# Patient Record
Sex: FEMALE | Race: WHITE | Employment: OTHER | URBAN - METROPOLITAN AREA
[De-identification: names, ages, dates, MRNs, and addresses within clinical notes are randomized per-mention and may not be internally consistent; named-entity substitution may affect disease eponyms.]

---

## 2017-01-13 PROBLEM — J44.9 CHRONIC OBSTRUCTIVE PULMONARY DISEASE (HCC): Status: ACTIVE | Noted: 2017-01-13

## 2017-01-13 PROBLEM — E11.8 CONTROLLED TYPE 2 DIABETES MELLITUS WITH COMPLICATION, WITHOUT LONG-TERM CURRENT USE OF INSULIN (HCC): Status: ACTIVE | Noted: 2017-01-13

## 2017-02-08 PROBLEM — J44.1 COPD WITH EXACERBATION (HCC): Status: ACTIVE | Noted: 2017-01-13

## 2017-02-08 PROBLEM — I20.0 UNSTABLE ANGINA (HCC): Status: ACTIVE | Noted: 2017-02-08

## 2017-02-08 PROBLEM — R55 SYNCOPE AND COLLAPSE: Status: ACTIVE | Noted: 2017-02-08

## 2017-02-09 PROBLEM — R07.9 CHEST PAIN: Status: ACTIVE | Noted: 2017-02-09

## 2017-02-16 ENCOUNTER — OFFICE VISIT (OUTPATIENT)
Dept: CARDIOLOGY CLINIC | Age: 72
End: 2017-02-16

## 2017-02-16 VITALS
HEIGHT: 66 IN | DIASTOLIC BLOOD PRESSURE: 80 MMHG | SYSTOLIC BLOOD PRESSURE: 118 MMHG | BODY MASS INDEX: 41.95 KG/M2 | WEIGHT: 261 LBS | HEART RATE: 78 BPM

## 2017-02-16 DIAGNOSIS — I25.10 CAD IN NATIVE ARTERY: Primary | ICD-10-CM

## 2017-02-16 DIAGNOSIS — R42 DIZZINESS: ICD-10-CM

## 2017-02-16 DIAGNOSIS — I10 ESSENTIAL HYPERTENSION: ICD-10-CM

## 2017-02-16 PROCEDURE — G8417 CALC BMI ABV UP PARAM F/U: HCPCS | Performed by: INTERNAL MEDICINE

## 2017-02-16 PROCEDURE — G8427 DOCREV CUR MEDS BY ELIG CLIN: HCPCS | Performed by: INTERNAL MEDICINE

## 2017-02-16 PROCEDURE — 99214 OFFICE O/P EST MOD 30 MIN: CPT | Performed by: INTERNAL MEDICINE

## 2017-02-16 PROCEDURE — 4040F PNEUMOC VAC/ADMIN/RCVD: CPT | Performed by: INTERNAL MEDICINE

## 2017-02-16 PROCEDURE — 4004F PT TOBACCO SCREEN RCVD TLK: CPT | Performed by: INTERNAL MEDICINE

## 2017-02-16 PROCEDURE — G8484 FLU IMMUNIZE NO ADMIN: HCPCS | Performed by: INTERNAL MEDICINE

## 2017-02-16 PROCEDURE — G8399 PT W/DXA RESULTS DOCUMENT: HCPCS | Performed by: INTERNAL MEDICINE

## 2017-02-16 PROCEDURE — G8598 ASA/ANTIPLAT THER USED: HCPCS | Performed by: INTERNAL MEDICINE

## 2017-02-16 PROCEDURE — 1111F DSCHRG MED/CURRENT MED MERGE: CPT | Performed by: INTERNAL MEDICINE

## 2017-02-16 PROCEDURE — 3017F COLORECTAL CA SCREEN DOC REV: CPT | Performed by: INTERNAL MEDICINE

## 2017-02-16 PROCEDURE — 1123F ACP DISCUSS/DSCN MKR DOCD: CPT | Performed by: INTERNAL MEDICINE

## 2017-02-16 PROCEDURE — 1090F PRES/ABSN URINE INCON ASSESS: CPT | Performed by: INTERNAL MEDICINE

## 2017-02-16 PROCEDURE — 3014F SCREEN MAMMO DOC REV: CPT | Performed by: INTERNAL MEDICINE

## 2017-06-01 RX ORDER — TRIAMCINOLONE ACETONIDE 1 MG/G
CREAM TOPICAL
Qty: 30 G | Refills: 1 | Status: SHIPPED | OUTPATIENT
Start: 2017-06-01 | End: 2020-12-11 | Stop reason: SDUPTHER

## 2017-08-16 ENCOUNTER — TELEPHONE (OUTPATIENT)
Dept: SLEEP MEDICINE | Age: 72
End: 2017-08-16

## 2017-08-16 ENCOUNTER — OFFICE VISIT (OUTPATIENT)
Dept: CARDIOLOGY CLINIC | Age: 72
End: 2017-08-16

## 2017-08-16 VITALS
SYSTOLIC BLOOD PRESSURE: 124 MMHG | HEART RATE: 50 BPM | BODY MASS INDEX: 41.78 KG/M2 | DIASTOLIC BLOOD PRESSURE: 64 MMHG | WEIGHT: 260 LBS | HEIGHT: 66 IN

## 2017-08-16 DIAGNOSIS — I25.10 ASHD (ARTERIOSCLEROTIC HEART DISEASE): Primary | ICD-10-CM

## 2017-08-16 DIAGNOSIS — I10 HTN (HYPERTENSION), BENIGN: ICD-10-CM

## 2017-08-16 DIAGNOSIS — I25.10 CORONARY ARTERY DISEASE INVOLVING NATIVE CORONARY ARTERY OF NATIVE HEART WITHOUT ANGINA PECTORIS: ICD-10-CM

## 2017-08-16 DIAGNOSIS — R06.83 SNORING: ICD-10-CM

## 2017-08-16 DIAGNOSIS — E66.01 MORBID OBESITY DUE TO EXCESS CALORIES (HCC): ICD-10-CM

## 2017-08-16 DIAGNOSIS — R60.0 BILATERAL EDEMA OF LOWER EXTREMITY: ICD-10-CM

## 2017-08-16 PROCEDURE — 93000 ELECTROCARDIOGRAM COMPLETE: CPT | Performed by: INTERNAL MEDICINE

## 2017-08-16 PROCEDURE — 99214 OFFICE O/P EST MOD 30 MIN: CPT | Performed by: INTERNAL MEDICINE

## 2017-08-16 PROCEDURE — G8598 ASA/ANTIPLAT THER USED: HCPCS | Performed by: INTERNAL MEDICINE

## 2017-08-16 PROCEDURE — G8399 PT W/DXA RESULTS DOCUMENT: HCPCS | Performed by: INTERNAL MEDICINE

## 2017-08-16 PROCEDURE — 1036F TOBACCO NON-USER: CPT | Performed by: INTERNAL MEDICINE

## 2017-08-16 PROCEDURE — G8427 DOCREV CUR MEDS BY ELIG CLIN: HCPCS | Performed by: INTERNAL MEDICINE

## 2017-08-16 PROCEDURE — 3014F SCREEN MAMMO DOC REV: CPT | Performed by: INTERNAL MEDICINE

## 2017-08-16 PROCEDURE — 3017F COLORECTAL CA SCREEN DOC REV: CPT | Performed by: INTERNAL MEDICINE

## 2017-08-16 PROCEDURE — G8417 CALC BMI ABV UP PARAM F/U: HCPCS | Performed by: INTERNAL MEDICINE

## 2017-08-16 PROCEDURE — 1123F ACP DISCUSS/DSCN MKR DOCD: CPT | Performed by: INTERNAL MEDICINE

## 2017-08-16 PROCEDURE — 1090F PRES/ABSN URINE INCON ASSESS: CPT | Performed by: INTERNAL MEDICINE

## 2017-08-16 PROCEDURE — 4040F PNEUMOC VAC/ADMIN/RCVD: CPT | Performed by: INTERNAL MEDICINE

## 2017-08-22 ENCOUNTER — HOSPITAL ENCOUNTER (OUTPATIENT)
Dept: OTHER | Age: 72
Discharge: HOME | End: 2017-08-22
Attending: INTERNAL MEDICINE | Admitting: INTERNAL MEDICINE

## 2017-08-22 DIAGNOSIS — I10 HTN (HYPERTENSION), BENIGN: ICD-10-CM

## 2017-08-22 DIAGNOSIS — R60.0 BILATERAL EDEMA OF LOWER EXTREMITY: ICD-10-CM

## 2017-08-22 LAB
ANION GAP SERPL CALCULATED.3IONS-SCNC: 17 MMOL/L (ref 3–16)
BUN BLDV-MCNC: 12 MG/DL (ref 7–20)
CALCIUM SERPL-MCNC: 9.6 MG/DL (ref 8.3–10.6)
CHLORIDE BLD-SCNC: 99 MMOL/L (ref 99–110)
CO2: 23 MMOL/L (ref 21–32)
CREAT SERPL-MCNC: 0.7 MG/DL (ref 0.6–1.2)
GFR AFRICAN AMERICAN: >60
GFR NON-AFRICAN AMERICAN: >60
GLUCOSE BLD-MCNC: 165 MG/DL (ref 70–99)
POTASSIUM SERPL-SCNC: 3.9 MMOL/L (ref 3.5–5.1)
SODIUM BLD-SCNC: 139 MMOL/L (ref 136–145)

## 2017-08-30 ENCOUNTER — TELEPHONE (OUTPATIENT)
Dept: CARDIOLOGY CLINIC | Age: 72
End: 2017-08-30

## 2017-09-27 PROBLEM — I20.0 UNSTABLE ANGINA (HCC): Status: ACTIVE | Noted: 2017-09-27

## 2017-09-27 PROBLEM — I20.0 UNSTABLE ANGINA (HCC): Status: RESOLVED | Noted: 2017-02-08 | Resolved: 2017-09-27

## 2017-09-27 PROBLEM — E87.70 FLUID OVERLOAD: Status: ACTIVE | Noted: 2017-09-27

## 2017-10-05 ENCOUNTER — HOSPITAL ENCOUNTER (OUTPATIENT)
Dept: OTHER | Age: 72
Discharge: HOME | End: 2017-10-05
Attending: NURSE PRACTITIONER | Admitting: NURSE PRACTITIONER

## 2017-10-05 ENCOUNTER — OFFICE VISIT (OUTPATIENT)
Dept: CARDIOLOGY CLINIC | Age: 72
End: 2017-10-05

## 2017-10-05 VITALS
WEIGHT: 264 LBS | HEART RATE: 101 BPM | DIASTOLIC BLOOD PRESSURE: 76 MMHG | HEIGHT: 66 IN | SYSTOLIC BLOOD PRESSURE: 138 MMHG | OXYGEN SATURATION: 94 % | BODY MASS INDEX: 42.43 KG/M2

## 2017-10-05 DIAGNOSIS — I10 HTN (HYPERTENSION), BENIGN: ICD-10-CM

## 2017-10-05 DIAGNOSIS — R06.02 SOB (SHORTNESS OF BREATH): ICD-10-CM

## 2017-10-05 DIAGNOSIS — I25.10 CORONARY ARTERY DISEASE INVOLVING NATIVE CORONARY ARTERY OF NATIVE HEART WITHOUT ANGINA PECTORIS: Primary | ICD-10-CM

## 2017-10-05 LAB
ANION GAP SERPL CALCULATED.3IONS-SCNC: 11 MMOL/L (ref 3–16)
BUN BLDV-MCNC: 17 MG/DL (ref 7–20)
CALCIUM SERPL-MCNC: 10.3 MG/DL (ref 8.3–10.6)
CHLORIDE BLD-SCNC: 102 MMOL/L (ref 99–110)
CO2: 28 MMOL/L (ref 21–32)
CREAT SERPL-MCNC: 0.8 MG/DL (ref 0.6–1.2)
GFR AFRICAN AMERICAN: >60
GFR NON-AFRICAN AMERICAN: >60
GLUCOSE BLD-MCNC: 97 MG/DL (ref 70–99)
POTASSIUM SERPL-SCNC: 4.1 MMOL/L (ref 3.5–5.1)
PRO-BNP: 148 PG/ML (ref 0–124)
SODIUM BLD-SCNC: 141 MMOL/L (ref 136–145)

## 2017-10-05 PROCEDURE — 99214 OFFICE O/P EST MOD 30 MIN: CPT | Performed by: NURSE PRACTITIONER

## 2017-10-05 NOTE — PROGRESS NOTES
[address]     Outpatient Follow Up Note    CHIEF COMPLAINT / HPI: Hospital Follow Up secondary to chest pain     Hospital record has been reviewed  Hospital Course progressed as follows( no discharge summary available at time of OV today)    recurrent chest discomfort, weight gain and lower extremity edema. Has been in the ER twice for recurrent CP and SOB. Describes increased weight from 258 to 265 lbs in 1 week with progressive lower extremity edema. This was associated withsharp L parasternal CP when getting up with sensation of spinning. Discomfort was sharp and into the L arm. Denies reproducible exertional chest discomfort, palpitations or syncope. ECG and enzymes are negative--Cardiac cath 2016 showed non-obstructive CAD. ~Ice to sore chest wall, support hose, Lexiscan Myoview/echo, sleep study. ~Fluid overload (9/27/2017)  Edema resolved--weight 271  Reduce lasix at D/C. Will try Demadex 20mg/day at D/C     Javier Mosquera is 71 y.o. female who presents today for a routine follow up after a recent hospitalization related to the above mentioned issues. She recalls waking with CP. She feel trying to get out of the chair. She went to the ER and released. She continued to feel poorly. She got a sharp pain in her Lt chest with arm radiation. Subjective:   Since the time of discharge, the patient admits their symptoms have improved. She's felt very tired / no energy. She's been feeling better each day but still has some days better than others. She denies significant chest pain. She had one sharp twinge in her chest with a little radiation to her Lt arm the morning after discharge. She took a NTG and xanax: it lasted about 3 minutes. She hasn't had any since. There is SOB/MUNROE: no better no worse. She has 2 pillow orthopnea, denies PND. Sometimes her legs swell. She will start being weighed in the near future. The patient is not experiencing palpitations.  She has some light headedness making quick position changes. These symptoms are improving over the last few days. Her BP today was 140/90 before taking her medications. With regard to medication therapy the patient has been compliant with prescribed regimen. They have tolerated therapy to date.      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    Chronic obstructive pulmonary disease ([facility] [facility] 1/13/2017    Claustrophobia     COPD (chronic obstructive pulmonary disease) ([facility]     Gastroesophageal reflux disease without esophagitis 3/24/2016    Hiatal hernia     Hypercholesteremia     Hypertension     Movement disorder arthritis    Pneumonia     Type II or unspecified type diabetes mellitus without mention of complication, not stated as uncontrolled     Unspecified cerebral artery occlusion with cerebral infarction      Social History:    History   Smoking Status    Former Smoker    Packs/day: 0.25    Years: 49.00    Types: Cigarettes    Quit date: 6/16/2017   Smokeless Tobacco    Never Used     Comment: only smoke when real stressed  Nicotine patch     Current Medications:  Current Outpatient Prescriptions   Medication Sig Dispense Refill    torsemide (DEMADEX) 20 MG tablet Take 1 tablet by mouth daily 30 tablet 3    pravastatin (PRAVACHOL) 80 MG tablet Take 80 mg by mouth daily      gabapentin (NEURONTIN) 100 MG capsule Take 1 capsule by mouth 3 times daily 90 capsule 0    ipratropium-albuterol (DUONEB) 0.5-2.5 (3) MG/3ML SOLN nebulizer solution Inhale 3 mLs into the lungs every 4 hours (while awake) 120 vial 0    ALPRAZolam (XANAX) 1 MG tablet Take 1 tablet by mouth 3 times daily as needed for Anxiety 90 tablet 0    isosorbide mononitrate (IMDUR) 60 MG extended release tablet Take 1 tablet by mouth daily 30 tablet 2    omeprazole (PRILOSEC) 40 MG delayed release capsule Take 1 capsule by mouth daily 30 capsule 3    cilostazol (PLETAL) 100 MG tablet Take 1 tablet by mouth 2 times daily 60 tablet 3    butalbital-acetaminophen-caffeine (FIORICET, ESGIC) -40 MG per tablet Take 1 tablet by mouth every 6 hours as needed for Headaches 90 tablet 1    metoprolol tartrate (LOPRESSOR) 25 MG tablet Take 0.5 tablets by mouth 2 times daily 60 tablet 3    lisinopril (PRINIVIL;ZESTRIL) 2.5 MG tablet Take 1 tablet by mouth daily 30 tablet 2    glipiZIDE (GLUCOTROL) 5 MG tablet Take 1 tablet by mouth 2 times daily (before meals) 60 tablet 2    triamcinolone (KENALOG) 0.1 % cream Apply topically 2 times daily. 30 g 1    albuterol sulfate HFA (VENTOLIN HFA) 108 (90 BASE) MCG/ACT inhaler Inhale 2 puffs into the lungs every 6 hours as needed for Wheezing 1 Inhaler 3    nystatin (MYCOSTATIN) 499139 UNIT/GM powder Affected area 1 Bottle 5    aspirin 81 MG tablet Take 81 mg by mouth daily      nitroGLYCERIN (NITROSTAT) 0.4 MG SL tablet Place 1 tablet under the tongue every 5 minutes as needed for Chest pain. 30 tablet 0    Respiratory Therapy Supplies (NEBULIZER) by Does not apply route. PRN BREATHING TREATMENTS, UNSURE OF MED       glucose blood VI test strips (ASCENSIA AUTODISC VI;ONE TOUCH ULTRA TEST VI) strip 1 each by In Vitro route daily As needed. 100 each 3     No current facility-administered medications for this visit. REVIEW OF SYSTEMS:   CONSTITUTIONAL: No major weight gain or loss, fatigue, weakness, night sweats or fever. There's been no change in energy level, sleep pattern, or activity level. HEENT: No new vision difficulties or ringing in the ears. RESPIRATORY: No new SOB, PND, orthopnea or cough.    CARDIOVASCULAR: See HPI  GI: No nausea, vomiting, diarrhea, constipation, abdominal pain or changes in bowel habits. : No urinary frequency, urgency, incontinence hematuria or dysuria. SKIN: No cyanosis or skin lesions. MUSCULOSKELETAL: No new muscle or joint pain. NEUROLOGICAL: No syncope or TIA-like symptoms. PSYCHIATRIC: No anxiety, pain, insomnia or depression    Objective:   PHYSICAL EXAM:      Vitals:    10/05/17 1440 10/05/17 1444   BP: 130/86 138/76   Site: Right Arm Right Arm   Position: Sitting    Cuff Size: Large Adult Large Adult   Pulse: 98 101   SpO2: 95% 94%   Weight: 264 lb (119.7 kg)    Height: 5' 6\" (1.676 m)        VITALS:  /86 (Site: Right Arm, Position: Sitting, Cuff Size: Large Adult)  Pulse 101  Ht 5' 6\" (1.676 m)  Wt 264 lb (119.7 kg)  SpO2 94%  BMI 42.61 kg/m2    CONSTITUTIONAL: Cooperative, no apparent distress, and appears well nourished / obese  NEUROLOGIC:  Awake and orientated to person, place and time. PSYCH: Calm affect. SKIN: Warm and dry. HEENT: Sclera non-icteric, normocephalic, neck supple, no elevation of JVP, normal carotid pulses with no bruits and thyroid normal size. LUNGS:  No increased work of breathing and clear to auscultation, no crackles or wheezing. CARDIOVASCULAR:  Regular rate and rhythm with no murmurs, gallops, rubs, or abnormal heart sounds, normal PMI. The apical impulses not displaced. Heart tones are crisp and normal                                                                                            Cervical veins are not engorged                 JVP less than 8 cm H2O                                                                              The carotid upstroke is normal in amplitude and contour without delay or bruit    ABDOMEN:  Normal bowel sounds, non-distended and non-tender to palpation   EXT: bart LE edema Rt > Lt, no calf tenderness. Pulses are present bilaterally.     DATA:    Lab Results   Component Value Date    ALT 12 09/26/2017    AST 19 09/26/2017    ALKPHOS 79 09/26/2017    BILITOT <0.2 09/26/2017     Lab Results   Component Value Date    CREATININE 0.8 09/29/2017    BUN 20 09/29/2017     09/29/2017    K 4.0 09/29/2017    CL 98 (L) 09/29/2017    CO2 28 09/29/2017     Lab Results   Component Value Date    TSH 2.34 06/06/2011     Lab Results   Component Value Date    WBC 13.5 (H) 09/29/2017    HGB 14.1 09/29/2017    HCT 42.3 09/29/2017    MCV 89.3 09/29/2017     09/29/2017     No components found for: CHLPL  Lab Results   Component Value Date    TRIG 272 (H) 03/05/2015    TRIG 181 (H) 05/02/2013    TRIG 203 (H) 04/19/2012     Lab Results   Component Value Date    HDL 23 (L) 03/05/2015    HDL 35 (L) 05/02/2013    HDL 32 (L) 04/19/2012     Lab Results   Component Value Date    LDLCALC 96 03/05/2015    LDLCALC 83 05/02/2013    LDLCALC 82 04/19/2012     Lab Results   Component Value Date    LABVLDL 54 03/05/2015    LABVLDL 36 05/02/2013    LABVLDL 41 04/19/2012     Radiology Review:  Pertinent images / reports were reviewed as a part of this visit and reveals the following:    Last Echo: Feb '17:  Summary  Technically difficult study. Normal left ventricle size, wall thickness and systolic function with an EF 55%  Mild mitral regurgitation is present.   Mild tricuspid regurgitation with RVSP estimated at 36 mmHg.     Last Stress Test: 9/27/17:  Summary    There is normal isotope uptake at stress and rest. There is no evidence of    myocardial ischemia or scar.    Normal LV function.    Overall findings represent a low risk scan. Assessment:     1. Coronary artery disease involving native coronary artery of native heart without angina pectoris   ~atypical chest discomfort  ~non-obst disease by cath; nuclear stress neg for ischemia  ~has used NTG SL along with xanax. Thinks panic attacks cause CP  ~statin / ASA / BB    2.  SOB (shortness of breath)   ~stable: weight down 7# over the past week of discharge  ~tolerating torsemide   ~proBNP 342 on adm  ~home SaPO2 92-97% on RA; sleeps using O2 NC   ~expecting to have sleep study Basic Metabolic Panel    Brain Natriuretic Peptide   3. HTN (hypertension), benign   ~reasonable         Patient  is stable since hospital discharge. Plan:  BNP/BMP today as planned   F/U as scheduled 11/2    I have addressed the patient's cardiac risk factors and adjusted pharmacologic treatment as needed. In addition, I have reinforced the need for patient directed risk factor modification. Further evaluation will be based upon the patient's clinical course and testing results. All questions and concerns were addressed to the patient. Alternatives to  treatment were discussed. The patient  currently  is not smoking: quit a few months ago. The risks related to smoking were reviewed with the patient. Recommend maintaining a smoke-free lifestyle. Antiplatelet therapy has been recommended / prescribed for this patient. Education conducted on adverse reactions including bleeding was discussed. The patient verbalizes understanding. Pt is on a BB  Pt is on an ace-i  Pt is on a statin      Saturated fat diet discussed  Exercise program discussed    Thank you for allowing to us to participate in the care of Triamarilis Mcmullen.       Svitlana  Documentation of today's visit sent to PCP

## 2017-10-05 NOTE — MR AVS SNAPSHOT
your BMI, the greater your risk of heart disease, high blood pressure, type 2 diabetes, stroke, gallstones, arthritis, sleep apnea, and certain cancers. BMI is not perfect. It may overestimate body fat in athletes and people who are more muscular. Even a small weight loss (between 5 and 10 percent of your current weight) by decreasing your calorie intake and becoming more physically active will help lower your risk of developing or worsening diseases associated with obesity. Learn more at: [employer]          Instructions    Labs today     Sleep apnea            Medications and Orders      Your Current Medications Are              torsemide (DEMADEX) 20 MG tablet Take 1 tablet by mouth daily    pravastatin (PRAVACHOL) 80 MG tablet Take 80 mg by mouth daily    gabapentin (NEURONTIN) 100 MG capsule Take 1 capsule by mouth 3 times daily    ipratropium-albuterol (DUONEB) 0.5-2.5 (3) MG/3ML SOLN nebulizer solution Inhale 3 mLs into the lungs every 4 hours (while awake)    ALPRAZolam (XANAX) 1 MG tablet Take 1 tablet by mouth 3 times daily as needed for Anxiety    isosorbide mononitrate (IMDUR) 60 MG extended release tablet Take 1 tablet by mouth daily    omeprazole (PRILOSEC) 40 MG delayed release capsule Take 1 capsule by mouth daily    cilostazol (PLETAL) 100 MG tablet Take 1 tablet by mouth 2 times daily    butalbital-acetaminophen-caffeine (FIORICET, ESGIC) -40 MG per tablet Take 1 tablet by mouth every 6 hours as needed for Headaches    metoprolol tartrate (LOPRESSOR) 25 MG tablet Take 0.5 tablets by mouth 2 times daily    lisinopril (PRINIVIL;ZESTRIL) 2.5 MG tablet Take 1 tablet by mouth daily    glipiZIDE (GLUCOTROL) 5 MG tablet Take 1 tablet by mouth 2 times daily (before meals)    triamcinolone (KENALOG) 0.1 % cream Apply topically 2 times daily.     albuterol sulfate HFA (VENTOLIN HFA) 108 (90 BASE) MCG/ACT inhaler Inhale 2 puffs into the lungs every 6 hours as needed for Wheezing    nystatin (MYCOSTATIN) 760397 UNIT/GM powder Affected area    aspirin 81 MG tablet Take 81 mg by mouth daily    nitroGLYCERIN (NITROSTAT) 0.4 MG SL tablet Place 1 tablet under the tongue every 5 minutes as needed for Chest pain. Respiratory Therapy Supplies (NEBULIZER) by Does not apply route. PRN BREATHING TREATMENTS, UNSURE OF MED     glucose blood VI test strips (ASCENSIA AUTODISC VI;ONE TOUCH ULTRA TEST VI) strip 1 each by In Vitro route daily As needed.       Allergies              Morphine Shortness Of Breath    Codeine     Chest pain    Hydromorphone Other (See Comments)    hallucinations  Hallucinations    But will take if needed in an emergency    Vicodin [Hydrocodone-acetaminophen] Hives      We Ordered/Performed the following           Basic Metabolic Panel     Brain Natriuretic Peptide     Comments:    Run as proBNP please         Additional Information        Basic Information     Date Of Birth Sex Race Ethnicity Preferred Language    1945 Female White Non-/Non  English      Problem List as of 10/5/2017  Date Reviewed: 10/5/2017                Hypoxia    CAD (coronary artery disease)    Hyperlipemia    Unstable angina (HCC)    Fluid overload    Bilateral edema of lower extremity    Chest pain    Syncope    Controlled type 2 diabetes mellitus with complication, without long-term current use of insulin (HCC)    COPD with exacerbation ([facility])    PVC (premature ventricular contraction)    Dizziness    Light headedness    Primary osteoarthritis involving multiple joints    Gastroesophageal reflux disease without esophagitis    Hemispheric carotid artery syndrome    DM2 (diabetes mellitus, type 2) ([facility])    Malignant hypertension    Body mass index 40.0-44.9, adult ([address]    Morbid obesity ([facility]    Essential hypertension    Type II or unspecified type diabetes mellitus without mention of complication, uncontrolled COPD (chronic obstructive pulmonary disease) ([facility]      Immunizations as of 10/5/2017     Name Date    Influenza Virus Vaccine 9/5/2017, 9/10/2015, 8/11/2014, 10/11/2013, 10/18/2010    Influenza, MDCK Ambrocioena Liguori, with Preservative 9/8/2017    Influenza, Triv, 3 years and older, IM 10/20/2016    Pneumococcal 13-valent Conjugate ([id number]) 2/8/2017    Pneumococcal Polysaccharide ([id number]) 7/23/2015, 12/24/2010      Preventive Care        Date Due    Hepatitis C screening is recommended for all adults regardless of risk factors born between [facility] at least once (lifetime) who have never been tested. 1945    Urine Check For Kidney Problems 4/8/1964    Tetanus Combination Vaccine (1 - Tdap) 4/8/1965    Cholesterol Screening 3/5/2016    Eye Exam By An Eye Doctor 7/23/2016    Diabetic Foot Exam 8/18/2017    Mammograms are recommended every 2 years for low/average risk patients aged 48 - 69, and every year for high risk patients per updated national guidelines. However these guidelines can be individualized by your provider. 10/29/2017    Hemoglobin A1C (Test For Long-Term Glucose Control) 9/27/2018    Colonoscopy 5/12/2020            MyChart Signup           Our records indicate that you have declined MyChart signup.

## 2017-10-06 ENCOUNTER — TELEPHONE (OUTPATIENT)
Dept: CARDIOLOGY CLINIC | Age: 72
End: 2017-10-06

## 2017-10-09 ENCOUNTER — TELEPHONE (OUTPATIENT)
Dept: CARDIOLOGY CLINIC | Age: 72
End: 2017-10-09

## 2017-11-02 ENCOUNTER — OFFICE VISIT (OUTPATIENT)
Dept: CARDIOLOGY CLINIC | Age: 72
End: 2017-11-02

## 2017-11-02 VITALS
HEART RATE: 76 BPM | WEIGHT: 269 LBS | DIASTOLIC BLOOD PRESSURE: 70 MMHG | HEIGHT: 66 IN | BODY MASS INDEX: 43.23 KG/M2 | SYSTOLIC BLOOD PRESSURE: 126 MMHG

## 2017-11-02 DIAGNOSIS — M79.605 PAIN IN BOTH LOWER EXTREMITIES: Primary | ICD-10-CM

## 2017-11-02 DIAGNOSIS — M79.89 LEG SWELLING: ICD-10-CM

## 2017-11-02 DIAGNOSIS — M79.604 PAIN IN BOTH LOWER EXTREMITIES: Primary | ICD-10-CM

## 2017-11-02 DIAGNOSIS — R00.1 BRADYCARDIA: ICD-10-CM

## 2017-11-02 DIAGNOSIS — I73.9 CLAUDICATION (HCC): ICD-10-CM

## 2017-11-02 PROCEDURE — 3017F COLORECTAL CA SCREEN DOC REV: CPT | Performed by: INTERNAL MEDICINE

## 2017-11-02 PROCEDURE — 1090F PRES/ABSN URINE INCON ASSESS: CPT | Performed by: INTERNAL MEDICINE

## 2017-11-02 PROCEDURE — G8482 FLU IMMUNIZE ORDER/ADMIN: HCPCS | Performed by: INTERNAL MEDICINE

## 2017-11-02 PROCEDURE — 99214 OFFICE O/P EST MOD 30 MIN: CPT | Performed by: INTERNAL MEDICINE

## 2017-11-02 PROCEDURE — G8427 DOCREV CUR MEDS BY ELIG CLIN: HCPCS | Performed by: INTERNAL MEDICINE

## 2017-11-02 PROCEDURE — 1123F ACP DISCUSS/DSCN MKR DOCD: CPT | Performed by: INTERNAL MEDICINE

## 2017-11-02 PROCEDURE — 1036F TOBACCO NON-USER: CPT | Performed by: INTERNAL MEDICINE

## 2017-11-02 PROCEDURE — G8598 ASA/ANTIPLAT THER USED: HCPCS | Performed by: INTERNAL MEDICINE

## 2017-11-02 PROCEDURE — G8399 PT W/DXA RESULTS DOCUMENT: HCPCS | Performed by: INTERNAL MEDICINE

## 2017-11-02 PROCEDURE — 93000 ELECTROCARDIOGRAM COMPLETE: CPT | Performed by: INTERNAL MEDICINE

## 2017-11-02 PROCEDURE — 3014F SCREEN MAMMO DOC REV: CPT | Performed by: INTERNAL MEDICINE

## 2017-11-02 PROCEDURE — G8417 CALC BMI ABV UP PARAM F/U: HCPCS | Performed by: INTERNAL MEDICINE

## 2017-11-02 PROCEDURE — 4040F PNEUMOC VAC/ADMIN/RCVD: CPT | Performed by: INTERNAL MEDICINE

## 2017-11-02 NOTE — PATIENT INSTRUCTIONS
Arterial us for claudication. ECG today. Sleep study to evaluate sleep apnea. Coq10 400 mg daily OTC at least 2 months trial  Fluid restriction no mor 64 oz day. Support hose knee high. Follow up in 3-4 months.

## 2017-11-02 NOTE — PROGRESS NOTES
[address]   Cardiac Consultation    Referring Provider:  Terrence Villalobos MD     Chief Complaint   Patient presents with    3 Month Follow-Up    Hypertension    Coronary Artery Disease    Numbness     in feet along with some pain     . Symptomatic bradycardia--B blocker stopped    History of Present Illness:   72 yo with several week hx of  Lower extremity edema, MUNROE, and bilateral lower extremity discomfort with activity. Occ chest discomfort unchanged. Legs and feet concern her the most and the slow HR. Patient has been advised on the importance of regular exercise of at least 20-30 minutes daily alternating with aerobic and isometric activities. Currently works with PT that comes to the house. Past Medical History:   has a past medical history of Acute MI ([facility] Acute pyelonephritis; Anxiety and depression; Arthritis; CAD (coronary artery disease); Cancer ([facility] Cancer ([facility] Cancer of skin of leg; Chronic obstructive pulmonary disease ([facility] Claustrophobia; COPD (chronic obstructive pulmonary disease) ([facility] Gastroesophageal reflux disease without esophagitis; Hiatal hernia; Hypercholesteremia; Hypertension; Movement disorder; Pneumonia; Type II or unspecified type diabetes mellitus without mention of complication, not stated as uncontrolled; and Unspecified cerebral artery occlusion with cerebral infarction. Surgical History:   has a past surgical history that includes Cardiac surgery; Gallbladder surgery; Hysterectomy; Appendectomy; Cholecystectomy; Colonoscopy; Upper gastrointestinal endoscopy (4/20/12); Endoscopy, colon, diagnostic; and Tear duct surgery. Social History:   reports that she quit smoking about 4 months ago. Her smoking use included Cigarettes. She has a 12.25 pack-year smoking history. She has never used smokeless tobacco. She reports that she drinks about 0.6 oz of alcohol per week . She reports that she does not use drugs.      Family History:  family history includes Cancer in her father, mother, and sister; Heart Disease in her brother, mother, and sister. Home Medications:  Outpatient Encounter Prescriptions as of 11/2/2017   Medication Sig Dispense Refill    lisinopril (PRINIVIL;ZESTRIL) 2.5 MG tablet Take 1 tablet by mouth daily 30 tablet 2    ALPRAZolam (XANAX) 1 MG tablet Take 1 tablet by mouth 3 times daily as needed for Anxiety 90 tablet 0    omeprazole (PRILOSEC) 40 MG delayed release capsule Take 1 capsule by mouth daily 30 capsule 3    cilostazol (PLETAL) 100 MG tablet Take 1 tablet by mouth 2 times daily 60 tablet 3    glipiZIDE (GLUCOTROL) 5 MG tablet Take 1 tablet by mouth 2 times daily (before meals) 60 tablet 2    butalbital-acetaminophen-caffeine (FIORICET, ESGIC) -40 MG per tablet Take 1 tablet by mouth every 6 hours as needed for Headaches 90 tablet 1    gabapentin (NEURONTIN) 100 MG capsule Take 1 capsule by mouth 3 times daily 90 capsule 0    torsemide (DEMADEX) 20 MG tablet Take 1 tablet by mouth daily 30 tablet 3    pravastatin (PRAVACHOL) 80 MG tablet Take 1 tablet by mouth daily 30 tablet 2    ipratropium-albuterol (DUONEB) 0.5-2.5 (3) MG/3ML SOLN nebulizer solution Inhale 3 mLs into the lungs every 4 hours (while awake) 120 vial 0    isosorbide mononitrate (IMDUR) 60 MG extended release tablet Take 1 tablet by mouth daily 30 tablet 2    triamcinolone (KENALOG) 0.1 % cream Apply topically 2 times daily. 30 g 1    albuterol sulfate HFA (VENTOLIN HFA) 108 (90 BASE) MCG/ACT inhaler Inhale 2 puffs into the lungs every 6 hours as needed for Wheezing 1 Inhaler 3    nystatin (MYCOSTATIN) 469900 UNIT/GM powder Affected area 1 Bottle 5    aspirin 81 MG tablet Take 81 mg by mouth daily      nitroGLYCERIN (NITROSTAT) 0.4 MG SL tablet Place 1 tablet under the tongue every 5 minutes as needed for Chest pain.  30 tablet 0    glucose blood VI test strips (ASCENSIA AUTODISC VI;ONE TOUCH ULTRA TEST VI) strip 1 each by In Vitro Auscultation: Normal breath sounds without dullness  Cardiovascular:  · The apical impulses not displaced  · Heart tones are crisp and normal  · Cervical veins are not engorged  · Normal S1S2, No S3, No Murmur  · Peripheral pulses are symmetrical and full  Extremities:  · There is no clubbing, cyanosis of the extremities. · No edema  · Femoral Arteries: 2+ and equal  · Pedal Pulses: 2+ and   · No masses or tenderness  · Liver/Spleen: No Abnormalities Noted  Neurological/Psychiatric:  · Alert and oriented in all spheres  · Moves all extremities well  · Exhibits normal gait balance and coordination  · No abnormalities of mood, affect, memory, mentation, or behavior are noted      Assessment:   Edema-Bilateral leg pain with activity--Arterial Doplers, fluid restrict, support Hose-20-30mmHG, Trial of CoQ10 200-400mg/day.   COPD  Obesity  Prob AKIL--Reschedule sleep study  Chronic CP--Stable  Bradycardia -Symptomatic--Holter and ECG to evaluate    Plan:  Sleep Study   Arterial Doppler legs  CoQ10 200-400mg/day  Fluid restrict 1200cc  Support Hose  ECG--NSR with PVCs  Holter  OV X 3-4months    Thank you for allowing me to participate in the care of this individual.      Gerald Kramer M.D., [facility]

## 2017-11-08 PROCEDURE — 93224 XTRNL ECG REC UP TO 48 HRS: CPT | Performed by: INTERNAL MEDICINE

## 2017-11-09 ENCOUNTER — TELEPHONE (OUTPATIENT)
Dept: CARDIOLOGY CLINIC | Age: 72
End: 2017-11-09

## 2017-11-15 ENCOUNTER — HOSPITAL ENCOUNTER (OUTPATIENT)
Dept: VASCULAR LAB | Age: 72
Discharge: HOME | End: 2017-11-15
Attending: INTERNAL MEDICINE | Admitting: INTERNAL MEDICINE

## 2017-11-15 DIAGNOSIS — M79.604 PAIN OF RIGHT LEG: ICD-10-CM

## 2017-11-15 DIAGNOSIS — M79.89 LEG SWELLING: ICD-10-CM

## 2017-11-15 DIAGNOSIS — M79.604 PAIN IN BOTH LOWER EXTREMITIES: ICD-10-CM

## 2017-11-15 DIAGNOSIS — M79.605 PAIN IN BOTH LOWER EXTREMITIES: ICD-10-CM

## 2017-11-15 DIAGNOSIS — I73.9 CLAUDICATION (HCC): ICD-10-CM

## 2017-12-28 PROBLEM — R07.9 CHEST PAIN: Status: ACTIVE | Noted: 2017-12-28

## 2018-01-19 PROBLEM — F41.9 ANXIETY: Status: ACTIVE | Noted: 2018-01-19

## 2018-02-21 ENCOUNTER — TELEPHONE (OUTPATIENT)
Dept: OTHER | Facility: CLINIC | Age: 73
End: 2018-02-21

## 2018-04-03 ENCOUNTER — OFFICE VISIT (OUTPATIENT)
Dept: CARDIOLOGY CLINIC | Age: 73
End: 2018-04-03

## 2018-04-03 VITALS
DIASTOLIC BLOOD PRESSURE: 86 MMHG | BODY MASS INDEX: 43.39 KG/M2 | SYSTOLIC BLOOD PRESSURE: 136 MMHG | HEART RATE: 76 BPM | HEIGHT: 66 IN | WEIGHT: 270 LBS

## 2018-04-03 DIAGNOSIS — I10 ESSENTIAL HYPERTENSION: ICD-10-CM

## 2018-04-03 DIAGNOSIS — I25.10 CORONARY ARTERY DISEASE INVOLVING NATIVE CORONARY ARTERY OF NATIVE HEART WITHOUT ANGINA PECTORIS: ICD-10-CM

## 2018-04-03 DIAGNOSIS — E78.2 MIXED HYPERLIPIDEMIA: ICD-10-CM

## 2018-04-03 DIAGNOSIS — I25.10 CAD IN NATIVE ARTERY: Primary | ICD-10-CM

## 2018-04-03 DIAGNOSIS — R60.0 BILATERAL EDEMA OF LOWER EXTREMITY: ICD-10-CM

## 2018-04-03 DIAGNOSIS — I73.9 CLAUDICATION (HCC): ICD-10-CM

## 2018-04-03 DIAGNOSIS — R06.02 SOB (SHORTNESS OF BREATH): ICD-10-CM

## 2018-04-03 PROBLEM — J44.1 COPD WITH EXACERBATION (HCC): Chronic | Status: ACTIVE | Noted: 2017-01-13

## 2018-04-03 PROCEDURE — G8598 ASA/ANTIPLAT THER USED: HCPCS | Performed by: INTERNAL MEDICINE

## 2018-04-03 PROCEDURE — 3017F COLORECTAL CA SCREEN DOC REV: CPT | Performed by: INTERNAL MEDICINE

## 2018-04-03 PROCEDURE — 1036F TOBACCO NON-USER: CPT | Performed by: INTERNAL MEDICINE

## 2018-04-03 PROCEDURE — 1123F ACP DISCUSS/DSCN MKR DOCD: CPT | Performed by: INTERNAL MEDICINE

## 2018-04-03 PROCEDURE — 1090F PRES/ABSN URINE INCON ASSESS: CPT | Performed by: INTERNAL MEDICINE

## 2018-04-03 PROCEDURE — G8427 DOCREV CUR MEDS BY ELIG CLIN: HCPCS | Performed by: INTERNAL MEDICINE

## 2018-04-03 PROCEDURE — 3014F SCREEN MAMMO DOC REV: CPT | Performed by: INTERNAL MEDICINE

## 2018-04-03 PROCEDURE — 99214 OFFICE O/P EST MOD 30 MIN: CPT | Performed by: INTERNAL MEDICINE

## 2018-04-03 PROCEDURE — 4040F PNEUMOC VAC/ADMIN/RCVD: CPT | Performed by: INTERNAL MEDICINE

## 2018-04-03 PROCEDURE — G8399 PT W/DXA RESULTS DOCUMENT: HCPCS | Performed by: INTERNAL MEDICINE

## 2018-04-03 PROCEDURE — G8417 CALC BMI ABV UP PARAM F/U: HCPCS | Performed by: INTERNAL MEDICINE

## 2018-05-25 ENCOUNTER — TELEPHONE (OUTPATIENT)
Dept: CARDIOLOGY CLINIC | Age: 73
End: 2018-05-25

## 2018-05-25 ENCOUNTER — HOSPITAL ENCOUNTER (OUTPATIENT)
Dept: MAMMOGRAPHY | Age: 73
Discharge: HOME | End: 2018-05-25
Attending: INTERNAL MEDICINE | Admitting: INTERNAL MEDICINE

## 2018-05-25 DIAGNOSIS — E11.8 CONTROLLED TYPE 2 DIABETES MELLITUS WITH COMPLICATION, WITHOUT LONG-TERM CURRENT USE OF INSULIN (HCC): ICD-10-CM

## 2018-05-25 DIAGNOSIS — Z12.39 BREAST CANCER SCREENING: ICD-10-CM

## 2018-05-25 LAB
ANION GAP SERPL CALCULATED.3IONS-SCNC: 17 MMOL/L (ref 3–16)
BUN BLDV-MCNC: 17 MG/DL (ref 7–20)
CALCIUM SERPL-MCNC: 9.6 MG/DL (ref 8.3–10.6)
CHLORIDE BLD-SCNC: 98 MMOL/L (ref 99–110)
CO2: 25 MMOL/L (ref 21–32)
CREAT SERPL-MCNC: 0.7 MG/DL (ref 0.6–1.2)
GFR AFRICAN AMERICAN: >60
GFR NON-AFRICAN AMERICAN: >60
GLUCOSE BLD-MCNC: 153 MG/DL (ref 70–99)
POTASSIUM SERPL-SCNC: 4.3 MMOL/L (ref 3.5–5.1)
SODIUM BLD-SCNC: 140 MMOL/L (ref 136–145)

## 2018-06-07 PROBLEM — E87.6 HYPOKALEMIA: Status: RESOLVED | Noted: 2018-06-07 | Resolved: 2018-06-07

## 2018-06-07 PROBLEM — E83.51 HYPOCALCEMIA: Status: ACTIVE | Noted: 2018-06-07

## 2018-06-07 PROBLEM — E87.6 HYPOKALEMIA: Status: ACTIVE | Noted: 2018-06-07

## 2018-06-07 PROBLEM — R47.1 DYSARTHRIA: Status: ACTIVE | Noted: 2018-06-07

## 2018-06-07 PROBLEM — E83.42 HYPOMAGNESEMIA: Status: ACTIVE | Noted: 2018-06-07

## 2018-06-08 PROBLEM — I20.0 UNSTABLE ANGINA (HCC): Status: RESOLVED | Noted: 2017-09-27 | Resolved: 2018-06-08

## 2018-06-08 PROBLEM — R55 SYNCOPE: Status: RESOLVED | Noted: 2017-02-08 | Resolved: 2018-06-08

## 2018-06-08 PROBLEM — K59.1 FUNCTIONAL DIARRHEA: Status: ACTIVE | Noted: 2018-06-08

## 2018-06-18 PROBLEM — E66.01 MORBID OBESITY (HCC): Status: ACTIVE | Noted: 2018-06-18

## 2018-06-18 PROBLEM — G93.41 METABOLIC ENCEPHALOPATHY: Status: ACTIVE | Noted: 2018-06-18

## 2018-07-23 ENCOUNTER — OFFICE VISIT (OUTPATIENT)
Dept: SURGERY | Age: 73
End: 2018-07-23

## 2018-07-23 VITALS
WEIGHT: 278 LBS | DIASTOLIC BLOOD PRESSURE: 82 MMHG | HEIGHT: 66 IN | SYSTOLIC BLOOD PRESSURE: 122 MMHG | BODY MASS INDEX: 44.68 KG/M2

## 2018-07-23 DIAGNOSIS — C44.41 BASAL CELL CARCINOMA OF SCALP: Primary | ICD-10-CM

## 2018-07-23 DIAGNOSIS — D04.4 SQUAMOUS CELL CARCINOMA IN SITU OF SCALP: Primary | ICD-10-CM

## 2018-07-23 PROCEDURE — 1090F PRES/ABSN URINE INCON ASSESS: CPT | Performed by: SURGERY

## 2018-07-23 PROCEDURE — 3017F COLORECTAL CA SCREEN DOC REV: CPT | Performed by: SURGERY

## 2018-07-23 PROCEDURE — G8417 CALC BMI ABV UP PARAM F/U: HCPCS | Performed by: SURGERY

## 2018-07-23 PROCEDURE — 1101F PT FALLS ASSESS-DOCD LE1/YR: CPT | Performed by: SURGERY

## 2018-07-23 PROCEDURE — 99202 OFFICE O/P NEW SF 15 MIN: CPT | Performed by: SURGERY

## 2018-07-23 PROCEDURE — G8427 DOCREV CUR MEDS BY ELIG CLIN: HCPCS | Performed by: SURGERY

## 2018-07-23 ASSESSMENT — ENCOUNTER SYMPTOMS
CHEST TIGHTNESS: 1
SHORTNESS OF BREATH: 1
TROUBLE SWALLOWING: 1
NAUSEA: 1
BACK PAIN: 1
ALLERGIC/IMMUNOLOGIC NEGATIVE: 1
DIARRHEA: 1
EYES NEGATIVE: 1
SINUS PRESSURE: 1

## 2018-07-23 NOTE — PROGRESS NOTES
Subjective:      Aurora Wolf is a 68 y.o. female     CC: Skin lesion of the scalp    HPI: 71-year-old female who presents for evaluation of a skin lesion of the scalp which has been present for about 25-30 years. Biopsy of the lesion in  showed basal cell carcinoma. It has slowly increased in size. She was scheduled to have it removed about 3 years ago but canceled after her  . Recently, the lesion has been causing her some pain and has been bleeding.         Family History   Problem Relation Age of Onset    Heart Disease Mother     Cancer Mother    Jewell Elbert Cancer Father     Cancer Sister     Heart Disease Sister     Heart Disease Brother     High Blood Pressure Neg Hx     High Cholesterol Neg Hx        Past Medical History:   Diagnosis Date    Acute MI     Acute pyelonephritis 2015    Anxiety and depression     Arthritis     CAD (coronary artery disease)     Cancer ([address]     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    Cancer [facility])     \"skin on top of my head\"    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    Chronic obstructive pulmonary disease ([address] 2017    Claustrophobia     COPD (chronic obstructive pulmonary disease) ([address]     Gastroesophageal reflux disease without esophagitis 3/24/2016    Hiatal hernia     Hypercholesteremia     Hypertension     Movement disorder arthritis    Pneumonia     Type II or unspecified type diabetes mellitus without mention of complication, not stated as uncontrolled     Unspecified cerebral artery occlusion with cerebral infarction        Past Surgical History:   Procedure Laterality Date    APPENDECTOMY      CARDIAC SURGERY      1 stent  and 1 stent     CHOLECYSTECTOMY      COLONOSCOPY      COLONOSCOPY  2018    ENDOSCOPY, COLON, DIAGNOSTIC      GALLBLADDER SURGERY      HYSTERECTOMY      TEAR DUCT SURGERY      UPPER GASTROINTESTINAL ENDOSCOPY  12    with biopsy of stomach,gastritis    UPPER GASTROINTESTINAL ENDOSCOPY  06/11/2018           Prior to Visit Medications    Medication Sig Taking? Authorizing Provider   ALPRAZolucy Riddle) 1 MG tablet Take 1 tablet by mouth 3 times daily as needed for Anxiety for up to 30 days. Sybil Roldan MD   blood glucose test strips (ASCENSIA AUTODISC VI;ONE TOUCH ULTRA TEST VI) strip 1 each by In Vitro route 3 times daily As needed. Yes Teja Sharpe MD   nystatin (MYCOSTATIN) 605774 UNIT/GM powder Apply topically 4 times daily. Yes Teja Sharpe MD   diclofenac sodium 1 % GEL Apply 2 g topically 2 times daily Yes Teja Sharpe MD   traMADol (ULTRAM) 50 MG tablet Take 1 tablet by mouth every 4 hours as needed for Pain for up to 7 days. Intended supply: 7 days. Take lowest dose possible to manage pain.  Yes Teja Sharpe MD   Denture Care Products (EFFERDENT DENTURE CLEANSER) TBEF 1 tablet by Does not apply route as needed (denture cleaning) Yes Teja Sharpe MD   omeprazole (PRILOSEC) 40 MG delayed release capsule Take 1 capsule by mouth daily Yes Teja Sharpe MD   glipiZIDE (GLUCOTROL) 5 MG tablet Take 1 tablet by mouth 2 times daily (before meals) Yes Teja Sharpe MD   lisinopril (PRINIVIL;ZESTRIL) 2.5 MG tablet Take 1 tablet by mouth daily Yes Teja Shrape MD   butalbital-acetaminophen-caffeine (FIORICET, ESGIC) -40 MG per tablet Take 1 tablet by mouth every 6 hours as needed for Headaches Yes Teja Sharpe MD   Diapers & Supplies (HUGGIES PULL-UPS 4T-5T) MISC 8 each by Does not apply route daily Yes Teja Sharpe MD   acetaminophen (TYLENOL) 325 MG tablet Take 2 tablets by mouth every 4 hours as needed for Pain or Fever Yes Teja Sharpe MD   docusate (COLACE, DULCOLAX) 100 MG CAPS Take 100 mg by mouth 2 times daily Yes Teja Sharpe MD   magnesium hydroxide (MILK OF MAGNESIA) 400 MG/5ML suspension Take 30 mLs by mouth daily as needed for Constipation Yes Teja Sharpe MD   diclofenac sodium 1 % GEL Apply

## 2018-07-23 NOTE — LETTER
[address]  Phone: [phone]  Fax: [phone]    Christina Winkler MD        July 23, 2018     Raul Valles MD  [address]    Patient: Jessica Giraldo  MR Number: [id number]  YOB: 1945  Date of Visit: 7/23/2018    Dear Dr. Raul Valles:    Thank you for the request for consultation for [facility]. Below are the relevant portions of my assessment and plan of care. Assessment:     59-year-old female with a long-standing history of a biopsy-proven basal cell carcinoma of the scalp. Physical examination, the patient has a 4 x 5 cm crusting skin lesion. There is no peripheral palpable adenopathy. Plan:     Because of the large size of the lesion, it will require excision with reconstruction. The patient was referred to Dr. Mikel Santana, plastic surgery, at [facility]    If you have questions, please do not hesitate to call me.     Sincerely,        Christina Winkler MD

## 2018-07-26 ENCOUNTER — TELEPHONE (OUTPATIENT)
Dept: SURGERY | Age: 73
End: 2018-07-26

## 2018-08-03 ENCOUNTER — TELEPHONE (OUTPATIENT)
Dept: SURGERY | Age: 73
End: 2018-08-03

## 2018-08-03 ENCOUNTER — OFFICE VISIT (OUTPATIENT)
Dept: SURGERY | Age: 73
End: 2018-08-03

## 2018-08-03 VITALS
RESPIRATION RATE: 15 BRPM | HEART RATE: 79 BPM | DIASTOLIC BLOOD PRESSURE: 76 MMHG | BODY MASS INDEX: 44.2 KG/M2 | OXYGEN SATURATION: 91 % | TEMPERATURE: 98 F | HEIGHT: 66 IN | SYSTOLIC BLOOD PRESSURE: 141 MMHG | WEIGHT: 275 LBS

## 2018-08-03 DIAGNOSIS — C44.42 SCC (SQUAMOUS CELL CARCINOMA), SCALP/NECK: Primary | ICD-10-CM

## 2018-08-03 PROCEDURE — 3017F COLORECTAL CA SCREEN DOC REV: CPT | Performed by: SURGERY

## 2018-08-03 PROCEDURE — G8427 DOCREV CUR MEDS BY ELIG CLIN: HCPCS | Performed by: SURGERY

## 2018-08-03 PROCEDURE — G8399 PT W/DXA RESULTS DOCUMENT: HCPCS | Performed by: SURGERY

## 2018-08-03 PROCEDURE — G8417 CALC BMI ABV UP PARAM F/U: HCPCS | Performed by: SURGERY

## 2018-08-03 PROCEDURE — 1090F PRES/ABSN URINE INCON ASSESS: CPT | Performed by: SURGERY

## 2018-08-03 PROCEDURE — 99204 OFFICE O/P NEW MOD 45 MIN: CPT | Performed by: SURGERY

## 2018-08-03 PROCEDURE — 1123F ACP DISCUSS/DSCN MKR DOCD: CPT | Performed by: SURGERY

## 2018-08-03 PROCEDURE — 1101F PT FALLS ASSESS-DOCD LE1/YR: CPT | Performed by: SURGERY

## 2018-08-03 PROCEDURE — 4040F PNEUMOC VAC/ADMIN/RCVD: CPT | Performed by: SURGERY

## 2018-08-03 PROCEDURE — 1036F TOBACCO NON-USER: CPT | Performed by: SURGERY

## 2018-08-03 PROCEDURE — G8598 ASA/ANTIPLAT THER USED: HCPCS | Performed by: SURGERY

## 2018-08-03 ASSESSMENT — ENCOUNTER SYMPTOMS
NAUSEA: 1
WHEEZING: 0
HEARTBURN: 0
BACK PAIN: 1
SHORTNESS OF BREATH: 1
BLURRED VISION: 0
DOUBLE VISION: 1
VOMITING: 0
COUGH: 0

## 2018-08-03 NOTE — PROGRESS NOTES
[facility]    CC: Skin lesions    Referring Physician: Almas French MD    HPI: This is a 68 y. o.female with a PMHx as delineated below who presents to clinic in consultation for scalp SCC. She was found to have SCC that has been biopsy confirmed several years ago, however did not want to pursue excision as her  . The lesion progressed in size and has caused her pain as well as bleeding, thus she went to surgery for evaluation. Given the size, plastic surgery was consulted for evaluation and management.      PMHx:   Past Medical History:   Diagnosis Date    Acute MI     Acute pyelonephritis 2015    Anxiety and depression     Arthritis     CAD (coronary artery disease)     Cancer [facility]     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    Cancer [facility])     \"skin on top of my head\"    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    Chronic obstructive pulmonary disease ([address] 2017    Claustrophobia     COPD (chronic obstructive pulmonary disease) ([facility]     Gastroesophageal reflux disease without esophagitis 3/24/2016    Hiatal hernia     Hypercholesteremia     Hypertension     Movement disorder arthritis    Pneumonia     Type II or unspecified type diabetes mellitus without mention of complication, not stated as uncontrolled     Unspecified cerebral artery occlusion with cerebral infarction    HgbA1c - 7.5 ()    PSHx:   Past Surgical History:   Procedure Laterality Date    APPENDECTOMY      CARDIAC SURGERY      1 stent  and 1 stent     CHOLECYSTECTOMY      COLONOSCOPY      COLONOSCOPY  2018    ENDOSCOPY, COLON, DIAGNOSTIC      GALLBLADDER SURGERY      HYSTERECTOMY      TEAR DUCT SURGERY      UPPER GASTROINTESTINAL ENDOSCOPY  12    with biopsy of stomach,gastritis    UPPER GASTROINTESTINAL ENDOSCOPY  2018     Allergy:   Allergies   Allergen Reactions    Morphine Shortness Of Breath    Codeine Other margins (deep) returns as positive, then will return to OR for excision of graft as well as margins. If periosteum removed, discussed options including free flap. However, the patient does not want to proceed with \"that extensive of a surgery\". Would instead apply Integra followed by additional STSG. Will schedule. In the interim, she was advised to work to improve her glycemic control to improve her surgical outcome. A discussion regarding surgical options including: excision with graft / Ludivina Halsted / free flap was performed with the patient. The pathophysiology of SCC was also elucidated specifying need for resection, observation, & margins. Additionally, discussion regarding the risks including, but not limited to: bleeding (potentially requiring transfusion or reoperation), infection, seroma, reoperation, poor cosmetic outcome, scarring, recurrence, revisional surgery,diminished sensation, VTE (DVT/PE), and death was performed. \"A significant amount of time was also allocated to nicotine's effect on wound healing and the patient understands that a sub-optimal wound healing and cosmetic result may occur with continued utilization. All questions were answered in a satisfactory manner. This consultation lasted 45 minutes with > 50% of the time spent face to face in counseling and coordination of care.     Kervin Navas MD  [address] [place] [address] P [facility] 399 Reconstructive Surgery  8/3/2018

## 2018-08-03 NOTE — TELEPHONE ENCOUNTER
Patient is requesting a return date in regards to date of surgery. Please advise, Thank you!     Mike Lucia [phone] (home) [phone] (work)

## 2018-08-09 NOTE — TELEPHONE ENCOUNTER
Patient called to confirm surgery date and I read information from chart.  Requests return call to discuss further at [phone]

## 2018-08-09 NOTE — TELEPHONE ENCOUNTER
LM for patient have 9/6/18 arrival time of 1:00 pm start time of 3:00 pm awaiting patient to return call to confirm. Awaiting surgery order.

## 2018-08-14 ENCOUNTER — TELEPHONE (OUTPATIENT)
Dept: SURGERY | Age: 73
End: 2018-08-14

## 2018-08-14 NOTE — TELEPHONE ENCOUNTER
Mailed patient surgery instructions inpatient/outpatient, pre operative instructions (to be completed 30 days prior to surgery) to the patient and their PCP. STP and verified this information as well as the surgery date, time of arrival and actual procedure start time.      Surgery date: 9/6/18    Arrival time:1:00 pm    Procedure start time: 3:00 pm @ [facility]

## 2018-08-20 ENCOUNTER — HOSPITAL ENCOUNTER (OUTPATIENT)
Dept: OTHER | Age: 73
Discharge: HOME | End: 2018-08-20
Attending: SURGERY | Admitting: SURGERY

## 2018-08-20 LAB
A/G RATIO: 1.2 (ref 1.1–2.2)
ALBUMIN SERPL-MCNC: 3.7 G/DL (ref 3.4–5)
ALP BLD-CCNC: 80 U/L (ref 40–129)
ALT SERPL-CCNC: 15 U/L (ref 10–40)
ANION GAP SERPL CALCULATED.3IONS-SCNC: 13 MMOL/L (ref 3–16)
APTT: 29.9 SEC (ref 26–36)
AST SERPL-CCNC: 21 U/L (ref 15–37)
BILIRUB SERPL-MCNC: <0.2 MG/DL (ref 0–1)
BUN BLDV-MCNC: 9 MG/DL (ref 7–20)
CALCIUM SERPL-MCNC: 9.8 MG/DL (ref 8.3–10.6)
CHLORIDE BLD-SCNC: 101 MMOL/L (ref 99–110)
CO2: 24 MMOL/L (ref 21–32)
CREAT SERPL-MCNC: 0.7 MG/DL (ref 0.6–1.2)
GFR AFRICAN AMERICAN: >60
GFR NON-AFRICAN AMERICAN: >60
GLOBULIN: 3.1 G/DL
GLUCOSE BLD-MCNC: 173 MG/DL (ref 70–99)
HCT VFR BLD CALC: 46.8 % (ref 36–48)
HEMOGLOBIN: 15.6 G/DL (ref 12–16)
INR BLD: 1.01 (ref 0.86–1.14)
MCH RBC QN AUTO: 30 PG (ref 26–34)
MCHC RBC AUTO-ENTMCNC: 33.4 G/DL (ref 31–36)
MCV RBC AUTO: 89.9 FL (ref 80–100)
PDW BLD-RTO: 15.2 % (ref 12.4–15.4)
PLATELET # BLD: 233 K/UL (ref 135–450)
PMV BLD AUTO: 8.6 FL (ref 5–10.5)
POTASSIUM SERPL-SCNC: 4.1 MMOL/L (ref 3.5–5.1)
PROTHROMBIN TIME: 11.5 SEC (ref 9.8–13)
RBC # BLD: 5.21 M/UL (ref 4–5.2)
SODIUM BLD-SCNC: 138 MMOL/L (ref 136–145)
TOTAL PROTEIN: 6.8 G/DL (ref 6.4–8.2)
WBC # BLD: 10.5 K/UL (ref 4–11)

## 2018-08-20 PROCEDURE — 93010 ELECTROCARDIOGRAM REPORT: CPT | Performed by: INTERNAL MEDICINE

## 2018-08-21 LAB
EKG ATRIAL RATE: 80 BPM
EKG DIAGNOSIS: NORMAL
EKG P AXIS: 4 DEGREES
EKG P-R INTERVAL: 160 MS
EKG Q-T INTERVAL: 372 MS
EKG QRS DURATION: 110 MS
EKG QTC CALCULATION (BAZETT): 429 MS
EKG R AXIS: -56 DEGREES
EKG T AXIS: 37 DEGREES
EKG VENTRICULAR RATE: 80 BPM

## 2018-09-04 NOTE — PROGRESS NOTES
patterns. Nausea, headache, muscle aches, or sore throat may also occur after anesthesia. Your nurse will help you manage these potential side effects. 2. For comfort and safety, arrange to have someone at home with you for the first 24 hours after discharge. 3. You and your family will be given written instructions about your diet, activity, dressing care, medications, and return visits. 4. Once at home, should issues with nausea, pain, or bleeding occur, or should you notice any signs of infection, you should call your surgeon. 5. Always clean your hands before and after caring for your wound. Do not let your family touch your surgery site without cleaning their hands. 6. Narcotic pain medications can cause significant constipation. You may want to add a stool softener to your postoperative medication schedule or speak to your surgeon on how best to manage this SIDE EFFECT. SPECIAL INSTRUCTIONS     Thank you for allowing us to care for you. We strive to exceed your expectations in the delivery of care and service provided to you and your family. If you need to contact us for any reason, please call us at [phone]    Instructions reviewed with patient during preadmission testing phone interview. Jordan Gutierrez. 9/4/2018 .3:18 PM      ADDITIONAL EDUCATIONAL INFORMATION REVIEWED PER PHONE WITH YOU AND/OR YOUR FAMILY:  No Bring a urine sample on day of surgery  No Pain Goal-Taking Control of Your Pain  No FAQs about Surgical Site Infections  Yes Hibiclens® Bathing Instructions   No Antibacterial Soap  No Natan® Wipes Bathing Instructions (Obtained from: [email or web address]/. pdf )  No Incentive Spirometer Education  No Other

## 2018-09-05 ENCOUNTER — ANESTHESIA EVENT (OUTPATIENT)
Dept: OPERATING ROOM | Age: 73
DRG: 577 | End: 2018-09-05
Payer: COMMERCIAL

## 2018-09-06 ENCOUNTER — ANESTHESIA (OUTPATIENT)
Dept: OPERATING ROOM | Age: 73
DRG: 577 | End: 2018-09-06
Payer: COMMERCIAL

## 2018-09-06 ENCOUNTER — HOSPITAL ENCOUNTER (INPATIENT)
Age: 73
LOS: 3 days | Discharge: HOME HEALTH | DRG: 577 | End: 2018-09-09
Attending: SURGERY | Admitting: SURGERY
Payer: COMMERCIAL

## 2018-09-06 VITALS — OXYGEN SATURATION: 97 % | SYSTOLIC BLOOD PRESSURE: 189 MMHG | DIASTOLIC BLOOD PRESSURE: 90 MMHG | TEMPERATURE: 97.2 F

## 2018-09-06 DIAGNOSIS — C76.0 SQUAMOUS CELL CARCINOMA OF HEAD AND NECK (HCC): Primary | ICD-10-CM

## 2018-09-06 PROBLEM — C44.42 SQUAMOUS CELL CARCINOMA OF HEAD AND NECK: Status: ACTIVE | Noted: 2018-09-06

## 2018-09-06 LAB
GLUCOSE BLD-MCNC: 143 MG/DL (ref 70–99)
GLUCOSE BLD-MCNC: 170 MG/DL (ref 70–99)
GLUCOSE BLD-MCNC: 173 MG/DL (ref 70–99)
GLUCOSE BLD-MCNC: 175 MG/DL (ref 70–99)
GLUCOSE BLD-MCNC: 188 MG/DL (ref 70–99)
PERFORMED ON: ABNORMAL

## 2018-09-06 PROCEDURE — 2500000003 HC RX 250 WO HCPCS: Performed by: NURSE ANESTHETIST, CERTIFIED REGISTERED

## 2018-09-06 PROCEDURE — 6360000002 HC RX W HCPCS: Performed by: STUDENT IN AN ORGANIZED HEALTH CARE EDUCATION/TRAINING PROGRAM

## 2018-09-06 PROCEDURE — 2580000003 HC RX 258: Performed by: SURGERY

## 2018-09-06 PROCEDURE — G0378 HOSPITAL OBSERVATION PER HR: HCPCS

## 2018-09-06 PROCEDURE — 94760 N-INVAS EAR/PLS OXIMETRY 1: CPT

## 2018-09-06 PROCEDURE — 6360000002 HC RX W HCPCS: Performed by: SURGERY

## 2018-09-06 PROCEDURE — 88305 TISSUE EXAM BY PATHOLOGIST: CPT

## 2018-09-06 PROCEDURE — 3700000001 HC ADD 15 MINUTES (ANESTHESIA): Performed by: SURGERY

## 2018-09-06 PROCEDURE — 2580000003 HC RX 258: Performed by: ANESTHESIOLOGY

## 2018-09-06 PROCEDURE — 6360000002 HC RX W HCPCS: Performed by: ANESTHESIOLOGY

## 2018-09-06 PROCEDURE — 0HR0X74 REPLACEMENT OF SCALP SKIN WITH AUTOLOGOUS TISSUE SUBSTITUTE, PARTIAL THICKNESS, EXTERNAL APPROACH: ICD-10-PCS | Performed by: SURGERY

## 2018-09-06 PROCEDURE — 94640 AIRWAY INHALATION TREATMENT: CPT

## 2018-09-06 PROCEDURE — 3600000014 HC SURGERY LEVEL 4 ADDTL 15MIN: Performed by: SURGERY

## 2018-09-06 PROCEDURE — 7100000001 HC PACU RECOVERY - ADDTL 15 MIN: Performed by: SURGERY

## 2018-09-06 PROCEDURE — S0028 INJECTION, FAMOTIDINE, 20 MG: HCPCS | Performed by: NURSE ANESTHETIST, CERTIFIED REGISTERED

## 2018-09-06 PROCEDURE — 94664 DEMO&/EVAL PT USE INHALER: CPT

## 2018-09-06 PROCEDURE — 2709999900 HC NON-CHARGEABLE SUPPLY: Performed by: SURGERY

## 2018-09-06 PROCEDURE — 11626 EXC S/N/H/F/G MAL+MRG >4 CM: CPT | Performed by: SURGERY

## 2018-09-06 PROCEDURE — 6360000002 HC RX W HCPCS: Performed by: NURSE ANESTHETIST, CERTIFIED REGISTERED

## 2018-09-06 PROCEDURE — 96372 THER/PROPH/DIAG INJ SC/IM: CPT

## 2018-09-06 PROCEDURE — 3700000000 HC ANESTHESIA ATTENDED CARE: Performed by: SURGERY

## 2018-09-06 PROCEDURE — 1200000000 HC SEMI PRIVATE

## 2018-09-06 PROCEDURE — 2500000003 HC RX 250 WO HCPCS: Performed by: SURGERY

## 2018-09-06 PROCEDURE — 3600000004 HC SURGERY LEVEL 4 BASE: Performed by: SURGERY

## 2018-09-06 PROCEDURE — 2580000003 HC RX 258: Performed by: STUDENT IN AN ORGANIZED HEALTH CARE EDUCATION/TRAINING PROGRAM

## 2018-09-06 PROCEDURE — 6370000000 HC RX 637 (ALT 250 FOR IP): Performed by: SURGERY

## 2018-09-06 PROCEDURE — 6370000000 HC RX 637 (ALT 250 FOR IP): Performed by: STUDENT IN AN ORGANIZED HEALTH CARE EDUCATION/TRAINING PROGRAM

## 2018-09-06 PROCEDURE — 2700000000 HC OXYGEN THERAPY PER DAY

## 2018-09-06 PROCEDURE — C9290 INJ, BUPIVACAINE LIPOSOME: HCPCS | Performed by: SURGERY

## 2018-09-06 PROCEDURE — 15120 SPLT AGRFT F/S/N/H/F/G/M 1ST: CPT | Performed by: SURGERY

## 2018-09-06 PROCEDURE — 7100000000 HC PACU RECOVERY - FIRST 15 MIN: Performed by: SURGERY

## 2018-09-06 RX ORDER — ALBUTEROL SULFATE 2.5 MG/3ML
2.5 SOLUTION RESPIRATORY (INHALATION) EVERY 6 HOURS PRN
Status: DISCONTINUED | OUTPATIENT
Start: 2018-09-06 | End: 2018-09-06

## 2018-09-06 RX ORDER — SODIUM CHLORIDE 0.9 % (FLUSH) 0.9 %
10 SYRINGE (ML) INJECTION EVERY 12 HOURS SCHEDULED
Status: DISCONTINUED | OUTPATIENT
Start: 2018-09-06 | End: 2018-09-09 | Stop reason: HOSPADM

## 2018-09-06 RX ORDER — ACETAMINOPHEN 325 MG/1
650 TABLET ORAL EVERY 4 HOURS PRN
Status: DISCONTINUED | OUTPATIENT
Start: 2018-09-06 | End: 2018-09-09 | Stop reason: HOSPADM

## 2018-09-06 RX ORDER — SUCCINYLCHOLINE CHLORIDE 20 MG/ML
INJECTION INTRAMUSCULAR; INTRAVENOUS PRN
Status: DISCONTINUED | OUTPATIENT
Start: 2018-09-06 | End: 2018-09-06 | Stop reason: SDUPTHER

## 2018-09-06 RX ORDER — GABAPENTIN 100 MG/1
100 CAPSULE ORAL 3 TIMES DAILY
Status: DISCONTINUED | OUTPATIENT
Start: 2018-09-06 | End: 2018-09-09 | Stop reason: HOSPADM

## 2018-09-06 RX ORDER — FENTANYL CITRATE 50 UG/ML
25 INJECTION, SOLUTION INTRAMUSCULAR; INTRAVENOUS EVERY 5 MIN PRN
Status: DISCONTINUED | OUTPATIENT
Start: 2018-09-06 | End: 2018-09-06 | Stop reason: HOSPADM

## 2018-09-06 RX ORDER — ALBUTEROL SULFATE 2.5 MG/3ML
2.5 SOLUTION RESPIRATORY (INHALATION) ONCE
Status: COMPLETED | OUTPATIENT
Start: 2018-09-06 | End: 2018-09-06

## 2018-09-06 RX ORDER — ONDANSETRON 2 MG/ML
INJECTION INTRAMUSCULAR; INTRAVENOUS PRN
Status: DISCONTINUED | OUTPATIENT
Start: 2018-09-06 | End: 2018-09-06 | Stop reason: SDUPTHER

## 2018-09-06 RX ORDER — PSEUDOEPHEDRINE HCL 30 MG
100 TABLET ORAL 2 TIMES DAILY
Status: DISCONTINUED | OUTPATIENT
Start: 2018-09-06 | End: 2018-09-06

## 2018-09-06 RX ORDER — LIDOCAINE HYDROCHLORIDE 20 MG/ML
INJECTION, SOLUTION INFILTRATION; PERINEURAL PRN
Status: DISCONTINUED | OUTPATIENT
Start: 2018-09-06 | End: 2018-09-06 | Stop reason: SDUPTHER

## 2018-09-06 RX ORDER — ONDANSETRON 2 MG/ML
4 INJECTION INTRAMUSCULAR; INTRAVENOUS
Status: COMPLETED | OUTPATIENT
Start: 2018-09-06 | End: 2018-09-06

## 2018-09-06 RX ORDER — EPINEPHRINE NASAL SOLUTION 1 MG/ML
SOLUTION NASAL PRN
Status: DISCONTINUED | OUTPATIENT
Start: 2018-09-06 | End: 2018-09-06 | Stop reason: HOSPADM

## 2018-09-06 RX ORDER — ROCURONIUM BROMIDE 10 MG/ML
INJECTION, SOLUTION INTRAVENOUS PRN
Status: DISCONTINUED | OUTPATIENT
Start: 2018-09-06 | End: 2018-09-06 | Stop reason: SDUPTHER

## 2018-09-06 RX ORDER — SODIUM CHLORIDE, SODIUM LACTATE, POTASSIUM CHLORIDE, CALCIUM CHLORIDE 600; 310; 30; 20 MG/100ML; MG/100ML; MG/100ML; MG/100ML
INJECTION, SOLUTION INTRAVENOUS CONTINUOUS
Status: DISCONTINUED | OUTPATIENT
Start: 2018-09-06 | End: 2018-09-06

## 2018-09-06 RX ORDER — DEXTROSE MONOHYDRATE 25 G/50ML
12.5 INJECTION, SOLUTION INTRAVENOUS PRN
Status: DISCONTINUED | OUTPATIENT
Start: 2018-09-06 | End: 2018-09-09 | Stop reason: HOSPADM

## 2018-09-06 RX ORDER — SODIUM CHLORIDE 0.9 % (FLUSH) 0.9 %
10 SYRINGE (ML) INJECTION PRN
Status: DISCONTINUED | OUTPATIENT
Start: 2018-09-06 | End: 2018-09-09 | Stop reason: HOSPADM

## 2018-09-06 RX ORDER — ONDANSETRON 2 MG/ML
4 INJECTION INTRAMUSCULAR; INTRAVENOUS EVERY 6 HOURS PRN
Status: DISCONTINUED | OUTPATIENT
Start: 2018-09-06 | End: 2018-09-09 | Stop reason: HOSPADM

## 2018-09-06 RX ORDER — MINERAL OIL
OIL (ML) MISCELLANEOUS PRN
Status: DISCONTINUED | OUTPATIENT
Start: 2018-09-06 | End: 2018-09-06 | Stop reason: HOSPADM

## 2018-09-06 RX ORDER — ALPRAZOLAM 0.5 MG/1
1 TABLET ORAL 3 TIMES DAILY PRN
Status: DISCONTINUED | OUTPATIENT
Start: 2018-09-06 | End: 2018-09-09 | Stop reason: HOSPADM

## 2018-09-06 RX ORDER — MORPHINE SULFATE 2 MG/ML
2 INJECTION, SOLUTION INTRAMUSCULAR; INTRAVENOUS EVERY 5 MIN PRN
Status: DISCONTINUED | OUTPATIENT
Start: 2018-09-06 | End: 2018-09-06 | Stop reason: HOSPADM

## 2018-09-06 RX ORDER — IPRATROPIUM BROMIDE AND ALBUTEROL SULFATE 2.5; .5 MG/3ML; MG/3ML
3 SOLUTION RESPIRATORY (INHALATION)
Status: DISCONTINUED | OUTPATIENT
Start: 2018-09-06 | End: 2018-09-09 | Stop reason: HOSPADM

## 2018-09-06 RX ORDER — FENTANYL CITRATE 50 UG/ML
50 INJECTION, SOLUTION INTRAMUSCULAR; INTRAVENOUS EVERY 5 MIN PRN
Status: DISCONTINUED | OUTPATIENT
Start: 2018-09-06 | End: 2018-09-06 | Stop reason: HOSPADM

## 2018-09-06 RX ORDER — HYDRALAZINE HYDROCHLORIDE 20 MG/ML
INJECTION INTRAMUSCULAR; INTRAVENOUS PRN
Status: DISCONTINUED | OUTPATIENT
Start: 2018-09-06 | End: 2018-09-06 | Stop reason: SDUPTHER

## 2018-09-06 RX ORDER — ISOSORBIDE DINITRATE 30 MG/1
30 TABLET ORAL
COMMUNITY
End: 2018-09-21 | Stop reason: DRUGHIGH

## 2018-09-06 RX ORDER — NICOTINE POLACRILEX 4 MG
15 LOZENGE BUCCAL PRN
Status: DISCONTINUED | OUTPATIENT
Start: 2018-09-06 | End: 2018-09-09 | Stop reason: HOSPADM

## 2018-09-06 RX ORDER — MIDAZOLAM HYDROCHLORIDE 1 MG/ML
INJECTION INTRAMUSCULAR; INTRAVENOUS PRN
Status: DISCONTINUED | OUTPATIENT
Start: 2018-09-06 | End: 2018-09-06 | Stop reason: SDUPTHER

## 2018-09-06 RX ORDER — IPRATROPIUM BROMIDE AND ALBUTEROL SULFATE 2.5; .5 MG/3ML; MG/3ML
1 SOLUTION RESPIRATORY (INHALATION)
Status: DISCONTINUED | OUTPATIENT
Start: 2018-09-06 | End: 2018-09-06

## 2018-09-06 RX ORDER — PROMETHAZINE HYDROCHLORIDE 25 MG/ML
6.25 INJECTION, SOLUTION INTRAMUSCULAR; INTRAVENOUS
Status: DISCONTINUED | OUTPATIENT
Start: 2018-09-06 | End: 2018-09-06 | Stop reason: HOSPADM

## 2018-09-06 RX ORDER — BUTALBITAL, ACETAMINOPHEN AND CAFFEINE 50; 325; 40 MG/1; MG/1; MG/1
1 TABLET ORAL EVERY 6 HOURS PRN
Status: DISCONTINUED | OUTPATIENT
Start: 2018-09-06 | End: 2018-09-09 | Stop reason: HOSPADM

## 2018-09-06 RX ORDER — ALBUTEROL SULFATE 2.5 MG/3ML
2.5 SOLUTION RESPIRATORY (INHALATION) EVERY 6 HOURS PRN
Status: DISCONTINUED | OUTPATIENT
Start: 2018-09-06 | End: 2018-09-09 | Stop reason: HOSPADM

## 2018-09-06 RX ORDER — OXYCODONE HYDROCHLORIDE AND ACETAMINOPHEN 5; 325 MG/1; MG/1
2 TABLET ORAL EVERY 4 HOURS PRN
Status: DISCONTINUED | OUTPATIENT
Start: 2018-09-06 | End: 2018-09-06

## 2018-09-06 RX ORDER — OXYCODONE HYDROCHLORIDE AND ACETAMINOPHEN 5; 325 MG/1; MG/1
1 TABLET ORAL EVERY 4 HOURS PRN
Status: DISCONTINUED | OUTPATIENT
Start: 2018-09-06 | End: 2018-09-06

## 2018-09-06 RX ORDER — PROPOFOL 10 MG/ML
INJECTION, EMULSION INTRAVENOUS PRN
Status: DISCONTINUED | OUTPATIENT
Start: 2018-09-06 | End: 2018-09-06 | Stop reason: SDUPTHER

## 2018-09-06 RX ORDER — DEXTROSE MONOHYDRATE 50 MG/ML
100 INJECTION, SOLUTION INTRAVENOUS PRN
Status: DISCONTINUED | OUTPATIENT
Start: 2018-09-06 | End: 2018-09-09 | Stop reason: HOSPADM

## 2018-09-06 RX ORDER — CILOSTAZOL 50 MG/1
100 TABLET ORAL 2 TIMES DAILY
Status: DISCONTINUED | OUTPATIENT
Start: 2018-09-06 | End: 2018-09-06

## 2018-09-06 RX ORDER — MORPHINE SULFATE 2 MG/ML
1 INJECTION, SOLUTION INTRAMUSCULAR; INTRAVENOUS EVERY 5 MIN PRN
Status: COMPLETED | OUTPATIENT
Start: 2018-09-06 | End: 2018-09-06

## 2018-09-06 RX ORDER — SODIUM CHLORIDE, SODIUM LACTATE, POTASSIUM CHLORIDE, CALCIUM CHLORIDE 600; 310; 30; 20 MG/100ML; MG/100ML; MG/100ML; MG/100ML
INJECTION, SOLUTION INTRAVENOUS CONTINUOUS
Status: DISCONTINUED | OUTPATIENT
Start: 2018-09-06 | End: 2018-09-08

## 2018-09-06 RX ORDER — PANTOPRAZOLE SODIUM 40 MG/1
40 TABLET, DELAYED RELEASE ORAL
Status: DISCONTINUED | OUTPATIENT
Start: 2018-09-07 | End: 2018-09-09 | Stop reason: HOSPADM

## 2018-09-06 RX ORDER — FENTANYL CITRATE 50 UG/ML
INJECTION, SOLUTION INTRAMUSCULAR; INTRAVENOUS PRN
Status: DISCONTINUED | OUTPATIENT
Start: 2018-09-06 | End: 2018-09-06 | Stop reason: SDUPTHER

## 2018-09-06 RX ORDER — MORPHINE SULFATE 4 MG/ML
INJECTION, SOLUTION INTRAMUSCULAR; INTRAVENOUS
Status: DISPENSED
Start: 2018-09-06 | End: 2018-09-07

## 2018-09-06 RX ORDER — LIDOCAINE HYDROCHLORIDE AND EPINEPHRINE 10; 10 MG/ML; UG/ML
INJECTION, SOLUTION INFILTRATION; PERINEURAL PRN
Status: DISCONTINUED | OUTPATIENT
Start: 2018-09-06 | End: 2018-09-06 | Stop reason: HOSPADM

## 2018-09-06 RX ADMIN — ROCURONIUM BROMIDE 20 MG: 10 INJECTION, SOLUTION INTRAVENOUS at 08:15

## 2018-09-06 RX ADMIN — MORPHINE SULFATE 2 MG: 2 INJECTION, SOLUTION INTRAMUSCULAR; INTRAVENOUS at 16:18

## 2018-09-06 RX ADMIN — PHENYLEPHRINE HYDROCHLORIDE 50 MCG: 10 INJECTION, SOLUTION INTRAMUSCULAR; INTRAVENOUS; SUBCUTANEOUS at 08:12

## 2018-09-06 RX ADMIN — ALPRAZOLAM 1 MG: 0.5 TABLET ORAL at 10:37

## 2018-09-06 RX ADMIN — CEFAZOLIN SODIUM 2 G: 1 POWDER, FOR SOLUTION INTRAMUSCULAR; INTRAVENOUS at 07:51

## 2018-09-06 RX ADMIN — BUTALBITAL, ACETAMINOPHEN, AND CAFFEINE 1 TABLET: 50; 325; 40 TABLET ORAL at 20:48

## 2018-09-06 RX ADMIN — ALBUTEROL SULFATE 2.5 MG: 2.5 SOLUTION RESPIRATORY (INHALATION) at 07:02

## 2018-09-06 RX ADMIN — LIDOCAINE HYDROCHLORIDE 100 MG: 20 INJECTION, SOLUTION INFILTRATION; PERINEURAL at 07:55

## 2018-09-06 RX ADMIN — HYDRALAZINE HYDROCHLORIDE 5 MG: 20 INJECTION, SOLUTION INTRAMUSCULAR; INTRAVENOUS at 09:00

## 2018-09-06 RX ADMIN — ALPRAZOLAM 1 MG: 0.5 TABLET ORAL at 20:49

## 2018-09-06 RX ADMIN — MORPHINE SULFATE 1 MG: 2 INJECTION, SOLUTION INTRAMUSCULAR; INTRAVENOUS at 14:35

## 2018-09-06 RX ADMIN — ONDANSETRON 4 MG: 2 INJECTION INTRAMUSCULAR; INTRAVENOUS at 07:55

## 2018-09-06 RX ADMIN — INSULIN LISPRO 1 UNITS: 100 INJECTION, SOLUTION INTRAVENOUS; SUBCUTANEOUS at 23:28

## 2018-09-06 RX ADMIN — GABAPENTIN 100 MG: 100 CAPSULE ORAL at 20:49

## 2018-09-06 RX ADMIN — MORPHINE SULFATE 1 MG: 2 INJECTION, SOLUTION INTRAMUSCULAR; INTRAVENOUS at 10:45

## 2018-09-06 RX ADMIN — FAMOTIDINE 20 MG: 10 INJECTION, SOLUTION INTRAVENOUS at 07:55

## 2018-09-06 RX ADMIN — SUCCINYLCHOLINE CHLORIDE 140 MG: 20 INJECTION, SOLUTION INTRAMUSCULAR; INTRAVENOUS; PARENTERAL at 07:55

## 2018-09-06 RX ADMIN — ENOXAPARIN SODIUM 40 MG: 40 INJECTION SUBCUTANEOUS at 20:51

## 2018-09-06 RX ADMIN — ONDANSETRON 4 MG: 2 INJECTION INTRAMUSCULAR; INTRAVENOUS at 09:45

## 2018-09-06 RX ADMIN — PROPOFOL 200 MG: 10 INJECTION, EMULSION INTRAVENOUS at 07:55

## 2018-09-06 RX ADMIN — MIDAZOLAM HYDROCHLORIDE 2 MG: 1 INJECTION INTRAMUSCULAR; INTRAVENOUS at 07:51

## 2018-09-06 RX ADMIN — BUTALBITAL, ACETAMINOPHEN, AND CAFFEINE 1 TABLET: 50; 325; 40 TABLET ORAL at 12:59

## 2018-09-06 RX ADMIN — ALBUTEROL SULFATE 2.5 MG: 2.5 SOLUTION RESPIRATORY (INHALATION) at 09:29

## 2018-09-06 RX ADMIN — PHENYLEPHRINE HYDROCHLORIDE 50 MCG: 10 INJECTION, SOLUTION INTRAMUSCULAR; INTRAVENOUS; SUBCUTANEOUS at 08:24

## 2018-09-06 RX ADMIN — MORPHINE SULFATE 1 MG: 2 INJECTION, SOLUTION INTRAMUSCULAR; INTRAVENOUS at 11:45

## 2018-09-06 RX ADMIN — SODIUM CHLORIDE, SODIUM LACTATE, POTASSIUM CHLORIDE, AND CALCIUM CHLORIDE: 600; 310; 30; 20 INJECTION, SOLUTION INTRAVENOUS at 08:40

## 2018-09-06 RX ADMIN — FENTANYL CITRATE 100 MCG: 50 INJECTION INTRAMUSCULAR; INTRAVENOUS at 07:51

## 2018-09-06 RX ADMIN — DEXTROSE MONOHYDRATE 1 G: 5 INJECTION INTRAVENOUS at 20:50

## 2018-09-06 RX ADMIN — GABAPENTIN 100 MG: 100 CAPSULE ORAL at 12:58

## 2018-09-06 RX ADMIN — FENTANYL CITRATE 50 MCG: 50 INJECTION INTRAMUSCULAR; INTRAVENOUS at 09:57

## 2018-09-06 RX ADMIN — FENTANYL CITRATE 50 MCG: 50 INJECTION INTRAMUSCULAR; INTRAVENOUS at 10:39

## 2018-09-06 RX ADMIN — MORPHINE SULFATE 1 MG: 2 INJECTION, SOLUTION INTRAMUSCULAR; INTRAVENOUS at 13:55

## 2018-09-06 RX ADMIN — MIDAZOLAM HYDROCHLORIDE 0.25 MG: 1 INJECTION INTRAMUSCULAR; INTRAVENOUS at 09:12

## 2018-09-06 RX ADMIN — SODIUM CHLORIDE, SODIUM LACTATE, POTASSIUM CHLORIDE, AND CALCIUM CHLORIDE: 600; 310; 30; 20 INJECTION, SOLUTION INTRAVENOUS at 06:41

## 2018-09-06 ASSESSMENT — PAIN SCALES - GENERAL
PAINLEVEL_OUTOF10: 6
PAINLEVEL_OUTOF10: 8
PAINLEVEL_OUTOF10: 6
PAINLEVEL_OUTOF10: 5
PAINLEVEL_OUTOF10: 7
PAINLEVEL_OUTOF10: 6

## 2018-09-06 ASSESSMENT — PAIN DESCRIPTION - ONSET: ONSET: ON-GOING

## 2018-09-06 ASSESSMENT — PAIN DESCRIPTION - DESCRIPTORS
DESCRIPTORS: ACHING
DESCRIPTORS: ITCHING;BURNING

## 2018-09-06 ASSESSMENT — PAIN DESCRIPTION - LOCATION: LOCATION: HEAD

## 2018-09-06 ASSESSMENT — PAIN DESCRIPTION - FREQUENCY: FREQUENCY: CONTINUOUS

## 2018-09-06 ASSESSMENT — COPD QUESTIONNAIRES: CAT_SEVERITY: MODERATE

## 2018-09-06 ASSESSMENT — PAIN DESCRIPTION - PAIN TYPE: TYPE: SURGICAL PAIN

## 2018-09-06 ASSESSMENT — PAIN DESCRIPTION - PROGRESSION: CLINICAL_PROGRESSION: NOT CHANGED

## 2018-09-06 ASSESSMENT — LIFESTYLE VARIABLES: SMOKING_STATUS: 0

## 2018-09-06 ASSESSMENT — PAIN - FUNCTIONAL ASSESSMENT: PAIN_FUNCTIONAL_ASSESSMENT: 0-10

## 2018-09-06 NOTE — ANESTHESIA POSTPROCEDURE EVALUATION
Department of Anesthesiology  Postprocedure Note    Patient: Radha Coats  MRN: [id number]  YOB: 1945  Date of evaluation: 9/6/2018  Time:  10:38 AM     Procedure Summary     Date:  09/06/18 Room / Location:  [facility] OR 06 / [facility]    Anesthesia Start:  0750 Anesthesia Stop:      Procedures:       EXCISION OF SCALP SQUAMOUS CELL CARCINOMA (N/A )      SPLIT THICKNESS SKIN GRAFT FOR COVERAGE SCALP, APPLICATION OF WOUND VAC DEVICE (N/A ) Diagnosis:  (SQUAMOUS CELL CARCINOMA SCALP/NECK)    Surgeon:  Yani Maloney MD Responsible Provider:  Alfa Chung MD    Anesthesia Type:  general ASA Status:  4          Anesthesia Type: general    Carlos Phase I: Carlos Score: 8    Carlos Phase II:      Last vitals: Reviewed and per EMR flowsheets.        Anesthesia Post Evaluation    Patient location during evaluation: PACU  Patient participation: complete - patient participated  Level of consciousness: awake and alert  Airway patency: patent  Nausea & Vomiting: no nausea and no vomiting  Complications: no  Cardiovascular status: blood pressure returned to baseline  Respiratory status: acceptable  Hydration status: stable

## 2018-09-06 NOTE — PROGRESS NOTES
RESPIRATORY THERAPY ASSESSMENT    Name:  Kody [facility] Record Number:  [id number]  Age: 68 y.o. Gender: female  : 1945  Today's Date:  2018  Room:  53245324-01    Assessment     Is the patient being admitted for a COPD or Asthma exacerbation? No   (If yes the patient will be seen every 4 hours for the first 24 hours and then reassessed)    Patient Admission Diagnosis Squamous cell CA      Allergies  Allergies   Allergen Reactions    Morphine Shortness Of Breath    Codeine Other (See Comments)     Chest pain    Hydromorphone Other (See Comments)     hallucinations  Hallucinations    But will take if needed in an emergency    Vicodin [Hydrocodone-Acetaminophen] Hives       Minimum Predicted Vital Capacity:     884          Actual Vital Capacity:      500          Pulmonary History:COPD  Home Oxygen Therapy:  room air  Home Respiratory Therapy:None   Current Respiratory Therapy:  Duoneb HHN Q4WA, Albuterol PRN  Treatment Type: HHN  Medications: Albuterol    Respiratory Severity Index(RSI)   Patients with orders for inhalation medications, oxygen, or any therapeutic treatment modality will be placed on Respiratory Protocol. They will be assessed with the first treatment and at least every 72 hours thereafter. The following severity scale will be used to determine frequency of treatment intervention.     Smoking History: Pulmonary Disease or Smoking History, Greater than 15 pack year = 2    Social History  Social History   Substance Use Topics    Smoking status: Former Smoker     Packs/day: 0.25     Years: 49.00     Types: Cigarettes     Quit date: 2017    Smokeless tobacco: Never Used      Comment: only smoke when real stressed  Nicotine patch    Alcohol use 0.6 oz/week     1 Glasses of wine per week      Comment: occ. every 3-4 mo       Recent Surgical History: None = 0  Past Surgical History  Past Surgical History:   Procedure Laterality Date    APPENDECTOMY      CARDIAC SURGERY

## 2018-09-06 NOTE — ANESTHESIA PRE PROCEDURE
Department of Anesthesiology  Preprocedure Note       Name:  Tamy Contreras   Age:  68 y.o.  :  1945                                          MRN:  [id number]         Date:  2018      Surgeon: Cyndy Allen):  Salome Omalley MD    Procedure: Procedure(s):  EXCISION OF SCALP SQUAMOUS CELL CARCINOMA  SPLIT THICKNESS SKIN GRAFT FOR COVERAGE SCALP, APPLICATION OF WOUND VAC DEVICE    Medications prior to admission:   Prior to Admission medications    Medication Sig Start Date End Date Taking? Authorizing Provider   OXYGEN Inhale 2 L/min into the lungs as needed Uses when O2 Sat is below 90   Yes Historical Provider, MD   isosorbide dinitrate (ISORDIL) 30 MG tablet Take 30 mg by mouth   Yes Historical Provider, MD   ALPRAZolam (XANAX) 1 MG tablet Take 1 tablet by mouth 3 times daily as needed for Anxiety for up to 30 days. . 8/16/18 9/15/18 Yes Rosa Puckett MD   gabapentin (NEURONTIN) 100 MG capsule Take 1 capsule by mouth 3 times daily for 30 days. . 8/16/18 9/15/18 Yes oRsa Puckett MD   blood glucose test strips (ASCENSIA AUTODISC VI;ONE TOUCH ULTRA TEST VI) strip 1 each by In Vitro route 3 times daily As needed.  18  Yes Rosa Puckett MD   omeprazole (PRILOSEC) 40 MG delayed release capsule Take 1 capsule by mouth daily 18  Yes Rosa Puckett MD   glipiZIDE (GLUCOTROL) 5 MG tablet Take 1 tablet by mouth 2 times daily (before meals) 5/15/18  Yes Rosa Puckett MD   isosorbide mononitrate (IMDUR) 60 MG extended release tablet Take 1 tablet by mouth daily 5/15/18 9/4/18 Yes Rosa Puckett MD   lisinopril (PRINIVIL;ZESTRIL) 2.5 MG tablet Take 1 tablet by mouth daily 5/15/18  Yes Rosa Puckett MD   butalbital-acetaminophen-caffeine (FIORICET, ESGIC) -75 MG per tablet Take 1 tablet by mouth every 6 hours as needed for Headaches 5/15/18  Yes Rosa Puckett MD   diclofenac sodium 1 % GEL Apply 2 g topically 2 times daily 17  Yes Rosa Puckett MD   albuterol sulfate HFA (VENTOLIN HFA) 108 (90 BASE) MCG/ACT inhaler Inhale 2 puffs into the lungs every 6 hours as needed for Wheezing 4/4/17  Yes Nancy Quevedo MD   nystatin (MYCOSTATIN) 458721 UNIT/GM powder Affected area 5/6/16  Yes Nancy Quevedo MD   aspirin 81 MG tablet Take 81 mg by mouth daily   Yes Kandis Seo MD   nitroGLYCERIN (NITROSTAT) 0.4 MG SL tablet Place 1 tablet under the tongue every 5 minutes as needed for Chest pain. 4/22/12  Yes Nancy Quevedo MD   nystatin (MYCOSTATIN) 604597 UNIT/GM powder Apply topically 4 times daily. 7/19/18   Nancy Quevedo MD   diclofenac sodium 1 % GEL Apply 2 g topically 2 times daily 7/19/18   Nancy Quevedo MD   Denture Care Products (EFFERDENT DENTURE CLEANSER) TBEF 1 tablet by Does not apply route as needed (denture cleaning) 6/13/18   Nancy Quevedo MD   Incontinence Supply Disposable (PREVAIL WET WIPES) MISC 8 each by Does not apply route daily 3/14/18 6/12/18  Nancy Quevedo MD   Diapers & Supplies (HUGGIES PULL-UPS 4T-5T) MISC 8 each by Does not apply route daily 3/14/18   Nancy Quevedo MD   docusate (COLACE, DULCOLAX) 100 MG CAPS Take 100 mg by mouth 2 times daily 12/28/17   Nancy Quevedo MD   magnesium hydroxide (MILK OF MAGNESIA) 400 MG/5ML suspension Take 30 mLs by mouth daily as needed for Constipation 12/28/17   Nancy Quevedo MD   Elastic Bandages & Supports (JOBST FOR MEN 30-40MMHG LG) MISC 20-30 mm by Does not apply route daily Please measure for knee hi hose 11/2/17   Sobeida Hernandez MD   cilostazol (PLETAL) 100 MG tablet Take 1 tablet by mouth 2 times daily 10/20/17   Nancy Quevedo MD   ipratropium-albuterol (DUONEB) 0.5-2.5 (3) MG/3ML SOLN nebulizer solution Inhale 3 mLs into the lungs every 4 hours (while awake) 9/25/17   Johan Anderson MD   triamcinolone (KENALOG) 0.1 % cream Apply topically 2 times daily. 6/1/17   Nancy Quevedo MD   Respiratory Therapy Supplies (NEBULIZER) by Does not apply route.  PRN BREATHING TREATMENTS, UNSURE OF MED     Historical Provider, MD       Current medications:    Current Facility-Administered Medications   Medication Dose Route Frequency Provider Last Rate Last Dose    ceFAZolin (ANCEF) 3 g in dextrose 5 % 100 mL IVPB  3 g Intravenous Once Jevon Obrien MD        lactated ringers infusion   Intravenous Continuous Dmitri Dumas MD           Allergies:     Allergies   Allergen Reactions    Morphine Shortness Of Breath    Codeine Other (See Comments)     Chest pain    Hydromorphone Other (See Comments)     hallucinations  Hallucinations    But will take if needed in an emergency    Vicodin [Hydrocodone-Acetaminophen] Hives       Problem List:    Patient Active Problem List   Diagnosis Code    CAD (coronary artery disease) I25.10    Hyperlipemia E78.5    COPD (chronic obstructive pulmonary disease) ([facility] J44.9    Essential hypertension I10    Type II or unspecified type diabetes mellitus without mention of complication, uncontrolled E11.65    Morbid obesity ([facility] E66.01    Body mass index 40.0-44.9, adult ([facility] Z68.41    Malignant hypertension I10    ASHD (arteriosclerotic heart disease) I25.10    Obesity E66.9    DM2 (diabetes mellitus, type 2) ([facility] E11.9    TIA (transient ischemic attack) G45.9    Type 2 diabetes mellitus with hyperglycemia (HCC) E11.65    HTN (hypertension), benign I10    CAD in native artery I25.10    Hemispheric carotid artery syndrome G45.1    Primary osteoarthritis involving multiple joints M15.0    Gastroesophageal reflux disease without esophagitis K21.9    PVC (premature ventricular contraction) I49.3    Dizziness R42    Light headedness R42    Controlled type 2 diabetes mellitus with complication, without long-term current use of insulin (HCC) E11.8    COPD with exacerbation (HCC) J44.1    Chest pain R07.9    Bilateral edema of lower extremity R60.0    Hypoxia R09.02    Fluid overload E87.70    Chest pain R07.9    Anxiety F41.9    Hypokalemia E87.6    Dysarthria R47.1    Hypomagnesemia E83.42    Hypocalcemia E83.51    Functional diarrhea B18.9    Metabolic encephalopathy C39.83    Morbid obesity (HCC) E66.01       Past Medical History:        Diagnosis Date    Acute MI ([facility] [facility]     Acute pyelonephritis 9/8/2015    Anxiety and depression     Arthritis     CAD (coronary artery disease)     Cancer ([facility]     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    Cancer [facility])     \"skin on top of my head\"    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    Chronic obstructive pulmonary disease ([facility] 1/13/2017    Claustrophobia     COPD (ch[facility] obstructive [facility]) [facility]     Esophageal stricture     Gastroesophageal reflux disease without esophagitis 3/24/2016    Hiatal hernia     Hiatal hernia     Hypercholesteremia     Hypertension     Migraines     Movement disorder arthritis    Pneumonia     PONV (postoperative nausea and vomiting)     Type II or unspecified type diabetes mellitus without mention of complication, not stated as uncontrolled     Unspecified cerebral artery occlusion with cerebral infarction        Past Surgical History:        Procedure Laterality Date    APPENDECTOMY      CARDIAC SURGERY      1 stent 2000 and 1 stent 2001    CHOLECYSTECTOMY      COLONOSCOPY      COLONOSCOPY  06/11/2018    ENDOSCOPY, COLON, DIAGNOSTIC      GALLBLADDER SURGERY      HYSTERECTOMY      TEAR DUCT SURGERY      UPPER GASTROINTESTINAL ENDOSCOPY  4/20/12    with biopsy of stomach,gastritis    UPPER GASTROINTESTINAL ENDOSCOPY  06/11/2018    w/esophagael dilation       Social History:    Social History   Substance Use Topics    Smoking status: Former Smoker     Packs/day: 0.25     Years: 49.00     Types: Cigarettes     Quit date: 6/16/2017    Smokeless tobacco: Never Used      Comment: only smoke when real stressed  Nicotine patch    Alcohol use 0.6 oz/week     1 Glasses of wine per week      Comment: occ.

## 2018-09-06 NOTE — PROGRESS NOTES
Admission Progress Note  9/6/2018     Data:  Patient received to unit from PACU to 5324 family at bed side. See DocFlowsheets for assessments. Action:  Patient oriented to room and call light, instructed on diet and activity. Reviewed the patient education folder with the patient / family. Patient instructed to call nurse with any needs or concerns. Response:  Patient / family verbalized understanding of instructions. No other needs verbalized, call light in reach. Bed locked and in lowest position. Will continue to monitor patient per plan of care.     Roldan Zimmerman RN

## 2018-09-07 LAB
GLUCOSE BLD-MCNC: 145 MG/DL (ref 70–99)
GLUCOSE BLD-MCNC: 179 MG/DL (ref 70–99)
GLUCOSE BLD-MCNC: 191 MG/DL (ref 70–99)
GLUCOSE BLD-MCNC: 201 MG/DL (ref 70–99)
PERFORMED ON: ABNORMAL

## 2018-09-07 PROCEDURE — 2580000003 HC RX 258: Performed by: STUDENT IN AN ORGANIZED HEALTH CARE EDUCATION/TRAINING PROGRAM

## 2018-09-07 PROCEDURE — 94640 AIRWAY INHALATION TREATMENT: CPT

## 2018-09-07 PROCEDURE — 96365 THER/PROPH/DIAG IV INF INIT: CPT

## 2018-09-07 PROCEDURE — 6370000000 HC RX 637 (ALT 250 FOR IP): Performed by: STUDENT IN AN ORGANIZED HEALTH CARE EDUCATION/TRAINING PROGRAM

## 2018-09-07 PROCEDURE — 96376 TX/PRO/DX INJ SAME DRUG ADON: CPT

## 2018-09-07 PROCEDURE — 94150 VITAL CAPACITY TEST: CPT

## 2018-09-07 PROCEDURE — G8979 MOBILITY GOAL STATUS: HCPCS

## 2018-09-07 PROCEDURE — 96375 TX/PRO/DX INJ NEW DRUG ADDON: CPT

## 2018-09-07 PROCEDURE — 96372 THER/PROPH/DIAG INJ SC/IM: CPT

## 2018-09-07 PROCEDURE — G0378 HOSPITAL OBSERVATION PER HR: HCPCS

## 2018-09-07 PROCEDURE — G8978 MOBILITY CURRENT STATUS: HCPCS

## 2018-09-07 PROCEDURE — 6360000002 HC RX W HCPCS: Performed by: STUDENT IN AN ORGANIZED HEALTH CARE EDUCATION/TRAINING PROGRAM

## 2018-09-07 PROCEDURE — 99024 POSTOP FOLLOW-UP VISIT: CPT | Performed by: SURGERY

## 2018-09-07 PROCEDURE — 97116 GAIT TRAINING THERAPY: CPT

## 2018-09-07 PROCEDURE — 6360000002 HC RX W HCPCS: Performed by: NURSE PRACTITIONER

## 2018-09-07 PROCEDURE — 6370000000 HC RX 637 (ALT 250 FOR IP): Performed by: NURSE PRACTITIONER

## 2018-09-07 PROCEDURE — 94761 N-INVAS EAR/PLS OXIMETRY MLT: CPT

## 2018-09-07 PROCEDURE — 96366 THER/PROPH/DIAG IV INF ADDON: CPT

## 2018-09-07 PROCEDURE — 1200000000 HC SEMI PRIVATE

## 2018-09-07 PROCEDURE — 97161 PT EVAL LOW COMPLEX 20 MIN: CPT

## 2018-09-07 RX ORDER — NITROGLYCERIN 0.4 MG/1
0.4 TABLET SUBLINGUAL EVERY 5 MIN PRN
Status: DISCONTINUED | OUTPATIENT
Start: 2018-09-07 | End: 2018-09-09 | Stop reason: HOSPADM

## 2018-09-07 RX ORDER — TRAMADOL HYDROCHLORIDE 50 MG/1
100 TABLET ORAL EVERY 6 HOURS PRN
Status: DISCONTINUED | OUTPATIENT
Start: 2018-09-07 | End: 2018-09-07

## 2018-09-07 RX ORDER — TRAMADOL HYDROCHLORIDE 50 MG/1
50 TABLET ORAL EVERY 6 HOURS PRN
Status: DISCONTINUED | OUTPATIENT
Start: 2018-09-07 | End: 2018-09-07

## 2018-09-07 RX ORDER — LISINOPRIL 2.5 MG/1
2.5 TABLET ORAL DAILY
Status: DISCONTINUED | OUTPATIENT
Start: 2018-09-07 | End: 2018-09-09 | Stop reason: HOSPADM

## 2018-09-07 RX ORDER — ISOSORBIDE MONONITRATE 60 MG/1
60 TABLET, EXTENDED RELEASE ORAL DAILY
Status: DISCONTINUED | OUTPATIENT
Start: 2018-09-07 | End: 2018-09-09 | Stop reason: HOSPADM

## 2018-09-07 RX ORDER — OXYCODONE HYDROCHLORIDE AND ACETAMINOPHEN 5; 325 MG/1; MG/1
1 TABLET ORAL EVERY 4 HOURS PRN
Status: DISCONTINUED | OUTPATIENT
Start: 2018-09-07 | End: 2018-09-09 | Stop reason: HOSPADM

## 2018-09-07 RX ORDER — HYDRALAZINE HYDROCHLORIDE 20 MG/ML
5 INJECTION INTRAMUSCULAR; INTRAVENOUS EVERY 6 HOURS PRN
Status: DISCONTINUED | OUTPATIENT
Start: 2018-09-07 | End: 2018-09-09 | Stop reason: HOSPADM

## 2018-09-07 RX ADMIN — IPRATROPIUM BROMIDE AND ALBUTEROL SULFATE 3 ML: .5; 3 SOLUTION RESPIRATORY (INHALATION) at 16:47

## 2018-09-07 RX ADMIN — ENOXAPARIN SODIUM 40 MG: 40 INJECTION SUBCUTANEOUS at 20:32

## 2018-09-07 RX ADMIN — BUTALBITAL, ACETAMINOPHEN, AND CAFFEINE 1 TABLET: 50; 325; 40 TABLET ORAL at 02:52

## 2018-09-07 RX ADMIN — OXYCODONE HYDROCHLORIDE AND ACETAMINOPHEN 1 TABLET: 5; 325 TABLET ORAL at 09:44

## 2018-09-07 RX ADMIN — INSULIN LISPRO 2 UNITS: 100 INJECTION, SOLUTION INTRAVENOUS; SUBCUTANEOUS at 12:31

## 2018-09-07 RX ADMIN — PANTOPRAZOLE SODIUM 40 MG: 40 TABLET, DELAYED RELEASE ORAL at 06:15

## 2018-09-07 RX ADMIN — LISINOPRIL 2.5 MG: 2.5 TABLET ORAL at 09:44

## 2018-09-07 RX ADMIN — PROCHLORPERAZINE EDISYLATE 10 MG: 5 INJECTION INTRAMUSCULAR; INTRAVENOUS at 20:31

## 2018-09-07 RX ADMIN — INSULIN LISPRO 1 UNITS: 100 INJECTION, SOLUTION INTRAVENOUS; SUBCUTANEOUS at 17:51

## 2018-09-07 RX ADMIN — PROCHLORPERAZINE EDISYLATE 10 MG: 5 INJECTION INTRAMUSCULAR; INTRAVENOUS at 07:49

## 2018-09-07 RX ADMIN — TRAMADOL HYDROCHLORIDE 100 MG: 50 TABLET, FILM COATED ORAL at 05:42

## 2018-09-07 RX ADMIN — DEXTROSE MONOHYDRATE 1 G: 5 INJECTION INTRAVENOUS at 03:03

## 2018-09-07 RX ADMIN — DEXTROSE MONOHYDRATE 1 G: 5 INJECTION INTRAVENOUS at 09:45

## 2018-09-07 RX ADMIN — INSULIN LISPRO 1 UNITS: 100 INJECTION, SOLUTION INTRAVENOUS; SUBCUTANEOUS at 20:32

## 2018-09-07 RX ADMIN — INSULIN LISPRO 1 UNITS: 100 INJECTION, SOLUTION INTRAVENOUS; SUBCUTANEOUS at 09:49

## 2018-09-07 RX ADMIN — GABAPENTIN 100 MG: 100 CAPSULE ORAL at 09:44

## 2018-09-07 RX ADMIN — ISOSORBIDE MONONITRATE 60 MG: 60 TABLET, EXTENDED RELEASE ORAL at 09:44

## 2018-09-07 RX ADMIN — IPRATROPIUM BROMIDE AND ALBUTEROL SULFATE 3 ML: .5; 3 SOLUTION RESPIRATORY (INHALATION) at 22:16

## 2018-09-07 RX ADMIN — GABAPENTIN 100 MG: 100 CAPSULE ORAL at 20:31

## 2018-09-07 RX ADMIN — DEXTROSE MONOHYDRATE 1 G: 5 INJECTION INTRAVENOUS at 17:50

## 2018-09-07 RX ADMIN — ALPRAZOLAM 1 MG: 0.5 TABLET ORAL at 22:59

## 2018-09-07 RX ADMIN — BUTALBITAL, ACETAMINOPHEN, AND CAFFEINE 1 TABLET: 50; 325; 40 TABLET ORAL at 20:31

## 2018-09-07 RX ADMIN — DEXTROSE MONOHYDRATE 1 G: 5 INJECTION INTRAVENOUS at 23:45

## 2018-09-07 RX ADMIN — OXYCODONE HYDROCHLORIDE AND ACETAMINOPHEN 1 TABLET: 5; 325 TABLET ORAL at 22:59

## 2018-09-07 RX ADMIN — ALPRAZOLAM 1 MG: 0.5 TABLET ORAL at 09:45

## 2018-09-07 RX ADMIN — IPRATROPIUM BROMIDE AND ALBUTEROL SULFATE 3 ML: .5; 3 SOLUTION RESPIRATORY (INHALATION) at 10:59

## 2018-09-07 RX ADMIN — GABAPENTIN 100 MG: 100 CAPSULE ORAL at 15:27

## 2018-09-07 ASSESSMENT — PAIN SCALES - GENERAL
PAINLEVEL_OUTOF10: 9
PAINLEVEL_OUTOF10: 9
PAINLEVEL_OUTOF10: 8
PAINLEVEL_OUTOF10: 8
PAINLEVEL_OUTOF10: 3
PAINLEVEL_OUTOF10: 8
PAINLEVEL_OUTOF10: 6

## 2018-09-07 ASSESSMENT — PAIN DESCRIPTION - LOCATION
LOCATION: HEAD

## 2018-09-07 ASSESSMENT — PAIN DESCRIPTION - ONSET
ONSET: ON-GOING

## 2018-09-07 ASSESSMENT — PAIN DESCRIPTION - PROGRESSION
CLINICAL_PROGRESSION: NOT CHANGED
CLINICAL_PROGRESSION: NOT CHANGED
CLINICAL_PROGRESSION: GRADUALLY IMPROVING
CLINICAL_PROGRESSION: NOT CHANGED
CLINICAL_PROGRESSION: NOT CHANGED

## 2018-09-07 ASSESSMENT — PAIN DESCRIPTION - PAIN TYPE
TYPE: SURGICAL PAIN

## 2018-09-07 ASSESSMENT — PAIN DESCRIPTION - DESCRIPTORS
DESCRIPTORS: ACHING

## 2018-09-07 ASSESSMENT — PAIN DESCRIPTION - FREQUENCY
FREQUENCY: CONTINUOUS

## 2018-09-07 NOTE — PROGRESS NOTES
shower, patient needs assistance getting socks on)  Homemaking Assistance: Needs assistance (aide 5x/week, assists with cleaning and meal prep)  Ambulation Assistance: Independent (with cane, uses 4WW if needed)  Transfer Assistance: Independent  Active : Yes (not often)  Additional Comments: patient gets meals on wheels, grand-daughter assists as needed (stops by daily when working), grand-daughter reports multiple falls at home  Objective          AROM RLE (degrees)  RLE AROM: WFL  AROM LLE (degrees)  LLE AROM : WFL  Strength RLE  Strength RLE: WFL  Strength LLE  Strength LLE: WFL        Bed mobility  Supine to Sit: Minimal assistance (assist with bringing trunk to upright sitting position, head of bed elevated)  Scooting: Supervision (to scoot toward edge of bed)  Comment: patient sitting up in bedside chair at end of session  Transfers  Sit to Stand: Contact guard assistance  Stand to sit: Contact guard assistance  Bed to Chair: Contact guard assistance  Ambulation  Ambulation?: Yes  Ambulation 1  Surface: level tile  Device: Rolling Walker  Assistance: Contact guard assistance  Quality of Gait: decreased step length/height bilaterally with shuffling steps, gait fairly steady with FWW  Distance: 4-5 steps from edge of bed to bedside chair  Comments: patient fatigued with mobility  Stairs/Curb  Stairs?: No     Balance  Sitting - Static: Good  Standing - Static: Fair (CGA with UE support)  Standing - Dynamic: Fair (CGA to ambulate with FWW)        Assessment   Body structures, Functions, Activity limitations: Decreased functional mobility ; Decreased safe awareness;Decreased balance;Decreased endurance  Assessment: Patient tolerated session well, transferring and ambulating over to bedside chair with FWW and CGA. Patient declined further mobility at this time due to fatigue and lunch arriving. She demonstrates steady gait with FWW but is drowsy/lethargic throughout session needing increased verbal cues. go home       Therapy Time   Individual Concurrent Group Co-treatment   Time In 1213         Time Out 1238         Minutes 25         Timed Code Treatment Minutes: 10 Minutes    Timed Code Treatment Minutes:  10 Minutes    Total Treatment Minutes:    10 minutes treatment + 15 minutes evaluation = 25 total treatment minutes  If patient is discharged from the hospital prior to next treatment session, this note will serve as the discharge summary.      Farhe[address] CO[address]

## 2018-09-07 NOTE — PROGRESS NOTES
Surgery Daily Progress Note  Patient: Marino Saravia    CC: Squamous Cell Carcinoma of the Scalp     Subjective :  Patient underwent elective excision of SCC of the scalp with STSG and application of wound vac yesterday. Pt continues to complains of pain in her head, radiating to her neck. Pt states ultram doesn't help. Patient is tolerating PO intake with no nausea and no vomiting. She continues to void freely and experiencing urinary incontinence, as she does at baseline. ROS: A 14 point review of systems was conducted, significant findings as noted above. All other systems negative. Objective : Infusions:   lactated ringers      dextrose        I/O:I/O last 3 completed shifts: In: 2460 [P.O.:360; I.V.:2100]  Out: 710 [Urine:700; Blood:10]           Wt Readings from Last 1 Encounters:   09/06/18 277 lb (125.6 kg)     Exam:BP (!) 162/79   Pulse 80   Temp 98.4 °F (36.9 °C) (Oral)   Resp 18   Ht 5' 6\" (1.676 m)   Wt 277 lb (125.6 kg)   SpO2 92%   BMI 44.71 kg/m²     General appearance: alert, in no apparent distress   HEENT: Wound vac on vertex of head with good seal; no surrounding erythema  Lungs: Normal effort; no adventitious breath sounds  Heart: regular rate and rhythm; S1, S2 normal; no murmurs, no rubs, no gallops  Abdomen: soft, non-tender, non-distended; no guarding, no rigidity, no rebound;            LABS: No results for input(s): WBC, HGB, HCT, MCV, PLT in the last 72 hours. No results for input(s): NA, K, CL, CO2, PHOS, BUN, CREATININE in the last 72 hours. Invalid input(s): CA   No results for input(s): AST, ALT, ALB, BILIDIR, BILITOT, ALKPHOS in the last 72 hours. No results for input(s): LIPASE, AMYLASE in the last 72 hours. No results for input(s): PROT, INR, APTT in the last 72 hours. No results for input(s): CKTOTAL, CKMB, CKMBINDEX, TROPONINI in the last 72 hours.     ASSESSMENT/PLAN: Pt. is a 68 y.o. female s/p excision of SCC of the scalp with STSG and application of wound vac POD1. - Dc ultram, start percocet with compazine   - Continue ancef  - FEN: IVF: LR at 125, Diet: Carb control  - VTE PPx: Lovenox and SCDs  - Pulmonary toilet: IS bedside, encourage frequent use  - Activity: Encourage frequent ambulation and out of bed to chair  - Dispo: Anticipating discharge home today with prevena in place for 7 days    Will discuss with staff    Anthony Villalba MD  PGY-1 General Surgery  09/07/18 7:29 AM  [phone]    I saw and independently examined the patient today. I agree with the history of present illness, past medical/surgical histories, family history, social history, medication list and allergies as listed. The review of systems is as noted above. My physical exam confirms the findings listed above. Review of labs, pathology reports, radiology reports and medical records confirm the findings noted above. I edited the note where appropriate in italics, strikethrough font, or underline. Patient with significant pain from both scalp and donor site. AF/VSS  Vac intact with good seal.    Awaiting pathology. Plan to keep for additional day for pain control (patient with multiple pain medication allergies) and then plan for DC in AM once appropriate regimen determined.     Darline Hernandez MD  [address] [place] [address] [address] Reconstructive Surgery  [phone]  09/07/18

## 2018-09-07 NOTE — OP NOTE
usual sterile  manner. A time-out was performed confirming the patient and the procedure  to be performed. The operation commenced by infiltrating a 50:50 mixture  of 1% lidocaine with epinephrine and 0.5% Marcaine plain into the periphery  around the wound. 2-cm margins were utilized around the area of squamous  cell carcinoma. After satisfactory amount of time for epinephrine effect,  the incision was taken down to the level of the periosteum. The tissue was  dissected sharply with scalpel with hemostasis using pinpoint cautery. This lesion was then taken off the scalp and sent to Pathology as specimen. Attention was then directed toward the right thigh. The split-thickness  skin graft was elevated using a SOL ELIXIRS dermatome. The graft was meshed in  1.5:1 manner and sutured in place to the scalp wound using 4-0 chromic  sutures. VAC dressing was then applied with excellent seal.  Attention was  directed towards the lateral thigh and hemostasis was obtained with  epinephrine-soaked Telfa. Xeroform dressing was then applied and sutured  in place. The patient was then awakened, taken to PACU in satisfactory  condition. There were no immediate complications. The patient tolerated  the procedure well. At the end of the case, all counts were correct.         Renetta Allen MD    D: 09/06/2018 17:48:47       T: 09/06/2018 21:40:54     [email or web address]  Job#: [id number]     Doc#: [id number]    CC:

## 2018-09-07 NOTE — PROGRESS NOTES
Occupational Therapy  OT order received, chart reviewed, attempted OT, but pt sleeping soundly. Will follow up 9/7 as schedule allows.   Adelso Tubbs, OTR/L [id number]

## 2018-09-07 NOTE — PLAN OF CARE
Problem: Musculor/Skeletal Functional Status  Intervention: PT Evaluation/treatment  Increase independence with mobility.

## 2018-09-08 LAB
GLUCOSE BLD-MCNC: 172 MG/DL (ref 70–99)
GLUCOSE BLD-MCNC: 202 MG/DL (ref 70–99)
GLUCOSE BLD-MCNC: 240 MG/DL (ref 70–99)
GLUCOSE BLD-MCNC: 249 MG/DL (ref 70–99)
PERFORMED ON: ABNORMAL

## 2018-09-08 PROCEDURE — G0378 HOSPITAL OBSERVATION PER HR: HCPCS

## 2018-09-08 PROCEDURE — 2700000000 HC OXYGEN THERAPY PER DAY

## 2018-09-08 PROCEDURE — 94664 DEMO&/EVAL PT USE INHALER: CPT

## 2018-09-08 PROCEDURE — 6370000000 HC RX 637 (ALT 250 FOR IP): Performed by: STUDENT IN AN ORGANIZED HEALTH CARE EDUCATION/TRAINING PROGRAM

## 2018-09-08 PROCEDURE — 94761 N-INVAS EAR/PLS OXIMETRY MLT: CPT

## 2018-09-08 PROCEDURE — 6360000002 HC RX W HCPCS: Performed by: STUDENT IN AN ORGANIZED HEALTH CARE EDUCATION/TRAINING PROGRAM

## 2018-09-08 PROCEDURE — 96366 THER/PROPH/DIAG IV INF ADDON: CPT

## 2018-09-08 PROCEDURE — 96372 THER/PROPH/DIAG INJ SC/IM: CPT

## 2018-09-08 PROCEDURE — 1200000000 HC SEMI PRIVATE

## 2018-09-08 PROCEDURE — 2580000003 HC RX 258: Performed by: STUDENT IN AN ORGANIZED HEALTH CARE EDUCATION/TRAINING PROGRAM

## 2018-09-08 PROCEDURE — 6370000000 HC RX 637 (ALT 250 FOR IP): Performed by: NURSE PRACTITIONER

## 2018-09-08 PROCEDURE — 94640 AIRWAY INHALATION TREATMENT: CPT

## 2018-09-08 RX ADMIN — LISINOPRIL 2.5 MG: 2.5 TABLET ORAL at 08:34

## 2018-09-08 RX ADMIN — IPRATROPIUM BROMIDE AND ALBUTEROL SULFATE 3 ML: .5; 3 SOLUTION RESPIRATORY (INHALATION) at 15:03

## 2018-09-08 RX ADMIN — INSULIN LISPRO 4 UNITS: 100 INJECTION, SOLUTION INTRAVENOUS; SUBCUTANEOUS at 19:18

## 2018-09-08 RX ADMIN — GABAPENTIN 100 MG: 100 CAPSULE ORAL at 08:34

## 2018-09-08 RX ADMIN — OXYCODONE HYDROCHLORIDE AND ACETAMINOPHEN 1 TABLET: 5; 325 TABLET ORAL at 14:32

## 2018-09-08 RX ADMIN — PANTOPRAZOLE SODIUM 40 MG: 40 TABLET, DELAYED RELEASE ORAL at 06:14

## 2018-09-08 RX ADMIN — GABAPENTIN 100 MG: 100 CAPSULE ORAL at 20:01

## 2018-09-08 RX ADMIN — OXYCODONE HYDROCHLORIDE AND ACETAMINOPHEN 1 TABLET: 5; 325 TABLET ORAL at 23:59

## 2018-09-08 RX ADMIN — ALBUTEROL SULFATE 2.5 MG: 2.5 SOLUTION RESPIRATORY (INHALATION) at 04:14

## 2018-09-08 RX ADMIN — ALPRAZOLAM 1 MG: 0.5 TABLET ORAL at 21:11

## 2018-09-08 RX ADMIN — ISOSORBIDE MONONITRATE 60 MG: 60 TABLET, EXTENDED RELEASE ORAL at 08:34

## 2018-09-08 RX ADMIN — GABAPENTIN 100 MG: 100 CAPSULE ORAL at 14:32

## 2018-09-08 RX ADMIN — OXYCODONE HYDROCHLORIDE AND ACETAMINOPHEN 1 TABLET: 5; 325 TABLET ORAL at 20:01

## 2018-09-08 RX ADMIN — DEXTROSE MONOHYDRATE 1 G: 5 INJECTION INTRAVENOUS at 10:34

## 2018-09-08 RX ADMIN — OXYCODONE HYDROCHLORIDE AND ACETAMINOPHEN 1 TABLET: 5; 325 TABLET ORAL at 06:16

## 2018-09-08 RX ADMIN — ENOXAPARIN SODIUM 40 MG: 40 INJECTION SUBCUTANEOUS at 20:01

## 2018-09-08 RX ADMIN — INSULIN LISPRO 1 UNITS: 100 INJECTION, SOLUTION INTRAVENOUS; SUBCUTANEOUS at 08:35

## 2018-09-08 RX ADMIN — Medication 10 ML: at 20:44

## 2018-09-08 RX ADMIN — INSULIN LISPRO 2 UNITS: 100 INJECTION, SOLUTION INTRAVENOUS; SUBCUTANEOUS at 13:40

## 2018-09-08 RX ADMIN — INSULIN LISPRO 2 UNITS: 100 INJECTION, SOLUTION INTRAVENOUS; SUBCUTANEOUS at 21:14

## 2018-09-08 RX ADMIN — DEXTROSE MONOHYDRATE 1 G: 5 INJECTION INTRAVENOUS at 17:28

## 2018-09-08 ASSESSMENT — PAIN DESCRIPTION - ORIENTATION
ORIENTATION: RIGHT
ORIENTATION: UPPER

## 2018-09-08 ASSESSMENT — PAIN DESCRIPTION - PROGRESSION: CLINICAL_PROGRESSION: NOT CHANGED

## 2018-09-08 ASSESSMENT — PAIN SCALES - GENERAL
PAINLEVEL_OUTOF10: 10
PAINLEVEL_OUTOF10: 9

## 2018-09-08 ASSESSMENT — PAIN DESCRIPTION - LOCATION
LOCATION: LEG;HEAD
LOCATION: HEAD

## 2018-09-08 ASSESSMENT — PAIN DESCRIPTION - DESCRIPTORS: DESCRIPTORS: ACHING

## 2018-09-08 ASSESSMENT — PAIN DESCRIPTION - FREQUENCY: FREQUENCY: CONTINUOUS

## 2018-09-08 ASSESSMENT — PAIN DESCRIPTION - PAIN TYPE
TYPE: ACUTE PAIN;SURGICAL PAIN
TYPE: SURGICAL PAIN

## 2018-09-08 ASSESSMENT — PAIN DESCRIPTION - ONSET: ONSET: ON-GOING

## 2018-09-08 NOTE — PLAN OF CARE
Problem: Falls - Risk of:  Goal: Will remain free from falls  Will remain free from falls   Outcome: Ongoing  Patient in bed with non-skid socks on, calls out appropriately. Call light within reach, bed in lowest position and wheels locked. Will continue to monitor.

## 2018-09-08 NOTE — PROGRESS NOTES
Patient A&O x4, VSS, complains of pain mostly at the skin graft site, pain managed with medication as ordered, up to bathroom x1 assist with front wheel walker, voiding well, wound vac has had no output on this shift with dressing CDI, dressing on upper rt thigh reinforce, with curlix and ace wrap. Will continue to monitor.

## 2018-09-09 VITALS
TEMPERATURE: 97.6 F | DIASTOLIC BLOOD PRESSURE: 66 MMHG | BODY MASS INDEX: 44.52 KG/M2 | WEIGHT: 277 LBS | HEIGHT: 66 IN | HEART RATE: 75 BPM | OXYGEN SATURATION: 91 % | RESPIRATION RATE: 18 BRPM | SYSTOLIC BLOOD PRESSURE: 115 MMHG

## 2018-09-09 LAB
GLUCOSE BLD-MCNC: 172 MG/DL (ref 70–99)
GLUCOSE BLD-MCNC: 256 MG/DL (ref 70–99)
PERFORMED ON: ABNORMAL
PERFORMED ON: ABNORMAL

## 2018-09-09 PROCEDURE — 2700000000 HC OXYGEN THERAPY PER DAY

## 2018-09-09 PROCEDURE — 2580000003 HC RX 258: Performed by: STUDENT IN AN ORGANIZED HEALTH CARE EDUCATION/TRAINING PROGRAM

## 2018-09-09 PROCEDURE — 97535 SELF CARE MNGMENT TRAINING: CPT

## 2018-09-09 PROCEDURE — G8988 SELF CARE GOAL STATUS: HCPCS

## 2018-09-09 PROCEDURE — 6360000002 HC RX W HCPCS: Performed by: STUDENT IN AN ORGANIZED HEALTH CARE EDUCATION/TRAINING PROGRAM

## 2018-09-09 PROCEDURE — 96372 THER/PROPH/DIAG INJ SC/IM: CPT

## 2018-09-09 PROCEDURE — G0378 HOSPITAL OBSERVATION PER HR: HCPCS

## 2018-09-09 PROCEDURE — 96375 TX/PRO/DX INJ NEW DRUG ADDON: CPT

## 2018-09-09 PROCEDURE — 96366 THER/PROPH/DIAG IV INF ADDON: CPT

## 2018-09-09 PROCEDURE — 97166 OT EVAL MOD COMPLEX 45 MIN: CPT

## 2018-09-09 PROCEDURE — 6370000000 HC RX 637 (ALT 250 FOR IP): Performed by: SURGERY

## 2018-09-09 PROCEDURE — G8987 SELF CARE CURRENT STATUS: HCPCS

## 2018-09-09 PROCEDURE — 97530 THERAPEUTIC ACTIVITIES: CPT

## 2018-09-09 PROCEDURE — 94761 N-INVAS EAR/PLS OXIMETRY MLT: CPT

## 2018-09-09 PROCEDURE — 6370000000 HC RX 637 (ALT 250 FOR IP): Performed by: STUDENT IN AN ORGANIZED HEALTH CARE EDUCATION/TRAINING PROGRAM

## 2018-09-09 PROCEDURE — 6370000000 HC RX 637 (ALT 250 FOR IP): Performed by: NURSE PRACTITIONER

## 2018-09-09 RX ORDER — TRAMADOL HYDROCHLORIDE 50 MG/1
50 TABLET ORAL EVERY 6 HOURS PRN
Qty: 12 TABLET | Refills: 0 | Status: SHIPPED | OUTPATIENT
Start: 2018-09-09 | End: 2018-09-13

## 2018-09-09 RX ORDER — POLYETHYLENE GLYCOL 3350 17 G/17G
17 POWDER, FOR SOLUTION ORAL DAILY PRN
Status: DISCONTINUED | OUTPATIENT
Start: 2018-09-09 | End: 2018-09-09 | Stop reason: HOSPADM

## 2018-09-09 RX ORDER — DOCUSATE SODIUM 100 MG/1
100 CAPSULE, LIQUID FILLED ORAL 2 TIMES DAILY
Status: DISCONTINUED | OUTPATIENT
Start: 2018-09-09 | End: 2018-09-09 | Stop reason: HOSPADM

## 2018-09-09 RX ADMIN — OXYCODONE HYDROCHLORIDE AND ACETAMINOPHEN 1 TABLET: 5; 325 TABLET ORAL at 03:54

## 2018-09-09 RX ADMIN — INSULIN LISPRO 9 UNITS: 100 INJECTION, SOLUTION INTRAVENOUS; SUBCUTANEOUS at 12:44

## 2018-09-09 RX ADMIN — LISINOPRIL 2.5 MG: 2.5 TABLET ORAL at 08:44

## 2018-09-09 RX ADMIN — OXYCODONE HYDROCHLORIDE AND ACETAMINOPHEN 1 TABLET: 5; 325 TABLET ORAL at 08:43

## 2018-09-09 RX ADMIN — PANTOPRAZOLE SODIUM 40 MG: 40 TABLET, DELAYED RELEASE ORAL at 05:04

## 2018-09-09 RX ADMIN — DOCUSATE SODIUM 100 MG: 100 CAPSULE, LIQUID FILLED ORAL at 11:06

## 2018-09-09 RX ADMIN — OXYCODONE HYDROCHLORIDE AND ACETAMINOPHEN 1 TABLET: 5; 325 TABLET ORAL at 12:48

## 2018-09-09 RX ADMIN — DEXTROSE MONOHYDRATE 1 G: 5 INJECTION INTRAVENOUS at 08:44

## 2018-09-09 RX ADMIN — ENOXAPARIN SODIUM 30 MG: 30 INJECTION SUBCUTANEOUS at 08:52

## 2018-09-09 RX ADMIN — GABAPENTIN 100 MG: 100 CAPSULE ORAL at 14:46

## 2018-09-09 RX ADMIN — ONDANSETRON 4 MG: 2 INJECTION INTRAMUSCULAR; INTRAVENOUS at 03:35

## 2018-09-09 RX ADMIN — GABAPENTIN 100 MG: 100 CAPSULE ORAL at 08:44

## 2018-09-09 RX ADMIN — DEXTROSE MONOHYDRATE 1 G: 5 INJECTION INTRAVENOUS at 00:00

## 2018-09-09 RX ADMIN — ISOSORBIDE MONONITRATE 60 MG: 60 TABLET, EXTENDED RELEASE ORAL at 08:44

## 2018-09-09 RX ADMIN — INSULIN LISPRO 2 UNITS: 100 INJECTION, SOLUTION INTRAVENOUS; SUBCUTANEOUS at 08:46

## 2018-09-09 ASSESSMENT — PAIN DESCRIPTION - PAIN TYPE
TYPE: ACUTE PAIN;SURGICAL PAIN
TYPE: SURGICAL PAIN

## 2018-09-09 ASSESSMENT — PAIN SCALES - GENERAL
PAINLEVEL_OUTOF10: 10
PAINLEVEL_OUTOF10: 9
PAINLEVEL_OUTOF10: 8

## 2018-09-09 ASSESSMENT — PAIN DESCRIPTION - DESCRIPTORS: DESCRIPTORS: ACHING

## 2018-09-09 ASSESSMENT — PAIN DESCRIPTION - PROGRESSION: CLINICAL_PROGRESSION: NOT CHANGED

## 2018-09-09 ASSESSMENT — PAIN DESCRIPTION - ONSET: ONSET: ON-GOING

## 2018-09-09 ASSESSMENT — PAIN DESCRIPTION - LOCATION
LOCATION: HEAD
LOCATION: HEAD

## 2018-09-09 ASSESSMENT — PAIN DESCRIPTION - ORIENTATION: ORIENTATION: UPPER

## 2018-09-09 ASSESSMENT — PAIN DESCRIPTION - FREQUENCY: FREQUENCY: CONTINUOUS

## 2018-09-09 NOTE — CARE COORDINATION
2018  [facility])  Clinical Case Management Department    Patient: Ling Lowery  MRN: [id number] / : 1945  ACCT: J [de-identified]     Emergency Contacts  Contact 1: Name: Beverly Joy 1: Number: [phone]  Contact 1: Relationship: daughter  Contact 2: Name: Dulce Maria Hamm 2: Number: [phone]  Contact 2: Relationship: grandson    Admission Documentation  Attending Provider: Cris Mann MD  Admit date/time: 2018  5:56 AM  Status: Inpatient  Diagnosis: Squamous cell carcinoma of head and neck ([facility] [address]     Readmission within last 30 days:  no     Living Situation  Discharge Planning  Living Arrangements: Alone  Support Systems: Family Members  Potential Assistance Needed: N/A  Type of Home Care Services: None  Patient expects to be discharged to[de-identified] home  Expected Discharge Date: 18    Service Assessment:       Values / Beliefs  Do you have any ethnic, cultural, sacramental, or spiritual Adventism needs you would like us to be aware of while you are in the hospital?: No    Advance Directives (For Healthcare)  Pre-existing DNR Comfort Care/DNR Arrest/DNI Order: No  Healthcare Directive: No, patient does not have an advance directive for healthcare treatment  Information on Healthcare Directives Requested: No  Patient Requests Assistance: No  Advance Directives: Pt. not interested at this time      Pharmacy:    Potential Assistance Purchasing Medications:  No  Does patient want to participate in local refill/meds to beds program?: No    Notification completed in [facility]/PAS? No     Discharge disposition:       CM spoke with intake and are aware of her d/c today , they will get an aid as soon as possible  , CM left  for daughter per pt request to update of d/c plan  , patient states she will have a ride between 5 pm and 6 pm , orders faxed to [facility] . CM discussed d/c plan with RN Adamaris Allen. [address] ?[phone]   ? Phone:

## 2018-09-09 NOTE — PLAN OF CARE
Problem: Falls - Risk of:  Goal: Will remain free from falls  Will remain free from falls   Outcome: Ongoing  Patient in bed with nonskid socks on. Ambulates x1 assist with walker. Bed in lowest position and locked. Call light and belongings within reach. Calls out appropriately for assistance. Will monitor.

## 2018-09-10 ENCOUNTER — OFFICE VISIT (OUTPATIENT)
Dept: SURGERY | Age: 73
End: 2018-09-10

## 2018-09-10 VITALS
HEART RATE: 105 BPM | TEMPERATURE: 97.9 F | DIASTOLIC BLOOD PRESSURE: 58 MMHG | RESPIRATION RATE: 17 BRPM | SYSTOLIC BLOOD PRESSURE: 84 MMHG | OXYGEN SATURATION: 92 %

## 2018-09-10 DIAGNOSIS — Z09 POSTOP CHECK: Primary | ICD-10-CM

## 2018-09-10 PROCEDURE — 99024 POSTOP FOLLOW-UP VISIT: CPT | Performed by: SURGERY

## 2018-09-10 NOTE — Clinical Note
Rock Davis is doing well from her scalp SCC/BCC excision with skin graft reconstruction. She is healing well with excellent take of her skin graft thus far. Her pathology was clear from a margin perspective. I will see her again in a few weeks to ensure continued wound healing success. If you have any questions or concerns, please don't hesitate to contact me anytime. Thanks!  Cat Grace

## 2018-09-13 ENCOUNTER — TELEPHONE (OUTPATIENT)
Dept: SURGERY | Age: 73
End: 2018-09-13

## 2018-09-17 NOTE — TELEPHONE ENCOUNTER
[place] [facility] called to let us know that [facility] [address] [facility]. They would like us to place the order mentioned below with Christiano N Norma [facility]. They stated that the patient needs SN and rehab therapy. She is homebound. Please call with questions.

## 2018-09-18 NOTE — TELEPHONE ENCOUNTER
Annie guajardo/ [place] [facility],  is requesting a return call in regards to skilled nursing [phone].

## 2018-09-19 ENCOUNTER — TELEPHONE (OUTPATIENT)
Dept: SURGERY | Age: 73
End: 2018-09-19

## 2018-09-20 NOTE — TELEPHONE ENCOUNTER
Late entry. Early Dr [address] 15 to set up skilled nursing care for patient. They are not taking [employer] at this time.  Tried to contact Ruth Pierce at [address] no answer LVM

## 2018-09-20 NOTE — TELEPHONE ENCOUNTER
Forward pictures to Dr. Coleman Nuñez. Per Coleman Nuñez no reason to be concerned everything looks good.  Continue wound care as ordered will see in office soon for post op check

## 2018-09-21 ENCOUNTER — HOSPITAL ENCOUNTER (INPATIENT)
Dept: INPATIENT UNIT | Age: 73
LOS: 1 days | Discharge: HOME HEALTH | DRG: 313 | End: 2018-09-22
Attending: EMERGENCY MEDICINE | Admitting: INTERNAL MEDICINE
Payer: COMMERCIAL

## 2018-09-21 DIAGNOSIS — I49.8 BIGEMINY: ICD-10-CM

## 2018-09-21 DIAGNOSIS — R07.9 CHEST PAIN, UNSPECIFIED TYPE: Primary | ICD-10-CM

## 2018-09-21 LAB
ANION GAP SERPL CALCULATED.3IONS-SCNC: 14 MMOL/L (ref 3–16)
BASOPHILS ABSOLUTE: 0.1 K/UL (ref 0–0.2)
BASOPHILS RELATIVE PERCENT: 1.3 %
BUN BLDV-MCNC: 12 MG/DL (ref 7–20)
CALCIUM SERPL-MCNC: 9.8 MG/DL (ref 8.3–10.6)
CHLORIDE BLD-SCNC: 98 MMOL/L (ref 99–110)
CO2: 24 MMOL/L (ref 21–32)
CREAT SERPL-MCNC: 0.9 MG/DL (ref 0.6–1.2)
D DIMER: 474 NG/ML DDU (ref 0–229)
EKG ATRIAL RATE: 70 BPM
EKG DIAGNOSIS: NORMAL
EKG P AXIS: 43 DEGREES
EKG P-R INTERVAL: 170 MS
EKG Q-T INTERVAL: 406 MS
EKG QRS DURATION: 108 MS
EKG QTC CALCULATION (BAZETT): 438 MS
EKG R AXIS: -48 DEGREES
EKG T AXIS: 15 DEGREES
EKG VENTRICULAR RATE: 70 BPM
EOSINOPHILS ABSOLUTE: 0.1 K/UL (ref 0–0.6)
EOSINOPHILS RELATIVE PERCENT: 1.4 %
GFR AFRICAN AMERICAN: >60
GFR NON-AFRICAN AMERICAN: >60
GLUCOSE BLD-MCNC: 174 MG/DL (ref 70–99)
HCT VFR BLD CALC: 50.2 % (ref 36–48)
HEMOGLOBIN: 16.5 G/DL (ref 12–16)
INR BLD: 1.07 (ref 0.86–1.14)
LYMPHOCYTES ABSOLUTE: 2.1 K/UL (ref 1–5.1)
LYMPHOCYTES RELATIVE PERCENT: 24.2 %
MCH RBC QN AUTO: 29.5 PG (ref 26–34)
MCHC RBC AUTO-ENTMCNC: 33 G/DL (ref 31–36)
MCV RBC AUTO: 89.4 FL (ref 80–100)
MONOCYTES ABSOLUTE: 0.5 K/UL (ref 0–1.3)
MONOCYTES RELATIVE PERCENT: 5.9 %
NEUTROPHILS ABSOLUTE: 5.8 K/UL (ref 1.7–7.7)
NEUTROPHILS RELATIVE PERCENT: 67.2 %
PDW BLD-RTO: 14.6 % (ref 12.4–15.4)
PLATELET # BLD: 252 K/UL (ref 135–450)
PMV BLD AUTO: 7.6 FL (ref 5–10.5)
POTASSIUM SERPL-SCNC: 4.2 MMOL/L (ref 3.5–5.1)
PRO-BNP: 114 PG/ML (ref 0–124)
PROTHROMBIN TIME: 12.2 SEC (ref 9.8–13)
RBC # BLD: 5.61 M/UL (ref 4–5.2)
SODIUM BLD-SCNC: 136 MMOL/L (ref 136–145)
TROPONIN: <0.01 NG/ML
TROPONIN: <0.01 NG/ML
WBC # BLD: 8.7 K/UL (ref 4–11)

## 2018-09-21 PROCEDURE — 71046 X-RAY EXAM CHEST 2 VIEWS: CPT

## 2018-09-21 PROCEDURE — 71260 CT THORAX DX C+: CPT

## 2018-09-21 PROCEDURE — 96372 THER/PROPH/DIAG INJ SC/IM: CPT | Performed by: INTERNAL MEDICINE

## 2018-09-21 PROCEDURE — 99285 EMERGENCY DEPT VISIT HI MDM: CPT

## 2018-09-21 PROCEDURE — 85610 PROTHROMBIN TIME: CPT

## 2018-09-21 PROCEDURE — 93005 ELECTROCARDIOGRAM TRACING: CPT

## 2018-09-21 PROCEDURE — 83880 ASSAY OF NATRIURETIC PEPTIDE: CPT

## 2018-09-21 PROCEDURE — 36415 COLL VENOUS BLD VENIPUNCTURE: CPT

## 2018-09-21 PROCEDURE — 80048 BASIC METABOLIC PNL TOTAL CA: CPT

## 2018-09-21 PROCEDURE — 96361 HYDRATE IV INFUSION ADD-ON: CPT | Performed by: INTERNAL MEDICINE

## 2018-09-21 PROCEDURE — 9990 CHARGE CONVERSION

## 2018-09-21 PROCEDURE — 96360 HYDRATION IV INFUSION INIT: CPT

## 2018-09-21 PROCEDURE — 85025 COMPLETE CBC W/AUTO DIFF WBC: CPT

## 2018-09-21 PROCEDURE — 84484 ASSAY OF TROPONIN QUANT: CPT

## 2018-09-21 PROCEDURE — 85379 FIBRIN DEGRADATION QUANT: CPT

## 2018-09-21 RX ORDER — NITROGLYCERIN 0.4 MG/1
0.4 TABLET SUBLINGUAL EVERY 5 MIN PRN
Status: DISCONTINUED | OUTPATIENT
Start: 2018-09-21 | End: 2018-09-23 | Stop reason: HOSPADM

## 2018-09-21 RX ORDER — ISOSORBIDE MONONITRATE 60 MG/1
60 TABLET, EXTENDED RELEASE ORAL DAILY
Status: DISCONTINUED | OUTPATIENT
Start: 2018-09-21 | End: 2018-09-23 | Stop reason: HOSPADM

## 2018-09-21 RX ORDER — TRAMADOL HYDROCHLORIDE 50 MG/1
50 TABLET ORAL ONCE
Status: COMPLETED | OUTPATIENT
Start: 2018-09-21 | End: 2018-09-21

## 2018-09-21 RX ORDER — SODIUM CHLORIDE 0.9 % (FLUSH) 0.9 %
10 SYRINGE (ML) INJECTION EVERY 12 HOURS SCHEDULED
Status: DISCONTINUED | OUTPATIENT
Start: 2018-09-21 | End: 2018-09-23 | Stop reason: HOSPADM

## 2018-09-21 RX ORDER — IPRATROPIUM BROMIDE AND ALBUTEROL SULFATE 2.5; .5 MG/3ML; MG/3ML
3 SOLUTION RESPIRATORY (INHALATION)
Status: DISCONTINUED | OUTPATIENT
Start: 2018-09-22 | End: 2018-09-21

## 2018-09-21 RX ORDER — LISINOPRIL 5 MG/1
2.5 TABLET ORAL DAILY
Status: DISCONTINUED | OUTPATIENT
Start: 2018-09-21 | End: 2018-09-23

## 2018-09-21 RX ORDER — ACETAMINOPHEN 325 MG/1
650 TABLET ORAL EVERY 4 HOURS PRN
Status: DISCONTINUED | OUTPATIENT
Start: 2018-09-21 | End: 2018-09-23 | Stop reason: HOSPADM

## 2018-09-21 RX ORDER — GABAPENTIN 100 MG/1
100 CAPSULE ORAL 3 TIMES DAILY
COMMUNITY
End: 2018-10-30 | Stop reason: SDUPTHER

## 2018-09-21 RX ORDER — CILOSTAZOL 100 MG/1
100 TABLET ORAL 2 TIMES DAILY
Status: DISCONTINUED | OUTPATIENT
Start: 2018-09-21 | End: 2018-09-23 | Stop reason: HOSPADM

## 2018-09-21 RX ORDER — FACIAL-BODY WIPES
8 EACH TOPICAL DAILY
Status: DISCONTINUED | OUTPATIENT
Start: 2018-09-21 | End: 2018-09-21 | Stop reason: RX

## 2018-09-21 RX ORDER — ONDANSETRON 2 MG/ML
4 INJECTION INTRAMUSCULAR; INTRAVENOUS EVERY 6 HOURS PRN
Status: DISCONTINUED | OUTPATIENT
Start: 2018-09-21 | End: 2018-09-23 | Stop reason: HOSPADM

## 2018-09-21 RX ORDER — POTASSIUM CHLORIDE 20 MEQ/1
40 TABLET, EXTENDED RELEASE ORAL PRN
Status: DISCONTINUED | OUTPATIENT
Start: 2018-09-21 | End: 2018-09-23 | Stop reason: HOSPADM

## 2018-09-21 RX ORDER — NITROGLYCERIN 0.4 MG/1
0.4 TABLET SUBLINGUAL EVERY 5 MIN PRN
Status: COMPLETED | OUTPATIENT
Start: 2018-09-21 | End: 2018-09-22

## 2018-09-21 RX ORDER — PANTOPRAZOLE SODIUM 40 MG/1
40 TABLET, DELAYED RELEASE ORAL
Status: DISCONTINUED | OUTPATIENT
Start: 2018-09-22 | End: 2018-09-23 | Stop reason: HOSPADM

## 2018-09-21 RX ORDER — GABAPENTIN 100 MG/1
100 CAPSULE ORAL 3 TIMES DAILY
Status: DISCONTINUED | OUTPATIENT
Start: 2018-09-21 | End: 2018-09-23 | Stop reason: HOSPADM

## 2018-09-21 RX ORDER — POTASSIUM CHLORIDE 7.45 MG/ML
10 INJECTION INTRAVENOUS PRN
Status: DISCONTINUED | OUTPATIENT
Start: 2018-09-21 | End: 2018-09-21 | Stop reason: SDUPTHER

## 2018-09-21 RX ORDER — ALBUTEROL SULFATE 90 UG/1
2 AEROSOL, METERED RESPIRATORY (INHALATION) EVERY 6 HOURS PRN
Status: DISCONTINUED | OUTPATIENT
Start: 2018-09-21 | End: 2018-09-21

## 2018-09-21 RX ORDER — BUTALBITAL, ACETAMINOPHEN AND CAFFEINE 50; 325; 40 MG/1; MG/1; MG/1
1 TABLET ORAL EVERY 6 HOURS PRN
Status: DISCONTINUED | OUTPATIENT
Start: 2018-09-21 | End: 2018-09-23 | Stop reason: HOSPADM

## 2018-09-21 RX ORDER — OMEPRAZOLE 20 MG/1
40 CAPSULE, DELAYED RELEASE ORAL DAILY
Status: DISCONTINUED | OUTPATIENT
Start: 2018-09-21 | End: 2018-09-21 | Stop reason: CLARIF

## 2018-09-21 RX ORDER — ALBUTEROL SULFATE 2.5 MG/3ML
2.5 SOLUTION RESPIRATORY (INHALATION) EVERY 4 HOURS PRN
Status: DISCONTINUED | OUTPATIENT
Start: 2018-09-21 | End: 2018-09-21

## 2018-09-21 RX ORDER — GLIPIZIDE 5 MG/1
5 TABLET ORAL
Status: DISCONTINUED | OUTPATIENT
Start: 2018-09-22 | End: 2018-09-23 | Stop reason: HOSPADM

## 2018-09-21 RX ORDER — SODIUM CHLORIDE 0.9 % (FLUSH) 0.9 %
10 SYRINGE (ML) INJECTION PRN
Status: DISCONTINUED | OUTPATIENT
Start: 2018-09-21 | End: 2018-09-23 | Stop reason: HOSPADM

## 2018-09-21 RX ORDER — POTASSIUM CHLORIDE 20 MEQ/1
40 TABLET, EXTENDED RELEASE ORAL PRN
Status: DISCONTINUED | OUTPATIENT
Start: 2018-09-21 | End: 2018-09-21 | Stop reason: SDUPTHER

## 2018-09-21 RX ORDER — 0.9 % SODIUM CHLORIDE 0.9 %
1000 INTRAVENOUS SOLUTION INTRAVENOUS ONCE
Status: COMPLETED | OUTPATIENT
Start: 2018-09-21 | End: 2018-09-21

## 2018-09-21 RX ORDER — POTASSIUM CHLORIDE 20MEQ/15ML
40 LIQUID (ML) ORAL PRN
Status: DISCONTINUED | OUTPATIENT
Start: 2018-09-21 | End: 2018-09-21 | Stop reason: SDUPTHER

## 2018-09-21 RX ORDER — POTASSIUM CHLORIDE 7.45 MG/ML
10 INJECTION INTRAVENOUS PRN
Status: DISCONTINUED | OUTPATIENT
Start: 2018-09-21 | End: 2018-09-23 | Stop reason: HOSPADM

## 2018-09-21 RX ORDER — ISOSORBIDE MONONITRATE 60 MG/1
60 TABLET, EXTENDED RELEASE ORAL DAILY
COMMUNITY
End: 2018-10-05 | Stop reason: SDUPTHER

## 2018-09-21 RX ORDER — ALBUTEROL SULFATE 2.5 MG/3ML
2.5 SOLUTION RESPIRATORY (INHALATION) EVERY 4 HOURS PRN
Status: DISCONTINUED | OUTPATIENT
Start: 2018-09-21 | End: 2018-09-23 | Stop reason: HOSPADM

## 2018-09-21 RX ORDER — ALPRAZOLAM 0.5 MG/1
1 TABLET ORAL 3 TIMES DAILY PRN
Status: DISCONTINUED | OUTPATIENT
Start: 2018-09-21 | End: 2018-09-23 | Stop reason: HOSPADM

## 2018-09-21 RX ADMIN — ENOXAPARIN SODIUM 40 MG: 40 INJECTION SUBCUTANEOUS at 21:54

## 2018-09-21 RX ADMIN — LISINOPRIL 2.5 MG: 5 TABLET ORAL at 21:58

## 2018-09-21 RX ADMIN — ALPRAZOLAM 1 MG: 0.5 TABLET ORAL at 19:10

## 2018-09-21 RX ADMIN — ISOSORBIDE MONONITRATE 60 MG: 60 TABLET, EXTENDED RELEASE ORAL at 21:54

## 2018-09-21 RX ADMIN — CILOSTAZOL 100 MG: 100 TABLET ORAL at 21:54

## 2018-09-21 RX ADMIN — TRAMADOL HYDROCHLORIDE 50 MG: 50 TABLET ORAL at 14:56

## 2018-09-21 RX ADMIN — GABAPENTIN 100 MG: 100 CAPSULE ORAL at 21:54

## 2018-09-21 RX ADMIN — Medication 1000 ML: at 13:39

## 2018-09-21 RX ADMIN — NITROGLYCERIN 0.4 MG: 0.4 TABLET SUBLINGUAL at 13:39

## 2018-09-21 RX ADMIN — Medication 10 ML: at 21:55

## 2018-09-21 ASSESSMENT — PAIN SCALES - GENERAL
PAINLEVEL_OUTOF10: 8
PAINLEVEL_OUTOF10: 6
PAINLEVEL_OUTOF10: 10

## 2018-09-21 ASSESSMENT — PAIN DESCRIPTION - PROGRESSION
CLINICAL_PROGRESSION: NOT CHANGED
CLINICAL_PROGRESSION: NOT CHANGED

## 2018-09-21 ASSESSMENT — PAIN DESCRIPTION - PAIN TYPE: TYPE: ACUTE PAIN

## 2018-09-21 ASSESSMENT — PAIN DESCRIPTION - LOCATION: LOCATION: CHEST

## 2018-09-21 NOTE — ED NOTES
Pt's family came out to desk and states that her BP has shot up and pt is now confused. Upon arrival to room, pt's BP is around where it has been most of the time. Pt had been sleeping and woke up and was confused about where she was, why she was here and was afraid that she was being admitted to a nursing home. Explained to pt that she was brought into the hospital by the squad because she was having chest pain. That she is being admitted to the hospital because she came in with chest pain.       Enrique Villegas RN  09/21/18 [id number]

## 2018-09-21 NOTE — ED PROVIDER NOTES
Triage Chief Complaint:   Chest Pain (pt brought in by FF squad from home for c/o chest pain. pt reports that it feels like her previous heart attacks. Pt had tumor removed from head 2 weeks ago with skin graft to right thigh. )    BERTIN Campbell is a 68 y.o. female that presents with CP and SOB. Patient reports that she noticed some substernal CP yesterday that was relieved with 1 SL nitro. Started having the pain again, took another nitro, and had some relief of the pain. Described as an intense heaviness and pressure across her chest. Radiates through to her back. Has some mild SOB associated with this. Symptoms are worse with exertion. Has h/o MI, HTN, HLD, COPD, DM. Recently has basal cell carcinoma removed from her scalp and had skin graft to right lateral thigh about two weeks ago. Rates pain as 10/10. States it feels similar to previous MIs. Denies fever, chills, cough, abdominal pain, nausea, vomiting, joint pain, numbness, tingling, focal weakness, or other associated signs or symptoms. ROS:  At least 10 systems reviewed and otherwise negative except as in the [address]     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    Cancer [facility])     \"skin on top of my head\"    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    Chronic obstructive pulmonary disease ([address] 1/13/2017    Claustrophobia     COPD (chronic obstructive pulmonary disease) (HCC)     Esophageal stricture     Gastroesophageal reflux disease without esophagitis 3/24/2016    Hiatal hernia     Hiatal hernia     Hypercholesteremia     Hypertension     Migraines     Movement disorder arthritis    Pneumonia     PONV (postoperative nausea and vomiting)     Type II or unspecified type diabetes mellitus without mention of complication, not stated as uncontrolled     Unspecified cerebral artery occlusion with cerebral infarction      Past Surgical History:   Procedure Laterality Date    APPENDECTOMY      CARDIAC SURGERY      1 stent 2000 and 1 stent 2001    CHOLECYSTECTOMY      COLONOSCOPY      COLONOSCOPY  06/11/2018    ENDOSCOPY, COLON, DIAGNOSTIC      GALLBLADDER SURGERY      HYSTERECTOMY      TN EXC SKIN MALIG <5MM REMAINDR BODY N/A 9/6/2018    EXCISION OF SCALP SQUAMOUS CELL CARCINOMA performed by Jodi Kelly MD at [facility] <100SQCM N/A 9/6/2018    SPLIT THICKNESS SKIN GRAFT FOR COVERAGE SCALP, APPLICATION OF WOUND VAC DEVICE performed by Jodi Kelly MD at [address] [place] [address] ENDOSCOPY  4/20/12    with biopsy of stomach,gastritis    UPPER GASTROINTESTINAL ENDOSCOPY  06/11/2018    w/esophagael dilation       CURRENT MEDICATIONS    Current Outpatient Rx   Medication Sig Dispense Refill    gabapentin (NEURONTIN) 100 MG capsule Take 100 mg by mouth 3 times daily. Albertus Reel isosorbide mononitrate (IMDUR) 60 MG extended release tablet Take 60 mg by mouth daily      ALPRAZolam (XANAX) 1 MG tablet Take 1 tablet by mouth 3 times daily as needed for Anxiety for up to 30 days. . 90 tablet 0    nystatin (MYCOSTATIN) 167418 UNIT/GM powder Apply topically 4 times daily.  1 Bottle 1    omeprazole (PRILOSEC) 40 MG delayed release capsule Take 1 capsule by mouth daily 30 capsule 3    glipiZIDE (GLUCOTROL) 5 MG tablet Take 1 tablet by mouth 2 times daily (before meals) 60 tablet 2    lisinopril (PRINIVIL;ZESTRIL) 2.5 MG tablet Take 1 tablet by mouth daily 30 tablet 2    butalbital-acetaminophen-caffeine (FIORICET, ESGIC) -40 MG per tablet Take 1 tablet by mouth every 6 hours as needed for Headaches 90 tablet 1    diclofenac sodium 1 % GEL Apply 2 g topically 2 times daily 1 Tube 3    ipratropium-albuterol (DUONEB) 0.5-2.5 Sister     Heart Disease Sister     Heart Disease Brother     High Blood Pressure Neg Hx     High Cholesterol Neg Hx        Social History     Social History    Marital status:      Spouse name: Naomi Melendrez Number of children: 3    Years of education: 15     Social History Main Topics    Smoking status: Former Smoker     Packs/day: 0.25     Years: 49.00     Types: Cigarettes     Quit date: 6/16/2017    Smokeless tobacco: Never Used      Comment: only smoke when real stressed  Nicotine patch    Alcohol use 0.6 oz/week     1 Glasses of wine per week      Comment: occ. every 3-4 mo    Drug use: No    Sexual activity: Not Asked     Other Topics Concern    None     Social History Narrative    None       Nursing Notes Reviewed    Physical Exam:  ED Triage Vitals [09/21/18 1226]   Enc Vitals Group      BP (!) 164/84      Pulse 68      Resp 24      Temp       Temp src       SpO2 96 %      Weight 270 lb (122.5 kg)      Height 5' 6\" (1.676 m)      Head Circumference       Peak Flow       Pain Score       Pain Loc       Pain Edu? Excl. in [address]? GENERAL APPEARANCE: Awake and alert. Cooperative. No acute distress. HEAD: Normocephalic. Atraumatic. EYES: EOM's grossly intact. Sclera anicteric. PERRLA  ENT: Mucous membranes are moist. Tolerates saliva. No trismus. Normal nose, patent airway, nonerythematous TMs and EACs  NECK: Supple. No meningismus. Trachea midline. HEART: Regularly irregular, Patient in bigeminy. Radial pulses 2+. LUNGS: Respirations unlabored. CTAB  ABDOMEN: Soft. Non-tender. No guarding or rebound. NBS  EXTREMITIES: No acute deformities. Full range of motion of upper and lower extremities  SKIN: Warm and dry. NEUROLOGICAL: No gross facial drooping. Moves all 4 extremities spontaneously.     I have reviewed and interpreted all of the currently available lab results from this visit (if applicable):  Results for orders placed or performed during the hospital encounter of 09/21/18 CBC auto differential   Result Value Ref Range    WBC 8.7 4.0 - 11.0 K/uL    RBC 5.61 (H) 4.00 - 5.20 M/uL    Hemoglobin 16.5 (H) 12.0 - 16.0 g/dL    Hematocrit 50.2 (H) 36.0 - 48.0 %    MCV 89.4 80.0 - 100.0 fL    MCH 29.5 26.0 - 34.0 pg    MCHC 33.0 31.0 - 36.0 g/dL    RDW 14.6 12.4 - 15.4 %    Platelets 135 304 - 027 K/uL    MPV 7.6 5.0 - 10.5 fL    Neutrophils % 67.2 %    Lymphocytes % 24.2 %    Monocytes % 5.9 %    Eosinophils % 1.4 %    Basophils % 1.3 %    Neutrophils # 5.8 1.7 - 7.7 K/uL    Lymphocytes # 2.1 1.0 - 5.1 K/uL    Monocytes # 0.5 0.0 - 1.3 K/uL    Eosinophils # 0.1 0.0 - 0.6 K/uL    Basophils # 0.1 0.0 - 0.2 K/uL   Basic metabolic panel   Result Value Ref Range    Sodium 136 136 - 145 mmol/L    Potassium 4.2 3.5 - 5.1 mmol/L    Chloride 98 (L) 99 - 110 mmol/L    CO2 24 21 - 32 mmol/L    Anion Gap 14 3 - 16    Glucose 174 (H) 70 - 99 mg/dL    BUN 12 7 - 20 mg/dL    CREATININE 0.9 0.6 - 1.2 mg/dL    GFR Non-African American >60 >60    GFR African American >60 >60    Calcium 9.8 8.3 - 10.6 mg/dL   Troponin   Result Value Ref Range    Troponin <0.01 <0.01 ng/mL   Protime-INR   Result Value Ref Range    Protime 12.2 9.8 - 13.0 sec    INR 1.07 0.86 - 1.14   Brain Natriuretic Peptide   Result Value Ref Range    Pro- 0 - 124 pg/mL   EKG 12 Lead   Result Value Ref Range    Ventricular Rate 70 BPM    Atrial Rate 70 BPM    P-R Interval 170 ms    QRS Duration 108 ms    Q-T Interval 406 ms    QTc Calculation (Bazett) 438 ms    P Axis 43 degrees    R Axis -48 degrees    T Axis 15 degrees    Diagnosis       Sinus rhythm with frequent Premature ventricular complexes in a pattern of bigeminyLeft axis deviationSeptal infarct , age undeterminedAbnormal ECG        Radiographs (if obtained):  CT Chest Pulmonary Embolism W Contrast   Preliminary Result   1. No evidence of acute cardiopulmonary disease. 2. No radiographic findings to suggest presence of pulmonary thromboembolism.    3. Calcific atherosclerotic coronary artery disease as described above. XR CHEST STANDARD (2 VW)   Final Result   No active cardiopulmonary disease             Chart review shows recent radiographs:  Xr Chest Standard (2 Vw)    Result Date: 9/21/2018  EXAMINATION: TWO VIEWS OF THE CHEST 9/21/2018 12:36 pm COMPARISON: 12/27/2017 HISTORY: ORDERING PHYSICIAN PROVIDED HISTORY: chest pain TECHNOLOGIST PROVIDED HISTORY: Technologist Provided Reason for Exam: Chest Pain (pt brought in by FF squad from home for c/o chest pain. pt reports that it feels like her previous heart attacks. Pt had tumor removed from head 2 weeks ago with skin graft to right thigh. ) Acuity: Acute Type of Encounter: Initial FINDINGS: Lordotic positioning. Heart size borderline. Pulmonary vessels within normal limits. Haziness of the lungs is due to overlying soft tissues. Lungs are grossly clear. Costophrenic angles are sharp     No active cardiopulmonary disease       EKG (if obtained): See Dr. Truong Tan note for EKG interpretation. MDM/ED COURSE:    Supervising physician: Dr. Lyn Shelton    CBC and BMP are unremarkable. Troponin negative. PT/INR is 12.2 and 1.07 respectively, BNP is 114. Chest x-ray shows no acute cardio pulmonary modalities. CT chest shows no signs of PE. EKG shows bigeminy, which is new for patient. Patient received IV fluids and SL nitro in ED. Chest pressure has improved some but complains of HA from the nitro. Patient then given Ultram. Due to patient's history, symptoms and her new bigeminy, patient should be admitted for further evaluation and management. Discussed results and plan for admission with patient. Consulted PCP. Patient's history, symptoms, ED course were discussed in detail. They agree to accept the patient for admission. Clinical Impression:  1. Chest pain, unspecified type    2.  Bigeminy        DISCHARGE MEDICATIONS:  New Prescriptions    No medications on file       (Please note that portions of this note may have been completed with a voice recognition program. Efforts were made to edit the dictations but occasionally words are mis-transcribed.)         Shannon Hylton PA-C  09/21/18 7162

## 2018-09-21 NOTE — ED PROVIDER NOTES
Sensation grossly intact to light touch. Full hand grasp B/L.  No pronator drift  PSYCH: Normal mood and affect    Assessment:  41-year-old female presented complaining of chest pain and mild shortness of breath      Plan:     Labs  Aspirin  Nitroglycerin  EKG  Cardiac monitor  CTA chest      Results for orders placed or performed during the hospital encounter of 09/21/18   CBC auto differential   Result Value Ref Range    WBC 8.7 4.0 - 11.0 K/uL    RBC 5.61 (H) 4.00 - 5.20 M/uL    Hemoglobin 16.5 (H) 12.0 - 16.0 g/dL    Hematocrit 50.2 (H) 36.0 - 48.0 %    MCV 89.4 80.0 - 100.0 fL    MCH 29.5 26.0 - 34.0 pg    MCHC 33.0 31.0 - 36.0 g/dL    RDW 14.6 12.4 - 15.4 %    Platelets 566 373 - 708 K/uL    MPV 7.6 5.0 - 10.5 fL    Neutrophils % 67.2 %    Lymphocytes % 24.2 %    Monocytes % 5.9 %    Eosinophils % 1.4 %    Basophils % 1.3 %    Neutrophils # 5.8 1.7 - 7.7 K/uL    Lymphocytes # 2.1 1.0 - 5.1 K/uL    Monocytes # 0.5 0.0 - 1.3 K/uL    Eosinophils # 0.1 0.0 - 0.6 K/uL    Basophils # 0.1 0.0 - 0.2 K/uL   Basic metabolic panel   Result Value Ref Range    Sodium 136 136 - 145 mmol/L    Potassium 4.2 3.5 - 5.1 mmol/L    Chloride 98 (L) 99 - 110 mmol/L    CO2 24 21 - 32 mmol/L    Anion Gap 14 3 - 16    Glucose 174 (H) 70 - 99 mg/dL    BUN 12 7 - 20 mg/dL    CREATININE 0.9 0.6 - 1.2 mg/dL    GFR Non-African American >60 >60    GFR African American >60 >60    Calcium 9.8 8.3 - 10.6 mg/dL   Troponin   Result Value Ref Range    Troponin <0.01 <0.01 ng/mL   Protime-INR   Result Value Ref Range    Protime 12.2 9.8 - 13.0 sec    INR 1.07 0.86 - 1.14   Brain Natriuretic Peptide   Result Value Ref Range    Pro- 0 - 124 pg/mL   EKG 12 Lead   Result Value Ref Range    Ventricular Rate 70 BPM    Atrial Rate 70 BPM    P-R Interval 170 ms    QRS Duration 108 ms    Q-T Interval 406 ms    QTc Calculation (Bazett) 438 ms    P Axis 43 degrees    R Axis -48 degrees    T Axis 15 degrees    Diagnosis       Sinus rhythm with frequent Premature ventricular complexes in a pattern of bigeminyLeft axis deviationSeptal infarct , age undeterminedAbnormal ECG       Xr Chest Standard (2 Vw)    Result Date: 9/21/2018  EXAMINATION: TWO VIEWS OF THE CHEST 9/21/2018 12:36 pm COMPARISON: 12/27/2017 HISTORY: ORDERING PHYSICIAN PROVIDED HISTORY: chest pain TECHNOLOGIST PROVIDED HISTORY: Technologist Provided Reason for Exam: Chest Pain (pt brought in by FF squad from home for c/o chest pain. pt reports that it feels like her previous heart attacks. Pt had tumor removed from head 2 weeks ago with skin graft to right thigh. ) Acuity: Acute Type of Encounter: Initial FINDINGS: Lordotic positioning. Heart size borderline. Pulmonary vessels within normal limits. Haziness of the lungs is due to overlying soft tissues. Lungs are grossly clear. Costophrenic angles are sharp     No active cardiopulmonary disease     Ct Chest Pulmonary Embolism W Contrast    Result Date: 9/21/2018  EXAMINATION: CTA OF THE CHEST, 9/21/2018 1:24 pm TECHNIQUE: CTA of the chest was performed after the administration of intravenous contrast.  Multiplanar reformatted images are provided for review. MIP images are provided for review. Dose modulation, iterative reconstruction, and/or weight based adjustment of the mA/kV was utilized to reduce the radiation dose to as low as reasonably achievable. COMPARISON: None HISTORY: ORDERING PHYSICIAN PROVIDED HISTORY: CP radiating to back, SOB TECHNOLOGIST PROVIDED HISTORY: Technologist Provided Reason for Exam: Chest pain Acuity: Unknown Type of Encounter: Unknown Relevant Medical/Surgical History: History of heart attack, recent surgery FINDINGS: Pulmonary Arteries: The central, main, and interlobar pulmonary arteries are unremarkable in appearance. No intraluminal filling defects are identified within the peripheral pulmonary arterial tree. No radiographic findings to suggest presence of pulmonary thromboembolism.  Mediastinum: There are mild her coronary arteries on CT of the chest.  She will require further in-hospital evaluation about her for rule out of ACS. Case is discussed with Dr. Tianna Hastings for Dr. Jim Chang who accepted the patient in stable condition. All diagnostic, treatment, and disposition decisions were made by myself in conjunction with the [facility]/Resident. For all further details of the patient's emergency department visit, please see their documentation. (Please note that portions of this note may have been completed with a voice recognition program. Efforts were made to edit the dictations but occasionally words are mis-transcribed. )    Cathleen Lyn, DO   [employer]       Cathleen Lyn DO  09/21/18 2326

## 2018-09-21 NOTE — H&P
History and Physical  Dr. Kristine Obregon  9/21/2018    PCP: Jatin Khan MD    Cc:   Chief Complaint   Patient presents with    Chest Pain     pt brought in by [facility] from home for c/o chest pain. pt reports that it feels like her previous heart attacks. Pt had tumor removed from head 2 weeks ago with skin graft to right thigh. HPI:  Perla De La Paz is a 68 y.o. female who  has a past medical history of Acute MI ([address] Acute pyelonephritis; Anxiety and depression; Arthritis; CAD (coronary artery disease); Cancer ([address] Cancer ([address] Cancer of skin of leg; Chronic obstructive pulmonary disease ([facility] Claustrophobia; COPD (chronic obstructive pulmonary disease) ([facility] Esophageal stricture; Gastroesophageal reflux disease without esophagitis; Hiatal hernia; Hiatal hernia; Hypercholesteremia; Hypertension; Migraines; Movement disorder; Pneumonia; PONV (postoperative nausea and vomiting); Type II or unspecified type diabetes mellitus without mention of complication, not stated as uncontrolled; and Unspecified cerebral artery occlusion with cerebral infarction. Patient presents with Chest pain. HPI of presenting complaint in block format: (1-3 for expanded problem focused, ?4 for detailed/comprehensive)   Location: chest pain  Duration: for 1 day  Timing:    Context: 68 y.o. female that presents with CP and SOB. Patient reports that she noticed some substernal CP yesterday that was relieved with 1 SL nitro. Started having the pain again, took another nitro, and had some relief of the pain. Described as an intense heaviness and pressure across her chest. Radiates through to her back. Has some mild SOB associated with this. Symptoms are worse with exertion. Recently has basal cell carcinoma removed from her scalp and had skin graft to right lateral thigh about two weeks ago. Severity: pain rated 10/10  Quality: sharp in nature  Modifying Factors: worse with exertion.  No change with eating  Associated Signs or Symptoms: Denies fever, chills, cough, abdominal pain, nausea, vomiting, joint pain, numbness, tingling, focal weakness, or other associated signs or symptoms. Problem list of hospitalization thus far: Active Hospital Problems    Diagnosis    CAD (coronary artery disease) [I25.10]     Priority: High    Chest pain [R07.9]    Bigeminy [I49.9]    DM2 (diabetes mellitus, type 2) (HCC) [E11.9]    Essential hypertension [I10]    COPD (chronic obstructive pulmonary disease) ([facility] [J44.9]         Review of Systems: (1 system for EPF, 2-9 for detailed, 10+ for comprehensive)  Review of Systems   Constitutional: Negative for chills and fever. HENT: Negative for hearing loss and tinnitus. Eyes: Negative for pain and discharge. Respiratory: Positive for shortness of breath. Negative for cough. Cardiovascular: Positive for chest pain. Negative for claudication and leg swelling. Gastrointestinal: Negative for nausea and vomiting. Genitourinary: Negative for dysuria and frequency. Musculoskeletal: Positive for back pain. Negative for myalgias. Skin: Negative for itching and rash. Neurological: Negative for dizziness and tremors. Endo/Heme/Allergies: Negative for environmental allergies. Psychiatric/Behavioral: Positive for depression. The patient is nervous/anxious.             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)     \"skin on top of my head\"    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    Chronic obstructive pulmonary disease ([facility] 1/13/2017    Claustrophobia     COPD (chronic obstructive pulmonary disease) ([facility]     Esophageal stricture     Gastroesophageal reflux disease without esophagitis 3/24/2016    Hiatal hernia     Hiatal Cancer Father     Cancer Sister     Heart Disease Sister     Heart Disease Brother     High Blood Pressure Neg Hx     High Cholesterol Neg Hx        PFSH: The above PMHx, PSHx, SocHx, FamHx has been reviewed by myself. (1 area for detailed, 2-3 for comprehensive)      Code Status: Prior    Meds  following list of home medications from electronic chart has been reviewed by myself  No current facility-administered medications on file prior to encounter. Current Outpatient Prescriptions on File Prior to Encounter   Medication Sig Dispense Refill    ALPRAZolam (XANAX) 1 MG tablet Take 1 tablet by mouth 3 times daily as needed for Anxiety for up to 30 days. . 90 tablet 0    nystatin (MYCOSTATIN) 079950 UNIT/GM powder Apply topically 4 times daily. 1 Bottle 1    omeprazole (PRILOSEC) 40 MG delayed release capsule Take 1 capsule by mouth daily 30 capsule 3    glipiZIDE (GLUCOTROL) 5 MG tablet Take 1 tablet by mouth 2 times daily (before meals) 60 tablet 2    lisinopril (PRINIVIL;ZESTRIL) 2.5 MG tablet Take 1 tablet by mouth daily 30 tablet 2    butalbital-acetaminophen-caffeine (FIORICET, ESGIC) -40 MG per tablet Take 1 tablet by mouth every 6 hours as needed for Headaches 90 tablet 1    diclofenac sodium 1 % GEL Apply 2 g topically 2 times daily 1 Tube 3    ipratropium-albuterol (DUONEB) 0.5-2.5 (3) MG/3ML SOLN nebulizer solution Inhale 3 mLs into the lungs every 4 hours (while awake) 120 vial 0    triamcinolone (KENALOG) 0.1 % cream Apply topically 2 times daily. 30 g 1    albuterol sulfate HFA (VENTOLIN HFA) 108 (90 BASE) MCG/ACT inhaler Inhale 2 puffs into the lungs every 6 hours as needed for Wheezing 1 Inhaler 3    nystatin (MYCOSTATIN) 485538 UNIT/GM powder Affected area 1 Bottle 5    aspirin 81 MG tablet Take 81 mg by mouth daily      nitroGLYCERIN (NITROSTAT) 0.4 MG SL tablet Place 1 tablet under the tongue every 5 minutes as needed for Chest pain.  30 tablet 0    OXYGEN Inhale 2 L/min rales  Cardiovascular: normal rate, regular rhythm, normal S1 and S2 and no murmurs  Gastrointestinal: soft, non-tender, non-distended, normal bowel sounds, no masses or organomegaly  Lymphatic:   Extremities: no edema, no clubbing  Skin: No rashes or nodules noted. Neurologic:    LABS:  Labs Reviewed   CBC WITH AUTO DIFFERENTIAL - Abnormal; Notable for the following:        Result Value    RBC 5.61 (*)     Hemoglobin 16.5 (*)     Hematocrit 50.2 (*)     All other components within normal limits    Narrative:     Performed at:  [facility] [address] [place]   Phone [phone]   BASIC METABOLIC PANEL - Abnormal; Notable for the following:     Chloride 98 (*)     Glucose 174 (*)     All other components within normal limits    Narrative:     Performed at:  [facility] [address], "[employer]   Phone [phone]   TROPONIN    Narrative:     Performed at:  [facility] [address], "[employer]   Phone [phone]   PROTIME-INR    Narrative:     Performed at:  [facility] [address], "[employer]   Phone [phone]    Narrative:     Performed at:  [facility] [email or web address]   Phone [phone]         IMAGING:  Imaging results from the ER have been reviewed in the computerized chart. Xr Chest Standard (2 Vw)    Result Date: 9/21/2018  EXAMINATION: TWO VIEWS OF THE CHEST 9/21/2018 12:36 pm COMPARISON: 12/27/2017 HISTORY: ORDERING PHYSICIAN PROVIDED HISTORY: chest pain TECHNOLOGIST PROVIDED HISTORY: Technologist Provided Reason for Exam: Chest Pain (pt brought in by FF squad from home for c/o chest pain. pt reports that it feels like her previous heart attacks. Pt had tumor removed from head 2 weeks ago with skin graft to right thigh. ) Acuity: Acute Type of Encounter: Initial FINDINGS: Lordotic positioning. Heart size borderline. Pulmonary vessels within normal limits. Haziness of the lungs is due to overlying soft tissues. Lungs are grossly clear. Costophrenic angles are sharp     No active cardiopulmonary disease     Ct Chest Pulmonary Embolism W Contrast    Result Date: 9/21/2018  EXAMINATION: CTA OF THE CHEST, 9/21/2018 1:24 pm TECHNIQUE: CTA of the chest was performed after the administration of intravenous contrast.  Multiplanar reformatted images are provided for review. MIP images are provided for review. Dose modulation, iterative reconstruction, and/or weight based adjustment of the mA/kV was utilized to reduce the radiation dose to as low as reasonably achievable. COMPARISON: None HISTORY: ORDERING PHYSICIAN PROVIDED HISTORY: CP radiating to back, SOB TECHNOLOGIST PROVIDED HISTORY: Technologist Provided Reason for Exam: Chest pain Acuity: Unknown Type of Encounter: Unknown Relevant Medical/Surgical History: History of heart attack, recent surgery FINDINGS: Pulmonary Arteries: The central, main, and interlobar pulmonary arteries are unremarkable in appearance. No intraluminal filling defects are identified within the peripheral pulmonary arterial tree. No radiographic findings to suggest presence of pulmonary thromboembolism. Mediastinum: There are mild atherosclerotic changes of the thoracic aorta with no evidence of aneurysm or dissection. No evidence of pericardial effusion. Calcific atherosclerotic coronary artery disease is identified. No significant mediastinal, hilar, or axillary lymphadenopathy is identified. Lungs/pleura: Linear/band-like densities at the left lung base may represent minimal atelectasis/scar. Subtle band-like density in the medial right middle lobe may represent minimal atelectasis/scar as well. No focal confluent pulmonary infiltrate is identified. No evidence of pleural effusion or pneumothorax. carbohydrate diet. Fingerstick sugars to be checked to monitor for both hypoglycemia as well as hyperglycemia. Sliding scale insulin ordered. Glucagon and dextrose ordered for hypoglycemia. Patient will be continued on home medications. Hemoglobin a1c to be ordered to assess efficacy of therapy. Mark (9/21/2018)    Assessment: New Problem to me. New finding    Plan: consult cardiology to nina.          Diagnoses as listed above, designated as new or established and plan outlined for each. Data Reviewed:   (1) Lab tests were reviewed or ordered. (1) Radiology tests were reviewed or ordered. (1) Medical test (Echo, EKG, PFT/jose juan) were not ordered. (1) History was obtained from someone other than patient  (1) Old records  were reviewed - see HPI/MDM for pertinent details if review done. (2) Case was discussed with another health care provider: Dr Stephanie Bustos  (2) Imaging was viewed by myself. cta chest  (2) EKG  was viewed by myself. The patient is being placed in inpatient status with the expectation of requiring a hospital stay spanning at least two midnights for care and treatment of the problems noted in the problem list.  This determination is also based on the patients comorbidities and past medical history, the severity and timing of the signs and symptoms upon presentation.         Electronically signed by: Perez Baird 9/21/2018

## 2018-09-21 NOTE — ED NOTES
Pt assisted off the bedside commode, pt assisted with cleaning herself, new brief placed on pt. Pt back in bed, VSS. Pt remains an ER hold. No s/s of distress noted.        Lin Anderson RN  09/21/18 7617

## 2018-09-21 NOTE — ED NOTES
Bed: 06-01  Expected date:   Expected time:   Means of arrival:   Comments:  Sick pt in [facility], RN  09/21/18 [id number]

## 2018-09-22 LAB
BASOPHILS ABSOLUTE: 0.1 K/UL (ref 0–0.2)
BASOPHILS RELATIVE PERCENT: 0.9 %
CHOLESTEROL, TOTAL: 172 MG/DL (ref 0–199)
EKG ATRIAL RATE: 73 BPM
EKG ATRIAL RATE: 78 BPM
EKG DIAGNOSIS: NORMAL
EKG DIAGNOSIS: NORMAL
EKG P AXIS: -1 DEGREES
EKG P AXIS: 0 DEGREES
EKG P-R INTERVAL: 152 MS
EKG P-R INTERVAL: 162 MS
EKG Q-T INTERVAL: 406 MS
EKG Q-T INTERVAL: 414 MS
EKG QRS DURATION: 106 MS
EKG QRS DURATION: 108 MS
EKG QTC CALCULATION (BAZETT): 447 MS
EKG QTC CALCULATION (BAZETT): 471 MS
EKG R AXIS: -86 DEGREES
EKG R AXIS: 261 DEGREES
EKG T AXIS: 55 DEGREES
EKG T AXIS: 75 DEGREES
EKG VENTRICULAR RATE: 73 BPM
EKG VENTRICULAR RATE: 78 BPM
EOSINOPHILS ABSOLUTE: 0.1 K/UL (ref 0–0.6)
EOSINOPHILS RELATIVE PERCENT: 2 %
GLUCOSE BLD-MCNC: 164 MG/DL (ref 70–99)
GLUCOSE BLD-MCNC: 241 MG/DL (ref 70–99)
HCT VFR BLD CALC: 47 % (ref 36–48)
HDLC SERPL-MCNC: 26 MG/DL (ref 40–60)
HEMOGLOBIN: 15.2 G/DL (ref 12–16)
LDL CHOLESTEROL CALCULATED: 89 MG/DL
LV EF: 59 %
LVEF MODALITY: NORMAL
LYMPHOCYTES ABSOLUTE: 2 K/UL (ref 1–5.1)
LYMPHOCYTES RELATIVE PERCENT: 27.9 %
MCH RBC QN AUTO: 29.6 PG (ref 26–34)
MCHC RBC AUTO-ENTMCNC: 32.4 G/DL (ref 31–36)
MCV RBC AUTO: 91.4 FL (ref 80–100)
MONOCYTES ABSOLUTE: 0.6 K/UL (ref 0–1.3)
MONOCYTES RELATIVE PERCENT: 8.6 %
NEUTROPHILS ABSOLUTE: 4.4 K/UL (ref 1.7–7.7)
NEUTROPHILS RELATIVE PERCENT: 60.6 %
PDW BLD-RTO: 14.5 % (ref 12.4–15.4)
PERFORMED ON: ABNORMAL
PERFORMED ON: ABNORMAL
PLATELET # BLD: 227 K/UL (ref 135–450)
PMV BLD AUTO: 7.7 FL (ref 5–10.5)
RBC # BLD: 5.14 M/UL (ref 4–5.2)
TRIGL SERPL-MCNC: 284 MG/DL (ref 0–150)
TROPONIN: <0.01 NG/ML
VLDLC SERPL CALC-MCNC: 57 MG/DL
WBC # BLD: 7.3 K/UL (ref 4–11)

## 2018-09-22 PROCEDURE — 9990 CHARGE CONVERSION

## 2018-09-22 PROCEDURE — 85025 COMPLETE CBC W/AUTO DIFF WBC: CPT

## 2018-09-22 PROCEDURE — G0378 HOSPITAL OBSERVATION PER HR: HCPCS | Performed by: INTERNAL MEDICINE

## 2018-09-22 PROCEDURE — 78452 HT MUSCLE IMAGE SPECT MULT: CPT

## 2018-09-22 PROCEDURE — 99223 1ST HOSP IP/OBS HIGH 75: CPT | Performed by: INTERNAL MEDICINE

## 2018-09-22 PROCEDURE — 6360000002 HC RX W HCPCS: Performed by: INTERNAL MEDICINE

## 2018-09-22 PROCEDURE — 2580000003 HC RX 258: Performed by: INTERNAL MEDICINE

## 2018-09-22 PROCEDURE — 6370000000 HC RX 637 (ALT 250 FOR IP): Performed by: INTERNAL MEDICINE

## 2018-09-22 PROCEDURE — 93010 ELECTROCARDIOGRAM REPORT: CPT | Performed by: INTERNAL MEDICINE

## 2018-09-22 PROCEDURE — 93017 CV STRESS TEST TRACING ONLY: CPT | Performed by: INTERNAL MEDICINE

## 2018-09-22 PROCEDURE — 84484 ASSAY OF TROPONIN QUANT: CPT

## 2018-09-22 PROCEDURE — 96372 THER/PROPH/DIAG INJ SC/IM: CPT | Performed by: INTERNAL MEDICINE

## 2018-09-22 PROCEDURE — A9502 TC99M TETROFOSMIN: HCPCS | Performed by: INTERNAL MEDICINE

## 2018-09-22 PROCEDURE — 1200000000 HC SEMI PRIVATE

## 2018-09-22 PROCEDURE — 36415 COLL VENOUS BLD VENIPUNCTURE: CPT

## 2018-09-22 PROCEDURE — 80061 LIPID PANEL: CPT

## 2018-09-22 PROCEDURE — 3430000000 HC RX DIAGNOSTIC RADIOPHARMACEUTICAL: Performed by: INTERNAL MEDICINE

## 2018-09-22 PROCEDURE — 96374 THER/PROPH/DIAG INJ IV PUSH: CPT | Performed by: INTERNAL MEDICINE

## 2018-09-22 PROCEDURE — 93005 ELECTROCARDIOGRAM TRACING: CPT | Performed by: INTERNAL MEDICINE

## 2018-09-22 RX ORDER — AMINOPHYLLINE DIHYDRATE 25 MG/ML
100 INJECTION, SOLUTION INTRAVENOUS PRN
Status: DISCONTINUED | OUTPATIENT
Start: 2018-09-22 | End: 2018-09-23 | Stop reason: HOSPADM

## 2018-09-22 RX ADMIN — Medication 10 ML: at 12:03

## 2018-09-22 RX ADMIN — GABAPENTIN 100 MG: 100 CAPSULE ORAL at 20:04

## 2018-09-22 RX ADMIN — AMINOPHYLLINE 100 MG: 25 INJECTION, SOLUTION INTRAVENOUS at 10:01

## 2018-09-22 RX ADMIN — GABAPENTIN 100 MG: 100 CAPSULE ORAL at 14:15

## 2018-09-22 RX ADMIN — NITROGLYCERIN 0.4 MG: 0.4 TABLET SUBLINGUAL at 04:20

## 2018-09-22 RX ADMIN — Medication 10 ML: at 20:05

## 2018-09-22 RX ADMIN — NITROGLYCERIN 0.4 MG: 0.4 TABLET SUBLINGUAL at 00:10

## 2018-09-22 RX ADMIN — NITROGLYCERIN 0.4 MG: 0.4 TABLET SUBLINGUAL at 04:03

## 2018-09-22 RX ADMIN — ISOSORBIDE MONONITRATE 60 MG: 60 TABLET, EXTENDED RELEASE ORAL at 12:01

## 2018-09-22 RX ADMIN — REGADENOSON 0.4 MG: 0.08 INJECTION, SOLUTION INTRAVENOUS at 09:47

## 2018-09-22 RX ADMIN — BUTALBITAL, ACETAMINOPHEN AND CAFFEINE 1 TABLET: 50; 325; 40 TABLET ORAL at 23:30

## 2018-09-22 RX ADMIN — ENOXAPARIN SODIUM 40 MG: 40 INJECTION SUBCUTANEOUS at 20:04

## 2018-09-22 RX ADMIN — ASPIRIN 325 MG: 325 TABLET, DELAYED RELEASE ORAL at 12:01

## 2018-09-22 RX ADMIN — LISINOPRIL 2.5 MG: 5 TABLET ORAL at 12:01

## 2018-09-22 RX ADMIN — ALPRAZOLAM 1 MG: 0.5 TABLET ORAL at 20:05

## 2018-09-22 RX ADMIN — TETROFOSMIN 10 MILLICURIE: 0.23 INJECTION, POWDER, LYOPHILIZED, FOR SOLUTION INTRAVENOUS at 09:36

## 2018-09-22 RX ADMIN — PANTOPRAZOLE SODIUM 40 MG: 40 TABLET, DELAYED RELEASE ORAL at 06:00

## 2018-09-22 RX ADMIN — CILOSTAZOL 100 MG: 100 TABLET ORAL at 12:01

## 2018-09-22 RX ADMIN — GABAPENTIN 100 MG: 100 CAPSULE ORAL at 12:02

## 2018-09-22 RX ADMIN — ALPRAZOLAM 1 MG: 0.5 TABLET ORAL at 12:02

## 2018-09-22 RX ADMIN — CILOSTAZOL 100 MG: 100 TABLET ORAL at 20:04

## 2018-09-22 RX ADMIN — GLIPIZIDE 5 MG: 5 TABLET ORAL at 17:37

## 2018-09-22 RX ADMIN — TETROFOSMIN 30 MILLICURIE: 0.23 INJECTION, POWDER, LYOPHILIZED, FOR SOLUTION INTRAVENOUS at 11:59

## 2018-09-22 RX ADMIN — ALPRAZOLAM 1 MG: 0.5 TABLET ORAL at 00:20

## 2018-09-22 RX ADMIN — NITROGLYCERIN 0.4 MG: 0.4 TABLET SUBLINGUAL at 00:20

## 2018-09-22 RX ADMIN — NITROGLYCERIN 0.4 MG: 0.4 TABLET SUBLINGUAL at 00:15

## 2018-09-22 ASSESSMENT — PAIN DESCRIPTION - PAIN TYPE: TYPE: ACUTE PAIN

## 2018-09-22 ASSESSMENT — PAIN SCALES - GENERAL
PAINLEVEL_OUTOF10: 8
PAINLEVEL_OUTOF10: 0
PAINLEVEL_OUTOF10: 10
PAINLEVEL_OUTOF10: 0

## 2018-09-22 ASSESSMENT — PAIN DESCRIPTION - ORIENTATION: ORIENTATION: LEFT

## 2018-09-22 ASSESSMENT — PAIN DESCRIPTION - ONSET: ONSET: AWAKENED FROM SLEEP

## 2018-09-22 ASSESSMENT — PAIN DESCRIPTION - LOCATION: LOCATION: CHEST

## 2018-09-22 ASSESSMENT — PAIN DESCRIPTION - DESCRIPTORS: DESCRIPTORS: SHARP

## 2018-09-22 ASSESSMENT — PAIN DESCRIPTION - PROGRESSION
CLINICAL_PROGRESSION: NOT CHANGED
CLINICAL_PROGRESSION: NOT CHANGED

## 2018-09-22 ASSESSMENT — ENCOUNTER SYMPTOMS
VOMITING: 0
COUGH: 0
SHORTNESS OF BREATH: 1
EYE PAIN: 0
BACK PAIN: 1
EYE DISCHARGE: 0
NAUSEA: 0

## 2018-09-22 ASSESSMENT — PAIN DESCRIPTION - FREQUENCY: FREQUENCY: INTERMITTENT

## 2018-09-22 NOTE — PROGRESS NOTES
Instructed on Lexiscan Stress Test Procedure including possible side effects/ adverse reactions. Patient verbalizes  understanding and denies having any questions . See [address] [place] [address] Rd Cardiology.   Axel Leblanc RN

## 2018-09-22 NOTE — PROGRESS NOTES
Dr. Nury Donnelly called for stress test results. Test results unable to tranfer to system. Dr. Nury Donnelly states that pt. Stress test is normal. Will follow through with discharge and follow up as needed.  Naomi Brown 5:13 PM

## 2018-09-22 NOTE — PROGRESS NOTES
Aminophylline given per lexiscan protocol in stress lab for c/o persistant nausea and stomach cramping.   Srinivas Renee RN

## 2018-09-22 NOTE — PAYOR INFORMATION
Patient Malachi Whiting:  [de-identified]  Primary AUTH/CERT:    [address] Name:   [facility]  Primary Insurance Plan Name:  Davi Quintero [id number]  Primary Insurance Group Number:  [id number]  Primary Insurance Plan Type: N  Primary Insurance Policy Number:  [id number]

## 2018-09-22 NOTE — PAYOR INFORMATION
NEELAM REBOLLEDO SUBMITTED VIA RICHTER ONLINE. PENDING REF [id number].   UR TO FAX CLINICALS TO Antonia Aranda [phone]

## 2018-09-22 NOTE — PROGRESS NOTES
. Educated pt. On elevated BS and medication. Pt. States she takes glipizide and states she will take it once she is able to eat. Pt. States understanding for need for medication. Pt. States she has issues with hypoglycemia vs. Hyperglycemia. Will continue to monitor.  aNomi Brown 4:43 PM

## 2018-09-22 NOTE — PROGRESS NOTES
<5MM REMAINDR BODY N/A 9/6/2018    EXCISION OF SCALP SQUAMOUS CELL CARCINOMA performed by Tammy Cisneros MD at [facility] <100SQCM N/A 9/6/2018    SPLIT THICKNESS SKIN GRAFT FOR COVERAGE SCALP, APPLICATION OF WOUND VAC DEVICE performed by Tammy Cisneros MD at [address] [facility] ENDOSCOPY  4/20/12    with biopsy of stomach,gastritis    UPPER GASTROINTESTINAL ENDOSCOPY  06/11/2018    w/esophagael dilation       Level of Consciousness: Alert, Oriented, and Cooperative = 0    Level of Activity: Walking unassisted = 0    Respiratory Pattern: Regular Pattern; RR 8-20 = 0    Breath Sounds: Clear = 0    Sputum   ,  ,    Cough: Strong, spontaneous, non-productive = 0    Vital Signs   BP (!) 159/56   Pulse 60   Temp 98.1 °F (36.7 °C) (Oral)   Resp 18   Ht 5' 6\" (1.676 m)   Wt 275 lb 11.2 oz (125.1 kg)   SpO2 94%   BMI 44.50 kg/m²   SPO2 (COPD values may differ): Greater than or equal to 92% on room air = 0    Peak Flow (asthma only): not applicable = 0    RSI: 5-6 = Q4hr PRN (every four hours as needed) for dyspnea        Plan       Goals: medication delivery  Plan of Care: Q4hr PRN (every four hours as needed) for dyspnea    Patient/caregiver was educated on the proper method of use of Respiratory Therapy device:  No: none        Level of understanding, none given was able to:(check appropriate box(es))   [] Verbalize understanding   [] Demonstrate understanding       [] Teach back        [] Needs reinforcement       []  No available caregiver               []  Other:     Response to education:  none given     Teaching Time:  0  minutes      Is patient being placed on Home Treatment Regimen? Yes     Electronically signed by Matilde Huff RCP on 9/21/2018 at 10:19 PM    Respiratory Protocol Guidelines     1.  Assessment and treatment by Respiratory Therapy will be initiated for medication and therapeutic interventions upon initiation of aerosolized a bronchodilator. 2. Discontinue if patient experiences worsened bronchospasm, or secretions have lessened to the point that the patient is able to clear them with a cough. Anti-inflammatory and Combination Medications:    1. If the patient lacks prior history of lung disease, is not using inhaled anti-inflammatory medication at home, and lacks wheezing by examination or by history for at least 24 hours, contact physician for possible discontinuation.

## 2018-09-22 NOTE — FLOWSHEET NOTE
Shift assessment complete. VSS. Pt. Is alert and oriented resting in bed. Pt. Complaining of pain at graft site but pt. Able to manage pain with non-pharm measures and pt. Satisfied. Pt. States that she received a \"GI drink\" last night and that it \"improved my pain a lot and made my belly feel better\". Pt. Requesting to have \"drink\" again. POC discussed with pt. And pt. States understanding and is in agreement with care. Call light and bedside table within reach. 09/22/18 1115   Vital Signs   Temp 97.2 °F (36.2 °C)   Temp Source Temporal   Pulse 69   Heart Rate Source Brachial   Resp 16   /63   BP Location Right upper arm   MAP (mmHg) 80   Patient Position Semi fowlers   Level of Consciousness 0   MEWS Score 1   Patient Currently in Pain Denies   Pain Assessment   Pain Assessment 0-10   Pain Level 0   Clinical Progression Not changed   Pain Intervention(s) Declines   Response to Pain Intervention Patient Satisfied   Oxygen Therapy   SpO2 94 %   Pulse Oximeter Device Mode Intermittent   Pulse Oximeter Device Location Finger   O2 Device None (Room air)   Will continue to monitor.  Naomi Brown

## 2018-09-22 NOTE — CONSULTS
[address]   Cardiac Consultation    Referring Provider:  Heraclio Dunbar MD     Chief Complaint   Patient presents with    Chest Pain     pt brought in by [facility] squad from home for c/o chest pain. pt reports that it feels like her previous heart attacks. Pt had tumor removed from head 2 weeks ago with skin graft to right thigh. History of Present Illness:  69 yo well known to me with hx of CAD and remote MI, COPD, Obesity, Diabetes, HTN, Anxiety/stress presents with [employer] SSCP with radiation to the back. Sx remind her of prior MI. Much stress related to loss of her  and daughter in the past. Denies reproducibly exertional chest discomfort, SOB, change in exercise tolerance, palpitations or syncope. Cath for CP in 2016 demonstrated jailed Diagonal branch with patent LAD stent and mild non-obstructive disease elsewhere. She did take NTG for sx with no clinical response. Noted HR in 30-40s ? Bigeminy. Past Medical History:   has a past medical history of Acute MI ([address] Acute pyelonephritis; Anxiety and depression; Arthritis; CAD (coronary artery disease); Cancer ([address] Cancer ([address] Cancer of skin of leg; Chronic obstructive pulmonary disease ([address] Claustrophobia; COPD (chronic obstructive pulmonary disease) ([address] Esophageal stricture; Gastroesophageal reflux disease without esophagitis; Hiatal hernia; Hiatal hernia; Hypercholesteremia; Hypertension; Migraines; Movement disorder; Pneumonia; PONV (postoperative nausea and vomiting); Type II or unspecified type diabetes mellitus without mention of complication, not stated as uncontrolled; and Unspecified cerebral artery occlusion with cerebral infarction. Surgical History:   has a past surgical history that includes Cardiac surgery; Gallbladder surgery; Hysterectomy; Appendectomy; Cholecystectomy; Colonoscopy; Upper gastrointestinal endoscopy (4/20/12); Endoscopy, colon, diagnostic; Tear duct surgery;  Upper gastrointestinal endoscopy (06/11/2018); Colonoscopy (06/11/2018); pr exc skin malig <5mm remaindr body (N/A, 9/6/2018); and pr split grft trunk,arm,leg <100sqcm (N/A, 9/6/2018). Social History:   reports that she quit smoking about 15 months ago. Her smoking use included Cigarettes. She has a 12.25 pack-year smoking history. She has never used smokeless tobacco. She reports that she drinks about 0.6 oz of alcohol per week . She reports that she does not use drugs. Family History:  family history includes Cancer in her father, mother, and sister; Heart Disease in her brother, mother, and sister. Home Medications:  See List    Allergies:  Morphine; Codeine; Hydromorphone; and Vicodin [hydrocodone-acetaminophen]     Review of Systems:   · Constitutional: there has been no unanticipated weight loss. There's been no change in energy level, sleep pattern, or activity level. · Eyes: No visual changes or diplopia. No scleral icterus. · ENT: No Headaches, hearing loss or vertigo. No mouth sores or sore throat. · Cardiovascular: Reviewed in HPI  · Respiratory: No cough or wheezing, no sputum production. No hematemesis. · Gastrointestinal: No abdominal pain, appetite loss, blood in stools. No change in bowel or bladder habits. · Genitourinary: No dysuria, trouble voiding, or hematuria. · Musculoskeletal:  No gait disturbance, weakness or joint complaints. · Integumentary: No rash or pruritis. · Neurological: No headache, diplopia, change in muscle strength, numbness or tingling. No change in gait, balance, coordination, mood, affect, memory, mentation, behavior. · Psychiatric: No anxiety, no depression. · Endocrine: No malaise, fatigue or temperature intolerance. No excessive thirst, fluid intake, or urination. No tremor. · Hematologic/Lymphatic: No abnormal bruising or bleeding, blood clots or swollen lymph nodes. · Allergic/Immunologic: No nasal congestion or hives.     Physical Examination:    Vitals: 09/22/18 0400   BP: 113/73   Pulse: 78   Resp: 18   Temp: 97 °F (36.1 °C)   SpO2:           Constitutional and General Appearance:   . NAD   SKIN:  .     Warm and dry  EYES:    .     EOMI  Neck:   . Normal carotid contour  Respiratory:  · Normal excursion and expansion without use of accessory muscles  · Resp Auscultation: Normal breath sounds without dullness  Cardiovascular:  · The apical impulses not displaced  +CWT  · Heart tones are crisp and normal  · Cervical veins are not engorged  · Normal S1S2, No S3, No Murmur  · Peripheral pulses are symmetrical and full  Extremities:  · There is no clubbing, cyanosis of the extremities. · No edema  · Femoral Arteries: 2+ and equal  · Pedal Pulses: 2+ and equal   Abdomen:  · No masses or tenderness  · Liver/Spleen: No Abnormalities Noted  Neurological/Psychiatric:  · Alert and oriented in all spheres  · Moves all extremities well  · Exhibits normal gait balance and coordination  · No abnormalities of mood, affect, memory, mentation, or behavior are noted      Assessment:     1. Chest pain, unspecified type -Atypical features with no ischemia/injury--Musculoskeletal component. 2. Bigeminy    3. Obesity  4. Anxiety/Stress    Plan:  Ruperto Gaucher    Thank you for allowing me to participate in the care of this individual.      Mary Jo Casanova M.D., [facility] [place].

## 2018-09-23 VITALS
OXYGEN SATURATION: 96 % | TEMPERATURE: 97.2 F | HEART RATE: 70 BPM | BODY MASS INDEX: 44.2 KG/M2 | SYSTOLIC BLOOD PRESSURE: 149 MMHG | RESPIRATION RATE: 16 BRPM | HEIGHT: 66 IN | WEIGHT: 275 LBS | DIASTOLIC BLOOD PRESSURE: 84 MMHG

## 2018-09-23 PROCEDURE — 99233 SBSQ HOSP IP/OBS HIGH 50: CPT | Performed by: INTERNAL MEDICINE

## 2018-09-23 PROCEDURE — 6370000000 HC RX 637 (ALT 250 FOR IP): Performed by: INTERNAL MEDICINE

## 2018-09-23 PROCEDURE — 2580000003 HC RX 258: Performed by: INTERNAL MEDICINE

## 2018-09-23 RX ORDER — LISINOPRIL 5 MG/1
5 TABLET ORAL DAILY
Status: DISCONTINUED | OUTPATIENT
Start: 2018-09-23 | End: 2018-09-23 | Stop reason: HOSPADM

## 2018-09-23 RX ADMIN — ASPIRIN 325 MG: 325 TABLET, DELAYED RELEASE ORAL at 09:51

## 2018-09-23 RX ADMIN — GLIPIZIDE 5 MG: 5 TABLET ORAL at 06:05

## 2018-09-23 RX ADMIN — PANTOPRAZOLE SODIUM 40 MG: 40 TABLET, DELAYED RELEASE ORAL at 06:05

## 2018-09-23 RX ADMIN — GABAPENTIN 100 MG: 100 CAPSULE ORAL at 09:51

## 2018-09-23 RX ADMIN — ALPRAZOLAM 1 MG: 0.5 TABLET ORAL at 09:51

## 2018-09-23 RX ADMIN — ALPRAZOLAM 1 MG: 0.5 TABLET ORAL at 02:19

## 2018-09-23 RX ADMIN — CILOSTAZOL 100 MG: 100 TABLET ORAL at 09:51

## 2018-09-23 RX ADMIN — LISINOPRIL 5 MG: 5 TABLET ORAL at 09:51

## 2018-09-23 RX ADMIN — Medication 10 ML: at 09:52

## 2018-09-23 RX ADMIN — ISOSORBIDE MONONITRATE 60 MG: 60 TABLET, EXTENDED RELEASE ORAL at 09:51

## 2018-09-23 ASSESSMENT — PAIN SCALES - GENERAL
PAINLEVEL_OUTOF10: 0
PAINLEVEL_OUTOF10: 0

## 2018-09-23 NOTE — FLOWSHEET NOTE
Shift assessment complete. VSS. Pt. Has no complaints at this time. Pt. Given education about GERD and precautions with eating and pt.states understanding. POC discussed with pt. And pt. States understanding and is in agreement with care. Call light and bedside table within reach. 09/23/18 0815   Vital Signs   Temp 97.2 °F (36.2 °C)   Temp Source Temporal   Pulse 75   Heart Rate Source Brachial   Resp 16   BP (!) 149/84   BP Location Right Arm   MAP (mmHg) 105   Level of Consciousness 0   MEWS Score 1   Patient Currently in Pain Denies   Pain Assessment   Pain Assessment 0-10   Pain Level 0   Pain Intervention(s) Declines   Response to Pain Intervention Patient Satisfied   Oxygen Therapy   SpO2 96 %   Pulse Oximeter Device Mode Intermittent   Pulse Oximeter Device Location Finger   O2 Device None (Room air)   Will continue to monitor.  Naomi Brown

## 2018-09-25 ENCOUNTER — TELEPHONE (OUTPATIENT)
Dept: SURGERY | Age: 73
End: 2018-09-25

## 2018-09-25 NOTE — TELEPHONE ENCOUNTER
Patient called because she needs head bandages called into [facility] [address]

## 2018-10-08 NOTE — DISCHARGE SUMMARY
Physician Discharge Summary       Bianca Subramanian  1945  MRN: [id number]    Admit Date: 9/21/2018  Discharge Date: 9/22/2018  1:45 PM    Discharge Unit: [address] [address]  Dept: [phone]  Loc: [phone]    Attending MD: Amilcar Silver MD  Discharging MD: Karthik Sheriff MD  PCP: Amilcar Silver MD [facility] [address] [phone]      Admission Diagnosis: Becka Nails [[id number]]     Discharge Diagnosis: Chest pain, non cardiac in origin (unable to determine cause)    Full Hospital Problem List:  Active Hospital Problems    Diagnosis Date Noted    CAD (coronary artery disease) [I25.10] 05/23/2011     Priority: High    Chest pain [R07.9] 09/21/2018    Bigeminy [I49.9] 09/21/2018    DM2 (diabetes mellitus, type 2) ([facility] [E11.9] 12/03/2015    Essential hypertension [I10] 04/18/2012    COPD (chronic obstructive pulmonary disease) ([facility] [J44.9] 08/09/2011           Hospital Course:    Pt was admitted for chest pain. Placed on telemetry and r/o MI pathway. Serial cardiac enzymes were negative. Pt underwent stress test, results of which are negative. Patient is chest pain free at time of discharge  At this time, etiology of the chest pain is unknown    No changes are made to patients medications. Pt is to follow up with PMD in 1-2 weeks time. Consults made during Hospitalization:  IP CONSULT TO PRIMARY CARE PROVIDER  IP CONSULT TO CARDIOLOGY  IP CONSULT TO SOCIAL WORK    Treatment team at time of Discharge: Treatment Team: Attending Provider: Amilcar Silver MD; Consulting Physician: Amilcar Silver MD; Consulting Physician: Liu Schroeder MD; Respiratory Therapist (Day): Ryan Pruitt RCP;  Registered Nurse: Beryl Soulier, RN    Condition at discharge: Stable    Imaging Results:  Xr Chest Standard (2 Vw)    Result Date: 9/21/2018  EXAMINATION: TWO VIEWS OF THE CHEST 9/21/2018 12:36 pm COMPARISON: 12/27/2017 HISTORY: ORDERING PHYSICIAN PROVIDED HISTORY: chest pain TECHNOLOGIST PROVIDED HISTORY: Technologist Provided Reason for Exam: Chest Pain (pt brought in by [facility] squad from home for c/o chest pain. pt reports that it feels like her previous heart attacks. Pt had tumor removed from head 2 weeks ago with skin graft to right thigh. ) Acuity: Acute Type of Encounter: Initial FINDINGS: Lordotic positioning. Heart size borderline. Pulmonary vessels within normal limits. Haziness of the lungs is due to overlying soft tissues. Lungs are grossly clear. Costophrenic angles are sharp     No active cardiopulmonary disease     Ct Chest Pulmonary Embolism W Contrast    Result Date: 9/23/2018  EXAMINATION: CTA OF THE CHEST, 9/21/2018 1:24 pm TECHNIQUE: CTA of the chest was performed after the administration of intravenous contrast.  Multiplanar reformatted images are provided for review. MIP images are provided for review. Dose modulation, iterative reconstruction, and/or weight based adjustment of the mA/kV was utilized to reduce the radiation dose to as low as reasonably achievable. COMPARISON: None HISTORY: ORDERING PHYSICIAN PROVIDED HISTORY: CP radiating to back, SOB TECHNOLOGIST PROVIDED HISTORY: Technologist Provided Reason for Exam: Chest pain Acuity: Unknown Type of Encounter: Unknown Relevant Medical/Surgical History: History of heart attack, recent surgery FINDINGS: Pulmonary Arteries: The central, main, and interlobar pulmonary arteries are unremarkable in appearance. No intraluminal filling defects are identified within the peripheral pulmonary arterial tree. No radiographic findings to suggest presence of pulmonary thromboembolism. Mediastinum: There are mild atherosclerotic changes of the thoracic aorta with no evidence of aneurysm or dissection. No evidence of pericardial effusion. Calcific atherosclerotic coronary artery disease is identified.  No significant mediastinal, hilar, or axillary lymphadenopathy is identified. Lungs/pleura: Linear/band-like densities at the left lung base may represent minimal atelectasis/scar. Subtle band-like density in the medial right middle lobe may represent minimal atelectasis/scar as well. No focal confluent pulmonary infiltrate is identified. No evidence of pleural effusion or pneumothorax. No focal abnormality of the visualized portion of the tracheobronchial tree is identified. Upper Abdomen: Limited imaging of the upper abdomen demonstrates reflux of small amount of contrast material into the IVC. Soft Tissues/Bones: There are degenerative changes of the spine with no evidence of acute osseous abnormality. 1. No evidence of acute cardiopulmonary disease. 2. No radiographic findings to suggest presence of pulmonary thromboembolism. 3. Calcific atherosclerotic coronary artery disease as described above. Stress/rest Nm Myocardial Spect Rx    Result Date: 9/22/2018  Cardiac Perfusion Imaging  Demographics   Patient Name      Marguerite Hickey   Date of Study     09/22/2018          Gender              Female   Patient Number    [id number]          Date of Birth       1945   Visit Number      [id number]         Age                 68 year(s)   Accession Number  [id number]           Room Number         1922   Corporate ID      [id number]            NM Technician       FRANKLIN Varner   Nurse             Deep Linda RN Interpreting        Aiden Celis,                                        Physician           MD, Abigail Carrington,  Physician         MD   The procedure was explained in detail to the patient. Risks,  complications and alternative treatments were reviewed. Written consent  was obtained. Procedure Procedure Type:   Nuclear Stress Test:Pharmacological, NM MYOCARDIAL SPECT REST EXERCISE OR  RX   Study location: [facility] - Nuclear Medicine   Indications: Chest pain.               Hospital Status: Inpatient. Risk Factors   The patient risk factors include:prior PCI on 01/01/2010;obesity,  cerebrovascular disease, physical activity, Current/Recent(w/in 1 year)  tobacco use, treated and controlled hypertension, family history of  premature CAD appeared at age 62, orally-treated diabetes mellitus, prior  heart failure and prior MI . Conclusions   Summary  There is normal isotope uptake at stress and rest. There is no evidence of  myocardial ischemia or scar. Normal LV function. Overall findings represent a low risk scan. Stress Protocols   Resting ECG  Normal sinus rhythm. Resting HR:71 bpm          Resting BP:102/73 mmHg   Pre-stress physical exam: Resting pulse ox 95% room air. Chest pain at level 0/10. Stress Protocol:Pharmacologic - Lexiscan's  Peak HR:86 bpm                   HR/BP product:9718  Peak BP:113/76 mmHg  Predicted HR: 148 bpm  % of predicted HR: 58  Test duration: 4 min  Reason for termination:Completed   ECG Findings  Normal sinus rhythm. Normal response to lexiscan . Arrhythmias  Occasional PVC's. Symptoms  Developed SOB and GI upset likely related to lexiscan. Symptoms resolved with aminophylline. Complications  Procedure complication was none. Procedure Medications   - Lexiscan I.V. 0.4 mg.10 sec   - Aminophylline I.V. 100 mg. Imaging Protocols   - One Day   Rest                          Stress   Isotope:Myoview/Tetrofosmin   Isotope: Myoview/Tetrofosmin  Isotope dose:9.6 mCi          Isotope dose:31.5 mCi  Administration Route:I.V.      Administration Route:I.V.  Date:09/22/2018 08:00         Date:09/22/2018 09:50                                 Technique:      Gated  Imaging Results    Stress ejection    Ejection fraction:59 %    EDV :81 ml    ESV :33 ml    Stroke volume :48 ml    LV mass :122 gr  Medical History  Signatures   ------------------------------------------------------------------  Electronically signed by Aiden Celis MD, [facility] (Interpreting  physician) on 09/22/2018 at 14:33  ------------------------------------------------------------------            Discharge Exam:  BP (!) 149/84   Pulse 70   Temp 97.2 °F (36.2 °C) (Temporal)   Resp 16   Ht 5' 6\" (1.676 m)   Wt 275 lb (124.7 kg)   SpO2 96%   BMI 44.39 kg/m²   General appearance: alert, appears stated age and cooperative  Head: Normocephalic, without obvious abnormality, atraumatic  Lungs: clear to auscultation bilaterally  Heart: regular rate and rhythm, S1, S2 normal, no murmur, click, rub or gallop  Abdomen: soft, non-tender; bowel sounds normal; no masses,  no organomegaly  Extremities: extremities normal, atraumatic, no cyanosis or edema    Disposition: home    Discharge Medications:     Medication List      CONTINUE taking these medications    albuterol sulfate  (90 Base) MCG/ACT inhaler  Commonly known as:  VENTOLIN HFA  Inhale 2 puffs into the lungs every 6 hours as needed for Wheezing  Notes to patient:  Use:bronchodilator, to open airways, rescue inhaler  Side effects: nervousness, jitteriness, shakiness, headache, fast heartbeat     aspirin 81 MG tablet  Notes to patient:  Use: prevents heart attacks and strokes. Side effects: bleeding or bruising more easily, stomach upset. blood glucose test strips strip  Commonly known as:  ASCENSIA AUTODISC VI;ONE TOUCH ULTRA TEST VI  1 each by In Vitro route 3 times daily As needed.      butalbital-acetaminophen-caffeine -40 MG per tablet  Commonly known as:  FIORICET, ESGIC  Take 1 tablet by mouth every 6 hours as needed for Headaches  Notes to patient:  Use:  Lessens pain  Side effects: Drowsiness, lightheadedness or fainting, constipation     cilostazol 100 MG tablet  Commonly known as:  PLETAL  Take 1 tablet by mouth 2 times daily  Notes to patient:  Use: management of peripheral vascular disease, primarily intermittent claudication  Side effects: dizziness, headache, diarrhea, upset stomach     diclofenac sodium 1 % Gel  Apply 2 g topically 2 times daily     EFFERDENT DENTURE CLEANSER Tbef  1 tablet by Does not apply route as needed (denture cleaning)     gabapentin 100 MG capsule  Commonly known as:  NEURONTIN  Notes to patient:  Use: Treat neuropathic pain  Side effects: Dizziness, fatigue, nausea, diarrhea      glipiZIDE 5 MG tablet  Commonly known as:  GLUCOTROL  Take 1 tablet by mouth 2 times daily (before meals)  Notes to patient:  Glipizide (Glucotrol)  Use: treat diabetes or high blood sugar  Side effects: low blood sugar, shakiness, dizziness, headache     HUGGIES PULL-UPS 4T-5T Misc  8 each by Does not apply route daily     ipratropium-albuterol 0.5-2.5 (3) MG/3ML Soln nebulizer solution  Commonly known as:  DUONEB  Inhale 3 mLs into the lungs every 4 hours (while awake)  Notes to patient:  Use:bronchodilator, to open airways, rescue inhaler  Side effects: nervousness, jitteriness, shakiness, headache, fast heartbeat     JOBST FOR MEN 30-40MMHG LG Misc  20-30 mm by Does not apply route daily Please measure for knee hi hose     lisinopril 2.5 MG tablet  Commonly known as:  PRINIVIL;ZESTRIL  Take 1 tablet by mouth daily  Notes to patient:  Use:  Lowers blood pressure and takes workload off heart  Side Effects: Dry cough, dizziness, drowsiness, sensitivity to the sun     NEBULIZER     nitroGLYCERIN 0.4 MG SL tablet  Commonly known as:  NITROSTAT  Place 1 tablet under the tongue every 5 minutes as needed for Chest pain. Notes to patient:  Use: Treat chest pain  Side effects: headache, flushing, dizziness    ** Take 1 tablet every 5 minutes for chest pain up to 3 times. Call doctor right away. * nystatin 018544 UNIT/GM powder  Commonly known as:  MYCOSTATIN  Affected area  Notes to patient:  Use: Treatment of fungal infection  Side effects: Stomach upset, naussea, diarrhea     * nystatin 056024 UNIT/GM powder  Commonly known as:  MYCOSTATIN  Apply topically 4 times daily.      omeprazole 40 MG delayed release capsule  Commonly known as: PRILOSEC  Take 1 capsule by mouth daily  Notes to patient:  Use: prevention and treatment of gastric ulcers and/or heartburn  Side effects: headache, fatigue, constipation, dry mouth      OXYGEN     PREVAIL WET WIPES Misc  8 each by Does not apply route daily     triamcinolone 0.1 % cream  Commonly known as:  KENALOG  Apply topically 2 times daily. * This list has 2 medication(s) that are the same as other medications prescribed for you. Read the directions carefully, and ask your doctor or other care provider to review them with you.             STOP taking these medications    docusate 100 MG Caps  Commonly known as:  COLACE, DULCOLAX     magnesium hydroxide 400 MG/5ML suspension  Commonly known as:  MILK OF MAGNESIA            Allergies   Allergen Reactions    Morphine Shortness Of Breath    Codeine Other (See Comments)     Chest pain    Hydromorphone Other (See Comments)     hallucinations  Hallucinations    But will take if needed in an emergency    Vicodin [Hydrocodone-Acetaminophen] Hives       Follow-up with PCP in 1-2 weeks time  Pt is asked to call PMD or return to ER if chest pain recurs    Total time spent on day of discharge including face-to-face visit, examination, documentation, counseling, preparation of discharge plans and followup, and discharge medicine reconciliation and presciptions is 34 minutes    Signed:  Megan Kiran MD  10/7/2018

## 2018-10-10 ENCOUNTER — TELEPHONE (OUTPATIENT)
Dept: CARDIOLOGY CLINIC | Age: 73
End: 2018-10-10

## 2018-10-10 ENCOUNTER — OFFICE VISIT (OUTPATIENT)
Dept: SURGERY | Age: 73
End: 2018-10-10

## 2018-10-10 VITALS
HEART RATE: 71 BPM | BODY MASS INDEX: 44.06 KG/M2 | DIASTOLIC BLOOD PRESSURE: 80 MMHG | TEMPERATURE: 98 F | WEIGHT: 273 LBS | OXYGEN SATURATION: 98 % | SYSTOLIC BLOOD PRESSURE: 129 MMHG

## 2018-10-10 DIAGNOSIS — Z09 POSTOP CHECK: Primary | ICD-10-CM

## 2018-10-10 PROCEDURE — 99024 POSTOP FOLLOW-UP VISIT: CPT | Performed by: SURGERY

## 2018-11-02 ENCOUNTER — APPOINTMENT (OUTPATIENT)
Dept: GENERAL RADIOLOGY | Age: 73
End: 2018-11-02
Payer: COMMERCIAL

## 2018-11-02 ENCOUNTER — HOSPITAL ENCOUNTER (OUTPATIENT)
Age: 73
Setting detail: OBSERVATION
Discharge: HOME | End: 2018-11-03
Attending: EMERGENCY MEDICINE | Admitting: INTERNAL MEDICINE
Payer: COMMERCIAL

## 2018-11-02 DIAGNOSIS — R55 NEAR SYNCOPE: ICD-10-CM

## 2018-11-02 DIAGNOSIS — R07.9 CHEST PAIN, UNSPECIFIED TYPE: Primary | ICD-10-CM

## 2018-11-02 DIAGNOSIS — F43.0 STRESS REACTION: ICD-10-CM

## 2018-11-02 LAB
A/G RATIO: 1 (ref 1.1–2.2)
ALBUMIN SERPL-MCNC: 3.9 G/DL (ref 3.4–5)
ALP BLD-CCNC: 97 U/L (ref 40–129)
ALT SERPL-CCNC: 26 U/L (ref 10–40)
ANION GAP SERPL CALCULATED.3IONS-SCNC: 14 MMOL/L (ref 3–16)
AST SERPL-CCNC: 40 U/L (ref 15–37)
BASOPHILS ABSOLUTE: 0.1 K/UL (ref 0–0.2)
BASOPHILS RELATIVE PERCENT: 0.9 %
BILIRUB SERPL-MCNC: 0.3 MG/DL (ref 0–1)
BUN BLDV-MCNC: 11 MG/DL (ref 7–20)
CALCIUM SERPL-MCNC: 10.1 MG/DL (ref 8.3–10.6)
CHLORIDE BLD-SCNC: 96 MMOL/L (ref 99–110)
CO2: 23 MMOL/L (ref 21–32)
CREAT SERPL-MCNC: 0.8 MG/DL (ref 0.6–1.2)
EOSINOPHILS ABSOLUTE: 0.1 K/UL (ref 0–0.6)
EOSINOPHILS RELATIVE PERCENT: 0.4 %
GFR AFRICAN AMERICAN: >60
GFR NON-AFRICAN AMERICAN: >60
GLOBULIN: 3.8 G/DL
GLUCOSE BLD-MCNC: 111 MG/DL (ref 70–99)
GLUCOSE BLD-MCNC: 145 MG/DL (ref 70–99)
GLUCOSE BLD-MCNC: 205 MG/DL (ref 70–99)
HCT VFR BLD CALC: 48.9 % (ref 36–48)
HEMOGLOBIN: 16.4 G/DL (ref 12–16)
LYMPHOCYTES ABSOLUTE: 2.2 K/UL (ref 1–5.1)
LYMPHOCYTES RELATIVE PERCENT: 17.5 %
MCH RBC QN AUTO: 29.8 PG (ref 26–34)
MCHC RBC AUTO-ENTMCNC: 33.5 G/DL (ref 31–36)
MCV RBC AUTO: 88.7 FL (ref 80–100)
MONOCYTES ABSOLUTE: 0.7 K/UL (ref 0–1.3)
MONOCYTES RELATIVE PERCENT: 5.8 %
NEUTROPHILS ABSOLUTE: 9.5 K/UL (ref 1.7–7.7)
NEUTROPHILS RELATIVE PERCENT: 75.4 %
PDW BLD-RTO: 14.5 % (ref 12.4–15.4)
PERFORMED ON: ABNORMAL
PERFORMED ON: ABNORMAL
PLATELET # BLD: 232 K/UL (ref 135–450)
PMV BLD AUTO: 8.4 FL (ref 5–10.5)
POTASSIUM SERPL-SCNC: 4.2 MMOL/L (ref 3.5–5.1)
RBC # BLD: 5.51 M/UL (ref 4–5.2)
SODIUM BLD-SCNC: 133 MMOL/L (ref 136–145)
TOTAL PROTEIN: 7.7 G/DL (ref 6.4–8.2)
TROPONIN: <0.01 NG/ML
TROPONIN: <0.01 NG/ML
WBC # BLD: 12.6 K/UL (ref 4–11)

## 2018-11-02 PROCEDURE — 96374 THER/PROPH/DIAG INJ IV PUSH: CPT

## 2018-11-02 PROCEDURE — 6370000000 HC RX 637 (ALT 250 FOR IP): Performed by: INTERNAL MEDICINE

## 2018-11-02 PROCEDURE — 93010 ELECTROCARDIOGRAM REPORT: CPT | Performed by: INTERNAL MEDICINE

## 2018-11-02 PROCEDURE — G0378 HOSPITAL OBSERVATION PER HR: HCPCS

## 2018-11-02 PROCEDURE — 85025 COMPLETE CBC W/AUTO DIFF WBC: CPT

## 2018-11-02 PROCEDURE — 93005 ELECTROCARDIOGRAM TRACING: CPT | Performed by: EMERGENCY MEDICINE

## 2018-11-02 PROCEDURE — 71045 X-RAY EXAM CHEST 1 VIEW: CPT

## 2018-11-02 PROCEDURE — 96372 THER/PROPH/DIAG INJ SC/IM: CPT

## 2018-11-02 PROCEDURE — 2500000003 HC RX 250 WO HCPCS: Performed by: INTERNAL MEDICINE

## 2018-11-02 PROCEDURE — 99285 EMERGENCY DEPT VISIT HI MDM: CPT

## 2018-11-02 PROCEDURE — 6370000000 HC RX 637 (ALT 250 FOR IP): Performed by: EMERGENCY MEDICINE

## 2018-11-02 PROCEDURE — 6360000002 HC RX W HCPCS: Performed by: INTERNAL MEDICINE

## 2018-11-02 PROCEDURE — 6360000002 HC RX W HCPCS: Performed by: EMERGENCY MEDICINE

## 2018-11-02 PROCEDURE — 36415 COLL VENOUS BLD VENIPUNCTURE: CPT

## 2018-11-02 PROCEDURE — 80053 COMPREHEN METABOLIC PANEL: CPT

## 2018-11-02 PROCEDURE — 84484 ASSAY OF TROPONIN QUANT: CPT

## 2018-11-02 RX ORDER — TRAMADOL HYDROCHLORIDE 50 MG/1
50 TABLET ORAL EVERY 4 HOURS PRN
Status: DISCONTINUED | OUTPATIENT
Start: 2018-11-02 | End: 2018-11-03 | Stop reason: HOSPADM

## 2018-11-02 RX ORDER — ALBUTEROL SULFATE 90 UG/1
2 AEROSOL, METERED RESPIRATORY (INHALATION) EVERY 6 HOURS PRN
Status: DISCONTINUED | OUTPATIENT
Start: 2018-11-02 | End: 2018-11-03 | Stop reason: HOSPADM

## 2018-11-02 RX ORDER — BUTALBITAL, ACETAMINOPHEN AND CAFFEINE 50; 325; 40 MG/1; MG/1; MG/1
1 TABLET ORAL EVERY 6 HOURS PRN
Status: DISCONTINUED | OUTPATIENT
Start: 2018-11-02 | End: 2018-11-03 | Stop reason: HOSPADM

## 2018-11-02 RX ORDER — ONDANSETRON 2 MG/ML
4 INJECTION INTRAMUSCULAR; INTRAVENOUS EVERY 6 HOURS PRN
Status: DISCONTINUED | OUTPATIENT
Start: 2018-11-02 | End: 2018-11-03 | Stop reason: HOSPADM

## 2018-11-02 RX ORDER — ASPIRIN 325 MG
325 TABLET ORAL ONCE
Status: COMPLETED | OUTPATIENT
Start: 2018-11-02 | End: 2018-11-02

## 2018-11-02 RX ORDER — LISINOPRIL 5 MG/1
2.5 TABLET ORAL DAILY
Status: DISCONTINUED | OUTPATIENT
Start: 2018-11-02 | End: 2018-11-03 | Stop reason: HOSPADM

## 2018-11-02 RX ORDER — IPRATROPIUM BROMIDE AND ALBUTEROL SULFATE 2.5; .5 MG/3ML; MG/3ML
3 SOLUTION RESPIRATORY (INHALATION) EVERY 4 HOURS PRN
Status: DISCONTINUED | OUTPATIENT
Start: 2018-11-02 | End: 2018-11-03 | Stop reason: HOSPADM

## 2018-11-02 RX ORDER — ASPIRIN 81 MG/1
81 TABLET, CHEWABLE ORAL DAILY
Status: DISCONTINUED | OUTPATIENT
Start: 2018-11-02 | End: 2018-11-03 | Stop reason: HOSPADM

## 2018-11-02 RX ORDER — GABAPENTIN 100 MG/1
100 CAPSULE ORAL 3 TIMES DAILY
Status: DISCONTINUED | OUTPATIENT
Start: 2018-11-02 | End: 2018-11-03 | Stop reason: HOSPADM

## 2018-11-02 RX ORDER — ALPRAZOLAM 0.5 MG/1
1 TABLET ORAL 3 TIMES DAILY PRN
Status: DISCONTINUED | OUTPATIENT
Start: 2018-11-02 | End: 2018-11-03 | Stop reason: HOSPADM

## 2018-11-02 RX ORDER — CILOSTAZOL 100 MG/1
100 TABLET ORAL 2 TIMES DAILY
Status: DISCONTINUED | OUTPATIENT
Start: 2018-11-02 | End: 2018-11-03 | Stop reason: HOSPADM

## 2018-11-02 RX ORDER — PANTOPRAZOLE SODIUM 40 MG/1
40 TABLET, DELAYED RELEASE ORAL
Status: DISCONTINUED | OUTPATIENT
Start: 2018-11-03 | End: 2018-11-03 | Stop reason: HOSPADM

## 2018-11-02 RX ORDER — LORAZEPAM 2 MG/ML
1 INJECTION INTRAMUSCULAR EVERY 30 MIN PRN
Status: DISCONTINUED | OUTPATIENT
Start: 2018-11-02 | End: 2018-11-03 | Stop reason: HOSPADM

## 2018-11-02 RX ORDER — NITROGLYCERIN 0.4 MG/1
0.4 TABLET SUBLINGUAL EVERY 5 MIN PRN
Status: DISCONTINUED | OUTPATIENT
Start: 2018-11-02 | End: 2018-11-03 | Stop reason: HOSPADM

## 2018-11-02 RX ORDER — GLIPIZIDE 5 MG/1
5 TABLET ORAL
Status: DISCONTINUED | OUTPATIENT
Start: 2018-11-02 | End: 2018-11-03 | Stop reason: HOSPADM

## 2018-11-02 RX ORDER — ISOSORBIDE MONONITRATE 60 MG/1
60 TABLET, EXTENDED RELEASE ORAL DAILY
Status: DISCONTINUED | OUTPATIENT
Start: 2018-11-02 | End: 2018-11-03 | Stop reason: HOSPADM

## 2018-11-02 RX ADMIN — ISOSORBIDE MONONITRATE 60 MG: 60 TABLET, EXTENDED RELEASE ORAL at 19:28

## 2018-11-02 RX ADMIN — CILOSTAZOL 100 MG: 100 TABLET ORAL at 20:49

## 2018-11-02 RX ADMIN — GABAPENTIN 100 MG: 100 CAPSULE ORAL at 20:49

## 2018-11-02 RX ADMIN — DICLOFENAC 2 G: 10 GEL TOPICAL at 20:57

## 2018-11-02 RX ADMIN — LISINOPRIL 2.5 MG: 5 TABLET ORAL at 19:29

## 2018-11-02 RX ADMIN — LORAZEPAM 1 MG: 2 INJECTION INTRAMUSCULAR; INTRAVENOUS at 15:19

## 2018-11-02 RX ADMIN — BUTALBITAL, ACETAMINOPHEN AND CAFFEINE 1 TABLET: 50; 325; 40 TABLET ORAL at 19:29

## 2018-11-02 RX ADMIN — GLIPIZIDE 5 MG: 5 TABLET ORAL at 19:29

## 2018-11-02 RX ADMIN — TRAMADOL HYDROCHLORIDE 50 MG: 50 TABLET, FILM COATED ORAL at 19:29

## 2018-11-02 RX ADMIN — ENOXAPARIN SODIUM 40 MG: 40 INJECTION SUBCUTANEOUS at 20:49

## 2018-11-02 RX ADMIN — ALPRAZOLAM 1 MG: 0.5 TABLET ORAL at 20:57

## 2018-11-02 RX ADMIN — MICONAZOLE NITRATE: 2 POWDER TOPICAL at 20:57

## 2018-11-02 RX ADMIN — ASPIRIN 325 MG: 325 TABLET, COATED ORAL at 15:19

## 2018-11-02 ASSESSMENT — PAIN SCALES - GENERAL
PAINLEVEL_OUTOF10: 10
PAINLEVEL_OUTOF10: 9

## 2018-11-02 ASSESSMENT — PAIN DESCRIPTION - PAIN TYPE: TYPE: ACUTE PAIN

## 2018-11-02 ASSESSMENT — PAIN DESCRIPTION - LOCATION: LOCATION: HEAD

## 2018-11-02 ASSESSMENT — PAIN DESCRIPTION - DESCRIPTORS: DESCRIPTORS: HEADACHE

## 2018-11-02 NOTE — ED PROVIDER NOTES
CHIEF COMPLAINT  Chest Pain (MOTHER OF BED 3 ( pt). AT BEDSIDE PT STARTED EXPERIENCING CHEST PAIN AND HAD A POSSIBLE SYNCOPAL EPISODE.)      HISTORY OF PRESENT ILLNESS  Silvia Hernandez is a 68 y.o. female presents to the ED with presents emergency Department chief complaint of chest pain. The patient was at the bed of her  son when she began to have shortness of breath and chest pressure. She had a syncopal episode but was lowered to the ground by family members that she could feel it coming on. She continues to have some chest pressure. She rates as a 9 out of 10. Midsternal non-radiating. She is very tearful and emotionally labile  She is denying any recent medication changes. She's had no fevers no chills. .  No other complaints, modifyingfactors or associated symptoms. I havereviewed the following from the nursing documentation.     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    Chronic obstructive pulmonary disease ([facility] 2017    Claustrophobia     COPD (chronic obstructive pulmonary disease) (HCC)     Esophageal stricture     Gastroesophageal reflux disease without esophagitis 3/24/2016    Hiatal hernia     Hiatal hernia     Hypercholesteremia     Hypertension     Migraines     Movement disorder arthritis    Pneumonia     PONV (postoperative nausea and vomiting)     Type II or unspecified type diabetes mellitus without mention of complication, not stated as uncontrolled     Unspecified cerebral artery occlusion with cerebral infarction      Past Surgical History:   Procedure Laterality Date    APPENDECTOMY      CARDIAC SURGERY      1 stent  and 1 stent     7 days. Intended supply: 7 days. Take lowest dose possible to manage pain. 42 tablet 0    ALPRAZolam (XANAX) 1 MG tablet Take 1 tablet by mouth 3 times daily as needed for Anxiety for up to 30 days. . 90 tablet 0    glipiZIDE (GLUCOTROL) 5 MG tablet Take 1 tablet by mouth 2 times daily (before meals) 60 tablet 2    lisinopril (PRINIVIL;ZESTRIL) 2.5 MG tablet Take 1 tablet by mouth daily 30 tablet 2    diclofenac sodium 1 % GEL Apply 2 g topically 2 times daily 1 Tube 3    gabapentin (NEURONTIN) 100 MG capsule Take 1 capsule by mouth 3 times daily for 30 days. . 90 capsule 0    isosorbide mononitrate (IMDUR) 60 MG extended release tablet Take 1 tablet by mouth daily 90 tablet 3    butalbital-acetaminophen-caffeine (FIORICET, ESGIC) -40 MG per tablet Take 1 tablet by mouth every 6 hours as needed for Headaches 90 tablet 1    cilostazol (PLETAL) 100 MG tablet Take 1 tablet by mouth 2 times daily 60 tablet 3    ipratropium-albuterol (DUONEB) 0.5-2.5 (3) MG/3ML SOLN nebulizer solution Inhale 3 mLs into the lungs every 4 hours (while awake) 120 vial 0    triamcinolone (KENALOG) 0.1 % cream Apply topically 2 times daily. 30 g 1    albuterol sulfate HFA (VENTOLIN HFA) 108 (90 BASE) MCG/ACT inhaler Inhale 2 puffs into the lungs every 6 hours as needed for Wheezing 1 Inhaler 3    nystatin (MYCOSTATIN) 729182 UNIT/GM powder Affected area 1 Bottle 5    aspirin 81 MG tablet Take 81 mg by mouth daily      omeprazole (PRILOSEC) 40 MG delayed release capsule Take 1 capsule by mouth daily 30 capsule 3    OXYGEN Inhale 2 L/min into the lungs as needed Uses when O2 Sat is below 90      blood glucose test strips (ASCENSIA AUTODISC VI;ONE TOUCH ULTRA TEST VI) strip 1 each by In Vitro route 3 times daily As needed.  100 each 3    Denture Care Products (EFFERDENT DENTURE CLEANSER) TBEF 1 tablet by Does not apply route as needed (denture cleaning)  0    Incontinence Supply Disposable (PREVAIL WET WIPES) MISC 8 each by to take these medications. New Prescriptions    No medications on file       CLINICALIMPRESSION  1. Chest pain, unspecified type    2. Near syncope    3. Stress reaction        Blood pressure 124/83, pulse 82, temperature 98.2 °F (36.8 °C), temperature source Oral, resp. rate 14, height 5' 6\" (1.676 m), weight 275 lb (124.7 kg), SpO2 94 %, not currently breastfeeding. [address] was admitted in stable condition.                  Shanelle Rutherford DO  11/02/18 [id number]

## 2018-11-03 VITALS
RESPIRATION RATE: 18 BRPM | TEMPERATURE: 97.4 F | OXYGEN SATURATION: 90 % | DIASTOLIC BLOOD PRESSURE: 76 MMHG | WEIGHT: 279.7 LBS | HEART RATE: 77 BPM | HEIGHT: 66 IN | BODY MASS INDEX: 44.95 KG/M2 | SYSTOLIC BLOOD PRESSURE: 113 MMHG

## 2018-11-03 PROBLEM — R07.2 PRECORDIAL PAIN: Status: ACTIVE | Noted: 2018-09-21

## 2018-11-03 PROBLEM — R60.0 BILATERAL EDEMA OF LOWER EXTREMITY: Status: RESOLVED | Noted: 2017-08-16 | Resolved: 2018-11-03

## 2018-11-03 PROBLEM — R07.9 CHEST PAIN: Status: RESOLVED | Noted: 2017-12-28 | Resolved: 2018-11-03

## 2018-11-03 PROBLEM — E83.51 HYPOCALCEMIA: Status: RESOLVED | Noted: 2018-06-07 | Resolved: 2018-11-03

## 2018-11-03 PROBLEM — E87.70 FLUID OVERLOAD: Status: RESOLVED | Noted: 2017-09-27 | Resolved: 2018-11-03

## 2018-11-03 PROBLEM — R47.1 DYSARTHRIA: Status: RESOLVED | Noted: 2018-06-07 | Resolved: 2018-11-03

## 2018-11-03 PROBLEM — R07.9 CHEST PAIN: Status: RESOLVED | Noted: 2017-02-09 | Resolved: 2018-11-03

## 2018-11-03 PROBLEM — I49.8 BIGEMINY: Status: RESOLVED | Noted: 2018-09-21 | Resolved: 2018-11-03

## 2018-11-03 PROBLEM — E83.42 HYPOMAGNESEMIA: Status: RESOLVED | Noted: 2018-06-07 | Resolved: 2018-11-03

## 2018-11-03 PROBLEM — J44.1 COPD WITH EXACERBATION (HCC): Chronic | Status: RESOLVED | Noted: 2017-01-13 | Resolved: 2018-11-03

## 2018-11-03 LAB
TROPONIN: <0.01 NG/ML
TROPONIN: <0.01 NG/ML

## 2018-11-03 PROCEDURE — G0378 HOSPITAL OBSERVATION PER HR: HCPCS

## 2018-11-03 PROCEDURE — 36415 COLL VENOUS BLD VENIPUNCTURE: CPT

## 2018-11-03 PROCEDURE — 6360000002 HC RX W HCPCS: Performed by: INTERNAL MEDICINE

## 2018-11-03 PROCEDURE — 96375 TX/PRO/DX INJ NEW DRUG ADDON: CPT

## 2018-11-03 PROCEDURE — 84484 ASSAY OF TROPONIN QUANT: CPT

## 2018-11-03 PROCEDURE — 6370000000 HC RX 637 (ALT 250 FOR IP): Performed by: INTERNAL MEDICINE

## 2018-11-03 RX ORDER — DEXTROSE MONOHYDRATE 50 MG/ML
100 INJECTION, SOLUTION INTRAVENOUS PRN
Status: DISCONTINUED | OUTPATIENT
Start: 2018-11-03 | End: 2018-11-03 | Stop reason: HOSPADM

## 2018-11-03 RX ORDER — NICOTINE POLACRILEX 4 MG
15 LOZENGE BUCCAL PRN
Status: DISCONTINUED | OUTPATIENT
Start: 2018-11-03 | End: 2018-11-03 | Stop reason: HOSPADM

## 2018-11-03 RX ORDER — DEXTROSE MONOHYDRATE 25 G/50ML
12.5 INJECTION, SOLUTION INTRAVENOUS PRN
Status: DISCONTINUED | OUTPATIENT
Start: 2018-11-03 | End: 2018-11-03 | Stop reason: HOSPADM

## 2018-11-03 RX ADMIN — ALPRAZOLAM 1 MG: 0.5 TABLET ORAL at 10:15

## 2018-11-03 RX ADMIN — DICLOFENAC 2 G: 10 GEL TOPICAL at 09:58

## 2018-11-03 RX ADMIN — MICONAZOLE NITRATE: 2 POWDER TOPICAL at 09:57

## 2018-11-03 RX ADMIN — PANTOPRAZOLE SODIUM 40 MG: 40 TABLET, DELAYED RELEASE ORAL at 09:55

## 2018-11-03 RX ADMIN — LISINOPRIL 2.5 MG: 5 TABLET ORAL at 09:55

## 2018-11-03 RX ADMIN — ALPRAZOLAM 1 MG: 0.5 TABLET ORAL at 04:43

## 2018-11-03 RX ADMIN — GLIPIZIDE 5 MG: 5 TABLET ORAL at 09:55

## 2018-11-03 RX ADMIN — ISOSORBIDE MONONITRATE 60 MG: 60 TABLET, EXTENDED RELEASE ORAL at 09:54

## 2018-11-03 RX ADMIN — ONDANSETRON HYDROCHLORIDE 4 MG: 2 INJECTION, SOLUTION INTRAMUSCULAR; INTRAVENOUS at 14:04

## 2018-11-03 RX ADMIN — GABAPENTIN 100 MG: 100 CAPSULE ORAL at 09:54

## 2018-11-03 RX ADMIN — CILOSTAZOL 100 MG: 100 TABLET ORAL at 09:55

## 2018-11-03 RX ADMIN — ASPIRIN 81 MG CHEWABLE TABLET 81 MG: 81 TABLET CHEWABLE at 09:55

## 2018-11-03 RX ADMIN — GABAPENTIN 100 MG: 100 CAPSULE ORAL at 14:02

## 2018-11-03 RX ADMIN — GLIPIZIDE 5 MG: 5 TABLET ORAL at 17:09

## 2018-11-03 NOTE — PROGRESS NOTES
Family at bedside. Patient less tearful.
Patient very tearful and having a difficult time processing the loss of her son yesterday. Offered spiritual support consult, patient has her  coming to see her today. 1mg Xanax given. Will continue to monitor.
1. Assessment and treatment by Respiratory Therapy will be initiated for medication and therapeutic interventions upon initiation of aerosolized medication. 2. Physician will be contacted for respiratory rate (RR) greater than 35 breaths per minute. Therapy will be held for heart rate (HR) greater than 140 beats per minute, pending direction from physician. 3. Bronchodilators will be administered via Metered Dose Inhaler (MDI) with spacer when the following criteria are met:  a. Alert and cooperative     b. HR < 140 bpm  c. RR < 30 bpm                d. Can demonstrate a 2-3 second inspiratory hold  4. Bronchodilators will be administered via Hand Held Nebulizer [facility]) to patients when ANY of the following criteria are met  a. Incognizant or uncooperative          b. Patients treated with HHN at Home        c. Unable to demonstrate proper MDI or HFA technique     d. RR > 30 bpm   5. Bronchodilators will be delivered via Metered Dose Inhaler (MDI) or Hydrofluoroalkane (HFA) with spacer to intubated patients on mechanical ventilation. 6. Inhalation medication orders will be delivered and/or substituted as outlined below. Aerosolized Medications Ordering and Administration Guidelines:    1. All Medications will be ordered by a physician, and their frequency and/or modality will be adjusted as defined by the patients Respiratory Severity Index (RSI) score. 2. If the patient does not have documented COPD, consider discontinuing anticholinergics when RSI is less than 9.  3. If the bronchospasm worsens (increased RSI), then the bronchodilator frequency can be increased to a maximum of every 4 hours. If greater than every 4 hours is required, the physician will be contacted. 4. If the bronchospasm improves, the frequency of the bronchodilator can be decreased, based on the patient's RSI, but not less than home treatment regimen frequency.   5. Bronchodilator(s) will be discontinued if patient has a RSI less than

## 2018-11-15 LAB
EKG ATRIAL RATE: 87 BPM
EKG DIAGNOSIS: NORMAL
EKG P AXIS: 25 DEGREES
EKG P-R INTERVAL: 164 MS
EKG Q-T INTERVAL: 354 MS
EKG QRS DURATION: 102 MS
EKG QTC CALCULATION (BAZETT): 425 MS
EKG R AXIS: -62 DEGREES
EKG T AXIS: 49 DEGREES
EKG VENTRICULAR RATE: 87 BPM

## 2018-11-27 ENCOUNTER — TELEPHONE (OUTPATIENT)
Dept: INPATIENT UNIT | Age: 73
End: 2018-11-27

## 2018-12-08 NOTE — DISCHARGE SUMMARY
[facility]                     [facility], [address]                               DISCHARGE SUMMARY    PATIENT NAME: Rachel Lobo                      :        1945  MED REC NO:   [id number]                          ROOM:       4131  ACCOUNT NO:   [de-identified]                           ADMIT DATE: 2018  PROVIDER:     Gordy Baumgarten, MD                  DISCHARGE DATE:  2018    FINAL DIAGNOSES:  1. The patient was on observational status for chest pain. 2.  Atherosclerotic heart disease. 3.  Hypertension. 4.  Acute grief reaction. 5.  Morbid obesity. 6.  COPD. DISCHARGE MEDICATIONS:  Resuming preadmission home meds without any  change with just reassurance. HOSPITAL COURSE:  This elderly woman came to the emergency room, has a  known cardiac disease, but has had recently all kinds of invasive and  noninvasive evaluation done. She was having severe chest pain and acute  grief reaction because her son just had  of heroin overdose. The  patient because of her cardiac history was brought in for observation. She underwent cardiac enzyme ruling out. Serial cardiac enzyme  monitoring. Vital signs were stable. All the labs were within normal  limit. EKG changes were not significant. Sodium was 133, potassium  4.2, chloride 96, CO2 23, BUN 11, creatinine 0.8. Three sets of  troponin were negative. Cholesterol was 172, HDL was 26. After 23  hours of observation, the patient was discharged in stable condition. The patient did not need anymore aggressive imaging studies. The  patient was discharged in stable condition and she was now coming to the  terms about her son's demise who had been long battling drug abuse  addiction. The patient was discharged in stable condition, family was  comforted.         Reji Jackson MD    D: 2018 16:37:10       T: 2018 10:37:03     [id number]  Job#: [id number]     Doc#:

## 2019-01-07 PROBLEM — E11.9 CONTROLLED TYPE 2 DIABETES MELLITUS WITHOUT COMPLICATION, WITHOUT LONG-TERM CURRENT USE OF INSULIN (HCC): Status: ACTIVE | Noted: 2017-01-13

## 2019-04-15 ENCOUNTER — OFFICE VISIT (OUTPATIENT)
Dept: SURGERY | Age: 74
End: 2019-04-15

## 2019-04-15 VITALS — HEIGHT: 66 IN | WEIGHT: 279 LBS | BODY MASS INDEX: 44.84 KG/M2

## 2019-04-15 DIAGNOSIS — Z09 POSTOP CHECK: Primary | ICD-10-CM

## 2019-04-15 PROCEDURE — 99999 PR OFFICE/OUTPT VISIT,PROCEDURE ONLY: CPT | Performed by: SURGERY

## 2019-04-22 ENCOUNTER — OFFICE VISIT (OUTPATIENT)
Dept: SURGERY | Age: 74
End: 2019-04-22
Payer: COMMERCIAL

## 2019-04-22 VITALS
HEART RATE: 88 BPM | DIASTOLIC BLOOD PRESSURE: 84 MMHG | BODY MASS INDEX: 44.84 KG/M2 | RESPIRATION RATE: 15 BRPM | SYSTOLIC BLOOD PRESSURE: 136 MMHG | HEIGHT: 66 IN | OXYGEN SATURATION: 93 % | WEIGHT: 279 LBS | TEMPERATURE: 98 F

## 2019-04-22 DIAGNOSIS — Z09 POSTOP CHECK: Primary | ICD-10-CM

## 2019-04-22 PROCEDURE — G8598 ASA/ANTIPLAT THER USED: HCPCS | Performed by: SURGERY

## 2019-04-22 PROCEDURE — 1123F ACP DISCUSS/DSCN MKR DOCD: CPT | Performed by: SURGERY

## 2019-04-22 PROCEDURE — 1036F TOBACCO NON-USER: CPT | Performed by: SURGERY

## 2019-04-22 PROCEDURE — 99212 OFFICE O/P EST SF 10 MIN: CPT | Performed by: SURGERY

## 2019-04-22 PROCEDURE — 1090F PRES/ABSN URINE INCON ASSESS: CPT | Performed by: SURGERY

## 2019-04-22 PROCEDURE — G8428 CUR MEDS NOT DOCUMENT: HCPCS | Performed by: SURGERY

## 2019-04-22 PROCEDURE — 4040F PNEUMOC VAC/ADMIN/RCVD: CPT | Performed by: SURGERY

## 2019-04-22 PROCEDURE — 3017F COLORECTAL CA SCREEN DOC REV: CPT | Performed by: SURGERY

## 2019-04-22 PROCEDURE — G8399 PT W/DXA RESULTS DOCUMENT: HCPCS | Performed by: SURGERY

## 2019-04-22 PROCEDURE — G8417 CALC BMI ABV UP PARAM F/U: HCPCS | Performed by: SURGERY

## 2019-04-22 NOTE — Clinical Note
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 Her graft has healed very well and she has no evidence of recurrence. She can follow-up with me PRN or anytime with any issues. Please don't hesitate to contact me anytime if you have any questions. Thanks! bAby Ely

## 2019-04-22 NOTE — PROGRESS NOTES
SCALP, APPLICATION OF WOUND VAC DEVICE performed by Jayne Llamas MD at [address]        UPPER GASTROINTESTINAL ENDOSCOPY   12     with biopsy of stomach,gastritis    UPPER GASTROINTESTINAL ENDOSCOPY   2018     w/esophagael dilation         Allergy:         Allergies   Allergen Reactions    Morphine Shortness Of Breath    Codeine Other (See Comments)       Chest pain    Hydromorphone Other (See Comments)       hallucinations  Hallucinations    But will take if needed in an emergency    Vicodin [Hydrocodone-Acetaminophen] Hives         SHx:   Social History               Socioeconomic History    Marital status:        Spouse name: Salty Evans Number of children: 3    Years of education: 15    Highest education level: Not on file   Occupational History    Not on file   Social Needs    Financial resource strain: Not on file    Food insecurity:       Worry: Not on file       Inability: Not on file    Transportation needs:       Medical: Not on file       Non-medical: Not on file   Tobacco Use    Smoking status: Former Smoker       Packs/day: 0.25       Years: 49.00       Pack years: 12.25       Types: Cigarettes       Last attempt to quit: 2017       Years since quittin.8    Smokeless tobacco: Never Used    Tobacco comment: only smoke when real stressed  Nicotine patch   Substance and Sexual Activity    Alcohol use:  Yes       Alcohol/week: 0.6 oz       Types: 1 Glasses of wine per week       Comment: occ. every 3-4 mo    Drug use: No    Sexual activity: Not Currently       Partners: Male   Lifestyle    Physical activity:       Days per week: Not on file       Minutes per session: Not on file    Stress: Not on file   Relationships    Social connections:       Talks on phone: Not on file       Gets together: Not on file       Attends Catholic service: Not on file       Active member of club or organization: Not on file       Attends meetings of clubs or organizations: Not on file       Relationship status: Not on file    Intimate partner violence:       Fear of current or ex partner: Not on file       Emotionally abused: Not on file       Physically abused: Not on file       Forced sexual activity: Not on file   Other Topics Concern    Not on file   Social History Narrative    Not on file         FHx: Family history of CA: Yes  Meds:   Current Facility-Administered Medications          Current Outpatient Medications   Medication Sig Dispense Refill    traMADol (ULTRAM) 50 MG tablet Take 1 tablet by mouth every 4 hours as needed for Pain for up to 7 days. Intended supply: 7 days. Take lowest dose possible to manage pain 42 tablet 0    ALPRAZolam (XANAX) 1 MG tablet Take 1 tablet by mouth 3 times daily as needed for Anxiety for up to 30 days. 90 tablet 0    gabapentin (NEURONTIN) 100 MG capsule Take 1 capsule by mouth 3 times daily for 30 days. 90 capsule 1    lisinopril (PRINIVIL;ZESTRIL) 2.5 MG tablet Take 1 tablet by mouth daily 30 tablet 2    diclofenac sodium 1 % GEL Apply 2 g topically 2 times daily 1 Tube 3    glipiZIDE (GLUCOTROL) 5 MG tablet Take 1 tablet by mouth 2 times daily (before meals) 60 tablet 2    omeprazole (PRILOSEC) 40 MG delayed release capsule Take 1 capsule by mouth daily 30 capsule 3    albuterol sulfate HFA (VENTOLIN HFA) 108 (90 Base) MCG/ACT inhaler Inhale 2 puffs into the lungs every 6 hours as needed for Wheezing 1 Inhaler 3    blood glucose test strips (ASCENSIA AUTODISC VI;ONE TOUCH ULTRA TEST VI) strip 1 each by In Vitro route 3 times daily As needed.  100 each 3    isosorbide mononitrate (IMDUR) 60 MG extended release tablet Take 1 tablet by mouth daily 90 tablet 3    OXYGEN Inhale 2 L/min into the lungs as needed Uses when O2 Sat is below 90        Denture Care Products (EFFERDENT DENTURE CLEANSER) TBEF 1 tablet by Does not apply route as needed (denture cleaning)   0   

## 2019-04-24 ENCOUNTER — APPOINTMENT (OUTPATIENT)
Dept: CT IMAGING | Age: 74
DRG: 191 | End: 2019-04-24
Payer: COMMERCIAL

## 2019-04-24 ENCOUNTER — HOSPITAL ENCOUNTER (INPATIENT)
Age: 74
LOS: 5 days | Discharge: HOME | DRG: 191 | End: 2019-04-29
Attending: EMERGENCY MEDICINE | Admitting: INTERNAL MEDICINE
Payer: COMMERCIAL

## 2019-04-24 ENCOUNTER — APPOINTMENT (OUTPATIENT)
Dept: GENERAL RADIOLOGY | Age: 74
DRG: 191 | End: 2019-04-24
Payer: COMMERCIAL

## 2019-04-24 DIAGNOSIS — J44.1 COPD EXACERBATION (HCC): Primary | ICD-10-CM

## 2019-04-24 DIAGNOSIS — R05.8 PRODUCTIVE COUGH: ICD-10-CM

## 2019-04-24 DIAGNOSIS — R07.89 CHEST DISCOMFORT: ICD-10-CM

## 2019-04-24 LAB
A/G RATIO: 1 (ref 1.1–2.2)
ALBUMIN SERPL-MCNC: 3.3 G/DL (ref 3.4–5)
ALP BLD-CCNC: 84 U/L (ref 40–129)
ALT SERPL-CCNC: 12 U/L (ref 10–40)
ANION GAP SERPL CALCULATED.3IONS-SCNC: 10 MMOL/L (ref 3–16)
AST SERPL-CCNC: 15 U/L (ref 15–37)
BASOPHILS ABSOLUTE: 0.1 K/UL (ref 0–0.2)
BASOPHILS RELATIVE PERCENT: 0.7 %
BILIRUB SERPL-MCNC: <0.2 MG/DL (ref 0–1)
BUN BLDV-MCNC: 11 MG/DL (ref 7–20)
CALCIUM SERPL-MCNC: 9.5 MG/DL (ref 8.3–10.6)
CHLORIDE BLD-SCNC: 105 MMOL/L (ref 99–110)
CO2: 25 MMOL/L (ref 21–32)
CREAT SERPL-MCNC: 0.7 MG/DL (ref 0.6–1.2)
EOSINOPHILS ABSOLUTE: 0.1 K/UL (ref 0–0.6)
EOSINOPHILS RELATIVE PERCENT: 1 %
GFR AFRICAN AMERICAN: >60
GFR NON-AFRICAN AMERICAN: >60
GLOBULIN: 3.4 G/DL
GLUCOSE BLD-MCNC: 136 MG/DL (ref 70–99)
HCT VFR BLD CALC: 49.1 % (ref 36–48)
HEMOGLOBIN: 16 G/DL (ref 12–16)
INR BLD: 1.04 (ref 0.86–1.14)
LYMPHOCYTES ABSOLUTE: 3.5 K/UL (ref 1–5.1)
LYMPHOCYTES RELATIVE PERCENT: 33.5 %
MCH RBC QN AUTO: 29.1 PG (ref 26–34)
MCHC RBC AUTO-ENTMCNC: 32.5 G/DL (ref 31–36)
MCV RBC AUTO: 89.6 FL (ref 80–100)
MONOCYTES ABSOLUTE: 0.7 K/UL (ref 0–1.3)
MONOCYTES RELATIVE PERCENT: 6.9 %
NEUTROPHILS ABSOLUTE: 6.1 K/UL (ref 1.7–7.7)
NEUTROPHILS RELATIVE PERCENT: 57.9 %
PDW BLD-RTO: 14.3 % (ref 12.4–15.4)
PLATELET # BLD: 192 K/UL (ref 135–450)
PMV BLD AUTO: 8 FL (ref 5–10.5)
POTASSIUM SERPL-SCNC: 3.9 MMOL/L (ref 3.5–5.1)
PRO-BNP: 196 PG/ML (ref 0–124)
PROTHROMBIN TIME: 11.9 SEC (ref 9.8–13)
RBC # BLD: 5.49 M/UL (ref 4–5.2)
SODIUM BLD-SCNC: 140 MMOL/L (ref 136–145)
TOTAL PROTEIN: 6.7 G/DL (ref 6.4–8.2)
TROPONIN: <0.01 NG/ML
WBC # BLD: 10.5 K/UL (ref 4–11)

## 2019-04-24 PROCEDURE — 96365 THER/PROPH/DIAG IV INF INIT: CPT

## 2019-04-24 PROCEDURE — 80053 COMPREHEN METABOLIC PANEL: CPT

## 2019-04-24 PROCEDURE — 71046 X-RAY EXAM CHEST 2 VIEWS: CPT

## 2019-04-24 PROCEDURE — 6360000002 HC RX W HCPCS: Performed by: EMERGENCY MEDICINE

## 2019-04-24 PROCEDURE — 99285 EMERGENCY DEPT VISIT HI MDM: CPT

## 2019-04-24 PROCEDURE — 96375 TX/PRO/DX INJ NEW DRUG ADDON: CPT

## 2019-04-24 PROCEDURE — 84484 ASSAY OF TROPONIN QUANT: CPT

## 2019-04-24 PROCEDURE — 85610 PROTHROMBIN TIME: CPT

## 2019-04-24 PROCEDURE — 6360000002 HC RX W HCPCS: Performed by: NURSE PRACTITIONER

## 2019-04-24 PROCEDURE — 87040 BLOOD CULTURE FOR BACTERIA: CPT

## 2019-04-24 PROCEDURE — 83880 ASSAY OF NATRIURETIC PEPTIDE: CPT

## 2019-04-24 PROCEDURE — 1200000000 HC SEMI PRIVATE

## 2019-04-24 PROCEDURE — 6370000000 HC RX 637 (ALT 250 FOR IP): Performed by: NURSE PRACTITIONER

## 2019-04-24 PROCEDURE — 93005 ELECTROCARDIOGRAM TRACING: CPT | Performed by: EMERGENCY MEDICINE

## 2019-04-24 PROCEDURE — 71250 CT THORAX DX C-: CPT

## 2019-04-24 PROCEDURE — 94640 AIRWAY INHALATION TREATMENT: CPT

## 2019-04-24 PROCEDURE — 85025 COMPLETE CBC W/AUTO DIFF WBC: CPT

## 2019-04-24 RX ORDER — ALBUTEROL SULFATE 2.5 MG/3ML
5 SOLUTION RESPIRATORY (INHALATION) ONCE
Status: COMPLETED | OUTPATIENT
Start: 2019-04-24 | End: 2019-04-24

## 2019-04-24 RX ORDER — METHYLPREDNISOLONE SODIUM SUCCINATE 125 MG/2ML
125 INJECTION, POWDER, LYOPHILIZED, FOR SOLUTION INTRAMUSCULAR; INTRAVENOUS ONCE
Status: COMPLETED | OUTPATIENT
Start: 2019-04-24 | End: 2019-04-24

## 2019-04-24 RX ORDER — IPRATROPIUM BROMIDE AND ALBUTEROL SULFATE 2.5; .5 MG/3ML; MG/3ML
1 SOLUTION RESPIRATORY (INHALATION) ONCE
Status: COMPLETED | OUTPATIENT
Start: 2019-04-24 | End: 2019-04-24

## 2019-04-24 RX ORDER — LEVOFLOXACIN 5 MG/ML
500 INJECTION, SOLUTION INTRAVENOUS ONCE
Status: COMPLETED | OUTPATIENT
Start: 2019-04-24 | End: 2019-04-25

## 2019-04-24 RX ADMIN — METHYLPREDNISOLONE SODIUM SUCCINATE 125 MG: 125 INJECTION, POWDER, FOR SOLUTION INTRAMUSCULAR; INTRAVENOUS at 22:57

## 2019-04-24 RX ADMIN — LEVOFLOXACIN 500 MG: 5 INJECTION, SOLUTION INTRAVENOUS at 22:57

## 2019-04-24 RX ADMIN — ALBUTEROL SULFATE 5 MG: 2.5 SOLUTION RESPIRATORY (INHALATION) at 19:42

## 2019-04-24 RX ADMIN — IPRATROPIUM BROMIDE AND ALBUTEROL SULFATE 1 AMPULE: .5; 3 SOLUTION RESPIRATORY (INHALATION) at 19:42

## 2019-04-24 ASSESSMENT — ENCOUNTER SYMPTOMS
VOMITING: 0
NAUSEA: 0
DIARRHEA: 0
COUGH: 1
CHEST TIGHTNESS: 0
WHEEZING: 1
SHORTNESS OF BREATH: 1
ABDOMINAL PAIN: 0

## 2019-04-24 ASSESSMENT — PAIN SCALES - GENERAL: PAINLEVEL_OUTOF10: 9

## 2019-04-24 NOTE — ED PROVIDER NOTES
[address] [facility]  eMERGENCY dEPARTMENT eNCOUnter        Pt Name: Nuha Whiting  MRN: [id number]  Kevgfclaudia 1945  Date of evaluation: 4/24/2019  Provider: Martha Hernandez, KAE - CEASAR  PCP: Omer Lizarraga MD    This patient was seen and evaluated by the attending physician Jacklyn Loco       Chief Complaint   Patient presents with    Chest Pain     Patient in with complaints of cp for a day. States feels heavy and lots of pressure. Took nitro. no aspirin. Also having emesis. sob noted upon triage. HISTORY OF PRESENT ILLNESS   (Location/Symptom, Timing/Onset, Context/Setting, Quality, Duration, Modifying Factors, Severity)  Note limiting factors. Nuha Whiting is a 69 y.o. female presents to the ER with a complaint of sharp/stabbing chest pain that goes from the left chest into the back with cough. States that she has had a productive cough x several days and fever. Concerned that she may have pneumonia, reports that she has a moist cough with green sputum. No mitigating or exacerbating factors. States that she only has chest pain with cough. She did take nitroglycerin today with previous heart attack, no relief from nitro. Denies any lightheadedness, dizziness, visual disturbances. No neck pain. No abdominal pain, nausea, vomiting, diarrhea, constipation, or dysuria. No rash. Nursing Notes were all reviewed and agreed with or any disagreements were addressed  in the HPI. REVIEW OF SYSTEMS    (2-9 systems for level 4, 10 or more for level 5)     Review of Systems   Constitutional: Positive for chills, fatigue and fever. Negative for activity change. HENT: Positive for congestion. Respiratory: Positive for cough, shortness of breath and wheezing. Negative for chest tightness. Cardiovascular: Positive for chest pain. Gastrointestinal: Negative for abdominal pain, diarrhea, nausea and vomiting.    Genitourinary: Negative for dysuria. All other systems reviewed and are negative. Positives and Pertinent negatives as per HPI. Except as noted abovein the ROS, all other systems were reviewed and negative.        PAST MEDICAL HISTORY     Past Medical History:   Diagnosis Date    Acute MI 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    Chronic obstructive pulmonary disease ([facility] [address] [facility] 1/13/2017    Claustrophobia     COPD (chronic obstructive pulmonary disease) ([facility] [address] [facility]     Esophageal stricture     Gastroesophageal reflux disease without esophagitis 3/24/2016    Hiatal hernia     Hiatal hernia     Hypercholesteremia     Hypertension     Migraines     Movement disorder arthritis    Pneumonia     PONV (postoperative nausea and vomiting)     Type II or unspecified type diabetes mellitus without mention of complication, not stated as uncontrolled     Unspecified cerebral artery occlusion with cerebral infarction          SURGICAL HISTORY     Past Surgical History:   Procedure Laterality Date    APPENDECTOMY      CARDIAC SURGERY      1 stent 2000 and 1 stent 2001    CHOLECYSTECTOMY      COLONOSCOPY      COLONOSCOPY  06/11/2018    ENDOSCOPY, COLON, DIAGNOSTIC      EYE SURGERY      bilateral cataracts    GALLBLADDER SURGERY      HYSTERECTOMY      CT EXC SKIN MALIG <5MM REMAINDR BODY N/A 9/6/2018    EXCISION OF SCALP SQUAMOUS CELL CARCINOMA performed by Maco Leonard MD at [address] [facility] <100SQCM N/A 9/6/2018    SPLIT THICKNESS SKIN GRAFT FOR COVERAGE SCALP, APPLICATION OF WOUND VAC DEVICE performed by Maco Leonard MD at [address] ENDOSCOPY  4/20/12    with biopsy of stomach,gastritis    (MYCOSTATIN) [id number] UNIT/GM POWDER    Affected area    OMEPRAZOLE (PRILOSEC) 40 MG DELAYED RELEASE CAPSULE    Take 1 capsule by mouth daily    OXYGEN    Inhale 2 L/min into the lungs as needed Uses when O2 Sat is below 90    RESPIRATORY THERAPY SUPPLIES (NEBULIZER)    by Does not apply route. PRN BREATHING TREATMENTS, UNSURE OF MED     TRIAMCINOLONE (KENALOG) 0.1 % CREAM    Apply topically 2 times daily. ALLERGIES     Morphine; Codeine; Hydromorphone; and Vicodin [hydrocodone-acetaminophen]    FAMILYHISTORY       Family History   Problem Relation Age of Onset    Heart Disease Mother     Cancer Mother     Cancer Father     Cancer Sister     Heart Disease Sister     Heart Disease Brother     High Blood Pressure Neg Hx     High Cholesterol Neg Hx           SOCIAL HISTORY       Social History     Socioeconomic History    Marital status:      Spouse name: Thien Morales Number of children: 3    Years of education: 15    Highest education level: None   Occupational History    None   Social Needs    Financial resource strain: None    Food insecurity:     Worry: None     Inability: None    Transportation needs:     Medical: None     Non-medical: None   Tobacco Use    Smoking status: Former Smoker     Packs/day: 0.25     Years: 49.00     Pack years: 12.25     Types: Cigarettes     Last attempt to quit: 2017     Years since quittin.8    Smokeless tobacco: Never Used    Tobacco comment: only smoke when real stressed  Nicotine patch   Substance and Sexual Activity    Alcohol use:  Yes     Alcohol/week: 0.6 oz     Types: 1 Glasses of wine per week     Comment: occ. every 3-4 mo    Drug use: No    Sexual activity: Not Currently     Partners: Male   Lifestyle    Physical activity:     Days per week: None     Minutes per session: None    Stress: None   Relationships    Social connections:     Talks on phone: None     Gets together: None     Attends Anabaptist service: None     Active member of club or organization: None     Attends meetings of clubs or organizations: None     Relationship status: None    Intimate partner violence:     Fear of current or ex partner: None     Emotionally abused: None     Physically abused: None     Forced sexual activity: None   Other Topics Concern    None   Social History Narrative    None       SCREENINGS             PHYSICAL EXAM    (up to 7 for level 4, 8 or more for level 5)     ED Triage Vitals [04/24/19 1901]   BP Temp Temp Source Pulse Resp SpO2 Height Weight   (!) 146/89 98.9 °F (37.2 °C) Oral 77 18 95 % -- --       Physical Exam   Constitutional: She is oriented to person, place, and time. She appears well-developed and well-nourished. No distress. HENT:   Head: Normocephalic and atraumatic. Right Ear: External ear normal.   Left Ear: External ear normal.   Eyes: Right eye exhibits no discharge. Left eye exhibits no discharge. Neck: Normal range of motion. Neck supple. No JVD present. Cardiovascular: Normal rate and regular rhythm. Exam reveals no friction rub. No murmur heard. Pulmonary/Chest: Effort normal. No respiratory distress. She has wheezes. She has rales. Abdominal: Soft. She exhibits no mass. There is no tenderness. Musculoskeletal: Normal range of motion. Neurological: She is alert and oriented to person, place, and time. Skin: Skin is warm and dry. She is not diaphoretic. No pallor. Psychiatric: She has a normal mood and affect. Her behavior is normal.   Nursing note and vitals reviewed.       DIAGNOSTIC RESULTS   LABS:    Labs Reviewed   CBC WITH AUTO DIFFERENTIAL - Abnormal; Notable for the following components:       Result Value    RBC 5.49 (*)     Hematocrit 49.1 (*)     All other components within normal limits    Narrative:     Performed at:  [facility] [address], [place], [facility] [address]   Phone [phone]   COMPREHENSIVE METABOLIC PANEL - Abnormal; Notable for the following components:    Glucose 136 (*)     Alb 3.3 (*)     Albumin/Globulin Ratio 1.0 (*)     All other components within normal limits    Narrative:     Performed at:  [facility] [address] [facility],  [place], [address]   Phone [phone]   BRAIN NATRIURETIC PEPTIDE - Abnormal; Notable for the following components:    Pro- (*)     All other components within normal limits    Narrative:     Performed at:  [facility] [address]  [place], [address]   Phone [phone]   CULTURE BLOOD #2   CULTURE BLOOD #1   TROPONIN    Narrative:     Performed at:  [facility] [address],  [place], [address]   Phone [phone]   PROTIME-INR    Narrative:     Performed at:  [facility] [address],  [place], [address]   Phone [phone]       All other labs were within normal range or not returned as of this dictation. EKG: All EKG's are interpreted by the Emergency Department Physician who either signs orCo-signs this chart in the absence of a cardiologist.  Please see their note for interpretation of EKG. RADIOLOGY:   Non-plain film images such as CT, Ultrasound and MRI are read by the radiologist. Plain radiographic images are visualized andpreliminarily interpreted by the  ED Provider with the below findings:        Interpretation [facility] Radiologist below, if available at the time of this note:    CT CHEST WO CONTRAST   Final Result   1. No focal infiltrate to suggest pneumonia. 2. Coronary artery disease. XR CHEST STANDARD (2 VW)   Final Result   No acute process. No results found.       PROCEDURES   Unless otherwise noted below, none     Procedures    CRITICAL CARE TIME   N/A    CONSULTS:  IP CONSULT TO INTERNAL MEDICINE      EMERGENCY DEPARTMENT COURSE and DIFFERENTIALDIAGNOSIS/MDM:   Vitals:    Vitals:    04/24/19 2245 04/24/19 2315 04/25/19 0015 04/25/19 0045   BP: (!) 153/79  (!) 168/70 (!) 148/69   Pulse: 68  71 70   Resp: 16 23 14 19   Temp:       TempSrc:       SpO2: 94% 92% 92% 91%       Patient was given thefollowing medications:  Medications   albuterol (PROVENTIL) nebulizer solution 5 mg (5 mg Nebulization Given 4/24/19 1942)   ipratropium-albuterol (DUONEB) nebulizer solution 1 ampule (1 ampule Inhalation Given 4/24/19 1942)   methylPREDNISolone sodium (SOLU-MEDROL) injection 125 mg (125 mg Intravenous Given 4/24/19 2257)   levofloxacin (LEVAQUIN) 500 MG/100ML infusion 500 mg (0 mg Intravenous Stopped 4/25/19 0001)       Briefly, this is a 69year old female that  presents to the ER with a complaint of sharp/stabbing chest pain that goes from the left chest into the back with cough. States that she has had a productive cough x several days and fever. Concerned that she may have pneumonia, reports that she has a moist cough with green sputum. No mitigating or exacerbating factors. States that she only has chest pain with cough. She did take nitroglycerin today with previous heart attack, no relief from nitro. She is given steroids and albuterol, and DuoNeb here in the ER. CBC is unremarkable. CMP unremarkable. . Troponin negative. INR 1.04. Impression:    1. No focal infiltrate to suggest pneumonia. 2. Coronary artery disease. Chest xray:  Impression:    No acute process. patient will be admitted for COPD exacerbation, ordered levaquin in the ER. Patient did have a reaction of itching. Medication was discontinued by ER nurse. Admit to dr. Iraj Henley. FINAL IMPRESSION      1. COPD exacerbation ([address]    2. Productive cough    3.  Chest discomfort          DISPOSITION/PLAN   DISPOSITION Admitted 04/24/2019 11:14:31 PM      PATIENT REFERREDTO:  Domenic Sommers MD  [address]  [phone]            DISCHARGE MEDICATIONS:  New Prescriptions    No medications on file       DISCONTINUED MEDICATIONS:  Discontinued Medications    No medications on file              (Please note that portions ofthis note were completed with a voice recognition program.  Efforts were made to edit the dictations but occasionally words are mis-transcribed.)    KAE Foreman CNP (electronically signed)           KAE Foreman CNP  04/25/19 0104

## 2019-04-24 NOTE — ED NOTES
Bed: 30  Expected date:   Expected time:   Means of arrival:   Comments:  FF 31-CP     Lianna Chapman, RN  04/24/19 5284

## 2019-04-25 PROBLEM — E87.6 HYPOKALEMIA: Status: RESOLVED | Noted: 2018-06-07 | Resolved: 2019-04-25

## 2019-04-25 PROBLEM — J20.9 ACUTE BRONCHITIS: Status: ACTIVE | Noted: 2019-04-25

## 2019-04-25 PROBLEM — G93.41 METABOLIC ENCEPHALOPATHY: Status: RESOLVED | Noted: 2018-06-18 | Resolved: 2019-04-25

## 2019-04-25 LAB
A/G RATIO: 1 (ref 1.1–2.2)
ALBUMIN SERPL-MCNC: 3.4 G/DL (ref 3.4–5)
ALP BLD-CCNC: 89 U/L (ref 40–129)
ALT SERPL-CCNC: 15 U/L (ref 10–40)
ANION GAP SERPL CALCULATED.3IONS-SCNC: 13 MMOL/L (ref 3–16)
AST SERPL-CCNC: 18 U/L (ref 15–37)
BILIRUB SERPL-MCNC: <0.2 MG/DL (ref 0–1)
BUN BLDV-MCNC: 13 MG/DL (ref 7–20)
C DIFFICILE TOXIN, EIA: NORMAL
CALCIUM SERPL-MCNC: 9.4 MG/DL (ref 8.3–10.6)
CHLORIDE BLD-SCNC: 100 MMOL/L (ref 99–110)
CO2: 21 MMOL/L (ref 21–32)
CREAT SERPL-MCNC: 0.8 MG/DL (ref 0.6–1.2)
EKG ATRIAL RATE: 74 BPM
EKG DIAGNOSIS: NORMAL
EKG P AXIS: 4 DEGREES
EKG P-R INTERVAL: 184 MS
EKG Q-T INTERVAL: 386 MS
EKG QRS DURATION: 106 MS
EKG QTC CALCULATION (BAZETT): 428 MS
EKG R AXIS: -64 DEGREES
EKG T AXIS: 30 DEGREES
EKG VENTRICULAR RATE: 74 BPM
ESTIMATED AVERAGE GLUCOSE: 180 MG/DL
GFR AFRICAN AMERICAN: >60
GFR NON-AFRICAN AMERICAN: >60
GLOBULIN: 3.5 G/DL
GLUCOSE BLD-MCNC: 273 MG/DL (ref 70–99)
GLUCOSE BLD-MCNC: 286 MG/DL (ref 70–99)
GLUCOSE BLD-MCNC: 321 MG/DL (ref 70–99)
GLUCOSE BLD-MCNC: 336 MG/DL (ref 70–99)
GLUCOSE BLD-MCNC: 413 MG/DL (ref 70–99)
HBA1C MFR BLD: 7.9 %
HCT VFR BLD CALC: 47.6 % (ref 36–48)
HEMOGLOBIN: 15.7 G/DL (ref 12–16)
MCH RBC QN AUTO: 30 PG (ref 26–34)
MCHC RBC AUTO-ENTMCNC: 33.1 G/DL (ref 31–36)
MCV RBC AUTO: 90.8 FL (ref 80–100)
PDW BLD-RTO: 14.6 % (ref 12.4–15.4)
PERFORMED ON: ABNORMAL
PLATELET # BLD: 188 K/UL (ref 135–450)
PMV BLD AUTO: 8.3 FL (ref 5–10.5)
POTASSIUM SERPL-SCNC: 4.5 MMOL/L (ref 3.5–5.1)
RBC # BLD: 5.24 M/UL (ref 4–5.2)
SODIUM BLD-SCNC: 134 MMOL/L (ref 136–145)
TOTAL PROTEIN: 6.9 G/DL (ref 6.4–8.2)
TROPONIN: <0.01 NG/ML
WBC # BLD: 10.5 K/UL (ref 4–11)

## 2019-04-25 PROCEDURE — 83036 HEMOGLOBIN GLYCOSYLATED A1C: CPT

## 2019-04-25 PROCEDURE — 94760 N-INVAS EAR/PLS OXIMETRY 1: CPT

## 2019-04-25 PROCEDURE — 93010 ELECTROCARDIOGRAM REPORT: CPT | Performed by: INTERNAL MEDICINE

## 2019-04-25 PROCEDURE — 6370000000 HC RX 637 (ALT 250 FOR IP): Performed by: INTERNAL MEDICINE

## 2019-04-25 PROCEDURE — 2500000003 HC RX 250 WO HCPCS: Performed by: INTERNAL MEDICINE

## 2019-04-25 PROCEDURE — 84484 ASSAY OF TROPONIN QUANT: CPT

## 2019-04-25 PROCEDURE — 87324 CLOSTRIDIUM AG IA: CPT

## 2019-04-25 PROCEDURE — 85027 COMPLETE CBC AUTOMATED: CPT

## 2019-04-25 PROCEDURE — 6360000002 HC RX W HCPCS: Performed by: INTERNAL MEDICINE

## 2019-04-25 PROCEDURE — 94640 AIRWAY INHALATION TREATMENT: CPT

## 2019-04-25 PROCEDURE — 80053 COMPREHEN METABOLIC PANEL: CPT

## 2019-04-25 PROCEDURE — 1200000000 HC SEMI PRIVATE

## 2019-04-25 PROCEDURE — 87449 NOS EACH ORGANISM AG IA: CPT

## 2019-04-25 PROCEDURE — 36415 COLL VENOUS BLD VENIPUNCTURE: CPT

## 2019-04-25 RX ORDER — GABAPENTIN 100 MG/1
100 CAPSULE ORAL 3 TIMES DAILY
Status: DISCONTINUED | OUTPATIENT
Start: 2019-04-25 | End: 2019-04-29 | Stop reason: HOSPADM

## 2019-04-25 RX ORDER — DEXTROSE MONOHYDRATE 50 MG/ML
100 INJECTION, SOLUTION INTRAVENOUS PRN
Status: DISCONTINUED | OUTPATIENT
Start: 2019-04-25 | End: 2019-04-29 | Stop reason: HOSPADM

## 2019-04-25 RX ORDER — METHYLPREDNISOLONE SODIUM SUCCINATE 40 MG/ML
40 INJECTION, POWDER, LYOPHILIZED, FOR SOLUTION INTRAMUSCULAR; INTRAVENOUS EVERY 8 HOURS
Status: DISCONTINUED | OUTPATIENT
Start: 2019-04-25 | End: 2019-04-26

## 2019-04-25 RX ORDER — LISINOPRIL 5 MG/1
2.5 TABLET ORAL DAILY
Status: DISCONTINUED | OUTPATIENT
Start: 2019-04-25 | End: 2019-04-29 | Stop reason: HOSPADM

## 2019-04-25 RX ORDER — ALBUTEROL SULFATE 90 UG/1
2 AEROSOL, METERED RESPIRATORY (INHALATION) EVERY 6 HOURS PRN
Status: DISCONTINUED | OUTPATIENT
Start: 2019-04-25 | End: 2019-04-29 | Stop reason: HOSPADM

## 2019-04-25 RX ORDER — TRIAMCINOLONE ACETONIDE 1 MG/G
CREAM TOPICAL 2 TIMES DAILY
Status: DISCONTINUED | OUTPATIENT
Start: 2019-04-25 | End: 2019-04-29 | Stop reason: HOSPADM

## 2019-04-25 RX ORDER — CEFUROXIME AXETIL 250 MG/1
250 TABLET ORAL EVERY 12 HOURS SCHEDULED
Status: DISCONTINUED | OUTPATIENT
Start: 2019-04-25 | End: 2019-04-29 | Stop reason: HOSPADM

## 2019-04-25 RX ORDER — NITROGLYCERIN 0.4 MG/1
0.4 TABLET SUBLINGUAL EVERY 5 MIN PRN
Status: DISCONTINUED | OUTPATIENT
Start: 2019-04-25 | End: 2019-04-29 | Stop reason: HOSPADM

## 2019-04-25 RX ORDER — CILOSTAZOL 100 MG/1
100 TABLET ORAL 2 TIMES DAILY
Status: DISCONTINUED | OUTPATIENT
Start: 2019-04-25 | End: 2019-04-29 | Stop reason: HOSPADM

## 2019-04-25 RX ORDER — GLIPIZIDE 5 MG/1
5 TABLET ORAL
Status: DISCONTINUED | OUTPATIENT
Start: 2019-04-25 | End: 2019-04-29 | Stop reason: HOSPADM

## 2019-04-25 RX ORDER — DEXTROSE MONOHYDRATE 25 G/50ML
12.5 INJECTION, SOLUTION INTRAVENOUS PRN
Status: DISCONTINUED | OUTPATIENT
Start: 2019-04-25 | End: 2019-04-29 | Stop reason: HOSPADM

## 2019-04-25 RX ORDER — LEVOFLOXACIN 5 MG/ML
500 INJECTION, SOLUTION INTRAVENOUS EVERY 24 HOURS
Status: DISCONTINUED | OUTPATIENT
Start: 2019-04-25 | End: 2019-04-25

## 2019-04-25 RX ORDER — BUTALBITAL, ACETAMINOPHEN AND CAFFEINE 50; 325; 40 MG/1; MG/1; MG/1
1 TABLET ORAL EVERY 6 HOURS PRN
Status: DISCONTINUED | OUTPATIENT
Start: 2019-04-25 | End: 2019-04-29 | Stop reason: HOSPADM

## 2019-04-25 RX ORDER — PANTOPRAZOLE SODIUM 40 MG/1
40 TABLET, DELAYED RELEASE ORAL
Status: DISCONTINUED | OUTPATIENT
Start: 2019-04-25 | End: 2019-04-26 | Stop reason: CLARIF

## 2019-04-25 RX ORDER — DEXTROSE MONOHYDRATE 25 G/50ML
12.5 INJECTION, SOLUTION INTRAVENOUS PRN
Status: DISCONTINUED | OUTPATIENT
Start: 2019-04-25 | End: 2019-04-25 | Stop reason: SDUPTHER

## 2019-04-25 RX ORDER — IPRATROPIUM BROMIDE AND ALBUTEROL SULFATE 2.5; .5 MG/3ML; MG/3ML
3 SOLUTION RESPIRATORY (INHALATION) EVERY 4 HOURS PRN
Status: DISCONTINUED | OUTPATIENT
Start: 2019-04-25 | End: 2019-04-29 | Stop reason: HOSPADM

## 2019-04-25 RX ORDER — ALPRAZOLAM 0.5 MG/1
1 TABLET ORAL 3 TIMES DAILY PRN
Status: DISCONTINUED | OUTPATIENT
Start: 2019-04-25 | End: 2019-04-29 | Stop reason: HOSPADM

## 2019-04-25 RX ORDER — NICOTINE POLACRILEX 4 MG
15 LOZENGE BUCCAL PRN
Status: DISCONTINUED | OUTPATIENT
Start: 2019-04-25 | End: 2019-04-29 | Stop reason: HOSPADM

## 2019-04-25 RX ORDER — NICOTINE POLACRILEX 4 MG
15 LOZENGE BUCCAL PRN
Status: DISCONTINUED | OUTPATIENT
Start: 2019-04-25 | End: 2019-04-25 | Stop reason: SDUPTHER

## 2019-04-25 RX ORDER — ISOSORBIDE MONONITRATE 60 MG/1
60 TABLET, EXTENDED RELEASE ORAL DAILY
Status: DISCONTINUED | OUTPATIENT
Start: 2019-04-25 | End: 2019-04-29 | Stop reason: HOSPADM

## 2019-04-25 RX ORDER — ASPIRIN 81 MG/1
81 TABLET, CHEWABLE ORAL DAILY
Status: DISCONTINUED | OUTPATIENT
Start: 2019-04-25 | End: 2019-04-29 | Stop reason: HOSPADM

## 2019-04-25 RX ORDER — FACIAL-BODY WIPES
8 EACH TOPICAL DAILY
Status: DISCONTINUED | OUTPATIENT
Start: 2019-04-25 | End: 2019-04-25 | Stop reason: RX

## 2019-04-25 RX ADMIN — LISINOPRIL 2.5 MG: 5 TABLET ORAL at 09:02

## 2019-04-25 RX ADMIN — METHYLPREDNISOLONE SODIUM SUCCINATE 40 MG: 40 INJECTION, POWDER, FOR SOLUTION INTRAMUSCULAR; INTRAVENOUS at 23:00

## 2019-04-25 RX ADMIN — INSULIN LISPRO 5 UNITS: 100 INJECTION, SOLUTION INTRAVENOUS; SUBCUTANEOUS at 20:59

## 2019-04-25 RX ADMIN — PANTOPRAZOLE SODIUM 40 MG: 40 TABLET, DELAYED RELEASE ORAL at 05:58

## 2019-04-25 RX ADMIN — ALPRAZOLAM 1 MG: 0.5 TABLET ORAL at 20:57

## 2019-04-25 RX ADMIN — DICLOFENAC 2 G: 10 GEL TOPICAL at 09:03

## 2019-04-25 RX ADMIN — DICLOFENAC 2 G: 10 GEL TOPICAL at 21:07

## 2019-04-25 RX ADMIN — INSULIN LISPRO 18 UNITS: 100 INJECTION, SOLUTION INTRAVENOUS; SUBCUTANEOUS at 11:47

## 2019-04-25 RX ADMIN — MICONAZOLE NITRATE: 2 POWDER TOPICAL at 09:03

## 2019-04-25 RX ADMIN — TRIAMCINOLONE ACETONIDE: 1 CREAM TOPICAL at 09:03

## 2019-04-25 RX ADMIN — ISOSORBIDE MONONITRATE 60 MG: 60 TABLET, EXTENDED RELEASE ORAL at 09:02

## 2019-04-25 RX ADMIN — METHYLPREDNISOLONE SODIUM SUCCINATE 40 MG: 40 INJECTION, POWDER, FOR SOLUTION INTRAMUSCULAR; INTRAVENOUS at 15:13

## 2019-04-25 RX ADMIN — ENOXAPARIN SODIUM 40 MG: 40 INJECTION SUBCUTANEOUS at 09:02

## 2019-04-25 RX ADMIN — Medication 2 PUFF: at 07:50

## 2019-04-25 RX ADMIN — ASPIRIN 81 MG 81 MG: 81 TABLET ORAL at 09:02

## 2019-04-25 RX ADMIN — TRIAMCINOLONE ACETONIDE: 1 CREAM TOPICAL at 21:10

## 2019-04-25 RX ADMIN — ALPRAZOLAM 1 MG: 0.5 TABLET ORAL at 02:21

## 2019-04-25 RX ADMIN — CEFUROXIME AXETIL 250 MG: 250 TABLET ORAL at 20:03

## 2019-04-25 RX ADMIN — INSULIN LISPRO 12 UNITS: 100 INJECTION, SOLUTION INTRAVENOUS; SUBCUTANEOUS at 17:32

## 2019-04-25 RX ADMIN — GABAPENTIN 100 MG: 100 CAPSULE ORAL at 15:14

## 2019-04-25 RX ADMIN — GLIPIZIDE 5 MG: 5 TABLET ORAL at 15:14

## 2019-04-25 RX ADMIN — CILOSTAZOL 100 MG: 100 TABLET ORAL at 09:02

## 2019-04-25 RX ADMIN — METHYLPREDNISOLONE SODIUM SUCCINATE 40 MG: 40 INJECTION, POWDER, FOR SOLUTION INTRAMUSCULAR; INTRAVENOUS at 05:55

## 2019-04-25 RX ADMIN — MICONAZOLE NITRATE: 2 POWDER TOPICAL at 21:10

## 2019-04-25 RX ADMIN — ALPRAZOLAM 1 MG: 0.5 TABLET ORAL at 11:37

## 2019-04-25 RX ADMIN — GABAPENTIN 100 MG: 100 CAPSULE ORAL at 09:02

## 2019-04-25 RX ADMIN — CEFUROXIME AXETIL 250 MG: 250 TABLET ORAL at 11:47

## 2019-04-25 RX ADMIN — GLIPIZIDE 5 MG: 5 TABLET ORAL at 05:54

## 2019-04-25 RX ADMIN — CILOSTAZOL 100 MG: 100 TABLET ORAL at 20:03

## 2019-04-25 RX ADMIN — GABAPENTIN 100 MG: 100 CAPSULE ORAL at 20:03

## 2019-04-25 ASSESSMENT — PAIN SCALES - GENERAL: PAINLEVEL_OUTOF10: 0

## 2019-04-25 NOTE — PROGRESS NOTES
Patient admitted to room 5569 From Ed via stretcher. A/O x 4, Oriented to room and call light, call light in reach. Bed in low locked position, Up to bathroom with walker. Steady. Declines bed alarm at this time. Patient has a skin graft to top of head. Site WNL, skin graft donor site to Rt thigh, healed. Site WNL.

## 2019-04-25 NOTE — CARE COORDINATION
Discharge Planning Assessment  RN/SW discharge planner met with patient/(and family member) to discuss reason for admission, current living situation, and potential needs at the time of discharge    Demographics/Insurance verified Yes    Current type of dwelling: ground level apt    Living arrangements: home alone    Level of function/Support: needs assist w/bathing-has aides to assist w/this    PCP: Philip Pineda    Last Visit to PCP:seeing on admission-sees him 1x/month    DME: uses a walker and cane    Active with any community resources/agencies/skilled home care: Pt stated she has [employer] for MOW, aides (13hrs/week-see pt 5days/week), life alert and medical transporation. Stated no skilled services. Medication compliance issues: stated compliant w/all meds    Financial issues that could impact healthcare:    Transportation at the time of discharge: stated she would have a ride home    Tentative discharge plan: home w/COA services resumed.     Electronically signed by PIOTR Yeh on 4/25/2019 at 1:27 PM

## 2019-04-25 NOTE — ED NOTES
Pt concerned that BS was low due to not eating, RN let pt know that BS was 136 and ok right now. RN let pt know once she was able to eat RN would provide something.       Jazmin Alicia RN  04/24/19 3654

## 2019-04-25 NOTE — ED NOTES
Pt in bed, respirations easy, even, and unlabored. Pt is alert and orientated. No s/s of distress.       Dwayne Villanueva RN  04/24/19 9169

## 2019-04-25 NOTE — ED NOTES
Pt is in bed, respirations easy, even, and unlabored. No s/s of distress. Alert and orientated. Pt states that she has been coughing and it hurts in her chest area when she coughs.       Mily Villa RN  04/24/19 5225

## 2019-04-25 NOTE — ED PROVIDER NOTES
I independently performed a history and physical on [address]. All diagnostic, treatment, and disposition decisions were made by myself in conjunction with the advanced practice provider. I have participated in the medical decision making and directed the treatment plan and disposition of the patient. For further details of Rolande Harada emergency department encounter, please see the advanced practice provider's documentation. CHIEF COMPLAINT  Chief Complaint   Patient presents with    Chest Pain     Patient in with complaints of cp for a day. States feels heavy and lots of pressure. Took nitro. no aspirin. Also having emesis. sob noted upon triage. Briefly, Apolinar Tejeda is a 69 y.o. female  who presents to the ED complaining of complaints of chest pain with coughing, shortness of breath and wheezing and coughing up thick yellow sputum. She has a history of COPD but wears oxygen mostly just at night. She had an episode of vomiting. FOCUSED PHYSICAL EXAMINATION  BP (!) 153/79   Pulse 68   Temp 98.9 °F (37.2 °C) (Oral)   Resp 16   SpO2 94%    Focused physical examination notable for no acute distress, well-appearing, well-nourished, normal speech and mentation without obvious facial droop, no obvious rash. No obvious cranial nerve deficits on my initial exam.  Reproducible anterior chest wall pain. Regular rate and rhythm. Wheezing and rhonchi throughout, wet sounding cough.     The 12 lead EKG was interpreted by me as follows:  Rate: normal with a rate of 74  Rhythm: sinus  Axis: left deviation  Intervals: normal RI, narrow QRS, normal QTc  ST segments: no ST elevations or depressions  T waves: no abnormal inversions  Non-specific T wave changes: present  Prior EKG comparison: EKG dated 11/2/18 is not significantly different    MDM:  Diagnostic considerations included acute coronary syndrome, pulmonary embolism, COPD/asthma, pneumonia, sepsis, pericardial tamponade, pneumothorax, CHF,

## 2019-04-25 NOTE — ED NOTES
Pt transported via bed, on tele with IV saline locked. Pt pants and shoes with pt. Emperatriz Helton ed tech transported.       Debbie Madrid RN  04/25/19 [id number]

## 2019-04-25 NOTE — ED NOTES
RN entered the room to answer call light, pt stating that her left arm was itching and felt like burning. Levaquin was infusing though that arm, RN stopped infusion and notified NP. Pt left arm slightly red, but no hives or swelling noted.       Edgardo Barbour RN  04/25/19 0526

## 2019-04-26 LAB
ANION GAP SERPL CALCULATED.3IONS-SCNC: 10 MMOL/L (ref 3–16)
BUN BLDV-MCNC: 22 MG/DL (ref 7–20)
CALCIUM SERPL-MCNC: 10.5 MG/DL (ref 8.3–10.6)
CHLORIDE BLD-SCNC: 96 MMOL/L (ref 99–110)
CO2: 24 MMOL/L (ref 21–32)
CREAT SERPL-MCNC: 0.8 MG/DL (ref 0.6–1.2)
EKG ATRIAL RATE: 80 BPM
EKG DIAGNOSIS: NORMAL
EKG P AXIS: -5 DEGREES
EKG P-R INTERVAL: 156 MS
EKG Q-T INTERVAL: 372 MS
EKG QRS DURATION: 112 MS
EKG QTC CALCULATION (BAZETT): 429 MS
EKG R AXIS: -58 DEGREES
EKG T AXIS: 64 DEGREES
EKG VENTRICULAR RATE: 80 BPM
GFR AFRICAN AMERICAN: >60
GFR NON-AFRICAN AMERICAN: >60
GLUCOSE BLD-MCNC: 362 MG/DL (ref 70–99)
GLUCOSE BLD-MCNC: 380 MG/DL (ref 70–99)
GLUCOSE BLD-MCNC: 412 MG/DL (ref 70–99)
GLUCOSE BLD-MCNC: 440 MG/DL (ref 70–99)
GLUCOSE BLD-MCNC: 445 MG/DL (ref 70–99)
MAGNESIUM: 2 MG/DL (ref 1.8–2.4)
PERFORMED ON: ABNORMAL
POTASSIUM SERPL-SCNC: 4.9 MMOL/L (ref 3.5–5.1)
SODIUM BLD-SCNC: 130 MMOL/L (ref 136–145)

## 2019-04-26 PROCEDURE — 6360000002 HC RX W HCPCS: Performed by: INTERNAL MEDICINE

## 2019-04-26 PROCEDURE — 80048 BASIC METABOLIC PNL TOTAL CA: CPT

## 2019-04-26 PROCEDURE — 83735 ASSAY OF MAGNESIUM: CPT

## 2019-04-26 PROCEDURE — 93005 ELECTROCARDIOGRAM TRACING: CPT | Performed by: INTERNAL MEDICINE

## 2019-04-26 PROCEDURE — 94760 N-INVAS EAR/PLS OXIMETRY 1: CPT

## 2019-04-26 PROCEDURE — 99223 1ST HOSP IP/OBS HIGH 75: CPT | Performed by: INTERNAL MEDICINE

## 2019-04-26 PROCEDURE — 94640 AIRWAY INHALATION TREATMENT: CPT

## 2019-04-26 PROCEDURE — 36415 COLL VENOUS BLD VENIPUNCTURE: CPT

## 2019-04-26 PROCEDURE — 93010 ELECTROCARDIOGRAM REPORT: CPT | Performed by: INTERNAL MEDICINE

## 2019-04-26 PROCEDURE — 6370000000 HC RX 637 (ALT 250 FOR IP): Performed by: INTERNAL MEDICINE

## 2019-04-26 PROCEDURE — 1200000000 HC SEMI PRIVATE

## 2019-04-26 RX ORDER — CALCIUM CARBONATE 200(500)MG
1000 TABLET,CHEWABLE ORAL EVERY 4 HOURS PRN
Status: DISCONTINUED | OUTPATIENT
Start: 2019-04-26 | End: 2019-04-29 | Stop reason: HOSPADM

## 2019-04-26 RX ORDER — METHYLPREDNISOLONE SODIUM SUCCINATE 125 MG/2ML
60 INJECTION, POWDER, LYOPHILIZED, FOR SOLUTION INTRAMUSCULAR; INTRAVENOUS EVERY 6 HOURS
Status: DISCONTINUED | OUTPATIENT
Start: 2019-04-26 | End: 2019-04-29 | Stop reason: HOSPADM

## 2019-04-26 RX ORDER — ACETAMINOPHEN 80 MG
TABLET,CHEWABLE ORAL
Status: DISPENSED
Start: 2019-04-26 | End: 2019-04-26

## 2019-04-26 RX ORDER — CALCIUM CARBONATE 200(500)MG
500 TABLET,CHEWABLE ORAL EVERY 4 HOURS PRN
Status: DISCONTINUED | OUTPATIENT
Start: 2019-04-26 | End: 2019-04-26

## 2019-04-26 RX ORDER — OMEPRAZOLE 40 MG/1
40 CAPSULE, DELAYED RELEASE ORAL DAILY
Status: DISCONTINUED | OUTPATIENT
Start: 2019-04-27 | End: 2019-04-29 | Stop reason: HOSPADM

## 2019-04-26 RX ORDER — GUAIFENESIN/DEXTROMETHORPHAN 100-10MG/5
10 SYRUP ORAL EVERY 4 HOURS PRN
Status: DISCONTINUED | OUTPATIENT
Start: 2019-04-26 | End: 2019-04-29 | Stop reason: HOSPADM

## 2019-04-26 RX ORDER — BENZONATATE 100 MG/1
200 CAPSULE ORAL 3 TIMES DAILY PRN
Status: DISCONTINUED | OUTPATIENT
Start: 2019-04-26 | End: 2019-04-29 | Stop reason: HOSPADM

## 2019-04-26 RX ORDER — OMEPRAZOLE 20 MG/1
40 CAPSULE, DELAYED RELEASE ORAL DAILY
Status: DISCONTINUED | OUTPATIENT
Start: 2019-04-27 | End: 2019-04-26 | Stop reason: ALTCHOICE

## 2019-04-26 RX ORDER — TRAMADOL HYDROCHLORIDE 50 MG/1
50 TABLET ORAL EVERY 6 HOURS PRN
Status: DISCONTINUED | OUTPATIENT
Start: 2019-04-26 | End: 2019-04-29 | Stop reason: HOSPADM

## 2019-04-26 RX ORDER — MAGNESIUM SULFATE 1 G/100ML
1 INJECTION INTRAVENOUS ONCE
Status: COMPLETED | OUTPATIENT
Start: 2019-04-26 | End: 2019-04-26

## 2019-04-26 RX ADMIN — MICONAZOLE NITRATE: 2 POWDER TOPICAL at 08:33

## 2019-04-26 RX ADMIN — LISINOPRIL 2.5 MG: 5 TABLET ORAL at 08:27

## 2019-04-26 RX ADMIN — PANTOPRAZOLE SODIUM 40 MG: 40 TABLET, DELAYED RELEASE ORAL at 05:50

## 2019-04-26 RX ADMIN — INSULIN GLARGINE 25 UNITS: 100 INJECTION, SOLUTION SUBCUTANEOUS at 20:22

## 2019-04-26 RX ADMIN — CEFUROXIME AXETIL 250 MG: 250 TABLET ORAL at 20:16

## 2019-04-26 RX ADMIN — CEFUROXIME AXETIL 250 MG: 250 TABLET ORAL at 08:27

## 2019-04-26 RX ADMIN — INSULIN LISPRO 15 UNITS: 100 INJECTION, SOLUTION INTRAVENOUS; SUBCUTANEOUS at 18:09

## 2019-04-26 RX ADMIN — METHYLPREDNISOLONE SODIUM SUCCINATE 60 MG: 125 INJECTION, POWDER, FOR SOLUTION INTRAMUSCULAR; INTRAVENOUS at 15:46

## 2019-04-26 RX ADMIN — INSULIN LISPRO 9 UNITS: 100 INJECTION, SOLUTION INTRAVENOUS; SUBCUTANEOUS at 20:22

## 2019-04-26 RX ADMIN — GUAIFENESIN AND DEXTROMETHORPHAN 10 ML: 100; 10 SYRUP ORAL at 22:53

## 2019-04-26 RX ADMIN — ENOXAPARIN SODIUM 40 MG: 40 INJECTION SUBCUTANEOUS at 08:44

## 2019-04-26 RX ADMIN — ANTACID TABLETS 1000 MG: 500 TABLET, CHEWABLE ORAL at 01:00

## 2019-04-26 RX ADMIN — METHYLPREDNISOLONE SODIUM SUCCINATE 40 MG: 40 INJECTION, POWDER, FOR SOLUTION INTRAMUSCULAR; INTRAVENOUS at 05:50

## 2019-04-26 RX ADMIN — GLIPIZIDE 5 MG: 5 TABLET ORAL at 05:50

## 2019-04-26 RX ADMIN — ISOSORBIDE MONONITRATE 60 MG: 60 TABLET, EXTENDED RELEASE ORAL at 08:27

## 2019-04-26 RX ADMIN — BENZONATATE 200 MG: 100 CAPSULE ORAL at 22:53

## 2019-04-26 RX ADMIN — DICLOFENAC 2 G: 10 GEL TOPICAL at 08:33

## 2019-04-26 RX ADMIN — ASPIRIN 81 MG 81 MG: 81 TABLET ORAL at 08:26

## 2019-04-26 RX ADMIN — Medication 2 PUFF: at 09:07

## 2019-04-26 RX ADMIN — CILOSTAZOL 100 MG: 100 TABLET ORAL at 08:26

## 2019-04-26 RX ADMIN — TRIAMCINOLONE ACETONIDE: 1 CREAM TOPICAL at 08:33

## 2019-04-26 RX ADMIN — ALPRAZOLAM 1 MG: 0.5 TABLET ORAL at 04:00

## 2019-04-26 RX ADMIN — GABAPENTIN 100 MG: 100 CAPSULE ORAL at 20:16

## 2019-04-26 RX ADMIN — DICLOFENAC 2 G: 10 GEL TOPICAL at 20:30

## 2019-04-26 RX ADMIN — GABAPENTIN 100 MG: 100 CAPSULE ORAL at 13:39

## 2019-04-26 RX ADMIN — Medication 2 PUFF: at 19:55

## 2019-04-26 RX ADMIN — METHYLPREDNISOLONE SODIUM SUCCINATE 60 MG: 125 INJECTION, POWDER, FOR SOLUTION INTRAMUSCULAR; INTRAVENOUS at 22:53

## 2019-04-26 RX ADMIN — GABAPENTIN 100 MG: 100 CAPSULE ORAL at 08:26

## 2019-04-26 RX ADMIN — TRIAMCINOLONE ACETONIDE: 1 CREAM TOPICAL at 20:16

## 2019-04-26 RX ADMIN — INSULIN LISPRO 18 UNITS: 100 INJECTION, SOLUTION INTRAVENOUS; SUBCUTANEOUS at 12:08

## 2019-04-26 RX ADMIN — MICONAZOLE NITRATE: 2 POWDER TOPICAL at 20:16

## 2019-04-26 RX ADMIN — INSULIN LISPRO 15 UNITS: 100 INJECTION, SOLUTION INTRAVENOUS; SUBCUTANEOUS at 08:44

## 2019-04-26 RX ADMIN — MAGNESIUM SULFATE HEPTAHYDRATE 1 G: 1 INJECTION, SOLUTION INTRAVENOUS at 15:50

## 2019-04-26 RX ADMIN — INSULIN LISPRO 10 UNITS: 100 INJECTION, SOLUTION INTRAVENOUS; SUBCUTANEOUS at 18:07

## 2019-04-26 RX ADMIN — CILOSTAZOL 100 MG: 100 TABLET ORAL at 20:16

## 2019-04-26 RX ADMIN — TRAMADOL HYDROCHLORIDE 50 MG: 50 TABLET, FILM COATED ORAL at 01:00

## 2019-04-26 RX ADMIN — ALPRAZOLAM 1 MG: 0.5 TABLET ORAL at 20:16

## 2019-04-26 RX ADMIN — GLIPIZIDE 5 MG: 5 TABLET ORAL at 15:49

## 2019-04-26 ASSESSMENT — PAIN SCALES - GENERAL
PAINLEVEL_OUTOF10: 10
PAINLEVEL_OUTOF10: 0

## 2019-04-26 NOTE — PROGRESS NOTES
Pt blood sugars continue to stay elevated, messaged Dr. Manish Morataya for further interventions. Waiting return call/ new orders.

## 2019-04-26 NOTE — H&P
[facility] [address]                     [address] [facility], [address]                              HISTORY AND PHYSICAL    PATIENT NAME: Mary Jane Mohan                      :        1945  MED REC NO:   [id number]                          ROOM:       0196  ACCOUNT NO:   [de-identified]                           ADMIT DATE: 2019  PROVIDER:     Van Henderson MD    HISTORY OF PRESENT ILLNESS:  The patient is a 80-year-old white lady  known patient of mine came to the emergency room with increasing  shortness of breath, cough, wheezing, mucoid sputum production,  occasionally yellowish for the last three days. No hemoptysis, no  weight loss. The patient is morbidly obese. The patient is a known  case of COPD and tobacco abuse. PAST MEDICAL HISTORY:  Pertinent for COPD, hypertension, atherosclerotic  heart disease, history of CVA, history of movement disorder,  osteoarthritis, pyelonephritis, anxiety neurosis, history of basal cell  carcinoma, claustrophobia, hiatal hernia, migraine, hyperlipidemia, type  2 diabetes mellitus. PAST SURGICAL HISTORY:  Pertinent for upper GI endoscopy, colonoscopy,  skin tumor excision from the skull, hysterectomy, tear duct surgery,  EGD, cataract removal bilateral.    MEDICATIONS:  Ventolin, Xanax, aspirin, Fioricet, Pletal, diclofenac  gel, gabapentin, glipizide, DuoNeb, isosorbide mononitrate, lisinopril,  Nitrostat, Mycostatin powder, Prilosec, triamcinolone cream.    ALLERGIES:  The patient is allergic to MORPHINE, CODEINE, HYDROMORPHONE,  LEVAQUIN, VICODIN. SOCIAL HISTORY:  She is a . She also recently lost her son to  heroin overdose and she has been very depressed since then. She also  has couple of grown children from previous marriage. 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 No history of substance abuse.     FAMILY HISTORY:  Both her parents are  echocardiogram was in 02/2017,  according to that her LVEF was 55% with mild mitral regurgitation and  mild tricuspid regurgitation. ASSESSMENT:  COPD exacerbation, acute bronchitis, atherosclerotic heart  disease, pleuritic chest discomfort. PLAN:  Get her admitted. Treat her with nebulized aerosol, supplemental  oxygen, IV steroids, empiric antibiotic treatment, cefuroxime, Mexitil. Also add Dulera to inhale beta 2 agonist and inhale corticosteroid.         Jose Antonio Maldonado MD    D: 04/25/2019 17:59:08       T: 04/26/2019 0:01:32     [email or web address]  Job#: [id number]     Doc#: [id number]    CC:

## 2019-04-26 NOTE — CONSULTS
[facility]   Electrophysiology Consultation   Date: 4/26/2019  Reason for Consultation: premature ventricular contractions   Consult Requesting Physician: Domenic Sommers MD     Chief Complaint   Patient presents with    Chest Pain     Patient in with complaints of cp for a day. States feels heavy and lots of pressure. Took nitro. no aspirin. Also having emesis. sob noted upon triage. HPI: Aey Neri is a 69 y.o. female admitted with shortness of breath, productive sputum  And cough and wheezing.   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    Cancer [facility])     \"skin on top of my head\"    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    Chronic obstructive pulmonary disease ([facility] 1/13/2017    Claustrophobia     COPD (chronic obstructive pulmonary disease) (HCC)     Esophageal stricture     Gastroesophageal reflux disease without esophagitis 3/24/2016    Hiatal hernia     Hiatal hernia     Hypercholesteremia     Hypertension     Migraines     Movement disorder arthritis    Pneumonia     PONV (postoperative nausea and vomiting)     Type II or unspecified type diabetes mellitus without mention of complication, not stated as uncontrolled     Unspecified cerebral artery occlusion with cerebral infarction         Past Surgical History:   Procedure Laterality Date    APPENDECTOMY      CARDIAC SURGERY      1 stent 2000 and 1 stent 2001    CHOLECYSTECTOMY      COLONOSCOPY      COLONOSCOPY  06/11/2018    ENDOSCOPY, COLON, DIAGNOSTIC      EYE SURGERY      bilateral cataracts    GALLBLADDER SURGERY      HYSTERECTOMY      MS EXC SKIN MALIG <5MM REMAINDR BODY N/A 9/6/2018    EXCISION OF SCALP SQUAMOUS CELL CARCINOMA performed by Devyn Pierce Martines MD at [facility] <100SQCM N/A 9/6/2018    SPLIT THICKNESS SKIN GRAFT FOR COVERAGE SCALP, APPLICATION OF WOUND VAC DEVICE performed by Juan R Stuart MD at [facility] GASTROINTESTINAL ENDOSCOPY  4/20/12    with biopsy of stomach,gastritis    UPPER GASTROINTESTINAL ENDOSCOPY  06/11/2018    w/esophagael dilation       Allergies   Allergen Reactions    Morphine Shortness Of Breath    Codeine Other (See Comments)     Chest pain    Hydromorphone Other (See Comments)     hallucinations  Hallucinations    But will take if needed in an emergency    Levaquin [Levofloxacin In D5w] Itching    Vicodin [Hydrocodone-Acetaminophen] Hives       Social History:  Reviewed. reports that she quit smoking about 22 months ago. Her smoking use included cigarettes. She has a 12.25 pack-year smoking history. She has never used smokeless tobacco. She reports that she drinks about 0.6 oz of alcohol per week. She reports that she does not use drugs. Family History:  Reviewed. family history includes Cancer in her father, mother, and sister; Heart Disease in her brother, mother, and sister. Review of System:  All other systems reviewed and are negative except for that noted above. Pertinent negatives are:     · General: negative for fever, chills   · Ophthalmic ROS: negative for - eye pain or loss of vision  · ENT ROS: negative for - headaches, sore throat   · Respiratory: negative for - cough, sputum  · Cardiovascular: Reviewed in HPI  · Gastrointestinal: negative for - abdominal pain, diarrhea, N/V  · Hematology: negative for - bleeding, blood clots, bruising or jaundice  · Genito-Urinary:  negative for - Dysuria or incontinence  · Musculoskeletal: negative for - Joint swelling, muscle pain  · Neurological: negative for - confusion, dizziness, headaches   · Psychiatric: No anxiety, no depression.   · Dermatological: negative for - 04/24/2019    INR 1.07 09/21/2018    INR 1.01 08/20/2018     Lab Results   Component Value Date    CHOL 172 09/22/2018    HDL 26 09/22/2018    HDL 32 04/19/2012    TRIG 284 09/22/2018       ECG: tiara strange    Holter 2017  PVC 4.6%    Echo: 2017   Conclusions      Summary     Technically difficult study.     Normal left ventricle size, wall thickness and systolic function with an EF   of 55%.     Mild mitral regurgitation is present.     Mild tricuspid regurgitation with RVSP estimated at 36 mmHg.   Cath:   Stress  2018  Conclusions        Summary    There is normal isotope uptake at stress and rest. There is no evidence of    myocardial ischemia or scar.    Normal LV function.    Overall findings represent a low risk scan.           Scheduled Meds:   pill splitter        methylPREDNISolone  60 mg Intravenous Q6H    magnesium sulfate  1 g Intravenous Once    insulin glargine  25 Units Subcutaneous Nightly    insulin lispro  10 Units Subcutaneous TID WC    aspirin  81 mg Oral Daily    cilostazol  100 mg Oral BID    diclofenac sodium  2 g Topical BID    gabapentin  100 mg Oral TID    glipiZIDE  5 mg Oral BID AC    isosorbide mononitrate  60 mg Oral Daily    lisinopril  2.5 mg Oral Daily    miconazole   Topical BID    pantoprazole  40 mg Oral QAM AC    triamcinolone   Topical BID    mometasone-formoterol  2 puff Inhalation BID    enoxaparin  40 mg Subcutaneous Daily    cefUROXime  250 mg Oral 2 times per day    insulin lispro  0-18 Units Subcutaneous TID WC    insulin lispro  0-9 Units Subcutaneous Nightly     Continuous Infusions:   dextrose      dextrose       PRN Meds:.traMADol, calcium carbonate, benzonatate, albuterol sulfate HFA, ALPRAZolam, butalbital-acetaminophen-caffeine, EFFERDENT DENTURE CLEANSER, ipratropium-albuterol, nitroGLYCERIN, dextrose, glucose, dextrose, glucagon (rDNA), dextrose     Patient Active Problem List    Diagnosis Date Noted    HTN (hypertension), benign Priority: High    CAD (coronary artery disease) 05/23/2011     Priority: High    Hyperlipemia 05/23/2011     Priority: High    Acute bronchitis 04/25/2019    COPD exacerbation (HCC) 04/24/2019    Precordial pain 09/21/2018    Squamous cell carcinoma of head and neck ([facility] 09/06/2018    Mor[facility] 06/18/2018    Functional diarrhea 06/08/2018    Anxiety 01/19/2018    Controlled type 2 diabetes mellitus without complication, without long-term current use of insulin ([facility] 01/13/2017    Dizziness 06/08/2016    Light headedness 06/08/2016    Primary osteoarthritis involving multiple joints 03/24/2016    Gastroesophageal reflux disease without esophagitis 03/24/2016    DM2 (diabetes mellitus, type 2) ([facility] 12/03/2015    ASHD (arteriosclerotic heart disease)     Malignant hypertension 11/07/2014    Essential hypertension 04/18/2012    Type II or unspecified type diabetes mellitus without mention of complication, uncontrolled 04/18/2012      Active Hospital Problems    Diagnosis Date Noted    HTN (hypertension), benign [I10]      Priority: High    CAD (coronary artery disease) [I25.10] 05/23/2011     Priority: High    Acute bronchitis [J20.9] 04/25/2019    COPD exacerbation (HCC) [J44.1] 04/24/2019    Precordial pain [R07.2] 09/21/2018    Morbid obesity ([facility] [E66.01] 06/18/2018    Functional diarrhea [K59.1] 06/08/2018    Controlled type 2 diabetes mellitus without complication, without long-term current use of insulin ([facility] [E11.9] 01/13/2017    Gastroesophageal reflux disease without esophagitis [K21.9] 03/24/2016    DM2 (diabetes mellitus, type 2) ([facility] [E11.9] 12/03/2015    ASHD (arteriosclerotic heart disease) [I25.10]     Malignant hypertension [I10] 11/07/2014    Essential hypertension [I10] 04/18/2012       Assessment:       Plan:    - frequent PVC   Holter in the past showed 4.6% burden.   Stress test was normal last year  I would repeat the Holter as outpatient once current issue is resolved  Unless symptomatic or associated with LV dysfunction, no further work up is needed. - CAD   Hx of remote MI   Normal stress test    -COPD exacerbation   On inhaler, steroid and antibiotics    - HTN   BP is well controlled. Continue current meds. - Hyponatremia   Adequate intake    - Obesity   Diet and exercise    I independently reviewed * holter    Thank you for allowing me to participate in the care of [address]: This report was transcribed using voice recognition software. Every effort was made to ensure accuracy, however, inadvertent computerized transcription errors may be present.

## 2019-04-27 ENCOUNTER — APPOINTMENT (OUTPATIENT)
Dept: GENERAL RADIOLOGY | Age: 74
DRG: 191 | End: 2019-04-27
Payer: COMMERCIAL

## 2019-04-27 LAB
ANION GAP SERPL CALCULATED.3IONS-SCNC: 12 MMOL/L (ref 3–16)
BUN BLDV-MCNC: 24 MG/DL (ref 7–20)
CALCIUM SERPL-MCNC: 10.1 MG/DL (ref 8.3–10.6)
CHLORIDE BLD-SCNC: 94 MMOL/L (ref 99–110)
CO2: 23 MMOL/L (ref 21–32)
CREAT SERPL-MCNC: 0.9 MG/DL (ref 0.6–1.2)
GFR AFRICAN AMERICAN: >60
GFR NON-AFRICAN AMERICAN: >60
GLUCOSE BLD-MCNC: 316 MG/DL (ref 70–99)
GLUCOSE BLD-MCNC: 323 MG/DL (ref 70–99)
GLUCOSE BLD-MCNC: 326 MG/DL (ref 70–99)
GLUCOSE BLD-MCNC: 372 MG/DL (ref 70–99)
GLUCOSE BLD-MCNC: 384 MG/DL (ref 70–99)
MAGNESIUM: 2.1 MG/DL (ref 1.8–2.4)
PERFORMED ON: ABNORMAL
POTASSIUM SERPL-SCNC: 4.8 MMOL/L (ref 3.5–5.1)
SODIUM BLD-SCNC: 129 MMOL/L (ref 136–145)
TROPONIN: <0.01 NG/ML

## 2019-04-27 PROCEDURE — 93005 ELECTROCARDIOGRAM TRACING: CPT | Performed by: INTERNAL MEDICINE

## 2019-04-27 PROCEDURE — 99231 SBSQ HOSP IP/OBS SF/LOW 25: CPT | Performed by: NURSE PRACTITIONER

## 2019-04-27 PROCEDURE — 6360000002 HC RX W HCPCS: Performed by: INTERNAL MEDICINE

## 2019-04-27 PROCEDURE — 6370000000 HC RX 637 (ALT 250 FOR IP): Performed by: INTERNAL MEDICINE

## 2019-04-27 PROCEDURE — 36415 COLL VENOUS BLD VENIPUNCTURE: CPT

## 2019-04-27 PROCEDURE — 94760 N-INVAS EAR/PLS OXIMETRY 1: CPT

## 2019-04-27 PROCEDURE — 83735 ASSAY OF MAGNESIUM: CPT

## 2019-04-27 PROCEDURE — 1200000000 HC SEMI PRIVATE

## 2019-04-27 PROCEDURE — 84484 ASSAY OF TROPONIN QUANT: CPT

## 2019-04-27 PROCEDURE — 80048 BASIC METABOLIC PNL TOTAL CA: CPT

## 2019-04-27 PROCEDURE — 71046 X-RAY EXAM CHEST 2 VIEWS: CPT

## 2019-04-27 PROCEDURE — 94640 AIRWAY INHALATION TREATMENT: CPT

## 2019-04-27 RX ORDER — POTASSIUM CHLORIDE 7.45 MG/ML
10 INJECTION INTRAVENOUS PRN
Status: DISCONTINUED | OUTPATIENT
Start: 2019-04-27 | End: 2019-04-29 | Stop reason: HOSPADM

## 2019-04-27 RX ORDER — MORPHINE SULFATE 2 MG/ML
INJECTION, SOLUTION INTRAMUSCULAR; INTRAVENOUS
Status: DISPENSED
Start: 2019-04-27 | End: 2019-04-28

## 2019-04-27 RX ORDER — IPRATROPIUM BROMIDE AND ALBUTEROL SULFATE 2.5; .5 MG/3ML; MG/3ML
1 SOLUTION RESPIRATORY (INHALATION)
Status: DISCONTINUED | OUTPATIENT
Start: 2019-04-27 | End: 2019-04-27

## 2019-04-27 RX ORDER — IPRATROPIUM BROMIDE AND ALBUTEROL SULFATE 2.5; .5 MG/3ML; MG/3ML
1 SOLUTION RESPIRATORY (INHALATION) 4 TIMES DAILY
Status: DISCONTINUED | OUTPATIENT
Start: 2019-04-27 | End: 2019-04-29 | Stop reason: HOSPADM

## 2019-04-27 RX ORDER — POTASSIUM CHLORIDE 20 MEQ/1
40 TABLET, EXTENDED RELEASE ORAL PRN
Status: DISCONTINUED | OUTPATIENT
Start: 2019-04-27 | End: 2019-04-29 | Stop reason: HOSPADM

## 2019-04-27 RX ORDER — MORPHINE SULFATE 2 MG/ML
2 INJECTION, SOLUTION INTRAMUSCULAR; INTRAVENOUS ONCE
Status: COMPLETED | OUTPATIENT
Start: 2019-04-27 | End: 2019-04-27

## 2019-04-27 RX ADMIN — GLIPIZIDE 5 MG: 5 TABLET ORAL at 17:55

## 2019-04-27 RX ADMIN — OMEPRAZOLE 40 MG: 40 CAPSULE, DELAYED RELEASE ORAL at 06:24

## 2019-04-27 RX ADMIN — INSULIN GLARGINE 25 UNITS: 100 INJECTION, SOLUTION SUBCUTANEOUS at 21:13

## 2019-04-27 RX ADMIN — ANTACID TABLETS 1000 MG: 500 TABLET, CHEWABLE ORAL at 18:08

## 2019-04-27 RX ADMIN — ALPRAZOLAM 1 MG: 0.5 TABLET ORAL at 23:10

## 2019-04-27 RX ADMIN — LISINOPRIL 2.5 MG: 5 TABLET ORAL at 09:06

## 2019-04-27 RX ADMIN — INSULIN LISPRO 10 UNITS: 100 INJECTION, SOLUTION INTRAVENOUS; SUBCUTANEOUS at 18:06

## 2019-04-27 RX ADMIN — IPRATROPIUM BROMIDE AND ALBUTEROL SULFATE 1 AMPULE: .5; 3 SOLUTION RESPIRATORY (INHALATION) at 15:36

## 2019-04-27 RX ADMIN — METHYLPREDNISOLONE SODIUM SUCCINATE 60 MG: 125 INJECTION, POWDER, FOR SOLUTION INTRAMUSCULAR; INTRAVENOUS at 21:09

## 2019-04-27 RX ADMIN — INSULIN LISPRO 10 UNITS: 100 INJECTION, SOLUTION INTRAVENOUS; SUBCUTANEOUS at 09:04

## 2019-04-27 RX ADMIN — NITROGLYCERIN 0.4 MG: 0.4 TABLET, ORALLY DISINTEGRATING SUBLINGUAL at 13:22

## 2019-04-27 RX ADMIN — ALPRAZOLAM 1 MG: 0.5 TABLET ORAL at 13:13

## 2019-04-27 RX ADMIN — MICONAZOLE NITRATE: 2 POWDER TOPICAL at 21:08

## 2019-04-27 RX ADMIN — GABAPENTIN 100 MG: 100 CAPSULE ORAL at 09:06

## 2019-04-27 RX ADMIN — NITROGLYCERIN 0.4 MG: 0.4 TABLET, ORALLY DISINTEGRATING SUBLINGUAL at 17:42

## 2019-04-27 RX ADMIN — ISOSORBIDE MONONITRATE 60 MG: 60 TABLET, EXTENDED RELEASE ORAL at 09:07

## 2019-04-27 RX ADMIN — Medication 2 PUFF: at 09:54

## 2019-04-27 RX ADMIN — GUAIFENESIN AND DEXTROMETHORPHAN 10 ML: 100; 10 SYRUP ORAL at 06:26

## 2019-04-27 RX ADMIN — DICLOFENAC 2 G: 10 GEL TOPICAL at 13:23

## 2019-04-27 RX ADMIN — DICLOFENAC 2 G: 10 GEL TOPICAL at 21:08

## 2019-04-27 RX ADMIN — GABAPENTIN 100 MG: 100 CAPSULE ORAL at 13:13

## 2019-04-27 RX ADMIN — IPRATROPIUM BROMIDE AND ALBUTEROL SULFATE 1 AMPULE: .5; 3 SOLUTION RESPIRATORY (INHALATION) at 19:47

## 2019-04-27 RX ADMIN — MICONAZOLE NITRATE: 2 POWDER TOPICAL at 13:23

## 2019-04-27 RX ADMIN — METHYLPREDNISOLONE SODIUM SUCCINATE 60 MG: 125 INJECTION, POWDER, FOR SOLUTION INTRAMUSCULAR; INTRAVENOUS at 17:54

## 2019-04-27 RX ADMIN — INSULIN LISPRO 6 UNITS: 100 INJECTION, SOLUTION INTRAVENOUS; SUBCUTANEOUS at 21:13

## 2019-04-27 RX ADMIN — MORPHINE SULFATE 2 MG: 2 INJECTION, SOLUTION INTRAMUSCULAR; INTRAVENOUS at 18:25

## 2019-04-27 RX ADMIN — CEFUROXIME AXETIL 250 MG: 250 TABLET ORAL at 21:09

## 2019-04-27 RX ADMIN — CEFUROXIME AXETIL 250 MG: 250 TABLET ORAL at 09:06

## 2019-04-27 RX ADMIN — TRAMADOL HYDROCHLORIDE 50 MG: 50 TABLET, FILM COATED ORAL at 13:30

## 2019-04-27 RX ADMIN — METHYLPREDNISOLONE SODIUM SUCCINATE 60 MG: 125 INJECTION, POWDER, FOR SOLUTION INTRAMUSCULAR; INTRAVENOUS at 09:06

## 2019-04-27 RX ADMIN — TRIAMCINOLONE ACETONIDE: 1 CREAM TOPICAL at 21:08

## 2019-04-27 RX ADMIN — CILOSTAZOL 100 MG: 100 TABLET ORAL at 21:09

## 2019-04-27 RX ADMIN — TRIAMCINOLONE ACETONIDE: 1 CREAM TOPICAL at 13:23

## 2019-04-27 RX ADMIN — INSULIN LISPRO 15 UNITS: 100 INJECTION, SOLUTION INTRAVENOUS; SUBCUTANEOUS at 17:55

## 2019-04-27 RX ADMIN — NITROGLYCERIN 0.4 MG: 0.4 TABLET, ORALLY DISINTEGRATING SUBLINGUAL at 17:54

## 2019-04-27 RX ADMIN — NITROGLYCERIN 0.4 MG: 0.4 TABLET, ORALLY DISINTEGRATING SUBLINGUAL at 18:07

## 2019-04-27 RX ADMIN — CILOSTAZOL 100 MG: 100 TABLET ORAL at 09:06

## 2019-04-27 RX ADMIN — GABAPENTIN 100 MG: 100 CAPSULE ORAL at 21:09

## 2019-04-27 RX ADMIN — GLIPIZIDE 5 MG: 5 TABLET ORAL at 06:24

## 2019-04-27 RX ADMIN — Medication 2 PUFF: at 19:47

## 2019-04-27 RX ADMIN — INSULIN LISPRO 12 UNITS: 100 INJECTION, SOLUTION INTRAVENOUS; SUBCUTANEOUS at 09:03

## 2019-04-27 RX ADMIN — INSULIN LISPRO 10 UNITS: 100 INJECTION, SOLUTION INTRAVENOUS; SUBCUTANEOUS at 13:15

## 2019-04-27 RX ADMIN — ENOXAPARIN SODIUM 40 MG: 40 INJECTION SUBCUTANEOUS at 09:06

## 2019-04-27 RX ADMIN — INSULIN LISPRO 12 UNITS: 100 INJECTION, SOLUTION INTRAVENOUS; SUBCUTANEOUS at 13:14

## 2019-04-27 RX ADMIN — ASPIRIN 81 MG 81 MG: 81 TABLET ORAL at 09:07

## 2019-04-27 RX ADMIN — METHYLPREDNISOLONE SODIUM SUCCINATE 60 MG: 125 INJECTION, POWDER, FOR SOLUTION INTRAMUSCULAR; INTRAVENOUS at 04:05

## 2019-04-27 ASSESSMENT — PAIN SCALES - GENERAL
PAINLEVEL_OUTOF10: 0
PAINLEVEL_OUTOF10: 10
PAINLEVEL_OUTOF10: 9
PAINLEVEL_OUTOF10: 0
PAINLEVEL_OUTOF10: 0
PAINLEVEL_OUTOF10: 9
PAINLEVEL_OUTOF10: 0

## 2019-04-27 NOTE — PROGRESS NOTES
Pt complain of chest pain 10/10. RR called and Primary MD notified. VSS. 3 doses SL nitro given prior to RR. 2mg morphine given durring RR. Pt no rates pain 7/10 in her back states chest pain is now better.

## 2019-04-27 NOTE — PLAN OF CARE
RR called for chest pain- in the center, moderate to severe with some SOB. Nitro times 3 given with out relief. Vitals - no change except minimal desaturation. Chest clear to auscultation. ekg- PVC;s that's known of. Finally pain resolved with iv morphine. Plan:   Check trops times 2. Check bmp and mg.   - if trop high or chest pain recurs, will let cards know.    - cont tele

## 2019-04-27 NOTE — PROGRESS NOTES
Morning meds administered. Pt states Robitussin helped her sleep. Starting to cough again, another dose of Robitussin given.

## 2019-04-27 NOTE — PROGRESS NOTES
Pt complain of chest pain. PRN nitro given x 1 dose, pt states its starting to feel better and does not want second dose. Will continue to monitor. Pt complain of headache after nitro PRN ultram given see MAR.

## 2019-04-27 NOTE — PROGRESS NOTES
Progress Note - Dr. Jinny Pulido - Internal Medicine  PCP: Sabino Malhotra MD [address] [facility] / [facility] [phone]    Hospital Day: 3  Code Status: Full Code  Current Diet: DIET CARB CONTROL;        CC: follow up on medical issues    Subjective:   Steve Rubio is a 69 y.o. female. Complaining of chest pain, still dyspneic  Cards has been paged by RN    She complains of chest pain, complains of shortness of breath, denies nausea,  denies emesis. 10 system Review of Systems is reviewed with patient, and pertinent positives are listed here: None . Otherwise, Review of systems is negative. I have reviewed the patient's medical and social history in detail and updated the computerized patient record. To recap: She  has a past medical history of Acute MI ([address] Acute pyelonephritis, Anxiety and depression, Arthritis, CAD (coronary artery disease), Cancer ([address] Cancer ([address] Cancer of skin of leg, Chronic obstructive pulmonary disease ([address] Claustrophobia, COPD (chronic obstructive pulmonary disease) ([address] Esophageal stricture, Gastroesophageal reflux disease without esophagitis, Hiatal hernia, Hiatal hernia, Hypercholesteremia, Hypertension, Migraines, Movement disorder, Pneumonia, PONV (postoperative nausea and vomiting), Type II or unspecified type diabetes mellitus without mention of complication, not stated as uncontrolled, and Unspecified cerebral artery occlusion with cerebral infarction. . She  has a past surgical history that includes Cardiac surgery; Gallbladder surgery; Hysterectomy; Appendectomy; Cholecystectomy; Colonoscopy; Upper gastrointestinal endoscopy (4/20/12); Endoscopy, colon, diagnostic; Tear duct surgery; Upper gastrointestinal endoscopy (06/11/2018); Colonoscopy (06/11/2018); pr exc skin malig <5mm remaindr body (N/A, 9/6/2018); pr split grft trunk,arm,leg <100sqcm (N/A, 9/6/2018); skin biopsy; and eye surgery. . She  reports that she quit smoking about 22 months ago.  Her smoking use included cigarettes. She has a 12.25 pack-year smoking history. She has never used smokeless tobacco. She reports that she drinks about 0.6 oz of alcohol per week. She reports that she does not use drugs. .        Active Hospital Problems    Diagnosis Date Noted    CAD (coronary artery disease) [I25.10] 05/23/2011     Priority: High    Hyponatremia [E87.1]     Acute bronchitis [J20.9] 04/25/2019    COPD exacerbation ([facility] [J44.1] 04/24/2019    Morbid obesity (HCC) [E66.01] 06/18/2018    Functional diarrhea [K59.1] 06/08/2018    PVC (premature ventricular contraction) [I49.3] 06/08/2016    Gastroesophageal reflux disease without esophagitis [K21.9] 03/24/2016    DM2 (diabetes mellitus, type 2) ([facility] [E11.9] 12/03/2015    ASHD (arteriosclerotic heart disease) [I25.10]     Essential hypertension [I10] 04/18/2012       Current Facility-Administered Medications: potassium chloride (KLOR-CON M) extended release tablet 40 mEq, 40 mEq, Oral, PRN **OR** potassium bicarb-citric acid (EFFER-K) effervescent tablet 40 mEq, 40 mEq, Oral, PRN **OR** potassium chloride 10 mEq/100 mL IVPB (Peripheral Line), 10 mEq, Intravenous, PRN  ipratropium-albuterol (DUONEB) nebulizer solution 1 ampule, 1 ampule, Inhalation, Q4H While awake  traMADol (ULTRAM) tablet 50 mg, 50 mg, Oral, Q6H PRN  calcium carbonate (TUMS) chewable tablet 1,000 mg, 1,000 mg, Oral, Q4H PRN  methylPREDNISolone sodium (SOLU-MEDROL) injection 60 mg, 60 mg, Intravenous, Q6H  benzonatate (TESSALON) capsule 200 mg, 200 mg, Oral, TID PRN  insulin glargine (LANTUS) injection pen 25 Units, 25 Units, Subcutaneous, Nightly  insulin lispro (HUMALOG) injection pen 10 Units, 10 Units, Subcutaneous, TID WC  omeprazole (PRILOSEC) delayed release capsule 40 mg-Patient Supplied Medication, 40 mg, Oral, Daily  guaiFENesin-dextromethorphan (ROBITUSSIN DM) 100-10 MG/5ML syrup 10 mL, 10 mL, Oral, Q4H PRN  albuterol sulfate  (90 Base) MCG/ACT inhaler 2 puff, 2 -- -- -- 20 94 % --   04/27/19 0901 (!) 163/73 97.7 °F (36.5 °C) Oral 57 18 -- --   04/27/19 0715 -- -- -- -- -- -- 280 lb (127 kg)   04/27/19 0600 -- -- -- 86 -- -- --   04/27/19 0400 (!) 142/72 98 °F (36.7 °C) Axillary 76 18 94 % --   04/26/19 2300 110/61 97.8 °F (36.6 °C) Oral 75 18 94 % --   04/26/19 2011 135/75 -- -- -- -- -- --   04/26/19 2000 (!) 178/73 97.4 °F (36.3 °C) Oral 70 18 91 % --   04/26/19 1956 -- -- -- -- 18 92 % --   04/26/19 1853 -- -- -- 83 -- -- --   04/26/19 1715 (!) 146/93 97.7 °F (36.5 °C) Oral 76 20 92 % --   04/26/19 1533 -- -- -- -- -- 91 % --   04/26/19 1500 (!) 118/56 97.8 °F (36.6 °C) Oral 69 18 -- --     Patient Vitals for the past 96 hrs (Last 3 readings):   Weight   04/27/19 0715 280 lb (127 kg)   04/25/19 0616 271 lb 3.2 oz (123 kg)   04/25/19 0224 270 lb 14.4 oz (122.9 kg)         No intake or output data in the 24 hours ending 04/27/19 1322      Physical Exam:   S1, S2 normal, no murmur, rub or gallop, regular rate and rhythm  Wheezes bilat  abdomen is soft without significant tenderness, masses, organomegaly or guarding  extremities normal, atraumatic, no cyanosis or edema    Labs:  Lab Results   Component Value Date    WBC 10.5 04/25/2019    HGB 15.7 04/25/2019    HCT 47.6 04/25/2019     04/25/2019    CHOL 172 09/22/2018    TRIG 284 (H) 09/22/2018    HDL 26 (L) 09/22/2018    ALT 15 04/25/2019    AST 18 04/25/2019     (L) 04/26/2019    K 4.9 04/26/2019    CL 96 (L) 04/26/2019    CREATININE 0.8 04/26/2019    BUN 22 (H) 04/26/2019    CO2 24 04/26/2019    TSH 2.34 06/06/2011    INR 1.04 04/24/2019    LABA1C 7.9 04/25/2019    LABMICR Not Indicated 06/07/2018     Lab Results   Component Value Date    CKTOTAL 24 (L) 06/07/2018    CKMB 0.29 08/09/2011    TROPONINI <0.01 04/25/2019       Recent Imaging Results are Reviewed:  Xr Chest Standard (2 Vw)    Result Date: 4/24/2019  EXAMINATION: TWO VIEWS OF THE CHEST 4/24/2019 7:41 pm COMPARISON: 11/02/2018 HISTORY: ORDERING SYSTEM PROVIDED HISTORY: Chest Discomfort TECHNOLOGIST PROVIDED HISTORY: Reason for exam:->Chest Discomfort Ordering Physician Provided Reason for Exam: Chest Discomfort Acuity: Acute Type of Exam: Initial FINDINGS: Normal cardiomediastinal silhouette. No focal consolidation. No pleural effusion or pneumothorax. Bones grossly intact. No acute process. Ct Chest Wo Contrast    Result Date: 4/24/2019  EXAMINATION: CT OF THE CHEST WITHOUT CONTRAST 4/24/2019 9:02 pm TECHNIQUE: CT of the chest was performed without the administration of intravenous contrast. Multiplanar reformatted images are provided for review. Dose modulation, iterative reconstruction, and/or weight based adjustment of the mA/kV was utilized to reduce the radiation dose to as low as reasonably achievable. COMPARISON: 09/21/2018 HISTORY: ORDERING SYSTEM PROVIDED HISTORY: pneumonia TECHNOLOGIST PROVIDED HISTORY: Ordering Physician Provided Reason for Exam: pneumonia Acuity: Acute Type of Exam: Initial Relevant Medical/Surgical History: Chest Pain (Patient in with complaints of cp for a day. States feels heavy and lots of pressure. Took nitro. no aspirin. Also having emesis. sob noted upon triage.) FINDINGS: Mediastinum: The heart size is normal.  There is no pericardial effusion or mediastinal hematoma. Coronary artery calcifications are again seen. Aortic caliber is normal.  There is no adenopathy. Lungs/pleura: The central airways are patent. There is no pneumothorax or pleural effusion. There is progressive lingular atelectasis with anterior left lower lobe scarring. No focal infiltrate is seen to suggest pneumonia. Upper Abdomen: Limited imaging of the upper abdomen discloses no significant abnormality. Soft Tissues/Bones: No acute findings. 1. No focal infiltrate to suggest pneumonia. 2. Coronary artery disease.        Assessment and Plan:  Patient Active Hospital Problem List:   PVC (premature ventricular contraction) (6/8/2016)    Assessment: Established problem. Stable. EP eval reviewed    Plan: at this time, no further workup   CAD (coronary artery disease) (5/23/2011)    Assessment: Established problem. Stable. Plan: Continue present orders/plan. Essential hypertension (4/18/2012)    Assessment: Established problem. Stable. 119/53    Plan: Continue present orders/plan. ASHD (arteriosclerotic heart disease) ()    Assessment: Established problem. Uncontrolled. Some CP today    Plan: cards has been called    DM2 (diabetes mellitus, type 2) ([facility] (12/3/2015)    Assessment: Established problem. Stable. Glu 323    Plan: elevated due to steroids. Cont sliding scale   Gastroesophageal reflux disease without esophagitis (3/24/2016)    Assessment: Established problem. Stable. Plan: Continue present orders/plan. Morbid obesity ([facility] (6/18/2018)   COPD exacerbation ([facility] (4/24/2019)    Assessment: Established problem. Stable. On steroids, hhn    Plan: continue tx. cxr ordered   Acute bronchitis (4/25/2019)    Assessment: Established problem. Stable. On abx    Plan: cxr to be checked today   Hyponatremia ()    Assessment: Established problem. Stable. Plan: Continue present orders/plan.                Smooth Clifton  4/27/2019

## 2019-04-27 NOTE — PROGRESS NOTES
HYSTERECTOMY      NM EXC SKIN MALIG <5MM REMAINDR BODY N/A 9/6/2018    EXCISION OF SCALP SQUAMOUS CELL CARCINOMA performed by Jeana Crawley MD at [address] [facility] [place] <100SQCM N/A 9/6/2018    SPLIT THICKNESS SKIN GRAFT FOR COVERAGE SCALP, APPLICATION OF WOUND VAC DEVICE performed by Jeana Crawley MD at [address] [facility] GASTROINTESTINAL ENDOSCOPY  4/20/12    with biopsy of stomach,gastritis    UPPER GASTROINTESTINAL ENDOSCOPY  06/11/2018    w/esophagael dilation       Allergies   Allergen Reactions    Morphine Shortness Of Breath    Codeine Other (See Comments)     Chest pain    Hydromorphone Other (See Comments)     hallucinations  Hallucinations    But will take if needed in an emergency    Levaquin [Levofloxacin In D5w] Itching    Vicodin [Hydrocodone-Acetaminophen] Hives       Social History:  Reviewed. reports that she quit smoking about 22 months ago. Her smoking use included cigarettes. She has a 12.25 pack-year smoking history. She has never used smokeless tobacco. She reports that she drinks about 0.6 oz of alcohol per week. She reports that she does not use drugs. Family History:  Reviewed. family history includes Cancer in her father, mother, and sister; Heart Disease in her brother, mother, and sister. Review of System:  All other systems reviewed and are negative except for that noted above.  Pertinent negatives are:     · General: negative for fever, chills   · Ophthalmic ROS: negative for - eye pain or loss of vision  · ENT ROS: negative for - headaches, sore throat   · Respiratory: negative for - cough, sputum  · Cardiovascular: Reviewed in HPI  · Gastrointestinal: negative for - abdominal pain, diarrhea, N/V  · Hematology: negative for - bleeding, blood clots, bruising or jaundice  · Genito-Urinary:  negative for - Dysuria or incontinence  · Musculoskeletal: negative for - Joint swelling, muscle pain  · Neurological: negative for - confusion, dizziness, headaches   · Psychiatric: No anxiety, no depression. · Dermatological: negative for - rash    Physical Examination:  Vitals:    19 1541   BP:    Pulse:    Resp: 20   Temp:    SpO2: 91%      No intake/output data recorded. Wt Readings from Last 3 Encounters:   19 280 lb (127 kg)   19 279 lb (126.6 kg)   04/15/19 279 lb (126.6 kg)     Temp  Av.7 °F (36.5 °C)  Min: 97.4 °F (36.3 °C)  Max: 98 °F (36.7 °C)  Pulse  Av.4  Min: 57  Max: 86  BP  Min: 101/48  Max: 178/73  SpO2  Av.3 %  Min: 90 %  Max: 94 %  No intake or output data in the 24 hours ending 19 1556    · Telemetry: Sinus rhythm PVC  · Constitutional: Oriented. No distress. · Head: Normocephalic and atraumatic. · Mouth/Throat: Oropharynx is clear and moist.   · Eyes: Conjunctivae normal. EOM are normal.   · Neck: Neck supple. No rigidity. No JVD present. · Cardiovascular: Normal rate, regular rhythm, S1&S2. · Pulmonary/Chest: Bilateral respiratory sounds. No wheezes, No rhonchi. · Abdominal: Soft. Bowel sounds present. No distension, No tenderness. · Musculoskeletal: No tenderness. No edema    · Lymphadenopathy: Has no cervical adenopathy. · Neurological: Alert and oriented. Cranial nerve appears intact, No Gross deficit   · Skin: Skin is warm and dry. No rash noted. · Psychiatric: Has a normal behavior     Labs, diagnostic and imaging results reviewed. Reviewed.    Recent Labs     19  0616 19  1402    134* 130*   K 3.9 4.5 4.9    100 96*   CO2 25 21 24   BUN 11 13 22*   CREATININE 0.7 0.8 0.8     Recent Labs     19  0616   WBC 10.5 10.5   HGB 16.0 15.7   HCT 49.1* 47.6   MCV 89.6 90.8    188     Lab Results   Component Value Date    CKTOTAL 24 2018    CKMB 0.29 2011    TROPONINI <0.01 2019     Lab Results   Component Value Date    BNP <15 2012    BNP <15 2010     Lab Results   Component Value Date    PROTIME 11.9 04/24/2019    PROTIME 12.2 09/21/2018    PROTIME 11.5 08/20/2018    INR 1.04 04/24/2019    INR 1.07 09/21/2018    INR 1.01 08/20/2018     Lab Results   Component Value Date    CHOL 172 09/22/2018    HDL 26 09/22/2018    HDL 32 04/19/2012    TRIG 284 09/22/2018       ECG: tiara strange    Holter 2017  PVC 4.6%    Echo: 2017   Conclusions      Summary     Technically difficult study.     Normal left ventricle size, wall thickness and systolic function with an EF   of 55%.     Mild mitral regurgitation is present.     Mild tricuspid regurgitation with RVSP estimated at 36 mmHg.   Cath:   Stress  2018  Conclusions        Summary    There is normal isotope uptake at stress and rest. There is no evidence of    myocardial ischemia or scar.    Normal LV function.    Overall findings represent a low risk scan.           Scheduled Meds:   ipratropium-albuterol  1 ampule Inhalation 4x daily    methylPREDNISolone  60 mg Intravenous Q6H    insulin glargine  25 Units Subcutaneous Nightly    insulin lispro  10 Units Subcutaneous TID WC    omeprazole  40 mg Oral Daily    aspirin  81 mg Oral Daily    cilostazol  100 mg Oral BID    diclofenac sodium  2 g Topical BID    gabapentin  100 mg Oral TID    glipiZIDE  5 mg Oral BID AC    isosorbide mononitrate  60 mg Oral Daily    lisinopril  2.5 mg Oral Daily    miconazole   Topical BID    triamcinolone   Topical BID    mometasone-formoterol  2 puff Inhalation BID    enoxaparin  40 mg Subcutaneous Daily    cefUROXime  250 mg Oral 2 times per day    insulin lispro  0-18 Units Subcutaneous TID WC    insulin lispro  0-9 Units Subcutaneous Nightly     Continuous Infusions:   dextrose      dextrose       PRN Meds:.potassium chloride **OR** potassium alternative oral replacement **OR** potassium chloride, traMADol, calcium carbonate, benzonatate, guaiFENesin-dextromethorphan, albuterol sulfate HFA, ALPRAZolam, History/Assessment/Plan 4/27/19  -No acute events overnight  -Fewer PVC's  -No changes today from EP perspective, will follow    Assessment/Plan:  - frequent PVC   Holter in the past showed 4.6% burden. Stress test was normal last year  I would repeat the Holter as outpatient once current issue is resolved  Unless symptomatic or associated with LV dysfunction, no further work up is needed. - CAD   Hx of remote MI   Normal stress test    -COPD exacerbation   On inhaler, steroid and antibiotics    - HTN   BP is well controlled. Continue current meds. - Hyponatremia   Adequate intake    - Obesity   Diet and exercise    I independently reviewed * holter    Thank you for allowing me to participate in the care of [address]: This report was transcribed using voice recognition software. Every effort was made to ensure accuracy, however, inadvertent computerized transcription errors may be present.

## 2019-04-27 NOTE — PROGRESS NOTES
Department of Internal Medicine  General Internal Medicine   Progress Note      SUBJECTIVE  Cough and  SOB with persistent wheezing       History obtained from chart review and the patient  General ROS: positive for  - fatigue and malaise  negative for - chills, fever or night sweats  Psychological ROS: negative  Respiratory ROS: positive for - shortness of breath and wheezing  negative for - cough, hemoptysis, sputum changes or stridor  Cardiovascular ROS: positive for - chest pain, dyspnea on exertion and edema  negative for - loss of consciousness, orthopnea or palpitations  Gastrointestinal ROS: no abdominal pain, change in bowel habits, or black or bloody stools    OBJECTIVE      Medications      Current Facility-Administered Medications: traMADol (ULTRAM) tablet 50 mg, 50 mg, Oral, Q6H PRN  calcium carbonate (TUMS) chewable tablet 1,000 mg, 1,000 mg, Oral, Q4H PRN  methylPREDNISolone sodium (SOLU-MEDROL) injection 60 mg, 60 mg, Intravenous, Q6H  benzonatate (TESSALON) capsule 200 mg, 200 mg, Oral, TID PRN  insulin glargine (LANTUS) injection pen 25 Units, 25 Units, Subcutaneous, Nightly  insulin lispro (HUMALOG) injection pen 10 Units, 10 Units, Subcutaneous, TID WC  [START ON 4/27/2019] omeprazole (PRILOSEC) delayed release capsule 40 mg-Patient Supplied Medication, 40 mg, Oral, Daily  guaiFENesin-dextromethorphan (ROBITUSSIN DM) 100-10 MG/5ML syrup 10 mL, 10 mL, Oral, Q4H PRN  albuterol sulfate  (90 Base) MCG/ACT inhaler 2 puff, 2 puff, Inhalation, Q6H PRN  ALPRAZolam (XANAX) tablet 1 mg, 1 mg, Oral, TID PRN  aspirin chewable tablet 81 mg, 81 mg, Oral, Daily  butalbital-acetaminophen-caffeine (FIORICET, ESGIC) per tablet 1 tablet, 1 tablet, Oral, Q6H PRN  cilostazol (PLETAL) tablet 100 mg, 100 mg, Oral, BID  EFFERDENT DENTURE CLEANSER TBEF 1 each, 1 tablet, Does not apply, PRN  diclofenac sodium 1 % gel 2 g, 2 g, Topical, BID  gabapentin (NEURONTIN) capsule 100 mg, 100 mg, Oral, TID  glipiZIDE trachea midline and skin normal  LUNGS:  No increased work of breathing, good air exchange, clear to auscultation bilaterally, no crackles or wheezing  CARDIOVASCULAR:  Normal apical impulse, regular rate and rhythm, normal S1 and S2, no S3 or S4, and no murmur noted  ABDOMEN:  No scars, normal bowel sounds, soft, non-distended, non-tender, no masses palpated, no hepatosplenomegally  MUSCULOSKELETAL:  Trace edema  NEUROLOGIC:  No acute focal change    Data      Lab Results   Component Value Date    PHART 7.446 02/07/2017       Lab Results   Component Value Date     04/26/2019    K 4.9 04/26/2019    K 4.7 06/07/2018    CL 96 04/26/2019    CO2 24 04/26/2019    BUN 22 04/26/2019    CREATININE 0.8 04/26/2019    GLUCOSE 445 04/26/2019    CALCIUM 10.5 04/26/2019     Lab Results   Component Value Date    WBC 10.5 04/25/2019    HGB 15.7 04/25/2019    HCT 47.6 04/25/2019    MCV 90.8 04/25/2019     04/25/2019         Lab Results   Component Value Date    INR 1.04 04/24/2019    PROTIME 11.9 04/24/2019       ASSESSMENT AND PLAN      Patient Active Problem List   Diagnosis    CAD (coronary artery disease)    Hyperlipemia    Essential hypertension    Type II or unspecified type diabetes mellitus without mention of complication, uncontrolled    Malignant hypertension    ASHD (arteriosclerotic heart disease)    [facility]) [facility]    HTN (hypertension), benign    Primary osteoarthritis involving multiple joints    Gastroesophageal reflux disease without esophagitis    PVC (premature ventricular contraction)    Dizziness    Light headedness    Controlled type 2 diabetes mellitus without complication, without long-term current use of insulin (HCC)    Anxiety    Functional diarrhea    Morbid obesity (HCC)    Squamous cell carcinoma of head and neck (HCC)    Precordial pain    COPD exacerbation (HCC)    Acute bronchitis    Hyponatremia     Increase IV steroids    added coverage for elevated  BS   ICS  And LABA   Empiric antibiotics therapy

## 2019-04-27 NOTE — PROGRESS NOTES
Pt complaining of coughing. States that cough is knocking the wind out of her. Notified DrCelio 10ml Robitussin was ordered 4x a day in addition to tessalon maribel to help with cough suppression.

## 2019-04-28 LAB
ANION GAP SERPL CALCULATED.3IONS-SCNC: 9 MMOL/L (ref 3–16)
BASOPHILS ABSOLUTE: 0 K/UL (ref 0–0.2)
BASOPHILS RELATIVE PERCENT: 0.3 %
BUN BLDV-MCNC: 24 MG/DL (ref 7–20)
CALCIUM SERPL-MCNC: 9.8 MG/DL (ref 8.3–10.6)
CHLORIDE BLD-SCNC: 96 MMOL/L (ref 99–110)
CO2: 26 MMOL/L (ref 21–32)
CREAT SERPL-MCNC: 0.8 MG/DL (ref 0.6–1.2)
EKG ATRIAL RATE: 92 BPM
EKG DIAGNOSIS: NORMAL
EKG P AXIS: 90 DEGREES
EKG P-R INTERVAL: 148 MS
EKG Q-T INTERVAL: 364 MS
EKG QRS DURATION: 116 MS
EKG QTC CALCULATION (BAZETT): 450 MS
EKG R AXIS: -50 DEGREES
EKG T AXIS: 61 DEGREES
EKG VENTRICULAR RATE: 92 BPM
EOSINOPHILS ABSOLUTE: 0 K/UL (ref 0–0.6)
EOSINOPHILS RELATIVE PERCENT: 0 %
GFR AFRICAN AMERICAN: >60
GFR NON-AFRICAN AMERICAN: >60
GLUCOSE BLD-MCNC: 286 MG/DL (ref 70–99)
GLUCOSE BLD-MCNC: 300 MG/DL (ref 70–99)
GLUCOSE BLD-MCNC: 330 MG/DL (ref 70–99)
GLUCOSE BLD-MCNC: 341 MG/DL (ref 70–99)
GLUCOSE BLD-MCNC: 357 MG/DL (ref 70–99)
HCT VFR BLD CALC: 44 % (ref 36–48)
HEMOGLOBIN: 14.4 G/DL (ref 12–16)
LYMPHOCYTES ABSOLUTE: 1.1 K/UL (ref 1–5.1)
LYMPHOCYTES RELATIVE PERCENT: 7.9 %
MCH RBC QN AUTO: 29.1 PG (ref 26–34)
MCHC RBC AUTO-ENTMCNC: 32.8 G/DL (ref 31–36)
MCV RBC AUTO: 88.8 FL (ref 80–100)
MONOCYTES ABSOLUTE: 0.6 K/UL (ref 0–1.3)
MONOCYTES RELATIVE PERCENT: 3.9 %
NEUTROPHILS ABSOLUTE: 12.7 K/UL (ref 1.7–7.7)
NEUTROPHILS RELATIVE PERCENT: 87.9 %
PDW BLD-RTO: 14.2 % (ref 12.4–15.4)
PERFORMED ON: ABNORMAL
PLATELET # BLD: 249 K/UL (ref 135–450)
PMV BLD AUTO: 8.7 FL (ref 5–10.5)
POTASSIUM SERPL-SCNC: 4.9 MMOL/L (ref 3.5–5.1)
RBC # BLD: 4.95 M/UL (ref 4–5.2)
SODIUM BLD-SCNC: 131 MMOL/L (ref 136–145)
WBC # BLD: 14.5 K/UL (ref 4–11)

## 2019-04-28 PROCEDURE — 2700000000 HC OXYGEN THERAPY PER DAY

## 2019-04-28 PROCEDURE — 80048 BASIC METABOLIC PNL TOTAL CA: CPT

## 2019-04-28 PROCEDURE — 1200000000 HC SEMI PRIVATE

## 2019-04-28 PROCEDURE — 6360000002 HC RX W HCPCS: Performed by: INTERNAL MEDICINE

## 2019-04-28 PROCEDURE — 94640 AIRWAY INHALATION TREATMENT: CPT

## 2019-04-28 PROCEDURE — 94760 N-INVAS EAR/PLS OXIMETRY 1: CPT

## 2019-04-28 PROCEDURE — 93010 ELECTROCARDIOGRAM REPORT: CPT | Performed by: INTERNAL MEDICINE

## 2019-04-28 PROCEDURE — 36415 COLL VENOUS BLD VENIPUNCTURE: CPT

## 2019-04-28 PROCEDURE — 85025 COMPLETE CBC W/AUTO DIFF WBC: CPT

## 2019-04-28 PROCEDURE — 6370000000 HC RX 637 (ALT 250 FOR IP): Performed by: INTERNAL MEDICINE

## 2019-04-28 RX ADMIN — GABAPENTIN 100 MG: 100 CAPSULE ORAL at 12:51

## 2019-04-28 RX ADMIN — CEFUROXIME AXETIL 250 MG: 250 TABLET ORAL at 09:35

## 2019-04-28 RX ADMIN — IPRATROPIUM BROMIDE AND ALBUTEROL SULFATE 1 AMPULE: .5; 3 SOLUTION RESPIRATORY (INHALATION) at 21:31

## 2019-04-28 RX ADMIN — ASPIRIN 81 MG 81 MG: 81 TABLET ORAL at 09:35

## 2019-04-28 RX ADMIN — INSULIN LISPRO 12 UNITS: 100 INJECTION, SOLUTION INTRAVENOUS; SUBCUTANEOUS at 17:00

## 2019-04-28 RX ADMIN — IPRATROPIUM BROMIDE AND ALBUTEROL SULFATE 1 AMPULE: .5; 3 SOLUTION RESPIRATORY (INHALATION) at 15:58

## 2019-04-28 RX ADMIN — GUAIFENESIN AND DEXTROMETHORPHAN 10 ML: 100; 10 SYRUP ORAL at 00:36

## 2019-04-28 RX ADMIN — METHYLPREDNISOLONE SODIUM SUCCINATE 60 MG: 125 INJECTION, POWDER, FOR SOLUTION INTRAMUSCULAR; INTRAVENOUS at 04:33

## 2019-04-28 RX ADMIN — DICLOFENAC 2 G: 10 GEL TOPICAL at 09:36

## 2019-04-28 RX ADMIN — Medication 2 PUFF: at 21:31

## 2019-04-28 RX ADMIN — METHYLPREDNISOLONE SODIUM SUCCINATE 60 MG: 125 INJECTION, POWDER, FOR SOLUTION INTRAMUSCULAR; INTRAVENOUS at 21:09

## 2019-04-28 RX ADMIN — TRIAMCINOLONE ACETONIDE: 1 CREAM TOPICAL at 09:36

## 2019-04-28 RX ADMIN — MICONAZOLE NITRATE: 2 POWDER TOPICAL at 09:36

## 2019-04-28 RX ADMIN — ISOSORBIDE MONONITRATE 60 MG: 60 TABLET, EXTENDED RELEASE ORAL at 09:35

## 2019-04-28 RX ADMIN — INSULIN LISPRO 12 UNITS: 100 INJECTION, SOLUTION INTRAVENOUS; SUBCUTANEOUS at 12:51

## 2019-04-28 RX ADMIN — LISINOPRIL 2.5 MG: 5 TABLET ORAL at 09:35

## 2019-04-28 RX ADMIN — GUAIFENESIN AND DEXTROMETHORPHAN 10 ML: 100; 10 SYRUP ORAL at 17:01

## 2019-04-28 RX ADMIN — ALPRAZOLAM 1 MG: 0.5 TABLET ORAL at 17:11

## 2019-04-28 RX ADMIN — METHYLPREDNISOLONE SODIUM SUCCINATE 60 MG: 125 INJECTION, POWDER, FOR SOLUTION INTRAMUSCULAR; INTRAVENOUS at 17:01

## 2019-04-28 RX ADMIN — CILOSTAZOL 100 MG: 100 TABLET ORAL at 09:35

## 2019-04-28 RX ADMIN — OMEPRAZOLE 40 MG: 40 CAPSULE, DELAYED RELEASE ORAL at 06:32

## 2019-04-28 RX ADMIN — DICLOFENAC 2 G: 10 GEL TOPICAL at 21:09

## 2019-04-28 RX ADMIN — MICONAZOLE NITRATE: 2 POWDER TOPICAL at 21:09

## 2019-04-28 RX ADMIN — INSULIN LISPRO 10 UNITS: 100 INJECTION, SOLUTION INTRAVENOUS; SUBCUTANEOUS at 10:39

## 2019-04-28 RX ADMIN — GLIPIZIDE 5 MG: 5 TABLET ORAL at 06:29

## 2019-04-28 RX ADMIN — TRIAMCINOLONE ACETONIDE: 1 CREAM TOPICAL at 21:10

## 2019-04-28 RX ADMIN — INSULIN LISPRO 10 UNITS: 100 INJECTION, SOLUTION INTRAVENOUS; SUBCUTANEOUS at 17:02

## 2019-04-28 RX ADMIN — CEFUROXIME AXETIL 250 MG: 250 TABLET ORAL at 21:09

## 2019-04-28 RX ADMIN — GABAPENTIN 100 MG: 100 CAPSULE ORAL at 21:09

## 2019-04-28 RX ADMIN — GLIPIZIDE 5 MG: 5 TABLET ORAL at 17:01

## 2019-04-28 RX ADMIN — INSULIN LISPRO 9 UNITS: 100 INJECTION, SOLUTION INTRAVENOUS; SUBCUTANEOUS at 09:36

## 2019-04-28 RX ADMIN — INSULIN LISPRO 7 UNITS: 100 INJECTION, SOLUTION INTRAVENOUS; SUBCUTANEOUS at 21:10

## 2019-04-28 RX ADMIN — BENZONATATE 200 MG: 100 CAPSULE ORAL at 00:36

## 2019-04-28 RX ADMIN — IPRATROPIUM BROMIDE AND ALBUTEROL SULFATE 1 AMPULE: .5; 3 SOLUTION RESPIRATORY (INHALATION) at 09:24

## 2019-04-28 RX ADMIN — CILOSTAZOL 100 MG: 100 TABLET ORAL at 21:09

## 2019-04-28 RX ADMIN — ALPRAZOLAM 1 MG: 0.5 TABLET ORAL at 22:31

## 2019-04-28 RX ADMIN — Medication 2 PUFF: at 09:24

## 2019-04-28 RX ADMIN — INSULIN GLARGINE 25 UNITS: 100 INJECTION, SOLUTION SUBCUTANEOUS at 21:10

## 2019-04-28 RX ADMIN — GUAIFENESIN AND DEXTROMETHORPHAN 10 ML: 100; 10 SYRUP ORAL at 21:09

## 2019-04-28 RX ADMIN — INSULIN LISPRO 10 UNITS: 100 INJECTION, SOLUTION INTRAVENOUS; SUBCUTANEOUS at 12:55

## 2019-04-28 RX ADMIN — GABAPENTIN 100 MG: 100 CAPSULE ORAL at 09:35

## 2019-04-28 RX ADMIN — ENOXAPARIN SODIUM 40 MG: 40 INJECTION SUBCUTANEOUS at 09:34

## 2019-04-28 RX ADMIN — METHYLPREDNISOLONE SODIUM SUCCINATE 60 MG: 125 INJECTION, POWDER, FOR SOLUTION INTRAMUSCULAR; INTRAVENOUS at 09:35

## 2019-04-28 ASSESSMENT — PAIN SCALES - GENERAL: PAINLEVEL_OUTOF10: 0

## 2019-04-28 NOTE — PROGRESS NOTES
Pt called out stating she was coughing. Pt given Tessalon Meka and Robitussin. Will continue to monitor.

## 2019-04-28 NOTE — PROGRESS NOTES
Assessment complete. VSS. Patient resting in bed. Fall precautions in place. No needs expressed at this time. Will continue to monitor.

## 2019-04-28 NOTE — PROGRESS NOTES
Shift assessment complete. VSS, scheduled meds given, call light within reach. No further needs expressed at this time.

## 2019-04-28 NOTE — PROGRESS NOTES
Pt called out saying she was nauseated. Pt requested some saltine crackers and milk. Will continue to monitor.

## 2019-04-28 NOTE — PROGRESS NOTES
Progress Note - Dr. Johnny Olivares - Internal Medicine  PCP: Hunter Bolanos MD [address] [facility] [phone]    Hospital Day: 4  Code Status: Full Code  Current Diet: DIET CARB CONTROL;        CC: follow up on medical issues    Subjective:   Manisha Alvarez is a 69 y.o. female. She denies problems    Doing ok  Pt with some chest pain yesterday - has since resolved. Trop negative    She complains of chest pain, denies shortness of breath, denies nausea,  denies emesis. 10 system Review of Systems is reviewed with patient, and pertinent positives are listed here: None . Otherwise, Review of systems is negative. I have reviewed the patient's medical and social history in detail and updated the computerized patient record. To recap: She  has a past medical history of Acute MI ([address] Acute pyelonephritis, Anxiety and depression, Arthritis, CAD (coronary artery disease), Cancer ([address] Cancer ([address] Cancer of skin of leg, Chronic obstructive pulmonary disease ([address] Claustrophobia, COPD (chronic obstructive pulmonary disease) ([address] Esophageal stricture, Gastroesophageal reflux disease without esophagitis, Hiatal hernia, Hiatal hernia, Hypercholesteremia, Hypertension, Migraines, Movement disorder, Pneumonia, PONV (postoperative nausea and vomiting), Type II or unspecified type diabetes mellitus without mention of complication, not stated as uncontrolled, and Unspecified cerebral artery occlusion with cerebral infarction. . She  has a past surgical history that includes Cardiac surgery; Gallbladder surgery; Hysterectomy; Appendectomy; Cholecystectomy; Colonoscopy; Upper gastrointestinal endoscopy (4/20/12); Endoscopy, colon, diagnostic; Tear duct surgery; Upper gastrointestinal endoscopy (06/11/2018); Colonoscopy (06/11/2018); pr exc skin malig <5mm remaindr body (N/A, 9/6/2018); pr split grft trunk,arm,leg <100sqcm (N/A, 9/6/2018); skin biopsy; and eye surgery. . She  reports that she quit smoking inhaler 2 puff, 2 puff, Inhalation, Q6H PRN  ALPRAZolam (XANAX) tablet 1 mg, 1 mg, Oral, TID PRN  aspirin chewable tablet 81 mg, 81 mg, Oral, Daily  butalbital-acetaminophen-caffeine (FIORICET, ESGIC) per tablet 1 tablet, 1 tablet, Oral, Q6H PRN  cilostazol (PLETAL) tablet 100 mg, 100 mg, Oral, BID  EFFERDENT DENTURE CLEANSER TBEF 1 each, 1 tablet, Does not apply, PRN  diclofenac sodium 1 % gel 2 g, 2 g, Topical, BID  gabapentin (NEURONTIN) capsule 100 mg, 100 mg, Oral, TID  glipiZIDE (GLUCOTROL) tablet 5 mg, 5 mg, Oral, BID AC  ipratropium-albuterol (DUONEB) nebulizer solution 3 mL, 3 mL, Inhalation, Q4H PRN  isosorbide mononitrate (IMDUR) extended release tablet 60 mg, 60 mg, Oral, Daily  lisinopril (PRINIVIL;ZESTRIL) tablet 2.5 mg, 2.5 mg, Oral, Daily  nitroGLYCERIN (NITROSTAT) SL tablet 0.4 mg, 0.4 mg, Sublingual, Q5 Min PRN  miconazole (MICOTIN) 2 % powder, , Topical, BID  triamcinolone (KENALOG) 0.1 % cream, , Topical, BID  mometasone-formoterol (DULERA) 200-5 MCG/ACT inhaler 2 puff, 2 puff, Inhalation, BID **AND** MDI Treatment, , , BID  enoxaparin (LOVENOX) injection 40 mg, 40 mg, Subcutaneous, Daily  dextrose 5 % solution, 100 mL/hr, Intravenous, PRN  cefUROXime (CEFTIN) tablet 250 mg, 250 mg, Oral, 2 times per day  glucose (GLUTOSE) 40 % oral gel 15 g, 15 g, Oral, PRN  dextrose 50 % solution 12.5 g, 12.5 g, Intravenous, PRN  glucagon (rDNA) injection 1 mg, 1 mg, Intramuscular, PRN  dextrose 5 % solution, 100 mL/hr, Intravenous, PRN  insulin lispro (HUMALOG) injection pen 0-18 Units, 0-18 Units, Subcutaneous, TID WC  insulin lispro (HUMALOG) injection pen 0-9 Units, 0-9 Units, Subcutaneous, Nightly         Objective:  /64   Pulse 75   Temp 97.9 °F (36.6 °C) (Oral)   Resp 18   Ht 5' 6\" (1.676 m)   Wt 278 lb 14.4 oz (126.5 kg)   SpO2 91%   BMI 45.02 kg/m²      Patient Vitals for the past 24 hrs:   BP Temp Temp src Pulse Resp SpO2 Weight   04/28/19 1256 129/64 97.9 °F (36.6 °C) Oral 75 18 91 % -- 08/09/2011    TROPONINI <0.01 04/27/2019       Recent Imaging Results are Reviewed:  Xr Chest Standard (2 Vw)    Result Date: 4/27/2019  EXAMINATION: TWO VIEWS OF THE CHEST 4/27/2019 2:26 pm COMPARISON: 04/24/2019 HISTORY: ORDERING SYSTEM PROVIDED HISTORY: cough TECHNOLOGIST PROVIDED HISTORY: Reason for exam:->cough Ordering Physician Provided Reason for Exam: Cough Acuity: Unknown Type of Exam: Unknown FINDINGS: Cardiomediastinal silhouette is stable. No pulmonary vascular congestion or edema. No focal pulmonary consolidation. Stable chest demonstrating no acute cardiopulmonary abnormality     Xr Chest Standard (2 Vw)    Result Date: 4/24/2019  EXAMINATION: TWO VIEWS OF THE CHEST 4/24/2019 7:41 pm COMPARISON: 11/02/2018 HISTORY: ORDERING SYSTEM PROVIDED HISTORY: Chest Discomfort TECHNOLOGIST PROVIDED HISTORY: Reason for exam:->Chest Discomfort Ordering Physician Provided Reason for Exam: Chest Discomfort Acuity: Acute Type of Exam: Initial FINDINGS: Normal cardiomediastinal silhouette. No focal consolidation. No pleural effusion or pneumothorax. Bones grossly intact. No acute process. Ct Chest Wo Contrast    Result Date: 4/24/2019  EXAMINATION: CT OF THE CHEST WITHOUT CONTRAST 4/24/2019 9:02 pm TECHNIQUE: CT of the chest was performed without the administration of intravenous contrast. Multiplanar reformatted images are provided for review. Dose modulation, iterative reconstruction, and/or weight based adjustment of the mA/kV was utilized to reduce the radiation dose to as low as reasonably achievable. COMPARISON: 09/21/2018 HISTORY: ORDERING SYSTEM PROVIDED HISTORY: pneumonia TECHNOLOGIST PROVIDED HISTORY: Ordering Physician Provided Reason for Exam: pneumonia Acuity: Acute Type of Exam: Initial Relevant Medical/Surgical History: Chest Pain (Patient in with complaints of cp for a day. States feels heavy and lots of pressure. Took nitro. no aspirin. Also having emesis.  sob noted upon triage.) FINDINGS: Mediastinum: The heart size is normal.  There is no pericardial effusion or mediastinal hematoma. Coronary artery calcifications are again seen. Aortic caliber is normal.  There is no adenopathy. Lungs/pleura: The central airways are patent. There is no pneumothorax or pleural effusion. There is progressive lingular atelectasis with anterior left lower lobe scarring. No focal infiltrate is seen to suggest pneumonia. Upper Abdomen: Limited imaging of the upper abdomen discloses no significant abnormality. Soft Tissues/Bones: No acute findings. 1. No focal infiltrate to suggest pneumonia. 2. Coronary artery disease. Assessment and Plan:  Patient Active Hospital Problem List:   PVC (premature ventricular contraction) (6/8/2016)    Assessment: Established problem. Stable. EP eval reviewed    Plan: at this time, no further workup   CAD (coronary artery disease) (5/23/2011)    Assessment: Established problem. Some CP yesterday. Workup negative then. No CP now    Plan: Continue present orders/plan. Essential hypertension (4/18/2012)    Assessment: Established problem. Stable. 129/64    Plan: Continue present orders/plan. ASHD (arteriosclerotic heart disease) ()    Assessment: Established problem. Uncontrolled. Some CP yest    Plan: cards has been called    DM2 (diabetes mellitus, type 2) ([facility] (12/3/2015)    Assessment: Established problem. Stable. Glu 300    Plan: elevated due to steroids. Cont sliding scale   Gastroesophageal reflux disease without esophagitis (3/24/2016)    Assessment: Established problem. Stable. Plan: Continue present orders/plan. Morbid obesity ([address] (6/18/2018)   COPD exacerbation ([address] (4/24/2019)    Assessment: Established problem. Stable. On steroids, hhn    Plan: continue tx. cxr reviewed   Acute bronchitis (4/25/2019)    Assessment: Established problem. Stable.   On abx    Plan: cxr to be checked today   Hyponatremia ()    Assessment: Established

## 2019-04-29 ENCOUNTER — TELEPHONE (OUTPATIENT)
Dept: CARDIOLOGY CLINIC | Age: 74
End: 2019-04-29

## 2019-04-29 VITALS
OXYGEN SATURATION: 93 % | HEART RATE: 78 BPM | DIASTOLIC BLOOD PRESSURE: 67 MMHG | BODY MASS INDEX: 44.82 KG/M2 | RESPIRATION RATE: 18 BRPM | TEMPERATURE: 97.9 F | WEIGHT: 278.9 LBS | HEIGHT: 66 IN | SYSTOLIC BLOOD PRESSURE: 134 MMHG

## 2019-04-29 LAB
ANION GAP SERPL CALCULATED.3IONS-SCNC: 9 MMOL/L (ref 3–16)
BANDED NEUTROPHILS RELATIVE PERCENT: 3 % (ref 0–7)
BASOPHILS ABSOLUTE: 0 K/UL (ref 0–0.2)
BASOPHILS RELATIVE PERCENT: 0 %
BLOOD CULTURE, ROUTINE: NORMAL
BUN BLDV-MCNC: 24 MG/DL (ref 7–20)
CALCIUM SERPL-MCNC: 9.3 MG/DL (ref 8.3–10.6)
CHLORIDE BLD-SCNC: 98 MMOL/L (ref 99–110)
CO2: 26 MMOL/L (ref 21–32)
CREAT SERPL-MCNC: 0.8 MG/DL (ref 0.6–1.2)
CULTURE, BLOOD 2: NORMAL
EOSINOPHILS ABSOLUTE: 0 K/UL (ref 0–0.6)
EOSINOPHILS RELATIVE PERCENT: 0 %
GFR AFRICAN AMERICAN: >60
GFR NON-AFRICAN AMERICAN: >60
GLUCOSE BLD-MCNC: 255 MG/DL (ref 70–99)
GLUCOSE BLD-MCNC: 321 MG/DL (ref 70–99)
GLUCOSE BLD-MCNC: 429 MG/DL (ref 70–99)
HCT VFR BLD CALC: 43.7 % (ref 36–48)
HEMOGLOBIN: 14.5 G/DL (ref 12–16)
LYMPHOCYTES ABSOLUTE: 0.8 K/UL (ref 1–5.1)
LYMPHOCYTES RELATIVE PERCENT: 6 %
MCH RBC QN AUTO: 29.7 PG (ref 26–34)
MCHC RBC AUTO-ENTMCNC: 33.2 G/DL (ref 31–36)
MCV RBC AUTO: 89.4 FL (ref 80–100)
MONOCYTES ABSOLUTE: 0.5 K/UL (ref 0–1.3)
MONOCYTES RELATIVE PERCENT: 4 %
NEUTROPHILS ABSOLUTE: 12.1 K/UL (ref 1.7–7.7)
NEUTROPHILS RELATIVE PERCENT: 87 %
PDW BLD-RTO: 14.3 % (ref 12.4–15.4)
PERFORMED ON: ABNORMAL
PERFORMED ON: ABNORMAL
PLATELET # BLD: ABNORMAL K/UL (ref 135–450)
PLATELET SLIDE REVIEW: ABNORMAL
PMV BLD AUTO: ABNORMAL FL (ref 5–10.5)
POTASSIUM SERPL-SCNC: 4.8 MMOL/L (ref 3.5–5.1)
RBC # BLD: 4.89 M/UL (ref 4–5.2)
RBC # BLD: NORMAL 10*6/UL
SLIDE REVIEW: ABNORMAL
SODIUM BLD-SCNC: 133 MMOL/L (ref 136–145)
WBC # BLD: 13.4 K/UL (ref 4–11)

## 2019-04-29 PROCEDURE — 6360000002 HC RX W HCPCS: Performed by: INTERNAL MEDICINE

## 2019-04-29 PROCEDURE — 94760 N-INVAS EAR/PLS OXIMETRY 1: CPT

## 2019-04-29 PROCEDURE — 36415 COLL VENOUS BLD VENIPUNCTURE: CPT

## 2019-04-29 PROCEDURE — 99233 SBSQ HOSP IP/OBS HIGH 50: CPT | Performed by: INTERNAL MEDICINE

## 2019-04-29 PROCEDURE — 6370000000 HC RX 637 (ALT 250 FOR IP): Performed by: INTERNAL MEDICINE

## 2019-04-29 PROCEDURE — 85025 COMPLETE CBC W/AUTO DIFF WBC: CPT

## 2019-04-29 PROCEDURE — 80048 BASIC METABOLIC PNL TOTAL CA: CPT

## 2019-04-29 PROCEDURE — 94640 AIRWAY INHALATION TREATMENT: CPT

## 2019-04-29 PROCEDURE — 2700000000 HC OXYGEN THERAPY PER DAY

## 2019-04-29 RX ORDER — BENZONATATE 200 MG/1
200 CAPSULE ORAL 3 TIMES DAILY PRN
Qty: 21 CAPSULE | Refills: 0 | Status: SHIPPED | OUTPATIENT
Start: 2019-04-29 | End: 2019-05-06

## 2019-04-29 RX ORDER — PREDNISONE 10 MG/1
TABLET ORAL
Qty: 40 TABLET | Refills: 0 | Status: SHIPPED | OUTPATIENT
Start: 2019-04-29 | End: 2019-04-29 | Stop reason: SDUPTHER

## 2019-04-29 RX ORDER — CEFUROXIME AXETIL 250 MG/1
250 TABLET ORAL EVERY 12 HOURS SCHEDULED
Qty: 20 TABLET | Refills: 0 | Status: SHIPPED | OUTPATIENT
Start: 2019-04-29 | End: 2019-05-09

## 2019-04-29 RX ORDER — PREDNISONE 10 MG/1
TABLET ORAL
Qty: 40 TABLET | Refills: 0 | Status: SHIPPED | OUTPATIENT
Start: 2019-04-29 | End: 2019-05-09

## 2019-04-29 RX ADMIN — ENOXAPARIN SODIUM 40 MG: 40 INJECTION SUBCUTANEOUS at 08:32

## 2019-04-29 RX ADMIN — Medication 2 PUFF: at 08:27

## 2019-04-29 RX ADMIN — METHYLPREDNISOLONE SODIUM SUCCINATE 60 MG: 125 INJECTION, POWDER, FOR SOLUTION INTRAMUSCULAR; INTRAVENOUS at 12:34

## 2019-04-29 RX ADMIN — ASPIRIN 81 MG 81 MG: 81 TABLET ORAL at 08:32

## 2019-04-29 RX ADMIN — INSULIN LISPRO 9 UNITS: 100 INJECTION, SOLUTION INTRAVENOUS; SUBCUTANEOUS at 16:46

## 2019-04-29 RX ADMIN — GUAIFENESIN AND DEXTROMETHORPHAN 10 ML: 100; 10 SYRUP ORAL at 05:03

## 2019-04-29 RX ADMIN — GLIPIZIDE 5 MG: 5 TABLET ORAL at 04:57

## 2019-04-29 RX ADMIN — NITROGLYCERIN 0.4 MG: 0.4 TABLET, ORALLY DISINTEGRATING SUBLINGUAL at 08:08

## 2019-04-29 RX ADMIN — MICONAZOLE NITRATE: 2 POWDER TOPICAL at 06:00

## 2019-04-29 RX ADMIN — TRAMADOL HYDROCHLORIDE 50 MG: 50 TABLET, FILM COATED ORAL at 03:06

## 2019-04-29 RX ADMIN — LISINOPRIL 2.5 MG: 5 TABLET ORAL at 08:33

## 2019-04-29 RX ADMIN — IPRATROPIUM BROMIDE AND ALBUTEROL SULFATE 1 AMPULE: .5; 3 SOLUTION RESPIRATORY (INHALATION) at 08:26

## 2019-04-29 RX ADMIN — METHYLPREDNISOLONE SODIUM SUCCINATE 60 MG: 125 INJECTION, POWDER, FOR SOLUTION INTRAMUSCULAR; INTRAVENOUS at 04:57

## 2019-04-29 RX ADMIN — GABAPENTIN 100 MG: 100 CAPSULE ORAL at 08:32

## 2019-04-29 RX ADMIN — CEFUROXIME AXETIL 250 MG: 250 TABLET ORAL at 08:33

## 2019-04-29 RX ADMIN — DICLOFENAC 2 G: 10 GEL TOPICAL at 08:34

## 2019-04-29 RX ADMIN — IPRATROPIUM BROMIDE AND ALBUTEROL SULFATE 1 AMPULE: .5; 3 SOLUTION RESPIRATORY (INHALATION) at 11:59

## 2019-04-29 RX ADMIN — IPRATROPIUM BROMIDE AND ALBUTEROL SULFATE 1 AMPULE: .5; 3 SOLUTION RESPIRATORY (INHALATION) at 16:06

## 2019-04-29 RX ADMIN — CILOSTAZOL 100 MG: 100 TABLET ORAL at 08:32

## 2019-04-29 RX ADMIN — INSULIN LISPRO 12 UNITS: 100 INJECTION, SOLUTION INTRAVENOUS; SUBCUTANEOUS at 08:09

## 2019-04-29 RX ADMIN — METHYLPREDNISOLONE SODIUM SUCCINATE 60 MG: 125 INJECTION, POWDER, FOR SOLUTION INTRAMUSCULAR; INTRAVENOUS at 16:19

## 2019-04-29 RX ADMIN — INSULIN LISPRO 10 UNITS: 100 INJECTION, SOLUTION INTRAVENOUS; SUBCUTANEOUS at 16:47

## 2019-04-29 RX ADMIN — ISOSORBIDE MONONITRATE 60 MG: 60 TABLET, EXTENDED RELEASE ORAL at 08:32

## 2019-04-29 RX ADMIN — ALPRAZOLAM 1 MG: 0.5 TABLET ORAL at 08:06

## 2019-04-29 RX ADMIN — ALPRAZOLAM 1 MG: 0.5 TABLET ORAL at 16:19

## 2019-04-29 RX ADMIN — INSULIN LISPRO 10 UNITS: 100 INJECTION, SOLUTION INTRAVENOUS; SUBCUTANEOUS at 08:12

## 2019-04-29 RX ADMIN — INSULIN LISPRO 10 UNITS: 100 INJECTION, SOLUTION INTRAVENOUS; SUBCUTANEOUS at 12:37

## 2019-04-29 RX ADMIN — OMEPRAZOLE 40 MG: 40 CAPSULE, DELAYED RELEASE ORAL at 04:58

## 2019-04-29 RX ADMIN — INSULIN LISPRO 12 UNITS: 100 INJECTION, SOLUTION INTRAVENOUS; SUBCUTANEOUS at 12:35

## 2019-04-29 RX ADMIN — GLIPIZIDE 5 MG: 5 TABLET ORAL at 16:19

## 2019-04-29 RX ADMIN — GABAPENTIN 100 MG: 100 CAPSULE ORAL at 16:19

## 2019-04-29 RX ADMIN — TRIAMCINOLONE ACETONIDE: 1 CREAM TOPICAL at 08:35

## 2019-04-29 ASSESSMENT — PAIN DESCRIPTION - DESCRIPTORS: DESCRIPTORS: POUNDING

## 2019-04-29 ASSESSMENT — PAIN DESCRIPTION - ORIENTATION
ORIENTATION: ANTERIOR;POSTERIOR
ORIENTATION: LEFT

## 2019-04-29 ASSESSMENT — PAIN DESCRIPTION - LOCATION
LOCATION: CHEST
LOCATION: KNEE

## 2019-04-29 ASSESSMENT — PAIN SCALES - GENERAL
PAINLEVEL_OUTOF10: 9
PAINLEVEL_OUTOF10: 10

## 2019-04-29 ASSESSMENT — PAIN DESCRIPTION - PAIN TYPE
TYPE: CHRONIC PAIN
TYPE: ACUTE PAIN

## 2019-04-29 NOTE — PROGRESS NOTES
Pt c/o chest pain 9/10, pounding in front and back. VSS. Oxygen replaced. Xanax and nitro given. Will monitor.

## 2019-04-29 NOTE — PLAN OF CARE
Problem: Falls - Risk of:  Goal: Will remain free from falls  Description  Will remain free from falls  Outcome: Ongoing  Patient will remain free of falls during this shift. Bed will remain in lowest, locked position. Patient wearing non-skid footwear. Patient encouraged to call nurse when needing to ambulate. Problem: Serum Glucose Level - Abnormal:  Goal: Ability to maintain appropriate glucose levels will improve  Description  Ability to maintain appropriate glucose levels will improve  Outcome: Ongoing  RN will monitor blood glucose levels during this shift and assess for signs and symptoms of hyper/hypoglycemia. RN will manage insulin administration this shift as needed.

## 2019-04-29 NOTE — DISCHARGE SUMMARY
SYSTEM PROVIDED HISTORY: pneumonia TECHNOLOGIST PROVIDED HISTORY: Ordering Physician Provided Reason for Exam: pneumonia Acuity: Acute Type of Exam: Initial Relevant Medical/Surgical History: Chest Pain (Patient in with complaints of cp for a day. States feels heavy and lots of pressure. Took nitro. no aspirin. Also having emesis. sob noted upon triage.) FINDINGS: Mediastinum: The heart size is normal.  There is no pericardial effusion or mediastinal hematoma. Coronary artery calcifications are again seen. Aortic caliber is normal.  There is no adenopathy. Lungs/pleura: The central airways are patent. There is no pneumothorax or pleural effusion. There is progressive lingular atelectasis with anterior left lower lobe scarring. No focal infiltrate is seen to suggest pneumonia. Upper Abdomen: Limited imaging of the upper abdomen discloses no significant abnormality. Soft Tissues/Bones: No acute findings. 1. No focal infiltrate to suggest pneumonia. 2. Coronary artery disease.          Discharge Exam:  BP (!) 141/72   Pulse 75   Temp 97.8 °F (36.6 °C) (Oral)   Resp 18   Ht 5' 6\" (1.676 m)   Wt 278 lb 14.4 oz (126.5 kg)   SpO2 92%   BMI 45.02 kg/m²   General appearance: alert, appears stated age and cooperative  Head: Normocephalic, without obvious abnormality, atraumatic  Lungs: clear to auscultation bilaterally  Heart: regular rate and rhythm, S1, S2 normal, no murmur, click, rub or gallop  Abdomen: soft, non-tender; bowel sounds normal; no masses,  no organomegaly  Extremities: extremities normal, atraumatic, no cyanosis or edema  Pulses: 2+ and symmetric    Disposition: home    Condition: stable    Discharge Medications:   Rita Lyman   Home Medication Instructions R:[id number]    Printed on:04/29/19 0856   Medication Information                      albuterol sulfate HFA (VENTOLIN HFA) 108 (90 Base) MCG/ACT inhaler  Inhale 2 puffs into the lungs every 6 hours as needed for Wheezing ALPRAZolam (XANAX) 1 MG tablet  Take 1 tablet by mouth 3 times daily as needed for Anxiety for up to 30 days. aspirin 81 MG tablet  Take 81 mg by mouth daily             benzonatate (TESSALON) 200 MG capsule  Take 1 capsule by mouth 3 times daily as needed for Cough             blood glucose test strips (ASCENSIA AUTODISC VI;ONE TOUCH ULTRA TEST VI) strip  1 each by In Vitro route 3 times daily As needed. butalbital-acetaminophen-caffeine (FIORICET, ESGIC) -40 MG per tablet  Take 1 tablet by mouth every 6 hours as needed for Headaches             cefUROXime (CEFTIN) 250 MG tablet  Take 1 tablet by mouth every 12 hours for 10 days             cilostazol (PLETAL) 100 MG tablet  Take 1 tablet by mouth 2 times daily             Denture Care Products (EFFERDENT DENTURE CLEANSER) TBEF  1 tablet by Does not apply route as needed (denture cleaning)             Diapers & Supplies (HUGGIES PULL-UPS 4T-5T) MISC  8 each by Does not apply route daily             diclofenac sodium 1 % GEL  Apply 2 g topically 2 times daily             Elastic Bandages & Supports (JOBST FOR MEN 30-40MMHG LG) MISC  20-30 mm by Does not apply route daily Please measure for knee hi hose             gabapentin (NEURONTIN) 100 MG capsule  Take 1 capsule by mouth 3 times daily for 30 days.              glipiZIDE (GLUCOTROL) 5 MG tablet  Take 1 tablet by mouth 2 times daily (before meals)             Incontinence Supply Disposable (PREVAIL WET WIPES) MISC  8 each by Does not apply route daily             ipratropium-albuterol (DUONEB) 0.5-2.5 (3) MG/3ML SOLN nebulizer solution  Inhale 3 mLs into the lungs every 4 hours (while awake)             isosorbide mononitrate (IMDUR) 60 MG extended release tablet  Take 1 tablet by mouth daily             lisinopril (PRINIVIL;ZESTRIL) 2.5 MG tablet  Take 1 tablet by mouth daily             nitroGLYCERIN (NITROSTAT) 0.4 MG SL tablet  Place 1 tablet under the tongue every 5 minutes as needed for Chest pain. nystatin (MYCOSTATIN) [id number] UNIT/GM powder  Affected area             omeprazole (PRILOSEC) 40 MG delayed release capsule  Take 1 capsule by mouth daily             OXYGEN  Inhale 2 L/min into the lungs as needed Uses when O2 Sat is below 90             predniSONE (DELTASONE) 10 MG tablet  4 tab daily x 4d, then 3 tab daily x 4d, then 2 tab daily x 4d, then 1 tab daily x 4d             Respiratory Therapy Supplies (NEBULIZER)  by Does not apply route. PRN BREATHING TREATMENTS, UNSURE OF MED              triamcinolone (KENALOG) 0.1 % cream  Apply topically 2 times daily. Allergies: Allergies   Allergen Reactions    Morphine Shortness Of Breath    Codeine Other (See Comments)     Chest pain    Hydromorphone Other (See Comments)     hallucinations  Hallucinations    But will take if needed in an emergency    Levaquin [Levofloxacin In D5w] Itching    Vicodin [Hydrocodone-Acetaminophen] Hives       Follow up Instructions: Follow-up with PCP: Amilcar Silver MD in 2 wk .       Total time spent on day of discharge including face-to-face visit, examination, documentation, counseling, preparation of discharge plans and followup, and discharge medicine reconciliation and presciptions is 34 minutes    Signed:  Karthik Sheriff MD  4/29/2019

## 2019-04-29 NOTE — PROGRESS NOTES
VSS; respirations even, easy, and slightly labored. Wheezes and rhonchi noted in all lung fields, see flowsheets. Scheduled medications administered. IV flushed. Friend at bedside. Pt repositions self. Shift assessment complete, pt A&OX4. No further needs expressed. Call light within reach. Will continue to monitor.

## 2019-04-29 NOTE — TELEPHONE ENCOUNTER
Getting out of the hospital today and wants to know if she really needs to keep the appt she has for Thursday with DGB ?  She just saw DGB while she was in the hospital

## 2019-04-29 NOTE — PROGRESS NOTES
Prn Tramadol administered for c/o pain in left knee. Warm blanket wrapped around patient's knee to help with pain. Pt repositioned self in bed. No further needs expressed. Call light within reach. Will continue to monitor.

## 2019-04-29 NOTE — PROGRESS NOTES
Routine VSS. Patient in bed. No further needs expressed. Call light within reach. Will continue to monitor.

## 2019-04-29 NOTE — PROGRESS NOTES
Patient incontinent of stool in bed by accident. Complete linen change provided. Prn xanax administered for c/o anxiety. Pt reports BM. All belongings within reach. No further needs expressed. Call light within reach. Will continue to monitor.

## 2019-04-29 NOTE — PROGRESS NOTES
[facility] [address]   Electrophysiology Progress Note     Admit Date: 2019     Reason for follow up: pvc    HPI and Interval History:   Patient seen and examined. Clinical notes reviewed. Telemetry reviewed. No new complaint today. No major events overnight. Denies having chest pain, shortness of breath, dyspnea on exertion, Orthopnea, PND at the time of this visit. Feeling better  Fewer PVC  Review of System:  All other systems reviewed and are negative except for that noted above. Pertinent negatives are:     · General: negative for fever, chills   · Ophthalmic ROS: negative for - eye pain or loss of vision  · ENT ROS: negative for - headaches, sore throat   · Respiratory: negative for - cough, sputum  · Cardiovascular: Reviewed in HPI  · Gastrointestinal: negative for - abdominal pain, diarrhea, N/V  · Hematology: negative for - bleeding, blood clots, bruising or jaundice  · Genito-Urinary:  negative for - Dysuria or incontinence  · Musculoskeletal: negative for - Joint swelling, muscle pain  · Neurological: negative for - confusion, dizziness, headaches   · Psychiatric: No anxiety, no depression. · Dermatological: negative for - rash      Physical Examination:  Vitals:    19 0800   BP: (!) 141/72   Pulse: 75   Resp: 20   Temp: 97.8 °F (36.6 °C)   SpO2: 93%      No intake/output data recorded. Wt Readings from Last 3 Encounters:   19 278 lb 14.4 oz (126.5 kg)   19 279 lb (126.6 kg)   04/15/19 279 lb (126.6 kg)     Temp  Av.9 °F (36.6 °C)  Min: 97.5 °F (36.4 °C)  Max: 98.5 °F (36.9 °C)  Pulse  Av.8  Min: 64  Max: 97  BP  Min: 129/64  Max: 154/61  SpO2  Av.2 %  Min: 90 %  Max: 96 %  No intake or output data in the 24 hours ending 19 0807    · Telemetry: Sinus rhythm   · Constitutional: Oriented. No distress. obese  · Head: Normocephalic and atraumatic.    · Mouth/Throat: Oropharynx is clear and moist.   · Eyes: Conjunctivae normal. EOM are normal.   · Neck: Neck supple. No rigidity. No JVD present. · Cardiovascular: Normal rate, regular rhythm, S1&S2. · Pulmonary/Chest: Bilateral respiratory sounds. No wheezes, No rhonchi. · Abdominal: Soft. Bowel sounds present. No distension, No tenderness. · Musculoskeletal: No tenderness. No edema    · Lymphadenopathy: Has no cervical adenopathy. · Neurological: Alert and oriented. Cranial nerve appears intact, No Gross deficit   · Skin: Skin is warm and dry. No rash noted. · Psychiatric: Has a normal behavior     Labs, diagnostic and imaging results reviewed. Reviewed. Recent Labs     04/27/19  1837 04/28/19  0640 04/29/19  0558   * 131* 133*   K 4.8 4.9 4.8   CL 94* 96* 98*   CO2 23 26 26   BUN 24* 24* 24*   CREATININE 0.9 0.8 0.8     Recent Labs     04/28/19  0640 04/29/19  0558   WBC 14.5* 13.4*   HGB 14.4 14.5   HCT 44.0 43.7   MCV 88.8 89.4    see below     Lab Results   Component Value Date    CKTOTAL 24 06/07/2018    CKMB 0.29 08/09/2011    TROPONINI <0.01 04/27/2019     Estimated Creatinine Clearance: 85 mL/min (based on SCr of 0.8 mg/dL).    Lab Results   Component Value Date    BNP <15 04/18/2012    BNP <15 12/22/2010     Lab Results   Component Value Date    PROTIME 11.9 04/24/2019    PROTIME 12.2 09/21/2018    PROTIME 11.5 08/20/2018    INR 1.04 04/24/2019    INR 1.07 09/21/2018    INR 1.01 08/20/2018     Lab Results   Component Value Date    CHOL 172 09/22/2018    HDL 26 09/22/2018    HDL 32 04/19/2012    TRIG 284 09/22/2018       Scheduled Meds:   ipratropium-albuterol  1 ampule Inhalation 4x daily    methylPREDNISolone  60 mg Intravenous Q6H    insulin glargine  25 Units Subcutaneous Nightly    insulin lispro  10 Units Subcutaneous TID WC    omeprazole  40 mg Oral Daily    aspirin  81 mg Oral Daily    cilostazol  100 mg Oral BID    diclofenac sodium  2 g Topical BID    gabapentin  100 mg Oral TID    glipiZIDE  5 mg Oral BID AC    isosorbide mononitrate  60 mg Oral Daily    lisinopril  2.5 mg Oral Daily    miconazole   Topical BID    triamcinolone   Topical BID    mometasone-formoterol  2 puff Inhalation BID    enoxaparin  40 mg Subcutaneous Daily    cefUROXime  250 mg Oral 2 times per day    insulin lispro  0-18 Units Subcutaneous TID     insulin lispro  0-9 Units Subcutaneous Nightly     Continuous Infusions:   dextrose      dextrose       PRN Meds:potassium chloride **OR** potassium alternative oral replacement **OR** potassium chloride, traMADol, calcium carbonate, benzonatate, guaiFENesin-dextromethorphan, albuterol sulfate HFA, ALPRAZolam, butalbital-acetaminophen-caffeine, EFFERDENT DENTURE CLEANSER, ipratropium-albuterol, nitroGLYCERIN, dextrose, glucose, dextrose, glucagon (rDNA), dextrose     Patient Active Problem List    Diagnosis Date Noted    HTN (hypertension), benign      Priority: High    CAD (coronary artery disease) 05/23/2011     Priority: High    Hyperlipemia 05/23/2011     Priority: High    Hyponatremia     Acute bronchitis 04/25/2019    COPD exacerbation (HCC) 04/24/2019    Precordial pain 09/21/2018    Squamous cell carcinoma of head and neck ([facility] 09/06/2018    Morbid obesity ([facility] 06/18/2018    Functional diarrhea 06/08/2018    Anxiety 01/19/2018    Controlled type 2 diabetes mellitus without complication, without long-term current use of insulin ([facility] 01/13/2017    PVC (premature ventricular contraction) 06/08/2016    Dizziness 06/08/2016    Light headedness 06/08/2016    Primary osteoarthritis involving multiple joints 03/24/2016    Gastroesophageal reflux disease without esophagitis 03/24/2016    DM2 (diabetes mellitus, type 2) ([facility] 12/03/2015    ASHD (arteriosclerotic heart disease)     Malignant hypertension 11/07/2014    Essential hypertension 04/18/2012    Type II or unspecified type diabetes mellitus without mention of complication, uncontrolled 04/18/2012      Active Hospital Problems    Diagnosis Date Noted    CAD (coronary artery disease) [I25.10] 05/23/2011     Priority: High    Hyponatremia [E87.1]     Acute bronchitis [J20.9] 04/25/2019    COPD exacerbation ([facility] [J44.1] 04/24/2019    Morbid obesity ([facility] [E66.01] 06/18/2018    Functional diarrhea [K59.1] 06/08/2018    PVC (premature ventricular contraction) [I49.3] 06/08/2016    Gastroesophageal reflux disease without esophagitis [K21.9] 03/24/2016    DM2 (diabetes mellitus, type 2) ([facility] [E11.9] 12/03/2015    ASHD (arteriosclerotic heart disease) [I25.10]     Essential hypertension [I10] 04/18/2012       Assessment:       Plan:     -  PVC              Holter in the past showed 4.6% burden. Stress test was normal last year  I would repeat the Holter as outpatient once current issue is resolved  Unless symptomatic or associated with LV dysfunction, no further work up is needed. Frequency is lower as her overall condition has improved     - CAD              Hx of remote MI              Normal stress test     -COPD exacerbation              On inhaler, steroid and antibiotics    improved  - HTN              BP is acceptablycontrolled. Continue current meds.      - Hyponatremia              Adequate intake     - Obesity              Diet and exercise        NOTE: This report was transcribed using voice recognition software. Every effort was made to ensure accuracy, however, inadvertent computerized transcription errors may be present.

## 2019-04-30 NOTE — CARE COORDINATION
Received a call stating pt needed home care for RN to assist w/new insulin. Per discussion w/charge Rn regarding insulin and home care order, AVS does not indicate that pt would d/c w/new insulin-was provided insulin d/t sugars increasing d/t steroids pt was on. No home care orders put in at this time.   Electronically signed by PIOTR Chacon on 4/30/2019 at 3:09 PM

## 2019-05-02 LAB
GLUCOSE BLD-MCNC: 344 MG/DL (ref 70–99)
PERFORMED ON: ABNORMAL

## 2019-07-24 ENCOUNTER — APPOINTMENT (OUTPATIENT)
Dept: CT IMAGING | Age: 74
End: 2019-07-24
Payer: COMMERCIAL

## 2019-07-24 ENCOUNTER — APPOINTMENT (OUTPATIENT)
Dept: GENERAL RADIOLOGY | Age: 74
End: 2019-07-24
Payer: COMMERCIAL

## 2019-07-24 ENCOUNTER — HOSPITAL ENCOUNTER (OUTPATIENT)
Age: 74
Setting detail: OBSERVATION
Discharge: HOME | End: 2019-07-26
Attending: EMERGENCY MEDICINE | Admitting: INTERNAL MEDICINE
Payer: COMMERCIAL

## 2019-07-24 DIAGNOSIS — R07.9 CHEST PAIN, UNSPECIFIED TYPE: ICD-10-CM

## 2019-07-24 DIAGNOSIS — R42 DIZZINESS: Primary | ICD-10-CM

## 2019-07-24 LAB
A/G RATIO: 0.9 (ref 1.1–2.2)
ALBUMIN SERPL-MCNC: 3.3 G/DL (ref 3.4–5)
ALP BLD-CCNC: 75 U/L (ref 40–129)
ALT SERPL-CCNC: 19 U/L (ref 10–40)
ANION GAP SERPL CALCULATED.3IONS-SCNC: 13 MMOL/L (ref 3–16)
AST SERPL-CCNC: 25 U/L (ref 15–37)
BASOPHILS ABSOLUTE: 0.2 K/UL (ref 0–0.2)
BASOPHILS RELATIVE PERCENT: 1.5 %
BILIRUB SERPL-MCNC: <0.2 MG/DL (ref 0–1)
BUN BLDV-MCNC: 12 MG/DL (ref 7–20)
CALCIUM SERPL-MCNC: 9.5 MG/DL (ref 8.3–10.6)
CHLORIDE BLD-SCNC: 100 MMOL/L (ref 99–110)
CO2: 21 MMOL/L (ref 21–32)
CREAT SERPL-MCNC: 0.8 MG/DL (ref 0.6–1.2)
EOSINOPHILS ABSOLUTE: 0.1 K/UL (ref 0–0.6)
EOSINOPHILS RELATIVE PERCENT: 0.9 %
GFR AFRICAN AMERICAN: >60
GFR NON-AFRICAN AMERICAN: >60
GLOBULIN: 3.6 G/DL
GLUCOSE BLD-MCNC: 171 MG/DL (ref 70–99)
HCT VFR BLD CALC: 48.3 % (ref 36–48)
HEMOGLOBIN: 15.9 G/DL (ref 12–16)
LYMPHOCYTES ABSOLUTE: 3 K/UL (ref 1–5.1)
LYMPHOCYTES RELATIVE PERCENT: 27.9 %
MCH RBC QN AUTO: 30 PG (ref 26–34)
MCHC RBC AUTO-ENTMCNC: 33 G/DL (ref 31–36)
MCV RBC AUTO: 90.9 FL (ref 80–100)
MONOCYTES ABSOLUTE: 0.6 K/UL (ref 0–1.3)
MONOCYTES RELATIVE PERCENT: 5.4 %
NEUTROPHILS ABSOLUTE: 7 K/UL (ref 1.7–7.7)
NEUTROPHILS RELATIVE PERCENT: 64.3 %
PDW BLD-RTO: 15 % (ref 12.4–15.4)
PLATELET # BLD: 205 K/UL (ref 135–450)
PMV BLD AUTO: 8.5 FL (ref 5–10.5)
POTASSIUM SERPL-SCNC: 4.6 MMOL/L (ref 3.5–5.1)
RBC # BLD: 5.31 M/UL (ref 4–5.2)
SODIUM BLD-SCNC: 134 MMOL/L (ref 136–145)
TOTAL PROTEIN: 6.9 G/DL (ref 6.4–8.2)
TROPONIN: <0.01 NG/ML
WBC # BLD: 10.9 K/UL (ref 4–11)

## 2019-07-24 PROCEDURE — 70450 CT HEAD/BRAIN W/O DYE: CPT

## 2019-07-24 PROCEDURE — 80053 COMPREHEN METABOLIC PANEL: CPT

## 2019-07-24 PROCEDURE — 93005 ELECTROCARDIOGRAM TRACING: CPT | Performed by: EMERGENCY MEDICINE

## 2019-07-24 PROCEDURE — 6370000000 HC RX 637 (ALT 250 FOR IP): Performed by: EMERGENCY MEDICINE

## 2019-07-24 PROCEDURE — 85025 COMPLETE CBC W/AUTO DIFF WBC: CPT

## 2019-07-24 PROCEDURE — 99285 EMERGENCY DEPT VISIT HI MDM: CPT

## 2019-07-24 PROCEDURE — 70160 X-RAY EXAM OF NASAL BONES: CPT

## 2019-07-24 PROCEDURE — 84484 ASSAY OF TROPONIN QUANT: CPT

## 2019-07-24 RX ORDER — ALPRAZOLAM 0.5 MG/1
1 TABLET ORAL ONCE
Status: COMPLETED | OUTPATIENT
Start: 2019-07-24 | End: 2019-07-24

## 2019-07-24 RX ORDER — ASPIRIN 325 MG
325 TABLET ORAL ONCE
Status: COMPLETED | OUTPATIENT
Start: 2019-07-24 | End: 2019-07-24

## 2019-07-24 RX ORDER — LORAZEPAM 2 MG/ML
1 INJECTION INTRAMUSCULAR ONCE
Status: DISCONTINUED | OUTPATIENT
Start: 2019-07-24 | End: 2019-07-24

## 2019-07-24 RX ORDER — MECLIZINE HCL 12.5 MG/1
25 TABLET ORAL ONCE
Status: COMPLETED | OUTPATIENT
Start: 2019-07-24 | End: 2019-07-24

## 2019-07-24 RX ADMIN — ALPRAZOLAM 1 MG: 0.5 TABLET ORAL at 21:17

## 2019-07-24 RX ADMIN — MECLIZINE 25 MG: 12.5 TABLET ORAL at 21:11

## 2019-07-24 RX ADMIN — ASPIRIN 325 MG ORAL TABLET 325 MG: 325 PILL ORAL at 23:30

## 2019-07-24 ASSESSMENT — HEART SCORE: ECG: 0

## 2019-07-24 ASSESSMENT — PAIN SCALES - GENERAL: PAINLEVEL_OUTOF10: 9

## 2019-07-25 ENCOUNTER — APPOINTMENT (OUTPATIENT)
Dept: MRI IMAGING | Age: 74
End: 2019-07-25
Payer: COMMERCIAL

## 2019-07-25 LAB
EKG ATRIAL RATE: 73 BPM
EKG DIAGNOSIS: NORMAL
EKG P AXIS: -26 DEGREES
EKG P-R INTERVAL: 174 MS
EKG Q-T INTERVAL: 398 MS
EKG QRS DURATION: 106 MS
EKG QTC CALCULATION (BAZETT): 438 MS
EKG R AXIS: -66 DEGREES
EKG T AXIS: 23 DEGREES
EKG VENTRICULAR RATE: 73 BPM
GLUCOSE BLD-MCNC: 139 MG/DL (ref 70–99)
GLUCOSE BLD-MCNC: 162 MG/DL (ref 70–99)
GLUCOSE BLD-MCNC: 178 MG/DL (ref 70–99)
GLUCOSE BLD-MCNC: 183 MG/DL (ref 70–99)
LEFT VENTRICULAR EJECTION FRACTION HIGH VALUE: 65 %
LEFT VENTRICULAR EJECTION FRACTION MODE: NORMAL
LV EF: 65 %
LVEF MODALITY: NORMAL
PERFORMED ON: ABNORMAL
TROPONIN: <0.01 NG/ML

## 2019-07-25 PROCEDURE — 97166 OT EVAL MOD COMPLEX 45 MIN: CPT

## 2019-07-25 PROCEDURE — G0378 HOSPITAL OBSERVATION PER HR: HCPCS

## 2019-07-25 PROCEDURE — 84484 ASSAY OF TROPONIN QUANT: CPT

## 2019-07-25 PROCEDURE — 6370000000 HC RX 637 (ALT 250 FOR IP): Performed by: NURSE PRACTITIONER

## 2019-07-25 PROCEDURE — 97162 PT EVAL MOD COMPLEX 30 MIN: CPT

## 2019-07-25 PROCEDURE — 99223 1ST HOSP IP/OBS HIGH 75: CPT | Performed by: INTERNAL MEDICINE

## 2019-07-25 PROCEDURE — 1200000000 HC SEMI PRIVATE

## 2019-07-25 PROCEDURE — 96372 THER/PROPH/DIAG INJ SC/IM: CPT

## 2019-07-25 PROCEDURE — 93010 ELECTROCARDIOGRAM REPORT: CPT | Performed by: INTERNAL MEDICINE

## 2019-07-25 PROCEDURE — 97530 THERAPEUTIC ACTIVITIES: CPT

## 2019-07-25 PROCEDURE — 6360000002 HC RX W HCPCS: Performed by: NURSE PRACTITIONER

## 2019-07-25 PROCEDURE — 94760 N-INVAS EAR/PLS OXIMETRY 1: CPT

## 2019-07-25 PROCEDURE — 93306 TTE W/DOPPLER COMPLETE: CPT

## 2019-07-25 PROCEDURE — 6370000000 HC RX 637 (ALT 250 FOR IP): Performed by: INTERNAL MEDICINE

## 2019-07-25 PROCEDURE — 70551 MRI BRAIN STEM W/O DYE: CPT

## 2019-07-25 PROCEDURE — 36415 COLL VENOUS BLD VENIPUNCTURE: CPT

## 2019-07-25 PROCEDURE — 97535 SELF CARE MNGMENT TRAINING: CPT

## 2019-07-25 PROCEDURE — 2580000003 HC RX 258: Performed by: NURSE PRACTITIONER

## 2019-07-25 RX ORDER — TRIAMCINOLONE ACETONIDE 1 MG/G
CREAM TOPICAL 2 TIMES DAILY
Status: DISCONTINUED | OUTPATIENT
Start: 2019-07-25 | End: 2019-07-26 | Stop reason: HOSPADM

## 2019-07-25 RX ORDER — IPRATROPIUM BROMIDE AND ALBUTEROL SULFATE 2.5; .5 MG/3ML; MG/3ML
1 SOLUTION RESPIRATORY (INHALATION) EVERY 4 HOURS PRN
Status: DISCONTINUED | OUTPATIENT
Start: 2019-07-25 | End: 2019-07-26 | Stop reason: HOSPADM

## 2019-07-25 RX ORDER — POTASSIUM CHLORIDE 7.45 MG/ML
10 INJECTION INTRAVENOUS PRN
Status: DISCONTINUED | OUTPATIENT
Start: 2019-07-25 | End: 2019-07-26 | Stop reason: HOSPADM

## 2019-07-25 RX ORDER — ALPRAZOLAM 0.5 MG/1
1 TABLET ORAL 3 TIMES DAILY PRN
Status: DISCONTINUED | OUTPATIENT
Start: 2019-07-25 | End: 2019-07-26 | Stop reason: HOSPADM

## 2019-07-25 RX ORDER — DEXTROSE MONOHYDRATE 50 MG/ML
100 INJECTION, SOLUTION INTRAVENOUS PRN
Status: DISCONTINUED | OUTPATIENT
Start: 2019-07-25 | End: 2019-07-26 | Stop reason: HOSPADM

## 2019-07-25 RX ORDER — NICOTINE POLACRILEX 4 MG
15 LOZENGE BUCCAL PRN
Status: DISCONTINUED | OUTPATIENT
Start: 2019-07-25 | End: 2019-07-26 | Stop reason: HOSPADM

## 2019-07-25 RX ORDER — OMEPRAZOLE 20 MG/1
40 CAPSULE, DELAYED RELEASE ORAL DAILY
Status: DISCONTINUED | OUTPATIENT
Start: 2019-07-25 | End: 2019-07-25 | Stop reason: CLARIF

## 2019-07-25 RX ORDER — NITROGLYCERIN 0.4 MG/1
0.4 TABLET SUBLINGUAL EVERY 5 MIN PRN
Status: DISCONTINUED | OUTPATIENT
Start: 2019-07-25 | End: 2019-07-26 | Stop reason: HOSPADM

## 2019-07-25 RX ORDER — ASPIRIN 81 MG/1
81 TABLET ORAL DAILY
Status: DISCONTINUED | OUTPATIENT
Start: 2019-07-25 | End: 2019-07-26 | Stop reason: HOSPADM

## 2019-07-25 RX ORDER — GABAPENTIN 300 MG/1
300 CAPSULE ORAL 2 TIMES DAILY
Status: DISCONTINUED | OUTPATIENT
Start: 2019-07-25 | End: 2019-07-26 | Stop reason: HOSPADM

## 2019-07-25 RX ORDER — LISINOPRIL 5 MG/1
2.5 TABLET ORAL DAILY
Status: DISCONTINUED | OUTPATIENT
Start: 2019-07-25 | End: 2019-07-26 | Stop reason: HOSPADM

## 2019-07-25 RX ORDER — POTASSIUM CHLORIDE 20 MEQ/1
40 TABLET, EXTENDED RELEASE ORAL PRN
Status: DISCONTINUED | OUTPATIENT
Start: 2019-07-25 | End: 2019-07-26 | Stop reason: HOSPADM

## 2019-07-25 RX ORDER — SODIUM CHLORIDE 0.9 % (FLUSH) 0.9 %
10 SYRINGE (ML) INJECTION PRN
Status: DISCONTINUED | OUTPATIENT
Start: 2019-07-25 | End: 2019-07-26 | Stop reason: HOSPADM

## 2019-07-25 RX ORDER — PANTOPRAZOLE SODIUM 40 MG/1
40 TABLET, DELAYED RELEASE ORAL
Status: DISCONTINUED | OUTPATIENT
Start: 2019-07-25 | End: 2019-07-26 | Stop reason: HOSPADM

## 2019-07-25 RX ORDER — SODIUM CHLORIDE 0.9 % (FLUSH) 0.9 %
10 SYRINGE (ML) INJECTION EVERY 12 HOURS SCHEDULED
Status: DISCONTINUED | OUTPATIENT
Start: 2019-07-25 | End: 2019-07-26 | Stop reason: HOSPADM

## 2019-07-25 RX ORDER — ISOSORBIDE MONONITRATE 60 MG/1
60 TABLET, EXTENDED RELEASE ORAL DAILY
Status: DISCONTINUED | OUTPATIENT
Start: 2019-07-25 | End: 2019-07-25

## 2019-07-25 RX ORDER — DEXTROSE MONOHYDRATE 25 G/50ML
12.5 INJECTION, SOLUTION INTRAVENOUS PRN
Status: DISCONTINUED | OUTPATIENT
Start: 2019-07-25 | End: 2019-07-26 | Stop reason: HOSPADM

## 2019-07-25 RX ORDER — ONDANSETRON 2 MG/ML
4 INJECTION INTRAMUSCULAR; INTRAVENOUS EVERY 6 HOURS PRN
Status: DISCONTINUED | OUTPATIENT
Start: 2019-07-25 | End: 2019-07-26 | Stop reason: HOSPADM

## 2019-07-25 RX ORDER — ALBUTEROL SULFATE 90 UG/1
2 AEROSOL, METERED RESPIRATORY (INHALATION) EVERY 6 HOURS PRN
Status: DISCONTINUED | OUTPATIENT
Start: 2019-07-25 | End: 2019-07-26 | Stop reason: HOSPADM

## 2019-07-25 RX ADMIN — LISINOPRIL 2.5 MG: 5 TABLET ORAL at 08:25

## 2019-07-25 RX ADMIN — INSULIN LISPRO 1 UNITS: 100 INJECTION, SOLUTION INTRAVENOUS; SUBCUTANEOUS at 16:48

## 2019-07-25 RX ADMIN — Medication 10 ML: at 08:26

## 2019-07-25 RX ADMIN — INSULIN LISPRO 1 UNITS: 100 INJECTION, SOLUTION INTRAVENOUS; SUBCUTANEOUS at 21:17

## 2019-07-25 RX ADMIN — ASPIRIN 81 MG: 81 TABLET ORAL at 08:25

## 2019-07-25 RX ADMIN — Medication 10 ML: at 21:16

## 2019-07-25 RX ADMIN — ENOXAPARIN SODIUM 40 MG: 40 INJECTION SUBCUTANEOUS at 21:24

## 2019-07-25 RX ADMIN — TRIAMCINOLONE ACETONIDE: 1 CREAM TOPICAL at 10:14

## 2019-07-25 RX ADMIN — TRIAMCINOLONE ACETONIDE: 1 CREAM TOPICAL at 21:16

## 2019-07-25 RX ADMIN — INSULIN LISPRO 1 UNITS: 100 INJECTION, SOLUTION INTRAVENOUS; SUBCUTANEOUS at 08:27

## 2019-07-25 RX ADMIN — GABAPENTIN 300 MG: 300 CAPSULE ORAL at 21:24

## 2019-07-25 RX ADMIN — ALPRAZOLAM 1 MG: 0.5 TABLET ORAL at 21:24

## 2019-07-25 RX ADMIN — GABAPENTIN 300 MG: 300 CAPSULE ORAL at 08:24

## 2019-07-25 ASSESSMENT — PAIN SCALES - GENERAL
PAINLEVEL_OUTOF10: 6
PAINLEVEL_OUTOF10: 7
PAINLEVEL_OUTOF10: 7
PAINLEVEL_OUTOF10: 0

## 2019-07-25 ASSESSMENT — PAIN DESCRIPTION - PAIN TYPE
TYPE: ACUTE PAIN

## 2019-07-25 ASSESSMENT — PAIN DESCRIPTION - ORIENTATION: ORIENTATION: MID

## 2019-07-25 ASSESSMENT — PAIN DESCRIPTION - LOCATION: LOCATION: NOSE

## 2019-07-25 NOTE — CONSULTS
[address]   Cardiac Evaluation      Patient: Steff Duron  YOB: 1945         Chief Complaint   Patient presents with    Dizziness     in via squad, was walking from bathroom to bedroom and felt dizzy with chest pain and then went down. denies LOC. c/o back pain, bilateral knee and leg pain. fell onto nose. unsure if on blood thinner. Referring provider: Briseyda Mann MD    History of Present Illness:   70 yo WF who is morbidly obese was admitted from the ER with dizziness that was described as the room spinning but she also complained of chest pain. Today is complaining of knee and nose pain from her fall. She is extremely sedentary at home and only gets up to the bathroom and to eat. She sees DR Jsoe Ruggiero and has had a stress test in the past year which was neg. She has untreated sleep apnea. Past Medical History:   has a past medical history of Acute MI ([address] Acute pyelonephritis, Anxiety and depression, Arthritis, CAD (coronary artery disease), Cancer ([address] Cancer ([address] Cancer of skin of leg, Chronic obstructive pulmonary disease ([address] Claustrophobia, COPD (chronic obstructive pulmonary disease) ([address] Esophageal stricture, Gastroesophageal reflux disease without esophagitis, Hiatal hernia, Hiatal hernia, Hypercholesteremia, Hypertension, Migraines, Movement disorder, Pneumonia, PONV (postoperative nausea and vomiting), Type II or unspecified type diabetes mellitus without mention of complication, not stated as uncontrolled, and Unspecified cerebral artery occlusion with cerebral infarction. Surgical History:   has a past surgical history that includes Cardiac surgery; Gallbladder surgery; Hysterectomy; Appendectomy; Cholecystectomy; Colonoscopy; Upper gastrointestinal endoscopy (4/20/12); Endoscopy, colon, diagnostic; Tear duct surgery; Upper gastrointestinal endoscopy (06/11/2018);  Colonoscopy (06/11/2018); pr exc skin malig <5mm remaindr body (N/A, 9/6/2018); pr split grft trunk,arm,leg <100sqcm (N/A, 9/6/2018); skin biopsy; and eye surgery.      Current Facility-Administered Medications   Medication Dose Route Frequency Provider Last Rate Last Dose    sodium chloride flush 0.9 % injection 10 mL  10 mL Intravenous 2 times per day KAE Jeffrey - CNP   10 mL at 07/25/19 0826    sodium chloride flush 0.9 % injection 10 mL  10 mL Intravenous PRN KAE Jeffrey CNP        magnesium hydroxide (MILK OF MAGNESIA) 400 MG/5ML suspension 30 mL  30 mL Oral Daily PRN KAE Jeffrey - CEASAR        ondansetron (ZOFRAN) injection 4 mg  4 mg Intravenous Q6H PRN KAE Jeffrey CNP        enoxaparin (LOVENOX) injection 40 mg  40 mg Subcutaneous Nightly KAE Jeffrey CNP        potassium chloride (KLOR-CON M) extended release tablet 40 mEq  40 mEq Oral PRN KAE Jeffrey - CNP        Or    potassium bicarb-citric acid (EFFER-K) effervescent tablet 40 mEq  40 mEq Oral PRN Feliicano Up, KAE - CNP        Or    potassium chloride 10 mEq/100 mL IVPB (Peripheral Line)  10 mEq Intravenous PRN KAE Jeffrey - CEASAR        perflutren lipid microspheres (DEFINITY) injection 1.65 mg  1.5 mL Intravenous ONCE PRN KAE Jeffrey - CNP        albuterol sulfate  (90 Base) MCG/ACT inhaler 2 puff  2 puff Inhalation Q6H PRN KAE Jeffrey CNP        aspirin EC tablet 81 mg  81 mg Oral Daily KAE Jeffrey - CNP   81 mg at 07/25/19 0825    gabapentin (NEURONTIN) capsule 300 mg  300 mg Oral BID Feliciano Up APRN - CNP   300 mg at 07/25/19 0824    lisinopril (PRINIVIL;ZESTRIL) tablet 2.5 mg  2.5 mg Oral Daily KAE Jeffrey - CNP   2.5 mg at 07/25/19 0825    nitroGLYCERIN (NITROSTAT) SL tablet 0.4 mg  0.4 mg Sublingual Q5 Min PRN KAE Jeffrey CNP        triamcinolone (KENALOG) 0.1 % cream   Topical BID KAE Jeffrey CNP        glucose (GLUTOSE) 40 % oral gel 15 g

## 2019-07-25 NOTE — ED PROVIDER NOTES
[facility]      Pt Name: Davy Mejia  MRN: [id number]  [place] 1945  Date ofevaluation: 7/24/2019  Provider: Bar Chase MD    CHIEF COMPLAINT       Chief Complaint   Patient presents with    Dizziness     in via squad, was walking from bathroom to bedroom and felt dizzy with chest pain and then went down. denies LOC. c/o back pain, bilateral knee and leg pain. fell onto nose. unsure if on blood thinner. HISTORY OF PRESENT ILLNESS   (Location/Symptom, Timing/Onset,Context/Setting, Quality, Duration, Modifying Factors, Severity)  Note limiting factors. Davy Mejia is a 69 y.o. female who presents to the emergency department with dizziness. This is a 78-year-old female who presents with dizziness. She describes as room spinning dizziness. The patient states it started around 2 hours prior to arrival.  The patient states after the dizziness she developed some substernal chest pain. Patient has a history of known cardiac disease. She denies any pain at this time. HPI    NursingNotes were reviewed. REVIEW OF SYSTEMS    (2-9 systems for level 4, 10 or more for level 5)     Review of Systems    General Appearance:  Alert, cooperative, no distress, appears stated age. Head:  Normocephalic, without obvious abnormality, atraumatic. Eyes:  conjunctiva/corneas clear, EOM's intact. Sclera anicteric. ENT: Mucous membranes moist.   Neck: Supple, symmetrical, trachea midline, no adenopathy. No jugular venous distention. Lungs:   No Respiratory Distress. Chest Wall:     Heart:   Regular rate rhythm with no murmurs rubs or gallops   Abdomen:    Overweight. Soft and benign. No pulsatile masses. Extremities:  Full range of motion. Pulses:  Good pulses throughout. Skin:  No rashes or lesions to exposed skin. Neurologic: Alert and oriented X 3. Motor grossly normal.  Speech clear.         Except as noted above the remainder of the 3:23 PM      CONTINUE these medications which have NOT CHANGED    Details   ALPRAZolam (XANAX) 1 MG tablet Take 1 tablet by mouth 3 times daily as needed for Anxiety for up to 30 days. , Disp-90 tablet, R-0Normal      gabapentin (NEURONTIN) 300 MG capsule Take 1 capsule by mouth 2 times daily for 30 days. Intended supply: 30 days, Disp-60 capsule, R-1Normal      lisinopril (PRINIVIL;ZESTRIL) 2.5 MG tablet Take 1 tablet by mouth daily, Disp-30 tablet, R-2Normal      glipiZIDE (GLUCOTROL) 5 MG tablet Take 1 tablet by mouth 2 times daily (before meals), Disp-60 tablet, R-2Normal      omeprazole (PRILOSEC) 40 MG delayed release capsule Take 1 capsule by mouth daily, Disp-30 capsule, R-3Normal      albuterol sulfate HFA (VENTOLIN HFA) 108 (90 Base) MCG/ACT inhaler Inhale 2 puffs into the lungs every 6 hours as needed for Wheezing, Disp-1 Inhaler, R-3Normal      diclofenac sodium 1 % GEL Apply 2 g topically 2 times daily, Topical, 2 TIMES DAILY Starting Fri 3/8/2019, Disp-1 Tube, R-3, Normal      blood glucose test strips (ASCENSIA AUTODISC VI;ONE TOUCH ULTRA TEST VI) strip 3 TIMES DAILY Starting Fri 2/8/2019, Disp-100 each, R-3, NormalAs needed. OXYGEN Inhale 2 L/min into the lungs as needed Uses when O2 Sat is below 90Historical Med      Denture Care Products (EFFERDENT DENTURE CLEANSER) TBEF PRN Starting Wed 6/13/2018, R-0, OTC      Diapers & Supplies (HUGGIES PULL-UPS 4T-5T) MISC 8 each by Does not apply route daily, Disp-720 each, R-3Normal      triamcinolone (KENALOG) 0.1 % cream Apply topically 2 times daily. , Disp-30 g, R-1, Normal      nystatin (MYCOSTATIN) 659749 UNIT/GM powder Affected area, Disp-1 Bottle, R-5, Normal      aspirin 81 MG tablet Take 81 mg by mouth daily      nitroGLYCERIN (NITROSTAT) 0.4 MG SL tablet Place 1 tablet under the tongue every 5 minutes as needed for Chest pain., Disp-30 tablet, R-0      Respiratory Therapy Supplies (NEBULIZER) by Does not apply route.  PRN BREATHING TREATMENTS, when real stressed  Nicotine patch   Substance and Sexual Activity    Alcohol use: Yes     Alcohol/week: 1.0 standard drinks     Types: 1 Glasses of wine per week     Comment: occ. every 3-4 mo    Drug use: No    Sexual activity: Not Currently     Partners: Male   Lifestyle    Physical activity:     Days per week: None     Minutes per session: None    Stress: None   Relationships    Social connections:     Talks on phone: None     Gets together: None     Attends Evangelical service: None     Active member of club or organization: None     Attends meetings of clubs or organizations: None     Relationship status: None    Intimate partner violence:     Fear of current or ex partner: None     Emotionally abused: None     Physically abused: None     Forced sexual activity: None   Other Topics Concern    None   Social History Narrative    None       SCREENINGS      Heart Score for chest pain patients  History: Moderately Suspicious  ECG: Normal  Patient Age: > 65 years  *Risk factors for Atherosclerotic disease: Coronary Artery Disease, Hypertension, Obesity  Risk Factors: > 3 Risk factors or history of atherosclerotic disease*  Troponin: < 1X normal limit  Heart Score Total: 5      PHYSICAL EXAM    (up to 7 for level 4, 8 or more for level 5)     ED Triage Vitals [07/24/19 2030]   BP Temp Temp Source Pulse Resp SpO2 Height Weight   126/66 97.3 °F (36.3 °C) Oral 73 17 94 % 5' 6\" (1.676 m) 270 lb (122.5 kg)       Physical Exam    DIAGNOSTIC RESULTS     EKG: All EKG's are interpreted by the Emergency Department Physician who either signs or Co-signsthis chart in the absence of a cardiologist.    Sinus rhythm at a rate of 73 beats a minute with no acute ST elevation or depressions or pathologic Q waves.     RADIOLOGY:   Non-plain filmimages such as CT, Ultrasound and MRI are read by the radiologist. Plain radiographic images are visualized and preliminarily interpreted by the emergency physician with the below

## 2019-07-25 NOTE — DISCHARGE INSTR - COC
w/esophagael dilation       Immunization History:   Immunization History   Administered Date(s) Administered    Influenza 10/11/2013    Influenza Virus Vaccine 10/18/2010, 08/11/2014, 09/10/2015, 09/05/2017    Influenza, High Dose (Fluzone 65 yrs and older) 10/02/2018    Influenza, MDCK [place], with preserv IM (Flucelvax 4 yrs and older) 09/08/2017, 09/11/2018    Influenza, Triv, 3 Years and older, IM (Afluria (5 yrs and older) 10/20/2016    Pneumococcal Conjugate 13-valent (Tgetnod18) 02/08/2017    Pneumococcal Polysaccharide (Lksqvbote32) 12/24/2010, 07/23/2015       Active Problems:  Patient Active Problem List   Diagnosis Code    CAD (coronary artery disease) I25.10    Hyperlipemia E78.5    Essential hypertension I10    Type II or unspecified type diabetes mellitus without mention of complication, uncontrolled E11.65    Malignant hypertension I10    ASHD (arteriosclerotic heart disease) I25.10    DM2 (diabetes mellitus, type 2) ([facility]) E11.9    HTN (hypertension), benign I10    Primary osteoarthritis involving multiple joints M15.0    Gastroesophageal reflux disease without esophagitis K21.9    PVC (premature ventricular contraction) I49.3    Dizziness R42    Light headedness R42    Controlled type 2 diabetes mellitus without complication, without long-term current use of insulin ([facility]) E11.9    Chest pain R07.9    Anxiety F41.9    Functional diarrhea K59.1    Morbid obesity ([facility]) E66.01    Squamous cell carcinoma of head and neck ([facility]) C76.0    Precordial pain R07.2    COPD exacerbation ([facility]) J44.1    Acute bronchitis J20.9    Hyponatremia E87.1       Isolation/Infection:   Isolation          No Isolation            Nurse Assessment:  Last Vital Signs: /71   Pulse 66   Temp 97.8 °F (36.6 °C) (Temporal)   Resp 18   Ht 5' 6\" (1.676 m)   Wt 270 lb (122.5 kg)   SpO2 92%   BMI 43.58 kg/m²     Last documented pain score (0-10 scale): Pain Level: 7  Last Weight:   Wt Readings

## 2019-07-25 NOTE — H&P
 CHOLECYSTECTOMY      COLONOSCOPY      COLONOSCOPY  06/11/2018    ENDOSCOPY, COLON, DIAGNOSTIC      EYE SURGERY      bilateral cataracts    GALLBLADDER SURGERY      HYSTERECTOMY      ID EXC SKIN MALIG <5MM REMAINDR BODY N/A 9/6/2018    EXCISION OF SCALP SQUAMOUS CELL CARCINOMA performed by Landon Higginbotham MD at [facility] <100SQCM N/A 9/6/2018    SPLIT THICKNESS SKIN GRAFT FOR COVERAGE SCALP, APPLICATION OF WOUND VAC DEVICE performed by Landon Higginbotham MD at [facility] GASTROINTESTINAL ENDOSCOPY  4/20/12    with biopsy of stomach,gastritis    UPPER GASTROINTESTINAL ENDOSCOPY  06/11/2018    w/esophagael dilation       Medications Prior to Admission:      Prior to Admission medications    Medication Sig Start Date End Date Taking? Authorizing Provider   ALPRAZolam Li Cartwright) 1 MG tablet Take 1 tablet by mouth 3 times daily as needed for Anxiety for up to 30 days. 7/9/19 8/8/19 Yes Sacha Yañez MD   gabapentin (NEURONTIN) 300 MG capsule Take 1 capsule by mouth 2 times daily for 30 days. Intended supply: 30 days 7/9/19 8/8/19 Yes Sacha Yañez MD   lisinopril (PRINIVIL;ZESTRIL) 2.5 MG tablet Take 1 tablet by mouth daily 6/10/19  Yes Sacha Yañez MD   glipiZIDE (GLUCOTROL) 5 MG tablet Take 1 tablet by mouth 2 times daily (before meals) 6/10/19  Yes Sacha Yañez MD   omeprazole (PRILOSEC) 40 MG delayed release capsule Take 1 capsule by mouth daily 6/10/19  Yes Sacha Yañez MD   albuterol sulfate HFA (VENTOLIN HFA) 108 (90 Base) MCG/ACT inhaler Inhale 2 puffs into the lungs every 6 hours as needed for Wheezing 6/10/19  Yes Sacha Yañez MD   diclofenac sodium 1 % GEL Apply 2 g topically 2 times daily 3/8/19  Yes Sacha Yañez MD   blood glucose test strips (ASCENSIA AUTODISC VI;ONE TOUCH ULTRA TEST VI) strip 1 each by In Vitro route 3 times daily As needed.  2/8/19  Yes Sacha Yañez MD   OXYGEN Inhale 2 L/min into the lungs as needed Uses when O2 Sat is below 90   Yes Historical Provider, MD   Denture Care Products (EFFERDENT DENTURE CLEANSER) TBEF 1 tablet by Does not apply route as needed (denture cleaning) 6/13/18  Yes Analia Hillman MD   Diapers & Supplies (HUGGIES PULL-UPS 4T-5T) MISC 8 each by Does not apply route daily 3/14/18  Yes Analia Hillman MD   triamcinolone (KENALOG) 0.1 % cream Apply topically 2 times daily. 6/1/17  Yes Analia Hillman MD   nystatin (MYCOSTATIN) 812599 UNIT/GM powder Affected area 5/6/16  Yes Analia Hillman MD   aspirin 81 MG tablet Take 81 mg by mouth daily   Yes Historical Provider, MD   nitroGLYCERIN (NITROSTAT) 0.4 MG SL tablet Place 1 tablet under the tongue every 5 minutes as needed for Chest pain. 4/22/12  Yes Analia Hillman MD   Respiratory Therapy Supplies (NEBULIZER) by Does not apply route. PRN BREATHING TREATMENTS, UNSURE OF MED    Yes Historical Provider, MD   isosorbide mononitrate (IMDUR) 60 MG extended release tablet Take 1 tablet by mouth daily 10/5/18 4/25/19  Analia Hillman MD   butalbital-acetaminophen-caffeine (FIORICET, ESGIC) -56 MG per tablet Take 1 tablet by mouth every 6 hours as needed for Headaches 5/15/18   Analia Hillman MD   Incontinence Supply Disposable (PREVAIL WET WIPES) MISC 8 each by Does not apply route daily 3/14/18 6/12/18  Analia Hillman MD   Elastic Bandages & Supports (JOBST FOR MEN 30-40MMHG LG) MISC 20-30 mm by Does not apply route daily Please measure for knee hi hose 11/2/17   Henna Garg MD   cilostazol (PLETAL) 100 MG tablet Take 1 tablet by mouth 2 times daily 10/20/17   Analia Hillman MD   ipratropium-albuterol (DUONEB) 0.5-2.5 (3) MG/3ML SOLN nebulizer solution Inhale 3 mLs into the lungs every 4 hours (while awake) 9/25/17   Sweetie Slade MD       Allergies:  Morphine; Codeine;  Hydromorphone; Levaquin [levofloxacin in d5w]; and Vicodin [hydrocodone-acetaminophen]    Social History:      The patient

## 2019-07-25 NOTE — ED NOTES
Dr. KOHLER [facility], [facility] at bedside.       Amaris Pizarro RN  07/24/19 0532

## 2019-07-25 NOTE — ED PROVIDER NOTES
TECHNIQUE: CT of the head was performed without the administration of intravenous contrast. Dose modulation, iterative reconstruction, and/or weight based adjustment of the mA/kV was utilized to reduce the radiation dose to as low as reasonably achievable. COMPARISON: None. HISTORY: ORDERING SYSTEM PROVIDED HISTORY: dizzy TECHNOLOGIST PROVIDED HISTORY: Has a \"code stroke\" or \"stroke alert\" been called? ->No Reason for Exam: dizzy Acuity: Acute Type of Exam: Initial Relevant Medical/Surgical History: Dizziness (in via squad, was walking from bathroom to bedroom and felt dizzy with chest pain and then went down. denies LOC. c/o back pain, bilateral knee and leg pain. fell onto nose. unsure if on blood thinner. ) FINDINGS: BRAIN/VENTRICLES: There is no acute intracranial hemorrhage, mass effect or midline shift. No abnormal extra-axial fluid collection. The gray-white differentiation is maintained without evidence of an acute infarct. There is prominence of the ventricles and sulci due to global parenchymal volume loss. There are nonspecific areas of hypoattenuation within the periventricular and subcortical white matter, which likely represent chronic microvascular ischemic change. ORBITS: The visualized portion of the orbits demonstrate no acute abnormality. SINUSES: The visualized paranasal sinuses and mastoid air cells demonstrate no acute abnormality. SOFT TISSUES/SKULL: No acute abnormality of the visualized skull or soft tissues. No acute intracranial abnormality. Final ED Course and MDM:  Patient with decreased dizziness after medication. Notes decreasing chest pain as well. Patient with a heart score of 5. Concerned the symptoms are related to ACS. Patient has been admitted for further evaluation and treatment.     ED Medication Orders (From admission, onward)    Start Ordered     Status Ordering Provider    07/24/19 224 07/24/19 2235  aspirin tablet 325 mg  ONCE      Last MAR action:  Given - by

## 2019-07-25 NOTE — ED NOTES
Pt refusing ativan stating she has never taken it before. Explained that Dr. Daisy Roberts wants to give it because of the vertigo symptoms. Pt still refusing and wants to take xanax instead. Dr. Daisy Roberts aware and changing orders.       Madhuri Chavez RN  07/24/19 9408

## 2019-07-25 NOTE — PROGRESS NOTES
diagnosis was Dizziness. A diagnosis of Chest pain, unspecified type was also pertinent to this visit. has a past medical history of Acute MI ([facility] [address] Acute pyelonephritis, Anxiety and depression, Arthritis, CAD (coronary artery disease), Cancer ([address] Cancer ([facility] [address] Cancer of skin of leg, Chronic obstructive pulmonary disease ([facility] [address] Claustrophobia, COPD (chronic obstructive pulmonary disease) ([facility] [address] Esophageal stricture, Gastroesophageal reflux disease without esophagitis, Hiatal hernia, Hiatal hernia, Hypercholesteremia, Hypertension, Migraines, Movement disorder, Pneumonia, PONV (postoperative nausea and vomiting), Type II or unspecified type diabetes mellitus without mention of complication, not stated as uncontrolled, and Unspecified cerebral artery occlusion with cerebral infarction. has a past surgical history that includes Cardiac surgery; Gallbladder surgery; Hysterectomy; Appendectomy; Cholecystectomy; Colonoscopy; Upper gastrointestinal endoscopy (4/20/12); Endoscopy, colon, diagnostic; Tear duct surgery; Upper gastrointestinal endoscopy (06/11/2018); Colonoscopy (06/11/2018); pr exc skin malig <5mm remaindr body (N/A, 9/6/2018); pr split grft trunk,arm,leg <100sqcm (N/A, 9/6/2018); skin biopsy; and eye surgery. Restrictions  Restrictions/Precautions  Restrictions/Precautions: Fall Risk(High fall risk)  Position Activity Restriction  Other position/activity restrictions: In brief, Ana Bah is a 69 y.o. female that presented to the emergency department with onset of chest pain and vertigo at 8 PM tonight. Patient fell forward and landed on her nose. She noted that the chest pain began before the fall. Pt is s/p meclizine and xanax and signed out with CT head with contrast pending. Given chest pain with 2 prior heart attacks, concern patient will require admission.   At time of my eval, patient did not have any vertigo while turning her head, which she notes is an improvement than prior Decreased awareness of deficits  Initiation: Requires cues for some  Sequencing: Requires cues for some    Objective          PROM RLE (degrees)  RLE PROM: WFL  AROM RLE (degrees)  RLE AROM: Exceptions  RLE General AROM: pt with minimal ROM of ankle against gravity, 50% active hip flexion, knee ROM not assessed due to pain   PROM LLE (degrees)  LLE PROM: WFL  AROM LLE (degrees)  LLE AROM : Exceptions  LLE General AROM: pt with minimal ROM of ankle against gravity, 50% active hip flexion, knee ROM not assessed due to pain   Strength RLE  Comment: hip flexion 3/5, ankle PF/DF <3/5, knee strength not assessed due to pain  Strength LLE  Comment: hip flexion 3/5, ankle PF/DF <3/5, knee strength not assessed due to pain  Tone RLE  RLE Tone: Normotonic  Tone LLE  LLE Tone: Normotonic  Sensation  Overall Sensation Status: Impaired(pt reports occassional numbness/tingling in B LEs from hip to feet)  Bed mobility  Supine to Sit: Stand by assistance  Sit to Supine: Stand by assistance  Scooting: Stand by assistance(scooting up in bed)  Comment: HOB elevated, bedrail use   Transfers  Sit to Stand: Contact guard assistance(from bed and toilet to RW, pt with BUEs on RW with increased weight through UEs to stand and significantly forward flexed trunk despite therapist cues)  Stand to sit: Contact guard assistance  Ambulation  Ambulation?: Yes  Ambulation 1  Surface: level tile  Device: Rolling Walker  Assistance: Contact guard assistance  Quality of Gait: significantly forward flexed posture, decreased step length, decreased cory, increased CAMMIE  Distance: 15'  Comments: Pt with decreased safety awareness when ambulating with RW. Pt did not stay in RW despite therapist cues, especially during turns where patient picked entire RW up to turn.      Balance  Sitting - Static: Good(able to sit EOB with SBA x10 min. )  Sitting - Dynamic: Fair;+  Standing - Static: Fair(with RW)  Standing - Dynamic: Fair(with RW)      Performed the and directed the above evaluation.  Thanks, Sam Cheng PT, DPT [id number]    Thanks, Seamus Garcia, DPT [id number]

## 2019-07-25 NOTE — PROGRESS NOTES
ADL SBA  Short term goal 2: Fxl transfers for ADL supervision  Short term goal 3: UB/LB dressing SBA  Short term goal 4: UB/LB bathing SBA  Short term goal 5:  Toileting supervision  Long term goals  Time Frame for Long term goals : LTG=STG       Therapy Time   Individual Concurrent Group Co-treatment   Time In 1436         Time Out 1521         Minutes 45            Timed Code Treatment Minutes:  30 minutes    Total Treatment Minutes:  45 minutes    Lyn Ruvalcaba, [address] [facility] OTR/L [id number]    Lyn Ruvalcaba, OT

## 2019-07-26 VITALS
TEMPERATURE: 98.6 F | WEIGHT: 269.3 LBS | SYSTOLIC BLOOD PRESSURE: 156 MMHG | DIASTOLIC BLOOD PRESSURE: 70 MMHG | HEART RATE: 60 BPM | RESPIRATION RATE: 16 BRPM | HEIGHT: 66 IN | BODY MASS INDEX: 43.28 KG/M2 | OXYGEN SATURATION: 93 %

## 2019-07-26 LAB
ANION GAP SERPL CALCULATED.3IONS-SCNC: 10 MMOL/L (ref 3–16)
BUN BLDV-MCNC: 11 MG/DL (ref 7–20)
CALCIUM SERPL-MCNC: 9.2 MG/DL (ref 8.3–10.6)
CHLORIDE BLD-SCNC: 102 MMOL/L (ref 99–110)
CO2: 27 MMOL/L (ref 21–32)
CREAT SERPL-MCNC: 0.7 MG/DL (ref 0.6–1.2)
GFR AFRICAN AMERICAN: >60
GFR NON-AFRICAN AMERICAN: >60
GLUCOSE BLD-MCNC: 109 MG/DL (ref 70–99)
GLUCOSE BLD-MCNC: 143 MG/DL (ref 70–99)
GLUCOSE BLD-MCNC: 164 MG/DL (ref 70–99)
GLUCOSE BLD-MCNC: 166 MG/DL (ref 70–99)
HCT VFR BLD CALC: 47 % (ref 36–48)
HEMOGLOBIN: 15.7 G/DL (ref 12–16)
MCH RBC QN AUTO: 29.8 PG (ref 26–34)
MCHC RBC AUTO-ENTMCNC: 33.3 G/DL (ref 31–36)
MCV RBC AUTO: 89.4 FL (ref 80–100)
PDW BLD-RTO: 14.8 % (ref 12.4–15.4)
PERFORMED ON: ABNORMAL
PLATELET # BLD: 173 K/UL (ref 135–450)
PMV BLD AUTO: 8.1 FL (ref 5–10.5)
POTASSIUM REFLEX MAGNESIUM: 4.3 MMOL/L (ref 3.5–5.1)
RBC # BLD: 5.26 M/UL (ref 4–5.2)
SODIUM BLD-SCNC: 139 MMOL/L (ref 136–145)
TROPONIN: <0.01 NG/ML
WBC # BLD: 6.9 K/UL (ref 4–11)

## 2019-07-26 PROCEDURE — 84484 ASSAY OF TROPONIN QUANT: CPT

## 2019-07-26 PROCEDURE — 36415 COLL VENOUS BLD VENIPUNCTURE: CPT

## 2019-07-26 PROCEDURE — 6370000000 HC RX 637 (ALT 250 FOR IP): Performed by: NURSE PRACTITIONER

## 2019-07-26 PROCEDURE — 85027 COMPLETE CBC AUTOMATED: CPT

## 2019-07-26 PROCEDURE — G0378 HOSPITAL OBSERVATION PER HR: HCPCS

## 2019-07-26 PROCEDURE — 80048 BASIC METABOLIC PNL TOTAL CA: CPT

## 2019-07-26 PROCEDURE — 2580000003 HC RX 258: Performed by: NURSE PRACTITIONER

## 2019-07-26 PROCEDURE — 6370000000 HC RX 637 (ALT 250 FOR IP): Performed by: INTERNAL MEDICINE

## 2019-07-26 RX ADMIN — LISINOPRIL 2.5 MG: 5 TABLET ORAL at 07:50

## 2019-07-26 RX ADMIN — GABAPENTIN 300 MG: 300 CAPSULE ORAL at 07:50

## 2019-07-26 RX ADMIN — Medication 10 ML: at 08:06

## 2019-07-26 RX ADMIN — PANTOPRAZOLE SODIUM 40 MG: 40 TABLET, DELAYED RELEASE ORAL at 06:05

## 2019-07-26 RX ADMIN — ASPIRIN 81 MG: 81 TABLET ORAL at 07:50

## 2019-07-26 RX ADMIN — INSULIN LISPRO 1 UNITS: 100 INJECTION, SOLUTION INTRAVENOUS; SUBCUTANEOUS at 07:51

## 2019-07-26 RX ADMIN — TRIAMCINOLONE ACETONIDE: 1 CREAM TOPICAL at 07:51

## 2019-07-26 NOTE — CARE COORDINATION
Patient discharged 7/26/19 to home with Name: [address]  Phone: [phone]  Fax: [phone]  All discharge needs met per case managementCM faxed H&P, AVS, face sheet and home care orders to [facility] [employer] at [phone].     DARRYL eLrmaN, CCM, RN  [facility]  [phone]

## 2019-07-26 NOTE — PLAN OF CARE
Problem: Falls - Risk of:  Goal: Will remain free from falls  Description  Will remain free from falls  7/26/2019 0809 by Javi Chambers RN  Outcome: Ongoing  Note:   Fall precautions in place, bed alarm on, nonskid foot wear applied, bed in lowest position, and call light within reach. Will continue to monitor.

## 2019-07-29 NOTE — PROGRESS NOTES
Occupational Therapy Discharge Summary    Name: Kristi Sandoval  : 1945    The pt was evaluated by OT on 19 and seen for initial evaluation only, prior to DC home on 19, per MD order. The pt's acute therapy goals were:    Short term goals  Time Frame for Short term goals: discharge  Short term goal 1: Fxl mob for ADL SBA  Short term goal 2: Fxl transfers for ADL supervision  Short term goal 3: UB/LB dressing SBA  Short term goal 4: UB/LB bathing SBA  Short term goal 5: Toileting supervision  Long term goals  Time Frame for Long term goals : LTG=STG     Patient met 0 goals during stay. Number of Refusals:0  Number of Holds: 1    During this hospitalization, the patient was educated on:  Patient Education: eval, discharge    DC pt from OT caseload at this time. Thank you!     Elvis White, OTR/L, [id number]

## 2019-08-09 PROBLEM — J20.9 ACUTE BRONCHITIS: Status: RESOLVED | Noted: 2019-04-25 | Resolved: 2019-08-09

## 2019-08-09 PROBLEM — K58.0 IRRITABLE BOWEL SYNDROME WITH DIARRHEA: Status: ACTIVE | Noted: 2019-08-09

## 2019-09-18 ENCOUNTER — HOSPITAL ENCOUNTER (INPATIENT)
Age: 74
LOS: 2 days | Discharge: HOME | DRG: 287 | End: 2019-09-20
Attending: EMERGENCY MEDICINE | Admitting: INTERNAL MEDICINE
Payer: COMMERCIAL

## 2019-09-18 ENCOUNTER — APPOINTMENT (OUTPATIENT)
Dept: GENERAL RADIOLOGY | Age: 74
DRG: 287 | End: 2019-09-18
Payer: COMMERCIAL

## 2019-09-18 DIAGNOSIS — R07.9 CHEST PAIN, UNSPECIFIED TYPE: Primary | ICD-10-CM

## 2019-09-18 PROBLEM — I20.0 UNSTABLE ANGINA (HCC): Status: ACTIVE | Noted: 2019-09-18

## 2019-09-18 LAB
A/G RATIO: 1.1 (ref 1.1–2.2)
ALBUMIN SERPL-MCNC: 3.7 G/DL (ref 3.4–5)
ALP BLD-CCNC: 87 U/L (ref 40–129)
ALT SERPL-CCNC: 20 U/L (ref 10–40)
ANION GAP SERPL CALCULATED.3IONS-SCNC: 10 MMOL/L (ref 3–16)
APTT: 29.1 SEC (ref 26–36)
AST SERPL-CCNC: 25 U/L (ref 15–37)
BASOPHILS ABSOLUTE: 0 K/UL (ref 0–0.2)
BASOPHILS RELATIVE PERCENT: 0.2 %
BILIRUB SERPL-MCNC: <0.2 MG/DL (ref 0–1)
BUN BLDV-MCNC: 14 MG/DL (ref 7–20)
CALCIUM SERPL-MCNC: 10 MG/DL (ref 8.3–10.6)
CHLORIDE BLD-SCNC: 102 MMOL/L (ref 99–110)
CO2: 25 MMOL/L (ref 21–32)
CREAT SERPL-MCNC: 0.8 MG/DL (ref 0.6–1.2)
EOSINOPHILS ABSOLUTE: 0.1 K/UL (ref 0–0.6)
EOSINOPHILS RELATIVE PERCENT: 0.9 %
GFR AFRICAN AMERICAN: >60
GFR NON-AFRICAN AMERICAN: >60
GLOBULIN: 3.3 G/DL
GLUCOSE BLD-MCNC: 145 MG/DL (ref 70–99)
GLUCOSE BLD-MCNC: 158 MG/DL (ref 70–99)
HCT VFR BLD CALC: 49 % (ref 36–48)
HEMOGLOBIN: 16.1 G/DL (ref 12–16)
INR BLD: 1.03 (ref 0.86–1.14)
LYMPHOCYTES ABSOLUTE: 2.6 K/UL (ref 1–5.1)
LYMPHOCYTES RELATIVE PERCENT: 23.2 %
MCH RBC QN AUTO: 29.1 PG (ref 26–34)
MCHC RBC AUTO-ENTMCNC: 32.9 G/DL (ref 31–36)
MCV RBC AUTO: 88.4 FL (ref 80–100)
MONOCYTES ABSOLUTE: 0.6 K/UL (ref 0–1.3)
MONOCYTES RELATIVE PERCENT: 5.7 %
NEUTROPHILS ABSOLUTE: 7.8 K/UL (ref 1.7–7.7)
NEUTROPHILS RELATIVE PERCENT: 70 %
PDW BLD-RTO: 14.2 % (ref 12.4–15.4)
PERFORMED ON: ABNORMAL
PLATELET # BLD: 211 K/UL (ref 135–450)
PMV BLD AUTO: 8.3 FL (ref 5–10.5)
POTASSIUM SERPL-SCNC: 4.2 MMOL/L (ref 3.5–5.1)
PRO-BNP: 119 PG/ML (ref 0–124)
PROTHROMBIN TIME: 11.7 SEC (ref 9.8–13)
RBC # BLD: 5.54 M/UL (ref 4–5.2)
SODIUM BLD-SCNC: 137 MMOL/L (ref 136–145)
TOTAL PROTEIN: 7 G/DL (ref 6.4–8.2)
TROPONIN: <0.01 NG/ML
TROPONIN: <0.01 NG/ML
WBC # BLD: 11.2 K/UL (ref 4–11)

## 2019-09-18 PROCEDURE — 80053 COMPREHEN METABOLIC PANEL: CPT

## 2019-09-18 PROCEDURE — 93005 ELECTROCARDIOGRAM TRACING: CPT | Performed by: EMERGENCY MEDICINE

## 2019-09-18 PROCEDURE — 85610 PROTHROMBIN TIME: CPT

## 2019-09-18 PROCEDURE — 96374 THER/PROPH/DIAG INJ IV PUSH: CPT

## 2019-09-18 PROCEDURE — 85730 THROMBOPLASTIN TIME PARTIAL: CPT

## 2019-09-18 PROCEDURE — 1200000000 HC SEMI PRIVATE

## 2019-09-18 PROCEDURE — 83880 ASSAY OF NATRIURETIC PEPTIDE: CPT

## 2019-09-18 PROCEDURE — 84484 ASSAY OF TROPONIN QUANT: CPT

## 2019-09-18 PROCEDURE — 99285 EMERGENCY DEPT VISIT HI MDM: CPT

## 2019-09-18 PROCEDURE — 2500000003 HC RX 250 WO HCPCS: Performed by: INTERNAL MEDICINE

## 2019-09-18 PROCEDURE — 6370000000 HC RX 637 (ALT 250 FOR IP): Performed by: PHYSICIAN ASSISTANT

## 2019-09-18 PROCEDURE — 6370000000 HC RX 637 (ALT 250 FOR IP): Performed by: INTERNAL MEDICINE

## 2019-09-18 PROCEDURE — 6360000002 HC RX W HCPCS: Performed by: PHYSICIAN ASSISTANT

## 2019-09-18 PROCEDURE — 71046 X-RAY EXAM CHEST 2 VIEWS: CPT

## 2019-09-18 PROCEDURE — 96375 TX/PRO/DX INJ NEW DRUG ADDON: CPT

## 2019-09-18 PROCEDURE — 85025 COMPLETE CBC W/AUTO DIFF WBC: CPT

## 2019-09-18 PROCEDURE — 36415 COLL VENOUS BLD VENIPUNCTURE: CPT

## 2019-09-18 RX ORDER — IPRATROPIUM BROMIDE AND ALBUTEROL SULFATE 2.5; .5 MG/3ML; MG/3ML
3 SOLUTION RESPIRATORY (INHALATION) EVERY 4 HOURS PRN
Status: DISCONTINUED | OUTPATIENT
Start: 2019-09-18 | End: 2019-09-20 | Stop reason: HOSPADM

## 2019-09-18 RX ORDER — GABAPENTIN 300 MG/1
300 CAPSULE ORAL 2 TIMES DAILY
Status: DISCONTINUED | OUTPATIENT
Start: 2019-09-18 | End: 2019-09-20 | Stop reason: HOSPADM

## 2019-09-18 RX ORDER — NICOTINE POLACRILEX 4 MG
15 LOZENGE BUCCAL PRN
Status: DISCONTINUED | OUTPATIENT
Start: 2019-09-18 | End: 2019-09-20 | Stop reason: HOSPADM

## 2019-09-18 RX ORDER — LISINOPRIL 5 MG/1
2.5 TABLET ORAL DAILY
Status: DISCONTINUED | OUTPATIENT
Start: 2019-09-19 | End: 2019-09-20 | Stop reason: HOSPADM

## 2019-09-18 RX ORDER — ONDANSETRON 2 MG/ML
4 INJECTION INTRAMUSCULAR; INTRAVENOUS ONCE
Status: COMPLETED | OUTPATIENT
Start: 2019-09-18 | End: 2019-09-18

## 2019-09-18 RX ORDER — PANTOPRAZOLE SODIUM 40 MG/1
40 TABLET, DELAYED RELEASE ORAL
Status: DISCONTINUED | OUTPATIENT
Start: 2019-09-19 | End: 2019-09-20 | Stop reason: HOSPADM

## 2019-09-18 RX ORDER — ALBUTEROL SULFATE 90 UG/1
2 AEROSOL, METERED RESPIRATORY (INHALATION) EVERY 6 HOURS PRN
Status: DISCONTINUED | OUTPATIENT
Start: 2019-09-18 | End: 2019-09-20 | Stop reason: HOSPADM

## 2019-09-18 RX ORDER — ISOSORBIDE MONONITRATE 60 MG/1
60 TABLET, EXTENDED RELEASE ORAL DAILY
Status: DISCONTINUED | OUTPATIENT
Start: 2019-09-19 | End: 2019-09-20 | Stop reason: HOSPADM

## 2019-09-18 RX ORDER — TRIAMCINOLONE ACETONIDE 1 MG/G
CREAM TOPICAL 2 TIMES DAILY
Status: DISCONTINUED | OUTPATIENT
Start: 2019-09-18 | End: 2019-09-18 | Stop reason: ALTCHOICE

## 2019-09-18 RX ORDER — DEXTROSE MONOHYDRATE 50 MG/ML
100 INJECTION, SOLUTION INTRAVENOUS PRN
Status: DISCONTINUED | OUTPATIENT
Start: 2019-09-18 | End: 2019-09-20 | Stop reason: HOSPADM

## 2019-09-18 RX ORDER — NITROGLYCERIN 0.4 MG/1
0.4 TABLET SUBLINGUAL EVERY 5 MIN PRN
Status: DISCONTINUED | OUTPATIENT
Start: 2019-09-18 | End: 2019-09-20 | Stop reason: HOSPADM

## 2019-09-18 RX ORDER — GLIPIZIDE 5 MG/1
5 TABLET ORAL
Status: DISCONTINUED | OUTPATIENT
Start: 2019-09-19 | End: 2019-09-20 | Stop reason: HOSPADM

## 2019-09-18 RX ORDER — ONDANSETRON 2 MG/ML
INJECTION INTRAMUSCULAR; INTRAVENOUS
Status: DISPENSED
Start: 2019-09-18 | End: 2019-09-19

## 2019-09-18 RX ORDER — KETOROLAC TROMETHAMINE 15 MG/ML
15 INJECTION, SOLUTION INTRAMUSCULAR; INTRAVENOUS EVERY 6 HOURS PRN
Status: DISCONTINUED | OUTPATIENT
Start: 2019-09-18 | End: 2019-09-20 | Stop reason: HOSPADM

## 2019-09-18 RX ORDER — ASPIRIN 81 MG/1
81 TABLET ORAL DAILY
Status: DISCONTINUED | OUTPATIENT
Start: 2019-09-19 | End: 2019-09-20 | Stop reason: HOSPADM

## 2019-09-18 RX ORDER — ALPRAZOLAM 0.5 MG/1
1 TABLET ORAL 3 TIMES DAILY PRN
Status: DISCONTINUED | OUTPATIENT
Start: 2019-09-18 | End: 2019-09-20 | Stop reason: HOSPADM

## 2019-09-18 RX ORDER — DEXTROSE MONOHYDRATE 25 G/50ML
12.5 INJECTION, SOLUTION INTRAVENOUS PRN
Status: DISCONTINUED | OUTPATIENT
Start: 2019-09-18 | End: 2019-09-20 | Stop reason: HOSPADM

## 2019-09-18 RX ORDER — MORPHINE SULFATE 2 MG/ML
2 INJECTION, SOLUTION INTRAMUSCULAR; INTRAVENOUS ONCE
Status: COMPLETED | OUTPATIENT
Start: 2019-09-18 | End: 2019-09-18

## 2019-09-18 RX ADMIN — MICONAZOLE NITRATE: 20 POWDER TOPICAL at 21:52

## 2019-09-18 RX ADMIN — DICLOFENAC 2 G: 10 GEL TOPICAL at 21:57

## 2019-09-18 RX ADMIN — GABAPENTIN 300 MG: 300 CAPSULE ORAL at 21:51

## 2019-09-18 RX ADMIN — ONDANSETRON 4 MG: 2 INJECTION INTRAMUSCULAR; INTRAVENOUS at 18:10

## 2019-09-18 RX ADMIN — NITROGLYCERIN 0.5 INCH: 20 OINTMENT TOPICAL at 18:09

## 2019-09-18 RX ADMIN — ALPRAZOLAM 1 MG: 0.5 TABLET ORAL at 21:52

## 2019-09-18 RX ADMIN — MORPHINE SULFATE 2 MG: 2 INJECTION, SOLUTION INTRAMUSCULAR; INTRAVENOUS at 18:09

## 2019-09-18 ASSESSMENT — PAIN SCALES - GENERAL
PAINLEVEL_OUTOF10: 2
PAINLEVEL_OUTOF10: 8
PAINLEVEL_OUTOF10: 8

## 2019-09-18 ASSESSMENT — PAIN DESCRIPTION - ORIENTATION
ORIENTATION: LEFT

## 2019-09-18 ASSESSMENT — PAIN DESCRIPTION - FREQUENCY
FREQUENCY: INTERMITTENT

## 2019-09-18 ASSESSMENT — PAIN DESCRIPTION - PAIN TYPE
TYPE: ACUTE PAIN

## 2019-09-18 ASSESSMENT — PAIN DESCRIPTION - LOCATION
LOCATION: CHEST

## 2019-09-18 ASSESSMENT — PAIN DESCRIPTION - DESCRIPTORS
DESCRIPTORS: DISCOMFORT

## 2019-09-18 ASSESSMENT — ENCOUNTER SYMPTOMS
DIARRHEA: 0
VOMITING: 0
SHORTNESS OF BREATH: 1
NAUSEA: 0
ABDOMINAL PAIN: 0
COUGH: 0
RHINORRHEA: 0

## 2019-09-18 NOTE — ED PROVIDER NOTES
[facility]        Pt Name: Davy Mejia  MRN: [id number]  [place] 1945  Date of evaluation: 9/18/2019  Provider: Edwina Robin PA-C  PCP: Thierry Negrete MD    This patient was seen and evaluated by the attending physician Ankur Huizar, [address]       Chief Complaint   Patient presents with    Chest Pain     pt with chest pain x3 days. pt states pain is worse today. also having sob. pt with hx of MI.        HISTORY OF PRESENT ILLNESS   (Location/Symptom, Timing/Onset, Context/Setting, Quality, Duration, Modifying Factors, Severity)  Note limiting factors. Davy Mejia is a 69 y.o. female who presents to the emergency department today for evaluation for chest pain. The patient has a past medical history significant for MI, coronary artery disease, type 2 diabetes, hypertension, hyperlipidemia. The patient has a positive family history of cardiac events. The patient states that since last night she has been experiencing intermittent left-sided chest pain and pressure with radiation up into her jaw and through to her back. Patient denies any known aggravating factors however she tells me that she took a nitro last night and this did seem to improve her pain. The patient states that she did not take any nitro today because it gave her headache. Patient states that she did receive a full-strength aspirin in route to the ED. Patient is currently rating her pain as an 8/10, she states that it does feel similar to previous heart attacks. She denies any recent illnesses including fever, cough. She denies any vomiting or diarrhea although she does have associated shortness of breath, and nausea with her pain. She denies any diaphoresis. She denies any lower leg pain or swelling. No history of DVT or PE. She has no other complaints.     Nursing Notes were all reviewed and agreed with or any disagreements were RELEASE TABLET    Take 1 tablet by mouth daily    LISINOPRIL (PRINIVIL;ZESTRIL) 2.5 MG TABLET    Take 1 tablet by mouth daily    NITROGLYCERIN (NITROSTAT) 0.4 MG SL TABLET    Place 1 tablet under the tongue every 5 minutes as needed for Chest pain. NYSTATIN (MYCOSTATIN) 017747 UNIT/GM POWDER    Affected area    NYSTATIN (MYCOSTATIN) 435109 UNIT/GM POWDER    Apply topically 4 times daily. OMEPRAZOLE (PRILOSEC) 40 MG DELAYED RELEASE CAPSULE    Take 1 capsule by mouth daily    OXYGEN    Inhale 2 L/min into the lungs as needed Uses when O2 Sat is below 90    RESPIRATORY THERAPY SUPPLIES (NEBULIZER)    by Does not apply route. PRN BREATHING TREATMENTS, UNSURE OF MED     TRIAMCINOLONE (KENALOG) 0.1 % CREAM    Apply topically 2 times daily. ALLERGIES     Morphine; Codeine; Hydromorphone; Levaquin [levofloxacin in d5w]; and Vicodin [hydrocodone-acetaminophen]    FAMILYHISTORY       Family History   Problem Relation Age of Onset    Heart Disease Mother     Cancer Mother     Cancer Father     Cancer Sister     Heart Disease Sister     Heart Disease Brother     High Blood Pressure Neg Hx     High Cholesterol Neg Hx           SOCIAL HISTORY       Social History     Socioeconomic History    Marital status:      Spouse name: Georgina Chisholm Number of children: 3    Years of education: 15    Highest education level: None   Occupational History    None   Social Needs    Financial resource strain: None    Food insecurity:     Worry: None     Inability: None    Transportation needs:     Medical: None     Non-medical: None   Tobacco Use    Smoking status: Former Smoker     Packs/day: 0.25     Years: 49.00     Pack years: 12.25     Types: Cigarettes     Last attempt to quit: 2017     Years since quittin.2    Smokeless tobacco: Never Used    Tobacco comment: only smoke when real stressed  Nicotine patch   Substance and Sexual Activity    Alcohol use:  Yes     Alcohol/week: 1.0 standard drinks complaints. Physical exam is relatively unremarkable    CBC shows a nonspecific leukocytosis of 11.2, no evidence of anemia. CMP is unremarkable. Troponin is negative. X-ray shows no acute process. Patient was given Nitropaste in the ED, she tells me that she can tolerate small doses of morphine, therefore this was given. Last stress test that I can see was in September 2018, due to her risk factors she will need to be admitted for further care and evaluation. Dr. Layton Mcardle has agreed for admission. Patient is stable for admission    FINAL IMPRESSION      1. Chest pain, unspecified type          DISPOSITION/PLAN   DISPOSITION Decision To Admit 09/18/2019 05:41:56 PM      PATIENT REFERREDTO:  No follow-up provider specified.     DISCHARGE MEDICATIONS:  New Prescriptions    No medications on file       DISCONTINUED MEDICATIONS:  Discontinued Medications    No medications on file              (Please note that portions ofthis note were completed with a voice recognition program.  Efforts were made to edit the dictations but occasionally words are mis-transcribed.)    Jose G Ruelas PA-C (electronically signed)            Jose G Ruelas PA-C  09/18/19 5923

## 2019-09-18 NOTE — CARE COORDINATION
Discharge Planning Assessment  SW discharge planner met with patient to discuss reason for admission, current living situation, and potential needs at the time of discharge. Pt in ED d/t chest pain    Demographics/Insurance verified:  Yes    Current type of dwelling:  Apartment - ground level    Living arrangements:  Alone    Level of function/Support:  Pt reports she uses a walker at all times, has arthritis in her legs and feet. Stated she has supportive neighbors and a supportive grandtr. PCP:  Layton Mcardle    Last Visit to PCP:  2 weeks ago    DME:  Sofia Harper lift chair. No DME needs reported. Active with any community resources/agencies/skilled home care:   Yes, active with [facility] for RN only. Also active with [facility] for home delivered meals, lifeline, medical transportation and aide that comes 6 days a week for 3 - 4 hours at a time. Medication compliance issues:  No    Financial issues that could impact healthcare:  No    Transportation at the time of discharge:  Prem Nearing can assist or possibly a neighbor. Tentative discharge plan:  Home most likely and continue with [facility] and [facility].  No other needs identified.     Electronically signed by PIOTR Garcia, [place] on 9/18/2019 at 7:48 PM

## 2019-09-19 PROBLEM — K59.1 FUNCTIONAL DIARRHEA: Status: RESOLVED | Noted: 2018-06-08 | Resolved: 2019-09-19

## 2019-09-19 PROBLEM — C44.42 SQUAMOUS CELL CARCINOMA OF HEAD AND NECK: Status: RESOLVED | Noted: 2018-09-06 | Resolved: 2019-09-19

## 2019-09-19 PROBLEM — J44.1 COPD EXACERBATION (HCC): Status: RESOLVED | Noted: 2019-04-24 | Resolved: 2019-09-19

## 2019-09-19 PROBLEM — C76.0 SQUAMOUS CELL CARCINOMA OF HEAD AND NECK (HCC): Status: RESOLVED | Noted: 2018-09-06 | Resolved: 2019-09-19

## 2019-09-19 LAB
BACTERIA: ABNORMAL /HPF
BILIRUBIN URINE: NEGATIVE
BLOOD, URINE: ABNORMAL
CLARITY: ABNORMAL
COLOR: YELLOW
EKG ATRIAL RATE: 77 BPM
EKG DIAGNOSIS: NORMAL
EKG P AXIS: 36 DEGREES
EKG P-R INTERVAL: 176 MS
EKG Q-T INTERVAL: 376 MS
EKG QRS DURATION: 112 MS
EKG QTC CALCULATION (BAZETT): 425 MS
EKG R AXIS: -67 DEGREES
EKG T AXIS: 46 DEGREES
EKG VENTRICULAR RATE: 77 BPM
EPITHELIAL CELLS, UA: 1 /HPF (ref 0–5)
GLUCOSE BLD-MCNC: 154 MG/DL (ref 70–99)
GLUCOSE BLD-MCNC: 160 MG/DL (ref 70–99)
GLUCOSE BLD-MCNC: 175 MG/DL (ref 70–99)
GLUCOSE BLD-MCNC: 95 MG/DL (ref 70–99)
GLUCOSE URINE: NEGATIVE MG/DL
HYALINE CASTS: 2 /LPF (ref 0–8)
KETONES, URINE: NEGATIVE MG/DL
LEUKOCYTE ESTERASE, URINE: ABNORMAL
MICROSCOPIC EXAMINATION: YES
NITRITE, URINE: NEGATIVE
PERFORMED ON: ABNORMAL
PERFORMED ON: NORMAL
PH UA: 6 (ref 5–8)
POC ACT LR: 237 SEC
PROTEIN UA: NEGATIVE MG/DL
RBC UA: 2 /HPF (ref 0–4)
SPECIFIC GRAVITY UA: 1.01 (ref 1–1.03)
TROPONIN: <0.01 NG/ML
TROPONIN: <0.01 NG/ML
URINE TYPE: ABNORMAL
UROBILINOGEN, URINE: 0.2 E.U./DL
WBC UA: 166 /HPF (ref 0–5)

## 2019-09-19 PROCEDURE — 87186 SC STD MICRODIL/AGAR DIL: CPT

## 2019-09-19 PROCEDURE — 81001 URINALYSIS AUTO W/SCOPE: CPT

## 2019-09-19 PROCEDURE — 2500000003 HC RX 250 WO HCPCS

## 2019-09-19 PROCEDURE — B2111ZZ FLUOROSCOPY OF MULTIPLE CORONARY ARTERIES USING LOW OSMOLAR CONTRAST: ICD-10-PCS | Performed by: INTERNAL MEDICINE

## 2019-09-19 PROCEDURE — 6370000000 HC RX 637 (ALT 250 FOR IP): Performed by: INTERNAL MEDICINE

## 2019-09-19 PROCEDURE — 2580000003 HC RX 258: Performed by: INTERNAL MEDICINE

## 2019-09-19 PROCEDURE — 99153 MOD SED SAME PHYS/QHP EA: CPT

## 2019-09-19 PROCEDURE — 6360000002 HC RX W HCPCS

## 2019-09-19 PROCEDURE — 36415 COLL VENOUS BLD VENIPUNCTURE: CPT

## 2019-09-19 PROCEDURE — C1887 CATHETER, GUIDING: HCPCS

## 2019-09-19 PROCEDURE — 6360000004 HC RX CONTRAST MEDICATION: Performed by: INTERNAL MEDICINE

## 2019-09-19 PROCEDURE — 2709999900 HC NON-CHARGEABLE SUPPLY

## 2019-09-19 PROCEDURE — 93010 ELECTROCARDIOGRAM REPORT: CPT | Performed by: INTERNAL MEDICINE

## 2019-09-19 PROCEDURE — 93571 IV DOP VEL&/PRESS C FLO 1ST: CPT

## 2019-09-19 PROCEDURE — 87086 URINE CULTURE/COLONY COUNT: CPT

## 2019-09-19 PROCEDURE — 2500000003 HC RX 250 WO HCPCS: Performed by: INTERNAL MEDICINE

## 2019-09-19 PROCEDURE — 1200000000 HC SEMI PRIVATE

## 2019-09-19 PROCEDURE — C1894 INTRO/SHEATH, NON-LASER: HCPCS

## 2019-09-19 PROCEDURE — 4A023N7 MEASUREMENT OF CARDIAC SAMPLING AND PRESSURE, LEFT HEART, PERCUTANEOUS APPROACH: ICD-10-PCS | Performed by: INTERNAL MEDICINE

## 2019-09-19 PROCEDURE — 99222 1ST HOSP IP/OBS MODERATE 55: CPT | Performed by: INTERNAL MEDICINE

## 2019-09-19 PROCEDURE — 87077 CULTURE AEROBIC IDENTIFY: CPT

## 2019-09-19 PROCEDURE — 84484 ASSAY OF TROPONIN QUANT: CPT

## 2019-09-19 PROCEDURE — 99152 MOD SED SAME PHYS/QHP 5/>YRS: CPT

## 2019-09-19 PROCEDURE — 4A033BC MEASUREMENT OF ARTERIAL PRESSURE, CORONARY, PERCUTANEOUS APPROACH: ICD-10-PCS | Performed by: INTERNAL MEDICINE

## 2019-09-19 PROCEDURE — 93458 L HRT ARTERY/VENTRICLE ANGIO: CPT

## 2019-09-19 PROCEDURE — 6360000002 HC RX W HCPCS: Performed by: INTERNAL MEDICINE

## 2019-09-19 PROCEDURE — 2720000010 HC SURG SUPPLY STERILE

## 2019-09-19 PROCEDURE — 85347 COAGULATION TIME ACTIVATED: CPT

## 2019-09-19 PROCEDURE — C1769 GUIDE WIRE: HCPCS

## 2019-09-19 RX ORDER — SODIUM CHLORIDE 9 MG/ML
INJECTION, SOLUTION INTRAVENOUS CONTINUOUS
Status: DISCONTINUED | OUTPATIENT
Start: 2019-09-19 | End: 2019-09-20 | Stop reason: HOSPADM

## 2019-09-19 RX ORDER — ACETAMINOPHEN 325 MG/1
650 TABLET ORAL EVERY 4 HOURS PRN
Status: DISCONTINUED | OUTPATIENT
Start: 2019-09-19 | End: 2019-09-20 | Stop reason: HOSPADM

## 2019-09-19 RX ORDER — POTASSIUM CHLORIDE 7.45 MG/ML
10 INJECTION INTRAVENOUS PRN
Status: DISCONTINUED | OUTPATIENT
Start: 2019-09-19 | End: 2019-09-20 | Stop reason: HOSPADM

## 2019-09-19 RX ORDER — SODIUM CHLORIDE 0.9 % (FLUSH) 0.9 %
10 SYRINGE (ML) INJECTION PRN
Status: DISCONTINUED | OUTPATIENT
Start: 2019-09-19 | End: 2019-09-20 | Stop reason: HOSPADM

## 2019-09-19 RX ORDER — SULFAMETHOXAZOLE AND TRIMETHOPRIM 800; 160 MG/1; MG/1
1 TABLET ORAL EVERY 12 HOURS SCHEDULED
Status: DISCONTINUED | OUTPATIENT
Start: 2019-09-19 | End: 2019-09-20 | Stop reason: HOSPADM

## 2019-09-19 RX ORDER — POTASSIUM CHLORIDE 20 MEQ/1
40 TABLET, EXTENDED RELEASE ORAL PRN
Status: DISCONTINUED | OUTPATIENT
Start: 2019-09-19 | End: 2019-09-20 | Stop reason: HOSPADM

## 2019-09-19 RX ORDER — SODIUM CHLORIDE 0.9 % (FLUSH) 0.9 %
10 SYRINGE (ML) INJECTION PRN
Status: DISCONTINUED | OUTPATIENT
Start: 2019-09-19 | End: 2019-09-19 | Stop reason: SDUPTHER

## 2019-09-19 RX ORDER — ONDANSETRON 2 MG/ML
4 INJECTION INTRAMUSCULAR; INTRAVENOUS EVERY 6 HOURS PRN
Status: DISCONTINUED | OUTPATIENT
Start: 2019-09-19 | End: 2019-09-20 | Stop reason: HOSPADM

## 2019-09-19 RX ORDER — 0.9 % SODIUM CHLORIDE 0.9 %
500 INTRAVENOUS SOLUTION INTRAVENOUS PRN
Status: DISCONTINUED | OUTPATIENT
Start: 2019-09-19 | End: 2019-09-20 | Stop reason: HOSPADM

## 2019-09-19 RX ORDER — SODIUM CHLORIDE 0.9 % (FLUSH) 0.9 %
10 SYRINGE (ML) INJECTION EVERY 12 HOURS SCHEDULED
Status: DISCONTINUED | OUTPATIENT
Start: 2019-09-19 | End: 2019-09-20 | Stop reason: HOSPADM

## 2019-09-19 RX ORDER — ASPIRIN 325 MG
325 TABLET ORAL ONCE
Status: DISCONTINUED | OUTPATIENT
Start: 2019-09-19 | End: 2019-09-20 | Stop reason: HOSPADM

## 2019-09-19 RX ORDER — SODIUM CHLORIDE 0.9 % (FLUSH) 0.9 %
10 SYRINGE (ML) INJECTION EVERY 12 HOURS SCHEDULED
Status: DISCONTINUED | OUTPATIENT
Start: 2019-09-19 | End: 2019-09-19 | Stop reason: SDUPTHER

## 2019-09-19 RX ORDER — HYDRALAZINE HYDROCHLORIDE 20 MG/ML
10 INJECTION INTRAMUSCULAR; INTRAVENOUS EVERY 6 HOURS PRN
Status: DISCONTINUED | OUTPATIENT
Start: 2019-09-19 | End: 2019-09-20 | Stop reason: HOSPADM

## 2019-09-19 RX ADMIN — ALPRAZOLAM 1 MG: 0.5 TABLET ORAL at 08:31

## 2019-09-19 RX ADMIN — SULFAMETHOXAZOLE AND TRIMETHOPRIM 1 TABLET: 800; 160 TABLET ORAL at 08:32

## 2019-09-19 RX ADMIN — KETOROLAC TROMETHAMINE 15 MG: 15 INJECTION, SOLUTION INTRAMUSCULAR; INTRAVENOUS at 04:57

## 2019-09-19 RX ADMIN — KETOROLAC TROMETHAMINE 15 MG: 15 INJECTION, SOLUTION INTRAMUSCULAR; INTRAVENOUS at 16:28

## 2019-09-19 RX ADMIN — IOPAMIDOL 50 ML: 755 INJECTION, SOLUTION INTRAVENOUS at 15:52

## 2019-09-19 RX ADMIN — MICONAZOLE NITRATE: 20 POWDER TOPICAL at 20:50

## 2019-09-19 RX ADMIN — ALPRAZOLAM 1 MG: 0.5 TABLET ORAL at 18:14

## 2019-09-19 RX ADMIN — DICLOFENAC 2 G: 10 GEL TOPICAL at 08:28

## 2019-09-19 RX ADMIN — SODIUM CHLORIDE: 9 INJECTION, SOLUTION INTRAVENOUS at 16:58

## 2019-09-19 RX ADMIN — DICLOFENAC 2 G: 10 GEL TOPICAL at 20:50

## 2019-09-19 RX ADMIN — SODIUM CHLORIDE: 9 INJECTION, SOLUTION INTRAVENOUS at 11:30

## 2019-09-19 RX ADMIN — ASPIRIN 81 MG: 81 TABLET, COATED ORAL at 08:26

## 2019-09-19 RX ADMIN — MICONAZOLE NITRATE: 20 POWDER TOPICAL at 08:28

## 2019-09-19 RX ADMIN — Medication 10 ML: at 20:45

## 2019-09-19 RX ADMIN — GLIPIZIDE 5 MG: 5 TABLET ORAL at 16:28

## 2019-09-19 RX ADMIN — GLIPIZIDE 5 MG: 5 TABLET ORAL at 06:23

## 2019-09-19 RX ADMIN — SULFAMETHOXAZOLE AND TRIMETHOPRIM 1 TABLET: 800; 160 TABLET ORAL at 20:45

## 2019-09-19 RX ADMIN — ISOSORBIDE MONONITRATE 60 MG: 60 TABLET, EXTENDED RELEASE ORAL at 08:26

## 2019-09-19 RX ADMIN — GABAPENTIN 300 MG: 300 CAPSULE ORAL at 08:25

## 2019-09-19 RX ADMIN — PANTOPRAZOLE SODIUM 40 MG: 40 TABLET, DELAYED RELEASE ORAL at 06:23

## 2019-09-19 RX ADMIN — LISINOPRIL 2.5 MG: 5 TABLET ORAL at 08:26

## 2019-09-19 RX ADMIN — GABAPENTIN 300 MG: 300 CAPSULE ORAL at 20:44

## 2019-09-19 ASSESSMENT — PAIN SCALES - GENERAL
PAINLEVEL_OUTOF10: 8
PAINLEVEL_OUTOF10: 8
PAINLEVEL_OUTOF10: 4
PAINLEVEL_OUTOF10: 0
PAINLEVEL_OUTOF10: 7
PAINLEVEL_OUTOF10: 0
PAINLEVEL_OUTOF10: 8
PAINLEVEL_OUTOF10: 0
PAINLEVEL_OUTOF10: 0
PAINLEVEL_OUTOF10: 6

## 2019-09-19 ASSESSMENT — PAIN DESCRIPTION - PAIN TYPE
TYPE: ACUTE PAIN

## 2019-09-19 ASSESSMENT — PAIN DESCRIPTION - LOCATION
LOCATION: CHEST
LOCATION: HEAD;LEG
LOCATION: JAW
LOCATION: JAW

## 2019-09-19 ASSESSMENT — PAIN DESCRIPTION - ORIENTATION: ORIENTATION: LEFT

## 2019-09-19 NOTE — CONSULTS
[facility]  [phone]        Reason for Consultation/Chief Complaint: \"I have been having chest pain . \"    History of Present Illness:  Woo Chavez is a 69 y.o. patient who presented to the hospital with complaints of intermittent L sided chest pain. The patient's symptom onset is acute for a time period of 1 day(s). The severity is described as moderate to severe. The course of her symptom over time is intermittent. The symptoms are described as improved with nitro and worsened with exertion. The patient's symptom is associated with radiation to jaw and back. The hospital course has been complicated by chest pain. I have been asked to provide consultation regarding further management and testing. The patient has a past medical history significant for MI, coronary artery disease, type 2 diabetes, hypertension, hyperlipidemia. Past Medical History:   has a past medical history of Acute MI ([address] Acute pyelonephritis, Anxiety and depression, Arthritis, CAD (coronary artery disease), Cancer ([address] Cancer ([address] Cancer of skin of leg, Chronic obstructive pulmonary disease ([address] Claustrophobia, COPD (chronic obstructive pulmonary disease) ([address] Esophageal stricture, Gastroesophageal reflux disease without esophagitis, Hiatal hernia, Hiatal hernia, Hypercholesteremia, Hypertension, Migraines, Movement disorder, Pneumonia, PONV (postoperative nausea and vomiting), Type II or unspecified type diabetes mellitus without mention of complication, not stated as uncontrolled, and Unspecified cerebral artery occlusion with cerebral infarction. Surgical History:   has a past surgical history that includes Cardiac surgery; Gallbladder surgery; Hysterectomy; Appendectomy; Cholecystectomy; Colonoscopy; Upper gastrointestinal endoscopy (4/20/12); Endoscopy, colon, diagnostic; Tear duct surgery; Upper gastrointestinal endoscopy (06/11/2018);  Colonoscopy (06/11/2018); pr exc skin malig <5mm

## 2019-09-20 VITALS
BODY MASS INDEX: 44.29 KG/M2 | OXYGEN SATURATION: 92 % | WEIGHT: 275.6 LBS | SYSTOLIC BLOOD PRESSURE: 160 MMHG | DIASTOLIC BLOOD PRESSURE: 87 MMHG | HEIGHT: 66 IN | HEART RATE: 79 BPM | TEMPERATURE: 97.6 F | RESPIRATION RATE: 16 BRPM

## 2019-09-20 LAB
GLUCOSE BLD-MCNC: 180 MG/DL (ref 70–99)
GLUCOSE BLD-MCNC: 234 MG/DL (ref 70–99)
PERFORMED ON: ABNORMAL
PERFORMED ON: ABNORMAL

## 2019-09-20 PROCEDURE — 6370000000 HC RX 637 (ALT 250 FOR IP): Performed by: INTERNAL MEDICINE

## 2019-09-20 PROCEDURE — 2500000003 HC RX 250 WO HCPCS: Performed by: INTERNAL MEDICINE

## 2019-09-20 PROCEDURE — 94680 O2 UPTK RST&XERS DIR SIMPLE: CPT

## 2019-09-20 PROCEDURE — 94760 N-INVAS EAR/PLS OXIMETRY 1: CPT

## 2019-09-20 PROCEDURE — 2700000000 HC OXYGEN THERAPY PER DAY

## 2019-09-20 PROCEDURE — 99232 SBSQ HOSP IP/OBS MODERATE 35: CPT | Performed by: INTERNAL MEDICINE

## 2019-09-20 PROCEDURE — 6360000002 HC RX W HCPCS: Performed by: INTERNAL MEDICINE

## 2019-09-20 PROCEDURE — 94640 AIRWAY INHALATION TREATMENT: CPT

## 2019-09-20 RX ORDER — METOPROLOL SUCCINATE 25 MG/1
25 TABLET, EXTENDED RELEASE ORAL DAILY
Status: DISCONTINUED | OUTPATIENT
Start: 2019-09-20 | End: 2019-09-20 | Stop reason: HOSPADM

## 2019-09-20 RX ORDER — TRAMADOL HYDROCHLORIDE 50 MG/1
50 TABLET ORAL ONCE
Status: COMPLETED | OUTPATIENT
Start: 2019-09-20 | End: 2019-09-20

## 2019-09-20 RX ADMIN — ISOSORBIDE MONONITRATE 60 MG: 60 TABLET, EXTENDED RELEASE ORAL at 09:53

## 2019-09-20 RX ADMIN — PANTOPRAZOLE SODIUM 40 MG: 40 TABLET, DELAYED RELEASE ORAL at 06:21

## 2019-09-20 RX ADMIN — MICONAZOLE NITRATE: 20 POWDER TOPICAL at 09:55

## 2019-09-20 RX ADMIN — GABAPENTIN 300 MG: 300 CAPSULE ORAL at 09:57

## 2019-09-20 RX ADMIN — IPRATROPIUM BROMIDE AND ALBUTEROL SULFATE 3 ML: .5; 3 SOLUTION RESPIRATORY (INHALATION) at 09:41

## 2019-09-20 RX ADMIN — METOPROLOL SUCCINATE 25 MG: 25 TABLET, EXTENDED RELEASE ORAL at 10:07

## 2019-09-20 RX ADMIN — DICLOFENAC 2 G: 10 GEL TOPICAL at 09:55

## 2019-09-20 RX ADMIN — LISINOPRIL 2.5 MG: 5 TABLET ORAL at 09:54

## 2019-09-20 RX ADMIN — GLIPIZIDE 5 MG: 5 TABLET ORAL at 06:21

## 2019-09-20 RX ADMIN — ALPRAZOLAM 1 MG: 0.5 TABLET ORAL at 09:58

## 2019-09-20 RX ADMIN — TRAMADOL HYDROCHLORIDE 50 MG: 50 TABLET, FILM COATED ORAL at 10:07

## 2019-09-20 RX ADMIN — ENOXAPARIN SODIUM 40 MG: 40 INJECTION SUBCUTANEOUS at 09:52

## 2019-09-20 RX ADMIN — ASPIRIN 81 MG: 81 TABLET, COATED ORAL at 09:53

## 2019-09-20 RX ADMIN — SULFAMETHOXAZOLE AND TRIMETHOPRIM 1 TABLET: 800; 160 TABLET ORAL at 09:53

## 2019-09-20 RX ADMIN — ALPRAZOLAM 1 MG: 0.5 TABLET ORAL at 01:56

## 2019-09-20 ASSESSMENT — PAIN SCALES - GENERAL
PAINLEVEL_OUTOF10: 0
PAINLEVEL_OUTOF10: 0
PAINLEVEL_OUTOF10: 9

## 2019-09-20 ASSESSMENT — PAIN DESCRIPTION - DESCRIPTORS: DESCRIPTORS: HEADACHE

## 2019-09-20 ASSESSMENT — PAIN DESCRIPTION - PAIN TYPE: TYPE: ACUTE PAIN

## 2019-09-20 NOTE — CARE COORDINATION
Patient confirmed she uses [facility] for home O2. She has a portable tank, home tank and oxygenator. Charline Edwards w/ [employer] updated.     Marvel Borrego RN, BSN  [phone]

## 2019-09-20 NOTE — PROGRESS NOTES
Home Oxygen Evaluation    Patients room air resting oxygen saturation SpO2 86%.   Patient's on oxygen at rest SpO2 % at   1 lpm = SpO2  92%    2 lpm = SpO2  94%

## 2019-09-20 NOTE — DISCHARGE SUMMARY
[facility] -- Physician Discharge Summary     Kristi Sandoval  1945  MRN: [id number]    Admit Date: 9/18/2019  Discharge Date: No discharge date for patient encounter. Attending MD: Kvng Carreon MD  Discharging MD: Antonio Naidu MD  PCP: Rhett Crouch [address] [place] [address] [address] [phone]    Admission Diagnosis: Unstable angina ([facility] [I20.0]  Unstable angina ([address] [I20.0]  DISCHARGE DIAGNOSIS: same    Full Hospital Problem List:  Active Hospital Problems    Diagnosis Date Noted    Hyperlipemia [E78.5] 05/23/2011     Priority: High    CAD (coronary artery disease) [I25.10] 05/23/2011     Priority: High    Unstable angina ([address] [I20.0] 09/18/2019    Irritable bowel syndrome with diarrhea [K58.0] 08/09/2019    Morbid obesity ([facility] [E66.01] 06/18/2018    Anxiety [F41.9] 01/19/2018    Chest pain [R07.9] 12/28/2017    Controlled type 2 diabetes mellitus without complication, without long-term current use of insulin ([facility] [E11.9] 01/13/2017    Primary osteoarthritis involving multiple joints [M15.0] 03/24/2016    Gastroesophageal reflux disease without esophagitis [K21.9] 03/24/2016    DM2 (diabetes mellitus, type 2) ([facility] [E11.9] 12/03/2015    ASHD (arteriosclerotic heart disease) [I25.10]     Essential hypertension [I10] 04/18/2012    COPD (chronic obstructive pulmonary disease) ([facility] [J44.9] 08/09/2011           Hospital Course: The patient is a 61-year-old white lady who  came to the emergency room with history of chest pain for last three  days. Pain has no relation to exertion, happens at rest, partially  relieved with nitroglycerin. There is some associated shortness of  breath, no diaphoresis. No loss of consciousness. No nausea or  Vomiting. She is admitted for unstable angina. Cardiology is consulted. Based on her presentation, cardiac cath is recommended.  Per op note per Dr Christie Griffith with FFR:  Anatomy:   LM-Normal No disease LAD-proximal stent 10% ISR, mid stent patent. Distal LAD 20% irregular stenosis. D2 70% ostial stent [facility]  Cx-Patent, mild luminal irregularities  OM1- ostial discrete 60% stenosis  RCA-Dominant, large sized vessel. Proximal 70% heavily calcified stenosis. RPDA- Patent, mild luminal irregularities     FFR of prox RCA 0.84     Impression  ~Angiography w/ single vessel moderate to severe disease. Patent LAD stents  ~LVEDP 20  ~Non ischemic FFR of RCA     Recommendation  ~Aggressive medical treatment and risk factor modification  1. Medications reviewed, no changes at this time. 2. Post cath IVF. Bedrest.  3. No indication for beta blocker, statin or anti platelet therapy  4. Patient has been advised on the importance of regular exercise of at least 20-30 minutes daily alternating with aerobic and isometric activities. 5. Patient counseled about and offered assistance for smoking cessation   6. No indication for cardiac rehab  4.  Follow up in 2 months with cardiology     Pt is chest pain free at time of discharge  No changes to her meds  Cleared by cardiology for discharge    Consults made during Hospitalization:  [address] [place] [address] TO CARDIOLOGY    Treatment team at time of Discharge: Treatment Team: Attending Provider: Steve Lopes MD; Registered Nurse: Valencia Lilly RN; Consulting Physician: Sharon Anne MD; Utilization Reviewer: Lopez Souza RN; Respiratory Therapist (Night): Tigre Orr RCP; Consulting Physician: River Ledezma MD; Registered Nurse: Kalin Avery RN    Imaging Results:  Xr Chest Standard (2 Vw)    Result Date: 9/18/2019  EXAMINATION: TWO XRAY VIEWS OF THE CHEST 9/18/2019 2:31 pm COMPARISON: 04/27/2019 HISTORY: ORDERING SYSTEM PROVIDED HISTORY: Chest Discomfort TECHNOLOGIST PROVIDED HISTORY: Reason for exam:->Chest Discomfort Reason for Exam: Chest pain Acuity: Unknown Type of Exam: Unknown FINDINGS: Evaluation is limited by the patient's body habitus. No definite focal infiltrate is identified. No obvious pneumothorax. No free air. No acute bony abnormality. Limited negative chest radiograph. Discharge Exam:  /87   Pulse 64   Temp 97.2 °F (36.2 °C) (Temporal)   Resp 16   Ht 5' 6\" (1.676 m)   Wt 275 lb 9.6 oz (125 kg)   SpO2 98%   BMI 44.48 kg/m²   General appearance: alert, appears stated age and cooperative  Head: Normocephalic, without obvious abnormality, atraumatic  Lungs: clear to auscultation bilaterally  Heart: regular rate and rhythm, S1, S2 normal, no murmur, click, rub or gallop  Abdomen: soft, non-tender; bowel sounds normal; no masses,  no organomegaly  Extremities: extremities normal, atraumatic, no cyanosis or edema  Pulses: 2+ and symmetric    Disposition: home    Condition: stable    Discharge Medications:   Nila Kings   Home Medication Instructions [id number]    Printed on:09/19/19 2012   Medication Information                      albuterol sulfate HFA (VENTOLIN HFA) 108 (90 Base) MCG/ACT inhaler  Inhale 2 puffs into the lungs every 6 hours as needed for Wheezing             ALPRAZolam (XANAX) 1 MG tablet  Take 1 tablet by mouth 3 times daily as needed for Anxiety for up to 30 days. aspirin 81 MG tablet  Take 81 mg by mouth daily             blood glucose test strips (ASCENSIA AUTODISC VI;ONE TOUCH ULTRA TEST VI) strip  1 each by In Vitro route 3 times daily As needed.              Denture Care Products (EFFERDENT DENTURE CLEANSER) TBEF  1 tablet by Does not apply route as needed (denture cleaning)             Diapers & Supplies (HUGGIES PULL-UPS 4T-5T) MISC  8 each by Does not apply route daily             diclofenac sodium 1 % GEL  Apply 2 g topically 2 times daily             Elastic Bandages & Supports (JOBST FOR MEN 30-40MMHG LG) MISC  20-30 mm by Does not apply route daily Please measure for knee hi hose             gabapentin (NEURONTIN) 300 MG capsule  Take 1

## 2019-09-20 NOTE — DISCHARGE INSTR - COC
Continuity of Care Form    Patient Name: Birgit Storm   :    MRN:  [id number]    Admit date:  2019  Discharge date:  2019    Code Status Order: Full Code   Advance Directives:   [facility] Documentation     Date/Time Healthcare Directive Type of Healthcare Directive Copy in [facility] [address] Agent's Name Healthcare Agent's Phone Number    [phone]  No, patient does not have an advance directive for healthcare treatment -- -- -- -- --          Admitting Physician:  Harrison Lim MD  PCP: Harrison Lim MD    Discharging Nurse: [place] [address] [facility] Final Unit/Room#: 4OZ-3967/5367-35  Discharging Unit Phone Number: [phone]    Emergency Contact:   Extended Emergency Contact Information  Primary Emergency Contact: [address] [place] [address] Phone: [phone]  Relation: Child  Secondary Emergency Contact: Rhea Bedolla  [place] Phone: [phone]  Relation: Grandchild    Past Surgical History:  Past Surgical History:   Procedure Laterality Date    APPENDECTOMY      CARDIAC SURGERY      1 stent  and 1 stent     CHOLECYSTECTOMY      COLONOSCOPY      COLONOSCOPY  2018    ENDOSCOPY, COLON, DIAGNOSTIC      EYE SURGERY      bilateral cataracts    GALLBLADDER SURGERY      HYSTERECTOMY      DC EXC SKIN MALIG <5MM REMAINDR BODY N/A 2018    EXCISION OF SCALP SQUAMOUS CELL CARCINOMA performed by Larry Merlos MD at [facility] <100SQCM N/A 2018    SPLIT THICKNESS SKIN GRAFT FOR COVERAGE SCALP, APPLICATION OF WOUND [place] performed by Larry Merlos MD at [address] ENDOSCOPY  12    with biopsy of stomach,gastritis    UPPER GASTROINTESTINAL ENDOSCOPY  2018    w/esophagael dilation       Immunization History:   Immunization History   Administered Date(s) Administered    Influenza 10/11/2013    Influenza Virus Vaccine 10/18/2010, 08/11/2014, 09/10/2015, 09/05/2017    Influenza, High Dose (Fluzone 65 yrs and older) 10/02/2018    Influenza, MDCK Jarrell Reyna, with preserv IM (Flucelvax 4 yrs and older) 09/08/2017, 09/11/2018    Influenza, Jarrell Pinks, IM, (6 mo and older Fluzone, Flulaval, Fluarix and 3 yrs and older Afluria) 09/09/2019    Influenza, Triv, 3 Years and older, IM (Afluria (5 yrs and older) 10/20/2016    Pneumococcal Conjugate 13-valent ([id number]) 02/08/2017    Pneumococcal Polysaccharide ([id number]) 12/24/2010, 07/23/2015       Active Problems:  Patient Active Problem List   Diagnosis Code    CAD (coronary artery disease) I25.10    Hyperlipemia E78.5    COPD (chronic obstructive pulmonary disease) ([facility] J44.9    Essential hypertension I10    ASHD (arteriosclerotic heart disease) I25.10    DM2 (diabetes mellitus, type 2) ([facility]) E11.9    Primary osteoarthritis involving multiple joints M15.0    Gastroesophageal reflux disease without esophagitis K21.9    PVC (premature ventricular contraction) I49.3    Controlled type 2 diabetes mellitus without complication, without long-term current use of insulin ([facility]) E11.9    Chest pain R07.9    Anxiety F41.9    Morbid obesity ([facility]) E66.01    Precordial pain R07.2    Irritable bowel syndrome with diarrhea K58.0    Unstable angina ([facility]) I20.0       Isolation/Infection:   Isolation          No Isolation            Nurse Assessment:  Last Vital Signs: /87   Pulse 64   Temp 97.2 °F (36.2 °C) (Temporal)   Resp 16   Ht 5' 6\" (1.676 m)   Wt 275 lb 9.6 oz (125 kg)   SpO2 98%   BMI 44.48 kg/m²     Last documented pain score (0-10 scale): Pain Level: 0  Last Weight:   Wt Readings from Last 1 Encounters:   09/18/19 275 lb 9.6 oz (125 kg)     Mental Status:  oriented and alert    IV Access:  - None    Nursing Mobility/ADLs:  Walking   Independent  Transfer  Independent  Bathing  Independent  Dressing  Independent  Toileting Independent  Feeding  Independent  Med Admin  Independent  Med Delivery   whole    Wound Care Documentation and Therapy:  Puncture 09/19/19 Wrist Right;Dorsal (Active)   Number of days: 0        Elimination:  Continence:   · Bowel: No  · Bladder: No  Urinary Catheter: None   Colostomy/Ileostomy/Ileal Conduit: No       Date of Last BM: 09/19/19    Intake/Output Summary (Last 24 hours) at 9/19/2019 2053  Last data filed at 9/19/2019 1902  Gross per 24 hour   Intake --   Output 700 ml   Net -700 ml     No intake/output data recorded. Safety Concerns:     None    Impairments/Disabilities:      None    Nutrition Therapy:  Current Nutrition Therapy:   - Oral Diet:  Cardiac    Routes of Feeding: Oral  Liquids: No Restrictions  Daily Fluid Restriction: no  Last Modified Barium Swallow with Video (Video Swallowing Test): not done    Treatments at the Time of Hospital Discharge:   Respiratory Treatments: PRN  Oxygen Therapy:  is on oxygen at 2 L/min per nasal cannula.   Ventilator:    - No ventilator support    Rehab Therapies: N/A  Weight Bearing Status/Restrictions: No weight bearing restirctions  Other Medical Equipment (for information only, NOT a DME order):  N/A  Other Treatments: N/A    Patient's personal belongings (please select all that are sent with patient):  None    RN SIGNATURE:  Electronically signed by Thony Benjamin RN on 9/20/19 at 10:20 AM    CASE MANAGEMENT/SOCIAL WORK SECTION    Inpatient Status Date: ***    Readmission Risk Assessment Score:  Readmission Risk              Risk of Unplanned Readmission:        14           Discharging to Facility/ Agency   · Name:   · Address:  · Phone:  · Fax:    Dialysis Facility (if applicable)   · Name:  · Address:  · Dialysis Schedule:  · Phone:  · Fax:    / signature: {Esignature:[id number]}    PHYSICIAN SECTION    Prognosis: Fair    Condition at Discharge: Stable    Rehab Potential (if transferring to Rehab): Good    Recommended Labs or

## 2019-09-21 LAB
ORGANISM: ABNORMAL
URINE CULTURE, ROUTINE: ABNORMAL

## 2019-09-22 ENCOUNTER — HOSPITAL ENCOUNTER (INPATIENT)
Age: 74
LOS: 3 days | Discharge: NURSING, REHAB OR LONG-TERM CARE | DRG: 191 | End: 2019-09-25
Attending: EMERGENCY MEDICINE | Admitting: INTERNAL MEDICINE
Payer: COMMERCIAL

## 2019-09-22 ENCOUNTER — APPOINTMENT (OUTPATIENT)
Dept: GENERAL RADIOLOGY | Age: 74
DRG: 191 | End: 2019-09-22
Payer: COMMERCIAL

## 2019-09-22 DIAGNOSIS — I20.0 UNSTABLE ANGINA PECTORIS (HCC): Primary | ICD-10-CM

## 2019-09-22 DIAGNOSIS — F41.9 ANXIETY: ICD-10-CM

## 2019-09-22 PROBLEM — J44.1 COPD EXACERBATION (HCC): Status: ACTIVE | Noted: 2019-09-22

## 2019-09-22 LAB
ANION GAP SERPL CALCULATED.3IONS-SCNC: 12 MMOL/L (ref 3–16)
BASOPHILS ABSOLUTE: 0.1 K/UL (ref 0–0.2)
BASOPHILS RELATIVE PERCENT: 1.1 %
BUN BLDV-MCNC: 15 MG/DL (ref 7–20)
CALCIUM SERPL-MCNC: 9.9 MG/DL (ref 8.3–10.6)
CHLORIDE BLD-SCNC: 97 MMOL/L (ref 99–110)
CO2: 25 MMOL/L (ref 21–32)
CREAT SERPL-MCNC: 0.8 MG/DL (ref 0.6–1.2)
EKG ATRIAL RATE: 85 BPM
EKG DIAGNOSIS: NORMAL
EKG P-R INTERVAL: 164 MS
EKG Q-T INTERVAL: 366 MS
EKG QRS DURATION: 110 MS
EKG QTC CALCULATION (BAZETT): 435 MS
EKG R AXIS: -59 DEGREES
EKG T AXIS: 43 DEGREES
EKG VENTRICULAR RATE: 85 BPM
EOSINOPHILS ABSOLUTE: 0.1 K/UL (ref 0–0.6)
EOSINOPHILS RELATIVE PERCENT: 1.1 %
GFR AFRICAN AMERICAN: >60
GFR NON-AFRICAN AMERICAN: >60
GLUCOSE BLD-MCNC: 150 MG/DL (ref 70–99)
GLUCOSE BLD-MCNC: 183 MG/DL (ref 70–99)
GLUCOSE BLD-MCNC: 395 MG/DL (ref 70–99)
HCT VFR BLD CALC: 50.9 % (ref 36–48)
HEMOGLOBIN: 16.8 G/DL (ref 12–16)
LYMPHOCYTES ABSOLUTE: 2.5 K/UL (ref 1–5.1)
LYMPHOCYTES RELATIVE PERCENT: 22.2 %
MCH RBC QN AUTO: 29 PG (ref 26–34)
MCHC RBC AUTO-ENTMCNC: 33 G/DL (ref 31–36)
MCV RBC AUTO: 88 FL (ref 80–100)
MONOCYTES ABSOLUTE: 0.7 K/UL (ref 0–1.3)
MONOCYTES RELATIVE PERCENT: 6.2 %
NEUTROPHILS ABSOLUTE: 7.9 K/UL (ref 1.7–7.7)
NEUTROPHILS RELATIVE PERCENT: 69.4 %
PDW BLD-RTO: 14.7 % (ref 12.4–15.4)
PERFORMED ON: ABNORMAL
PERFORMED ON: ABNORMAL
PLATELET # BLD: 234 K/UL (ref 135–450)
PMV BLD AUTO: 7.9 FL (ref 5–10.5)
POTASSIUM SERPL-SCNC: 4.7 MMOL/L (ref 3.5–5.1)
PRO-BNP: 88 PG/ML (ref 0–124)
RBC # BLD: 5.78 M/UL (ref 4–5.2)
SODIUM BLD-SCNC: 134 MMOL/L (ref 136–145)
TROPONIN: <0.01 NG/ML
WBC # BLD: 11.4 K/UL (ref 4–11)

## 2019-09-22 PROCEDURE — 1200000000 HC SEMI PRIVATE

## 2019-09-22 PROCEDURE — 84484 ASSAY OF TROPONIN QUANT: CPT

## 2019-09-22 PROCEDURE — 6360000002 HC RX W HCPCS: Performed by: INTERNAL MEDICINE

## 2019-09-22 PROCEDURE — 6370000000 HC RX 637 (ALT 250 FOR IP): Performed by: INTERNAL MEDICINE

## 2019-09-22 PROCEDURE — 80048 BASIC METABOLIC PNL TOTAL CA: CPT

## 2019-09-22 PROCEDURE — 6360000002 HC RX W HCPCS: Performed by: PHYSICIAN ASSISTANT

## 2019-09-22 PROCEDURE — 99285 EMERGENCY DEPT VISIT HI MDM: CPT

## 2019-09-22 PROCEDURE — 87205 SMEAR GRAM STAIN: CPT

## 2019-09-22 PROCEDURE — 94640 AIRWAY INHALATION TREATMENT: CPT

## 2019-09-22 PROCEDURE — 93010 ELECTROCARDIOGRAM REPORT: CPT | Performed by: INTERNAL MEDICINE

## 2019-09-22 PROCEDURE — 93005 ELECTROCARDIOGRAM TRACING: CPT | Performed by: EMERGENCY MEDICINE

## 2019-09-22 PROCEDURE — 6360000002 HC RX W HCPCS: Performed by: EMERGENCY MEDICINE

## 2019-09-22 PROCEDURE — 2580000003 HC RX 258: Performed by: INTERNAL MEDICINE

## 2019-09-22 PROCEDURE — 6370000000 HC RX 637 (ALT 250 FOR IP): Performed by: EMERGENCY MEDICINE

## 2019-09-22 PROCEDURE — 96374 THER/PROPH/DIAG INJ IV PUSH: CPT

## 2019-09-22 PROCEDURE — 87070 CULTURE OTHR SPECIMN AEROBIC: CPT

## 2019-09-22 PROCEDURE — 2700000000 HC OXYGEN THERAPY PER DAY

## 2019-09-22 PROCEDURE — 85025 COMPLETE CBC W/AUTO DIFF WBC: CPT

## 2019-09-22 PROCEDURE — 94761 N-INVAS EAR/PLS OXIMETRY MLT: CPT

## 2019-09-22 PROCEDURE — 71046 X-RAY EXAM CHEST 2 VIEWS: CPT

## 2019-09-22 PROCEDURE — 2500000003 HC RX 250 WO HCPCS: Performed by: INTERNAL MEDICINE

## 2019-09-22 PROCEDURE — 6370000000 HC RX 637 (ALT 250 FOR IP): Performed by: PHYSICIAN ASSISTANT

## 2019-09-22 PROCEDURE — 83880 ASSAY OF NATRIURETIC PEPTIDE: CPT

## 2019-09-22 RX ORDER — PREDNISONE 20 MG/1
40 TABLET ORAL DAILY
Status: DISCONTINUED | OUTPATIENT
Start: 2019-09-24 | End: 2019-09-25 | Stop reason: HOSPADM

## 2019-09-22 RX ORDER — SODIUM CHLORIDE 0.9 % (FLUSH) 0.9 %
10 SYRINGE (ML) INJECTION PRN
Status: DISCONTINUED | OUTPATIENT
Start: 2019-09-22 | End: 2019-09-25 | Stop reason: HOSPADM

## 2019-09-22 RX ORDER — OMEPRAZOLE 20 MG/1
40 CAPSULE, DELAYED RELEASE ORAL DAILY
Status: DISCONTINUED | OUTPATIENT
Start: 2019-09-22 | End: 2019-09-22

## 2019-09-22 RX ORDER — ALBUTEROL SULFATE 90 UG/1
2 AEROSOL, METERED RESPIRATORY (INHALATION) EVERY 6 HOURS PRN
Status: DISCONTINUED | OUTPATIENT
Start: 2019-09-22 | End: 2019-09-25 | Stop reason: HOSPADM

## 2019-09-22 RX ORDER — GLIPIZIDE 5 MG/1
5 TABLET ORAL
Status: DISCONTINUED | OUTPATIENT
Start: 2019-09-22 | End: 2019-09-23

## 2019-09-22 RX ORDER — MAGNESIUM SULFATE IN WATER 40 MG/ML
2 INJECTION, SOLUTION INTRAVENOUS ONCE
Status: COMPLETED | OUTPATIENT
Start: 2019-09-22 | End: 2019-09-22

## 2019-09-22 RX ORDER — IPRATROPIUM BROMIDE AND ALBUTEROL SULFATE 2.5; .5 MG/3ML; MG/3ML
1 SOLUTION RESPIRATORY (INHALATION) ONCE
Status: COMPLETED | OUTPATIENT
Start: 2019-09-22 | End: 2019-09-22

## 2019-09-22 RX ORDER — NITROGLYCERIN 0.4 MG/1
0.4 TABLET SUBLINGUAL EVERY 5 MIN PRN
Status: DISCONTINUED | OUTPATIENT
Start: 2019-09-22 | End: 2019-09-25 | Stop reason: HOSPADM

## 2019-09-22 RX ORDER — PANTOPRAZOLE SODIUM 40 MG/1
40 TABLET, DELAYED RELEASE ORAL
Status: DISCONTINUED | OUTPATIENT
Start: 2019-09-22 | End: 2019-09-25 | Stop reason: HOSPADM

## 2019-09-22 RX ORDER — METHYLPREDNISOLONE SODIUM SUCCINATE 125 MG/2ML
125 INJECTION, POWDER, LYOPHILIZED, FOR SOLUTION INTRAMUSCULAR; INTRAVENOUS ONCE
Status: COMPLETED | OUTPATIENT
Start: 2019-09-22 | End: 2019-09-22

## 2019-09-22 RX ORDER — SODIUM CHLORIDE 0.9 % (FLUSH) 0.9 %
10 SYRINGE (ML) INJECTION EVERY 12 HOURS SCHEDULED
Status: DISCONTINUED | OUTPATIENT
Start: 2019-09-22 | End: 2019-09-25 | Stop reason: HOSPADM

## 2019-09-22 RX ORDER — TRIAMCINOLONE ACETONIDE 1 MG/G
CREAM TOPICAL 2 TIMES DAILY
Status: DISCONTINUED | OUTPATIENT
Start: 2019-09-22 | End: 2019-09-25 | Stop reason: HOSPADM

## 2019-09-22 RX ORDER — LISINOPRIL 5 MG/1
2.5 TABLET ORAL DAILY
Status: DISCONTINUED | OUTPATIENT
Start: 2019-09-22 | End: 2019-09-25 | Stop reason: HOSPADM

## 2019-09-22 RX ORDER — DEXTROSE MONOHYDRATE 25 G/50ML
12.5 INJECTION, SOLUTION INTRAVENOUS PRN
Status: DISCONTINUED | OUTPATIENT
Start: 2019-09-22 | End: 2019-09-23

## 2019-09-22 RX ORDER — IPRATROPIUM BROMIDE AND ALBUTEROL SULFATE 2.5; .5 MG/3ML; MG/3ML
3 SOLUTION RESPIRATORY (INHALATION)
Status: DISCONTINUED | OUTPATIENT
Start: 2019-09-22 | End: 2019-09-25 | Stop reason: HOSPADM

## 2019-09-22 RX ORDER — ISOSORBIDE MONONITRATE 60 MG/1
60 TABLET, EXTENDED RELEASE ORAL DAILY
Status: DISCONTINUED | OUTPATIENT
Start: 2019-09-22 | End: 2019-09-25 | Stop reason: HOSPADM

## 2019-09-22 RX ORDER — GABAPENTIN 300 MG/1
300 CAPSULE ORAL 2 TIMES DAILY
Status: DISCONTINUED | OUTPATIENT
Start: 2019-09-22 | End: 2019-09-25 | Stop reason: HOSPADM

## 2019-09-22 RX ORDER — METHYLPREDNISOLONE SODIUM SUCCINATE 40 MG/ML
40 INJECTION, POWDER, LYOPHILIZED, FOR SOLUTION INTRAMUSCULAR; INTRAVENOUS EVERY 6 HOURS
Status: DISPENSED | OUTPATIENT
Start: 2019-09-22 | End: 2019-09-24

## 2019-09-22 RX ORDER — NICOTINE POLACRILEX 4 MG
15 LOZENGE BUCCAL PRN
Status: DISCONTINUED | OUTPATIENT
Start: 2019-09-22 | End: 2019-09-23

## 2019-09-22 RX ORDER — ALPRAZOLAM 0.5 MG/1
1 TABLET ORAL 3 TIMES DAILY PRN
Status: DISCONTINUED | OUTPATIENT
Start: 2019-09-22 | End: 2019-09-25 | Stop reason: HOSPADM

## 2019-09-22 RX ORDER — DEXTROSE MONOHYDRATE 50 MG/ML
100 INJECTION, SOLUTION INTRAVENOUS PRN
Status: DISCONTINUED | OUTPATIENT
Start: 2019-09-22 | End: 2019-09-23

## 2019-09-22 RX ORDER — ONDANSETRON 2 MG/ML
4 INJECTION INTRAMUSCULAR; INTRAVENOUS EVERY 6 HOURS PRN
Status: DISCONTINUED | OUTPATIENT
Start: 2019-09-22 | End: 2019-09-25 | Stop reason: HOSPADM

## 2019-09-22 RX ORDER — ASPIRIN 81 MG/1
81 TABLET, CHEWABLE ORAL DAILY
Status: DISCONTINUED | OUTPATIENT
Start: 2019-09-22 | End: 2019-09-25 | Stop reason: HOSPADM

## 2019-09-22 RX ADMIN — METHYLPREDNISOLONE SODIUM SUCCINATE 40 MG: 40 INJECTION, POWDER, FOR SOLUTION INTRAMUSCULAR; INTRAVENOUS at 14:08

## 2019-09-22 RX ADMIN — IPRATROPIUM BROMIDE AND ALBUTEROL SULFATE 1 AMPULE: .5; 3 SOLUTION RESPIRATORY (INHALATION) at 02:50

## 2019-09-22 RX ADMIN — LISINOPRIL 2.5 MG: 5 TABLET ORAL at 09:42

## 2019-09-22 RX ADMIN — MAGNESIUM SULFATE HEPTAHYDRATE 2 G: 40 INJECTION, SOLUTION INTRAVENOUS at 02:39

## 2019-09-22 RX ADMIN — GLIPIZIDE 5 MG: 5 TABLET ORAL at 09:42

## 2019-09-22 RX ADMIN — IPRATROPIUM BROMIDE AND ALBUTEROL SULFATE 3 ML: .5; 3 SOLUTION RESPIRATORY (INHALATION) at 07:40

## 2019-09-22 RX ADMIN — ASPIRIN 81 MG 81 MG: 81 TABLET ORAL at 09:42

## 2019-09-22 RX ADMIN — Medication 10 ML: at 09:48

## 2019-09-22 RX ADMIN — ONDANSETRON 4 MG: 2 INJECTION INTRAMUSCULAR; INTRAVENOUS at 22:26

## 2019-09-22 RX ADMIN — IPRATROPIUM BROMIDE AND ALBUTEROL SULFATE 3 ML: .5; 3 SOLUTION RESPIRATORY (INHALATION) at 12:59

## 2019-09-22 RX ADMIN — DICLOFENAC 2 G: 10 GEL TOPICAL at 22:27

## 2019-09-22 RX ADMIN — TRIAMCINOLONE ACETONIDE: 1 CREAM TOPICAL at 22:27

## 2019-09-22 RX ADMIN — MICONAZOLE NITRATE: 20 POWDER TOPICAL at 22:26

## 2019-09-22 RX ADMIN — IPRATROPIUM BROMIDE AND ALBUTEROL SULFATE 3 ML: .5; 3 SOLUTION RESPIRATORY (INHALATION) at 17:07

## 2019-09-22 RX ADMIN — ENOXAPARIN SODIUM 40 MG: 40 INJECTION SUBCUTANEOUS at 09:47

## 2019-09-22 RX ADMIN — ALPRAZOLAM 1 MG: 0.5 TABLET ORAL at 06:11

## 2019-09-22 RX ADMIN — GLIPIZIDE 5 MG: 5 TABLET ORAL at 15:41

## 2019-09-22 RX ADMIN — METHYLPREDNISOLONE SODIUM SUCCINATE 40 MG: 40 INJECTION, POWDER, FOR SOLUTION INTRAMUSCULAR; INTRAVENOUS at 09:42

## 2019-09-22 RX ADMIN — ALBUTEROL SULFATE 5 MG: 2.5 SOLUTION RESPIRATORY (INHALATION) at 01:45

## 2019-09-22 RX ADMIN — GABAPENTIN 300 MG: 300 CAPSULE ORAL at 09:43

## 2019-09-22 RX ADMIN — ALPRAZOLAM 1 MG: 0.5 TABLET ORAL at 18:14

## 2019-09-22 RX ADMIN — IPRATROPIUM BROMIDE AND ALBUTEROL SULFATE 1 AMPULE: .5; 3 SOLUTION RESPIRATORY (INHALATION) at 01:45

## 2019-09-22 RX ADMIN — Medication 10 ML: at 23:00

## 2019-09-22 RX ADMIN — METHYLPREDNISOLONE SODIUM SUCCINATE 125 MG: 125 INJECTION, POWDER, FOR SOLUTION INTRAMUSCULAR; INTRAVENOUS at 02:39

## 2019-09-22 RX ADMIN — MICONAZOLE NITRATE: 20 POWDER TOPICAL at 09:47

## 2019-09-22 RX ADMIN — IPRATROPIUM BROMIDE AND ALBUTEROL SULFATE 3 ML: .5; 3 SOLUTION RESPIRATORY (INHALATION) at 20:16

## 2019-09-22 RX ADMIN — ISOSORBIDE MONONITRATE 60 MG: 60 TABLET, EXTENDED RELEASE ORAL at 09:43

## 2019-09-22 ASSESSMENT — PAIN SCALES - GENERAL
PAINLEVEL_OUTOF10: 7
PAINLEVEL_OUTOF10: 9
PAINLEVEL_OUTOF10: 8
PAINLEVEL_OUTOF10: 7
PAINLEVEL_OUTOF10: 5

## 2019-09-22 ASSESSMENT — ENCOUNTER SYMPTOMS
EYE ITCHING: 0
BACK PAIN: 0
CONSTIPATION: 0
FACIAL SWELLING: 0
NAUSEA: 0
CHEST TIGHTNESS: 1
EYE PAIN: 0
DIARRHEA: 0
SHORTNESS OF BREATH: 1
ABDOMINAL PAIN: 0
VOMITING: 0

## 2019-09-22 ASSESSMENT — PAIN DESCRIPTION - PAIN TYPE
TYPE_2: ACUTE PAIN
TYPE: CHRONIC PAIN

## 2019-09-22 ASSESSMENT — PAIN DESCRIPTION - DESCRIPTORS
DESCRIPTORS: HEADACHE
DESCRIPTORS: ACHING
DESCRIPTORS: HEADACHE
DESCRIPTORS: STABBING
DESCRIPTORS_2: HEADACHE

## 2019-09-22 ASSESSMENT — PAIN DESCRIPTION - LOCATION
LOCATION: HEAD
LOCATION_2: HEAD
LOCATION: CHEST
LOCATION: HEAD
LOCATION: BACK;LEG
LOCATION: CHEST

## 2019-09-22 ASSESSMENT — PAIN DESCRIPTION - ORIENTATION
ORIENTATION: MID
ORIENTATION: MID
ORIENTATION: RIGHT;LEFT
ORIENTATION: LEFT

## 2019-09-22 ASSESSMENT — PAIN DESCRIPTION - DIRECTION: RADIATING_TOWARDS: SHOULDER

## 2019-09-22 ASSESSMENT — PAIN DESCRIPTION - INTENSITY: RATING_2: 10

## 2019-09-22 ASSESSMENT — PAIN DESCRIPTION - PROGRESSION: CLINICAL_PROGRESSION: GRADUALLY IMPROVING

## 2019-09-22 NOTE — H&P
nausea and vomiting)     Type II or unspecified type diabetes mellitus without mention of complication, not stated as uncontrolled     Unspecified cerebral artery occlusion with cerebral infarction        Past Surgical History:   Past Surgical History:   Procedure Laterality Date    APPENDECTOMY      CARDIAC SURGERY      1 stent 2000 and 1 stent 2001    CHOLECYSTECTOMY      COLONOSCOPY      COLONOSCOPY  06/11/2018    ENDOSCOPY, COLON, DIAGNOSTIC      EYE SURGERY      bilateral cataracts    GALLBLADDER SURGERY      HYSTERECTOMY      AZ EXC SKIN MALIG <5MM REMAINDR BODY N/A 9/6/2018    EXCISION OF SCALP SQUAMOUS CELL CARCINOMA performed by Dang Wilde MD at [facility] <100SQCM N/A 9/6/2018    SPLIT THICKNESS SKIN GRAFT FOR COVERAGE SCALP, APPLICATION OF WOUND VAC DEVICE performed by Dang Wilde MD at [address] [facility] ENDOSCOPY  4/20/12    with biopsy of stomach,gastritis    UPPER GASTROINTESTINAL ENDOSCOPY  06/11/2018    w/esophagael dilation       Social History:   Social History     Tobacco History     Smoking Status  Former Smoker Quit date  6/16/2017 Smoking Frequency  0.25 packs/day for 49 years (12.25 pk yrs) Smoking Tobacco Type  Cigarettes    Smokeless Tobacco Use  Never Used    Tobacco Comment  only smoke when real stressed  Nicotine patch          Alcohol History     Alcohol Use Status  Yes Drinks/Week  1 Glasses of wine per week Amount  1.0 standard drinks of alcohol/wk Comment  occ. every 3-4 mo          Drug Use     Drug Use Status  No          Sexual Activity     Sexually Active  Not Currently Partners  Male                Fam History:   Family History   Problem Relation Age of Onset    Heart Disease Mother     Cancer Mother     Cancer Father     Cancer Sister     Heart Disease Sister     Heart Disease Brother     High Blood Pressure Neg Hx     High Cholesterol Neg Hx        PFSH: The MISC 8 each by Does not apply route daily 3/14/18  Yes Mukund Lai MD   Elastic Bandages & Supports (JOBST FOR MEN 30-40MMHG LG) MISC 20-30 mm by Does not apply route daily Please measure for knee hi hose 11/2/17  Yes Pio Sheets MD   ipratropium-albuterol (DUONEB) 0.5-2.5 (3) MG/3ML SOLN nebulizer solution Inhale 3 mLs into the lungs every 4 hours (while awake) 9/25/17  Yes Sven Henry MD   triamcinolone (KENALOG) 0.1 % cream Apply topically 2 times daily. 6/1/17  Yes Mukund Lai MD   nystatin (MYCOSTATIN) 062650 UNIT/GM powder Affected area 5/6/16  Yes Mukund Lai MD   aspirin 81 MG tablet Take 81 mg by mouth daily   Yes Historical Provider, MD   nitroGLYCERIN (NITROSTAT) 0.4 MG SL tablet Place 1 tablet under the tongue every 5 minutes as needed for Chest pain. 4/22/12  Yes Mukund Lai MD   Respiratory Therapy Supplies (NEBULIZER) by Does not apply route.  PRN BREATHING TREATMENTS, UNSURE OF MED    Yes Historical Provider, MD   Incontinence Supply Disposable (PREVAIL WET WIPES) MISC 8 each by Does not apply route daily 3/14/18 6/12/18  Mukund Lai MD         Allergies   Allergen Reactions    Morphine Shortness Of Breath    Codeine Other (See Comments)     Chest pain    Hydromorphone Other (See Comments)     hallucinations  Hallucinations    But will take if needed in an emergency    Levaquin [Levofloxacin In D5w] Itching    Vicodin [Hydrocodone-Acetaminophen] Hives             EXAM: (2-7 system for EPF/Detailed, ?8 for Comprehensive)  BP (!) 108/53   Pulse 77   Temp 97.8 °F (36.6 °C) (Temporal)   Resp 18   Ht 5' 6\" (1.676 m)   Wt 269 lb 12.8 oz (122.4 kg)   SpO2 92%   BMI 43.55 kg/m²   Constitutional: vitals as above: alert, appears stated age and cooperative  Psychiatric: normal insight and judgment, oriented to person, place, time, and general circumstances  Head: Normocephalic, without obvious abnormality, atraumatic  Eyes:lids and lashes normal, conjunctivae and sclerae normal and pupils equal, round, reactive to light and accomodation  EMNT: external ears normal, lips normal  Neck: no adenopathy, no carotid bruit, no JVD, supple, symmetrical, trachea midline and thyroid not enlarged, symmetric, no tenderness/mass/nodules   Respiratory: end exp wheezes  Cardiovascular: normal rate, regular rhythm, normal S1 and S2 and no gallops  Gastrointestinal: soft, non-tender, non-distended, normal bowel sounds, no masses or organomegaly  Lymphatic:   Extremities: no edema  Skin:No rashes or nodules noted.   Neurologic:    LABS:  Labs Reviewed   BASIC METABOLIC PANEL - Abnormal; Notable for the following components:       Result Value    Sodium 134 (*)     Chloride 97 (*)     Glucose 150 (*)     All other components within normal limits    Narrative:     Performed at:  [facility] [address], [place], [facility][note 3] [email or web address]   Phone [phone]   CBC WITH AUTO DIFFERENTIAL - Abnormal; Notable for the following components:    WBC 11.4 (*)     RBC 5.78 (*)     Hemoglobin 16.8 (*)     Hematocrit 50.9 (*)     Neutrophils Absolute 7.9 (*)     All other components within normal limits    Narrative:     Performed at:  [facility] [address], [place], [facility][note 3] [email or web address]   Phone [phone]   POCT GLUCOSE - Abnormal; Notable for the following components:    POC Glucose 183 (*)     All other components within normal limits    Narrative:     Performed at:  [facility] [address], [place], [facility][note 3] [email or web address]   Phone [phone]   RESPIRATORY CULTURE   C DIFF TOXIN/ANTIGEN   TROPONIN    Narrative:     Performed at:  [facility] [address], [place], [employer]   Phone [phone] PEPTIDE    Narrative:     Performed at:  [facility] [address], [place], [facility][note 3] [email or web address]   Phone [phone]   POCT GLUCOSE POCT GLUCOSE         IMAGING:  Imaging results from the ER have been reviewed in the computerized chart. Xr Chest Standard (2 Vw)    Result Date: 9/22/2019  EXAMINATION: TWO XRAY VIEWS OF THE CHEST 9/22/2019 1:14 am COMPARISON: 09/18/2019 HISTORY: ORDERING SYSTEM PROVIDED HISTORY: toshia retanain TECHNOLOGIST PROVIDED HISTORY: Reason for exam:->ches tpain FINDINGS: Evaluation is limited by the patient's body habitus. Pulmonary edema is identified. The heart size is at the upper limits of normal.  There is no large pleural effusion or evidence to suggest pneumothorax. Pulmonary edema. Xr Chest Standard (2 Vw)    Result Date: 9/18/2019  EXAMINATION: TWO XRAY VIEWS OF THE CHEST 9/18/2019 2:31 pm COMPARISON: 04/27/2019 HISTORY: ORDERING SYSTEM PROVIDED HISTORY: Chest Discomfort TECHNOLOGIST PROVIDED HISTORY: Reason for exam:->Chest Discomfort Reason for Exam: Chest pain Acuity: Unknown Type of Exam: Unknown FINDINGS: Evaluation is limited by the patient's body habitus. No definite focal infiltrate is identified. No obvious pneumothorax. No free air. No acute bony abnormality. Limited negative chest radiograph. EKG: from ER, interpreted by self. Sinus rhythm at 85. No acute st elevation. No TWI. Comparison made to old ekg in chart from 9/18/19, appears similar          MEDICAL DECISION MAKING:    Principal Problem:    COPD exacerbation ([address] - New Problem to me. Plan: will give iv steroids. Cont o2. Appears BIG PROBLEM IS NON-COMPLIANCE AT HOME. PT REFUSING TO WEAR HOME O2. IT IS NO WONDER THAT SHE IS SHORT OF BREATH  Active Problems:    Hyperlipemia - Established problem. Stable. Plan: Continue present orders/plan. Essential hypertension - Established problem. Stable. 108/53  Plan: Continue present orders/plan. DM2 (diabetes mellitus, type 2) ([address] -Established problem. Stable. Plan: Patient placed on controlled carbohydrate diet.  Fingerstick sugars to be checked to monitor

## 2019-09-22 NOTE — DISCHARGE INSTR - COC
Independent  Toileting  Assisted  Feeding  Independent  Med Admin  Assisted  Med Delivery   whole    Wound Care Documentation and Therapy:  Puncture 09/19/19 Wrist Right;Dorsal (Active)   Number of days: 2        Elimination:  Continence:   · Bowel: Yes  · Bladder: Mostly continent, has stress incontinence  Urinary Catheter: None   Colostomy/Ileostomy/Ileal Conduit: No       Date of Last BM: 9/24/19    Intake/Output Summary (Last 24 hours) at 9/22/2019 1610  Last data filed at 9/22/2019 1545  Gross per 24 hour   Intake 1090 ml   Output 550 ml   Net 540 ml     I/O last 3 completed shifts: In: 36 [P.O.:720; I.V.:10]  Out: 550 [Urine:550]    Safety Concerns: At Risk for Falls    Impairments/Disabilities:      None    Nutrition Therapy:  Current Nutrition Therapy:   - Oral Diet:  Carb Control 4 carbs/meal (1800kcals/day)    Routes of Feeding: Oral  Liquids: Thin Liquids  Daily Fluid Restriction: no  Last Modified Barium Swallow with Video (Video Swallowing Test): not done    Treatments at the Time of Hospital Discharge:   Respiratory Treatments: duoneb, albuteral, dulera  Oxygen Therapy:  is on oxygen at 3 L/min per nasal cannula.   Ventilator:    - No ventilator support    Rehab Therapies: Physical Therapy and Occupational Therapy  Weight Bearing Status/Restrictions: No weight bearing restirctions  Other Medical Equipment (for information only, NOT a DME order):  walker  Other Treatments: n/a    Patient's personal belongings (please select all that are sent with patient):  None    RN SIGNATURE:  Electronically signed by Meek Miguel RN on 9/25/19 at 1:42 PM    CASE MANAGEMENT/SOCIAL WORK SECTION    Inpatient Status Date:  9/22/19    Readmission Risk Assessment Score:  Readmission Risk              Risk of Unplanned Readmission:        20           Discharging to Facility/ [address]: [facility]  Phone: [phone]  Fax: [phone]      / KARINE Fierro,

## 2019-09-22 NOTE — ED NOTES
Pt placed on 2L of 02. Pt states she is to wear 02 when her sats run below 90%. Daughter called to update, per request of patient. Daughter states that pt is not good about wearing 02 at home. Pt also very depressed this week, Anniversary of her daughters death.      Jeffery Sotelo RN  09/22/19 2705

## 2019-09-22 NOTE — PROGRESS NOTES
Received report from ER, admitted to 3a, admission paperwork complete. Oriented to room, bed, and call light. Assisted to bathroom and back to bed. Placed on contact plus for c-diff rule out. Ice pack for headache. Got crackers and ginger ale per request. Denies other needs, call light in reach, bed alarm engaged.

## 2019-09-22 NOTE — ED PROVIDER NOTES
[facility]        Pt Name: Lacey Steinberg  MRN: [id number]  [place] 1945  Date of evaluation: 9/22/2019  Provider: Lorena Shields PA-C  PCP: Lizabeth Tenorio MD    This patient was seen and evaluated by the attending physician Shira Frey MD.      [address] [place] [address]       Chief Complaint   Patient presents with    Chest Pain     Pt in by squad for chest pain. Recent admission for same. Angiogram done yesterday through right arm. Pt now with pain to chest into jaw and back. Squad gave 324mg of aspirin and 1 nitro       HISTORY OF PRESENT ILLNESS   (Location/Symptom, Timing/Onset, Context/Setting, Quality, Duration, Modifying Factors, Severity)  Note limiting factors. Lacey Steinberg is a 69 y.o. female patient presents the emergency department for evaluation of chest pain. Patient had a recent admission for similar chest pain. Angiogram was done on 18 September through the right radial artery. Patient had no stents placed at that time. Patient is now complaining of left-sided chest pain that radiates up into her jaw and into her back. Patient gave her aspirin and nitroglycerin. Patient states she did not get any relief from these medications. Patient had not tried to take any medications at home. Patient takes all of her prescribed medications daily. Patient states that her chest pain is largely a tightness sensation. She states she has an associated shortness of breath. Patient states she has oxygen at home that she is supposed to wear for oxygen saturations to get below 90 however she frequently does not wear it. Nursing Notes were all reviewed and agreed with or any disagreements were addressed  in the HPI. REVIEW OF SYSTEMS    (2-9 systems for level 4, 10 or more for level 5)     Review of Systems   Constitutional: Negative for fatigue and fever. HENT: Negative. Eyes: Negative for visual disturbance.

## 2019-09-23 ENCOUNTER — APPOINTMENT (OUTPATIENT)
Dept: CT IMAGING | Age: 74
DRG: 191 | End: 2019-09-23
Payer: COMMERCIAL

## 2019-09-23 PROBLEM — R07.9 CHEST PAIN: Status: RESOLVED | Noted: 2017-12-28 | Resolved: 2019-09-23

## 2019-09-23 PROBLEM — I20.0 UNSTABLE ANGINA (HCC): Status: RESOLVED | Noted: 2019-09-18 | Resolved: 2019-09-23

## 2019-09-23 LAB
A/G RATIO: 1 (ref 1.1–2.2)
ALBUMIN SERPL-MCNC: 3.5 G/DL (ref 3.4–5)
ALP BLD-CCNC: 83 U/L (ref 40–129)
ALT SERPL-CCNC: 15 U/L (ref 10–40)
ANION GAP SERPL CALCULATED.3IONS-SCNC: 9 MMOL/L (ref 3–16)
AST SERPL-CCNC: 12 U/L (ref 15–37)
BASOPHILS ABSOLUTE: 0.1 K/UL (ref 0–0.2)
BASOPHILS RELATIVE PERCENT: 0.3 %
BILIRUB SERPL-MCNC: <0.2 MG/DL (ref 0–1)
BUN BLDV-MCNC: 17 MG/DL (ref 7–20)
CALCIUM SERPL-MCNC: 10.1 MG/DL (ref 8.3–10.6)
CHLORIDE BLD-SCNC: 99 MMOL/L (ref 99–110)
CO2: 24 MMOL/L (ref 21–32)
CREAT SERPL-MCNC: 0.8 MG/DL (ref 0.6–1.2)
EOSINOPHILS ABSOLUTE: 0 K/UL (ref 0–0.6)
EOSINOPHILS RELATIVE PERCENT: 0 %
GFR AFRICAN AMERICAN: >60
GFR NON-AFRICAN AMERICAN: >60
GLOBULIN: 3.4 G/DL
GLUCOSE BLD-MCNC: 276 MG/DL (ref 70–99)
GLUCOSE BLD-MCNC: 282 MG/DL (ref 70–99)
GLUCOSE BLD-MCNC: 296 MG/DL (ref 70–99)
GLUCOSE BLD-MCNC: 310 MG/DL (ref 70–99)
GLUCOSE BLD-MCNC: 317 MG/DL (ref 70–99)
GLUCOSE BLD-MCNC: 412 MG/DL (ref 70–99)
GLUCOSE BLD-MCNC: 415 MG/DL (ref 70–99)
GLUCOSE BLD-MCNC: 441 MG/DL (ref 70–99)
HCT VFR BLD CALC: 45.8 % (ref 36–48)
HEMOGLOBIN: 14.9 G/DL (ref 12–16)
LYMPHOCYTES ABSOLUTE: 1 K/UL (ref 1–5.1)
LYMPHOCYTES RELATIVE PERCENT: 6.1 %
MCH RBC QN AUTO: 29.1 PG (ref 26–34)
MCHC RBC AUTO-ENTMCNC: 32.5 G/DL (ref 31–36)
MCV RBC AUTO: 89.6 FL (ref 80–100)
MONOCYTES ABSOLUTE: 0.8 K/UL (ref 0–1.3)
MONOCYTES RELATIVE PERCENT: 4.5 %
NEUTROPHILS ABSOLUTE: 15.3 K/UL (ref 1.7–7.7)
NEUTROPHILS RELATIVE PERCENT: 89.1 %
PDW BLD-RTO: 14.3 % (ref 12.4–15.4)
PERFORMED ON: ABNORMAL
PLATELET # BLD: 210 K/UL (ref 135–450)
PMV BLD AUTO: 8.5 FL (ref 5–10.5)
POTASSIUM REFLEX MAGNESIUM: 5.3 MMOL/L (ref 3.5–5.1)
PROCALCITONIN: 0.04 NG/ML (ref 0–0.15)
RBC # BLD: 5.11 M/UL (ref 4–5.2)
SODIUM BLD-SCNC: 132 MMOL/L (ref 136–145)
TOTAL PROTEIN: 6.9 G/DL (ref 6.4–8.2)
WBC # BLD: 17.2 K/UL (ref 4–11)

## 2019-09-23 PROCEDURE — 6360000002 HC RX W HCPCS: Performed by: INTERNAL MEDICINE

## 2019-09-23 PROCEDURE — 6370000000 HC RX 637 (ALT 250 FOR IP): Performed by: INTERNAL MEDICINE

## 2019-09-23 PROCEDURE — 84145 PROCALCITONIN (PCT): CPT

## 2019-09-23 PROCEDURE — 97162 PT EVAL MOD COMPLEX 30 MIN: CPT

## 2019-09-23 PROCEDURE — 2580000003 HC RX 258: Performed by: INTERNAL MEDICINE

## 2019-09-23 PROCEDURE — 94761 N-INVAS EAR/PLS OXIMETRY MLT: CPT

## 2019-09-23 PROCEDURE — 85025 COMPLETE CBC W/AUTO DIFF WBC: CPT

## 2019-09-23 PROCEDURE — 6360000004 HC RX CONTRAST MEDICATION: Performed by: INTERNAL MEDICINE

## 2019-09-23 PROCEDURE — 80053 COMPREHEN METABOLIC PANEL: CPT

## 2019-09-23 PROCEDURE — 97530 THERAPEUTIC ACTIVITIES: CPT

## 2019-09-23 PROCEDURE — 36415 COLL VENOUS BLD VENIPUNCTURE: CPT

## 2019-09-23 PROCEDURE — 2700000000 HC OXYGEN THERAPY PER DAY

## 2019-09-23 PROCEDURE — 71260 CT THORAX DX C+: CPT

## 2019-09-23 PROCEDURE — 83036 HEMOGLOBIN GLYCOSYLATED A1C: CPT

## 2019-09-23 PROCEDURE — 94640 AIRWAY INHALATION TREATMENT: CPT

## 2019-09-23 PROCEDURE — 97166 OT EVAL MOD COMPLEX 45 MIN: CPT

## 2019-09-23 PROCEDURE — 2500000003 HC RX 250 WO HCPCS: Performed by: INTERNAL MEDICINE

## 2019-09-23 PROCEDURE — 1200000000 HC SEMI PRIVATE

## 2019-09-23 RX ORDER — DEXTROSE MONOHYDRATE 25 G/50ML
12.5 INJECTION, SOLUTION INTRAVENOUS PRN
Status: DISCONTINUED | OUTPATIENT
Start: 2019-09-23 | End: 2019-09-25 | Stop reason: HOSPADM

## 2019-09-23 RX ORDER — CEFUROXIME AXETIL 250 MG/1
500 TABLET ORAL EVERY 12 HOURS SCHEDULED
Status: DISCONTINUED | OUTPATIENT
Start: 2019-09-23 | End: 2019-09-25

## 2019-09-23 RX ORDER — DEXTROMETHORPHAN HYDROBROMIDE AND PROMETHAZINE HYDROCHLORIDE 15; 6.25 MG/5ML; MG/5ML
5 SYRUP ORAL EVERY 4 HOURS PRN
Status: DISCONTINUED | OUTPATIENT
Start: 2019-09-23 | End: 2019-09-25 | Stop reason: HOSPADM

## 2019-09-23 RX ORDER — METOPROLOL SUCCINATE 25 MG/1
25 TABLET, EXTENDED RELEASE ORAL DAILY
Status: DISCONTINUED | OUTPATIENT
Start: 2019-09-23 | End: 2019-09-23

## 2019-09-23 RX ORDER — GLIPIZIDE 5 MG/1
10 TABLET ORAL
Status: DISCONTINUED | OUTPATIENT
Start: 2019-09-23 | End: 2019-09-23 | Stop reason: ALTCHOICE

## 2019-09-23 RX ORDER — NICOTINE POLACRILEX 4 MG
15 LOZENGE BUCCAL PRN
Status: DISCONTINUED | OUTPATIENT
Start: 2019-09-23 | End: 2019-09-25 | Stop reason: HOSPADM

## 2019-09-23 RX ORDER — DEXTROSE MONOHYDRATE 50 MG/ML
100 INJECTION, SOLUTION INTRAVENOUS PRN
Status: DISCONTINUED | OUTPATIENT
Start: 2019-09-23 | End: 2019-09-25 | Stop reason: HOSPADM

## 2019-09-23 RX ADMIN — METHYLPREDNISOLONE SODIUM SUCCINATE 40 MG: 40 INJECTION, POWDER, FOR SOLUTION INTRAMUSCULAR; INTRAVENOUS at 22:09

## 2019-09-23 RX ADMIN — ASPIRIN 81 MG 81 MG: 81 TABLET ORAL at 08:47

## 2019-09-23 RX ADMIN — Medication 10 ML: at 22:15

## 2019-09-23 RX ADMIN — MICONAZOLE NITRATE: 20 POWDER TOPICAL at 22:15

## 2019-09-23 RX ADMIN — ALPRAZOLAM 1 MG: 0.5 TABLET ORAL at 22:08

## 2019-09-23 RX ADMIN — Medication 10 ML: at 09:42

## 2019-09-23 RX ADMIN — GLIPIZIDE 5 MG: 5 TABLET ORAL at 06:40

## 2019-09-23 RX ADMIN — IPRATROPIUM BROMIDE AND ALBUTEROL SULFATE 3 ML: .5; 3 SOLUTION RESPIRATORY (INHALATION) at 19:35

## 2019-09-23 RX ADMIN — METHYLPREDNISOLONE SODIUM SUCCINATE 40 MG: 40 INJECTION, POWDER, FOR SOLUTION INTRAMUSCULAR; INTRAVENOUS at 01:24

## 2019-09-23 RX ADMIN — Medication 10 ML: at 12:53

## 2019-09-23 RX ADMIN — DEXTROMETHORPHAN HYDROBROMIDE AND PROMETHAZINE HYDROCHLORIDE 5 ML: 15; 6.25 SOLUTION ORAL at 23:49

## 2019-09-23 RX ADMIN — IPRATROPIUM BROMIDE AND ALBUTEROL SULFATE 3 ML: .5; 3 SOLUTION RESPIRATORY (INHALATION) at 12:35

## 2019-09-23 RX ADMIN — IPRATROPIUM BROMIDE AND ALBUTEROL SULFATE 3 ML: .5; 3 SOLUTION RESPIRATORY (INHALATION) at 15:41

## 2019-09-23 RX ADMIN — INSULIN GLARGINE 13 UNITS: 100 INJECTION, SOLUTION SUBCUTANEOUS at 13:21

## 2019-09-23 RX ADMIN — METHYLPREDNISOLONE SODIUM SUCCINATE 40 MG: 40 INJECTION, POWDER, FOR SOLUTION INTRAMUSCULAR; INTRAVENOUS at 06:43

## 2019-09-23 RX ADMIN — INSULIN LISPRO 6 UNITS: 100 INJECTION, SOLUTION INTRAVENOUS; SUBCUTANEOUS at 22:10

## 2019-09-23 RX ADMIN — DEXTROMETHORPHAN HYDROBROMIDE AND PROMETHAZINE HYDROCHLORIDE 5 ML: 15; 6.25 SOLUTION ORAL at 17:51

## 2019-09-23 RX ADMIN — INSULIN LISPRO 9 UNITS: 100 INJECTION, SOLUTION INTRAVENOUS; SUBCUTANEOUS at 08:46

## 2019-09-23 RX ADMIN — INSULIN LISPRO 18 UNITS: 100 INJECTION, SOLUTION INTRAVENOUS; SUBCUTANEOUS at 16:34

## 2019-09-23 RX ADMIN — GABAPENTIN 300 MG: 300 CAPSULE ORAL at 01:23

## 2019-09-23 RX ADMIN — CEFUROXIME AXETIL 500 MG: 250 TABLET ORAL at 22:08

## 2019-09-23 RX ADMIN — ENOXAPARIN SODIUM 40 MG: 40 INJECTION SUBCUTANEOUS at 08:47

## 2019-09-23 RX ADMIN — LISINOPRIL 2.5 MG: 5 TABLET ORAL at 08:47

## 2019-09-23 RX ADMIN — DICLOFENAC 2 G: 10 GEL TOPICAL at 09:42

## 2019-09-23 RX ADMIN — Medication 2 PUFF: at 19:35

## 2019-09-23 RX ADMIN — MICONAZOLE NITRATE: 20 POWDER TOPICAL at 09:43

## 2019-09-23 RX ADMIN — METHYLPREDNISOLONE SODIUM SUCCINATE 40 MG: 40 INJECTION, POWDER, FOR SOLUTION INTRAMUSCULAR; INTRAVENOUS at 12:52

## 2019-09-23 RX ADMIN — INSULIN LISPRO 18 UNITS: 100 INJECTION, SOLUTION INTRAVENOUS; SUBCUTANEOUS at 11:51

## 2019-09-23 RX ADMIN — DEXTROMETHORPHAN HYDROBROMIDE AND PROMETHAZINE HYDROCHLORIDE 5 ML: 15; 6.25 SOLUTION ORAL at 10:52

## 2019-09-23 RX ADMIN — ALPRAZOLAM 1 MG: 0.5 TABLET ORAL at 02:49

## 2019-09-23 RX ADMIN — GABAPENTIN 300 MG: 300 CAPSULE ORAL at 08:47

## 2019-09-23 RX ADMIN — ISOSORBIDE MONONITRATE 60 MG: 60 TABLET, EXTENDED RELEASE ORAL at 08:47

## 2019-09-23 RX ADMIN — Medication 2 PUFF: at 05:05

## 2019-09-23 RX ADMIN — ALPRAZOLAM 1 MG: 0.5 TABLET ORAL at 11:52

## 2019-09-23 RX ADMIN — CEFUROXIME AXETIL 500 MG: 250 TABLET ORAL at 10:56

## 2019-09-23 RX ADMIN — INSULIN LISPRO 10 UNITS: 100 INJECTION, SOLUTION INTRAVENOUS; SUBCUTANEOUS at 12:52

## 2019-09-23 RX ADMIN — INSULIN LISPRO 9 UNITS: 100 INJECTION, SOLUTION INTRAVENOUS; SUBCUTANEOUS at 01:20

## 2019-09-23 RX ADMIN — ONDANSETRON 4 MG: 2 INJECTION INTRAMUSCULAR; INTRAVENOUS at 04:50

## 2019-09-23 RX ADMIN — GABAPENTIN 300 MG: 300 CAPSULE ORAL at 22:14

## 2019-09-23 RX ADMIN — IPRATROPIUM BROMIDE AND ALBUTEROL SULFATE 3 ML: .5; 3 SOLUTION RESPIRATORY (INHALATION) at 07:38

## 2019-09-23 RX ADMIN — IOPAMIDOL 75 ML: 755 INJECTION, SOLUTION INTRAVENOUS at 11:24

## 2019-09-23 RX ADMIN — Medication 2 PUFF: at 12:35

## 2019-09-23 RX ADMIN — TRIAMCINOLONE ACETONIDE: 1 CREAM TOPICAL at 11:57

## 2019-09-23 RX ADMIN — TRIAMCINOLONE ACETONIDE: 1 CREAM TOPICAL at 22:09

## 2019-09-23 RX ADMIN — DICLOFENAC 2 G: 10 GEL TOPICAL at 22:15

## 2019-09-23 RX ADMIN — BENZOCAINE, MENTHOL 1 LOZENGE: 15; 3.6 LOZENGE ORAL at 08:47

## 2019-09-23 RX ADMIN — INSULIN LISPRO 10 UNITS: 100 INJECTION, SOLUTION INTRAVENOUS; SUBCUTANEOUS at 16:34

## 2019-09-23 ASSESSMENT — PAIN SCALES - GENERAL
PAINLEVEL_OUTOF10: 2
PAINLEVEL_OUTOF10: 8
PAINLEVEL_OUTOF10: 2
PAINLEVEL_OUTOF10: 8
PAINLEVEL_OUTOF10: 4
PAINLEVEL_OUTOF10: 0
PAINLEVEL_OUTOF10: 0
PAINLEVEL_OUTOF10: 8
PAINLEVEL_OUTOF10: 0
PAINLEVEL_OUTOF10: 7
PAINLEVEL_OUTOF10: 0
PAINLEVEL_OUTOF10: 7
PAINLEVEL_OUTOF10: 2

## 2019-09-23 ASSESSMENT — PAIN DESCRIPTION - PAIN TYPE
TYPE: CHRONIC PAIN
TYPE: CHRONIC PAIN
TYPE_2: ACUTE PAIN
TYPE: ACUTE PAIN
TYPE: ACUTE PAIN

## 2019-09-23 ASSESSMENT — PAIN DESCRIPTION - ORIENTATION
ORIENTATION: MID
ORIENTATION: MID

## 2019-09-23 ASSESSMENT — PAIN DESCRIPTION - LOCATION
LOCATION: CHEST
LOCATION: BACK
LOCATION_2: HEAD
LOCATION: HEAD

## 2019-09-23 ASSESSMENT — PAIN DESCRIPTION - DESCRIPTORS: DESCRIPTORS: HEADACHE

## 2019-09-23 ASSESSMENT — PAIN DESCRIPTION - INTENSITY: RATING_2: 6

## 2019-09-23 ASSESSMENT — PAIN DESCRIPTION - PROGRESSION: CLINICAL_PROGRESSION: GRADUALLY IMPROVING

## 2019-09-23 ASSESSMENT — PAIN DESCRIPTION - FREQUENCY: FREQUENCY: INTERMITTENT

## 2019-09-23 NOTE — PROGRESS NOTES
Physical Therapy    Facility/Department: [facility] 3A NURSING  Initial Assessment    NAME: Geovany Sloan  : 1945  MRN: [id number]    Date of Service: 2019    Discharge Recommendations:Milana Ashford scored a 13/24 on the AM-PAC short mobility form. Current research shows that an AM-PAC score of 17 or less is typically not associated with a discharge to the patient's home setting. Based on the patients AM-PAC score and their current functional mobility deficits, it is recommended that the patient have 3-5 sessions per week of Physical Therapy at d/c to increase the patients independence. PT Equipment Recommendations  Equipment Needed: No    Assessment   Body structures, Functions, Activity limitations: Decreased functional mobility ; Decreased ADL status; Decreased strength;Decreased balance;Decreased endurance; Increased Pain  Assessment: Pt is limited by the above deficits and would benefit from skilled PT services to maximize pt functional mobility and safety prior to discharge. Pt is a very HIGH fall risk and unsafe to discharge home at this time. Prognosis: Fair;Good  Decision Making: Medium Complexity  PT Education: Goals;PT Role;Plan of Care;Transfer Training;Functional Mobility Training  Barriers to Learning: hearing  REQUIRES PT FOLLOW UP: Yes  Activity Tolerance  Activity Tolerance: Patient limited by fatigue;Patient limited by endurance(weakness)  Activity Tolerance: SpO2=94% at rest; >90% with transfer to bed on 3L       Patient Diagnosis(es): The encounter diagnosis was Unstable angina pectoris ([address]      has a past medical history of Acute MI ([address] Acute pyelonephritis, Anxiety and depression, Arthritis, CAD (coronary artery disease), Cancer ([address] Cancer ([address] Cancer of skin of leg, Chronic obstructive pulmonary disease ([address] Claustrophobia, COPD (chronic obstructive pulmonary disease) ([address] Esophageal stricture, Gastroesophageal reflux disease without esophagitis, Hiatal hernia, Hiatal hernia, Hypercholesteremia, Hypertension, Migraines, Movement disorder, Pneumonia, PONV (postoperative nausea and vomiting), Type II or unspecified type diabetes mellitus without mention of complication, not stated as uncontrolled, and Unspecified cerebral artery occlusion with cerebral infarction. has a past surgical history that includes Cardiac surgery; Gallbladder surgery; Hysterectomy; Appendectomy; Cholecystectomy; Colonoscopy; Upper gastrointestinal endoscopy (4/20/12); Endoscopy, colon, diagnostic; Tear duct surgery; Upper gastrointestinal endoscopy (06/11/2018); Colonoscopy (06/11/2018); pr exc skin malig <5mm remaindr body (N/A, 9/6/2018); pr split grft trunk,arm,leg <100sqcm (N/A, 9/6/2018); skin biopsy; and eye surgery. Restrictions  Restrictions/Precautions  Restrictions/Precautions: Fall Risk(high fall risk; carb control diet; up with assist; on 3L of O2)  Required Braces or Orthoses?: No  Position Activity Restriction  Other position/activity restrictions: Admitted with COPD exacerbation;  Chest CT neg 9/23 for PE or other abnormalities     Vision/Hearing  Vision: Impaired  Vision Exceptions: Wears glasses for reading(cataracts removed)  Hearing: Exceptions to [facility]  Hearing Exceptions: Hard of hearing/hearing concerns       Subjective  General  Chart Reviewed: Yes  Family / Caregiver Present: No  Diagnosis: COPD Exacerbation  Follows Commands: Within Functional Limits  General Comment  Comments: Pt seated in chair upon arrival; agreeable to PT  Subjective  Subjective: Pain back, LEs, head, chest from coughing; 8/10; LEs very weak and needs TKA; pt very tired; nursing aware of pain, pt states \"there is nothing I can take for the pain\"   Pain Screening  Patient Currently in Pain: Yes  Pain Assessment  Pain Assessment: 0-10  Pain Level: 8  Pain Type: Acute pain  Vital Signs  Patient Currently in Pain: Yes       Orientation  Orientation  Overall Orientation Status: Within Functional Limits Strengthening, ROM, Balance Training, Transfer Training, Functional Mobility Training, Patient/Caregiver Education & Training, Gait Training  Safety Devices  Type of devices:  All fall risk precautions in place, Bed alarm in place, Call light within reach, Gait belt, Patient at risk for falls, Left in bed, Nurse notified(MERCEDES Crump)      AM-PAC Score  AM-PAC Inpatient Mobility Raw Score : 13 (09/23/19 1352)  AM-PAC Inpatient T-Scale Score : 36.74 (09/23/19 1352)  Mobility Inpatient CMS 0-100% Score: 64.91 (09/23/19 1352)  Mobility Inpatient CMS G-Code Modifier : CL (09/23/19 1352)          Goals  Short term goals  Time Frame for Short term goals: discharge  Short term goal 1: bed mobility with supervision  Short term goal 2: sit to/from stand with SBA  Short term goal 3: amb 20 ft with RW with CGA  Patient Goals   Patient goals : return to apt       Therapy Time   Individual Concurrent Group Co-treatment   Time In 1246         Time Out 1316         Minutes 30         Timed Code Treatment Minutes: [address], [address]

## 2019-09-23 NOTE — PROGRESS NOTES
Pt. leaning over side of bed vomiting. PRN zofran given by Nicolasa Duke RN. Pt. Found not to be wearing O2. Says \"I can't throw-up and wear my Oxygen. \"

## 2019-09-23 NOTE — PROGRESS NOTES
Physical Therapy/Occupational Therapy  Praveen Peters     PT/OT attempted, pt refusing evaluation at this time. Pt stating that she \"had a bad night\", and would like therapy to try later. Will follow up later this date as schedule allows. 2nd attempt: Pt is off the floor to CT.      Thanks, Jos Guidry, PT [place], OTR/L, [id number]

## 2019-09-23 NOTE — PROGRESS NOTES
Provider notified about BS of 395. New orders obtained for high dose sliding scale insulin. See new orders.

## 2019-09-23 NOTE — PROGRESS NOTES
Occupational Therapy   Occupational Therapy Initial Assessment  Date: 2019   Patient Name: Ana Cristina Kunz  MRN: [id number]     : 1945    Date of Service: 2019    Discharge Recommendations:  Ana Cristina Kunz scored a  on the AM-PAC ADL Inpatient form. Current research shows that an AM-PAC score of 17 or less is typically not associated with a discharge to the patient's home setting. Based on the patients AM-PAC score and their current ADL deficits, it is recommended that the patient have 3-5 sessions per week of Occupational Therapy at d/c to increase the patients independence. OT Equipment Recommendations  Equipment Needed: No  Other: TBD next level of care. Assessment   Performance deficits / Impairments: Decreased functional mobility ; Decreased ADL status; Decreased endurance;Decreased fine motor control;Decreased balance  Assessment: Pt is not at her baseline level of occupational function, based on the above deficits associated with COPD. Pt would benefit from continued skilled acute OT services to address these deficits. Treatment Diagnosis: Decreased ADL status, endurance, FM control, functional mobility and balance associated with COPD  Prognosis: Fair  Decision Making: Medium Complexity  History: Pt is 70 yo, lives alone, needs assist w/ADLs, IADLs, limited assist from [address], ambulates w/4WW, rarely goes out, recent fall. PMH: acute MI, COPD, DM anxiety & depression, acute MI, Acute pyelonephritis, skin Ca  Exam: ROM, MMT, 6 clicks, 5 peformance deficits/impairments  Assistance / Modification: Min/CGA for transfer, very limited functional mobility, decreased hand functional severely limited ADLs  Patient Education: OT eval, POC, d/c recommendation, safe functional transfer  Barriers to Learning: None  REQUIRES OT FOLLOW UP: Yes  Activity Tolerance  Activity Tolerance: Patient limited by fatigue  Safety Devices  Safety Devices in place: Yes  Type of devices:  All fall risk planning  Diagnosis: COPD  Subjective  Subjective: Pt seated in chair on arrival. Pt reported getting \"no sleep last night\" and reported pain \"all over\", 8/10 in her chest and head from coughting and low back & LEs. Pt denied pain medication. Patient Currently in Pain: Yes  Pain Assessment  Pain Assessment: 0-10  Pain Level: 8(12:47 pm)  Patient's Stated Pain Goal: 2  Pre Treatment Pain Screening  Intervention List: Patient able to continue with treatment;Nurse/Physician notified  Vital Signs  Patient Currently in Pain: Yes  Social/Functional History  Social/Functional History  Lives With: Alone  Type of Home: Apartment  Home Layout: One level  Home Access: Level entry  Bathroom Shower/Tub: Tub/Shower unit(Pt takes tub baths)  Bathroom Toilet: Standard  Bathroom Equipment: Grab bars in shower, Grab bars around toilet, Hand-held shower  Home Equipment: Reacher, 4 wheeled walker, Grab bars, Cane, Oxygen, Lift chair(alarm cord in bathroom; home O2, 2L, high-low table)  Receives Help From: Home health  ADL Assistance: Needs assistance(see OT notes for details)  Homemaking Assistance: Needs assistance  Homemaking Responsibilities: Yes(Very limited; only puts items in microwave; aides are supposed to help; don't do much)  Ambulation Assistance: Independent(uses 4WW in house and community; rarely goes out, walks short distances)  Additional Comments: Pt fell 2 months ago, bathing without aide present, became dizzy getting out of the tub. Pt has tried several [employer], but aides aren't assisting her.        Objective   Vision: Impaired  Vision Exceptions: Wears glasses for reading(cataracts removed)  Hearing: Exceptions to [facility]  Hearing Exceptions: Hard of hearing/hearing concerns    Orientation  Overall Orientation Status: Within Functional Limits     Balance  Sitting Balance: Stand by assistance  Standing Balance: Minimal assistance(Min A on 1st sit to stand; CGA 2nd standing)  Standing Balance  Time: ~20 sec X

## 2019-09-24 ENCOUNTER — APPOINTMENT (OUTPATIENT)
Dept: CT IMAGING | Age: 74
DRG: 191 | End: 2019-09-24
Payer: COMMERCIAL

## 2019-09-24 LAB
CULTURE, RESPIRATORY: NORMAL
EKG ATRIAL RATE: 85 BPM
EKG DIAGNOSIS: NORMAL
EKG P AXIS: 23 DEGREES
EKG P-R INTERVAL: 154 MS
EKG Q-T INTERVAL: 344 MS
EKG QRS DURATION: 116 MS
EKG QTC CALCULATION (BAZETT): 409 MS
EKG R AXIS: -56 DEGREES
EKG T AXIS: 53 DEGREES
EKG VENTRICULAR RATE: 85 BPM
ESTIMATED AVERAGE GLUCOSE: 171.4 MG/DL
GLUCOSE BLD-MCNC: 185 MG/DL (ref 70–99)
GLUCOSE BLD-MCNC: 275 MG/DL (ref 70–99)
GLUCOSE BLD-MCNC: 311 MG/DL (ref 70–99)
GLUCOSE BLD-MCNC: 327 MG/DL (ref 70–99)
GRAM STAIN RESULT: NORMAL
HBA1C MFR BLD: 7.6 %
LEFT VENTRICULAR EJECTION FRACTION HIGH VALUE: 60 %
LEFT VENTRICULAR EJECTION FRACTION MODE: NORMAL
LV EF: 55 %
PERFORMED ON: ABNORMAL
TROPONIN: <0.01 NG/ML

## 2019-09-24 PROCEDURE — 99222 1ST HOSP IP/OBS MODERATE 55: CPT | Performed by: INTERNAL MEDICINE

## 2019-09-24 PROCEDURE — 94640 AIRWAY INHALATION TREATMENT: CPT

## 2019-09-24 PROCEDURE — 6370000000 HC RX 637 (ALT 250 FOR IP): Performed by: INTERNAL MEDICINE

## 2019-09-24 PROCEDURE — 2580000003 HC RX 258: Performed by: INTERNAL MEDICINE

## 2019-09-24 PROCEDURE — 2700000000 HC OXYGEN THERAPY PER DAY

## 2019-09-24 PROCEDURE — 6360000004 HC RX CONTRAST MEDICATION: Performed by: INTERNAL MEDICINE

## 2019-09-24 PROCEDURE — 6360000002 HC RX W HCPCS: Performed by: INTERNAL MEDICINE

## 2019-09-24 PROCEDURE — 84484 ASSAY OF TROPONIN QUANT: CPT

## 2019-09-24 PROCEDURE — 93010 ELECTROCARDIOGRAM REPORT: CPT | Performed by: INTERNAL MEDICINE

## 2019-09-24 PROCEDURE — 71275 CT ANGIOGRAPHY CHEST: CPT

## 2019-09-24 PROCEDURE — 1200000000 HC SEMI PRIVATE

## 2019-09-24 PROCEDURE — 93005 ELECTROCARDIOGRAM TRACING: CPT | Performed by: INTERNAL MEDICINE

## 2019-09-24 PROCEDURE — 36415 COLL VENOUS BLD VENIPUNCTURE: CPT

## 2019-09-24 PROCEDURE — 94761 N-INVAS EAR/PLS OXIMETRY MLT: CPT

## 2019-09-24 RX ORDER — IBUPROFEN 400 MG/1
400 TABLET ORAL EVERY 6 HOURS PRN
Status: DISCONTINUED | OUTPATIENT
Start: 2019-09-24 | End: 2019-09-25 | Stop reason: HOSPADM

## 2019-09-24 RX ORDER — KETOROLAC TROMETHAMINE 15 MG/ML
15 INJECTION, SOLUTION INTRAMUSCULAR; INTRAVENOUS EVERY 6 HOURS PRN
Status: DISCONTINUED | OUTPATIENT
Start: 2019-09-24 | End: 2019-09-24

## 2019-09-24 RX ORDER — COLCHICINE 0.6 MG/1
0.6 TABLET ORAL DAILY
Status: DISCONTINUED | OUTPATIENT
Start: 2019-09-24 | End: 2019-09-25 | Stop reason: HOSPADM

## 2019-09-24 RX ORDER — KETOROLAC TROMETHAMINE 30 MG/ML
30 INJECTION, SOLUTION INTRAMUSCULAR; INTRAVENOUS EVERY 6 HOURS PRN
Status: DISCONTINUED | OUTPATIENT
Start: 2019-09-24 | End: 2019-09-25 | Stop reason: HOSPADM

## 2019-09-24 RX ADMIN — ALPRAZOLAM 1 MG: 0.5 TABLET ORAL at 09:25

## 2019-09-24 RX ADMIN — INSULIN LISPRO 12 UNITS: 100 INJECTION, SOLUTION INTRAVENOUS; SUBCUTANEOUS at 08:33

## 2019-09-24 RX ADMIN — DICLOFENAC 2 G: 10 GEL TOPICAL at 08:25

## 2019-09-24 RX ADMIN — GABAPENTIN 300 MG: 300 CAPSULE ORAL at 21:17

## 2019-09-24 RX ADMIN — ONDANSETRON 4 MG: 2 INJECTION INTRAMUSCULAR; INTRAVENOUS at 12:42

## 2019-09-24 RX ADMIN — INSULIN LISPRO 3 UNITS: 100 INJECTION, SOLUTION INTRAVENOUS; SUBCUTANEOUS at 17:36

## 2019-09-24 RX ADMIN — PREDNISONE 40 MG: 20 TABLET ORAL at 12:43

## 2019-09-24 RX ADMIN — MICONAZOLE NITRATE: 20 POWDER TOPICAL at 21:47

## 2019-09-24 RX ADMIN — INSULIN LISPRO 10 UNITS: 100 INJECTION, SOLUTION INTRAVENOUS; SUBCUTANEOUS at 17:36

## 2019-09-24 RX ADMIN — INSULIN LISPRO 10 UNITS: 100 INJECTION, SOLUTION INTRAVENOUS; SUBCUTANEOUS at 12:52

## 2019-09-24 RX ADMIN — ENOXAPARIN SODIUM 40 MG: 40 INJECTION SUBCUTANEOUS at 08:24

## 2019-09-24 RX ADMIN — Medication 2 PUFF: at 08:15

## 2019-09-24 RX ADMIN — IPRATROPIUM BROMIDE AND ALBUTEROL SULFATE 3 ML: .5; 3 SOLUTION RESPIRATORY (INHALATION) at 08:15

## 2019-09-24 RX ADMIN — ALPRAZOLAM 1 MG: 0.5 TABLET ORAL at 21:20

## 2019-09-24 RX ADMIN — GABAPENTIN 300 MG: 300 CAPSULE ORAL at 08:25

## 2019-09-24 RX ADMIN — INSULIN LISPRO 12 UNITS: 100 INJECTION, SOLUTION INTRAVENOUS; SUBCUTANEOUS at 12:52

## 2019-09-24 RX ADMIN — INSULIN GLARGINE 13 UNITS: 100 INJECTION, SOLUTION SUBCUTANEOUS at 21:26

## 2019-09-24 RX ADMIN — ASPIRIN 81 MG 81 MG: 81 TABLET ORAL at 08:25

## 2019-09-24 RX ADMIN — INSULIN LISPRO 10 UNITS: 100 INJECTION, SOLUTION INTRAVENOUS; SUBCUTANEOUS at 08:33

## 2019-09-24 RX ADMIN — DICLOFENAC 2 G: 10 GEL TOPICAL at 21:47

## 2019-09-24 RX ADMIN — TRIAMCINOLONE ACETONIDE: 1 CREAM TOPICAL at 21:47

## 2019-09-24 RX ADMIN — IPRATROPIUM BROMIDE AND ALBUTEROL SULFATE 3 ML: .5; 3 SOLUTION RESPIRATORY (INHALATION) at 16:38

## 2019-09-24 RX ADMIN — METHYLPREDNISOLONE SODIUM SUCCINATE 40 MG: 40 INJECTION, POWDER, FOR SOLUTION INTRAMUSCULAR; INTRAVENOUS at 04:08

## 2019-09-24 RX ADMIN — IOPAMIDOL 75 ML: 755 INJECTION, SOLUTION INTRAVENOUS at 14:29

## 2019-09-24 RX ADMIN — Medication 10 ML: at 21:47

## 2019-09-24 RX ADMIN — Medication 10 ML: at 09:25

## 2019-09-24 RX ADMIN — IPRATROPIUM BROMIDE AND ALBUTEROL SULFATE 3 ML: .5; 3 SOLUTION RESPIRATORY (INHALATION) at 12:01

## 2019-09-24 RX ADMIN — NITROGLYCERIN 0.4 MG: 0.4 TABLET, ORALLY DISINTEGRATING SUBLINGUAL at 10:18

## 2019-09-24 RX ADMIN — COLCHICINE 0.6 MG: 0.6 TABLET, FILM COATED ORAL at 17:36

## 2019-09-24 RX ADMIN — CEFUROXIME AXETIL 500 MG: 250 TABLET ORAL at 08:25

## 2019-09-24 RX ADMIN — INSULIN LISPRO 5 UNITS: 100 INJECTION, SOLUTION INTRAVENOUS; SUBCUTANEOUS at 21:26

## 2019-09-24 RX ADMIN — KETOROLAC TROMETHAMINE 15 MG: 15 INJECTION, SOLUTION INTRAMUSCULAR; INTRAVENOUS at 12:42

## 2019-09-24 RX ADMIN — MICONAZOLE NITRATE: 20 POWDER TOPICAL at 08:25

## 2019-09-24 RX ADMIN — NITROGLYCERIN 0.4 MG: 0.4 TABLET, ORALLY DISINTEGRATING SUBLINGUAL at 12:41

## 2019-09-24 RX ADMIN — ISOSORBIDE MONONITRATE 60 MG: 60 TABLET, EXTENDED RELEASE ORAL at 08:25

## 2019-09-24 RX ADMIN — PANTOPRAZOLE SODIUM 40 MG: 40 TABLET, DELAYED RELEASE ORAL at 06:36

## 2019-09-24 RX ADMIN — Medication 10 ML: at 12:42

## 2019-09-24 RX ADMIN — Medication 2 PUFF: at 20:05

## 2019-09-24 RX ADMIN — NITROGLYCERIN 0.4 MG: 0.4 TABLET, ORALLY DISINTEGRATING SUBLINGUAL at 10:28

## 2019-09-24 RX ADMIN — CEFUROXIME AXETIL 500 MG: 250 TABLET ORAL at 21:17

## 2019-09-24 RX ADMIN — TRIAMCINOLONE ACETONIDE: 1 CREAM TOPICAL at 08:25

## 2019-09-24 RX ADMIN — LISINOPRIL 2.5 MG: 5 TABLET ORAL at 08:25

## 2019-09-24 RX ADMIN — NITROGLYCERIN 0.4 MG: 0.4 TABLET, ORALLY DISINTEGRATING SUBLINGUAL at 10:13

## 2019-09-24 RX ADMIN — IPRATROPIUM BROMIDE AND ALBUTEROL SULFATE 3 ML: .5; 3 SOLUTION RESPIRATORY (INHALATION) at 20:05

## 2019-09-24 ASSESSMENT — PAIN DESCRIPTION - PAIN TYPE
TYPE: ACUTE PAIN
TYPE: ACUTE PAIN

## 2019-09-24 ASSESSMENT — PAIN DESCRIPTION - DESCRIPTORS
DESCRIPTORS: ACHING;CRUSHING
DESCRIPTORS: ACHING;CRUSHING
DESCRIPTORS: PRESSURE;CRUSHING

## 2019-09-24 ASSESSMENT — PAIN DESCRIPTION - DIRECTION
RADIATING_TOWARDS: BACK
RADIATING_TOWARDS: BACK

## 2019-09-24 ASSESSMENT — PAIN SCALES - GENERAL
PAINLEVEL_OUTOF10: 0
PAINLEVEL_OUTOF10: 5
PAINLEVEL_OUTOF10: 0
PAINLEVEL_OUTOF10: 5
PAINLEVEL_OUTOF10: 10
PAINLEVEL_OUTOF10: 9
PAINLEVEL_OUTOF10: 8
PAINLEVEL_OUTOF10: 10

## 2019-09-24 ASSESSMENT — PAIN DESCRIPTION - ORIENTATION
ORIENTATION: MID
ORIENTATION: MID

## 2019-09-24 ASSESSMENT — PAIN DESCRIPTION - LOCATION
LOCATION: CHEST
LOCATION: CHEST

## 2019-09-24 NOTE — PROGRESS NOTES
(IMDUR) extended release tablet 60 mg, 60 mg, Oral, Daily  lisinopril (PRINIVIL;ZESTRIL) tablet 2.5 mg, 2.5 mg, Oral, Daily  nitroGLYCERIN (NITROSTAT) SL tablet 0.4 mg, 0.4 mg, Sublingual, Q5 Min PRN  miconazole (MICOTIN) 2 % powder, , Topical, BID  triamcinolone (KENALOG) 0.1 % cream, , Topical, BID  sodium chloride flush 0.9 % injection 10 mL, 10 mL, Intravenous, 2 times per day  sodium chloride flush 0.9 % injection 10 mL, 10 mL, Intravenous, PRN  magnesium hydroxide (MILK OF MAGNESIA) 400 MG/5ML suspension 30 mL, 30 mL, Oral, Daily PRN  ondansetron (ZOFRAN) injection 4 mg, 4 mg, Intravenous, Q6H PRN  enoxaparin (LOVENOX) injection 40 mg, 40 mg, Subcutaneous, Daily  methylPREDNISolone sodium (SOLU-MEDROL) injection 40 mg, 40 mg, Intravenous, Q6H **FOLLOWED BY** [START ON 2019] predniSONE (DELTASONE) tablet 40 mg, 40 mg, Oral, Daily  pantoprazole (PROTONIX) tablet 40 mg, 40 mg, Oral, QAM AC  insulin lispro (HUMALOG) injection pen 0-18 Units, 0-18 Units, Subcutaneous, TID WC  insulin lispro (HUMALOG) injection pen 0-9 Units, 0-9 Units, Subcutaneous, Nightly    Physical      VITALS:    BP (!) 150/79   Pulse 83   Temp 97.2 °F (36.2 °C) (Oral)   Resp 20   Ht 5' 6\" (1.676 m)   Wt 277 lb (125.6 kg)   SpO2 98%   BMI 44.71 kg/m²   TEMPERATURE:  Current - Temp: 97.2 °F (36.2 °C);  Max - Temp  Av.5 °F (36.4 °C)  Min: 97 °F (36.1 °C)  Max: 98.2 °F (36.8 °C)  RESPIRATIONS RANGE: Resp  Av.8  Min: 18  Max: 22  PULSE RANGE: Pulse  Av.3  Min: 64  Max: 87  BLOOD PRESSURE RANGE:  Systolic (20MIR), ZUS:836 , Min:130 , SUC:682   ; Diastolic (24QJG), LZK:55, Min:74, Max:84    PULSE OXIMETRY RANGE: SpO2  Av %  Min: 92 %  Max: 98 %  24HR INTAKE/OUTPUT:      Intake/Output Summary (Last 24 hours) at 2019 5366  Last data filed at 2019 1854  Gross per 24 hour   Intake 1020 ml   Output --   Net 1020 ml     CONSTITUTIONAL:  awake, alert, cooperative, no apparent distress, and appears stated age  NECK:

## 2019-09-24 NOTE — PROGRESS NOTES
Patient with active 10/10 chest pain. BP elevated, 160/106. Nitro SL given x2. Stat EKG and trop ordered. Dr. Deejay Silver aware, cardiology called and consulted.

## 2019-09-24 NOTE — PROGRESS NOTES
Physical Therapy      Hold/refusal for PT treatment today. Pt currently in lots of pain. Will follow-up another day as schedule permits.    Thanks, Lauren Davis, DPT [phone]

## 2019-09-24 NOTE — CARE COORDINATION
CM placed call to [facility] admissions at [facility] to verify status of precert request. CM will follow to facilitate DC planning needs.     KARINE Turner, CCM, RN  [facility]  [phone]

## 2019-09-25 VITALS
SYSTOLIC BLOOD PRESSURE: 128 MMHG | TEMPERATURE: 97.8 F | DIASTOLIC BLOOD PRESSURE: 85 MMHG | HEART RATE: 78 BPM | HEIGHT: 66 IN | WEIGHT: 275.5 LBS | OXYGEN SATURATION: 91 % | BODY MASS INDEX: 44.27 KG/M2 | RESPIRATION RATE: 20 BRPM

## 2019-09-25 LAB
GLUCOSE BLD-MCNC: 117 MG/DL (ref 70–99)
GLUCOSE BLD-MCNC: 172 MG/DL (ref 70–99)
GLUCOSE BLD-MCNC: 196 MG/DL (ref 70–99)
PERFORMED ON: ABNORMAL

## 2019-09-25 PROCEDURE — 93308 TTE F-UP OR LMTD: CPT

## 2019-09-25 PROCEDURE — 94761 N-INVAS EAR/PLS OXIMETRY MLT: CPT

## 2019-09-25 PROCEDURE — 97530 THERAPEUTIC ACTIVITIES: CPT

## 2019-09-25 PROCEDURE — 94640 AIRWAY INHALATION TREATMENT: CPT

## 2019-09-25 PROCEDURE — 6370000000 HC RX 637 (ALT 250 FOR IP): Performed by: INTERNAL MEDICINE

## 2019-09-25 PROCEDURE — 2700000000 HC OXYGEN THERAPY PER DAY

## 2019-09-25 PROCEDURE — 99232 SBSQ HOSP IP/OBS MODERATE 35: CPT | Performed by: INTERNAL MEDICINE

## 2019-09-25 PROCEDURE — 6360000002 HC RX W HCPCS: Performed by: INTERNAL MEDICINE

## 2019-09-25 PROCEDURE — 2580000003 HC RX 258: Performed by: INTERNAL MEDICINE

## 2019-09-25 RX ORDER — DEXTROMETHORPHAN HYDROBROMIDE AND PROMETHAZINE HYDROCHLORIDE 15; 6.25 MG/5ML; MG/5ML
5 SYRUP ORAL EVERY 4 HOURS PRN
Qty: 200 ML | Refills: 0 | Status: SHIPPED | OUTPATIENT
Start: 2019-09-25 | End: 2019-12-17 | Stop reason: SDUPTHER

## 2019-09-25 RX ORDER — PREDNISONE 20 MG/1
40 TABLET ORAL DAILY
Qty: 20 TABLET | Refills: 0 | Status: SHIPPED | OUTPATIENT
Start: 2019-09-26 | End: 2019-10-06

## 2019-09-25 RX ORDER — COLCHICINE 0.6 MG/1
0.6 TABLET ORAL DAILY
Qty: 30 TABLET | Refills: 3 | Status: SHIPPED | OUTPATIENT
Start: 2019-09-26 | End: 2020-09-01

## 2019-09-25 RX ORDER — NICOTINE POLACRILEX 4 MG
15 LOZENGE BUCCAL PRN
Qty: 45 G | Refills: 1 | Status: SHIPPED | OUTPATIENT
Start: 2019-09-25 | End: 2020-04-03 | Stop reason: SDUPTHER

## 2019-09-25 RX ORDER — ALPRAZOLAM 1 MG/1
1 TABLET ORAL 3 TIMES DAILY PRN
Qty: 20 TABLET | Refills: 0 | Status: SHIPPED | OUTPATIENT
Start: 2019-09-25 | End: 2019-10-08 | Stop reason: SDUPTHER

## 2019-09-25 RX ORDER — IBUPROFEN 400 MG/1
400 TABLET ORAL EVERY 6 HOURS PRN
Qty: 120 TABLET | Refills: 3 | Status: SHIPPED | OUTPATIENT
Start: 2019-09-25 | End: 2019-10-29 | Stop reason: SDUPTHER

## 2019-09-25 RX ADMIN — DEXTROMETHORPHAN HYDROBROMIDE AND PROMETHAZINE HYDROCHLORIDE 5 ML: 15; 6.25 SOLUTION ORAL at 00:05

## 2019-09-25 RX ADMIN — ISOSORBIDE MONONITRATE 60 MG: 60 TABLET, EXTENDED RELEASE ORAL at 08:59

## 2019-09-25 RX ADMIN — LISINOPRIL 2.5 MG: 5 TABLET ORAL at 08:59

## 2019-09-25 RX ADMIN — GABAPENTIN 300 MG: 300 CAPSULE ORAL at 08:59

## 2019-09-25 RX ADMIN — DICLOFENAC 2 G: 10 GEL TOPICAL at 09:11

## 2019-09-25 RX ADMIN — COLCHICINE 0.6 MG: 0.6 TABLET, FILM COATED ORAL at 08:59

## 2019-09-25 RX ADMIN — CEFUROXIME AXETIL 500 MG: 250 TABLET ORAL at 08:59

## 2019-09-25 RX ADMIN — ASPIRIN 81 MG 81 MG: 81 TABLET ORAL at 08:59

## 2019-09-25 RX ADMIN — ALPRAZOLAM 1 MG: 0.5 TABLET ORAL at 12:50

## 2019-09-25 RX ADMIN — INSULIN LISPRO 10 UNITS: 100 INJECTION, SOLUTION INTRAVENOUS; SUBCUTANEOUS at 09:11

## 2019-09-25 RX ADMIN — INSULIN LISPRO 10 UNITS: 100 INJECTION, SOLUTION INTRAVENOUS; SUBCUTANEOUS at 12:51

## 2019-09-25 RX ADMIN — INSULIN LISPRO 10 UNITS: 100 INJECTION, SOLUTION INTRAVENOUS; SUBCUTANEOUS at 16:47

## 2019-09-25 RX ADMIN — INSULIN LISPRO 3 UNITS: 100 INJECTION, SOLUTION INTRAVENOUS; SUBCUTANEOUS at 16:47

## 2019-09-25 RX ADMIN — Medication 10 ML: at 09:13

## 2019-09-25 RX ADMIN — PANTOPRAZOLE SODIUM 40 MG: 40 TABLET, DELAYED RELEASE ORAL at 06:21

## 2019-09-25 RX ADMIN — MICONAZOLE NITRATE: 20 POWDER TOPICAL at 09:11

## 2019-09-25 RX ADMIN — TRIAMCINOLONE ACETONIDE: 1 CREAM TOPICAL at 09:11

## 2019-09-25 RX ADMIN — IPRATROPIUM BROMIDE AND ALBUTEROL SULFATE 3 ML: .5; 3 SOLUTION RESPIRATORY (INHALATION) at 12:40

## 2019-09-25 RX ADMIN — INSULIN LISPRO 3 UNITS: 100 INJECTION, SOLUTION INTRAVENOUS; SUBCUTANEOUS at 12:51

## 2019-09-25 RX ADMIN — ENOXAPARIN SODIUM 40 MG: 40 INJECTION SUBCUTANEOUS at 08:59

## 2019-09-25 RX ADMIN — PREDNISONE 40 MG: 20 TABLET ORAL at 08:58

## 2019-09-25 ASSESSMENT — PAIN DESCRIPTION - LOCATION: LOCATION: BACK;KNEE;HEAD

## 2019-09-25 ASSESSMENT — PAIN SCALES - GENERAL
PAINLEVEL_OUTOF10: 8
PAINLEVEL_OUTOF10: 2
PAINLEVEL_OUTOF10: 5

## 2019-09-25 ASSESSMENT — PAIN DESCRIPTION - ORIENTATION: ORIENTATION: LEFT;RIGHT

## 2019-09-25 ASSESSMENT — PAIN DESCRIPTION - PAIN TYPE: TYPE: ACUTE PAIN;CHRONIC PAIN

## 2019-09-25 NOTE — PLAN OF CARE
Problem: Falls - Risk of:  Goal: Will remain free from falls  Description  Will remain free from falls  Outcome: Ongoing  Note:   Bed locked and in lowest position. Non slip socks on when ambulating. Bed alarm engaged. Pt encouraged to call prior to getting up from bed. Problem: Respiratory  Goal: O2 Sat > 90%  Outcome: Ongoing  Note:   Pt requires O2 for comfort currently on 4 L/min- trying to wean down.  Pt easily winded with ambulation but quickly recovers with rest.

## 2019-10-23 ENCOUNTER — TELEPHONE (OUTPATIENT)
Dept: INTERNAL MEDICINE CLINIC | Age: 74
End: 2019-10-23

## 2019-10-23 DIAGNOSIS — J44.1 COPD EXACERBATION (HCC): Primary | ICD-10-CM

## 2019-10-24 NOTE — PROGRESS NOTES
Patient seen , discharge dictated scripts given , arrangements made , JOSE G completed .  Discussed with nursing staff  And   If applicable ,  Discussed with  Patient's family , all questions answered and concerns addressed  When applicable

## 2019-10-25 NOTE — DISCHARGE SUMMARY
stable. White blood cell count was 17.2, hemoglobin and  hematocrit 14.9 and 45.8, platelet count 058. Nuclear medicine,  myocardial contractility. Stress testing, noninvasive. Pharmacologic  stress test was also done, which was a normal isotope uptake at stress  and rest without any evidence of myocardial ischemia or scar. The  patient was given all the supportive care, home healthcare needs etc.   The patient has significant difficulty in getting around with  significant component of exertional dyspnea. We made necessary  arrangement for patient to go to a skilled nursing facility to [address] [place] [address] for a few days. The patient was discharged in stable  condition on 09/25.         Jacey Wheeler MD    D: 10/24/2019 7:35:25       T: 10/24/2019 23:17:18     [id number]  Job#: [id number]     Doc#: [id number]    CC:

## 2019-11-04 ENCOUNTER — OFFICE VISIT (OUTPATIENT)
Dept: CARDIOLOGY CLINIC | Age: 74
End: 2019-11-04
Payer: COMMERCIAL

## 2019-11-04 VITALS
HEIGHT: 65 IN | WEIGHT: 268 LBS | DIASTOLIC BLOOD PRESSURE: 80 MMHG | SYSTOLIC BLOOD PRESSURE: 144 MMHG | BODY MASS INDEX: 44.65 KG/M2 | HEART RATE: 76 BPM

## 2019-11-04 DIAGNOSIS — I25.10 CORONARY ARTERY DISEASE INVOLVING NATIVE CORONARY ARTERY OF NATIVE HEART WITHOUT ANGINA PECTORIS: Primary | ICD-10-CM

## 2019-11-04 DIAGNOSIS — E78.5 HYPERLIPIDEMIA, UNSPECIFIED HYPERLIPIDEMIA TYPE: ICD-10-CM

## 2019-11-04 DIAGNOSIS — I10 HYPERTENSION, UNSPECIFIED TYPE: ICD-10-CM

## 2019-11-04 PROCEDURE — G8482 FLU IMMUNIZE ORDER/ADMIN: HCPCS | Performed by: INTERNAL MEDICINE

## 2019-11-04 PROCEDURE — G8417 CALC BMI ABV UP PARAM F/U: HCPCS | Performed by: INTERNAL MEDICINE

## 2019-11-04 PROCEDURE — 3017F COLORECTAL CA SCREEN DOC REV: CPT | Performed by: INTERNAL MEDICINE

## 2019-11-04 PROCEDURE — 4040F PNEUMOC VAC/ADMIN/RCVD: CPT | Performed by: INTERNAL MEDICINE

## 2019-11-04 PROCEDURE — 99213 OFFICE O/P EST LOW 20 MIN: CPT | Performed by: INTERNAL MEDICINE

## 2019-11-04 PROCEDURE — G8399 PT W/DXA RESULTS DOCUMENT: HCPCS | Performed by: INTERNAL MEDICINE

## 2019-11-04 PROCEDURE — G8427 DOCREV CUR MEDS BY ELIG CLIN: HCPCS | Performed by: INTERNAL MEDICINE

## 2019-11-04 PROCEDURE — 1090F PRES/ABSN URINE INCON ASSESS: CPT | Performed by: INTERNAL MEDICINE

## 2019-11-04 PROCEDURE — 1036F TOBACCO NON-USER: CPT | Performed by: INTERNAL MEDICINE

## 2019-11-04 PROCEDURE — 1123F ACP DISCUSS/DSCN MKR DOCD: CPT | Performed by: INTERNAL MEDICINE

## 2019-11-04 PROCEDURE — G8598 ASA/ANTIPLAT THER USED: HCPCS | Performed by: INTERNAL MEDICINE

## 2019-11-07 ENCOUNTER — HOSPITAL ENCOUNTER (OUTPATIENT)
Age: 74
Discharge: HOME | End: 2019-11-07
Payer: COMMERCIAL

## 2019-11-07 LAB
CHOLESTEROL, TOTAL: 226 MG/DL (ref 0–199)
HDLC SERPL-MCNC: 34 MG/DL (ref 40–60)
LDL CHOLESTEROL CALCULATED: 132 MG/DL
TRIGL SERPL-MCNC: 300 MG/DL (ref 0–150)
VLDLC SERPL CALC-MCNC: 60 MG/DL

## 2019-11-07 PROCEDURE — 80061 LIPID PANEL: CPT

## 2019-11-07 PROCEDURE — 36415 COLL VENOUS BLD VENIPUNCTURE: CPT

## 2019-11-13 ENCOUNTER — TELEPHONE (OUTPATIENT)
Dept: CARDIOLOGY CLINIC | Age: 74
End: 2019-11-13

## 2019-11-14 RX ORDER — ROSUVASTATIN CALCIUM 10 MG/1
10 TABLET, COATED ORAL DAILY
Qty: 30 TABLET | Refills: 5 | Status: SHIPPED | OUTPATIENT
Start: 2019-11-14 | End: 2020-07-02 | Stop reason: SDUPTHER

## 2019-11-21 ENCOUNTER — TELEPHONE (OUTPATIENT)
Dept: CARDIOLOGY CLINIC | Age: 74
End: 2019-11-21

## 2019-12-09 RX ORDER — BUDESONIDE AND FORMOTEROL FUMARATE DIHYDRATE 160; 4.5 UG/1; UG/1
2 AEROSOL RESPIRATORY (INHALATION) 2 TIMES DAILY
Qty: 3 INHALER | Refills: 1 | Status: SHIPPED | OUTPATIENT
Start: 2019-12-09 | End: 2021-06-30 | Stop reason: SDUPTHER

## 2020-01-19 ENCOUNTER — HOSPITAL ENCOUNTER (INPATIENT)
Age: 75
LOS: 12 days | Discharge: HOME HEALTH | DRG: 190 | End: 2020-01-31
Attending: EMERGENCY MEDICINE | Admitting: INTERNAL MEDICINE
Payer: COMMERCIAL

## 2020-01-19 ENCOUNTER — APPOINTMENT (OUTPATIENT)
Dept: GENERAL RADIOLOGY | Age: 75
DRG: 190 | End: 2020-01-19
Payer: COMMERCIAL

## 2020-01-19 PROBLEM — J44.1 COPD EXACERBATION (HCC): Status: ACTIVE | Noted: 2020-01-19

## 2020-01-19 LAB
A/G RATIO: 0.9 (ref 1.1–2.2)
ALBUMIN SERPL-MCNC: 3.4 G/DL (ref 3.4–5)
ALP BLD-CCNC: 91 U/L (ref 40–129)
ALT SERPL-CCNC: 15 U/L (ref 10–40)
ANION GAP SERPL CALCULATED.3IONS-SCNC: 13 MMOL/L (ref 3–16)
AST SERPL-CCNC: 18 U/L (ref 15–37)
BACTERIA: ABNORMAL /HPF
BILIRUB SERPL-MCNC: 0.5 MG/DL (ref 0–1)
BILIRUBIN URINE: NEGATIVE
BLOOD, URINE: ABNORMAL
BUN BLDV-MCNC: 8 MG/DL (ref 7–20)
CALCIUM SERPL-MCNC: 9.8 MG/DL (ref 8.3–10.6)
CHLORIDE BLD-SCNC: 98 MMOL/L (ref 99–110)
CLARITY: ABNORMAL
CO2: 26 MMOL/L (ref 21–32)
COLOR: YELLOW
CREAT SERPL-MCNC: 0.7 MG/DL (ref 0.6–1.2)
EPITHELIAL CELLS, UA: 2 /HPF (ref 0–5)
GFR AFRICAN AMERICAN: >60
GFR NON-AFRICAN AMERICAN: >60
GLOBULIN: 4 G/DL
GLUCOSE BLD-MCNC: 144 MG/DL (ref 70–99)
GLUCOSE URINE: NEGATIVE MG/DL
HCT VFR BLD CALC: 49.8 % (ref 36–48)
HEMOGLOBIN: 16.6 G/DL (ref 12–16)
HYALINE CASTS: 6 /LPF (ref 0–8)
KETONES, URINE: NEGATIVE MG/DL
LEUKOCYTE ESTERASE, URINE: ABNORMAL
MCH RBC QN AUTO: 29.5 PG (ref 26–34)
MCHC RBC AUTO-ENTMCNC: 33.4 G/DL (ref 31–36)
MCV RBC AUTO: 88.2 FL (ref 80–100)
MICROSCOPIC EXAMINATION: YES
NITRITE, URINE: POSITIVE
PDW BLD-RTO: 14.4 % (ref 12.4–15.4)
PH UA: 6.5 (ref 5–8)
PLATELET # BLD: 198 K/UL (ref 135–450)
PMV BLD AUTO: 8.3 FL (ref 5–10.5)
POTASSIUM REFLEX MAGNESIUM: 4.1 MMOL/L (ref 3.5–5.1)
PRO-BNP: 139 PG/ML (ref 0–124)
PROTEIN UA: NEGATIVE MG/DL
RBC # BLD: 5.64 M/UL (ref 4–5.2)
RBC UA: ABNORMAL /HPF (ref 0–2)
SODIUM BLD-SCNC: 137 MMOL/L (ref 136–145)
SPECIFIC GRAVITY UA: 1.01 (ref 1–1.03)
TOTAL PROTEIN: 7.4 G/DL (ref 6.4–8.2)
TROPONIN: <0.01 NG/ML
URINE TYPE: ABNORMAL
UROBILINOGEN, URINE: 1 E.U./DL
WBC # BLD: 9.7 K/UL (ref 4–11)
WBC UA: 72 /HPF (ref 0–5)

## 2020-01-19 PROCEDURE — 96372 THER/PROPH/DIAG INJ SC/IM: CPT

## 2020-01-19 PROCEDURE — 6370000000 HC RX 637 (ALT 250 FOR IP): Performed by: EMERGENCY MEDICINE

## 2020-01-19 PROCEDURE — 1220000000 HC SEMI PRIVATE OB R&B

## 2020-01-19 PROCEDURE — 99285 EMERGENCY DEPT VISIT HI MDM: CPT

## 2020-01-19 PROCEDURE — 83880 ASSAY OF NATRIURETIC PEPTIDE: CPT

## 2020-01-19 PROCEDURE — 71045 X-RAY EXAM CHEST 1 VIEW: CPT

## 2020-01-19 PROCEDURE — 85027 COMPLETE CBC AUTOMATED: CPT

## 2020-01-19 PROCEDURE — 84484 ASSAY OF TROPONIN QUANT: CPT

## 2020-01-19 PROCEDURE — 81001 URINALYSIS AUTO W/SCOPE: CPT

## 2020-01-19 PROCEDURE — 93005 ELECTROCARDIOGRAM TRACING: CPT | Performed by: EMERGENCY MEDICINE

## 2020-01-19 PROCEDURE — 80053 COMPREHEN METABOLIC PANEL: CPT

## 2020-01-19 PROCEDURE — 6360000002 HC RX W HCPCS: Performed by: EMERGENCY MEDICINE

## 2020-01-19 PROCEDURE — 83036 HEMOGLOBIN GLYCOSYLATED A1C: CPT

## 2020-01-19 PROCEDURE — 94640 AIRWAY INHALATION TREATMENT: CPT

## 2020-01-19 RX ORDER — NITROGLYCERIN 0.4 MG/1
0.4 TABLET SUBLINGUAL ONCE
Status: COMPLETED | OUTPATIENT
Start: 2020-01-19 | End: 2020-01-19

## 2020-01-19 RX ORDER — IPRATROPIUM BROMIDE AND ALBUTEROL SULFATE 2.5; .5 MG/3ML; MG/3ML
1 SOLUTION RESPIRATORY (INHALATION) ONCE
Status: COMPLETED | OUTPATIENT
Start: 2020-01-19 | End: 2020-01-19

## 2020-01-19 RX ORDER — METHYLPREDNISOLONE SODIUM SUCCINATE 125 MG/2ML
125 INJECTION, POWDER, LYOPHILIZED, FOR SOLUTION INTRAMUSCULAR; INTRAVENOUS DAILY
Status: DISCONTINUED | OUTPATIENT
Start: 2020-01-19 | End: 2020-01-20 | Stop reason: HOSPADM

## 2020-01-19 RX ORDER — METHYLPREDNISOLONE SODIUM SUCCINATE 125 MG/2ML
125 INJECTION, POWDER, LYOPHILIZED, FOR SOLUTION INTRAMUSCULAR; INTRAVENOUS DAILY
Status: DISCONTINUED | OUTPATIENT
Start: 2020-01-20 | End: 2020-01-19

## 2020-01-19 RX ORDER — FUROSEMIDE 10 MG/ML
40 INJECTION INTRAMUSCULAR; INTRAVENOUS ONCE
Status: COMPLETED | OUTPATIENT
Start: 2020-01-19 | End: 2020-01-19

## 2020-01-19 RX ADMIN — Medication 1 G: at 22:06

## 2020-01-19 RX ADMIN — FUROSEMIDE 40 MG: 10 INJECTION, SOLUTION INTRAMUSCULAR; INTRAVENOUS at 22:06

## 2020-01-19 RX ADMIN — IPRATROPIUM BROMIDE AND ALBUTEROL SULFATE 1 AMPULE: .5; 3 SOLUTION RESPIRATORY (INHALATION) at 21:32

## 2020-01-19 RX ADMIN — NITROGLYCERIN 0.4 MG: 0.4 TABLET, ORALLY DISINTEGRATING SUBLINGUAL at 22:05

## 2020-01-19 RX ADMIN — METHYLPREDNISOLONE SODIUM SUCCINATE 125 MG: 125 INJECTION, POWDER, FOR SOLUTION INTRAMUSCULAR; INTRAVENOUS at 22:58

## 2020-01-20 LAB
ESTIMATED AVERAGE GLUCOSE: 174.3 MG/DL
GLUCOSE BLD-MCNC: 185 MG/DL (ref 70–99)
GLUCOSE BLD-MCNC: 208 MG/DL (ref 70–99)
GLUCOSE BLD-MCNC: 242 MG/DL (ref 70–99)
GLUCOSE BLD-MCNC: 246 MG/DL (ref 70–99)
GLUCOSE BLD-MCNC: 284 MG/DL (ref 70–99)
GLUCOSE BLD-MCNC: 302 MG/DL (ref 70–99)
HBA1C MFR BLD: 7.7 %
PERFORMED ON: ABNORMAL

## 2020-01-20 PROCEDURE — 6360000002 HC RX W HCPCS: Performed by: INTERNAL MEDICINE

## 2020-01-20 PROCEDURE — 94640 AIRWAY INHALATION TREATMENT: CPT

## 2020-01-20 PROCEDURE — 6370000000 HC RX 637 (ALT 250 FOR IP): Performed by: INTERNAL MEDICINE

## 2020-01-20 PROCEDURE — 94761 N-INVAS EAR/PLS OXIMETRY MLT: CPT

## 2020-01-20 PROCEDURE — 2700000000 HC OXYGEN THERAPY PER DAY

## 2020-01-20 PROCEDURE — 1220000000 HC SEMI PRIVATE OB R&B

## 2020-01-20 PROCEDURE — 93010 ELECTROCARDIOGRAM REPORT: CPT | Performed by: INTERNAL MEDICINE

## 2020-01-20 RX ORDER — NITROGLYCERIN 0.4 MG/1
0.4 TABLET SUBLINGUAL EVERY 5 MIN PRN
Status: DISCONTINUED | OUTPATIENT
Start: 2020-01-20 | End: 2020-01-31 | Stop reason: HOSPADM

## 2020-01-20 RX ORDER — ALBUTEROL SULFATE 90 UG/1
2 AEROSOL, METERED RESPIRATORY (INHALATION) EVERY 6 HOURS PRN
Status: DISCONTINUED | OUTPATIENT
Start: 2020-01-20 | End: 2020-01-31 | Stop reason: HOSPADM

## 2020-01-20 RX ORDER — ASPIRIN 81 MG/1
81 TABLET, CHEWABLE ORAL DAILY
Status: DISCONTINUED | OUTPATIENT
Start: 2020-01-20 | End: 2020-01-31 | Stop reason: HOSPADM

## 2020-01-20 RX ORDER — INSULIN LISPRO 100 [IU]/ML
0-12 INJECTION, SOLUTION INTRAVENOUS; SUBCUTANEOUS
Status: DISCONTINUED | OUTPATIENT
Start: 2020-01-20 | End: 2020-01-22

## 2020-01-20 RX ORDER — COLCHICINE 0.6 MG/1
0.6 TABLET ORAL DAILY
Status: DISCONTINUED | OUTPATIENT
Start: 2020-01-20 | End: 2020-01-20 | Stop reason: ALTCHOICE

## 2020-01-20 RX ORDER — GABAPENTIN 300 MG/1
300 CAPSULE ORAL 2 TIMES DAILY
Status: DISCONTINUED | OUTPATIENT
Start: 2020-01-20 | End: 2020-01-31 | Stop reason: HOSPADM

## 2020-01-20 RX ORDER — DIPHENOXYLATE HYDROCHLORIDE AND ATROPINE SULFATE 2.5; .025 MG/1; MG/1
1 TABLET ORAL 2 TIMES DAILY
Status: DISCONTINUED | OUTPATIENT
Start: 2020-01-20 | End: 2020-01-31 | Stop reason: HOSPADM

## 2020-01-20 RX ORDER — ALPRAZOLAM 0.5 MG/1
1 TABLET ORAL 3 TIMES DAILY PRN
Status: DISCONTINUED | OUTPATIENT
Start: 2020-01-20 | End: 2020-01-31 | Stop reason: HOSPADM

## 2020-01-20 RX ORDER — PANTOPRAZOLE SODIUM 40 MG/1
40 TABLET, DELAYED RELEASE ORAL
Status: DISCONTINUED | OUTPATIENT
Start: 2020-01-20 | End: 2020-01-31 | Stop reason: HOSPADM

## 2020-01-20 RX ORDER — DEXTROSE MONOHYDRATE 25 G/50ML
12.5 INJECTION, SOLUTION INTRAVENOUS PRN
Status: DISCONTINUED | OUTPATIENT
Start: 2020-01-20 | End: 2020-01-31 | Stop reason: HOSPADM

## 2020-01-20 RX ORDER — METHYLPREDNISOLONE SODIUM SUCCINATE 40 MG/ML
40 INJECTION, POWDER, LYOPHILIZED, FOR SOLUTION INTRAMUSCULAR; INTRAVENOUS EVERY 8 HOURS
Status: DISCONTINUED | OUTPATIENT
Start: 2020-01-20 | End: 2020-01-23

## 2020-01-20 RX ORDER — INSULIN LISPRO 100 [IU]/ML
0-6 INJECTION, SOLUTION INTRAVENOUS; SUBCUTANEOUS NIGHTLY
Status: DISCONTINUED | OUTPATIENT
Start: 2020-01-20 | End: 2020-01-22

## 2020-01-20 RX ORDER — CEFUROXIME AXETIL 250 MG/1
500 TABLET ORAL EVERY 12 HOURS SCHEDULED
Status: DISCONTINUED | OUTPATIENT
Start: 2020-01-20 | End: 2020-01-25 | Stop reason: ALTCHOICE

## 2020-01-20 RX ORDER — IBUPROFEN 800 MG/1
800 TABLET ORAL
Status: DISCONTINUED | OUTPATIENT
Start: 2020-01-20 | End: 2020-01-20 | Stop reason: ALTCHOICE

## 2020-01-20 RX ORDER — DEXTROSE MONOHYDRATE 50 MG/ML
100 INJECTION, SOLUTION INTRAVENOUS PRN
Status: DISCONTINUED | OUTPATIENT
Start: 2020-01-20 | End: 2020-01-31 | Stop reason: HOSPADM

## 2020-01-20 RX ORDER — NICOTINE POLACRILEX 4 MG
15 LOZENGE BUCCAL PRN
Status: DISCONTINUED | OUTPATIENT
Start: 2020-01-20 | End: 2020-01-31 | Stop reason: HOSPADM

## 2020-01-20 RX ORDER — IPRATROPIUM BROMIDE AND ALBUTEROL SULFATE 2.5; .5 MG/3ML; MG/3ML
1 SOLUTION RESPIRATORY (INHALATION) EVERY 4 HOURS PRN
Status: DISCONTINUED | OUTPATIENT
Start: 2020-01-20 | End: 2020-01-31 | Stop reason: HOSPADM

## 2020-01-20 RX ORDER — IBUPROFEN 200 MG
400 TABLET ORAL EVERY 6 HOURS PRN
Status: DISCONTINUED | OUTPATIENT
Start: 2020-01-20 | End: 2020-01-31 | Stop reason: HOSPADM

## 2020-01-20 RX ORDER — IPRATROPIUM BROMIDE AND ALBUTEROL SULFATE 2.5; .5 MG/3ML; MG/3ML
3 SOLUTION RESPIRATORY (INHALATION)
Status: DISCONTINUED | OUTPATIENT
Start: 2020-01-20 | End: 2020-01-31 | Stop reason: HOSPADM

## 2020-01-20 RX ORDER — BUTALBITAL, ACETAMINOPHEN AND CAFFEINE 50; 325; 40 MG/1; MG/1; MG/1
1 TABLET ORAL EVERY 4 HOURS PRN
Status: DISCONTINUED | OUTPATIENT
Start: 2020-01-20 | End: 2020-01-31 | Stop reason: HOSPADM

## 2020-01-20 RX ORDER — ROSUVASTATIN CALCIUM 10 MG/1
10 TABLET, COATED ORAL DAILY
Status: DISCONTINUED | OUTPATIENT
Start: 2020-01-20 | End: 2020-01-31 | Stop reason: HOSPADM

## 2020-01-20 RX ORDER — LISINOPRIL 5 MG/1
2.5 TABLET ORAL DAILY
Status: DISCONTINUED | OUTPATIENT
Start: 2020-01-20 | End: 2020-01-31 | Stop reason: HOSPADM

## 2020-01-20 RX ORDER — ISOSORBIDE MONONITRATE 30 MG/1
60 TABLET, EXTENDED RELEASE ORAL DAILY
Status: DISCONTINUED | OUTPATIENT
Start: 2020-01-20 | End: 2020-01-31 | Stop reason: HOSPADM

## 2020-01-20 RX ORDER — TRIAMCINOLONE ACETONIDE 1 MG/G
CREAM TOPICAL 2 TIMES DAILY
Status: DISCONTINUED | OUTPATIENT
Start: 2020-01-20 | End: 2020-01-20 | Stop reason: ALTCHOICE

## 2020-01-20 RX ORDER — NICOTINE POLACRILEX 4 MG
15 LOZENGE BUCCAL PRN
Status: DISCONTINUED | OUTPATIENT
Start: 2020-01-20 | End: 2020-01-20 | Stop reason: SDUPTHER

## 2020-01-20 RX ADMIN — ROSUVASTATIN CALCIUM 10 MG: 10 TABLET, FILM COATED ORAL at 09:55

## 2020-01-20 RX ADMIN — IPRATROPIUM BROMIDE AND ALBUTEROL SULFATE 3 ML: .5; 3 SOLUTION RESPIRATORY (INHALATION) at 09:14

## 2020-01-20 RX ADMIN — METHYLPREDNISOLONE SODIUM SUCCINATE 40 MG: 40 INJECTION, POWDER, FOR SOLUTION INTRAMUSCULAR; INTRAVENOUS at 23:20

## 2020-01-20 RX ADMIN — DICLOFENAC 2 G: 10 GEL TOPICAL at 09:07

## 2020-01-20 RX ADMIN — ALPRAZOLAM 1 MG: 0.5 TABLET ORAL at 21:23

## 2020-01-20 RX ADMIN — ALPRAZOLAM 1 MG: 0.5 TABLET ORAL at 02:17

## 2020-01-20 RX ADMIN — METHYLPREDNISOLONE SODIUM SUCCINATE 40 MG: 40 INJECTION, POWDER, FOR SOLUTION INTRAMUSCULAR; INTRAVENOUS at 09:10

## 2020-01-20 RX ADMIN — GABAPENTIN 300 MG: 300 CAPSULE ORAL at 09:07

## 2020-01-20 RX ADMIN — GABAPENTIN 300 MG: 300 CAPSULE ORAL at 21:03

## 2020-01-20 RX ADMIN — INSULIN LISPRO 4 UNITS: 100 INJECTION, SOLUTION INTRAVENOUS; SUBCUTANEOUS at 21:24

## 2020-01-20 RX ADMIN — ENOXAPARIN SODIUM 40 MG: 40 INJECTION SUBCUTANEOUS at 09:09

## 2020-01-20 RX ADMIN — IPRATROPIUM BROMIDE AND ALBUTEROL SULFATE 3 ML: .5; 3 SOLUTION RESPIRATORY (INHALATION) at 19:35

## 2020-01-20 RX ADMIN — INSULIN LISPRO 4 UNITS: 100 INJECTION, SOLUTION INTRAVENOUS; SUBCUTANEOUS at 11:42

## 2020-01-20 RX ADMIN — CEFUROXIME AXETIL 500 MG: 250 TABLET ORAL at 21:26

## 2020-01-20 RX ADMIN — ASPIRIN 81 MG 81 MG: 81 TABLET ORAL at 09:10

## 2020-01-20 RX ADMIN — ISOSORBIDE MONONITRATE 60 MG: 30 TABLET, EXTENDED RELEASE ORAL at 09:55

## 2020-01-20 RX ADMIN — IPRATROPIUM BROMIDE AND ALBUTEROL SULFATE 3 ML: .5; 3 SOLUTION RESPIRATORY (INHALATION) at 17:20

## 2020-01-20 RX ADMIN — DIPHENOXYLATE HYDROCHLORIDE AND ATROPINE SULFATE 1 TABLET: 2.5; .025 TABLET ORAL at 21:03

## 2020-01-20 RX ADMIN — INSULIN GLARGINE 30 UNITS: 100 INJECTION, SOLUTION SUBCUTANEOUS at 21:25

## 2020-01-20 RX ADMIN — INSULIN LISPRO 4 UNITS: 100 INJECTION, SOLUTION INTRAVENOUS; SUBCUTANEOUS at 09:55

## 2020-01-20 RX ADMIN — INSULIN LISPRO 4 UNITS: 100 INJECTION, SOLUTION INTRAVENOUS; SUBCUTANEOUS at 17:09

## 2020-01-20 RX ADMIN — Medication 2 PUFF: at 09:14

## 2020-01-20 RX ADMIN — INSULIN GLARGINE 30 UNITS: 100 INJECTION, SOLUTION SUBCUTANEOUS at 02:22

## 2020-01-20 RX ADMIN — CEFUROXIME AXETIL 500 MG: 250 TABLET ORAL at 09:10

## 2020-01-20 RX ADMIN — DICLOFENAC 2 G: 10 GEL TOPICAL at 21:24

## 2020-01-20 RX ADMIN — METHYLPREDNISOLONE SODIUM SUCCINATE 40 MG: 40 INJECTION, POWDER, FOR SOLUTION INTRAMUSCULAR; INTRAVENOUS at 14:56

## 2020-01-20 RX ADMIN — Medication 2 PUFF: at 19:35

## 2020-01-20 RX ADMIN — DICLOFENAC 2 G: 10 GEL TOPICAL at 02:22

## 2020-01-20 RX ADMIN — LISINOPRIL 2.5 MG: 5 TABLET ORAL at 09:10

## 2020-01-20 ASSESSMENT — PAIN SCALES - GENERAL
PAINLEVEL_OUTOF10: 0
PAINLEVEL_OUTOF10: 0

## 2020-01-20 NOTE — ED PROVIDER NOTES
Procedure Laterality Date    APPENDECTOMY      CARDIAC SURGERY      1 stent 2000 and 1 stent 2001    CHOLECYSTECTOMY      COLONOSCOPY      COLONOSCOPY  06/11/2018    ENDOSCOPY, COLON, DIAGNOSTIC      EYE SURGERY      bilateral cataracts    GALLBLADDER SURGERY      HYSTERECTOMY      IL EXC SKIN MALIG <5MM REMAINDR BODY N/A 9/6/2018    EXCISION OF SCALP SQUAMOUS CELL CARCINOMA performed by Luciana Cho MD at [facility] <100SQCM N/A 9/6/2018    SPLIT THICKNESS SKIN GRAFT FOR COVERAGE SCALP, APPLICATION OF WOUND VAC DEVICE performed by Luciana Cho MD at [facility] GASTROINTESTINAL ENDOSCOPY  4/20/12    with biopsy of stomach,gastritis    UPPER GASTROINTESTINAL ENDOSCOPY  06/11/2018    w/esophagael dilation       CURRENT MEDICATIONS    Current Outpatient Rx   Medication Sig Dispense Refill    ALPRAZolam (XANAX) 1 MG tablet Take 1 tablet by mouth 3 times daily as needed for Anxiety for up to 30 days. 90 tablet 0    ibuprofen (ADVIL;MOTRIN) 800 MG tablet Take 1 tablet by mouth 3 times daily (with meals) 90 tablet 5    ipratropium-albuterol (DUONEB) 0.5-2.5 (3) MG/3ML SOLN nebulizer solution Inhale 3 mLs into the lungs every 4 hours as needed for Shortness of Breath 360 mL 1    isosorbide mononitrate (IMDUR) 60 MG extended release tablet Take 1 tablet by mouth daily 90 tablet 3    budesonide-formoterol (SYMBICORT) 160-4.5 MCG/ACT AERO Inhale 2 puffs into the lungs 2 times daily 3 Inhaler 1    gabapentin (NEURONTIN) 300 MG capsule Take 1 capsule by mouth 2 times daily for 30 days. Intended supply: 30 days 60 capsule 1    albuterol sulfate  (90 Base) MCG/ACT inhaler Inhale 2 puffs into the lungs every 6 hours as needed for Wheezing 1 Inhaler 2    triamcinolone (KENALOG) 0.1 % cream Apply topically 2 times daily.  45 g 1    rosuvastatin (CRESTOR) 10 MG tablet Take 1 tablet by mouth daily 30 tablet 5    omeprazole (PRILOSEC) 40 MG delayed release capsule Take 1 capsule by mouth daily 30 capsule 3    ibuprofen (ADVIL;MOTRIN) 400 MG tablet Take 1 tablet by mouth every 6 hours as needed for Pain or Fever 120 tablet 3    lisinopril (PRINIVIL;ZESTRIL) 2.5 MG tablet Take 1 tablet by mouth daily 30 tablet 2    glucose (GLUTOSE) 40 % GEL Take 37.5 mLs by mouth as needed (low BS) 45 g 1    insulin glargine (LANTUS) 100 UNIT/ML injection pen Inject 13 Units into the skin nightly 5 pen 3    insulin lispro (HUMALOG) 100 UNIT/ML pen Inject 0-18 Units into the skin 3 times daily (with meals) 5 pen 3    insulin lispro (HUMALOG) 100 UNIT/ML pen Inject 0-9 Units into the skin nightly 5 pen 3    insulin lispro (HUMALOG) 100 UNIT/ML pen Inject 10 Units into the skin 3 times daily (with meals) 5 pen 3    glucagon, rDNA, 1 MG injection Inject 1 mg into the muscle as needed for Low blood sugar (Blood glucose less than 70 mg/dL and patient NOT ALERT or NPO and does not have IV access. ) 1 each 0    colchicine (COLCRYS) 0.6 MG tablet Take 1 tablet by mouth daily 30 tablet 3    benzocaine-menthol (CEPACOL SORE THROAT) 15-3.6 MG lozenge Take 1 lozenge by mouth every 2 hours as needed for Sore Throat 168 lozenge     albuterol sulfate HFA (VENTOLIN HFA) 108 (90 Base) MCG/ACT inhaler Inhale 2 puffs into the lungs every 6 hours as needed for Wheezing 1 Inhaler 3    diclofenac sodium 1 % GEL Apply 2 g topically 2 times daily 1 Tube 3    blood glucose test strips (ASCENSIA AUTODISC VI;ONE TOUCH ULTRA TEST VI) strip 1 each by In Vitro route 3 times daily As needed.  100 each 3    OXYGEN Inhale 2 L/min into the lungs as needed Uses when O2 Sat is below 90      Denture Care Products (EFFERDENT DENTURE CLEANSER) TBEF 1 tablet by Does not apply route as needed (denture cleaning)  0    Incontinence Supply Disposable (PREVAIL WET WIPES) MISC 8 each by Does not apply route daily 720 each 3    Diapers & Supplies (HUGGIES PULL-UPS 4T-5T) MISC 8 each by normal, Oropharynx moist, No oral exudates, Nose normal. Neck- Normal range of motion, No tenderness, Supple, No stridor. Eyes:  PERRL, EOMI, Conjunctiva normal, No discharge. Respiratory: Coarse breath sounds, No respiratory distress, No wheezing, No chest tenderness. Cardiovascular:  Normal heart rate, Normal rhythm, No murmurs, No rubs, No gallops. GI:  Bowel sounds normal, Soft, No tenderness, No masses, No pulsatile masses. Musculoskeletal:  Intact distal pulses, No edema, No tenderness, No cyanosis, No clubbing. Good range of motion in all major joints. No tenderness to palpation or major deformities noted. Back- No tenderness. Integument:  Warm, Dry, No erythema, No rash. Lymphatic:  No lymphadenopathy noted. Neurologic:  Alert & oriented x 3, Normal motor function, Normal sensory function, No focal deficits noted. Psychiatric:  Affect normal, Judgment normal, Mood normal.     EKG    Normal sinus rhythm with no ST elevation or depression. Normal QRS    RADIOLOGY    XR CHEST PORTABLE   Final Result   Pulmonary edema.            Labs Reviewed   CBC - Abnormal; Notable for the following components:       Result Value    RBC 5.64 (*)     Hemoglobin 16.6 (*)     Hematocrit 49.8 (*)     All other components within normal limits    Narrative:     Performed at:  [facility] [address], [place], [facility]   Phone [phone]   COMPREHENSIVE METABOLIC PANEL W/ REFLEX TO MG FOR LOW K - Abnormal; Notable for the following components:    Chloride 98 (*)     Glucose 144 (*)     Albumin/Globulin Ratio 0.9 (*)     All other components within normal limits    Narrative:     Performed at:  [facility] [address], [place], [facility]   Phone [phone]   BRAIN NATRIURETIC PEPTIDE - Abnormal; Notable for the following components:    Pro- (*)     All other components within normal limits    Narrative:     Performed

## 2020-01-20 NOTE — PLAN OF CARE
Problem: Falls - Risk of:  Goal: Will remain free from falls  Description  Will remain free from falls  Outcome: Ongoing  Goal: Absence of physical injury  Description  Absence of physical injury  Outcome: Ongoing     Problem: Serum Glucose Level - Abnormal:  Goal: Ability to maintain appropriate glucose levels will improve  Description  Ability to maintain appropriate glucose levels will improve  Outcome: Ongoing     Problem: Pain:  Goal: Pain level will decrease  Description  Pain level will decrease  Outcome: Ongoing     Problem: Breathing Pattern - Ineffective:  Goal: Ability to achieve and maintain a regular respiratory rate will improve  Description  Ability to achieve and maintain a regular respiratory rate will improve  Outcome: Ongoing

## 2020-01-20 NOTE — PROGRESS NOTES
Accepted pt. To room. Introduced self and POC. Oriented to Room and call light system. All questions answered by pt. Who is A&Ox4. On 4L of O2. Verified Allergies and Home meds w/ patient.

## 2020-01-20 NOTE — ED NOTES
Bed: 14  Expected date:   Expected time:   Means of arrival:   Comments:  MERCEDES Gillis  01/19/20 2029

## 2020-01-20 NOTE — ED NOTES
Report given to RN, RN verbalized understanding and denied any need for further information, patient to be transported to room at this time      Latosha Cotto RN  01/20/20 0087

## 2020-01-21 PROBLEM — I20.0 UNSTABLE ANGINA (HCC): Status: RESOLVED | Noted: 2019-09-18 | Resolved: 2020-01-21

## 2020-01-21 PROBLEM — J44.1 COPD EXACERBATION (HCC): Status: RESOLVED | Noted: 2020-01-19 | Resolved: 2020-01-21

## 2020-01-21 PROBLEM — E11.9 CONTROLLED TYPE 2 DIABETES MELLITUS WITHOUT COMPLICATION, WITHOUT LONG-TERM CURRENT USE OF INSULIN (HCC): Status: RESOLVED | Noted: 2017-01-13 | Resolved: 2020-01-21

## 2020-01-21 LAB
GLUCOSE BLD-MCNC: 254 MG/DL (ref 70–99)
GLUCOSE BLD-MCNC: 257 MG/DL (ref 70–99)
GLUCOSE BLD-MCNC: 273 MG/DL (ref 70–99)
GLUCOSE BLD-MCNC: 286 MG/DL (ref 70–99)
GLUCOSE BLD-MCNC: 387 MG/DL (ref 70–99)
GLUCOSE BLD-MCNC: 474 MG/DL (ref 70–99)
PERFORMED ON: ABNORMAL

## 2020-01-21 PROCEDURE — 94640 AIRWAY INHALATION TREATMENT: CPT

## 2020-01-21 PROCEDURE — 6370000000 HC RX 637 (ALT 250 FOR IP): Performed by: INTERNAL MEDICINE

## 2020-01-21 PROCEDURE — 2700000000 HC OXYGEN THERAPY PER DAY

## 2020-01-21 PROCEDURE — 6360000002 HC RX W HCPCS: Performed by: INTERNAL MEDICINE

## 2020-01-21 PROCEDURE — 2500000003 HC RX 250 WO HCPCS: Performed by: INTERNAL MEDICINE

## 2020-01-21 PROCEDURE — 1220000000 HC SEMI PRIVATE OB R&B

## 2020-01-21 PROCEDURE — 94761 N-INVAS EAR/PLS OXIMETRY MLT: CPT

## 2020-01-21 RX ORDER — BENZONATATE 100 MG/1
100 CAPSULE ORAL 3 TIMES DAILY PRN
Status: DISCONTINUED | OUTPATIENT
Start: 2020-01-21 | End: 2020-01-31 | Stop reason: HOSPADM

## 2020-01-21 RX ORDER — INSULIN LISPRO 100 [IU]/ML
5 INJECTION, SOLUTION INTRAVENOUS; SUBCUTANEOUS
Status: DISCONTINUED | OUTPATIENT
Start: 2020-01-21 | End: 2020-01-24

## 2020-01-21 RX ORDER — DEXTROMETHORPHAN HYDROBROMIDE AND PROMETHAZINE HYDROCHLORIDE 15; 6.25 MG/5ML; MG/5ML
10 SYRUP ORAL EVERY 6 HOURS PRN
Status: DISCONTINUED | OUTPATIENT
Start: 2020-01-21 | End: 2020-01-31 | Stop reason: HOSPADM

## 2020-01-21 RX ORDER — INSULIN LISPRO 100 [IU]/ML
10 INJECTION, SOLUTION INTRAVENOUS; SUBCUTANEOUS ONCE
Status: COMPLETED | OUTPATIENT
Start: 2020-01-21 | End: 2020-01-21

## 2020-01-21 RX ADMIN — ROSUVASTATIN CALCIUM 10 MG: 10 TABLET, FILM COATED ORAL at 09:10

## 2020-01-21 RX ADMIN — ENOXAPARIN SODIUM 40 MG: 40 INJECTION SUBCUTANEOUS at 09:49

## 2020-01-21 RX ADMIN — GABAPENTIN 300 MG: 300 CAPSULE ORAL at 09:10

## 2020-01-21 RX ADMIN — MICONAZOLE NITRATE: 20 POWDER TOPICAL at 20:51

## 2020-01-21 RX ADMIN — INSULIN LISPRO 10 UNITS: 100 INJECTION, SOLUTION INTRAVENOUS; SUBCUTANEOUS at 22:45

## 2020-01-21 RX ADMIN — METHYLPREDNISOLONE SODIUM SUCCINATE 40 MG: 40 INJECTION, POWDER, FOR SOLUTION INTRAMUSCULAR; INTRAVENOUS at 22:46

## 2020-01-21 RX ADMIN — ALPRAZOLAM 1 MG: 0.5 TABLET ORAL at 20:52

## 2020-01-21 RX ADMIN — ISOSORBIDE MONONITRATE 60 MG: 30 TABLET, EXTENDED RELEASE ORAL at 09:10

## 2020-01-21 RX ADMIN — CEFUROXIME AXETIL 500 MG: 250 TABLET ORAL at 09:10

## 2020-01-21 RX ADMIN — ASPIRIN 81 MG 81 MG: 81 TABLET ORAL at 09:10

## 2020-01-21 RX ADMIN — IBUPROFEN 400 MG: 200 TABLET, FILM COATED ORAL at 09:09

## 2020-01-21 RX ADMIN — INSULIN LISPRO 6 UNITS: 100 INJECTION, SOLUTION INTRAVENOUS; SUBCUTANEOUS at 16:52

## 2020-01-21 RX ADMIN — INSULIN LISPRO 6 UNITS: 100 INJECTION, SOLUTION INTRAVENOUS; SUBCUTANEOUS at 09:11

## 2020-01-21 RX ADMIN — INSULIN LISPRO 5 UNITS: 100 INJECTION, SOLUTION INTRAVENOUS; SUBCUTANEOUS at 16:53

## 2020-01-21 RX ADMIN — INSULIN LISPRO 6 UNITS: 100 INJECTION, SOLUTION INTRAVENOUS; SUBCUTANEOUS at 12:00

## 2020-01-21 RX ADMIN — PROMETHAZINE HYDROCHLORIDE AND DEXTROMETHORPHAN HYDROBROMIDE 10 ML: 15; 6.25 SYRUP ORAL at 23:13

## 2020-01-21 RX ADMIN — MICONAZOLE NITRATE: 20 POWDER TOPICAL at 10:58

## 2020-01-21 RX ADMIN — INSULIN LISPRO 6 UNITS: 100 INJECTION, SOLUTION INTRAVENOUS; SUBCUTANEOUS at 21:26

## 2020-01-21 RX ADMIN — BENZONATATE 100 MG: 100 CAPSULE ORAL at 21:08

## 2020-01-21 RX ADMIN — METHYLPREDNISOLONE SODIUM SUCCINATE 40 MG: 40 INJECTION, POWDER, FOR SOLUTION INTRAMUSCULAR; INTRAVENOUS at 14:13

## 2020-01-21 RX ADMIN — DIPHENOXYLATE HYDROCHLORIDE AND ATROPINE SULFATE 1 TABLET: 2.5; .025 TABLET ORAL at 20:52

## 2020-01-21 RX ADMIN — LISINOPRIL 2.5 MG: 5 TABLET ORAL at 09:09

## 2020-01-21 RX ADMIN — IPRATROPIUM BROMIDE AND ALBUTEROL SULFATE 3 ML: .5; 3 SOLUTION RESPIRATORY (INHALATION) at 15:48

## 2020-01-21 RX ADMIN — PROMETHAZINE HYDROCHLORIDE AND DEXTROMETHORPHAN HYDROBROMIDE 10 ML: 15; 6.25 SYRUP ORAL at 14:06

## 2020-01-21 RX ADMIN — IPRATROPIUM BROMIDE AND ALBUTEROL SULFATE 3 ML: .5; 3 SOLUTION RESPIRATORY (INHALATION) at 19:45

## 2020-01-21 RX ADMIN — Medication 2 PUFF: at 19:45

## 2020-01-21 RX ADMIN — BENZONATATE 100 MG: 100 CAPSULE ORAL at 14:06

## 2020-01-21 RX ADMIN — DICLOFENAC 2 G: 10 GEL TOPICAL at 09:11

## 2020-01-21 RX ADMIN — INSULIN GLARGINE 40 UNITS: 100 INJECTION, SOLUTION SUBCUTANEOUS at 21:26

## 2020-01-21 RX ADMIN — IPRATROPIUM BROMIDE AND ALBUTEROL SULFATE 3 ML: .5; 3 SOLUTION RESPIRATORY (INHALATION) at 11:59

## 2020-01-21 RX ADMIN — ALPRAZOLAM 1 MG: 0.5 TABLET ORAL at 09:10

## 2020-01-21 RX ADMIN — METHYLPREDNISOLONE SODIUM SUCCINATE 40 MG: 40 INJECTION, POWDER, FOR SOLUTION INTRAMUSCULAR; INTRAVENOUS at 05:38

## 2020-01-21 RX ADMIN — DICLOFENAC 2 G: 10 GEL TOPICAL at 20:51

## 2020-01-21 RX ADMIN — CEFUROXIME AXETIL 500 MG: 250 TABLET ORAL at 22:45

## 2020-01-21 RX ADMIN — GABAPENTIN 300 MG: 300 CAPSULE ORAL at 20:52

## 2020-01-21 RX ADMIN — DIPHENOXYLATE HYDROCHLORIDE AND ATROPINE SULFATE 1 TABLET: 2.5; .025 TABLET ORAL at 09:11

## 2020-01-21 ASSESSMENT — PAIN SCALES - GENERAL: PAINLEVEL_OUTOF10: 6

## 2020-01-21 NOTE — PROGRESS NOTES
Patient Active Problem List   Diagnosis    CAD (coronary artery disease)    Hyperlipemia    Essential hypertension    UTI (urinary tract infection)    DM2 [facility] 2) [facility]    Primary osteoarthritis involving multiple joints    Gastroesophageal reflux disease without esophagitis    PVC (premature ventricular contraction)    Anxiety    Morbid obesity ([facility])    Precordial pain    Acute bronchitis    Irritable bowel syndrome with diarrhea    COPD with acute exacerbation ([facility]   H&P dictated

## 2020-01-21 NOTE — PLAN OF CARE
Problem: Falls - Risk of:  Goal: Will remain free from falls  Description  Will remain free from falls  Outcome: Ongoing  Goal: Absence of physical injury  Description  Absence of physical injury  Outcome: Ongoing   Safety precautions in place. Problem: Serum Glucose Level - Abnormal:  Goal: Ability to maintain appropriate glucose levels will improve  Description  Ability to maintain appropriate glucose levels will improve  Outcome: Ongoing   Insulin given per order. Problem: Pain:  Description  Pain management should include both nonpharmacologic and pharmacologic interventions. Goal: Pain level will decrease  Description  Pain level will decrease  Outcome: Ongoing   No pain. Problem: Breathing Pattern - Ineffective:  Goal: Ability to achieve and maintain a regular respiratory rate will improve  Description  Ability to achieve and maintain a regular respiratory rate will improve  Outcome: Ongoing   Patient remains on 2L of oxygen. The care plan and education has been reviewed and mutually agreed upon with the patient.

## 2020-01-21 NOTE — H&P
[place] [address]                     [facility], [address]                              HISTORY AND PHYSICAL    PATIENT NAME: Tanya Cobian                      :        1945  MED REC NO:   [id number]                          ROOM:         ACCOUNT NO:   [de-identified]                           ADMIT DATE: 2020  PROVIDER:     Karina Ng MD    HISTORY OF PRESENT ILLNESS:  The patient is a 76-year-old white woman,  chronically ill with multiple admissions in the past, came to the  emergency room with severe respiratory distress, huffing and puffing  cough production, mucoid sputum production. There was no fever, no  chills, no hemoptysis. PAST MEDICAL HISTORY:  Pertinent for COPD, severe tobacco abuse, history  of CVA, history of morbid obesity, gastroesophageal reflux disease,  esophageal stricture, basal cell carcinoma of the scalp, atherosclerotic  heart disease, anxiety neurosis, recurrent UTI, pyelonephritis,  claustrophobia, movement disorder, osteoarthritis. PAST SURGICAL HISTORY:  Pertinent for upper GI endoscopy, colonoscopy,  skin grafting, cardiac surgery with one stent, cholecystectomy, EGD and  colonoscopy on more than one occasion, gallbladder surgery,  hysterectomy, tear duct surgery. MEDICATIONS:  The patient is on albuterol sulfate, Xanax, aspirin,  Cepacol lozenges, Symbicort, colchicine, gabapentin, ibuprofen, Advil,  Humalog, Lantus, DuoNeb, isosorbide mononitrate, Prinivil, Nitrostat  sublingual, Mycostatin powder, Prilosec, oxygen, rosuvastatin,  triamcinolone cream.    ALLERGIES:  The patient is allergic to MORPHINE and CODEINE,  HYDROMORPHONE, LEVAQUIN, and HYDROCODONE.    SOCIAL HISTORY:  She is a . Her   couple of years ago. She recently lost her son to heroin overdose. She still has one child  and two grandchildren left. She was  twice.   She has been a  heavy smoker all her life until recently when she claimed she may have  stopped until very recently she was doing so called vaping or vaporized  nicotine. There is no history of alcohol abuse. 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 No history of substance  abuse. REVIEW OF SYSTEMS:  Pertinent for moderate anxiety. Severe shortness of  breath, obvious wheezing. Obese habitus. No dysphagia. Difficulty in  ambulation. No angina pectoris. Significant resting and exertional  shortness of breath with orthopnea. No paroxysmal nocturnal dyspnea. No abdominal pain. No hematemesis or melena. No genitourinary  complaint. Does have chronic musculoskeletal pain. Does have  occasional depression and anxiety neurosis. No TIA or seizure activity. PHYSICAL EXAMINATION:  GENERAL:  She is alert, awake, oriented x3. A moderately distressed  56-year-old white woman looking consistent with her stated age. VITAL SIGNS:  Her temperature is 97.5, blood pressure 146/67,  respirations 20, heart rate 78. HEENT:  Oral mucosa dry. SKIN:  Warm and dry. NECK:  Supple. Mild jugular venous distention. No accessory muscles  use. LUNGS:  Diminished breath sounds. Bronchovesicular breathing pattern  with few coarse crackles. Bilateral profound expiratory wheezes. HEART:  Regular rate and rhythm. S1 and S2.  1/6 systolic ejection  murmur. No gallop rhythm. ABDOMEN:  Soft, nontender. Bowel sounds present. EXTREMITIES:  Show 1 to 2+ pitting edema. NEUROLOGICAL:  No acute focal deficit. Appears to be moving all the  four extremities against gravity. LABORATORY EVALUATION:  Shows sodium of 137, potassium 4.1, chloride 98,  CO2 26, BUN 8, creatinine 0.7, blood sugar 144, ProBNP level 139, total  cholesterol 226, HDL 34, , triglycerides 300, troponin less than  0.01, albumin 3.4, globulin 4.0, alkaline phosphatase 91, AST and ALT 18  and 15, hemoglobin A1c is 7.7.   White blood cell count is 9.7,  hemoglobin/hematocrit 16.6 and 49.8, platelet count is 054. Urinalysis  4+ bacteria, 72 wbcs, large amount of leukocyte esterase, evidence of  urinary tract infection is undeniable. Chest x-ray, pulmonary edema. The patient had a transthoracic  echocardiogram done just in 09/2019. At that time LVEF was 60% with  some diastolic dysfunction, moderate pulmonary hypertension, _____ size  was normal.    ASSESSMENT:  COPD exacerbation, acute bronchitis, urinary tract  infection, morbid obesity,Acute on chronic hypoxemic respiratory failure   atherosclerotic heart disease, hypertension,  chronic diastolic heart failure, mild pulmonary edema. PLAN:  Get her admitted. Treat her with nebulized aerosol, supplemental  oxygen, IV Solu-Medrol, empiric antibiotic treatment to cover both  bronchitis and urinary tract infection. The patient is a full code.         Kevon Romero MD    D: 01/21/2020 7:50:28       T: 01/21/2020 10:20:05     [id number]  Job#: [id number]     Doc#: [id number]    CC:

## 2020-01-21 NOTE — PROGRESS NOTES
Shift assessment completed, see flowsheets. Medications administered, see MAR. Plan of care discussed with patient. Call light and bedside table within reach. Pt denies further needs at this time. Will continue to monitor.   Chaparro Baker RN

## 2020-01-22 LAB
GLUCOSE BLD-MCNC: 283 MG/DL (ref 70–99)
GLUCOSE BLD-MCNC: 306 MG/DL (ref 70–99)
GLUCOSE BLD-MCNC: 311 MG/DL (ref 70–99)
GLUCOSE BLD-MCNC: 337 MG/DL (ref 70–99)
GLUCOSE BLD-MCNC: 356 MG/DL (ref 70–99)
PERFORMED ON: ABNORMAL

## 2020-01-22 PROCEDURE — 1220000000 HC SEMI PRIVATE OB R&B

## 2020-01-22 PROCEDURE — 6360000002 HC RX W HCPCS: Performed by: INTERNAL MEDICINE

## 2020-01-22 PROCEDURE — 94761 N-INVAS EAR/PLS OXIMETRY MLT: CPT

## 2020-01-22 PROCEDURE — 6370000000 HC RX 637 (ALT 250 FOR IP): Performed by: INTERNAL MEDICINE

## 2020-01-22 PROCEDURE — 94640 AIRWAY INHALATION TREATMENT: CPT

## 2020-01-22 PROCEDURE — 2700000000 HC OXYGEN THERAPY PER DAY

## 2020-01-22 RX ORDER — INSULIN LISPRO 100 [IU]/ML
0-9 INJECTION, SOLUTION INTRAVENOUS; SUBCUTANEOUS NIGHTLY
Status: DISCONTINUED | OUTPATIENT
Start: 2020-01-22 | End: 2020-01-31 | Stop reason: HOSPADM

## 2020-01-22 RX ORDER — GUAIFENESIN 600 MG/1
600 TABLET, EXTENDED RELEASE ORAL 2 TIMES DAILY
Status: DISCONTINUED | OUTPATIENT
Start: 2020-01-22 | End: 2020-01-31 | Stop reason: HOSPADM

## 2020-01-22 RX ORDER — INSULIN LISPRO 100 [IU]/ML
0-18 INJECTION, SOLUTION INTRAVENOUS; SUBCUTANEOUS
Status: DISCONTINUED | OUTPATIENT
Start: 2020-01-22 | End: 2020-01-31 | Stop reason: HOSPADM

## 2020-01-22 RX ADMIN — INSULIN LISPRO 5 UNITS: 100 INJECTION, SOLUTION INTRAVENOUS; SUBCUTANEOUS at 11:39

## 2020-01-22 RX ADMIN — INSULIN LISPRO 8 UNITS: 100 INJECTION, SOLUTION INTRAVENOUS; SUBCUTANEOUS at 08:28

## 2020-01-22 RX ADMIN — ASPIRIN 81 MG 81 MG: 81 TABLET ORAL at 08:30

## 2020-01-22 RX ADMIN — INSULIN GLARGINE 40 UNITS: 100 INJECTION, SOLUTION SUBCUTANEOUS at 21:38

## 2020-01-22 RX ADMIN — ALPRAZOLAM 1 MG: 0.5 TABLET ORAL at 05:54

## 2020-01-22 RX ADMIN — ENOXAPARIN SODIUM 40 MG: 40 INJECTION SUBCUTANEOUS at 08:31

## 2020-01-22 RX ADMIN — GUAIFENESIN 600 MG: 600 TABLET, EXTENDED RELEASE ORAL at 21:31

## 2020-01-22 RX ADMIN — LISINOPRIL 2.5 MG: 5 TABLET ORAL at 08:30

## 2020-01-22 RX ADMIN — BENZONATATE 100 MG: 100 CAPSULE ORAL at 11:05

## 2020-01-22 RX ADMIN — PANTOPRAZOLE SODIUM 40 MG: 40 TABLET, DELAYED RELEASE ORAL at 05:54

## 2020-01-22 RX ADMIN — Medication 2 PUFF: at 08:37

## 2020-01-22 RX ADMIN — INSULIN LISPRO 15 UNITS: 100 INJECTION, SOLUTION INTRAVENOUS; SUBCUTANEOUS at 11:38

## 2020-01-22 RX ADMIN — ISOSORBIDE MONONITRATE 60 MG: 30 TABLET, EXTENDED RELEASE ORAL at 08:30

## 2020-01-22 RX ADMIN — DICLOFENAC 2 G: 10 GEL TOPICAL at 21:32

## 2020-01-22 RX ADMIN — IPRATROPIUM BROMIDE AND ALBUTEROL SULFATE 3 ML: .5; 3 SOLUTION RESPIRATORY (INHALATION) at 13:13

## 2020-01-22 RX ADMIN — GABAPENTIN 300 MG: 300 CAPSULE ORAL at 08:30

## 2020-01-22 RX ADMIN — IPRATROPIUM BROMIDE AND ALBUTEROL SULFATE 3 ML: .5; 3 SOLUTION RESPIRATORY (INHALATION) at 19:08

## 2020-01-22 RX ADMIN — IBUPROFEN 400 MG: 200 TABLET, FILM COATED ORAL at 05:54

## 2020-01-22 RX ADMIN — INSULIN LISPRO 5 UNITS: 100 INJECTION, SOLUTION INTRAVENOUS; SUBCUTANEOUS at 08:28

## 2020-01-22 RX ADMIN — MICONAZOLE NITRATE: 20 POWDER TOPICAL at 08:31

## 2020-01-22 RX ADMIN — GABAPENTIN 300 MG: 300 CAPSULE ORAL at 21:31

## 2020-01-22 RX ADMIN — ROSUVASTATIN CALCIUM 10 MG: 10 TABLET, FILM COATED ORAL at 08:30

## 2020-01-22 RX ADMIN — CEFUROXIME AXETIL 500 MG: 250 TABLET ORAL at 21:38

## 2020-01-22 RX ADMIN — Medication 2 PUFF: at 19:16

## 2020-01-22 RX ADMIN — IPRATROPIUM BROMIDE AND ALBUTEROL SULFATE 3 ML: .5; 3 SOLUTION RESPIRATORY (INHALATION) at 08:37

## 2020-01-22 RX ADMIN — INSULIN LISPRO 12 UNITS: 100 INJECTION, SOLUTION INTRAVENOUS; SUBCUTANEOUS at 17:17

## 2020-01-22 RX ADMIN — DIPHENOXYLATE HYDROCHLORIDE AND ATROPINE SULFATE 1 TABLET: 2.5; .025 TABLET ORAL at 21:37

## 2020-01-22 RX ADMIN — DIPHENOXYLATE HYDROCHLORIDE AND ATROPINE SULFATE 1 TABLET: 2.5; .025 TABLET ORAL at 11:05

## 2020-01-22 RX ADMIN — METHYLPREDNISOLONE SODIUM SUCCINATE 40 MG: 40 INJECTION, POWDER, FOR SOLUTION INTRAMUSCULAR; INTRAVENOUS at 06:00

## 2020-01-22 RX ADMIN — BENZONATATE 100 MG: 100 CAPSULE ORAL at 21:33

## 2020-01-22 RX ADMIN — METHYLPREDNISOLONE SODIUM SUCCINATE 40 MG: 40 INJECTION, POWDER, FOR SOLUTION INTRAMUSCULAR; INTRAVENOUS at 23:52

## 2020-01-22 RX ADMIN — INSULIN LISPRO 5 UNITS: 100 INJECTION, SOLUTION INTRAVENOUS; SUBCUTANEOUS at 17:17

## 2020-01-22 RX ADMIN — PROMETHAZINE HYDROCHLORIDE AND DEXTROMETHORPHAN HYDROBROMIDE 10 ML: 15; 6.25 SYRUP ORAL at 05:55

## 2020-01-22 RX ADMIN — INSULIN LISPRO 5 UNITS: 100 INJECTION, SOLUTION INTRAVENOUS; SUBCUTANEOUS at 21:38

## 2020-01-22 RX ADMIN — ALPRAZOLAM 1 MG: 0.5 TABLET ORAL at 21:31

## 2020-01-22 RX ADMIN — MICONAZOLE NITRATE: 20 POWDER TOPICAL at 21:33

## 2020-01-22 RX ADMIN — METHYLPREDNISOLONE SODIUM SUCCINATE 40 MG: 40 INJECTION, POWDER, FOR SOLUTION INTRAMUSCULAR; INTRAVENOUS at 17:14

## 2020-01-22 RX ADMIN — DICLOFENAC 2 G: 10 GEL TOPICAL at 08:31

## 2020-01-22 RX ADMIN — CEFUROXIME AXETIL 500 MG: 250 TABLET ORAL at 08:30

## 2020-01-22 ASSESSMENT — PAIN DESCRIPTION - LOCATION
LOCATION: CHEST
LOCATION: BACK;CHEST
LOCATION: CHEST

## 2020-01-22 ASSESSMENT — PAIN SCALES - GENERAL
PAINLEVEL_OUTOF10: 9
PAINLEVEL_OUTOF10: 8
PAINLEVEL_OUTOF10: 6
PAINLEVEL_OUTOF10: 5
PAINLEVEL_OUTOF10: 0

## 2020-01-22 ASSESSMENT — PAIN DESCRIPTION - PAIN TYPE
TYPE: ACUTE PAIN

## 2020-01-22 NOTE — PLAN OF CARE
Problem: Falls - Risk of:  Goal: Will remain free from falls  Description  Will remain free from falls  1/22/2020 0108 by Tasha King RN  Outcome: Ongoing     Problem: Falls - Risk of:  Goal: Absence of physical injury  Description  Absence of physical injury  1/22/2020 0108 by Tasha King RN  Outcome: Ongoing   Safety precautions in place. Problem: Serum Glucose Level - Abnormal:  Goal: Ability to maintain appropriate glucose levels will improve  Description  Ability to maintain appropriate glucose levels will improve  1/22/2020 0108 by Tasha King RN  Outcome: Ongoing   Blood glucose levels elevated, insulin given per order. Problem: Pain:  Description  Pain management should include both nonpharmacologic and pharmacologic interventions. Goal: Pain level will decrease  Description  Pain level will decrease  1/22/2020 0108 by Tasha King RN  Outcome: Ongoing   Pain medication given per order. Problem: Breathing Pattern - Ineffective:  Goal: Ability to achieve and maintain a regular respiratory rate will improve  Description  Ability to achieve and maintain a regular respiratory rate will improve  1/22/2020 0108 by Tasha King RN  Outcome: Ongoing   SOB with exertion. The care plan and education has been reviewed and mutually agreed upon with the patient.

## 2020-01-22 NOTE — PROGRESS NOTES
Department of Internal Medicine  General Internal Medicine   Progress Note     SUBJECTIVE   Denies chest pain , wheezing some what improved but still has quite a bit of exertional dyspnea       History obtained from chart review and the patient  General ROS: positive for  - fatigue and malaise  negative for - chills, fever or night sweats  Psychological ROS: negative  Respiratory ROS: positive for - shortness of breath and wheezing  negative for - cough, hemoptysis, sputum changes or stridor  Cardiovascular ROS: positive for - chest pain, dyspnea on exertion and edema  negative for - loss of consciousness, orthopnea or palpitations  Gastrointestinal ROS: no abdominal pain, change in bowel habits, or black or bloody stools    OBJECTIVE      Medications      Current Facility-Administered Medications: insulin lispro (1 Unit Dial) 0-18 Units, 0-18 Units, Subcutaneous, TID WC  insulin lispro (1 Unit Dial) 0-9 Units, 0-9 Units, Subcutaneous, Nightly  miconazole (MICOTIN) 2 % powder, , Topical, BID  benzonatate (TESSALON) capsule 100 mg, 100 mg, Oral, TID PRN  promethazine-dextromethorphan (PROMETHAZINE-DM) 6.25-15 MG/5ML syrup 10 mL, 10 mL, Oral, Q6H PRN  insulin glargine (LANTUS) injection pen 40 Units, 40 Units, Subcutaneous, Nightly  insulin lispro (1 Unit Dial) 5 Units, 5 Units, Subcutaneous, TID WC  albuterol sulfate  (90 Base) MCG/ACT inhaler 2 puff, 2 puff, Inhalation, Q6H PRN  ALPRAZolam (XANAX) tablet 1 mg, 1 mg, Oral, TID PRN  aspirin chewable tablet 81 mg, 81 mg, Oral, Daily  mometasone-formoterol (DULERA) 200-5 MCG/ACT inhaler 2 puff, 2 puff, Inhalation, BID  diclofenac sodium 1 % gel 2 g, 2 g, Topical, BID  gabapentin (NEURONTIN) capsule 300 mg, 300 mg, Oral, BID  ibuprofen (ADVIL;MOTRIN) tablet 400 mg, 400 mg, Oral, Q6H PRN  ipratropium-albuterol (DUONEB) nebulizer solution 3 mL, 3 mL, Inhalation, Q4H WA  ipratropium-albuterol (DUONEB) nebulizer solution 3 mL, 1 vial, Inhalation, Q4H PRN  isosorbide auscultation bilaterally, no crackles or wheezing  CARDIOVASCULAR:  Normal apical impulse, regular rate and rhythm, normal S1 and S2, no S3 or S4, and no murmur noted  ABDOMEN:  No scars, normal bowel sounds, soft, non-distended, non-tender, no masses palpated, no hepatosplenomegally  MUSCULOSKELETAL:  Trace edema  NEUROLOGIC:  No acute focal change    Data      Lab Results   Component Value Date    PHART 7.446 02/07/2017       Lab Results   Component Value Date     01/19/2020    K 4.1 01/19/2020    CL 98 01/19/2020    CO2 26 01/19/2020    BUN 8 01/19/2020    CREATININE 0.7 01/19/2020    GLUCOSE 144 01/19/2020    CALCIUM 9.8 01/19/2020     Lab Results   Component Value Date    WBC 9.7 01/19/2020    HGB 16.6 01/19/2020    HCT 49.8 01/19/2020    MCV 88.2 01/19/2020     01/19/2020         Lab Results   Component Value Date    INR 1.03 09/18/2019    PROTIME 11.7 09/18/2019       ASSESSMENT AND PLAN      Patient Active Problem List   Diagnosis    CAD (coronary artery disease)    Hyperlipemia    Essential hypertension    UTI (urinary tract infection)    DM2 [facility]diabetes mellitus, type 2) ([facility])    Primary osteoarthritis involving multiple joints    Gastroesophageal reflux disease without esophagitis    PVC (premature ventricular contraction)    Anxiety    Morbid obesity ([facility])    Precordial pain    Acute bronchitis    Irritable bowel syndrome with diarrhea    COPD with acute exacerbation ([facility])     Ct IV solumedrol    nebulizer treatments  Optimize control of diabetes    may need placement

## 2020-01-23 PROBLEM — R06.02 SOB (SHORTNESS OF BREATH): Status: ACTIVE | Noted: 2020-01-23

## 2020-01-23 PROBLEM — R09.89 CHEST CONGESTION: Status: ACTIVE | Noted: 2020-01-23

## 2020-01-23 PROBLEM — R82.81 PYURIA: Status: ACTIVE | Noted: 2020-01-23

## 2020-01-23 PROBLEM — G47.33 OSA (OBSTRUCTIVE SLEEP APNEA): Status: ACTIVE | Noted: 2020-01-23

## 2020-01-23 PROBLEM — R06.89 INEFFECTIVE AIRWAY CLEARANCE: Status: ACTIVE | Noted: 2020-01-23

## 2020-01-23 LAB
GLUCOSE BLD-MCNC: 219 MG/DL (ref 70–99)
GLUCOSE BLD-MCNC: 233 MG/DL (ref 70–99)
GLUCOSE BLD-MCNC: 262 MG/DL (ref 70–99)
GLUCOSE BLD-MCNC: 312 MG/DL (ref 70–99)
PERFORMED ON: ABNORMAL

## 2020-01-23 PROCEDURE — 94669 MECHANICAL CHEST WALL OSCILL: CPT

## 2020-01-23 PROCEDURE — 94640 AIRWAY INHALATION TREATMENT: CPT

## 2020-01-23 PROCEDURE — 6370000000 HC RX 637 (ALT 250 FOR IP): Performed by: INTERNAL MEDICINE

## 2020-01-23 PROCEDURE — 6360000002 HC RX W HCPCS: Performed by: INTERNAL MEDICINE

## 2020-01-23 PROCEDURE — 2700000000 HC OXYGEN THERAPY PER DAY

## 2020-01-23 PROCEDURE — 94761 N-INVAS EAR/PLS OXIMETRY MLT: CPT

## 2020-01-23 PROCEDURE — 94150 VITAL CAPACITY TEST: CPT

## 2020-01-23 PROCEDURE — 1220000000 HC SEMI PRIVATE OB R&B

## 2020-01-23 PROCEDURE — 99223 1ST HOSP IP/OBS HIGH 75: CPT | Performed by: INTERNAL MEDICINE

## 2020-01-23 PROCEDURE — 6360000002 HC RX W HCPCS

## 2020-01-23 RX ORDER — POTASSIUM CHLORIDE 7.45 MG/ML
10 INJECTION INTRAVENOUS PRN
Status: DISCONTINUED | OUTPATIENT
Start: 2020-01-23 | End: 2020-01-23 | Stop reason: SDUPTHER

## 2020-01-23 RX ORDER — HYDRALAZINE HYDROCHLORIDE 20 MG/ML
10 INJECTION INTRAMUSCULAR; INTRAVENOUS EVERY 6 HOURS PRN
Status: DISCONTINUED | OUTPATIENT
Start: 2020-01-23 | End: 2020-01-23 | Stop reason: SDUPTHER

## 2020-01-23 RX ORDER — ACETYLCYSTEINE 200 MG/ML
SOLUTION ORAL; RESPIRATORY (INHALATION)
Status: COMPLETED
Start: 2020-01-23 | End: 2020-01-23

## 2020-01-23 RX ORDER — METHYLPREDNISOLONE SODIUM SUCCINATE 40 MG/ML
40 INJECTION, POWDER, LYOPHILIZED, FOR SOLUTION INTRAMUSCULAR; INTRAVENOUS EVERY 12 HOURS
Status: DISCONTINUED | OUTPATIENT
Start: 2020-01-23 | End: 2020-01-27

## 2020-01-23 RX ORDER — 0.9 % SODIUM CHLORIDE 0.9 %
500 INTRAVENOUS SOLUTION INTRAVENOUS PRN
Status: DISCONTINUED | OUTPATIENT
Start: 2020-01-23 | End: 2020-01-23 | Stop reason: SDUPTHER

## 2020-01-23 RX ORDER — POTASSIUM CHLORIDE 7.45 MG/ML
10 INJECTION INTRAVENOUS PRN
Status: DISCONTINUED | OUTPATIENT
Start: 2020-01-23 | End: 2020-01-31 | Stop reason: HOSPADM

## 2020-01-23 RX ORDER — HYDRALAZINE HYDROCHLORIDE 20 MG/ML
10 INJECTION INTRAMUSCULAR; INTRAVENOUS EVERY 6 HOURS PRN
Status: DISCONTINUED | OUTPATIENT
Start: 2020-01-23 | End: 2020-01-31 | Stop reason: HOSPADM

## 2020-01-23 RX ORDER — POTASSIUM CHLORIDE 20 MEQ/1
40 TABLET, EXTENDED RELEASE ORAL PRN
Status: DISCONTINUED | OUTPATIENT
Start: 2020-01-23 | End: 2020-01-23 | Stop reason: SDUPTHER

## 2020-01-23 RX ORDER — SODIUM CHLORIDE 0.9 % (FLUSH) 0.9 %
10 SYRINGE (ML) INJECTION 2 TIMES DAILY
Status: DISCONTINUED | OUTPATIENT
Start: 2020-01-23 | End: 2020-01-31 | Stop reason: HOSPADM

## 2020-01-23 RX ORDER — ACETYLCYSTEINE 200 MG/ML
600 SOLUTION ORAL; RESPIRATORY (INHALATION) 2 TIMES DAILY
Status: DISCONTINUED | OUTPATIENT
Start: 2020-01-23 | End: 2020-01-27

## 2020-01-23 RX ORDER — SODIUM CHLORIDE 0.9 % (FLUSH) 0.9 %
10 SYRINGE (ML) INJECTION PRN
Status: DISCONTINUED | OUTPATIENT
Start: 2020-01-23 | End: 2020-01-31 | Stop reason: HOSPADM

## 2020-01-23 RX ORDER — POTASSIUM CHLORIDE 20 MEQ/1
40 TABLET, EXTENDED RELEASE ORAL PRN
Status: DISCONTINUED | OUTPATIENT
Start: 2020-01-23 | End: 2020-01-31 | Stop reason: HOSPADM

## 2020-01-23 RX ORDER — 0.9 % SODIUM CHLORIDE 0.9 %
500 INTRAVENOUS SOLUTION INTRAVENOUS PRN
Status: DISCONTINUED | OUTPATIENT
Start: 2020-01-23 | End: 2020-01-31 | Stop reason: HOSPADM

## 2020-01-23 RX ADMIN — DICLOFENAC 2 G: 10 GEL TOPICAL at 21:30

## 2020-01-23 RX ADMIN — ROSUVASTATIN CALCIUM 10 MG: 10 TABLET, FILM COATED ORAL at 10:57

## 2020-01-23 RX ADMIN — MICONAZOLE NITRATE: 20 POWDER TOPICAL at 09:24

## 2020-01-23 RX ADMIN — LISINOPRIL 2.5 MG: 5 TABLET ORAL at 09:15

## 2020-01-23 RX ADMIN — SALINE NASAL SPRAY 1 SPRAY: 1.5 SOLUTION NASAL at 21:38

## 2020-01-23 RX ADMIN — INSULIN GLARGINE 40 UNITS: 100 INJECTION, SOLUTION SUBCUTANEOUS at 21:25

## 2020-01-23 RX ADMIN — IPRATROPIUM BROMIDE AND ALBUTEROL SULFATE 3 ML: .5; 3 SOLUTION RESPIRATORY (INHALATION) at 16:03

## 2020-01-23 RX ADMIN — ALPRAZOLAM 1 MG: 0.5 TABLET ORAL at 12:05

## 2020-01-23 RX ADMIN — ENOXAPARIN SODIUM 40 MG: 40 INJECTION SUBCUTANEOUS at 09:14

## 2020-01-23 RX ADMIN — ASPIRIN 81 MG 81 MG: 81 TABLET ORAL at 09:15

## 2020-01-23 RX ADMIN — INSULIN LISPRO 5 UNITS: 100 INJECTION, SOLUTION INTRAVENOUS; SUBCUTANEOUS at 09:21

## 2020-01-23 RX ADMIN — DIPHENOXYLATE HYDROCHLORIDE AND ATROPINE SULFATE 1 TABLET: 2.5; .025 TABLET ORAL at 10:59

## 2020-01-23 RX ADMIN — BENZONATATE 100 MG: 100 CAPSULE ORAL at 06:12

## 2020-01-23 RX ADMIN — ACETYLCYSTEINE 600 MG: 200 SOLUTION ORAL; RESPIRATORY (INHALATION) at 19:28

## 2020-01-23 RX ADMIN — ALPRAZOLAM 1 MG: 0.5 TABLET ORAL at 23:49

## 2020-01-23 RX ADMIN — SALINE NASAL SPRAY 1 SPRAY: 1.5 SOLUTION NASAL at 17:17

## 2020-01-23 RX ADMIN — PANTOPRAZOLE SODIUM 40 MG: 40 TABLET, DELAYED RELEASE ORAL at 06:12

## 2020-01-23 RX ADMIN — IPRATROPIUM BROMIDE AND ALBUTEROL SULFATE 3 ML: .5; 3 SOLUTION RESPIRATORY (INHALATION) at 07:57

## 2020-01-23 RX ADMIN — CEFUROXIME AXETIL 500 MG: 250 TABLET ORAL at 21:30

## 2020-01-23 RX ADMIN — DICLOFENAC 2 G: 10 GEL TOPICAL at 09:23

## 2020-01-23 RX ADMIN — INSULIN LISPRO 5 UNITS: 100 INJECTION, SOLUTION INTRAVENOUS; SUBCUTANEOUS at 11:53

## 2020-01-23 RX ADMIN — GABAPENTIN 300 MG: 300 CAPSULE ORAL at 21:30

## 2020-01-23 RX ADMIN — IPRATROPIUM BROMIDE AND ALBUTEROL SULFATE 3 ML: .5; 3 SOLUTION RESPIRATORY (INHALATION) at 19:20

## 2020-01-23 RX ADMIN — IPRATROPIUM BROMIDE AND ALBUTEROL SULFATE 3 ML: .5; 3 SOLUTION RESPIRATORY (INHALATION) at 11:36

## 2020-01-23 RX ADMIN — INSULIN LISPRO 6 UNITS: 100 INJECTION, SOLUTION INTRAVENOUS; SUBCUTANEOUS at 06:00

## 2020-01-23 RX ADMIN — GUAIFENESIN 600 MG: 600 TABLET, EXTENDED RELEASE ORAL at 09:14

## 2020-01-23 RX ADMIN — GUAIFENESIN 600 MG: 600 TABLET, EXTENDED RELEASE ORAL at 21:30

## 2020-01-23 RX ADMIN — DIPHENOXYLATE HYDROCHLORIDE AND ATROPINE SULFATE 1 TABLET: 2.5; .025 TABLET ORAL at 21:30

## 2020-01-23 RX ADMIN — INSULIN LISPRO 9 UNITS: 100 INJECTION, SOLUTION INTRAVENOUS; SUBCUTANEOUS at 17:13

## 2020-01-23 RX ADMIN — INSULIN LISPRO 5 UNITS: 100 INJECTION, SOLUTION INTRAVENOUS; SUBCUTANEOUS at 17:14

## 2020-01-23 RX ADMIN — INSULIN LISPRO 3 UNITS: 100 INJECTION, SOLUTION INTRAVENOUS; SUBCUTANEOUS at 21:26

## 2020-01-23 RX ADMIN — GABAPENTIN 300 MG: 300 CAPSULE ORAL at 09:14

## 2020-01-23 RX ADMIN — Medication 2 PUFF: at 07:55

## 2020-01-23 RX ADMIN — CEFUROXIME AXETIL 500 MG: 250 TABLET ORAL at 10:58

## 2020-01-23 RX ADMIN — METHYLPREDNISOLONE SODIUM SUCCINATE 40 MG: 40 INJECTION, POWDER, FOR SOLUTION INTRAMUSCULAR; INTRAVENOUS at 06:12

## 2020-01-23 RX ADMIN — ISOSORBIDE MONONITRATE 60 MG: 30 TABLET, EXTENDED RELEASE ORAL at 10:59

## 2020-01-23 RX ADMIN — MICONAZOLE NITRATE: 20 POWDER TOPICAL at 21:30

## 2020-01-23 RX ADMIN — INSULIN LISPRO 12 UNITS: 100 INJECTION, SOLUTION INTRAVENOUS; SUBCUTANEOUS at 11:52

## 2020-01-23 ASSESSMENT — PAIN SCALES - GENERAL: PAINLEVEL_OUTOF10: 7

## 2020-01-23 ASSESSMENT — PAIN DESCRIPTION - PAIN TYPE: TYPE: ACUTE PAIN

## 2020-01-23 ASSESSMENT — PAIN DESCRIPTION - LOCATION: LOCATION: CHEST

## 2020-01-23 NOTE — PLAN OF CARE
Problem: Falls - Risk of:  Goal: Will remain free from falls  Description  Will remain free from falls  Outcome: Ongoing     Problem: Serum Glucose Level - Abnormal:  Goal: Ability to maintain appropriate glucose levels will improve  Description  Ability to maintain appropriate glucose levels will improve  1/23/2020 1111 by Mary Bah RN  Outcome: Ongoing     Problem: Pain:  Goal: Pain level will decrease  Description  Pain level will decrease  1/23/2020 1111 by Mary Bah RN  Outcome: Ongoing     Problem: Breathing Pattern - Ineffective:  Goal: Ability to achieve and maintain a regular respiratory rate will improve  Description  Ability to achieve and maintain a regular respiratory rate will improve  1/23/2020 1111 by Mary Bah RN  Outcome: Ongoing

## 2020-01-23 NOTE — PLAN OF CARE
Problem: Serum Glucose Level - Abnormal:  Goal: Ability to maintain appropriate glucose levels will improve  Description  Ability to maintain appropriate glucose levels will improve  1/22/2020 2229 by Italo Encarnacion RN  Outcome: Ongoing     Problem: Pain:  Goal: Pain level will decrease  Description  Pain level will decrease  1/22/2020 2229 by Italo Encarnacion RN  Outcome: Ongoing     Problem: Breathing Pattern - Ineffective:  Goal: Ability to achieve and maintain a regular respiratory rate will improve  Description  Ability to achieve and maintain a regular respiratory rate will improve  1/22/2020 2229 by Italo Encarnacion RN  Outcome: Ongoing

## 2020-01-23 NOTE — CONSULTS
INPATIENT PULMONARY CRITICAL CARE CONSULT NOTE      Chief Complaint/Referring Provider:  Patient is being seen at the request of Dr. Nafisa Gardiner  for a consultation for refractory COPD      Presenting HPI: Patient came to the hospital with increasing shortness of breath and coughing    As per ER provider-Milana L Catia Plummer is a 69 y.o. female who presents with cough and shortness of breath. Patient was seen by her primary care physician around [place] put on antibiotics states that she felt better until yesterday when she became increasingly short of breath. She has pain with coughing. No lightheadedness or dizziness. She states that she feels as though she has had some chills but did not take her temperature at home. She is afebrile here today. She has a history of COPD and CHF and is oxygen dependent at night only. Coughing of course makes her feel worse and there are no other associated signs or symptoms.     Patient on evaluation states that she has been having cough and cold for last 3 weeks or more and patient states that she was treating her cold on her own but her symptoms were not improving, patient states along with a cough she has increased chest congestion, patient is not able to bring up the secretions anything significant, patient states that the secretions appear to come to her throat but is not able to expectorate, patient has some nasal congestion, patient does not have any tinnitus, patient denies any difficulty in swallowing, no coughing or choking while eating, patient does not have any significant odynophagia or dysphagia, patient does have increased shortness of breath and wheezing, patient has chest tightness, patient has some reflux symptoms, patient does not have any significant epistaxis or hemoptysis, patient does not have any hematochezia, patient does have some leg edema but not significant, patient denies any significant change in the ambient environment or any sick contacts, patient is a (chronic obstructive pulmonary disease) (HCC)     Esophageal stricture     Gastroesophageal reflux disease without esophagitis 3/24/2016    Hiatal hernia     Hiatal hernia     Hypercholesteremia     Hypertension     Migraines     Movement disorder arthritis    Pneumonia     PONV (postoperative nausea and vomiting)     Type II or unspecified type diabetes mellitus without mention of complication, not stated as uncontrolled     Unspecified cerebral artery occlusion with cerebral infarction         Past Surgical History:   Procedure Laterality Date    APPENDECTOMY      CARDIAC SURGERY      1 stent  and 1 stent     CHOLECYSTECTOMY      COLONOSCOPY      COLONOSCOPY  2018    ENDOSCOPY, COLON, DIAGNOSTIC      EYE SURGERY      bilateral cataracts    GALLBLADDER SURGERY      HYSTERECTOMY      WA EXC SKIN MALIG <5MM REMAINDR BODY N/A 2018    EXCISION OF SCALP SQUAMOUS CELL CARCINOMA performed by Gem Pritchard MD at [address] [facility] <100SQCM N/A 2018    SPLIT THICKNESS SKIN GRAFT FOR COVERAGE SCALP, APPLICATION OF WOUND VAC DEVICE performed by Gem Pritchard MD at [address] [facility] SURGERY      UPPER GASTROINTESTINAL ENDOSCOPY  12    with biopsy of stomach,gastritis    UPPER GASTROINTESTINAL ENDOSCOPY  2018    w/esophagael dilation        Family History   Problem Relation Age of Onset    Heart Disease Mother     Cancer Mother     Cancer Father     Cancer Sister     Heart Disease Sister     Heart Disease Brother     High Blood Pressure Neg Hx     High Cholesterol Neg Hx         Social History     Tobacco Use    Smoking status: Former Smoker     Packs/day: 0.25     Years: 49.00     Pack years: 12.25     Types: Cigarettes     Last attempt to quit: 2017     Years since quittin.6    Smokeless tobacco: Never Used    Tobacco comment: only smoke when real stressed  Nicotine patch   Substance Use Topics    (H) 337 (H) 311 (H) 356 (H) 306 (H) 283 (H) 219 (H)     Results for Chasity Boss (MRN [id number]) as of 1/23/2020 11:59   Ref. Range 1/19/2020 21:46   Pro-BNP Latest Ref Range: 0 - 124 pg/mL 139 (H)   Troponin Latest Ref Range: <0.01 ng/mL <0.01     Imaging:  I have reviewed radiology images personally. XR CHEST PORTABLE   Final Result   Pulmonary edema. XR CHEST PORTABLE    (Results Pending)     Xr Chest Portable    Result Date: 1/19/2020  EXAMINATION: ONE XRAY VIEW OF THE CHEST 1/19/2020 9:21 pm COMPARISON: 09/22/2019 HISTORY: ORDERING SYSTEM PROVIDED HISTORY: SOB TECHNOLOGIST PROVIDED HISTORY: Reason for exam:->SOB Reason for Exam: increased SOB and chest pain, coughing Acuity: Acute Type of Exam: Initial FINDINGS: Evaluation is limited by the patient's body habitus and the portable technique. Pulmonary edema is again noted. The heart size is unchanged. There is no discernible pneumothorax. Pulmonary edema. Klebsiella pneumoniae    Urine Culture, Routine 09/19/2019  2:10 PM [facility]   >100,000 CFU/ml    Testing Performed By     Lab - [facility]. Name Director Address Valid Date Range   19-[facility] - Rip Danial Bose M.D., Ph.D. [address] [place] [id number] 08/30/17 0817-Present   Narrative   Performed by: [facility]   ORDER#: [id number]                          ORDERED BY: Trini Marin  SOURCE: Urine Clean Catch                  COLLECTED:  09/19/19 14:10  ANTIBIOTICS AT [facility]. :                      RECEIVED :  09/20/19 07:00  Performed at:  [address] [address]   Phone [phone]   Susceptibility     Klebsiella pneumoniae (1)     Antibiotic Interpretation VIVI Status    amoxicillin-clavulanate Sensitive <=8/4 mcg/mL     ampicillin Resistant >16 mcg/mL     ceFAZolin Sensitive <=2 mcg/mL      NOTE: Cefazolin should only be used for reformatted images are provided for   review.  MIP images are provided for review. Dose modulation, iterative   reconstruction, and/or weight based adjustment of the mA/kV was utilized to   reduce the radiation dose to as low as reasonably achievable.       COMPARISON:   CT chest 09/23/2019       HISTORY:   ORDERING SYSTEM PROVIDED HISTORY: CHEST PAIN, NORMAL EKG   TECHNOLOGIST PROVIDED HISTORY:   Reason for Exam: chest pain , back pain   Acuity: Unknown   Type of Exam: Unknown       FINDINGS:   Aorta: Calcified and noncalcified atherosclerotic plaque of the thoracic   aorta.  Thoracic aorta is normal in caliber.  No acute aortic abnormality. No dissection or aneurysm.       Mediastinum: Stable cardiomegaly.  No pericardial effusion.  No mediastinal   hematoma.  No mediastinal or hilar adenopathy.  Small hiatal hernia.       Lungs/Pleura: Central airways are clear.  Mild lower lobe and lingular   atelectasis.  No focal consolidation.  No pulmonary mass or suspicious,   noncalcified pulmonary nodule.       Upper Abdomen: Limited images of the upper abdomen demonstrate post   cholecystectomy changes.       Soft Tissues/Bones: No acute bone or soft tissue abnormality.           Impression   No acute thoracic aortic abnormality.  No thoracic aortic aneurysm or   dissection.       No acute pulmonary process. Echocardiogram:2019- Conclusions      Summary   -Thickened pericardium without pericardial effusion   -Normal left ventricle size, wall thickness, and systolic function with an   estimated ejection fraction of 55-60%. No regional wall motion abnormalities   are seen. PFT: In 2014-  Office spirometry shows FEV-1 66% of predicted. FVC 65% of predicted.           Assessment:  Active Problems:    CAD (coronary artery disease)    Hyperlipemia    Essential hypertension    UTI (urinary tract infection)    DM2 (diabetes mellitus, type 2) (HCC)    Primary osteoarthritis involving multiple joints    Anxiety

## 2020-01-23 NOTE — PROGRESS NOTES
Incentive Spirometry education and demonstration completed by Respiratory Therapy Yes      Response to education: Very Good     Teaching Time: 5 minutes    Minimum Predicted Vital Capacity - 593 mL. Patient's Actual Vital Capacity - 400 mL.  Turning over to Nursing for routine follow-up No.    Comments: pt unable to meet goal     Electronically signed by Power Tovar RCP on 1/23/2020 at 4:28 PM

## 2020-01-24 ENCOUNTER — APPOINTMENT (OUTPATIENT)
Dept: GENERAL RADIOLOGY | Age: 75
DRG: 190 | End: 2020-01-24
Payer: COMMERCIAL

## 2020-01-24 ENCOUNTER — ANESTHESIA (OUTPATIENT)
Dept: ENDOSCOPY | Age: 75
DRG: 190 | End: 2020-01-24
Payer: COMMERCIAL

## 2020-01-24 ENCOUNTER — ANESTHESIA EVENT (OUTPATIENT)
Dept: ENDOSCOPY | Age: 75
DRG: 190 | End: 2020-01-24
Payer: COMMERCIAL

## 2020-01-24 VITALS
OXYGEN SATURATION: 94 % | SYSTOLIC BLOOD PRESSURE: 87 MMHG | RESPIRATION RATE: 12 BRPM | DIASTOLIC BLOOD PRESSURE: 50 MMHG

## 2020-01-24 PROBLEM — R91.8 PULMONARY INFILTRATE: Status: ACTIVE | Noted: 2020-01-24

## 2020-01-24 LAB
ANION GAP SERPL CALCULATED.3IONS-SCNC: 9 MMOL/L (ref 3–16)
ATYPICAL LYMPHOCYTE RELATIVE PERCENT: 3 % (ref 0–6)
BASOPHILS ABSOLUTE: 0 K/UL (ref 0–0.2)
BASOPHILS RELATIVE PERCENT: 0 %
BUN BLDV-MCNC: 23 MG/DL (ref 7–20)
CALCIUM SERPL-MCNC: 9.4 MG/DL (ref 8.3–10.6)
CHLORIDE BLD-SCNC: 97 MMOL/L (ref 99–110)
CO2: 28 MMOL/L (ref 21–32)
CREAT SERPL-MCNC: 0.9 MG/DL (ref 0.6–1.2)
EOSINOPHILS ABSOLUTE: 0 K/UL (ref 0–0.6)
EOSINOPHILS RELATIVE PERCENT: 0 %
GFR AFRICAN AMERICAN: >60
GFR NON-AFRICAN AMERICAN: >60
GLUCOSE BLD-MCNC: 149 MG/DL (ref 70–99)
GLUCOSE BLD-MCNC: 158 MG/DL (ref 70–99)
GLUCOSE BLD-MCNC: 166 MG/DL (ref 70–99)
GLUCOSE BLD-MCNC: 225 MG/DL (ref 70–99)
GLUCOSE BLD-MCNC: 236 MG/DL (ref 70–99)
GLUCOSE BLD-MCNC: 321 MG/DL (ref 70–99)
HCT VFR BLD CALC: 46.2 % (ref 36–48)
HEMOGLOBIN: 15.5 G/DL (ref 12–16)
LYMPHOCYTES ABSOLUTE: 0.9 K/UL (ref 1–5.1)
LYMPHOCYTES RELATIVE PERCENT: 6 %
MCH RBC QN AUTO: 29.3 PG (ref 26–34)
MCHC RBC AUTO-ENTMCNC: 33.5 G/DL (ref 31–36)
MCV RBC AUTO: 87.5 FL (ref 80–100)
MONOCYTES ABSOLUTE: 0.2 K/UL (ref 0–1.3)
MONOCYTES RELATIVE PERCENT: 2 %
NEUTROPHILS ABSOLUTE: 8.5 K/UL (ref 1.7–7.7)
NEUTROPHILS RELATIVE PERCENT: 89 %
PDW BLD-RTO: 14.5 % (ref 12.4–15.4)
PERFORMED ON: ABNORMAL
PLATELET # BLD: 196 K/UL (ref 135–450)
PLATELET SLIDE REVIEW: ADEQUATE
PMV BLD AUTO: 8.6 FL (ref 5–10.5)
POTASSIUM SERPL-SCNC: 4.9 MMOL/L (ref 3.5–5.1)
RBC # BLD: 5.28 M/UL (ref 4–5.2)
RBC # BLD: NORMAL 10*6/UL
SLIDE REVIEW: ABNORMAL
SODIUM BLD-SCNC: 134 MMOL/L (ref 136–145)
WBC # BLD: 9.6 K/UL (ref 4–11)

## 2020-01-24 PROCEDURE — 6360000002 HC RX W HCPCS: Performed by: INTERNAL MEDICINE

## 2020-01-24 PROCEDURE — 80048 BASIC METABOLIC PNL TOTAL CA: CPT

## 2020-01-24 PROCEDURE — 0BC18ZZ EXTIRPATION OF MATTER FROM TRACHEA, VIA NATURAL OR ARTIFICIAL OPENING ENDOSCOPIC: ICD-10-PCS | Performed by: INTERNAL MEDICINE

## 2020-01-24 PROCEDURE — 7100000000 HC PACU RECOVERY - FIRST 15 MIN: Performed by: INTERNAL MEDICINE

## 2020-01-24 PROCEDURE — 6370000000 HC RX 637 (ALT 250 FOR IP): Performed by: INTERNAL MEDICINE

## 2020-01-24 PROCEDURE — 87102 FUNGUS ISOLATION CULTURE: CPT

## 2020-01-24 PROCEDURE — 0BC78ZZ EXTIRPATION OF MATTER FROM LEFT MAIN BRONCHUS, VIA NATURAL OR ARTIFICIAL OPENING ENDOSCOPIC: ICD-10-PCS | Performed by: INTERNAL MEDICINE

## 2020-01-24 PROCEDURE — 88112 CYTOPATH CELL ENHANCE TECH: CPT

## 2020-01-24 PROCEDURE — 2700000000 HC OXYGEN THERAPY PER DAY

## 2020-01-24 PROCEDURE — 2580000003 HC RX 258: Performed by: NURSE ANESTHETIST, CERTIFIED REGISTERED

## 2020-01-24 PROCEDURE — 31645 BRNCHSC W/THER ASPIR 1ST: CPT | Performed by: INTERNAL MEDICINE

## 2020-01-24 PROCEDURE — 94669 MECHANICAL CHEST WALL OSCILL: CPT

## 2020-01-24 PROCEDURE — 1220000000 HC SEMI PRIVATE OB R&B

## 2020-01-24 PROCEDURE — 2709999900 HC NON-CHARGEABLE SUPPLY: Performed by: INTERNAL MEDICINE

## 2020-01-24 PROCEDURE — 2580000003 HC RX 258: Performed by: INTERNAL MEDICINE

## 2020-01-24 PROCEDURE — 87015 SPECIMEN INFECT AGNT CONCNTJ: CPT

## 2020-01-24 PROCEDURE — 3609010800 HC BRONCHOSCOPY ALVEOLAR LAVAGE: Performed by: INTERNAL MEDICINE

## 2020-01-24 PROCEDURE — 88305 TISSUE EXAM BY PATHOLOGIST: CPT

## 2020-01-24 PROCEDURE — 99233 SBSQ HOSP IP/OBS HIGH 50: CPT | Performed by: INTERNAL MEDICINE

## 2020-01-24 PROCEDURE — 0BC38ZZ EXTIRPATION OF MATTER FROM RIGHT MAIN BRONCHUS, VIA NATURAL OR ARTIFICIAL OPENING ENDOSCOPIC: ICD-10-PCS | Performed by: INTERNAL MEDICINE

## 2020-01-24 PROCEDURE — 85025 COMPLETE CBC W/AUTO DIFF WBC: CPT

## 2020-01-24 PROCEDURE — 0B9F7ZX DRAINAGE OF RIGHT LOWER LUNG LOBE, VIA NATURAL OR ARTIFICIAL OPENING, DIAGNOSTIC: ICD-10-PCS | Performed by: INTERNAL MEDICINE

## 2020-01-24 PROCEDURE — 31624 DX BRONCHOSCOPE/LAVAGE: CPT | Performed by: INTERNAL MEDICINE

## 2020-01-24 PROCEDURE — 3700000001 HC ADD 15 MINUTES (ANESTHESIA): Performed by: INTERNAL MEDICINE

## 2020-01-24 PROCEDURE — 88312 SPECIAL STAINS GROUP 1: CPT

## 2020-01-24 PROCEDURE — 87206 SMEAR FLUORESCENT/ACID STAI: CPT

## 2020-01-24 PROCEDURE — 87070 CULTURE OTHR SPECIMN AEROBIC: CPT

## 2020-01-24 PROCEDURE — 6360000002 HC RX W HCPCS: Performed by: NURSE ANESTHETIST, CERTIFIED REGISTERED

## 2020-01-24 PROCEDURE — 7100000001 HC PACU RECOVERY - ADDTL 15 MIN: Performed by: INTERNAL MEDICINE

## 2020-01-24 PROCEDURE — 3700000000 HC ANESTHESIA ATTENDED CARE: Performed by: INTERNAL MEDICINE

## 2020-01-24 PROCEDURE — 87116 MYCOBACTERIA CULTURE: CPT

## 2020-01-24 PROCEDURE — 94640 AIRWAY INHALATION TREATMENT: CPT

## 2020-01-24 PROCEDURE — 2500000003 HC RX 250 WO HCPCS: Performed by: NURSE ANESTHETIST, CERTIFIED REGISTERED

## 2020-01-24 PROCEDURE — 71045 X-RAY EXAM CHEST 1 VIEW: CPT

## 2020-01-24 PROCEDURE — 87631 RESP VIRUS 3-5 TARGETS: CPT

## 2020-01-24 PROCEDURE — 94761 N-INVAS EAR/PLS OXIMETRY MLT: CPT

## 2020-01-24 PROCEDURE — 87205 SMEAR GRAM STAIN: CPT

## 2020-01-24 RX ORDER — LABETALOL HYDROCHLORIDE 5 MG/ML
5 INJECTION, SOLUTION INTRAVENOUS EVERY 10 MIN PRN
Status: DISCONTINUED | OUTPATIENT
Start: 2020-01-24 | End: 2020-01-24 | Stop reason: HOSPADM

## 2020-01-24 RX ORDER — SODIUM CHLORIDE 0.9 % (FLUSH) 0.9 %
10 SYRINGE (ML) INJECTION EVERY 12 HOURS SCHEDULED
Status: CANCELLED | OUTPATIENT
Start: 2020-01-24

## 2020-01-24 RX ORDER — SODIUM CHLORIDE 9 MG/ML
INJECTION, SOLUTION INTRAVENOUS CONTINUOUS PRN
Status: DISCONTINUED | OUTPATIENT
Start: 2020-01-24 | End: 2020-01-24 | Stop reason: SDUPTHER

## 2020-01-24 RX ORDER — IPRATROPIUM BROMIDE AND ALBUTEROL SULFATE 2.5; .5 MG/3ML; MG/3ML
1 SOLUTION RESPIRATORY (INHALATION)
Status: DISCONTINUED | OUTPATIENT
Start: 2020-01-24 | End: 2020-01-24

## 2020-01-24 RX ORDER — ACETYLCYSTEINE 200 MG/ML
SOLUTION ORAL; RESPIRATORY (INHALATION) PRN
Status: DISCONTINUED | OUTPATIENT
Start: 2020-01-24 | End: 2020-01-24 | Stop reason: ALTCHOICE

## 2020-01-24 RX ORDER — LIDOCAINE HYDROCHLORIDE 40 MG/ML
SOLUTION TOPICAL PRN
Status: DISCONTINUED | OUTPATIENT
Start: 2020-01-24 | End: 2020-01-24 | Stop reason: ALTCHOICE

## 2020-01-24 RX ORDER — LIDOCAINE HYDROCHLORIDE 20 MG/ML
INJECTION, SOLUTION INFILTRATION; PERINEURAL PRN
Status: DISCONTINUED | OUTPATIENT
Start: 2020-01-24 | End: 2020-01-24 | Stop reason: SDUPTHER

## 2020-01-24 RX ORDER — SODIUM CHLORIDE 0.9 % (FLUSH) 0.9 %
10 SYRINGE (ML) INJECTION PRN
Status: CANCELLED | OUTPATIENT
Start: 2020-01-24

## 2020-01-24 RX ORDER — PROMETHAZINE HYDROCHLORIDE 25 MG/ML
6.25 INJECTION, SOLUTION INTRAMUSCULAR; INTRAVENOUS
Status: DISCONTINUED | OUTPATIENT
Start: 2020-01-24 | End: 2020-01-24 | Stop reason: HOSPADM

## 2020-01-24 RX ORDER — ONDANSETRON 2 MG/ML
4 INJECTION INTRAMUSCULAR; INTRAVENOUS
Status: DISCONTINUED | OUTPATIENT
Start: 2020-01-24 | End: 2020-01-24 | Stop reason: HOSPADM

## 2020-01-24 RX ORDER — SODIUM CHLORIDE 9 MG/ML
INJECTION, SOLUTION INTRAVENOUS CONTINUOUS
Status: CANCELLED | OUTPATIENT
Start: 2020-01-24

## 2020-01-24 RX ORDER — PROPOFOL 10 MG/ML
INJECTION, EMULSION INTRAVENOUS PRN
Status: DISCONTINUED | OUTPATIENT
Start: 2020-01-24 | End: 2020-01-24 | Stop reason: SDUPTHER

## 2020-01-24 RX ORDER — INSULIN LISPRO 100 [IU]/ML
10 INJECTION, SOLUTION INTRAVENOUS; SUBCUTANEOUS
Status: DISCONTINUED | OUTPATIENT
Start: 2020-01-24 | End: 2020-01-31 | Stop reason: HOSPADM

## 2020-01-24 RX ADMIN — ASPIRIN 81 MG 81 MG: 81 TABLET ORAL at 15:24

## 2020-01-24 RX ADMIN — GABAPENTIN 300 MG: 300 CAPSULE ORAL at 22:08

## 2020-01-24 RX ADMIN — LIDOCAINE HYDROCHLORIDE 100 MG: 20 INJECTION, SOLUTION INFILTRATION; PERINEURAL at 12:52

## 2020-01-24 RX ADMIN — ALPRAZOLAM 1 MG: 0.5 TABLET ORAL at 14:15

## 2020-01-24 RX ADMIN — DICLOFENAC 2 G: 10 GEL TOPICAL at 22:08

## 2020-01-24 RX ADMIN — INSULIN LISPRO 6 UNITS: 100 INJECTION, SOLUTION INTRAVENOUS; SUBCUTANEOUS at 22:09

## 2020-01-24 RX ADMIN — MICONAZOLE NITRATE: 20 POWDER TOPICAL at 15:27

## 2020-01-24 RX ADMIN — GUAIFENESIN 600 MG: 600 TABLET, EXTENDED RELEASE ORAL at 22:08

## 2020-01-24 RX ADMIN — INSULIN LISPRO 3 UNITS: 100 INJECTION, SOLUTION INTRAVENOUS; SUBCUTANEOUS at 17:29

## 2020-01-24 RX ADMIN — MICONAZOLE NITRATE: 20 POWDER TOPICAL at 22:08

## 2020-01-24 RX ADMIN — CEFUROXIME AXETIL 500 MG: 250 TABLET ORAL at 22:08

## 2020-01-24 RX ADMIN — Medication 10 ML: at 22:20

## 2020-01-24 RX ADMIN — ISOSORBIDE MONONITRATE 60 MG: 30 TABLET, EXTENDED RELEASE ORAL at 17:33

## 2020-01-24 RX ADMIN — INSULIN GLARGINE 40 UNITS: 100 INJECTION, SOLUTION SUBCUTANEOUS at 22:08

## 2020-01-24 RX ADMIN — ROSUVASTATIN CALCIUM 10 MG: 10 TABLET, FILM COATED ORAL at 15:25

## 2020-01-24 RX ADMIN — Medication 10 ML: at 09:36

## 2020-01-24 RX ADMIN — Medication 10 ML: at 15:40

## 2020-01-24 RX ADMIN — IPRATROPIUM BROMIDE AND ALBUTEROL SULFATE 3 ML: .5; 3 SOLUTION RESPIRATORY (INHALATION) at 15:44

## 2020-01-24 RX ADMIN — LISINOPRIL 2.5 MG: 5 TABLET ORAL at 15:24

## 2020-01-24 RX ADMIN — IPRATROPIUM BROMIDE AND ALBUTEROL SULFATE 3 ML: .5; 3 SOLUTION RESPIRATORY (INHALATION) at 12:21

## 2020-01-24 RX ADMIN — IPRATROPIUM BROMIDE AND ALBUTEROL SULFATE 3 ML: .5; 3 SOLUTION RESPIRATORY (INHALATION) at 07:44

## 2020-01-24 RX ADMIN — IPRATROPIUM BROMIDE AND ALBUTEROL SULFATE 3 ML: .5; 3 SOLUTION RESPIRATORY (INHALATION) at 19:30

## 2020-01-24 RX ADMIN — IPRATROPIUM BROMIDE AND ALBUTEROL SULFATE 3 ML: .5; 3 SOLUTION RESPIRATORY (INHALATION) at 04:02

## 2020-01-24 RX ADMIN — INSULIN LISPRO 10 UNITS: 100 INJECTION, SOLUTION INTRAVENOUS; SUBCUTANEOUS at 17:30

## 2020-01-24 RX ADMIN — SALINE NASAL SPRAY 1 SPRAY: 1.5 SOLUTION NASAL at 09:34

## 2020-01-24 RX ADMIN — ACETYLCYSTEINE 600 MG: 200 SOLUTION ORAL; RESPIRATORY (INHALATION) at 07:37

## 2020-01-24 RX ADMIN — PROPOFOL 100 MG: 10 INJECTION, EMULSION INTRAVENOUS at 12:51

## 2020-01-24 RX ADMIN — METHYLPREDNISOLONE SODIUM SUCCINATE 40 MG: 40 INJECTION, POWDER, FOR SOLUTION INTRAMUSCULAR; INTRAVENOUS at 01:30

## 2020-01-24 RX ADMIN — METHYLPREDNISOLONE SODIUM SUCCINATE 40 MG: 40 INJECTION, POWDER, FOR SOLUTION INTRAMUSCULAR; INTRAVENOUS at 15:33

## 2020-01-24 RX ADMIN — SALINE NASAL SPRAY 1 SPRAY: 1.5 SOLUTION NASAL at 15:40

## 2020-01-24 RX ADMIN — DIPHENOXYLATE HYDROCHLORIDE AND ATROPINE SULFATE 1 TABLET: 2.5; .025 TABLET ORAL at 22:08

## 2020-01-24 RX ADMIN — ENOXAPARIN SODIUM 40 MG: 40 INJECTION SUBCUTANEOUS at 14:15

## 2020-01-24 RX ADMIN — SALINE NASAL SPRAY 1 SPRAY: 1.5 SOLUTION NASAL at 22:07

## 2020-01-24 RX ADMIN — DICLOFENAC 2 G: 10 GEL TOPICAL at 15:28

## 2020-01-24 RX ADMIN — SODIUM CHLORIDE: 9 INJECTION, SOLUTION INTRAVENOUS at 13:05

## 2020-01-24 RX ADMIN — IBUPROFEN 400 MG: 200 TABLET, FILM COATED ORAL at 15:24

## 2020-01-24 RX ADMIN — SODIUM CHLORIDE: 9 INJECTION, SOLUTION INTRAVENOUS at 12:39

## 2020-01-24 RX ADMIN — ACETYLCYSTEINE 600 MG: 200 SOLUTION ORAL; RESPIRATORY (INHALATION) at 19:30

## 2020-01-24 RX ADMIN — ALPRAZOLAM 1 MG: 0.5 TABLET ORAL at 22:20

## 2020-01-24 RX ADMIN — INSULIN LISPRO 6 UNITS: 100 INJECTION, SOLUTION INTRAVENOUS; SUBCUTANEOUS at 09:34

## 2020-01-24 ASSESSMENT — PULMONARY FUNCTION TESTS
PIF_VALUE: 9
PIF_VALUE: 5
PIF_VALUE: 14
PIF_VALUE: 20
PIF_VALUE: 20
PIF_VALUE: 0
PIF_VALUE: 17
PIF_VALUE: 2
PIF_VALUE: 18
PIF_VALUE: 1
PIF_VALUE: 14
PIF_VALUE: 0
PIF_VALUE: 18
PIF_VALUE: 21
PIF_VALUE: 9
PIF_VALUE: 5
PIF_VALUE: 18
PIF_VALUE: 18
PIF_VALUE: 14
PIF_VALUE: 1
PIF_VALUE: 1
PIF_VALUE: 2
PIF_VALUE: 2
PIF_VALUE: 1
PIF_VALUE: 14
PIF_VALUE: 23
PIF_VALUE: 2
PIF_VALUE: 1
PIF_VALUE: 17
PIF_VALUE: 11
PIF_VALUE: 9
PIF_VALUE: 19
PIF_VALUE: 1
PIF_VALUE: 1
PIF_VALUE: 16
PIF_VALUE: 14
PIF_VALUE: 1
PIF_VALUE: 2
PIF_VALUE: 6

## 2020-01-24 ASSESSMENT — ENCOUNTER SYMPTOMS: SHORTNESS OF BREATH: 1

## 2020-01-24 ASSESSMENT — PAIN SCALES - GENERAL
PAINLEVEL_OUTOF10: 1
PAINLEVEL_OUTOF10: 0

## 2020-01-24 NOTE — PROGRESS NOTES
01/23/20 1941   Patient Observation   Observations pt.  refused to perform IS at this time-pt states she did it on her own an hour ago

## 2020-01-24 NOTE — PLAN OF CARE
Problem: Falls - Risk of:  Goal: Will remain free from falls  Description  Will remain free from falls  1/24/2020 1149 by MERCEDES Haney  Outcome: Ongoing     Problem: Pain:  Goal: Pain level will decrease  Description  Pain level will decrease  1/24/2020 1149 by MERCEDES Haney  Outcome: Ongoing     Problem: Breathing Pattern - Ineffective:  Goal: Ability to achieve and maintain a regular respiratory rate will improve  Description  Ability to achieve and maintain a regular respiratory rate will improve  1/24/2020 1149 by MERCEDES Haney  Outcome: Ongoing

## 2020-01-24 NOTE — CARE COORDINATION
CM following for discharge needs. Chart reviewed for discharge planning. Barrier(s) to discharge-respiratory status, possible bronchoscopy today  Tentative discharge plan- home with home health, patient has oxygen and COA services  Tentative discharge date-when medically stable    *Case management will continue to follow progress and update discharge plan as needed.     Negrita Gregg, BSN, CCM, RN  [facility]  [phone]

## 2020-01-24 NOTE — PROGRESS NOTES
Pt woke up SOB sts unable to catch her breath but sh needed to go to the BR ans that made her more SOB back to bed o2 in place tried to calm pt.  She just keeps moaning and sts I can't breath call place to [place] [facility] at [phone] she will be up to give pt a treatment

## 2020-01-24 NOTE — PROGRESS NOTES
Department of Internal Medicine  General Internal Medicine   Progress Note     SUBJECTIVE   Moderate dyspnea  And wheezing  No fever or hemoptysis       History obtained from chart review and the patient  General ROS: positive for  - fatigue and malaise  negative for - chills, fever or night sweats  Psychological ROS: negative  Respiratory ROS: positive for - shortness of breath and wheezing  negative for - cough, hemoptysis, sputum changes or stridor  Cardiovascular ROS: positive for - chest pain, dyspnea on exertion and edema  negative for - loss of consciousness, orthopnea or palpitations  Gastrointestinal ROS: no abdominal pain, change in bowel habits, or black or bloody stools    OBJECTIVE      Medications      Current Facility-Administered Medications: methylPREDNISolone sodium (SOLU-MEDROL) injection 40 mg, 40 mg, Intravenous, Q12H  acetylcysteine (MUCOMYST) 20 % solution 600 mg, 600 mg, Inhalation, BID  sodium chloride (OCEAN, BABY AYR) 0.65 % nasal spray 1 spray, 1 spray, Each Nostril, TID  sodium chloride flush 0.9 % injection 10 mL, 10 mL, Intravenous, BID  sodium chloride flush 0.9 % injection 10 mL, 10 mL, Intravenous, PRN  potassium chloride (KLOR-CON M) extended release tablet 40 mEq, 40 mEq, Oral, PRN **OR** potassium bicarb-citric acid (EFFER-K) effervescent tablet 40 mEq, 40 mEq, Oral, PRN **OR** potassium chloride 10 mEq/100 mL IVPB (Peripheral Line), 10 mEq, Intravenous, PRN  0.9 % sodium chloride bolus, 500 mL, Intravenous, PRN  hydrALAZINE (APRESOLINE) injection 10 mg, 10 mg, Intravenous, Q6H PRN  insulin lispro (1 Unit Dial) 0-18 Units, 0-18 Units, Subcutaneous, TID WC  insulin lispro (1 Unit Dial) 0-9 Units, 0-9 Units, Subcutaneous, Nightly  guaiFENesin (MUCINEX) extended release tablet 600 mg, 600 mg, Oral, BID  miconazole (MICOTIN) 2 % powder, , Topical, BID  benzonatate (TESSALON) capsule 100 mg, 100 mg, Oral, TID PRN  promethazine-dextromethorphan (PROMETHAZINE-DM) 6.25-15 MG/5ML syrup 10 22  PULSE RANGE: Pulse  Av.7  Min: 62  Max: 74  BLOOD PRESSURE RANGE:  Systolic (09PGS), JENNIFER:999 , Min:119 , VVO:958   ; Diastolic (56GTL), UCS:57, Min:56, Max:62    PULSE OXIMETRY RANGE: SpO2  Av.4 %  Min: 91 %  Max: 94 %  24HR INTAKE/OUTPUT:    No intake or output data in the 24 hours ending 20 0303  CONSTITUTIONAL:  awake, alert, cooperative, no apparent distress, and appears stated age  NECK:  supple, symmetrical, trachea midline and skin normal  LUNGS:  No increased work of breathing, good air exchange, clear to auscultation bilaterally, no crackles or wheezing  CARDIOVASCULAR:  Normal apical impulse, regular rate and rhythm, normal S1 and S2, no S3 or S4, and no murmur noted  ABDOMEN:  No scars, normal bowel sounds, soft, non-distended, non-tender, no masses palpated, no hepatosplenomegally  MUSCULOSKELETAL:  Trace edema  NEUROLOGIC:  No acute focal change    Data      Lab Results   Component Value Date    PHART 7.446 2017       Lab Results   Component Value Date     2020    K 4.1 2020    CL 98 2020    CO2 26 2020    BUN 8 2020    CREATININE 0.7 2020    GLUCOSE 144 2020    CALCIUM 9.8 2020     Lab Results   Component Value Date    WBC 9.7 2020    HGB 16.6 2020    HCT 49.8 2020    MCV 88.2 2020     2020         Lab Results   Component Value Date    INR 1.03 2019    PROTIME 11.7 2019       ASSESSMENT AND PLAN      Patient Active Problem List   Diagnosis    CAD (coronary artery disease)    Hyperlipemia    Essential hypertension    UTI (urinary tract infection)    DM2 (diabetes mellitus, type 2) (HCC)    Primary osteoarthritis involving multiple joints    Gastroesophageal reflux disease without esophagitis    PVC (premature ventricular contraction)    Anxiety    Morbid obesity (HCC)    Precordial pain    Acute bronchitis    Irritable bowel syndrome with diarrhea    COPD with

## 2020-01-24 NOTE — PROCEDURES
Bronchoscopy note    Patient with acute respiratory failure with hypoxemia, COPD exacerbation, chest congestion, mucous plugging, atelectasis, pulmonary infiltrates, ineffective airway clearance along with symptomatic dyspnea which was not improving with conservative management and for that reason patient was offered bronchoscopy for diagnostic and therapeutic purposes and patient was told about the procedure along with the pros and cons and after informed consent patient was taken to the endoscopy suite, patient was given IV sedation by the anesthesia team, please refer to anesthesia sheet for ASA Mallampati scores, the bronchoscope was introduced through the LMA which was introduced with the anesthesia team and patient was found to have normal vocal cords, patient had extensive tracheobronchomalacia, patient had complete blockage of the right lower lobe, right middle lobe as well as left lower lobe bronchi with thick tenacious mucous plugs which were quite viscous, the bronchoscope was wedged into the right lower lobe bronchus and BAL was done from the area which was sent for various cultures and cytology, the rest of the tracheobronchial tree was therapeutically aspirated using Mucomyst and saline, patient tolerated the procedure well and did not have any apparent complications  Estimated blood loss was 0    Further management depending on patient's clinical status and the bronchoscopy results    Tc rm MD

## 2020-01-24 NOTE — ANESTHESIA PRE PROCEDURE
[facility] Department of Anesthesiology  Pre-Anesthesia Evaluation/Consultation       Name:  Jojo Velasquez  : 1945  Age:  69 y.o.                                            MRN:  [id number]  Date: 2020           Surgeon: Surgeon(s):  Daphne Fields MD    Procedure: Procedure(s):  BRONCHOSCOPY DIAGNOSTIC OR CELL [address] ONLY     Allergies   Allergen Reactions    Morphine Shortness Of Breath    Codeine Other (See Comments)     Chest pain    Hydromorphone Other (See Comments)     hallucinations  Hallucinations    But will take if needed in an emergency    Levaquin [Levofloxacin In D5w] Itching    Vicodin [Hydrocodone-Acetaminophen] Hives     Patient Active Problem List   Diagnosis    CAD (coronary artery disease)    Hyperlipemia    Essential hypertension    UTI (urinary tract infection)    DM2 (diabetes mellitus, type 2) (HCC)    Primary osteoarthritis involving multiple joints    Gastroesophageal reflux disease without esophagitis    PVC (premature ventricular contraction)    Anxiety    Morbid obesity (HCC)    Precordial pain    Acute bronchitis    Irritable bowel syndrome with diarrhea    COPD with acute exacerbation (HCC)    AKIL (obstructive sleep apnea)    SOB (shortness of breath)    Chest congestion    DM (diabetes mellitus), secondary uncontrolled (HCC)    Pyuria    Ineffective airway clearance    Pulmonary infiltrate     Past Medical History:   Diagnosis Date    Acute MI ([address]     Acute pyelonephritis 2015    Anxiety and depression     Arthritis     CAD (coronary artery disease)     Cancer ([address]     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    Cancer [facility])     \"skin on top of my head\"    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    Chronic obstructive pulmonary disease ([address] 2017    Claustrophobia     COPD (chronic obstructive pulmonary disease) ([address]     Esophageal stricture     Gastroesophageal reflux disease without esophagitis 3/24/2016    Hiatal hernia     Hiatal hernia     Hypercholesteremia     Hypertension     Migraines     Movement disorder arthritis    Pneumonia     PONV (postoperative nausea and vomiting)     Type II or unspecified type diabetes mellitus without mention of complication, not stated as uncontrolled     Unspecified cerebral artery occlusion with cerebral infarction      Past Surgical History:   Procedure Laterality Date    APPENDECTOMY      CARDIAC SURGERY      1 stent  and 1 stent     CHOLECYSTECTOMY      COLONOSCOPY      COLONOSCOPY  2018    ENDOSCOPY, COLON, DIAGNOSTIC      EYE SURGERY      bilateral cataracts    GALLBLADDER SURGERY      HYSTERECTOMY      NV EXC SKIN MALIG <5MM REMAINDR BODY N/A 2018    EXCISION OF SCALP SQUAMOUS CELL CARCINOMA performed by Leoncio Perez MD at [facility] <100SQCM N/A 2018    SPLIT THICKNESS SKIN GRAFT FOR COVERAGE SCALP, APPLICATION OF WOUND VAC DEVICE performed by Leoncio Perez MD at [address] [facility] ENDOSCOPY  12    with biopsy of stomach,gastritis    UPPER GASTROINTESTINAL ENDOSCOPY  2018    w/esophagael dilation     Social History     Tobacco Use    Smoking status: Former Smoker     Packs/day: 0.25     Years: 49.00     Pack years: 12.25     Types: Cigarettes     Last attempt to quit: 2017     Years since quittin.6    Smokeless tobacco: Never Used    Tobacco comment: only smoke when real stressed  Nicotine patch   Substance Use Topics    Alcohol use: Yes     Alcohol/week: 1.0 standard drinks     Types: 1 Glasses of wine per week     Comment: occ. every 3-4 mo    Drug use: No     Medications  No current facility-administered medications on file prior to encounter.       Current Outpatient Medications on File Prior to Encounter   Medication Sig Dispense Refill    ALPRAZolam (XANAX) 1 MG tablet Take 1 tablet by mouth 3 solution Inhale 3 mLs into the lungs every 4 hours (while awake) 120 vial 0    aspirin 81 MG tablet Take 81 mg by mouth daily      nitroGLYCERIN (NITROSTAT) 0.4 MG SL tablet Place 1 tablet under the tongue every 5 minutes as needed for Chest pain. 30 tablet 0    Respiratory Therapy Supplies (NEBULIZER) by Does not apply route. PRN BREATHING TREATMENTS, UNSURE OF MED       ibuprofen (ADVIL;MOTRIN) 800 MG tablet Take 1 tablet by mouth 3 times daily (with meals) 90 tablet 5    triamcinolone (KENALOG) 0.1 % cream Apply topically 2 times daily. 45 g 1    insulin glargine (LANTUS) 100 UNIT/ML injection pen Inject 13 Units into the skin nightly 5 pen 3    insulin lispro (HUMALOG) 100 UNIT/ML pen Inject 0-18 Units into the skin 3 times daily (with meals) 5 pen 3    insulin lispro (HUMALOG) 100 UNIT/ML pen Inject 0-9 Units into the skin nightly 5 pen 3    insulin lispro (HUMALOG) 100 UNIT/ML pen Inject 10 Units into the skin 3 times daily (with meals) 5 pen 3    glucagon, rDNA, 1 MG injection Inject 1 mg into the muscle as needed for Low blood sugar (Blood glucose less than 70 mg/dL and patient NOT ALERT or NPO and does not have IV access. ) 1 each 0    colchicine (COLCRYS) 0.6 MG tablet Take 1 tablet by mouth daily 30 tablet 3    benzocaine-menthol (CEPACOL SORE THROAT) 15-3.6 MG lozenge Take 1 lozenge by mouth every 2 hours as needed for Sore Throat 168 lozenge     Denture Care Products (EFFERDENT DENTURE CLEANSER) TBEF 1 tablet by Does not apply route as needed (denture cleaning)  0    Incontinence Supply Disposable (PREVAIL WET WIPES) MISC 8 each by Does not apply route daily 720 each 3    triamcinolone (KENALOG) 0.1 % cream Apply topically 2 times daily.  30 g 1    nystatin (MYCOSTATIN) 035350 UNIT/GM powder Affected area 1 Bottle 5     Current Facility-Administered Medications   Medication Dose Route Frequency Provider Last Rate Last Dose    insulin lispro (1 Unit Dial) 10 Units  10 Units Subcutaneous Topical BID Martin Perez MD        benzonatate (TESSALON) capsule 100 mg  100 mg Oral TID PRN Martin Perez MD   100 mg at 01/23/20 0612    promethazine-dextromethorphan (PROMETHAZINE-DM) 6.25-15 MG/5ML syrup 10 mL  10 mL Oral Q6H PRN Martin Perez MD   10 mL at 01/22/20 0555    insulin glargine (LANTUS) injection pen 40 Units  40 Units Subcutaneous Nightly Martin Perez MD   40 Units at 01/23/20 2125    albuterol sulfate  (90 Base) MCG/ACT inhaler 2 puff  2 puff Inhalation Q6H PRN Martin Perez MD        ALPRAZolam John Fernando) tablet 1 mg  1 mg Oral TID PRN Martin Perez MD   1 mg at 01/23/20 2349    aspirin chewable tablet 81 mg  81 mg Oral Daily Martin Perez MD   81 mg at 01/23/20 0915    diclofenac sodium 1 % gel 2 g  2 g Topical BID Martin Perez MD   2 g at 01/23/20 2130    gabapentin (NEURONTIN) capsule 300 mg  300 mg Oral BID Martin Perez MD   300 mg at 01/23/20 2130    ibuprofen (ADVIL;MOTRIN) tablet 400 mg  400 mg Oral Q6H PRN Martin Perez MD   400 mg at 01/22/20 0554    ipratropium-albuterol (DUONEB) nebulizer solution 3 mL  3 mL Inhalation Q4H WA Martin Perez MD   3 mL at 01/24/20 0744    ipratropium-albuterol (DUONEB) nebulizer solution 3 mL  1 vial Inhalation Q4H PRN Martin Perez MD   3 mL at 01/24/20 0402    isosorbide mononitrate (IMDUR) extended release tablet 60 mg  60 mg Oral Daily Martin Perez MD   60 mg at 01/23/20 1059    lisinopril (PRINIVIL;ZESTRIL) tablet 2.5 mg  2.5 mg Oral Daily Martin Perez MD   2.5 mg at 01/23/20 0915    nitroGLYCERIN (NITROSTAT) SL tablet 0.4 mg  0.4 mg Sublingual Q5 Min PRN Martin Perez MD        pantoprazole (PROTONIX) tablet 40 mg  40 mg Oral QAM AC Martin Perez MD   40 mg at 01/23/20 7173    rosuvastatin (CRESTOR) tablet 10 mg  10 mg Oral Daily Martin Perez MD   10 mg at 01/23/20 1057    cefUROXime (CEFTIN) tablet 500 mg  500 mg Oral 2 times per day Martin Perez MD   500 mg at 20    enoxaparin (LOVENOX) injection 40 mg  40 mg Subcutaneous Daily Niya Gan MD   40 mg at 20 0914    glucose (GLUTOSE) 40 % oral gel 15 g  15 g Oral PRN Niya Gan MD        dextrose 50 % IV solution  12.5 g Intravenous PRN Niya Gan MD        glucagon (rDNA) injection 1 mg  1 mg Intramuscular PRN Niya Gan MD        dextrose 5 % solution  100 mL/hr Intravenous PRN Niya Gan MD        diphenoxylate-atropine (LOMOTIL) 2.5-0.025 MG per tablet 1 tablet  1 tablet Oral BID Niya Gan MD   1 tablet at 200    butalbital-acetaminophen-caffeine (FIORICET, ESGIC) per tablet 1 tablet  1 tablet Oral Q4H PRN Niya Gan MD         Vital Signs (Current)   Vitals:    20 1207   BP: (!) 161/88   Pulse: 61   Resp: 19   Temp: 97.7 °F (36.5 °C)   SpO2: 91%     Vital Signs Statistics (for past 48 hrs)     Temp  Av.3 °F (36.3 °C)  Min: 95.8 °F (35.4 °C)   Min taken time: 20 0005  Max: 98.4 °F (36.9 °C)   Max taken time: 20 0115  Pulse  Av.2  Min: 62   Min taken time: 20 0500  Max: 74   Max taken time: 20 2020  Resp  Av.9  Min: 25   Min taken time: 20 1141  Max: 24   Max taken time: 20 0451  BP  Min: 108/59   Min taken time: 20 1141  Max: 161/88   Max taken time: 20 1207  SpO2  Av.1 %  Min: 90 %   Min taken time: 20 1713  Max: 96 %   Max taken time: 20 0802    BP Readings from Last 3 Encounters:   20 (!) 161/88   20 130/80   19 118/62     BMI  Body mass index is 41.19 kg/m². Estimated body mass index is 41.19 kg/m² as calculated from the following:    Height as of this encounter: 5' 6\" (1.676 m). Weight as of this encounter: 255 lb 3 oz (115.8 kg).     CBC   Lab Results   Component Value Date    WBC 9.6 2020    RBC 5.28 2020    HGB 15.5 2020    HCT 46.2 2020    MCV 87.5 2020    RDW 14.5 2020     2020 CMP    Lab Results   Component Value Date     01/24/2020    K 4.9 01/24/2020    K 4.1 01/19/2020    CL 97 01/24/2020    CO2 28 01/24/2020    BUN 23 01/24/2020    CREATININE 0.9 01/24/2020    GFRAA >60 01/24/2020    GFRAA >60 05/02/2013    AGRATIO 0.9 01/19/2020    LABGLOM >60 01/24/2020    GLUCOSE 236 01/24/2020    PROT 7.4 01/19/2020    PROT 7.6 12/02/2011    CALCIUM 9.4 01/24/2020    BILITOT 0.5 01/19/2020    ALKPHOS 91 01/19/2020    AST 18 01/19/2020    ALT 15 01/19/2020     BMP    Lab Results   Component Value Date     01/24/2020    K 4.9 01/24/2020    K 4.1 01/19/2020    CL 97 01/24/2020    CO2 28 01/24/2020    BUN 23 01/24/2020    CREATININE 0.9 01/24/2020    CALCIUM 9.4 01/24/2020    GFRAA >60 01/24/2020    GFRAA >60 05/02/2013    LABGLOM >60 01/24/2020    GLUCOSE 236 01/24/2020     POCGlucose  Recent Labs     01/24/20  0531   GLUCOSE 236*      Coags    Lab Results   Component Value Date    PROTIME 11.7 09/18/2019    INR 1.03 09/18/2019    APTT 29.1 53/85/0594     HCG (If Applicable) No results found for: PREGTESTUR, PREGSERUM, HCG, HCGQUANT   ABGs   Lab Results   Component Value Date    PHART 7.446 02/07/2017    PO2ART 77.3 02/07/2017    VLO3IRW 33.6 02/07/2017    FYC8FSV 22.6 02/07/2017    BEART -0.6 02/07/2017    M7IOMBKI 96.6 02/07/2017      Type & Screen (If Applicable)  No results found for: LABABO, LABRH                         BMI: Wt Readings from Last 3 Encounters:       NPO Status: 8 hours                          Anesthesia Evaluation  Patient summary reviewed   history of anesthetic complications: PONV.   Airway: Mallampati: IV  TM distance: >3 FB   Neck ROM: full   Dental:    (+) edentulous      Pulmonary:   (+) pneumonia:  COPD (2L O2 NC qhs):  shortness of breath:  sleep apnea: on CPAP,  decreased breath sounds,  wheezes,                             Cardiovascular:  Exercise tolerance: poor (<4 METS),   (+) hypertension (EF 60):, past MI:, CAD (Stents x 2 2001):, dysrhythmias (pacS):,       ECG reviewed  Rhythm: regular  Rate: normal  Echocardiogram reviewed         Beta Blocker:  Not on Beta Blocker         Neuro/Psych:   (+) CVA (no residual effects per pt):, headaches:, psychiatric history:depression/anxiety             GI/Hepatic/Renal:   (+) hiatal hernia, GERD:, morbid obesity          Endo/Other:    (+) DiabetesType II DM, using insulin, . Abdominal:   (+) obese,         Vascular: negative vascular ROS. Anesthesia Plan      general     ASA 4     (Explained to patient risk of post operative intubation and ventilator need due to current respiratory status. Patient understands and would like to p)  Induction: intravenous. MIPS: Prophylactic antiemetics administered. Anesthetic plan and risks discussed with patient. Plan discussed with CRNA. This pre-anesthesia assessment may be used as a history and physical.    DOS STAFF ADDENDUM:    Pt seen and examined, chart reviewed (including anesthesia, drug and allergy history). No interval changes to history and physical examination. Anesthetic plan, risks, benefits, alternatives, and personnel involved discussed with patient. Questions and concerns addressed. Patient(family) verbalized an understanding and agrees to proceed.       Lavern Kirby MD  January 24, 2020  12:11 PM

## 2020-01-24 NOTE — CARE COORDINATION
CM received message from [facility]  Jay Best). CM returned call advising the current discharge plan is to return home with prior management as patient refuses to go to skilled nursing facility. CM advised there is currently no tentative discharge date.   KARINE Ohara, CCM, RN  [facility]  [phone]

## 2020-01-24 NOTE — PROGRESS NOTES
0430  Pt is a littler calmer since breathing TX she is laying down and a warm blanket given.  Pt sts the discomfort she is having is from breathing so hard and coughing it goes from the front of he chest straight thru to her back and increase in intensity with breathing    0500  Resting eyes closed resp easy and even    0600 cont to rest resp easy and even

## 2020-01-24 NOTE — PROGRESS NOTES
Pt unable to do acapella and IS at this time, Pt complaining of chest pain, will attempt at a later time

## 2020-01-24 NOTE — PROGRESS NOTES
INPATIENT PULMONARY CRITICAL CARE PROGRESS NOTE      Reason for visit    refractory COPD    SUBJECTIVE: Patient when seen this morning continues to be symptomatic, patient has profound chest congestion and is not able to expectorate, patient continues to have increased shortness of breath and wheezing, patient has increased chest tightness, patient states that she is symptomatic because she is not able to expectorate anything significant, patient was having increased anxiety because of that, patient was afebrile, patient's systolic blood pressure is on the higher side, patient's blood sugars are better than yesterday, patient was on 2 L of nasal cannula oxygen with saturation 91% when seen, patient urine output has not been documented in the epic for review, patient was alert and communicative, no other pertinent review of system of concern          Physical Exam:  Blood pressure (!) 161/88, pulse 61, temperature 97.7 °F (36.5 °C), temperature source Temporal, resp. rate 19, height 5' 6\" (1.676 m), weight 255 lb 3 oz (115.8 kg), SpO2 91 %, not currently breastfeeding.'     Constitutional: Mild respiratory  distress. HENT:  Oropharynx is clear and moist. No thyromegaly. Increased nasal congestion  Eyes:  Conjunctivae are normal. Pupils equal, round, and reactive to light. No scleral icterus. Neck: . No tracheal deviation present. No obvious thyroid mass. Short enlarged neck  Cardiovascular: sinus bradycardia, normal heart sounds. No right ventricular heave. some  lower extremity edema. Pulmonary/Chest: B/L  wheezes. Scattered  rales. Increased chest congestion Chest wall is not dull to percussion. No accessory muscle usage or stridor. Abdominal: Soft. Bowel sounds present. No distension or hernia. No tenderness. obese    Musculoskeletal: No cyanosis. No clubbing. No obvious joint deformity. Lymphadenopathy: No cervical or supraclavicular adenopathy. Skin: Skin is warm and dry.  No rash or nodules on the exposed extremities. Psychiatric: Normal mood and affect. Behavior is normal.  slight  anxiety. Neurologic: Alert, awake and oriented. PERRL. Speech fluent        Results:  CBC:   Recent Labs     01/24/20  0531   WBC 9.6   HGB 15.5   HCT 46.2   MCV 87.5        BMP:   Recent Labs     01/24/20  0531   *   K 4.9   CL 97*   CO2 28   BUN 23*   CREATININE 0.9       Imaging:  I have reviewed radiology images personally. XR CHEST PORTABLE   Final Result   Increased pleural-parenchymal disease on the left         XR CHEST PORTABLE   Final Result   Pulmonary edema. Xr Chest Portable    Result Date: 1/24/2020  EXAMINATION: ONE XRAY VIEW OF THE CHEST 1/24/2020 5:52 am COMPARISON: 01/19/2020 HISTORY: ORDERING SYSTEM PROVIDED HISTORY: sob TECHNOLOGIST PROVIDED HISTORY: Reason for exam:->sob Reason for Exam: sob Acuity: Unknown Type of Exam: Unknown FINDINGS: Lung volumes are low accentuating heart size and bronchovascular markings at the lung bases. Patient body habitus limits evaluation of fine pulmonary parenchymal changes. There is persistent left-sided pleural effusion left lung consolidation, slightly increased Right basilar opacity is unchanged Atherosclerotic changes seen in the aorta. Increased pleural-parenchymal disease on the left     Xr Chest Portable    Result Date: 1/19/2020  EXAMINATION: ONE XRAY VIEW OF THE CHEST 1/19/2020 9:21 pm COMPARISON: 09/22/2019 HISTORY: ORDERING SYSTEM PROVIDED HISTORY: SOB TECHNOLOGIST PROVIDED HISTORY: Reason for exam:->SOB Reason for Exam: increased SOB and chest pain, coughing Acuity: Acute Type of Exam: Initial FINDINGS: Evaluation is limited by the patient's body habitus and the portable technique. Pulmonary edema is again noted. The heart size is unchanged. There is no discernible pneumothorax. Pulmonary edema. Results for Deana Santos (MRN [id number]) as of 1/24/2020 12:08   Ref.  Range 1/23/2020 21:10 1/24/2020 05:31 1/24/2020 08:00 1/24/2020 11:40   Sodium Latest Ref Range: 136 - 145 mmol/L  134 (L)     Potassium Latest Ref Range: 3.5 - 5.1 mmol/L  4.9     Chloride Latest Ref Range: 99 - 110 mmol/L  97 (L)     CO2 Latest Ref Range: 21 - 32 mmol/L  28     BUN Latest Ref Range: 7 - 20 mg/dL  23 (H)     Creatinine Latest Ref Range: 0.6 - 1.2 mg/dL  0.9     Anion Gap Latest Ref Range: 3 - 16   9     GFR Non- Latest Ref Range: >60   >60     GFR  Latest Ref Range: >60   >60     Glucose Latest Ref Range: 70 - 99 mg/dL  236 (H)     POC Glucose Latest Ref Range: 70 - 99 mg/dl 233 (H)  225 (H) 149 (H)   Calcium Latest Ref Range: 8.3 - 10.6 mg/dL  9.4       Results for Kervin Kaufman (MRN [id number]) as of 1/24/2020 12:08   Ref.  Range 9/22/2019 01:17 9/23/2019 05:15 1/19/2020 21:46 1/24/2020 05:31   WBC Latest Ref Range: 4.0 - 11.0 K/uL 11.4 (H) 17.2 (H) 9.7 9.6   RBC Latest Ref Range: 4.00 - 5.20 M/uL 5.78 (H) 5.11 5.64 (H) 5.28 (H)   Hemoglobin Quant Latest Ref Range: 12.0 - 16.0 g/dL 16.8 (H) 14.9 16.6 (H) 15.5   Hematocrit Latest Ref Range: 36.0 - 48.0 % 50.9 (H) 45.8 49.8 (H) 46.2   MCV Latest Ref Range: 80.0 - 100.0 fL 88.0 89.6 88.2 87.5   MCH Latest Ref Range: 26.0 - 34.0 pg 29.0 29.1 29.5 29.3   MCHC Latest Ref Range: 31.0 - 36.0 g/dL 33.0 32.5 33.4 33.5   MPV Latest Ref Range: 5.0 - 10.5 fL 7.9 8.5 8.3 8.6   RDW Latest Ref Range: 12.4 - 15.4 % 14.7 14.3 14.4 14.5   Platelet Count Latest Ref Range: 135 - 450 K/uL 234 210 198 196   Neutrophils % Latest Units: % 69.4 89.1  89.0     ONE XRAY VIEW OF THE CHEST       1/24/2020 5:52 am       COMPARISON:   01/19/2020       HISTORY:   ORDERING SYSTEM PROVIDED HISTORY: sob   TECHNOLOGIST PROVIDED HISTORY:   Reason for exam:->sob   Reason for Exam: sob   Acuity: Unknown   Type of Exam: Unknown       FINDINGS:   Lung volumes are low accentuating heart size and bronchovascular markings at   the lung bases.  Patient body habitus limits evaluation of fine pulmonary parenchymal changes.       There is persistent left-sided pleural effusion left lung consolidation,   slightly increased       Right basilar opacity is unchanged       Atherosclerotic changes seen in the aorta.           Impression   Increased pleural-parenchymal disease on the left         Assessment:  Principal Problem:    COPD with acute exacerbation ([facility])  Active Problems:    CAD (coronary artery disease)    Hyperlipemia    Essential hypertension    UTI (urinary tract infection)    DM2 (diabetes mellitus, type 2) ([facility])    Anxiety    Morbid obesity ([facility])    Acute bronchitis    Pulmonary infiltrate  Resolved Problems:    COPD exacerbation ([facility])          Plan:   · Oxygen supplementation, if required, to keep saturation between 90 to 94% only  · Pulmonary toilet  · Bronchodilators  · IV Solu-Medrol to continue for now  · Mucolytic started including Mucomyst nebulization  · Patient's blood sugars are on the higher side which may be partly because of steroids and titration of insulins as per internal medicine team  · Lantus insulin to be titrated as per blood glucose monitoring by IM  · BGM with SSI  · Patient is getting p.o.  Ceftin for pyuria with history of Klebsiella UTI in the past  · Patient has increased chest congestion along with mucus plugging and ineffective airway clearance and patient was told about the options including conservative management versus aggressive care with bronchoscopy-patient continued to be symptomatic and patient was told about the options of bronchoscopy with intrinsic pros and cons of the procedure and patient wants to get it done we will arrange for that today  · Incentive spirometry and Acapella ordered  · Internal medicine to decide about continuation of lomotil  · Patient to be provisionally kept NPO after midnight  · Monitor for any hypoglycemia  · Avoid sedatives and narcotics  · Antihypertensives as per IM  · Monitor for any hypoventilation and hypercarbia   · Saline nasal spray started  · PUD and DVT prophylaxis as per IM     Case discussed with patient and nursing     Further management depending on patient's clinical status, follow-up on above recommendations and bronchoscopy findings         Electronically signed by:  Brab Aragon MD    1/24/2020    12:11 PM.

## 2020-01-24 NOTE — PROGRESS NOTES
Department of Internal Medicine  General Internal Medicine   Progress Note     SUBJECTIVE   Does have some chronic chest pain , still significant dyspnea       History obtained from chart review and the patient  General ROS: positive for  - fatigue and malaise  negative for - chills, fever or night sweats  Psychological ROS: negative  Respiratory ROS: positive for - shortness of breath and wheezing  negative for - cough, hemoptysis, sputum changes or stridor  Cardiovascular ROS: positive for - chest pain, dyspnea on exertion and edema  negative for - loss of consciousness, orthopnea or palpitations  Gastrointestinal ROS: no abdominal pain, change in bowel habits, or black or bloody stools    OBJECTIVE      Medications      Current Facility-Administered Medications: methylPREDNISolone sodium (SOLU-MEDROL) injection 40 mg, 40 mg, Intravenous, Q12H  acetylcysteine (MUCOMYST) 20 % solution 600 mg, 600 mg, Inhalation, BID  sodium chloride (OCEAN, BABY AYR) 0.65 % nasal spray 1 spray, 1 spray, Each Nostril, TID  sodium chloride flush 0.9 % injection 10 mL, 10 mL, Intravenous, BID  sodium chloride flush 0.9 % injection 10 mL, 10 mL, Intravenous, PRN  potassium chloride (KLOR-CON M) extended release tablet 40 mEq, 40 mEq, Oral, PRN **OR** potassium bicarb-citric acid (EFFER-K) effervescent tablet 40 mEq, 40 mEq, Oral, PRN **OR** potassium chloride 10 mEq/100 mL IVPB (Peripheral Line), 10 mEq, Intravenous, PRN  0.9 % sodium chloride bolus, 500 mL, Intravenous, PRN  hydrALAZINE (APRESOLINE) injection 10 mg, 10 mg, Intravenous, Q6H PRN  insulin lispro (1 Unit Dial) 0-18 Units, 0-18 Units, Subcutaneous, TID WC  insulin lispro (1 Unit Dial) 0-9 Units, 0-9 Units, Subcutaneous, Nightly  guaiFENesin (MUCINEX) extended release tablet 600 mg, 600 mg, Oral, BID  miconazole (MICOTIN) 2 % powder, , Topical, BID  benzonatate (TESSALON) capsule 100 mg, 100 mg, Oral, TID PRN  promethazine-dextromethorphan (PROMETHAZINE-DM) 6.25-15 MG/5ML syrup 10 mL, 10 mL, Oral, Q6H PRN  insulin glargine (LANTUS) injection pen 40 Units, 40 Units, Subcutaneous, Nightly  insulin lispro (1 Unit Dial) 5 Units, 5 Units, Subcutaneous, TID WC  albuterol sulfate  (90 Base) MCG/ACT inhaler 2 puff, 2 puff, Inhalation, Q6H PRN  ALPRAZolam (XANAX) tablet 1 mg, 1 mg, Oral, TID PRN  aspirin chewable tablet 81 mg, 81 mg, Oral, Daily  diclofenac sodium 1 % gel 2 g, 2 g, Topical, BID  gabapentin (NEURONTIN) capsule 300 mg, 300 mg, Oral, BID  ibuprofen (ADVIL;MOTRIN) tablet 400 mg, 400 mg, Oral, Q6H PRN  ipratropium-albuterol (DUONEB) nebulizer solution 3 mL, 3 mL, Inhalation, Q4H WA  ipratropium-albuterol (DUONEB) nebulizer solution 3 mL, 1 vial, Inhalation, Q4H PRN  isosorbide mononitrate (IMDUR) extended release tablet 60 mg, 60 mg, Oral, Daily  lisinopril (PRINIVIL;ZESTRIL) tablet 2.5 mg, 2.5 mg, Oral, Daily  nitroGLYCERIN (NITROSTAT) SL tablet 0.4 mg, 0.4 mg, Sublingual, Q5 Min PRN  pantoprazole (PROTONIX) tablet 40 mg, 40 mg, Oral, QAM AC  rosuvastatin (CRESTOR) tablet 10 mg, 10 mg, Oral, Daily  cefUROXime (CEFTIN) tablet 500 mg, 500 mg, Oral, 2 times per day  enoxaparin (LOVENOX) injection 40 mg, 40 mg, Subcutaneous, Daily  glucose (GLUTOSE) 40 % oral gel 15 g, 15 g, Oral, PRN  dextrose 50 % IV solution, 12.5 g, Intravenous, PRN  glucagon (rDNA) injection 1 mg, 1 mg, Intramuscular, PRN  dextrose 5 % solution, 100 mL/hr, Intravenous, PRN  diphenoxylate-atropine (LOMOTIL) 2.5-0.025 MG per tablet 1 tablet, 1 tablet, Oral, BID  butalbital-acetaminophen-caffeine (FIORICET, ESGIC) per tablet 1 tablet, 1 tablet, Oral, Q4H PRN    Physical      VITALS:    BP (!) 128/56   Pulse 68   Temp 98.4 °F (36.9 °C) (Oral)   Resp 18   Ht 5' 6\" (1.676 m)   Wt 255 lb 3 oz (115.8 kg)   SpO2 94%   BMI 41.19 kg/m²   TEMPERATURE:  Current - Temp: 98.4 °F (36.9 °C);  Max - Temp  Av.2 °F (36.2 °C)  Min: 95.8 °F (35.4 °C)  Max: 98.4 °F (36.9 °C)  RESPIRATIONS RANGE: Resp  Av.7  Min:

## 2020-01-24 NOTE — PROGRESS NOTES
Pt lost IV access on day shift about 1830, did not receive the solumedrol. This RN stuck pt 2 x's without success. An ICU nurse came and looked did not stick. An OB RN looked a stuck x 1 unsuccessful.  This RN relooked  And suck and did get the IV in. Pt giana well

## 2020-01-24 NOTE — PROGRESS NOTES
Incentive Spirometry education and demonstration completed by Respiratory Therapy Yes      Response to education: Good     Teaching Time: 5 minutes    Minimum Predicted Vital Capacity - 593 mL. Patient's Actual Vital Capacity - 400 mL.  Turning over to Nursing for routine follow-up No.    Comments: pt. Requires more instruction on IS    Electronically signed by Christina Rg RCP on 1/23/2020 at 10:04 PM

## 2020-01-24 NOTE — PROGRESS NOTES
Progress Note - Dr. Sadiq Light - Internal Medicine  PCP: Neda Thompson MD [address] [facility] / [facility] [phone]    Hospital Day: 5  Code Status: Full Code  Current Diet: Diet NPO, After Midnight        CC: follow up on medical issues    Subjective:   Ellie Wesley is a 69 y.o. female. She denies problems    Doing ok  Remains on o2  Pt has not made up mind on bronchoscopy or not    She complains of chest pain, denies shortness of breath, denies nausea,  denies emesis. 10 system Review of Systems is reviewed with patient, and pertinent positives are listed here: None . Otherwise, Review of systems is negative. I have reviewed the patient's medical and social history in detail and updated the computerized patient record. To recap: She  has a past medical history of Acute MI ([address] Acute pyelonephritis, Anxiety and depression, Arthritis, CAD (coronary artery disease), Cancer ([address] Cancer ([address] Cancer of skin of leg, Chronic obstructive pulmonary disease ([address] Claustrophobia, COPD (chronic obstructive pulmonary disease) ([address] Esophageal stricture, Gastroesophageal reflux disease without esophagitis, Hiatal hernia, Hiatal hernia, Hypercholesteremia, Hypertension, Migraines, Movement disorder, Pneumonia, PONV (postoperative nausea and vomiting), Type II or unspecified type diabetes mellitus without mention of complication, not stated as uncontrolled, and Unspecified cerebral artery occlusion with cerebral infarction. . She  has a past surgical history that includes Cardiac surgery; Gallbladder surgery; Hysterectomy; Appendectomy; Cholecystectomy; Colonoscopy; Upper gastrointestinal endoscopy (4/20/12); Endoscopy, colon, diagnostic; Tear duct surgery; Upper gastrointestinal endoscopy (06/11/2018); Colonoscopy (06/11/2018); pr exc skin malig <5mm remaindr body (N/A, 9/6/2018); pr split grft trunk,arm,leg <100sqcm (N/A, 9/6/2018); skin biopsy; and eye surgery. . She  reports that she quit smoking about 2 years ago. Her smoking use included cigarettes. She has a 12.25 pack-year smoking history. She has never used smokeless tobacco. She reports current alcohol use of about 1.0 standard drinks of alcohol per week. She reports that she does not use drugs. .        Active Hospital Problems    Diagnosis Date Noted    Hyperlipemia [E78.5] 05/23/2011     Priority: High    CAD (coronary artery disease) [I25.10] 05/23/2011     Priority: High    COPD with acute exacerbation (HCC) [J44.1] 09/22/2019    Acute bronchitis [J20.9] 04/25/2019    Morbid obesity ([facility] [E66.01] 06/18/2018    Anxiety [F41.9] 01/19/2018    UTI (urinary tract infection) [N39.0] 12/03/2015    DM2 (diabetes mellitus, type 2) ([facility] [E11.9] 12/03/2015    Essential hypertension [I10] 04/18/2012       Current Facility-Administered Medications: methylPREDNISolone sodium (SOLU-MEDROL) injection 40 mg, 40 mg, Intravenous, Q12H  acetylcysteine (MUCOMYST) 20 % solution 600 mg, 600 mg, Inhalation, BID  sodium chloride (OCEAN, BABY AYR) 0.65 % nasal spray 1 spray, 1 spray, Each Nostril, TID  sodium chloride flush 0.9 % injection 10 mL, 10 mL, Intravenous, BID  sodium chloride flush 0.9 % injection 10 mL, 10 mL, Intravenous, PRN  potassium chloride (KLOR-CON M) extended release tablet 40 mEq, 40 mEq, Oral, PRN **OR** potassium bicarb-citric acid (EFFER-K) effervescent tablet 40 mEq, 40 mEq, Oral, PRN **OR** potassium chloride 10 mEq/100 mL IVPB (Peripheral Line), 10 mEq, Intravenous, PRN  0.9 % sodium chloride bolus, 500 mL, Intravenous, PRN  hydrALAZINE (APRESOLINE) injection 10 mg, 10 mg, Intravenous, Q6H PRN  insulin lispro (1 Unit Dial) 0-18 Units, 0-18 Units, Subcutaneous, TID WC  insulin lispro (1 Unit Dial) 0-9 Units, 0-9 Units, Subcutaneous, Nightly  guaiFENesin (MUCINEX) extended release tablet 600 mg, 600 mg, Oral, BID  miconazole (MICOTIN) 2 % powder, , Topical, BID  benzonatate (TESSALON) capsule 100 mg, 100 mg, Oral, TID PRN  promethazine-dextromethorphan (PROMETHAZINE-DM) 6.25-15 MG/5ML syrup 10 mL, 10 mL, Oral, Q6H PRN  insulin glargine (LANTUS) injection pen 40 Units, 40 Units, Subcutaneous, Nightly  insulin lispro (1 Unit Dial) 5 Units, 5 Units, Subcutaneous, TID WC  albuterol sulfate  (90 Base) MCG/ACT inhaler 2 puff, 2 puff, Inhalation, Q6H PRN  ALPRAZolam (XANAX) tablet 1 mg, 1 mg, Oral, TID PRN  aspirin chewable tablet 81 mg, 81 mg, Oral, Daily  diclofenac sodium 1 % gel 2 g, 2 g, Topical, BID  gabapentin (NEURONTIN) capsule 300 mg, 300 mg, Oral, BID  ibuprofen (ADVIL;MOTRIN) tablet 400 mg, 400 mg, Oral, Q6H PRN  ipratropium-albuterol (DUONEB) nebulizer solution 3 mL, 3 mL, Inhalation, Q4H WA  ipratropium-albuterol (DUONEB) nebulizer solution 3 mL, 1 vial, Inhalation, Q4H PRN  isosorbide mononitrate (IMDUR) extended release tablet 60 mg, 60 mg, Oral, Daily  lisinopril (PRINIVIL;ZESTRIL) tablet 2.5 mg, 2.5 mg, Oral, Daily  nitroGLYCERIN (NITROSTAT) SL tablet 0.4 mg, 0.4 mg, Sublingual, Q5 Min PRN  pantoprazole (PROTONIX) tablet 40 mg, 40 mg, Oral, QAM AC  rosuvastatin (CRESTOR) tablet 10 mg, 10 mg, Oral, Daily  cefUROXime (CEFTIN) tablet 500 mg, 500 mg, Oral, 2 times per day  enoxaparin (LOVENOX) injection 40 mg, 40 mg, Subcutaneous, Daily  glucose (GLUTOSE) 40 % oral gel 15 g, 15 g, Oral, PRN  dextrose 50 % IV solution, 12.5 g, Intravenous, PRN  glucagon (rDNA) injection 1 mg, 1 mg, Intramuscular, PRN  dextrose 5 % solution, 100 mL/hr, Intravenous, PRN  diphenoxylate-atropine (LOMOTIL) 2.5-0.025 MG per tablet 1 tablet, 1 tablet, Oral, BID  butalbital-acetaminophen-caffeine (FIORICET, ESGIC) per tablet 1 tablet, 1 tablet, Oral, Q4H PRN         Objective:  /69   Pulse 59   Temp 97.4 °F (36.3 °C) (Oral)   Resp 18   Ht 5' 6\" (1.676 m)   Wt 255 lb 3 oz (115.8 kg)   SpO2 96%   BMI 41.19 kg/m²      Patient Vitals for the past 24 hrs:   BP Temp Temp src Pulse Resp SpO2   01/24/20 0802 128/69 97.4 °F (36.3 °C) Oral 59 18 96 %   01/24/20 0744 -- -- -- -- 18 91 %   01/24/20 0451 (!) 154/85 97.8 °F (36.6 °C) Oral 68 24 95 %   01/24/20 0413 -- -- -- -- 20 --   01/24/20 0410 -- -- -- -- 20 --   01/24/20 0402 -- -- -- -- 20 94 %   01/24/20 0115 -- 98.4 °F (36.9 °C) Oral -- -- --   01/24/20 0005 (!) 128/56 95.8 °F (35.4 °C) Oral 68 18 94 %   01/23/20 2320 -- -- -- -- 18 --   01/23/20 2020 (!) 119/57 98.1 °F (36.7 °C) Oral 74 22 92 %   01/23/20 1938 -- -- -- -- 20 --   01/23/20 1928 -- -- -- -- 18 --   01/23/20 1920 -- -- -- -- 18 92 %   01/23/20 1603 -- -- -- -- 18 92 %   01/23/20 1136 -- -- -- -- 18 91 %   01/23/20 0845 121/61 96.6 °F (35.9 °C) Oral -- 20 --     No data found.       No intake or output data in the 24 hours ending 01/24/20 0805      Physical Exam:   S1, S2 normal, no murmur, rub or gallop, regular rate and rhythm  clear to auscultation bilaterally  abdomen is soft without significant tenderness, masses, organomegaly or guarding  extremities normal, atraumatic, no cyanosis or edema    Labs:  Lab Results   Component Value Date    WBC 9.6 01/24/2020    HGB 15.5 01/24/2020    HCT 46.2 01/24/2020     01/24/2020    CHOL 226 (H) 11/07/2019    TRIG 300 (H) 11/07/2019    HDL 34 (L) 11/07/2019    ALT 15 01/19/2020    AST 18 01/19/2020     (L) 01/24/2020    K 4.9 01/24/2020    CL 97 (L) 01/24/2020    CREATININE 0.9 01/24/2020    BUN 23 (H) 01/24/2020    CO2 28 01/24/2020    TSH 2.34 06/06/2011    INR 1.03 09/18/2019    LABA1C 7.7 01/19/2020    LABMICR YES 01/19/2020     Lab Results   Component Value Date    CKTOTAL 24 (L) 06/07/2018    CKMB 0.29 08/09/2011    TROPONINI <0.01 01/19/2020       Recent Imaging Results are Reviewed:  Xr Chest Portable    Result Date: 1/24/2020  EXAMINATION: ONE XRAY VIEW OF THE CHEST 1/24/2020 5:52 am COMPARISON: 01/19/2020 HISTORY: ORDERING SYSTEM PROVIDED HISTORY: sob TECHNOLOGIST PROVIDED HISTORY: Reason for exam:->sob Reason for Exam: sob Acuity: Unknown Type of Exam: Unknown FINDINGS: Lung volumes are low accentuating heart size and bronchovascular markings at the lung bases. Patient body habitus limits evaluation of fine pulmonary parenchymal changes. There is persistent left-sided pleural effusion left lung consolidation, slightly increased Right basilar opacity is unchanged Atherosclerotic changes seen in the aorta. Increased pleural-parenchymal disease on the left     Xr Chest Portable    Result Date: 1/19/2020  EXAMINATION: ONE XRAY VIEW OF THE CHEST 1/19/2020 9:21 pm COMPARISON: 09/22/2019 HISTORY: ORDERING SYSTEM PROVIDED HISTORY: SOB TECHNOLOGIST PROVIDED HISTORY: Reason for exam:->SOB Reason for Exam: increased SOB and chest pain, coughing Acuity: Acute Type of Exam: Initial FINDINGS: Evaluation is limited by the patient's body habitus and the portable technique. Pulmonary edema is again noted. The heart size is unchanged. There is no discernible pneumothorax. Pulmonary edema. Assessment and Plan:  Principal Problem:    COPD with acute exacerbation ([address] -Established problem. Stable. Remains on o2. Plan: appreciate pulm eval. Cont steroids. Consideration of bronchoscopy; will await pulm re-eval  Active Problems:    CAD (coronary artery disease)-Established problem. Stable. Plan: Continue present orders/plan. Hyperlipemia -Established problem. Stable. Plan: cont statin    Essential hypertension -Established problem. Stable. 128/69  Plan: Continue present orders/plan. UTI (urinary tract infection) -Established problem. Stable. Plan: cont abx    DM2 (diabetes mellitus, type 2) ([address] -Established problem. Stable. FSBS 225  Plan: stay on ccc diet, sliding scale, home meds    Anxiety -Established problem. Stable. Plan: Continue present orders/plan. Morbid obesity ([address] -Established problem. Stable.   bmi 41  Plan: advised to lose weight    Acute bronchitis            Racheal Bartholomew  1/24/2020

## 2020-01-25 ENCOUNTER — APPOINTMENT (OUTPATIENT)
Dept: GENERAL RADIOLOGY | Age: 75
DRG: 190 | End: 2020-01-25
Payer: COMMERCIAL

## 2020-01-25 LAB
ANION GAP SERPL CALCULATED.3IONS-SCNC: 9 MMOL/L (ref 3–16)
BASOPHILS ABSOLUTE: 0 K/UL (ref 0–0.2)
BASOPHILS RELATIVE PERCENT: 0 %
BUN BLDV-MCNC: 25 MG/DL (ref 7–20)
CALCIUM SERPL-MCNC: 8.7 MG/DL (ref 8.3–10.6)
CHLORIDE BLD-SCNC: 98 MMOL/L (ref 99–110)
CO2: 27 MMOL/L (ref 21–32)
CREAT SERPL-MCNC: 0.7 MG/DL (ref 0.6–1.2)
EOSINOPHILS ABSOLUTE: 0 K/UL (ref 0–0.6)
EOSINOPHILS RELATIVE PERCENT: 0 %
GFR AFRICAN AMERICAN: >60
GFR NON-AFRICAN AMERICAN: >60
GLUCOSE BLD-MCNC: 197 MG/DL (ref 70–99)
GLUCOSE BLD-MCNC: 213 MG/DL (ref 70–99)
GLUCOSE BLD-MCNC: 218 MG/DL (ref 70–99)
GLUCOSE BLD-MCNC: 271 MG/DL (ref 70–99)
HCT VFR BLD CALC: 44 % (ref 36–48)
HEMOGLOBIN: 14.3 G/DL (ref 12–16)
LYMPHOCYTES ABSOLUTE: 1.6 K/UL (ref 1–5.1)
LYMPHOCYTES RELATIVE PERCENT: 14 %
MCH RBC QN AUTO: 29 PG (ref 26–34)
MCHC RBC AUTO-ENTMCNC: 32.5 G/DL (ref 31–36)
MCV RBC AUTO: 89.1 FL (ref 80–100)
METAMYELOCYTES RELATIVE PERCENT: 2 %
MONOCYTES ABSOLUTE: 0.6 K/UL (ref 0–1.3)
MONOCYTES RELATIVE PERCENT: 5 %
NEUTROPHILS ABSOLUTE: 9.3 K/UL (ref 1.7–7.7)
NEUTROPHILS RELATIVE PERCENT: 79 %
PDW BLD-RTO: 14.5 % (ref 12.4–15.4)
PERFORMED ON: ABNORMAL
PLATELET # BLD: 218 K/UL (ref 135–450)
PLATELET SLIDE REVIEW: ADEQUATE
PMV BLD AUTO: 8.7 FL (ref 5–10.5)
POTASSIUM SERPL-SCNC: 4.6 MMOL/L (ref 3.5–5.1)
RBC # BLD: 4.93 M/UL (ref 4–5.2)
SLIDE REVIEW: ABNORMAL
SODIUM BLD-SCNC: 134 MMOL/L (ref 136–145)
WBC # BLD: 11.5 K/UL (ref 4–11)

## 2020-01-25 PROCEDURE — 6370000000 HC RX 637 (ALT 250 FOR IP): Performed by: INTERNAL MEDICINE

## 2020-01-25 PROCEDURE — 2700000000 HC OXYGEN THERAPY PER DAY

## 2020-01-25 PROCEDURE — 94761 N-INVAS EAR/PLS OXIMETRY MLT: CPT

## 2020-01-25 PROCEDURE — 99232 SBSQ HOSP IP/OBS MODERATE 35: CPT | Performed by: INTERNAL MEDICINE

## 2020-01-25 PROCEDURE — 6360000002 HC RX W HCPCS: Performed by: INTERNAL MEDICINE

## 2020-01-25 PROCEDURE — 85025 COMPLETE CBC W/AUTO DIFF WBC: CPT

## 2020-01-25 PROCEDURE — 71046 X-RAY EXAM CHEST 2 VIEWS: CPT

## 2020-01-25 PROCEDURE — 94669 MECHANICAL CHEST WALL OSCILL: CPT

## 2020-01-25 PROCEDURE — 2580000003 HC RX 258: Performed by: INTERNAL MEDICINE

## 2020-01-25 PROCEDURE — 94640 AIRWAY INHALATION TREATMENT: CPT

## 2020-01-25 PROCEDURE — 1220000000 HC SEMI PRIVATE OB R&B

## 2020-01-25 PROCEDURE — 80048 BASIC METABOLIC PNL TOTAL CA: CPT

## 2020-01-25 RX ORDER — PREDNISONE 10 MG/1
TABLET ORAL
Qty: 40 TABLET | Refills: 0 | Status: SHIPPED | OUTPATIENT
Start: 2020-01-25 | End: 2020-02-04

## 2020-01-25 RX ORDER — AMOXICILLIN AND CLAVULANATE POTASSIUM 875; 125 MG/1; MG/1
1 TABLET, FILM COATED ORAL EVERY 12 HOURS SCHEDULED
Status: DISCONTINUED | OUTPATIENT
Start: 2020-01-25 | End: 2020-01-31 | Stop reason: HOSPADM

## 2020-01-25 RX ORDER — CEFUROXIME AXETIL 500 MG/1
500 TABLET ORAL EVERY 12 HOURS SCHEDULED
Qty: 20 TABLET | Refills: 0 | Status: SHIPPED | OUTPATIENT
Start: 2020-01-25 | End: 2020-02-04

## 2020-01-25 RX ORDER — DEXTROMETHORPHAN HYDROBROMIDE AND PROMETHAZINE HYDROCHLORIDE 15; 6.25 MG/5ML; MG/5ML
5 SYRUP ORAL EVERY 6 HOURS PRN
Qty: 200 ML | Refills: 0 | Status: SHIPPED | OUTPATIENT
Start: 2020-01-25 | End: 2020-02-04

## 2020-01-25 RX ADMIN — ISOSORBIDE MONONITRATE 60 MG: 30 TABLET, EXTENDED RELEASE ORAL at 08:32

## 2020-01-25 RX ADMIN — GABAPENTIN 300 MG: 300 CAPSULE ORAL at 08:22

## 2020-01-25 RX ADMIN — SALINE NASAL SPRAY 1 SPRAY: 1.5 SOLUTION NASAL at 22:49

## 2020-01-25 RX ADMIN — AMOXICILLIN AND CLAVULANATE POTASSIUM 1 TABLET: 875; 125 TABLET, FILM COATED ORAL at 22:48

## 2020-01-25 RX ADMIN — ACETYLCYSTEINE 600 MG: 200 SOLUTION ORAL; RESPIRATORY (INHALATION) at 08:36

## 2020-01-25 RX ADMIN — LISINOPRIL 2.5 MG: 5 TABLET ORAL at 08:22

## 2020-01-25 RX ADMIN — ACETYLCYSTEINE 600 MG: 200 SOLUTION ORAL; RESPIRATORY (INHALATION) at 19:23

## 2020-01-25 RX ADMIN — IPRATROPIUM BROMIDE AND ALBUTEROL SULFATE 3 ML: .5; 3 SOLUTION RESPIRATORY (INHALATION) at 19:23

## 2020-01-25 RX ADMIN — ROSUVASTATIN CALCIUM 10 MG: 10 TABLET, FILM COATED ORAL at 08:31

## 2020-01-25 RX ADMIN — INSULIN LISPRO 3 UNITS: 100 INJECTION, SOLUTION INTRAVENOUS; SUBCUTANEOUS at 11:39

## 2020-01-25 RX ADMIN — GUAIFENESIN 600 MG: 600 TABLET, EXTENDED RELEASE ORAL at 08:22

## 2020-01-25 RX ADMIN — INSULIN GLARGINE 40 UNITS: 100 INJECTION, SOLUTION SUBCUTANEOUS at 22:52

## 2020-01-25 RX ADMIN — DICLOFENAC 2 G: 10 GEL TOPICAL at 08:23

## 2020-01-25 RX ADMIN — INSULIN LISPRO 5 UNITS: 100 INJECTION, SOLUTION INTRAVENOUS; SUBCUTANEOUS at 22:53

## 2020-01-25 RX ADMIN — METHYLPREDNISOLONE SODIUM SUCCINATE 40 MG: 40 INJECTION, POWDER, FOR SOLUTION INTRAMUSCULAR; INTRAVENOUS at 02:30

## 2020-01-25 RX ADMIN — ASPIRIN 81 MG 81 MG: 81 TABLET ORAL at 08:22

## 2020-01-25 RX ADMIN — GABAPENTIN 300 MG: 300 CAPSULE ORAL at 22:49

## 2020-01-25 RX ADMIN — INSULIN LISPRO 6 UNITS: 100 INJECTION, SOLUTION INTRAVENOUS; SUBCUTANEOUS at 08:33

## 2020-01-25 RX ADMIN — INSULIN LISPRO 10 UNITS: 100 INJECTION, SOLUTION INTRAVENOUS; SUBCUTANEOUS at 08:33

## 2020-01-25 RX ADMIN — Medication 10 ML: at 22:49

## 2020-01-25 RX ADMIN — INSULIN LISPRO 10 UNITS: 100 INJECTION, SOLUTION INTRAVENOUS; SUBCUTANEOUS at 11:39

## 2020-01-25 RX ADMIN — CEFUROXIME AXETIL 500 MG: 250 TABLET ORAL at 08:31

## 2020-01-25 RX ADMIN — DICLOFENAC 2 G: 10 GEL TOPICAL at 22:50

## 2020-01-25 RX ADMIN — METHYLPREDNISOLONE SODIUM SUCCINATE 40 MG: 40 INJECTION, POWDER, FOR SOLUTION INTRAMUSCULAR; INTRAVENOUS at 13:43

## 2020-01-25 RX ADMIN — PANTOPRAZOLE SODIUM 40 MG: 40 TABLET, DELAYED RELEASE ORAL at 05:45

## 2020-01-25 RX ADMIN — IPRATROPIUM BROMIDE AND ALBUTEROL SULFATE 3 ML: .5; 3 SOLUTION RESPIRATORY (INHALATION) at 12:24

## 2020-01-25 RX ADMIN — MICONAZOLE NITRATE: 20 POWDER TOPICAL at 08:23

## 2020-01-25 RX ADMIN — DIPHENOXYLATE HYDROCHLORIDE AND ATROPINE SULFATE 1 TABLET: 2.5; .025 TABLET ORAL at 08:31

## 2020-01-25 RX ADMIN — Medication 10 ML: at 08:23

## 2020-01-25 RX ADMIN — ALPRAZOLAM 1 MG: 0.5 TABLET ORAL at 22:48

## 2020-01-25 RX ADMIN — IPRATROPIUM BROMIDE AND ALBUTEROL SULFATE 3 ML: .5; 3 SOLUTION RESPIRATORY (INHALATION) at 08:35

## 2020-01-25 RX ADMIN — ENOXAPARIN SODIUM 40 MG: 40 INJECTION SUBCUTANEOUS at 08:22

## 2020-01-25 RX ADMIN — SALINE NASAL SPRAY 1 SPRAY: 1.5 SOLUTION NASAL at 08:24

## 2020-01-25 RX ADMIN — DIPHENOXYLATE HYDROCHLORIDE AND ATROPINE SULFATE 1 TABLET: 2.5; .025 TABLET ORAL at 22:46

## 2020-01-25 RX ADMIN — GUAIFENESIN 600 MG: 600 TABLET, EXTENDED RELEASE ORAL at 22:48

## 2020-01-25 ASSESSMENT — PAIN SCALES - GENERAL
PAINLEVEL_OUTOF10: 7
PAINLEVEL_OUTOF10: 0

## 2020-01-25 ASSESSMENT — PAIN DESCRIPTION - PAIN TYPE: TYPE: CHRONIC PAIN

## 2020-01-25 ASSESSMENT — PAIN DESCRIPTION - LOCATION: LOCATION: BACK

## 2020-01-25 NOTE — PROGRESS NOTES
[facility] Pulmonary/CCM Progress note      Admit Date: 1/19/2020    Chief Complaint: Shortness of breath and chest congestion    Subjective: Interval History: Patient underwent bronchoscopy yesterday, with mucous plugging/respiratory secretions noted and suctioned bilaterally. Chest x-ray shows persistent left-sided infiltrate and patient still feels chest congestion. Unable to bring up phlegm. On 2 L O2.     Scheduled Meds:   insulin lispro  10 Units Subcutaneous TID WC    methylPREDNISolone  40 mg Intravenous Q12H    acetylcysteine  600 mg Inhalation BID    sodium chloride  1 spray Each Nostril TID    sodium chloride flush  10 mL Intravenous BID    insulin lispro  0-18 Units Subcutaneous TID WC    insulin lispro  0-9 Units Subcutaneous Nightly    guaiFENesin  600 mg Oral BID    miconazole   Topical BID    insulin glargine  40 Units Subcutaneous Nightly    aspirin  81 mg Oral Daily    diclofenac sodium  2 g Topical BID    gabapentin  300 mg Oral BID    ipratropium-albuterol  3 mL Inhalation Q4H WA    isosorbide mononitrate  60 mg Oral Daily    lisinopril  2.5 mg Oral Daily    pantoprazole  40 mg Oral QAM AC    rosuvastatin  10 mg Oral Daily    cefUROXime  500 mg Oral 2 times per day    enoxaparin  40 mg Subcutaneous Daily    diphenoxylate-atropine  1 tablet Oral BID     Continuous Infusions:   dextrose       PRN Meds:sodium chloride flush, potassium chloride **OR** potassium alternative oral replacement **OR** potassium chloride, sodium chloride, hydrALAZINE, benzonatate, promethazine-dextromethorphan, albuterol sulfate HFA, ALPRAZolam, ibuprofen, ipratropium-albuterol, nitroGLYCERIN, glucose, dextrose, glucagon (rDNA), dextrose, butalbital-acetaminophen-caffeine    Review of Systems  Constitutional: negative for fatigue, fevers, malaise and weight loss  Ears, nose, mouth, throat: negative for ear drainage, epistaxis, hoarseness, nasal congestion, sore throat and voice change  Respiratory: clubbing or edema  Musculoskeletal: normal range of motion, no joint swelling, deformity or tenderness  Neurologic: alert, no focal neurologic deficits    Data Review:  CBC:   Lab Results   Component Value Date    WBC 11.5 01/25/2020    RBC 4.93 01/25/2020     BMP:   Lab Results   Component Value Date    GLUCOSE 218 01/25/2020    CO2 27 01/25/2020    BUN 25 01/25/2020    CREATININE 0.7 01/25/2020    CALCIUM 8.7 01/25/2020     ABG:   Lab Results   Component Value Date    XHS6RCM 22.6 02/07/2017    BEART -0.6 02/07/2017    [id number] 96.6 02/07/2017    PHART 7.446 02/07/2017    IYQ4GAW 33.6 02/07/2017    PO2ART 77.3 02/07/2017    [id number] 23.7 02/07/2017       Radiology: All pertinent images / reports were reviewed as a part of this visit. Narrative   EXAMINATION:   TWO XRAY VIEWS OF THE CHEST       1/25/2020 4:05 pm       COMPARISON:   01/24/2020 and earlier studies.       HISTORY:   ORDERING SYSTEM PROVIDED HISTORY: cough   TECHNOLOGIST PROVIDED HISTORY:   Reason for exam:->cough   Reason for Exam: COUGH, CONGESTION AND SHORTNESS OF BREATH. FORMER SMOKER. HISTORY OF DIABETES, HTN, COPD, SKIN CANCER, CAD AND ACUTE MI   Acuity: Unknown   Type of Exam: Subsequent/Follow-up       FINDINGS:   Heart is slightly enlarged.  The pulmonary vasculature is mildly to   moderately prominent and more prominent than on the prior study.  Small   amount of opacity present in the lingula along the left heart border.  No   sizable pleural effusion.  Mild underlying emphysematous changes suspected.           Impression   Mild-to-moderate pulmonary vascular congestion worse than yesterday.  Focal   lingular airspace disease either atelectasis or pneumonia slightly improved. No pleural effusion.  Mild emphysematous changes. Problem List:     COPD with acute exacerbation  Mucous plugging of airway    Assessment/Plan:     Patient underwent bronchoscopy yesterday with significant mucus plugging noted.   Chest x-ray still shows significant infiltrate at the left base. On 2 L O2. Still has significant chest congestion on exam, add chest vest therapy. Acapella valve. Patient is not ready for discharge yet. Might require bronchoscopy which we will evaluate for on Monday.     Matilde Moya MD Patient tolerated procedure well.

## 2020-01-25 NOTE — PROGRESS NOTES
Progress Note - Dr. Darvin Hawley - Internal Medicine  PCP: Tami Lutz MD [address] [facility] [phone]    Hospital Day: 6  Code Status: Full Code  Current Diet: DIET CARB CONTROL;        CC: follow up on medical issues    Subjective:   Mikala Sahu is a 69 y.o. female. She denies new problems    Refusing to even consider snf  She appears to be close to baseline  On o2 here, has o2 at home already    She denies chest pain, complains of shortness of breath, denies nausea,  denies emesis. 10 system Review of Systems is reviewed with patient, and pertinent positives are listed here: None . Otherwise, Review of systems is negative. I have reviewed the patient's medical and social history in detail and updated the computerized patient record. To recap: She  has a past medical history of Acute MI ([address] Acute pyelonephritis, Anxiety and depression, Arthritis, CAD (coronary artery disease), Cancer ([address] Cancer ([address] Cancer of skin of leg, Chronic obstructive pulmonary disease ([address] Claustrophobia, COPD (chronic obstructive pulmonary disease) ([address] Esophageal stricture, Gastroesophageal reflux disease without esophagitis, Hiatal hernia, Hiatal hernia, Hypercholesteremia, Hypertension, Migraines, Movement disorder, Pneumonia, PONV (postoperative nausea and vomiting), Type II or unspecified type diabetes mellitus without mention of complication, not stated as uncontrolled, and Unspecified cerebral artery occlusion with cerebral infarction. . She  has a past surgical history that includes Cardiac surgery; Gallbladder surgery; Hysterectomy; Appendectomy; Cholecystectomy; Colonoscopy; Upper gastrointestinal endoscopy (4/20/12); Endoscopy, colon, diagnostic; Tear duct surgery; Upper gastrointestinal endoscopy (06/11/2018);  Colonoscopy (06/11/2018); pr exc skin malig <5mm remaindr body (N/A, 9/6/2018); pr split grft trunk,arm,leg <100sqcm (N/A, 9/6/2018); skin biopsy; eye surgery; and bronchoscopy (N/A, 1/24/2020). . She  reports that she quit smoking about 2 years ago. Her smoking use included cigarettes. She has a 12.25 pack-year smoking history. She has never used smokeless tobacco. She reports current alcohol use of about 1.0 standard drinks of alcohol per week. She reports that she does not use drugs. .        Active Hospital Problems    Diagnosis Date Noted    Hyperlipemia [E78.5] 05/23/2011     Priority: High    CAD (coronary artery disease) [I25.10] 05/23/2011     Priority: High    Pulmonary infiltrate [R91.8] 01/24/2020    COPD with acute exacerbation (HCC) [J44.1] 09/22/2019    Acute bronchitis [J20.9] 04/25/2019    [facility] ([facility] [E66.01] 06/18/2018    Anxiety [F41.9] 01/19/2018    UTI (urinary tract infection) [N39.0] 12/03/2015    DM2 (diabetes mellitus, type 2) ([facility] [E11.9] 12/03/2015    Essential hypertension [I10] 04/18/2012       Current Facility-Administered Medications: insulin lispro (1 Unit Dial) 10 Units, 10 Units, Subcutaneous, TID WC  methylPREDNISolone sodium (SOLU-MEDROL) injection 40 mg, 40 mg, Intravenous, Q12H  acetylcysteine (MUCOMYST) 20 % solution 600 mg, 600 mg, Inhalation, BID  sodium chloride (OCEAN, BABY AYR) 0.65 % nasal spray 1 spray, 1 spray, Each Nostril, TID  sodium chloride flush 0.9 % injection 10 mL, 10 mL, Intravenous, BID  sodium chloride flush 0.9 % injection 10 mL, 10 mL, Intravenous, PRN  potassium chloride (KLOR-CON M) extended release tablet 40 mEq, 40 mEq, Oral, PRN **OR** potassium bicarb-citric acid (EFFER-K) effervescent tablet 40 mEq, 40 mEq, Oral, PRN **OR** potassium chloride 10 mEq/100 mL IVPB (Peripheral Line), 10 mEq, Intravenous, PRN  0.9 % sodium chloride bolus, 500 mL, Intravenous, PRN  hydrALAZINE (APRESOLINE) injection 10 mg, 10 mg, Intravenous, Q6H PRN  insulin lispro (1 Unit Dial) 0-18 Units, 0-18 Units, Subcutaneous, TID WC  insulin lispro (1 Unit Dial) 0-9 Units, 0-9 Units, Subcutaneous, Nightly  guaiFENesin [facility]) extended release tablet 600 mg, 600 mg, Oral, BID  miconazole (MICOTIN) 2 % powder, , Topical, BID  benzonatate (TESSALON) capsule 100 mg, 100 mg, Oral, TID PRN  promethazine-dextromethorphan (PROMETHAZINE-DM) 6.25-15 MG/5ML syrup 10 mL, 10 mL, Oral, Q6H PRN  insulin glargine (LANTUS) injection pen 40 Units, 40 Units, Subcutaneous, Nightly  albuterol sulfate  (90 Base) MCG/ACT inhaler 2 puff, 2 puff, Inhalation, Q6H PRN  ALPRAZolam (XANAX) tablet 1 mg, 1 mg, Oral, TID PRN  aspirin chewable tablet 81 mg, 81 mg, Oral, Daily  diclofenac sodium 1 % gel 2 g, 2 g, Topical, BID  gabapentin (NEURONTIN) capsule 300 mg, 300 mg, Oral, BID  ibuprofen (ADVIL;MOTRIN) tablet 400 mg, 400 mg, Oral, Q6H PRN  ipratropium-albuterol (DUONEB) nebulizer solution 3 mL, 3 mL, Inhalation, Q4H WA  ipratropium-albuterol (DUONEB) nebulizer solution 3 mL, 1 vial, Inhalation, Q4H PRN  isosorbide mononitrate (IMDUR) extended release tablet 60 mg, 60 mg, Oral, Daily  lisinopril (PRINIVIL;ZESTRIL) tablet 2.5 mg, 2.5 mg, Oral, Daily  nitroGLYCERIN (NITROSTAT) SL tablet 0.4 mg, 0.4 mg, Sublingual, Q5 Min PRN  pantoprazole (PROTONIX) tablet 40 mg, 40 mg, Oral, QAM AC  rosuvastatin (CRESTOR) tablet 10 mg, 10 mg, Oral, Daily  cefUROXime (CEFTIN) tablet 500 mg, 500 mg, Oral, 2 times per day  enoxaparin (LOVENOX) injection 40 mg, 40 mg, Subcutaneous, Daily  glucose (GLUTOSE) 40 % oral gel 15 g, 15 g, Oral, PRN  dextrose 50 % IV solution, 12.5 g, Intravenous, PRN  glucagon (rDNA) injection 1 mg, 1 mg, Intramuscular, PRN  dextrose 5 % solution, 100 mL/hr, Intravenous, PRN  diphenoxylate-atropine (LOMOTIL) 2.5-0.025 MG per tablet 1 tablet, 1 tablet, Oral, BID  butalbital-acetaminophen-caffeine (FIORICET, ESGIC) per tablet 1 tablet, 1 tablet, Oral, Q4H PRN         Objective:  BP (!) 141/72   Pulse 53   Temp 97.3 °F (36.3 °C) (Oral)   Resp 16   Ht 5' 6\" (1.676 m)   Wt 255 lb 3 oz (115.8 kg)   SpO2 95%   BMI 41.19 kg/m²      Patient Vitals for the past 24 hrs:   BP Temp Temp src Pulse Resp SpO2   01/25/20 0834 -- -- -- -- 16 95 %   01/25/20 0818 (!) 141/72 97.3 °F (36.3 °C) Oral 53 16 94 %   01/25/20 0412 (!) 142/78 97.3 °F (36.3 °C) Oral 62 16 92 %   01/25/20 0025 111/63 97.3 °F (36.3 °C) Oral 70 16 95 %   01/24/20 2200 (!) 108/55 98.7 °F (37.1 °C) Oral 81 16 91 %   01/24/20 1930 -- -- -- -- 16 93 %   01/24/20 1545 -- -- -- -- 16 96 %   01/24/20 1335 129/82 97.4 °F (36.3 °C) Temporal 66 15 94 %   01/24/20 1330 126/86 -- -- 64 15 95 %   01/24/20 1325 (!) 121/91 97.8 °F (36.6 °C) Temporal 69 15 94 %   01/24/20 1321 -- -- -- 74 14 91 %   01/24/20 1318 128/65 97.8 °F (36.6 °C) Temporal 74 14 94 %   01/24/20 1220 -- -- -- -- -- 93 %   01/24/20 1207 (!) 161/88 97.7 °F (36.5 °C) Temporal 61 19 91 %   01/24/20 1141 (!) 108/59 97.5 °F (36.4 °C) Oral 59 18 91 %     No data found.         Intake/Output Summary (Last 24 hours) at 1/25/2020 1122  Last data filed at 1/24/2020 1328  Gross per 24 hour   Intake 250 ml   Output --   Net 250 ml         Physical Exam:   S1, S2 normal, no murmur, rub or gallop, regular rate and rhythm  clear to auscultation bilaterally  abdomen is soft without significant tenderness, masses, organomegaly or guarding  extremities normal, atraumatic, no cyanosis or edema    Labs:  Lab Results   Component Value Date    WBC 11.5 (H) 01/25/2020    HGB 14.3 01/25/2020    HCT 44.0 01/25/2020     01/25/2020    CHOL 226 (H) 11/07/2019    TRIG 300 (H) 11/07/2019    HDL 34 (L) 11/07/2019    ALT 15 01/19/2020    AST 18 01/19/2020     (L) 01/25/2020    K 4.6 01/25/2020    CL 98 (L) 01/25/2020    CREATININE 0.7 01/25/2020    BUN 25 (H) 01/25/2020    CO2 27 01/25/2020    TSH 2.34 06/06/2011    INR 1.03 09/18/2019    LABA1C 7.7 01/19/2020    LABMICR YES 01/19/2020     Lab Results   Component Value Date    CKTOTAL 24 (L) 06/07/2018    CKMB 0.29 08/09/2011    TROPONINI <0.01 01/19/2020       Recent Imaging Results are Reviewed:  Xr Chest Portable    Result Date: 1/24/2020  EXAMINATION: ONE XRAY VIEW OF THE CHEST 1/24/2020 5:52 am COMPARISON: 01/19/2020 HISTORY: ORDERING SYSTEM PROVIDED HISTORY: sob TECHNOLOGIST PROVIDED HISTORY: Reason for exam:->sob Reason for Exam: sob Acuity: Unknown Type of Exam: Unknown FINDINGS: Lung volumes are low accentuating heart size and bronchovascular markings at the lung bases. Patient body habitus limits evaluation of fine pulmonary parenchymal changes. There is persistent left-sided pleural effusion left lung consolidation, slightly increased Right basilar opacity is unchanged Atherosclerotic changes seen in the aorta. Increased pleural-parenchymal disease on the left     Xr Chest Portable    Result Date: 1/19/2020  EXAMINATION: ONE XRAY VIEW OF THE CHEST 1/19/2020 9:21 pm COMPARISON: 09/22/2019 HISTORY: ORDERING SYSTEM PROVIDED HISTORY: SOB TECHNOLOGIST PROVIDED HISTORY: Reason for exam:->SOB Reason for Exam: increased SOB and chest pain, coughing Acuity: Acute Type of Exam: Initial FINDINGS: Evaluation is limited by the patient's body habitus and the portable technique. Pulmonary edema is again noted. The heart size is unchanged. There is no discernible pneumothorax. Pulmonary edema. Assessment and Plan:  Principal Problem:    COPD with acute exacerbation ([address] -Established problem. Stable. Remains on o2. Plan: appreciate pulm eval. Cont steroids. Convert to PO soon. Active Problems:    CAD (coronary artery disease)-Established problem. Stable. Plan: Continue present orders/plan. Hyperlipemia -Established problem. Stable. Plan: cont statin    Essential hypertension -Established problem. Stable. 141/72  Plan: Continue present orders/plan. UTI (urinary tract infection) -Established problem. Stable. Plan: cont abx    DM2 (diabetes mellitus, type 2) ([address] -Established problem. Stable. FSBS 197; due to steroids?   Plan: stay on ccc diet, sliding scale, home meds    Anxiety -Established problem. Stable. Plan: Continue present orders/plan. Morbid obesity ([address] -Established problem. Stable. bmi 41  Plan: advised to lose weight    Acute bronchitis    Disp - pt is chronically ill. Close to baseline. Can wear o2 atc at home.   Discharge when ok with octavio Knight Most  1/25/2020

## 2020-01-25 NOTE — DISCHARGE INSTR - COC
Continuity of Care Form    Patient Name: Brandon Allen   :    MRN:  [id number]    Admit date:  2020  Discharge date:  20    Code Status Order: Full Code   Advance Directives:   Advance Care Flowsheet Documentation     Date/Time Healthcare Directive Type of Healthcare Directive Copy in [address] Agent's Name Healthcare Agent's Phone Number    20 0250  No, patient does not have an advance directive for healthcare treatment -- -- -- -- --          Admitting Physician:  Vi Manriquez MD  PCP: Vi Manriquez MD    Discharging Nurse: [facility] Unit/Room#: [id number]/[id number]  Discharging Unit Phone Number: [phone]    Emergency Contact:   Extended Emergency Contact Information  Primary Emergency Contact: [address] [place] [address] Phone: [phone]  Relation: Child  Secondary Emergency Contact: Rhea Bedolla  [place] Phone: [phone]  Relation: Grandchild    Past Surgical History:  Past Surgical History:   Procedure Laterality Date    APPENDECTOMY      BRONCHOSCOPY N/A 2020    BRONCHOSCOPY ALVEOLAR LAVAGE performed by Susana Gunter MD at [address] [facility] [address] [facility] [address]      1 stent  and 1 stent     CHOLECYSTECTOMY      COLONOSCOPY      COLONOSCOPY  2018    ENDOSCOPY, COLON, DIAGNOSTIC      EYE SURGERY      bilateral cataracts    GALLBLADDER SURGERY      HYSTERECTOMY      HI EXC SKIN MALIG <5MM REMAINDR BODY N/A 2018    EXCISION OF SCALP SQUAMOUS CELL CARCINOMA performed by Mary Mata MD at [address] [facility] <100SQCM N/A 2018    SPLIT THICKNESS SKIN GRAFT FOR COVERAGE SCALP, APPLICATION OF WOUND [place] performed by Mary Mata MD at [address] ENDOSCOPY  12    with biopsy of stomach,gastritis    UPPER GASTROINTESTINAL ENDOSCOPY  2018    w/esophagael dilation Immunization History:   Immunization History   Administered Date(s) Administered    Influenza 10/11/2013    Influenza Virus Vaccine 10/18/2010, 08/11/2014, 09/10/2015, 09/05/2017    Influenza, High Dose (Fluzone 65 yrs and older) 10/02/2018    Influenza, MDCK Quadv, with preserv IM (Flucelvax 4 yrs and older) 09/08/2017, 09/11/2018    Influenza, Rubina Mimes, IM, (6 mo and older Fluzone, Flulaval, Fluarix and 3 yrs and older Afluria) 09/09/2019    Influenza, Triv, 3 Years and older, IM (Daryl Yuliet (5 yrs and older) 10/20/2016    Pneumococcal Conjugate 13-valent (Earlie Edu) 02/08/2017    Pneumococcal Polysaccharide ([id number]) 12/24/2010, 07/23/2015       Active Problems:  Patient Active Problem List   Diagnosis Code    CAD (coronary artery disease) I25.10    Hyperlipemia E78.5    Essential hypertension I10    UTI (urinary tract infection) N39.0    DM2 (diabetes mellitus, type 2) ([facility] E11.9    Primary osteoarthritis involving multiple joints M15.0    Gastroesophageal reflux disease without esophagitis K21.9    PVC (premature ventricular contraction) I49.3    Anxiety F41.9    Morbid obesity (HCC) E66.01    Precordial pain R07.2    Acute bronchitis J20.9    Irritable bowel syndrome with diarrhea K58.0    COPD with acute exacerbation (HCC) J44.1    AKIL (obstructive sleep apnea) G47.33    SOB (shortness of breath) R06.02    Chest congestion R09.89    DM (diabetes mellitus), secondary uncontrolled (HCC) E13.65    Pyuria R82.81    Ineffective airway clearance R06.89    Pulmonary infiltrate R91.8       Isolation/Infection:   Isolation          No Isolation        Patient Infection Status     None to display          Nurse Assessment:  Last Vital Signs: BP (!) 141/72   Pulse 53   Temp 97.3 °F (36.3 °C) (Oral)   Resp 16   Ht 5' 6\" (1.676 m)   Wt 255 lb 3 oz (115.8 kg)   SpO2 95%   BMI 41.19 kg/m²     Last documented pain score (0-10 scale): Pain Level: 0  Last Weight:   Wt Readings from Last 8:10 PM    CASE MANAGEMENT/SOCIAL WORK SECTION    Inpatient Status Date: 01/19/20    Readmission Risk Assessment Score:  Readmission Risk              Risk of Unplanned Readmission:        24         Discharging to Facility/ Agency   · Name: [facility]  · Address:  · Phone: [phone]  · Fax: [phone]    / signature:Electronically signed by PIOTR Kilgore on 1/25/2020 at 12:00 PM      PHYSICIAN SECTION    Prognosis: Poor    Condition at Discharge: Stable    Rehab Potential (if transferring to Rehab): Good    Recommended Labs or Other Treatments After Discharge: wear oxygen around the clock at 2LPM until re-eval by Dr Harshal Macedo at followup appt in 8-1UG    Physician Certification: I certify the above information and transfer of Belén Walters  is necessary for the continuing treatment of the diagnosis listed and that she requires Home Care for greater 30 days.      Update Admission H&P: No change in H&P    PHYSICIAN SIGNATURE:  Electronically signed by Bryce Dey MD on 1/25/20 at 11:27 AM

## 2020-01-26 ENCOUNTER — APPOINTMENT (OUTPATIENT)
Dept: GENERAL RADIOLOGY | Age: 75
DRG: 190 | End: 2020-01-26
Payer: COMMERCIAL

## 2020-01-26 PROBLEM — J10.1 INFLUENZA B: Status: ACTIVE | Noted: 2020-01-26

## 2020-01-26 LAB
ANION GAP SERPL CALCULATED.3IONS-SCNC: 7 MMOL/L (ref 3–16)
ATYPICAL LYMPHOCYTE RELATIVE PERCENT: 1 % (ref 0–6)
BASOPHILS ABSOLUTE: 0 K/UL (ref 0–0.2)
BASOPHILS RELATIVE PERCENT: 0 %
BUN BLDV-MCNC: 26 MG/DL (ref 7–20)
CALCIUM SERPL-MCNC: 9 MG/DL (ref 8.3–10.6)
CHLORIDE BLD-SCNC: 95 MMOL/L (ref 99–110)
CO2: 30 MMOL/L (ref 21–32)
CREAT SERPL-MCNC: 0.7 MG/DL (ref 0.6–1.2)
CULTURE, RESPIRATORY: NORMAL
EOSINOPHILS ABSOLUTE: 0 K/UL (ref 0–0.6)
EOSINOPHILS RELATIVE PERCENT: 0 %
GFR AFRICAN AMERICAN: >60
GFR NON-AFRICAN AMERICAN: >60
GLUCOSE BLD-MCNC: 159 MG/DL (ref 70–99)
GLUCOSE BLD-MCNC: 189 MG/DL (ref 70–99)
GLUCOSE BLD-MCNC: 212 MG/DL (ref 70–99)
GLUCOSE BLD-MCNC: 250 MG/DL (ref 70–99)
GLUCOSE BLD-MCNC: 280 MG/DL (ref 70–99)
GRAM STAIN RESULT: NORMAL
HCT VFR BLD CALC: 45.3 % (ref 36–48)
HEMOGLOBIN: 14.9 G/DL (ref 12–16)
INFLUENZA A BY PCR: NOT DETECTED
INFLUENZA B BY PCR: DETECTED
LYMPHOCYTES ABSOLUTE: 1.2 K/UL (ref 1–5.1)
LYMPHOCYTES RELATIVE PERCENT: 11 %
MCH RBC QN AUTO: 29.4 PG (ref 26–34)
MCHC RBC AUTO-ENTMCNC: 32.9 G/DL (ref 31–36)
MCV RBC AUTO: 89.3 FL (ref 80–100)
METAMYELOCYTES RELATIVE PERCENT: 1 %
MONOCYTES ABSOLUTE: 1 K/UL (ref 0–1.3)
MONOCYTES RELATIVE PERCENT: 10 %
NEUTROPHILS ABSOLUTE: 7.8 K/UL (ref 1.7–7.7)
NEUTROPHILS RELATIVE PERCENT: 77 %
PDW BLD-RTO: 14.7 % (ref 12.4–15.4)
PERFORMED ON: ABNORMAL
PLATELET # BLD: 204 K/UL (ref 135–450)
PLATELET SLIDE REVIEW: ADEQUATE
PMV BLD AUTO: 8.4 FL (ref 5–10.5)
POTASSIUM SERPL-SCNC: 4.9 MMOL/L (ref 3.5–5.1)
RBC # BLD: 5.07 M/UL (ref 4–5.2)
RBC # BLD: NORMAL 10*6/UL
RSV BY PCR: NOT DETECTED
RSV SOURCE: ABNORMAL
SLIDE REVIEW: ABNORMAL
SODIUM BLD-SCNC: 132 MMOL/L (ref 136–145)
WBC # BLD: 10 K/UL (ref 4–11)

## 2020-01-26 PROCEDURE — 94640 AIRWAY INHALATION TREATMENT: CPT

## 2020-01-26 PROCEDURE — 6370000000 HC RX 637 (ALT 250 FOR IP): Performed by: INTERNAL MEDICINE

## 2020-01-26 PROCEDURE — 1220000000 HC SEMI PRIVATE OB R&B

## 2020-01-26 PROCEDURE — 85025 COMPLETE CBC W/AUTO DIFF WBC: CPT

## 2020-01-26 PROCEDURE — 80048 BASIC METABOLIC PNL TOTAL CA: CPT

## 2020-01-26 PROCEDURE — 94761 N-INVAS EAR/PLS OXIMETRY MLT: CPT

## 2020-01-26 PROCEDURE — 2700000000 HC OXYGEN THERAPY PER DAY

## 2020-01-26 PROCEDURE — 94669 MECHANICAL CHEST WALL OSCILL: CPT

## 2020-01-26 PROCEDURE — 6360000002 HC RX W HCPCS: Performed by: INTERNAL MEDICINE

## 2020-01-26 PROCEDURE — 71046 X-RAY EXAM CHEST 2 VIEWS: CPT

## 2020-01-26 PROCEDURE — 2580000003 HC RX 258: Performed by: INTERNAL MEDICINE

## 2020-01-26 PROCEDURE — 99232 SBSQ HOSP IP/OBS MODERATE 35: CPT | Performed by: INTERNAL MEDICINE

## 2020-01-26 RX ORDER — OSELTAMIVIR PHOSPHATE 75 MG/1
75 CAPSULE ORAL 2 TIMES DAILY
Status: COMPLETED | OUTPATIENT
Start: 2020-01-26 | End: 2020-01-30

## 2020-01-26 RX ADMIN — OSELTAMIVIR PHOSPHATE 75 MG: 75 CAPSULE ORAL at 20:45

## 2020-01-26 RX ADMIN — SALINE NASAL SPRAY 1 SPRAY: 1.5 SOLUTION NASAL at 08:14

## 2020-01-26 RX ADMIN — DIPHENOXYLATE HYDROCHLORIDE AND ATROPINE SULFATE 1 TABLET: 2.5; .025 TABLET ORAL at 21:19

## 2020-01-26 RX ADMIN — OSELTAMIVIR PHOSPHATE 75 MG: 75 CAPSULE ORAL at 11:02

## 2020-01-26 RX ADMIN — INSULIN LISPRO 6 UNITS: 100 INJECTION, SOLUTION INTRAVENOUS; SUBCUTANEOUS at 08:13

## 2020-01-26 RX ADMIN — INSULIN LISPRO 9 UNITS: 100 INJECTION, SOLUTION INTRAVENOUS; SUBCUTANEOUS at 11:31

## 2020-01-26 RX ADMIN — DIPHENOXYLATE HYDROCHLORIDE AND ATROPINE SULFATE 1 TABLET: 2.5; .025 TABLET ORAL at 09:07

## 2020-01-26 RX ADMIN — ALPRAZOLAM 1 MG: 0.5 TABLET ORAL at 09:09

## 2020-01-26 RX ADMIN — SALINE NASAL SPRAY 1 SPRAY: 1.5 SOLUTION NASAL at 20:43

## 2020-01-26 RX ADMIN — GABAPENTIN 300 MG: 300 CAPSULE ORAL at 08:15

## 2020-01-26 RX ADMIN — AMOXICILLIN AND CLAVULANATE POTASSIUM 1 TABLET: 875; 125 TABLET, FILM COATED ORAL at 20:48

## 2020-01-26 RX ADMIN — MICONAZOLE NITRATE: 20 POWDER TOPICAL at 08:14

## 2020-01-26 RX ADMIN — INSULIN GLARGINE 40 UNITS: 100 INJECTION, SOLUTION SUBCUTANEOUS at 20:45

## 2020-01-26 RX ADMIN — INSULIN LISPRO 5 UNITS: 100 INJECTION, SOLUTION INTRAVENOUS; SUBCUTANEOUS at 20:45

## 2020-01-26 RX ADMIN — DICLOFENAC 2 G: 10 GEL TOPICAL at 08:14

## 2020-01-26 RX ADMIN — METHYLPREDNISOLONE SODIUM SUCCINATE 40 MG: 40 INJECTION, POWDER, FOR SOLUTION INTRAMUSCULAR; INTRAVENOUS at 01:30

## 2020-01-26 RX ADMIN — ISOSORBIDE MONONITRATE 60 MG: 30 TABLET, EXTENDED RELEASE ORAL at 09:06

## 2020-01-26 RX ADMIN — IPRATROPIUM BROMIDE AND ALBUTEROL SULFATE 3 ML: .5; 3 SOLUTION RESPIRATORY (INHALATION) at 19:26

## 2020-01-26 RX ADMIN — GUAIFENESIN 600 MG: 600 TABLET, EXTENDED RELEASE ORAL at 20:45

## 2020-01-26 RX ADMIN — ACETYLCYSTEINE 600 MG: 200 SOLUTION ORAL; RESPIRATORY (INHALATION) at 08:28

## 2020-01-26 RX ADMIN — GUAIFENESIN 600 MG: 600 TABLET, EXTENDED RELEASE ORAL at 08:16

## 2020-01-26 RX ADMIN — IPRATROPIUM BROMIDE AND ALBUTEROL SULFATE 3 ML: .5; 3 SOLUTION RESPIRATORY (INHALATION) at 08:28

## 2020-01-26 RX ADMIN — LISINOPRIL 2.5 MG: 5 TABLET ORAL at 08:16

## 2020-01-26 RX ADMIN — ROSUVASTATIN CALCIUM 10 MG: 10 TABLET, FILM COATED ORAL at 09:06

## 2020-01-26 RX ADMIN — METHYLPREDNISOLONE SODIUM SUCCINATE 40 MG: 40 INJECTION, POWDER, FOR SOLUTION INTRAMUSCULAR; INTRAVENOUS at 14:17

## 2020-01-26 RX ADMIN — IPRATROPIUM BROMIDE AND ALBUTEROL SULFATE 3 ML: .5; 3 SOLUTION RESPIRATORY (INHALATION) at 13:03

## 2020-01-26 RX ADMIN — ALPRAZOLAM 1 MG: 0.5 TABLET ORAL at 22:10

## 2020-01-26 RX ADMIN — GABAPENTIN 300 MG: 300 CAPSULE ORAL at 22:11

## 2020-01-26 RX ADMIN — INSULIN LISPRO 10 UNITS: 100 INJECTION, SOLUTION INTRAVENOUS; SUBCUTANEOUS at 16:48

## 2020-01-26 RX ADMIN — MICONAZOLE NITRATE: 20 POWDER TOPICAL at 20:43

## 2020-01-26 RX ADMIN — ASPIRIN 81 MG 81 MG: 81 TABLET ORAL at 08:16

## 2020-01-26 RX ADMIN — ACETYLCYSTEINE 600 MG: 200 SOLUTION ORAL; RESPIRATORY (INHALATION) at 19:24

## 2020-01-26 RX ADMIN — AMOXICILLIN AND CLAVULANATE POTASSIUM 1 TABLET: 875; 125 TABLET, FILM COATED ORAL at 08:14

## 2020-01-26 RX ADMIN — INSULIN LISPRO 10 UNITS: 100 INJECTION, SOLUTION INTRAVENOUS; SUBCUTANEOUS at 11:31

## 2020-01-26 RX ADMIN — DICLOFENAC 2 G: 10 GEL TOPICAL at 20:43

## 2020-01-26 RX ADMIN — INSULIN LISPRO 10 UNITS: 100 INJECTION, SOLUTION INTRAVENOUS; SUBCUTANEOUS at 08:13

## 2020-01-26 RX ADMIN — INSULIN LISPRO 3 UNITS: 100 INJECTION, SOLUTION INTRAVENOUS; SUBCUTANEOUS at 16:48

## 2020-01-26 RX ADMIN — ENOXAPARIN SODIUM 40 MG: 40 INJECTION SUBCUTANEOUS at 08:15

## 2020-01-26 RX ADMIN — PANTOPRAZOLE SODIUM 40 MG: 40 TABLET, DELAYED RELEASE ORAL at 05:48

## 2020-01-26 RX ADMIN — SALINE NASAL SPRAY 1 SPRAY: 1.5 SOLUTION NASAL at 14:17

## 2020-01-26 RX ADMIN — Medication 10 ML: at 08:15

## 2020-01-26 ASSESSMENT — PAIN SCALES - GENERAL
PAINLEVEL_OUTOF10: 0
PAINLEVEL_OUTOF10: 5
PAINLEVEL_OUTOF10: 0

## 2020-01-26 NOTE — PROGRESS NOTES
Shift assessment completed, see flowsheets. Medications administered, see MAR. Plan of care discussed with pt and/or family. Pt denies further needs at this time. Will continue to monitor.

## 2020-01-26 NOTE — PROGRESS NOTES
Progress Note - Dr. Oli Marcus - Internal Medicine  PCP: Linda Jaquez MD [address] [facility] [phone]    Hospital Day: 7  Code Status: Full Code  Current Diet: DIET CARB CONTROL;        CC: follow up on medical issues    Subjective:   Mando Schrader is a 69 y.o. female. She complains of problems    Pt feels worse today  Flu b positive  Short of breath    Poss bronch on Monday per pulm      She denies chest pain, complains of shortness of breath, denies nausea,  denies emesis. 10 system Review of Systems is reviewed with patient, and pertinent positives are listed here: None . Otherwise, Review of systems is negative. I have reviewed the patient's medical and social history in detail and updated the computerized patient record. To recap: She  has a past medical history of Acute MI ([address] Acute pyelonephritis, Anxiety and depression, Arthritis, CAD (coronary artery disease), Cancer ([address] Cancer ([address] Cancer of skin of leg, Chronic obstructive pulmonary disease ([address] Claustrophobia, COPD (chronic obstructive pulmonary disease) ([address] Esophageal stricture, Gastroesophageal reflux disease without esophagitis, Hiatal hernia, Hiatal hernia, Hypercholesteremia, Hypertension, Migraines, Movement disorder, Pneumonia, PONV (postoperative nausea and vomiting), Type II or unspecified type diabetes mellitus without mention of complication, not stated as uncontrolled, and Unspecified cerebral artery occlusion with cerebral infarction. . She  has a past surgical history that includes Cardiac surgery; Gallbladder surgery; Hysterectomy; Appendectomy; Cholecystectomy; Colonoscopy; Upper gastrointestinal endoscopy (4/20/12); Endoscopy, colon, diagnostic; Tear duct surgery; Upper gastrointestinal endoscopy (06/11/2018);  Colonoscopy (06/11/2018); pr exc skin malig <5mm remaindr body (N/A, 9/6/2018); pr split grft trunk,arm,leg <100sqcm (N/A, 9/6/2018); skin biopsy; eye surgery; and bronchoscopy (N/A, 1/24/2020). . She  reports that she quit smoking about 2 years ago. Her smoking use included cigarettes. She has a 12.25 pack-year smoking history. She has never used smokeless tobacco. She reports current alcohol use of about 1.0 standard drinks of alcohol per week. She reports that she does not use drugs. .        Active Hospital Problems    Diagnosis Date Noted    Hyperlipemia [E78.5] 05/23/2011     Priority: High    CAD (coronary artery disease) [I25.10] 05/23/2011     Priority: High    Influenza B [J10.1] 01/26/2020    Pulmonary infiltrate [R91.8] 01/24/2020    COPD with acute exacerbation (HCC) [J44.1] 09/22/2019    Acute bronchitis [J20.9] 04/25/2019    [facility] [E66.01] 06/18/2018    Anxiety [F41.9] 01/19/2018    UTI (urinary tract infection) [N39.0] 12/03/2015    DM2 (diabetes mellitus, type 2) ([facility] [E11.9] 12/03/2015    Essential hypertension [I10] 04/18/2012       Current Facility-Administered Medications: amoxicillin-clavulanate (AUGMENTIN) 875-125 MG per tablet 1 tablet, 1 tablet, Oral, 2 times per day  insulin lispro (1 Unit Dial) 10 Units, 10 Units, Subcutaneous, TID WC  methylPREDNISolone sodium (SOLU-MEDROL) injection 40 mg, 40 mg, Intravenous, Q12H  acetylcysteine (MUCOMYST) 20 % solution 600 mg, 600 mg, Inhalation, BID  sodium chloride (OCEAN, BABY AYR) 0.65 % nasal spray 1 spray, 1 spray, Each Nostril, TID  sodium chloride flush 0.9 % injection 10 mL, 10 mL, Intravenous, BID  sodium chloride flush 0.9 % injection 10 mL, 10 mL, Intravenous, PRN  potassium chloride (KLOR-CON M) extended release tablet 40 mEq, 40 mEq, Oral, PRN **OR** potassium bicarb-citric acid (EFFER-K) effervescent tablet 40 mEq, 40 mEq, Oral, PRN **OR** potassium chloride 10 mEq/100 mL IVPB (Peripheral Line), 10 mEq, Intravenous, PRN  0.9 % sodium chloride bolus, 500 mL, Intravenous, PRN  hydrALAZINE (APRESOLINE) injection 10 mg, 10 mg, Intravenous, Q6H PRN  insulin lispro (1 Unit Dial) 0-18 Units, 0-18 SpO2 95%   BMI 41.19 kg/m²      Patient Vitals for the past 24 hrs:   BP Temp Temp src Pulse Resp SpO2   01/26/20 0831 -- -- -- -- 18 95 %   01/26/20 0745 126/67 97.1 °F (36.2 °C) Oral 52 18 94 %   01/26/20 0530 130/69 97.1 °F (36.2 °C) Oral 54 18 94 %   01/26/20 0115 (!) 145/68 97.1 °F (36.2 °C) Oral 63 16 92 %   01/25/20 2030 133/63 97.1 °F (36.2 °C) Oral 76 16 91 %   01/25/20 1928 -- -- -- -- -- (!) 89 %   01/25/20 1224 -- -- -- -- 18 91 %     No data found. No intake or output data in the 24 hours ending 01/26/20 0943      Physical Exam:   S1, S2 normal, no murmur, rub or gallop, regular rate and rhythm  Rales bilat  abdomen is soft without significant tenderness, masses, organomegaly or guarding  extremities normal, atraumatic, no cyanosis or edema    Labs:  Lab Results   Component Value Date    WBC 10.0 01/26/2020    HGB 14.9 01/26/2020    HCT 45.3 01/26/2020     01/26/2020    CHOL 226 (H) 11/07/2019    TRIG 300 (H) 11/07/2019    HDL 34 (L) 11/07/2019    ALT 15 01/19/2020    AST 18 01/19/2020     (L) 01/26/2020    K 4.9 01/26/2020    CL 95 (L) 01/26/2020    CREATININE 0.7 01/26/2020    BUN 26 (H) 01/26/2020    CO2 30 01/26/2020    TSH 2.34 06/06/2011    INR 1.03 09/18/2019    LABA1C 7.7 01/19/2020    LABMICR YES 01/19/2020     Lab Results   Component Value Date    CKTOTAL 24 (L) 06/07/2018    CKMB 0.29 08/09/2011    TROPONINI <0.01 01/19/2020       Recent Imaging Results are Reviewed:  Xr Chest Standard (2 Vw)    Result Date: 1/25/2020  EXAMINATION: TWO XRAY VIEWS OF THE CHEST 1/25/2020 4:05 pm COMPARISON: 01/24/2020 and earlier studies. HISTORY: ORDERING SYSTEM PROVIDED HISTORY: cough TECHNOLOGIST PROVIDED HISTORY: Reason for exam:->cough Reason for Exam: COUGH, CONGESTION AND SHORTNESS OF BREATH. FORMER SMOKER. HISTORY OF DIABETES, HTN, COPD, SKIN CANCER, CAD AND ACUTE MI Acuity: Unknown Type of Exam: Subsequent/Follow-up FINDINGS: Heart is slightly enlarged.   The pulmonary vasculature is mildly to moderately prominent and more prominent than on the prior study. Small amount of opacity present in the lingula along the left heart border. No sizable pleural effusion. Mild underlying emphysematous changes suspected. Mild-to-moderate pulmonary vascular congestion worse than yesterday. Focal lingular airspace disease either atelectasis or pneumonia slightly improved. No pleural effusion. Mild emphysematous changes. Xr Chest Portable    Result Date: 1/24/2020  EXAMINATION: ONE XRAY VIEW OF THE CHEST 1/24/2020 5:52 am COMPARISON: 01/19/2020 HISTORY: ORDERING SYSTEM PROVIDED HISTORY: sob TECHNOLOGIST PROVIDED HISTORY: Reason for exam:->sob Reason for Exam: sob Acuity: Unknown Type of Exam: Unknown FINDINGS: Lung volumes are low accentuating heart size and bronchovascular markings at the lung bases. Patient body habitus limits evaluation of fine pulmonary parenchymal changes. There is persistent left-sided pleural effusion left lung consolidation, slightly increased Right basilar opacity is unchanged Atherosclerotic changes seen in the aorta. Increased pleural-parenchymal disease on the left     Xr Chest Portable    Result Date: 1/19/2020  EXAMINATION: ONE XRAY VIEW OF THE CHEST 1/19/2020 9:21 pm COMPARISON: 09/22/2019 HISTORY: ORDERING SYSTEM PROVIDED HISTORY: SOB TECHNOLOGIST PROVIDED HISTORY: Reason for exam:->SOB Reason for Exam: increased SOB and chest pain, coughing Acuity: Acute Type of Exam: Initial FINDINGS: Evaluation is limited by the patient's body habitus and the portable technique. Pulmonary edema is again noted. The heart size is unchanged. There is no discernible pneumothorax. Pulmonary edema. Assessment and Plan:  Principal Problem:    COPD with acute exacerbation ([address] -Established problem. Stable.  Remains on o2. Plan: appreciate pulm eval. Cont steroids. Convert to PO soon. Active Problems:    CAD (coronary artery disease)-Established problem.

## 2020-01-26 NOTE — PROGRESS NOTES
organomegaly  Lymph Nodes: Cervical, supraclavicular normal  Extremities: no cyanosis, clubbing or edema  Musculoskeletal: normal range of motion, no joint swelling, deformity or tenderness  Neurologic: alert, no focal neurologic deficits    Data Review:  CBC:   Lab Results   Component Value Date    WBC 10.0 01/26/2020    RBC 5.07 01/26/2020     BMP:   Lab Results   Component Value Date    GLUCOSE 189 01/26/2020    CO2 30 01/26/2020    BUN 26 01/26/2020    CREATININE 0.7 01/26/2020    CALCIUM 9.0 01/26/2020     ABG:   Lab Results   Component Value Date    MUS8PNT 22.6 02/07/2017    BEART -0.6 02/07/2017    P7QPBMMO 96.6 02/07/2017    PHART 7.446 02/07/2017    IPR0ARH 33.6 02/07/2017    PO2ART 77.3 02/07/2017    KBJ2DEB 23.7 02/07/2017       Radiology: All pertinent images / reports were reviewed as a part of this visit. Narrative   EXAMINATION:   TWO XRAY VIEWS OF THE CHEST       1/25/2020 4:05 pm       COMPARISON:   01/24/2020 and earlier studies.       HISTORY:   ORDERING SYSTEM PROVIDED HISTORY: cough   TECHNOLOGIST PROVIDED HISTORY:   Reason for exam:->cough   Reason for Exam: COUGH, CONGESTION AND SHORTNESS OF BREATH. FORMER SMOKER. HISTORY OF DIABETES, HTN, COPD, SKIN CANCER, CAD AND ACUTE MI   Acuity: Unknown   Type of Exam: Subsequent/Follow-up       FINDINGS:   Heart is slightly enlarged.  The pulmonary vasculature is mildly to   moderately prominent and more prominent than on the prior study.  Small   amount of opacity present in the lingula along the left heart border.  No   sizable pleural effusion.  Mild underlying emphysematous changes suspected.           Impression   Mild-to-moderate pulmonary vascular congestion worse than yesterday.  Focal   lingular airspace disease either atelectasis or pneumonia slightly improved. No pleural effusion.  Mild emphysematous changes.          Problem List:     COPD with acute exacerbation  Mucous plugging of airway    Assessment/Plan:     Patient underwent bronchoscopy yesterday with significant mucus plugging noted. Chest x-ray still shows significant infiltrate at the left base. On 2 L O2. Influenza B negative on respiratory viral panel, started on Tamiflu. Still has significant chest congestion on exam, add chest vest therapy. Acapella valve. No response to above therapy, will organize for bronchoscopy tomorrow a.m. for bronchial toileting. Persistent infiltrate noted at the left base.     Rhonda Mensah MD

## 2020-01-27 ENCOUNTER — ANESTHESIA (OUTPATIENT)
Dept: ENDOSCOPY | Age: 75
DRG: 190 | End: 2020-01-27
Payer: COMMERCIAL

## 2020-01-27 ENCOUNTER — ANESTHESIA EVENT (OUTPATIENT)
Dept: ENDOSCOPY | Age: 75
DRG: 190 | End: 2020-01-27
Payer: COMMERCIAL

## 2020-01-27 VITALS
RESPIRATION RATE: 18 BRPM | OXYGEN SATURATION: 97 % | DIASTOLIC BLOOD PRESSURE: 57 MMHG | SYSTOLIC BLOOD PRESSURE: 106 MMHG

## 2020-01-27 LAB
ANION GAP SERPL CALCULATED.3IONS-SCNC: 9 MMOL/L (ref 3–16)
BANDED NEUTROPHILS RELATIVE PERCENT: 2 % (ref 0–7)
BASOPHILS ABSOLUTE: 0 K/UL (ref 0–0.2)
BASOPHILS RELATIVE PERCENT: 0 %
BUN BLDV-MCNC: 28 MG/DL (ref 7–20)
CALCIUM SERPL-MCNC: 8.6 MG/DL (ref 8.3–10.6)
CHLORIDE BLD-SCNC: 97 MMOL/L (ref 99–110)
CO2: 27 MMOL/L (ref 21–32)
CREAT SERPL-MCNC: 0.7 MG/DL (ref 0.6–1.2)
EOSINOPHILS ABSOLUTE: 0 K/UL (ref 0–0.6)
EOSINOPHILS RELATIVE PERCENT: 0 %
GFR AFRICAN AMERICAN: >60
GFR NON-AFRICAN AMERICAN: >60
GLUCOSE BLD-MCNC: 129 MG/DL (ref 70–99)
GLUCOSE BLD-MCNC: 201 MG/DL (ref 70–99)
GLUCOSE BLD-MCNC: 222 MG/DL (ref 70–99)
GLUCOSE BLD-MCNC: 227 MG/DL (ref 70–99)
GLUCOSE BLD-MCNC: 245 MG/DL (ref 70–99)
HCT VFR BLD CALC: 44.8 % (ref 36–48)
HEMOGLOBIN: 14.8 G/DL (ref 12–16)
INR BLD: 0.97 (ref 0.86–1.14)
LYMPHOCYTES ABSOLUTE: 1.1 K/UL (ref 1–5.1)
LYMPHOCYTES RELATIVE PERCENT: 12 %
MCH RBC QN AUTO: 29.4 PG (ref 26–34)
MCHC RBC AUTO-ENTMCNC: 33 G/DL (ref 31–36)
MCV RBC AUTO: 89 FL (ref 80–100)
MONOCYTES ABSOLUTE: 0.6 K/UL (ref 0–1.3)
MONOCYTES RELATIVE PERCENT: 7 %
NEUTROPHILS ABSOLUTE: 7.5 K/UL (ref 1.7–7.7)
NEUTROPHILS RELATIVE PERCENT: 79 %
PDW BLD-RTO: 14.5 % (ref 12.4–15.4)
PERFORMED ON: ABNORMAL
PLATELET # BLD: 167 K/UL (ref 135–450)
PLATELET SLIDE REVIEW: ADEQUATE
PMV BLD AUTO: 8.3 FL (ref 5–10.5)
POTASSIUM SERPL-SCNC: 5.2 MMOL/L (ref 3.5–5.1)
PROTHROMBIN TIME: 11.3 SEC (ref 10–13.2)
RBC # BLD: 5.04 M/UL (ref 4–5.2)
SLIDE REVIEW: NORMAL
SODIUM BLD-SCNC: 133 MMOL/L (ref 136–145)
WBC # BLD: 9.2 K/UL (ref 4–11)

## 2020-01-27 PROCEDURE — 2580000003 HC RX 258: Performed by: INTERNAL MEDICINE

## 2020-01-27 PROCEDURE — 3700000000 HC ANESTHESIA ATTENDED CARE: Performed by: INTERNAL MEDICINE

## 2020-01-27 PROCEDURE — 6370000000 HC RX 637 (ALT 250 FOR IP): Performed by: INTERNAL MEDICINE

## 2020-01-27 PROCEDURE — 36415 COLL VENOUS BLD VENIPUNCTURE: CPT

## 2020-01-27 PROCEDURE — 6360000002 HC RX W HCPCS: Performed by: NURSE ANESTHETIST, CERTIFIED REGISTERED

## 2020-01-27 PROCEDURE — 2580000003 HC RX 258: Performed by: NURSE ANESTHETIST, CERTIFIED REGISTERED

## 2020-01-27 PROCEDURE — 80048 BASIC METABOLIC PNL TOTAL CA: CPT

## 2020-01-27 PROCEDURE — 6370000000 HC RX 637 (ALT 250 FOR IP): Performed by: ANESTHESIOLOGY

## 2020-01-27 PROCEDURE — 31622 DX BRONCHOSCOPE/WASH: CPT | Performed by: INTERNAL MEDICINE

## 2020-01-27 PROCEDURE — 85025 COMPLETE CBC W/AUTO DIFF WBC: CPT

## 2020-01-27 PROCEDURE — 2580000003 HC RX 258: Performed by: ANESTHESIOLOGY

## 2020-01-27 PROCEDURE — 94669 MECHANICAL CHEST WALL OSCILL: CPT

## 2020-01-27 PROCEDURE — 6360000002 HC RX W HCPCS: Performed by: INTERNAL MEDICINE

## 2020-01-27 PROCEDURE — 94761 N-INVAS EAR/PLS OXIMETRY MLT: CPT

## 2020-01-27 PROCEDURE — 2709999900 HC NON-CHARGEABLE SUPPLY: Performed by: INTERNAL MEDICINE

## 2020-01-27 PROCEDURE — 3700000001 HC ADD 15 MINUTES (ANESTHESIA): Performed by: INTERNAL MEDICINE

## 2020-01-27 PROCEDURE — 99232 SBSQ HOSP IP/OBS MODERATE 35: CPT | Performed by: INTERNAL MEDICINE

## 2020-01-27 PROCEDURE — 0B938ZZ DRAINAGE OF RIGHT MAIN BRONCHUS, VIA NATURAL OR ARTIFICIAL OPENING ENDOSCOPIC: ICD-10-PCS | Performed by: INTERNAL MEDICINE

## 2020-01-27 PROCEDURE — 2700000000 HC OXYGEN THERAPY PER DAY

## 2020-01-27 PROCEDURE — 7100000001 HC PACU RECOVERY - ADDTL 15 MIN: Performed by: INTERNAL MEDICINE

## 2020-01-27 PROCEDURE — 7100000000 HC PACU RECOVERY - FIRST 15 MIN: Performed by: INTERNAL MEDICINE

## 2020-01-27 PROCEDURE — 1220000000 HC SEMI PRIVATE OB R&B

## 2020-01-27 PROCEDURE — 2500000003 HC RX 250 WO HCPCS: Performed by: NURSE ANESTHETIST, CERTIFIED REGISTERED

## 2020-01-27 PROCEDURE — 85610 PROTHROMBIN TIME: CPT

## 2020-01-27 PROCEDURE — 94640 AIRWAY INHALATION TREATMENT: CPT

## 2020-01-27 PROCEDURE — 3609027000 HC BRONCHOSCOPY: Performed by: INTERNAL MEDICINE

## 2020-01-27 RX ORDER — SODIUM CHLORIDE 9 MG/ML
INJECTION, SOLUTION INTRAVENOUS CONTINUOUS
Status: CANCELLED | OUTPATIENT
Start: 2020-01-27

## 2020-01-27 RX ORDER — ONDANSETRON 2 MG/ML
4 INJECTION INTRAMUSCULAR; INTRAVENOUS
Status: DISCONTINUED | OUTPATIENT
Start: 2020-01-27 | End: 2020-01-27 | Stop reason: HOSPADM

## 2020-01-27 RX ORDER — METHYLPREDNISOLONE SODIUM SUCCINATE 40 MG/ML
40 INJECTION, POWDER, LYOPHILIZED, FOR SOLUTION INTRAMUSCULAR; INTRAVENOUS DAILY
Status: DISCONTINUED | OUTPATIENT
Start: 2020-01-28 | End: 2020-01-30

## 2020-01-27 RX ORDER — SODIUM CHLORIDE 0.9 % (FLUSH) 0.9 %
10 SYRINGE (ML) INJECTION EVERY 12 HOURS SCHEDULED
Status: CANCELLED | OUTPATIENT
Start: 2020-01-27

## 2020-01-27 RX ORDER — PROMETHAZINE HYDROCHLORIDE 25 MG/ML
6.25 INJECTION, SOLUTION INTRAMUSCULAR; INTRAVENOUS
Status: DISCONTINUED | OUTPATIENT
Start: 2020-01-27 | End: 2020-01-27 | Stop reason: HOSPADM

## 2020-01-27 RX ORDER — SODIUM CHLORIDE 9 MG/ML
INJECTION, SOLUTION INTRAVENOUS CONTINUOUS
Status: DISCONTINUED | OUTPATIENT
Start: 2020-01-27 | End: 2020-01-30

## 2020-01-27 RX ORDER — SODIUM CHLORIDE 0.9 % (FLUSH) 0.9 %
10 SYRINGE (ML) INJECTION PRN
Status: CANCELLED | OUTPATIENT
Start: 2020-01-27

## 2020-01-27 RX ORDER — IPRATROPIUM BROMIDE AND ALBUTEROL SULFATE 2.5; .5 MG/3ML; MG/3ML
1 SOLUTION RESPIRATORY (INHALATION) ONCE
Status: COMPLETED | OUTPATIENT
Start: 2020-01-27 | End: 2020-01-27

## 2020-01-27 RX ORDER — ONDANSETRON 2 MG/ML
INJECTION INTRAMUSCULAR; INTRAVENOUS PRN
Status: DISCONTINUED | OUTPATIENT
Start: 2020-01-27 | End: 2020-01-27 | Stop reason: SDUPTHER

## 2020-01-27 RX ORDER — SODIUM CHLORIDE 9 MG/ML
INJECTION, SOLUTION INTRAVENOUS CONTINUOUS PRN
Status: DISCONTINUED | OUTPATIENT
Start: 2020-01-27 | End: 2020-01-27 | Stop reason: SDUPTHER

## 2020-01-27 RX ORDER — LABETALOL HYDROCHLORIDE 5 MG/ML
5 INJECTION, SOLUTION INTRAVENOUS EVERY 10 MIN PRN
Status: DISCONTINUED | OUTPATIENT
Start: 2020-01-27 | End: 2020-01-27 | Stop reason: HOSPADM

## 2020-01-27 RX ORDER — PROPOFOL 10 MG/ML
INJECTION, EMULSION INTRAVENOUS PRN
Status: DISCONTINUED | OUTPATIENT
Start: 2020-01-27 | End: 2020-01-27 | Stop reason: SDUPTHER

## 2020-01-27 RX ORDER — LIDOCAINE HYDROCHLORIDE 20 MG/ML
INJECTION, SOLUTION INFILTRATION; PERINEURAL PRN
Status: DISCONTINUED | OUTPATIENT
Start: 2020-01-27 | End: 2020-01-27 | Stop reason: SDUPTHER

## 2020-01-27 RX ORDER — LIDOCAINE HYDROCHLORIDE 40 MG/ML
SOLUTION TOPICAL PRN
Status: DISCONTINUED | OUTPATIENT
Start: 2020-01-27 | End: 2020-01-27 | Stop reason: ALTCHOICE

## 2020-01-27 RX ADMIN — Medication 10 ML: at 12:29

## 2020-01-27 RX ADMIN — SALINE NASAL SPRAY 1 SPRAY: 1.5 SOLUTION NASAL at 08:06

## 2020-01-27 RX ADMIN — DIPHENOXYLATE HYDROCHLORIDE AND ATROPINE SULFATE 1 TABLET: 2.5; .025 TABLET ORAL at 20:59

## 2020-01-27 RX ADMIN — PROPOFOL 100 MG: 10 INJECTION, EMULSION INTRAVENOUS at 09:56

## 2020-01-27 RX ADMIN — GUAIFENESIN 600 MG: 600 TABLET, EXTENDED RELEASE ORAL at 12:28

## 2020-01-27 RX ADMIN — OSELTAMIVIR PHOSPHATE 75 MG: 75 CAPSULE ORAL at 20:25

## 2020-01-27 RX ADMIN — AMOXICILLIN AND CLAVULANATE POTASSIUM 1 TABLET: 875; 125 TABLET, FILM COATED ORAL at 20:24

## 2020-01-27 RX ADMIN — IPRATROPIUM BROMIDE AND ALBUTEROL SULFATE 3 ML: .5; 3 SOLUTION RESPIRATORY (INHALATION) at 19:13

## 2020-01-27 RX ADMIN — INSULIN LISPRO 6 UNITS: 100 INJECTION, SOLUTION INTRAVENOUS; SUBCUTANEOUS at 11:25

## 2020-01-27 RX ADMIN — INSULIN LISPRO 3 UNITS: 100 INJECTION, SOLUTION INTRAVENOUS; SUBCUTANEOUS at 20:59

## 2020-01-27 RX ADMIN — AMOXICILLIN AND CLAVULANATE POTASSIUM 1 TABLET: 875; 125 TABLET, FILM COATED ORAL at 12:19

## 2020-01-27 RX ADMIN — IPRATROPIUM BROMIDE AND ALBUTEROL SULFATE 3 ML: .5; 3 SOLUTION RESPIRATORY (INHALATION) at 13:17

## 2020-01-27 RX ADMIN — LISINOPRIL 2.5 MG: 5 TABLET ORAL at 08:05

## 2020-01-27 RX ADMIN — METHYLPREDNISOLONE SODIUM SUCCINATE 40 MG: 40 INJECTION, POWDER, FOR SOLUTION INTRAMUSCULAR; INTRAVENOUS at 01:50

## 2020-01-27 RX ADMIN — MICONAZOLE NITRATE: 20 POWDER TOPICAL at 08:07

## 2020-01-27 RX ADMIN — SODIUM CHLORIDE: 9 INJECTION, SOLUTION INTRAVENOUS at 17:13

## 2020-01-27 RX ADMIN — INSULIN GLARGINE 40 UNITS: 100 INJECTION, SOLUTION SUBCUTANEOUS at 20:59

## 2020-01-27 RX ADMIN — ISOSORBIDE MONONITRATE 60 MG: 30 TABLET, EXTENDED RELEASE ORAL at 08:09

## 2020-01-27 RX ADMIN — ENOXAPARIN SODIUM 40 MG: 40 INJECTION SUBCUTANEOUS at 12:28

## 2020-01-27 RX ADMIN — GABAPENTIN 300 MG: 300 CAPSULE ORAL at 12:28

## 2020-01-27 RX ADMIN — GABAPENTIN 300 MG: 300 CAPSULE ORAL at 21:02

## 2020-01-27 RX ADMIN — OSELTAMIVIR PHOSPHATE 75 MG: 75 CAPSULE ORAL at 12:20

## 2020-01-27 RX ADMIN — SALINE NASAL SPRAY 1 SPRAY: 1.5 SOLUTION NASAL at 12:29

## 2020-01-27 RX ADMIN — ONDANSETRON 4 MG: 2 INJECTION INTRAMUSCULAR; INTRAVENOUS at 10:07

## 2020-01-27 RX ADMIN — IPRATROPIUM BROMIDE AND ALBUTEROL SULFATE 1 AMPULE: .5; 3 SOLUTION RESPIRATORY (INHALATION) at 08:55

## 2020-01-27 RX ADMIN — IPRATROPIUM BROMIDE AND ALBUTEROL SULFATE 3 ML: .5; 3 SOLUTION RESPIRATORY (INHALATION) at 16:04

## 2020-01-27 RX ADMIN — DIPHENOXYLATE HYDROCHLORIDE AND ATROPINE SULFATE 1 TABLET: 2.5; .025 TABLET ORAL at 08:09

## 2020-01-27 RX ADMIN — DICLOFENAC 2 G: 10 GEL TOPICAL at 08:07

## 2020-01-27 RX ADMIN — INSULIN LISPRO 10 UNITS: 100 INJECTION, SOLUTION INTRAVENOUS; SUBCUTANEOUS at 16:50

## 2020-01-27 RX ADMIN — ASPIRIN 81 MG 81 MG: 81 TABLET ORAL at 12:27

## 2020-01-27 RX ADMIN — INSULIN LISPRO 10 UNITS: 100 INJECTION, SOLUTION INTRAVENOUS; SUBCUTANEOUS at 11:26

## 2020-01-27 RX ADMIN — LIDOCAINE HYDROCHLORIDE 60 MG: 20 INJECTION, SOLUTION INFILTRATION; PERINEURAL at 09:57

## 2020-01-27 RX ADMIN — MICONAZOLE NITRATE: 20 POWDER TOPICAL at 20:19

## 2020-01-27 RX ADMIN — ROSUVASTATIN CALCIUM 10 MG: 10 TABLET, FILM COATED ORAL at 12:20

## 2020-01-27 RX ADMIN — DICLOFENAC 2 G: 10 GEL TOPICAL at 20:18

## 2020-01-27 RX ADMIN — PROPOFOL 50 MG: 10 INJECTION, EMULSION INTRAVENOUS at 10:00

## 2020-01-27 RX ADMIN — SALINE NASAL SPRAY 1 SPRAY: 1.5 SOLUTION NASAL at 20:18

## 2020-01-27 RX ADMIN — SODIUM CHLORIDE: 9 INJECTION, SOLUTION INTRAVENOUS at 08:54

## 2020-01-27 RX ADMIN — ALPRAZOLAM 1 MG: 0.5 TABLET ORAL at 14:11

## 2020-01-27 RX ADMIN — ALPRAZOLAM 1 MG: 0.5 TABLET ORAL at 23:34

## 2020-01-27 RX ADMIN — SODIUM CHLORIDE: 9 INJECTION, SOLUTION INTRAVENOUS at 09:55

## 2020-01-27 RX ADMIN — SODIUM CHLORIDE: 9 INJECTION, SOLUTION INTRAVENOUS at 10:02

## 2020-01-27 RX ADMIN — GUAIFENESIN 600 MG: 600 TABLET, EXTENDED RELEASE ORAL at 20:24

## 2020-01-27 ASSESSMENT — PAIN SCALES - GENERAL
PAINLEVEL_OUTOF10: 0

## 2020-01-27 ASSESSMENT — PAIN - FUNCTIONAL ASSESSMENT: PAIN_FUNCTIONAL_ASSESSMENT: 0-10

## 2020-01-27 ASSESSMENT — ENCOUNTER SYMPTOMS: SHORTNESS OF BREATH: 1

## 2020-01-27 NOTE — PROGRESS NOTES
[facility] Pulmonary/CCM Progress note      Admit Date: 1/19/2020    Chief Complaint: Shortness of breath and chest congestion    Subjective: Interval History: Congestion of the chest continues, unable to bring up phlegm. Persistent left basilar infiltrate noted on chest x-ray. Requires 2 L O2. Noted to have influenza B.     Scheduled Meds:   oseltamivir  75 mg Oral BID    amoxicillin-clavulanate  1 tablet Oral 2 times per day    insulin lispro  10 Units Subcutaneous TID WC    methylPREDNISolone  40 mg Intravenous Q12H    acetylcysteine  600 mg Inhalation BID    sodium chloride  1 spray Each Nostril TID    sodium chloride flush  10 mL Intravenous BID    insulin lispro  0-18 Units Subcutaneous TID WC    insulin lispro  0-9 Units Subcutaneous Nightly    guaiFENesin  600 mg Oral BID    miconazole   Topical BID    insulin glargine  40 Units Subcutaneous Nightly    aspirin  81 mg Oral Daily    diclofenac sodium  2 g Topical BID    gabapentin  300 mg Oral BID    ipratropium-albuterol  3 mL Inhalation Q4H WA    isosorbide mononitrate  60 mg Oral Daily    lisinopril  2.5 mg Oral Daily    pantoprazole  40 mg Oral QAM AC    rosuvastatin  10 mg Oral Daily    enoxaparin  40 mg Subcutaneous Daily    diphenoxylate-atropine  1 tablet Oral BID     Continuous Infusions:   sodium chloride 100 mL/hr at 01/27/20 0854    dextrose       PRN Meds:ondansetron, promethazine, labetalol, sodium chloride flush, potassium chloride **OR** potassium alternative oral replacement **OR** potassium chloride, sodium chloride, hydrALAZINE, benzonatate, promethazine-dextromethorphan, albuterol sulfate HFA, ALPRAZolam, ibuprofen, ipratropium-albuterol, nitroGLYCERIN, glucose, dextrose, glucagon (rDNA), dextrose, butalbital-acetaminophen-caffeine    Review of Systems  Constitutional: negative for fatigue, fevers, malaise and weight loss  Ears, nose, mouth, throat: negative for ear drainage, epistaxis, hoarseness, nasal congestion, sore throat and voice change  Respiratory: negative except for cough and shortness of breath  Cardiovascular: negative for chest pain, chest pressure/discomfort, irregular heart beat, lower extremity edema and palpitations  Gastrointestinal: negative for abdominal pain, constipation, diarrhea, jaundice, melena, odynophagia, reflux symptoms and vomiting  Hematologic/lymphatic: negative for bleeding, easy bruising, lymphadenopathy and petechiae  Musculoskeletal:negative for arthralgias, bone pain, muscle weakness, neck pain and stiff joints  Neurological: negative for dizziness, gait problems, headaches, seizures, speech problems, tremors and weakness  Behavioral/Psych: negative for anxiety, behavior problems, depression, fatigue and sleep disturbance  Endocrine: negative for diabetic symptoms including none, neuropathy, polyphagia, polyuria, polydipsia, vomiting and diarrhea and temperature intolerance  Allergic/Immunologic: negative for anaphylaxis, angioedema, hay fever and urticaria    Objective:     Patient Vitals for the past 8 hrs:   BP Temp Temp src Pulse Resp SpO2   01/27/20 1026 107/64 -- -- 62 21 94 %   01/27/20 1021 113/62 -- -- 62 18 97 %   01/27/20 1016 108/63 97.9 °F (36.6 °C) Temporal 64 18 97 %   01/27/20 0830 (!) 125/56 98.1 °F (36.7 °C) Temporal 60 20 93 %   01/27/20 0515 (!) 114/57 98.3 °F (36.8 °C) Oral 59 18 93 %     I/O last 3 completed shifts:   In: 350 [P.O.:350]  Out: -   I/O this shift:  In: 350 [I.V.:350]  Out: -     General Appearance: alert and oriented to person, place and time, well developed and well- nourished, in no acute distress  Skin: warm and dry, no rash or erythema  Head: normocephalic and atraumatic  Eyes: pupils equal, round, and reactive to light, extraocular eye movements intact, conjunctivae normal  ENT: external ear and ear canal normal bilaterally, nose without deformity, nasal mucosa and turbinates normal  Neck: supple and non-tender without mass, no cervical yesterday.  Focal   lingular airspace disease either atelectasis or pneumonia slightly improved. No pleural effusion.  Mild emphysematous changes. Problem List:     COPD with acute exacerbation  Mucous plugging of airway    Assessment/Plan:     Repeat bronchoscopy performed today suggestive of thin bilateral secretions in both main stem bronchi, which was suctioned. No clear evidence of mucus plugging noted. Influenza B negative on respiratory viral panel, started on Tamiflu. Potential discharge tomorrow if better. Decrease Solu-Medrol to 40 mg daily. Pulmonary will follow for 1 more day.     Suzy Thacker MD

## 2020-01-27 NOTE — PROGRESS NOTES
Progress Note - Dr. Jodee Frankel - Internal Medicine  PCP: Cornelio Del Rio MD [facility] / [address] [place] [address] [place] [phone]    Hospital Day: 8  Code Status: Full Code  Current Diet: Diet NPO, After Midnight        CC: follow up on medical issues    Subjective:   Edilberto Beck is a 69 y.o. female. She denies newproblems    About the same  Still congested  For repeat bronch this am    She denies chest pain, complains of shortness of breath, denies nausea,  denies emesis. 10 system Review of Systems is reviewed with patient, and pertinent positives are listed here: None . Otherwise, Review of systems is negative. I have reviewed the patient's medical and social history in detail and updated the computerized patient record. To recap: She  has a past medical history of Acute MI ([address] Acute pyelonephritis, Anxiety and depression, Arthritis, CAD (coronary artery disease), Cancer ([address] Cancer ([address] Cancer of skin of leg, Chronic obstructive pulmonary disease ([address] Claustrophobia, COPD (chronic obstructive pulmonary disease) ([address] Esophageal stricture, Gastroesophageal reflux disease without esophagitis, Hiatal hernia, Hiatal hernia, Hypercholesteremia, Hypertension, Migraines, Movement disorder, Pneumonia, PONV (postoperative nausea and vomiting), Type II or unspecified type diabetes mellitus without mention of complication, not stated as uncontrolled, and Unspecified cerebral artery occlusion with cerebral infarction. . She  has a past surgical history that includes Cardiac surgery; Gallbladder surgery; Hysterectomy; Appendectomy; Cholecystectomy; Colonoscopy; Upper gastrointestinal endoscopy (4/20/12); Endoscopy, colon, diagnostic; Tear duct surgery; Upper gastrointestinal endoscopy (06/11/2018); Colonoscopy (06/11/2018); pr exc skin malig <5mm remaindr body (N/A, 9/6/2018); pr split grft trunk,arm,leg <100sqcm (N/A, 9/6/2018); skin biopsy; eye surgery; and bronchoscopy (N/A, 1/24/2020). . She  reports that 255 lb 3 oz (115.8 kg)   SpO2 93%   BMI 41.19 kg/m²      Patient Vitals for the past 24 hrs:   BP Temp Temp src Pulse Resp SpO2   01/27/20 0515 (!) 114/57 98.3 °F (36.8 °C) Oral 59 18 93 %   01/27/20 0045 133/72 97.8 °F (36.6 °C) Axillary 66 18 94 %   01/26/20 2030 128/73 97.6 °F (36.4 °C) Axillary 83 19 94 %   01/26/20 1925 -- -- -- -- -- 92 %   01/26/20 0831 -- -- -- -- 18 95 %     No data found. Intake/Output Summary (Last 24 hours) at 1/27/2020 6531  Last data filed at 1/26/2020 2102  Gross per 24 hour   Intake 350 ml   Output --   Net 350 ml         Physical Exam:   S1, S2 normal, no murmur, rub or gallop, regular rate and rhythm  Rales bilat  abdomen is soft without significant tenderness, masses, organomegaly or guarding  extremities normal, atraumatic, no cyanosis or edema    Labs:  Lab Results   Component Value Date    WBC 9.2 01/27/2020    HGB 14.8 01/27/2020    HCT 44.8 01/27/2020     01/27/2020    CHOL 226 (H) 11/07/2019    TRIG 300 (H) 11/07/2019    HDL 34 (L) 11/07/2019    ALT 15 01/19/2020    AST 18 01/19/2020     (L) 01/27/2020    K 5.2 (H) 01/27/2020    CL 97 (L) 01/27/2020    CREATININE 0.7 01/27/2020    BUN 28 (H) 01/27/2020    CO2 27 01/27/2020    TSH 2.34 06/06/2011    INR 0.97 01/27/2020    LABA1C 7.7 01/19/2020    LABMICR YES 01/19/2020     Lab Results   Component Value Date    CKTOTAL 24 (L) 06/07/2018    CKMB 0.29 08/09/2011    TROPONINI <0.01 01/19/2020       Recent Imaging Results are Reviewed:  Xr Chest Standard (2 Vw)    Result Date: 1/26/2020  EXAMINATION: TWO XRAY VIEWS OF THE CHEST 1/26/2020 10:23 am COMPARISON: January 25, 2020 HISTORY: ORDERING SYSTEM PROVIDED HISTORY: cough TECHNOLOGIST PROVIDED HISTORY: Reason for exam:->cough Reason for Exam: short of breath Acuity: Acute Type of Exam: Initial FINDINGS: Increased left lung density is suspected to represent overlapping soft tissue given the lack of corresponding abnormality on the lateral view. Cardiomegaly. Mild pulmonary edema. Mild pulmonary edema. Xr Chest Standard (2 Vw)    Result Date: 1/25/2020  EXAMINATION: TWO XRAY VIEWS OF THE CHEST 1/25/2020 4:05 pm COMPARISON: 01/24/2020 and earlier studies. HISTORY: ORDERING SYSTEM PROVIDED HISTORY: cough TECHNOLOGIST PROVIDED HISTORY: Reason for exam:->cough Reason for Exam: COUGH, CONGESTION AND SHORTNESS OF BREATH. FORMER SMOKER. HISTORY OF DIABETES, HTN, COPD, SKIN CANCER, CAD AND ACUTE MI Acuity: Unknown Type of Exam: Subsequent/Follow-up FINDINGS: Heart is slightly enlarged. The pulmonary vasculature is mildly to moderately prominent and more prominent than on the prior study. Small amount of opacity present in the lingula along the left heart border. No sizable pleural effusion. Mild underlying emphysematous changes suspected. Mild-to-moderate pulmonary vascular congestion worse than yesterday. Focal lingular airspace disease either atelectasis or pneumonia slightly improved. No pleural effusion. Mild emphysematous changes. Xr Chest Portable    Result Date: 1/24/2020  EXAMINATION: ONE XRAY VIEW OF THE CHEST 1/24/2020 5:52 am COMPARISON: 01/19/2020 HISTORY: ORDERING SYSTEM PROVIDED HISTORY: sob TECHNOLOGIST PROVIDED HISTORY: Reason for exam:->sob Reason for Exam: sob Acuity: Unknown Type of Exam: Unknown FINDINGS: Lung volumes are low accentuating heart size and bronchovascular markings at the lung bases. Patient body habitus limits evaluation of fine pulmonary parenchymal changes. There is persistent left-sided pleural effusion left lung consolidation, slightly increased Right basilar opacity is unchanged Atherosclerotic changes seen in the aorta.      Increased pleural-parenchymal disease on the left     Xr Chest Portable    Result Date: 1/19/2020  EXAMINATION: ONE XRAY VIEW OF THE CHEST 1/19/2020 9:21 pm COMPARISON: 09/22/2019 HISTORY: ORDERING SYSTEM PROVIDED HISTORY: SOB TECHNOLOGIST PROVIDED HISTORY: Reason for exam:->SOB Reason for Exam:

## 2020-01-27 NOTE — PLAN OF CARE
Bronchoscopy complete (2nd). Continue w/ IV fluids monitor O2 saturation. Up to br, friend in to visit, requested prn Xanax. Discussed home blood sugar control of glipizide    Problem: Falls - Risk of:  Goal: Will remain free from falls  Description  Will remain free from falls  Outcome: Ongoing  Goal: Absence of physical injury  Description  Absence of physical injury  Outcome: Ongoing     Problem: Serum Glucose Level - Abnormal:  Goal: Ability to maintain appropriate glucose levels will improve  Description  Ability to maintain appropriate glucose levels will improve  Outcome: Ongoing     Problem: Pain:  Description  Pain management should include both nonpharmacologic and pharmacologic interventions.   Goal: Pain level will decrease  Description  Pain level will decrease  Outcome: Ongoing     Problem: Breathing Pattern - Ineffective:  Goal: Ability to achieve and maintain a regular respiratory rate will improve  Description  Ability to achieve and maintain a regular respiratory rate will improve  Outcome: Ongoing

## 2020-01-27 NOTE — ANESTHESIA PRE PROCEDURE
[facility] Department of Anesthesiology  Pre-Anesthesia Evaluation/Consultation       Name:  Martha Lucero  : 1945  Age:  69 y.o.                                            MRN:  [id number]  Date: 2020           Surgeon: Surgeon(s):  Rylee Kumar MD    Procedure: Procedure(s):  BRONCHOSCOPY DIAGNOSTIC OR CELL [address] ONLY     Allergies   Allergen Reactions    Morphine Shortness Of Breath    Codeine Other (See Comments)     Chest pain    Hydromorphone Other (See Comments)     hallucinations  Hallucinations    But will take if needed in an emergency    Levaquin [Levofloxacin In D5w] Itching    Vicodin [Hydrocodone-Acetaminophen] Hives     Patient Active Problem List   Diagnosis    CAD (coronary artery disease)    Hyperlipemia    Essential hypertension    UTI (urinary tract infection)    DM2 (diabetes mellitus, type 2) ([facility])    Primary osteoarthritis involving multiple joints    Gastroesophageal reflux disease without esophagitis    PVC (premature ventricular contraction)    Anxiety    Morbid obesity ([facility])    Precordial pain    Acute bronchitis    Irritable bowel syndrome with diarrhea    COPD with acute exacerbation (HCC)    AKIL (obstructive sleep apnea)    SOB (shortness of breath)    Chest congestion    DM (diabetes mellitus), secondary uncontrolled (HCC)    Pyuria    Ineffective airway clearance    Pulmonary infiltrate    Influenza B     Past Medical History:   Diagnosis Date    Acute MI ([address]     Acute pyelonephritis 2015    Anxiety and depression     Arthritis     CAD (coronary artery disease)     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    Cancer [facility])     \"skin on top of my head\"    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    Chronic obstructive pulmonary disease ([address] 2017    Claustrophobia     COPD (chronic obstructive pulmonary disease) (HCC)     Esophageal stricture     Gastroesophageal reflux disease without esophagitis 3/24/2016    Hiatal hernia     Hiatal hernia     Hypercholesteremia     Hypertension     Migraines     Movement disorder arthritis    Pneumonia     PONV (postoperative nausea and vomiting)     Type II or unspecified type diabetes mellitus without mention of complication, not stated as uncontrolled     Unspecified cerebral artery occlusion with cerebral infarction      Past Surgical History:   Procedure Laterality Date    APPENDECTOMY      BRONCHOSCOPY N/A 2020    BRONCHOSCOPY ALVEOLAR LAVAGE performed by Myah Sutherland MD at [address] [facility] [address]      1 stent  and 1 stent     CHOLECYSTECTOMY      COLONOSCOPY      COLONOSCOPY  2018    ENDOSCOPY, COLON, DIAGNOSTIC      EYE SURGERY      bilateral cataracts    GALLBLADDER SURGERY      HYSTERECTOMY      RI EXC SKIN MALIG <5MM REMAINDR BODY N/A 2018    EXCISION OF SCALP SQUAMOUS CELL CARCINOMA performed by Kashif Rivas MD at [address] [facility] <100SQCM N/A 2018    SPLIT THICKNESS SKIN GRAFT FOR COVERAGE SCALP, APPLICATION OF WOUND VAC DEVICE performed by Kashif Rivas MD at [address] ENDOSCOPY  12    with biopsy of stomach,gastritis    UPPER GASTROINTESTINAL ENDOSCOPY  2018    w/esophagael dilation     Social History     Tobacco Use    Smoking status: Former Smoker     Packs/day: 0.25     Years: 49.00     Pack years: 12.25     Types: Cigarettes     Last attempt to quit: 2017     Years since quittin.6    Smokeless tobacco: Never Used    Tobacco comment: only smoke when real stressed  Nicotine patch   Substance Use Topics    Alcohol use:  Yes     Alcohol/week: 1.0 standard drinks     Types: 1 Glasses of wine per week     Comment: occ. every 3-4 mo    Drug use: No     Medications  Current Facility-Administered Medications on File Prior to Visit   Medication Dose Route 1 tablet Oral BID Martin Perez MD   1 tablet at 20 0809    butalbital-acetaminophen-caffeine (FIORICET, ESGIC) per tablet 1 tablet  1 tablet Oral Q4H PRN Martin Perez MD         Vital Signs (Current)   There were no vitals filed for this visit. Vital Signs Statistics (for past 48 hrs)     Temp  Av.4 °F (36.3 °C)  Min: 97.1 °F (36.2 °C)   Min taken time: 20 0745  Max: 98.3 °F (36.8 °C)   Max taken time: 20 0515  Pulse  Av.7  Min: 46   Min taken time: 20 0745  Max: 83   Max taken time: 20 2030  Resp  Av.5  Min: 12   Min taken time: 20 0115  Max: 19   Max taken time: 20 2030  BP  Min: 114/57   Min taken time: 20 0515  Max: 145/68   Max taken time: 20 0115  SpO2  Av.8 %  Min: 89 %   Min taken time: 20 1928  Max: 95 %   Max taken time: 20 0831    BP Readings from Last 3 Encounters:   20 (!) 114/57   20 (!) 87/50   20 130/80     BMI  There is no height or weight on file to calculate BMI. Estimated body mass index is 41.19 kg/m² as calculated from the following:    Height as of 20: 5' 6\" (1.676 m). Weight as of 20: 255 lb 3 oz (115.8 kg).     CBC   Lab Results   Component Value Date    WBC 9.2 2020    RBC 5.04 2020    HGB 14.8 2020    HCT 44.8 2020    MCV 89.0 2020    RDW 14.5 2020     2020     CMP    Lab Results   Component Value Date     2020    K 5.2 2020    K 4.1 2020    CL 97 2020    CO2 27 2020    BUN 28 2020    CREATININE 0.7 2020    GFRAA >60 2020    GFRAA >60 2013    AGRATIO 0.9 2020    LABGLOM >60 2020    GLUCOSE 245 2020    PROT 7.4 2020    PROT 7.6 2011    CALCIUM 8.6 2020    BILITOT 0.5 2020    ALKPHOS 91 2020    AST 18 2020    ALT 15 2020     BMP    Lab Results   Component Value Date     2020    K 5.2 01/27/2020    K 4.1 01/19/2020    CL 97 01/27/2020    CO2 27 01/27/2020    BUN 28 01/27/2020    CREATININE 0.7 01/27/2020    CALCIUM 8.6 01/27/2020    GFRAA >60 01/27/2020    GFRAA >60 05/02/2013    LABGLOM >60 01/27/2020    GLUCOSE 245 01/27/2020     POCGlucose  Recent Labs     01/25/20  0456 01/26/20  0423 01/27/20  0702   GLUCOSE 218* 189* 245*      Coags    Lab Results   Component Value Date    PROTIME 11.3 01/27/2020    INR 0.97 01/27/2020    APTT 29.1 68/67/4633     HCG (If Applicable) No results found for: PREGTESTUR, PREGSERUM, HCG, HCGQUANT   ABGs   Lab Results   Component Value Date    PHART 7.446 02/07/2017    PO2ART 77.3 02/07/2017    MHS0MDQ 33.6 02/07/2017    FGD6RZM 22.6 02/07/2017    BEART -0.6 02/07/2017    B0CTMPWB 96.6 02/07/2017      Type & Screen (If Applicable)  No results found for: LABABO, LABRH                         BMI: Wt Readings from Last 3 Encounters:       NPO Status: 8 hours                          Anesthesia Evaluation  Patient summary reviewed   history of anesthetic complications: PONV. Airway: Mallampati: IV  TM distance: >3 FB   Neck ROM: full   Dental:    (+) edentulous      Pulmonary:   (+) pneumonia:  COPD (2L O2 NC qhs):  shortness of breath:  sleep apnea: on CPAP,  decreased breath sounds,  wheezes,                             Cardiovascular:  Exercise tolerance: poor (<4 METS),   (+) hypertension (EF 60):, past MI:, CAD (Stents x 2 2001):, dysrhythmias (pacS):,       ECG reviewed  Rhythm: regular  Rate: normal  Echocardiogram reviewed         Beta Blocker:  Not on Beta Blocker         Neuro/Psych:   (+) CVA (no residual effects per pt):, headaches:, psychiatric history:depression/anxiety             GI/Hepatic/Renal:   (+) hiatal hernia, GERD:, morbid obesity          Endo/Other:    (+) DiabetesType II DM, using insulin, . Abdominal:   (+) obese,         Vascular: negative vascular ROS.                                          Anesthesia Plan      general ASA 4     (Explained to patient risk of post operative intubation and ventilator need due to current respiratory status. Patient understands and would like to p)  Induction: intravenous. MIPS: Prophylactic antiemetics administered. Anesthetic plan and risks discussed with patient. Plan discussed with CRNA. This pre-anesthesia assessment may be used as a history and physical.    DOS STAFF ADDENDUM:    Pt seen and examined, chart reviewed (including anesthesia, drug and allergy history). No interval changes to history and physical examination. Anesthetic plan, risks, benefits, alternatives, and personnel involved discussed with patient. Questions and concerns addressed. Patient(family) verbalized an understanding and agrees to proceed.       Niko Archibald MD  January 27, 2020  8:24 AM

## 2020-01-27 NOTE — PROGRESS NOTES
Reviewed problem list, assessment, H&P, and checklist preoperatively. Scope verified using 2 person system.

## 2020-01-27 NOTE — PLAN OF CARE
Problem: Falls - Risk of:  Goal: Will remain free from falls  Description  Will remain free from falls  Outcome: Met This Shift  Goal: Absence of physical injury  Description  Absence of physical injury  Outcome: Met This Shift     Problem: Serum Glucose Level - Abnormal:  Goal: Ability to maintain appropriate glucose levels will improve  Description  Ability to maintain appropriate glucose levels will improve  Outcome: Ongoing     Problem: Pain:  Goal: Pain level will decrease  Description  Pain level will decrease  Outcome: Met This Shift     Problem: Breathing Pattern - Ineffective:  Goal: Ability to achieve and maintain a regular respiratory rate will improve  Description  Ability to achieve and maintain a regular respiratory rate will improve  Outcome: Ongoing

## 2020-01-27 NOTE — PROGRESS NOTES
Pt flu positive and recovered in room 414 due to droplet isolation. S/p bronchoscopy for clean out. Pt alert and oriented. Denies pain or discomfort. VSS. Simple mask weaned to 5 lpm humidified NC at this time. Pt in droplet isolation and precautions maintained.

## 2020-01-27 NOTE — OP NOTE
Bronchoscopy Procedure Note    Date of Operation: 1/27/20  Pre-op Diagnosis: Mucus plugging of airway  Post-op Diagnosis: airway inflammation only  Surgeon: Inga Brown  Anesthesia: General LMA anesthesia  Estimate Blood Loss: Minimal   Complications: None    Indications and History:  The patient is 69 y.o. female with Influenza B pneumonia. The risks, benefits, complications, treatment options and expected outcomes were discussed with the patient. The possibilities of reaction to medication, pulmonary aspiration, perforation of a viscus, bleeding, failure to diagnose a condition and creating a complication requiring transfusion or operation were discussed with the patient who freely signed the consent. Description of Procedure: The patient was taken to endoscopy suite, identified as Claritza Dean and the procedure verified as flexible fiberoptic bronchoscopy. A time out was held and the above information confirmed. After the induction of topical nasopharyngeal anesthesia, the patient was placed in appropriate position and the bronchoscope was passed through the LMA . The vocal cords were visualized and total of 3 ml of 2% Lidocaine was topically placed onto the cords. The cords were normal. The scope was then passed into the trachea. Lidocaine 2% 3 ml was used topically on the chris. Careful inspection of the tracheal lumen was accomplished. The scope was sequentially passed into the left main and then left upper and lower bronchi and segmental bronchi. The scope was then withdrawn and advanced into the right main bronchus and then into the RUL, RML, and RLL bronchi and segmental bronchi. Endobronchial findings: Thin secretions noted which was suctioned. No obvious mucus plugging noted.   Trachea: inflammation  Chris: Normal mucosa   Right main bronchus: Normal mucosa   Right upper lobe bronchus: Normal mucosa   Right middle lobe bronchus: Normal mucosa   Right lower lobe bronchus: Normal mucosa   Left main bronchus: Normal mucosa   Left upper lobe bronchus: Normal mucosa   Left lower lobe bronchus: Normal mucosa     The patient was taken to the endoscopy recovery area in satisfactory condition. Recommendation:   1. F/U on culture results   2. F/U on cytology results     Attestation: I performed the procedure.   [id number]

## 2020-01-28 LAB
ANION GAP SERPL CALCULATED.3IONS-SCNC: 8 MMOL/L (ref 3–16)
BASOPHILS ABSOLUTE: 0 K/UL (ref 0–0.2)
BASOPHILS RELATIVE PERCENT: 0.4 %
BUN BLDV-MCNC: 29 MG/DL (ref 7–20)
CALCIUM SERPL-MCNC: 8.3 MG/DL (ref 8.3–10.6)
CHLORIDE BLD-SCNC: 101 MMOL/L (ref 99–110)
CO2: 24 MMOL/L (ref 21–32)
CREAT SERPL-MCNC: 0.7 MG/DL (ref 0.6–1.2)
EOSINOPHILS ABSOLUTE: 0 K/UL (ref 0–0.6)
EOSINOPHILS RELATIVE PERCENT: 0.2 %
GFR AFRICAN AMERICAN: >60
GFR NON-AFRICAN AMERICAN: >60
GLUCOSE BLD-MCNC: 104 MG/DL (ref 70–99)
GLUCOSE BLD-MCNC: 184 MG/DL (ref 70–99)
GLUCOSE BLD-MCNC: 211 MG/DL (ref 70–99)
GLUCOSE BLD-MCNC: 217 MG/DL (ref 70–99)
GLUCOSE BLD-MCNC: 256 MG/DL (ref 70–99)
HCT VFR BLD CALC: 42.9 % (ref 36–48)
HEMOGLOBIN: 14 G/DL (ref 12–16)
LYMPHOCYTES ABSOLUTE: 3.1 K/UL (ref 1–5.1)
LYMPHOCYTES RELATIVE PERCENT: 30.5 %
MCH RBC QN AUTO: 29.5 PG (ref 26–34)
MCHC RBC AUTO-ENTMCNC: 32.7 G/DL (ref 31–36)
MCV RBC AUTO: 90 FL (ref 80–100)
MONOCYTES ABSOLUTE: 1.2 K/UL (ref 0–1.3)
MONOCYTES RELATIVE PERCENT: 11.2 %
NEUTROPHILS ABSOLUTE: 6 K/UL (ref 1.7–7.7)
NEUTROPHILS RELATIVE PERCENT: 57.7 %
PDW BLD-RTO: 14.6 % (ref 12.4–15.4)
PERFORMED ON: ABNORMAL
PLATELET # BLD: 164 K/UL (ref 135–450)
PMV BLD AUTO: 8.5 FL (ref 5–10.5)
POTASSIUM SERPL-SCNC: 4.2 MMOL/L (ref 3.5–5.1)
RBC # BLD: 4.77 M/UL (ref 4–5.2)
SODIUM BLD-SCNC: 133 MMOL/L (ref 136–145)
WBC # BLD: 10.3 K/UL (ref 4–11)

## 2020-01-28 PROCEDURE — 6370000000 HC RX 637 (ALT 250 FOR IP): Performed by: INTERNAL MEDICINE

## 2020-01-28 PROCEDURE — 80048 BASIC METABOLIC PNL TOTAL CA: CPT

## 2020-01-28 PROCEDURE — 6360000002 HC RX W HCPCS: Performed by: INTERNAL MEDICINE

## 2020-01-28 PROCEDURE — 94640 AIRWAY INHALATION TREATMENT: CPT

## 2020-01-28 PROCEDURE — 94761 N-INVAS EAR/PLS OXIMETRY MLT: CPT

## 2020-01-28 PROCEDURE — 85025 COMPLETE CBC W/AUTO DIFF WBC: CPT

## 2020-01-28 PROCEDURE — 2700000000 HC OXYGEN THERAPY PER DAY

## 2020-01-28 PROCEDURE — 99232 SBSQ HOSP IP/OBS MODERATE 35: CPT | Performed by: INTERNAL MEDICINE

## 2020-01-28 PROCEDURE — 94669 MECHANICAL CHEST WALL OSCILL: CPT

## 2020-01-28 PROCEDURE — 1200000000 HC SEMI PRIVATE

## 2020-01-28 PROCEDURE — 2580000003 HC RX 258: Performed by: INTERNAL MEDICINE

## 2020-01-28 RX ADMIN — GABAPENTIN 300 MG: 300 CAPSULE ORAL at 08:53

## 2020-01-28 RX ADMIN — OSELTAMIVIR PHOSPHATE 75 MG: 75 CAPSULE ORAL at 08:58

## 2020-01-28 RX ADMIN — INSULIN LISPRO 5 UNITS: 100 INJECTION, SOLUTION INTRAVENOUS; SUBCUTANEOUS at 21:10

## 2020-01-28 RX ADMIN — SALINE NASAL SPRAY 1 SPRAY: 1.5 SOLUTION NASAL at 12:39

## 2020-01-28 RX ADMIN — SALINE NASAL SPRAY 1 SPRAY: 1.5 SOLUTION NASAL at 21:19

## 2020-01-28 RX ADMIN — OSELTAMIVIR PHOSPHATE 75 MG: 75 CAPSULE ORAL at 21:09

## 2020-01-28 RX ADMIN — Medication 10 ML: at 08:54

## 2020-01-28 RX ADMIN — INSULIN LISPRO 10 UNITS: 100 INJECTION, SOLUTION INTRAVENOUS; SUBCUTANEOUS at 16:55

## 2020-01-28 RX ADMIN — GUAIFENESIN 600 MG: 600 TABLET, EXTENDED RELEASE ORAL at 21:09

## 2020-01-28 RX ADMIN — AMOXICILLIN AND CLAVULANATE POTASSIUM 1 TABLET: 875; 125 TABLET, FILM COATED ORAL at 21:09

## 2020-01-28 RX ADMIN — IPRATROPIUM BROMIDE AND ALBUTEROL SULFATE 3 ML: .5; 3 SOLUTION RESPIRATORY (INHALATION) at 16:36

## 2020-01-28 RX ADMIN — INSULIN GLARGINE 40 UNITS: 100 INJECTION, SOLUTION SUBCUTANEOUS at 21:10

## 2020-01-28 RX ADMIN — LISINOPRIL 2.5 MG: 5 TABLET ORAL at 08:52

## 2020-01-28 RX ADMIN — IPRATROPIUM BROMIDE AND ALBUTEROL SULFATE 3 ML: .5; 3 SOLUTION RESPIRATORY (INHALATION) at 19:27

## 2020-01-28 RX ADMIN — SALINE NASAL SPRAY 1 SPRAY: 1.5 SOLUTION NASAL at 08:13

## 2020-01-28 RX ADMIN — INSULIN LISPRO 6 UNITS: 100 INJECTION, SOLUTION INTRAVENOUS; SUBCUTANEOUS at 12:36

## 2020-01-28 RX ADMIN — MICONAZOLE NITRATE: 20 POWDER TOPICAL at 08:13

## 2020-01-28 RX ADMIN — DIPHENOXYLATE HYDROCHLORIDE AND ATROPINE SULFATE 1 TABLET: 2.5; .025 TABLET ORAL at 21:19

## 2020-01-28 RX ADMIN — DICLOFENAC 2 G: 10 GEL TOPICAL at 08:13

## 2020-01-28 RX ADMIN — ENOXAPARIN SODIUM 40 MG: 40 INJECTION SUBCUTANEOUS at 08:52

## 2020-01-28 RX ADMIN — INSULIN LISPRO 10 UNITS: 100 INJECTION, SOLUTION INTRAVENOUS; SUBCUTANEOUS at 08:14

## 2020-01-28 RX ADMIN — IPRATROPIUM BROMIDE AND ALBUTEROL SULFATE 3 ML: .5; 3 SOLUTION RESPIRATORY (INHALATION) at 11:28

## 2020-01-28 RX ADMIN — IPRATROPIUM BROMIDE AND ALBUTEROL SULFATE 3 ML: .5; 3 SOLUTION RESPIRATORY (INHALATION) at 07:20

## 2020-01-28 RX ADMIN — ROSUVASTATIN CALCIUM 10 MG: 10 TABLET, FILM COATED ORAL at 08:53

## 2020-01-28 RX ADMIN — GABAPENTIN 300 MG: 300 CAPSULE ORAL at 21:26

## 2020-01-28 RX ADMIN — MICONAZOLE NITRATE: 20 POWDER TOPICAL at 21:19

## 2020-01-28 RX ADMIN — GUAIFENESIN 600 MG: 600 TABLET, EXTENDED RELEASE ORAL at 08:52

## 2020-01-28 RX ADMIN — DIPHENOXYLATE HYDROCHLORIDE AND ATROPINE SULFATE 1 TABLET: 2.5; .025 TABLET ORAL at 09:15

## 2020-01-28 RX ADMIN — ISOSORBIDE MONONITRATE 60 MG: 30 TABLET, EXTENDED RELEASE ORAL at 08:57

## 2020-01-28 RX ADMIN — DICLOFENAC 2 G: 10 GEL TOPICAL at 21:10

## 2020-01-28 RX ADMIN — AMOXICILLIN AND CLAVULANATE POTASSIUM 1 TABLET: 875; 125 TABLET, FILM COATED ORAL at 08:57

## 2020-01-28 RX ADMIN — ASPIRIN 81 MG 81 MG: 81 TABLET ORAL at 08:52

## 2020-01-28 RX ADMIN — PANTOPRAZOLE SODIUM 40 MG: 40 TABLET, DELAYED RELEASE ORAL at 08:52

## 2020-01-28 RX ADMIN — INSULIN LISPRO 10 UNITS: 100 INJECTION, SOLUTION INTRAVENOUS; SUBCUTANEOUS at 12:37

## 2020-01-28 RX ADMIN — Medication 10 ML: at 21:11

## 2020-01-28 RX ADMIN — INSULIN LISPRO 6 UNITS: 100 INJECTION, SOLUTION INTRAVENOUS; SUBCUTANEOUS at 16:52

## 2020-01-28 RX ADMIN — ALPRAZOLAM 1 MG: 0.5 TABLET ORAL at 22:26

## 2020-01-28 RX ADMIN — METHYLPREDNISOLONE SODIUM SUCCINATE 40 MG: 40 INJECTION, POWDER, FOR SOLUTION INTRAMUSCULAR; INTRAVENOUS at 08:53

## 2020-01-28 RX ADMIN — ALPRAZOLAM 1 MG: 0.5 TABLET ORAL at 09:15

## 2020-01-28 ASSESSMENT — PAIN SCALES - GENERAL
PAINLEVEL_OUTOF10: 0

## 2020-01-28 NOTE — PROGRESS NOTES
[facility] Pulmonary/CCM Progress note      Admit Date: 1/19/2020    Chief Complaint: Shortness of breath and chest congestion    Subjective: Interval History: Patient still continues to be very congested and rhonchorous. Bronchoscopy performed yesterday showed only thin secretions. Continues to need 2 L O2.     Scheduled Meds:   methylPREDNISolone  40 mg Intravenous Daily    oseltamivir  75 mg Oral BID    amoxicillin-clavulanate  1 tablet Oral 2 times per day    insulin lispro  10 Units Subcutaneous TID WC    sodium chloride  1 spray Each Nostril TID    sodium chloride flush  10 mL Intravenous BID    insulin lispro  0-18 Units Subcutaneous TID WC    insulin lispro  0-9 Units Subcutaneous Nightly    guaiFENesin  600 mg Oral BID    miconazole   Topical BID    insulin glargine  40 Units Subcutaneous Nightly    aspirin  81 mg Oral Daily    diclofenac sodium  2 g Topical BID    gabapentin  300 mg Oral BID    ipratropium-albuterol  3 mL Inhalation Q4H WA    isosorbide mononitrate  60 mg Oral Daily    lisinopril  2.5 mg Oral Daily    pantoprazole  40 mg Oral QAM AC    rosuvastatin  10 mg Oral Daily    enoxaparin  40 mg Subcutaneous Daily    diphenoxylate-atropine  1 tablet Oral BID     Continuous Infusions:   sodium chloride 100 mL/hr at 01/27/20 1713    dextrose       PRN Meds:sodium chloride flush, potassium chloride **OR** potassium alternative oral replacement **OR** potassium chloride, sodium chloride, hydrALAZINE, benzonatate, promethazine-dextromethorphan, albuterol sulfate HFA, ALPRAZolam, ibuprofen, ipratropium-albuterol, nitroGLYCERIN, glucose, dextrose, glucagon (rDNA), dextrose, butalbital-acetaminophen-caffeine    Review of Systems  Constitutional: negative for fatigue, fevers, malaise and weight loss  Ears, nose, mouth, throat: negative for ear drainage, epistaxis, hoarseness, nasal congestion, sore throat and voice change  Respiratory: negative except for cough and shortness of organomegaly  Lymph Nodes: Cervical, supraclavicular normal  Extremities: no cyanosis, clubbing or edema  Musculoskeletal: normal range of motion, no joint swelling, deformity or tenderness  Neurologic: alert, no focal neurologic deficits    Data Review:  CBC:   Lab Results   Component Value Date    WBC 10.3 01/28/2020    RBC 4.77 01/28/2020     BMP:   Lab Results   Component Value Date    GLUCOSE 184 01/28/2020    CO2 24 01/28/2020    BUN 29 01/28/2020    CREATININE 0.7 01/28/2020    CALCIUM 8.3 01/28/2020     ABG:   Lab Results   Component Value Date    WDW1LKX 22.6 02/07/2017    BEART -0.6 02/07/2017    Q0UFSTPH 96.6 02/07/2017    PHART 7.446 02/07/2017    QWI0AOE 33.6 02/07/2017    PO2ART 77.3 02/07/2017    JDS4LNP 23.7 02/07/2017       Radiology: All pertinent images / reports were reviewed as a part of this visit. Narrative   EXAMINATION:   TWO XRAY VIEWS OF THE CHEST       1/25/2020 4:05 pm       COMPARISON:   01/24/2020 and earlier studies.       HISTORY:   ORDERING SYSTEM PROVIDED HISTORY: cough   TECHNOLOGIST PROVIDED HISTORY:   Reason for exam:->cough   Reason for Exam: COUGH, CONGESTION AND SHORTNESS OF BREATH. FORMER SMOKER. HISTORY OF DIABETES, HTN, COPD, SKIN CANCER, CAD AND ACUTE MI   Acuity: Unknown   Type of Exam: Subsequent/Follow-up       FINDINGS:   Heart is slightly enlarged.  The pulmonary vasculature is mildly to   moderately prominent and more prominent than on the prior study.  Small   amount of opacity present in the lingula along the left heart border.  No   sizable pleural effusion.  Mild underlying emphysematous changes suspected.           Impression   Mild-to-moderate pulmonary vascular congestion worse than yesterday.  Focal   lingular airspace disease either atelectasis or pneumonia slightly improved. No pleural effusion.  Mild emphysematous changes.          Problem List:     COPD with acute exacerbation  Mucous plugging of airway  Influenza B infection  Assessment/Plan: Influenza B infection with multifocal infiltrates-status post bronchoscopy x2, mucous plugging removed. Bronchoscopy from yesterday showed thin secretions only. Influenza B negative on respiratory viral panel, started on Tamiflu. Continue with Solu-Medrol. Unfortunately patient is not ready for discharge yet.     Geoffrey Pabon MD

## 2020-01-29 LAB
GLUCOSE BLD-MCNC: 182 MG/DL (ref 70–99)
GLUCOSE BLD-MCNC: 237 MG/DL (ref 70–99)
GLUCOSE BLD-MCNC: 382 MG/DL (ref 70–99)
GLUCOSE BLD-MCNC: 89 MG/DL (ref 70–99)
PERFORMED ON: ABNORMAL
PERFORMED ON: NORMAL

## 2020-01-29 PROCEDURE — 6370000000 HC RX 637 (ALT 250 FOR IP): Performed by: INTERNAL MEDICINE

## 2020-01-29 PROCEDURE — 6360000002 HC RX W HCPCS: Performed by: INTERNAL MEDICINE

## 2020-01-29 PROCEDURE — 2580000003 HC RX 258: Performed by: ANESTHESIOLOGY

## 2020-01-29 PROCEDURE — 2500000003 HC RX 250 WO HCPCS: Performed by: INTERNAL MEDICINE

## 2020-01-29 PROCEDURE — 94761 N-INVAS EAR/PLS OXIMETRY MLT: CPT

## 2020-01-29 PROCEDURE — 1200000000 HC SEMI PRIVATE

## 2020-01-29 PROCEDURE — 2700000000 HC OXYGEN THERAPY PER DAY

## 2020-01-29 PROCEDURE — 99232 SBSQ HOSP IP/OBS MODERATE 35: CPT | Performed by: INTERNAL MEDICINE

## 2020-01-29 PROCEDURE — 2580000003 HC RX 258: Performed by: INTERNAL MEDICINE

## 2020-01-29 PROCEDURE — 94640 AIRWAY INHALATION TREATMENT: CPT

## 2020-01-29 PROCEDURE — 94669 MECHANICAL CHEST WALL OSCILL: CPT

## 2020-01-29 RX ADMIN — GUAIFENESIN 600 MG: 600 TABLET, EXTENDED RELEASE ORAL at 09:21

## 2020-01-29 RX ADMIN — DICLOFENAC 2 G: 10 GEL TOPICAL at 09:20

## 2020-01-29 RX ADMIN — MICONAZOLE NITRATE: 20 POWDER TOPICAL at 09:20

## 2020-01-29 RX ADMIN — ISOSORBIDE MONONITRATE 60 MG: 30 TABLET, EXTENDED RELEASE ORAL at 09:21

## 2020-01-29 RX ADMIN — IPRATROPIUM BROMIDE AND ALBUTEROL SULFATE 3 ML: .5; 3 SOLUTION RESPIRATORY (INHALATION) at 16:50

## 2020-01-29 RX ADMIN — INSULIN LISPRO 10 UNITS: 100 INJECTION, SOLUTION INTRAVENOUS; SUBCUTANEOUS at 09:29

## 2020-01-29 RX ADMIN — Medication 10 ML: at 20:31

## 2020-01-29 RX ADMIN — INSULIN LISPRO 10 UNITS: 100 INJECTION, SOLUTION INTRAVENOUS; SUBCUTANEOUS at 14:28

## 2020-01-29 RX ADMIN — AMOXICILLIN AND CLAVULANATE POTASSIUM 1 TABLET: 875; 125 TABLET, FILM COATED ORAL at 09:21

## 2020-01-29 RX ADMIN — GABAPENTIN 300 MG: 300 CAPSULE ORAL at 09:21

## 2020-01-29 RX ADMIN — OSELTAMIVIR PHOSPHATE 75 MG: 75 CAPSULE ORAL at 09:21

## 2020-01-29 RX ADMIN — DICLOFENAC 2 G: 10 GEL TOPICAL at 22:43

## 2020-01-29 RX ADMIN — ROSUVASTATIN CALCIUM 10 MG: 10 TABLET, FILM COATED ORAL at 09:21

## 2020-01-29 RX ADMIN — INSULIN LISPRO 10 UNITS: 100 INJECTION, SOLUTION INTRAVENOUS; SUBCUTANEOUS at 17:52

## 2020-01-29 RX ADMIN — SODIUM CHLORIDE: 9 INJECTION, SOLUTION INTRAVENOUS at 02:33

## 2020-01-29 RX ADMIN — ALPRAZOLAM 1 MG: 0.5 TABLET ORAL at 20:39

## 2020-01-29 RX ADMIN — INSULIN LISPRO 15 UNITS: 100 INJECTION, SOLUTION INTRAVENOUS; SUBCUTANEOUS at 17:52

## 2020-01-29 RX ADMIN — IPRATROPIUM BROMIDE AND ALBUTEROL SULFATE 3 ML: .5; 3 SOLUTION RESPIRATORY (INHALATION) at 11:54

## 2020-01-29 RX ADMIN — BENZONATATE 100 MG: 100 CAPSULE ORAL at 20:39

## 2020-01-29 RX ADMIN — SALINE NASAL SPRAY 1 SPRAY: 1.5 SOLUTION NASAL at 09:30

## 2020-01-29 RX ADMIN — ASPIRIN 81 MG 81 MG: 81 TABLET ORAL at 09:20

## 2020-01-29 RX ADMIN — SALINE NASAL SPRAY 1 SPRAY: 1.5 SOLUTION NASAL at 14:27

## 2020-01-29 RX ADMIN — ENOXAPARIN SODIUM 40 MG: 40 INJECTION SUBCUTANEOUS at 09:20

## 2020-01-29 RX ADMIN — METHYLPREDNISOLONE SODIUM SUCCINATE 40 MG: 40 INJECTION, POWDER, FOR SOLUTION INTRAMUSCULAR; INTRAVENOUS at 09:20

## 2020-01-29 RX ADMIN — SODIUM CHLORIDE: 9 INJECTION, SOLUTION INTRAVENOUS at 13:06

## 2020-01-29 RX ADMIN — AMOXICILLIN AND CLAVULANATE POTASSIUM 1 TABLET: 875; 125 TABLET, FILM COATED ORAL at 20:31

## 2020-01-29 RX ADMIN — GUAIFENESIN 600 MG: 600 TABLET, EXTENDED RELEASE ORAL at 20:31

## 2020-01-29 RX ADMIN — GABAPENTIN 300 MG: 300 CAPSULE ORAL at 20:31

## 2020-01-29 RX ADMIN — IPRATROPIUM BROMIDE AND ALBUTEROL SULFATE 3 ML: .5; 3 SOLUTION RESPIRATORY (INHALATION) at 20:37

## 2020-01-29 RX ADMIN — Medication 10 ML: at 09:21

## 2020-01-29 RX ADMIN — IPRATROPIUM BROMIDE AND ALBUTEROL SULFATE 3 ML: .5; 3 SOLUTION RESPIRATORY (INHALATION) at 08:01

## 2020-01-29 RX ADMIN — SALINE NASAL SPRAY 1 SPRAY: 1.5 SOLUTION NASAL at 20:33

## 2020-01-29 RX ADMIN — OSELTAMIVIR PHOSPHATE 75 MG: 75 CAPSULE ORAL at 20:31

## 2020-01-29 RX ADMIN — INSULIN LISPRO 3 UNITS: 100 INJECTION, SOLUTION INTRAVENOUS; SUBCUTANEOUS at 20:32

## 2020-01-29 RX ADMIN — DIPHENOXYLATE HYDROCHLORIDE AND ATROPINE SULFATE 1 TABLET: 2.5; .025 TABLET ORAL at 09:21

## 2020-01-29 RX ADMIN — INSULIN GLARGINE 40 UNITS: 100 INJECTION, SOLUTION SUBCUTANEOUS at 20:31

## 2020-01-29 RX ADMIN — INSULIN LISPRO 3 UNITS: 100 INJECTION, SOLUTION INTRAVENOUS; SUBCUTANEOUS at 14:27

## 2020-01-29 RX ADMIN — MICONAZOLE NITRATE: 20 POWDER TOPICAL at 20:33

## 2020-01-29 RX ADMIN — PANTOPRAZOLE SODIUM 40 MG: 40 TABLET, DELAYED RELEASE ORAL at 05:26

## 2020-01-29 RX ADMIN — DIPHENOXYLATE HYDROCHLORIDE AND ATROPINE SULFATE 1 TABLET: 2.5; .025 TABLET ORAL at 20:31

## 2020-01-29 RX ADMIN — LISINOPRIL 2.5 MG: 5 TABLET ORAL at 09:21

## 2020-01-29 ASSESSMENT — PAIN SCALES - GENERAL: PAINLEVEL_OUTOF10: 0

## 2020-01-29 NOTE — PROGRESS NOTES
Department of Internal Medicine  General Internal Medicine   Progress Note     SUBJECTIVE :  Patient has still profound dyspnea and congestion       History obtained from chart review and the patient  General ROS: positive for  - fatigue and malaise  negative for - chills, fever or night sweats  Psychological ROS: negative  Respiratory ROS: positive for - shortness of breath and wheezing  negative for - cough, hemoptysis, sputum changes or stridor  Cardiovascular ROS: positive for - chest pain, dyspnea on exertion and edema  negative for - loss of consciousness, orthopnea or palpitations  Gastrointestinal ROS: no abdominal pain, change in bowel habits, or black or bloody stools    OBJECTIVE      Medications      Current Facility-Administered Medications: 0.9 % sodium chloride infusion, , Intravenous, Continuous  methylPREDNISolone sodium (SOLU-MEDROL) injection 40 mg, 40 mg, Intravenous, Daily  oseltamivir (TAMIFLU) capsule 75 mg, 75 mg, Oral, BID  amoxicillin-clavulanate (AUGMENTIN) 875-125 MG per tablet 1 tablet, 1 tablet, Oral, 2 times per day  insulin lispro (1 Unit Dial) 10 Units, 10 Units, Subcutaneous, TID WC  sodium chloride (OCEAN, BABY AYR) 0.65 % nasal spray 1 spray, 1 spray, Each Nostril, TID  sodium chloride flush 0.9 % injection 10 mL, 10 mL, Intravenous, BID  sodium chloride flush 0.9 % injection 10 mL, 10 mL, Intravenous, PRN  potassium chloride (KLOR-CON M) extended release tablet 40 mEq, 40 mEq, Oral, PRN **OR** potassium bicarb-citric acid (EFFER-K) effervescent tablet 40 mEq, 40 mEq, Oral, PRN **OR** potassium chloride 10 mEq/100 mL IVPB (Peripheral Line), 10 mEq, Intravenous, PRN  0.9 % sodium chloride bolus, 500 mL, Intravenous, PRN  hydrALAZINE (APRESOLINE) injection 10 mg, 10 mg, Intravenous, Q6H PRN  insulin lispro (1 Unit Dial) 0-18 Units, 0-18 Units, Subcutaneous, TID WC  insulin lispro (1 Unit Dial) 0-9 Units, 0-9 Units, Subcutaneous, Nightly  guaiFENesin (MUCINEX) extended release tablet esophagitis    PVC (premature ventricular contraction)    Anxiety    Morbid obesity ([facility])    Precordial pain    Acute bronchitis    Irritable bowel syndrome with diarrhea    COPD with acute exacerbation ([facility])    AKIL (obstructive sleep apnea)    SOB (shortness of breath)    Chest congestion    DM (diabetes mellitus), secondary uncontrolled ([facility])    Pyuria    Ineffective airway clearance    Pulmonary infiltrate    Influenza B    Mucus plugging of bronchi     Ct  Present care    Tamiflu added for Influenza positive status ct PO augmentin    wean IV steroids

## 2020-01-29 NOTE — PROGRESS NOTES
Incentive Spirometry education and demonstration completed by Respiratory Therapy Yes      Response to education: Very Good     Teaching Time: 5 minutes    Minimum Predicted Vital Capacity - 593 mL. Patient's Actual Vital Capacity - 750 mL. Turning over to Nursing for routine follow-up Yes.     Comments:     Electronically signed by Ebonie Senters on 1/29/2020 at 6:18 PM

## 2020-01-29 NOTE — PROGRESS NOTES
[facility] Pulmonary/CCM Progress note      Admit Date: 1/19/2020    Chief Complaint: Shortness of breath and chest congestion    Subjective: Interval History: Still quite congested, rhonchi and wheeze. No phlegm with the cough. On 2 L O2.     Scheduled Meds:   methylPREDNISolone  40 mg Intravenous Daily    oseltamivir  75 mg Oral BID    amoxicillin-clavulanate  1 tablet Oral 2 times per day    insulin lispro  10 Units Subcutaneous TID     sodium chloride  1 spray Each Nostril TID    sodium chloride flush  10 mL Intravenous BID    insulin lispro  0-18 Units Subcutaneous TID WC    insulin lispro  0-9 Units Subcutaneous Nightly    guaiFENesin  600 mg Oral BID    miconazole   Topical BID    insulin glargine  40 Units Subcutaneous Nightly    aspirin  81 mg Oral Daily    diclofenac sodium  2 g Topical BID    gabapentin  300 mg Oral BID    ipratropium-albuterol  3 mL Inhalation Q4H WA    isosorbide mononitrate  60 mg Oral Daily    lisinopril  2.5 mg Oral Daily    pantoprazole  40 mg Oral QAM AC    rosuvastatin  10 mg Oral Daily    enoxaparin  40 mg Subcutaneous Daily    diphenoxylate-atropine  1 tablet Oral BID     Continuous Infusions:   sodium chloride 100 mL/hr at 01/29/20 0233    dextrose       PRN Meds:sodium chloride flush, potassium chloride **OR** potassium alternative oral replacement **OR** potassium chloride, sodium chloride, hydrALAZINE, benzonatate, promethazine-dextromethorphan, albuterol sulfate HFA, ALPRAZolam, ibuprofen, ipratropium-albuterol, nitroGLYCERIN, glucose, dextrose, glucagon (rDNA), dextrose, butalbital-acetaminophen-caffeine    Review of Systems  Constitutional: negative for fatigue, fevers, malaise and weight loss  Ears, nose, mouth, throat: negative for ear drainage, epistaxis, hoarseness, nasal congestion, sore throat and voice change  Respiratory: negative except for cough and shortness of breath  Cardiovascular: negative for chest pain, chest pressure/discomfort, irregular heart beat, lower extremity edema and palpitations  Gastrointestinal: negative for abdominal pain, constipation, diarrhea, jaundice, melena, odynophagia, reflux symptoms and vomiting  Hematologic/lymphatic: negative for bleeding, easy bruising, lymphadenopathy and petechiae  Musculoskeletal:negative for arthralgias, bone pain, muscle weakness, neck pain and stiff joints  Neurological: negative for dizziness, gait problems, headaches, seizures, speech problems, tremors and weakness  Behavioral/Psych: negative for anxiety, behavior problems, depression, fatigue and sleep disturbance  Endocrine: negative for diabetic symptoms including none, neuropathy, polyphagia, polyuria, polydipsia, vomiting and diarrhea and temperature intolerance  Allergic/Immunologic: negative for anaphylaxis, angioedema, hay fever and urticaria    Objective:     Patient Vitals for the past 8 hrs:   BP Temp Temp src Pulse Resp SpO2   01/29/20 1156 -- -- -- -- 20 93 %   01/29/20 1129 -- 98.2 °F (36.8 °C) Oral 69 18 94 %   01/29/20 0919 117/73 97.3 °F (36.3 °C) Axillary 58 18 95 %   01/29/20 0803 -- -- -- -- 20 95 %   01/29/20 0515 134/78 97 °F (36.1 °C) Axillary 68 18 95 %     I/O last 3 completed shifts:   In: 600 [P.O.:600]  Out: -   I/O this shift:  In: 240 [P.O.:240]  Out: -     General Appearance: alert and oriented to person, place and time, well developed and well- nourished, in no acute distress  Skin: warm and dry, no rash or erythema  Head: normocephalic and atraumatic  Eyes: pupils equal, round, and reactive to light, extraocular eye movements intact, conjunctivae normal  ENT: external ear and ear canal normal bilaterally, nose without deformity, nasal mucosa and turbinates normal  Neck: supple and non-tender without mass, no cervical lymphadenopathy  Pulmonary/Chest: wheezing present-bilateral and rales present-bilateral  Cardiovascular: normal rate, regular rhythm,  no murmurs, rubs, distal pulses intact, no carotid bruits  Abdomen: soft, non-tender, non-distended, normal bowel sounds, no masses or organomegaly  Lymph Nodes: Cervical, supraclavicular normal  Extremities: no cyanosis, clubbing or edema  Musculoskeletal: normal range of motion, no joint swelling, deformity or tenderness  Neurologic: alert, no focal neurologic deficits    Data Review:  CBC:   Lab Results   Component Value Date    WBC 10.3 01/28/2020    RBC 4.77 01/28/2020     BMP:   Lab Results   Component Value Date    GLUCOSE 184 01/28/2020    CO2 24 01/28/2020    BUN 29 01/28/2020    CREATININE 0.7 01/28/2020    CALCIUM 8.3 01/28/2020     ABG:   Lab Results   Component Value Date    NEM9MDQ 22.6 02/07/2017    BEART -0.6 02/07/2017    U6AQRHXA 96.6 02/07/2017    PHART 7.446 02/07/2017    ZXN8CDX 33.6 02/07/2017    PO2ART 77.3 02/07/2017    JMA9EKV 23.7 02/07/2017       Radiology: All pertinent images / reports were reviewed as a part of this visit. Narrative   EXAMINATION:   TWO XRAY VIEWS OF THE CHEST       1/25/2020 4:05 pm       COMPARISON:   01/24/2020 and earlier studies.       HISTORY:   ORDERING SYSTEM PROVIDED HISTORY: cough   TECHNOLOGIST PROVIDED HISTORY:   Reason for exam:->cough   Reason for Exam: COUGH, CONGESTION AND SHORTNESS OF BREATH. FORMER SMOKER. HISTORY OF DIABETES, HTN, COPD, SKIN CANCER, CAD AND ACUTE MI   Acuity: Unknown   Type of Exam: Subsequent/Follow-up       FINDINGS:   Heart is slightly enlarged.  The pulmonary vasculature is mildly to   moderately prominent and more prominent than on the prior study.  Small   amount of opacity present in the lingula along the left heart border.  No   sizable pleural effusion.  Mild underlying emphysematous changes suspected.           Impression   Mild-to-moderate pulmonary vascular congestion worse than yesterday.  Focal   lingular airspace disease either atelectasis or pneumonia slightly improved. No pleural effusion.  Mild emphysematous changes.          Problem List:     COPD with acute exacerbation  Mucous plugging of airway  Influenza B infection  Assessment/Plan:     Influenza B infection with multifocal infiltrates-status post bronchoscopy x2, mucous plugging removed. No significant secretions/plugging noted on second bronchoscopy. Respiratory cultures negative. Influenza B negative on respiratory viral panel, started on Tamiflu. Continue with Solu-Medrol. Unfortunately patient is not ready for discharge yet, since patient continues to be rhonchorous.   May need 1 or 2 more days in hospital.    Sanju Ram MD

## 2020-01-30 LAB
GLUCOSE BLD-MCNC: 101 MG/DL (ref 70–99)
GLUCOSE BLD-MCNC: 185 MG/DL (ref 70–99)
GLUCOSE BLD-MCNC: 244 MG/DL (ref 70–99)
PERFORMED ON: ABNORMAL

## 2020-01-30 PROCEDURE — 6370000000 HC RX 637 (ALT 250 FOR IP): Performed by: INTERNAL MEDICINE

## 2020-01-30 PROCEDURE — 94761 N-INVAS EAR/PLS OXIMETRY MLT: CPT

## 2020-01-30 PROCEDURE — 94669 MECHANICAL CHEST WALL OSCILL: CPT

## 2020-01-30 PROCEDURE — 2700000000 HC OXYGEN THERAPY PER DAY

## 2020-01-30 PROCEDURE — 1200000000 HC SEMI PRIVATE

## 2020-01-30 PROCEDURE — 6360000002 HC RX W HCPCS: Performed by: INTERNAL MEDICINE

## 2020-01-30 PROCEDURE — 2500000003 HC RX 250 WO HCPCS: Performed by: INTERNAL MEDICINE

## 2020-01-30 PROCEDURE — 94640 AIRWAY INHALATION TREATMENT: CPT

## 2020-01-30 PROCEDURE — 99232 SBSQ HOSP IP/OBS MODERATE 35: CPT | Performed by: INTERNAL MEDICINE

## 2020-01-30 PROCEDURE — 2580000003 HC RX 258: Performed by: ANESTHESIOLOGY

## 2020-01-30 PROCEDURE — 2580000003 HC RX 258: Performed by: INTERNAL MEDICINE

## 2020-01-30 RX ORDER — PREDNISONE 20 MG/1
40 TABLET ORAL DAILY
Status: DISCONTINUED | OUTPATIENT
Start: 2020-01-31 | End: 2020-01-31 | Stop reason: HOSPADM

## 2020-01-30 RX ADMIN — ASPIRIN 81 MG 81 MG: 81 TABLET ORAL at 08:51

## 2020-01-30 RX ADMIN — GABAPENTIN 300 MG: 300 CAPSULE ORAL at 08:51

## 2020-01-30 RX ADMIN — IPRATROPIUM BROMIDE AND ALBUTEROL SULFATE 3 ML: .5; 3 SOLUTION RESPIRATORY (INHALATION) at 16:57

## 2020-01-30 RX ADMIN — ENOXAPARIN SODIUM 40 MG: 40 INJECTION SUBCUTANEOUS at 08:51

## 2020-01-30 RX ADMIN — INSULIN GLARGINE 40 UNITS: 100 INJECTION, SOLUTION SUBCUTANEOUS at 21:43

## 2020-01-30 RX ADMIN — IPRATROPIUM BROMIDE AND ALBUTEROL SULFATE 3 ML: .5; 3 SOLUTION RESPIRATORY (INHALATION) at 07:42

## 2020-01-30 RX ADMIN — OSELTAMIVIR PHOSPHATE 75 MG: 75 CAPSULE ORAL at 08:51

## 2020-01-30 RX ADMIN — MICONAZOLE NITRATE: 20 POWDER TOPICAL at 21:40

## 2020-01-30 RX ADMIN — DICLOFENAC 2 G: 10 GEL TOPICAL at 21:39

## 2020-01-30 RX ADMIN — AMOXICILLIN AND CLAVULANATE POTASSIUM 1 TABLET: 875; 125 TABLET, FILM COATED ORAL at 08:51

## 2020-01-30 RX ADMIN — ALPRAZOLAM 1 MG: 0.5 TABLET ORAL at 21:39

## 2020-01-30 RX ADMIN — OSELTAMIVIR PHOSPHATE 75 MG: 75 CAPSULE ORAL at 21:41

## 2020-01-30 RX ADMIN — DICLOFENAC 2 G: 10 GEL TOPICAL at 08:51

## 2020-01-30 RX ADMIN — INSULIN LISPRO 3 UNITS: 100 INJECTION, SOLUTION INTRAVENOUS; SUBCUTANEOUS at 13:21

## 2020-01-30 RX ADMIN — SODIUM CHLORIDE: 9 INJECTION, SOLUTION INTRAVENOUS at 03:10

## 2020-01-30 RX ADMIN — GABAPENTIN 300 MG: 300 CAPSULE ORAL at 21:42

## 2020-01-30 RX ADMIN — Medication 10 ML: at 22:33

## 2020-01-30 RX ADMIN — INSULIN LISPRO 10 UNITS: 100 INJECTION, SOLUTION INTRAVENOUS; SUBCUTANEOUS at 08:52

## 2020-01-30 RX ADMIN — IPRATROPIUM BROMIDE AND ALBUTEROL SULFATE 3 ML: .5; 3 SOLUTION RESPIRATORY (INHALATION) at 21:16

## 2020-01-30 RX ADMIN — LISINOPRIL 2.5 MG: 5 TABLET ORAL at 08:51

## 2020-01-30 RX ADMIN — Medication 10 ML: at 08:51

## 2020-01-30 RX ADMIN — DIPHENOXYLATE HYDROCHLORIDE AND ATROPINE SULFATE 1 TABLET: 2.5; .025 TABLET ORAL at 08:51

## 2020-01-30 RX ADMIN — INSULIN LISPRO 3 UNITS: 100 INJECTION, SOLUTION INTRAVENOUS; SUBCUTANEOUS at 21:42

## 2020-01-30 RX ADMIN — GUAIFENESIN 600 MG: 600 TABLET, EXTENDED RELEASE ORAL at 21:40

## 2020-01-30 RX ADMIN — SALINE NASAL SPRAY 1 SPRAY: 1.5 SOLUTION NASAL at 21:40

## 2020-01-30 RX ADMIN — INSULIN LISPRO 10 UNITS: 100 INJECTION, SOLUTION INTRAVENOUS; SUBCUTANEOUS at 17:40

## 2020-01-30 RX ADMIN — AMOXICILLIN AND CLAVULANATE POTASSIUM 1 TABLET: 875; 125 TABLET, FILM COATED ORAL at 21:41

## 2020-01-30 RX ADMIN — SALINE NASAL SPRAY 1 SPRAY: 1.5 SOLUTION NASAL at 08:51

## 2020-01-30 RX ADMIN — ROSUVASTATIN CALCIUM 10 MG: 10 TABLET, FILM COATED ORAL at 08:51

## 2020-01-30 RX ADMIN — MICONAZOLE NITRATE: 20 POWDER TOPICAL at 08:51

## 2020-01-30 RX ADMIN — IPRATROPIUM BROMIDE AND ALBUTEROL SULFATE 3 ML: .5; 3 SOLUTION RESPIRATORY (INHALATION) at 11:21

## 2020-01-30 RX ADMIN — GUAIFENESIN 600 MG: 600 TABLET, EXTENDED RELEASE ORAL at 08:51

## 2020-01-30 RX ADMIN — ISOSORBIDE MONONITRATE 60 MG: 30 TABLET, EXTENDED RELEASE ORAL at 08:51

## 2020-01-30 RX ADMIN — PROMETHAZINE HYDROCHLORIDE AND DEXTROMETHORPHAN HYDROBROMIDE 10 ML: 15; 6.25 SYRUP ORAL at 22:32

## 2020-01-30 RX ADMIN — PANTOPRAZOLE SODIUM 40 MG: 40 TABLET, DELAYED RELEASE ORAL at 06:08

## 2020-01-30 RX ADMIN — METHYLPREDNISOLONE SODIUM SUCCINATE 40 MG: 40 INJECTION, POWDER, FOR SOLUTION INTRAMUSCULAR; INTRAVENOUS at 08:52

## 2020-01-30 RX ADMIN — DIPHENOXYLATE HYDROCHLORIDE AND ATROPINE SULFATE 1 TABLET: 2.5; .025 TABLET ORAL at 21:40

## 2020-01-30 RX ADMIN — INSULIN LISPRO 10 UNITS: 100 INJECTION, SOLUTION INTRAVENOUS; SUBCUTANEOUS at 13:22

## 2020-01-30 ASSESSMENT — PAIN DESCRIPTION - LOCATION: LOCATION: CHEST

## 2020-01-30 ASSESSMENT — PAIN DESCRIPTION - PAIN TYPE: TYPE: ACUTE PAIN

## 2020-01-30 NOTE — PROGRESS NOTES
chest pain, chest pressure/discomfort, irregular heart beat, lower extremity edema and palpitations  Gastrointestinal: negative for abdominal pain, constipation, diarrhea, jaundice, melena, odynophagia, reflux symptoms and vomiting  Hematologic/lymphatic: negative for bleeding, easy bruising, lymphadenopathy and petechiae  Musculoskeletal:negative for arthralgias, bone pain, muscle weakness, neck pain and stiff joints  Neurological: negative for dizziness, gait problems, headaches, seizures, speech problems, tremors and weakness  Behavioral/Psych: negative for anxiety, behavior problems, depression, fatigue and sleep disturbance  Endocrine: negative for diabetic symptoms including none, neuropathy, polyphagia, polyuria, polydipsia, vomiting and diarrhea and temperature intolerance  Allergic/Immunologic: negative for anaphylaxis, angioedema, hay fever and urticaria    Objective:     Patient Vitals for the past 8 hrs:   BP Temp Temp src Pulse Resp SpO2   01/30/20 0845 123/80 98.2 °F (36.8 °C) Oral 81 18 94 %   01/30/20 0744 -- -- -- -- 18 91 %   01/30/20 0610 131/66 98.5 °F (36.9 °C) Oral 60 20 95 %     I/O last 3 completed shifts: In: 1965.8 [P.O.:240; I.V.:1725.8]  Out: -   I/O this shift:   In: 780.3 [I.V.:780.3]  Out: -     General Appearance: alert and oriented to person, place and time, well developed and well- nourished, in no acute distress  Skin: warm and dry, no rash or erythema  Head: normocephalic and atraumatic  Eyes: pupils equal, round, and reactive to light, extraocular eye movements intact, conjunctivae normal  ENT: external ear and ear canal normal bilaterally, nose without deformity, nasal mucosa and turbinates normal  Neck: supple and non-tender without mass, no cervical lymphadenopathy  Pulmonary/Chest: wheezing present-bilateral and rales present-bilateral  Cardiovascular: normal rate, regular rhythm,  no murmurs, rubs, distal pulses intact, no carotid bruits  Abdomen: soft, non-tender, non-distended, normal bowel sounds, no masses or organomegaly  Lymph Nodes: Cervical, supraclavicular normal  Extremities: no cyanosis, clubbing or edema  Musculoskeletal: normal range of motion, no joint swelling, deformity or tenderness  Neurologic: alert, no focal neurologic deficits    Data Review:  CBC:   Lab Results   Component Value Date    WBC 10.3 01/28/2020    RBC 4.77 01/28/2020     BMP:   Lab Results   Component Value Date    GLUCOSE 184 01/28/2020    CO2 24 01/28/2020    BUN 29 01/28/2020    CREATININE 0.7 01/28/2020    CALCIUM 8.3 01/28/2020     ABG:   Lab Results   Component Value Date    PKF0HMZ 22.6 02/07/2017    BEART -0.6 02/07/2017    S4OPEZML 96.6 02/07/2017    PHART 7.446 02/07/2017    AHY7LAF 33.6 02/07/2017    PO2ART 77.3 02/07/2017    [id number] 23.7 02/07/2017       Radiology: All pertinent images / reports were reviewed as a part of this visit. Narrative   EXAMINATION:   TWO XRAY VIEWS OF THE CHEST       1/25/2020 4:05 pm       COMPARISON:   01/24/2020 and earlier studies.       HISTORY:   ORDERING SYSTEM PROVIDED HISTORY: cough   TECHNOLOGIST PROVIDED HISTORY:   Reason for exam:->cough   Reason for Exam: COUGH, CONGESTION AND SHORTNESS OF BREATH. FORMER SMOKER. HISTORY OF DIABETES, HTN, COPD, SKIN CANCER, CAD AND ACUTE MI   Acuity: Unknown   Type of Exam: Subsequent/Follow-up       FINDINGS:   Heart is slightly enlarged.  The pulmonary vasculature is mildly to   moderately prominent and more prominent than on the prior study.  Small   amount of opacity present in the lingula along the left heart border.  No   sizable pleural effusion.  Mild underlying emphysematous changes suspected.           Impression   Mild-to-moderate pulmonary vascular congestion worse than yesterday.  Focal   lingular airspace disease either atelectasis or pneumonia slightly improved. No pleural effusion.  Mild emphysematous changes.          Problem List:     COPD with acute exacerbation  Mucous plugging of

## 2020-01-31 VITALS
SYSTOLIC BLOOD PRESSURE: 117 MMHG | HEIGHT: 66 IN | BODY MASS INDEX: 41.01 KG/M2 | DIASTOLIC BLOOD PRESSURE: 64 MMHG | TEMPERATURE: 97.8 F | HEART RATE: 68 BPM | RESPIRATION RATE: 20 BRPM | WEIGHT: 255.19 LBS | OXYGEN SATURATION: 94 %

## 2020-01-31 LAB
GLUCOSE BLD-MCNC: 102 MG/DL (ref 70–99)
GLUCOSE BLD-MCNC: 179 MG/DL (ref 70–99)
GLUCOSE BLD-MCNC: 246 MG/DL (ref 70–99)
GLUCOSE BLD-MCNC: 93 MG/DL (ref 70–99)
PERFORMED ON: ABNORMAL
PERFORMED ON: NORMAL

## 2020-01-31 PROCEDURE — 6370000000 HC RX 637 (ALT 250 FOR IP): Performed by: INTERNAL MEDICINE

## 2020-01-31 PROCEDURE — 94640 AIRWAY INHALATION TREATMENT: CPT

## 2020-01-31 PROCEDURE — 94761 N-INVAS EAR/PLS OXIMETRY MLT: CPT

## 2020-01-31 PROCEDURE — 94760 N-INVAS EAR/PLS OXIMETRY 1: CPT

## 2020-01-31 PROCEDURE — 2580000003 HC RX 258: Performed by: INTERNAL MEDICINE

## 2020-01-31 PROCEDURE — 6360000002 HC RX W HCPCS: Performed by: INTERNAL MEDICINE

## 2020-01-31 RX ORDER — AMOXICILLIN AND CLAVULANATE POTASSIUM 875; 125 MG/1; MG/1
1 TABLET, FILM COATED ORAL EVERY 12 HOURS SCHEDULED
Qty: 10 TABLET | Refills: 0 | Status: SHIPPED | OUTPATIENT
Start: 2020-01-31 | End: 2020-02-05

## 2020-01-31 RX ADMIN — INSULIN LISPRO 3 UNITS: 100 INJECTION, SOLUTION INTRAVENOUS; SUBCUTANEOUS at 13:30

## 2020-01-31 RX ADMIN — PREDNISONE 40 MG: 20 TABLET ORAL at 08:33

## 2020-01-31 RX ADMIN — GABAPENTIN 300 MG: 300 CAPSULE ORAL at 08:32

## 2020-01-31 RX ADMIN — SALINE NASAL SPRAY 1 SPRAY: 1.5 SOLUTION NASAL at 13:31

## 2020-01-31 RX ADMIN — DIPHENOXYLATE HYDROCHLORIDE AND ATROPINE SULFATE 1 TABLET: 2.5; .025 TABLET ORAL at 08:33

## 2020-01-31 RX ADMIN — IPRATROPIUM BROMIDE AND ALBUTEROL SULFATE 3 ML: .5; 3 SOLUTION RESPIRATORY (INHALATION) at 15:56

## 2020-01-31 RX ADMIN — ASPIRIN 81 MG 81 MG: 81 TABLET ORAL at 08:32

## 2020-01-31 RX ADMIN — ENOXAPARIN SODIUM 40 MG: 40 INJECTION SUBCUTANEOUS at 08:31

## 2020-01-31 RX ADMIN — IPRATROPIUM BROMIDE AND ALBUTEROL SULFATE 3 ML: .5; 3 SOLUTION RESPIRATORY (INHALATION) at 12:19

## 2020-01-31 RX ADMIN — MICONAZOLE NITRATE: 20 POWDER TOPICAL at 08:34

## 2020-01-31 RX ADMIN — Medication 10 ML: at 08:34

## 2020-01-31 RX ADMIN — LISINOPRIL 2.5 MG: 5 TABLET ORAL at 08:32

## 2020-01-31 RX ADMIN — SALINE NASAL SPRAY 1 SPRAY: 1.5 SOLUTION NASAL at 08:35

## 2020-01-31 RX ADMIN — INSULIN LISPRO 10 UNITS: 100 INJECTION, SOLUTION INTRAVENOUS; SUBCUTANEOUS at 13:29

## 2020-01-31 RX ADMIN — PANTOPRAZOLE SODIUM 40 MG: 40 TABLET, DELAYED RELEASE ORAL at 06:57

## 2020-01-31 RX ADMIN — GUAIFENESIN 600 MG: 600 TABLET, EXTENDED RELEASE ORAL at 08:33

## 2020-01-31 RX ADMIN — DICLOFENAC 2 G: 10 GEL TOPICAL at 08:34

## 2020-01-31 RX ADMIN — INSULIN LISPRO 10 UNITS: 100 INJECTION, SOLUTION INTRAVENOUS; SUBCUTANEOUS at 16:52

## 2020-01-31 RX ADMIN — INSULIN LISPRO 10 UNITS: 100 INJECTION, SOLUTION INTRAVENOUS; SUBCUTANEOUS at 08:47

## 2020-01-31 RX ADMIN — IPRATROPIUM BROMIDE AND ALBUTEROL SULFATE 3 ML: .5; 3 SOLUTION RESPIRATORY (INHALATION) at 09:00

## 2020-01-31 RX ADMIN — ALPRAZOLAM 1 MG: 0.5 TABLET ORAL at 08:54

## 2020-01-31 RX ADMIN — AMOXICILLIN AND CLAVULANATE POTASSIUM 1 TABLET: 875; 125 TABLET, FILM COATED ORAL at 08:32

## 2020-01-31 RX ADMIN — INSULIN LISPRO 6 UNITS: 100 INJECTION, SOLUTION INTRAVENOUS; SUBCUTANEOUS at 16:48

## 2020-01-31 RX ADMIN — ISOSORBIDE MONONITRATE 60 MG: 30 TABLET, EXTENDED RELEASE ORAL at 08:32

## 2020-01-31 RX ADMIN — ROSUVASTATIN CALCIUM 10 MG: 10 TABLET, FILM COATED ORAL at 08:31

## 2020-01-31 ASSESSMENT — PAIN SCALES - GENERAL
PAINLEVEL_OUTOF10: 0

## 2020-01-31 NOTE — PLAN OF CARE
Problem: Falls - Risk of:  Goal: Will remain free from falls  Description  Will remain free from falls  1/31/2020 1230 by Manish Salazar RN  Outcome: Ongoing  1/30/2020 2339 by Franklyn Macias RN  Outcome: Ongoing  Goal: Absence of physical injury  Description  Absence of physical injury  1/31/2020 1230 by Manish Salazar RN  Outcome: Ongoing  1/30/2020 2339 by Franklyn Macias RN  Outcome: Ongoing     Problem: Serum Glucose Level - Abnormal:  Goal: Ability to maintain appropriate glucose levels will improve  Description  Ability to maintain appropriate glucose levels will improve  1/31/2020 1230 by Manish Salazar RN  Outcome: Ongoing  1/30/2020 2339 by Franklyn Macias RN  Outcome: Ongoing     Problem: Pain:  Goal: Pain level will decrease  Description  Pain level will decrease  1/31/2020 1230 by Manish Salazar RN  Outcome: Ongoing  1/30/2020 2339 by Franklyn Macias RN  Outcome: Ongoing     Problem: Breathing Pattern - Ineffective:  Goal: Ability to achieve and maintain a regular respiratory rate will improve  Description  Ability to achieve and maintain a regular respiratory rate will improve  1/31/2020 1230 by Manish Salazar RN  Outcome: Ongoing  1/30/2020 2339 by Franklyn Macias RN  Outcome: Ongoing     Problem: HEMODYNAMIC STATUS  Goal: Patient has stable vital signs and fluid balance  1/31/2020 1230 by Manish Salazar RN  Outcome: Ongoing  1/30/2020 2339 by Franklyn Macias RN  Outcome: Ongoing     Problem: ACTIVITY INTOLERANCE/IMPAIRED MOBILITY  Goal: Mobility/activity is maintained at optimum level for patient  1/30/2020 2339 by Franklyn Macias RN  Outcome: Ongoing     Problem: COMMUNICATION IMPAIRMENT  Goal: Ability to express needs and understand communication  1/31/2020 1230 by Manish Salazar RN  Outcome: Ongoing  1/30/2020 2339 by Franklyn Macias RN  Outcome: Ongoing     Problem: Discharge Planning:  Goal: Discharged to appropriate level of care  Description  Discharged to appropriate level of care  1/31/2020 1230 by Jaycee Arredondo RN  Outcome: Ongoing  1/30/2020 2339 by Barbra Garcia RN  Outcome: Ongoing     Problem: Activity Intolerance:  Goal: Ability to tolerate increased activity will improve  Description  Ability to tolerate increased activity will improve  1/30/2020 2339 by Barbra Garcia RN  Outcome: Ongoing     Problem: Airway Clearance - Ineffective:  Goal: Ability to maintain a clear airway will improve  Description  Ability to maintain a clear airway will improve  1/31/2020 1230 by Jaycee Arredondo RN  Outcome: Ongoing  1/30/2020 2339 by Barbra Garcia RN  Outcome: Ongoing     Problem: Breathing Pattern - Ineffective:  Goal: Ability to achieve and maintain a regular respiratory rate will improve  Description  Ability to achieve and maintain a regular respiratory rate will improve  1/31/2020 1230 by Jaycee Arredondo RN  Outcome: Ongoing  1/30/2020 2339 by Barbra Garcia RN  Outcome: Ongoing     Problem: Gas Exchange - Impaired:  Goal: Levels of oxygenation will improve  Description  Levels of oxygenation will improve  1/31/2020 1230 by Jaycee Arredondo RN  Outcome: Ongoing  1/30/2020 2339 by Barbra Garcia RN  Outcome: Ongoing     Problem:  Activity:  Goal: Energy level will increase  Description  Energy level will increase  1/31/2020 1230 by Jaycee Arredondo RN  Outcome: Ongoing  1/30/2020 2339 by Barbra Garcia RN  Outcome: Ongoing  Goal: Ability to return to normal activity level will improve  Description  Ability to return to normal activity level will improve  1/31/2020 1230 by Jaycee Arredondo RN  Outcome: Ongoing  1/30/2020 2339 by Barbra Garcia RN  Outcome: Ongoing     Problem: Fluid Volume:  Goal: Maintenance of adequate hydration will improve  Description  Maintenance of adequate hydration will improve  1/31/2020 1230 by Jaycee Arredondo RN  Outcome: Ongoing  1/30/2020 2339 by Campos Inman Markus Lozano RN  Outcome: Ongoing     Problem: Health Behavior:  Goal: Adoption of positive health habits will improve  Description  Adoption of positive health habits will improve  1/31/2020 1230 by Chilo Bose RN  Outcome: Ongoing  1/30/2020 2339 by John Busch RN  Outcome: Ongoing  Goal: Compliance with immunization standards will improve  Description  Compliance with immunization standards will improve  1/30/2020 2339 by John Busch RN  Outcome: Ongoing     Problem: Physical Regulation:  Goal: Complications related to the disease process, condition or treatment will be avoided or minimized  Description  Complications related to the disease process, condition or treatment will be avoided or minimized  1/31/2020 1230 by Chilo Bose RN  Outcome: Ongoing  1/30/2020 2339 by John Busch RN  Outcome: Ongoing  Goal: Ability to maintain clinical measurements within normal limits will improve  Description  Ability to maintain clinical measurements within normal limits will improve  1/31/2020 1230 by Chilo Bose RN  Outcome: Ongoing  1/30/2020 2339 by John Busch RN  Outcome: Ongoing  Goal: Signs and symptoms of infection will decrease  Description  Signs and symptoms of infection will decrease  1/31/2020 1230 by Chilo Bose RN  Outcome: Ongoing  1/30/2020 2339 by John Busch RN  Outcome: Ongoing     Problem: Respiratory:  Goal: Respiratory status will improve  Description  Respiratory status will improve  1/30/2020 2339 by John Busch RN  Outcome: Ongoing     Problem: Sensory:  Goal: General experience of comfort will improve  Description  General experience of comfort will improve  1/30/2020 2339 by John Busch RN  Outcome: Ongoing

## 2020-01-31 NOTE — PROGRESS NOTES
Oral, TID PRN  promethazine-dextromethorphan (PROMETHAZINE-DM) 6.25-15 MG/5ML syrup 10 mL, 10 mL, Oral, Q6H PRN  insulin glargine (LANTUS) injection pen 40 Units, 40 Units, Subcutaneous, Nightly  albuterol sulfate  (90 Base) MCG/ACT inhaler 2 puff, 2 puff, Inhalation, Q6H PRN  ALPRAZolam (XANAX) tablet 1 mg, 1 mg, Oral, TID PRN  aspirin chewable tablet 81 mg, 81 mg, Oral, Daily  diclofenac sodium 1 % gel 2 g, 2 g, Topical, BID  gabapentin (NEURONTIN) capsule 300 mg, 300 mg, Oral, BID  ibuprofen (ADVIL;MOTRIN) tablet 400 mg, 400 mg, Oral, Q6H PRN  ipratropium-albuterol (DUONEB) nebulizer solution 3 mL, 3 mL, Inhalation, Q4H WA  ipratropium-albuterol (DUONEB) nebulizer solution 3 mL, 1 vial, Inhalation, Q4H PRN  isosorbide mononitrate (IMDUR) extended release tablet 60 mg, 60 mg, Oral, Daily  lisinopril (PRINIVIL;ZESTRIL) tablet 2.5 mg, 2.5 mg, Oral, Daily  nitroGLYCERIN (NITROSTAT) SL tablet 0.4 mg, 0.4 mg, Sublingual, Q5 Min PRN  pantoprazole (PROTONIX) tablet 40 mg, 40 mg, Oral, QAM AC  rosuvastatin (CRESTOR) tablet 10 mg, 10 mg, Oral, Daily  enoxaparin (LOVENOX) injection 40 mg, 40 mg, Subcutaneous, Daily  glucose (GLUTOSE) 40 % oral gel 15 g, 15 g, Oral, PRN  dextrose 50 % IV solution, 12.5 g, Intravenous, PRN  glucagon (rDNA) injection 1 mg, 1 mg, Intramuscular, PRN  dextrose 5 % solution, 100 mL/hr, Intravenous, PRN  diphenoxylate-atropine (LOMOTIL) 2.5-0.025 MG per tablet 1 tablet, 1 tablet, Oral, BID  butalbital-acetaminophen-caffeine (FIORICET, ESGIC) per tablet 1 tablet, 1 tablet, Oral, Q4H PRN    Physical      VITALS:    /64   Pulse 68   Temp 97.8 °F (36.6 °C) (Axillary)   Resp 20   Ht 5' 6\" (1.676 m)   Wt 255 lb 3 oz (115.8 kg)   SpO2 94%   BMI 41.19 kg/m²   TEMPERATURE:  Current - Temp: 97.8 °F (36.6 °C);  Max - Temp  Av.9 °F (36.6 °C)  Min: 97.7 °F (36.5 °C)  Max: 98.3 °F (36.8 °C)  RESPIRATIONS RANGE: Resp  Av.9  Min: 18  Max: 20  PULSE RANGE: Pulse  Av.3  Min: 58 Acute bronchitis    Irritable bowel syndrome with diarrhea    COPD with acute exacerbation (HCC)    AKIL (obstructive sleep apnea)    SOB (shortness of breath)    Chest congestion    DM (diabetes mellitus), secondary uncontrolled (HCC)    Pyuria    Ineffective airway clearance    Pulmonary infiltrate    Influenza B    Mucus plugging of bronchi     Ct  Present care    wean steroids , she appears to be heading towards dismissal but clinical improvement is modest

## 2020-01-31 NOTE — PROGRESS NOTES
Department of Internal Medicine  General Internal Medicine   Progress Note     SUBJECTIVE :  Breathing has improved , no wheezes obviously audible       History obtained from chart review and the patient  General ROS: positive for  - fatigue and malaise  negative for - chills, fever or night sweats  Psychological ROS: negative  Respiratory ROS: positive for - shortness of breath and wheezing  negative for - cough, hemoptysis, sputum changes or stridor  Cardiovascular ROS: positive for - chest pain, dyspnea on exertion and edema  negative for - loss of consciousness, orthopnea or palpitations  Gastrointestinal ROS: no abdominal pain, change in bowel habits, or black or bloody stools    OBJECTIVE      Medications      Current Facility-Administered Medications: predniSONE (DELTASONE) tablet 40 mg, 40 mg, Oral, Daily  amoxicillin-clavulanate (AUGMENTIN) 875-125 MG per tablet 1 tablet, 1 tablet, Oral, 2 times per day  insulin lispro (1 Unit Dial) 10 Units, 10 Units, Subcutaneous, TID WC  sodium chloride (OCEAN, BABY AYR) 0.65 % nasal spray 1 spray, 1 spray, Each Nostril, TID  sodium chloride flush 0.9 % injection 10 mL, 10 mL, Intravenous, BID  sodium chloride flush 0.9 % injection 10 mL, 10 mL, Intravenous, PRN  potassium chloride (KLOR-CON M) extended release tablet 40 mEq, 40 mEq, Oral, PRN **OR** potassium bicarb-citric acid (EFFER-K) effervescent tablet 40 mEq, 40 mEq, Oral, PRN **OR** potassium chloride 10 mEq/100 mL IVPB (Peripheral Line), 10 mEq, Intravenous, PRN  0.9 % sodium chloride bolus, 500 mL, Intravenous, PRN  hydrALAZINE (APRESOLINE) injection 10 mg, 10 mg, Intravenous, Q6H PRN  insulin lispro (1 Unit Dial) 0-18 Units, 0-18 Units, Subcutaneous, TID WC  insulin lispro (1 Unit Dial) 0-9 Units, 0-9 Units, Subcutaneous, Nightly  guaiFENesin (MUCINEX) extended release tablet 600 mg, 600 mg, Oral, BID  miconazole (MICOTIN) 2 % powder, , Topical, BID  benzonatate (TESSALON) capsule 100 mg, 100 mg, Oral, TID bronchitis    Irritable bowel syndrome with diarrhea    COPD with acute exacerbation (HCC)    AKIL (obstructive sleep apnea)    SOB (shortness of breath)    Chest congestion    DM (diabetes mellitus), secondary uncontrolled (HCC)    Pyuria    Ineffective airway clearance    Pulmonary infiltrate    Influenza B    Mucus plugging of bronchi     Ct  Present care , slow wean on steroid dependence

## 2020-01-31 NOTE — ADT AUTH CERT
Raine Morillo      tolerating po    Interventions    (X) Oxygen as needed    1/31/2020 10:59 AM EST by Raine Morillo      2L    Medications    ( ) * Inhaled bronchodilator regimen established and feasible at next level of care    * Milestone   Additional Notes   01/29/2020      Pulmonology:   Interval History: Still quite congested, rhonchi and wheeze.  No phlegm with the cough.  On 2 L O2.       Problem List:       COPD with acute exacerbation   Mucous plugging of airway   Influenza B infection   Assessment/Plan:       Influenza B infection with multifocal infiltrates-status post bronchoscopy x2, mucous plugging removed.  No significant secretions/plugging noted on second bronchoscopy.  Respiratory cultures negative.       Influenza B positive on respiratory viral panel, started on Tamiflu.  Continue with Solu-Medrol.       Unfortunately patient is not ready for discharge yet, since patient continues to be rhonchorous.  May need 1 or 2 more days in hospital.      Scheduled Medications   · methylPREDNISolone 40 mg Intravenous Daily   · oseltamivir 75 mg Oral BID   · amoxicillin-clavulanate 1 tablet Oral 2 times per day   · insulin lispro 10 Units Subcutaneous TID WC   · sodium chloride 1 spray Each Nostril TID   · sodium chloride flush 10 mL Intravenous BID   · insulin lispro 0-18 Units Subcutaneous TID WC   · insulin lispro 0-9 Units Subcutaneous Nightly   · guaiFENesin 600 mg Oral BID   · miconazole Topical BID   · insulin glargine 40 Units Subcutaneous Nightly   · aspirin 81 mg Oral Daily   · diclofenac sodium 2 g Topical BID   · gabapentin 300 mg Oral BID   · ipratropium-albuterol 3 mL Inhalation Q4H WA   · isosorbide mononitrate 60 mg Oral Daily   · lisinopril 2.5 mg Oral Daily   · pantoprazole 40 mg Oral QAM AC   · rosuvastatin 10 mg Oral Daily   · enoxaparin 40 mg Subcutaneous Daily   · diphenoxylate-atropine 1 tablet Oral BID         PRN: xanax po x 1, tessalon 100 mg po x 1      Labs: [id number]     Chronic Obstructive Pulmonary Disease - Care Day 10 (1/28/2020) by Erica Antoine RN         Review Status Review Entered   Completed 1/31/2020 10:52       Criteria Review      Care Day: 10 Care Date: 1/28/2020 Level of Care:    Guideline Day 3    Clinical Status    (X) * Hemodynamic stability    1/31/2020 10:52 AM EST by Chi Morillo      VS: 97.5, 60, 22, 131/67, 97% on 2L/NC    (X) * Mental status at baseline    1/31/2020 10:52 AM EST by Tamie Morillo      A&O    ( ) * No evidence of infection, or outpatient treatment planned    (X) * Uncompensated acidosis absent    1/31/2020 10:52 AM EST by Chi Morillo      none noted    ( ) * Oxygenation at baseline or acceptable for next level of care [G]    ( ) * Airflow rates at baseline or acceptable for next level of care    ( ) * Eating and sleeping without frequent dyspnea    ( ) * Breathing comfortably at rest    ( ) * Discharge plans and education understood    Activity    ( ) * Ambulatory    Routes    (X) * Oral hydration, medications, and diet    1/31/2020 10:52 AM EST by Chi Morillo      tolerating po    Interventions    (X) Oxygen as needed    1/31/2020 10:52 AM EST by Chi Morillo      2L    Medications    ( ) * Inhaled bronchodilator regimen established and feasible at next level of care    * Milestone   Additional Notes   01/28/2020      Pulmonology:   Interval History: Patient still continues to be very congested and rhonchorous.  Bronchoscopy performed yesterday showed only thin secretions.  Continues to need 2 L O2.       Problem List:       COPD with acute exacerbation   Mucous plugging of airway   Influenza B infection   Assessment/Plan:       Influenza B infection with multifocal infiltrates-status post bronchoscopy x2, mucous plugging removed.  Bronchoscopy from yesterday showed thin secretions only.       Influenza B on respiratory viral panel, started on Tamiflu.  Continue with Solu-Medrol.       Unfortunately patient is not ready for discharge yet.          Scheduled Medications   · methylPREDNISolone 40 mg Intravenous Daily   · oseltamivir 75 mg Oral BID   · amoxicillin-clavulanate 1 tablet Oral 2 times per day   · insulin lispro 10 Units Subcutaneous TID WC   · sodium chloride 1 spray Each Nostril TID   · sodium chloride flush 10 mL Intravenous BID   · insulin lispro 0-18 Units Subcutaneous TID WC   · insulin lispro 0-9 Units Subcutaneous Nightly   · guaiFENesin 600 mg Oral BID   · miconazole Topical BID   · insulin glargine 40 Units Subcutaneous Nightly   · aspirin 81 mg Oral Daily   · diclofenac sodium 2 g Topical BID   · gabapentin 300 mg Oral BID   · ipratropium-albuterol 3 mL Inhalation Q4H WA   · isosorbide mononitrate 60 mg Oral Daily   · lisinopril 2.5 mg Oral Daily   · pantoprazole 40 mg Oral QAM AC   · rosuvastatin 10 mg Oral Daily   · enoxaparin 40 mg Subcutaneous Daily   · diphenoxylate-atropine 1 tablet Oral BID         PRN: xanax po x 2      CBC:    Lab Results   Component Value Date     WBC 10.3 01/28/2020     RBC 4.77 01/28/2020       BMP:    Lab Results   Component Value Date     GLUCOSE 184 01/28/2020     CO2 24 01/28/2020     BUN 29 01/28/2020     CREATININE 0.7 01/28/2020     CALCIUM 8.3 01/28/2020              Chronic Obstructive Pulmonary Disease - Care Day 9 (1/27/2020) by George Olmedo RN         Review Status Review Entered   Completed 1/31/2020 10:46       Criteria Review      Care Day: 9 Care Date: 1/27/2020 Level of Care:    Guideline Day 3    Clinical Status    (X) * Hemodynamic stability    1/31/2020 10:46 AM EST by Tamie Morillo      VS: 98.1, 60, 20, 125/56, 93% on 2L/NC    (X) * Mental status at baseline    1/31/2020 10:46 AM EST by Tamie Morillo      A&O    ( ) * No evidence of infection, or outpatient treatment planned    (X) * Uncompensated acidosis absent    ( ) * Oxygenation at baseline or acceptable for next level of care [G]    ( ) * Airflow rates at baseline or acceptable for next level of care    ( 189 01/26/2020     CO2 30 01/26/2020     BUN 26 01/26/2020     CREATININE 0.7 01/26/2020     CALCIUM 9.0 01/26/2020                   Chronic Obstructive Pulmonary Disease - Care Day 7 (1/25/2020) by Erica Antoine RN         Review Status Review Entered   Completed 1/31/2020 10:36       Criteria Review      Care Day: 7 Care Date: 1/25/2020 Level of Care:    Guideline Day 3    Clinical Status    (X) * Hemodynamic stability    1/31/2020 10:36 AM EST by Tamie Morillo      VS: 97.3, 62, 16, 142/78, 92% on 2L/NC---89% on r/a    (X) * Mental status at baseline    1/31/2020 10:36 AM EST by Tamie Morillo      A&O    ( ) * No evidence of infection, or outpatient treatment planned    1/31/2020 10:36 AM EST by Chi Morillo      cxr with significant infiltrate left base    (X) * Uncompensated acidosis absent    1/31/2020 10:36 AM EST by Chi Morillo      none ntoed    ( ) * Oxygenation at baseline or acceptable for next level of care [G]    ( ) * Airflow rates at baseline or acceptable for next level of care    ( ) * Eating and sleeping without frequent dyspnea    ( ) * Breathing comfortably at rest    ( ) * Discharge plans and education understood    Activity    ( ) * Ambulatory    Routes    (X) * Oral hydration, medications, and diet    1/31/2020 10:36 AM EST by Chi Morillo      tolerating po    Interventions    (X) Oxygen as needed    1/31/2020 10:36 AM EST by Chi Morillo      2 L    Medications    ( ) * Inhaled bronchodilator regimen established and feasible at next level of care    * Milestone   Additional Notes   01/25/2020      Pulmonology:   Interval History: Patient underwent bronchoscopy yesterday, with mucous plugging/respiratory secretions noted and suctioned bilaterally.  Chest x-ray shows persistent left-sided infiltrate and patient still feels chest congestion.  Unable to bring up phlegm.  On 2 L O2.       Problem List:       COPD with acute exacerbation   Mucous plugging of airway     tracheobronchial tree was therapeutically aspirated using Mucomyst and saline, patient tolerated the procedure well and did not have any apparent complications         Current meds:    Mucomyst 600 mg HHN bid, asa, ceftin 500 mg po bid, lovenox 40 mg sc daily, neurontin bid, mucinex bid, SSI, lantus 40 u sc nightly, duoneb hhn q 4, imdur po daily, IV solumedrol 40 mg q 12, crestor po daily      PRN: xanax 1 mg po x 2, ibuprofen 400 x 1, duoneb 3 ml x 1         Labs:      (L) 01/24/2020     K 4.9 01/24/2020     CL 97 (L) 01/24/2020     CREATININE 0.9 01/24/2020     BUN 23 (H) 01/24/2020             Chronic Obstructive Pulmonary Disease - Care Day 5 (1/23/2020) by Alex Villavicencio RN         Review Status Review Entered   Completed 1/23/2020 16:21       Criteria Review      Care Day: 5 Care Date: 1/23/2020 Level of Care:    Guideline Day 3    Clinical Status    (X) * Hemodynamic stability    1/23/2020 4:21 PM EST by Tamie Morillo      VS: 97.0, 58, 18, 123/62, 94% on 2L/NC    (X) * Mental status at baseline    ( ) * No evidence of infection, or outpatient treatment planned    1/23/2020 4:21 PM EST by Raine Morillo      Pulmonology concerned for mucous plugging    (X) * Uncompensated acidosis absent    ( ) * Oxygenation at baseline or acceptable for next level of care [G]    ( ) * Airflow rates at baseline or acceptable for next level of care    ( ) * Eating and sleeping without frequent dyspnea    ( ) * Breathing comfortably at rest    ( ) * Discharge plans and education understood    Activity    ( ) * Ambulatory    Routes    (X) * Oral hydration, medications, and diet    Medications    ( ) * Inhaled bronchodilator regimen established and feasible at next level of care    ( ) Oral steroids    1/23/2020 4:21 PM EST by Raine Morillo      IV solumedrol 40 mg q 12, decreased from q 8    * Milestone   Additional Notes   01/23/2020      Labs: [id number]      Pulmonology:   Patient on evaluation states that she has sleep apnea)     SOB (shortness of breath)     Chest congestion     DM (diabetes mellitus), secondary uncontrolled (HCC)     Pyuria     Ineffective airway clearance   Resolved Problems:     COPD exacerbation (HCC)                   Plan:    · Oxygen supplementation, if required, to keep saturation between 90 to 94% only   · Pulmonary toilet   · Bronchodilators   · No need for any Dulera with DuoNeb and IV Solu-Medrol   · IV Solu-Medrol to continue for now   · Mucolytic started including Mucomyst nebulization   · Patient's blood sugars are on the higher side which may be partly because of steroids and titration of insulins as per internal medicine team   · Lantus insulin to be titrated as per blood glucose monitoring by IM   · BGM with SSI   · Patient is getting p.o.  Ceftin for pyuria with history of Klebsiella UTI in the past   · Patient has increased chest congestion along with mucus plugging and ineffective airway clearance and patient was told about the options including conservative management versus aggressive care with bronchoscopy-patient wants to revisit that tomorrow morning   · Incentive spirometry and Acapella ordered   · Internal medicine to decide about continuation of lomotil   · Patient to be provisionally kept NPO after midnight   · Monitor for any hypoglycemia   · Avoid sedatives and narcotics   · Antihypertensives as per IM   · Monitor for any hypoventilation and hypercarbia    · Saline nasal spray started   · PUD and DVT prophylaxis as per IM      Scheduled Meds:   methylPREDNISolone, 40 mg, Intravenous, Q12H   acetylcysteine, 600 mg, Inhalation, BID   sodium chloride, 1 spray, Each Nostril, TID   insulin lispro, 0-18 Units, Subcutaneous, TID WC   insulin lispro, 0-9 Units, Subcutaneous, Nightly   guaiFENesin, 600 mg, Oral, BID   miconazole, , Topical, BID   insulin glargine, 40 Units, Subcutaneous, Nightly   insulin lispro, 5 Units, Subcutaneous, TID WC   aspirin, 81 mg, Oral, Daily   diclofenac sodium, 2 g, Topical, BID   gabapentin, 300 mg, Oral, BID   ipratropium-albuterol, 3 mL, Inhalation, Q4H WA   isosorbide mononitrate, 60 mg, Oral, Daily   lisinopril, 2.5 mg, Oral, Daily   pantoprazole, 40 mg, Oral, QAM AC   rosuvastatin, 10 mg, Oral, Daily   cefUROXime, 500 mg, Oral, 2 times per day   enoxaparin, 40 mg, Subcutaneous, Daily   diphenoxylate-atropine, 1 tablet, Oral, BID             Chronic Obstructive Pulmonary Disease - Care Day 4 (1/22/2020) by Brittany Rocha RN         Review Status Review Entered   Completed 1/23/2020 16:18       Criteria Review      Care Day: 4 Care Date: 1/22/2020 Level of Care:    Guideline Day 3    Clinical Status    (X) * Hemodynamic stability    1/23/2020 4:18 PM EST by Tamie Morillo      VS: 97.3, 72, 18, 154/93, 90% on 2L/NC    (X) * Mental status at baseline    1/23/2020 4:18 PM EST by Vibha Morillo      baseline    ( ) * No evidence of infection, or outpatient treatment planned    (X) * Uncompensated acidosis absent    1/23/2020 4:18 PM EST by Vibha Morillo      none noted    ( ) * Oxygenation at baseline or acceptable for next level of care [G]    ( ) * Airflow rates at baseline or acceptable for next level of care    ( ) * Eating and sleeping without frequent dyspnea    1/23/2020 4:18 PM EST by Vibha Morillo      continues with exertional dyspnea    ( ) * Breathing comfortably at rest    ( ) * Discharge plans and education understood    Activity    ( ) * Ambulatory    Routes    (X) * Oral hydration, medications, and diet    1/23/2020 4:18 PM EST by Vibha Morillo      tolerating po    Medications    ( ) * Inhaled bronchodilator regimen established and feasible at next level of care    ( ) Oral steroids    1/23/2020 4:18 PM EST by Vibha Morillo      continues on IV solumedrol as prior    * Milestone   Additional Notes   01/22/2020      No MD notes available      Per CM notes, planning for home at [place]       Chronic Obstructive Pulmonary Disease - Care Day 3 lispro (1 Unit Dial) 0-18 Units, 0-18 Units, Subcutaneous, TID WC   insulin lispro (1 Unit Dial) 0-9 Units, 0-9 Units, Subcutaneous, Nightly   miconazole (MICOTIN) 2 % powder, , Topical, BID   insulin glargine (LANTUS) injection pen 40 Units, 40 Units, Subcutaneous, Nightly   insulin lispro (1 Unit Dial) 5 Units, 5 Units, Subcutaneous, TID WC   aspirin chewable tablet 81 mg, 81 mg, Oral, Daily   mometasone-formoterol (DULERA) 200-5 MCG/ACT inhaler 2 puff, 2 puff, Inhalation, BID   diclofenac sodium 1 % gel 2 g, 2 g, Topical, BID   gabapentin (NEURONTIN) capsule 300 mg, 300 mg, Oral, BID   ipratropium-albuterol (DUONEB) nebulizer solution 3 mL, 3 mL, Inhalation, Q4H WA   isosorbide mononitrate (IMDUR) extended release tablet 60 mg, 60 mg, Oral, Daily   lisinopril (PRINIVIL;ZESTRIL) tablet 2.5 mg, 2.5 mg, Oral, Daily   pantoprazole (PROTONIX) tablet 40 mg, 40 mg, Oral, QAM AC   rosuvastatin (CRESTOR) tablet 10 mg, 10 mg, Oral, Daily   methylPREDNISolone sodium (SOLU-MEDROL) injection 40 mg, 40 mg, Intravenous, Q8H   cefUROXime (CEFTIN) tablet 500 mg, 500 mg, Oral, 2 times per day   enoxaparin (LOVENOX) injection 40 mg, 40 mg, Subcutaneous, Daily   diphenoxylate-atropine (LOMOTIL) 2.5-0.025 MG per tablet 1 tablet, 1 tablet, Oral, BID              Chronic Obstructive Pulmonary Disease - Care Day 2 (1/20/2020) by William Chandler RN         Review Status Review Entered   Completed 1/21/2020 14:20       Criteria Review      Care Day: 2 Care Date: 1/20/2020 Level of Care:    Guideline Day 2    Level Of Care    (X) Floor or intermediate care    Clinical Status    (X) * Hemodynamic stability    1/21/2020 2:20 PM EST by Shaheen Urias      146/67, 136/67, 141/55    (X) * Mechanical and noninvasive ventilation absent    ( ) * Uncompensated acidosis absent    Routes    (X) Diet as tolerated    Interventions    (X) Oxygen    1/21/2020 2:20 PM EST by Shaheen Duty      O2 2-4L    (X) Pulse oximetry    1/21/2020 2:20 PM EST by Danika Hamm 87-97% O2 2-4L    (X) Respiratory therapy    (X) DVT prophylaxis    1/21/2020 2:20 PM EST by Korene Nissen      SQ Lovenox QD    Medications    (X) Systemic corticosteroids    1/21/2020 2:20 PM EST by Korene Nissen      IV Solumedrol Q8hrs    (X) Inhaled bronchodilators    1/21/2020 2:20 PM EST by Korene Nissen      Duooneb Aero Q4hrs WA    * Milestone   Additional Notes   1/20-Care Day 2      [id number]/55-87%-97% O2 2-4L      IV Solumedrol 40mg Q8hrs   Duoneb Aero Q4hrs WA   SQ Lovenox QD      Intermed Note:   REVIEW OF SYSTEMS:  Pertinent for moderate anxiety.  Severe shortness of   breath, obvious wheezing.  Obese habitus.  No dysphagia.  Difficulty in   ambulation.  No angina pectoris.  Significant resting and exertional   shortness of breath with orthopnea.  No paroxysmal nocturnal dyspnea. No abdominal pain.  No hematemesis or melena.  No genitourinary   complaint.  Does have chronic musculoskeletal pain.  Does have   occasional depression and anxiety neurosis.  No TIA or seizure activity. PHYSICAL EXAMINATION:   GENERAL: Kenny Goel is alert, awake, oriented x3.  A moderately distressed   60-year-old white woman looking consistent with her stated age. VITAL SIGNS:  Her temperature is 97.5, blood pressure 146/67,   respirations 20, heart rate 78. HEENT:  Oral mucosa dry. SKIN:  Warm and dry. NECK:  Supple.  Mild jugular venous distention.  No accessory muscles   use. LUNGS:  Diminished breath sounds.  Bronchovesicular breathing pattern   with few coarse crackles.  Bilateral profound expiratory wheezes. HEART:  Regular rate and rhythm.  S1 and S2.  5/5 systolic ejection   murmur.  No gallop rhythm. ABDOMEN:  Soft, nontender.  Bowel sounds present. EXTREMITIES:  Show 1 to 2+ pitting edema. NEUROLOGICAL:  No acute focal deficit.  Appears to be moving all the   four extremities against gravity.    ASSESSMENT:  COPD exacerbation, acute bronchitis, UTI PLAN:Treat her with nebulized aerosol, supplemental   oxygen, IV Solu-Medrol, empiric antibiotic treatment to cover both   bronchitis and urinary tract infection.

## 2020-01-31 NOTE — ADT AUTH CERT
Utilization Reviews         Chronic Obstructive Pulmonary Disease - Care Day 12 (1/30/2020) by Petr Parker RN         Review Status Review Entered   Completed 1/31/2020 11:03       Criteria Review      Care Day: 12 Care Date: 1/30/2020 Level of Care:    Guideline Day 3    Clinical Status    (X) * Hemodynamic stability    1/31/2020 11:03 AM EST by Tamie Morillo      VS: 98.2, 81, 18, 123/80, 91% on r/a    (X) * Mental status at baseline    1/31/2020 11:03 AM EST by Tamie Morillo      A&O    ( ) * No evidence of infection, or outpatient treatment planned    (X) * Uncompensated acidosis absent    1/31/2020 11:03 AM EST by Franck Morillo      none noted    (X) * Oxygenation at baseline or acceptable for next level of care [G]    1/31/2020 11:03 AM EST by Franck Morillo      on R/A    ( ) * Airflow rates at baseline or acceptable for next level of care    ( ) * Eating and sleeping without frequent dyspnea    ( ) * Breathing comfortably at rest    ( ) * Discharge plans and education understood    Activity    ( ) * Ambulatory    Routes    (X) * Oral hydration, medications, and diet    1/31/2020 11:03 AM EST by Franck Morillo      tolerating po    Medications    ( ) * Inhaled bronchodilator regimen established and feasible at next level of care    * Milestone   Additional Notes   01/30/2020      Pulmonology:   Interval History: Congested, on room air currently.  Still has significant bilateral wheeze and shortness of breath.  Denies any chest pain. Problem List:       COPD with acute exacerbation   Mucous plugging of airway   Influenza B infection   Assessment/Plan:       Influenza B infection with multifocal infiltrates-status post bronchoscopy x2, some mucous plugging noted.  Respiratory cultures negative.       Influenza B, completed 5-day course of Tamiflu. .  Switch Solu-Medrol to prednisone.       Scheduled Medications   · methylPREDNISolone 40 mg Intravenous Daily   · oseltamivir 75 mg Oral BID   · amoxicillin-clavulanate 1 tablet Oral 2 times per day   · insulin lispro 10 Units Subcutaneous TID WC   · sodium chloride 1 spray Each Nostril TID   · sodium chloride flush 10 mL Intravenous BID   · insulin lispro 0-18 Units Subcutaneous TID WC   · insulin lispro 0-9 Units Subcutaneous Nightly   · guaiFENesin 600 mg Oral BID   · miconazole Topical BID   · insulin glargine 40 Units Subcutaneous Nightly   · aspirin 81 mg Oral Daily   · diclofenac sodium 2 g Topical BID   · gabapentin 300 mg Oral BID   · ipratropium-albuterol 3 mL Inhalation Q4H WA   · isosorbide mononitrate 60 mg Oral Daily   · lisinopril 2.5 mg Oral Daily   · pantoprazole 40 mg Oral QAM AC   · rosuvastatin 10 mg Oral Daily   · enoxaparin 40 mg Subcutaneous Daily   · diphenoxylate-atropine 1 tablet Oral BID      Prednisone 40 mg po to start on 01/31      PRN:  xanax 1 mg po x 1      Labs: [id number]          Chronic Obstructive Pulmonary Disease - Care Day 11 (1/29/2020) by Logan Shannon RN         Review Status Review Entered   Completed 1/31/2020 10:59       Criteria Review      Care Day: 11 Care Date: 1/29/2020 Level of Care:    Guideline Day 3    Clinical Status    (X) * Hemodynamic stability    1/31/2020 10:59 AM EST by Tamie Morillo      VS: 98.2, 69, 20, 117/73, 93% on 2L/NC    (X) * Mental status at baseline    1/31/2020 10:59 AM EST by Tamie Morillo      A&O    ( ) * No evidence of infection, or outpatient treatment planned    (X) * Uncompensated acidosis absent    1/31/2020 10:59 AM EST by Sonia Morillo      none noted    ( ) * Oxygenation at baseline or acceptable for next level of care [G]    ( ) * Airflow rates at baseline or acceptable for next level of care    ( ) * Eating and sleeping without frequent dyspnea    ( ) * Breathing comfortably at rest    ( ) * Discharge plans and education understood    Activity    ( ) * Ambulatory    Routes    (X) * Oral hydration, medications, and diet    1/31/2020 10:59 AM EST by Sonia Morillo      tolerating po    Interventions    (X) Oxygen as needed    1/31/2020 10:59 AM EST by Jason Morillo      2L    Medications    ( ) * Inhaled bronchodilator regimen established and feasible at next level of care    * Milestone   Additional Notes   01/29/2020      Pulmonology:   Interval History: Still quite congested, rhonchi and wheeze.  No phlegm with the cough.  On 2 L O2.       Problem List:       COPD with acute exacerbation   Mucous plugging of airway   Influenza B infection   Assessment/Plan:       Influenza B infection with multifocal infiltrates-status post bronchoscopy x2, mucous plugging removed.  No significant secretions/plugging noted on second bronchoscopy.  Respiratory cultures negative.       Influenza B positive on respiratory viral panel, started on Tamiflu.  Continue with Solu-Medrol.       Unfortunately patient is not ready for discharge yet, since patient continues to be rhonchorous.  May need 1 or 2 more days in hospital.      Scheduled Medications   · methylPREDNISolone 40 mg Intravenous Daily   · oseltamivir 75 mg Oral BID   · amoxicillin-clavulanate 1 tablet Oral 2 times per day   · insulin lispro 10 Units Subcutaneous TID WC   · sodium chloride 1 spray Each Nostril TID   · sodium chloride flush 10 mL Intravenous BID   · insulin lispro 0-18 Units Subcutaneous TID WC   · insulin lispro 0-9 Units Subcutaneous Nightly   · guaiFENesin 600 mg Oral BID   · miconazole Topical BID   · insulin glargine 40 Units Subcutaneous Nightly   · aspirin 81 mg Oral Daily   · diclofenac sodium 2 g Topical BID   · gabapentin 300 mg Oral BID   · ipratropium-albuterol 3 mL Inhalation Q4H WA   · isosorbide mononitrate 60 mg Oral Daily   · lisinopril 2.5 mg Oral Daily   · pantoprazole 40 mg Oral QAM AC   · rosuvastatin 10 mg Oral Daily   · enoxaparin 40 mg Subcutaneous Daily   · diphenoxylate-atropine 1 tablet Oral BID         PRN: xanax po x 1, tessalon 100 mg po x 1      Labs: [id number]       Chronic Obstructive Pulmonary Disease - Care Day 10 (1/28/2020) by Andrade Cleveland RN         Review Status Review Entered   Completed 1/31/2020 10:52       Criteria Review      Care Day: 10 Care Date: 1/28/2020 Level of Care:    Guideline Day 3    Clinical Status    (X) * Hemodynamic stability    1/31/2020 10:52 AM EST by Jason Morillo      VS: 97.5, 60, 22, 131/67, 97% on 2L/NC    (X) * Mental status at baseline    1/31/2020 10:52 AM EST by Tamie Morillo      A&O    ( ) * No evidence of infection, or outpatient treatment planned    (X) * Uncompensated acidosis absent    1/31/2020 10:52 AM EST by Jason Morillo      none noted    ( ) * Oxygenation at baseline or acceptable for next level of care [G]    ( ) * Airflow rates at baseline or acceptable for next level of care    ( ) * Eating and sleeping without frequent dyspnea    ( ) * Breathing comfortably at rest    ( ) * Discharge plans and education understood    Activity    ( ) * Ambulatory    Routes    (X) * Oral hydration, medications, and diet    1/31/2020 10:52 AM EST by Jason Morillo      tolerating po    Interventions    (X) Oxygen as needed    1/31/2020 10:52 AM EST by Jason Morillo      2L    Medications    ( ) * Inhaled bronchodilator regimen established and feasible at next level of care    * Milestone   Additional Notes   01/28/2020      Pulmonology:   Interval History: Patient still continues to be very congested and rhonchorous.  Bronchoscopy performed yesterday showed only thin secretions.  Continues to need 2 L O2. Problem List:       COPD with acute exacerbation   Mucous plugging of airway   Influenza B infection   Assessment/Plan:       Influenza B infection with multifocal infiltrates-status post bronchoscopy x2, mucous plugging removed.  Bronchoscopy from yesterday showed thin secretions only.       Influenza B on respiratory viral panel, started on Tamiflu.  Continue with Solu-Medrol.       Unfortunately patient is not ready for discharge yet. Scheduled Medications   · methylPREDNISolone 40 mg Intravenous Daily   · oseltamivir 75 mg Oral BID   · amoxicillin-clavulanate 1 tablet Oral 2 times per day   · insulin lispro 10 Units Subcutaneous TID WC   · sodium chloride 1 spray Each Nostril TID   · sodium chloride flush 10 mL Intravenous BID   · insulin lispro 0-18 Units Subcutaneous TID WC   · insulin lispro 0-9 Units Subcutaneous Nightly   · guaiFENesin 600 mg Oral BID   · miconazole Topical BID   · insulin glargine 40 Units Subcutaneous Nightly   · aspirin 81 mg Oral Daily   · diclofenac sodium 2 g Topical BID   · gabapentin 300 mg Oral BID   · ipratropium-albuterol 3 mL Inhalation Q4H WA   · isosorbide mononitrate 60 mg Oral Daily   · lisinopril 2.5 mg Oral Daily   · pantoprazole 40 mg Oral QAM AC   · rosuvastatin 10 mg Oral Daily   · enoxaparin 40 mg Subcutaneous Daily   · diphenoxylate-atropine 1 tablet Oral BID         PRN: xanax po x 2      CBC:    Lab Results   Component Value Date     WBC 10.3 01/28/2020     RBC 4.77 01/28/2020       BMP:    Lab Results   Component Value Date     GLUCOSE 184 01/28/2020     CO2 24 01/28/2020     BUN 29 01/28/2020     CREATININE 0.7 01/28/2020     CALCIUM 8.3 01/28/2020              Chronic Obstructive Pulmonary Disease - Care Day 9 (1/27/2020) by Alysha Bush RN         Review Status Review Entered   Completed 1/31/2020 10:46       Criteria Review      Care Day: 9 Care Date: 1/27/2020 Level of Care:    Guideline Day 3    Clinical Status    (X) * Hemodynamic stability    1/31/2020 10:46 AM EST by Tamie Morillo      VS: 98.1, 60, 20, 125/56, 93% on 2L/NC    (X) * Mental status at baseline    1/31/2020 10:46 AM EST by Tamie Morillo      A&O    ( ) * No evidence of infection, or outpatient treatment planned    (X) * Uncompensated acidosis absent    ( ) * Oxygenation at baseline or acceptable for next level of care [G]    ( ) * Airflow rates at baseline or acceptable for next level of care    ( ) * Eating and sleeping without frequent dyspnea    ( ) * Breathing comfortably at rest    ( ) * Discharge plans and education understood    Activity    ( ) * Ambulatory    Routes    ( ) * Oral hydration, medications, and diet    1/31/2020 10:46 AM EST by Vibha Morillo      NPO    Interventions    (X) Oxygen as needed    1/31/2020 10:46 AM EST by Vibha Morillo      2L    Medications    ( ) * Inhaled bronchodilator regimen established and feasible at next level of care    * Milestone   Additional Notes   01/27/2020      For repeat bronchoscopy today      IM note:   About the same   Still congested   For repeat bronch this am      Assessment and Plan:   Principal Problem:     COPD with acute exacerbation ([facility] [address] -Established problem. Stable.  Remains on o2. Plan: appreciate pulm eval. Cont steroids. Convert to PO soon. Active Problems:     CAD (coronary artery disease)-Established problem. Stable.    Plan: Continue present orders/plan.       Hyperlipemia -Established problem. Stable.     Plan: cont statin     Essential hypertension -Established problem. Stable.  114/57   Plan: Continue present orders/plan.      UTI (urinary tract infection) -Established problem. Stable.     Plan: cont abx     DM2 (diabetes mellitus, type 2) ([facility] [address] -Established problem. Stable.  FSBS 280; due to steroids? Plan: stay on ccc diet, sliding scale, home meds     Anxiety -Established problem. Stable.     Plan: Continue present orders/plan.      Morbid obesity ([facility] -Established problem. Stable.  bmi 41   Plan: advised to lose weight     Acute bronchitis     Influenza B   Plan: on tamiflu - today is day #2          Bronchoscopy:   Endobronchial findings: Thin secretions noted which was suctioned. No obvious mucus plugging noted.    Trachea: inflammation   Kell: Normal mucosa    Right main bronchus: Normal mucosa    Right upper lobe bronchus: Normal mucosa    Right middle lobe bronchus: Normal mucosa    Right lower lobe bronchus: Normal mucosa    Left main bronchus: Normal mucosa    Left upper lobe bronchus: Normal mucosa    Left lower lobe bronchus: Normal mucosa        The patient was taken to the endoscopy recovery area in satisfactory condition.        Recommendation:    1. F/U on culture results    2. F/U on cytology results       Pulmonology:   Interval History: Congestion of the chest continues, unable to bring up phlegm.  Persistent left basilar infiltrate noted on chest x-ray.  Requires 2 L O2.  Noted to have influenza B. Problem List:       COPD with acute exacerbation   Mucous plugging of airway       Assessment/Plan:       Repeat bronchoscopy performed today suggestive of thin bilateral secretions in both main stem bronchi, which was suctioned.  No clear evidence of mucus plugging noted.       Influenza B  on respiratory viral panel, started on Tamiflu.       Potential discharge tomorrow if better.  Decrease Solu-Medrol to 40 mg daily.       Pulmonary will follow for 1 more day.             Scheduled Medications   · oseltamivir 75 mg Oral BID   · amoxicillin-clavulanate 1 tablet Oral 2 times per day   · insulin lispro 10 Units Subcutaneous TID WC   · methylPREDNISolone 40 mg Intravenous Q12H   · acetylcysteine 600 mg Inhalation BID   · sodium chloride 1 spray Each Nostril TID   · sodium chloride flush 10 mL Intravenous BID   · insulin lispro 0-18 Units Subcutaneous TID WC   · insulin lispro 0-9 Units Subcutaneous Nightly   · guaiFENesin 600 mg Oral BID   · miconazole Topical BID   · insulin glargine 40 Units Subcutaneous Nightly   · aspirin 81 mg Oral Daily   · diclofenac sodium 2 g Topical BID   · gabapentin 300 mg Oral BID   · ipratropium-albuterol 3 mL Inhalation Q4H WA   · isosorbide mononitrate 60 mg Oral Daily   · lisinopril 2.5 mg Oral Daily   · pantoprazole 40 mg Oral QAM AC   · rosuvastatin 10 mg Oral Daily   · enoxaparin 40 mg Subcutaneous Daily   · diphenoxylate-atropine 1 tablet Oral BID      PRN: xanax po x 2      CBC:    Lab Results 189 01/26/2020     CO2 30 01/26/2020     BUN 26 01/26/2020     CREATININE 0.7 01/26/2020     CALCIUM 9.0 01/26/2020                   Chronic Obstructive Pulmonary Disease - Care Day 7 (1/25/2020) by Diann Friedman RN         Review Status Review Entered   Completed 1/31/2020 10:36       Criteria Review      Care Day: 7 Care Date: 1/25/2020 Level of Care:    Guideline Day 3    Clinical Status    (X) * Hemodynamic stability    1/31/2020 10:36 AM EST by Tamie Morillo      VS: 97.3, 62, 16, 142/78, 92% on 2L/NC---89% on r/a    (X) * Mental status at baseline    1/31/2020 10:36 AM EST by Tamie Morillo      A&O    ( ) * No evidence of infection, or outpatient treatment planned    1/31/2020 10:36 AM EST by Christiano Morillo      cxr with significant infiltrate left base    (X) * Uncompensated acidosis absent    1/31/2020 10:36 AM EST by Christiano Morillo      none ntoed    ( ) * Oxygenation at baseline or acceptable for next level of care [G]    ( ) * Airflow rates at baseline or acceptable for next level of care    ( ) * Eating and sleeping without frequent dyspnea    ( ) * Breathing comfortably at rest    ( ) * Discharge plans and education understood    Activity    ( ) * Ambulatory    Routes    (X) * Oral hydration, medications, and diet    1/31/2020 10:36 AM EST by Christiano Morillo      tolerating po    Interventions    (X) Oxygen as needed    1/31/2020 10:36 AM EST by Christiano Morillo      2 L    Medications    ( ) * Inhaled bronchodilator regimen established and feasible at next level of care    * Milestone   Additional Notes   01/25/2020      Pulmonology:   Interval History: Patient underwent bronchoscopy yesterday, with mucous plugging/respiratory secretions noted and suctioned bilaterally.  Chest x-ray shows persistent left-sided infiltrate and patient still feels chest congestion.  Unable to bring up phlegm.  On 2 L O2.       Problem List:       COPD with acute exacerbation   Mucous plugging of airway       VS: 98.7, 81, 16, 108/55, 91% on 2L/NC    (X) * Mental status at baseline    1/31/2020 10:23 AM EST by Tamie Morillo&SELVIN    ( ) * No evidence of infection, or outpatient treatment planned    (X) * Uncompensated acidosis absent    ( ) * Oxygenation at baseline or acceptable for next level of care [G]    ( ) * Airflow rates at baseline or acceptable for next level of care    ( ) * Eating and sleeping without frequent dyspnea    ( ) * Breathing comfortably at rest    ( ) * Discharge plans and education understood    Activity    ( ) * Ambulatory    Routes    ( ) * Oral hydration, medications, and diet    1/31/2020 10:23 AM EST by Mable Morillo Been      NPO for bronchoscopy    Medications    ( ) * Inhaled bronchodilator regimen established and feasible at next level of care    * Milestone   Additional Notes   01/24/2020      Pulmonology:   SUBJECTIVE: Patient when seen this morning continues to be symptomatic, patient has profound chest congestion and is not able to expectorate, patient continues to have increased shortness of breath and wheezing, patient has increased chest tightness, patient states that she is symptomatic because she is not able to expectorate anything significant, patient was having increased anxiety because of that, patient was afebrile, patient's systolic blood pressure is on the higher side, patient's blood sugars are better than yesterday, patient was on 2 L of nasal cannula oxygen with saturation 91% when seen, patient urine output has not been documented in the epic for review, patient was alert and communicative, no other pertinent review of system of concern      Assessment:   Principal Problem:     COPD with acute exacerbation ([address]   Active Problems:     CAD (coronary artery disease)     Hyperlipemia     Essential hypertension     UTI (urinary tract infection)     DM2 (diabetes mellitus, type 2) ([address]     Anxiety     Morbid obesity ([address]     Acute bronchitis     Pulmonary infiltrate sleep apnea)     SOB (shortness of breath)     Chest congestion     DM (diabetes mellitus), secondary uncontrolled (HCC)     Pyuria     Ineffective airway clearance   Resolved Problems:     COPD exacerbation (HCC)                   Plan:    · Oxygen supplementation, if required, to keep saturation between 90 to 94% only   · Pulmonary toilet   · Bronchodilators   · No need for any Dulera with DuoNeb and IV Solu-Medrol   · IV Solu-Medrol to continue for now   · Mucolytic started including Mucomyst nebulization   · Patient's blood sugars are on the higher side which may be partly because of steroids and titration of insulins as per internal medicine team   · Lantus insulin to be titrated as per blood glucose monitoring by IM   · BGM with SSI   · Patient is getting p.o.  Ceftin for pyuria with history of Klebsiella UTI in the past   · Patient has increased chest congestion along with mucus plugging and ineffective airway clearance and patient was told about the options including conservative management versus aggressive care with bronchoscopy-patient wants to revisit that tomorrow morning   · Incentive spirometry and Acapella ordered   · Internal medicine to decide about continuation of lomotil   · Patient to be provisionally kept NPO after midnight   · Monitor for any hypoglycemia   · Avoid sedatives and narcotics   · Antihypertensives as per IM   · Monitor for any hypoventilation and hypercarbia    · Saline nasal spray started   · PUD and DVT prophylaxis as per IM      Scheduled Meds:   methylPREDNISolone, 40 mg, Intravenous, Q12H   acetylcysteine, 600 mg, Inhalation, BID   sodium chloride, 1 spray, Each Nostril, TID   insulin lispro, 0-18 Units, Subcutaneous, TID WC   insulin lispro, 0-9 Units, Subcutaneous, Nightly   guaiFENesin, 600 mg, Oral, BID   miconazole, , Topical, BID   insulin glargine, 40 Units, Subcutaneous, Nightly   insulin lispro, 5 Units, Subcutaneous, TID WC   aspirin, 81 mg, Oral, Daily   diclofenac sodium, 2 g, Topical, BID   gabapentin, 300 mg, Oral, BID   ipratropium-albuterol, 3 mL, Inhalation, Q4H WA   isosorbide mononitrate, 60 mg, Oral, Daily   lisinopril, 2.5 mg, Oral, Daily   pantoprazole, 40 mg, Oral, QAM AC   rosuvastatin, 10 mg, Oral, Daily   cefUROXime, 500 mg, Oral, 2 times per day   enoxaparin, 40 mg, Subcutaneous, Daily   diphenoxylate-atropine, 1 tablet, Oral, BID             Chronic Obstructive Pulmonary Disease - Care Day 4 (1/22/2020) by Berlin Flowers RN         Review Status Review Entered   Completed 1/23/2020 16:18       Criteria Review      Care Day: 4 Care Date: 1/22/2020 Level of Care:    Guideline Day 3    Clinical Status    (X) * Hemodynamic stability    1/23/2020 4:18 PM EST by Tamie Morillo      VS: 97.3, 72, 18, 154/93, 90% on 2L/NC    (X) * Mental status at baseline    1/23/2020 4:18 PM EST by Pato Morillo      baseline    ( ) * No evidence of infection, or outpatient treatment planned    (X) * Uncompensated acidosis absent    1/23/2020 4:18 PM EST by Pato Morillo      none noted    ( ) * Oxygenation at baseline or acceptable for next level of care [G]    ( ) * Airflow rates at baseline or acceptable for next level of care    ( ) * Eating and sleeping without frequent dyspnea    1/23/2020 4:18 PM EST by Pato Morillo      continues with exertional dyspnea    ( ) * Breathing comfortably at rest    ( ) * Discharge plans and education understood    Activity    ( ) * Ambulatory    Routes    (X) * Oral hydration, medications, and diet    1/23/2020 4:18 PM EST by Pato Morillo      tolerating po    Medications    ( ) * Inhaled bronchodilator regimen established and feasible at next level of care    ( ) Oral steroids    1/23/2020 4:18 PM EST by Pato Morillo      continues on IV solumedrol as prior    * Milestone   Additional Notes   01/22/2020      No MD notes available      Per CM notes, planning for home at [place]       Chronic Obstructive Pulmonary Disease - Care Day 3 lispro (1 Unit Dial) 0-18 Units, 0-18 Units, Subcutaneous, TID WC   insulin lispro (1 Unit Dial) 0-9 Units, 0-9 Units, Subcutaneous, Nightly   miconazole (MICOTIN) 2 % powder, , Topical, BID   insulin glargine (LANTUS) injection pen 40 Units, 40 Units, Subcutaneous, Nightly   insulin lispro (1 Unit Dial) 5 Units, 5 Units, Subcutaneous, TID WC   aspirin chewable tablet 81 mg, 81 mg, Oral, Daily   mometasone-formoterol (DULERA) 200-5 MCG/ACT inhaler 2 puff, 2 puff, Inhalation, BID   diclofenac sodium 1 % gel 2 g, 2 g, Topical, BID   gabapentin (NEURONTIN) capsule 300 mg, 300 mg, Oral, BID   ipratropium-albuterol (DUONEB) nebulizer solution 3 mL, 3 mL, Inhalation, Q4H WA   isosorbide mononitrate (IMDUR) extended release tablet 60 mg, 60 mg, Oral, Daily   lisinopril (PRINIVIL;ZESTRIL) tablet 2.5 mg, 2.5 mg, Oral, Daily   pantoprazole (PROTONIX) tablet 40 mg, 40 mg, Oral, QAM AC   rosuvastatin (CRESTOR) tablet 10 mg, 10 mg, Oral, Daily   methylPREDNISolone sodium (SOLU-MEDROL) injection 40 mg, 40 mg, Intravenous, Q8H   cefUROXime (CEFTIN) tablet 500 mg, 500 mg, Oral, 2 times per day   enoxaparin (LOVENOX) injection 40 mg, 40 mg, Subcutaneous, Daily   diphenoxylate-atropine (LOMOTIL) 2.5-0.025 MG per tablet 1 tablet, 1 tablet, Oral, BID

## 2020-01-31 NOTE — PROGRESS NOTES
Assessment completed, see doc flowsheets. Pt is A&Ox4. Lung sounds - expiratory wheezing. VSS. Medication given per [facility]. Patient has no needs at this time. Call light within in reach, will continue to monitor.

## 2020-01-31 NOTE — PROGRESS NOTES
assessments done. All vitals within normal limits. Austin Gross denies pain. Requesting Xanax for anxiety.

## 2020-02-01 NOTE — PROGRESS NOTES
01/31/20 2015   Patient Observation   Observations pt.  refused duoneb/vpep-states she is discharged

## 2020-02-01 NOTE — PROGRESS NOTES
Pt discharged in stable condition to home with home healthcare; JOSE G completed and given to pt; pt taken to granddaughter at front of hospital in wheelchair by this RN; pt transferred safely into granddaughter's car for transport home.

## 2020-02-03 LAB
EKG ATRIAL RATE: 74 BPM
EKG DIAGNOSIS: NORMAL
EKG P AXIS: 97 DEGREES
EKG P-R INTERVAL: 178 MS
EKG Q-T INTERVAL: 378 MS
EKG QRS DURATION: 112 MS
EKG QTC CALCULATION (BAZETT): 419 MS
EKG R AXIS: -65 DEGREES
EKG T AXIS: 48 DEGREES
EKG VENTRICULAR RATE: 74 BPM

## 2020-02-05 NOTE — PROGRESS NOTES
Patient Active Problem List   Diagnosis    Acute on chronic respiratory failure with hypoxia ([facility]    CAD (coronary artery disease)    Hyperlipemia    Essential hypertension    UTI (urinary tract infection)    DM2 (diabetes mellitus, type 2) ([facility]    Primary osteoarthritis involving multiple joints    Gastroesophageal reflux disease without esophagitis    PVC (premature ventricular contraction)    Anxiety    Morbid obesity ([facility]    Precordial pain    Acute bronchitis    Irritable bowel syndrome with diarrhea    COPD with acute exacerbation ([facility] [address]    AKIL (obstructive sleep apnea)    SOB (shortness of breath)    Chest congestion    DM (diabetes mellitus), secondary uncontrolled ([facility]    Pyuria    Ineffective airway clearance    Pulmonary infiltrate    Influenza B    Mucus plugging of bronchi   problem list updated

## 2020-02-24 LAB
FUNGUS (MYCOLOGY) CULTURE: NORMAL
FUNGUS STAIN: NORMAL

## 2020-03-10 LAB
AFB CULTURE (MYCOBACTERIA): NORMAL
AFB SMEAR: NORMAL

## 2020-03-13 ENCOUNTER — HOSPITAL ENCOUNTER (INPATIENT)
Age: 75
LOS: 13 days | Discharge: HOME | DRG: 191 | End: 2020-03-27
Attending: EMERGENCY MEDICINE | Admitting: INTERNAL MEDICINE
Payer: COMMERCIAL

## 2020-03-13 ENCOUNTER — APPOINTMENT (OUTPATIENT)
Dept: GENERAL RADIOLOGY | Age: 75
DRG: 191 | End: 2020-03-13
Payer: COMMERCIAL

## 2020-03-13 LAB
ANION GAP SERPL CALCULATED.3IONS-SCNC: 11 MMOL/L (ref 3–16)
BASOPHILS ABSOLUTE: 0 K/UL (ref 0–0.2)
BASOPHILS RELATIVE PERCENT: 0.2 %
BUN BLDV-MCNC: 11 MG/DL (ref 7–20)
CALCIUM SERPL-MCNC: 9.8 MG/DL (ref 8.3–10.6)
CHLORIDE BLD-SCNC: 99 MMOL/L (ref 99–110)
CO2: 26 MMOL/L (ref 21–32)
CREAT SERPL-MCNC: 0.7 MG/DL (ref 0.6–1.2)
EOSINOPHILS ABSOLUTE: 0.1 K/UL (ref 0–0.6)
EOSINOPHILS RELATIVE PERCENT: 1 %
GFR AFRICAN AMERICAN: >60
GFR NON-AFRICAN AMERICAN: >60
GLUCOSE BLD-MCNC: 175 MG/DL (ref 70–99)
HCT VFR BLD CALC: 47.1 % (ref 36–48)
HEMOGLOBIN: 15.8 G/DL (ref 12–16)
LYMPHOCYTES ABSOLUTE: 2.6 K/UL (ref 1–5.1)
LYMPHOCYTES RELATIVE PERCENT: 28.6 %
MCH RBC QN AUTO: 29.7 PG (ref 26–34)
MCHC RBC AUTO-ENTMCNC: 33.6 G/DL (ref 31–36)
MCV RBC AUTO: 88.3 FL (ref 80–100)
MONOCYTES ABSOLUTE: 0.6 K/UL (ref 0–1.3)
MONOCYTES RELATIVE PERCENT: 6.1 %
NEUTROPHILS ABSOLUTE: 5.8 K/UL (ref 1.7–7.7)
NEUTROPHILS RELATIVE PERCENT: 64.1 %
PDW BLD-RTO: 15.9 % (ref 12.4–15.4)
PLATELET # BLD: 177 K/UL (ref 135–450)
PMV BLD AUTO: 8.2 FL (ref 5–10.5)
POTASSIUM SERPL-SCNC: 4.2 MMOL/L (ref 3.5–5.1)
RAPID INFLUENZA  B AGN: NEGATIVE
RAPID INFLUENZA A AGN: NEGATIVE
RBC # BLD: 5.33 M/UL (ref 4–5.2)
SODIUM BLD-SCNC: 136 MMOL/L (ref 136–145)
TROPONIN: <0.01 NG/ML
WBC # BLD: 9.1 K/UL (ref 4–11)

## 2020-03-13 PROCEDURE — 36415 COLL VENOUS BLD VENIPUNCTURE: CPT

## 2020-03-13 PROCEDURE — 87804 INFLUENZA ASSAY W/OPTIC: CPT

## 2020-03-13 PROCEDURE — 71045 X-RAY EXAM CHEST 1 VIEW: CPT

## 2020-03-13 PROCEDURE — 96365 THER/PROPH/DIAG IV INF INIT: CPT

## 2020-03-13 PROCEDURE — 93005 ELECTROCARDIOGRAM TRACING: CPT | Performed by: EMERGENCY MEDICINE

## 2020-03-13 PROCEDURE — 6370000000 HC RX 637 (ALT 250 FOR IP): Performed by: EMERGENCY MEDICINE

## 2020-03-13 PROCEDURE — 96375 TX/PRO/DX INJ NEW DRUG ADDON: CPT

## 2020-03-13 PROCEDURE — 96367 TX/PROPH/DG ADDL SEQ IV INF: CPT

## 2020-03-13 PROCEDURE — 6360000002 HC RX W HCPCS: Performed by: EMERGENCY MEDICINE

## 2020-03-13 PROCEDURE — 2580000003 HC RX 258: Performed by: EMERGENCY MEDICINE

## 2020-03-13 PROCEDURE — 84484 ASSAY OF TROPONIN QUANT: CPT

## 2020-03-13 PROCEDURE — 80048 BASIC METABOLIC PNL TOTAL CA: CPT

## 2020-03-13 PROCEDURE — 83036 HEMOGLOBIN GLYCOSYLATED A1C: CPT

## 2020-03-13 PROCEDURE — 85025 COMPLETE CBC W/AUTO DIFF WBC: CPT

## 2020-03-13 PROCEDURE — 99285 EMERGENCY DEPT VISIT HI MDM: CPT

## 2020-03-13 RX ORDER — ALBUTEROL SULFATE 90 UG/1
2 AEROSOL, METERED RESPIRATORY (INHALATION) ONCE
Status: DISCONTINUED | OUTPATIENT
Start: 2020-03-13 | End: 2020-03-18

## 2020-03-13 RX ORDER — ASPIRIN 81 MG/1
324 TABLET, CHEWABLE ORAL ONCE
Status: COMPLETED | OUTPATIENT
Start: 2020-03-13 | End: 2020-03-13

## 2020-03-13 RX ORDER — METHYLPREDNISOLONE SODIUM SUCCINATE 125 MG/2ML
125 INJECTION, POWDER, LYOPHILIZED, FOR SOLUTION INTRAMUSCULAR; INTRAVENOUS ONCE
Status: COMPLETED | OUTPATIENT
Start: 2020-03-13 | End: 2020-03-13

## 2020-03-13 RX ADMIN — CEFEPIME HYDROCHLORIDE 2 G: 2 INJECTION, POWDER, FOR SOLUTION INTRAVENOUS at 21:55

## 2020-03-13 RX ADMIN — METHYLPREDNISOLONE SODIUM SUCCINATE 125 MG: 125 INJECTION, POWDER, FOR SOLUTION INTRAMUSCULAR; INTRAVENOUS at 21:55

## 2020-03-13 RX ADMIN — ASPIRIN 81 MG 324 MG: 81 TABLET ORAL at 23:28

## 2020-03-13 RX ADMIN — VANCOMYCIN HYDROCHLORIDE 1750 MG: 10 INJECTION, POWDER, LYOPHILIZED, FOR SOLUTION INTRAVENOUS at 23:28

## 2020-03-14 LAB
EKG ATRIAL RATE: 75 BPM
EKG DIAGNOSIS: NORMAL
EKG P-R INTERVAL: 170 MS
EKG Q-T INTERVAL: 386 MS
EKG QRS DURATION: 108 MS
EKG QTC CALCULATION (BAZETT): 431 MS
EKG R AXIS: -65 DEGREES
EKG T AXIS: 47 DEGREES
EKG VENTRICULAR RATE: 75 BPM
ESTIMATED AVERAGE GLUCOSE: 171.4 MG/DL
GLUCOSE BLD-MCNC: 234 MG/DL (ref 70–99)
GLUCOSE BLD-MCNC: 251 MG/DL (ref 70–99)
GLUCOSE BLD-MCNC: 298 MG/DL (ref 70–99)
GLUCOSE BLD-MCNC: 345 MG/DL (ref 70–99)
HBA1C MFR BLD: 7.6 %
PERFORMED ON: ABNORMAL

## 2020-03-14 PROCEDURE — 6370000000 HC RX 637 (ALT 250 FOR IP): Performed by: INTERNAL MEDICINE

## 2020-03-14 PROCEDURE — 94761 N-INVAS EAR/PLS OXIMETRY MLT: CPT

## 2020-03-14 PROCEDURE — 94640 AIRWAY INHALATION TREATMENT: CPT

## 2020-03-14 PROCEDURE — 2500000003 HC RX 250 WO HCPCS: Performed by: INTERNAL MEDICINE

## 2020-03-14 PROCEDURE — 6360000002 HC RX W HCPCS: Performed by: INTERNAL MEDICINE

## 2020-03-14 PROCEDURE — 93010 ELECTROCARDIOGRAM REPORT: CPT | Performed by: INTERNAL MEDICINE

## 2020-03-14 PROCEDURE — 1200000000 HC SEMI PRIVATE

## 2020-03-14 RX ORDER — METHYLPREDNISOLONE SODIUM SUCCINATE 40 MG/ML
40 INJECTION, POWDER, LYOPHILIZED, FOR SOLUTION INTRAMUSCULAR; INTRAVENOUS EVERY 8 HOURS
Status: DISCONTINUED | OUTPATIENT
Start: 2020-03-14 | End: 2020-03-17

## 2020-03-14 RX ORDER — GABAPENTIN 300 MG/1
300 CAPSULE ORAL 2 TIMES DAILY
Status: DISCONTINUED | OUTPATIENT
Start: 2020-03-14 | End: 2020-03-27 | Stop reason: HOSPADM

## 2020-03-14 RX ORDER — IPRATROPIUM BROMIDE AND ALBUTEROL SULFATE 2.5; .5 MG/3ML; MG/3ML
3 SOLUTION RESPIRATORY (INHALATION)
Status: DISCONTINUED | OUTPATIENT
Start: 2020-03-14 | End: 2020-03-27 | Stop reason: HOSPADM

## 2020-03-14 RX ORDER — ALPRAZOLAM 0.5 MG/1
1 TABLET ORAL 3 TIMES DAILY PRN
Status: DISCONTINUED | OUTPATIENT
Start: 2020-03-14 | End: 2020-03-27 | Stop reason: HOSPADM

## 2020-03-14 RX ORDER — NITROGLYCERIN 0.4 MG/1
0.4 TABLET SUBLINGUAL EVERY 5 MIN PRN
Status: DISCONTINUED | OUTPATIENT
Start: 2020-03-14 | End: 2020-03-27 | Stop reason: HOSPADM

## 2020-03-14 RX ORDER — TRIAMCINOLONE ACETONIDE 1 MG/G
CREAM TOPICAL 2 TIMES DAILY
Status: DISCONTINUED | OUTPATIENT
Start: 2020-03-14 | End: 2020-03-27 | Stop reason: HOSPADM

## 2020-03-14 RX ORDER — INSULIN LISPRO 100 [IU]/ML
0-6 INJECTION, SOLUTION INTRAVENOUS; SUBCUTANEOUS NIGHTLY
Status: DISCONTINUED | OUTPATIENT
Start: 2020-03-14 | End: 2020-03-14 | Stop reason: DRUGHIGH

## 2020-03-14 RX ORDER — IBUPROFEN 400 MG/1
400 TABLET ORAL
Status: DISCONTINUED | OUTPATIENT
Start: 2020-03-14 | End: 2020-03-27 | Stop reason: HOSPADM

## 2020-03-14 RX ORDER — LISINOPRIL 5 MG/1
2.5 TABLET ORAL DAILY
Status: DISCONTINUED | OUTPATIENT
Start: 2020-03-14 | End: 2020-03-27 | Stop reason: HOSPADM

## 2020-03-14 RX ORDER — INSULIN LISPRO 100 [IU]/ML
0-18 INJECTION, SOLUTION INTRAVENOUS; SUBCUTANEOUS
Status: DISCONTINUED | OUTPATIENT
Start: 2020-03-14 | End: 2020-03-27 | Stop reason: HOSPADM

## 2020-03-14 RX ORDER — INSULIN LISPRO 100 [IU]/ML
0-9 INJECTION, SOLUTION INTRAVENOUS; SUBCUTANEOUS NIGHTLY
Status: DISCONTINUED | OUTPATIENT
Start: 2020-03-14 | End: 2020-03-27 | Stop reason: HOSPADM

## 2020-03-14 RX ORDER — COLCHICINE 0.6 MG/1
0.6 TABLET ORAL DAILY
Status: DISCONTINUED | OUTPATIENT
Start: 2020-03-14 | End: 2020-03-14 | Stop reason: ALTCHOICE

## 2020-03-14 RX ORDER — LEVOFLOXACIN 500 MG/1
500 TABLET, FILM COATED ORAL DAILY
Status: DISCONTINUED | OUTPATIENT
Start: 2020-03-14 | End: 2020-03-23

## 2020-03-14 RX ORDER — GLIPIZIDE 5 MG/1
5 TABLET ORAL
Status: DISCONTINUED | OUTPATIENT
Start: 2020-03-14 | End: 2020-03-25

## 2020-03-14 RX ORDER — BUDESONIDE AND FORMOTEROL FUMARATE DIHYDRATE 160; 4.5 UG/1; UG/1
2 AEROSOL RESPIRATORY (INHALATION) 2 TIMES DAILY
Status: DISCONTINUED | OUTPATIENT
Start: 2020-03-14 | End: 2020-03-15

## 2020-03-14 RX ORDER — ALBUTEROL SULFATE 90 UG/1
2 AEROSOL, METERED RESPIRATORY (INHALATION) EVERY 6 HOURS PRN
Status: DISCONTINUED | OUTPATIENT
Start: 2020-03-14 | End: 2020-03-27 | Stop reason: HOSPADM

## 2020-03-14 RX ORDER — ISOSORBIDE MONONITRATE 60 MG/1
60 TABLET, EXTENDED RELEASE ORAL DAILY
Status: DISCONTINUED | OUTPATIENT
Start: 2020-03-14 | End: 2020-03-27 | Stop reason: HOSPADM

## 2020-03-14 RX ORDER — ASPIRIN 81 MG/1
81 TABLET, CHEWABLE ORAL DAILY
Status: DISCONTINUED | OUTPATIENT
Start: 2020-03-14 | End: 2020-03-27 | Stop reason: HOSPADM

## 2020-03-14 RX ORDER — FACIAL-BODY WIPES
8 EACH TOPICAL DAILY
Status: DISCONTINUED | OUTPATIENT
Start: 2020-03-14 | End: 2020-03-14

## 2020-03-14 RX ORDER — PANTOPRAZOLE SODIUM 40 MG/1
40 TABLET, DELAYED RELEASE ORAL
Status: DISCONTINUED | OUTPATIENT
Start: 2020-03-14 | End: 2020-03-27 | Stop reason: HOSPADM

## 2020-03-14 RX ORDER — HYDRALAZINE HYDROCHLORIDE 25 MG/1
25 TABLET, FILM COATED ORAL 3 TIMES DAILY
Status: DISCONTINUED | OUTPATIENT
Start: 2020-03-14 | End: 2020-03-27 | Stop reason: HOSPADM

## 2020-03-14 RX ORDER — INSULIN LISPRO 100 [IU]/ML
0-12 INJECTION, SOLUTION INTRAVENOUS; SUBCUTANEOUS
Status: DISCONTINUED | OUTPATIENT
Start: 2020-03-14 | End: 2020-03-14 | Stop reason: DRUGHIGH

## 2020-03-14 RX ORDER — ROSUVASTATIN CALCIUM 10 MG/1
10 TABLET, COATED ORAL DAILY
Status: DISCONTINUED | OUTPATIENT
Start: 2020-03-14 | End: 2020-03-27 | Stop reason: HOSPADM

## 2020-03-14 RX ORDER — NICOTINE POLACRILEX 4 MG
15 LOZENGE BUCCAL PRN
Status: DISCONTINUED | OUTPATIENT
Start: 2020-03-14 | End: 2020-03-27 | Stop reason: HOSPADM

## 2020-03-14 RX ORDER — DEXTROSE MONOHYDRATE 50 MG/ML
100 INJECTION, SOLUTION INTRAVENOUS PRN
Status: DISCONTINUED | OUTPATIENT
Start: 2020-03-14 | End: 2020-03-27 | Stop reason: HOSPADM

## 2020-03-14 RX ORDER — DEXTROSE MONOHYDRATE 25 G/50ML
12.5 INJECTION, SOLUTION INTRAVENOUS PRN
Status: DISCONTINUED | OUTPATIENT
Start: 2020-03-14 | End: 2020-03-27 | Stop reason: HOSPADM

## 2020-03-14 RX ADMIN — GABAPENTIN 300 MG: 300 CAPSULE ORAL at 03:04

## 2020-03-14 RX ADMIN — INSULIN GLARGINE 30 UNITS: 100 INJECTION, SOLUTION SUBCUTANEOUS at 22:21

## 2020-03-14 RX ADMIN — IPRATROPIUM BROMIDE AND ALBUTEROL SULFATE 3 ML: .5; 3 SOLUTION RESPIRATORY (INHALATION) at 07:43

## 2020-03-14 RX ADMIN — Medication 2 PUFF: at 20:56

## 2020-03-14 RX ADMIN — HYDRALAZINE HYDROCHLORIDE 25 MG: 25 TABLET, FILM COATED ORAL at 17:18

## 2020-03-14 RX ADMIN — IBUPROFEN 400 MG: 400 TABLET ORAL at 10:58

## 2020-03-14 RX ADMIN — GLIPIZIDE 5 MG: 5 TABLET ORAL at 05:36

## 2020-03-14 RX ADMIN — DICLOFENAC 2 G: 10 GEL TOPICAL at 02:53

## 2020-03-14 RX ADMIN — ALPRAZOLAM 1 MG: 0.5 TABLET ORAL at 22:05

## 2020-03-14 RX ADMIN — INSULIN LISPRO 5 UNITS: 100 INJECTION, SOLUTION INTRAVENOUS; SUBCUTANEOUS at 22:21

## 2020-03-14 RX ADMIN — Medication 2 PUFF: at 07:46

## 2020-03-14 RX ADMIN — GABAPENTIN 300 MG: 300 CAPSULE ORAL at 22:05

## 2020-03-14 RX ADMIN — PANTOPRAZOLE SODIUM 40 MG: 40 TABLET, DELAYED RELEASE ORAL at 05:36

## 2020-03-14 RX ADMIN — MICONAZOLE NITRATE: 20 POWDER TOPICAL at 08:27

## 2020-03-14 RX ADMIN — TRIAMCINOLONE ACETONIDE: 1 CREAM TOPICAL at 08:28

## 2020-03-14 RX ADMIN — HYDRALAZINE HYDROCHLORIDE 25 MG: 25 TABLET, FILM COATED ORAL at 22:05

## 2020-03-14 RX ADMIN — IBUPROFEN 400 MG: 400 TABLET ORAL at 17:18

## 2020-03-14 RX ADMIN — IPRATROPIUM BROMIDE AND ALBUTEROL SULFATE 3 ML: .5; 3 SOLUTION RESPIRATORY (INHALATION) at 15:49

## 2020-03-14 RX ADMIN — DICLOFENAC 2 G: 10 GEL TOPICAL at 08:27

## 2020-03-14 RX ADMIN — IBUPROFEN 400 MG: 400 TABLET ORAL at 08:27

## 2020-03-14 RX ADMIN — ISOSORBIDE MONONITRATE 60 MG: 60 TABLET, EXTENDED RELEASE ORAL at 08:26

## 2020-03-14 RX ADMIN — METHYLPREDNISOLONE SODIUM SUCCINATE 40 MG: 40 INJECTION, POWDER, FOR SOLUTION INTRAMUSCULAR; INTRAVENOUS at 05:36

## 2020-03-14 RX ADMIN — IPRATROPIUM BROMIDE AND ALBUTEROL SULFATE 3 ML: .5; 3 SOLUTION RESPIRATORY (INHALATION) at 11:28

## 2020-03-14 RX ADMIN — INSULIN LISPRO 8 UNITS: 100 INJECTION, SOLUTION INTRAVENOUS; SUBCUTANEOUS at 11:52

## 2020-03-14 RX ADMIN — LISINOPRIL 2.5 MG: 5 TABLET ORAL at 08:26

## 2020-03-14 RX ADMIN — LEVOFLOXACIN 500 MG: 500 TABLET, FILM COATED ORAL at 08:26

## 2020-03-14 RX ADMIN — DICLOFENAC 2 G: 10 GEL TOPICAL at 22:05

## 2020-03-14 RX ADMIN — HYDRALAZINE HYDROCHLORIDE 25 MG: 25 TABLET, FILM COATED ORAL at 08:26

## 2020-03-14 RX ADMIN — ASPIRIN 81 MG 81 MG: 81 TABLET ORAL at 08:26

## 2020-03-14 RX ADMIN — IPRATROPIUM BROMIDE AND ALBUTEROL SULFATE 3 ML: .5; 3 SOLUTION RESPIRATORY (INHALATION) at 20:55

## 2020-03-14 RX ADMIN — ROSUVASTATIN CALCIUM 10 MG: 10 TABLET, FILM COATED ORAL at 08:26

## 2020-03-14 RX ADMIN — GABAPENTIN 300 MG: 300 CAPSULE ORAL at 08:26

## 2020-03-14 RX ADMIN — ALPRAZOLAM 1 MG: 0.5 TABLET ORAL at 10:58

## 2020-03-14 RX ADMIN — ALPRAZOLAM 1 MG: 0.5 TABLET ORAL at 03:04

## 2020-03-14 RX ADMIN — INSULIN LISPRO 6 UNITS: 100 INJECTION, SOLUTION INTRAVENOUS; SUBCUTANEOUS at 17:18

## 2020-03-14 RX ADMIN — ENOXAPARIN SODIUM 40 MG: 40 INJECTION SUBCUTANEOUS at 08:26

## 2020-03-14 RX ADMIN — METHYLPREDNISOLONE SODIUM SUCCINATE 40 MG: 40 INJECTION, POWDER, FOR SOLUTION INTRAMUSCULAR; INTRAVENOUS at 17:23

## 2020-03-14 RX ADMIN — GLIPIZIDE 5 MG: 5 TABLET ORAL at 17:17

## 2020-03-14 RX ADMIN — METHYLPREDNISOLONE SODIUM SUCCINATE 40 MG: 40 INJECTION, POWDER, FOR SOLUTION INTRAMUSCULAR; INTRAVENOUS at 22:05

## 2020-03-14 ASSESSMENT — PAIN DESCRIPTION - PAIN TYPE
TYPE: CHRONIC PAIN
TYPE: CHRONIC PAIN;ACUTE PAIN
TYPE: CHRONIC PAIN
TYPE: CHRONIC PAIN

## 2020-03-14 ASSESSMENT — PAIN DESCRIPTION - ORIENTATION: ORIENTATION: LOWER;LEFT

## 2020-03-14 ASSESSMENT — PAIN SCALES - GENERAL
PAINLEVEL_OUTOF10: 8
PAINLEVEL_OUTOF10: 7
PAINLEVEL_OUTOF10: 8
PAINLEVEL_OUTOF10: 8
PAINLEVEL_OUTOF10: 0
PAINLEVEL_OUTOF10: 7

## 2020-03-14 ASSESSMENT — PAIN DESCRIPTION - FREQUENCY
FREQUENCY: CONTINUOUS

## 2020-03-14 ASSESSMENT — PAIN DESCRIPTION - DESCRIPTORS
DESCRIPTORS: ACHING;CONSTANT
DESCRIPTORS: ACHING
DESCRIPTORS: ACHING

## 2020-03-14 ASSESSMENT — PAIN DESCRIPTION - LOCATION
LOCATION: GENERALIZED

## 2020-03-14 ASSESSMENT — PAIN DESCRIPTION - ONSET
ONSET: ON-GOING
ONSET: ON-GOING

## 2020-03-14 ASSESSMENT — PAIN DESCRIPTION - PROGRESSION: CLINICAL_PROGRESSION: NOT CHANGED

## 2020-03-14 NOTE — PLAN OF CARE
Problem: Falls - Risk of:  Goal: Will remain free from falls  Description: Will remain free from falls  Outcome: Ongoing  Goal: Absence of physical injury  Description: Absence of physical injury  Outcome: Ongoing     Problem: Pain:  Goal: Pain level will decrease  Description: Pain level will decrease  Outcome: Ongoing  Goal: Control of acute pain  Description: Control of acute pain  Outcome: Ongoing  Goal: Control of chronic pain  Description: Control of chronic pain  Outcome: Ongoing     Problem:  Bowel/Gastric:  Goal: Occurrences of diarrhea will decrease  Description: Occurrences of diarrhea will decrease  Outcome: Ongoing     Problem: Physical Regulation:  Goal: Prevent transmision of infection  Description: Prevent transmision of infection  Outcome: Ongoing

## 2020-03-14 NOTE — ED PROVIDER NOTES
eMERGENCY dEPARTMENT eNCOUnter      PtName: Alannah Kennedy  MRN: [id number]  [place] 1945  Date of evaluation: 3/13/2020  Provider: Sharath [address]       Chief Complaint   Patient presents with    Chest Pain     started yesterday and she took 1 Nitro with + effects and had no further CP until today, today she began with CP again, took Nitro without + effects so she called 911, they gave a HHN and 1x Nitro spray. pt began with cough today, states productive green. HISTORY OF PRESENT ILLNESS   (Location/Symptom, Timing/Onset,Context/Setting, Quality, Duration, Modifying Factors, Severity) Note limiting factors. HPI    Alannah Kennedy is a 69 y.o. female who presents to the emergency department with chief complaint of productive cough, chest pressure described as constant, central, 5/10 without radiation. Also coughing up green/yellow sputum. History of COPD. Normally only on oxygen at night but having to go onto it all the time now. Yesterday she had chest pain as well and improved with nitro but not today. Nursing Notes were reviewed. REVIEW OF SYSTEMS    (2+ forlevel 4; 10+ for level 5)     Review of Systems  See hpi for further details. Complete 10 point review of all systems performed and is otherwise negative unless noted above.     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    Chronic obstructive pulmonary disease ([address] 1/13/2017    Claustrophobia     COPD (chronic obstructive pulmonary disease) ([facility] [facility]     Esophageal stricture     Gastroesophageal reflux disease without esophagitis 3/24/2016    Hiatal hernia     Hiatal hernia     PEN    Inject 10 Units into the skin 3 times daily (with meals)    IPRATROPIUM-ALBUTEROL (DUONEB) 0.5-2.5 (3) MG/3ML SOLN NEBULIZER SOLUTION    Inhale 3 mLs into the lungs every 4 hours (while awake)    IPRATROPIUM-ALBUTEROL (DUONEB) 0.5-2.5 (3) MG/3ML SOLN NEBULIZER SOLUTION    Inhale 3 mLs into the lungs every 4 hours as needed for Shortness of Breath    ISOSORBIDE MONONITRATE (IMDUR) 60 MG EXTENDED RELEASE TABLET    Take 1 tablet by mouth daily    LISINOPRIL (PRINIVIL;ZESTRIL) 2.5 MG TABLET    Take 1 tablet by mouth daily    LOMITAPIDE MESYLATE PO    Take by mouth    NITROGLYCERIN (NITROSTAT) 0.4 MG SL TABLET    Place 1 tablet under the tongue every 5 minutes as needed for Chest pain. NYSTATIN (MYCOSTATIN) 732880 UNIT/GM POWDER    Affected area    OMEPRAZOLE (PRILOSEC) 40 MG DELAYED RELEASE CAPSULE    Take 1 capsule by mouth daily    OXYGEN    Inhale 2 L/min into the lungs as needed Uses when O2 Sat is below 90    PHENYLEPHRINE-BROMPHEN-DM 5-2-10 MG/5ML LIQD    Take 7.5 mLs by mouth 3 times daily as needed (cough)    RESPIRATORY THERAPY SUPPLIES (NEBULIZER)    by Does not apply route. PRN BREATHING TREATMENTS, UNSURE OF MED     ROSUVASTATIN (CRESTOR) 10 MG TABLET    Take 1 tablet by mouth daily    TRIAMCINOLONE (KENALOG) 0.1 % CREAM    Apply topically 2 times daily. TRIAMCINOLONE (KENALOG) 0.1 % CREAM    Apply topically 2 times daily. ALLERGIES     Morphine; Codeine; Hydromorphone; Levaquin [levofloxacin in d5w]; and Vicodin [hydrocodone-acetaminophen]    FAMILY HISTORY       Family History   Problem Relation Age of Onset    Heart Disease Mother     Cancer Mother     Cancer Father     Cancer Sister     Heart Disease Sister     Heart Disease Brother     High Blood Pressure Neg Hx     High Cholesterol Neg Hx           SOCIAL HISTORY       Social History     Socioeconomic History    Marital status:       Spouse name: Ruth Beck Number of children: 3    Years of education: 15    Highest education level: None   Occupational History    None   Social Needs    Financial resource strain: None    Food insecurity     Worry: None     Inability: None    Transportation needs     Medical: None     Non-medical: None   Tobacco Use    Smoking status: Former Smoker     Packs/day: 0.25     Years: 49.00     Pack years: 12.25     Types: Cigarettes     Last attempt to quit: 2017     Years since quittin.7    Smokeless tobacco: Never Used    Tobacco comment: only smoke when real stressed  Nicotine patch   Substance and Sexual Activity    Alcohol use: Yes     Alcohol/week: 1.0 standard drinks     Types: 1 Glasses of wine per week     Comment: occ. every 3-4 mo    Drug use: No    Sexual activity: Not Currently     Partners: Male   Lifestyle    Physical activity     Days per week: None     Minutes per session: None    Stress: None   Relationships    Social connections     Talks on phone: None     Gets together: None     Attends Je[employer] witness service: None     Active member of club or organization: None     Attends meetings of clubs or organizations: None     Relationship status: None    Intimate partner violence     Fear of current or ex partner: None     Emotionally abused: None     Physically abused: None     Forced sexual activity: None   Other Topics Concern    None   Social History Narrative    None       SCREENINGS           PHYSICAL EXAM    (5+ for level 4, 8+ for level 5)     ED Triage Vitals [20 2020]   BP Temp Temp Source Pulse Resp SpO2 Height Weight   (!) 152/79 98.6 °F (37 °C) Oral 78 18 91 % 5' 5\" (1.651 m) 262 lb (118.8 kg)       Physical Exam  Vitals signs and nursing note reviewed. Constitutional:       General: She is not in acute distress. Appearance: Normal appearance. She is not toxic-appearing or diaphoretic. HENT:      Head: Normocephalic and atraumatic.       Right Ear: External ear normal.      Left Ear: External ear normal.      Nose: Nose normal. Mouth/Throat:      Mouth: Mucous membranes are moist.      Pharynx: Oropharynx is clear. Eyes:      General: No scleral icterus. Right eye: No discharge. Left eye: No discharge. Extraocular Movements: Extraocular movements intact. Conjunctiva/sclera: Conjunctivae normal.      Pupils: Pupils are equal, round, and reactive to light. Neck:      Musculoskeletal: Normal range of motion and neck supple. Cardiovascular:      Rate and Rhythm: Normal rate and regular rhythm. Pulses: Normal pulses. Heart sounds: Normal heart sounds. No murmur. No friction rub. No gallop. Pulmonary:      Effort: Pulmonary effort is normal. No respiratory distress. Breath sounds: No stridor. Wheezing and rales present. No rhonchi. Comments: Wheezes and crackles bilaterally. Chest:      Chest wall: No tenderness. Abdominal:      General: Abdomen is flat. There is no distension. Tenderness: There is no abdominal tenderness. Musculoskeletal: Normal range of motion. General: No swelling, tenderness or deformity. Right lower leg: No edema. Left lower leg: No edema. Skin:     General: Skin is warm and dry. Capillary Refill: Capillary refill takes less than 2 seconds. Coloration: Skin is not jaundiced or pale. Findings: No bruising, erythema or rash. Neurological:      General: No focal deficit present. Mental Status: She is alert and oriented to person, place, and time. Cranial Nerves: No cranial nerve deficit. Sensory: No sensory deficit. Motor: No weakness. Psychiatric:         Mood and Affect: Mood normal.         Behavior: Behavior normal.         Thought Content: Thought content normal.         Judgment: Judgment normal.         DIAGNOSTIC RESULTS     EKG (Per Emergency Physician):   EKG interpretation by ED physician: L sided axis, normal sinus rhythm at a rate of 75 bpm. No ischemic ST changes.       RADIOLOGY (Per Emergency Physician): Interpretation per the Radiologist below, if available at the time of this note:  Xr Chest Portable    Result Date: 3/13/2020  EXAMINATION: ONE XRAY VIEW OF THE CHEST 3/13/2020 8:53 pm COMPARISON: Frontal and lateral views of the chest 01/26/2020, 01/25/2020. CTA thorax 09/24/2019. HISTORY: ORDERING SYSTEM PROVIDED HISTORY: CHEST PAIN TECHNOLOGIST PROVIDED HISTORY: Reason for exam:->CHEST PAIN FINDINGS: The cardiopericardial silhouette is again noted to be enlarged but stable. Atherosclerotic calcification of the thoracic aorta is again noted. The visible lungs are without pneumothorax, pleural effusion or new focal airspace opacity. Blunting of the left costophrenic angle again noted likely reflecting atelectasis rather than an infectious/inflammatory process. Prominent bilateral interstitial markings again noted, vascular congestion versus interstitial edema are to be considered. 1. The visible lungs are without pneumothorax, pleural effusion or new focal airspace opacity. 2. Blunting of the left costophrenic angle again noted likely reflecting atelectasis. 3. Prominent bilateral interstitial markings again noted, vascular congestion versus interstitial edema are to be considered.        LABS:  Labs Reviewed   BASIC METABOLIC PANEL - Abnormal; Notable for the following components:       Result Value    Glucose 175 (*)     All other components within normal limits    Narrative:     Performed at:  [facility] [address], [facility], [address]   Phone [phone]   CBC WITH AUTO DIFFERENTIAL - Abnormal; Notable for the following components:    RBC 5.33 (*)     RDW 15.9 (*)     All other components within normal limits    Narrative:     Performed at:  [facility]  [address] [facility], [address]   Phone [phone]   RAPID INFLUENZA A/B ANTIGENS    Narrative:     Performed at:  [facility]  [address], [address]   Phone [phone]   TROPONIN    Narrative:     Performed at:  [facility]  [address], [address]   Phone [phone]       All other labs were within normal range or not returned as of this dictation. EMERGENCY DEPARTMENT COURSE and DIFFERENTIAL DIAGNOSIS/MDM:   Vitals:    Vitals:    03/13/20 2100 03/13/20 2130 03/13/20 2200 03/13/20 2230   BP: (!) 140/76 (!) 153/83 (!) 160/85 139/81   Pulse: 72 70 71 71   Resp: 17 18 16 14   Temp:       TempSrc:       SpO2: 93% 95%  96%   Weight:       Height:           Medications   albuterol sulfate  (90 Base) MCG/ACT inhaler 2 puff (has no administration in time range)   vancomycin (VANCOCIN) 1,750 mg in dextrose 5 % 500 mL IVPB (has no administration in time range)   aspirin chewable tablet 324 mg (has no administration in time range)   methylPREDNISolone sodium (SOLU-MEDROL) injection 125 mg (125 mg Intravenous Given 3/13/20 2155)   cefepime (MAXIPIME) 2 g IVPB minibag (0 g Intravenous Stopped 3/13/20 2225)       MDM. Cardiac work-up initiated. Patient with prior heart history however clinically suspect the cause of her chest pain related to COPD exacerbation, breathing treatments, steroids ordered. Flu swab ordered. Antibiotics ordered given her recent hospitalization in January as well as productive cough. Aspirin ordered given chest pain. Recommend admission. CONSULTS:  None    PROCEDURES:  Unless otherwise noted below, none     Procedures    FINAL IMPRESSION      1. Acute chest pain    2. COPD exacerbation ([facility] [address]          DISPOSITION/PLAN   DISPOSITION Decision To Admit 03/13/2020 10:49:56 PM      PATIENT REFERRED TO:  No follow-up provider specified.     DISCHARGE MEDICATIONS:  New Prescriptions    No medications on file          (Please note:  Portions of this note were completed with a voice recognition program. Efforts were made to edit the dictations but occasionally words and phrases are mis-transcribed.)    Form v2016. J.5-cn    Chetna Alarcon DO (electronically signed)  Emergency Medicine Provider             Shant Castillo DO  03/13/20 4141

## 2020-03-14 NOTE — PLAN OF CARE
Problem: Falls - Risk of:  Goal: Will remain free from falls  Description: Will remain free from falls  3/14/2020 0844 by Claudia Scott RN  Outcome: Ongoing     Problem: Falls - Risk of:  Goal: Absence of physical injury  Description: Absence of physical injury  3/14/2020 0844 by Claudia Scott RN  Outcome: Ongoing     Problem: Pain:  Goal: Pain level will decrease  Description: Pain level will decrease  3/14/2020 0844 by Claudia Scott RN  Outcome: Ongoing     Problem: Pain:  Goal: Control of acute pain  Description: Control of acute pain  3/14/2020 0844 by Claudia Scott RN  Outcome: Ongoing     Problem: Pain:  Goal: Control of chronic pain  Description: Control of chronic pain  3/14/2020 0844 by Claudia Scott RN  Outcome: Ongoing

## 2020-03-14 NOTE — PROGRESS NOTES
Sitting up in bed eating breakfast and watching TV, pt is alert and oriented and rates her chronic pain 8/10. Assessment done, VSS, call light in reach and bed alarm on, will continue to monitor.

## 2020-03-15 PROBLEM — R91.8 PULMONARY INFILTRATE: Status: RESOLVED | Noted: 2020-01-24 | Resolved: 2020-03-15

## 2020-03-15 PROBLEM — R82.81 PYURIA: Status: RESOLVED | Noted: 2020-01-23 | Resolved: 2020-03-15

## 2020-03-15 PROBLEM — K58.0 IRRITABLE BOWEL SYNDROME WITH DIARRHEA: Status: RESOLVED | Noted: 2019-08-09 | Resolved: 2020-03-15

## 2020-03-15 PROBLEM — J10.1 INFLUENZA B: Status: RESOLVED | Noted: 2020-01-26 | Resolved: 2020-03-15

## 2020-03-15 PROBLEM — R06.02 SOB (SHORTNESS OF BREATH): Status: RESOLVED | Noted: 2020-01-23 | Resolved: 2020-03-15

## 2020-03-15 LAB
ANION GAP SERPL CALCULATED.3IONS-SCNC: 13 MMOL/L (ref 3–16)
BUN BLDV-MCNC: 15 MG/DL (ref 7–20)
CALCIUM SERPL-MCNC: 10.4 MG/DL (ref 8.3–10.6)
CHLORIDE BLD-SCNC: 97 MMOL/L (ref 99–110)
CO2: 25 MMOL/L (ref 21–32)
CREAT SERPL-MCNC: 0.8 MG/DL (ref 0.6–1.2)
GFR AFRICAN AMERICAN: >60
GFR NON-AFRICAN AMERICAN: >60
GLUCOSE BLD-MCNC: 246 MG/DL (ref 70–99)
GLUCOSE BLD-MCNC: 297 MG/DL (ref 70–99)
GLUCOSE BLD-MCNC: 330 MG/DL (ref 70–99)
GLUCOSE BLD-MCNC: 363 MG/DL (ref 70–99)
GLUCOSE BLD-MCNC: 377 MG/DL (ref 70–99)
HCT VFR BLD CALC: 43.2 % (ref 36–48)
HEMOGLOBIN: 14.4 G/DL (ref 12–16)
MCH RBC QN AUTO: 29.5 PG (ref 26–34)
MCHC RBC AUTO-ENTMCNC: 33.4 G/DL (ref 31–36)
MCV RBC AUTO: 88.2 FL (ref 80–100)
PDW BLD-RTO: 15.3 % (ref 12.4–15.4)
PERFORMED ON: ABNORMAL
PLATELET # BLD: 183 K/UL (ref 135–450)
PMV BLD AUTO: 8.7 FL (ref 5–10.5)
POTASSIUM SERPL-SCNC: 4.7 MMOL/L (ref 3.5–5.1)
RBC # BLD: 4.9 M/UL (ref 4–5.2)
SODIUM BLD-SCNC: 135 MMOL/L (ref 136–145)
WBC # BLD: 10.6 K/UL (ref 4–11)

## 2020-03-15 PROCEDURE — 1200000000 HC SEMI PRIVATE

## 2020-03-15 PROCEDURE — 6360000002 HC RX W HCPCS: Performed by: INTERNAL MEDICINE

## 2020-03-15 PROCEDURE — 85027 COMPLETE CBC AUTOMATED: CPT

## 2020-03-15 PROCEDURE — 80048 BASIC METABOLIC PNL TOTAL CA: CPT

## 2020-03-15 PROCEDURE — 6370000000 HC RX 637 (ALT 250 FOR IP): Performed by: INTERNAL MEDICINE

## 2020-03-15 PROCEDURE — 94761 N-INVAS EAR/PLS OXIMETRY MLT: CPT

## 2020-03-15 PROCEDURE — 94640 AIRWAY INHALATION TREATMENT: CPT

## 2020-03-15 PROCEDURE — 36415 COLL VENOUS BLD VENIPUNCTURE: CPT

## 2020-03-15 RX ORDER — DEXTROMETHORPHAN HYDROBROMIDE AND PROMETHAZINE HYDROCHLORIDE 15; 6.25 MG/5ML; MG/5ML
5 SYRUP ORAL EVERY 4 HOURS PRN
Status: DISCONTINUED | OUTPATIENT
Start: 2020-03-15 | End: 2020-03-27 | Stop reason: HOSPADM

## 2020-03-15 RX ORDER — BUDESONIDE AND FORMOTEROL FUMARATE DIHYDRATE 160; 4.5 UG/1; UG/1
2 AEROSOL RESPIRATORY (INHALATION) 2 TIMES DAILY
Status: DISCONTINUED | OUTPATIENT
Start: 2020-03-15 | End: 2020-03-23

## 2020-03-15 RX ADMIN — ASPIRIN 81 MG 81 MG: 81 TABLET ORAL at 08:13

## 2020-03-15 RX ADMIN — GABAPENTIN 300 MG: 300 CAPSULE ORAL at 08:14

## 2020-03-15 RX ADMIN — METHYLPREDNISOLONE SODIUM SUCCINATE 40 MG: 40 INJECTION, POWDER, FOR SOLUTION INTRAMUSCULAR; INTRAVENOUS at 05:40

## 2020-03-15 RX ADMIN — INSULIN LISPRO 15 UNITS: 100 INJECTION, SOLUTION INTRAVENOUS; SUBCUTANEOUS at 08:15

## 2020-03-15 RX ADMIN — PROMETHAZINE HYDROCHLORIDE AND DEXTROMETHORPHAN HYDROBROMIDE 5 ML: 15; 6.25 SYRUP ORAL at 22:51

## 2020-03-15 RX ADMIN — METHYLPREDNISOLONE SODIUM SUCCINATE 40 MG: 40 INJECTION, POWDER, FOR SOLUTION INTRAMUSCULAR; INTRAVENOUS at 14:00

## 2020-03-15 RX ADMIN — INSULIN GLARGINE 30 UNITS: 100 INJECTION, SOLUTION SUBCUTANEOUS at 22:56

## 2020-03-15 RX ADMIN — ENOXAPARIN SODIUM 40 MG: 40 INJECTION SUBCUTANEOUS at 08:14

## 2020-03-15 RX ADMIN — TRIAMCINOLONE ACETONIDE: 1 CREAM TOPICAL at 08:14

## 2020-03-15 RX ADMIN — ISOSORBIDE MONONITRATE 60 MG: 60 TABLET, EXTENDED RELEASE ORAL at 08:13

## 2020-03-15 RX ADMIN — INSULIN LISPRO 6 UNITS: 100 INJECTION, SOLUTION INTRAVENOUS; SUBCUTANEOUS at 20:50

## 2020-03-15 RX ADMIN — DICLOFENAC 2 G: 10 GEL TOPICAL at 08:15

## 2020-03-15 RX ADMIN — GLIPIZIDE 5 MG: 5 TABLET ORAL at 16:13

## 2020-03-15 RX ADMIN — INSULIN LISPRO 9 UNITS: 100 INJECTION, SOLUTION INTRAVENOUS; SUBCUTANEOUS at 12:18

## 2020-03-15 RX ADMIN — LISINOPRIL 2.5 MG: 5 TABLET ORAL at 08:14

## 2020-03-15 RX ADMIN — INSULIN LISPRO 6 UNITS: 100 INJECTION, SOLUTION INTRAVENOUS; SUBCUTANEOUS at 18:03

## 2020-03-15 RX ADMIN — PROMETHAZINE HYDROCHLORIDE AND DEXTROMETHORPHAN HYDROBROMIDE 5 ML: 15; 6.25 SYRUP ORAL at 18:03

## 2020-03-15 RX ADMIN — IPRATROPIUM BROMIDE AND ALBUTEROL SULFATE 3 ML: .5; 3 SOLUTION RESPIRATORY (INHALATION) at 13:22

## 2020-03-15 RX ADMIN — MICONAZOLE NITRATE: 20 POWDER TOPICAL at 20:45

## 2020-03-15 RX ADMIN — TRIAMCINOLONE ACETONIDE: 1 CREAM TOPICAL at 20:45

## 2020-03-15 RX ADMIN — IPRATROPIUM BROMIDE AND ALBUTEROL SULFATE 3 ML: .5; 3 SOLUTION RESPIRATORY (INHALATION) at 21:13

## 2020-03-15 RX ADMIN — DICLOFENAC 2 G: 10 GEL TOPICAL at 20:45

## 2020-03-15 RX ADMIN — HYDRALAZINE HYDROCHLORIDE 25 MG: 25 TABLET, FILM COATED ORAL at 14:00

## 2020-03-15 RX ADMIN — ROSUVASTATIN CALCIUM 10 MG: 10 TABLET, FILM COATED ORAL at 08:13

## 2020-03-15 RX ADMIN — GLIPIZIDE 5 MG: 5 TABLET ORAL at 06:32

## 2020-03-15 RX ADMIN — PANTOPRAZOLE SODIUM 40 MG: 40 TABLET, DELAYED RELEASE ORAL at 06:32

## 2020-03-15 RX ADMIN — IBUPROFEN 400 MG: 400 TABLET ORAL at 16:13

## 2020-03-15 RX ADMIN — LEVOFLOXACIN 500 MG: 500 TABLET, FILM COATED ORAL at 08:21

## 2020-03-15 RX ADMIN — HYDRALAZINE HYDROCHLORIDE 25 MG: 25 TABLET, FILM COATED ORAL at 20:45

## 2020-03-15 RX ADMIN — MICONAZOLE NITRATE: 20 POWDER TOPICAL at 08:13

## 2020-03-15 RX ADMIN — ALPRAZOLAM 1 MG: 0.5 TABLET ORAL at 22:51

## 2020-03-15 RX ADMIN — IBUPROFEN 400 MG: 400 TABLET ORAL at 08:14

## 2020-03-15 RX ADMIN — METHYLPREDNISOLONE SODIUM SUCCINATE 40 MG: 40 INJECTION, POWDER, FOR SOLUTION INTRAMUSCULAR; INTRAVENOUS at 23:01

## 2020-03-15 RX ADMIN — Medication 2 PUFF: at 21:13

## 2020-03-15 RX ADMIN — HYDRALAZINE HYDROCHLORIDE 25 MG: 25 TABLET, FILM COATED ORAL at 08:14

## 2020-03-15 RX ADMIN — IBUPROFEN 400 MG: 400 TABLET ORAL at 12:16

## 2020-03-15 RX ADMIN — ALPRAZOLAM 1 MG: 0.5 TABLET ORAL at 06:10

## 2020-03-15 RX ADMIN — Medication 2 PUFF: at 13:21

## 2020-03-15 RX ADMIN — GABAPENTIN 300 MG: 300 CAPSULE ORAL at 20:45

## 2020-03-15 ASSESSMENT — PAIN SCALES - GENERAL
PAINLEVEL_OUTOF10: 9
PAINLEVEL_OUTOF10: 9
PAINLEVEL_OUTOF10: 8
PAINLEVEL_OUTOF10: 8
PAINLEVEL_OUTOF10: 9

## 2020-03-15 ASSESSMENT — PAIN DESCRIPTION - LOCATION: LOCATION: OTHER (COMMENT)

## 2020-03-15 NOTE — PROGRESS NOTES
Patient Active Problem List   Diagnosis    Acute on chronic respiratory failure with hypoxia (HCC)    CAD (coronary artery disease)    Hyperlipemia    Essential hypertension    DM2 (diabetes mellitus, type 2) ([facility] [address]    Primary osteoarthritis involving multiple joints    Gastroesophageal reflux disease without esophagitis    Anxiety    [facility]    Precordial pain    Acute bronchitis    [facility] with [facility] exacerbation [facility]    AKIL (obstructive sleep apnea)    Chest congestion    DM (diabetes mellitus), secondary uncontrolled (HCC)    Ineffective airway clearance    Mucus plugging of bronchi   H&P dictated

## 2020-03-15 NOTE — H&P
[facility] [address]                     [facility], [address]                              HISTORY AND PHYSICAL    PATIENT NAME: Christiano Lorenz                      :        1945  MED REC NO:   [id number]                          ROOM:       5901  ACCOUNT NO:   [de-identified]                           ADMIT DATE: 2020  PROVIDER:     Demond Fernández MD    HISTORY OF PRESENT ILLNESS:  The patient is a 69-year-old white woman  came to the emergency room with history of increasing shortness of  breath, cough and wheezing. The patient has also been having chest  pain; although, her chest pain pattern is rather atypical for having  cardiac nature of the pain. The patient did have low grade fever and  noted some chills. There is cough but no hemoptysis. There is no  weight loss. In fact, she is morbidly obese with a BMI of 43.9. 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 PAST SURGICAL HISTORY:  Pertinent for appendectomy, bronchoscopy,  bronchoalveolar lavage, cardiac stenting, multiple endoscopies,  cholecystectomy, colonoscopy, EGD, eye surgery, gallbladder surgery,  hysterectomy, skin grafting, tear duct surgery, upper GI endoscopy on  more than one occasion.     MEDICATIONS:  Albuterol, Xanax, aspirin, Cepacol lozenges, Symbicort,  colchicine, diclofenac gel, gabapentin, glipizide, glucagon,  hydralazine, ibuprofen, insulin glargine, insulin lispro, DuoNeb,  isosorbide mononitrate, lisinopril, Nitrostat sublingual, nystatin  powder, omeprazole, phenylephrine, brompheniramine DM cough syrup,  rosuvastatin, triamcinolone. ALLERGIES:  She is allergic to MORPHINE, CODEINE, HYDROMORPHONE,  LEVAQUIN, and HYDROCODONE.    SOCIAL HISTORY:  She is a . She has three grown children but one  of her sons  of heroin overdose about a year ago. Her life has been  devastating since then. She also lost her  three years ago and  she has been very sad since then. She was a heavy smoker all her life  and quit smoking less than a year ago. No history of alcohol usage. 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 No history of substance abuse. FAMILY HISTORY:  Both her parents are  because of natural  causes. Mother had atherosclerotic heart disease and cancer. Father  also had atherosclerotic heart disease and cancer. Two sisters have  atherosclerotic heart disease. REVIEW OF SYSTEMS:  Negative for loss of consciousness. No seizures. No visual blurring. No dysphagia. No hematemesis. No melena. No  hemoptysis. No angina pectoris of exertion although she does have  nonspecific chest pain. No abdominal pain. No genitourinary  complaints. Does have chronic musculoskeletal pain. PHYSICAL EXAMINATION:  GENERAL:  She is alert, awake, and oriented x3, moderately distressed,  78-year-old white woman looking morbidly obese. VITAL SIGNS:  Her temperature is 96.6, blood pressure 176/96, heart rate  is 66, respirations 20. HEENT:  Oral mucosa are dry. SKIN:  Warm and dry. NECK:  Supple. Faint carotid bruits. Mild jugular venous distention. LUNGS:  Diminished breath sounds. Prolonged exhalation. Few expiratory  wheezes. Scattered crackles. HEART:  Regular rate and rhythm. S1 and S2, without any S3 or S4  gallop. ABDOMEN:  Soft, nontender. Bowel sounds are present. EXTREMITIES:  Trace edema. NEUROLOGIC:  There are no acute focal deficits. Babinski is bilaterally  absent.     LABORATORY EVALUATION:  Shows sodium 135, potassium 4.7, chloride 97,  CO2 is 25, BUN 15, creatinine 0.8, anion gap is 13, blood sugar is 377,  calcium is 10.4, white blood cell count is 10.6, hemoglobin/hematocrit  14.4 and 43.2, platelet count is 147. Rapid influenza tests A and B are  negative. Chest x-ray negative for acute intrathoracic disease. ASSESSMENT:  1. Acute exacerbation of COPD. 2.  Acute bronchitis. 3.  Atherosclerotic heart disease. 4.  Hypertension. 5.  Morbid obesity. PLAN:  Get her admitted. Treat her with IV hydration. IV Solu-Medrol. Nebulized aerosol. Supplemental oxygen. We will also give empiric  antibiotic treatment.         Maynor Aparicio MD    D: 03/15/2020 14:15:37       T: 03/15/2020 15:23:41     [email or web address]  Job#: [id number]     Doc#: [id number]    CC:

## 2020-03-15 NOTE — PROGRESS NOTES
Midnight assessment complete and documented. Assisted pt to bathroom. Tolerated well. No other needs or concerns expressed. Will continue to monitor.

## 2020-03-15 NOTE — PLAN OF CARE
Problem: Falls - Risk of:  Goal: Will remain free from falls  Description: Will remain free from falls  Outcome: Ongoing  Goal: Absence of physical injury  Description: Absence of physical injury  Outcome: Ongoing  Note: Fall precautions in place. Problem: Pain:  Goal: Pain level will decrease  Description: Pain level will decrease  Outcome: Ongoing  Goal: Control of acute pain  Description: Control of acute pain  Outcome: Ongoing  Goal: Control of chronic pain  Description: Control of chronic pain  Outcome: Ongoing     Problem:  Bowel/Gastric:  Goal: Occurrences of diarrhea will decrease  Description: Occurrences of diarrhea will decrease  Outcome: Ongoing     Problem: Physical Regulation:  Goal: Prevent transmision of infection  Description: Prevent transmision of infection  Outcome: Ongoing

## 2020-03-15 NOTE — PROGRESS NOTES
Pt asleep on stomach and when woke up, stated she couldn't breathe. Sats low 90's, BP elevated. Instructed pt to try to relax, that she was ok and to breathe slowly. Pt stated she is a mouth breather. Informed pt I could give her xanax around 0600. Will continue to monitor.

## 2020-03-15 NOTE — FLOWSHEET NOTE
[phone] Hospital or Facility: [facility] From: Daniel Loco RE: Vilma Ramirez Pt is requesting a cough suppressant. Thank you Need Callback: NO CALLBACK REQ 5C STEP DOWN ROUTINE    Order received.

## 2020-03-16 LAB
GLUCOSE BLD-MCNC: 215 MG/DL (ref 70–99)
GLUCOSE BLD-MCNC: 293 MG/DL (ref 70–99)
GLUCOSE BLD-MCNC: 313 MG/DL (ref 70–99)
GLUCOSE BLD-MCNC: 331 MG/DL (ref 70–99)
PERFORMED ON: ABNORMAL

## 2020-03-16 PROCEDURE — 6360000002 HC RX W HCPCS: Performed by: INTERNAL MEDICINE

## 2020-03-16 PROCEDURE — 94640 AIRWAY INHALATION TREATMENT: CPT

## 2020-03-16 PROCEDURE — 2700000000 HC OXYGEN THERAPY PER DAY

## 2020-03-16 PROCEDURE — 6370000000 HC RX 637 (ALT 250 FOR IP): Performed by: INTERNAL MEDICINE

## 2020-03-16 PROCEDURE — 1200000000 HC SEMI PRIVATE

## 2020-03-16 PROCEDURE — 94761 N-INVAS EAR/PLS OXIMETRY MLT: CPT

## 2020-03-16 RX ADMIN — IPRATROPIUM BROMIDE AND ALBUTEROL SULFATE 3 ML: .5; 3 SOLUTION RESPIRATORY (INHALATION) at 09:17

## 2020-03-16 RX ADMIN — MICONAZOLE NITRATE: 20 POWDER TOPICAL at 21:47

## 2020-03-16 RX ADMIN — Medication 2 PUFF: at 09:17

## 2020-03-16 RX ADMIN — LISINOPRIL 2.5 MG: 5 TABLET ORAL at 09:23

## 2020-03-16 RX ADMIN — MICONAZOLE NITRATE: 20 POWDER TOPICAL at 09:26

## 2020-03-16 RX ADMIN — IPRATROPIUM BROMIDE AND ALBUTEROL SULFATE 3 ML: .5; 3 SOLUTION RESPIRATORY (INHALATION) at 19:29

## 2020-03-16 RX ADMIN — PROMETHAZINE HYDROCHLORIDE AND DEXTROMETHORPHAN HYDROBROMIDE 5 ML: 15; 6.25 SYRUP ORAL at 21:09

## 2020-03-16 RX ADMIN — IBUPROFEN 400 MG: 400 TABLET ORAL at 13:03

## 2020-03-16 RX ADMIN — IBUPROFEN 400 MG: 400 TABLET ORAL at 09:23

## 2020-03-16 RX ADMIN — TRIAMCINOLONE ACETONIDE: 1 CREAM TOPICAL at 09:26

## 2020-03-16 RX ADMIN — DICLOFENAC 2 G: 10 GEL TOPICAL at 21:09

## 2020-03-16 RX ADMIN — PROMETHAZINE HYDROCHLORIDE AND DEXTROMETHORPHAN HYDROBROMIDE 5 ML: 15; 6.25 SYRUP ORAL at 14:49

## 2020-03-16 RX ADMIN — PANTOPRAZOLE SODIUM 40 MG: 40 TABLET, DELAYED RELEASE ORAL at 06:01

## 2020-03-16 RX ADMIN — INSULIN LISPRO 6 UNITS: 100 INJECTION, SOLUTION INTRAVENOUS; SUBCUTANEOUS at 09:34

## 2020-03-16 RX ADMIN — DICLOFENAC 2 G: 10 GEL TOPICAL at 09:26

## 2020-03-16 RX ADMIN — METHYLPREDNISOLONE SODIUM SUCCINATE 40 MG: 40 INJECTION, POWDER, FOR SOLUTION INTRAMUSCULAR; INTRAVENOUS at 13:03

## 2020-03-16 RX ADMIN — GLIPIZIDE 5 MG: 5 TABLET ORAL at 14:45

## 2020-03-16 RX ADMIN — GABAPENTIN 300 MG: 300 CAPSULE ORAL at 21:09

## 2020-03-16 RX ADMIN — INSULIN GLARGINE 30 UNITS: 100 INJECTION, SOLUTION SUBCUTANEOUS at 21:10

## 2020-03-16 RX ADMIN — ALPRAZOLAM 1 MG: 0.5 TABLET ORAL at 21:09

## 2020-03-16 RX ADMIN — GABAPENTIN 300 MG: 300 CAPSULE ORAL at 09:24

## 2020-03-16 RX ADMIN — ENOXAPARIN SODIUM 40 MG: 40 INJECTION SUBCUTANEOUS at 09:22

## 2020-03-16 RX ADMIN — INSULIN LISPRO 6 UNITS: 100 INJECTION, SOLUTION INTRAVENOUS; SUBCUTANEOUS at 21:10

## 2020-03-16 RX ADMIN — METHYLPREDNISOLONE SODIUM SUCCINATE 40 MG: 40 INJECTION, POWDER, FOR SOLUTION INTRAMUSCULAR; INTRAVENOUS at 21:09

## 2020-03-16 RX ADMIN — ROSUVASTATIN CALCIUM 10 MG: 10 TABLET, FILM COATED ORAL at 09:23

## 2020-03-16 RX ADMIN — Medication 2 PUFF: at 19:37

## 2020-03-16 RX ADMIN — HYDRALAZINE HYDROCHLORIDE 25 MG: 25 TABLET, FILM COATED ORAL at 09:30

## 2020-03-16 RX ADMIN — LEVOFLOXACIN 500 MG: 500 TABLET, FILM COATED ORAL at 09:22

## 2020-03-16 RX ADMIN — HYDRALAZINE HYDROCHLORIDE 25 MG: 25 TABLET, FILM COATED ORAL at 21:09

## 2020-03-16 RX ADMIN — IBUPROFEN 400 MG: 400 TABLET ORAL at 17:58

## 2020-03-16 RX ADMIN — HYDRALAZINE HYDROCHLORIDE 25 MG: 25 TABLET, FILM COATED ORAL at 13:03

## 2020-03-16 RX ADMIN — TRIAMCINOLONE ACETONIDE: 1 CREAM TOPICAL at 22:06

## 2020-03-16 RX ADMIN — ISOSORBIDE MONONITRATE 60 MG: 60 TABLET, EXTENDED RELEASE ORAL at 09:22

## 2020-03-16 RX ADMIN — METHYLPREDNISOLONE SODIUM SUCCINATE 40 MG: 40 INJECTION, POWDER, FOR SOLUTION INTRAMUSCULAR; INTRAVENOUS at 06:01

## 2020-03-16 RX ADMIN — GLIPIZIDE 5 MG: 5 TABLET ORAL at 06:01

## 2020-03-16 RX ADMIN — INSULIN LISPRO 12 UNITS: 100 INJECTION, SOLUTION INTRAVENOUS; SUBCUTANEOUS at 17:58

## 2020-03-16 RX ADMIN — ALPRAZOLAM 1 MG: 0.5 TABLET ORAL at 14:46

## 2020-03-16 RX ADMIN — PROMETHAZINE HYDROCHLORIDE AND DEXTROMETHORPHAN HYDROBROMIDE 5 ML: 15; 6.25 SYRUP ORAL at 02:38

## 2020-03-16 RX ADMIN — INSULIN LISPRO 9 UNITS: 100 INJECTION, SOLUTION INTRAVENOUS; SUBCUTANEOUS at 12:37

## 2020-03-16 RX ADMIN — ASPIRIN 81 MG 81 MG: 81 TABLET ORAL at 09:24

## 2020-03-16 ASSESSMENT — PAIN SCALES - GENERAL
PAINLEVEL_OUTOF10: 0
PAINLEVEL_OUTOF10: 6
PAINLEVEL_OUTOF10: 6
PAINLEVEL_OUTOF10: 0
PAINLEVEL_OUTOF10: 0
PAINLEVEL_OUTOF10: 5
PAINLEVEL_OUTOF10: 6

## 2020-03-16 ASSESSMENT — PAIN DESCRIPTION - PAIN TYPE: TYPE: CHRONIC PAIN

## 2020-03-16 NOTE — PROGRESS NOTES
Department of Internal Medicine  General Internal Medicine   Progress Note     SUBJECTIVE :  Breathing has improved , but wheezes  Still obviously audible       History obtained from chart review and the patient  General ROS: positive for  - fatigue and malaise  negative for - chills, fever or night sweats  Psychological ROS: negative  Respiratory ROS: positive for - shortness of breath and wheezing  negative for - cough, hemoptysis, sputum changes or stridor  Cardiovascular ROS: positive for - chest pain, dyspnea on exertion and edema  negative for - loss of consciousness, orthopnea or palpitations  Gastrointestinal ROS: no abdominal pain, change in bowel habits, or black or bloody stools    OBJECTIVE      Medications      Current Facility-Administered Medications: budesonide-formoterol (SYMBICORT) 160-4.5 MCG/ACT inhaler 2 puff, 2 puff, Inhalation, BID  promethazine-dextromethorphan (PROMETHAZINE-DM) 6.25-15 MG/5ML syrup 5 mL, 5 mL, Oral, Q4H PRN  albuterol sulfate  (90 Base) MCG/ACT inhaler 2 puff, 2 puff, Inhalation, Q6H PRN  ALPRAZolam (XANAX) tablet 1 mg, 1 mg, Oral, TID PRN  aspirin chewable tablet 81 mg, 81 mg, Oral, Daily  diclofenac sodium (VOLTAREN) 1 % gel 2 g, 2 g, Topical, BID  gabapentin (NEURONTIN) capsule 300 mg, 300 mg, Oral, BID  glipiZIDE (GLUCOTROL) tablet 5 mg, 5 mg, Oral, BID AC  hydrALAZINE (APRESOLINE) tablet 25 mg, 25 mg, Oral, TID  ibuprofen (ADVIL;MOTRIN) tablet 400 mg, 400 mg, Oral, TID WC  ipratropium-albuterol (DUONEB) nebulizer solution 3 mL, 3 mL, Inhalation, Q4H WA  isosorbide mononitrate (IMDUR) extended release tablet 60 mg, 60 mg, Oral, Daily  lisinopril (PRINIVIL;ZESTRIL) tablet 2.5 mg, 2.5 mg, Oral, Daily  nitroGLYCERIN (NITROSTAT) SL tablet 0.4 mg, 0.4 mg, Sublingual, Q5 Min PRN  miconazole (MICOTIN) 2 % powder, , Topical, BID  pantoprazole (PROTONIX) tablet 40 mg, 40 mg, Oral, QAM AC  rosuvastatin (CRESTOR) tablet 10 mg, 10 mg, Oral, Daily  triamcinolone (KENALOG) 0.1 % cream, , Topical, BID  methylPREDNISolone sodium (SOLU-MEDROL) injection 40 mg, 40 mg, Intravenous, Q8H  levoFLOXacin (LEVAQUIN) tablet 500 mg, 500 mg, Oral, Daily  glucose (GLUTOSE) 40 % oral gel 15 g, 15 g, Oral, PRN  dextrose 50 % IV solution, 12.5 g, Intravenous, PRN  glucagon (rDNA) injection 1 mg, 1 mg, Intramuscular, PRN  dextrose 5 % solution, 100 mL/hr, Intravenous, PRN  insulin glargine (LANTUS;BASAGLAR) injection pen 30 Units, 0.25 Units/kg, Subcutaneous, Nightly  enoxaparin (LOVENOX) injection 40 mg, 40 mg, Subcutaneous, Daily  insulin lispro (1 Unit Dial) 0-18 Units, 0-18 Units, Subcutaneous, TID WC  insulin lispro (1 Unit Dial) 0-9 Units, 0-9 Units, Subcutaneous, Nightly  albuterol sulfate  (90 Base) MCG/ACT inhaler 2 puff, 2 puff, Inhalation, Once    Physical      VITALS:    /74   Pulse 64   Temp 97.3 °F (36.3 °C) (Temporal)   Resp 18   Ht 5' 6\" (1.676 m)   Wt 267 lb 9.6 oz (121.4 kg)   SpO2 93%   Breastfeeding No   BMI 43.19 kg/m²   TEMPERATURE:  Current - Temp: 97.3 °F (36.3 °C);  Max - Temp  Av °F (36.7 °C)  Min: 97.3 °F (36.3 °C)  Max: 98.8 °F (37.1 °C)  RESPIRATIONS RANGE: Resp  Av.3  Min: 18  Max: 20  PULSE RANGE: Pulse  Av  Min: 60  Max: 87  BLOOD PRESSURE RANGE:  Systolic (59QFW), XFD:151 , Min:131 , OVR:551   ; Diastolic (69RZL), SWZ:93, Min:72, Max:86    PULSE OXIMETRY RANGE: SpO2  Av.3 %  Min: 90 %  Max: 96 %  24HR INTAKE/OUTPUT:      Intake/Output Summary (Last 24 hours) at 3/16/2020 9157  Last data filed at 3/16/2020 1246  Gross per 24 hour   Intake 600 ml   Output --   Net 600 ml     CONSTITUTIONAL:  awake, alert, cooperative, no apparent distress, and appears stated age  NECK:  supple, symmetrical, trachea midline and skin normal  LUNGS:  No increased work of breathing, good air exchange, clear to auscultation bilaterally, no crackles or wheezing  CARDIOVASCULAR:  Normal apical impulse, regular rate and rhythm, normal S1 and S2, no S3 or S4, and

## 2020-03-16 NOTE — PROGRESS NOTES
PM assessment complete and documented. Meds given per MAR. Pt states she would like her cough medicine and xanax when I give her solumedrol. Told her I would. No other needs or concerns expressed. Will continue to monitor.

## 2020-03-16 NOTE — PLAN OF CARE
Problem: Bowel/Gastric:  Goal: Occurrences of diarrhea will decrease  Description: Occurrences of diarrhea will decrease  3/15/2020 2312 by Gloria Harper RN  Outcome: Ongoing  Note: Pt states she has not had a bowel movement and is aware we need a sample.  Will continue to monitor

## 2020-03-16 NOTE — PLAN OF CARE
Problem: Falls - Risk of:  Goal: Will remain free from falls  Description: Will remain free from falls  Outcome: Ongoing  Goal: Absence of physical injury  Description: Absence of physical injury  Outcome: Ongoing     Problem: Pain:  Goal: Pain level will decrease  Description: Pain level will decrease  Outcome: Ongoing  Goal: Control of acute pain  Description: Control of acute pain  Outcome: Ongoing  Goal: Control of chronic pain  Description: Control of chronic pain  Outcome: Ongoing     Problem: Bowel/Gastric:  Goal: Occurrences of diarrhea will decrease  Description: Occurrences of diarrhea will decrease  3/16/2020 1137 by Ivanna Colon RN  Outcome: Ongoing  3/15/2020 2312 by Godwin Aquino RN  Outcome: Ongoing  Note: Pt states she has not had a bowel movement and is aware we need a sample.  Will continue to monitor     Problem: Physical Regulation:  Goal: Prevent transmision of infection  Description: Prevent transmision of infection  Outcome: Ongoing     Problem: Serum Glucose Level - Abnormal:  Goal: Ability to maintain appropriate glucose levels has stabilized  Description: Ability to maintain appropriate glucose levels has stabilized  Outcome: Ongoing

## 2020-03-16 NOTE — PROGRESS NOTES
Morning assessment completed, patient denies needs at this time, call light in reach, will continue to monitor.

## 2020-03-17 LAB
GLUCOSE BLD-MCNC: 198 MG/DL (ref 70–99)
GLUCOSE BLD-MCNC: 242 MG/DL (ref 70–99)
GLUCOSE BLD-MCNC: 269 MG/DL (ref 70–99)
GLUCOSE BLD-MCNC: 325 MG/DL (ref 70–99)
PERFORMED ON: ABNORMAL

## 2020-03-17 PROCEDURE — 1200000000 HC SEMI PRIVATE

## 2020-03-17 PROCEDURE — 94761 N-INVAS EAR/PLS OXIMETRY MLT: CPT

## 2020-03-17 PROCEDURE — 2700000000 HC OXYGEN THERAPY PER DAY

## 2020-03-17 PROCEDURE — 6370000000 HC RX 637 (ALT 250 FOR IP): Performed by: INTERNAL MEDICINE

## 2020-03-17 PROCEDURE — 6360000002 HC RX W HCPCS: Performed by: INTERNAL MEDICINE

## 2020-03-17 PROCEDURE — 94640 AIRWAY INHALATION TREATMENT: CPT

## 2020-03-17 RX ORDER — METHYLPREDNISOLONE SODIUM SUCCINATE 40 MG/ML
40 INJECTION, POWDER, LYOPHILIZED, FOR SOLUTION INTRAMUSCULAR; INTRAVENOUS EVERY 12 HOURS
Status: DISCONTINUED | OUTPATIENT
Start: 2020-03-18 | End: 2020-03-26

## 2020-03-17 RX ADMIN — TRIAMCINOLONE ACETONIDE: 1 CREAM TOPICAL at 20:26

## 2020-03-17 RX ADMIN — LISINOPRIL 2.5 MG: 5 TABLET ORAL at 08:13

## 2020-03-17 RX ADMIN — GABAPENTIN 300 MG: 300 CAPSULE ORAL at 20:24

## 2020-03-17 RX ADMIN — ISOSORBIDE MONONITRATE 60 MG: 60 TABLET, EXTENDED RELEASE ORAL at 08:12

## 2020-03-17 RX ADMIN — IBUPROFEN 400 MG: 400 TABLET ORAL at 17:22

## 2020-03-17 RX ADMIN — PROMETHAZINE HYDROCHLORIDE AND DEXTROMETHORPHAN HYDROBROMIDE 5 ML: 15; 6.25 SYRUP ORAL at 12:23

## 2020-03-17 RX ADMIN — DICLOFENAC 2 G: 10 GEL TOPICAL at 20:26

## 2020-03-17 RX ADMIN — INSULIN LISPRO 9 UNITS: 100 INJECTION, SOLUTION INTRAVENOUS; SUBCUTANEOUS at 17:23

## 2020-03-17 RX ADMIN — ENOXAPARIN SODIUM 40 MG: 40 INJECTION SUBCUTANEOUS at 08:17

## 2020-03-17 RX ADMIN — HYDRALAZINE HYDROCHLORIDE 25 MG: 25 TABLET, FILM COATED ORAL at 20:24

## 2020-03-17 RX ADMIN — PROMETHAZINE HYDROCHLORIDE AND DEXTROMETHORPHAN HYDROBROMIDE 5 ML: 15; 6.25 SYRUP ORAL at 20:24

## 2020-03-17 RX ADMIN — Medication 2 PUFF: at 21:45

## 2020-03-17 RX ADMIN — INSULIN GLARGINE 30 UNITS: 100 INJECTION, SOLUTION SUBCUTANEOUS at 20:25

## 2020-03-17 RX ADMIN — IPRATROPIUM BROMIDE AND ALBUTEROL SULFATE 3 ML: .5; 3 SOLUTION RESPIRATORY (INHALATION) at 06:17

## 2020-03-17 RX ADMIN — PANTOPRAZOLE SODIUM 40 MG: 40 TABLET, DELAYED RELEASE ORAL at 06:05

## 2020-03-17 RX ADMIN — Medication 2 PUFF: at 06:25

## 2020-03-17 RX ADMIN — HYDRALAZINE HYDROCHLORIDE 25 MG: 25 TABLET, FILM COATED ORAL at 15:55

## 2020-03-17 RX ADMIN — IPRATROPIUM BROMIDE AND ALBUTEROL SULFATE 3 ML: .5; 3 SOLUTION RESPIRATORY (INHALATION) at 12:13

## 2020-03-17 RX ADMIN — GABAPENTIN 300 MG: 300 CAPSULE ORAL at 08:18

## 2020-03-17 RX ADMIN — IBUPROFEN 400 MG: 400 TABLET ORAL at 08:13

## 2020-03-17 RX ADMIN — MICONAZOLE NITRATE: 20 POWDER TOPICAL at 08:18

## 2020-03-17 RX ADMIN — ALPRAZOLAM 1 MG: 0.5 TABLET ORAL at 09:57

## 2020-03-17 RX ADMIN — GLIPIZIDE 5 MG: 5 TABLET ORAL at 06:04

## 2020-03-17 RX ADMIN — LEVOFLOXACIN 500 MG: 500 TABLET, FILM COATED ORAL at 08:17

## 2020-03-17 RX ADMIN — ASPIRIN 81 MG 81 MG: 81 TABLET ORAL at 08:13

## 2020-03-17 RX ADMIN — HYDRALAZINE HYDROCHLORIDE 25 MG: 25 TABLET, FILM COATED ORAL at 08:19

## 2020-03-17 RX ADMIN — GLIPIZIDE 5 MG: 5 TABLET ORAL at 15:54

## 2020-03-17 RX ADMIN — METHYLPREDNISOLONE SODIUM SUCCINATE 40 MG: 40 INJECTION, POWDER, FOR SOLUTION INTRAMUSCULAR; INTRAVENOUS at 06:05

## 2020-03-17 RX ADMIN — PROMETHAZINE HYDROCHLORIDE AND DEXTROMETHORPHAN HYDROBROMIDE 5 ML: 15; 6.25 SYRUP ORAL at 06:23

## 2020-03-17 RX ADMIN — TRIAMCINOLONE ACETONIDE: 1 CREAM TOPICAL at 08:18

## 2020-03-17 RX ADMIN — INSULIN LISPRO 6 UNITS: 100 INJECTION, SOLUTION INTRAVENOUS; SUBCUTANEOUS at 20:25

## 2020-03-17 RX ADMIN — METHYLPREDNISOLONE SODIUM SUCCINATE 40 MG: 40 INJECTION, POWDER, FOR SOLUTION INTRAMUSCULAR; INTRAVENOUS at 15:55

## 2020-03-17 RX ADMIN — ROSUVASTATIN CALCIUM 10 MG: 10 TABLET, FILM COATED ORAL at 08:12

## 2020-03-17 RX ADMIN — INSULIN LISPRO 3 UNITS: 100 INJECTION, SOLUTION INTRAVENOUS; SUBCUTANEOUS at 08:58

## 2020-03-17 RX ADMIN — DICLOFENAC 2 G: 10 GEL TOPICAL at 08:18

## 2020-03-17 RX ADMIN — ALPRAZOLAM 1 MG: 0.5 TABLET ORAL at 20:24

## 2020-03-17 RX ADMIN — IPRATROPIUM BROMIDE AND ALBUTEROL SULFATE 3 ML: .5; 3 SOLUTION RESPIRATORY (INHALATION) at 21:45

## 2020-03-17 RX ADMIN — MICONAZOLE NITRATE: 20 POWDER TOPICAL at 22:17

## 2020-03-17 RX ADMIN — IBUPROFEN 400 MG: 400 TABLET ORAL at 12:22

## 2020-03-17 RX ADMIN — INSULIN LISPRO 6 UNITS: 100 INJECTION, SOLUTION INTRAVENOUS; SUBCUTANEOUS at 12:25

## 2020-03-17 ASSESSMENT — PAIN SCALES - GENERAL
PAINLEVEL_OUTOF10: 0
PAINLEVEL_OUTOF10: 0
PAINLEVEL_OUTOF10: 6
PAINLEVEL_OUTOF10: 0
PAINLEVEL_OUTOF10: 6
PAINLEVEL_OUTOF10: 0
PAINLEVEL_OUTOF10: 0
PAINLEVEL_OUTOF10: 6

## 2020-03-17 ASSESSMENT — PAIN DESCRIPTION - PAIN TYPE: TYPE: CHRONIC PAIN

## 2020-03-17 NOTE — PLAN OF CARE
Problem: Pain:  Goal: Pain level will decrease  Description: Pain level will decrease  3/16/2020 2213 by Ashley Gage RN  Outcome: Ongoing    Problem: Falls - Risk of:  Goal: Absence of physical injury  Description: Absence of physical injury  3/16/2020 2213 by Ashley Gage RN  Outcome: Ongoing

## 2020-03-17 NOTE — PROGRESS NOTES
Department of Internal Medicine  General Internal Medicine   Progress Note     SUBJECTIVE :  Breathing partially improved , wheezing still significant       History obtained from chart review and the patient  General ROS: positive for  - fatigue and malaise  negative for - chills, fever or night sweats  Psychological ROS: negative  Respiratory ROS: positive for - shortness of breath and wheezing  negative for - cough, hemoptysis, sputum changes or stridor  Cardiovascular ROS: positive for - chest pain, dyspnea on exertion and edema  negative for - loss of consciousness, orthopnea or palpitations  Gastrointestinal ROS: no abdominal pain, change in bowel habits, or black or bloody stools    OBJECTIVE      Medications      Current Facility-Administered Medications: [START ON 3/18/2020] methylPREDNISolone sodium (SOLU-MEDROL) injection 40 mg, 40 mg, Intravenous, Q12H  budesonide-formoterol (SYMBICORT) 160-4.5 MCG/ACT inhaler 2 puff, 2 puff, Inhalation, BID  promethazine-dextromethorphan (PROMETHAZINE-DM) 6.25-15 MG/5ML syrup 5 mL, 5 mL, Oral, Q4H PRN  albuterol sulfate  (90 Base) MCG/ACT inhaler 2 puff, 2 puff, Inhalation, Q6H PRN  ALPRAZolam (XANAX) tablet 1 mg, 1 mg, Oral, TID PRN  aspirin chewable tablet 81 mg, 81 mg, Oral, Daily  diclofenac sodium (VOLTAREN) 1 % gel 2 g, 2 g, Topical, BID  gabapentin (NEURONTIN) capsule 300 mg, 300 mg, Oral, BID  glipiZIDE (GLUCOTROL) tablet 5 mg, 5 mg, Oral, BID AC  hydrALAZINE (APRESOLINE) tablet 25 mg, 25 mg, Oral, TID  ibuprofen (ADVIL;MOTRIN) tablet 400 mg, 400 mg, Oral, TID WC  ipratropium-albuterol (DUONEB) nebulizer solution 3 mL, 3 mL, Inhalation, Q4H WA  isosorbide mononitrate (IMDUR) extended release tablet 60 mg, 60 mg, Oral, Daily  lisinopril (PRINIVIL;ZESTRIL) tablet 2.5 mg, 2.5 mg, Oral, Daily  nitroGLYCERIN (NITROSTAT) SL tablet 0.4 mg, 0.4 mg, Sublingual, Q5 Min PRN  miconazole (MICOTIN) 2 % powder, , Topical, BID  pantoprazole (PROTONIX) tablet 40 mg, 40 mg, Oral, QAM AC  rosuvastatin (CRESTOR) tablet 10 mg, 10 mg, Oral, Daily  triamcinolone (KENALOG) 0.1 % cream, , Topical, BID  levoFLOXacin (LEVAQUIN) tablet 500 mg, 500 mg, Oral, Daily  glucose (GLUTOSE) 40 % oral gel 15 g, 15 g, Oral, PRN  dextrose 50 % IV solution, 12.5 g, Intravenous, PRN  glucagon (rDNA) injection 1 mg, 1 mg, Intramuscular, PRN  dextrose 5 % solution, 100 mL/hr, Intravenous, PRN  insulin glargine (LANTUS;BASAGLAR) injection pen 30 Units, 0.25 Units/kg, Subcutaneous, Nightly  enoxaparin (LOVENOX) injection 40 mg, 40 mg, Subcutaneous, Daily  insulin lispro (1 Unit Dial) 0-18 Units, 0-18 Units, Subcutaneous, TID WC  insulin lispro (1 Unit Dial) 0-9 Units, 0-9 Units, Subcutaneous, Nightly  albuterol sulfate  (90 Base) MCG/ACT inhaler 2 puff, 2 puff, Inhalation, Once    Physical      VITALS:    BP (!) 141/76   Pulse 62   Temp 98.6 °F (37 °C)   Resp 20   Ht 5' 6\" (1.676 m)   Wt 267 lb 9.6 oz (121.4 kg)   SpO2 91%   Breastfeeding No   BMI 43.19 kg/m²   TEMPERATURE:  Current - Temp: 98.6 °F (37 °C);  Max - Temp  Av.8 °F (36.6 °C)  Min: 97.3 °F (36.3 °C)  Max: 98.6 °F (37 °C)  RESPIRATIONS RANGE: Resp  Av.3  Min: 18  Max: 22  PULSE RANGE: Pulse  Av.2  Min: 53  Max: 83  BLOOD PRESSURE RANGE:  Systolic (29DSW), IYN:725 , Min:110 , HKH:505   ; Diastolic (13JSL), WAY:43, Min:53, Max:96    PULSE OXIMETRY RANGE: SpO2  Av %  Min: 91 %  Max: 96 %  24HR INTAKE/OUTPUT:      Intake/Output Summary (Last 24 hours) at 3/17/2020 1802  Last data filed at 3/17/2020 1736  Gross per 24 hour   Intake 1200 ml   Output 1200 ml   Net 0 ml     CONSTITUTIONAL:  awake, alert, cooperative, no apparent distress, and appears stated age  NECK:  supple, symmetrical, trachea midline and skin normal  LUNGS:  No increased work of breathing, good air exchange, clear to auscultation bilaterally, no crackles or wheezing  CARDIOVASCULAR:  Normal apical impulse, regular rate and rhythm, normal S1 and S2, no S3

## 2020-03-18 ENCOUNTER — APPOINTMENT (OUTPATIENT)
Dept: GENERAL RADIOLOGY | Age: 75
DRG: 191 | End: 2020-03-18
Payer: COMMERCIAL

## 2020-03-18 LAB
GLUCOSE BLD-MCNC: 107 MG/DL (ref 70–99)
GLUCOSE BLD-MCNC: 117 MG/DL (ref 70–99)
GLUCOSE BLD-MCNC: 226 MG/DL (ref 70–99)
GLUCOSE BLD-MCNC: 229 MG/DL (ref 70–99)
PERFORMED ON: ABNORMAL

## 2020-03-18 PROCEDURE — 2700000000 HC OXYGEN THERAPY PER DAY

## 2020-03-18 PROCEDURE — 6360000002 HC RX W HCPCS: Performed by: INTERNAL MEDICINE

## 2020-03-18 PROCEDURE — 71046 X-RAY EXAM CHEST 2 VIEWS: CPT

## 2020-03-18 PROCEDURE — 6370000000 HC RX 637 (ALT 250 FOR IP): Performed by: INTERNAL MEDICINE

## 2020-03-18 PROCEDURE — 1200000000 HC SEMI PRIVATE

## 2020-03-18 PROCEDURE — 94761 N-INVAS EAR/PLS OXIMETRY MLT: CPT

## 2020-03-18 PROCEDURE — 94640 AIRWAY INHALATION TREATMENT: CPT

## 2020-03-18 RX ADMIN — LISINOPRIL 2.5 MG: 5 TABLET ORAL at 08:37

## 2020-03-18 RX ADMIN — ENOXAPARIN SODIUM 40 MG: 40 INJECTION SUBCUTANEOUS at 08:38

## 2020-03-18 RX ADMIN — INSULIN LISPRO 3 UNITS: 100 INJECTION, SOLUTION INTRAVENOUS; SUBCUTANEOUS at 21:20

## 2020-03-18 RX ADMIN — GABAPENTIN 300 MG: 300 CAPSULE ORAL at 08:37

## 2020-03-18 RX ADMIN — LEVOFLOXACIN 500 MG: 500 TABLET, FILM COATED ORAL at 08:38

## 2020-03-18 RX ADMIN — Medication 2 PUFF: at 09:10

## 2020-03-18 RX ADMIN — ALPRAZOLAM 1 MG: 0.5 TABLET ORAL at 21:27

## 2020-03-18 RX ADMIN — METHYLPREDNISOLONE SODIUM SUCCINATE 40 MG: 40 INJECTION, POWDER, FOR SOLUTION INTRAMUSCULAR; INTRAVENOUS at 17:19

## 2020-03-18 RX ADMIN — TRIAMCINOLONE ACETONIDE: 1 CREAM TOPICAL at 21:27

## 2020-03-18 RX ADMIN — PROMETHAZINE HYDROCHLORIDE AND DEXTROMETHORPHAN HYDROBROMIDE 5 ML: 15; 6.25 SYRUP ORAL at 06:23

## 2020-03-18 RX ADMIN — ASPIRIN 81 MG 81 MG: 81 TABLET ORAL at 08:37

## 2020-03-18 RX ADMIN — MICONAZOLE NITRATE: 20 POWDER TOPICAL at 08:40

## 2020-03-18 RX ADMIN — ISOSORBIDE MONONITRATE 60 MG: 60 TABLET, EXTENDED RELEASE ORAL at 08:37

## 2020-03-18 RX ADMIN — GABAPENTIN 300 MG: 300 CAPSULE ORAL at 21:27

## 2020-03-18 RX ADMIN — DICLOFENAC 2 G: 10 GEL TOPICAL at 08:40

## 2020-03-18 RX ADMIN — HYDRALAZINE HYDROCHLORIDE 25 MG: 25 TABLET, FILM COATED ORAL at 21:27

## 2020-03-18 RX ADMIN — DICLOFENAC 2 G: 10 GEL TOPICAL at 21:28

## 2020-03-18 RX ADMIN — IPRATROPIUM BROMIDE AND ALBUTEROL SULFATE 3 ML: .5; 3 SOLUTION RESPIRATORY (INHALATION) at 09:10

## 2020-03-18 RX ADMIN — IBUPROFEN 400 MG: 400 TABLET ORAL at 12:24

## 2020-03-18 RX ADMIN — IBUPROFEN 400 MG: 400 TABLET ORAL at 17:19

## 2020-03-18 RX ADMIN — IBUPROFEN 400 MG: 400 TABLET ORAL at 08:38

## 2020-03-18 RX ADMIN — PROMETHAZINE HYDROCHLORIDE AND DEXTROMETHORPHAN HYDROBROMIDE 5 ML: 15; 6.25 SYRUP ORAL at 22:09

## 2020-03-18 RX ADMIN — MICONAZOLE NITRATE: 20 POWDER TOPICAL at 21:27

## 2020-03-18 RX ADMIN — PANTOPRAZOLE SODIUM 40 MG: 40 TABLET, DELAYED RELEASE ORAL at 05:04

## 2020-03-18 RX ADMIN — HYDRALAZINE HYDROCHLORIDE 25 MG: 25 TABLET, FILM COATED ORAL at 08:37

## 2020-03-18 RX ADMIN — Medication 2 PUFF: at 20:56

## 2020-03-18 RX ADMIN — METHYLPREDNISOLONE SODIUM SUCCINATE 40 MG: 40 INJECTION, POWDER, FOR SOLUTION INTRAMUSCULAR; INTRAVENOUS at 05:04

## 2020-03-18 RX ADMIN — ROSUVASTATIN CALCIUM 10 MG: 10 TABLET, FILM COATED ORAL at 08:37

## 2020-03-18 RX ADMIN — GLIPIZIDE 5 MG: 5 TABLET ORAL at 17:19

## 2020-03-18 RX ADMIN — INSULIN LISPRO 6 UNITS: 100 INJECTION, SOLUTION INTRAVENOUS; SUBCUTANEOUS at 12:24

## 2020-03-18 RX ADMIN — IPRATROPIUM BROMIDE AND ALBUTEROL SULFATE 3 ML: .5; 3 SOLUTION RESPIRATORY (INHALATION) at 12:34

## 2020-03-18 RX ADMIN — ALPRAZOLAM 1 MG: 0.5 TABLET ORAL at 08:48

## 2020-03-18 RX ADMIN — HYDRALAZINE HYDROCHLORIDE 25 MG: 25 TABLET, FILM COATED ORAL at 12:24

## 2020-03-18 RX ADMIN — IPRATROPIUM BROMIDE AND ALBUTEROL SULFATE 3 ML: .5; 3 SOLUTION RESPIRATORY (INHALATION) at 20:56

## 2020-03-18 RX ADMIN — PROMETHAZINE HYDROCHLORIDE AND DEXTROMETHORPHAN HYDROBROMIDE 5 ML: 15; 6.25 SYRUP ORAL at 12:24

## 2020-03-18 RX ADMIN — GLIPIZIDE 5 MG: 5 TABLET ORAL at 05:04

## 2020-03-18 RX ADMIN — TRIAMCINOLONE ACETONIDE: 1 CREAM TOPICAL at 08:40

## 2020-03-18 RX ADMIN — INSULIN GLARGINE 30 UNITS: 100 INJECTION, SOLUTION SUBCUTANEOUS at 21:28

## 2020-03-18 RX ADMIN — IPRATROPIUM BROMIDE AND ALBUTEROL SULFATE 3 ML: .5; 3 SOLUTION RESPIRATORY (INHALATION) at 16:59

## 2020-03-18 ASSESSMENT — PAIN SCALES - GENERAL
PAINLEVEL_OUTOF10: 0
PAINLEVEL_OUTOF10: 2
PAINLEVEL_OUTOF10: 2
PAINLEVEL_OUTOF10: 0
PAINLEVEL_OUTOF10: 2

## 2020-03-18 NOTE — PROGRESS NOTES
PM assessment completed and PM medications given at this time. Expiratory wheezes still present throughout lungs. VSS, blood glucose better at 107, no insulin coverage needed for dinner. Patient denies needs at this time. Will continue to monitor.

## 2020-03-18 NOTE — PLAN OF CARE
Problem: Falls - Risk of:  Goal: Will remain free from falls  Description: Will remain free from falls  3/18/2020 1049 by Inge Alvarado RN  Outcome: Ongoing  Note: Nonskid socks on, siderails up 2/4, call light and bed table within reach. Patient is medium fall risk, refuses bed alarm. Problem: Falls - Risk of:  Goal: Absence of physical injury  Description: Absence of physical injury  3/18/2020 1049 by Inge Alvarado RN  Outcome: Ongoing  Note: Fall precautions in place, room free of clutter, patient oriented to room and call light use.  Hourly rounding completed     Problem: Serum Glucose Level - Abnormal:  Goal: Ability to maintain appropriate glucose levels has stabilized  Description: Ability to maintain appropriate glucose levels has stabilized  3/18/2020 1049 by Inge Alvarado RN  Outcome: Ongoing  Note: Blood glucose 117 this morning, order parameters not met for insulin coverage for breakfast

## 2020-03-18 NOTE — PLAN OF CARE
Problem: Falls - Risk of:  Goal: Will remain free from falls  Description: Will remain free from falls  3/17/2020 2219 by Inna Olmos RN  Outcome: Ongoing  Problem: Pain:    Goal: Pain level will decrease  Description: Pain level will decrease  3/17/2020 2219 by Inna Oloms RN  Outcome: Ongoing

## 2020-03-18 NOTE — PROGRESS NOTES
Noon assessment completed and medications given at this time. VSS, assessment unchanged. Patient denies needs at this time. Will continue to monitor.

## 2020-03-18 NOTE — PROGRESS NOTES
Morning assessment completed and morning medications given at this time. VSS. Patient states that she feels better today compared to yesterday. Patient requesting Xanax for anxiety, given at this time. Patient denies needs at this time. Will continue to monitor.

## 2020-03-19 ENCOUNTER — APPOINTMENT (OUTPATIENT)
Dept: CT IMAGING | Age: 75
DRG: 191 | End: 2020-03-19
Payer: COMMERCIAL

## 2020-03-19 LAB
ANION GAP SERPL CALCULATED.3IONS-SCNC: 9 MMOL/L (ref 3–16)
BUN BLDV-MCNC: 29 MG/DL (ref 7–20)
CALCIUM SERPL-MCNC: 9.6 MG/DL (ref 8.3–10.6)
CHLORIDE BLD-SCNC: 98 MMOL/L (ref 99–110)
CO2: 31 MMOL/L (ref 21–32)
CREAT SERPL-MCNC: 0.8 MG/DL (ref 0.6–1.2)
GFR AFRICAN AMERICAN: >60
GFR NON-AFRICAN AMERICAN: >60
GLUCOSE BLD-MCNC: 104 MG/DL (ref 70–99)
GLUCOSE BLD-MCNC: 169 MG/DL (ref 70–99)
GLUCOSE BLD-MCNC: 193 MG/DL (ref 70–99)
GLUCOSE BLD-MCNC: 222 MG/DL (ref 70–99)
GLUCOSE BLD-MCNC: 315 MG/DL (ref 70–99)
HCT VFR BLD CALC: 46.1 % (ref 36–48)
HEMOGLOBIN: 15.3 G/DL (ref 12–16)
MCH RBC QN AUTO: 29.4 PG (ref 26–34)
MCHC RBC AUTO-ENTMCNC: 33.2 G/DL (ref 31–36)
MCV RBC AUTO: 88.5 FL (ref 80–100)
PDW BLD-RTO: 15.9 % (ref 12.4–15.4)
PERFORMED ON: ABNORMAL
PLATELET # BLD: 193 K/UL (ref 135–450)
PMV BLD AUTO: 8.5 FL (ref 5–10.5)
POTASSIUM SERPL-SCNC: 4.9 MMOL/L (ref 3.5–5.1)
RBC # BLD: 5.21 M/UL (ref 4–5.2)
SODIUM BLD-SCNC: 138 MMOL/L (ref 136–145)
WBC # BLD: 10 K/UL (ref 4–11)

## 2020-03-19 PROCEDURE — 6370000000 HC RX 637 (ALT 250 FOR IP): Performed by: INTERNAL MEDICINE

## 2020-03-19 PROCEDURE — 71250 CT THORAX DX C-: CPT

## 2020-03-19 PROCEDURE — 97166 OT EVAL MOD COMPLEX 45 MIN: CPT

## 2020-03-19 PROCEDURE — 6360000002 HC RX W HCPCS: Performed by: INTERNAL MEDICINE

## 2020-03-19 PROCEDURE — 97530 THERAPEUTIC ACTIVITIES: CPT

## 2020-03-19 PROCEDURE — 94761 N-INVAS EAR/PLS OXIMETRY MLT: CPT

## 2020-03-19 PROCEDURE — 80048 BASIC METABOLIC PNL TOTAL CA: CPT

## 2020-03-19 PROCEDURE — 99223 1ST HOSP IP/OBS HIGH 75: CPT | Performed by: INTERNAL MEDICINE

## 2020-03-19 PROCEDURE — 97535 SELF CARE MNGMENT TRAINING: CPT

## 2020-03-19 PROCEDURE — 97161 PT EVAL LOW COMPLEX 20 MIN: CPT

## 2020-03-19 PROCEDURE — 85027 COMPLETE CBC AUTOMATED: CPT

## 2020-03-19 PROCEDURE — 1200000000 HC SEMI PRIVATE

## 2020-03-19 PROCEDURE — 94640 AIRWAY INHALATION TREATMENT: CPT

## 2020-03-19 PROCEDURE — 97116 GAIT TRAINING THERAPY: CPT

## 2020-03-19 RX ADMIN — INSULIN GLARGINE 30 UNITS: 100 INJECTION, SOLUTION SUBCUTANEOUS at 21:16

## 2020-03-19 RX ADMIN — PROMETHAZINE HYDROCHLORIDE AND DEXTROMETHORPHAN HYDROBROMIDE 5 ML: 15; 6.25 SYRUP ORAL at 21:15

## 2020-03-19 RX ADMIN — MICONAZOLE NITRATE: 20 POWDER TOPICAL at 21:17

## 2020-03-19 RX ADMIN — HYDRALAZINE HYDROCHLORIDE 25 MG: 25 TABLET, FILM COATED ORAL at 21:15

## 2020-03-19 RX ADMIN — TRIAMCINOLONE ACETONIDE: 1 CREAM TOPICAL at 08:52

## 2020-03-19 RX ADMIN — GLIPIZIDE 5 MG: 5 TABLET ORAL at 19:37

## 2020-03-19 RX ADMIN — INSULIN LISPRO 3 UNITS: 100 INJECTION, SOLUTION INTRAVENOUS; SUBCUTANEOUS at 19:37

## 2020-03-19 RX ADMIN — ASPIRIN 81 MG 81 MG: 81 TABLET ORAL at 07:53

## 2020-03-19 RX ADMIN — IBUPROFEN 400 MG: 400 TABLET ORAL at 19:37

## 2020-03-19 RX ADMIN — HYDRALAZINE HYDROCHLORIDE 25 MG: 25 TABLET, FILM COATED ORAL at 13:32

## 2020-03-19 RX ADMIN — LISINOPRIL 2.5 MG: 5 TABLET ORAL at 07:52

## 2020-03-19 RX ADMIN — GABAPENTIN 300 MG: 300 CAPSULE ORAL at 07:52

## 2020-03-19 RX ADMIN — METHYLPREDNISOLONE SODIUM SUCCINATE 40 MG: 40 INJECTION, POWDER, FOR SOLUTION INTRAMUSCULAR; INTRAVENOUS at 19:37

## 2020-03-19 RX ADMIN — ROSUVASTATIN CALCIUM 10 MG: 10 TABLET, FILM COATED ORAL at 07:53

## 2020-03-19 RX ADMIN — INSULIN LISPRO 3 UNITS: 100 INJECTION, SOLUTION INTRAVENOUS; SUBCUTANEOUS at 08:48

## 2020-03-19 RX ADMIN — MICONAZOLE NITRATE: 20 POWDER TOPICAL at 08:52

## 2020-03-19 RX ADMIN — IBUPROFEN 400 MG: 400 TABLET ORAL at 07:53

## 2020-03-19 RX ADMIN — DICLOFENAC 2 G: 10 GEL TOPICAL at 21:17

## 2020-03-19 RX ADMIN — GLIPIZIDE 5 MG: 5 TABLET ORAL at 06:06

## 2020-03-19 RX ADMIN — Medication 2 PUFF: at 19:41

## 2020-03-19 RX ADMIN — INSULIN LISPRO 12 UNITS: 100 INJECTION, SOLUTION INTRAVENOUS; SUBCUTANEOUS at 13:32

## 2020-03-19 RX ADMIN — ALPRAZOLAM 1 MG: 0.5 TABLET ORAL at 21:14

## 2020-03-19 RX ADMIN — TRIAMCINOLONE ACETONIDE: 1 CREAM TOPICAL at 21:17

## 2020-03-19 RX ADMIN — PROMETHAZINE HYDROCHLORIDE AND DEXTROMETHORPHAN HYDROBROMIDE 5 ML: 15; 6.25 SYRUP ORAL at 08:47

## 2020-03-19 RX ADMIN — IPRATROPIUM BROMIDE AND ALBUTEROL SULFATE 3 ML: .5; 3 SOLUTION RESPIRATORY (INHALATION) at 19:41

## 2020-03-19 RX ADMIN — Medication 2 PUFF: at 07:55

## 2020-03-19 RX ADMIN — INSULIN LISPRO 0 UNITS: 100 INJECTION, SOLUTION INTRAVENOUS; SUBCUTANEOUS at 21:16

## 2020-03-19 RX ADMIN — PROMETHAZINE HYDROCHLORIDE AND DEXTROMETHORPHAN HYDROBROMIDE 5 ML: 15; 6.25 SYRUP ORAL at 03:44

## 2020-03-19 RX ADMIN — IPRATROPIUM BROMIDE AND ALBUTEROL SULFATE 3 ML: .5; 3 SOLUTION RESPIRATORY (INHALATION) at 07:55

## 2020-03-19 RX ADMIN — IPRATROPIUM BROMIDE AND ALBUTEROL SULFATE 3 ML: .5; 3 SOLUTION RESPIRATORY (INHALATION) at 16:26

## 2020-03-19 RX ADMIN — PANTOPRAZOLE SODIUM 40 MG: 40 TABLET, DELAYED RELEASE ORAL at 06:06

## 2020-03-19 RX ADMIN — ENOXAPARIN SODIUM 40 MG: 40 INJECTION SUBCUTANEOUS at 07:54

## 2020-03-19 RX ADMIN — GABAPENTIN 300 MG: 300 CAPSULE ORAL at 21:15

## 2020-03-19 RX ADMIN — ISOSORBIDE MONONITRATE 60 MG: 60 TABLET, EXTENDED RELEASE ORAL at 07:53

## 2020-03-19 RX ADMIN — IPRATROPIUM BROMIDE AND ALBUTEROL SULFATE 3 ML: .5; 3 SOLUTION RESPIRATORY (INHALATION) at 12:06

## 2020-03-19 RX ADMIN — HYDRALAZINE HYDROCHLORIDE 25 MG: 25 TABLET, FILM COATED ORAL at 07:53

## 2020-03-19 RX ADMIN — LEVOFLOXACIN 500 MG: 500 TABLET, FILM COATED ORAL at 07:53

## 2020-03-19 RX ADMIN — DICLOFENAC 2 G: 10 GEL TOPICAL at 08:49

## 2020-03-19 RX ADMIN — IBUPROFEN 400 MG: 400 TABLET ORAL at 13:31

## 2020-03-19 RX ADMIN — ALPRAZOLAM 1 MG: 0.5 TABLET ORAL at 07:52

## 2020-03-19 RX ADMIN — METHYLPREDNISOLONE SODIUM SUCCINATE 40 MG: 40 INJECTION, POWDER, FOR SOLUTION INTRAMUSCULAR; INTRAVENOUS at 03:44

## 2020-03-19 ASSESSMENT — PAIN SCALES - GENERAL
PAINLEVEL_OUTOF10: 3
PAINLEVEL_OUTOF10: 10
PAINLEVEL_OUTOF10: 10
PAINLEVEL_OUTOF10: 8
PAINLEVEL_OUTOF10: 3
PAINLEVEL_OUTOF10: 2
PAINLEVEL_OUTOF10: 7

## 2020-03-19 ASSESSMENT — PAIN DESCRIPTION - LOCATION
LOCATION: HEAD
LOCATION: HEAD
LOCATION: HEAD;THROAT
LOCATION: BACK;LEG

## 2020-03-19 ASSESSMENT — PAIN DESCRIPTION - PAIN TYPE
TYPE: CHRONIC PAIN

## 2020-03-19 ASSESSMENT — PAIN DESCRIPTION - DESCRIPTORS
DESCRIPTORS: HEADACHE
DESCRIPTORS: HEADACHE

## 2020-03-19 ASSESSMENT — PAIN DESCRIPTION - FREQUENCY: FREQUENCY: CONTINUOUS

## 2020-03-19 ASSESSMENT — PAIN DESCRIPTION - PROGRESSION: CLINICAL_PROGRESSION: GRADUALLY IMPROVING

## 2020-03-19 NOTE — PROGRESS NOTES
Nutrition Assessment (Low Risk)    Type and Reason for Visit: Initial(RD identified per LOS)    Nutrition Recommendations:   1. Continue diet    Nutrition Assessment:  Pt is nutritionally stable at this time. Pt is consuming % at meals. No n/v at this time. Pt has no issues at this time. Pt will remain nutritionally stable as long as po is greater than 75% at meals.      Malnutrition Assessment:  · Malnutrition Status: No malnutrition    Nutrition Risk Level   Risk Level: Low    Nutrition Diagnosis:   · Problem: No nutrition diagnosis at this time    Nutrition Intervention:  Food and/or Delivery: Continue current diet  Nutrition Education/Counseling/Coordination of Care:  Continued Inpatient Monitoring      Electronically signed by Paige Henning RD, CNSC, LD on 3/19/20 at 10:25 AM EDT    Contact Number: [phone]

## 2020-03-19 NOTE — PROGRESS NOTES
Physical/Occupational Therapy  Tyler Fines  Orders received, chart reviewed. Attempted PT/OT evaluation this date. Pt supine in bed upon arrival. Pt refusing therapy at this time however agreeable to PT/OT in an hour. Will re-attempt evaluation later this date as schedule allows.    [address], [place] [address]

## 2020-03-19 NOTE — PROGRESS NOTES
Patient states she is tired and will work with therapy later, patient notified that her room will be changed due to this unit closing. Fall precautions in place, hourly rounding, call light and belongings in reach, bed in lowest position, wheels locked in place, side rails up x 2, walkways free of clutter.

## 2020-03-19 NOTE — PROGRESS NOTES
unit  Bathroom Toilet: Standard  Bathroom Equipment: Shower chair, Hand-held shower, Grab bars in shower, Grab bars around toilet  Bathroom Accessibility: Accessible  Home Equipment: (ambulates short distances)  ADL Assistance: Needs assistance  Homemaking Assistance: Needs assistance  Homemaking Responsibilities: No  Ambulation Assistance: Independent  Transfer Assistance: Independent  Active : Yes  Mode of Transportation: Car  Leisure & Hobbies: none  Additional Comments: patient has an aide 6 days a week     Cognition   Cognition  Overall Cognitive Status: WNL    Objective  AROM RLE (degrees)  RLE AROM: WFL  AROM LLE (degrees)  LLE AROM : WFL  Strength RLE  Strength RLE: WFL  Strength LLE  Strength LLE: WFL  Tone RLE  RLE Tone: Normotonic  Tone LLE  LLE Tone: Normotonic  Motor Control  Gross Motor?: WFL  Sensation  Overall Sensation Status: WFL  Bed mobility  Supine to Sit: Independent  Sit to Supine: Independent  Scooting: Independent  Transfers  Sit to Stand: Independent(x2 EOB, x3 BSC in shower)  Stand to sit: Independent  Ambulation  Ambulation?: Yes  Ambulation 1  Surface: level tile  Device: No Device  Assistance: Independent  Quality of Gait: wide Sonam and slight sway noted due to body habitus, decreased cory, forward flexed trunk  Distance: 10'+10'+3'  Comments: Pt able to negotiate obstacles without assist. No LOB. Stairs/Curb  Stairs?: No     Balance  Posture: Good  Sitting - Static: Good  Sitting - Dynamic: Good(able to maintain seated balance on BSC in shower for ADLs ~15-20 minutes total)  Standing - Static: Good  Standing - Dynamic: Good        Plan   Plan  Times per week: discharge  Safety Devices  Type of devices:  All fall risk precautions in place, Call light within reach, Gait belt, Nurse notified, Left in chair, Patient at risk for falls, Chair alarm in place  Restraints  Initially in place: No    G-Code       OutComes Score                                                  AM-PAC Score  AM-PAC Inpatient Mobility Raw Score : 24 (03/19/20 1507)  AM-PAC Inpatient T-Scale Score : 61.14 (03/19/20 1507)  Mobility Inpatient CMS 0-100% Score: 0 (03/19/20 1507)  Mobility Inpatient CMS G-Code Modifier : [address] (03/19/20 1507)          Goals  Short term goals  Time Frame for Short term goals: none, discharge from PT caseload       Therapy Time   Individual Concurrent Group Co-treatment   Time In 1420         Time Out 1500         Minutes 40              Timed Code Treatment Minutes:   25    Total Treatment Minutes:  [address], [place] [id number]

## 2020-03-19 NOTE — PROGRESS NOTES
Department of Internal Medicine  General Internal Medicine   Progress Note     SUBJECTIVE :  Wheezing persists , still feels congested       History obtained from chart review and the patient  General ROS: positive for  - fatigue and malaise  negative for - chills, fever or night sweats  Psychological ROS: negative  Respiratory ROS: positive for - shortness of breath and wheezing  negative for - cough, hemoptysis, sputum changes or stridor  Cardiovascular ROS: positive for - chest pain, dyspnea on exertion and edema  negative for - loss of consciousness, orthopnea or palpitations  Gastrointestinal ROS: no abdominal pain, change in bowel habits, or black or bloody stools    OBJECTIVE      Medications      Current Facility-Administered Medications: sodium chloride (OCEAN, BABY AYR) 0.65 % nasal spray 1 spray, 1 spray, Each Nostril, PRN  methylPREDNISolone sodium (SOLU-MEDROL) injection 40 mg, 40 mg, Intravenous, Q12H  budesonide-formoterol (SYMBICORT) 160-4.5 MCG/ACT inhaler 2 puff, 2 puff, Inhalation, BID  promethazine-dextromethorphan (PROMETHAZINE-DM) 6.25-15 MG/5ML syrup 5 mL, 5 mL, Oral, Q4H PRN  albuterol sulfate  (90 Base) MCG/ACT inhaler 2 puff, 2 puff, Inhalation, Q6H PRN  ALPRAZolam (XANAX) tablet 1 mg, 1 mg, Oral, TID PRN  aspirin chewable tablet 81 mg, 81 mg, Oral, Daily  diclofenac sodium (VOLTAREN) 1 % gel 2 g, 2 g, Topical, BID  gabapentin (NEURONTIN) capsule 300 mg, 300 mg, Oral, BID  glipiZIDE (GLUCOTROL) tablet 5 mg, 5 mg, Oral, BID AC  hydrALAZINE (APRESOLINE) tablet 25 mg, 25 mg, Oral, TID  ibuprofen (ADVIL;MOTRIN) tablet 400 mg, 400 mg, Oral, TID WC  ipratropium-albuterol (DUONEB) nebulizer solution 3 mL, 3 mL, Inhalation, Q4H WA  isosorbide mononitrate (IMDUR) extended release tablet 60 mg, 60 mg, Oral, Daily  lisinopril (PRINIVIL;ZESTRIL) tablet 2.5 mg, 2.5 mg, Oral, Daily  nitroGLYCERIN (NITROSTAT) SL tablet 0.4 mg, 0.4 mg, Sublingual, Q5 Min PRN  miconazole (MICOTIN) 2 % powder, , Topical, BID  pantoprazole (PROTONIX) tablet 40 mg, 40 mg, Oral, QAM AC  rosuvastatin (CRESTOR) tablet 10 mg, 10 mg, Oral, Daily  triamcinolone (KENALOG) 0.1 % cream, , Topical, BID  levoFLOXacin (LEVAQUIN) tablet 500 mg, 500 mg, Oral, Daily  glucose (GLUTOSE) 40 % oral gel 15 g, 15 g, Oral, PRN  dextrose 50 % IV solution, 12.5 g, Intravenous, PRN  glucagon (rDNA) injection 1 mg, 1 mg, Intramuscular, PRN  dextrose 5 % solution, 100 mL/hr, Intravenous, PRN  insulin glargine (LANTUS;BASAGLAR) injection pen 30 Units, 0.25 Units/kg, Subcutaneous, Nightly  enoxaparin (LOVENOX) injection 40 mg, 40 mg, Subcutaneous, Daily  insulin lispro (1 Unit Dial) 0-18 Units, 0-18 Units, Subcutaneous, TID WC  insulin lispro (1 Unit Dial) 0-9 Units, 0-9 Units, Subcutaneous, Nightly    Physical      VITALS:    /75   Pulse 51   Temp 96 °F (35.6 °C) (Temporal)   Resp 18   Ht 5' 6\" (1.676 m)   Wt 282 lb 14.4 oz (128.3 kg)   SpO2 92%   Breastfeeding No   BMI 45.66 kg/m²   TEMPERATURE:  Current - Temp: 96 °F (35.6 °C);  Max - Temp  Av.9 °F (36.1 °C)  Min: 96 °F (35.6 °C)  Max: 97.6 °F (36.4 °C)  RESPIRATIONS RANGE: Resp  Av.8  Min: 18  Max: 21  PULSE RANGE: Pulse  Av.8  Min: 51  Max: 84  BLOOD PRESSURE RANGE:  Systolic (48XPV), QT , Min:118 , LKY:538   ; Diastolic (20EDE), XZV:02, Min:61, Max:95    PULSE OXIMETRY RANGE: SpO2  Av.8 %  Min: 88 %  Max: 94 %  24HR INTAKE/OUTPUT:      Intake/Output Summary (Last 24 hours) at 3/19/2020 1554  Last data filed at 3/19/2020 1121  Gross per 24 hour   Intake 840 ml   Output --   Net 840 ml     CONSTITUTIONAL:  awake, alert, cooperative, no apparent distress, and appears stated age  NECK:  supple, symmetrical, trachea midline and skin normal  LUNGS:  No increased work of breathing, good air exchange, clear to auscultation bilaterally, no crackles or wheezing  CARDIOVASCULAR:  Normal apical impulse, regular rate and rhythm, normal S1 and S2, no S3 or S4, and no murmur

## 2020-03-19 NOTE — PROGRESS NOTES
Department of Internal Medicine  General Internal Medicine   Progress Note     SUBJECTIVE :  Breathing still distressed       History obtained from chart review and the patient  General ROS: positive for  - fatigue and malaise  negative for - chills, fever or night sweats  Psychological ROS: negative  Respiratory ROS: positive for - shortness of breath and wheezing  negative for - cough, hemoptysis, sputum changes or stridor  Cardiovascular ROS: positive for - chest pain, dyspnea on exertion and edema  negative for - loss of consciousness, orthopnea or palpitations  Gastrointestinal ROS: no abdominal pain, change in bowel habits, or black or bloody stools    OBJECTIVE      Medications      Current Facility-Administered Medications: sodium chloride (OCEAN, BABY AYR) 0.65 % nasal spray 1 spray, 1 spray, Each Nostril, PRN  methylPREDNISolone sodium (SOLU-MEDROL) injection 40 mg, 40 mg, Intravenous, Q12H  budesonide-formoterol (SYMBICORT) 160-4.5 MCG/ACT inhaler 2 puff, 2 puff, Inhalation, BID  promethazine-dextromethorphan (PROMETHAZINE-DM) 6.25-15 MG/5ML syrup 5 mL, 5 mL, Oral, Q4H PRN  albuterol sulfate  (90 Base) MCG/ACT inhaler 2 puff, 2 puff, Inhalation, Q6H PRN  ALPRAZolam (XANAX) tablet 1 mg, 1 mg, Oral, TID PRN  aspirin chewable tablet 81 mg, 81 mg, Oral, Daily  diclofenac sodium (VOLTAREN) 1 % gel 2 g, 2 g, Topical, BID  gabapentin (NEURONTIN) capsule 300 mg, 300 mg, Oral, BID  glipiZIDE (GLUCOTROL) tablet 5 mg, 5 mg, Oral, BID AC  hydrALAZINE (APRESOLINE) tablet 25 mg, 25 mg, Oral, TID  ibuprofen (ADVIL;MOTRIN) tablet 400 mg, 400 mg, Oral, TID WC  ipratropium-albuterol (DUONEB) nebulizer solution 3 mL, 3 mL, Inhalation, Q4H WA  isosorbide mononitrate (IMDUR) extended release tablet 60 mg, 60 mg, Oral, Daily  lisinopril (PRINIVIL;ZESTRIL) tablet 2.5 mg, 2.5 mg, Oral, Daily  nitroGLYCERIN (NITROSTAT) SL tablet 0.4 mg, 0.4 mg, Sublingual, Q5 Min PRN  miconazole (MICOTIN) 2 % powder, , Topical, BID  pantoprazole (PROTONIX) tablet 40 mg, 40 mg, Oral, QAM AC  rosuvastatin (CRESTOR) tablet 10 mg, 10 mg, Oral, Daily  triamcinolone (KENALOG) 0.1 % cream, , Topical, BID  levoFLOXacin (LEVAQUIN) tablet 500 mg, 500 mg, Oral, Daily  glucose (GLUTOSE) 40 % oral gel 15 g, 15 g, Oral, PRN  dextrose 50 % IV solution, 12.5 g, Intravenous, PRN  glucagon (rDNA) injection 1 mg, 1 mg, Intramuscular, PRN  dextrose 5 % solution, 100 mL/hr, Intravenous, PRN  insulin glargine (LANTUS;BASAGLAR) injection pen 30 Units, 0.25 Units/kg, Subcutaneous, Nightly  enoxaparin (LOVENOX) injection 40 mg, 40 mg, Subcutaneous, Daily  insulin lispro (1 Unit Dial) 0-18 Units, 0-18 Units, Subcutaneous, TID WC  insulin lispro (1 Unit Dial) 0-9 Units, 0-9 Units, Subcutaneous, Nightly    Physical      VITALS:    /75   Pulse 51   Temp 96 °F (35.6 °C) (Temporal)   Resp 18   Ht 5' 6\" (1.676 m)   Wt 282 lb 14.4 oz (128.3 kg)   SpO2 92%   Breastfeeding No   BMI 45.66 kg/m²   TEMPERATURE:  Current - Temp: 96 °F (35.6 °C);  Max - Temp  Av.9 °F (36.1 °C)  Min: 96 °F (35.6 °C)  Max: 97.6 °F (36.4 °C)  RESPIRATIONS RANGE: Resp  Av.8  Min: 18  Max: 21  PULSE RANGE: Pulse  Av.8  Min: 51  Max: 84  BLOOD PRESSURE RANGE:  Systolic (86HMX), BF , Min:118 , BENJAMIN:188   ; Diastolic (91MPK), JZL:66, Min:61, Max:95    PULSE OXIMETRY RANGE: SpO2  Av.8 %  Min: 88 %  Max: 94 %  24HR INTAKE/OUTPUT:      Intake/Output Summary (Last 24 hours) at 3/19/2020 1552  Last data filed at 3/19/2020 1121  Gross per 24 hour   Intake 840 ml   Output --   Net 840 ml     CONSTITUTIONAL:  awake, alert, cooperative, no apparent distress, and appears stated age  NECK:  supple, symmetrical, trachea midline and skin normal  LUNGS:  No increased work of breathing, good air exchange, clear to auscultation bilaterally, no crackles or wheezing  CARDIOVASCULAR:  Normal apical impulse, regular rate and rhythm, normal S1 and S2, no S3 or S4, and no murmur noted  ABDOMEN:  No scars, normal bowel sounds, soft, non-distended, non-tender, no masses palpated, no hepatosplenomegally  MUSCULOSKELETAL:  Trace edema  NEUROLOGIC:  No acute focal change    Data      Lab Results   Component Value Date    PHART 7.446 02/07/2017       Lab Results   Component Value Date     03/19/2020    K 4.9 03/19/2020    K 4.1 01/19/2020    CL 98 03/19/2020    CO2 31 03/19/2020    BUN 29 03/19/2020    CREATININE 0.8 03/19/2020    GLUCOSE 222 03/19/2020    CALCIUM 9.6 03/19/2020     Lab Results   Component Value Date    WBC 10.0 03/19/2020    HGB 15.3 03/19/2020    HCT 46.1 03/19/2020    MCV 88.5 03/19/2020     03/19/2020         Lab Results   Component Value Date    INR 0.97 01/27/2020    PROTIME 11.3 01/27/2020       ASSESSMENT AND PLAN      Patient Active Problem List   Diagnosis    Chronic respiratory failure ([facility]    CAD (coronary artery disease)    Hyperlipemia    Essential hypertension    DM2 (diabetes mellitus, type 2) ([facility]    Primary osteoarthritis involving multiple joints    Gastroesophageal reflux disease without esophagitis    Anxiety    Morbid obesity (HCC)    Precordial pain    Acute bronchitis    COPD with acute exacerbation (HCC)    AKIL (obstructive sleep apnea)    Chest congestion    DM (diabetes mellitus), secondary uncontrolled (HCC)    Ineffective airway clearance    Mucus plugging of bronchi     Ct present care , some slow cautious steroid weaning  Pulmonary consult

## 2020-03-19 NOTE — PLAN OF CARE
Problem: Falls - Risk of:  Goal: Will remain free from falls  Description: Will remain free from falls  Outcome: Ongoing  Note: Patient is a high fall risk. Fall risk interventions are in place including armband, socks, bed alarms, hourly rounding. Plan of care explained to patient, verbalized understanding. Problem: Pain:  Description: Pain management should include both nonpharmacologic and pharmacologic interventions. Goal: Pain level will decrease  Description: Pain level will decrease  Outcome: Ongoing  Note:   Patient will verbalize decrease in pain to comfortable level with the use of pharmacologic and non-pharmacologic methods. 0-10 pain rating scale reviewed with patient. Pain reductions strategies discussed. Patient verbalized understanding, demonstrates knowledge of plan of care.

## 2020-03-19 NOTE — CARE COORDINATION
Discharge Planning Assessment   discharge planner met with patient to discuss reason for admission, current living situation, and potential needs at the time of discharge    Demographics/Insurance verified Yes/No: [facility] My [address]    Current type of dwelling: Patient states that she lives in an apartment on the ground floor. Patient from ECF/ confirmed with: N/A    Living arrangements: Patient lives alone. Level of function/Support: Patient reports that she can complete most ADLs but needs assistance with IADLS. PCP: Dr. Yanet Meraz    Last Visit to PCP:March 18, 2020    DME: [employer], walker and a hospital bed    Active with any community resources/agencies/skilled home care: Patient is active with [facility]. Patient also reports that she is active with [employer] [facility] [employer] on [address]", lifeline, medical transportation and an aide. Patient reports that her aide comes 6 days a week for 3-4 hours at a time. Patient also reports that she has home O2 with [facility]. Medication compliance issues: Patient manages her own medication. Financial issues that could impact healthcare: None    Tentative discharge plan:  Patient anticipates discharging to home with [facility]. Discussed with patient and/or family that on the day of discharge home tentative time of discharge will be between 10 AM and noon. Transportation at the time of discharge: Patient's grand-daughter will provide her transportation to home.     Electronically signed by PIOTR Magana on 3/19/2020 at 1:11 PM

## 2020-03-19 NOTE — CONSULTS
lung base. Repeat imaging yesterday showed some worsening in this disease process.       Past Surgical History:        Procedure Laterality Date    APPENDECTOMY      BRONCHOSCOPY N/A 1/24/2020    BRONCHOSCOPY ALVEOLAR LAVAGE performed by Susana Gunter MD at [address] [facility] N/A 1/27/2020    BRONCHOSCOPY DIAGNOSTIC OR CELL [address] ONLY performed by Gilmer Jung MD at [address] [facility] [address]      1 stent 2000 and 1 stent 2001    CATARACT REMOVAL  2013 or 2014    bilateral cataracts removed    CHOLECYSTECTOMY      COLONOSCOPY      COLONOSCOPY  06/11/2018    ENDOSCOPY, COLON, DIAGNOSTIC      ESOPHAGEAL DILATATION      EYE SURGERY      bilateral cataracts    GALLBLADDER SURGERY      HYSTERECTOMY      [place] EXC SKIN MALIG <5MM REMAINDR BODY N/A 9/6/2018    EXCISION OF SCALP SQUAMOUS CELL CARCINOMA performed by Mary Mata MD at [address] [facility] <100SQCM N/A 9/6/2018    SPLIT THICKNESS SKIN GRAFT FOR COVERAGE SCALP, APPLICATION OF WOUND VAC DEVICE performed by Mary Mata MD at [address] ENDOSCOPY  4/20/12    with biopsy of stomach,gastritis    UPPER GASTROINTESTINAL ENDOSCOPY  06/11/2018    w/esophagael dilation     Current Medications:    Current Facility-Administered Medications: sodium chloride (OCEAN, BABY AYR) 0.65 % nasal spray 1 spray, 1 spray, Each Nostril, PRN  methylPREDNISolone sodium (SOLU-MEDROL) injection 40 mg, 40 mg, Intravenous, Q12H  budesonide-formoterol (SYMBICORT) 160-4.5 MCG/ACT inhaler 2 puff, 2 puff, Inhalation, BID  promethazine-dextromethorphan (PROMETHAZINE-DM) 6.25-15 MG/5ML syrup 5 mL, 5 mL, Oral, Q4H PRN  albuterol sulfate  (90 Base) MCG/ACT inhaler 2 puff, 2 puff, Inhalation, Q6H PRN  ALPRAZolam (XANAX) tablet 1 mg, 1 mg, Oral, TID PRN  aspirin chewable tablet 81 mg, 81 mg, Oral, Daily  diclofenac sodium (VOLTAREN) 1 % gel 2 g, 2 g, Topical, BID  gabapentin (NEURONTIN) capsule 300 mg, 300 mg, Oral, BID  glipiZIDE (GLUCOTROL) tablet 5 mg, 5 mg, Oral, BID AC  hydrALAZINE (APRESOLINE) tablet 25 mg, 25 mg, Oral, TID  ibuprofen (ADVIL;MOTRIN) tablet 400 mg, 400 mg, Oral, TID WC  ipratropium-albuterol (DUONEB) nebulizer solution 3 mL, 3 mL, Inhalation, Q4H WA  isosorbide mononitrate (IMDUR) extended release tablet 60 mg, 60 mg, Oral, Daily  lisinopril (PRINIVIL;ZESTRIL) tablet 2.5 mg, 2.5 mg, Oral, Daily  nitroGLYCERIN (NITROSTAT) SL tablet 0.4 mg, 0.4 mg, Sublingual, Q5 Min PRN  miconazole (MICOTIN) 2 % powder, , Topical, BID  pantoprazole (PROTONIX) tablet 40 mg, 40 mg, Oral, QAM AC  rosuvastatin (CRESTOR) tablet 10 mg, 10 mg, Oral, Daily  triamcinolone (KENALOG) 0.1 % cream, , Topical, BID  levoFLOXacin (LEVAQUIN) tablet 500 mg, 500 mg, Oral, Daily  glucose (GLUTOSE) 40 % oral gel 15 g, 15 g, Oral, PRN  dextrose 50 % IV solution, 12.5 g, Intravenous, PRN  glucagon (rDNA) injection 1 mg, 1 mg, Intramuscular, PRN  dextrose 5 % solution, 100 mL/hr, Intravenous, PRN  insulin glargine (LANTUS;BASAGLAR) injection pen 30 Units, 0.25 Units/kg, Subcutaneous, Nightly  enoxaparin (LOVENOX) injection 40 mg, 40 mg, Subcutaneous, Daily  insulin lispro (1 Unit Dial) 0-18 Units, 0-18 Units, Subcutaneous, TID WC  insulin lispro (1 Unit Dial) 0-9 Units, 0-9 Units, Subcutaneous, Nightly    Allergies   Allergen Reactions    Morphine Shortness Of Breath    Codeine Other (See Comments)     Chest pain    Hydromorphone Other (See Comments)     hallucinations  Hallucinations    But will take if needed in an emergency    Levaquin [Levofloxacin In D5w] Itching    Vicodin [Hydrocodone-Acetaminophen] Hives    Aspirin      Upsets hiatal hernia       Social History:    TOBACCO:   reports that she quit smoking about 2 years ago. Her smoking use included cigarettes. She has a 12.25 pack-year smoking history.  She has never used smokeless tobacco.  ETOH:   reports current base.  · She is afebrile and does not have a leukocytosis  · She has been on Levaquin since admission 3/13  · Obtain CT imaging    2. COPD exacerbation  · Still wheezing on exam  · She is on Symbicort  · Also receiving scheduled bronchodilators  · She is on Solu-Medrol 40 every 12 hours  · Increase Solu-Medrol to 40 every 6 hours    3. Chronic hypoxemic respiratory failure  · She does have a baseline oxygen requirement  · Sounds like she normally wears oxygen with sleep  · Currently requiring supplemental oxygen at 1.5 L during the day as well.

## 2020-03-19 NOTE — PLAN OF CARE
Problem: Falls - Risk of:  Goal: Will remain free from falls  Description: Will remain free from falls  3/19/2020 1024 by Nohemy Dick RN  Outcome: Ongoing   Fall precautions in place, hourly rounding, call light and belongings in reach, bed in lowest position, wheels locked in place, side rails up x 2, walkways free of clutter. Problem: Pain:  Goal: Pain level will decrease  Description: Pain level will decrease  3/19/2020 1024 by Nohemy Dick RN  Outcome: Ongoing   Patient able to report pain, 0-10 scale used, pharmacologic and non pharmacologic  Interventions in use and discussed with patient. Problem: Bowel/Gastric:  Goal: Occurrences of diarrhea will decrease  Description: Occurrences of diarrhea will decrease  Outcome: Ongoing   No diarrhea since admission, will continue to monitor.

## 2020-03-20 LAB
GLUCOSE BLD-MCNC: 118 MG/DL (ref 70–99)
GLUCOSE BLD-MCNC: 193 MG/DL (ref 70–99)
GLUCOSE BLD-MCNC: 244 MG/DL (ref 70–99)
GLUCOSE BLD-MCNC: 293 MG/DL (ref 70–99)
LV EF: 68 %
LVEF MODALITY: NORMAL
PERFORMED ON: ABNORMAL

## 2020-03-20 PROCEDURE — 2700000000 HC OXYGEN THERAPY PER DAY

## 2020-03-20 PROCEDURE — 6370000000 HC RX 637 (ALT 250 FOR IP): Performed by: INTERNAL MEDICINE

## 2020-03-20 PROCEDURE — 6360000002 HC RX W HCPCS: Performed by: INTERNAL MEDICINE

## 2020-03-20 PROCEDURE — 93306 TTE W/DOPPLER COMPLETE: CPT

## 2020-03-20 PROCEDURE — 94640 AIRWAY INHALATION TREATMENT: CPT

## 2020-03-20 PROCEDURE — 1200000000 HC SEMI PRIVATE

## 2020-03-20 RX ORDER — INSULIN LISPRO 100 [IU]/ML
5 INJECTION, SOLUTION INTRAVENOUS; SUBCUTANEOUS
Status: DISCONTINUED | OUTPATIENT
Start: 2020-03-20 | End: 2020-03-25

## 2020-03-20 RX ADMIN — ALPRAZOLAM 1 MG: 0.5 TABLET ORAL at 11:24

## 2020-03-20 RX ADMIN — INSULIN LISPRO 3 UNITS: 100 INJECTION, SOLUTION INTRAVENOUS; SUBCUTANEOUS at 21:36

## 2020-03-20 RX ADMIN — MICONAZOLE NITRATE: 20 POWDER TOPICAL at 08:47

## 2020-03-20 RX ADMIN — LISINOPRIL 2.5 MG: 5 TABLET ORAL at 08:46

## 2020-03-20 RX ADMIN — PROMETHAZINE HYDROCHLORIDE AND DEXTROMETHORPHAN HYDROBROMIDE 5 ML: 15; 6.25 SYRUP ORAL at 21:32

## 2020-03-20 RX ADMIN — DICLOFENAC 2 G: 10 GEL TOPICAL at 08:47

## 2020-03-20 RX ADMIN — PANTOPRAZOLE SODIUM 40 MG: 40 TABLET, DELAYED RELEASE ORAL at 06:39

## 2020-03-20 RX ADMIN — IBUPROFEN 400 MG: 400 TABLET ORAL at 08:47

## 2020-03-20 RX ADMIN — Medication 2 PUFF: at 20:55

## 2020-03-20 RX ADMIN — GABAPENTIN 300 MG: 300 CAPSULE ORAL at 21:31

## 2020-03-20 RX ADMIN — TRIAMCINOLONE ACETONIDE: 1 CREAM TOPICAL at 21:37

## 2020-03-20 RX ADMIN — GLIPIZIDE 5 MG: 5 TABLET ORAL at 15:18

## 2020-03-20 RX ADMIN — HYDRALAZINE HYDROCHLORIDE 25 MG: 25 TABLET, FILM COATED ORAL at 08:46

## 2020-03-20 RX ADMIN — IBUPROFEN 400 MG: 400 TABLET ORAL at 15:18

## 2020-03-20 RX ADMIN — ROSUVASTATIN CALCIUM 10 MG: 10 TABLET, FILM COATED ORAL at 08:47

## 2020-03-20 RX ADMIN — ENOXAPARIN SODIUM 40 MG: 40 INJECTION SUBCUTANEOUS at 08:47

## 2020-03-20 RX ADMIN — IPRATROPIUM BROMIDE AND ALBUTEROL SULFATE 3 ML: .5; 3 SOLUTION RESPIRATORY (INHALATION) at 11:39

## 2020-03-20 RX ADMIN — METHYLPREDNISOLONE SODIUM SUCCINATE 40 MG: 40 INJECTION, POWDER, FOR SOLUTION INTRAMUSCULAR; INTRAVENOUS at 15:18

## 2020-03-20 RX ADMIN — INSULIN LISPRO 9 UNITS: 100 INJECTION, SOLUTION INTRAVENOUS; SUBCUTANEOUS at 12:39

## 2020-03-20 RX ADMIN — Medication 2 PUFF: at 02:13

## 2020-03-20 RX ADMIN — ASPIRIN 81 MG 81 MG: 81 TABLET ORAL at 08:47

## 2020-03-20 RX ADMIN — SALINE NASAL SPRAY 1 SPRAY: 1.5 SOLUTION NASAL at 02:18

## 2020-03-20 RX ADMIN — HYDRALAZINE HYDROCHLORIDE 25 MG: 25 TABLET, FILM COATED ORAL at 21:31

## 2020-03-20 RX ADMIN — IPRATROPIUM BROMIDE AND ALBUTEROL SULFATE 3 ML: .5; 3 SOLUTION RESPIRATORY (INHALATION) at 20:55

## 2020-03-20 RX ADMIN — LEVOFLOXACIN 500 MG: 500 TABLET, FILM COATED ORAL at 08:47

## 2020-03-20 RX ADMIN — PROMETHAZINE HYDROCHLORIDE AND DEXTROMETHORPHAN HYDROBROMIDE 5 ML: 15; 6.25 SYRUP ORAL at 02:10

## 2020-03-20 RX ADMIN — ALPRAZOLAM 1 MG: 0.5 TABLET ORAL at 21:30

## 2020-03-20 RX ADMIN — MICONAZOLE NITRATE: 20 POWDER TOPICAL at 21:41

## 2020-03-20 RX ADMIN — GABAPENTIN 300 MG: 300 CAPSULE ORAL at 08:47

## 2020-03-20 RX ADMIN — TRIAMCINOLONE ACETONIDE: 1 CREAM TOPICAL at 08:47

## 2020-03-20 RX ADMIN — METHYLPREDNISOLONE SODIUM SUCCINATE 40 MG: 40 INJECTION, POWDER, FOR SOLUTION INTRAMUSCULAR; INTRAVENOUS at 03:35

## 2020-03-20 RX ADMIN — IBUPROFEN 400 MG: 400 TABLET ORAL at 12:43

## 2020-03-20 RX ADMIN — GLIPIZIDE 5 MG: 5 TABLET ORAL at 06:39

## 2020-03-20 RX ADMIN — ISOSORBIDE MONONITRATE 60 MG: 60 TABLET, EXTENDED RELEASE ORAL at 08:46

## 2020-03-20 ASSESSMENT — PAIN DESCRIPTION - LOCATION
LOCATION: GENERALIZED

## 2020-03-20 ASSESSMENT — PAIN DESCRIPTION - PAIN TYPE
TYPE: CHRONIC PAIN

## 2020-03-20 ASSESSMENT — PAIN DESCRIPTION - FREQUENCY
FREQUENCY: INTERMITTENT
FREQUENCY: INTERMITTENT

## 2020-03-20 ASSESSMENT — PAIN DESCRIPTION - DESCRIPTORS
DESCRIPTORS: HEADACHE
DESCRIPTORS: HEADACHE;ACHING

## 2020-03-20 ASSESSMENT — PAIN SCALES - GENERAL
PAINLEVEL_OUTOF10: 8
PAINLEVEL_OUTOF10: 9
PAINLEVEL_OUTOF10: 7
PAINLEVEL_OUTOF10: 7

## 2020-03-20 ASSESSMENT — PAIN DESCRIPTION - ORIENTATION
ORIENTATION: LEFT;LOWER
ORIENTATION: LEFT;LOWER

## 2020-03-20 NOTE — PROGRESS NOTES
Assessment completed, see doc flowsheets. Pt is A&Ox4. Lung sounds-expiratory wheezes, rhonchi. VSS. Medication given per [facility]. Patient has no needs at this time. Call light within in reach, will continue to monitor.

## 2020-03-20 NOTE — CONSULTS
alcohol use of about 1.0 standard drinks of alcohol per week. Patient currently lives independently  Environmental/chemical exposure: None known    Family History:       Problem Relation Age of Onset    Heart Disease Mother     Cancer Mother     Cancer Father     Cancer Sister     Heart Disease Sister     Heart Disease Brother     High Blood Pressure Neg Hx     High Cholesterol Neg Hx      REVIEW OF SYSTEMS:    CONSTITUTIONAL:  negative for fevers, chills, diaphoresis, activity change, appetite change, fatigue, night sweats and unexpected weight change. EYES:  negative for blurred vision, eye discharge, visual disturbance and icterus  HEENT:  negative for hearing loss, tinnitus, ear drainage, sinus pressure, nasal congestion, epistaxis and snoring  RESPIRATORY:  See HPI  CARDIOVASCULAR:  negative for chest pain, palpitations, exertional chest pressure/discomfort, edema, syncope  GASTROINTESTINAL:  negative for nausea, vomiting, diarrhea, constipation, blood in stool and abdominal pain  GENITOURINARY:  negative for frequency, dysuria, urinary incontinence, decreased urine volume, and hematuria  HEMATOLOGIC/LYMPHATIC:  negative for easy bruising, bleeding and lymphadenopathy  ALLERGIC/IMMUNOLOGIC:  negative for recurrent infections, angioedema, anaphylaxis and drug reactions  ENDOCRINE:  negative for weight changes and diabetic symptoms including polyuria, polydipsia and polyphagia  MUSCULOSKELETAL:  negative for  pain, joint swelling, decreased range of motion and muscle weakness  NEUROLOGICAL:  negative for headaches, slurred speech, unilateral weakness  PSYCHIATRIC/BEHAVIORAL: negative for hallucinations, behavioral problems, confusion and agitation.      Objective:   PHYSICAL EXAM:      VITALS:  /68   Pulse 57   Temp 98 °F (36.7 °C) (Oral)   Resp 20   Ht 5' 6\" (1.676 m)   Wt 282 lb 14.4 oz (128.3 kg)   SpO2 94%   Breastfeeding No   BMI 45.66 kg/m²      24HR INTAKE/OUTPUT:      Intake/Output Summary (Last 24 hours) at 3/20/2020 1006  Last data filed at 3/19/2020 1121  Gross per 24 hour   Intake 240 ml   Output --   Net 240 ml     CONSTITUTIONAL:  awake, alert, cooperative, no apparent distress, and appears stated age  NECK:  Supple, symmetrical, trachea midline, no adenopathy, thyroid symmetric, not enlarged and no tenderness, skin normal  LUNGS:  no increased work of breathing and wheezing and diminished to auscultation. No accessory muscle use  CARDIOVASCULAR: S1 and S2, no edema and no JVD  ABDOMEN:  normal bowel sounds, non-distended and no masses palpated, and no tenderness to palpation. No hepatospleenomegaly  LYMPHADENOPATHY:  no axillary or supraclavicular adenopathy. No cervical adnenopathy  PSYCHIATRIC: Oriented to person place and time. No obvious depression or anxiety. MUSCULOSKELETAL: No obvious misalignment or effusion of the joints. No clubbing, cyanosis of the digits. SKIN:  normal skin color, texture, turgor and no redness, warmth, or swelling. No palpable nodules    DATA:    Old records have been reviewed    CBC:  Recent Labs     03/19/20  0511   WBC 10.0   RBC 5.21*   HGB 15.3   HCT 46.1      MCV 88.5   MCH 29.4   MCHC 33.2   RDW 15.9*      BMP:  Recent Labs     03/19/20  0511      K 4.9   CL 98*   CO2 31   BUN 29*   CREATININE 0.8   CALCIUM 9.6   GLUCOSE 222*      ABG:  No results for input(s): PHART, OCY8XZS, PO2ART, GID4VDG, B2IZMYIZ, BEART in the last 72 hours. Lab Results   Component Value Date    BNP <15 04/18/2012     Lab Results   Component Value Date    CKTOTAL 24 (L) 06/07/2018    CKMB 0.29 08/09/2011    TROPONINI <0.01 03/13/2020       Cultures:     Abx:    Radiology Review:  All pertinent images / reports were reviewed as a part of this visit. Assessment:     1. Left lower lobe pneumonia  · I have reviewed laboratories, medical records and images for this visit  · Chest imaging reveals increasing density/effusion at the left lung base.   · She is afebrile and does not have a leukocytosis  · She has been on Levaquin since admission 3/13  · Obtain CT imaging    2. COPD exacerbation  · Still wheezing on exam  · She is on Symbicort  · Also receiving scheduled bronchodilators  · She is on Solu-Medrol 40 every 12 hours  · Increase Solu-Medrol to 40 every 6 hours    3. Chronic hypoxemic respiratory failure  · She does have a baseline oxygen requirement  · Sounds like she normally wears oxygen with sleep  · Currently requiring supplemental oxygen at 1.5 L during the day as well.

## 2020-03-21 LAB
GLUCOSE BLD-MCNC: 134 MG/DL (ref 70–99)
GLUCOSE BLD-MCNC: 209 MG/DL (ref 70–99)
GLUCOSE BLD-MCNC: 276 MG/DL (ref 70–99)
GLUCOSE BLD-MCNC: 83 MG/DL (ref 70–99)
PERFORMED ON: ABNORMAL
PERFORMED ON: NORMAL

## 2020-03-21 PROCEDURE — 2500000003 HC RX 250 WO HCPCS: Performed by: INTERNAL MEDICINE

## 2020-03-21 PROCEDURE — 6360000002 HC RX W HCPCS: Performed by: INTERNAL MEDICINE

## 2020-03-21 PROCEDURE — 94640 AIRWAY INHALATION TREATMENT: CPT

## 2020-03-21 PROCEDURE — 6370000000 HC RX 637 (ALT 250 FOR IP): Performed by: INTERNAL MEDICINE

## 2020-03-21 PROCEDURE — 2700000000 HC OXYGEN THERAPY PER DAY

## 2020-03-21 PROCEDURE — 1200000000 HC SEMI PRIVATE

## 2020-03-21 PROCEDURE — 99232 SBSQ HOSP IP/OBS MODERATE 35: CPT | Performed by: INTERNAL MEDICINE

## 2020-03-21 PROCEDURE — 94761 N-INVAS EAR/PLS OXIMETRY MLT: CPT

## 2020-03-21 RX ADMIN — GLIPIZIDE 5 MG: 5 TABLET ORAL at 16:39

## 2020-03-21 RX ADMIN — LISINOPRIL 2.5 MG: 5 TABLET ORAL at 09:33

## 2020-03-21 RX ADMIN — METHYLPREDNISOLONE SODIUM SUCCINATE 40 MG: 40 INJECTION, POWDER, FOR SOLUTION INTRAMUSCULAR; INTRAVENOUS at 04:09

## 2020-03-21 RX ADMIN — IBUPROFEN 400 MG: 400 TABLET ORAL at 16:39

## 2020-03-21 RX ADMIN — ROSUVASTATIN CALCIUM 10 MG: 10 TABLET, FILM COATED ORAL at 09:32

## 2020-03-21 RX ADMIN — INSULIN LISPRO 3 UNITS: 100 INJECTION, SOLUTION INTRAVENOUS; SUBCUTANEOUS at 22:05

## 2020-03-21 RX ADMIN — TRIAMCINOLONE ACETONIDE: 1 CREAM TOPICAL at 22:04

## 2020-03-21 RX ADMIN — METHYLPREDNISOLONE SODIUM SUCCINATE 40 MG: 40 INJECTION, POWDER, FOR SOLUTION INTRAMUSCULAR; INTRAVENOUS at 16:39

## 2020-03-21 RX ADMIN — HYDRALAZINE HYDROCHLORIDE 25 MG: 25 TABLET, FILM COATED ORAL at 22:03

## 2020-03-21 RX ADMIN — MICONAZOLE NITRATE: 20 POWDER TOPICAL at 09:34

## 2020-03-21 RX ADMIN — MICONAZOLE NITRATE: 20 POWDER TOPICAL at 22:04

## 2020-03-21 RX ADMIN — PROMETHAZINE HYDROCHLORIDE AND DEXTROMETHORPHAN HYDROBROMIDE 5 ML: 15; 6.25 SYRUP ORAL at 04:13

## 2020-03-21 RX ADMIN — INSULIN LISPRO 5 UNITS: 100 INJECTION, SOLUTION INTRAVENOUS; SUBCUTANEOUS at 09:40

## 2020-03-21 RX ADMIN — Medication 2 PUFF: at 19:31

## 2020-03-21 RX ADMIN — PANTOPRAZOLE SODIUM 40 MG: 40 TABLET, DELAYED RELEASE ORAL at 05:50

## 2020-03-21 RX ADMIN — IPRATROPIUM BROMIDE AND ALBUTEROL SULFATE 3 ML: .5; 3 SOLUTION RESPIRATORY (INHALATION) at 12:43

## 2020-03-21 RX ADMIN — DICLOFENAC 2 G: 10 GEL TOPICAL at 22:03

## 2020-03-21 RX ADMIN — INSULIN LISPRO 9 UNITS: 100 INJECTION, SOLUTION INTRAVENOUS; SUBCUTANEOUS at 13:27

## 2020-03-21 RX ADMIN — TRIAMCINOLONE ACETONIDE: 1 CREAM TOPICAL at 09:42

## 2020-03-21 RX ADMIN — IPRATROPIUM BROMIDE AND ALBUTEROL SULFATE 3 ML: .5; 3 SOLUTION RESPIRATORY (INHALATION) at 08:54

## 2020-03-21 RX ADMIN — IPRATROPIUM BROMIDE AND ALBUTEROL SULFATE 3 ML: .5; 3 SOLUTION RESPIRATORY (INHALATION) at 19:30

## 2020-03-21 RX ADMIN — ENOXAPARIN SODIUM 40 MG: 40 INJECTION SUBCUTANEOUS at 09:32

## 2020-03-21 RX ADMIN — ISOSORBIDE MONONITRATE 60 MG: 60 TABLET, EXTENDED RELEASE ORAL at 09:32

## 2020-03-21 RX ADMIN — GABAPENTIN 300 MG: 300 CAPSULE ORAL at 22:03

## 2020-03-21 RX ADMIN — IBUPROFEN 400 MG: 400 TABLET ORAL at 13:26

## 2020-03-21 RX ADMIN — IBUPROFEN 400 MG: 400 TABLET ORAL at 10:14

## 2020-03-21 RX ADMIN — HYDRALAZINE HYDROCHLORIDE 25 MG: 25 TABLET, FILM COATED ORAL at 13:26

## 2020-03-21 RX ADMIN — IPRATROPIUM BROMIDE AND ALBUTEROL SULFATE 3 ML: .5; 3 SOLUTION RESPIRATORY (INHALATION) at 16:44

## 2020-03-21 RX ADMIN — Medication 2 PUFF: at 08:54

## 2020-03-21 RX ADMIN — ALPRAZOLAM 1 MG: 0.5 TABLET ORAL at 16:39

## 2020-03-21 RX ADMIN — PROMETHAZINE HYDROCHLORIDE AND DEXTROMETHORPHAN HYDROBROMIDE 5 ML: 15; 6.25 SYRUP ORAL at 23:22

## 2020-03-21 RX ADMIN — INSULIN LISPRO 5 UNITS: 100 INJECTION, SOLUTION INTRAVENOUS; SUBCUTANEOUS at 13:28

## 2020-03-21 RX ADMIN — GLIPIZIDE 5 MG: 5 TABLET ORAL at 05:50

## 2020-03-21 RX ADMIN — ALPRAZOLAM 1 MG: 0.5 TABLET ORAL at 09:33

## 2020-03-21 RX ADMIN — GABAPENTIN 300 MG: 300 CAPSULE ORAL at 09:33

## 2020-03-21 RX ADMIN — HYDRALAZINE HYDROCHLORIDE 25 MG: 25 TABLET, FILM COATED ORAL at 09:32

## 2020-03-21 RX ADMIN — LEVOFLOXACIN 500 MG: 500 TABLET, FILM COATED ORAL at 09:32

## 2020-03-21 RX ADMIN — ASPIRIN 81 MG 81 MG: 81 TABLET ORAL at 09:32

## 2020-03-21 RX ADMIN — PROMETHAZINE HYDROCHLORIDE AND DEXTROMETHORPHAN HYDROBROMIDE 5 ML: 15; 6.25 SYRUP ORAL at 10:15

## 2020-03-21 ASSESSMENT — PAIN SCALES - GENERAL
PAINLEVEL_OUTOF10: 0
PAINLEVEL_OUTOF10: 7
PAINLEVEL_OUTOF10: 4
PAINLEVEL_OUTOF10: 6
PAINLEVEL_OUTOF10: 8
PAINLEVEL_OUTOF10: 7
PAINLEVEL_OUTOF10: 3

## 2020-03-21 ASSESSMENT — PAIN DESCRIPTION - DESCRIPTORS
DESCRIPTORS: HEADACHE
DESCRIPTORS: HEADACHE

## 2020-03-21 ASSESSMENT — PAIN DESCRIPTION - PROGRESSION: CLINICAL_PROGRESSION: GRADUALLY IMPROVING

## 2020-03-21 ASSESSMENT — PAIN DESCRIPTION - FREQUENCY
FREQUENCY: INTERMITTENT
FREQUENCY: INTERMITTENT

## 2020-03-21 ASSESSMENT — PAIN DESCRIPTION - LOCATION
LOCATION: HEAD
LOCATION: HEAD
LOCATION: SHOULDER

## 2020-03-21 ASSESSMENT — PAIN DESCRIPTION - PAIN TYPE
TYPE: ACUTE PAIN

## 2020-03-21 ASSESSMENT — PAIN DESCRIPTION - ORIENTATION
ORIENTATION: RIGHT;LEFT
ORIENTATION: RIGHT;LEFT

## 2020-03-21 ASSESSMENT — PAIN DESCRIPTION - ONSET: ONSET: ON-GOING

## 2020-03-21 NOTE — PROGRESS NOTES
Pulmonary Progress Note       ASSESSMENT:     Left Lower Lobe Pneumonia   Still has a productive cough but she is afebrile and WBC is normal  Acute Exacerbation of COPD   Still has dyspnea and wheezing   Chronic Hypoxic Respiratory Failure   She uses oxygen during sleep at home      PLAN:      Continue Levaquin for pneumonia   Continue Solumedrol for AECOPD   Continue Albuterol and Ipratropium Nebulizer         REASON FOR VISIT:  pneumonia    UPDATE:  Afebrile. PULMONARY CHIEF COMPLAINT:  shortness of breath     HISTORY:  She has severe dyspnea on exertion and intermittent dyspnea at rest.  She has cough and wheezing. REVIEW OF SYSTEMS:  ENT:  No sore throat or hoarseness. Cardiovascular:  No chest pain or palpitations. MEDICATIONS:  Scheduled Meds:   insulin glargine  20 Units Subcutaneous Nightly    insulin lispro  5 Units Subcutaneous TID WC    methylPREDNISolone  40 mg Intravenous Q12H    budesonide-formoterol  2 puff Inhalation BID    aspirin  81 mg Oral Daily    diclofenac sodium  2 g Topical BID    gabapentin  300 mg Oral BID    glipiZIDE  5 mg Oral BID AC    hydrALAZINE  25 mg Oral TID    ibuprofen  400 mg Oral TID WC    ipratropium-albuterol  3 mL Inhalation Q4H WA    isosorbide mononitrate  60 mg Oral Daily    lisinopril  2.5 mg Oral Daily    miconazole   Topical BID    pantoprazole  40 mg Oral QAM AC    rosuvastatin  10 mg Oral Daily    triamcinolone   Topical BID    levoFLOXacin  500 mg Oral Daily    enoxaparin  40 mg Subcutaneous Daily    insulin lispro  0-18 Units Subcutaneous TID WC    insulin lispro  0-9 Units Subcutaneous Nightly         PHYSICAL EXAM:   Vital Signs: /80   Pulse 57   Temp 98.2 °F (36.8 °C) (Axillary)   Resp 18   Ht 5' 6\" (1.676 m)   Wt 282 lb 14.4 oz (128.3 kg)   SpO2 93%   Breastfeeding No   BMI 45.66 kg/m²     Gen:   No distress.  Breathing comfortably at rest.  ENT:  Nasal mucosa is normal. Lips and tongue are normal.   Neck: Trachea is midline. No JVD. No thyroid enlargement. Resp:   No accessory muscle use. Bilateral wheezing. Psych:  Awake and alert. Oriented. Normal mood. LAB RESULTS:  CBC:   Recent Labs     03/19/20  0511   WBC 10.0   HGB 15.3   HCT 46.1   MCV 88.5        BMP:   Recent Labs     03/19/20  0511      K 4.9   CL 98*   CO2 31   BUN 29*   CREATININE 0.8       ABG: No results for input(s): PHART, UFZ4LFB, PO2ART in the last 72 hours. CULTURES:  Rapid Influenza Antigen 3/13/20:  Rapid Influenza A Antigen:  Negative  Rapid Influenza B Antigen:  Negative       CHEST XRAY PORTABLE:   Results for orders placed during the hospital encounter of 03/13/20   XR CHEST PORTABLE    Narrative EXAMINATION:  ONE XRAY VIEW OF THE CHEST    3/13/2020 8:53 pm    COMPARISON:  Frontal and lateral views of the chest 01/26/2020, 01/25/2020. CTA thorax  09/24/2019. HISTORY:  ORDERING SYSTEM PROVIDED HISTORY: CHEST PAIN  TECHNOLOGIST PROVIDED HISTORY:  Reason for exam:->CHEST PAIN    FINDINGS:  The cardiopericardial silhouette is again noted to be enlarged but stable. Atherosclerotic calcification of the thoracic aorta is again noted. The visible lungs are without pneumothorax, pleural effusion or new focal  airspace opacity. Blunting of the left costophrenic angle again noted likely  reflecting atelectasis rather than an infectious/inflammatory process. Prominent bilateral interstitial markings again noted, vascular congestion  versus interstitial edema are to be considered. Impression 1. The visible lungs are without pneumothorax, pleural effusion or new focal  airspace opacity. 2. Blunting of the left costophrenic angle again noted likely reflecting  atelectasis. 3. Prominent bilateral interstitial markings again noted, vascular congestion  versus interstitial edema are to be considered.          CHEST XRAY PA AND LATERAL:   Results for orders placed during the hospital encounter of 03/13/20   XR CHEST STANDARD (2 VW)    Narrative EXAMINATION:  TWO XRAY VIEWS OF THE CHEST    3/18/2020 3:20 pm    COMPARISON:  March 13, 2020    HISTORY:  ORDERING SYSTEM PROVIDED HISTORY: persistent dyspnea  TECHNOLOGIST PROVIDED HISTORY:  Reason for exam:->persistent dyspnea    FINDINGS:  Cardiac silhouette is enlarged. No pneumothorax. Interstitial prominence  and pulmonary vascular congestion centrally, progressed especially in the  bases. No acute bony abnormality. Impression Worsening edema versus infection. CHEST CT SCAN WITHOUT CONTRAST:   Results for orders placed during the hospital encounter of 03/13/20   CT Chest WO Contrast    Narrative EXAMINATION:  CT OF THE CHEST WITHOUT CONTRAST 3/19/2020 11:05 am    TECHNIQUE:  CT of the chest was performed without the administration of intravenous  contrast. Multiplanar reformatted images are provided for review. Dose  modulation, iterative reconstruction, and/or weight based adjustment of the  mA/kV was utilized to reduce the radiation dose to as low as reasonably  achievable. COMPARISON:  03/18/2020, 09/24/2019, 09/21/2018, 02/25/2014    HISTORY:  ORDERING SYSTEM PROVIDED HISTORY: dyspnea  TECHNOLOGIST PROVIDED HISTORY:  Reason for exam:->dyspnea  Reason for Exam: dyspnea  Acuity: Unknown  Type of Exam: Unknown    FINDINGS:  Mediastinum: The thyroid gland is unremarkable. Atherosclerotic plaque is  noted along the aorta and its branch vessels. The heart is mildly enlarged. Coronary artery vascular calcifications are noted. Calcified right hilar  lymph nodes are compatible with sequela of old granulomatous disease. No  mediastinal or hilar adenopathy. Lungs/pleura: There is mild pulmonary vascular congestion, most pronounced in  the lung apices. There are areas of mucus plugging noted in the lower lobes  versus aspiration. Paraseptal emphysema is noted in the lung apices, right  side greater than left.     There is asymmetric volume loss in the lingula and left lower lobe, slightly  increased in the left lower lobe since the previous exam.  Superimposed  pneumonia cannot be excluded. No new suspicious pulmonary nodule. No pleural effusion or pneumothorax. Upper Abdomen: Limited images through the upper abdomen are unremarkable. Soft Tissues/Bones: No axillary adenopathy. No appreciable soft tissue  swelling is identified. Degenerative changes are noted in the spine. There  is a chronic T11 compression fracture. No evidence of acute fracture. Impression 1. Asymmetric volume loss noted in the lingula and left lower lobe, slightly  increased in the left lower lobe since the previous exam on 09/24/2019. This  is likely related to atelectasis, however, underlying pneumonia cannot be  excluded. 2. There are areas of mucus plugging noted in the lower lobe bronchi versus  aspiration. 3. Pulmonary vascular congestion.

## 2020-03-21 NOTE — PROGRESS NOTES
(PRINIVIL;ZESTRIL) tablet 2.5 mg, 2.5 mg, Oral, Daily  nitroGLYCERIN (NITROSTAT) SL tablet 0.4 mg, 0.4 mg, Sublingual, Q5 Min PRN  miconazole (MICOTIN) 2 % powder, , Topical, BID  pantoprazole (PROTONIX) tablet 40 mg, 40 mg, Oral, QAM AC  rosuvastatin (CRESTOR) tablet 10 mg, 10 mg, Oral, Daily  triamcinolone (KENALOG) 0.1 % cream, , Topical, BID  levoFLOXacin (LEVAQUIN) tablet 500 mg, 500 mg, Oral, Daily  glucose (GLUTOSE) 40 % oral gel 15 g, 15 g, Oral, PRN  dextrose 50 % IV solution, 12.5 g, Intravenous, PRN  glucagon (rDNA) injection 1 mg, 1 mg, Intramuscular, PRN  dextrose 5 % solution, 100 mL/hr, Intravenous, PRN  enoxaparin (LOVENOX) injection 40 mg, 40 mg, Subcutaneous, Daily  insulin lispro (1 Unit Dial) 0-18 Units, 0-18 Units, Subcutaneous, TID WC  insulin lispro (1 Unit Dial) 0-9 Units, 0-9 Units, Subcutaneous, Nightly    Physical      VITALS:    /74   Pulse 66   Temp 98.3 °F (36.8 °C) (Oral)   Resp 18   Ht 5' 6\" (1.676 m)   Wt 282 lb 14.4 oz (128.3 kg)   SpO2 95%   Breastfeeding No   BMI 45.66 kg/m²   TEMPERATURE:  Current - Temp: 98.3 °F (36.8 °C);  Max - Temp  Av.9 °F (36.6 °C)  Min: 97.5 °F (36.4 °C)  Max: 98.3 °F (36.8 °C)  RESPIRATIONS RANGE: Resp  Av.3  Min: 18  Max: 19  PULSE RANGE: Pulse  Av.8  Min: 57  Max: 71  BLOOD PRESSURE RANGE:  Systolic (24VSK), YKU:781 , Min:111 , PHX:004   ; Diastolic (73INB), UVS:88, Min:56, Max:78    PULSE OXIMETRY RANGE: SpO2  Av.5 %  Min: 88 %  Max: 95 %  24HR INTAKE/OUTPUT:      Intake/Output Summary (Last 24 hours) at 3/21/2020 1401  Last data filed at 3/20/2020 1500  Gross per 24 hour   Intake 480 ml   Output --   Net 480 ml     CONSTITUTIONAL:  awake, alert, cooperative, no apparent distress, and appears stated age  NECK:  supple, symmetrical, trachea midline and skin normal  LUNGS:  No increased work of breathing, good air exchange, clear to auscultation bilaterally, no crackles or wheezing  CARDIOVASCULAR:  Normal apical impulse, regular rate and rhythm, normal S1 and S2, no S3 or S4, and no murmur noted  ABDOMEN:  No scars, normal bowel sounds, soft, non-distended, non-tender, no masses palpated, no hepatosplenomegally  MUSCULOSKELETAL:  Trace edema  NEUROLOGIC:  No acute focal change    Data      Lab Results   Component Value Date    PHART 7.446 02/07/2017       Lab Results   Component Value Date     03/19/2020    K 4.9 03/19/2020    K 4.1 01/19/2020    CL 98 03/19/2020    CO2 31 03/19/2020    BUN 29 03/19/2020    CREATININE 0.8 03/19/2020    GLUCOSE 222 03/19/2020    CALCIUM 9.6 03/19/2020     Lab Results   Component Value Date    WBC 10.0 03/19/2020    HGB 15.3 03/19/2020    HCT 46.1 03/19/2020    MCV 88.5 03/19/2020     03/19/2020         Lab Results   Component Value Date    INR 0.97 01/27/2020    PROTIME 11.3 01/27/2020       ASSESSMENT AND PLAN      Patient Active Problem List   Diagnosis    Chronic respiratory failure ([address]    CAD (coronary artery disease)    Hyperlipemia    Essential hypertension    DM2 (diabetes mellitus, type 2) ([address]    Primary osteoarthritis involving multiple joints    Gastroesophageal reflux disease without esophagitis    Anxiety    Morbid obesity ([address]    Precordial pain    Acute bronchitis    COPD with acute exacerbation (HCC)    AKIL (obstructive sleep apnea)    Chest congestion    DM (diabetes mellitus), secondary uncontrolled (HCC)    Ineffective airway clearance    Mucus plugging of bronchi     1. Asymmetric volume loss noted in the lingula and left lower lobe, slightly   increased in the left lower lobe since the previous exam on 09/24/2019.  This   is likely related to atelectasis, however, underlying pneumonia cannot be   excluded. 2. There are areas of mucus plugging noted in the lower lobe bronchi versus   aspiration.    3. Pulmonary vascular congestion         CT Chest findings noted  As above   ct Symbicort , Duoneb , incresed IV steroids   Ct Levaquin    management of diabetes   DVT prophylaxis

## 2020-03-21 NOTE — PROGRESS NOTES
Normal apical impulse, regular rate and rhythm, normal S1 and S2, no S3 or S4, and no murmur noted  ABDOMEN:  No scars, normal bowel sounds, soft, non-distended, non-tender, no masses palpated, no hepatosplenomegally  MUSCULOSKELETAL:  Trace edema  NEUROLOGIC:  No acute focal change    Data      Lab Results   Component Value Date    PHART 7.446 02/07/2017       Lab Results   Component Value Date     03/19/2020    K 4.9 03/19/2020    K 4.1 01/19/2020    CL 98 03/19/2020    CO2 31 03/19/2020    BUN 29 03/19/2020    CREATININE 0.8 03/19/2020    GLUCOSE 222 03/19/2020    CALCIUM 9.6 03/19/2020     Lab Results   Component Value Date    WBC 10.0 03/19/2020    HGB 15.3 03/19/2020    HCT 46.1 03/19/2020    MCV 88.5 03/19/2020     03/19/2020         Lab Results   Component Value Date    INR 0.97 01/27/2020    PROTIME 11.3 01/27/2020       ASSESSMENT AND PLAN      Patient Active Problem List   Diagnosis    Chronic respiratory failure ([address]    CAD (coronary artery disease)    Hyperlipemia    Essential hypertension    DM2 (diabetes mellitus, type 2) ([address]    Primary osteoarthritis involving multiple joints    Gastroesophageal reflux disease without esophagitis    Anxiety    Morbid obesity ([address]    Precordial pain    Acute bronchitis    COPD with acute exacerbation ([address]    AKIL (obstructive sleep apnea)    Chest congestion    DM (diabetes mellitus), secondary uncontrolled (HCC)    Ineffective airway clearance    Mucus plugging of bronchi     Increase steroids    chest CT    patient has done smoking cessation  But already lot of irreversible damage has occurred

## 2020-03-22 LAB
GLUCOSE BLD-MCNC: 130 MG/DL (ref 70–99)
GLUCOSE BLD-MCNC: 217 MG/DL (ref 70–99)
GLUCOSE BLD-MCNC: 255 MG/DL (ref 70–99)
GLUCOSE BLD-MCNC: 73 MG/DL (ref 70–99)
PERFORMED ON: ABNORMAL
PERFORMED ON: NORMAL

## 2020-03-22 PROCEDURE — 99232 SBSQ HOSP IP/OBS MODERATE 35: CPT | Performed by: INTERNAL MEDICINE

## 2020-03-22 PROCEDURE — 1200000000 HC SEMI PRIVATE

## 2020-03-22 PROCEDURE — 6360000002 HC RX W HCPCS: Performed by: INTERNAL MEDICINE

## 2020-03-22 PROCEDURE — 6370000000 HC RX 637 (ALT 250 FOR IP): Performed by: INTERNAL MEDICINE

## 2020-03-22 PROCEDURE — 2700000000 HC OXYGEN THERAPY PER DAY

## 2020-03-22 PROCEDURE — 94761 N-INVAS EAR/PLS OXIMETRY MLT: CPT

## 2020-03-22 PROCEDURE — 94640 AIRWAY INHALATION TREATMENT: CPT

## 2020-03-22 RX ADMIN — MICONAZOLE NITRATE: 20 POWDER TOPICAL at 21:47

## 2020-03-22 RX ADMIN — IPRATROPIUM BROMIDE AND ALBUTEROL SULFATE 3 ML: .5; 3 SOLUTION RESPIRATORY (INHALATION) at 08:29

## 2020-03-22 RX ADMIN — METHYLPREDNISOLONE SODIUM SUCCINATE 40 MG: 40 INJECTION, POWDER, FOR SOLUTION INTRAMUSCULAR; INTRAVENOUS at 17:28

## 2020-03-22 RX ADMIN — IBUPROFEN 400 MG: 400 TABLET ORAL at 13:04

## 2020-03-22 RX ADMIN — GABAPENTIN 300 MG: 300 CAPSULE ORAL at 21:47

## 2020-03-22 RX ADMIN — METHYLPREDNISOLONE SODIUM SUCCINATE 40 MG: 40 INJECTION, POWDER, FOR SOLUTION INTRAMUSCULAR; INTRAVENOUS at 04:57

## 2020-03-22 RX ADMIN — GLIPIZIDE 5 MG: 5 TABLET ORAL at 06:11

## 2020-03-22 RX ADMIN — HYDRALAZINE HYDROCHLORIDE 25 MG: 25 TABLET, FILM COATED ORAL at 21:47

## 2020-03-22 RX ADMIN — INSULIN LISPRO 9 UNITS: 100 INJECTION, SOLUTION INTRAVENOUS; SUBCUTANEOUS at 13:04

## 2020-03-22 RX ADMIN — LISINOPRIL 2.5 MG: 5 TABLET ORAL at 10:14

## 2020-03-22 RX ADMIN — DICLOFENAC 2 G: 10 GEL TOPICAL at 21:47

## 2020-03-22 RX ADMIN — HYDRALAZINE HYDROCHLORIDE 25 MG: 25 TABLET, FILM COATED ORAL at 10:13

## 2020-03-22 RX ADMIN — TRIAMCINOLONE ACETONIDE: 1 CREAM TOPICAL at 10:26

## 2020-03-22 RX ADMIN — HYDRALAZINE HYDROCHLORIDE 25 MG: 25 TABLET, FILM COATED ORAL at 13:15

## 2020-03-22 RX ADMIN — IBUPROFEN 400 MG: 400 TABLET ORAL at 10:28

## 2020-03-22 RX ADMIN — TRIAMCINOLONE ACETONIDE: 1 CREAM TOPICAL at 21:47

## 2020-03-22 RX ADMIN — LEVOFLOXACIN 500 MG: 500 TABLET, FILM COATED ORAL at 10:13

## 2020-03-22 RX ADMIN — DICLOFENAC 2 G: 10 GEL TOPICAL at 10:26

## 2020-03-22 RX ADMIN — INSULIN LISPRO 5 UNITS: 100 INJECTION, SOLUTION INTRAVENOUS; SUBCUTANEOUS at 13:05

## 2020-03-22 RX ADMIN — IPRATROPIUM BROMIDE AND ALBUTEROL SULFATE 3 ML: .5; 3 SOLUTION RESPIRATORY (INHALATION) at 20:55

## 2020-03-22 RX ADMIN — INSULIN LISPRO 5 UNITS: 100 INJECTION, SOLUTION INTRAVENOUS; SUBCUTANEOUS at 10:22

## 2020-03-22 RX ADMIN — PROMETHAZINE HYDROCHLORIDE AND DEXTROMETHORPHAN HYDROBROMIDE 5 ML: 15; 6.25 SYRUP ORAL at 13:16

## 2020-03-22 RX ADMIN — IBUPROFEN 400 MG: 400 TABLET ORAL at 17:28

## 2020-03-22 RX ADMIN — PANTOPRAZOLE SODIUM 40 MG: 40 TABLET, DELAYED RELEASE ORAL at 06:11

## 2020-03-22 RX ADMIN — Medication 2 PUFF: at 20:55

## 2020-03-22 RX ADMIN — Medication 2 PUFF: at 08:29

## 2020-03-22 RX ADMIN — ROSUVASTATIN CALCIUM 10 MG: 10 TABLET, FILM COATED ORAL at 10:13

## 2020-03-22 RX ADMIN — INSULIN LISPRO 5 UNITS: 100 INJECTION, SOLUTION INTRAVENOUS; SUBCUTANEOUS at 18:20

## 2020-03-22 RX ADMIN — GABAPENTIN 300 MG: 300 CAPSULE ORAL at 10:14

## 2020-03-22 RX ADMIN — INSULIN LISPRO 3 UNITS: 100 INJECTION, SOLUTION INTRAVENOUS; SUBCUTANEOUS at 21:47

## 2020-03-22 RX ADMIN — ASPIRIN 81 MG 81 MG: 81 TABLET ORAL at 10:13

## 2020-03-22 RX ADMIN — ALPRAZOLAM 1 MG: 0.5 TABLET ORAL at 02:02

## 2020-03-22 RX ADMIN — ISOSORBIDE MONONITRATE 60 MG: 60 TABLET, EXTENDED RELEASE ORAL at 10:14

## 2020-03-22 RX ADMIN — MICONAZOLE NITRATE: 20 POWDER TOPICAL at 10:16

## 2020-03-22 RX ADMIN — ENOXAPARIN SODIUM 40 MG: 40 INJECTION SUBCUTANEOUS at 10:14

## 2020-03-22 RX ADMIN — GLIPIZIDE 5 MG: 5 TABLET ORAL at 17:28

## 2020-03-22 RX ADMIN — ALPRAZOLAM 1 MG: 0.5 TABLET ORAL at 14:09

## 2020-03-22 ASSESSMENT — PAIN DESCRIPTION - PAIN TYPE: TYPE: ACUTE PAIN

## 2020-03-22 ASSESSMENT — PAIN SCALES - GENERAL
PAINLEVEL_OUTOF10: 8
PAINLEVEL_OUTOF10: 8
PAINLEVEL_OUTOF10: 9
PAINLEVEL_OUTOF10: 7
PAINLEVEL_OUTOF10: 9
PAINLEVEL_OUTOF10: 9

## 2020-03-22 ASSESSMENT — PAIN DESCRIPTION - PROGRESSION
CLINICAL_PROGRESSION: GRADUALLY IMPROVING

## 2020-03-22 ASSESSMENT — PAIN DESCRIPTION - LOCATION: LOCATION: OTHER (COMMENT)

## 2020-03-22 NOTE — PLAN OF CARE
Problem: Falls - Risk of:  Goal: Will remain free from falls  Description: Will remain free from falls  Outcome: Ongoing  Goal: Absence of physical injury  Description: Absence of physical injury  Outcome: Ongoing     Problem: Pain:  Goal: Pain level will decrease  Description: Pain level will decrease  Outcome: Ongoing  Goal: Control of acute pain  Description: Control of acute pain  Outcome: Ongoing  Goal: Control of chronic pain  Description: Control of chronic pain  Outcome: Ongoing     Problem:  Bowel/Gastric:  Goal: Occurrences of diarrhea will decrease  Description: Occurrences of diarrhea will decrease  Outcome: Ongoing     Problem: Physical Regulation:  Goal: Prevent transmision of infection  Description: Prevent transmision of infection  Outcome: Ongoing     Problem: Serum Glucose Level - Abnormal:  Goal: Ability to maintain appropriate glucose levels has stabilized  Description: Ability to maintain appropriate glucose levels has stabilized  Outcome: Ongoing     Problem: Musculor/Skeletal Functional Status  Goal: Highest potential functional level  Outcome: Ongoing  Goal: Absence of falls  Outcome: Ongoing

## 2020-03-22 NOTE — PROGRESS NOTES
2020    HISTORY:  ORDERING SYSTEM PROVIDED HISTORY: persistent dyspnea  TECHNOLOGIST PROVIDED HISTORY:  Reason for exam:->persistent dyspnea    FINDINGS:  Cardiac silhouette is enlarged. No pneumothorax. Interstitial prominence  and pulmonary vascular congestion centrally, progressed especially in the  bases. No acute bony abnormality. Impression Worsening edema versus infection. CHEST CT SCAN WITHOUT CONTRAST:   Results for orders placed during the hospital encounter of 03/13/20   CT Chest WO Contrast    Narrative EXAMINATION:  CT OF THE CHEST WITHOUT CONTRAST 3/19/2020 11:05 am    TECHNIQUE:  CT of the chest was performed without the administration of intravenous  contrast. Multiplanar reformatted images are provided for review. Dose  modulation, iterative reconstruction, and/or weight based adjustment of the  mA/kV was utilized to reduce the radiation dose to as low as reasonably  achievable. COMPARISON:  03/18/2020, 09/24/2019, 09/21/2018, 02/25/2014    HISTORY:  ORDERING SYSTEM PROVIDED HISTORY: dyspnea  TECHNOLOGIST PROVIDED HISTORY:  Reason for exam:->dyspnea  Reason for Exam: dyspnea  Acuity: Unknown  Type of Exam: Unknown    FINDINGS:  Mediastinum: The thyroid gland is unremarkable. Atherosclerotic plaque is  noted along the aorta and its branch vessels. The heart is mildly enlarged. Coronary artery vascular calcifications are noted. Calcified right hilar  lymph nodes are compatible with sequela of old granulomatous disease. No  mediastinal or hilar adenopathy. Lungs/pleura: There is mild pulmonary vascular congestion, most pronounced in  the lung apices. There are areas of mucus plugging noted in the lower lobes  versus aspiration. Paraseptal emphysema is noted in the lung apices, right  side greater than left.     There is asymmetric volume loss in the lingula and left lower lobe, slightly  increased in the left lower lobe since the previous exam.  Superimposed  pneumonia cannot be excluded. No new suspicious pulmonary nodule. No pleural effusion or pneumothorax. Upper Abdomen: Limited images through the upper abdomen are unremarkable. Soft Tissues/Bones: No axillary adenopathy. No appreciable soft tissue  swelling is identified. Degenerative changes are noted in the spine. There  is a chronic T11 compression fracture. No evidence of acute fracture. Impression 1. Asymmetric volume loss noted in the lingula and left lower lobe, slightly  increased in the left lower lobe since the previous exam on 09/24/2019. This  is likely related to atelectasis, however, underlying pneumonia cannot be  excluded. 2. There are areas of mucus plugging noted in the lower lobe bronchi versus  aspiration. 3. Pulmonary vascular congestion.

## 2020-03-22 NOTE — PROGRESS NOTES
Department of Internal Medicine  General Internal Medicine   Progress Note     SUBJECTIVE :  Moderate wheezing on line of improvement but it is very slow     History obtained from chart review and the patient  General ROS: positive for  - fatigue and malaise  negative for - chills, fever or night sweats  Psychological ROS: negative  Respiratory ROS: positive for - shortness of breath and wheezing  negative for - cough, hemoptysis, sputum changes or stridor  Cardiovascular ROS: positive for - chest pain, dyspnea on exertion and edema  negative for - loss of consciousness, orthopnea or palpitations  Gastrointestinal ROS: no abdominal pain, change in bowel habits, or black or bloody stools    OBJECTIVE      Medications      Current Facility-Administered Medications: insulin glargine (LANTUS;BASAGLAR) injection pen 20 Units, 20 Units, Subcutaneous, Nightly  insulin lispro (1 Unit Dial) 5 Units, 5 Units, Subcutaneous, TID WC  sodium chloride (OCEAN, BABY AYR) 0.65 % nasal spray 1 spray, 1 spray, Each Nostril, PRN  methylPREDNISolone sodium (SOLU-MEDROL) injection 40 mg, 40 mg, Intravenous, Q12H  budesonide-formoterol (SYMBICORT) 160-4.5 MCG/ACT inhaler 2 puff, 2 puff, Inhalation, BID  promethazine-dextromethorphan (PROMETHAZINE-DM) 6.25-15 MG/5ML syrup 5 mL, 5 mL, Oral, Q4H PRN  albuterol sulfate  (90 Base) MCG/ACT inhaler 2 puff, 2 puff, Inhalation, Q6H PRN  ALPRAZolam (XANAX) tablet 1 mg, 1 mg, Oral, TID PRN  aspirin chewable tablet 81 mg, 81 mg, Oral, Daily  diclofenac sodium (VOLTAREN) 1 % gel 2 g, 2 g, Topical, BID  gabapentin (NEURONTIN) capsule 300 mg, 300 mg, Oral, BID  glipiZIDE (GLUCOTROL) tablet 5 mg, 5 mg, Oral, BID AC  hydrALAZINE (APRESOLINE) tablet 25 mg, 25 mg, Oral, TID  ibuprofen (ADVIL;MOTRIN) tablet 400 mg, 400 mg, Oral, TID WC  ipratropium-albuterol (DUONEB) nebulizer solution 3 mL, 3 mL, Inhalation, Q4H WA  isosorbide mononitrate (IMDUR) extended release tablet 60 mg, 60 mg, Oral, co-management

## 2020-03-23 PROBLEM — J81.0 ACUTE PULMONARY EDEMA (HCC): Status: ACTIVE | Noted: 2020-03-23

## 2020-03-23 PROBLEM — I51.89 GRADE II DIASTOLIC DYSFUNCTION: Status: ACTIVE | Noted: 2020-03-23

## 2020-03-23 LAB
ANION GAP SERPL CALCULATED.3IONS-SCNC: 11 MMOL/L (ref 3–16)
BUN BLDV-MCNC: 29 MG/DL (ref 7–20)
CALCIUM SERPL-MCNC: 9.7 MG/DL (ref 8.3–10.6)
CHLORIDE BLD-SCNC: 96 MMOL/L (ref 99–110)
CO2: 29 MMOL/L (ref 21–32)
CREAT SERPL-MCNC: 0.8 MG/DL (ref 0.6–1.2)
GFR AFRICAN AMERICAN: >60
GFR NON-AFRICAN AMERICAN: >60
GLUCOSE BLD-MCNC: 110 MG/DL (ref 70–99)
GLUCOSE BLD-MCNC: 126 MG/DL (ref 70–99)
GLUCOSE BLD-MCNC: 171 MG/DL (ref 70–99)
GLUCOSE BLD-MCNC: 287 MG/DL (ref 70–99)
GLUCOSE BLD-MCNC: 330 MG/DL (ref 70–99)
HCT VFR BLD CALC: 50.2 % (ref 36–48)
HEMOGLOBIN: 16.2 G/DL (ref 12–16)
MCH RBC QN AUTO: 28.7 PG (ref 26–34)
MCHC RBC AUTO-ENTMCNC: 32.3 G/DL (ref 31–36)
MCV RBC AUTO: 88.7 FL (ref 80–100)
PDW BLD-RTO: 15.5 % (ref 12.4–15.4)
PERFORMED ON: ABNORMAL
PLATELET # BLD: 223 K/UL (ref 135–450)
PMV BLD AUTO: 7.9 FL (ref 5–10.5)
POTASSIUM SERPL-SCNC: 4.4 MMOL/L (ref 3.5–5.1)
RBC # BLD: 5.66 M/UL (ref 4–5.2)
SODIUM BLD-SCNC: 136 MMOL/L (ref 136–145)
WBC # BLD: 15.4 K/UL (ref 4–11)

## 2020-03-23 PROCEDURE — 1200000000 HC SEMI PRIVATE

## 2020-03-23 PROCEDURE — 99232 SBSQ HOSP IP/OBS MODERATE 35: CPT | Performed by: INTERNAL MEDICINE

## 2020-03-23 PROCEDURE — 2500000003 HC RX 250 WO HCPCS: Performed by: INTERNAL MEDICINE

## 2020-03-23 PROCEDURE — 94640 AIRWAY INHALATION TREATMENT: CPT

## 2020-03-23 PROCEDURE — 6360000002 HC RX W HCPCS: Performed by: INTERNAL MEDICINE

## 2020-03-23 PROCEDURE — 36415 COLL VENOUS BLD VENIPUNCTURE: CPT

## 2020-03-23 PROCEDURE — 6370000000 HC RX 637 (ALT 250 FOR IP): Performed by: INTERNAL MEDICINE

## 2020-03-23 PROCEDURE — 94150 VITAL CAPACITY TEST: CPT

## 2020-03-23 PROCEDURE — 85027 COMPLETE CBC AUTOMATED: CPT

## 2020-03-23 PROCEDURE — 80048 BASIC METABOLIC PNL TOTAL CA: CPT

## 2020-03-23 PROCEDURE — 2700000000 HC OXYGEN THERAPY PER DAY

## 2020-03-23 PROCEDURE — 94761 N-INVAS EAR/PLS OXIMETRY MLT: CPT

## 2020-03-23 RX ORDER — FUROSEMIDE 10 MG/ML
40 INJECTION INTRAMUSCULAR; INTRAVENOUS ONCE
Status: COMPLETED | OUTPATIENT
Start: 2020-03-23 | End: 2020-03-23

## 2020-03-23 RX ADMIN — Medication 2 PUFF: at 12:04

## 2020-03-23 RX ADMIN — PROMETHAZINE HYDROCHLORIDE AND DEXTROMETHORPHAN HYDROBROMIDE 5 ML: 15; 6.25 SYRUP ORAL at 00:20

## 2020-03-23 RX ADMIN — LEVOFLOXACIN 500 MG: 500 TABLET, FILM COATED ORAL at 08:44

## 2020-03-23 RX ADMIN — GABAPENTIN 300 MG: 300 CAPSULE ORAL at 20:34

## 2020-03-23 RX ADMIN — ISOSORBIDE MONONITRATE 60 MG: 60 TABLET, EXTENDED RELEASE ORAL at 08:44

## 2020-03-23 RX ADMIN — IPRATROPIUM BROMIDE AND ALBUTEROL SULFATE 3 ML: .5; 3 SOLUTION RESPIRATORY (INHALATION) at 19:52

## 2020-03-23 RX ADMIN — IPRATROPIUM BROMIDE AND ALBUTEROL SULFATE 3 ML: .5; 3 SOLUTION RESPIRATORY (INHALATION) at 16:14

## 2020-03-23 RX ADMIN — IBUPROFEN 400 MG: 400 TABLET ORAL at 08:44

## 2020-03-23 RX ADMIN — PANTOPRAZOLE SODIUM 40 MG: 40 TABLET, DELAYED RELEASE ORAL at 05:26

## 2020-03-23 RX ADMIN — IBUPROFEN 400 MG: 400 TABLET ORAL at 16:33

## 2020-03-23 RX ADMIN — INSULIN LISPRO 5 UNITS: 100 INJECTION, SOLUTION INTRAVENOUS; SUBCUTANEOUS at 17:59

## 2020-03-23 RX ADMIN — PROMETHAZINE HYDROCHLORIDE AND DEXTROMETHORPHAN HYDROBROMIDE 5 ML: 15; 6.25 SYRUP ORAL at 15:50

## 2020-03-23 RX ADMIN — GLIPIZIDE 5 MG: 5 TABLET ORAL at 08:50

## 2020-03-23 RX ADMIN — GABAPENTIN 300 MG: 300 CAPSULE ORAL at 08:43

## 2020-03-23 RX ADMIN — ALPRAZOLAM 1 MG: 0.5 TABLET ORAL at 01:20

## 2020-03-23 RX ADMIN — IPRATROPIUM BROMIDE AND ALBUTEROL SULFATE 3 ML: .5; 3 SOLUTION RESPIRATORY (INHALATION) at 12:04

## 2020-03-23 RX ADMIN — LISINOPRIL 2.5 MG: 5 TABLET ORAL at 08:45

## 2020-03-23 RX ADMIN — HYDRALAZINE HYDROCHLORIDE 25 MG: 25 TABLET, FILM COATED ORAL at 08:43

## 2020-03-23 RX ADMIN — HYDRALAZINE HYDROCHLORIDE 25 MG: 25 TABLET, FILM COATED ORAL at 20:34

## 2020-03-23 RX ADMIN — IBUPROFEN 400 MG: 400 TABLET ORAL at 12:05

## 2020-03-23 RX ADMIN — METHYLPREDNISOLONE SODIUM SUCCINATE 40 MG: 40 INJECTION, POWDER, FOR SOLUTION INTRAMUSCULAR; INTRAVENOUS at 05:26

## 2020-03-23 RX ADMIN — DICLOFENAC 2 G: 10 GEL TOPICAL at 20:34

## 2020-03-23 RX ADMIN — IPRATROPIUM BROMIDE AND ALBUTEROL SULFATE 3 ML: .5; 3 SOLUTION RESPIRATORY (INHALATION) at 08:27

## 2020-03-23 RX ADMIN — INSULIN LISPRO 5 UNITS: 100 INJECTION, SOLUTION INTRAVENOUS; SUBCUTANEOUS at 08:28

## 2020-03-23 RX ADMIN — INSULIN LISPRO 9 UNITS: 100 INJECTION, SOLUTION INTRAVENOUS; SUBCUTANEOUS at 12:08

## 2020-03-23 RX ADMIN — TRIAMCINOLONE ACETONIDE: 1 CREAM TOPICAL at 08:46

## 2020-03-23 RX ADMIN — TRIAMCINOLONE ACETONIDE: 1 CREAM TOPICAL at 20:34

## 2020-03-23 RX ADMIN — ASPIRIN 81 MG 81 MG: 81 TABLET ORAL at 08:44

## 2020-03-23 RX ADMIN — INSULIN LISPRO 5 UNITS: 100 INJECTION, SOLUTION INTRAVENOUS; SUBCUTANEOUS at 12:08

## 2020-03-23 RX ADMIN — FUROSEMIDE 40 MG: 10 INJECTION, SOLUTION INTRAMUSCULAR; INTRAVENOUS at 14:52

## 2020-03-23 RX ADMIN — DICLOFENAC 2 G: 10 GEL TOPICAL at 08:46

## 2020-03-23 RX ADMIN — MICONAZOLE NITRATE: 20 POWDER TOPICAL at 20:34

## 2020-03-23 RX ADMIN — ROSUVASTATIN CALCIUM 10 MG: 10 TABLET, FILM COATED ORAL at 08:43

## 2020-03-23 RX ADMIN — METHYLPREDNISOLONE SODIUM SUCCINATE 40 MG: 40 INJECTION, POWDER, FOR SOLUTION INTRAMUSCULAR; INTRAVENOUS at 14:52

## 2020-03-23 RX ADMIN — MICONAZOLE NITRATE: 20 POWDER TOPICAL at 08:46

## 2020-03-23 RX ADMIN — ALPRAZOLAM 1 MG: 0.5 TABLET ORAL at 10:57

## 2020-03-23 RX ADMIN — ENOXAPARIN SODIUM 40 MG: 40 INJECTION SUBCUTANEOUS at 08:45

## 2020-03-23 RX ADMIN — ALPRAZOLAM 1 MG: 0.5 TABLET ORAL at 21:56

## 2020-03-23 RX ADMIN — GLIPIZIDE 5 MG: 5 TABLET ORAL at 14:52

## 2020-03-23 ASSESSMENT — PAIN DESCRIPTION - LOCATION: LOCATION: GENERALIZED

## 2020-03-23 ASSESSMENT — PAIN SCALES - GENERAL
PAINLEVEL_OUTOF10: 0
PAINLEVEL_OUTOF10: 8
PAINLEVEL_OUTOF10: 4
PAINLEVEL_OUTOF10: 0
PAINLEVEL_OUTOF10: 8

## 2020-03-23 ASSESSMENT — PAIN DESCRIPTION - PAIN TYPE: TYPE: CHRONIC PAIN

## 2020-03-23 NOTE — PROGRESS NOTES
Department of Internal Medicine  General Internal Medicine   Progress Note     SUBJECTIVE :  Still has moderate respiratory distress  And wheezing     History obtained from chart review and the patient  General ROS: positive for  - fatigue and malaise  negative for - chills, fever or night sweats  Psychological ROS: negative  Respiratory ROS: positive for - shortness of breath and wheezing  negative for - cough, hemoptysis, sputum changes or stridor  Cardiovascular ROS: positive for - chest pain, dyspnea on exertion and edema  negative for - loss of consciousness, orthopnea or palpitations  Gastrointestinal ROS: no abdominal pain, change in bowel habits, or black or bloody stools    OBJECTIVE      Medications      Current Facility-Administered Medications: insulin glargine (LANTUS;BASAGLAR) injection pen 20 Units, 20 Units, Subcutaneous, Nightly  insulin lispro (1 Unit Dial) 5 Units, 5 Units, Subcutaneous, TID WC  sodium chloride (OCEAN, BABY AYR) 0.65 % nasal spray 1 spray, 1 spray, Each Nostril, PRN  methylPREDNISolone sodium (SOLU-MEDROL) injection 40 mg, 40 mg, Intravenous, Q12H  budesonide-formoterol (SYMBICORT) 160-4.5 MCG/ACT inhaler 2 puff, 2 puff, Inhalation, BID  promethazine-dextromethorphan (PROMETHAZINE-DM) 6.25-15 MG/5ML syrup 5 mL, 5 mL, Oral, Q4H PRN  albuterol sulfate  (90 Base) MCG/ACT inhaler 2 puff, 2 puff, Inhalation, Q6H PRN  ALPRAZolam (XANAX) tablet 1 mg, 1 mg, Oral, TID PRN  aspirin chewable tablet 81 mg, 81 mg, Oral, Daily  diclofenac sodium (VOLTAREN) 1 % gel 2 g, 2 g, Topical, BID  gabapentin (NEURONTIN) capsule 300 mg, 300 mg, Oral, BID  glipiZIDE (GLUCOTROL) tablet 5 mg, 5 mg, Oral, BID AC  hydrALAZINE (APRESOLINE) tablet 25 mg, 25 mg, Oral, TID  ibuprofen (ADVIL;MOTRIN) tablet 400 mg, 400 mg, Oral, TID WC  ipratropium-albuterol (DUONEB) nebulizer solution 3 mL, 3 mL, Inhalation, Q4H WA  isosorbide mononitrate (IMDUR) extended release tablet 60 mg, 60 mg, Oral, Daily  lisinopril (PRINIVIL;ZESTRIL) tablet 2.5 mg, 2.5 mg, Oral, Daily  nitroGLYCERIN (NITROSTAT) SL tablet 0.4 mg, 0.4 mg, Sublingual, Q5 Min PRN  miconazole (MICOTIN) 2 % powder, , Topical, BID  pantoprazole (PROTONIX) tablet 40 mg, 40 mg, Oral, QAM AC  rosuvastatin (CRESTOR) tablet 10 mg, 10 mg, Oral, Daily  triamcinolone (KENALOG) 0.1 % cream, , Topical, BID  levoFLOXacin (LEVAQUIN) tablet 500 mg, 500 mg, Oral, Daily  glucose (GLUTOSE) 40 % oral gel 15 g, 15 g, Oral, PRN  dextrose 50 % IV solution, 12.5 g, Intravenous, PRN  glucagon (rDNA) injection 1 mg, 1 mg, Intramuscular, PRN  dextrose 5 % solution, 100 mL/hr, Intravenous, PRN  enoxaparin (LOVENOX) injection 40 mg, 40 mg, Subcutaneous, Daily  insulin lispro (1 Unit Dial) 0-18 Units, 0-18 Units, Subcutaneous, TID WC  insulin lispro (1 Unit Dial) 0-9 Units, 0-9 Units, Subcutaneous, Nightly    Physical      VITALS:    BP (!) 113/57   Pulse 64   Temp 97.9 °F (36.6 °C) (Oral)   Resp 18   Ht 5' 6\" (1.676 m)   Wt 282 lb 14.4 oz (128.3 kg)   SpO2 92%   Breastfeeding No   BMI 45.66 kg/m²   TEMPERATURE:  Current - Temp: 97.9 °F (36.6 °C);  Max - Temp  Av.9 °F (36.6 °C)  Min: 97.5 °F (36.4 °C)  Max: 98.7 °F (37.1 °C)  RESPIRATIONS RANGE: Resp  Av  Min: 18  Max: 22  PULSE RANGE: Pulse  Av.5  Min: 60  Max: 77  BLOOD PRESSURE RANGE:  Systolic (43RGC), UKV:121 , Min:113 , RXE:743   ; Diastolic (45AQX), RAY:03, Min:54, Max:72    PULSE OXIMETRY RANGE: SpO2  Av.4 %  Min: 92 %  Max: 96 %  24HR INTAKE/OUTPUT:      Intake/Output Summary (Last 24 hours) at 3/23/2020 1334  Last data filed at 3/23/2020 1210  Gross per 24 hour   Intake 240 ml   Output --   Net 240 ml     CONSTITUTIONAL:  awake, alert, cooperative, no apparent distress, and appears stated age  NECK:  supple, symmetrical, trachea midline and skin normal  LUNGS:  No increased work of breathing, good air exchange, clear to auscultation bilaterally, no crackles or wheezing  CARDIOVASCULAR:  Normal apical impulse, regular rate and rhythm, normal S1 and S2, no S3 or S4, and no murmur noted  ABDOMEN:  No scars, normal bowel sounds, soft, non-distended, non-tender, no masses palpated, no hepatosplenomegally  MUSCULOSKELETAL:  Trace edema  NEUROLOGIC:  No acute focal change    Data      Lab Results   Component Value Date    PHART 7.446 02/07/2017       Lab Results   Component Value Date     03/23/2020    K 4.4 03/23/2020    K 4.1 01/19/2020    CL 96 03/23/2020    CO2 29 03/23/2020    BUN 29 03/23/2020    CREATININE 0.8 03/23/2020    GLUCOSE 171 03/23/2020    CALCIUM 9.7 03/23/2020     Lab Results   Component Value Date    WBC 15.4 03/23/2020    HGB 16.2 03/23/2020    HCT 50.2 03/23/2020    MCV 88.7 03/23/2020     03/23/2020         Lab Results   Component Value Date    INR 0.97 01/27/2020    PROTIME 11.3 01/27/2020       ASSESSMENT AND PLAN      Patient Active Problem List   Diagnosis    Chronic respiratory failure (HCC)    COPD exacerbation ([address]    CAD (coronary artery disease)    Hyperlipemia    Essential hypertension    DM2 (diabetes mellitus, type 2) ([address]    Primary osteoarthritis involving multiple joints    Gastroesophageal reflux disease without esophagitis    Anxiety    Morbid obesity ([address]    Precordial pain    Acute bronchitis    COPD with acute exacerbation ([address]    AKIL (obstructive sleep apnea)    Chest congestion    DM (diabetes mellitus), secondary uncontrolled (HCC)    Ineffective airway clearance    Mucus plugging of bronchi    Pneumonia of left lower lobe due to infectious organism ([address]     1. Asymmetric volume loss noted in the lingula and left lower lobe, slightly   increased in the left lower lobe since the previous exam on 09/24/2019.  This   is likely related to atelectasis, however, underlying pneumonia cannot be   excluded. 2. There are areas of mucus plugging noted in the lower lobe bronchi versus   aspiration.    3. Pulmonary vascular congestion         CT present care  With Levaquin and steroids    lot of mucous plugs is still a challenge

## 2020-03-23 NOTE — ADT AUTH CERT
Chronic Obstructive Pulmonary Disease - Care Day 10 (3/23/2020) by Tomasa Arteaga, RN         Review Status Review Entered   Completed 3/23/2020 12:49       Criteria Review      Care Day: 10 Care Date: 3/23/2020 Level of Care: Inpatient Floor    Guideline Day 3    Clinical Status    (X) * Hemodynamic stability    3/23/2020 12:49 PM EDT by Adrian Cisneros      VS: 97.7, 60, 22, 134/72, 95% on 3L/NC    (X) * Mental status at baseline    3/23/2020 12:49 PM EDT by Arabella Morillo      A&O    ( ) * No evidence of infection, or outpatient treatment planned    3/23/2020 12:49 PM EDT by Arabella Morillo      continues with pneumonia and COPD    (X) * Uncompensated acidosis absent    3/23/2020 12:49 PM EDT by Arabella Morillo      none noted    ( ) * Oxygenation at baseline or acceptable for next level of care [G]    (X) * Airflow rates at baseline or acceptable for next level of care    3/23/2020 12:49 PM EDT by Arabella Morillo      no rates    (X) * Eating and sleeping without frequent dyspnea    3/23/2020 12:49 PM EDT by Arabella Morillo      noted to have intermittent dyspnea at rest    (X) * Breathing comfortably at rest    3/23/2020 12:49 PM EDT by Arabella Morillo      noted to have intermittent dyspnea at rest    ( ) * Discharge plans and education understood    Activity    (X) * Ambulatory    3/23/2020 12:49 PM EDT by Arabella Morillo      up in room    Routes    (X) * Oral hydration, medications, and diet    3/23/2020 12:49 PM EDT by Arabella Morillo      tolerating po    Interventions    (X) Pulse oximetry    3/23/2020 12:49 PM EDT by Arabella Morillo      95%    (X) Oxygen as needed    3/23/2020 12:49 PM EDT by Arabella Morillo      3L/NC    Medications    ( ) * Inhaled bronchodilator regimen established and feasible at next level of care    ( ) Oral steroids    3/23/2020 12:49 PM EDT by Arabella Morillo      remains on IV solumedrol 40 mg q 12    (X) Possible oral antibiotics    3/23/2020 12:49 PM EDT by Adrian Cisneros      at 1.5 L during the day as well.

## 2020-03-23 NOTE — PROGRESS NOTES
Pulmonary Progress Note      REASON FOR VISIT:  pneumonia    UPDATE:    Patient when seen this morning states that she is better than before, patient still has some cough with chest congestion but it is less as per the patient, patient was having less shortness of breath and less wheezing, patient was not having any fever or chills, patient was afebrile and hemodynamically maintained, patient was on 3 L of nasal cannula oxygen with saturation of 92%, patient's blood sugars are not controlled optimally, patient's urine output has not been recorded in the epic for review, patient was alert and communicative, no other pertinent review of system of concern      MEDICATIONS:  Scheduled Meds:   insulin glargine  20 Units Subcutaneous Nightly    insulin lispro  5 Units Subcutaneous TID WC    methylPREDNISolone  40 mg Intravenous Q12H    budesonide-formoterol  2 puff Inhalation BID    aspirin  81 mg Oral Daily    diclofenac sodium  2 g Topical BID    gabapentin  300 mg Oral BID    glipiZIDE  5 mg Oral BID AC    hydrALAZINE  25 mg Oral TID    ibuprofen  400 mg Oral TID WC    ipratropium-albuterol  3 mL Inhalation Q4H WA    isosorbide mononitrate  60 mg Oral Daily    lisinopril  2.5 mg Oral Daily    miconazole   Topical BID    pantoprazole  40 mg Oral QAM AC    rosuvastatin  10 mg Oral Daily    triamcinolone   Topical BID    levoFLOXacin  500 mg Oral Daily    enoxaparin  40 mg Subcutaneous Daily    insulin lispro  0-18 Units Subcutaneous TID WC    insulin lispro  0-9 Units Subcutaneous Nightly         PHYSICAL EXAM:   Vital Signs: BP (!) 113/57   Pulse 64   Temp 97.9 °F (36.6 °C) (Oral)   Resp 18   Ht 5' 6\" (1.676 m)   Wt 282 lb 14.4 oz (128.3 kg)   SpO2 92%   Breastfeeding No   BMI 45.66 kg/m²       CONSTITUTIONAL:  awake, alert, cooperative, no apparent distress, and appears stated age  NECK:  Supple, symmetrical, trachea midline, no adenopathy, thyroid symmetric,  no tenderness, skin normal neck diameter is increased  LUNGS:   Bilateral chest congestion with wheezing with decreased breath sound intensity when seen  CARDIOVASCULAR: S1 and S2,  no JVD; pedal edema present  ABDOMEN:  normal bowel sounds, non-distended and no masses palpated, and no tenderness to palpation. No hepatospleenomegaly, obese  LYMPHADENOPATHY:  no axillary or supraclavicular adenopathy. No cervical adnenopathy  PSYCHIATRIC: Oriented to person place and time. No obvious depression or anxiety. MUSCULOSKELETAL: No obvious misalignment or effusion of the joints. No clubbing, cyanosis of the digits. SKIN:  normal skin color, texture, turgor and no redness, warmth, or swelling. No palpable nodules    LAB RESULTS:  CBC:   Recent Labs     03/23/20  0546   WBC 15.4*   HGB 16.2*   HCT 50.2*   MCV 88.7        BMP:   Recent Labs     03/23/20  0546      K 4.4   CL 96*   CO2 29   BUN 29*   CREATININE 0.8         CULTURES:  Rapid Influenza Antigen 3/13/20:  Rapid Influenza A Antigen:  Negative  Rapid Influenza B Antigen:  Negative     Results for [employer] (MRN [id number]) as of 3/23/2020 14:06   Ref.  Range 3/19/2020 05:11 3/19/2020 07:49 3/19/2020 11:42 3/19/2020 16:58 3/19/2020 20:29 3/20/2020 08:01 3/20/2020 12:24 3/20/2020 16:59 3/20/2020 20:35 3/21/2020 08:20 3/21/2020 11:23 3/21/2020 17:05 3/21/2020 20:27 3/22/2020 07:59 3/22/2020 11:23 3/22/2020 17:14 3/22/2020 21:46 3/23/2020 05:46   Sodium Latest Ref Range: 136 - 145 mmol/L 138                 136   Potassium Latest Ref Range: 3.5 - 5.1 mmol/L 4.9                 4.4   Chloride Latest Ref Range: 99 - 110 mmol/L 98 (L)                 96 (L)   CO2 Latest Ref Range: 21 - 32 mmol/L 31                 29   BUN Latest Ref Range: 7 - 20 mg/dL 29 (H)                 29 (H)   Creatinine Latest Ref Range: 0.6 - 1.2 mg/dL 0.8                 0.8   Anion Gap Latest Ref Range: 3 - 16  9                 11   GFR Non- Latest Ref Range: >60  >60 >60   GFR  Latest Ref Range: >60  >60                 >60   Glucose Latest Ref Range: 70 - 99 mg/dL 222 (H)                 171 (H)   POC Glucose Latest Ref Range: 70 - 99 mg/dl  169 (H) 315 (H) 193 (H) 104 (H) 193 (H) 293 (H) 118 (H) 244 (H) 134 (H) 276 (H) 83 209 (H) 130 (H) 255 (H) 73 217 (H)    Calcium Latest Ref Range: 8.3 - 10.6 mg/dL 9.6                 9.7      Results for [employer] (MRN [id number]) as of 3/23/2020 14:06   Ref. Range 1/28/2020 04:50 3/13/2020 20:38 3/15/2020 05:50 3/19/2020 05:11 3/23/2020 05:46   WBC Latest Ref Range: 4.0 - 11.0 K/uL 10.3 9.1 10.6 10.0 15.4 (H)   RBC Latest Ref Range: 4.00 - 5.20 M/uL 4.77 5.33 (H) 4.90 5.21 (H) 5.66 (H)   Hemoglobin Quant Latest Ref Range: 12.0 - 16.0 g/dL 14.0 15.8 14.4 15.3 16.2 (H)   Hematocrit Latest Ref Range: 36.0 - 48.0 % 42.9 47.1 43.2 46.1 50.2 (H)   MCV Latest Ref Range: 80.0 - 100.0 fL 90.0 88.3 88.2 88.5 88.7   MCH Latest Ref Range: 26.0 - 34.0 pg 29.5 29.7 29.5 29.4 28.7   MCHC Latest Ref Range: 31.0 - 36.0 g/dL 32.7 33.6 33.4 33.2 32.3   MPV Latest Ref Range: 5.0 - 10.5 fL 8.5 8.2 8.7 8.5 7.9   RDW Latest Ref Range: 12.4 - 15.4 % 14.6 15.9 (H) 15.3 15.9 (H) 15.5 (H)   Platelet Count Latest Ref Range: 135 - 450 K/uL 164 177 183 193 223   Neutrophils % Latest Units: % 57.7 64.1      Lymphocyte % Latest Units: % 30.5 28.6        Results for [employer] (MRN [id number]) as of 3/23/2020 14:06   Ref. Range 1/24/2020 13:00 3/13/2020 22:00   CULTURE, FUNGUS Unknown Rpt    CULTURE, RESPIRATORY Unknown Rpt    AFB Culture (Mycobacteria) Unknown No growth after 6 weeks of incubation. Fungus (Mycology) Culture Unknown No growth after 4 weeks of incubation. Gram Stain Result Unknown 4+ WBC's (Polymor. ..     CULTURE, RESPIRATORY Unknown >100,000 CFU/mL Normal respiratory sandi    CULTURE WITH SMEAR, ACID FAST BACILLIUS Unknown Rpt    AFB Smear Unknown No AFB observed by Fluorescent stain    Fungus Stain Unknown No Fungal elements seen    Rapid Influenza A Ag Latest Ref Range: Negative   Negative   Rapid Influenza B Ag Latest Ref Range: Negative   Negative   RAPID INFLUENZA A/B ANTIGENS Unknown  Rpt   Rsv Source Unknown BAL    Influenza A by PCR Unknown Not Detected    Influenza B by PCR Unknown Detected (A)    RSV by PCR Unknown Not Detected        TWO XRAY VIEWS OF THE CHEST       3/18/2020 3:20 pm       COMPARISON:   March 13, 2020       HISTORY:   ORDERING SYSTEM PROVIDED HISTORY: persistent dyspnea   TECHNOLOGIST PROVIDED HISTORY:   Reason for exam:->persistent dyspnea       FINDINGS:   Cardiac silhouette is enlarged.  No pneumothorax.  Interstitial prominence   and pulmonary vascular congestion centrally, progressed especially in the   bases.  No acute bony abnormality.           Impression   Worsening edema versus infection.           Echo-Summary   Normal left ventricular cavity size and systolic function with an ejection   fraction estimated at 65-70%. Mild septal hypertrophy. No regional wall motion abnormalities noted. Diastolic filling parameters suggest grade II diastolic dysfunction. Mild mitral regurgitation. Mild aortic stenosis with a peak velocity of 2.26 m/s and a mean pressure   gradient of 11 mmHg. There is mild aortic insufficiency. There is mild to moderate right ventricular hypertrophy. The right ventricle   is normal in size and function. Unable to estimate pulmonary artery pressure secondary to incomplete TR jet   envelope.     ASSESSMENT:     Assessment:  Principal Problem:    COPD with acute exacerbation     DM2 (diabetes mellitus, type 2) with hyperglycemia    Morbid obesity (HCC)  AKIL  Acute pulmonary edema  Diastolic II dysfunction     Pulmonary infiltrate              Plan:   · Oxygen supplementation, if required, to keep saturation between 90 to 94% only  · Pulmonary toilet  · Bronchodilators-no need for symbicort with duoneb and systemic steroids   · IV Solu-Medrol to continue for

## 2020-03-24 ENCOUNTER — APPOINTMENT (OUTPATIENT)
Dept: GENERAL RADIOLOGY | Age: 75
DRG: 191 | End: 2020-03-24
Payer: COMMERCIAL

## 2020-03-24 LAB
ANION GAP SERPL CALCULATED.3IONS-SCNC: 12 MMOL/L (ref 3–16)
BASOPHILS ABSOLUTE: 0.1 K/UL (ref 0–0.2)
BASOPHILS RELATIVE PERCENT: 0.3 %
BUN BLDV-MCNC: 27 MG/DL (ref 7–20)
CALCIUM SERPL-MCNC: 10 MG/DL (ref 8.3–10.6)
CHLORIDE BLD-SCNC: 93 MMOL/L (ref 99–110)
CO2: 28 MMOL/L (ref 21–32)
CREAT SERPL-MCNC: 0.8 MG/DL (ref 0.6–1.2)
EOSINOPHILS ABSOLUTE: 0 K/UL (ref 0–0.6)
EOSINOPHILS RELATIVE PERCENT: 0 %
GFR AFRICAN AMERICAN: >60
GFR NON-AFRICAN AMERICAN: >60
GLUCOSE BLD-MCNC: 110 MG/DL (ref 70–99)
GLUCOSE BLD-MCNC: 176 MG/DL (ref 70–99)
GLUCOSE BLD-MCNC: 178 MG/DL (ref 70–99)
GLUCOSE BLD-MCNC: 210 MG/DL (ref 70–99)
GLUCOSE BLD-MCNC: 224 MG/DL (ref 70–99)
HCT VFR BLD CALC: 51.5 % (ref 36–48)
HEMOGLOBIN: 17 G/DL (ref 12–16)
LYMPHOCYTES ABSOLUTE: 1.5 K/UL (ref 1–5.1)
LYMPHOCYTES RELATIVE PERCENT: 8.8 %
MCH RBC QN AUTO: 29 PG (ref 26–34)
MCHC RBC AUTO-ENTMCNC: 33 G/DL (ref 31–36)
MCV RBC AUTO: 87.9 FL (ref 80–100)
MONOCYTES ABSOLUTE: 0.9 K/UL (ref 0–1.3)
MONOCYTES RELATIVE PERCENT: 5.1 %
NEUTROPHILS ABSOLUTE: 14.4 K/UL (ref 1.7–7.7)
NEUTROPHILS RELATIVE PERCENT: 85.8 %
PDW BLD-RTO: 16.1 % (ref 12.4–15.4)
PERFORMED ON: ABNORMAL
PLATELET # BLD: 219 K/UL (ref 135–450)
PMV BLD AUTO: 8.2 FL (ref 5–10.5)
POTASSIUM SERPL-SCNC: 5 MMOL/L (ref 3.5–5.1)
RBC # BLD: 5.86 M/UL (ref 4–5.2)
SODIUM BLD-SCNC: 133 MMOL/L (ref 136–145)
WBC # BLD: 16.8 K/UL (ref 4–11)

## 2020-03-24 PROCEDURE — 6370000000 HC RX 637 (ALT 250 FOR IP): Performed by: INTERNAL MEDICINE

## 2020-03-24 PROCEDURE — 36415 COLL VENOUS BLD VENIPUNCTURE: CPT

## 2020-03-24 PROCEDURE — 6360000002 HC RX W HCPCS: Performed by: INTERNAL MEDICINE

## 2020-03-24 PROCEDURE — 2500000003 HC RX 250 WO HCPCS: Performed by: INTERNAL MEDICINE

## 2020-03-24 PROCEDURE — 80048 BASIC METABOLIC PNL TOTAL CA: CPT

## 2020-03-24 PROCEDURE — 1200000000 HC SEMI PRIVATE

## 2020-03-24 PROCEDURE — 94640 AIRWAY INHALATION TREATMENT: CPT

## 2020-03-24 PROCEDURE — 71045 X-RAY EXAM CHEST 1 VIEW: CPT

## 2020-03-24 PROCEDURE — 99232 SBSQ HOSP IP/OBS MODERATE 35: CPT | Performed by: INTERNAL MEDICINE

## 2020-03-24 PROCEDURE — 85025 COMPLETE CBC W/AUTO DIFF WBC: CPT

## 2020-03-24 PROCEDURE — 94761 N-INVAS EAR/PLS OXIMETRY MLT: CPT

## 2020-03-24 RX ORDER — DOXYCYCLINE HYCLATE 100 MG
100 TABLET ORAL EVERY 12 HOURS SCHEDULED
Status: DISCONTINUED | OUTPATIENT
Start: 2020-03-24 | End: 2020-03-27 | Stop reason: HOSPADM

## 2020-03-24 RX ADMIN — IBUPROFEN 400 MG: 400 TABLET ORAL at 08:06

## 2020-03-24 RX ADMIN — ROSUVASTATIN CALCIUM 10 MG: 10 TABLET, FILM COATED ORAL at 08:07

## 2020-03-24 RX ADMIN — DICLOFENAC 2 G: 10 GEL TOPICAL at 21:28

## 2020-03-24 RX ADMIN — LISINOPRIL 2.5 MG: 5 TABLET ORAL at 08:06

## 2020-03-24 RX ADMIN — ALPRAZOLAM 1 MG: 0.5 TABLET ORAL at 08:07

## 2020-03-24 RX ADMIN — HYDRALAZINE HYDROCHLORIDE 25 MG: 25 TABLET, FILM COATED ORAL at 08:06

## 2020-03-24 RX ADMIN — METHYLPREDNISOLONE SODIUM SUCCINATE 40 MG: 40 INJECTION, POWDER, FOR SOLUTION INTRAMUSCULAR; INTRAVENOUS at 17:07

## 2020-03-24 RX ADMIN — ISOSORBIDE MONONITRATE 60 MG: 60 TABLET, EXTENDED RELEASE ORAL at 08:06

## 2020-03-24 RX ADMIN — INSULIN LISPRO 5 UNITS: 100 INJECTION, SOLUTION INTRAVENOUS; SUBCUTANEOUS at 09:00

## 2020-03-24 RX ADMIN — DICLOFENAC 2 G: 10 GEL TOPICAL at 08:07

## 2020-03-24 RX ADMIN — TRIAMCINOLONE ACETONIDE: 1 CREAM TOPICAL at 08:08

## 2020-03-24 RX ADMIN — TRIAMCINOLONE ACETONIDE: 1 CREAM TOPICAL at 21:29

## 2020-03-24 RX ADMIN — IBUPROFEN 400 MG: 400 TABLET ORAL at 14:50

## 2020-03-24 RX ADMIN — IPRATROPIUM BROMIDE AND ALBUTEROL SULFATE 3 ML: .5; 3 SOLUTION RESPIRATORY (INHALATION) at 15:15

## 2020-03-24 RX ADMIN — GLIPIZIDE 5 MG: 5 TABLET ORAL at 08:06

## 2020-03-24 RX ADMIN — METHYLPREDNISOLONE SODIUM SUCCINATE 40 MG: 40 INJECTION, POWDER, FOR SOLUTION INTRAMUSCULAR; INTRAVENOUS at 04:37

## 2020-03-24 RX ADMIN — ASPIRIN 81 MG 81 MG: 81 TABLET ORAL at 08:06

## 2020-03-24 RX ADMIN — PROMETHAZINE HYDROCHLORIDE AND DEXTROMETHORPHAN HYDROBROMIDE 5 ML: 15; 6.25 SYRUP ORAL at 15:08

## 2020-03-24 RX ADMIN — INSULIN LISPRO 6 UNITS: 100 INJECTION, SOLUTION INTRAVENOUS; SUBCUTANEOUS at 14:50

## 2020-03-24 RX ADMIN — PANTOPRAZOLE SODIUM 40 MG: 40 TABLET, DELAYED RELEASE ORAL at 06:20

## 2020-03-24 RX ADMIN — DOXYCYCLINE HYCLATE 100 MG: 100 TABLET, COATED ORAL at 21:28

## 2020-03-24 RX ADMIN — INSULIN LISPRO 3 UNITS: 100 INJECTION, SOLUTION INTRAVENOUS; SUBCUTANEOUS at 09:00

## 2020-03-24 RX ADMIN — MICONAZOLE NITRATE: 20 POWDER TOPICAL at 21:29

## 2020-03-24 RX ADMIN — PROMETHAZINE HYDROCHLORIDE AND DEXTROMETHORPHAN HYDROBROMIDE 5 ML: 15; 6.25 SYRUP ORAL at 02:26

## 2020-03-24 RX ADMIN — HYDRALAZINE HYDROCHLORIDE 25 MG: 25 TABLET, FILM COATED ORAL at 21:28

## 2020-03-24 RX ADMIN — GABAPENTIN 300 MG: 300 CAPSULE ORAL at 08:06

## 2020-03-24 RX ADMIN — MICONAZOLE NITRATE: 20 POWDER TOPICAL at 08:08

## 2020-03-24 RX ADMIN — IPRATROPIUM BROMIDE AND ALBUTEROL SULFATE 3 ML: .5; 3 SOLUTION RESPIRATORY (INHALATION) at 11:43

## 2020-03-24 RX ADMIN — GABAPENTIN 300 MG: 300 CAPSULE ORAL at 21:28

## 2020-03-24 RX ADMIN — ALPRAZOLAM 1 MG: 0.5 TABLET ORAL at 21:28

## 2020-03-24 RX ADMIN — INSULIN LISPRO 3 UNITS: 100 INJECTION, SOLUTION INTRAVENOUS; SUBCUTANEOUS at 21:34

## 2020-03-24 RX ADMIN — HYDRALAZINE HYDROCHLORIDE 25 MG: 25 TABLET, FILM COATED ORAL at 14:50

## 2020-03-24 RX ADMIN — IBUPROFEN 400 MG: 400 TABLET ORAL at 17:06

## 2020-03-24 RX ADMIN — PROMETHAZINE HYDROCHLORIDE AND DEXTROMETHORPHAN HYDROBROMIDE 5 ML: 15; 6.25 SYRUP ORAL at 23:47

## 2020-03-24 RX ADMIN — IPRATROPIUM BROMIDE AND ALBUTEROL SULFATE 3 ML: .5; 3 SOLUTION RESPIRATORY (INHALATION) at 08:13

## 2020-03-24 RX ADMIN — ENOXAPARIN SODIUM 40 MG: 40 INJECTION SUBCUTANEOUS at 08:08

## 2020-03-24 RX ADMIN — GLIPIZIDE 5 MG: 5 TABLET ORAL at 17:06

## 2020-03-24 RX ADMIN — INSULIN LISPRO 5 UNITS: 100 INJECTION, SOLUTION INTRAVENOUS; SUBCUTANEOUS at 17:41

## 2020-03-24 RX ADMIN — DOXYCYCLINE HYCLATE 100 MG: 100 TABLET, COATED ORAL at 10:10

## 2020-03-24 RX ADMIN — INSULIN LISPRO 5 UNITS: 100 INJECTION, SOLUTION INTRAVENOUS; SUBCUTANEOUS at 14:51

## 2020-03-24 ASSESSMENT — PAIN SCALES - GENERAL
PAINLEVEL_OUTOF10: 3

## 2020-03-24 NOTE — PROGRESS NOTES
Assessment completed, see flowsheet for details. Respiratory: End expiratory wheezing auscultated throughout all lobes. O2 stats dropped to 88% on RA, now on PRN oxygen. GI: Bowel sounds are present in all quadrants. No N/V reported. : Within defined limits. Neuro: Pt A/O x4     Peripheral vascular: Pulses are palpable in all extremities. Skin: Skin tx applied to pt. No evidence of new skin breakdown assessed during shift. Pain: Pt reported a 0 on a 0-10 scale. /62   Pulse 89   Temp 98.1 °F (36.7 °C) (Oral)   Resp 18   Ht 5' 6\" (1.676 m)   Wt 282 lb 14.4 oz (128.3 kg)   SpO2 90%   Breastfeeding No   BMI 45.66 kg/m²      Call light within reach. Will continue to monitor.

## 2020-03-24 NOTE — PROGRESS NOTES
Sublingual, Q5 Min PRN  miconazole (MICOTIN) 2 % powder, , Topical, BID  pantoprazole (PROTONIX) tablet 40 mg, 40 mg, Oral, QAM AC  rosuvastatin (CRESTOR) tablet 10 mg, 10 mg, Oral, Daily  triamcinolone (KENALOG) 0.1 % cream, , Topical, BID  glucose (GLUTOSE) 40 % oral gel 15 g, 15 g, Oral, PRN  dextrose 50 % IV solution, 12.5 g, Intravenous, PRN  glucagon (rDNA) injection 1 mg, 1 mg, Intramuscular, PRN  dextrose 5 % solution, 100 mL/hr, Intravenous, PRN  enoxaparin (LOVENOX) injection 40 mg, 40 mg, Subcutaneous, Daily  insulin lispro (1 Unit Dial) 0-18 Units, 0-18 Units, Subcutaneous, TID WC  insulin lispro (1 Unit Dial) 0-9 Units, 0-9 Units, Subcutaneous, Nightly    Physical      VITALS:    /74   Pulse 75   Temp 98.3 °F (36.8 °C) (Axillary)   Resp 18   Ht 5' 6\" (1.676 m)   Wt 282 lb 14.4 oz (128.3 kg)   SpO2 93%   Breastfeeding No   BMI 45.66 kg/m²   TEMPERATURE:  Current - Temp: 98.3 °F (36.8 °C);  Max - Temp  Av °F (36.7 °C)  Min: 97.7 °F (36.5 °C)  Max: 98.3 °F (36.8 °C)  RESPIRATIONS RANGE: Resp  Av.9  Min: 18  Max: 22  PULSE RANGE: Pulse  Av  Min: 60  Max: 89  BLOOD PRESSURE RANGE:  Systolic (47OOT), DZE:158 , Min:113 , GTK:394   ; Diastolic (02GOK), JACQUIE:09, Min:57, Max:74    PULSE OXIMETRY RANGE: SpO2  Av.6 %  Min: 90 %  Max: 95 %  24HR INTAKE/OUTPUT:      Intake/Output Summary (Last 24 hours) at 3/24/2020 0001  Last data filed at 3/23/2020 1638  Gross per 24 hour   Intake 960 ml   Output 700 ml   Net 260 ml     CONSTITUTIONAL:  awake, alert, cooperative, no apparent distress, and appears stated age  NECK:  supple, symmetrical, trachea midline and skin normal  LUNGS:  No increased work of breathing, good air exchange, clear to auscultation bilaterally, no crackles or wheezing  CARDIOVASCULAR:  Normal apical impulse, regular rate and rhythm, normal S1 and S2, no S3 or S4, and no murmur noted  ABDOMEN:  No scars, normal bowel sounds, soft, non-distended, non-tender, no masses palpated, no hepatosplenomegally  MUSCULOSKELETAL:  Trace edema  NEUROLOGIC:  No acute focal change    Data      Lab Results   Component Value Date    PHART 7.446 02/07/2017       Lab Results   Component Value Date     03/23/2020    K 4.4 03/23/2020    K 4.1 01/19/2020    CL 96 03/23/2020    CO2 29 03/23/2020    BUN 29 03/23/2020    CREATININE 0.8 03/23/2020    GLUCOSE 171 03/23/2020    CALCIUM 9.7 03/23/2020     Lab Results   Component Value Date    WBC 15.4 03/23/2020    HGB 16.2 03/23/2020    HCT 50.2 03/23/2020    MCV 88.7 03/23/2020     03/23/2020         Lab Results   Component Value Date    INR 0.97 01/27/2020    PROTIME 11.3 01/27/2020       ASSESSMENT AND PLAN      Patient Active Problem List   Diagnosis    Chronic respiratory failure (HCC)    [facility] exacerbation [facility]    CAD (coronary artery disease)    Hyperlipemia    Essential hypertension    DM2 (diabetes mellitus, type 2) ([address]    Primary osteoarthritis involving multiple joints    Gastroesophageal reflux disease without esophagitis    Anxiety    Morbid obesity (HCC)    Precordial pain    Acute bronchitis    COPD with acute exacerbation ([address]    AKIL (obstructive sleep apnea)    Chest congestion    DM (diabetes mellitus), secondary uncontrolled (HCC)    Ineffective airway clearance    Mucus plugging of bronchi    Pneumonia of left lower lobe due to infectious organism ([address]    [facility] ([facility]    Grade II diastolic dysfunction     1. Asymmetric volume loss noted in the lingula and left lower lobe, slightly   increased in the left lower lobe since the previous exam on 09/24/2019.  This   is likely related to atelectasis, however, underlying pneumonia cannot be   excluded. 2. There are areas of mucus plugging noted in the lower lobe bronchi versus   aspiration.    3. Pulmonary vascular congestion         CT present care  , off antibiotics    wean IV steroids  Still needs inpatient care   Repeat CXR on Tuesday AM

## 2020-03-24 NOTE — PROGRESS NOTES
Incentive Spirometry education and demonstration completed by Respiratory Therapy Yes      Response to education: Very Good     Teaching Time: 5 minutes    Minimum Predicted Vital Capacity - 593 mL. Patient's Actual Vital Capacity - 750 mL. Turning over to Nursing for routine follow-up Yes.     Comments:     Electronically signed by Elvie Mae RCP on 3/24/2020 at 8:26 AM

## 2020-03-24 NOTE — PROGRESS NOTES
Pulmonary Progress Note      REASON FOR VISIT:  pneumonia    UPDATE:    Patient when seen this morning states that she is not feeling well, patient had a rough night, patient states that her IV had stopped working and they had to put a new IV in the middle of the night along with that patient states that she has been having persistent chest congestion which is not significant improved as compared to yesterday, patient also has been having some increased wheezing, patient does not have any chest pain, patient does not have any fever or chills, patient when seen was not hypoxemic and was on room air oxygen with saturation of 92%, patient was having increased blood sugars which are better as compared to yesterday but still suboptimal, patient had good urine output with Lasix, patient's cumulative fluid balance is +380 mL, patient was alert and oriented no other pertinent review of system of concern    MEDICATIONS:  Scheduled Meds:   doxycycline hyclate  100 mg Oral 2 times per day    insulin glargine  20 Units Subcutaneous Nightly    insulin lispro  5 Units Subcutaneous TID WC    methylPREDNISolone  40 mg Intravenous Q12H    aspirin  81 mg Oral Daily    diclofenac sodium  2 g Topical BID    gabapentin  300 mg Oral BID    glipiZIDE  5 mg Oral BID AC    hydrALAZINE  25 mg Oral TID    ibuprofen  400 mg Oral TID WC    ipratropium-albuterol  3 mL Inhalation Q4H WA    isosorbide mononitrate  60 mg Oral Daily    lisinopril  2.5 mg Oral Daily    miconazole   Topical BID    pantoprazole  40 mg Oral QAM AC    rosuvastatin  10 mg Oral Daily    triamcinolone   Topical BID    enoxaparin  40 mg Subcutaneous Daily    insulin lispro  0-18 Units Subcutaneous TID WC    insulin lispro  0-9 Units Subcutaneous Nightly         PHYSICAL EXAM:   Vital Signs: /68   Pulse 62   Temp 98.3 °F (36.8 °C) (Oral)   Resp 16   Ht 5' 6\" (1.676 m)   Wt 282 lb 14.4 oz (128.3 kg)   SpO2 92%   Breastfeeding No   BMI 45.66 Latest Ref Range: 3 - 16  11      12   GFR Non- Latest Ref Range: >60  >60      >60   GFR  Latest Ref Range: >60  >60      >60   Glucose Latest Ref Range: 70 - 99 mg/dL 171 (H)      176 (H)   POC Glucose Latest Ref Range: 70 - 99 mg/dl  126 (H) 287 (H) 110 (H) 330 (H) 178 (H)    Calcium Latest Ref Range: 8.3 - 10.6 mg/dL 9.7      10.0     Results for Hilaria Hinton (MRN [id number]) as of 3/24/2020 10:34   Ref.  Range 3/13/2020 20:38 3/15/2020 05:50 3/19/2020 05:11 3/23/2020 05:46 3/24/2020 08:22   WBC Latest Ref Range: 4.0 - 11.0 K/uL 9.1 10.6 10.0 15.4 (H) 16.8 (H)   RBC Latest Ref Range: 4.00 - 5.20 M/uL 5.33 (H) 4.90 5.21 (H) 5.66 (H) 5.86 (H)   Hemoglobin Quant Latest Ref Range: 12.0 - 16.0 g/dL 15.8 14.4 15.3 16.2 (H) 17.0 (H)   Hematocrit Latest Ref Range: 36.0 - 48.0 % 47.1 43.2 46.1 50.2 (H) 51.5 (H)   MCV Latest Ref Range: 80.0 - 100.0 fL 88.3 88.2 88.5 88.7 87.9   MCH Latest Ref Range: 26.0 - 34.0 pg 29.7 29.5 29.4 28.7 29.0   MCHC Latest Ref Range: 31.0 - 36.0 g/dL 33.6 33.4 33.2 32.3 33.0   MPV Latest Ref Range: 5.0 - 10.5 fL 8.2 8.7 8.5 7.9 8.2   RDW Latest Ref Range: 12.4 - 15.4 % 15.9 (H) 15.3 15.9 (H) 15.5 (H) 16.1 (H)   Platelet Count Latest Ref Range: 135 - 450 K/uL 177 183 193 223 219   Neutrophils % Latest Units: % 64.1    85.8   Lymphocyte % Latest Units: % 28.6    8.8   Monocytes % Latest Units: % 6.1    5.1     ONE XRAY VIEW OF THE CHEST       3/24/2020 8:47 am       COMPARISON:   Chest radiograph 03/18/2020.  CT chest 03/19/2020.       HISTORY:   ORDERING SYSTEM PROVIDED HISTORY: SOB   TECHNOLOGIST PROVIDED HISTORY:   Reason for exam:->SOB   Reason for Exam: SOB   Acuity: Unknown   Type of Exam: Subsequent/Follow-up       FINDINGS:   Single view provided.  Moderate rotation.  Stable mediastinal and cardiac   silhouettes accentuated by the rotation.  There is stable left lung base   consolidation.  No diffuse pulmonary opacities.  No significant effusion or pneumothorax.           Impression   Stable left lung base consolidation. Echo-Summary   Normal left ventricular cavity size and systolic function with an ejection   fraction estimated at 65-70%. Mild septal hypertrophy. No regional wall motion abnormalities noted. Diastolic filling parameters suggest grade II diastolic dysfunction. Mild mitral regurgitation. Mild aortic stenosis with a peak velocity of 2.26 m/s and a mean pressure   gradient of 11 mmHg. There is mild aortic insufficiency. There is mild to moderate right ventricular hypertrophy. The right ventricle   is normal in size and function. Unable to estimate pulmonary artery pressure secondary to incomplete TR jet   envelope.     ASSESSMENT:     Assessment:  Principal Problem:    COPD with acute exacerbation     DM2 (diabetes mellitus, type 2) with hyperglycemia    Morbid obesity (HCC)  AKIL  Acute pulmonary edema  Diastolic II dysfunction     Pulmonary infiltrate              Plan:   · Oxygen supplementation, if required, to keep saturation between 90 to 94% only-patient   · Pulmonary toilet  · Bronchodilator  · IV Solu-Medrol to continue for now-patient continues to be wheezing -will not transition to oral prednisone   · Patient's blood sugars are on the higher side which may be partly because of steroids and titration of insulins as per internal medicine team  · Basaglar  insulin to be titrated as per blood glucose monitoring by IM  · BGM with SSI  · Patient's clinical status and radiology reviewed   · Patient's WBC count is higher and has increased chest congestion   · Will start doxy and reassess   · Will give one dose of lasix  · Strict I/O and BMP  · Correct electrolytes on PRN basis   · Incentive spirometry ordered  · Internal medicine to decide about continuation of glipizide  · Monitor for any hypoglycemia  · Avoid sedatives and narcotics  · Antihypertensives as per IM  · Monitor for any hypoventilation and hypercarbia   · PUD and DVT prophylaxis as per IM    Tc mr MD

## 2020-03-25 LAB
GLUCOSE BLD-MCNC: 165 MG/DL (ref 70–99)
GLUCOSE BLD-MCNC: 250 MG/DL (ref 70–99)
GLUCOSE BLD-MCNC: 264 MG/DL (ref 70–99)
GLUCOSE BLD-MCNC: 70 MG/DL (ref 70–99)
PERFORMED ON: ABNORMAL
PERFORMED ON: NORMAL

## 2020-03-25 PROCEDURE — 1200000000 HC SEMI PRIVATE

## 2020-03-25 PROCEDURE — 6360000002 HC RX W HCPCS: Performed by: INTERNAL MEDICINE

## 2020-03-25 PROCEDURE — 94760 N-INVAS EAR/PLS OXIMETRY 1: CPT

## 2020-03-25 PROCEDURE — 99232 SBSQ HOSP IP/OBS MODERATE 35: CPT | Performed by: INTERNAL MEDICINE

## 2020-03-25 PROCEDURE — 2700000000 HC OXYGEN THERAPY PER DAY

## 2020-03-25 PROCEDURE — 94640 AIRWAY INHALATION TREATMENT: CPT

## 2020-03-25 PROCEDURE — 6370000000 HC RX 637 (ALT 250 FOR IP): Performed by: INTERNAL MEDICINE

## 2020-03-25 PROCEDURE — 2500000003 HC RX 250 WO HCPCS: Performed by: INTERNAL MEDICINE

## 2020-03-25 RX ORDER — METFORMIN HYDROCHLORIDE 500 MG/1
1000 TABLET, EXTENDED RELEASE ORAL 2 TIMES DAILY WITH MEALS
Status: DISCONTINUED | OUTPATIENT
Start: 2020-03-25 | End: 2020-03-26

## 2020-03-25 RX ORDER — INSULIN LISPRO 100 [IU]/ML
10 INJECTION, SOLUTION INTRAVENOUS; SUBCUTANEOUS
Status: DISCONTINUED | OUTPATIENT
Start: 2020-03-25 | End: 2020-03-27 | Stop reason: HOSPADM

## 2020-03-25 RX ADMIN — ASPIRIN 81 MG 81 MG: 81 TABLET ORAL at 08:52

## 2020-03-25 RX ADMIN — INSULIN LISPRO 3 UNITS: 100 INJECTION, SOLUTION INTRAVENOUS; SUBCUTANEOUS at 08:54

## 2020-03-25 RX ADMIN — TRIAMCINOLONE ACETONIDE: 1 CREAM TOPICAL at 21:01

## 2020-03-25 RX ADMIN — INSULIN LISPRO 10 UNITS: 100 INJECTION, SOLUTION INTRAVENOUS; SUBCUTANEOUS at 13:02

## 2020-03-25 RX ADMIN — ALPRAZOLAM 1 MG: 0.5 TABLET ORAL at 22:19

## 2020-03-25 RX ADMIN — IPRATROPIUM BROMIDE AND ALBUTEROL SULFATE 3 ML: .5; 3 SOLUTION RESPIRATORY (INHALATION) at 11:58

## 2020-03-25 RX ADMIN — IBUPROFEN 400 MG: 400 TABLET ORAL at 13:02

## 2020-03-25 RX ADMIN — GABAPENTIN 300 MG: 300 CAPSULE ORAL at 20:58

## 2020-03-25 RX ADMIN — HYDRALAZINE HYDROCHLORIDE 25 MG: 25 TABLET, FILM COATED ORAL at 13:01

## 2020-03-25 RX ADMIN — METHYLPREDNISOLONE SODIUM SUCCINATE 40 MG: 40 INJECTION, POWDER, FOR SOLUTION INTRAMUSCULAR; INTRAVENOUS at 04:04

## 2020-03-25 RX ADMIN — ISOSORBIDE MONONITRATE 60 MG: 60 TABLET, EXTENDED RELEASE ORAL at 08:52

## 2020-03-25 RX ADMIN — HYDRALAZINE HYDROCHLORIDE 25 MG: 25 TABLET, FILM COATED ORAL at 20:58

## 2020-03-25 RX ADMIN — INSULIN GLARGINE 25 UNITS: 100 INJECTION, SOLUTION SUBCUTANEOUS at 21:46

## 2020-03-25 RX ADMIN — IPRATROPIUM BROMIDE AND ALBUTEROL SULFATE 3 ML: .5; 3 SOLUTION RESPIRATORY (INHALATION) at 20:30

## 2020-03-25 RX ADMIN — ALPRAZOLAM 1 MG: 0.5 TABLET ORAL at 14:30

## 2020-03-25 RX ADMIN — GLIPIZIDE 5 MG: 5 TABLET ORAL at 06:32

## 2020-03-25 RX ADMIN — LISINOPRIL 2.5 MG: 5 TABLET ORAL at 08:52

## 2020-03-25 RX ADMIN — IBUPROFEN 400 MG: 400 TABLET ORAL at 18:11

## 2020-03-25 RX ADMIN — MICONAZOLE NITRATE: 20 POWDER TOPICAL at 09:09

## 2020-03-25 RX ADMIN — MICONAZOLE NITRATE: 20 POWDER TOPICAL at 21:13

## 2020-03-25 RX ADMIN — DICLOFENAC 2 G: 10 GEL TOPICAL at 08:51

## 2020-03-25 RX ADMIN — TRIAMCINOLONE ACETONIDE: 1 CREAM TOPICAL at 09:09

## 2020-03-25 RX ADMIN — ROSUVASTATIN CALCIUM 10 MG: 10 TABLET, FILM COATED ORAL at 08:52

## 2020-03-25 RX ADMIN — INSULIN LISPRO 9 UNITS: 100 INJECTION, SOLUTION INTRAVENOUS; SUBCUTANEOUS at 13:02

## 2020-03-25 RX ADMIN — DICLOFENAC 2 G: 10 GEL TOPICAL at 21:01

## 2020-03-25 RX ADMIN — DOXYCYCLINE HYCLATE 100 MG: 100 TABLET, COATED ORAL at 08:51

## 2020-03-25 RX ADMIN — GABAPENTIN 300 MG: 300 CAPSULE ORAL at 08:51

## 2020-03-25 RX ADMIN — GLIPIZIDE 5 MG: 5 TABLET ORAL at 16:20

## 2020-03-25 RX ADMIN — INSULIN LISPRO 5 UNITS: 100 INJECTION, SOLUTION INTRAVENOUS; SUBCUTANEOUS at 08:55

## 2020-03-25 RX ADMIN — IBUPROFEN 400 MG: 400 TABLET ORAL at 08:52

## 2020-03-25 RX ADMIN — HYDRALAZINE HYDROCHLORIDE 25 MG: 25 TABLET, FILM COATED ORAL at 08:52

## 2020-03-25 RX ADMIN — ALPRAZOLAM 1 MG: 0.5 TABLET ORAL at 06:28

## 2020-03-25 RX ADMIN — METHYLPREDNISOLONE SODIUM SUCCINATE 40 MG: 40 INJECTION, POWDER, FOR SOLUTION INTRAMUSCULAR; INTRAVENOUS at 16:20

## 2020-03-25 RX ADMIN — IPRATROPIUM BROMIDE AND ALBUTEROL SULFATE 3 ML: .5; 3 SOLUTION RESPIRATORY (INHALATION) at 15:46

## 2020-03-25 RX ADMIN — IPRATROPIUM BROMIDE AND ALBUTEROL SULFATE 3 ML: .5; 3 SOLUTION RESPIRATORY (INHALATION) at 08:03

## 2020-03-25 RX ADMIN — ENOXAPARIN SODIUM 40 MG: 40 INJECTION SUBCUTANEOUS at 08:52

## 2020-03-25 RX ADMIN — PANTOPRAZOLE SODIUM 40 MG: 40 TABLET, DELAYED RELEASE ORAL at 06:32

## 2020-03-25 RX ADMIN — DOXYCYCLINE HYCLATE 100 MG: 100 TABLET, COATED ORAL at 20:58

## 2020-03-25 RX ADMIN — INSULIN LISPRO 5 UNITS: 100 INJECTION, SOLUTION INTRAVENOUS; SUBCUTANEOUS at 21:45

## 2020-03-25 ASSESSMENT — PAIN SCALES - GENERAL
PAINLEVEL_OUTOF10: 0
PAINLEVEL_OUTOF10: 2
PAINLEVEL_OUTOF10: 0
PAINLEVEL_OUTOF10: 0

## 2020-03-25 NOTE — PROGRESS NOTES
symmetric,  no tenderness, skin normal neck diameter is increased  LUNGS:   Bilateral minimal chest congestion ; patient continues to have wheezing with decreased breath sound intensity when seen which has not changed as compared to yesterday  CARDIOVASCULAR: S1 and S2,  no JVD; pedal edema present  ABDOMEN:  normal bowel sounds, non-distended and no masses palpated, and no tenderness to palpation. No hepatospleenomegaly, obese  LYMPHADENOPATHY:  no axillary or supraclavicular adenopathy. No cervical adnenopathy  PSYCHIATRIC: Oriented to person place and time. No obvious depression or anxiety. MUSCULOSKELETAL: No obvious misalignment or effusion of the joints. No clubbing, cyanosis of the digits. SKIN:  normal skin color, texture, turgor and no redness, warmth, or swelling. No palpable nodules    LAB RESULTS:  CBC:   Recent Labs     03/23/20  0546 03/24/20  0822   WBC 15.4* 16.8*   HGB 16.2* 17.0*   HCT 50.2* 51.5*   MCV 88.7 87.9    219     BMP:   Recent Labs     03/23/20  0546 03/24/20  0822    133*   K 4.4 5.0   CL 96* 93*   CO2 29 28   BUN 29* 27*   CREATININE 0.8 0.8         CULTURES:  Rapid Influenza Antigen 3/13/20:  Rapid Influenza A Antigen:  Negative  Rapid Influenza B Antigen:  Negative           Echo-Summary   Normal left ventricular cavity size and systolic function with an ejection   fraction estimated at 65-70%. Mild septal hypertrophy. No regional wall motion abnormalities noted. Diastolic filling parameters suggest grade II diastolic dysfunction. Mild mitral regurgitation. Mild aortic stenosis with a peak velocity of 2.26 m/s and a mean pressure   gradient of 11 mmHg. There is mild aortic insufficiency. There is mild to moderate right ventricular hypertrophy. The right ventricle   is normal in size and function. Unable to estimate pulmonary artery pressure secondary to incomplete TR jet   envelope.     ASSESSMENT:     Assessment:  Principal Problem:    COPD with acute exacerbation     DM2 (diabetes mellitus, type 2) with hyperglycemia    Morbid obesity (HCC)  AKIL  Acute pulmonary edema  Diastolic II dysfunction     Pulmonary infiltrate              Plan:   · Oxygen supplementation, if required, to keep saturation between 90 to 94% only-patient   · Pulmonary toilet  · Bronchodilator  · IV Solu-Medrol to continue for now-patient continues to be wheezing -will not transition to oral prednisone today may consider tomorrow morning  · Patient's blood sugars are on the higher side which may be partly because of steroids and titration of insulins as per internal medicine team  · Basaglar  insulin to be titrated as per blood glucose monitoring by IM-patient's blood sugars are not controlled  · BGM with SSI  · Patient's clinical status and radiology reviewed   · Patient's WBC count is higher and has increased chest congestion   · Continue p.o. doxycycline  · Strict I/O and BMP  · Correct electrolytes on PRN basis   · Incentive spirometry ordered  · Internal medicine to decide about continuation of glipizide  · Monitor for any hypoglycemia  · Avoid sedatives and narcotics  · Antihypertensives as per IM  · Monitor for any hypoventilation and hypercarbia   · PUD and DVT prophylaxis as per IM    Tc mr MD

## 2020-03-26 LAB
BACTERIA: ABNORMAL /HPF
BILIRUBIN URINE: NEGATIVE
BLOOD, URINE: NEGATIVE
CLARITY: ABNORMAL
COLOR: YELLOW
EPITHELIAL CELLS, UA: 1 /HPF (ref 0–5)
GLUCOSE BLD-MCNC: 175 MG/DL (ref 70–99)
GLUCOSE BLD-MCNC: 175 MG/DL (ref 70–99)
GLUCOSE BLD-MCNC: 201 MG/DL (ref 70–99)
GLUCOSE BLD-MCNC: 210 MG/DL (ref 70–99)
GLUCOSE BLD-MCNC: 301 MG/DL (ref 70–99)
GLUCOSE URINE: 500 MG/DL
HYALINE CASTS: 2 /LPF (ref 0–8)
KETONES, URINE: NEGATIVE MG/DL
LEUKOCYTE ESTERASE, URINE: ABNORMAL
MICROSCOPIC EXAMINATION: YES
NITRITE, URINE: NEGATIVE
PERFORMED ON: ABNORMAL
PH UA: 5.5 (ref 5–8)
PROTEIN UA: NEGATIVE MG/DL
RBC UA: 2 /HPF (ref 0–4)
SPECIFIC GRAVITY UA: 1.03 (ref 1–1.03)
URINE REFLEX TO CULTURE: YES
URINE TYPE: ABNORMAL
UROBILINOGEN, URINE: 1 E.U./DL
WBC UA: 209 /HPF (ref 0–5)

## 2020-03-26 PROCEDURE — 81001 URINALYSIS AUTO W/SCOPE: CPT

## 2020-03-26 PROCEDURE — 2700000000 HC OXYGEN THERAPY PER DAY

## 2020-03-26 PROCEDURE — 6360000002 HC RX W HCPCS: Performed by: INTERNAL MEDICINE

## 2020-03-26 PROCEDURE — 87086 URINE CULTURE/COLONY COUNT: CPT

## 2020-03-26 PROCEDURE — 87077 CULTURE AEROBIC IDENTIFY: CPT

## 2020-03-26 PROCEDURE — 6370000000 HC RX 637 (ALT 250 FOR IP): Performed by: INTERNAL MEDICINE

## 2020-03-26 PROCEDURE — 94761 N-INVAS EAR/PLS OXIMETRY MLT: CPT

## 2020-03-26 PROCEDURE — 1200000000 HC SEMI PRIVATE

## 2020-03-26 PROCEDURE — 99232 SBSQ HOSP IP/OBS MODERATE 35: CPT | Performed by: INTERNAL MEDICINE

## 2020-03-26 PROCEDURE — 94640 AIRWAY INHALATION TREATMENT: CPT

## 2020-03-26 PROCEDURE — 87186 SC STD MICRODIL/AGAR DIL: CPT

## 2020-03-26 RX ORDER — PREDNISONE 20 MG/1
40 TABLET ORAL DAILY
Status: DISCONTINUED | OUTPATIENT
Start: 2020-03-26 | End: 2020-03-27 | Stop reason: HOSPADM

## 2020-03-26 RX ADMIN — IBUPROFEN 400 MG: 400 TABLET ORAL at 09:12

## 2020-03-26 RX ADMIN — INSULIN LISPRO 3 UNITS: 100 INJECTION, SOLUTION INTRAVENOUS; SUBCUTANEOUS at 23:26

## 2020-03-26 RX ADMIN — INSULIN LISPRO 12 UNITS: 100 INJECTION, SOLUTION INTRAVENOUS; SUBCUTANEOUS at 12:33

## 2020-03-26 RX ADMIN — ASPIRIN 81 MG 81 MG: 81 TABLET ORAL at 09:11

## 2020-03-26 RX ADMIN — ALPRAZOLAM 1 MG: 0.5 TABLET ORAL at 06:40

## 2020-03-26 RX ADMIN — DOXYCYCLINE HYCLATE 100 MG: 100 TABLET, COATED ORAL at 21:22

## 2020-03-26 RX ADMIN — LINAGLIPTIN 5 MG: 5 TABLET, FILM COATED ORAL at 09:11

## 2020-03-26 RX ADMIN — PROMETHAZINE HYDROCHLORIDE AND DEXTROMETHORPHAN HYDROBROMIDE 5 ML: 15; 6.25 SYRUP ORAL at 23:59

## 2020-03-26 RX ADMIN — HYDRALAZINE HYDROCHLORIDE 25 MG: 25 TABLET, FILM COATED ORAL at 21:22

## 2020-03-26 RX ADMIN — Medication 1 G: at 18:16

## 2020-03-26 RX ADMIN — DICLOFENAC 2 G: 10 GEL TOPICAL at 21:24

## 2020-03-26 RX ADMIN — INSULIN LISPRO 10 UNITS: 100 INJECTION, SOLUTION INTRAVENOUS; SUBCUTANEOUS at 12:36

## 2020-03-26 RX ADMIN — MICONAZOLE NITRATE: 20 POWDER TOPICAL at 21:32

## 2020-03-26 RX ADMIN — INSULIN LISPRO 10 UNITS: 100 INJECTION, SOLUTION INTRAVENOUS; SUBCUTANEOUS at 18:06

## 2020-03-26 RX ADMIN — IPRATROPIUM BROMIDE AND ALBUTEROL SULFATE 3 ML: .5; 3 SOLUTION RESPIRATORY (INHALATION) at 15:42

## 2020-03-26 RX ADMIN — PROMETHAZINE HYDROCHLORIDE AND DEXTROMETHORPHAN HYDROBROMIDE 5 ML: 15; 6.25 SYRUP ORAL at 00:13

## 2020-03-26 RX ADMIN — INSULIN LISPRO 10 UNITS: 100 INJECTION, SOLUTION INTRAVENOUS; SUBCUTANEOUS at 09:14

## 2020-03-26 RX ADMIN — METHYLPREDNISOLONE SODIUM SUCCINATE 40 MG: 40 INJECTION, POWDER, FOR SOLUTION INTRAMUSCULAR; INTRAVENOUS at 04:06

## 2020-03-26 RX ADMIN — HYDRALAZINE HYDROCHLORIDE 25 MG: 25 TABLET, FILM COATED ORAL at 15:18

## 2020-03-26 RX ADMIN — ISOSORBIDE MONONITRATE 60 MG: 60 TABLET, EXTENDED RELEASE ORAL at 09:12

## 2020-03-26 RX ADMIN — DOXYCYCLINE HYCLATE 100 MG: 100 TABLET, COATED ORAL at 09:11

## 2020-03-26 RX ADMIN — IBUPROFEN 400 MG: 400 TABLET ORAL at 12:33

## 2020-03-26 RX ADMIN — INSULIN LISPRO 3 UNITS: 100 INJECTION, SOLUTION INTRAVENOUS; SUBCUTANEOUS at 18:05

## 2020-03-26 RX ADMIN — IPRATROPIUM BROMIDE AND ALBUTEROL SULFATE 3 ML: .5; 3 SOLUTION RESPIRATORY (INHALATION) at 08:54

## 2020-03-26 RX ADMIN — GABAPENTIN 300 MG: 300 CAPSULE ORAL at 21:22

## 2020-03-26 RX ADMIN — PANTOPRAZOLE SODIUM 40 MG: 40 TABLET, DELAYED RELEASE ORAL at 06:40

## 2020-03-26 RX ADMIN — INSULIN GLARGINE 25 UNITS: 100 INJECTION, SOLUTION SUBCUTANEOUS at 23:26

## 2020-03-26 RX ADMIN — PREDNISONE 40 MG: 20 TABLET ORAL at 12:33

## 2020-03-26 RX ADMIN — PROMETHAZINE HYDROCHLORIDE AND DEXTROMETHORPHAN HYDROBROMIDE 5 ML: 15; 6.25 SYRUP ORAL at 11:06

## 2020-03-26 RX ADMIN — GABAPENTIN 300 MG: 300 CAPSULE ORAL at 09:11

## 2020-03-26 RX ADMIN — IPRATROPIUM BROMIDE AND ALBUTEROL SULFATE 3 ML: .5; 3 SOLUTION RESPIRATORY (INHALATION) at 19:28

## 2020-03-26 RX ADMIN — HYDRALAZINE HYDROCHLORIDE 25 MG: 25 TABLET, FILM COATED ORAL at 09:12

## 2020-03-26 RX ADMIN — TRIAMCINOLONE ACETONIDE: 1 CREAM TOPICAL at 21:39

## 2020-03-26 RX ADMIN — ALPRAZOLAM 1 MG: 0.5 TABLET ORAL at 15:19

## 2020-03-26 RX ADMIN — LISINOPRIL 2.5 MG: 5 TABLET ORAL at 09:11

## 2020-03-26 RX ADMIN — ENOXAPARIN SODIUM 40 MG: 40 INJECTION SUBCUTANEOUS at 09:12

## 2020-03-26 RX ADMIN — INSULIN LISPRO 3 UNITS: 100 INJECTION, SOLUTION INTRAVENOUS; SUBCUTANEOUS at 09:13

## 2020-03-26 RX ADMIN — ALPRAZOLAM 1 MG: 0.5 TABLET ORAL at 23:21

## 2020-03-26 RX ADMIN — ROSUVASTATIN CALCIUM 10 MG: 10 TABLET, FILM COATED ORAL at 09:11

## 2020-03-26 ASSESSMENT — PAIN DESCRIPTION - FREQUENCY
FREQUENCY: INTERMITTENT
FREQUENCY: CONTINUOUS

## 2020-03-26 ASSESSMENT — PAIN SCALES - GENERAL
PAINLEVEL_OUTOF10: 7
PAINLEVEL_OUTOF10: 8
PAINLEVEL_OUTOF10: 9
PAINLEVEL_OUTOF10: 8
PAINLEVEL_OUTOF10: 9
PAINLEVEL_OUTOF10: 8

## 2020-03-26 ASSESSMENT — PAIN DESCRIPTION - ONSET
ONSET: ON-GOING
ONSET: ON-GOING

## 2020-03-26 ASSESSMENT — PAIN DESCRIPTION - PAIN TYPE
TYPE: ACUTE PAIN;CHRONIC PAIN
TYPE: ACUTE PAIN;CHRONIC PAIN

## 2020-03-26 ASSESSMENT — PAIN DESCRIPTION - LOCATION
LOCATION: BACK;SHOULDER
LOCATION: BACK;SHOULDER
LOCATION: GENERALIZED

## 2020-03-26 ASSESSMENT — PAIN DESCRIPTION - PROGRESSION
CLINICAL_PROGRESSION: GRADUALLY IMPROVING
CLINICAL_PROGRESSION: GRADUALLY IMPROVING

## 2020-03-26 NOTE — PROGRESS NOTES
VSS, assessment completed, and meds given. Pt appears calm with no signs of distress. Breathing is regular with dyspnea noted with exertion. Pt uses 3L at baseline while sleeping. Call light is within reach. No further needs at this time.

## 2020-03-26 NOTE — PROGRESS NOTES
Bedside report completed with [facility] RN. Pt denies further needs. Call light within reach.

## 2020-03-26 NOTE — PROGRESS NOTES
nebulizer solution 3 mL, 3 mL, Inhalation, Q4H WA  isosorbide mononitrate (IMDUR) extended release tablet 60 mg, 60 mg, Oral, Daily  lisinopril (PRINIVIL;ZESTRIL) tablet 2.5 mg, 2.5 mg, Oral, Daily  nitroGLYCERIN (NITROSTAT) SL tablet 0.4 mg, 0.4 mg, Sublingual, Q5 Min PRN  miconazole (MICOTIN) 2 % powder, , Topical, BID  pantoprazole (PROTONIX) tablet 40 mg, 40 mg, Oral, QAM AC  rosuvastatin (CRESTOR) tablet 10 mg, 10 mg, Oral, Daily  triamcinolone (KENALOG) 0.1 % cream, , Topical, BID  glucose (GLUTOSE) 40 % oral gel 15 g, 15 g, Oral, PRN  dextrose 50 % IV solution, 12.5 g, Intravenous, PRN  glucagon (rDNA) injection 1 mg, 1 mg, Intramuscular, PRN  dextrose 5 % solution, 100 mL/hr, Intravenous, PRN  enoxaparin (LOVENOX) injection 40 mg, 40 mg, Subcutaneous, Daily  insulin lispro (1 Unit Dial) 0-18 Units, 0-18 Units, Subcutaneous, TID WC  insulin lispro (1 Unit Dial) 0-9 Units, 0-9 Units, Subcutaneous, Nightly    Physical      VITALS:    /71   Pulse 65   Temp 97.6 °F (36.4 °C) (Oral)   Resp 24   Ht 5' 6\" (1.676 m)   Wt 282 lb 14.4 oz (128.3 kg)   SpO2 96%   Breastfeeding No   BMI 45.66 kg/m²   TEMPERATURE:  Current - Temp: 97.6 °F (36.4 °C);  Max - Temp  Av.8 °F (36.6 °C)  Min: 97.3 °F (36.3 °C)  Max: 98.4 °F (36.9 °C)  RESPIRATIONS RANGE: Resp  Av.8  Min: 18  Max: 24  PULSE RANGE: Pulse  Av.7  Min: 65  Max: 87  BLOOD PRESSURE RANGE:  Systolic (91HLM), AC , Min:128 , OHA:685   ; Diastolic (88BUR), POA:55, Min:68, Max:94    PULSE OXIMETRY RANGE: SpO2  Av.7 %  Min: 87 %  Max: 97 %  24HR INTAKE/OUTPUT:      Intake/Output Summary (Last 24 hours) at 3/26/2020 1702  Last data filed at 3/26/2020 0900  Gross per 24 hour   Intake 480 ml   Output --   Net 480 ml     CONSTITUTIONAL:  awake, alert, cooperative, no apparent distress, and appears stated age  NECK:  supple, symmetrical, trachea midline and skin normal  LUNGS:  No increased work of breathing, good air exchange, clear to excluded. 2. There are areas of mucus plugging noted in the lower lobe bronchi versus   aspiration.    3. Pulmonary vascular congestion       Ct  Present breathing treatment    on PO prednisone now    pulmonary co-management

## 2020-03-26 NOTE — PROGRESS NOTES
Influenza A by PCR Unknown  Not Detected    Influenza B by PCR Unknown  Detected (A)    RSV by PCR Unknown  Not Detected        Echo-Summary   Normal left ventricular cavity size and systolic function with an ejection   fraction estimated at 65-70%. Mild septal hypertrophy. No regional wall motion abnormalities noted. Diastolic filling parameters suggest grade II diastolic dysfunction. Mild mitral regurgitation. Mild aortic stenosis with a peak velocity of 2.26 m/s and a mean pressure   gradient of 11 mmHg. There is mild aortic insufficiency. There is mild to moderate right ventricular hypertrophy. The right ventricle   is normal in size and function. Unable to estimate pulmonary artery pressure secondary to incomplete TR jet   envelope. ASSESSMENT:     Assessment:  Principal Problem:    COPD with acute exacerbation     DM2 (diabetes mellitus, type 2) with hyperglycemia    Morbid obesity (HCC)  AKIL  Acute pulmonary edema  Diastolic II dysfunction     Pulmonary infiltrate              Plan:   · Oxygen supplementation, if required, to keep saturation between 90 to 94% only-  · Pulmonary toilet  · Bronchodilator  · IV Solu-Medrol  Being changed to PO prednisone   · Patient's blood sugars are on the higher side which may be partly because of steroids and titration of insulins as per internal medicine team  · Basaglar  insulin to be titrated as per blood glucose monitoring by IM-patient's blood sugars are still not controlled  · BGM with SSI  · Will get CBC and BMP in AM   ·  p.o. doxycycline  · Strict I/O and BMP  · Correct electrolytes on PRN basis   · Incentive spirometry ordered  · Monitor for any hypoglycemia  · Avoid sedatives and narcotics  · Antihypertensives as per IM  · Monitor for any hypoventilation and hypercarbia   · PUD and DVT prophylaxis as per IM    Case d/w patient and nursing    ? Discharge planning     Tc rm MD

## 2020-03-26 NOTE — PROGRESS NOTES
Pt's non slip socks changed due to being soiled, and bed alarm active. Pt had been independently mobile, but is now at least standby assist. Pt asked to use the call light before attempting to get out of bed, Pt verbalizes understanding. Pt is oriented and able to use call light appropriately.

## 2020-03-27 VITALS
SYSTOLIC BLOOD PRESSURE: 138 MMHG | BODY MASS INDEX: 45.46 KG/M2 | WEIGHT: 282.9 LBS | HEART RATE: 81 BPM | HEIGHT: 66 IN | OXYGEN SATURATION: 95 % | DIASTOLIC BLOOD PRESSURE: 72 MMHG | TEMPERATURE: 97.7 F | RESPIRATION RATE: 18 BRPM

## 2020-03-27 LAB
ANION GAP SERPL CALCULATED.3IONS-SCNC: 9 MMOL/L (ref 3–16)
BUN BLDV-MCNC: 28 MG/DL (ref 7–20)
CALCIUM SERPL-MCNC: 9.5 MG/DL (ref 8.3–10.6)
CHLORIDE BLD-SCNC: 98 MMOL/L (ref 99–110)
CO2: 27 MMOL/L (ref 21–32)
CREAT SERPL-MCNC: 0.7 MG/DL (ref 0.6–1.2)
GFR AFRICAN AMERICAN: >60
GFR NON-AFRICAN AMERICAN: >60
GLUCOSE BLD-MCNC: 106 MG/DL (ref 70–99)
GLUCOSE BLD-MCNC: 222 MG/DL (ref 70–99)
GLUCOSE BLD-MCNC: 91 MG/DL (ref 70–99)
HCT VFR BLD CALC: 45.8 % (ref 36–48)
HEMOGLOBIN: 15.2 G/DL (ref 12–16)
MCH RBC QN AUTO: 29.1 PG (ref 26–34)
MCHC RBC AUTO-ENTMCNC: 33.1 G/DL (ref 31–36)
MCV RBC AUTO: 87.8 FL (ref 80–100)
PDW BLD-RTO: 16 % (ref 12.4–15.4)
PERFORMED ON: ABNORMAL
PERFORMED ON: NORMAL
PLATELET # BLD: 181 K/UL (ref 135–450)
PMV BLD AUTO: 8.1 FL (ref 5–10.5)
POTASSIUM SERPL-SCNC: 4.5 MMOL/L (ref 3.5–5.1)
RBC # BLD: 5.22 M/UL (ref 4–5.2)
SODIUM BLD-SCNC: 134 MMOL/L (ref 136–145)
WBC # BLD: 15.6 K/UL (ref 4–11)

## 2020-03-27 PROCEDURE — 6360000002 HC RX W HCPCS: Performed by: INTERNAL MEDICINE

## 2020-03-27 PROCEDURE — 36415 COLL VENOUS BLD VENIPUNCTURE: CPT

## 2020-03-27 PROCEDURE — 80048 BASIC METABOLIC PNL TOTAL CA: CPT

## 2020-03-27 PROCEDURE — 6370000000 HC RX 637 (ALT 250 FOR IP): Performed by: INTERNAL MEDICINE

## 2020-03-27 PROCEDURE — 99232 SBSQ HOSP IP/OBS MODERATE 35: CPT | Performed by: INTERNAL MEDICINE

## 2020-03-27 PROCEDURE — 94640 AIRWAY INHALATION TREATMENT: CPT

## 2020-03-27 PROCEDURE — 85027 COMPLETE CBC AUTOMATED: CPT

## 2020-03-27 PROCEDURE — 2700000000 HC OXYGEN THERAPY PER DAY

## 2020-03-27 RX ORDER — CEFUROXIME AXETIL 250 MG/1
250 TABLET ORAL 2 TIMES DAILY
Qty: 14 TABLET | Refills: 0 | Status: SHIPPED | OUTPATIENT
Start: 2020-03-27 | End: 2020-04-03

## 2020-03-27 RX ORDER — DOXYCYCLINE HYCLATE 100 MG
100 TABLET ORAL EVERY 12 HOURS SCHEDULED
Qty: 20 TABLET | Refills: 0 | Status: SHIPPED | OUTPATIENT
Start: 2020-03-27 | End: 2020-04-06

## 2020-03-27 RX ORDER — PREDNISONE 20 MG/1
40 TABLET ORAL DAILY
Qty: 20 TABLET | Refills: 0 | Status: SHIPPED | OUTPATIENT
Start: 2020-03-28 | End: 2020-04-07

## 2020-03-27 RX ORDER — ALPRAZOLAM 1 MG/1
1 TABLET ORAL 3 TIMES DAILY PRN
Qty: 90 TABLET | Refills: 0 | Status: SHIPPED | OUTPATIENT
Start: 2020-03-27 | End: 2020-05-04 | Stop reason: SDUPTHER

## 2020-03-27 RX ORDER — DEXTROMETHORPHAN HYDROBROMIDE AND PROMETHAZINE HYDROCHLORIDE 15; 6.25 MG/5ML; MG/5ML
5 SYRUP ORAL EVERY 4 HOURS PRN
Qty: 200 ML | Refills: 0 | Status: SHIPPED | OUTPATIENT
Start: 2020-03-27 | End: 2020-03-30

## 2020-03-27 RX ADMIN — IPRATROPIUM BROMIDE AND ALBUTEROL SULFATE 3 ML: .5; 3 SOLUTION RESPIRATORY (INHALATION) at 08:05

## 2020-03-27 RX ADMIN — HYDRALAZINE HYDROCHLORIDE 25 MG: 25 TABLET, FILM COATED ORAL at 09:09

## 2020-03-27 RX ADMIN — ENOXAPARIN SODIUM 40 MG: 40 INJECTION SUBCUTANEOUS at 09:09

## 2020-03-27 RX ADMIN — TRIAMCINOLONE ACETONIDE: 1 CREAM TOPICAL at 09:08

## 2020-03-27 RX ADMIN — INSULIN LISPRO 10 UNITS: 100 INJECTION, SOLUTION INTRAVENOUS; SUBCUTANEOUS at 12:54

## 2020-03-27 RX ADMIN — LINAGLIPTIN 5 MG: 5 TABLET, FILM COATED ORAL at 09:10

## 2020-03-27 RX ADMIN — ASPIRIN 81 MG 81 MG: 81 TABLET ORAL at 09:10

## 2020-03-27 RX ADMIN — ROSUVASTATIN CALCIUM 10 MG: 10 TABLET, FILM COATED ORAL at 09:10

## 2020-03-27 RX ADMIN — IPRATROPIUM BROMIDE AND ALBUTEROL SULFATE 3 ML: .5; 3 SOLUTION RESPIRATORY (INHALATION) at 12:12

## 2020-03-27 RX ADMIN — PREDNISONE 40 MG: 20 TABLET ORAL at 09:10

## 2020-03-27 RX ADMIN — DOXYCYCLINE HYCLATE 100 MG: 100 TABLET, COATED ORAL at 09:10

## 2020-03-27 RX ADMIN — LISINOPRIL 2.5 MG: 5 TABLET ORAL at 09:09

## 2020-03-27 RX ADMIN — DICLOFENAC 2 G: 10 GEL TOPICAL at 09:08

## 2020-03-27 RX ADMIN — ISOSORBIDE MONONITRATE 60 MG: 60 TABLET, EXTENDED RELEASE ORAL at 09:10

## 2020-03-27 RX ADMIN — MICONAZOLE NITRATE: 20 POWDER TOPICAL at 09:09

## 2020-03-27 RX ADMIN — INSULIN LISPRO 6 UNITS: 100 INJECTION, SOLUTION INTRAVENOUS; SUBCUTANEOUS at 12:54

## 2020-03-27 RX ADMIN — PANTOPRAZOLE SODIUM 40 MG: 40 TABLET, DELAYED RELEASE ORAL at 06:31

## 2020-03-27 RX ADMIN — INSULIN LISPRO 10 UNITS: 100 INJECTION, SOLUTION INTRAVENOUS; SUBCUTANEOUS at 09:12

## 2020-03-27 RX ADMIN — ALPRAZOLAM 1 MG: 0.5 TABLET ORAL at 06:35

## 2020-03-27 RX ADMIN — IBUPROFEN 400 MG: 400 TABLET ORAL at 09:10

## 2020-03-27 RX ADMIN — GABAPENTIN 300 MG: 300 CAPSULE ORAL at 09:10

## 2020-03-27 ASSESSMENT — PAIN SCALES - GENERAL
PAINLEVEL_OUTOF10: 0
PAINLEVEL_OUTOF10: 2

## 2020-03-27 ASSESSMENT — PAIN DESCRIPTION - PROGRESSION
CLINICAL_PROGRESSION: GRADUALLY IMPROVING

## 2020-03-27 NOTE — DISCHARGE INSTR - COC
Continuity of Care Form    Patient Name: Adi Rosenthal   :  [id number]  MRN:  [id number]    Admit date:  3/13/2020  Discharge date:  2020    Code Status Order: Full Code   Advance Directives:   Advance Care Flowsheet Documentation     Date/Time Healthcare Directive Type of Healthcare Directive Copy in [address] Agent's Name Healthcare Agent's Phone Number    [phone]  Yes, patient has an advance directive for healthcare treatment nephew has  Health care treatment directive  No, copy requested from family  [facility] power of   Hussein Fay nephew  patient doesn't know number          Admitting Physician:  Corwin Hooper MD  PCP: Corwin Hooper MD    Discharging Nurse: [facility] Unit/Room#: 0BN-7686/6838-21  Discharging Unit Phone Number: [phone]    Emergency Contact:   Extended Emergency Contact Information  Primary Emergency Contact: [address] [place] [address] Phone: [phone]  Relation: Child  Secondary Emergency Contact: Rhea Bedolla  [place] Phone: [phone]  Relation: Grandchild    Past Surgical History:  Past Surgical History:   Procedure Laterality Date    APPENDECTOMY      BRONCHOSCOPY N/A 2020    BRONCHOSCOPY ALVEOLAR LAVAGE performed by Joseph Boucher MD at [address] [place] [address] 2020    BRONCHOSCOPY DIAGNOSTIC OR CELL [address] performed by Justin Valente MD at [facility]      1 stent  and 1 stent     CATARACT REMOVAL   or     bilateral cataracts removed    CHOLECYSTECTOMY      COLONOSCOPY      COLONOSCOPY  2018    ENDOSCOPY, COLON, DIAGNOSTIC      ESOPHAGEAL DILATATION      EYE SURGERY      bilateral cataracts    GALLBLADDER SURGERY      HYSTERECTOMY      UT EXC SKIN MALIG <5MM REMAINDR BODY N/A 2018    EXCISION OF SCALP SQUAMOUS CELL CARCINOMA performed by Jalen Palomino MD at [address] [place] [address] I51.9       Isolation/Infection:   Isolation          No Isolation        Patient Infection Status     Infection Onset Added Last Indicated Last Indicated By Review Planned Expiration Resolved Resolved By    None active    Resolved    C-diff Rule Out 20 Clostridium difficile toxin/antigen (Ordered)   20 Alan Judge RN    INFLUENZA  20 Shantell Tejada RN   20           Nurse Assessment:  Last Vital Signs: /72   Pulse 81   Temp 97.7 °F (36.5 °C) (Oral)   Resp 18   Ht 5' 6\" (1.676 m)   Wt 282 lb 14.4 oz (128.3 kg)   SpO2 95%   Breastfeeding No   BMI 45.66 kg/m²     Last documented pain score (0-10 scale): Pain Level: 2  Last Weight:   Wt Readings from Last 1 Encounters:   20 282 lb 14.4 oz (128.3 kg)     Mental Status:  oriented and alert    IV Access:  - None    Nursing Mobility/ADLs:  Walking   Independent  Transfer  Independent  Bathing  Assisted  Dressing  Independent  Toileting  Independent  Feeding  Independent  Med Admin  Independent  Med Delivery   whole    Wound Care Documentation and Therapy:        Elimination:  Continence:   · Bowel: yes    · Bladder: No  Urinary Catheter: None   Colostomy/Ileostomy/Ileal Conduit: No       Date of Last BM:     Intake/Output Summary (Last 24 hours) at 3/27/2020 1124  Last data filed at 3/27/2020 0907  Gross per 24 hour   Intake 480 ml   Output 600 ml   Net -120 ml     I/O last 3 completed shifts: In: 480 [P.O.:480]  Out: 600 [Urine:600]    Safety Concerns:     History of Falls (last 30 days)    Impairments/Disabilities:      None    Nutrition Therapy:  Current Nutrition Therapy:   - Oral Diet:  Carb Control 3 carbs/meal (1500kcals/day)    Routes of Feeding: Oral  Liquids:  Thin Liquids  Daily Fluid Restriction: no  Last Modified Barium Swallow with Video (Video Swallowing Test): not done    Treatments at the Time of Hospital Discharge:   Respiratory Treatments: Duoneb 3ml Q 4hr while

## 2020-03-27 NOTE — CARE COORDINATION
Discharge Planning:    Pt discharging home today with [facility]. Order/ AVS faxed and agency notifed. No other SW needs at this time.   Kathleen Yost, MSW, LSW  [phone]

## 2020-03-27 NOTE — PROGRESS NOTES
Pulmonary Progress Note      REASON FOR VISIT:  pneumonia    UPDATE:    Patient when seen this morning states he was feeling somewhat better but states that she has yeast infection now, patient still has some cough with yellowish secretions but much less, patient shortness of breath and wheezing are better than yesterday, patient had a comfortable night, patient when seen was on 3 L of nasal cannula oxygen with saturation of 95% , patient continues to have better glycemic control, patient had no documented urine output , patient's cumulative fluid balance is +1405 mL, patient was alert and oriented no other pertinent review of system of concern    MEDICATIONS:  Scheduled Meds:   predniSONE  40 mg Oral Daily    cefTRIAXone (ROCEPHIN) IV  1 g Intravenous Q24H    insulin lispro  10 Units Subcutaneous TID WC    linagliptin  5 mg Oral Daily    insulin glargine  25 Units Subcutaneous Nightly    doxycycline hyclate  100 mg Oral 2 times per day    aspirin  81 mg Oral Daily    diclofenac sodium  2 g Topical BID    gabapentin  300 mg Oral BID    hydrALAZINE  25 mg Oral TID    ibuprofen  400 mg Oral TID WC    ipratropium-albuterol  3 mL Inhalation Q4H WA    isosorbide mononitrate  60 mg Oral Daily    lisinopril  2.5 mg Oral Daily    miconazole   Topical BID    pantoprazole  40 mg Oral QAM AC    rosuvastatin  10 mg Oral Daily    triamcinolone   Topical BID    enoxaparin  40 mg Subcutaneous Daily    insulin lispro  0-18 Units Subcutaneous TID WC    insulin lispro  0-9 Units Subcutaneous Nightly         PHYSICAL EXAM:   Vital Signs: /72   Pulse 81   Temp 97.7 °F (36.5 °C) (Oral)   Resp 18   Ht 5' 6\" (1.676 m)   Wt 282 lb 14.4 oz (128.3 kg)   SpO2 95%   Breastfeeding No   BMI 45.66 kg/m²       CONSTITUTIONAL:  awake, alert, cooperative, no apparent distress, and appears stated age, mild congestion  NECK:  Supple, symmetrical, trachea midline, no adenopathy, thyroid symmetric,  no tenderness, skin normal neck diameter is increased  LUNGS: no chest congestion ; minimal wheezing with decreased breath sound intensity when seen which has not changed as compared to yesterday  CARDIOVASCULAR: S1 and S2,  no JVD; pedal edema present  ABDOMEN:  normal bowel sounds, non-distended and no masses palpated, and no tenderness to palpation. No hepatospleenomegaly, obese  LYMPHADENOPATHY:  no axillary or supraclavicular adenopathy. No cervical adnenopathy  PSYCHIATRIC: Oriented to person place and time. No obvious depression or anxiety. MUSCULOSKELETAL: No obvious misalignment or effusion of the joints. No clubbing, cyanosis of the digits. SKIN:  normal skin color, texture, turgor and no redness, warmth, or swelling. No palpable nodules    LAB RESULTS:  CBC:   Recent Labs     03/27/20  0550   WBC 15.6*   HGB 15.2   HCT 45.8   MCV 87.8        BMP:   Recent Labs     03/27/20  0550   *   K 4.5   CL 98*   CO2 27   BUN 28*   CREATININE 0.7         CULTURES:  Rapid Influenza Antigen 3/13/20:  Rapid Influenza A Antigen:  Negative  Rapid Influenza B Antigen:  Negative       Results for Rubén Umanzor (MRN [id number]) as of 3/26/2020 11:21   Ref. Range 9/22/2019 22:50 1/24/2020 13:00 3/13/2020 22:00   CULTURE WITH SMEAR, ACID FAST BACILLIUS Unknown  Rpt    CULTURE, FUNGUS Unknown  Rpt    CULTURE, RESPIRATORY Unknown Rpt Rpt    AFB Culture (Mycobacteria) Unknown  No growth after 6 weeks of incubation. Fungus (Mycology) Culture Unknown  No growth after 4 weeks of incubation. Gram Stain Result Unknown 2+ WBC's (Polymor. .. 4+ WBC's (Polymor. ..     CULTURE, RESPIRATORY Unknown Normal respiratory sandi >100,000 CFU/mL Normal respiratory sandi    AFB Smear Unknown  No AFB observed by Fluorescent stain    Fungus Stain Unknown  No Fungal elements seen    Rapid Influenza A Ag Latest Ref Range: Negative    Negative   Rapid Influenza B Ag Latest Ref Range: Negative    Negative   RAPID INFLUENZA A/B ANTIGENS Unknown   Rpt Rsv Source Unknown  BAL    Influenza A by PCR Unknown  Not Detected    Influenza B by PCR Unknown  Detected (A)    RSV by PCR Unknown  Not Detected        Echo-Summary   Normal left ventricular cavity size and systolic function with an ejection   fraction estimated at 65-70%. Mild septal hypertrophy. No regional wall motion abnormalities noted. Diastolic filling parameters suggest grade II diastolic dysfunction. Mild mitral regurgitation. Mild aortic stenosis with a peak velocity of 2.26 m/s and a mean pressure   gradient of 11 mmHg. There is mild aortic insufficiency. There is mild to moderate right ventricular hypertrophy. The right ventricle   is normal in size and function. Unable to estimate pulmonary artery pressure secondary to incomplete TR jet   envelope. Results for uW Rodriguez (MRN [id number]) as of 3/27/2020 11:19   Ref.  Range 3/24/2020 08:22 3/24/2020 12:12 3/24/2020 16:59 3/24/2020 21:32 3/25/2020 08:16 3/25/2020 12:01 3/25/2020 17:21 3/25/2020 20:55 3/26/2020 02:16 3/26/2020 08:27 3/26/2020 11:08 3/26/2020 18:02 3/26/2020 22:23 3/27/2020 05:50   Sodium Latest Ref Range: 136 - 145 mmol/L 133 (L)             134 (L)   Potassium Latest Ref Range: 3.5 - 5.1 mmol/L 5.0             4.5   Chloride Latest Ref Range: 99 - 110 mmol/L 93 (L)             98 (L)   CO2 Latest Ref Range: 21 - 32 mmol/L 28             27   BUN Latest Ref Range: 7 - 20 mg/dL 27 (H)             28 (H)   Creatinine Latest Ref Range: 0.6 - 1.2 mg/dL 0.8             0.7   Anion Gap Latest Ref Range: 3 - 16  12             9   GFR Non- Latest Ref Range: >60  >60             >60   GFR  Latest Ref Range: >60  >60             >60   Glucose Latest Ref Range: 70 - 99 mg/dL 176 (H)             106 (H)   POC Glucose Latest Ref Range: 70 - 99 mg/dl  224 (H) 110 (H) 210 (H) 165 (H) 250 (H) 70 264 (H) 210 (H) 175 (H) 301 (H) 175 (H) 201 (H)    Calcium Latest Ref Range: 8.3 - 10.6 mg/dL 10.0 9.5       Results for Darryl Lopes (MRN [id number]) as of 3/27/2020 11:19   Ref. Range 3/15/2020 05:50 3/19/2020 05:11 3/23/2020 05:46 3/24/2020 08:22 3/27/2020 05:50   WBC Latest Ref Range: 4.0 - 11.0 K/uL 10.6 10.0 15.4 (H) 16.8 (H) 15.6 (H)   RBC Latest Ref Range: 4.00 - 5.20 M/uL 4.90 5.21 (H) 5.66 (H) 5.86 (H) 5.22 (H)   Hemoglobin Quant Latest Ref Range: 12.0 - 16.0 g/dL 14.4 15.3 16.2 (H) 17.0 (H) 15.2   Hematocrit Latest Ref Range: 36.0 - 48.0 % 43.2 46.1 50.2 (H) 51.5 (H) 45.8   MCV Latest Ref Range: 80.0 - 100.0 fL 88.2 88.5 88.7 87.9 87.8   MCH Latest Ref Range: 26.0 - 34.0 pg 29.5 29.4 28.7 29.0 29.1   MCHC Latest Ref Range: 31.0 - 36.0 g/dL 33.4 33.2 32.3 33.0 33.1   MPV Latest Ref Range: 5.0 - 10.5 fL 8.7 8.5 7.9 8.2 8.1   RDW Latest Ref Range: 12.4 - 15.4 % 15.3 15.9 (H) 15.5 (H) 16.1 (H) 16.0 (H)   Platelet Count Latest Ref Range: 135 - 450 K/uL 183 193 223 219 181   Neutrophils % Latest Units: %    85.8        ASSESSMENT:     Assessment:  Principal Problem:    COPD with acute exacerbation     DM2 (diabetes mellitus, type 2) with hyperglycemia    Morbid obesity (HCC)  AKIL  Acute pulmonary edema  Diastolic II dysfunction     Pulmonary infiltrate              Plan:   · Oxygen supplementation to keep saturation between 90 to 94% only-  · Pulmonary toilet  · Bronchodilator  · PO prednisone in tapering doses   · Insulins as per IM   · BGM with SSI  · Will get CBC and BMP in AM   · p.o. doxycycline  · Strict I/O and BMP  · Correct electrolytes on PRN basis   · Incentive spirometry ordered  · Monitor for any hypoglycemia  · Avoid sedatives and narcotics  · Antihypertensives as per IM  · Monitor for any hypoventilation and hypercarbia   · PUD and DVT prophylaxis as per IM    Case d/w patient and nursing    ? Discharge planning        Patient can follow-up with [facility] pulmonology in 4 to 6 weeks    Tc rm MD

## 2020-03-28 ENCOUNTER — CARE COORDINATION (OUTPATIENT)
Dept: CASE MANAGEMENT | Age: 75
End: 2020-03-28

## 2020-03-28 NOTE — CARE COORDINATION
COVID-19 Screening Initial Follow-up Note    Patient contacted regarding COVID-19  risk. CTN contacted patient for post discharge assessment. CTN verified name and  with patient, Introduced self, explained CTN role, and reason for call due to risk factors for infection and/or exposure to COVID-19. Symptoms reviewed with patient who verbalized the following symptoms:   Fever no    Fatigue no   Pain or aching joints no  Cough no  Shortness of breath no    Confusion or unusual change in mental status no    Chills or shaking no    Sweating no    Fast heart rate no    Fast breathing no    Dizziness/lightheadedness no    Less urine output no    Cold, clammy, and pale skin no  Low body temperature no        Patient has following risk factors: COPD, bronchitis. CTN reviewed discharge instructions, new medications, medical action plan and red flags such as increased shortness of breath, increasing fever and signs of decompensation with patient who verbalized understanding. Discussed exposure protocols and quarantine with CDC Guidelines What to do if you are sick with coronavirus disease 2019 Patient who was given an opportunity for questions and concerns. The patient agrees to contact the [facility] exposure line, local health department and PCP office for questions related to their healthcare. CTN provided contact information for future reference. Pt states HC nurse is on her way out. Will be getting new meds today.  Agreed to more CTC f/u calls    Plan for follow-up call in 14 days based on severity of symptoms and risk factors

## 2020-03-29 LAB
ORGANISM: ABNORMAL
URINE CULTURE, ROUTINE: ABNORMAL

## 2020-04-10 ENCOUNTER — CARE COORDINATION (OUTPATIENT)
Dept: CASE MANAGEMENT | Age: 75
End: 2020-04-10

## 2020-05-01 RX ORDER — COMPRESSION  PANTYHOSE, SMALL
2 EACH MISCELLANEOUS ONCE
Qty: 2 EACH | Refills: 0 | Status: SHIPPED | OUTPATIENT
Start: 2020-05-01 | End: 2020-11-16 | Stop reason: ALTCHOICE

## 2020-05-08 NOTE — DISCHARGE SUMMARY
[facility] [address]                     [address] [facility], [address] [facility]                               DISCHARGE SUMMARY    PATIENT NAME: Kervin Kaufman                      :        1945  MED REC NO:   [id number]                          ROOM:       8954  ACCOUNT NO:   [de-identified]                           ADMIT DATE: 2020  PROVIDER:     Ron Bingham MD                  DISCHARGE DATE:  2020    FINAL DIAGNOSES:  1. Acute exacerbation of COPD. 2.  Acute bronchitis. 3.  Atherosclerotic heart disease. 4.  Hypertension. 5.  Morbid obesity. DISCHARGE MEDICATIONS:  1. Prednisone 20 mg twice a day for 10 days. 2.  Ocean Nasal saline spray as directed. 3.  Doxycycline Vibra-Tabs 100 mg p.o. b.i.d.  4.  Cefuroxime 250 mg p.o. b.i.d. for 7 days. 5.  Xanax 1 mg p.o. t.i.d. for 30 days. 6.  Tradjenta 5 mg once a day. 7.  Promethazine and dextromethorphan 5 mL every four hours as needed. 8.  Lomitapide mesylate taken by mouth daily. 9.  Omeprazole 40 mg once a day. 10.  Hydralazine 25 mg p.o. t.i.d.  11.  Lisinopril 2.5 mg daily. 12.  Phenylephrine, brompheniramine and DM 7.5 mL p.o. t.i.d.  13.  Isosorbide mononitrate one tablet daily. 14.  Budesonide/formoterol two puffs twice a day 4.5/160. 15.  Albuterol two puffs every four hours p.r.n. 16.  Crestor 10 mg once a day. 17.  Cepacol lozenges as directed. 18.  Diclofenac gel 2 gm p.o. b.i.d. 19.  Kenalog cream 0.1% b.i.d. 20.  Mycostatin powder in affected area to be applied twice a day. 21. Aspirin 81 mg once a day. 22.  Nitrostat sublingual p.r.n.  23.  Triamcinolone cream 0.1% b.i.d.  24.  Insulin glargine 13 units nightly. 25.  Humalog 0 to 18 units high dose sliding scale. 26.  Colcrys one tablet daily. 27.  Efferdent one tablet daily as needed.     HOSPITAL COURSE:  This 29-year-old white woman with advanced COPD,  morbid obesity, came to the emergency room with COPD exacerbation

## 2020-07-02 ENCOUNTER — VIRTUAL VISIT (OUTPATIENT)
Dept: PULMONOLOGY | Age: 75
End: 2020-07-02
Payer: COMMERCIAL

## 2020-07-02 PROCEDURE — G8427 DOCREV CUR MEDS BY ELIG CLIN: HCPCS | Performed by: INTERNAL MEDICINE

## 2020-07-02 PROCEDURE — G8399 PT W/DXA RESULTS DOCUMENT: HCPCS | Performed by: INTERNAL MEDICINE

## 2020-07-02 PROCEDURE — 4040F PNEUMOC VAC/ADMIN/RCVD: CPT | Performed by: INTERNAL MEDICINE

## 2020-07-02 PROCEDURE — 1090F PRES/ABSN URINE INCON ASSESS: CPT | Performed by: INTERNAL MEDICINE

## 2020-07-02 PROCEDURE — 1123F ACP DISCUSS/DSCN MKR DOCD: CPT | Performed by: INTERNAL MEDICINE

## 2020-07-02 PROCEDURE — 99213 OFFICE O/P EST LOW 20 MIN: CPT | Performed by: INTERNAL MEDICINE

## 2020-07-02 PROCEDURE — 3017F COLORECTAL CA SCREEN DOC REV: CPT | Performed by: INTERNAL MEDICINE

## 2020-07-02 ASSESSMENT — ENCOUNTER SYMPTOMS
DIARRHEA: 0
CHEST TIGHTNESS: 0
SINUS PRESSURE: 0
ABDOMINAL DISTENTION: 0
CHOKING: 0
STRIDOR: 0
SORE THROAT: 0
ANAL BLEEDING: 0
SHORTNESS OF BREATH: 1
VOICE CHANGE: 0
ABDOMINAL PAIN: 0
WHEEZING: 0
CONSTIPATION: 0
RHINORRHEA: 0
COUGH: 1
BLOOD IN STOOL: 0
APNEA: 0

## 2020-07-02 NOTE — PROGRESS NOTES
[facility]     Pulmonology Video Visit    Pursuant to the emergency declaration under the [address] [facility] waiver authority and the [employer] and [employer] Supplemental Appropriations Act this Video Visit was insisted, with patient's consent, to reduce the patient's risk of exposure to COVID-19 and provide continuity of care for an established patient. The patient was at home, while the provider was at the clinic. Services were provided through a synchronous discussion through a Video Visit to substitute for in-person clinic visit, and coded as such. YOB: 1945     Date of Service:  7/2/2020     Chief Complaint   Patient presents with    Follow-Up from [address] [place] [address] Visit pt seen in the hospital for pneumonia          HPI prior hospitalization with influenza B pneumonia/mucous plugging in January 2020-required bronchoscopy x2. Dyspnea on exertion, cough with mucoid phlegm. Symptoms related to COPD are at baseline. States that she uses 2 L oxygen, only with sleep. Overall patient has been doing quite well.     Allergies   Allergen Reactions    Morphine Shortness Of Breath    Codeine Other (See Comments)     Chest pain    Hydromorphone Other (See Comments)     hallucinations  Hallucinations    But will take if needed in an emergency    Levaquin [Levofloxacin In D5w] Itching    Vicodin [Hydrocodone-Acetaminophen] Hives    Aspirin      Upsets hiatal hernia     No outpatient medications have been marked as taking for the 7/2/20 encounter (Virtual Visit) with Roberto Sanches MD.       Immunization History   Administered Date(s) Administered    Influenza 10/11/2013    Influenza Virus Vaccine 10/18/2010, 08/11/2014, 09/10/2015, 09/05/2017    Influenza, High Dose (Fluzone 65 yrs and older) 10/02/2018    Influenza, MDCK Quadv, with preserv IM (Flucelvax 4 yrs and older) 09/08/2017, 09/11/2018    Influenza, [place], IM, (6 mo and older Fluzone, Flulaval, Fluarix and 3 yrs and older Afluria) 09/09/2019    Influenza, Triv, 3 Years and older, IM (Arvind Mandeep (5 yrs and older) 10/20/2016    Pneumococcal Conjugate 13-valent (Rollene Ort) 02/08/2017    Pneumococcal Polysaccharide ([id number]) 12/24/2010, 07/23/2015       Past Medical History:   Diagnosis Date    Acute MI ([facility] [address]     Acute pyelonephritis 9/8/2015    Anxiety and depression     Arthritis     CAD (coronary artery disease)     two heart stent one in 2000 and 2nd one 6 months later on 2000, had MI in 2000    Cancer [facility])     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    [facility])     \"skin on top of my head\"    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    Chronic obstructive pulmonary disease ([facility] [address] 1/13/2017    Claustrophobia     COPD (chronic obstructive pulmonary disease) (HCC)     Esophageal stricture     Gastroesophageal reflux disease without esophagitis 3/24/2016    Hiatal hernia     Hiatal hernia     Hypercholesteremia     Hypertension     IBS (irritable bowel syndrome)     Migraines     Movement disorder arthritis    Pneumonia     PONV (postoperative nausea and vomiting)     Type II or unspecified type diabetes mellitus without mention of complication, not stated as uncontrolled     type ll does not take insulin at home    Unspecified cerebral artery occlusion with cerebral infarction     many TIA's     Past Surgical History:   Procedure Laterality Date    APPENDECTOMY      BRONCHOSCOPY N/A 1/24/2020    BRONCHOSCOPY ALVEOLAR LAVAGE performed by Anushka Bowser MD at [address]  N/A 1/27/2020    BRONCHOSCOPY DIAGNOSTIC OR CELL [address] ONLY performed by Yuridia Sandhu MD at [address]      1 stent 2000 and 1 stent 2001    CATARACT REMOVAL  2013 or 2014    bilateral cataracts removed    CHOLECYSTECTOMY      COLONOSCOPY      COLONOSCOPY  06/11/2018    ENDOSCOPY, COLON, DIAGNOSTIC      ESOPHAGEAL DILATATION      EYE SURGERY      bilateral cataracts    GALLBLADDER SURGERY      HYSTERECTOMY      HI EXC SKIN MALIG <5MM REMAINDR BODY N/A 9/6/2018    EXCISION OF SCALP SQUAMOUS CELL CARCINOMA performed by Coirnna Jacob MD at [address] [facility] <100SQCM N/A 9/6/2018    SPLIT THICKNESS SKIN GRAFT FOR COVERAGE SCALP, APPLICATION OF WOUND VAC DEVICE performed by Corinna Jacob MD at [address] [facility] DUCT SURGERY      UPPER GASTROINTESTINAL ENDOSCOPY  4/20/12    with biopsy of stomach,gastritis    UPPER GASTROINTESTINAL ENDOSCOPY  06/11/2018    w/esophagael dilation     Family History   Problem Relation Age of Onset    Heart Disease Mother     Cancer Mother     Cancer Father     Cancer Sister     Heart Disease Sister     Heart Disease Brother     High Blood Pressure Neg Hx     High Cholesterol Neg Hx        Review of Systems:  Review of Systems   Constitutional: Positive for fatigue. Negative for activity change, appetite change and fever. HENT: Negative for congestion, ear discharge, ear pain, postnasal drip, rhinorrhea, sinus pressure, sneezing, sore throat, tinnitus and voice change. Respiratory: Positive for cough and shortness of breath. Negative for apnea, choking, chest tightness, wheezing and stridor. Cardiovascular: Negative for chest pain, palpitations and leg swelling. Gastrointestinal: Negative for abdominal distention, abdominal pain, anal bleeding, blood in stool, constipation and diarrhea. Skin: Negative for pallor and rash. Allergic/Immunologic: Negative for environmental allergies. Neurological: Negative for dizziness, tremors, seizures, syncope, speech difficulty, weakness, light-headedness, numbness and headaches. Hematological: Negative for adenopathy. Does not bruise/bleed easily. Psychiatric/Behavioral: Negative for sleep disturbance.        There were no vitals filed for this visit. Patient-Reported Vitals 7/2/2020   Patient-Reported Weight 255lb   Patient-Reported Height 5'-6\"   Patient-Reported Systolic 470   Patient-Reported Diastolic 79   Patient-Reported Pulse 78   Patient-Reported Peak Flow 97      There is no height or weight on file to calculate BMI. Wt Readings from Last 3 Encounters:   07/02/20 255 lb (115.7 kg)   06/02/20 282 lb (127.9 kg)   05/04/20 282 lb (127.9 kg)     BP Readings from Last 3 Encounters:   07/02/20 128/74   06/02/20 120/78   05/04/20 138/88         Physical Exam    Due to the current efforts to prevent transmission of COVID-19 and also the need to preserve PPE for other caregivers, a face-to-face encounter with the patient was not performed. That being said, all relevant records and diagnostic tests were reviewed, including laboratory results and imaging. Please reference any relevant documentation elsewhere. Care will be coordinated with the primary service. Health Maintenance   Topic Date Due    Diabetic microalbuminuria test  04/08/1964    DTaP/Tdap/Td vaccine (1 - Tdap) 04/08/1965    Shingles Vaccine (1 of 2) 04/08/1996    Breast cancer screen  05/25/2020    Flu vaccine (1) 09/01/2020    Diabetic foot exam  10/08/2020    Diabetic retinal exam  10/08/2020    Annual Wellness Visit (AWV)  10/08/2020    Lipid screen  11/07/2020    A1C test (Diabetic or Prediabetic)  03/13/2021    Potassium monitoring  03/27/2021    Creatinine monitoring  03/27/2021    Colon cancer screen colonoscopy  06/11/2028    DEXA (modify frequency per FRAX score)  Completed    Pneumococcal 65+ years Vaccine  Completed    Hepatitis A vaccine  Aged Out    Hib vaccine  Aged Out    Meningococcal (ACWY) vaccine  Aged Out    Hepatitis C screen  Discontinued          Assessment/Plan:    Patient probably has COPD based on smoking history-50+ years. Never quantified with PFT-patient is reluctant to have PFT study done.     Prior CT imaging from March 2020 reviewed-left upper lobe atelectasis. Prior history of pneumonia related to influenza B, bronchoscopy x2 in January 2020. Continue with Symbicort 160 and albuterol. Patient has been using Symbicort only as needed and I have advised her to use it at 2 puffs twice daily. Follow-up in 4 months.

## 2020-07-27 ENCOUNTER — TELEPHONE (OUTPATIENT)
Dept: CARDIOLOGY CLINIC | Age: 75
End: 2020-07-27

## 2020-07-27 NOTE — TELEPHONE ENCOUNTER
Pt asking if she can have a VV on 7/31/20 due to not able to breath in the heat and humidity.   Please call to advise

## 2020-07-30 NOTE — PROGRESS NOTES
2020    TELEHEALTH EVALUATION -- Audio/Visual (During [facility] public health emergency)    Chief Complaint   Patient presents with    Coronary Artery Disease     No complaints     Hypertension         HPI:    Martha Singh (:  1945) has requested an audio/video evaluation for the following concern(s):  She has a history of CAD, htn,hchol  In 2019 she was hospitalized with pericarditis and was treated with colchicine. She is no longer on this    Today she states she is doing well. She has no chest pain, change in exertional sob palpitations or dizziness. Walks with a walker, has a hospital bed and lift chair at home. Monitors weight, vitals, and pulse ox daily    Patient is compliant  with medication. Stopped  crestor due to leg cramps. Review of Systems   Respiratory: Positive for shortness of breath. Musculoskeletal: Positive for myalgias. Prior to Visit Medications    Medication Sig Taking? Authorizing Provider   ciprofloxacin (CIPRO) 500 MG tablet Take 1 tablet by mouth 2 times daily for 5 days Yes Bridgett Cabrera MD   phenazopyridine (PYRIDIUM) 200 MG tablet Take 1 tablet by mouth 3 times daily as needed for Pain Yes Bridgett Cabrera MD   ALPRAZolam Tita Yanely) 1 MG tablet Take 1 tablet by mouth 3 times daily as needed for Anxiety for up to 30 days.  Yes Bridgett Cabrera MD   omeprazole (PRILOSEC) 40 MG delayed release capsule Take 1 capsule by mouth daily Yes Bridgett Cabrera MD   diclofenac sodium (VOLTAREN) 1 % GEL Apply 4 g topically 4 times daily Yes Bridgett Cabrera MD   rosuvastatin (CRESTOR) 10 MG tablet Take 1 tablet by mouth daily Yes Bridgett Cabrera MD   furosemide (LASIX) 40 MG tablet Take 1 tablet by mouth daily Yes Bridgett Cabrera MD   linagliptin (TRADJENTA) 5 MG tablet Take 1 tablet by mouth daily Yes Bridgett Cabrera MD   hydrALAZINE (APRESOLINE) 25 MG tablet Take 1 tablet by mouth 3 times daily Yes MD Kevin Espinosa MISC by Does not apply route If she needs any paper work for this  She needs to be evaluated at [facility] for that Yes Giana Simpson MD   gabapentin (NEURONTIN) 300 MG capsule Take 1 capsule by mouth 2 times daily for 30 days.  Intended supply: 30 days Yes Giana Simpson MD   glipiZIDE (GLUCOTROL) 5 MG tablet Take 1 tablet by mouth 2 times daily (before meals) Yes Giana Simpson MD   lisinopril (PRINIVIL;ZESTRIL) 10 MG tablet Take 1 tablet by mouth daily Yes Giana Simpson MD   Elastic Bandages & Supports ([address]) [address] [facility] 2 each by Does not apply route once for 1 dose Yes Giana Simpson MD   albuterol sulfate HFA (VENTOLIN HFA) 108 (90 Base) MCG/ACT inhaler Inhale 2 puffs into the lungs every 6 hours as needed for Wheezing Yes Giana Simpson MD   blood glucose test strips (TRUE METRIX BLOOD GLUCOSE TEST) strip USE THREE TIMES A DAY AS NEEDED Yes Giana Simpson MD   brompheniramine-pseudoephedrine-DM 2-30-10 MG/5ML syrup Take 5 mLs by mouth 4 times daily as needed for Congestion or Cough Yes Giana Simpson MD   sodium chloride (OCEAN, BABY AYR) 0.65 % nasal spray 1 spray by Nasal route as needed for Congestion Yes Giana Simpson MD   LOMITAPIDE MESYLATE PO Take by mouth Yes Kandis Seo MD   Phenylephrine-Bromphen-DM 5-2-10 MG/5ML LIQD Take 7.5 mLs by mouth 3 times daily as needed (cough) Yes Giana Simpson MD   ipratropium-albuterol (DUONEB) 0.5-2.5 (3) MG/3ML SOLN nebulizer solution Inhale 3 mLs into the lungs every 4 hours as needed for Shortness of Breath Yes Giana Simpson MD   isosorbide mononitrate (IMDUR) 60 MG extended release tablet Take 1 tablet by mouth daily Yes Giana Simpson MD   budesonide-formoterol (SYMBICORT) 160-4.5 MCG/ACT AERO Inhale 2 puffs into the lungs 2 times daily Yes Giana Simpson MD   albuterol sulfate  (90 Base) MCG/ACT inhaler Inhale 2 puffs into the lungs every 6 hours as needed for Wheezing Yes Giana Simpson MD   ibuprofen (ADVIL;MOTRIN) 400 MG tablet Take 1 tablet by mouth every 6 hours as needed for Pain or Fever Yes Tom Kowalski MD   glucagon, rDNA, 1 MG injection Inject 1 mg into the muscle as needed for Low blood sugar (Blood glucose less than 70 mg/dL and patient NOT ALERT or NPO and does not have IV access.) Yes Tom Kowalski MD   benzocaine-menthol (CEPACOL SORE THROAT) 15-3.6 MG lozenge Take 1 lozenge by mouth every 2 hours as needed for Sore Throat Yes Tom Kowalski MD   Incontinence Supply Disposable (PREVAIL WET WIPES) MISC 8 each by Does not apply route daily Yes Tom Kowalski MD   Diapers & Supplies (HUGGIES PULL-UPS 4T-5T) MISC 8 each by Does not apply route daily Yes Tom Kowalski MD   Elastic Bandages & Supports (JOBST FOR MEN 30-40MMHG LG) MISC 20-30 mm by Does not apply route daily Please measure for knee hi hose Yes Jaron Nath MD   ipratropium-albuterol (DUONEB) 0.5-2.5 (3) MG/3ML SOLN nebulizer solution Inhale 3 mLs into the lungs every 4 hours (while awake) Yes Ying Higginbotham MD   triamcinolone (KENALOG) 0.1 % cream Apply topically 2 times daily. Yes Tom Kowalski MD   nystatin (MYCOSTATIN) 966911 UNIT/GM powder Affected area Yes Tom Kowalski MD   aspirin 81 MG tablet Take 81 mg by mouth daily Yes Historical Provider, MD   nitroGLYCERIN (NITROSTAT) 0.4 MG SL tablet Place 1 tablet under the tongue every 5 minutes as needed for Chest pain. Yes Tom Kowalski MD   Respiratory Therapy Supplies (NEBULIZER) by Does not apply route.  PRN BREATHING TREATMENTS, UNSURE OF MED  Yes Historical Provider, MD   budesonide-formoterol (SYMBICORT) 160-4.5 MCG/ACT AERO Inhale 2 puffs into the lungs 2 times daily  Patient not taking: Reported on 7/31/2020  Tom Kowalski MD   glucose (GLUTOSE) 40 % GEL Take 37.5 mLs by mouth as needed (low BS)  Patient not taking: Reported on 7/31/2020  Tom Kowalski MD   insulin glargine (LANTUS) 100 UNIT/ML injection pen Inject 13 Units into the skin nightly  Patient not taking: Reported on 7/31/2020  Vernetta Heimlich, MD   insulin lispro (HUMALOG) 100 UNIT/ML pen Inject 0-18 Units into the skin 3 times daily (with meals)  Patient not taking: Reported on 7/31/2020  Vernetta Heimlich, MD   insulin lispro (HUMALOG) 100 UNIT/ML pen Inject 0-9 Units into the skin nightly  Patient not taking: Reported on 7/31/2020  Vernetta Heimlich, MD   insulin lispro (HUMALOG) 100 UNIT/ML pen Inject 10 Units into the skin 3 times daily (with meals)  Vernetta Heimlich, MD   colchicine (COLCRYS) 0.6 MG tablet Take 1 tablet by mouth daily  Patient not taking: Reported on 7/31/2020  Vernetta Heimlich, MD   OXYGEN Inhale 2 L/min into the lungs as needed Uses when O2 Sat is below 90  Historical Provider, MD [address] (EFFERDENT DENTURE CLEANSER) TBEF 1 tablet by Does not apply route as needed (denture cleaning)  Patient not taking: Reported on 7/31/2020  Vernetta Heimlich, MD       Social History     Tobacco Use    Smoking status: Former Smoker     Packs/day: 0.25     Years: 49.00     Pack years: 12.25     Types: Cigarettes     Last attempt to quit: 6/16/2017     Years since quitting: 3.1    Smokeless tobacco: Never Used    Tobacco comment: only smoke when real stressed  Nicotine patch   Substance Use Topics    Alcohol use:  Yes     Alcohol/week: 1.0 standard drinks     Types: 1 Glasses of wine per week     Comment: occ. every  6 mo    Drug use: No        Physical Exam not performed    ASSESSMENT/PLAN:  Assessment:   CAD  9/18/19  LAD prox prev stent 10% Instent restenosis  Mid LAD widely patent in prev stent, mid 30%  diag 2 ostial 70% jailed by previous stent  cx widely patent  RCA-prox 70%  Nonobstructive by FFR  Tolerates asa without excess bleeding or bruising       Pericarditis  2019 treated with colchicine   Resolved, no chest pain  CTA aorta--no dissection  9/25/19  Echo--no effusion, thickened pericardium  3/13/2020 echo  EF 65-70  Mild septal hypertrophy mild MR mild AS mild AI  Mild to mod RVH       htn  There were no vitals taken for this visit. 124/66 today at home 265 pounds, pulse ox 96  Heart rate 88  Tolerates lisinopril     hchol  9/22/18 ldl 89  9/23 ast 12  11/7/2019  Tc 226  hdl 34 ldl 132 tri 300  Stopped crestor due to muscle aches/leg cramps  Agrees to start crestor (1/2 dose) and coenzyme q 10 400 mg daily     Leg cramps  11/2016  No significant disease  Unable to afford coenzyme q 10  3/27 2020 k 4.5  Bun 28 creat 0.7      Edema  Support hose  Fluid restriction  Weighs daily    Carotid disease  2016    16-49% bilateral    Plan  Agrees to start crestor (1/2 dose) 5 mg and coenzyme q 10 400 mg daily  Recommend walking daily  Follow up in 6 months with Dr Zoe Daniels is a 77 y.o. female being evaluated by a Virtual Visit (video visit) encounter to address concerns as mentioned above. A caregiver was present when appropriate. Due to this being a TeleHealth encounter (During [place] [address] public [facility] emergency), evaluation of the following organ systems was limited: Vitals/Constitutional/EENT/Resp/CV/GI//MS/Neuro/Skin/Heme-Lymph-Imm. Pursuant to the emergency declaration under the [address] [place] [address], [address] [place] [address] authority and the [employer] Act, this Virtual Visit was conducted with patient's (and/or legal guardian's) consent, to reduce the patient's risk of exposure to COVID-19 and provide necessary medical care. The patient (and/or legal guardian) has also been advised to contact this office for worsening conditions or problems, and seek emergency medical treatment and/or call 911 if deemed necessary. Patient identification was verified at the start of the visit: Yes    Total time spent on this encounter: Not billed by time    Services were provided through a video synchronous discussion virtually to substitute for in-person clinic visit.  Patient and provider were located at their individual homes. Scribe Attestation:  Bernice Garner, am scribing for and in the presence of Sky Albrecht MD.   Taj Haskins 07/31/20 8:29 AM   Provider Kingston Lundborg is working as a scribe for and in the presence of rian Albrecht MD). Working as a scribe, Miya Bethea may have prepopulated components of this note with my historical  intellectual property under my direct supervision. Any additions to this intellectual property were performed in my presence and at my direction. Furthermore, the content and accuracy of this note have been reviewed by rian Albrecht MD). Sky Albrecht MD  An electronic signature was used to authenticate this note.

## 2020-07-31 ENCOUNTER — VIRTUAL VISIT (OUTPATIENT)
Dept: CARDIOLOGY CLINIC | Age: 75
End: 2020-07-31
Payer: COMMERCIAL

## 2020-07-31 PROCEDURE — 99442 PR PHYS/QHP TELEPHONE EVALUATION 11-20 MIN: CPT | Performed by: INTERNAL MEDICINE

## 2020-07-31 RX ORDER — ROSUVASTATIN CALCIUM 10 MG/1
5 TABLET, COATED ORAL DAILY
Qty: 90 TABLET | Refills: 3 | Status: SHIPPED | OUTPATIENT
Start: 2020-07-31

## 2020-07-31 ASSESSMENT — ENCOUNTER SYMPTOMS: SHORTNESS OF BREATH: 1

## 2020-07-31 NOTE — PATIENT INSTRUCTIONS
Agrees to start crestor (1/2 dose) 5 mg   Take coenzyme q 10 400 mg daily to decrease fatigue and aches that could be caused by your statin.  This can be found at any local pharmacy, no prescription needed    Recommend walking daily  Follow up in 6 months with Dr Alisson Lopez    Call [facility] [phone] with any questions

## 2020-10-02 RX ORDER — SULFAMETHOXAZOLE AND TRIMETHOPRIM 800; 160 MG/1; MG/1
1 TABLET ORAL 2 TIMES DAILY
Qty: 14 TABLET | Refills: 0 | Status: SHIPPED | OUTPATIENT
Start: 2020-10-02 | End: 2021-05-10 | Stop reason: SDUPTHER

## 2020-11-03 ENCOUNTER — VIRTUAL VISIT (OUTPATIENT)
Dept: PULMONOLOGY | Age: 75
End: 2020-11-03
Payer: COMMERCIAL

## 2020-11-03 PROCEDURE — 99213 OFFICE O/P EST LOW 20 MIN: CPT | Performed by: INTERNAL MEDICINE

## 2020-11-03 ASSESSMENT — ENCOUNTER SYMPTOMS
STRIDOR: 0
CONSTIPATION: 0
ABDOMINAL PAIN: 0
APNEA: 0
VOICE CHANGE: 0
SHORTNESS OF BREATH: 1
RHINORRHEA: 0
WHEEZING: 0
ANAL BLEEDING: 0
SINUS PRESSURE: 0
DIARRHEA: 0
BLOOD IN STOOL: 0
BACK PAIN: 0
SORE THROAT: 0
COUGH: 1
ABDOMINAL DISTENTION: 0
CHOKING: 0
CHEST TIGHTNESS: 0

## 2020-11-03 NOTE — PROGRESS NOTES
Jillian Castro     Pulmonology Video Visit    Pursuant to the emergency declaration under the [address] [facility] [address] waiver authority and the [employer] Supplemental Appropriations Act this Video Visit was insisted, with patient's consent, to reduce the patient's risk of exposure to COVID-19 and provide continuity of care for an established patient. The patient was at home, while the provider was at the clinic. Services were provided through a synchronous discussion through a Video Visit to substitute for in-person clinic visit, and coded as such. YOB: 1945     Date of Service:  11/3/2020     Chief Complaint   Patient presents with    Shortness of Breath     4 month PCP put her on Zpak 10-30-20 for possible sinus infection         HPI doxy visit. Patient continues to have some dyspnea with exertion. Has had a cough with mucoid phlegm for the last 3 to 4 days, prescribed Z-Marin by PCP. Denies any wheezing. States that she has been running low-grade fever. No chest pain.     Allergies   Allergen Reactions    Morphine Shortness Of Breath    Codeine Other (See Comments)     Chest pain    Hydromorphone Other (See Comments)     hallucinations  Hallucinations    But will take if needed in an emergency    Levaquin [Levofloxacin In D5w] Itching    Vicodin [Hydrocodone-Acetaminophen] Hives    Aspirin      Upsets hiatal hernia     No outpatient medications have been marked as taking for the 11/3/20 encounter (Virtual Visit) with Steffen Guerra MD.       Immunization History   Administered Date(s) Administered    Influenza 10/11/2013    Influenza Virus Vaccine 10/18/2010, 08/11/2014, 09/10/2015, 09/05/2017    Influenza, High Dose (Fluzone 65 yrs and older) 10/02/2018    Influenza, MDCK Quadv, with preserv IM (Flucelvax 4 yrs and older) 09/08/2017, 09/11/2018, 09/01/2020    Influenza, Quadv, IM, (6 mo and older Fluzone, Flulaval, Fluarix and 3 yrs and older Afluria) 09/09/2019    Influenza, Triv, 3 Years and older, IM ([place] Baars (5 yrs and older) 10/20/2016    Pneumococcal Conjugate 13-valent (Ga Bill) 02/08/2017    Pneumococcal Polysaccharide ([id number]) 12/24/2010, 07/23/2015       Past Medical History:   Diagnosis Date    Acute MI ([facility]     Acute pyelonephritis 9/8/2015    Anxiety and depression     Arthritis     CAD (coronary artery disease)     two heart stent one in 2000 and 2nd one 6 months later on 2000, had MI in 2000    Cancer [facility])     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    Cancer [facility])     \"skin on top of my head\"    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    Chronic obstructive pulmonary disease ([facility] 1/13/2017    Claustrophobia     COPD (chronic obstructive pulmonary disease) (HCC)     Esophageal stricture     Gastroesophageal reflux disease without esophagitis 3/24/2016    Hiatal hernia     Hiatal hernia     Hypercholesteremia     Hypertension     IBS (irritable bowel syndrome)     Migraines     Movement disorder arthritis    Pneumonia     PONV (postoperative nausea and vomiting)     Type II or unspecified type diabetes mellitus without mention of complication, not stated as uncontrolled     type ll does not take insulin at home    Unspecified cerebral artery occlusion with cerebral infarction     many TIA's     Past Surgical History:   Procedure Laterality Date    APPENDECTOMY      BRONCHOSCOPY N/A 1/24/2020    BRONCHOSCOPY ALVEOLAR LAVAGE performed by Naomi Casarez MD at [address] N/A 1/27/2020    BRONCHOSCOPY DIAGNOSTIC OR CELL [address] ONLY performed by Carlito Francois MD at [address] [place] [address] St      1 stent 2000 and 1 stent 2001    CATARACT REMOVAL  2013 or 2014    bilateral cataracts removed    CHOLECYSTECTOMY      COLONOSCOPY      COLONOSCOPY  06/11/2018    ENDOSCOPY, COLON, DIAGNOSTIC      ESOPHAGEAL DILATATION      EYE SURGERY      bilateral cataracts    GALLBLADDER SURGERY      HYSTERECTOMY      IA EXC SKIN MALIG <5MM REMAINDR BODY N/A 9/6/2018    EXCISION OF SCALP SQUAMOUS CELL CARCINOMA performed by Jennifer Johnson MD at [address] [facility] <100SQCM N/A 9/6/2018    SPLIT THICKNESS SKIN GRAFT FOR COVERAGE SCALP, APPLICATION OF WOUND VAC DEVICE performed by Jennifer Johnson MD at [address] [facility] DUCT SURGERY      UPPER GASTROINTESTINAL ENDOSCOPY  4/20/12    with biopsy of stomach,gastritis    UPPER GASTROINTESTINAL ENDOSCOPY  06/11/2018    w/esophagael dilation     Family History   Problem Relation Age of Onset    Heart Disease Mother     Cancer Mother     Cancer Father     Cancer Sister     Heart Disease Sister     Heart Disease Brother     High Blood Pressure Neg Hx     High Cholesterol Neg Hx        Review of Systems:  Review of Systems   Constitutional: Negative for activity change, appetite change, fatigue and fever. HENT: Positive for congestion and postnasal drip. Negative for ear discharge, ear pain, rhinorrhea, sinus pressure, sneezing, sore throat, tinnitus and voice change. Respiratory: Positive for cough and shortness of breath. Negative for apnea, choking, chest tightness, wheezing and stridor. Cardiovascular: Negative for chest pain, palpitations and leg swelling. Gastrointestinal: Negative for abdominal distention, abdominal pain, anal bleeding, blood in stool, constipation and diarrhea. Musculoskeletal: Negative for arthralgias, back pain and gait problem. Skin: Negative for pallor and rash. Allergic/Immunologic: Negative for environmental allergies. Neurological: Negative for dizziness, tremors, seizures, syncope, speech difficulty, weakness, light-headedness, numbness and headaches. Hematological: Negative for adenopathy. Does not bruise/bleed easily. Psychiatric/Behavioral: Negative for sleep disturbance. There were no vitals filed for this visit. Patient-Reported Vitals 7/31/2020   Patient-Reported Weight 165.0lb   Patient-Reported Height 5 6   Patient-Reported Systolic 955   Patient-Reported Diastolic 66   Patient-Reported Pulse 88   Patient-Reported SpO2 96   Patient-Reported Peak Flow -      There is no height or weight on file to calculate BMI. Wt Readings from Last 3 Encounters:   10/30/20 258 lb (117 kg)   09/29/20 253 lb (114.8 kg)   09/01/20 265 lb (120.2 kg)     BP Readings from Last 3 Encounters:   11/02/20 120/80   10/30/20 120/80   09/29/20 120/78     Physical examination:    Due to the current efforts to prevent transmission of COVID-19 and also the need to preserve PPE for other caregivers, a face-to-face encounter with the patient was not performed. That being said, all relevant records and diagnostic tests were reviewed, including laboratory results and imaging. Please reference any relevant documentation elsewhere. Care will be coordinated with the primary service. Health Maintenance   Topic Date Due    Diabetic microalbuminuria test  04/08/1964    DTaP/Tdap/Td vaccine (1 - Tdap) 04/08/1965    Shingles Vaccine (1 of 2) 04/08/1996    Breast cancer screen  05/25/2020    Diabetic foot exam  10/08/2020    Diabetic retinal exam  10/08/2020    Lipid screen  11/07/2020    A1C test (Diabetic or Prediabetic)  03/13/2021    Potassium monitoring  03/27/2021    Creatinine monitoring  03/27/2021    Annual Wellness Visit (AWV)  11/03/2021    Colon cancer screen colonoscopy  06/11/2028    DEXA (modify frequency per FRAX score)  Completed    Flu vaccine  Completed    Pneumococcal 65+ years Vaccine  Completed    Hepatitis A vaccine  Aged Out    Hib vaccine  Aged Out    Meningococcal (ACWY) vaccine  Aged Out    Hepatitis C screen  Discontinued          Assessment/Plan:    COPD, of unclear severity. Patient is reluctant to have PFT study done due to the COVID-19 pandemic.   Prefers

## 2020-11-15 ENCOUNTER — HOSPITAL ENCOUNTER (INPATIENT)
Age: 75
LOS: 8 days | Discharge: HOME HEALTH | DRG: 191 | End: 2020-11-24
Attending: EMERGENCY MEDICINE | Admitting: INTERNAL MEDICINE
Payer: COMMERCIAL

## 2020-11-15 ENCOUNTER — APPOINTMENT (OUTPATIENT)
Dept: GENERAL RADIOLOGY | Age: 75
DRG: 191 | End: 2020-11-15
Payer: COMMERCIAL

## 2020-11-15 LAB
A/G RATIO: 1.3 (ref 1.1–2.2)
ALBUMIN SERPL-MCNC: 2.8 G/DL (ref 3.4–5)
ALP BLD-CCNC: 66 U/L (ref 40–129)
ALT SERPL-CCNC: 9 U/L (ref 10–40)
ANION GAP SERPL CALCULATED.3IONS-SCNC: 8 MMOL/L (ref 3–16)
AST SERPL-CCNC: 11 U/L (ref 15–37)
BASOPHILS ABSOLUTE: 0.1 K/UL (ref 0–0.2)
BASOPHILS RELATIVE PERCENT: 0.8 %
BILIRUB SERPL-MCNC: <0.2 MG/DL (ref 0–1)
BUN BLDV-MCNC: 17 MG/DL (ref 7–20)
CALCIUM SERPL-MCNC: 7.5 MG/DL (ref 8.3–10.6)
CHLORIDE BLD-SCNC: 110 MMOL/L (ref 99–110)
CO2: 22 MMOL/L (ref 21–32)
CREAT SERPL-MCNC: 0.7 MG/DL (ref 0.6–1.2)
EOSINOPHILS ABSOLUTE: 0.1 K/UL (ref 0–0.6)
EOSINOPHILS RELATIVE PERCENT: 1.1 %
GFR AFRICAN AMERICAN: >60
GFR NON-AFRICAN AMERICAN: >60
GLOBULIN: 2.2 G/DL
GLUCOSE BLD-MCNC: 155 MG/DL (ref 70–99)
HCT VFR BLD CALC: 39.4 % (ref 36–48)
HEMOGLOBIN: 12.8 G/DL (ref 12–16)
LYMPHOCYTES ABSOLUTE: 2.1 K/UL (ref 1–5.1)
LYMPHOCYTES RELATIVE PERCENT: 23.5 %
MAGNESIUM: 1.6 MG/DL (ref 1.8–2.4)
MCH RBC QN AUTO: 28.8 PG (ref 26–34)
MCHC RBC AUTO-ENTMCNC: 32.6 G/DL (ref 31–36)
MCV RBC AUTO: 88.3 FL (ref 80–100)
MONOCYTES ABSOLUTE: 0.6 K/UL (ref 0–1.3)
MONOCYTES RELATIVE PERCENT: 6.9 %
NEUTROPHILS ABSOLUTE: 6.1 K/UL (ref 1.7–7.7)
NEUTROPHILS RELATIVE PERCENT: 67.7 %
PDW BLD-RTO: 15.9 % (ref 12.4–15.4)
PLATELET # BLD: 195 K/UL (ref 135–450)
PMV BLD AUTO: 8.1 FL (ref 5–10.5)
POTASSIUM REFLEX MAGNESIUM: 3.3 MMOL/L (ref 3.5–5.1)
PRO-BNP: 80 PG/ML (ref 0–449)
RBC # BLD: 4.46 M/UL (ref 4–5.2)
SODIUM BLD-SCNC: 140 MMOL/L (ref 136–145)
TOTAL PROTEIN: 5 G/DL (ref 6.4–8.2)
TROPONIN: <0.01 NG/ML
WBC # BLD: 9.1 K/UL (ref 4–11)

## 2020-11-15 PROCEDURE — 94640 AIRWAY INHALATION TREATMENT: CPT

## 2020-11-15 PROCEDURE — 99284 EMERGENCY DEPT VISIT MOD MDM: CPT

## 2020-11-15 PROCEDURE — 83880 ASSAY OF NATRIURETIC PEPTIDE: CPT

## 2020-11-15 PROCEDURE — 2700000000 HC OXYGEN THERAPY PER DAY

## 2020-11-15 PROCEDURE — 94761 N-INVAS EAR/PLS OXIMETRY MLT: CPT

## 2020-11-15 PROCEDURE — 80053 COMPREHEN METABOLIC PANEL: CPT

## 2020-11-15 PROCEDURE — 85025 COMPLETE CBC W/AUTO DIFF WBC: CPT

## 2020-11-15 PROCEDURE — 83735 ASSAY OF MAGNESIUM: CPT

## 2020-11-15 PROCEDURE — 71045 X-RAY EXAM CHEST 1 VIEW: CPT

## 2020-11-15 PROCEDURE — 84484 ASSAY OF TROPONIN QUANT: CPT

## 2020-11-15 PROCEDURE — 93005 ELECTROCARDIOGRAM TRACING: CPT | Performed by: EMERGENCY MEDICINE

## 2020-11-15 PROCEDURE — 6370000000 HC RX 637 (ALT 250 FOR IP): Performed by: EMERGENCY MEDICINE

## 2020-11-15 RX ORDER — FUROSEMIDE 10 MG/ML
40 INJECTION INTRAMUSCULAR; INTRAVENOUS ONCE
Status: DISCONTINUED | OUTPATIENT
Start: 2020-11-15 | End: 2020-11-16 | Stop reason: HOSPADM

## 2020-11-15 RX ORDER — IPRATROPIUM BROMIDE AND ALBUTEROL SULFATE 2.5; .5 MG/3ML; MG/3ML
1 SOLUTION RESPIRATORY (INHALATION) ONCE
Status: COMPLETED | OUTPATIENT
Start: 2020-11-15 | End: 2020-11-15

## 2020-11-15 RX ORDER — METHYLPREDNISOLONE SODIUM SUCCINATE 125 MG/2ML
125 INJECTION, POWDER, LYOPHILIZED, FOR SOLUTION INTRAMUSCULAR; INTRAVENOUS DAILY
Status: DISCONTINUED | OUTPATIENT
Start: 2020-11-16 | End: 2020-11-16

## 2020-11-15 RX ADMIN — IPRATROPIUM BROMIDE AND ALBUTEROL SULFATE 1 AMPULE: .5; 3 SOLUTION RESPIRATORY (INHALATION) at 22:12

## 2020-11-15 ASSESSMENT — PAIN SCALES - GENERAL: PAINLEVEL_OUTOF10: 10

## 2020-11-16 LAB
EKG ATRIAL RATE: 84 BPM
EKG DIAGNOSIS: NORMAL
EKG P AXIS: 27 DEGREES
EKG P-R INTERVAL: 160 MS
EKG Q-T INTERVAL: 368 MS
EKG QRS DURATION: 108 MS
EKG QTC CALCULATION (BAZETT): 434 MS
EKG R AXIS: -60 DEGREES
EKG T AXIS: 71 DEGREES
EKG VENTRICULAR RATE: 84 BPM
GLUCOSE BLD-MCNC: 211 MG/DL (ref 70–99)
GLUCOSE BLD-MCNC: 248 MG/DL (ref 70–99)
PERFORMED ON: ABNORMAL
PERFORMED ON: ABNORMAL
TROPONIN: <0.01 NG/ML

## 2020-11-16 PROCEDURE — 1200000000 HC SEMI PRIVATE

## 2020-11-16 PROCEDURE — 99222 1ST HOSP IP/OBS MODERATE 55: CPT | Performed by: INTERNAL MEDICINE

## 2020-11-16 PROCEDURE — 94640 AIRWAY INHALATION TREATMENT: CPT

## 2020-11-16 PROCEDURE — 6360000002 HC RX W HCPCS: Performed by: EMERGENCY MEDICINE

## 2020-11-16 PROCEDURE — 2700000000 HC OXYGEN THERAPY PER DAY

## 2020-11-16 PROCEDURE — 6370000000 HC RX 637 (ALT 250 FOR IP): Performed by: INTERNAL MEDICINE

## 2020-11-16 PROCEDURE — 93010 ELECTROCARDIOGRAM REPORT: CPT | Performed by: INTERNAL MEDICINE

## 2020-11-16 PROCEDURE — 36415 COLL VENOUS BLD VENIPUNCTURE: CPT

## 2020-11-16 PROCEDURE — 94761 N-INVAS EAR/PLS OXIMETRY MLT: CPT

## 2020-11-16 PROCEDURE — 6370000000 HC RX 637 (ALT 250 FOR IP): Performed by: EMERGENCY MEDICINE

## 2020-11-16 PROCEDURE — 6360000002 HC RX W HCPCS: Performed by: INTERNAL MEDICINE

## 2020-11-16 PROCEDURE — 84484 ASSAY OF TROPONIN QUANT: CPT

## 2020-11-16 RX ORDER — LISINOPRIL 10 MG/1
10 TABLET ORAL DAILY
Status: DISCONTINUED | OUTPATIENT
Start: 2020-11-16 | End: 2020-11-24 | Stop reason: HOSPADM

## 2020-11-16 RX ORDER — GLIPIZIDE 5 MG/1
5 TABLET ORAL
Status: DISCONTINUED | OUTPATIENT
Start: 2020-11-16 | End: 2020-11-24 | Stop reason: HOSPADM

## 2020-11-16 RX ORDER — HYDRALAZINE HYDROCHLORIDE 25 MG/1
25 TABLET, FILM COATED ORAL 3 TIMES DAILY
Status: DISCONTINUED | OUTPATIENT
Start: 2020-11-16 | End: 2020-11-24 | Stop reason: HOSPADM

## 2020-11-16 RX ORDER — ALOGLIPTIN 25 MG/1
25 TABLET, FILM COATED ORAL DAILY
Status: DISCONTINUED | OUTPATIENT
Start: 2020-11-16 | End: 2020-11-24 | Stop reason: HOSPADM

## 2020-11-16 RX ORDER — IBUPROFEN 400 MG/1
400 TABLET ORAL EVERY 6 HOURS PRN
Status: DISCONTINUED | OUTPATIENT
Start: 2020-11-16 | End: 2020-11-24 | Stop reason: HOSPADM

## 2020-11-16 RX ORDER — ROSUVASTATIN CALCIUM 10 MG/1
5 TABLET, COATED ORAL NIGHTLY
Status: DISCONTINUED | OUTPATIENT
Start: 2020-11-16 | End: 2020-11-24 | Stop reason: HOSPADM

## 2020-11-16 RX ORDER — NICOTINE POLACRILEX 4 MG
15 LOZENGE BUCCAL PRN
Status: DISCONTINUED | OUTPATIENT
Start: 2020-11-16 | End: 2020-11-24 | Stop reason: HOSPADM

## 2020-11-16 RX ORDER — NYSTATIN 100000 [USP'U]/G
POWDER TOPICAL 2 TIMES DAILY
Status: DISCONTINUED | OUTPATIENT
Start: 2020-11-16 | End: 2020-11-24 | Stop reason: HOSPADM

## 2020-11-16 RX ORDER — POTASSIUM CHLORIDE 750 MG/1
20 TABLET, FILM COATED, EXTENDED RELEASE ORAL 2 TIMES DAILY WITH MEALS
Status: DISCONTINUED | OUTPATIENT
Start: 2020-11-16 | End: 2020-11-18

## 2020-11-16 RX ORDER — POTASSIUM CHLORIDE 7.45 MG/ML
10 INJECTION INTRAVENOUS PRN
Status: DISCONTINUED | OUTPATIENT
Start: 2020-11-16 | End: 2020-11-19 | Stop reason: SDUPTHER

## 2020-11-16 RX ORDER — BUDESONIDE AND FORMOTEROL FUMARATE DIHYDRATE 160; 4.5 UG/1; UG/1
2 AEROSOL RESPIRATORY (INHALATION) 2 TIMES DAILY
Status: DISCONTINUED | OUTPATIENT
Start: 2020-11-16 | End: 2020-11-19

## 2020-11-16 RX ORDER — NICOTINE POLACRILEX 4 MG
15 LOZENGE BUCCAL PRN
Status: DISCONTINUED | OUTPATIENT
Start: 2020-11-16 | End: 2020-11-16 | Stop reason: SDUPTHER

## 2020-11-16 RX ORDER — GUAIFENESIN/DEXTROMETHORPHAN 100-10MG/5
5 SYRUP ORAL 3 TIMES DAILY PRN
Status: DISCONTINUED | OUTPATIENT
Start: 2020-11-16 | End: 2020-11-17 | Stop reason: ALTCHOICE

## 2020-11-16 RX ORDER — POTASSIUM CHLORIDE 20 MEQ/1
40 TABLET, EXTENDED RELEASE ORAL PRN
Status: DISCONTINUED | OUTPATIENT
Start: 2020-11-16 | End: 2020-11-19 | Stop reason: SDUPTHER

## 2020-11-16 RX ORDER — TRIAMCINOLONE ACETONIDE 1 MG/G
CREAM TOPICAL 2 TIMES DAILY
Status: DISCONTINUED | OUTPATIENT
Start: 2020-11-16 | End: 2020-11-16

## 2020-11-16 RX ORDER — FUROSEMIDE 10 MG/ML
20 INJECTION INTRAMUSCULAR; INTRAVENOUS DAILY
Status: DISCONTINUED | OUTPATIENT
Start: 2020-11-16 | End: 2020-11-24 | Stop reason: HOSPADM

## 2020-11-16 RX ORDER — GABAPENTIN 300 MG/1
300 CAPSULE ORAL 2 TIMES DAILY
Status: DISCONTINUED | OUTPATIENT
Start: 2020-11-16 | End: 2020-11-16

## 2020-11-16 RX ORDER — ALOGLIPTIN 6.25 MG/1
6.25 TABLET, FILM COATED ORAL DAILY
Status: DISCONTINUED | OUTPATIENT
Start: 2020-11-16 | End: 2020-11-16 | Stop reason: DRUGHIGH

## 2020-11-16 RX ORDER — PANTOPRAZOLE SODIUM 40 MG/1
40 TABLET, DELAYED RELEASE ORAL
Status: DISCONTINUED | OUTPATIENT
Start: 2020-11-16 | End: 2020-11-24 | Stop reason: HOSPADM

## 2020-11-16 RX ORDER — ISOSORBIDE MONONITRATE 30 MG/1
60 TABLET, EXTENDED RELEASE ORAL DAILY
Status: DISCONTINUED | OUTPATIENT
Start: 2020-11-16 | End: 2020-11-24 | Stop reason: HOSPADM

## 2020-11-16 RX ORDER — NITROGLYCERIN 0.4 MG/1
0.4 TABLET SUBLINGUAL EVERY 5 MIN PRN
Status: DISCONTINUED | OUTPATIENT
Start: 2020-11-16 | End: 2020-11-16 | Stop reason: SDUPTHER

## 2020-11-16 RX ORDER — DEXTROSE MONOHYDRATE 25 G/50ML
12.5 INJECTION, SOLUTION INTRAVENOUS PRN
Status: DISCONTINUED | OUTPATIENT
Start: 2020-11-16 | End: 2020-11-24 | Stop reason: HOSPADM

## 2020-11-16 RX ORDER — NITROGLYCERIN 0.4 MG/1
0.4 TABLET SUBLINGUAL EVERY 5 MIN PRN
Status: DISCONTINUED | OUTPATIENT
Start: 2020-11-16 | End: 2020-11-24 | Stop reason: HOSPADM

## 2020-11-16 RX ORDER — IPRATROPIUM BROMIDE AND ALBUTEROL SULFATE 2.5; .5 MG/3ML; MG/3ML
1 SOLUTION RESPIRATORY (INHALATION)
Status: DISCONTINUED | OUTPATIENT
Start: 2020-11-16 | End: 2020-11-24 | Stop reason: HOSPADM

## 2020-11-16 RX ORDER — METHYLPREDNISOLONE SODIUM SUCCINATE 125 MG/2ML
125 INJECTION, POWDER, LYOPHILIZED, FOR SOLUTION INTRAMUSCULAR; INTRAVENOUS ONCE
Status: COMPLETED | OUTPATIENT
Start: 2020-11-16 | End: 2020-11-16

## 2020-11-16 RX ORDER — METHYLPREDNISOLONE SODIUM SUCCINATE 40 MG/ML
40 INJECTION, POWDER, LYOPHILIZED, FOR SOLUTION INTRAMUSCULAR; INTRAVENOUS EVERY 8 HOURS
Status: DISCONTINUED | OUTPATIENT
Start: 2020-11-16 | End: 2020-11-18

## 2020-11-16 RX ORDER — ALPRAZOLAM 0.5 MG/1
1 TABLET ORAL 3 TIMES DAILY PRN
Status: DISCONTINUED | OUTPATIENT
Start: 2020-11-16 | End: 2020-11-24 | Stop reason: HOSPADM

## 2020-11-16 RX ORDER — DEXTROSE MONOHYDRATE 50 MG/ML
100 INJECTION, SOLUTION INTRAVENOUS PRN
Status: DISCONTINUED | OUTPATIENT
Start: 2020-11-16 | End: 2020-11-24 | Stop reason: HOSPADM

## 2020-11-16 RX ORDER — BROMPHENIRAMINE MALEATE, PSEUDOEPHEDRINE HYDROCHLORIDE, AND DEXTROMETHORPHAN HYDROBROMIDE 2; 30; 10 MG/5ML; MG/5ML; MG/5ML
5 SYRUP ORAL 4 TIMES DAILY PRN
Status: DISCONTINUED | OUTPATIENT
Start: 2020-11-16 | End: 2020-11-16 | Stop reason: ALTCHOICE

## 2020-11-16 RX ORDER — ASPIRIN 81 MG/1
81 TABLET, CHEWABLE ORAL DAILY
Status: DISCONTINUED | OUTPATIENT
Start: 2020-11-16 | End: 2020-11-24 | Stop reason: HOSPADM

## 2020-11-16 RX ORDER — MAGNESIUM SULFATE 1 G/100ML
1 INJECTION INTRAVENOUS PRN
Status: DISCONTINUED | OUTPATIENT
Start: 2020-11-16 | End: 2020-11-24 | Stop reason: HOSPADM

## 2020-11-16 RX ADMIN — FUROSEMIDE 40 MG: 10 INJECTION, SOLUTION INTRAMUSCULAR; INTRAVENOUS at 00:56

## 2020-11-16 RX ADMIN — ISOSORBIDE MONONITRATE 60 MG: 30 TABLET, EXTENDED RELEASE ORAL at 10:07

## 2020-11-16 RX ADMIN — HYDRALAZINE HYDROCHLORIDE 25 MG: 25 TABLET, FILM COATED ORAL at 20:14

## 2020-11-16 RX ADMIN — ASPIRIN 81 MG 81 MG: 81 TABLET ORAL at 10:07

## 2020-11-16 RX ADMIN — PANTOPRAZOLE SODIUM 40 MG: 40 TABLET, DELAYED RELEASE ORAL at 05:06

## 2020-11-16 RX ADMIN — ALPRAZOLAM 1 MG: 0.5 TABLET ORAL at 21:50

## 2020-11-16 RX ADMIN — Medication 2 PUFF: at 10:34

## 2020-11-16 RX ADMIN — GLIPIZIDE 5 MG: 5 TABLET ORAL at 11:05

## 2020-11-16 RX ADMIN — ALOGLIPTIN 25 MG: 25 TABLET, FILM COATED ORAL at 11:05

## 2020-11-16 RX ADMIN — METHYLPREDNISOLONE SODIUM SUCCINATE 40 MG: 40 INJECTION, POWDER, FOR SOLUTION INTRAMUSCULAR; INTRAVENOUS at 10:08

## 2020-11-16 RX ADMIN — IBUPROFEN 400 MG: 400 TABLET, FILM COATED ORAL at 05:06

## 2020-11-16 RX ADMIN — DICLOFENAC 4 G: 10 GEL TOPICAL at 10:23

## 2020-11-16 RX ADMIN — HYDRALAZINE HYDROCHLORIDE 25 MG: 25 TABLET, FILM COATED ORAL at 16:48

## 2020-11-16 RX ADMIN — ALPRAZOLAM 1 MG: 0.5 TABLET ORAL at 13:15

## 2020-11-16 RX ADMIN — ALPRAZOLAM 1 MG: 0.5 TABLET ORAL at 05:06

## 2020-11-16 RX ADMIN — METHYLPREDNISOLONE SODIUM SUCCINATE 125 MG: 125 INJECTION, POWDER, FOR SOLUTION INTRAMUSCULAR; INTRAVENOUS at 04:58

## 2020-11-16 RX ADMIN — POTASSIUM CHLORIDE 20 MEQ: 750 TABLET, FILM COATED, EXTENDED RELEASE ORAL at 18:48

## 2020-11-16 RX ADMIN — IPRATROPIUM BROMIDE AND ALBUTEROL SULFATE 1 AMPULE: .5; 3 SOLUTION RESPIRATORY (INHALATION) at 10:11

## 2020-11-16 RX ADMIN — ROSUVASTATIN CALCIUM 5 MG: 10 TABLET, FILM COATED ORAL at 20:14

## 2020-11-16 RX ADMIN — DICLOFENAC 4 G: 10 GEL TOPICAL at 21:50

## 2020-11-16 RX ADMIN — NYSTATIN: 100000 POWDER TOPICAL at 21:50

## 2020-11-16 RX ADMIN — METHYLPREDNISOLONE SODIUM SUCCINATE 40 MG: 40 INJECTION, POWDER, FOR SOLUTION INTRAMUSCULAR; INTRAVENOUS at 18:48

## 2020-11-16 RX ADMIN — FUROSEMIDE 20 MG: 10 INJECTION, SOLUTION INTRAMUSCULAR; INTRAVENOUS at 10:08

## 2020-11-16 RX ADMIN — MAGNESIUM GLUCONATE 500 MG ORAL TABLET 400 MG: 500 TABLET ORAL at 18:48

## 2020-11-16 RX ADMIN — NYSTATIN: 100000 POWDER TOPICAL at 13:15

## 2020-11-16 RX ADMIN — HYDRALAZINE HYDROCHLORIDE 25 MG: 25 TABLET, FILM COATED ORAL at 10:07

## 2020-11-16 RX ADMIN — ROSUVASTATIN CALCIUM 5 MG: 10 TABLET, FILM COATED ORAL at 04:58

## 2020-11-16 RX ADMIN — LISINOPRIL 10 MG: 10 TABLET ORAL at 10:07

## 2020-11-16 ASSESSMENT — PAIN SCALES - GENERAL
PAINLEVEL_OUTOF10: 5
PAINLEVEL_OUTOF10: 0
PAINLEVEL_OUTOF10: 0
PAINLEVEL_OUTOF10: 5
PAINLEVEL_OUTOF10: 5
PAINLEVEL_OUTOF10: 0
PAINLEVEL_OUTOF10: 10
PAINLEVEL_OUTOF10: 7
PAINLEVEL_OUTOF10: 0

## 2020-11-16 ASSESSMENT — PAIN DESCRIPTION - PAIN TYPE
TYPE: CHRONIC PAIN

## 2020-11-16 NOTE — PROGRESS NOTES
Patient assisted to bedside commode with good urine output. Patient stated severe chest pain that is worse with activity, but patient refused bedpan. Pain relieved with rest and no movement. Cough with deep breathing, which exacerbates chest pain. Will continue to monitor. Patient has chronic pain at all times throughout her body. Will review medications available.

## 2020-11-16 NOTE — ED NOTES
Pharmacy Medication History Note      List of current medications patient is taking is complete. Source of information: Dennise Monet Byairam[address] made to medication list:  Medications flagged for removal (include reason, ex. noncompliance):  none    Medications removed (include reason, ex. therapy complete or physician discontinued):  Duplicates and old medical supplies    Medications added/doses adjusted:  none    Other notes (ex. Recent course of antibiotics, Coumadin dosing):  Denies use of other OTC or herbal medications. Last dose times updated. Essie Curiel, PharmD  PGY1 Pharmacy Resident  [id number]    Home Medicine Reconciliation:    No current facility-administered medications on file prior to encounter. Current Outpatient Medications on File Prior to Encounter   Medication Sig Dispense Refill    omeprazole (PRILOSEC) 40 MG delayed release capsule Take 1 capsule by mouth daily 30 capsule 3    lisinopril (PRINIVIL;ZESTRIL) 10 MG tablet Take 1 tablet by mouth daily 90 tablet 1    ALPRAZolam (XANAX) 1 MG tablet Take 1 tablet by mouth 3 times daily as needed for Anxiety for up to 30 days. 90 tablet 0    linagliptin (TRADJENTA) 5 MG tablet Take 1 tablet by mouth daily 90 tablet 1    gabapentin (NEURONTIN) 300 MG capsule Take 1 capsule by mouth 2 times daily for 30 days.  Intended supply: 30 days 60 capsule 1    hydrALAZINE (APRESOLINE) 25 MG tablet Take 1 tablet by mouth 3 times daily 90 tablet 3    glipiZIDE (GLUCOTROL) 5 MG tablet Take 1 tablet by mouth 2 times daily (before meals) 60 tablet 2    isosorbide mononitrate (IMDUR) 60 MG extended release tablet Take 1 tablet by mouth daily 90 tablet 3    diclofenac sodium (VOLTAREN) 1 % GEL APPLY TWO GRAMS TOPICALLY TWICE DAILY 1 Tube 2    rosuvastatin (CRESTOR) 10 MG tablet Take 0.5 tablets by mouth daily 90 tablet 3    furosemide (LASIX) 40 MG tablet Take 1 tablet by mouth daily 30 tablet 1    sodium chloride (OCEAN, BABY AYR) 0.65 % nasal spray 1 spray by Nasal route as needed for Congestion  0    ipratropium-albuterol (DUONEB) 0.5-2.5 (3) MG/3ML SOLN nebulizer solution Inhale 3 mLs into the lungs every 4 hours as needed for Shortness of Breath 360 mL 1    budesonide-formoterol (SYMBICORT) 160-4.5 MCG/ACT AERO Inhale 2 puffs into the lungs 2 times daily 3 Inhaler 1    albuterol sulfate  (90 Base) MCG/ACT inhaler Inhale 2 puffs into the lungs every 6 hours as needed for Wheezing 1 Inhaler 2    ibuprofen (ADVIL;MOTRIN) 400 MG tablet Take 1 tablet by mouth every 6 hours as needed for Pain or Fever 120 tablet 3    triamcinolone (KENALOG) 0.1 % cream Apply topically 2 times daily. 30 g 1    aspirin 81 MG tablet Take 81 mg by mouth daily      [DISCONTINUED] diclofenac sodium (VOLTAREN) 1 % GEL Apply 4 g topically 4 times daily 2 Tube 3    [DISCONTINUED] nitroGLYCERIN (NITROSTAT) 0.4 MG SL tablet Place 1 tablet under the tongue every 5 minutes as needed for Chest pain up to max of 3 total doses.  If no relief after 1 dose, call 911. 25 tablet 3    [DISCONTINUED] Scooter MISC by Does not apply route If she needs any paper work for this  She needs to be evaluated at [facility] for that 1 each 0    [DISCONTINUED] Elastic Bandages & Supports ([address]) [address] [facility] [address] [facility] [address] 2 each by Does not apply route once for 1 dose 2 each 0    [DISCONTINUED] blood glucose test strips (TRUE METRIX BLOOD GLUCOSE TEST) strip USE THREE TIMES A DAY AS NEEDED 100 strip 5    [DISCONTINUED] glucose (GLUTOSE) 40 % GEL Take 37.5 mLs by mouth as needed (low BS) (Patient not taking: Reported on 7/31/2020) 45 g 1    [DISCONTINUED] brompheniramine-pseudoephedrine-DM 2-30-10 MG/5ML syrup Take 5 mLs by mouth 4 times daily as needed for Congestion or Cough 200 mL 1    [DISCONTINUED] Phenylephrine-Bromphen-DM 5-2-10 MG/5ML LIQD Take 7.5 mLs by mouth 3 times daily as needed (cough) 240 mL 1    [DISCONTINUED] OXYGEN Inhale 2 L/min into the lungs as needed Uses when O2 Sat is below 90      [DISCONTINUED] Denture Care Products (EFFERDENT DENTURE CLEANSER) TBEF 1 tablet by Does not apply route as needed (denture cleaning) (Patient not taking: Reported on 7/31/2020)  0    [DISCONTINUED] Incontinence Supply Disposable (PREVAIL WET WIPES) MISC 8 each by Does not apply route daily 720 each 3    [DISCONTINUED] Diapers & Supplies (HUGGIES PULL-UPS 4T-5T) MISC 8 each by Does not apply route daily 720 each 3    [DISCONTINUED] Elastic Bandages & Supports (JOBST FOR MEN 30-40MMHG LG) MISC 20-30 mm by Does not apply route daily Please measure for knee hi hose 1 each 3    nystatin (MYCOSTATIN) 216851 UNIT/GM powder Affected area 1 Bottle 5    nitroGLYCERIN (NITROSTAT) 0.4 MG SL tablet Place 1 tablet under the tongue every 5 minutes as needed for Chest pain. 30 tablet 0    [DISCONTINUED] Respiratory Therapy Supplies (NEBULIZER) by Does not apply route.  PRN BREATHING TREATMENTS, UNSURE OF MED

## 2020-11-16 NOTE — ED NOTES
Pt updated on POC. Pt positioned for comfort. Call light in reach. Bed locked and in low position. Pt denies any needs or concerns at this time. [place] provided.      Mark Atkins RN  11/16/20 [id number]

## 2020-11-16 NOTE — CONSULTS
[facility]  [phone]      Chief Complaint   Patient presents with    Chest Pain     in via squad, c/o chest pain and sob that radiates into back. took nitro with no relief at home. History of Present Illness:  Roman Dick is a 77 y.o. patient who presented to the hospital with complaints of CP and SOB. She had sudden onset of substernal CP, sharp/pressure radiating to the back. No relief with nitro. Troponin and BNP were normal. H/o pericarditis and CAD. Admitted for COPD exacerbation. I have been asked to provide consultation regarding further management and testing. Past Medical History:   has a past medical history of Acute MI ([facility] [address] Acute pyelonephritis, Anxiety and depression, Arthritis, CAD (coronary artery disease), Cancer ([address] Cancer ([address] Cancer of skin of leg, Chronic obstructive pulmonary disease ([address] Claustrophobia, COPD (chronic obstructive pulmonary disease) ([address] Esophageal stricture, Gastroesophageal reflux disease without esophagitis, Hiatal hernia, Hiatal hernia, Hypercholesteremia, Hypertension, IBS (irritable bowel syndrome), Migraines, Movement disorder, Pneumonia, PONV (postoperative nausea and vomiting), Type II or unspecified type diabetes mellitus without mention of complication, not stated as uncontrolled, and Unspecified cerebral artery occlusion with cerebral infarction. Surgical History:   has a past surgical history that includes Cardiac surgery; Gallbladder surgery; Hysterectomy; Appendectomy; Cholecystectomy; Colonoscopy; Upper gastrointestinal endoscopy (4/20/12); Endoscopy, colon, diagnostic; Tear duct surgery; Upper gastrointestinal endoscopy (06/11/2018); Colonoscopy (06/11/2018); pr exc skin malig <5mm remaindr body (N/A, 9/6/2018); pr split grft trunk,arm,leg <100sqcm (N/A, 9/6/2018); skin biopsy; eye surgery; bronchoscopy (N/A, 1/24/2020); bronchoscopy (N/A, 1/27/2020);  Cataract removal (2013 or 2014); and Esophagus dilation. Social History:   reports that she quit smoking about 3 years ago. Her smoking use included cigarettes. She has a 12.25 pack-year smoking history. She has never used smokeless tobacco. She reports current alcohol use of about 1.0 standard drinks of alcohol per week. She reports that she does not use drugs. Family History:  family history includes Cancer in her father, mother, and sister; Heart Disease in her brother, mother, and sister. Home Medications:  Were reviewed and are listed in nursing record. and/or listed below  Prior to Admission medications    Medication Sig Start Date End Date Taking? Authorizing Provider   omeprazole (PRILOSEC) 40 MG delayed release capsule Take 1 capsule by mouth daily 10/30/20   Donna Daly MD   lisinopril (PRINIVIL;ZESTRIL) 10 MG tablet Take 1 tablet by mouth daily 10/30/20 1/28/21  Donna Daly MD   ALPRAZolam Rimma Cassis) 1 MG tablet Take 1 tablet by mouth 3 times daily as needed for Anxiety for up to 30 days. 10/30/20 11/29/20  Donna Daly MD   diclofenac sodium (VOLTAREN) 1 % GEL Apply 4 g topically 4 times daily 10/30/20   Donna Daly MD   linagliptin (TRADJENTA) 5 MG tablet Take 1 tablet by mouth daily 10/30/20 1/28/21  Donna Daly MD   gabapentin (NEURONTIN) 300 MG capsule Take 1 capsule by mouth 2 times daily for 30 days. Intended supply: 30 days 9/29/20 10/29/20  Donna Daly MD   hydrALAZINE (APRESOLINE) 25 MG tablet Take 1 tablet by mouth 3 times daily 9/29/20   Donna Daly MD   glipiZIDE (GLUCOTROL) 5 MG tablet Take 1 tablet by mouth 2 times daily (before meals) 9/29/20   Donna Daly MD   isosorbide mononitrate (IMDUR) 60 MG extended release tablet Take 1 tablet by mouth daily 9/22/20 12/21/20  Donna Daly MD   nitroGLYCERIN (NITROSTAT) 0.4 MG SL tablet Place 1 tablet under the tongue every 5 minutes as needed for Chest pain up to max of 3 total doses.  If no relief after 1 dose, call 911. 9/22/20   Arian MENA Jay Sandoval MD   diclofenac sodium (VOLTAREN) 1 % GEL APPLY TWO GRAMS TOPICALLY TWICE DAILY 9/11/20   Roshan Farrell MD   rosuvastatin (CRESTOR) 10 MG tablet Take 0.5 tablets by mouth daily 7/31/20   Naomi Montes MD   furosemide (LASIX) 40 MG tablet Take 1 tablet by mouth daily 6/2/20 7/31/20  Roshan Farrell MD   [place] MISC by Does not apply route If she needs any paper work for this  She needs to be evaluated at [facility] for that 6/2/20   Roshan Farrell MD   Elastic Bandages & Supports (FUTURO FIRM COMPRESSION HOSE) [address] 2 each by Does not apply route once for 1 dose 5/1/20 7/31/20  Roshan Farrell MD   blood glucose test strips (TRUE METRIX BLOOD GLUCOSE TEST) strip USE THREE TIMES A DAY AS NEEDED 4/3/20   Roshan Farrell MD   glucose (GLUTOSE) 40 % GEL Take 37.5 mLs by mouth as needed (low BS)  Patient not taking: Reported on 7/31/2020 4/3/20   Roshan Farrell MD   brompheniramine-pseudoephedrine-DM 2-30-10 MG/5ML syrup Take 5 mLs by mouth 4 times daily as needed for Congestion or Cough 3/30/20   Roshan Farrell MD   sodium chloride (OCEAN, BABY AYR) 0.65 % nasal spray 1 spray by Nasal route as needed for Congestion 3/27/20   Roshan Farrell MD   Phenylephrine-Bromphen-DM 5-2-10 MG/5ML LIQD Take 7.5 mLs by mouth 3 times daily as needed (cough) 2/4/20   Roshan Farrell MD   ipratropium-albuterol (DUONEB) 0.5-2.5 (3) MG/3ML SOLN nebulizer solution Inhale 3 mLs into the lungs every 4 hours as needed for Shortness of Breath 12/17/19   Roshan Farrell MD   budesonide-formoterol (SYMBICORT) 160-4.5 MCG/ACT AERO Inhale 2 puffs into the lungs 2 times daily 12/9/19   Roshan Farrell MD   albuterol sulfate  (90 Base) MCG/ACT inhaler Inhale 2 puffs into the lungs every 6 hours as needed for Wheezing 12/5/19   Roshan Farrell MD   ibuprofen (ADVIL;MOTRIN) 400 MG tablet Take 1 tablet by mouth every 6 hours as needed for Pain or Fever 10/29/19   Roshan Farrell MD   OXYGEN Inhale 2 L/min into the lungs as needed Uses when O2 Sat is below 90    Historical Provider, MD [place] 2 (EFFERDENT DENTURE CLEANSER) TBEF 1 tablet by Does not apply route as needed (denture cleaning)  Patient not taking: Reported on 7/31/2020 6/13/18   Zaheer Luna MD   Incontinence Supply Disposable (PREVAIL WET WIPES) MISC 8 each by Does not apply route daily 3/14/18 7/31/20  Zaheer Luna MD   Diapers & Supplies (HUGGIES PULL-UPS 4T-5T) MISC 8 each by Does not apply route daily 3/14/18   Zaheer Lnua MD   Elastic Bandages & Supports (JOBST FOR MEN 30-40MMHG LG) MISC 20-30 mm by Does not apply route daily Please measure for knee hi hose 11/2/17   Lacretia Ring, MD   triamcinolone (KENALOG) 0.1 % cream Apply topically 2 times daily. 6/1/17   Zaheer Luna MD   nystatin (MYCOSTATIN) 325144 UNIT/GM powder Affected area 5/6/16   Zaheer Luna MD   aspirin 81 MG tablet Take 81 mg by mouth daily    Historical Provider, MD   nitroGLYCERIN (NITROSTAT) 0.4 MG SL tablet Place 1 tablet under the tongue every 5 minutes as needed for Chest pain. 4/22/12   Zaheer Luna MD   Respiratory Therapy Supplies (NEBULIZER) by Does not apply route.  PRN BREATHING TREATMENTS, UNSURE OF MED     Historical Provider, MD        Current Medications:  Current Facility-Administered Medications   Medication Dose Route Frequency Provider Last Rate Last Dose    ipratropium-albuterol (DUONEB) nebulizer solution 1 ampule  1 ampule Inhalation Q4H WA Zaheer Luna MD   1 ampule at 11/16/20 1011    ALPRAZolam [place]) tablet 1 mg  1 mg Oral TID PRN Zaheer Luna MD   1 mg at 11/16/20 1315    aspirin chewable tablet 81 mg  81 mg Oral Daily Zaheer Luna MD   81 mg at 11/16/20 1007    budesonide-formoterol (SYMBICORT) 160-4.5 MCG/ACT inhaler 2 puff  2 puff Inhalation BID Zaheer Luna MD   2 puff at 11/16/20 1034    diclofenac sodium (VOLTAREN) 1 % gel 4 g  4 g Topical BID Zaheer Luna MD   4 g at 11/16/20 1025    glipiZIDE (GLUCOTROL) tablet 5 mg  5 mg Oral BID AC Jolie Meredith MD   5 mg at 11/16/20 1105    hydrALAZINE (APRESOLINE) tablet 25 mg  25 mg Oral TID Jolie Meredith MD   25 mg at 11/16/20 1007    ibuprofen (ADVIL;MOTRIN) tablet 400 mg  400 mg Oral Q6H PRN Jolie Meredith MD   400 mg at 11/16/20 0506    isosorbide mononitrate (IMDUR) extended release tablet 60 mg  60 mg Oral Daily Jolie Meredith MD   60 mg at 11/16/20 1007    lisinopril (PRINIVIL;ZESTRIL) tablet 10 mg  10 mg Oral Daily Jolie Meredith MD   10 mg at 11/16/20 1007    nitroGLYCERIN (NITROSTAT) SL tablet 0.4 mg  0.4 mg Sublingual Q5 Min PRN Jolie Meredith MD        pantoprazole (PROTONIX) tablet 40 mg  40 mg Oral QAM AC Jolie Meredith MD   40 mg at 11/16/20 0506    guaiFENesin-dextromethorphan (ROBITUSSIN DM) 100-10 MG/5ML syrup 5 mL  5 mL Oral TID PRN Jolie Meredith MD        rosuvastatin (CRESTOR) tablet 5 mg  5 mg Oral Nightly Jolie Meredith MD   5 mg at 11/16/20 0458    sodium chloride (OCEAN, BABY AYR) 0.65 % nasal spray 1 spray  1 spray Nasal PRN Jolie Meredith MD        methylPREDNISolone sodium (SOLU-MEDROL) injection 40 mg  40 mg Intravenous Q8H Jolie Meredith MD   40 mg at 11/16/20 1008    furosemide (LASIX) injection 20 mg  20 mg Intravenous Daily Jolie Meredith MD   20 mg at 11/16/20 1008    alogliptin (NESINA) tablet 25 mg  25 mg Oral Daily Teresita Harrell MD   25 mg at 11/16/20 1105    glucose (GLUTOSE) 40 % oral gel 15 g  15 g Oral PRN Maureen Dennis MD        dextrose 50 % IV solution  12.5 g Intravenous PRN Maureen Dennis MD        glucagon (rDNA) injection 1 mg  1 mg Intramuscular PRN Maureen Dennis MD        dextrose 5 % solution  100 mL/hr Intravenous PRN Maureen Dennis MD        nystatin (MYCOSTATIN) powder   Topical BID Jolie Meredith MD         Current Outpatient Medications   Medication Sig Dispense Refill    omeprazole (PRILOSEC) 40 MG delayed release capsule Take 1 capsule by mouth daily 30 capsule 3    lisinopril (PRINIVIL;ZESTRIL) 10 MG tablet Take 1 tablet by mouth daily 90 tablet 1    ALPRAZolam (XANAX) 1 MG tablet Take 1 tablet by mouth 3 times daily as needed for Anxiety for up to 30 days. 90 tablet 0    diclofenac sodium (VOLTAREN) 1 % GEL Apply 4 g topically 4 times daily 2 Tube 3    linagliptin (TRADJENTA) 5 MG tablet Take 1 tablet by mouth daily 90 tablet 1    gabapentin (NEURONTIN) 300 MG capsule Take 1 capsule by mouth 2 times daily for 30 days. Intended supply: 30 days 60 capsule 1    hydrALAZINE (APRESOLINE) 25 MG tablet Take 1 tablet by mouth 3 times daily 90 tablet 3    glipiZIDE (GLUCOTROL) 5 MG tablet Take 1 tablet by mouth 2 times daily (before meals) 60 tablet 2    isosorbide mononitrate (IMDUR) 60 MG extended release tablet Take 1 tablet by mouth daily 90 tablet 3    nitroGLYCERIN (NITROSTAT) 0.4 MG SL tablet Place 1 tablet under the tongue every 5 minutes as needed for Chest pain up to max of 3 total doses.  If no relief after 1 dose, call 911. 25 tablet 3    diclofenac sodium (VOLTAREN) 1 % GEL APPLY TWO GRAMS TOPICALLY TWICE DAILY 1 Tube 2    rosuvastatin (CRESTOR) 10 MG tablet Take 0.5 tablets by mouth daily 90 tablet 3    furosemide (LASIX) 40 MG tablet Take 1 tablet by mouth daily 30 tablet 1    [place] MISC by Does not apply route If she needs any paper work for this  She needs to be [facility] for that 1 each 0    Elastic Bandages & Supports (FUTURO FIRM COMPRESSION HOSE) MISC 2 each by Does not apply route once for 1 dose 2 each 0    blood glucose test strips (TRUE METRIX BLOOD GLUCOSE TEST) strip USE THREE TIMES A DAY AS NEEDED 100 strip 5    glucose (GLUTOSE) 40 % GEL Take 37.5 mLs by mouth as needed (low BS) (Patient not taking: Reported on 7/31/2020) 45 g 1    brompheniramine-pseudoephedrine-DM 2-30-10 MG/5ML syrup Take 5 mLs by mouth 4 times daily as needed for Congestion or Cough 200 mL 1    sodium chloride (OCEAN, BABY AYR) 0.65 % nasal spray 1 spray by Nasal route as needed for Congestion  0    Phenylephrine-Bromphen-DM 5-2-10 MG/5ML LIQD Take 7.5 mLs by mouth 3 times daily as needed (cough) 240 mL 1    ipratropium-albuterol (DUONEB) 0.5-2.5 (3) MG/3ML SOLN nebulizer solution Inhale 3 mLs into the lungs every 4 hours as needed for Shortness of Breath 360 mL 1    budesonide-formoterol (SYMBICORT) 160-4.5 MCG/ACT AERO Inhale 2 puffs into the lungs 2 times daily 3 Inhaler 1    albuterol sulfate  (90 Base) MCG/ACT inhaler Inhale 2 puffs into the lungs every 6 hours as needed for Wheezing 1 Inhaler 2    ibuprofen (ADVIL;MOTRIN) 400 MG tablet Take 1 tablet by mouth every 6 hours as needed for Pain or Fever 120 tablet 3    OXYGEN Inhale 2 L/min into the lungs as needed Uses when O2 Sat is below 90      Denture Care Products (EFFERDENT DENTURE CLEANSER) TBEF 1 tablet by Does not apply route as needed (denture cleaning) (Patient not taking: Reported on 7/31/2020)  0    Incontinence Supply Disposable (PREVAIL WET WIPES) MISC 8 each by Does not apply route daily 720 each 3    Diapers & Supplies (HUGGIES PULL-UPS 4T-5T) MISC 8 each by Does not apply route daily 720 each 3    Elastic Bandages & Supports (JOBST FOR MEN 30-40MMHG LG) MISC 20-30 mm by Does not apply route daily Please measure for knee hi hose 1 each 3    triamcinolone (KENALOG) 0.1 % cream Apply topically 2 times daily. 30 g 1    nystatin (MYCOSTATIN) 226561 UNIT/GM powder Affected area 1 Bottle 5    aspirin 81 MG tablet Take 81 mg by mouth daily      nitroGLYCERIN (NITROSTAT) 0.4 MG SL tablet Place 1 tablet under the tongue every 5 minutes as needed for Chest pain. 30 tablet 0    Respiratory Therapy Supplies (NEBULIZER) by Does not apply route. PRN BREATHING TREATMENTS, UNSURE OF MED           Allergies:  Morphine; Codeine; Hydromorphone; Levaquin [levofloxacin in d5w];  Vicodin [hydrocodone-acetaminophen]; and Aspirin     Review of Systems: · Constitutional: there has been no unanticipated weight loss. There's been no change in energy level, sleep pattern, or activity level. · Eyes: No visual changes or diplopia. No scleral icterus. · ENT: No Headaches, hearing loss or vertigo. No mouth sores or sore throat. · Cardiovascular: Reviewed in HPI  · Respiratory: No cough or wheezing, no sputum production. No hematemesis. · Gastrointestinal: No abdominal pain, appetite loss, blood in stools. No change in bowel or bladder habits. · Genitourinary: No dysuria, trouble voiding, or hematuria. · Musculoskeletal:  No gait disturbance, weakness or joint complaints. · Integumentary: No rash or pruritis. · Neurological: No headache, diplopia, change in muscle strength, numbness or tingling. No change in gait, balance, coordination, mood, affect, memory, mentation, behavior. · Psychiatric: No anxiety, no depression. · Endocrine: No malaise, fatigue or temperature intolerance. No excessive thirst, fluid intake, or urination. No tremor. · Hematologic/Lymphatic: No abnormal bruising or bleeding, blood clots or swollen lymph nodes. · Allergic/Immunologic: No nasal congestion or hives.   ·     Physical Examination:    Vitals:    11/16/20 1248   BP:    Pulse:    Resp:    Temp:    SpO2: 93%    Weight: 260 lb (117.9 kg)         General Appearance:  Alert, cooperative, no distress, appears stated age   Head:  Normocephalic, without obvious abnormality, atraumatic   Eyes:  PERRL, conjunctiva/corneas clear       Nose: Nares normal, no drainage or sinus tenderness   Throat: Lips, mucosa, and tongue normal   Neck: Supple, symmetrical, trachea midline, no adenopathy, thyroid: not enlarged, symmetric, no tenderness/mass/nodules, no carotid bruit or JVD       Lungs:   Wheeze to auscultation bilaterally, respirations unlabored   Chest Wall:  No tenderness or deformity   Heart:  Regular rate and rhythm, S1, S2 normal, no murmur, rub or gallop   Abdomen:   Soft, non-tender, bowel sounds active all four quadrants,  no masses, no organomegaly           Extremities: Extremities normal, atraumatic, no cyanosis or edema   Pulses: 2+ and symmetric   Skin: Skin color, texture, turgor normal, no rashes or lesions   Pysch: Normal mood and affect   Neurologic: Normal gross motor and sensory exam.         Labs  CBC:   Lab Results   Component Value Date    WBC 9.1 11/15/2020    RBC 4.46 11/15/2020    HGB 12.8 11/15/2020    HCT 39.4 11/15/2020    MCV 88.3 11/15/2020    RDW 15.9 11/15/2020     11/15/2020     CMP:    Lab Results   Component Value Date     11/15/2020    K 3.3 11/15/2020     11/15/2020    CO2 22 11/15/2020    BUN 17 11/15/2020    CREATININE 0.7 11/15/2020    GFRAA >60 11/15/2020    GFRAA >60 05/02/2013    AGRATIO 1.3 11/15/2020    LABGLOM >60 11/15/2020    GLUCOSE 155 11/15/2020    PROT 5.0 11/15/2020    PROT 7.6 12/02/2011    CALCIUM 7.5 11/15/2020    BILITOT <0.2 11/15/2020    ALKPHOS 66 11/15/2020    AST 11 11/15/2020    ALT 9 11/15/2020     PT/INR:  No results found for: PTINR  Lab Results   Component Value Date    CKTOTAL 24 (L) 06/07/2018    CKMB 0.29 08/09/2011    TROPONINI <0.01 11/16/2020       EKG:  I have reviewed EKG with the following interpretation:  Impression:  Sinus rhythm with Fusion complexesLeft anterior fascicular blockPoor R wave progressionAbnormal ECGNo significant change was found from previous     Pt has CAD with following history:  CABG: (date/details),   Valve replacement (date/details)   GXT/Myoview/Lexiscan: (date)  (results)   Stress/echo: (date) (result)   CATH/PCI:  (date)- (results)  - (# and type of stents) -   Cardiac Cath with FFR:  Anatomy:   LM-Normal No disease   LAD-proximal stent 10% ISR, mid stent patent. Distal LAD 20% irregular stenosis. D2 70% ostial stent snf  Cx-Patent, mild luminal irregularities  OM1- ostial discrete 60% stenosis  RCA-Dominant, large sized vessel.  Proximal 70% heavily calcified stenosis.   RPDA- Patent, mild luminal irregularities     FFR of prox RCA 0.84     Impression  ~Angiography w/ single vessel moderate to severe disease. Patent LAD stents  ~LVEDP 20  ~Non ischemic FFR of RCA    9/18/19  LAD prox prev stent 10% Instent restenosis  Mid LAD widely patent in prev stent, mid 30%  diag 2 ostial 70% jailed by previous stent  cx widely patent  RCA-prox 70%  Nonobstructive by FFR  ECHO: (date) results EF    %. ANNA (date) (results)  EP procedures/studies/ablation:  (specific data). Device information:  Event monitor: (date/results)    All testing and labs listed below were personally reviewed. Assessment  Patient Active Problem List   Diagnosis    Chronic respiratory failure ([address]    COPD exacerbation ([address]    CAD (coronary artery disease)    Hyperlipemia    Essential hypertension    DM2 (diabetes mellitus, type 2) ([address]    Primary osteoarthritis involving multiple joints    Gastroesophageal reflux disease without esophagitis    Anxiety    Morbid obesity ([address]    Precordial pain    Acute bronchitis    COPD with acute exacerbation ([address]    AKIL (obstructive sleep apnea)    Chest congestion    DM (diabetes mellitus), secondary uncontrolled (HCC)    Ineffective airway clearance    Mucus plugging of bronchi    Pneumonia of left lower lobe due to infectious organism    Acute pulmonary edema (HCC)    Grade II diastolic dysfunction         Plan:    I had the opportunity to review the clinical symptoms and presentation of Maribell Zaragoza. Assessment/Plan:  Active Problems:    COPD with acute exacerbation (HCC)  Plan: per primary team.      Chest Pain: Atypical CP. Nonobstructive CAD 1 year ago by cath. Recommend nuclear stress test to assess for ischemia. Cont aspirin, statin. I will address the patient's cardiac risk factors and adjusted pharmacologic treatment as needed. In addition, I have reinforced the need for patient directed risk factor modification.     Tobacco use was discussed with the patient and educated on the negative effects. I have asked the patient to not utilize these agents. Thank you for allowing to us to participate in the curtis or Daniel Macias. Further evaluation will be based upon the patient's clinical course and testing results. All questions and concerns were addressed to the patient/family. Alternatives to my treatment were discussed. The note was completed using EMR. Every effort was made to ensure accuracy; however, inadvertent computerized transcription errors may be present.     Wilberto Pierce MD 11/16/2020 1:53 PM

## 2020-11-16 NOTE — ED NOTES
Pt c/o feeling lightheaded in waiting room. Pt taken to triage and vitals taken. See flowsheet. Pt taken to 31 to be seen by provider.       Isabel Menezes RN  11/15/20 [id number]

## 2020-11-16 NOTE — ED PROVIDER NOTES
BRONCHOSCOPY ALVEOLAR LAVAGE performed by Harini St MD at [address] [facility] N/A 1/27/2020    BRONCHOSCOPY DIAGNOSTIC OR CELL [address] ONLY performed by Jackelyn Gregg MD at [address] [facility] [address]      1 stent 2000 and 1 stent 2001    CATARACT REMOVAL  2013 or 2014    bilateral cataracts removed    CHOLECYSTECTOMY      COLONOSCOPY      COLONOSCOPY  06/11/2018    ENDOSCOPY, COLON, DIAGNOSTIC      ESOPHAGEAL DILATATION      EYE SURGERY      bilateral cataracts    GALLBLADDER SURGERY      HYSTERECTOMY      RI EXC SKIN MALIG <5MM REMAINDR BODY N/A 9/6/2018    EXCISION OF SCALP SQUAMOUS CELL CARCINOMA performed by Ad Dewey MD at [address] [facility] <100SQCM N/A 9/6/2018    SPLIT THICKNESS SKIN GRAFT FOR COVERAGE SCALP, APPLICATION OF WOUND VAC DEVICE performed by Ad Dewey MD at [address] ENDOSCOPY  4/20/12    with biopsy of stomach,gastritis    UPPER GASTROINTESTINAL ENDOSCOPY  06/11/2018    w/esophagael dilation       CURRENT MEDICATIONS    Current Outpatient Rx   Medication Sig Dispense Refill    omeprazole (PRILOSEC) 40 MG delayed release capsule Take 1 capsule by mouth daily 30 capsule 3    lisinopril (PRINIVIL;ZESTRIL) 10 MG tablet Take 1 tablet by mouth daily 90 tablet 1    ALPRAZolam (XANAX) 1 MG tablet Take 1 tablet by mouth 3 times daily as needed for Anxiety for up to 30 days. 90 tablet 0    diclofenac sodium (VOLTAREN) 1 % GEL Apply 4 g topically 4 times daily 2 Tube 3    linagliptin (TRADJENTA) 5 MG tablet Take 1 tablet by mouth daily 90 tablet 1    gabapentin (NEURONTIN) 300 MG capsule Take 1 capsule by mouth 2 times daily for 30 days.  Intended supply: 30 days 60 capsule 1    hydrALAZINE (APRESOLINE) 25 MG tablet Take 1 tablet by mouth 3 times daily 90 tablet 3    glipiZIDE (GLUCOTROL) 5 MG tablet Take 1 tablet by mouth 2 times daily (before meals) 60 tablet 2    isosorbide mononitrate (IMDUR) 60 MG extended release tablet Take 1 tablet by mouth daily 90 tablet 3    nitroGLYCERIN (NITROSTAT) 0.4 MG SL tablet Place 1 tablet under the tongue every 5 minutes as needed for Chest pain up to max of 3 total doses.  If no relief after 1 dose, call 911. 25 tablet 3    diclofenac sodium (VOLTAREN) 1 % GEL APPLY TWO GRAMS TOPICALLY TWICE DAILY 1 Tube 2    rosuvastatin (CRESTOR) 10 MG tablet Take 0.5 tablets by mouth daily 90 tablet 3    furosemide (LASIX) 40 MG tablet Take 1 tablet by mouth daily 30 tablet 1    Scooter MISC by Does not apply route If she needs any paper work for this  She needs to be [facility] at [facility] for that 1 each 0    Elastic Bandages & Supports (FUTURO FIRM COMPRESSION HOSE) MISC 2 each by Does not apply route once for 1 dose 2 each 0    blood glucose test strips (TRUE METRIX BLOOD GLUCOSE TEST) strip USE THREE TIMES A DAY AS NEEDED 100 strip 5    glucose (GLUTOSE) 40 % GEL Take 37.5 mLs by mouth as needed (low BS) (Patient not taking: Reported on 7/31/2020) 45 g 1    brompheniramine-pseudoephedrine-DM 2-30-10 MG/5ML syrup Take 5 mLs by mouth 4 times daily as needed for Congestion or Cough 200 mL 1    sodium chloride (OCEAN, BABY AYR) 0.65 % nasal spray 1 spray by Nasal route as needed for Congestion  0    Phenylephrine-Bromphen-DM 5-2-10 MG/5ML LIQD Take 7.5 mLs by mouth 3 times daily as needed (cough) 240 mL 1    ipratropium-albuterol (DUONEB) 0.5-2.5 (3) MG/3ML SOLN nebulizer solution Inhale 3 mLs into the lungs every 4 hours as needed for Shortness of Breath 360 mL 1    budesonide-formoterol (SYMBICORT) 160-4.5 MCG/ACT AERO Inhale 2 puffs into the lungs 2 times daily 3 Inhaler 1    albuterol sulfate  (90 Base) MCG/ACT inhaler Inhale 2 puffs into the lungs every 6 hours as needed for Wheezing 1 Inhaler 2    ibuprofen (ADVIL;MOTRIN) 400 MG tablet Take 1 tablet by mouth every 6 hours as needed for Pain or Fever 120 tablet 3    OXYGEN Inhale 2 L/min into the lungs as needed Uses when O2 Sat is below 90      Denture Care Products (EFFERDENT DENTURE CLEANSER) TBEF 1 tablet by Does not apply route as needed (denture cleaning) (Patient not taking: Reported on 7/31/2020)  0    Incontinence Supply Disposable (PREVAIL WET WIPES) MISC 8 each by Does not apply route daily 720 each 3    Diapers & Supplies (HUGGIES PULL-UPS 4T-5T) MISC 8 each by Does not apply route daily 720 each 3    Elastic Bandages & Supports (JOBST FOR MEN 30-40MMHG LG) MISC 20-30 mm by Does not apply route daily Please measure for knee hi hose 1 each 3    triamcinolone (KENALOG) 0.1 % cream Apply topically 2 times daily. 30 g 1    nystatin (MYCOSTATIN) 010215 UNIT/GM powder Affected area 1 Bottle 5    aspirin 81 MG tablet Take 81 mg by mouth daily      nitroGLYCERIN (NITROSTAT) 0.4 MG SL tablet Place 1 tablet under the tongue every 5 minutes as needed for Chest pain. 30 tablet 0    Respiratory Therapy Supplies (NEBULIZER) by Does not apply route. PRN BREATHING TREATMENTS, UNSURE OF MED          ALLERGIES    Allergies   Allergen Reactions    Morphine Shortness Of Breath    Codeine Other (See Comments)     Chest pain    Hydromorphone Other (See Comments)     hallucinations  Hallucinations    But will take if needed in an emergency    Levaquin [Levofloxacin In D5w] Itching    Vicodin [Hydrocodone-Acetaminophen] Hives    Aspirin      Upsets hiatal hernia       FAMILY HISTORY    Family History   Problem Relation Age of Onset    Heart Disease Mother     Cancer Mother     Cancer Father     Cancer Sister     Heart Disease Sister     Heart Disease Brother     High Blood Pressure Neg Hx     High Cholesterol Neg Hx        SOCIAL HISTORY    Social History     Socioeconomic History    Marital status:       Spouse name: Amara Crystal Number of children: 3    Years of education: 15    Highest education level: None   Occupational History    None   Social Needs    Financial resource strain: None    Food insecurity     Worry: None     Inability: None    Transportation needs     Medical: None     Non-medical: None   Tobacco Use    Smoking status: Former Smoker     Packs/day: 0.25     Years: 49.00     Pack years: 12.25     Types: Cigarettes     Last attempt to quit: 6/16/2017     Years since quitting: 3.4    Smokeless tobacco: Never Used    Tobacco comment: only smoke when real stressed  Nicotine patch   Substance and Sexual Activity    Alcohol use: Yes     Alcohol/week: 1.0 standard drinks     Types: 1 Glasses of wine per week     Comment: occ. every  6 mo    Drug use: No    Sexual activity: Not Currently     Partners: Male   Lifestyle    Physical activity     Days per week: None     Minutes per session: None    Stress: None   Relationships    Social connections     Talks on phone: None     Gets together: None     Attends Anabaptism service: None     Active member of club or organization: None     Attends meetings of clubs or organizations: None     Relationship status: None    Intimate partner violence     Fear of current or ex partner: None     Emotionally abused: None     Physically abused: None     Forced sexual activity: None   Other Topics Concern    None   Social History Narrative    None       REVIEW OF SYSTEMS    Constitutional:  Denies fever, chills, weight loss or weakness   Eyes:  Denies photophobia or discharge   HENT:  Denies sore throat or ear pain   Respiratory:  shortness of breath   Cardiovascular: Pain in the chest  GI:  Denies abdominal pain, nausea, vomiting, or diarrhea   Musculoskeletal:  Denies back pain   Skin:  Denies rash   Neurologic:  Denies headache, focal weakness or sensory changes   Endocrine:  Denies polyuria or polydypsia   Lymphatic:  Denies swollen glands   Psychiatric:  Denies depression, suicidal ideation or homicidal ideation   All systems negative except as marked.      PHYSICAL EXAM    VITAL SIGNS: BP 111/71   Pulse 86   Temp 97.4 °F (36.3 °C) (Oral)   Resp 18   Ht 5' 6\" (1.676 m)   Wt 260 lb (117.9 kg)   SpO2 96%   BMI 41.97 kg/m²    Constitutional:  Well developed, Well nourished, No acute distress, Non-toxic appearance. HENT:  Normocephalic, Atraumatic, Bilateral external ears normal, Oropharynx moist, No oral exudates, Nose normal. Neck- Normal range of motion, No tenderness, Supple, No stridor. Eyes:  PERRL, EOMI, Conjunctiva normal, No discharge. Respiratory: Coarse breath sounds with wheeze  Cardiovascular:  Normal heart rate, Normal rhythm, No murmurs, No rubs, No gallops. GI:  Bowel sounds normal, Soft, No tenderness, No masses, No pulsatile masses. Musculoskeletal:  Intact distal pulses, No edema, No tenderness, No cyanosis, No clubbing. Good range of motion in all major joints. No tenderness to palpation or major deformities noted. Back- No tenderness. Integument:  Warm, Dry, No erythema, No rash. Lymphatic:  No lymphadenopathy noted. Neurologic:  Alert & oriented x 3, Normal motor function, Normal sensory function, No focal deficits noted. Psychiatric:  Affect normal, Judgment normal, Mood normal.     EKG    Left anterior fascicular block. Otherwise normal sinus rhythm with no ST elevation or depression    RADIOLOGY    XR CHEST PORTABLE   Final Result   Mild pulmonary vascular congestion.            Labs Reviewed   COMPREHENSIVE METABOLIC PANEL W/ REFLEX TO MG FOR LOW K - Abnormal; Notable for the following components:       Result Value    Potassium reflex Magnesium 3.3 (*)     Glucose 155 (*)     Calcium 7.5 (*)     Total Protein 5.0 (*)     Alb 2.8 (*)     ALT 9 (*)     AST 11 (*)     All other components within normal limits    Narrative:     Performed at:  [facility], [facility] [address]   Phone [phone]   CBC WITH AUTO DIFFERENTIAL - Abnormal; Notable for the following components:    RDW 15.9 (*)     All other components within normal limits    Narrative:     Performed at:  [facility]  [address],  [place], [address]   Phone [phone]   MAGNESIUM - Abnormal; Notable for the following components:    Magnesium 1.60 (*)     All other components within normal limits    Narrative:     Performed at:  [facility]  [address],  [place], [address]   Phone [phone]   TROPONIN    Narrative:     Performed at:  [facility]  [address],  [place], [address]   Phone [phone]   BRAIN NATRIURETIC PEPTIDE    Narrative:     Performed at:  [facility]  [address],  [place], [address]   Phone [phone]         PROCEDURES        ED [facility] [employer] 630 studies reviewed. (See chart for details)  This patient has shortness of breath and known coronary disease. She has multiple medical problems including COPD. I gave her breathing treatment here in the department along with some Solu-Medrol. I think is based on history, this patient needs to be admitted. Laboratories are unremarkable. I spoke to Dr. Noe Higgins for admission. Patient is receiving Lasix here in the department and also got a breathing treatment. She is hemodynamically stable at the time of admission. There was significant chance for life-threatening deterioration in this patient condition if not from a urgent intervention. Total critical care time is 30 minutes  FINAL IMPRESSION    1.  Acute chest pain             David Jenkins MD  11/15/20 9044       David Jenkins MD  11/15/20 0804

## 2020-11-17 PROBLEM — J20.9 ACUTE BRONCHITIS: Status: RESOLVED | Noted: 2019-04-25 | Resolved: 2020-11-17

## 2020-11-17 PROBLEM — R06.89 INEFFECTIVE AIRWAY CLEARANCE: Status: RESOLVED | Noted: 2020-01-23 | Resolved: 2020-11-17

## 2020-11-17 PROBLEM — J81.0 ACUTE PULMONARY EDEMA (HCC): Status: RESOLVED | Noted: 2020-03-23 | Resolved: 2020-11-17

## 2020-11-17 LAB
ANION GAP SERPL CALCULATED.3IONS-SCNC: 12 MMOL/L (ref 3–16)
BUN BLDV-MCNC: 19 MG/DL (ref 7–20)
CALCIUM SERPL-MCNC: 10.6 MG/DL (ref 8.3–10.6)
CHLORIDE BLD-SCNC: 97 MMOL/L (ref 99–110)
CO2: 26 MMOL/L (ref 21–32)
CREAT SERPL-MCNC: 1 MG/DL (ref 0.6–1.2)
GFR AFRICAN AMERICAN: >60
GFR NON-AFRICAN AMERICAN: 54
GLUCOSE BLD-MCNC: 188 MG/DL (ref 70–99)
GLUCOSE BLD-MCNC: 203 MG/DL (ref 70–99)
GLUCOSE BLD-MCNC: 236 MG/DL (ref 70–99)
GLUCOSE BLD-MCNC: 281 MG/DL (ref 70–99)
GLUCOSE BLD-MCNC: 296 MG/DL (ref 70–99)
LV EF: 76 %
LVEF MODALITY: NORMAL
PERFORMED ON: ABNORMAL
POTASSIUM SERPL-SCNC: 4.7 MMOL/L (ref 3.5–5.1)
SODIUM BLD-SCNC: 135 MMOL/L (ref 136–145)

## 2020-11-17 PROCEDURE — 6360000002 HC RX W HCPCS: Performed by: INTERNAL MEDICINE

## 2020-11-17 PROCEDURE — 36415 COLL VENOUS BLD VENIPUNCTURE: CPT

## 2020-11-17 PROCEDURE — 93017 CV STRESS TEST TRACING ONLY: CPT | Performed by: INTERNAL MEDICINE

## 2020-11-17 PROCEDURE — 99233 SBSQ HOSP IP/OBS HIGH 50: CPT | Performed by: INTERNAL MEDICINE

## 2020-11-17 PROCEDURE — A9502 TC99M TETROFOSMIN: HCPCS | Performed by: INTERNAL MEDICINE

## 2020-11-17 PROCEDURE — 83036 HEMOGLOBIN GLYCOSYLATED A1C: CPT

## 2020-11-17 PROCEDURE — 94640 AIRWAY INHALATION TREATMENT: CPT

## 2020-11-17 PROCEDURE — 78452 HT MUSCLE IMAGE SPECT MULT: CPT

## 2020-11-17 PROCEDURE — 6370000000 HC RX 637 (ALT 250 FOR IP): Performed by: INTERNAL MEDICINE

## 2020-11-17 PROCEDURE — 80048 BASIC METABOLIC PNL TOTAL CA: CPT

## 2020-11-17 PROCEDURE — 2700000000 HC OXYGEN THERAPY PER DAY

## 2020-11-17 PROCEDURE — 1200000000 HC SEMI PRIVATE

## 2020-11-17 PROCEDURE — 3430000000 HC RX DIAGNOSTIC RADIOPHARMACEUTICAL: Performed by: INTERNAL MEDICINE

## 2020-11-17 PROCEDURE — 94761 N-INVAS EAR/PLS OXIMETRY MLT: CPT

## 2020-11-17 PROCEDURE — 6370000000 HC RX 637 (ALT 250 FOR IP): Performed by: EMERGENCY MEDICINE

## 2020-11-17 RX ORDER — GUAIFENESIN 100 MG/5ML
200 SOLUTION ORAL EVERY 4 HOURS PRN
Status: DISCONTINUED | OUTPATIENT
Start: 2020-11-17 | End: 2020-11-24 | Stop reason: HOSPADM

## 2020-11-17 RX ORDER — GABAPENTIN 300 MG/1
300 CAPSULE ORAL 2 TIMES DAILY
Status: DISCONTINUED | OUTPATIENT
Start: 2020-11-17 | End: 2020-11-24 | Stop reason: HOSPADM

## 2020-11-17 RX ORDER — NICOTINE POLACRILEX 4 MG
15 LOZENGE BUCCAL PRN
Status: DISCONTINUED | OUTPATIENT
Start: 2020-11-17 | End: 2020-11-24 | Stop reason: HOSPADM

## 2020-11-17 RX ORDER — DEXTROMETHORPHAN HYDROBROMIDE AND PROMETHAZINE HYDROCHLORIDE 15; 6.25 MG/5ML; MG/5ML
5 SYRUP ORAL EVERY 4 HOURS PRN
Status: DISCONTINUED | OUTPATIENT
Start: 2020-11-17 | End: 2020-11-24 | Stop reason: HOSPADM

## 2020-11-17 RX ORDER — DEXTROSE MONOHYDRATE 25 G/50ML
12.5 INJECTION, SOLUTION INTRAVENOUS PRN
Status: DISCONTINUED | OUTPATIENT
Start: 2020-11-17 | End: 2020-11-18 | Stop reason: SDUPTHER

## 2020-11-17 RX ORDER — INSULIN LISPRO 100 [IU]/ML
0-18 INJECTION, SOLUTION INTRAVENOUS; SUBCUTANEOUS
Status: DISCONTINUED | OUTPATIENT
Start: 2020-11-17 | End: 2020-11-24 | Stop reason: HOSPADM

## 2020-11-17 RX ORDER — AMINOPHYLLINE DIHYDRATE 25 MG/ML
100 INJECTION, SOLUTION INTRAVENOUS ONCE
Status: COMPLETED | OUTPATIENT
Start: 2020-11-17 | End: 2020-11-17

## 2020-11-17 RX ORDER — DEXTROSE MONOHYDRATE 50 MG/ML
100 INJECTION, SOLUTION INTRAVENOUS PRN
Status: DISCONTINUED | OUTPATIENT
Start: 2020-11-17 | End: 2020-11-18 | Stop reason: SDUPTHER

## 2020-11-17 RX ORDER — INSULIN LISPRO 100 [IU]/ML
0-9 INJECTION, SOLUTION INTRAVENOUS; SUBCUTANEOUS NIGHTLY
Status: DISCONTINUED | OUTPATIENT
Start: 2020-11-17 | End: 2020-11-24 | Stop reason: HOSPADM

## 2020-11-17 RX ADMIN — INSULIN LISPRO 9 UNITS: 100 INJECTION, SOLUTION INTRAVENOUS; SUBCUTANEOUS at 18:03

## 2020-11-17 RX ADMIN — AMINOPHYLLINE 100 MG: 25 INJECTION, SOLUTION INTRAVENOUS at 08:50

## 2020-11-17 RX ADMIN — ALPRAZOLAM 1 MG: 0.5 TABLET ORAL at 11:47

## 2020-11-17 RX ADMIN — DICLOFENAC 4 G: 10 GEL TOPICAL at 10:31

## 2020-11-17 RX ADMIN — PANTOPRAZOLE SODIUM 40 MG: 40 TABLET, DELAYED RELEASE ORAL at 05:36

## 2020-11-17 RX ADMIN — ISOSORBIDE MONONITRATE 60 MG: 30 TABLET, EXTENDED RELEASE ORAL at 10:30

## 2020-11-17 RX ADMIN — TETROFOSMIN 30 MILLICURIE: 1.38 INJECTION, POWDER, LYOPHILIZED, FOR SOLUTION INTRAVENOUS at 08:49

## 2020-11-17 RX ADMIN — GLIPIZIDE 5 MG: 5 TABLET ORAL at 16:21

## 2020-11-17 RX ADMIN — Medication 2 PUFF: at 19:08

## 2020-11-17 RX ADMIN — GUAIFENESIN AND DEXTROMETHORPHAN 5 ML: 100; 10 SYRUP ORAL at 14:50

## 2020-11-17 RX ADMIN — LISINOPRIL 10 MG: 10 TABLET ORAL at 10:30

## 2020-11-17 RX ADMIN — FUROSEMIDE 20 MG: 10 INJECTION, SOLUTION INTRAMUSCULAR; INTRAVENOUS at 13:10

## 2020-11-17 RX ADMIN — POTASSIUM CHLORIDE 20 MEQ: 750 TABLET, FILM COATED, EXTENDED RELEASE ORAL at 10:30

## 2020-11-17 RX ADMIN — METHYLPREDNISOLONE SODIUM SUCCINATE 40 MG: 40 INJECTION, POWDER, FOR SOLUTION INTRAMUSCULAR; INTRAVENOUS at 10:31

## 2020-11-17 RX ADMIN — NYSTATIN: 100000 POWDER TOPICAL at 10:32

## 2020-11-17 RX ADMIN — GABAPENTIN 300 MG: 300 CAPSULE ORAL at 20:20

## 2020-11-17 RX ADMIN — INSULIN GLARGINE 29 UNITS: 100 INJECTION, SOLUTION SUBCUTANEOUS at 20:26

## 2020-11-17 RX ADMIN — REGADENOSON 0.4 MG: 0.08 INJECTION, SOLUTION INTRAVENOUS at 08:46

## 2020-11-17 RX ADMIN — ALPRAZOLAM 1 MG: 0.5 TABLET ORAL at 22:34

## 2020-11-17 RX ADMIN — GLIPIZIDE 5 MG: 5 TABLET ORAL at 05:37

## 2020-11-17 RX ADMIN — IPRATROPIUM BROMIDE AND ALBUTEROL SULFATE 1 AMPULE: .5; 3 SOLUTION RESPIRATORY (INHALATION) at 18:23

## 2020-11-17 RX ADMIN — DICLOFENAC 4 G: 10 GEL TOPICAL at 20:22

## 2020-11-17 RX ADMIN — HYDRALAZINE HYDROCHLORIDE 25 MG: 25 TABLET, FILM COATED ORAL at 10:31

## 2020-11-17 RX ADMIN — NYSTATIN: 100000 POWDER TOPICAL at 20:22

## 2020-11-17 RX ADMIN — MAGNESIUM GLUCONATE 500 MG ORAL TABLET 400 MG: 500 TABLET ORAL at 10:30

## 2020-11-17 RX ADMIN — METHYLPREDNISOLONE SODIUM SUCCINATE 40 MG: 40 INJECTION, POWDER, FOR SOLUTION INTRAMUSCULAR; INTRAVENOUS at 01:01

## 2020-11-17 RX ADMIN — HYDRALAZINE HYDROCHLORIDE 25 MG: 25 TABLET, FILM COATED ORAL at 13:09

## 2020-11-17 RX ADMIN — INSULIN LISPRO 5 UNITS: 100 INJECTION, SOLUTION INTRAVENOUS; SUBCUTANEOUS at 20:25

## 2020-11-17 RX ADMIN — POTASSIUM CHLORIDE 20 MEQ: 750 TABLET, FILM COATED, EXTENDED RELEASE ORAL at 16:21

## 2020-11-17 RX ADMIN — GUAIFENESIN 200 MG: 100 SOLUTION ORAL at 23:54

## 2020-11-17 RX ADMIN — TETROFOSMIN 10 MILLICURIE: 1.38 INJECTION, POWDER, LYOPHILIZED, FOR SOLUTION INTRAVENOUS at 07:50

## 2020-11-17 RX ADMIN — GUAIFENESIN AND DEXTROMETHORPHAN 5 ML: 100; 10 SYRUP ORAL at 01:44

## 2020-11-17 RX ADMIN — ASPIRIN 81 MG 81 MG: 81 TABLET ORAL at 10:30

## 2020-11-17 RX ADMIN — ALOGLIPTIN 25 MG: 25 TABLET, FILM COATED ORAL at 10:30

## 2020-11-17 RX ADMIN — METHYLPREDNISOLONE SODIUM SUCCINATE 40 MG: 40 INJECTION, POWDER, FOR SOLUTION INTRAMUSCULAR; INTRAVENOUS at 16:21

## 2020-11-17 RX ADMIN — HYDRALAZINE HYDROCHLORIDE 25 MG: 25 TABLET, FILM COATED ORAL at 20:20

## 2020-11-17 RX ADMIN — ROSUVASTATIN CALCIUM 5 MG: 10 TABLET, FILM COATED ORAL at 20:21

## 2020-11-17 ASSESSMENT — PAIN SCALES - GENERAL
PAINLEVEL_OUTOF10: 0
PAINLEVEL_OUTOF10: 6
PAINLEVEL_OUTOF10: 0
PAINLEVEL_OUTOF10: 0
PAINLEVEL_OUTOF10: 5
PAINLEVEL_OUTOF10: 0

## 2020-11-17 ASSESSMENT — PAIN DESCRIPTION - PAIN TYPE: TYPE: CHRONIC PAIN

## 2020-11-17 ASSESSMENT — PAIN DESCRIPTION - ORIENTATION: ORIENTATION: UPPER;MID

## 2020-11-17 ASSESSMENT — PAIN DESCRIPTION - LOCATION: LOCATION: CHEST

## 2020-11-17 NOTE — PROGRESS NOTES
Pt up to room and settled into bed. VSS and food provided. Denies any needs. Call light in reach and bed alarm engaged.

## 2020-11-17 NOTE — PROGRESS NOTES
[address] Daily Progress Note      Admit Date:  11/15/2020    Chief Complaint   Patient presents with    Chest Pain     in via squad, c/o chest pain and sob that radiates into back. took nitro with no relief at home.          Subjective:  Ms. Laraine Prader of dyspnea at rest and cough    Objective:   BP (!) 149/92   Pulse 76   Temp 97.9 °F (36.6 °C) (Oral)   Resp 16   Ht 5' 6\" (1.676 m)   Wt 260 lb (117.9 kg)   SpO2 96%   BMI 41.97 kg/m²       Intake/Output Summary (Last 24 hours) at 11/17/2020 1211  Last data filed at 11/16/2020 1700  Gross per 24 hour   Intake 720 ml   Output 550 ml   Net 170 ml       TELEMETRY: Sinus     Physical Exam:  General:  Awake, alert, oriented x 3, NAD  Skin:  Warm and dry  Neck:  JVD flat  Chest:  wheezes  Cardiovascular:  RRR S1S2, no S3, no mrmr  Abdomen:  Soft, ND, NT, No HSM  Extremities:  No edema    Medications:    ipratropium-albuterol  1 ampule Inhalation Q4H WA    aspirin  81 mg Oral Daily    budesonide-formoterol  2 puff Inhalation BID    diclofenac sodium  4 g Topical BID    glipiZIDE  5 mg Oral BID AC    hydrALAZINE  25 mg Oral TID    isosorbide mononitrate  60 mg Oral Daily    lisinopril  10 mg Oral Daily    pantoprazole  40 mg Oral QAM AC    rosuvastatin  5 mg Oral Nightly    methylPREDNISolone  40 mg Intravenous Q8H    furosemide  20 mg Intravenous Daily    alogliptin  25 mg Oral Daily    nystatin   Topical BID    potassium chloride  20 mEq Oral BID WC    magnesium oxide  400 mg Oral Daily      dextrose       ALPRAZolam, ibuprofen, nitroGLYCERIN, guaiFENesin-dextromethorphan, sodium chloride, glucose, dextrose, glucagon (rDNA), dextrose, potassium chloride **OR** potassium alternative oral replacement **OR** potassium chloride, magnesium sulfate    Lab Data:  CBC:   Recent Labs     11/15/20  2202   WBC 9.1   HGB 12.8   HCT 39.4   MCV 88.3        BMP:   Recent Labs     11/15/20  2202 11/17/20  0611    135*   K 3.3* 4.7  97*   CO2 22 26   BUN 17 19   CREATININE 0.7 1.0     LIVER PROFILE:   Recent Labs     11/15/20  2202   AST 11*   ALT 9*   BILITOT <0.2   ALKPHOS 66     PT/INR: No results for input(s): PROTIME, INR in the last 72 hours. APTT: No results for input(s): APTT in the last 72 hours. BNP:  No results for input(s): BNP in the last 72 hours. IMAGING:    SPECT images demonstrate homogeneous tracer distribution throughout the     myocardium.     There is normal isotope uptake at stress and rest. There is no evidence of     myocardial ischemia or scar.     Normal LV size and systolic function.     Left ventricular ejection fraction of 76 %. Assessment/Plan:  Active Problems:    CAD (coronary artery disease)  Plan: Cont aspirin, statin. Stable. Unstable angina (HCC)  Plan: atypical chest pain. Normal troponin and bnp. Normal nuclear stress test today. No cardiac cause for chest pain    COPD with acute exacerbation (HCC)  Plan: cont steroids, nebulizers per primary team.         Will sign off. Call with questions.         Shun Murray MD 11/17/2020 12:11 PM

## 2020-11-17 NOTE — PROGRESS NOTES
Pt back to room 3313 from stress lab. VSS. NPO status reinforced until stress results come back, v/u. Will monitor.

## 2020-11-17 NOTE — PLAN OF CARE
Problem: Falls - Risk of:  Goal: Will remain free from falls  Description: Will remain free from falls  11/17/2020 1652 by Rudi Pickens RN  Outcome: Ongoing  Note: Pt is wearing the fall bracelet, S.A.F.E. sign is posted outside door, and yellow blanket is on the bed. Pt informed of fall risks, verbalizes understanding, and agrees to ask for help to ambulate. Will monitor. Problem: OXYGENATION/RESPIRATORY FUNCTION  Goal: Patient will maintain patent airway  Outcome: Ongoing  Note: Pt able to maintain sats >90% on RA. Pt wears 2L O2 at night. Pt receiving IV Lasix and steroids. Pt with rhonchi/expiratory wheezing. Stress test WNL. Strict I/Os and daily weights being done. Will monitor.

## 2020-11-17 NOTE — H&P
[facility]                     [facility], [address]                              HISTORY AND PHYSICAL    PATIENT NAME: Iona Matthews                      :        1945  MED REC NO:   [id number]                          ROOM:       6517  ACCOUNT NO:   [de-identified]                           ADMIT DATE: 11/15/2020  PROVIDER:     Roshan Farrell MD    HISTORY OF PRESENT ILLNESS:  The patient is a 27-year-old chronically  ill white woman who is morbidly obese, came to the emergency room with  history of increasing shortness of breath and chest pain, most of these  symptoms are chronic and she has been worked up enough in the past.  Has  had frequent COPD exacerbation. This time also she is having cough with  some mucoid sputum production. No hemoptysis. No fever, no chills. The patient felt heavy chest pressure. She has been worked up many  times before in the past for this. PAST MEDICAL HISTORY:  Pertinent for atherosclerotic heart disease,  hypertension, COPD, type 2 diabetes mellitus, history of stroke,  pneumonia, migraine, hyperlipidemia, anxiety neurosis, hiatal hernia,  COPD, esophageal stricture, claustrophobia, skin cancer of the leg,  myocardial infarction, depression. PAST SURGICAL HISTORY:  Pertinent for cardiac stent, upper GI endoscopy,  scalp lesion excision, bronchoscopy on multiple occasions, appendectomy,  cataract removal, cholecystectomy, EGD multiple times, gallbladder  removal, hysterectomy, and tear duct surgery. FAMILY HISTORY:  Both the parents are . One brother had  atherosclerotic heart disease. Father had cancer. Mother also  of  cancer and atherosclerotic heart disease. SOCIAL HISTORY:  She is a . She has three children, one of her  sons  of heroin overdose couple years ago and she has been in  turmoil since then. She has few grandchildren.   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 Heavy tobacco abuser all her life and quit smoking some  6-7 months ago. No history of alcohol usage or drug abuse. MEDICATIONS:  Lisinopril, Xanax, isosorbide, Lasix, alogliptin,  Symbicort, Voltaren gel, hydralazine, DuoNeb, Mycostatin powder,  rosuvastatin, potassium chloride extended-release, pantoprazole,  nystatin powder. ALLERGIES:  Allergic to CODEINE, HYDROMORPHONE, LEVAQUIN, VICODIN,  ACETAMINOPHEN and ASPIRIN. REVIEW OF SYSTEMS:  Negative for loss of consciousness. No visual  blurring. No convulsions. No speech disturbance. No dysphagia. The  patient is morbidly obese. Usually has never had any shortage of  appetite. Her body mass index is 42. Significant resting and  exertional shortness of breath with some wheezing. No orthopnea. No  paroxysmal nocturnal dyspnea. No exertional angina. Does have some  angina at rest.  No abdominal pain. No hematemesis. No melena. Does  have lower extremity edema. No intermittent claudication. No  genitourinary complaint except bladder overactivity. PHYSICAL EXAMINATION:  GENERAL:  Alert, awake, oriented x1 43-year-old white lady, appears to  be in moderate distress. VITAL SIGNS:  Temperature 97.8, blood pressure 127/72, respirations 18,  heart rate 84, O2 sat is 92% on 2 liters. HEENT:  Oral mucosa dry. SKIN:  Warm and dry. NECK:  Neck is supple. Mild jugular venous distention. Faint carotid  bruit. No lymphadenopathy. No thyromegaly. LUNGS:  Vesicular breath sounds. Prolonged exhalation. Bilateral  expiratory wheezes. Scattered crackles. HEART:  Regular rate and rhythm. S1, S2. No tachycardia. ABDOMEN:  Soft, morbidly obese. No organomegaly. EXTREMITIES:  Show bilateral 1-2+ pitting edema. NEUROLOGIC:  Grossly intact without any acute focal deficit. Babinski  is bilaterally absent.     LABORATORY EVALUATION:  Shows sodium 135, potassium 4.7, chloride 97,  CO2 26, BUN 19, creatinine 1.0. Troponin less than 0.01. Potassium  initially was 3.3, but now it has improved. White blood cell count 9.1,  hemoglobin/hematocrit 12.8 and 39.4, platelet count is 456. DIAGNOSTIC DATA:  EKG shows no acute ischemic changes. Chest x-ray,  mild pulmonary vascular congestion, no acute focal consolidating  pneumonia. Last time the patient had a transthoracic echocardiogram was  in March, at that time the patient did have normal LV size, systolic  function, EF of 65-70% with septal hypertrophy, diastolic parameters  suggesting grade 2 diastolic dysfunction, they were unable to estimate  the pulmonary artery pressure. ASSESSMENT:  Unstable angina, chronic diastolic heart failure, COPD  exacerbation, chronic respiratory failure, hypertension, anxiety  neurosis. PLAN:  Get her admitted. Treat her with IV Solu-Medrol, empiric  antibiotic treatment, nebulized aerosol, supplemental oxygen. Cardiology consultation. Pharmacologic stress test may be in order. Last time she had any such study was back in 09/2018.         Kathy Aguilar MD    D: 11/17/2020 9:08:30       T: 11/17/2020 9:13:40     [email or web address]  Job#: [id number]     Doc#: [id number]    CC:

## 2020-11-17 NOTE — PLAN OF CARE
Problem: Falls - Risk of:  Goal: Will remain free from falls  Description: Will remain free from falls  Outcome: Ongoing   Pt A&O, fall precaution in place. Pt will remains free from falls during this stay. Call light within reach. Will continue to monitor. Problem: Pain:  Goal: Pain level will decrease  Description: Pain level will decrease  Outcome: Ongoing  Denies pain. Will monitor.  Scheduled for stress in am.

## 2020-11-17 NOTE — PROGRESS NOTES
Patient Active Problem List   Diagnosis    Chronic respiratory failure ([facility]    CAD (coronary artery disease)    Hyperlipemia    Essential hypertension    DM2 [facility]diabetes mellitus, type 2) [facility]    Primary osteoarthritis involving multiple joints    Gastroesophageal reflux disease without esophagitis    Anxiety    Morbid obesity ([facility]    Precordial pain    Unstable angina ([facility]    COPD with acute exacerbation ([facility]    AKIL (obstructive sleep apnea)    Chest congestion    DM (diabetes mellitus), secondary uncontrolled ([facility]    Grade II diastolic dysfunction   H&P dictated

## 2020-11-17 NOTE — CARE COORDINATION
Discharge Planning Assessment     discharge planner met with patient to discuss reason for admission, current living situation, and potential needs at the time of discharge    Demographics/Insurance verified Yes/yes    Current type of dwelling: Senior apartment \"cottage\"     Patient from ECF/ confirmed with: n/a    Living arrangements: lives alone- walk in entrance with no stairs    Level of function/Support: some assistance from [facility] and family as needed    PCP: Dr. Jered Lewis     Last Visit to PCP: 11-2-20    DME: Hospital bed, walker, cane, O2    Active with any community resources/agencies/skilled home care: active with [facility] for [address]-  to [facility] program   Patient is active with [facility] for skilled nurse and [facility] for [facility]. Patient also reports that she is active with [employer] for [employer] on [employer]", lifeline, medical transportation and an aide. Patient reports that her aide comes 6 days a week for 3-4 hours at a time. Patient also reports that she has home O2 with [facility].       Medication compliance issues: no    Financial issues that could impact healthcare: no      Tentative discharge plan: [facility] [address] [facility], states she will refuse SNF if recommended   Discussed and provided facilities of choice if transition to a skilled nursing facility is required at the time of discharge      Discussed with patient and/or family that on the day of discharge home tentative time of discharge will be between 10 AM and noon.     Transportation at the time of discharge: yes    Yaya Lujan MSW, [address]

## 2020-11-17 NOTE — PROGRESS NOTES
Dr. Vivian Gutierrez paged for blood sugar >200. Pt only receiving PO antidiabetics and no sliding scale. Awaiting new orders. Will monitor.

## 2020-11-17 NOTE — PROGRESS NOTES
Instructed on Lexiscan Stress Test Procedure including possible side effects/ adverse reactions. Patient verbalizes  understanding and denies having any questions. See [address] [place] [address] Rd Cardiology.   Rmoana Hart RN

## 2020-11-18 ENCOUNTER — APPOINTMENT (OUTPATIENT)
Dept: GENERAL RADIOLOGY | Age: 75
DRG: 191 | End: 2020-11-18
Payer: COMMERCIAL

## 2020-11-18 LAB
ANION GAP SERPL CALCULATED.3IONS-SCNC: 12 MMOL/L (ref 3–16)
BASOPHILS ABSOLUTE: 0 K/UL (ref 0–0.2)
BASOPHILS RELATIVE PERCENT: 0.3 %
BUN BLDV-MCNC: 26 MG/DL (ref 7–20)
CALCIUM SERPL-MCNC: 10 MG/DL (ref 8.3–10.6)
CHLORIDE BLD-SCNC: 96 MMOL/L (ref 99–110)
CO2: 23 MMOL/L (ref 21–32)
CREAT SERPL-MCNC: 1 MG/DL (ref 0.6–1.2)
EOSINOPHILS ABSOLUTE: 0 K/UL (ref 0–0.6)
EOSINOPHILS RELATIVE PERCENT: 0 %
ESTIMATED AVERAGE GLUCOSE: 157.1 MG/DL
GFR AFRICAN AMERICAN: >60
GFR NON-AFRICAN AMERICAN: 54
GLUCOSE BLD-MCNC: 216 MG/DL (ref 70–99)
GLUCOSE BLD-MCNC: 269 MG/DL (ref 70–99)
GLUCOSE BLD-MCNC: 280 MG/DL (ref 70–99)
GLUCOSE BLD-MCNC: 308 MG/DL (ref 70–99)
GLUCOSE BLD-MCNC: 328 MG/DL (ref 70–99)
HBA1C MFR BLD: 7.1 %
HCT VFR BLD CALC: 46 % (ref 36–48)
HEMOGLOBIN: 14.8 G/DL (ref 12–16)
LYMPHOCYTES ABSOLUTE: 1.2 K/UL (ref 1–5.1)
LYMPHOCYTES RELATIVE PERCENT: 9.5 %
MCH RBC QN AUTO: 28.4 PG (ref 26–34)
MCHC RBC AUTO-ENTMCNC: 32.3 G/DL (ref 31–36)
MCV RBC AUTO: 88.2 FL (ref 80–100)
MONOCYTES ABSOLUTE: 1 K/UL (ref 0–1.3)
MONOCYTES RELATIVE PERCENT: 7.6 %
NEUTROPHILS ABSOLUTE: 10.7 K/UL (ref 1.7–7.7)
NEUTROPHILS RELATIVE PERCENT: 82.6 %
PDW BLD-RTO: 15.4 % (ref 12.4–15.4)
PERFORMED ON: ABNORMAL
PLATELET # BLD: 202 K/UL (ref 135–450)
PMV BLD AUTO: 8.4 FL (ref 5–10.5)
POTASSIUM SERPL-SCNC: 4.9 MMOL/L (ref 3.5–5.1)
RBC # BLD: 5.21 M/UL (ref 4–5.2)
SODIUM BLD-SCNC: 131 MMOL/L (ref 136–145)
WBC # BLD: 13 K/UL (ref 4–11)

## 2020-11-18 PROCEDURE — 80048 BASIC METABOLIC PNL TOTAL CA: CPT

## 2020-11-18 PROCEDURE — 6370000000 HC RX 637 (ALT 250 FOR IP): Performed by: INTERNAL MEDICINE

## 2020-11-18 PROCEDURE — 6370000000 HC RX 637 (ALT 250 FOR IP): Performed by: EMERGENCY MEDICINE

## 2020-11-18 PROCEDURE — 2700000000 HC OXYGEN THERAPY PER DAY

## 2020-11-18 PROCEDURE — 85025 COMPLETE CBC W/AUTO DIFF WBC: CPT

## 2020-11-18 PROCEDURE — 6360000002 HC RX W HCPCS: Performed by: INTERNAL MEDICINE

## 2020-11-18 PROCEDURE — 36415 COLL VENOUS BLD VENIPUNCTURE: CPT

## 2020-11-18 PROCEDURE — 71046 X-RAY EXAM CHEST 2 VIEWS: CPT

## 2020-11-18 PROCEDURE — 94761 N-INVAS EAR/PLS OXIMETRY MLT: CPT

## 2020-11-18 PROCEDURE — 94640 AIRWAY INHALATION TREATMENT: CPT

## 2020-11-18 PROCEDURE — 1200000000 HC SEMI PRIVATE

## 2020-11-18 RX ORDER — INSULIN LISPRO 100 [IU]/ML
7 INJECTION, SOLUTION INTRAVENOUS; SUBCUTANEOUS
Status: DISCONTINUED | OUTPATIENT
Start: 2020-11-18 | End: 2020-11-19

## 2020-11-18 RX ORDER — METHYLPREDNISOLONE SODIUM SUCCINATE 40 MG/ML
40 INJECTION, POWDER, LYOPHILIZED, FOR SOLUTION INTRAMUSCULAR; INTRAVENOUS EVERY 12 HOURS
Status: DISCONTINUED | OUTPATIENT
Start: 2020-11-18 | End: 2020-11-19

## 2020-11-18 RX ADMIN — IPRATROPIUM BROMIDE AND ALBUTEROL SULFATE 1 AMPULE: .5; 3 SOLUTION RESPIRATORY (INHALATION) at 16:00

## 2020-11-18 RX ADMIN — PROMETHAZINE HYDROCHLORIDE AND DEXTROMETHORPHAN HYDROBROMIDE 5 ML: 6.25; 15 SOLUTION ORAL at 22:37

## 2020-11-18 RX ADMIN — HYDRALAZINE HYDROCHLORIDE 25 MG: 25 TABLET, FILM COATED ORAL at 21:36

## 2020-11-18 RX ADMIN — GUAIFENESIN 200 MG: 100 SOLUTION ORAL at 07:31

## 2020-11-18 RX ADMIN — HYDRALAZINE HYDROCHLORIDE 25 MG: 25 TABLET, FILM COATED ORAL at 13:52

## 2020-11-18 RX ADMIN — GLIPIZIDE 5 MG: 5 TABLET ORAL at 05:49

## 2020-11-18 RX ADMIN — INSULIN LISPRO 9 UNITS: 100 INJECTION, SOLUTION INTRAVENOUS; SUBCUTANEOUS at 11:35

## 2020-11-18 RX ADMIN — Medication 2 PUFF: at 07:53

## 2020-11-18 RX ADMIN — Medication 2 PUFF: at 20:00

## 2020-11-18 RX ADMIN — GABAPENTIN 300 MG: 300 CAPSULE ORAL at 21:35

## 2020-11-18 RX ADMIN — DICLOFENAC 4 G: 10 GEL TOPICAL at 10:40

## 2020-11-18 RX ADMIN — LISINOPRIL 10 MG: 10 TABLET ORAL at 08:55

## 2020-11-18 RX ADMIN — ALPRAZOLAM 1 MG: 0.5 TABLET ORAL at 07:01

## 2020-11-18 RX ADMIN — MAGNESIUM GLUCONATE 500 MG ORAL TABLET 400 MG: 500 TABLET ORAL at 08:55

## 2020-11-18 RX ADMIN — INSULIN LISPRO 6 UNITS: 100 INJECTION, SOLUTION INTRAVENOUS; SUBCUTANEOUS at 08:56

## 2020-11-18 RX ADMIN — PANTOPRAZOLE SODIUM 40 MG: 40 TABLET, DELAYED RELEASE ORAL at 05:49

## 2020-11-18 RX ADMIN — ENOXAPARIN SODIUM 40 MG: 40 INJECTION SUBCUTANEOUS at 08:55

## 2020-11-18 RX ADMIN — DICLOFENAC 4 G: 10 GEL TOPICAL at 21:47

## 2020-11-18 RX ADMIN — POTASSIUM CHLORIDE 20 MEQ: 750 TABLET, FILM COATED, EXTENDED RELEASE ORAL at 08:55

## 2020-11-18 RX ADMIN — METHYLPREDNISOLONE SODIUM SUCCINATE 40 MG: 40 INJECTION, POWDER, FOR SOLUTION INTRAMUSCULAR; INTRAVENOUS at 13:52

## 2020-11-18 RX ADMIN — ALOGLIPTIN 25 MG: 25 TABLET, FILM COATED ORAL at 08:55

## 2020-11-18 RX ADMIN — ASPIRIN 81 MG 81 MG: 81 TABLET ORAL at 08:55

## 2020-11-18 RX ADMIN — HYDRALAZINE HYDROCHLORIDE 25 MG: 25 TABLET, FILM COATED ORAL at 08:55

## 2020-11-18 RX ADMIN — TIOTROPIUM BROMIDE INHALATION SPRAY 2 PUFF: 3.12 SPRAY, METERED RESPIRATORY (INHALATION) at 07:53

## 2020-11-18 RX ADMIN — GABAPENTIN 300 MG: 300 CAPSULE ORAL at 08:55

## 2020-11-18 RX ADMIN — NYSTATIN: 100000 POWDER TOPICAL at 21:53

## 2020-11-18 RX ADMIN — ROSUVASTATIN CALCIUM 5 MG: 10 TABLET, FILM COATED ORAL at 21:35

## 2020-11-18 RX ADMIN — ALPRAZOLAM 1 MG: 0.5 TABLET ORAL at 21:43

## 2020-11-18 RX ADMIN — METHYLPREDNISOLONE SODIUM SUCCINATE 40 MG: 40 INJECTION, POWDER, FOR SOLUTION INTRAMUSCULAR; INTRAVENOUS at 00:51

## 2020-11-18 RX ADMIN — ISOSORBIDE MONONITRATE 60 MG: 30 TABLET, EXTENDED RELEASE ORAL at 08:55

## 2020-11-18 RX ADMIN — NYSTATIN: 100000 POWDER TOPICAL at 10:41

## 2020-11-18 RX ADMIN — GLIPIZIDE 5 MG: 5 TABLET ORAL at 17:06

## 2020-11-18 RX ADMIN — IPRATROPIUM BROMIDE AND ALBUTEROL SULFATE 1 AMPULE: .5; 3 SOLUTION RESPIRATORY (INHALATION) at 07:53

## 2020-11-18 RX ADMIN — INSULIN LISPRO 6 UNITS: 100 INJECTION, SOLUTION INTRAVENOUS; SUBCUTANEOUS at 22:40

## 2020-11-18 RX ADMIN — IPRATROPIUM BROMIDE AND ALBUTEROL SULFATE 1 AMPULE: .5; 3 SOLUTION RESPIRATORY (INHALATION) at 20:00

## 2020-11-18 RX ADMIN — INSULIN LISPRO 7 UNITS: 100 INJECTION, SOLUTION INTRAVENOUS; SUBCUTANEOUS at 17:07

## 2020-11-18 RX ADMIN — FUROSEMIDE 20 MG: 10 INJECTION, SOLUTION INTRAMUSCULAR; INTRAVENOUS at 11:35

## 2020-11-18 RX ADMIN — INSULIN LISPRO 9 UNITS: 100 INJECTION, SOLUTION INTRAVENOUS; SUBCUTANEOUS at 17:07

## 2020-11-18 RX ADMIN — IPRATROPIUM BROMIDE AND ALBUTEROL SULFATE 1 AMPULE: .5; 3 SOLUTION RESPIRATORY (INHALATION) at 11:42

## 2020-11-18 ASSESSMENT — PAIN SCALES - GENERAL
PAINLEVEL_OUTOF10: 0
PAINLEVEL_OUTOF10: 5
PAINLEVEL_OUTOF10: 0
PAINLEVEL_OUTOF10: 2
PAINLEVEL_OUTOF10: 0
PAINLEVEL_OUTOF10: 2

## 2020-11-18 ASSESSMENT — PAIN DESCRIPTION - PAIN TYPE: TYPE: ACUTE PAIN

## 2020-11-18 ASSESSMENT — PAIN DESCRIPTION - LOCATION: LOCATION: CHEST

## 2020-11-18 NOTE — PLAN OF CARE
Problem: Falls - Risk of:  Goal: Will remain free from falls  Description: Will remain free from falls  11/18/2020 0247 by Piter Potter RN  Outcome: Ongoing  Pt A&O, remains free from falls during this stay. Call light within reach. Will continue to monitor. Problem: OXYGENATION/RESPIRATORY FUNCTION  Goal: Patient will maintain patent airway  11/18/2020 0247 by Piter Potter RN  Outcome: Ongoing   Pt on 2L NC, sats >90%. Pt on Iv Lasix and steroids. Strict I&Os and daily weight being done.

## 2020-11-18 NOTE — PLAN OF CARE
Problem: Falls - Risk of:  Goal: Will remain free from falls  Description: Will remain free from falls  Outcome: Ongoing  Note: Pt is wearing the fall bracelet, S.A.F.E. sign is posted outside door, and yellow blanket is on the bed. Pt informed of fall risks, verbalizes understanding, and agrees to ask for help to ambulate. Will monitor. Problem: OXYGENATION/RESPIRATORY FUNCTION  Goal: Patient will maintain patent airway  Outcome: Ongoing  Note: Pt able to maintain sats >90% on RA. Pt receiving IV Lasix daily. Pt on IV steroids for COPD. Pulmonology consulted for refractory COPD. Strict I/Os and daily weights being done. Will monitor.

## 2020-11-18 NOTE — PROGRESS NOTES
Department of Internal Medicine  General Internal Medicine   Progress Note     SUBJECTIVE   Breathing some what better but still has significant wheezing     History obtained from chart review and the patient  General ROS: positive for  - fatigue and malaise  negative for - chills, fever or night sweats  Psychological ROS: negative  Respiratory ROS: positive for - shortness of breath and wheezing  negative for - cough, hemoptysis, sputum changes or stridor  Cardiovascular ROS: positive for - chest pain, dyspnea on exertion and edema  negative for - loss of consciousness, orthopnea or palpitations  Gastrointestinal ROS: no abdominal pain, change in bowel habits, or black or bloody stools    OBJECTIVE      Medications      Current Facility-Administered Medications: glucose (GLUTOSE) 40 % oral gel 15 g, 15 g, Oral, PRN  dextrose 50 % IV solution, 12.5 g, Intravenous, PRN  glucagon (rDNA) injection 1 mg, 1 mg, Intramuscular, PRN  dextrose 5 % solution, 100 mL/hr, Intravenous, PRN  insulin glargine (LANTUS;BASAGLAR) injection pen 29 Units, 0.25 Units/kg, Subcutaneous, Nightly  insulin lispro (1 Unit Dial) 0-18 Units, 0-18 Units, Subcutaneous, TID WC  insulin lispro (1 Unit Dial) 0-9 Units, 0-9 Units, Subcutaneous, Nightly  gabapentin (NEURONTIN) capsule 300 mg, 300 mg, Oral, BID  promethazine-dextromethorphan (PROMETHAZINE-DM) 6.25-15 MG/5ML syrup 5 mL, 5 mL, Oral, Q4H PRN  guaiFENesin (ROBITUSSIN) 100 MG/5ML oral solution 200 mg, 200 mg, Oral, Q4H PRN  ipratropium-albuterol (DUONEB) nebulizer solution 1 ampule, 1 ampule, Inhalation, Q4H WA  ALPRAZolam (XANAX) tablet 1 mg, 1 mg, Oral, TID PRN  aspirin chewable tablet 81 mg, 81 mg, Oral, Daily  budesonide-formoterol (SYMBICORT) 160-4.5 MCG/ACT inhaler 2 puff, 2 puff, Inhalation, BID  diclofenac sodium (VOLTAREN) 1 % gel 4 g, 4 g, Topical, BID  glipiZIDE (GLUCOTROL) tablet 5 mg, 5 mg, Oral, BID AC  hydrALAZINE (APRESOLINE) tablet 25 mg, 25 mg, Oral, TID  ibuprofen (ADVIL;MOTRIN) tablet 400 mg, 400 mg, Oral, Q6H PRN  isosorbide mononitrate (IMDUR) extended release tablet 60 mg, 60 mg, Oral, Daily  lisinopril (PRINIVIL;ZESTRIL) tablet 10 mg, 10 mg, Oral, Daily  nitroGLYCERIN (NITROSTAT) SL tablet 0.4 mg, 0.4 mg, Sublingual, Q5 Min PRN  pantoprazole (PROTONIX) tablet 40 mg, 40 mg, Oral, QAM AC  rosuvastatin (CRESTOR) tablet 5 mg, 5 mg, Oral, Nightly  sodium chloride (OCEAN, BABY AYR) 0.65 % nasal spray 1 spray, 1 spray, Nasal, PRN  methylPREDNISolone sodium (SOLU-MEDROL) injection 40 mg, 40 mg, Intravenous, Q8H  furosemide (LASIX) injection 20 mg, 20 mg, Intravenous, Daily  alogliptin (NESINA) tablet 25 mg, 25 mg, Oral, Daily  glucose (GLUTOSE) 40 % oral gel 15 g, 15 g, Oral, PRN  dextrose 50 % IV solution, 12.5 g, Intravenous, PRN  glucagon (rDNA) injection 1 mg, 1 mg, Intramuscular, PRN  dextrose 5 % solution, 100 mL/hr, Intravenous, PRN  nystatin (MYCOSTATIN) powder, , Topical, BID  potassium chloride (KLOR-CON M) extended release tablet 40 mEq, 40 mEq, Oral, PRN **OR** potassium bicarb-citric acid (EFFER-K) effervescent tablet 40 mEq, 40 mEq, Oral, PRN **OR** potassium chloride 10 mEq/100 mL IVPB (Peripheral Line), 10 mEq, Intravenous, PRN  magnesium sulfate 1 g in dextrose 5% 100 mL IVPB, 1 g, Intravenous, PRN  potassium chloride (KLOR-CON) extended release tablet 20 mEq, 20 mEq, Oral, BID WC  magnesium oxide (MAG-OX) tablet 400 mg, 400 mg, Oral, Daily    Physical      VITALS:    BP (!) 111/52   Pulse 71   Temp 98.2 °F (36.8 °C) (Oral)   Resp 16   Ht 5' 6\" (1.676 m)   Wt 260 lb (117.9 kg)   SpO2 96%   BMI 41.97 kg/m²   TEMPERATURE:  Current - Temp: 98.2 °F (36.8 °C);  Max - Temp  Av °F (36.7 °C)  Min: 97.8 °F (36.6 °C)  Max: 98.2 °F (36.8 °C)  RESPIRATIONS RANGE: Resp  Av.6  Min: 16  Max: 18  PULSE RANGE: Pulse  Av.2  Min: 69  Max: 77  BLOOD PRESSURE RANGE:  Systolic (45CSB), DKM:784 , Min:101 , FKS:362   ; Diastolic (07QGS), LBE:36, Min:43, Max:92    PULSE OXIMETRY RANGE: SpO2  Av %  Min: 93 %  Max: 99 %  24HR INTAKE/OUTPUT:      Intake/Output Summary (Last 24 hours) at 2020 8352  Last data filed at 2020 2251  Gross per 24 hour   Intake 1080 ml   Output 2550 ml   Net -1470 ml     CONSTITUTIONAL:  awake, alert, cooperative, no apparent distress, and appears stated age  NECK:  supple, symmetrical, trachea midline and skin normal  LUNGS:  Moderate increase in work of breathing bart crackles and exp wheezes   CARDIOVASCULAR:  Normal apical impulse, regular rate and rhythm, normal S1 and S2, no S3 or S4, and no murmur noted  ABDOMEN:  No scars, normal bowel sounds, soft, non-distended, non-tender, no masses palpated, no hepatosplenomegally  MUSCULOSKELETAL:  ++ edema  NEUROLOGIC:  No acute focal change    Data      Lab Results   Component Value Date    PHART 7.446 2017       Lab Results   Component Value Date     2020    K 4.7 2020    K 3.3 11/15/2020    CL 97 2020    CO2 26 2020    BUN 19 2020    CREATININE 1.0 2020    GLUCOSE 236 2020    CALCIUM 10.6 2020     Lab Results   Component Value Date    WBC 9.1 11/15/2020    HGB 12.8 11/15/2020    HCT 39.4 11/15/2020    MCV 88.3 11/15/2020     11/15/2020         Lab Results   Component Value Date    INR 0.97 2020    PROTIME 11.3 2020       ASSESSMENT AND PLAN      Patient Active Problem List   Diagnosis    Chronic respiratory failure ([address]    CAD (coronary artery disease)    Hyperlipemia    Essential hypertension    DM2 (diabetes mellitus, type 2) ([address]    Primary osteoarthritis involving multiple joints    Gastroesophageal reflux disease without esophagitis    Anxiety    Morbid obesity ([address]    Precordial pain    Unstable angina ([address]    COPD with acute exacerbation ([address]    AKIL (obstructive sleep apnea)    Chest congestion    DM (diabetes mellitus), secondary uncontrolled ([address]    Grade II diastolic dysfunction                          repeat CXR    Nebulizer treatment inhaled LABA and ICS   Improve sugar control due to concomitant  Steroid therapy

## 2020-11-19 LAB
EKG ATRIAL RATE: 72 BPM
EKG DIAGNOSIS: NORMAL
EKG P AXIS: -45 DEGREES
EKG P-R INTERVAL: 162 MS
EKG Q-T INTERVAL: 368 MS
EKG QRS DURATION: 116 MS
EKG QTC CALCULATION (BAZETT): 402 MS
EKG R AXIS: -59 DEGREES
EKG T AXIS: 49 DEGREES
EKG VENTRICULAR RATE: 72 BPM
GLUCOSE BLD-MCNC: 163 MG/DL (ref 70–99)
GLUCOSE BLD-MCNC: 207 MG/DL (ref 70–99)
GLUCOSE BLD-MCNC: 273 MG/DL (ref 70–99)
GLUCOSE BLD-MCNC: 313 MG/DL (ref 70–99)
PERFORMED ON: ABNORMAL
PRO-BNP: 280 PG/ML (ref 0–449)
PROCALCITONIN: 0.05 NG/ML (ref 0–0.15)
TROPONIN: <0.01 NG/ML

## 2020-11-19 PROCEDURE — 2700000000 HC OXYGEN THERAPY PER DAY

## 2020-11-19 PROCEDURE — 99223 1ST HOSP IP/OBS HIGH 75: CPT | Performed by: INTERNAL MEDICINE

## 2020-11-19 PROCEDURE — 93010 ELECTROCARDIOGRAM REPORT: CPT | Performed by: INTERNAL MEDICINE

## 2020-11-19 PROCEDURE — 36415 COLL VENOUS BLD VENIPUNCTURE: CPT

## 2020-11-19 PROCEDURE — 83880 ASSAY OF NATRIURETIC PEPTIDE: CPT

## 2020-11-19 PROCEDURE — 83036 HEMOGLOBIN GLYCOSYLATED A1C: CPT

## 2020-11-19 PROCEDURE — 6360000002 HC RX W HCPCS: Performed by: INTERNAL MEDICINE

## 2020-11-19 PROCEDURE — 94760 N-INVAS EAR/PLS OXIMETRY 1: CPT

## 2020-11-19 PROCEDURE — 1200000000 HC SEMI PRIVATE

## 2020-11-19 PROCEDURE — 6370000000 HC RX 637 (ALT 250 FOR IP): Performed by: EMERGENCY MEDICINE

## 2020-11-19 PROCEDURE — 93005 ELECTROCARDIOGRAM TRACING: CPT | Performed by: INTERNAL MEDICINE

## 2020-11-19 PROCEDURE — 6370000000 HC RX 637 (ALT 250 FOR IP): Performed by: INTERNAL MEDICINE

## 2020-11-19 PROCEDURE — 84145 PROCALCITONIN (PCT): CPT

## 2020-11-19 PROCEDURE — 84484 ASSAY OF TROPONIN QUANT: CPT

## 2020-11-19 PROCEDURE — 94640 AIRWAY INHALATION TREATMENT: CPT

## 2020-11-19 RX ORDER — DEXTROSE MONOHYDRATE 50 MG/ML
100 INJECTION, SOLUTION INTRAVENOUS PRN
Status: DISCONTINUED | OUTPATIENT
Start: 2020-11-19 | End: 2020-11-19 | Stop reason: SDUPTHER

## 2020-11-19 RX ORDER — INSULIN LISPRO 100 [IU]/ML
0-6 INJECTION, SOLUTION INTRAVENOUS; SUBCUTANEOUS NIGHTLY
Status: DISCONTINUED | OUTPATIENT
Start: 2020-11-19 | End: 2020-11-19 | Stop reason: ALTCHOICE

## 2020-11-19 RX ORDER — POTASSIUM CHLORIDE 20 MEQ/1
40 TABLET, EXTENDED RELEASE ORAL PRN
Status: DISCONTINUED | OUTPATIENT
Start: 2020-11-19 | End: 2020-11-24 | Stop reason: HOSPADM

## 2020-11-19 RX ORDER — 0.9 % SODIUM CHLORIDE 0.9 %
500 INTRAVENOUS SOLUTION INTRAVENOUS PRN
Status: DISCONTINUED | OUTPATIENT
Start: 2020-11-19 | End: 2020-11-24 | Stop reason: HOSPADM

## 2020-11-19 RX ORDER — INSULIN LISPRO 100 [IU]/ML
0-12 INJECTION, SOLUTION INTRAVENOUS; SUBCUTANEOUS
Status: DISCONTINUED | OUTPATIENT
Start: 2020-11-20 | End: 2020-11-19 | Stop reason: ALTCHOICE

## 2020-11-19 RX ORDER — NICOTINE POLACRILEX 4 MG
15 LOZENGE BUCCAL PRN
Status: DISCONTINUED | OUTPATIENT
Start: 2020-11-19 | End: 2020-11-19 | Stop reason: SDUPTHER

## 2020-11-19 RX ORDER — HYDRALAZINE HYDROCHLORIDE 20 MG/ML
10 INJECTION INTRAMUSCULAR; INTRAVENOUS EVERY 6 HOURS PRN
Status: DISCONTINUED | OUTPATIENT
Start: 2020-11-19 | End: 2020-11-24 | Stop reason: HOSPADM

## 2020-11-19 RX ORDER — POTASSIUM CHLORIDE 7.45 MG/ML
10 INJECTION INTRAVENOUS PRN
Status: DISCONTINUED | OUTPATIENT
Start: 2020-11-19 | End: 2020-11-24 | Stop reason: HOSPADM

## 2020-11-19 RX ORDER — METHYLPREDNISOLONE SODIUM SUCCINATE 40 MG/ML
40 INJECTION, POWDER, LYOPHILIZED, FOR SOLUTION INTRAMUSCULAR; INTRAVENOUS EVERY 8 HOURS
Status: DISCONTINUED | OUTPATIENT
Start: 2020-11-19 | End: 2020-11-24 | Stop reason: HOSPADM

## 2020-11-19 RX ORDER — ARFORMOTEROL TARTRATE 15 UG/2ML
15 SOLUTION RESPIRATORY (INHALATION) 2 TIMES DAILY
Status: DISCONTINUED | OUTPATIENT
Start: 2020-11-19 | End: 2020-11-24 | Stop reason: HOSPADM

## 2020-11-19 RX ORDER — INSULIN LISPRO 100 [IU]/ML
10 INJECTION, SOLUTION INTRAVENOUS; SUBCUTANEOUS
Status: DISCONTINUED | OUTPATIENT
Start: 2020-11-19 | End: 2020-11-24 | Stop reason: HOSPADM

## 2020-11-19 RX ORDER — BUDESONIDE 0.5 MG/2ML
0.5 INHALANT ORAL 2 TIMES DAILY
Status: DISCONTINUED | OUTPATIENT
Start: 2020-11-19 | End: 2020-11-24 | Stop reason: HOSPADM

## 2020-11-19 RX ORDER — DEXTROSE MONOHYDRATE 25 G/50ML
12.5 INJECTION, SOLUTION INTRAVENOUS PRN
Status: DISCONTINUED | OUTPATIENT
Start: 2020-11-19 | End: 2020-11-19 | Stop reason: SDUPTHER

## 2020-11-19 RX ADMIN — INSULIN GLARGINE 50 UNITS: 100 INJECTION, SOLUTION SUBCUTANEOUS at 20:52

## 2020-11-19 RX ADMIN — INSULIN LISPRO 7 UNITS: 100 INJECTION, SOLUTION INTRAVENOUS; SUBCUTANEOUS at 09:08

## 2020-11-19 RX ADMIN — IPRATROPIUM BROMIDE AND ALBUTEROL SULFATE 1 AMPULE: .5; 3 SOLUTION RESPIRATORY (INHALATION) at 16:00

## 2020-11-19 RX ADMIN — BUDESONIDE 500 MCG: 0.5 SUSPENSION RESPIRATORY (INHALATION) at 19:36

## 2020-11-19 RX ADMIN — ROSUVASTATIN CALCIUM 5 MG: 10 TABLET, FILM COATED ORAL at 20:35

## 2020-11-19 RX ADMIN — METHYLPREDNISOLONE SODIUM SUCCINATE 40 MG: 40 INJECTION, POWDER, FOR SOLUTION INTRAMUSCULAR; INTRAVENOUS at 02:19

## 2020-11-19 RX ADMIN — GABAPENTIN 300 MG: 300 CAPSULE ORAL at 09:04

## 2020-11-19 RX ADMIN — LISINOPRIL 10 MG: 10 TABLET ORAL at 09:04

## 2020-11-19 RX ADMIN — ALOGLIPTIN 25 MG: 25 TABLET, FILM COATED ORAL at 09:04

## 2020-11-19 RX ADMIN — NITROGLYCERIN 0.4 MG: 0.4 TABLET SUBLINGUAL at 10:35

## 2020-11-19 RX ADMIN — HYDRALAZINE HYDROCHLORIDE 25 MG: 25 TABLET, FILM COATED ORAL at 13:11

## 2020-11-19 RX ADMIN — HYDRALAZINE HYDROCHLORIDE 25 MG: 25 TABLET, FILM COATED ORAL at 20:35

## 2020-11-19 RX ADMIN — GABAPENTIN 300 MG: 300 CAPSULE ORAL at 20:35

## 2020-11-19 RX ADMIN — IPRATROPIUM BROMIDE AND ALBUTEROL SULFATE 1 AMPULE: .5; 3 SOLUTION RESPIRATORY (INHALATION) at 19:36

## 2020-11-19 RX ADMIN — ALPRAZOLAM 1 MG: 0.5 TABLET ORAL at 20:59

## 2020-11-19 RX ADMIN — INSULIN LISPRO 3 UNITS: 100 INJECTION, SOLUTION INTRAVENOUS; SUBCUTANEOUS at 17:27

## 2020-11-19 RX ADMIN — GLIPIZIDE 5 MG: 5 TABLET ORAL at 17:26

## 2020-11-19 RX ADMIN — PANTOPRAZOLE SODIUM 40 MG: 40 TABLET, DELAYED RELEASE ORAL at 06:33

## 2020-11-19 RX ADMIN — ALPRAZOLAM 1 MG: 0.5 TABLET ORAL at 06:33

## 2020-11-19 RX ADMIN — FUROSEMIDE 20 MG: 10 INJECTION, SOLUTION INTRAMUSCULAR; INTRAVENOUS at 09:04

## 2020-11-19 RX ADMIN — ENOXAPARIN SODIUM 40 MG: 40 INJECTION SUBCUTANEOUS at 09:04

## 2020-11-19 RX ADMIN — INSULIN LISPRO 7 UNITS: 100 INJECTION, SOLUTION INTRAVENOUS; SUBCUTANEOUS at 13:11

## 2020-11-19 RX ADMIN — NITROGLYCERIN 0.4 MG: 0.4 TABLET SUBLINGUAL at 10:41

## 2020-11-19 RX ADMIN — NYSTATIN: 100000 POWDER TOPICAL at 09:04

## 2020-11-19 RX ADMIN — PROMETHAZINE HYDROCHLORIDE AND DEXTROMETHORPHAN HYDROBROMIDE 5 ML: 6.25; 15 SOLUTION ORAL at 18:53

## 2020-11-19 RX ADMIN — NYSTATIN: 100000 POWDER TOPICAL at 20:37

## 2020-11-19 RX ADMIN — DICLOFENAC 4 G: 10 GEL TOPICAL at 20:36

## 2020-11-19 RX ADMIN — ISOSORBIDE MONONITRATE 60 MG: 30 TABLET, EXTENDED RELEASE ORAL at 09:04

## 2020-11-19 RX ADMIN — ASPIRIN 81 MG 81 MG: 81 TABLET ORAL at 09:04

## 2020-11-19 RX ADMIN — METHYLPREDNISOLONE SODIUM SUCCINATE 40 MG: 40 INJECTION, POWDER, FOR SOLUTION INTRAMUSCULAR; INTRAVENOUS at 13:10

## 2020-11-19 RX ADMIN — ARFORMOTEROL TARTRATE 15 MCG: 15 SOLUTION RESPIRATORY (INHALATION) at 19:36

## 2020-11-19 RX ADMIN — INSULIN LISPRO 5 UNITS: 100 INJECTION, SOLUTION INTRAVENOUS; SUBCUTANEOUS at 20:51

## 2020-11-19 RX ADMIN — INSULIN LISPRO 12 UNITS: 100 INJECTION, SOLUTION INTRAVENOUS; SUBCUTANEOUS at 13:11

## 2020-11-19 RX ADMIN — MAGNESIUM GLUCONATE 500 MG ORAL TABLET 400 MG: 500 TABLET ORAL at 09:04

## 2020-11-19 RX ADMIN — INSULIN LISPRO 10 UNITS: 100 INJECTION, SOLUTION INTRAVENOUS; SUBCUTANEOUS at 17:27

## 2020-11-19 RX ADMIN — PROMETHAZINE HYDROCHLORIDE AND DEXTROMETHORPHAN HYDROBROMIDE 5 ML: 6.25; 15 SOLUTION ORAL at 10:39

## 2020-11-19 RX ADMIN — HYDRALAZINE HYDROCHLORIDE 25 MG: 25 TABLET, FILM COATED ORAL at 09:04

## 2020-11-19 RX ADMIN — METHYLPREDNISOLONE SODIUM SUCCINATE 40 MG: 40 INJECTION, POWDER, FOR SOLUTION INTRAMUSCULAR; INTRAVENOUS at 20:36

## 2020-11-19 RX ADMIN — DICLOFENAC 4 G: 10 GEL TOPICAL at 08:02

## 2020-11-19 RX ADMIN — GLIPIZIDE 5 MG: 5 TABLET ORAL at 09:04

## 2020-11-19 RX ADMIN — INSULIN LISPRO 6 UNITS: 100 INJECTION, SOLUTION INTRAVENOUS; SUBCUTANEOUS at 09:07

## 2020-11-19 ASSESSMENT — PAIN DESCRIPTION - DESCRIPTORS
DESCRIPTORS: CRUSHING;PRESSURE
DESCRIPTORS: ACHING
DESCRIPTORS: CRUSHING
DESCRIPTORS: ACHING
DESCRIPTORS: CRUSHING;PRESSURE

## 2020-11-19 ASSESSMENT — PAIN SCALES - GENERAL
PAINLEVEL_OUTOF10: 5
PAINLEVEL_OUTOF10: 5
PAINLEVEL_OUTOF10: 4
PAINLEVEL_OUTOF10: 4
PAINLEVEL_OUTOF10: 0
PAINLEVEL_OUTOF10: 0
PAINLEVEL_OUTOF10: 10
PAINLEVEL_OUTOF10: 10

## 2020-11-19 ASSESSMENT — PAIN DESCRIPTION - ORIENTATION
ORIENTATION: MID

## 2020-11-19 ASSESSMENT — PAIN DESCRIPTION - PAIN TYPE
TYPE: ACUTE PAIN

## 2020-11-19 ASSESSMENT — PAIN DESCRIPTION - LOCATION
LOCATION: CHEST
LOCATION: THROAT
LOCATION: CHEST

## 2020-11-19 ASSESSMENT — PAIN DESCRIPTION - PROGRESSION: CLINICAL_PROGRESSION: GRADUALLY IMPROVING

## 2020-11-19 NOTE — FLOWSHEET NOTE
11/19/20 1033   Vital Signs   Temp 97.6 °F (36.4 °C)   Temp Source Oral   Pulse 81   Heart Rate Source Brachial   Resp 18   BP (!) 149/81   BP Location Right lower arm   MAP (mmHg) 104   Patient Position Supine   Level of Consciousness 0   MEWS Score 1   Patient Currently in Pain Yes   Pain Assessment   Pain Assessment 0-10   Pain Level 10   Patient's Stated Pain Goal No pain   Pain Type Acute pain   Pain Location Chest   Pain Orientation Mid   Pain Descriptors Crushing;Pressure   Non-Pharmaceutical Pain Intervention(s) Repositioned   Response to Pain Intervention Patient Satisfied   Oxygen Therapy   SpO2 90 %   Pulse Oximeter Device Mode Intermittent   Pulse Oximeter Device Location Finger   O2 Device None (Room air)     Pt stating 10/10 chest mid chest pain. Pt states she has experienced this chest pain before and has taken nitro at home. Nitro given. Pt states pain is decreasing. BP stable. Pt then states she feels the need to belch. Pt states that sometimes if she \"burps\" it helps her chest pain. Pt denies any further needs at this time.  Naomi Brown 10:50 AM

## 2020-11-19 NOTE — PLAN OF CARE
PT REFUSES BED ALARM AT THIS TIME. PT EDUCATED ON RISKS OF THIS DECISION BUT CONTINUES TO REFUSE ALARM, WILL MONITOR.

## 2020-11-19 NOTE — FLOWSHEET NOTE
11/19/20 0800   Vital Signs   Temp 97.6 °F (36.4 °C)   Temp Source Oral   Pulse 67   Heart Rate Source Monitor   Resp 20   BP (!) 148/68   BP Location Right lower arm   Patient Position Lying left side   Level of Consciousness 0   MEWS Score 1   Patient Currently in Pain Yes   Pain Assessment   Pain Assessment 0-10   Pain Level   (Patient didn't give a number)   Oxygen Therapy   SpO2 92 %   Pulse Oximeter Device Mode Intermittent   Pulse Oximeter Device Location Finger   O2 Device Nasal cannula   O2 Flow Rate (L/min) 2 L/min     Shift assessment compete. VSS. Pt. Is alert and oriented resting in bed. Pt. Has complaints of generalized pain and states this is chronic for her. POC discussed with patient and patient states understanding and is in agreement with plan. Bed alarm engaged. Call light and bedside table within reach. Will continue to monitor.  Naomi Brown

## 2020-11-19 NOTE — CONSULTS
PULMONARY AND CRITICAL CARE MEDICINE CONSULTATION NOTE    CONSULTING PHYSICIAN:  Sofía Griggs MD    ADMISSION DATE: 11/15/2020  ADMISSION DIAGNOSIS: COPD with acute exacerbation ([facility] [J44.1]  COPD with acute exacerbation ([facility] [J44.1]    REASON FOR CONSULT:   Chief Complaint   Patient presents with    Chest Pain     in via squad, c/o chest pain and sob that radiates into back. took nitro with no relief at home. DATE OF CONSULT: 11/15/2020    HISTORY OF PRESENT ILLNESS: 77y.o. year old female with a past medical history significant for COPD of unknown severity, morbid obesity, possible obstructive sleep apnea untreated, hypertension who presented to the hospital with severe shortness of breath and chest pain. Patient also reports of dry cough which has been going on for about a week. Denies any fevers, night sweats, discolored expectoration, hemoptysis. Has been taking her Symbicort albuterol inhaler regularly at home. Denies any sick contacts. At the time of interview today, patient sitting in bed eating her breakfast.  Reports that she still feels short of breath. Intermittent cough persist.  Patient is very concerned about the fact that there are many Covid patients in the hospital.  She actually delayed presenting to the hospital in order to avoid this scenario. REVIEW OF SYSTEMS:     CONSTITUTIONAL SYMPTOMS: The patient denies fever, fatigue, night sweats, weight loss or weight gain. HEENT: No vision changes. No tinnitus, Denies sinus pain. No hoarseness, or dysphagia. NECK: Patient denies swelling in the neck. CARDIOVASCULAR: Denies chest pain, palpitation, syncope. RESPIRATORY: See HPI. GASTROINTESTINAL: Denies nausea, abdominal pain or change in bowel function. GENITOURINARY: Denies obstructive symptoms. No history of incontinence. BREASTS: No masses or lumps in the breasts. SKIN: No rashes or itching. MUSCULOSKELETAL: Denies weakness or bone pain.    NEUROLOGICAL: No headaches or seizures. PSYCHIATRIC: Denies mood swings or depression. ENDOCRINE: Denies heat or cold intolerance or excessive thirst.  HEMATOLOGIC/LYMPHATIC: Denies easy bruising or lymph node swelling. ALLERGIC/IMMUNOLOGIC: No environmental allergies.     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    Chronic obstructive pulmonary disease ([facility] 1/13/2017    Claustrophobia     COPD (chronic obstructive pulmonary disease) (HCC)     Esophageal stricture     Gastroesophageal reflux disease without esophagitis 3/24/2016    Hiatal hernia     Hiatal hernia     Hypercholesteremia     Hypertension     IBS (irritable bowel syndrome)     Migraines     Movement disorder arthritis    Pneumonia     PONV (postoperative nausea and vomiting)     Type II or unspecified type diabetes mellitus without mention of complication, not stated as uncontrolled     type ll does not take insulin at home    Unspecified cerebral artery occlusion with cerebral infarction     many TIA's       PAST SURGICAL HISTORY:   Past Surgical History:   Procedure Laterality Date    APPENDECTOMY      BRONCHOSCOPY N/A 1/24/2020    BRONCHOSCOPY ALVEOLAR LAVAGE performed by Wilton Weeks MD at [address] [facility] N/A 1/27/2020    BRONCHOSCOPY DIAGNOSTIC OR CELL [address] ONLY performed by Richy Rios MD at [address] [place] [address]      1 stent 2000 and 1 stent 2001    CATARACT REMOVAL  2013 or 2014    bilateral cataracts removed    CHOLECYSTECTOMY      COLONOSCOPY      COLONOSCOPY  06/11/2018    ENDOSCOPY, COLON, DIAGNOSTIC      ESOPHAGEAL DILATATION      EYE SURGERY      bilateral cataracts    GALLBLADDER SURGERY      HYSTERECTOMY      MD EXC SKIN MALIG <5MM REMAINDR BODY N/A 9/6/2018    EXCISION OF SCALP SQUAMOUS CELL CARCINOMA performed by Ck Aguilar MD at [facility] <100SQCM N/A 9/6/2018    SPLIT THICKNESS SKIN GRAFT FOR COVERAGE SCALP, APPLICATION OF WOUND VAC DEVICE performed by Ck Aguilar MD at [facility] GASTROINTESTINAL ENDOSCOPY  4/20/12    with biopsy of stomach,gastritis    UPPER GASTROINTESTINAL ENDOSCOPY  06/11/2018    w/esophagael dilation       SOCIAL HISTORY:   Social History     Tobacco Use    Smoking status: Former Smoker     Packs/day: 0.25     Years: 49.00     Pack years: 12.25     Types: Cigarettes     Last attempt to quit: 6/16/2017     Years since quitting: 3.4    Smokeless tobacco: Never Used    Tobacco comment: only smoke when real stressed  Nicotine patch   Substance Use Topics    Alcohol use: Yes     Alcohol/week: 1.0 standard drinks     Types: 1 Glasses of wine per week     Comment: occ. every  6 mo    Drug use: No       FAMILY HISTORY:   Family History   Problem Relation Age of Onset    Heart Disease Mother     Cancer Mother     Cancer Father     Cancer Sister     Heart Disease Sister     Heart Disease Brother     High Blood Pressure Neg Hx     High Cholesterol Neg Hx        MEDICATIONS:     No current facility-administered medications on file prior to encounter. Current Outpatient Medications on File Prior to Encounter   Medication Sig Dispense Refill    omeprazole (PRILOSEC) 40 MG delayed release capsule Take 1 capsule by mouth daily 30 capsule 3    lisinopril (PRINIVIL;ZESTRIL) 10 MG tablet Take 1 tablet by mouth daily 90 tablet 1    ALPRAZolam (XANAX) 1 MG tablet Take 1 tablet by mouth 3 times daily as needed for Anxiety for up to 30 days.  90 tablet 0    linagliptin (TRADJENTA) 5 MG tablet Take 1 tablet by mouth daily 90 tablet 1    gabapentin (NEURONTIN) 300 MG capsule Take 1 capsule by mouth 2 times daily for 30 days. Intended supply: 30 days 60 capsule 1    hydrALAZINE (APRESOLINE) 25 MG tablet Take 1 tablet by mouth 3 times daily 90 tablet 3    glipiZIDE (GLUCOTROL) 5 MG tablet Take 1 tablet by mouth 2 times daily (before meals) 60 tablet 2    isosorbide mononitrate (IMDUR) 60 MG extended release tablet Take 1 tablet by mouth daily 90 tablet 3    diclofenac sodium (VOLTAREN) 1 % GEL APPLY TWO GRAMS TOPICALLY TWICE DAILY 1 Tube 2    rosuvastatin (CRESTOR) 10 MG tablet Take 0.5 tablets by mouth daily 90 tablet 3    furosemide (LASIX) 40 MG tablet Take 1 tablet by mouth daily 30 tablet 1    sodium chloride (OCEAN, BABY AYR) 0.65 % nasal spray 1 spray by Nasal route as needed for Congestion  0    ipratropium-albuterol (DUONEB) 0.5-2.5 (3) MG/3ML SOLN nebulizer solution Inhale 3 mLs into the lungs every 4 hours as needed for Shortness of Breath 360 mL 1    budesonide-formoterol (SYMBICORT) 160-4.5 MCG/ACT AERO Inhale 2 puffs into the lungs 2 times daily 3 Inhaler 1    albuterol sulfate  (90 Base) MCG/ACT inhaler Inhale 2 puffs into the lungs every 6 hours as needed for Wheezing 1 Inhaler 2    ibuprofen (ADVIL;MOTRIN) 400 MG tablet Take 1 tablet by mouth every 6 hours as needed for Pain or Fever 120 tablet 3    triamcinolone (KENALOG) 0.1 % cream Apply topically 2 times daily. 30 g 1    aspirin 81 MG tablet Take 81 mg by mouth daily      nystatin (MYCOSTATIN) 109239 UNIT/GM powder Affected area 1 Bottle 5    nitroGLYCERIN (NITROSTAT) 0.4 MG SL tablet Place 1 tablet under the tongue every 5 minutes as needed for Chest pain.  30 tablet 0        Arformoterol Tartrate  15 mcg Nebulization BID    budesonide  0.5 mg Nebulization BID    methylPREDNISolone  40 mg Intravenous Q8H    tiotropium  2 puff Inhalation Daily    enoxaparin  40 mg supraclavicular lymphadenopathy:  CARDIOVASCULAR: S1 S2 RRR. Without murmer  RESPIRATORY & CHEST: Bilateral diffuse scattered wheezes heard. GASTROINTESTINAL & ABDOMEN: Soft, nontender, positive bowel sounds in all quadrants, non-distended, without hepatosplenomegaly. GENITOURINARY: Deferred. MUSCULOSKELETAL: No tenderness to palpation of the axial skeleton. There is no clubbing. No cyanosis. No edema of the lower extremities. SKIN OF BODY: No rash or jaundice. PSYCHIATRIC EVALUATION: Normal affect. Patient answers questions appropriately. HEMATOLOGIC/LYMPHATIC/ IMMUNOLOGIC: No palpable lymphadenopathy. NEUROLOGIC: Alert and oriented x 3. Groslly non-focal. Motor strength is 5+/5 in all muscle groups. The patient has a normal sensorium globally. LABS:  Recent Labs     11/17/20 0611 11/18/20  1038   WBC  --  13.0*   HGB  --  14.8   HCT  --  46.0   PLT  --  202   * 131*   K 4.7 4.9   CL 97* 96*   CREATININE 1.0 1.0   BUN 19 26*   CO2 26 23       Recent Labs     11/17/20  0611 11/18/20  1038   GLUCOSE 236* 308*   CALCIUM 10.6 10.0   * 131*   K 4.7 4.9   CO2 26 23   CL 97* 96*   BUN 19 26*   CREATININE 1.0 1.0       No results for input(s): PHART, GGO0CCN, PO2ART, XKF8WNB, F6HRBIWT, BEART, Q0CZVXGD in the last 72 hours.     Lab Results   Component Value Date    INR 0.97 01/27/2020    INR 1.03 09/18/2019    INR 1.04 04/24/2019    PROTIME 11.3 01/27/2020    PROTIME 11.7 09/18/2019    PROTIME 11.9 04/24/2019     Lab Results   Component Value Date    AMYLASE 28 09/08/2015      Lab Results   Component Value Date    LABA1C 7.1 11/17/2020     Lab Results   Component Value Date    .1 11/17/2020     Lab Results   Component Value Date    TSH 2.34 06/06/2011     Lab Results   Component Value Date    CKTOTAL 24 (L) 06/07/2018    CKMB 0.29 08/09/2011    TROPONINI <0.01 11/19/2020      Lab Results   Component Value Date    CRP 4.7 02/25/2014      Lab Results   Component Value Date    BNP <15 04/18/2012      Lab Results   Component Value Date    DDIMER 474 (H) 09/21/2018      No results found for: FERRITIN   Lab Results   Component Value Date    LACTA 2.6 (H) 02/07/2017         Transthoracic echocardiogram done on 3/20/2020    Normal left ventricular cavity size and systolic function with an ejection fraction estimated at 65-70%. Mild septal hypertrophy. No regional wall motion abnormalities noted. Diastolic filling parameters suggest grade II diastolic dysfunction. Mild mitral regurgitation. Mild aortic stenosis with a peak velocity of 2.26 m/s and a mean pressure gradient of 11 mmHg. There is mild aortic insufficiency. There is mild to moderate right ventricular hypertrophy. The right ventricle is normal in size and function. Unable to estimate pulmonary artery pressure secondary to incomplete TR jetenvelope. Electronically signed by Raman Crump DO, [facility]      IMAGING:    Chest x-ray 2 views done on 11/18/2020 was personally viewed by me and my interpretation is: Left lower lobe opacity which is chronic in nature. No new focal consolidation seen. No pneumothorax or pleural effusion seen. Cardiac silhouette is slightly enlarged. IMPRESSION:     1. COPD of unknown severity in exacerbation  2. Heart failure with preserved ejection fraction  3. Acute hypoxic respiratory failure  4. Morbid obesity  5. High suspicion for obstructive sleep apnea  6. Previous history of tobacco abuse    RECOMMENDATION:     1. Patient has again presented to the hospital with increased shortness of breath and chest pain. 2. Nuclear medicine stress test done yesterday did not show any abnormal cardiac perfusion. 3. She does have bilateral wheezing with dry cough. 4. At this time I will change her inhaler therapy into nebulized therapy. I will start her on Brovana twice a day, Pulmicort twice a day and continue with Spiriva Respimat.   5. Additionally since she is wheezing excessively, will increase the dosage of Solu-Medrol to 40 mg IV every 8 hours. 6. Oxygen supplementation to maintain SPO2 between 88 to 92%  7. We will get a procalcitonin level to assess if patient would need antibiotic coverage. The left lower lobe opacity is chronic in nature and possibly scar tissue. 8. If patient is seen to have worsening respiratory distress, she can be given BiPAP therapy at a pressure setting of 16/8 cmH2O with FiO2 of 40%. Thank you for your consultation. We will continue to follow the patient. Iraj Vallejo MD  Pulmonary Critical Care and Sleep Medicine  11/15/2020, 12:44 PM    This note was completed using dragon medical speech recognition software. Grammatical errors, random word insertions, pronoun errors and incomplete sentences are occasional consequences of this technology due to software limitations. If there are questions or concerns about the content of this note of information contained within the body of this dictation they should be addressed with the provider for clarification.

## 2020-11-20 ENCOUNTER — APPOINTMENT (OUTPATIENT)
Dept: GENERAL RADIOLOGY | Age: 75
DRG: 191 | End: 2020-11-20
Payer: COMMERCIAL

## 2020-11-20 LAB
ANION GAP SERPL CALCULATED.3IONS-SCNC: 8 MMOL/L (ref 3–16)
ANISOCYTOSIS: ABNORMAL
ATYPICAL LYMPHOCYTE RELATIVE PERCENT: 8 % (ref 0–6)
BASOPHILS ABSOLUTE: 0 K/UL (ref 0–0.2)
BASOPHILS RELATIVE PERCENT: 0 %
BUN BLDV-MCNC: 30 MG/DL (ref 7–20)
CALCIUM SERPL-MCNC: 9.9 MG/DL (ref 8.3–10.6)
CHLORIDE BLD-SCNC: 94 MMOL/L (ref 99–110)
CO2: 28 MMOL/L (ref 21–32)
CREAT SERPL-MCNC: 0.9 MG/DL (ref 0.6–1.2)
EOSINOPHILS ABSOLUTE: 0 K/UL (ref 0–0.6)
EOSINOPHILS RELATIVE PERCENT: 0 %
ESTIMATED AVERAGE GLUCOSE: 162.8 MG/DL
GFR AFRICAN AMERICAN: >60
GFR NON-AFRICAN AMERICAN: >60
GLUCOSE BLD-MCNC: 118 MG/DL (ref 70–99)
GLUCOSE BLD-MCNC: 218 MG/DL (ref 70–99)
GLUCOSE BLD-MCNC: 219 MG/DL (ref 70–99)
GLUCOSE BLD-MCNC: 238 MG/DL (ref 70–99)
GLUCOSE BLD-MCNC: 257 MG/DL (ref 70–99)
HBA1C MFR BLD: 7.3 %
HCT VFR BLD CALC: 46 % (ref 36–48)
HEMOGLOBIN: 15 G/DL (ref 12–16)
LYMPHOCYTES ABSOLUTE: 1.4 K/UL (ref 1–5.1)
LYMPHOCYTES RELATIVE PERCENT: 5 %
MCH RBC QN AUTO: 28.9 PG (ref 26–34)
MCHC RBC AUTO-ENTMCNC: 32.6 G/DL (ref 31–36)
MCV RBC AUTO: 88.4 FL (ref 80–100)
MONOCYTES ABSOLUTE: 0.2 K/UL (ref 0–1.3)
MONOCYTES RELATIVE PERCENT: 2 %
NEUTROPHILS ABSOLUTE: 9.4 K/UL (ref 1.7–7.7)
NEUTROPHILS RELATIVE PERCENT: 85 %
PDW BLD-RTO: 15.5 % (ref 12.4–15.4)
PERFORMED ON: ABNORMAL
PLATELET # BLD: 218 K/UL (ref 135–450)
PLATELET SLIDE REVIEW: ADEQUATE
PMV BLD AUTO: 8.1 FL (ref 5–10.5)
POTASSIUM SERPL-SCNC: 5.2 MMOL/L (ref 3.5–5.1)
PROCALCITONIN: 0.05 NG/ML (ref 0–0.15)
RBC # BLD: 5.21 M/UL (ref 4–5.2)
SLIDE REVIEW: ABNORMAL
SODIUM BLD-SCNC: 130 MMOL/L (ref 136–145)
WBC # BLD: 11.1 K/UL (ref 4–11)

## 2020-11-20 PROCEDURE — 94761 N-INVAS EAR/PLS OXIMETRY MLT: CPT

## 2020-11-20 PROCEDURE — 94640 AIRWAY INHALATION TREATMENT: CPT

## 2020-11-20 PROCEDURE — 71046 X-RAY EXAM CHEST 2 VIEWS: CPT

## 2020-11-20 PROCEDURE — 6370000000 HC RX 637 (ALT 250 FOR IP): Performed by: EMERGENCY MEDICINE

## 2020-11-20 PROCEDURE — 6370000000 HC RX 637 (ALT 250 FOR IP): Performed by: INTERNAL MEDICINE

## 2020-11-20 PROCEDURE — 6360000002 HC RX W HCPCS: Performed by: INTERNAL MEDICINE

## 2020-11-20 PROCEDURE — 84145 PROCALCITONIN (PCT): CPT

## 2020-11-20 PROCEDURE — 80048 BASIC METABOLIC PNL TOTAL CA: CPT

## 2020-11-20 PROCEDURE — 2700000000 HC OXYGEN THERAPY PER DAY

## 2020-11-20 PROCEDURE — 99233 SBSQ HOSP IP/OBS HIGH 50: CPT | Performed by: INTERNAL MEDICINE

## 2020-11-20 PROCEDURE — 85025 COMPLETE CBC W/AUTO DIFF WBC: CPT

## 2020-11-20 PROCEDURE — 36415 COLL VENOUS BLD VENIPUNCTURE: CPT

## 2020-11-20 PROCEDURE — 1200000000 HC SEMI PRIVATE

## 2020-11-20 RX ORDER — PREDNISONE 10 MG/1
TABLET ORAL
Qty: 40 TABLET | Refills: 0 | Status: SHIPPED | OUTPATIENT
Start: 2020-11-20 | End: 2020-11-30

## 2020-11-20 RX ADMIN — PROMETHAZINE HYDROCHLORIDE AND DEXTROMETHORPHAN HYDROBROMIDE 5 ML: 6.25; 15 SOLUTION ORAL at 20:23

## 2020-11-20 RX ADMIN — ENOXAPARIN SODIUM 40 MG: 40 INJECTION SUBCUTANEOUS at 08:49

## 2020-11-20 RX ADMIN — IPRATROPIUM BROMIDE AND ALBUTEROL SULFATE 1 AMPULE: .5; 3 SOLUTION RESPIRATORY (INHALATION) at 19:55

## 2020-11-20 RX ADMIN — INSULIN LISPRO 10 UNITS: 100 INJECTION, SOLUTION INTRAVENOUS; SUBCUTANEOUS at 11:45

## 2020-11-20 RX ADMIN — FUROSEMIDE 20 MG: 10 INJECTION, SOLUTION INTRAMUSCULAR; INTRAVENOUS at 08:49

## 2020-11-20 RX ADMIN — INSULIN LISPRO 10 UNITS: 100 INJECTION, SOLUTION INTRAVENOUS; SUBCUTANEOUS at 08:50

## 2020-11-20 RX ADMIN — NYSTATIN: 100000 POWDER TOPICAL at 08:50

## 2020-11-20 RX ADMIN — BUDESONIDE 500 MCG: 0.5 SUSPENSION RESPIRATORY (INHALATION) at 07:58

## 2020-11-20 RX ADMIN — INSULIN LISPRO 5 UNITS: 100 INJECTION, SOLUTION INTRAVENOUS; SUBCUTANEOUS at 20:31

## 2020-11-20 RX ADMIN — NYSTATIN: 100000 POWDER TOPICAL at 20:24

## 2020-11-20 RX ADMIN — MAGNESIUM GLUCONATE 500 MG ORAL TABLET 400 MG: 500 TABLET ORAL at 08:48

## 2020-11-20 RX ADMIN — IPRATROPIUM BROMIDE AND ALBUTEROL SULFATE 1 AMPULE: .5; 3 SOLUTION RESPIRATORY (INHALATION) at 07:58

## 2020-11-20 RX ADMIN — ALPRAZOLAM 1 MG: 0.5 TABLET ORAL at 04:03

## 2020-11-20 RX ADMIN — ISOSORBIDE MONONITRATE 60 MG: 30 TABLET, EXTENDED RELEASE ORAL at 08:48

## 2020-11-20 RX ADMIN — ASPIRIN 81 MG 81 MG: 81 TABLET ORAL at 08:49

## 2020-11-20 RX ADMIN — ALPRAZOLAM 1 MG: 0.5 TABLET ORAL at 20:35

## 2020-11-20 RX ADMIN — ALOGLIPTIN 25 MG: 25 TABLET, FILM COATED ORAL at 08:49

## 2020-11-20 RX ADMIN — ARFORMOTEROL TARTRATE 15 MCG: 15 SOLUTION RESPIRATORY (INHALATION) at 19:55

## 2020-11-20 RX ADMIN — INSULIN LISPRO 6 UNITS: 100 INJECTION, SOLUTION INTRAVENOUS; SUBCUTANEOUS at 08:50

## 2020-11-20 RX ADMIN — INSULIN LISPRO 10 UNITS: 100 INJECTION, SOLUTION INTRAVENOUS; SUBCUTANEOUS at 16:43

## 2020-11-20 RX ADMIN — GLIPIZIDE 5 MG: 5 TABLET ORAL at 16:43

## 2020-11-20 RX ADMIN — GABAPENTIN 300 MG: 300 CAPSULE ORAL at 20:23

## 2020-11-20 RX ADMIN — GABAPENTIN 300 MG: 300 CAPSULE ORAL at 08:50

## 2020-11-20 RX ADMIN — METHYLPREDNISOLONE SODIUM SUCCINATE 40 MG: 40 INJECTION, POWDER, FOR SOLUTION INTRAMUSCULAR; INTRAVENOUS at 03:53

## 2020-11-20 RX ADMIN — DICLOFENAC 4 G: 10 GEL TOPICAL at 08:50

## 2020-11-20 RX ADMIN — PANTOPRAZOLE SODIUM 40 MG: 40 TABLET, DELAYED RELEASE ORAL at 05:48

## 2020-11-20 RX ADMIN — ARFORMOTEROL TARTRATE 15 MCG: 15 SOLUTION RESPIRATORY (INHALATION) at 07:58

## 2020-11-20 RX ADMIN — LISINOPRIL 10 MG: 10 TABLET ORAL at 08:49

## 2020-11-20 RX ADMIN — HYDRALAZINE HYDROCHLORIDE 25 MG: 25 TABLET, FILM COATED ORAL at 08:49

## 2020-11-20 RX ADMIN — METHYLPREDNISOLONE SODIUM SUCCINATE 40 MG: 40 INJECTION, POWDER, FOR SOLUTION INTRAMUSCULAR; INTRAVENOUS at 11:45

## 2020-11-20 RX ADMIN — METHYLPREDNISOLONE SODIUM SUCCINATE 40 MG: 40 INJECTION, POWDER, FOR SOLUTION INTRAMUSCULAR; INTRAVENOUS at 20:23

## 2020-11-20 RX ADMIN — HYDRALAZINE HYDROCHLORIDE 25 MG: 25 TABLET, FILM COATED ORAL at 14:16

## 2020-11-20 RX ADMIN — HYDRALAZINE HYDROCHLORIDE 25 MG: 25 TABLET, FILM COATED ORAL at 20:23

## 2020-11-20 RX ADMIN — IPRATROPIUM BROMIDE AND ALBUTEROL SULFATE 1 AMPULE: .5; 3 SOLUTION RESPIRATORY (INHALATION) at 16:12

## 2020-11-20 RX ADMIN — INSULIN LISPRO 6 UNITS: 100 INJECTION, SOLUTION INTRAVENOUS; SUBCUTANEOUS at 11:45

## 2020-11-20 RX ADMIN — DICLOFENAC 4 G: 10 GEL TOPICAL at 20:23

## 2020-11-20 RX ADMIN — PROMETHAZINE HYDROCHLORIDE AND DEXTROMETHORPHAN HYDROBROMIDE 5 ML: 6.25; 15 SOLUTION ORAL at 05:27

## 2020-11-20 RX ADMIN — PROMETHAZINE HYDROCHLORIDE AND DEXTROMETHORPHAN HYDROBROMIDE 5 ML: 6.25; 15 SOLUTION ORAL at 00:22

## 2020-11-20 RX ADMIN — BUDESONIDE 500 MCG: 0.5 SUSPENSION RESPIRATORY (INHALATION) at 19:55

## 2020-11-20 RX ADMIN — IPRATROPIUM BROMIDE AND ALBUTEROL SULFATE 1 AMPULE: .5; 3 SOLUTION RESPIRATORY (INHALATION) at 11:40

## 2020-11-20 RX ADMIN — GLIPIZIDE 5 MG: 5 TABLET ORAL at 08:49

## 2020-11-20 RX ADMIN — ROSUVASTATIN CALCIUM 5 MG: 10 TABLET, FILM COATED ORAL at 20:23

## 2020-11-20 RX ADMIN — TIOTROPIUM BROMIDE INHALATION SPRAY 2 PUFF: 3.12 SPRAY, METERED RESPIRATORY (INHALATION) at 07:58

## 2020-11-20 RX ADMIN — ALPRAZOLAM 1 MG: 0.5 TABLET ORAL at 14:16

## 2020-11-20 RX ADMIN — INSULIN GLARGINE 50 UNITS: 100 INJECTION, SOLUTION SUBCUTANEOUS at 20:31

## 2020-11-20 ASSESSMENT — PAIN SCALES - GENERAL
PAINLEVEL_OUTOF10: 6
PAINLEVEL_OUTOF10: 0

## 2020-11-20 ASSESSMENT — PAIN DESCRIPTION - PAIN TYPE: TYPE: CHRONIC PAIN

## 2020-11-20 ASSESSMENT — PAIN DESCRIPTION - LOCATION: LOCATION: BACK;LEG

## 2020-11-20 NOTE — PROGRESS NOTES
Department of Internal Medicine  General Internal Medicine   Progress Note     SUBJECTIVE   Still considerably SOB and congested     History obtained from chart review and the patient  General ROS: positive for  - fatigue and malaise  negative for - chills, fever or night sweats  Psychological ROS: negative  Respiratory ROS: positive for - shortness of breath and wheezing  negative for - cough, hemoptysis, sputum changes or stridor  Cardiovascular ROS: positive for - chest pain, dyspnea on exertion and edema  negative for - loss of consciousness, orthopnea or palpitations  Gastrointestinal ROS: no abdominal pain, change in bowel habits, or black or bloody stools    OBJECTIVE      Medications      Current Facility-Administered Medications: Arformoterol Tartrate (BROVANA) nebulizer solution 15 mcg, 15 mcg, Nebulization, BID  budesonide (PULMICORT) nebulizer suspension 500 mcg, 0.5 mg, Nebulization, BID  methylPREDNISolone sodium (SOLU-MEDROL) injection 40 mg, 40 mg, Intravenous, Q8H  insulin glargine (LANTUS;BASAGLAR) injection pen 50 Units, 50 Units, Subcutaneous, Nightly  insulin lispro (1 Unit Dial) 10 Units, 10 Units, Subcutaneous, TID WC  hydrALAZINE (APRESOLINE) injection 10 mg, 10 mg, Intravenous, Q6H PRN  0.9 % sodium chloride bolus, 500 mL, Intravenous, PRN  potassium chloride (KLOR-CON M) extended release tablet 40 mEq, 40 mEq, Oral, PRN **OR** potassium bicarb-citric acid (EFFER-K) effervescent tablet 40 mEq, 40 mEq, Oral, PRN **OR** potassium chloride 10 mEq/100 mL IVPB (Peripheral Line), 10 mEq, Intravenous, PRN  tiotropium (SPIRIVA RESPIMAT) 2.5 MCG/ACT inhaler 2 puff, 2 puff, Inhalation, Daily  enoxaparin (LOVENOX) injection 40 mg, 40 mg, Subcutaneous, Daily  glucose (GLUTOSE) 40 % oral gel 15 g, 15 g, Oral, PRN  glucagon (rDNA) injection 1 mg, 1 mg, Intramuscular, PRN  insulin lispro (1 Unit Dial) 0-18 Units, 0-18 Units, Subcutaneous, TID WC  insulin lispro (1 Unit Dial) 0-9 Units, 0-9 Units, Subcutaneous, Nightly  gabapentin (NEURONTIN) capsule 300 mg, 300 mg, Oral, BID  promethazine-dextromethorphan (PROMETHAZINE-DM) 6.25-15 MG/5ML syrup 5 mL, 5 mL, Oral, Q4H PRN  guaiFENesin (ROBITUSSIN) 100 MG/5ML oral solution 200 mg, 200 mg, Oral, Q4H PRN  ipratropium-albuterol (DUONEB) nebulizer solution 1 ampule, 1 ampule, Inhalation, Q4H WA  ALPRAZolam (XANAX) tablet 1 mg, 1 mg, Oral, TID PRN  aspirin chewable tablet 81 mg, 81 mg, Oral, Daily  diclofenac sodium (VOLTAREN) 1 % gel 4 g, 4 g, Topical, BID  glipiZIDE (GLUCOTROL) tablet 5 mg, 5 mg, Oral, BID AC  hydrALAZINE (APRESOLINE) tablet 25 mg, 25 mg, Oral, TID  ibuprofen (ADVIL;MOTRIN) tablet 400 mg, 400 mg, Oral, Q6H PRN  isosorbide mononitrate (IMDUR) extended release tablet 60 mg, 60 mg, Oral, Daily  lisinopril (PRINIVIL;ZESTRIL) tablet 10 mg, 10 mg, Oral, Daily  nitroGLYCERIN (NITROSTAT) SL tablet 0.4 mg, 0.4 mg, Sublingual, Q5 Min PRN  pantoprazole (PROTONIX) tablet 40 mg, 40 mg, Oral, QAM AC  rosuvastatin (CRESTOR) tablet 5 mg, 5 mg, Oral, Nightly  sodium chloride (OCEAN, BABY AYR) 0.65 % nasal spray 1 spray, 1 spray, Nasal, PRN  furosemide (LASIX) injection 20 mg, 20 mg, Intravenous, Daily  alogliptin (NESINA) tablet 25 mg, 25 mg, Oral, Daily  glucose (GLUTOSE) 40 % oral gel 15 g, 15 g, Oral, PRN  dextrose 50 % IV solution, 12.5 g, Intravenous, PRN  glucagon (rDNA) injection 1 mg, 1 mg, Intramuscular, PRN  dextrose 5 % solution, 100 mL/hr, Intravenous, PRN  nystatin (MYCOSTATIN) powder, , Topical, BID  magnesium sulfate 1 g in dextrose 5% 100 mL IVPB, 1 g, Intravenous, PRN  magnesium oxide (MAG-OX) tablet 400 mg, 400 mg, Oral, Daily    Physical      VITALS:    /70   Pulse 64   Temp 97.5 °F (36.4 °C) (Oral)   Resp 16   Ht 5' 6\" (1.676 m)   Wt 277 lb 5.4 oz (125.8 kg)   SpO2 92%   BMI 44.76 kg/m²   TEMPERATURE:  Current - Temp: 97.5 °F (36.4 °C);  Max - Temp  Av.6 °F (36.4 °C)  Min: 97.5 °F (36.4 °C)  Max: 97.8 °F (36.6 °C)  RESPIRATIONS RANGE: Resp  Av.6  Min: 16  Max: 20  PULSE RANGE: Pulse  Av.9  Min: 64  Max: 82  BLOOD PRESSURE RANGE:  Systolic (36HOM), UPR:564 , Min:100 , UAN:889   ; Diastolic (32ZIS), DGT:31, Min:55, Max:81    PULSE OXIMETRY RANGE: SpO2  Av.2 %  Min: 89 %  Max: 95 %  24HR INTAKE/OUTPUT:      Intake/Output Summary (Last 24 hours) at 2020 0911  Last data filed at 2020 7840  Gross per 24 hour   Intake 720 ml   Output 2500 ml   Net -1780 ml     CONSTITUTIONAL:  awake, alert, cooperative, no apparent distress, and appears stated age  NECK:  supple, symmetrical, trachea midline and skin normal  LUNGS:  Moderate increase in work of breathing bart crackles and exp wheezes   CARDIOVASCULAR:  Normal apical impulse, regular rate and rhythm, normal S1 and S2, no S3 or S4, and no murmur noted  ABDOMEN:  No scars, normal bowel sounds, soft, non-distended, non-tender, no masses palpated, no hepatosplenomegally  MUSCULOSKELETAL:  ++ edema  NEUROLOGIC:  No acute focal change    Data      Lab Results   Component Value Date    PHART 7.446 2017       Lab Results   Component Value Date     2020    K 5.2 2020    K 3.3 11/15/2020    CL 94 2020    CO2 28 2020    BUN 30 2020    CREATININE 0.9 2020    GLUCOSE 238 2020    CALCIUM 9.9 2020     Lab Results   Component Value Date    WBC 11.1 2020    HGB 15.0 2020    HCT 46.0 2020    MCV 88.4 2020     2020         Lab Results   Component Value Date    INR 0.97 2020    PROTIME 11.3 2020       ASSESSMENT AND PLAN      Patient Active Problem List   Diagnosis    Chronic respiratory failure (HCC)    CAD (coronary artery disease)    Hyperlipemia    Essential hypertension    DM2 (diabetes mellitus, type 2) (HCC)    Primary osteoarthritis involving multiple joints    Gastroesophageal reflux disease without esophagitis    Anxiety    Morbid obesity (HCC)    Precordial pain  Unstable angina (HCC)    COPD with acute exacerbation (HCC)    AKIL (obstructive sleep apnea)    Chest congestion    DM (diabetes mellitus), secondary uncontrolled (HCC)    Grade II diastolic dysfunction                          repeat CXR  Shows no lobar consolidation  But lot of chronic fibrotic changes with inflammation element and chronic venous congestion without overt failure     Next step would be increase steroids , she is maxed out on COPD management , Pulm consult in place

## 2020-11-20 NOTE — PROGRESS NOTES
Coverage for dr Clarence Chavez reviewed   see orders    Active Hospital Problems    Diagnosis Date Noted    Hyperlipemia [E78.5] 05/23/2011     Priority: High    Grade II diastolic dysfunction [T34.0] 03/23/2020    AKIL (obstructive sleep apnea) [G47.33] 01/23/2020    COPD with acute exacerbation ([facility] [J44.1] 09/22/2019    Morbid obesity ([facility] [E66.01] 06/18/2018    Anxiety [F41.9] 01/19/2018    Gastroesophageal reflux disease without esophagitis [K21.9] 03/24/2016    DM2 (diabetes mellitus, type 2) ([facility] [E11.9] 12/03/2015    Essential hypertension [I10] 04/18/2012    Chronic respiratory failure ([facility] [J96.10] 12/22/2010

## 2020-11-20 NOTE — PROGRESS NOTES
Assessment and med pass complete. Meds passed whole with water. Asked to have bed alarm off to use bedside commode, informed to call if assistance is needed to prevent a fall. Denies other needs, call light in reach.

## 2020-11-20 NOTE — PROGRESS NOTES
PULMONARY AND CRITICAL CARE MEDICINE PROGRESS NOTE        SUBJECTIVE: Patient lying in bed in mild respiratory distress. Reports that her breathing is slowly getting better. Continues to wheeze and intermittently cough. No expectoration seen. REVIEW OF SYSTEMS:     CONSTITUTIONAL SYMPTOMS: The patient denies fever, fatigue, night sweats, weight loss or weight gain. HEENT: No vision changes. No tinnitus, Denies sinus pain. No hoarseness, or dysphagia. NECK: Patient denies swelling in the neck. CARDIOVASCULAR: Denies chest pain, palpitation, syncope. RESPIRATORY: See HPI. GASTROINTESTINAL: Denies nausea, abdominal pain or change in bowel function. GENITOURINARY: Denies obstructive symptoms. No history of incontinence. BREASTS: No masses or lumps in the breasts. SKIN: No rashes or itching. MUSCULOSKELETAL: Denies weakness or bone pain. NEUROLOGICAL: No headaches or seizures. PSYCHIATRIC: Denies mood swings or depression. ENDOCRINE: Denies heat or cold intolerance or excessive thirst.  HEMATOLOGIC/LYMPHATIC: Denies easy bruising or lymph node swelling. ALLERGIC/IMMUNOLOGIC: No environmental allergies.     PAST MEDICAL HISTORY:   Past Medical History:   Diagnosis Date    Acute MI [facility]     Acute pyelonephritis 9/8/2015    Anxiety and depression     Arthritis     CAD (coronary artery disease)     two heart stent one in 2000 and 2nd one 6 months later on 2000, had MI in 2000    [facility])     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    Cancer [facility])     \"skin on top of my head\"    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    Chronic obstructive pulmonary disease ([facility] 1/13/2017    Claustrophobia     COPD (chronic obstructive pulmonary disease) (HCC)     Esophageal stricture     Gastroesophageal reflux disease without esophagitis 3/24/2016    Hiatal hernia     Hiatal hernia     Hypercholesteremia     Hypertension     IBS (irritable bowel syndrome)     Migraines     Movement disorder arthritis    Pneumonia     PONV (postoperative nausea and vomiting)     Type II or unspecified type diabetes mellitus without mention of complication, not stated as uncontrolled     type ll does not take insulin at home    Unspecified cerebral artery occlusion with cerebral infarction     many TIA's       PAST SURGICAL HISTORY:   Past Surgical History:   Procedure Laterality Date    APPENDECTOMY      BRONCHOSCOPY N/A 1/24/2020    BRONCHOSCOPY ALVEOLAR LAVAGE performed by Mariah Barber MD at [address] N/A 1/27/2020    BRONCHOSCOPY DIAGNOSTIC OR CELL [address] ONLY performed by Barron Cifuentes MD at [address]      1 stent 2000 and 1 stent 2001    CATARACT REMOVAL  2013 or 2014    bilateral cataracts removed    CHOLECYSTECTOMY      COLONOSCOPY      COLONOSCOPY  06/11/2018    ENDOSCOPY, COLON, DIAGNOSTIC      ESOPHAGEAL DILATATION      EYE SURGERY      bilateral cataracts    GALLBLADDER SURGERY      HYSTERECTOMY      TN EXC SKIN MALIG <5MM REMAINDR BODY N/A 9/6/2018    EXCISION OF SCALP SQUAMOUS CELL CARCINOMA performed by Namita Reyes MD at [address] [facility] <100SQCM N/A 9/6/2018    SPLIT THICKNESS SKIN GRAFT FOR COVERAGE SCALP, APPLICATION OF WOUND VAC DEVICE performed by Namita Reyes MD at [address] ENDOSCOPY  4/20/12    with biopsy of stomach,gastritis    UPPER GASTROINTESTINAL ENDOSCOPY  06/11/2018    w/esophagael dilation       SOCIAL HISTORY:   Social History     Tobacco Use    Smoking status: Former Smoker     Packs/day: 0.25     Years: 49.00     Pack years: 12.25     Types: Cigarettes     Last attempt to quit: 6/16/2017     Years since quitting: 3.4    Smokeless tobacco: Never Used    Tobacco comment: only smoke when real stressed  Nicotine patch   Substance Use Topics    Alcohol use:  Yes (SYMBICORT) 160-4.5 MCG/ACT AERO Inhale 2 puffs into the lungs 2 times daily 3 Inhaler 1    albuterol sulfate  (90 Base) MCG/ACT inhaler Inhale 2 puffs into the lungs every 6 hours as needed for Wheezing 1 Inhaler 2    ibuprofen (ADVIL;MOTRIN) 400 MG tablet Take 1 tablet by mouth every 6 hours as needed for Pain or Fever 120 tablet 3    triamcinolone (KENALOG) 0.1 % cream Apply topically 2 times daily. 30 g 1    aspirin 81 MG tablet Take 81 mg by mouth daily      nystatin (MYCOSTATIN) 976139 UNIT/GM powder Affected area 1 Bottle 5    nitroGLYCERIN (NITROSTAT) 0.4 MG SL tablet Place 1 tablet under the tongue every 5 minutes as needed for Chest pain.  30 tablet 0        Arformoterol Tartrate  15 mcg Nebulization BID    budesonide  0.5 mg Nebulization BID    methylPREDNISolone  40 mg Intravenous Q8H    insulin glargine  50 Units Subcutaneous Nightly    insulin lispro  10 Units Subcutaneous TID WC    tiotropium  2 puff Inhalation Daily    enoxaparin  40 mg Subcutaneous Daily    insulin lispro  0-18 Units Subcutaneous TID WC    insulin lispro  0-9 Units Subcutaneous Nightly    gabapentin  300 mg Oral BID    ipratropium-albuterol  1 ampule Inhalation Q4H WA    aspirin  81 mg Oral Daily    diclofenac sodium  4 g Topical BID    glipiZIDE  5 mg Oral BID AC    hydrALAZINE  25 mg Oral TID    isosorbide mononitrate  60 mg Oral Daily    lisinopril  10 mg Oral Daily    pantoprazole  40 mg Oral QAM AC    rosuvastatin  5 mg Oral Nightly    furosemide  20 mg Intravenous Daily    alogliptin  25 mg Oral Daily    nystatin   Topical BID    magnesium oxide  400 mg Oral Daily      dextrose       hydrALAZINE, sodium chloride, potassium chloride **OR** potassium alternative oral replacement **OR** potassium chloride, glucose, glucagon (rDNA), promethazine-dextromethorphan, guaiFENesin, ALPRAZolam, ibuprofen, nitroGLYCERIN, sodium chloride, glucose, dextrose, glucagon (rDNA), dextrose, magnesium 26* 30*  --    CO2 23 28  --        Recent Labs     11/18/20  1038 11/20/20  0509   GLUCOSE 308* 238*   CALCIUM 10.0 9.9   * 130*   K 4.9 5.2*   CO2 23 28   CL 96* 94*   BUN 26* 30*   CREATININE 1.0 0.9       No results for input(s): PHART, YMC4ZJQ, PO2ART, MGC4VJA, L0XVHUXQ, BEART, Z1BYIWKK in the last 72 hours. Lab Results   Component Value Date    INR 0.97 01/27/2020    INR 1.03 09/18/2019    INR 1.04 04/24/2019    PROTIME 11.3 01/27/2020    PROTIME 11.7 09/18/2019    PROTIME 11.9 04/24/2019     Lab Results   Component Value Date    AMYLASE 28 09/08/2015      Lab Results   Component Value Date    LABA1C 7.3 11/19/2020     Lab Results   Component Value Date    .8 11/19/2020     Lab Results   Component Value Date    TSH 2.34 06/06/2011     Lab Results   Component Value Date    CKTOTAL 24 (L) 06/07/2018    CKMB 0.29 08/09/2011    TROPONINI <0.01 11/19/2020      Lab Results   Component Value Date    CRP 4.7 02/25/2014      Lab Results   Component Value Date    BNP <15 04/18/2012      Lab Results   Component Value Date    DDIMER 474 (H) 09/21/2018      No results found for: FERRITIN   Lab Results   Component Value Date    LACTA 2.6 (H) 02/07/2017         Transthoracic echocardiogram done on 3/20/2020    Normal left ventricular cavity size and systolic function with an ejection fraction estimated at 65-70%. Mild septal hypertrophy. No regional wall motion abnormalities noted. Diastolic filling parameters suggest grade II diastolic dysfunction. Mild mitral regurgitation. Mild aortic stenosis with a peak velocity of 2.26 m/s and a mean pressure gradient of 11 mmHg. There is mild aortic insufficiency. There is mild to moderate right ventricular hypertrophy. The right ventricle is normal in size and function. Unable to estimate pulmonary artery pressure secondary to incomplete TR jetenvelope.    Electronically signed by [place] DO, [facility]      IMAGING:    Chest x-ray 2 views done on 11/18/2020 was personally viewed by me and my interpretation is: Left lower lobe opacity which is chronic in nature. No new focal consolidation seen. No pneumothorax or pleural effusion seen. Cardiac silhouette is slightly enlarged. IMPRESSION:     1. COPD of unknown severity in exacerbation, resolving  2. Heart failure with preserved ejection fraction  3. Acute hypoxic respiratory failure  4. Morbid obesity  5. High suspicion for obstructive sleep apnea  6. Previous history of tobacco abuse    RECOMMENDATION:     1. Patient's breathing is slowly stabilizing. She still continues to have bilateral wheezing. 2. Continue with Brovana, Pulmicort and Spiriva Respimat. 3. Also continue her on Solu-Medrol 40 mg IV every 8 hours. 4. Nuclear medicine stress test done yesterday did not show any abnormal cardiac perfusion. 5. Oxygen supplementation to maintain SPO2 between 88 to 92%  6. Procalcitonin levels are within normal limits. No new infection suspected. No indications for antibiotics. 7. If patient is seen to have worsening respiratory distress, she can be given BiPAP therapy at a pressure setting of 16/8 cmH2O with FiO2 of 40%. We will continue to follow the patient. Marce Puckett MD  Pulmonary Critical Care and Sleep Medicine  11/15/2020, 11:13 AM    This note was completed using dragon medical speech recognition software. Grammatical errors, random word insertions, pronoun errors and incomplete sentences are occasional consequences of this technology due to software limitations. If there are questions or concerns about the content of this note of information contained within the body of this dictation they should be addressed with the provider for clarification.

## 2020-11-20 NOTE — PROGRESS NOTES
CLINICAL PHARMACY NOTE: MEDS TO [address] Select Patient?: No  Total # of Prescriptions Filled: 1   The following medications were delivered to the patient:  · Prednisone 10  Total # of Interventions Completed: 0  Time Spent (min): 30    Additional Documentation:  Patient signed for 1 rx at bedside

## 2020-11-20 NOTE — PROGRESS NOTES
Subcutaneous, Nightly  gabapentin (NEURONTIN) capsule 300 mg, 300 mg, Oral, BID  promethazine-dextromethorphan (PROMETHAZINE-DM) 6.25-15 MG/5ML syrup 5 mL, 5 mL, Oral, Q4H PRN  guaiFENesin (ROBITUSSIN) 100 MG/5ML oral solution 200 mg, 200 mg, Oral, Q4H PRN  ipratropium-albuterol (DUONEB) nebulizer solution 1 ampule, 1 ampule, Inhalation, Q4H WA  ALPRAZolam (XANAX) tablet 1 mg, 1 mg, Oral, TID PRN  aspirin chewable tablet 81 mg, 81 mg, Oral, Daily  diclofenac sodium (VOLTAREN) 1 % gel 4 g, 4 g, Topical, BID  glipiZIDE (GLUCOTROL) tablet 5 mg, 5 mg, Oral, BID AC  hydrALAZINE (APRESOLINE) tablet 25 mg, 25 mg, Oral, TID  ibuprofen (ADVIL;MOTRIN) tablet 400 mg, 400 mg, Oral, Q6H PRN  isosorbide mononitrate (IMDUR) extended release tablet 60 mg, 60 mg, Oral, Daily  lisinopril (PRINIVIL;ZESTRIL) tablet 10 mg, 10 mg, Oral, Daily  nitroGLYCERIN (NITROSTAT) SL tablet 0.4 mg, 0.4 mg, Sublingual, Q5 Min PRN  pantoprazole (PROTONIX) tablet 40 mg, 40 mg, Oral, QAM AC  rosuvastatin (CRESTOR) tablet 5 mg, 5 mg, Oral, Nightly  sodium chloride (OCEAN, BABY AYR) 0.65 % nasal spray 1 spray, 1 spray, Nasal, PRN  furosemide (LASIX) injection 20 mg, 20 mg, Intravenous, Daily  alogliptin (NESINA) tablet 25 mg, 25 mg, Oral, Daily  glucose (GLUTOSE) 40 % oral gel 15 g, 15 g, Oral, PRN  dextrose 50 % IV solution, 12.5 g, Intravenous, PRN  glucagon (rDNA) injection 1 mg, 1 mg, Intramuscular, PRN  dextrose 5 % solution, 100 mL/hr, Intravenous, PRN  nystatin (MYCOSTATIN) powder, , Topical, BID  magnesium sulfate 1 g in dextrose 5% 100 mL IVPB, 1 g, Intravenous, PRN  magnesium oxide (MAG-OX) tablet 400 mg, 400 mg, Oral, Daily    Physical      VITALS:    /70   Pulse 64   Temp 97.5 °F (36.4 °C) (Oral)   Resp 16   Ht 5' 6\" (1.676 m)   Wt 277 lb 5.4 oz (125.8 kg)   SpO2 92%   BMI 44.76 kg/m²   TEMPERATURE:  Current - Temp: 97.5 °F (36.4 °C);  Max - Temp  Av.6 °F (36.4 °C)  Min: 97.5 °F (36.4 °C)  Max: 97.8 °F (36.6 °C)  RESPIRATIONS RANGE: Resp  Av.6  Min: 16  Max: 20  PULSE RANGE: Pulse  Av.9  Min: 64  Max: 82  BLOOD PRESSURE RANGE:  Systolic (50VPN), NBM:346 , Min:100 , SKI:801   ; Diastolic (69RHC), EU, Min:55, Max:81    PULSE OXIMETRY RANGE: SpO2  Av.2 %  Min: 89 %  Max: 95 %  24HR INTAKE/OUTPUT:      Intake/Output Summary (Last 24 hours) at 2020 09  Last data filed at 2020 9550  Gross per 24 hour   Intake 720 ml   Output 2500 ml   Net -1780 ml     CONSTITUTIONAL:  awake, alert, cooperative, no apparent distress, and appears stated age  NECK:  supple, symmetrical, trachea midline and skin normal  LUNGS:  Moderate increase in work of breathing bart crackles and exp wheezes   CARDIOVASCULAR:  Normal apical impulse, regular rate and rhythm, normal S1 and S2, no S3 or S4, and no murmur noted  ABDOMEN:  No scars, normal bowel sounds, soft, non-distended, non-tender, no masses palpated, no hepatosplenomegally  MUSCULOSKELETAL:  ++ edema  NEUROLOGIC:  No acute focal change    Data      Lab Results   Component Value Date    PHART 7.446 2017       Lab Results   Component Value Date     2020    K 5.2 2020    K 3.3 11/15/2020    CL 94 2020    CO2 28 2020    BUN 30 2020    CREATININE 0.9 2020    GLUCOSE 238 2020    CALCIUM 9.9 2020     Lab Results   Component Value Date    WBC 11.1 2020    HGB 15.0 2020    HCT 46.0 2020    MCV 88.4 2020     2020         Lab Results   Component Value Date    INR 0.97 2020    PROTIME 11.3 2020       ASSESSMENT AND PLAN      Patient Active Problem List   Diagnosis    Chronic respiratory failure (HCC)    CAD (coronary artery disease)    Hyperlipemia    Essential hypertension    DM2 (diabetes mellitus, type 2) (HCC)    Primary osteoarthritis involving multiple joints    Gastroesophageal reflux disease without esophagitis    Anxiety    Morbid obesity (HCC)    Precordial pain

## 2020-11-20 NOTE — DISCHARGE INSTR - DIET

## 2020-11-20 NOTE — PROGRESS NOTES
CHOL 226 (H) 11/07/2019    TRIG 300 (H) 11/07/2019    HDL 34 (L) 11/07/2019    ALT 9 (L) 11/15/2020    AST 11 (L) 11/15/2020     (L) 11/20/2020    K 5.2 (H) 11/20/2020    CL 94 (L) 11/20/2020    CREATININE 0.9 11/20/2020    BUN 30 (H) 11/20/2020    CO2 28 11/20/2020    TSH 2.34 06/06/2011    INR 0.97 01/27/2020    LABA1C 7.1 11/17/2020    LABMICR YES 03/26/2020     Lab Results   Component Value Date    CKTOTAL 24 (L) 06/07/2018    CKMB 0.29 08/09/2011    TROPONINI <0.01 11/19/2020       Recent Imaging Results are Reviewed:  Xr Chest (2 Vw)    Result Date: 11/18/2020  EXAMINATION: TWO XRAY VIEWS OF THE CHEST 11/18/2020 9:32 am COMPARISON: 11/15/2020, 03/24/2020, 03/18/2020 HISTORY: ORDERING SYSTEM PROVIDED HISTORY: persistent hypoxemia TECHNOLOGIST PROVIDED HISTORY: Reason for exam:->persistent hypoxemia Reason for Exam: Persistent hypoxemia Acuity: Unknown Type of Exam: Unknown FINDINGS: Frontal and lateral views of the chest were performed. There is no acute skeletal abnormality. There is degenerative change throughout the thoracic spine. The heart size is mildly enlarged, though stable from prior studies. The mediastinal contours are stable. There is stable mild chronic pulmonary vascular congestion, without overt edema. There appears to be chronic volume loss and airspace opacity within the lingula and left lower lobe, likely chronic partial left basilar lobar collapse. The lungs are otherwise clear, without acute airspace consolidation. There is no evidence of a pneumothorax. 1. No acute airspace consolidation. 2. Chronic opacity and associated volume loss within the lingula and left lower lobe, consistent with chronic partial left basilar collapse, most likely infectious or inflammatory in etiology. 3. Stable chronic cardiomegaly and pulmonary vascular congestion, without overt CHF.      Xr Chest Portable    Result Date: 11/15/2020  EXAMINATION: ONE XRAY VIEW OF THE CHEST 11/15/2020 9:39 pm COMPARISON: March 24, 2020 HISTORY: ORDERING SYSTEM PROVIDED HISTORY: cp TECHNOLOGIST PROVIDED HISTORY: Reason for exam:->cp Reason for Exam: c/o chest pain and sob that radiates into back. took nitro with no relief at home Acuity: Acute Type of Exam: Initial FINDINGS: The heart size is magnified by portable technique but is likely at least mildly enlarged. There is mild pulmonary vascular congestion. No effusion is visualized. Mild pulmonary vascular congestion. Nm Cardiac Stress Test Nuclear Imaging    Result Date: 11/17/2020  Cardiac Perfusion Imaging  Demographics   Patient Name       Mackenzie WHITE   Date of Study      11/17/2020         Gender              Female   Patient Number     [id number]         Date of Birth       1945   Visit Number       [id number]          Age                 77 year(s)   Accession Number   [id number]         Room Number         7138   Corporate ID       [id number]           NM Technician       Abraham Dubon   Nurse              Hernandez Morillo, RN Interpreting        Federico Degroot MD,                                        Physician Justin Alcala   Ordering Physician Federico Degroot MD,                     [facility] [place] [facility], [facility]    The procedure was explained in detail to the patient. Risks,  complications and alternative treatments were reviewed. Written consent  was obtained. Procedure Procedure Type:   Nuclear Stress Test:Pharmacological, NM MYOCARDIAL SPECT REST EXERCISE OR  RX   Study location: [facility] - Nuclear Medicine   Indications: Chest pain. Hospital Status: Inpatient.   Height: 66 inches Weight: 260 pounds  Risk Factors   The patient risk factors include:prior PCI on 01/01/2001;obesity,  cerebrovascular disease, former tobacco use, treated and controlled  hypercholesterolemia, treated and controlled hypertension, family history of  premature CAD, orally-treated diabetes mellitus, chronic lung disease,  dyslipidemia, prior MI and ( years not smokin). Conclusions   Summary  SPECT images demonstrate homogeneous tracer distribution throughout the  myocardium. There is normal isotope uptake at stress and rest. There is no evidence of  myocardial ischemia or scar. Normal LV size and systolic function. Left ventricular ejection fraction of 76 %. Stress Protocols   Resting ECG  Sinus bradycardia. Poor R wave progression. Evidence of a prior anterior myocardial infarction. Resting HR:69 bpm       Resting BP:123/60 mmHg  Stress Protocol:Pharmacologic - Lexiscan's  Peak HR:86 bpm                               HR/BP product:06675  Peak BP:123/55 mmHg  Predicted HR: 146 bpm  % of predicted HR: 59  Test duration: 4 min  Reason for termination:Completed   ECG Findings  Normal response to lexiscan . Arrhythmias  No significant rhythm abnormality. Symptoms  Developed chest pressure (rated at a 4 on a scale of 0-10), shortness of  breath, and nausea symptoms likely related to [address] [place] [address]. Symptoms resolved  with aminophylline. Complications  Procedure complication was none. Stress Interpretation  Appropriate hemodynamic response to lexiscan with no significant ST  changes. Procedure Medications   - Lexiscan I.V. 0.4 mg.10 sec   - Aminophylline I.V. 100 mg. Imaging Protocols   - One Day   Rest                          Stress   Isotope:Myoview/Tetrofosmin   Isotope: Myoview/Tetrofosmin  Isotope dose:10.2 mCi         Isotope dose:32.5 mCi  Administration Route:I.V.      Administration Route:I.V.  Date:2020 07:30         Date:2020 08:50                                 Technique:      Gated  Imaging Results    Stress ejection    Ejection fraction:76 %    EDV :97 ml    ESV :23 ml    Stroke volume :74 ml    LV mass :129 gr  Medical History   Additional Medical History   Past Medical History: Acute MI, Acute pyelonephritis, Anxiety  and depression, Arthritis, CAD (coronary artery disease),  Cancer, Cancer of skin of leg, Chronic obstructive pulmonary  disease, Claustrophobia, COPD (chronic obstructive pulmonary  disease), Esophageal stricture, Gastroesophageal reflux disease  without esophagitis, Hiatal hernia, Hiatal hernia,  Hypercholesteremia, Hypertension, IBS (irritable bowel  syndrome), Migraines, Movement disorder, Pneumonia, PONV  (postoperative nausea and vomiting), Type II or unspecified type  diabetes mellitus without mention of complication, not stated as  uncontrolled, and Unspecified cerebral artery occlusion with  cerebral infarction. Surgical History: Cardiac surgery; Gallbladder surgery; Hysterectomy; Appendectomy; Cholecystectomy; Colonoscopy; Upper  gastrointestinal endoscopy (4/20/12); Endoscopy, colon,  diagnostic; Tear duct surgery; Upper gastrointestinal endoscopy  (06/11/2018); Colonoscopy (06/11/2018); pr exc skin malig <5mm  remaindr body (N/A, 9/6/2018); pr split grft trunk,arm,leg  <100sqcm (N/A, 9/6/2018); skin biopsy; eye surgery; bronchoscopy  (N/A, 1/24/2020); bronchoscopy (N/A, 1/27/2020); Cataract  removal (2013 or 2014); and Esophagus dilation. Signatures   ------------------------------------------------------------------  Electronically signed by Elier Hawkins MD, [facility] [place], [address]  (Interpreting physician) on 11/17/2020 at 11:57  ------------------------------------------------------------------        Assessment and Plan:  Principal Problem:    COPD with acute exacerbation ([address] -Established problem. Stable. On 2L now, baseline 1.5L - so pretty close  Plan: try to wean down o2. Cont steroids  Active Problems:    Hyperlipemia -Established problem. Stable. Plan: cont statin    Chronic respiratory failure ([address] -Established problem. Stable. Plan: Continue present orders/plan. Essential hypertension -Established problem. Stable. bp ok  Plan: stay on same meds    DM2 (diabetes mellitus, type 2) ([facility] [address] -Established problem. Stable.   fsbs 219  Plan: cont ccc diet, sliding scale, home meds    Gastroesophageal reflux disease without esophagitis    Anxiety    AKIL (obstructive sleep apnea)    Grade II diastolic dysfunction    Disp - home when ok with pulm, hopefully today/tomorrow          Chicho Valentine  11/20/2020

## 2020-11-20 NOTE — DISCHARGE INSTR - COC
Continuity of Care Form    Patient Name: Sachin Cervantes   :  [birth year]  MRN:  [id number]    Admit date:  11/15/2020  Discharge date:      Code Status Order: Full Code   Advance Directives:      Admitting Physician:  Donna Daly MD  PCP: Donna Daly MD    Discharging Nurse: [facility] Unit/Room#: [id number]  Discharging Unit Phone Number: [phone]    Emergency Contact:   Extended Emergency Contact Information  Primary Emergency Contact: [address] [place] [address] Phone: [phone]  Relation: Child  Secondary Emergency Contact: Rhea Bedolla  [place] Phone: [phone]  Relation: Grandchild    Past Surgical History:  Past Surgical History:   Procedure Laterality Date    APPENDECTOMY      BRONCHOSCOPY N/A 2020    BRONCHOSCOPY ALVEOLAR LAVAGE performed by Ronny Hunt MD at [address] [place] [address] 2020    BRONCHOSCOPY DIAGNOSTIC OR CELL [address] performed by Rachel Fitzgerald MD at [facility]      1 stent  and 1 stent     CATARACT REMOVAL   or     bilateral cataracts removed    CHOLECYSTECTOMY      COLONOSCOPY      COLONOSCOPY  2018    ENDOSCOPY, COLON, DIAGNOSTIC      ESOPHAGEAL DILATATION      EYE SURGERY      bilateral cataracts    GALLBLADDER SURGERY      HYSTERECTOMY      IL EXC SKIN MALIG <5MM REMAINDR BODY N/A 2018    EXCISION OF SCALP SQUAMOUS CELL CARCINOMA performed by Jennifer Johnson MD at [address] <100SQCM N/A 2018    SPLIT THICKNESS SKIN GRAFT FOR COVERAGE SCALP, APPLICATION OF WOUND [place] performed by Jennifer Johnson MD at [address] ENDOSCOPY  12    with biopsy of stomach,gastritis    UPPER GASTROINTESTINAL ENDOSCOPY  2018    w/esophagael dilation       Immunization History:   Immunization History   Administered Date(s) documented pain score (0-10 scale): Pain Level: 4  Last Weight:   Wt Readings from Last 1 Encounters:   11/20/20 277 lb 5.4 oz (125.8 kg)     Mental Status:  oriented and alert    IV Access:  - None    Nursing Mobility/ADLs:  Walking   Independent  Transfer  Independent  Bathing  Assisted  Dressing  Assisted  [address]  Assisted  Med Delivery   whole    Wound Care Documentation and Therapy:        Elimination:  Continence:   · Bowel: Yes  · Bladder: Yes  Urinary Catheter: None   Colostomy/Ileostomy/Ileal Conduit: No       Date of Last BM: 11/24    Intake/Output Summary (Last 24 hours) at 11/20/2020 0802  Last data filed at 11/20/2020 0528  Gross per 24 hour   Intake 720 ml   Output 2500 ml   Net -1780 ml     I/O last 3 completed shifts: In: 12 [P.O.:960]  Out: 2500 [Urine:2500]    Safety Concerns: At Risk for Falls    Impairments/Disabilities:      None    Nutrition Therapy:  Current Nutrition Therapy:   - Oral Diet:  Carb Control 5 carbs/meal (2000kcals/day)    Routes of Feeding: Oral  Liquids: No Restrictions  Daily Fluid Restriction: no  Last Modified Barium Swallow with Video (Video Swallowing Test): not done    Treatments at the Time of Hospital Discharge:   Respiratory Treatments: ***  Oxygen Therapy:  is on oxygen at 3 L/min per nasal cannula.   Ventilator:    - No ventilator support    Rehab Therapies: Physical Therapy and Occupational Therapy  Weight Bearing Status/Restrictions: No weight bearing restirctions  Other Medical Equipment (for information only, NOT a DME order):  wheelchair, walker and hospital bed  Other Treatments:     Patient's personal belongings (please select all that are sent with patient):  Dentures upper and lower    RN SIGNATURE:  Electronically signed by Perez Fritz RN on 11/24/20 at 3:10 PM EST    CASE MANAGEMENT/SOCIAL WORK SECTION    Inpatient Status Date: ***    Readmission Risk Assessment Score:  Readmission Risk Risk of Unplanned Readmission:        17           Discharging to Facility/ Agency   Name: Name: [address]  Phone: [phone]  · Fax: [phone]  · Address:  · Phone:  · Fax:    Dialysis Facility (if applicable)   · Name:  · Address:  · Dialysis Schedule:  · Phone:  · Fax:    / signature: Electronically signed by Adis Pavon RN on 11/24/20 at 4:09 PM EST       PHYSICIAN SECTION    Prognosis: Guarded    Condition at Discharge: Stable    Rehab Potential (if transferring to Rehab): Good    Recommended Labs or Other Treatments After Discharge: ***    Physician Certification: I certify the above information and transfer of Ning Parents  is necessary for the continuing treatment of the diagnosis listed and that she requires Home Care for greater 30 days.      Update Admission H&P: No change in H&P    PHYSICIAN SIGNATURE:  Electronically signed by hCris Trujillo MD on 11/20/20 at 8:02 AM EST

## 2020-11-21 ENCOUNTER — APPOINTMENT (OUTPATIENT)
Dept: CT IMAGING | Age: 75
DRG: 191 | End: 2020-11-21
Payer: COMMERCIAL

## 2020-11-21 LAB
GLUCOSE BLD-MCNC: 116 MG/DL (ref 70–99)
GLUCOSE BLD-MCNC: 183 MG/DL (ref 70–99)
GLUCOSE BLD-MCNC: 287 MG/DL (ref 70–99)
GLUCOSE BLD-MCNC: 97 MG/DL (ref 70–99)
PERFORMED ON: ABNORMAL
PERFORMED ON: NORMAL

## 2020-11-21 PROCEDURE — 6360000002 HC RX W HCPCS: Performed by: INTERNAL MEDICINE

## 2020-11-21 PROCEDURE — 2700000000 HC OXYGEN THERAPY PER DAY

## 2020-11-21 PROCEDURE — 6370000000 HC RX 637 (ALT 250 FOR IP): Performed by: INTERNAL MEDICINE

## 2020-11-21 PROCEDURE — 1200000000 HC SEMI PRIVATE

## 2020-11-21 PROCEDURE — 94669 MECHANICAL CHEST WALL OSCILL: CPT

## 2020-11-21 PROCEDURE — 6370000000 HC RX 637 (ALT 250 FOR IP): Performed by: EMERGENCY MEDICINE

## 2020-11-21 PROCEDURE — 71250 CT THORAX DX C-: CPT

## 2020-11-21 PROCEDURE — 94761 N-INVAS EAR/PLS OXIMETRY MLT: CPT

## 2020-11-21 PROCEDURE — 99232 SBSQ HOSP IP/OBS MODERATE 35: CPT | Performed by: INTERNAL MEDICINE

## 2020-11-21 PROCEDURE — 94640 AIRWAY INHALATION TREATMENT: CPT

## 2020-11-21 RX ADMIN — INSULIN LISPRO 9 UNITS: 100 INJECTION, SOLUTION INTRAVENOUS; SUBCUTANEOUS at 11:51

## 2020-11-21 RX ADMIN — INSULIN GLARGINE 50 UNITS: 100 INJECTION, SOLUTION SUBCUTANEOUS at 21:02

## 2020-11-21 RX ADMIN — METHYLPREDNISOLONE SODIUM SUCCINATE 40 MG: 40 INJECTION, POWDER, FOR SOLUTION INTRAMUSCULAR; INTRAVENOUS at 21:10

## 2020-11-21 RX ADMIN — MAGNESIUM GLUCONATE 500 MG ORAL TABLET 400 MG: 500 TABLET ORAL at 09:11

## 2020-11-21 RX ADMIN — BUDESONIDE 500 MCG: 0.5 SUSPENSION RESPIRATORY (INHALATION) at 09:42

## 2020-11-21 RX ADMIN — HYDRALAZINE HYDROCHLORIDE 25 MG: 25 TABLET, FILM COATED ORAL at 15:10

## 2020-11-21 RX ADMIN — METHYLPREDNISOLONE SODIUM SUCCINATE 40 MG: 40 INJECTION, POWDER, FOR SOLUTION INTRAMUSCULAR; INTRAVENOUS at 11:52

## 2020-11-21 RX ADMIN — GLIPIZIDE 5 MG: 5 TABLET ORAL at 09:11

## 2020-11-21 RX ADMIN — ALOGLIPTIN 25 MG: 25 TABLET, FILM COATED ORAL at 11:40

## 2020-11-21 RX ADMIN — ALPRAZOLAM 1 MG: 0.5 TABLET ORAL at 21:10

## 2020-11-21 RX ADMIN — GABAPENTIN 300 MG: 300 CAPSULE ORAL at 21:09

## 2020-11-21 RX ADMIN — ROSUVASTATIN CALCIUM 5 MG: 10 TABLET, FILM COATED ORAL at 21:09

## 2020-11-21 RX ADMIN — NYSTATIN: 100000 POWDER TOPICAL at 20:58

## 2020-11-21 RX ADMIN — BUDESONIDE 500 MCG: 0.5 SUSPENSION RESPIRATORY (INHALATION) at 19:11

## 2020-11-21 RX ADMIN — HYDRALAZINE HYDROCHLORIDE 25 MG: 25 TABLET, FILM COATED ORAL at 21:09

## 2020-11-21 RX ADMIN — INSULIN LISPRO 10 UNITS: 100 INJECTION, SOLUTION INTRAVENOUS; SUBCUTANEOUS at 11:51

## 2020-11-21 RX ADMIN — HYDRALAZINE HYDROCHLORIDE 25 MG: 25 TABLET, FILM COATED ORAL at 09:11

## 2020-11-21 RX ADMIN — FUROSEMIDE 20 MG: 10 INJECTION, SOLUTION INTRAMUSCULAR; INTRAVENOUS at 09:11

## 2020-11-21 RX ADMIN — IPRATROPIUM BROMIDE AND ALBUTEROL SULFATE 1 AMPULE: .5; 3 SOLUTION RESPIRATORY (INHALATION) at 19:11

## 2020-11-21 RX ADMIN — ARFORMOTEROL TARTRATE 15 MCG: 15 SOLUTION RESPIRATORY (INHALATION) at 19:11

## 2020-11-21 RX ADMIN — ASPIRIN 81 MG 81 MG: 81 TABLET ORAL at 09:11

## 2020-11-21 RX ADMIN — PROMETHAZINE HYDROCHLORIDE AND DEXTROMETHORPHAN HYDROBROMIDE 5 ML: 6.25; 15 SOLUTION ORAL at 06:13

## 2020-11-21 RX ADMIN — LISINOPRIL 10 MG: 10 TABLET ORAL at 09:12

## 2020-11-21 RX ADMIN — TIOTROPIUM BROMIDE INHALATION SPRAY 2 PUFF: 3.12 SPRAY, METERED RESPIRATORY (INHALATION) at 09:47

## 2020-11-21 RX ADMIN — INSULIN LISPRO 10 UNITS: 100 INJECTION, SOLUTION INTRAVENOUS; SUBCUTANEOUS at 17:33

## 2020-11-21 RX ADMIN — GABAPENTIN 300 MG: 300 CAPSULE ORAL at 09:12

## 2020-11-21 RX ADMIN — DICLOFENAC 4 G: 10 GEL TOPICAL at 09:12

## 2020-11-21 RX ADMIN — METHYLPREDNISOLONE SODIUM SUCCINATE 40 MG: 40 INJECTION, POWDER, FOR SOLUTION INTRAMUSCULAR; INTRAVENOUS at 03:59

## 2020-11-21 RX ADMIN — ARFORMOTEROL TARTRATE 15 MCG: 15 SOLUTION RESPIRATORY (INHALATION) at 09:42

## 2020-11-21 RX ADMIN — ISOSORBIDE MONONITRATE 60 MG: 30 TABLET, EXTENDED RELEASE ORAL at 09:11

## 2020-11-21 RX ADMIN — ALPRAZOLAM 1 MG: 0.5 TABLET ORAL at 11:51

## 2020-11-21 RX ADMIN — PROMETHAZINE HYDROCHLORIDE AND DEXTROMETHORPHAN HYDROBROMIDE 5 ML: 6.25; 15 SOLUTION ORAL at 21:46

## 2020-11-21 RX ADMIN — IPRATROPIUM BROMIDE AND ALBUTEROL SULFATE 1 AMPULE: .5; 3 SOLUTION RESPIRATORY (INHALATION) at 09:42

## 2020-11-21 RX ADMIN — DICLOFENAC 4 G: 10 GEL TOPICAL at 20:57

## 2020-11-21 RX ADMIN — INSULIN LISPRO 3 UNITS: 100 INJECTION, SOLUTION INTRAVENOUS; SUBCUTANEOUS at 08:58

## 2020-11-21 RX ADMIN — ENOXAPARIN SODIUM 40 MG: 40 INJECTION SUBCUTANEOUS at 09:11

## 2020-11-21 RX ADMIN — GLIPIZIDE 5 MG: 5 TABLET ORAL at 16:16

## 2020-11-21 RX ADMIN — ALPRAZOLAM 1 MG: 0.5 TABLET ORAL at 04:03

## 2020-11-21 RX ADMIN — PANTOPRAZOLE SODIUM 40 MG: 40 TABLET, DELAYED RELEASE ORAL at 06:13

## 2020-11-21 RX ADMIN — INSULIN LISPRO 10 UNITS: 100 INJECTION, SOLUTION INTRAVENOUS; SUBCUTANEOUS at 08:58

## 2020-11-21 ASSESSMENT — PAIN SCALES - GENERAL
PAINLEVEL_OUTOF10: 6
PAINLEVEL_OUTOF10: 5
PAINLEVEL_OUTOF10: 5

## 2020-11-21 ASSESSMENT — PAIN DESCRIPTION - DESCRIPTORS
DESCRIPTORS: CONSTANT
DESCRIPTORS: CONSTANT
DESCRIPTORS: DISCOMFORT

## 2020-11-21 ASSESSMENT — PAIN DESCRIPTION - PAIN TYPE
TYPE: CHRONIC PAIN

## 2020-11-21 ASSESSMENT — PAIN DESCRIPTION - LOCATION
LOCATION: BACK;LEG
LOCATION: HEAD;CHEST
LOCATION: HEAD

## 2020-11-21 ASSESSMENT — PAIN DESCRIPTION - ORIENTATION: ORIENTATION: MID

## 2020-11-21 ASSESSMENT — PAIN DESCRIPTION - PROGRESSION
CLINICAL_PROGRESSION: GRADUALLY IMPROVING
CLINICAL_PROGRESSION: GRADUALLY IMPROVING

## 2020-11-21 NOTE — PROGRESS NOTES
[facility] Pulmonary/CCM Progress note      Admit Date: 11/15/2020    Chief Complaint: Shortness of breath and cough    Subjective: Interval History: Still continues to have a congested cough, unable to bring up any phlegm. Has rhonchi, still feels short of breath. On 2 L O2.     Scheduled Meds:   Arformoterol Tartrate  15 mcg Nebulization BID    budesonide  0.5 mg Nebulization BID    methylPREDNISolone  40 mg Intravenous Q8H    insulin glargine  50 Units Subcutaneous Nightly    insulin lispro  10 Units Subcutaneous TID WC    tiotropium  2 puff Inhalation Daily    enoxaparin  40 mg Subcutaneous Daily    insulin lispro  0-18 Units Subcutaneous TID WC    insulin lispro  0-9 Units Subcutaneous Nightly    gabapentin  300 mg Oral BID    ipratropium-albuterol  1 ampule Inhalation Q4H WA    aspirin  81 mg Oral Daily    diclofenac sodium  4 g Topical BID    glipiZIDE  5 mg Oral BID AC    hydrALAZINE  25 mg Oral TID    isosorbide mononitrate  60 mg Oral Daily    lisinopril  10 mg Oral Daily    pantoprazole  40 mg Oral QAM AC    rosuvastatin  5 mg Oral Nightly    furosemide  20 mg Intravenous Daily    alogliptin  25 mg Oral Daily    nystatin   Topical BID    magnesium oxide  400 mg Oral Daily     Continuous Infusions:   dextrose       PRN Meds:hydrALAZINE, sodium chloride, potassium chloride **OR** potassium alternative oral replacement **OR** potassium chloride, glucose, glucagon (rDNA), promethazine-dextromethorphan, guaiFENesin, ALPRAZolam, ibuprofen, nitroGLYCERIN, sodium chloride, glucose, dextrose, glucagon (rDNA), dextrose, magnesium sulfate    Review of Systems  Constitutional: negative for fatigue, fevers, malaise and weight loss  Ears, nose, mouth, throat: negative for ear drainage, epistaxis, hoarseness, nasal congestion, sore throat and voice change  Respiratory: negative except for cough and shortness of breath  Cardiovascular: negative for chest pain, chest pressure/discomfort, irregular heart beat, lower extremity edema and palpitations  Gastrointestinal: negative for abdominal pain, constipation, diarrhea, jaundice, melena, odynophagia, reflux symptoms and vomiting  Hematologic/lymphatic: negative for bleeding, easy bruising, lymphadenopathy and petechiae  Musculoskeletal:negative for arthralgias, bone pain, muscle weakness, neck pain and stiff joints  Neurological: negative for dizziness, gait problems, headaches, seizures, speech problems, tremors and weakness  Behavioral/Psych: negative for anxiety, behavior problems, depression, fatigue and sleep disturbance  Endocrine: negative for diabetic symptoms including none, neuropathy, polyphagia, polyuria, polydipsia, vomiting and diarrhea and temperature intolerance  Allergic/Immunologic: negative for anaphylaxis, angioedema, hay fever and urticaria    Objective:     Patient Vitals for the past 8 hrs:   BP Temp Temp src Pulse Resp SpO2   11/21/20 1145 119/64 -- Axillary 66 16 94 %   11/21/20 0940 -- -- -- -- -- 94 %   11/21/20 0915 114/64 97.4 °F (36.3 °C) -- 69 14 93 %     I/O last 3 completed shifts: In: 1200 [P.O.:1200]  Out: 3650 [Urine:3650]  No intake/output data recorded.     General Appearance: alert and oriented to person, place and time, well developed and well- nourished, in no acute distress  Skin: warm and dry, no rash or erythema  Head: normocephalic and atraumatic  Eyes: pupils equal, round, and reactive to light, extraocular eye movements intact, conjunctivae normal  ENT: external ear and ear canal normal bilaterally, nose without deformity, nasal mucosa and turbinates normal  Neck: supple and non-tender without mass, no cervical lymphadenopathy  Pulmonary/Chest: rhonchi present-bilateral, coarse  Cardiovascular: normal rate, regular rhythm,  no murmurs, rubs, distal pulses intact, no carotid bruits  Abdomen: soft, non-tender, non-distended, normal bowel sounds, no masses or organomegaly  Lymph Nodes: Cervical, supraclavicular normal  Extremities: no cyanosis, clubbing or edema  Musculoskeletal: normal range of motion, no joint swelling, deformity or tenderness  Neurologic: alert, no focal neurologic deficits    Data Review:  CBC:   Lab Results   Component Value Date    WBC 11.1 11/20/2020    RBC 5.21 11/20/2020     BMP:   Lab Results   Component Value Date    GLUCOSE 238 11/20/2020    CO2 28 11/20/2020    BUN 30 11/20/2020    CREATININE 0.9 11/20/2020    CALCIUM 9.9 11/20/2020     ABG:   Lab Results   Component Value Date    CNT6RTI 22.6 02/07/2017    BEART -0.6 02/07/2017    C9FBZKSZ 96.6 02/07/2017    PHART 7.446 02/07/2017    YYW9KYA 33.6 02/07/2017    PO2ART 77.3 02/07/2017    CRJ3FVI 23.7 02/07/2017       Radiology: All pertinent images / reports were reviewed as a part of this visit. Problem List:     COPD with acute exacerbation  Mucous plugging of airway    Assessment/Plan:     Patient continues to have bilateral rhonchi, states that she has a cough-unable to clear the phlegm. CT chest was performed today which shows some mucus in the large airways-however no significant lung collapse. Chronic left lingular atelectasis noted. Add Acapella valve and chest vest therapy. Would consider bronchoscopy for airway inspection and removal of mucous plugging on Monday. Will reevaluate patient again tomorrow and organize it if necessary. Discussed plan with patient. Continue with bronchodilators and Solu-Medrol. Pulmonary will follow.     David Zamudio MD

## 2020-11-21 NOTE — PROGRESS NOTES
Upper gastrointestinal endoscopy (06/11/2018); Colonoscopy (06/11/2018); pr exc skin malig <5mm remaindr body (N/A, 9/6/2018); pr split grft trunk,arm,leg <100sqcm (N/A, 9/6/2018); skin biopsy; eye surgery; bronchoscopy (N/A, 1/24/2020); bronchoscopy (N/A, 1/27/2020); Cataract removal (2013 or 2014); and Esophagus dilation. . She  reports that she quit smoking about 3 years ago. Her smoking use included cigarettes. She has a 12.25 pack-year smoking history. She has never used smokeless tobacco. She reports current alcohol use of about 1.0 standard drinks of alcohol per week. She reports that she does not use drugs. .        Active Hospital Problems    Diagnosis Date Noted    Hyperlipemia [E78.5] 05/23/2011     Priority: High    Grade II diastolic dysfunction [Z51.5] 03/23/2020    AKIL (obstructive sleep apnea) [G47.33] 01/23/2020    COPD with acute exacerbation ([facility] [J44.1] 09/22/2019    Morbid obesity ([facility] [E66.01] 06/18/2018    Anxiety [F41.9] 01/19/2018    Gastroesophageal reflux disease without esophagitis [K21.9] 03/24/2016    DM2 (diabetes mellitus, type 2) ([facility] [E11.9] 12/03/2015    Essential hypertension [I10] 04/18/2012    Chronic respiratory failure (HCC) [J96.10] 12/22/2010       Current Facility-Administered Medications: Arformoterol Tartrate (BROVANA) nebulizer solution 15 mcg, 15 mcg, Nebulization, BID  budesonide (PULMICORT) nebulizer suspension 500 mcg, 0.5 mg, Nebulization, BID  methylPREDNISolone sodium (SOLU-MEDROL) injection 40 mg, 40 mg, Intravenous, Q8H  insulin glargine (LANTUS;BASAGLAR) injection pen 50 Units, 50 Units, Subcutaneous, Nightly  insulin lispro (1 Unit Dial) 10 Units, 10 Units, Subcutaneous, TID WC  hydrALAZINE (APRESOLINE) injection 10 mg, 10 mg, Intravenous, Q6H PRN  0.9 % sodium chloride bolus, 500 mL, Intravenous, PRN  potassium chloride (KLOR-CON M) extended release tablet 40 mEq, 40 mEq, Oral, PRN **OR** potassium bicarb-citric acid (EFFER-K) effervescent tablet PRN  nystatin (MYCOSTATIN) powder, , Topical, BID  magnesium sulfate 1 g in dextrose 5% 100 mL IVPB, 1 g, Intravenous, PRN  magnesium oxide (MAG-OX) tablet 400 mg, 400 mg, Oral, Daily         Objective:  /73   Pulse 64   Temp 97.5 °F (36.4 °C) (Oral)   Resp 16   Ht 5' 6\" (1.676 m)   Wt 274 lb 11.1 oz (124.6 kg)   SpO2 94%   BMI 44.34 kg/m²      Patient Vitals for the past 24 hrs:   BP Temp Temp src Pulse Resp SpO2 Weight   11/21/20 0355 118/73 97.5 °F (36.4 °C) Oral 64 16 94 % 274 lb 11.1 oz (124.6 kg)   11/21/20 0029 (!) 125/58 97.7 °F (36.5 °C) Oral 67 18 (!) 89 % --   11/20/20 2000 122/60 97.3 °F (36.3 °C) Oral 77 20 91 % --   11/20/20 1957 -- -- -- -- 22 94 % --   11/20/20 1635 -- -- -- -- -- 92 % --   11/20/20 1630 126/76 97.8 °F (36.6 °C) Oral 69 16 (!) 87 % --   11/20/20 1612 -- -- -- -- -- 93 % --   11/20/20 1409 (!) 125/58 -- -- 69 -- -- --   11/20/20 1126 117/60 97.6 °F (36.4 °C) Oral 70 16 94 % --     Patient Vitals for the past 96 hrs (Last 3 readings):   Weight   11/21/20 0355 274 lb 11.1 oz (124.6 kg)   11/20/20 0352 277 lb 5.4 oz (125.8 kg)           Intake/Output Summary (Last 24 hours) at 11/21/2020 0907  Last data filed at 11/21/2020 0355  Gross per 24 hour   Intake 960 ml   Output 3650 ml   Net -2690 ml         Physical Exam:   /73   Pulse 64   Temp 97.5 °F (36.4 °C) (Oral)   Resp 16   Ht 5' 6\" (1.676 m)   Wt 274 lb 11.1 oz (124.6 kg)   SpO2 94%   BMI 44.34 kg/m²   General appearance: alert, appears stated age and cooperative  Head: Normocephalic, without obvious abnormality, atraumatic  Lungs: rhonchi bilat  Heart: regular rate and rhythm, S1, S2 normal, no murmur, click, rub or gallop  Abdomen: soft, non-tender; bowel sounds normal; no masses,  no organomegaly  Extremities: extremities normal, atraumatic, no cyanosis or edema    Labs:  Lab Results   Component Value Date    WBC 11.1 (H) 11/20/2020    HGB 15.0 11/20/2020    HCT 46.0 11/20/2020     11/20/2020 CHOL 226 (H) 11/07/2019    TRIG 300 (H) 11/07/2019    HDL 34 (L) 11/07/2019    ALT 9 (L) 11/15/2020    AST 11 (L) 11/15/2020     (L) 11/20/2020    K 5.2 (H) 11/20/2020    CL 94 (L) 11/20/2020    CREATININE 0.9 11/20/2020    BUN 30 (H) 11/20/2020    CO2 28 11/20/2020    TSH 2.34 06/06/2011    INR 0.97 01/27/2020    LABA1C 7.3 11/19/2020    LABMICR YES 03/26/2020     Lab Results   Component Value Date    CKTOTAL 24 (L) 06/07/2018    CKMB 0.29 08/09/2011    TROPONINI <0.01 11/19/2020       Recent Imaging Results are Reviewed:  Xr Chest (2 Vw)    Result Date: 11/20/2020  EXAMINATION: TWO XRAY VIEWS OF THE CHEST 11/20/2020 3:12 pm COMPARISON: 11/18/2020 HISTORY: ORDERING SYSTEM PROVIDED HISTORY: cough TECHNOLOGIST PROVIDED HISTORY: Reason for exam:->cough Reason for Exam: cough Acuity: Acute Type of Exam: Initial FINDINGS: There is slight interval worsening in bibasilar pleural and parenchymal disease, greater on the left. Some of this may be artifactual and due to incomplete inspiration. Interval progression of bilateral pleural-parenchymal disease. Xr Chest (2 Vw)    Result Date: 11/18/2020  EXAMINATION: TWO XRAY VIEWS OF THE CHEST 11/18/2020 9:32 am COMPARISON: 11/15/2020, 03/24/2020, 03/18/2020 HISTORY: ORDERING SYSTEM PROVIDED HISTORY: persistent hypoxemia TECHNOLOGIST PROVIDED HISTORY: Reason for exam:->persistent hypoxemia Reason for Exam: Persistent hypoxemia Acuity: Unknown Type of Exam: Unknown FINDINGS: Frontal and lateral views of the chest were performed. There is no acute skeletal abnormality. There is degenerative change throughout the thoracic spine. The heart size is mildly enlarged, though stable from prior studies. The mediastinal contours are stable. There is stable mild chronic pulmonary vascular congestion, without overt edema. There appears to be chronic volume loss and airspace opacity within the lingula and left lower lobe, likely chronic partial left basilar lobar collapse. The lungs are otherwise clear, without acute airspace consolidation. There is no evidence of a pneumothorax. 1. No acute airspace consolidation. 2. Chronic opacity and associated volume loss within the lingula and left lower lobe, consistent with chronic partial left basilar collapse, most likely infectious or inflammatory in etiology. 3. Stable chronic cardiomegaly and pulmonary vascular congestion, without overt CHF. Xr Chest Portable    Result Date: 11/15/2020  EXAMINATION: ONE XRAY VIEW OF THE CHEST 11/15/2020 9:39 pm COMPARISON: March 24, 2020 HISTORY: ORDERING SYSTEM PROVIDED HISTORY: cp TECHNOLOGIST PROVIDED HISTORY: Reason for exam:->cp Reason for Exam: c/o chest pain and sob that radiates into back. took nitro with no relief at home Acuity: Acute Type of Exam: Initial FINDINGS: The heart size is magnified by portable technique but is likely at least mildly enlarged. There is mild pulmonary vascular congestion. No effusion is visualized. Mild pulmonary vascular congestion. Nm Cardiac Stress Test Nuclear Imaging    Result Date: 11/17/2020  Cardiac Perfusion Imaging  Demographics   Patient Name       Mackenzie WHITE   Date of Study      11/17/2020         Gender              Female   Patient Number     [id number]         Date of Birth       1945   Visit Number       [id number]          Age                 77 year(s)   Accession Number   [id number]         Room Number         0725   Corporate ID       [id number]           NM Technician       Abraham Morillo, [facility] Interpreting        Federico Degroot MD,                                        Physician [facility]   Ordering Physician Federico Degroot MD,                     [facility] [place], [address]   The procedure was explained in detail to the patient. Risks,  complications and alternative treatments were reviewed. Written consent  was obtained.   Procedure Procedure Type:   Nuclear Stress Test:Pharmacological, NM MYOCARDIAL SPECT REST EXERCISE OR  RX   Study location: [facility] - Nuclear Medicine   Indications: Chest pain. Hospital Status: Inpatient. Height: 66 inches Weight: 260 pounds  Risk Factors   The patient risk factors include:prior PCI on 2001;obesity,  cerebrovascular disease, former tobacco use, treated and controlled  hypercholesterolemia, treated and controlled hypertension, family history of  premature CAD, orally-treated diabetes mellitus, chronic lung disease,  dyslipidemia, prior MI and ( years not smokin). Conclusions   Summary  SPECT images demonstrate homogeneous tracer distribution throughout the  myocardium. There is normal isotope uptake at stress and rest. There is no evidence of  myocardial ischemia or scar. Normal LV size and systolic function. Left ventricular ejection fraction of 76 %. Stress Protocols   Resting ECG  Sinus bradycardia. Poor R wave progression. Evidence of a prior anterior myocardial infarction. Resting HR:69 bpm       Resting BP:123/60 mmHg  Stress Protocol:Pharmacologic - Lexiscan's  Peak HR:86 bpm                               HR/BP product:65605  Peak BP:123/55 mmHg  Predicted HR: 146 bpm  % of predicted HR: 59  Test duration: 4 min  Reason for termination:Completed   ECG Findings  Normal response to lexiscan . Arrhythmias  No significant rhythm abnormality. Symptoms  Developed chest pressure (rated at a 4 on a scale of 0-10), shortness of  breath, and nausea symptoms likely related to [address] [place] [address]. Symptoms resolved  with aminophylline. Complications  Procedure complication was none. Stress Interpretation  Appropriate hemodynamic response to lexiscan with no significant ST  changes. Procedure Medications   - Lexiscan I.V. 0.4 mg.10 sec   - Aminophylline I.V. 100 mg.   Imaging Protocols   - One Day   Rest                          Stress   Isotope:Myoview/Tetrofosmin   Isotope: Myoview/Tetrofosmin  Isotope dose:10.2 mCi Isotope dose:32.5 mCi  Administration Route:I.V. Administration Route:I.V.  Date:11/17/2020 07:30         Date:11/17/2020 08:50                                 Technique:      Gated  Imaging Results    Stress ejection    Ejection fraction:76 %    EDV :97 ml    ESV :23 ml    Stroke volume :74 ml    LV mass :129 gr  Medical History   Additional Medical History   Past Medical History: Acute MI, Acute pyelonephritis, Anxiety  and depression, Arthritis, CAD (coronary artery disease),  Cancer, Cancer of skin of leg, Chronic obstructive pulmonary  disease, Claustrophobia, COPD (chronic obstructive pulmonary  disease), Esophageal stricture, Gastroesophageal reflux disease  without esophagitis, Hiatal hernia, Hiatal hernia,  Hypercholesteremia, Hypertension, IBS (irritable bowel  syndrome), Migraines, Movement disorder, Pneumonia, PONV  (postoperative nausea and vomiting), Type II or unspecified type  diabetes mellitus without mention of complication, not stated as  uncontrolled, and Unspecified cerebral artery occlusion with  cerebral infarction. Surgical History: Cardiac surgery; Gallbladder surgery; Hysterectomy; Appendectomy; Cholecystectomy; Colonoscopy; Upper  gastrointestinal endoscopy (4/20/12); Endoscopy, colon,  diagnostic; Tear duct surgery; Upper gastrointestinal endoscopy  (06/11/2018); Colonoscopy (06/11/2018); pr exc skin malig <5mm  remaindr body (N/A, 9/6/2018); pr split grft trunk,arm,leg  <100sqcm (N/A, 9/6/2018); skin biopsy; eye surgery; bronchoscopy  (N/A, 1/24/2020); bronchoscopy (N/A, 1/27/2020); Cataract  removal (2013 or 2014); and Esophagus dilation.    Signatures   ------------------------------------------------------------------  Electronically signed by Luc Eagle MD, [facility] [place], [address]  (Interpreting physician) on 11/17/2020 at 11:57  ------------------------------------------------------------------        Assessment and Plan:  Principal Problem:    COPD with acute exacerbation ([facility] -Established problem. Stable. On 2L now per Epic, but pt found in room without NC in, baseline 1.5L   Plan: try to wean down o2. Cont steroids  Active Problems:    Hyperlipemia -Established problem. Stable. Plan: cont statin    Chronic respiratory failure ([facility] -Established problem. Stable. Plan: Continue present orders/plan. Essential hypertension -Established problem. Stable. bp 118/73 this am  Plan: stay on same meds    DM2 (diabetes mellitus, type 2) ([facility] -Established problem. Stable.   fsbs 2183  Plan: cont ccc diet, sliding scale, home meds    Gastroesophageal reflux disease without esophagitis    Anxiety    AKIL (obstructive sleep apnea)    Grade II diastolic dysfunction     Disp - home when ok with octavio Palomo  11/21/2020

## 2020-11-21 NOTE — PROGRESS NOTES
Assessment and med pass complete. Meds taken whole with water. Prn meds given per request. Up to [facility] independently, informed to call if needing assist. Denies other needs, call light in reach.

## 2020-11-21 NOTE — PLAN OF CARE
Problem: Falls - Risk of:  Goal: Will remain free from falls  Description: Will remain free from falls  Outcome: Ongoing  Note: Refuses bed alarm, steady gait to bedside commode, instructed to call if she needs to ambulate farther

## 2020-11-22 LAB
GLUCOSE BLD-MCNC: 156 MG/DL (ref 70–99)
GLUCOSE BLD-MCNC: 200 MG/DL (ref 70–99)
GLUCOSE BLD-MCNC: 209 MG/DL (ref 70–99)
GLUCOSE BLD-MCNC: 301 MG/DL (ref 70–99)
GLUCOSE BLD-MCNC: 56 MG/DL (ref 70–99)
GLUCOSE BLD-MCNC: 66 MG/DL (ref 70–99)
GLUCOSE BLD-MCNC: 70 MG/DL (ref 70–99)
PERFORMED ON: ABNORMAL
PERFORMED ON: NORMAL

## 2020-11-22 PROCEDURE — 94761 N-INVAS EAR/PLS OXIMETRY MLT: CPT

## 2020-11-22 PROCEDURE — 6360000002 HC RX W HCPCS: Performed by: INTERNAL MEDICINE

## 2020-11-22 PROCEDURE — 6370000000 HC RX 637 (ALT 250 FOR IP): Performed by: INTERNAL MEDICINE

## 2020-11-22 PROCEDURE — 94640 AIRWAY INHALATION TREATMENT: CPT

## 2020-11-22 PROCEDURE — 1200000000 HC SEMI PRIVATE

## 2020-11-22 PROCEDURE — 6370000000 HC RX 637 (ALT 250 FOR IP): Performed by: EMERGENCY MEDICINE

## 2020-11-22 PROCEDURE — 99232 SBSQ HOSP IP/OBS MODERATE 35: CPT | Performed by: INTERNAL MEDICINE

## 2020-11-22 RX ADMIN — ALPRAZOLAM 1 MG: 0.5 TABLET ORAL at 04:12

## 2020-11-22 RX ADMIN — NYSTATIN: 100000 POWDER TOPICAL at 20:26

## 2020-11-22 RX ADMIN — ENOXAPARIN SODIUM 40 MG: 40 INJECTION SUBCUTANEOUS at 09:00

## 2020-11-22 RX ADMIN — INSULIN LISPRO 3 UNITS: 100 INJECTION, SOLUTION INTRAVENOUS; SUBCUTANEOUS at 09:07

## 2020-11-22 RX ADMIN — IPRATROPIUM BROMIDE AND ALBUTEROL SULFATE 1 AMPULE: .5; 3 SOLUTION RESPIRATORY (INHALATION) at 13:05

## 2020-11-22 RX ADMIN — ISOSORBIDE MONONITRATE 60 MG: 30 TABLET, EXTENDED RELEASE ORAL at 09:00

## 2020-11-22 RX ADMIN — INSULIN LISPRO 10 UNITS: 100 INJECTION, SOLUTION INTRAVENOUS; SUBCUTANEOUS at 09:07

## 2020-11-22 RX ADMIN — LISINOPRIL 10 MG: 10 TABLET ORAL at 09:00

## 2020-11-22 RX ADMIN — INSULIN LISPRO 10 UNITS: 100 INJECTION, SOLUTION INTRAVENOUS; SUBCUTANEOUS at 13:27

## 2020-11-22 RX ADMIN — INSULIN LISPRO 3 UNITS: 100 INJECTION, SOLUTION INTRAVENOUS; SUBCUTANEOUS at 20:27

## 2020-11-22 RX ADMIN — METHYLPREDNISOLONE SODIUM SUCCINATE 40 MG: 40 INJECTION, POWDER, FOR SOLUTION INTRAMUSCULAR; INTRAVENOUS at 04:05

## 2020-11-22 RX ADMIN — DICLOFENAC 4 G: 10 GEL TOPICAL at 20:25

## 2020-11-22 RX ADMIN — GABAPENTIN 300 MG: 300 CAPSULE ORAL at 20:24

## 2020-11-22 RX ADMIN — GLIPIZIDE 5 MG: 5 TABLET ORAL at 09:01

## 2020-11-22 RX ADMIN — HYDRALAZINE HYDROCHLORIDE 25 MG: 25 TABLET, FILM COATED ORAL at 20:24

## 2020-11-22 RX ADMIN — PROMETHAZINE HYDROCHLORIDE AND DEXTROMETHORPHAN HYDROBROMIDE 5 ML: 6.25; 15 SOLUTION ORAL at 04:12

## 2020-11-22 RX ADMIN — MAGNESIUM GLUCONATE 500 MG ORAL TABLET 400 MG: 500 TABLET ORAL at 09:00

## 2020-11-22 RX ADMIN — METHYLPREDNISOLONE SODIUM SUCCINATE 40 MG: 40 INJECTION, POWDER, FOR SOLUTION INTRAMUSCULAR; INTRAVENOUS at 20:24

## 2020-11-22 RX ADMIN — GLIPIZIDE 5 MG: 5 TABLET ORAL at 15:45

## 2020-11-22 RX ADMIN — ALPRAZOLAM 1 MG: 0.5 TABLET ORAL at 13:38

## 2020-11-22 RX ADMIN — GABAPENTIN 300 MG: 300 CAPSULE ORAL at 09:00

## 2020-11-22 RX ADMIN — ALPRAZOLAM 1 MG: 0.5 TABLET ORAL at 22:29

## 2020-11-22 RX ADMIN — ROSUVASTATIN CALCIUM 5 MG: 10 TABLET, FILM COATED ORAL at 20:24

## 2020-11-22 RX ADMIN — HYDRALAZINE HYDROCHLORIDE 25 MG: 25 TABLET, FILM COATED ORAL at 09:00

## 2020-11-22 RX ADMIN — HYDRALAZINE HYDROCHLORIDE 25 MG: 25 TABLET, FILM COATED ORAL at 13:26

## 2020-11-22 RX ADMIN — PANTOPRAZOLE SODIUM 40 MG: 40 TABLET, DELAYED RELEASE ORAL at 06:22

## 2020-11-22 RX ADMIN — INSULIN LISPRO 12 UNITS: 100 INJECTION, SOLUTION INTRAVENOUS; SUBCUTANEOUS at 13:27

## 2020-11-22 RX ADMIN — ALOGLIPTIN 25 MG: 25 TABLET, FILM COATED ORAL at 13:27

## 2020-11-22 RX ADMIN — FUROSEMIDE 20 MG: 10 INJECTION, SOLUTION INTRAMUSCULAR; INTRAVENOUS at 09:00

## 2020-11-22 RX ADMIN — ARFORMOTEROL TARTRATE 15 MCG: 15 SOLUTION RESPIRATORY (INHALATION) at 13:05

## 2020-11-22 RX ADMIN — INSULIN GLARGINE 50 UNITS: 100 INJECTION, SOLUTION SUBCUTANEOUS at 20:27

## 2020-11-22 RX ADMIN — NYSTATIN: 100000 POWDER TOPICAL at 09:25

## 2020-11-22 RX ADMIN — METHYLPREDNISOLONE SODIUM SUCCINATE 40 MG: 40 INJECTION, POWDER, FOR SOLUTION INTRAMUSCULAR; INTRAVENOUS at 13:43

## 2020-11-22 RX ADMIN — ASPIRIN 81 MG 81 MG: 81 TABLET ORAL at 09:24

## 2020-11-22 RX ADMIN — PROMETHAZINE HYDROCHLORIDE AND DEXTROMETHORPHAN HYDROBROMIDE 5 ML: 6.25; 15 SOLUTION ORAL at 22:29

## 2020-11-22 RX ADMIN — BUDESONIDE 500 MCG: 0.5 SUSPENSION RESPIRATORY (INHALATION) at 13:05

## 2020-11-22 ASSESSMENT — PAIN SCALES - GENERAL
PAINLEVEL_OUTOF10: 0
PAINLEVEL_OUTOF10: 8
PAINLEVEL_OUTOF10: 0

## 2020-11-22 ASSESSMENT — PAIN DESCRIPTION - PAIN TYPE: TYPE: ACUTE PAIN

## 2020-11-22 ASSESSMENT — PAIN DESCRIPTION - ONSET: ONSET: OTHER (COMMENT)

## 2020-11-22 ASSESSMENT — PAIN DESCRIPTION - DESCRIPTORS: DESCRIPTORS: ACHING

## 2020-11-22 ASSESSMENT — PAIN DESCRIPTION - LOCATION: LOCATION: CHEST;HEAD

## 2020-11-22 NOTE — PROGRESS NOTES
Progress Note - Dr. Isabella Garza - Internal Medicine  PCP: Luis Rojas MD [address] [facility] [phone]    Hospital Day: 6  Code Status: Full Code  Current Diet: DIET CARB CONTROL; Carb Control: 5 carb choices (75 gms)/meal        CC: follow up on medical issues    Subjective:   Lakeisha Boland is a 77 y.o. female. Pt still complains of dyspnea  Appreciate pulm eval - possible bronch on Monday if she continues to struggly    She denies chest pain, complains of shortness of breath, denies nausea,  denies emesis. 10 system Review of Systems is reviewed with patient, and pertinent positives are noted in HPI above . Otherwise, Review of systems is negative. I have reviewed the patient's medical and social history in detail and updated the computerized patient record. To recap: She  has a past medical history of Acute MI ([facility] Acute pyelonephritis, Anxiety and depression, Arthritis, CAD (coronary artery disease), Cancer ([address] Cancer ([address] Cancer of skin of leg, Chronic obstructive pulmonary disease ([address] Claustrophobia, COPD (chronic obstructive pulmonary disease) ([address] Esophageal stricture, Gastroesophageal reflux disease without esophagitis, Hiatal hernia, Hiatal hernia, Hypercholesteremia, Hypertension, IBS (irritable bowel syndrome), Migraines, Movement disorder, Pneumonia, PONV (postoperative nausea and vomiting), Type II or unspecified type diabetes mellitus without mention of complication, not stated as uncontrolled, and Unspecified cerebral artery occlusion with cerebral infarction. . She  has a past surgical history that includes Cardiac surgery; Gallbladder surgery; Hysterectomy; Appendectomy; Cholecystectomy; Colonoscopy; Upper gastrointestinal endoscopy (4/20/12); Endoscopy, colon, diagnostic; Tear duct surgery; Upper gastrointestinal endoscopy (06/11/2018);  Colonoscopy (06/11/2018); pr exc skin malig <5mm remaindr body (N/A, 9/6/2018); pr split grft trunk,arm,leg <100sqcm (N/A, 9/6/2018); skin biopsy; eye surgery; bronchoscopy (N/A, 1/24/2020); bronchoscopy (N/A, 1/27/2020); Cataract removal (2013 or 2014); and Esophagus dilation. . She  reports that she quit smoking about 3 years ago. Her smoking use included cigarettes. She has a 12.25 pack-year smoking history. She has never used smokeless tobacco. She reports current alcohol use of about 1.0 standard drinks of alcohol per week. She reports that she does not use drugs. .        Active Hospital Problems    Diagnosis Date Noted    Hyperlipemia [E78.5] 05/23/2011     Priority: High    Grade II diastolic dysfunction [F04.6] 03/23/2020    AKIL (obstructive sleep apnea) [G47.33] 01/23/2020    COPD with acute exacerbation ([facility] [J44.1] 09/22/2019    Morbid obesity ([facility] [E66.01] 06/18/2018    Anxiety [F41.9] 01/19/2018    Gastroesophageal reflux disease without esophagitis [K21.9] 03/24/2016    DM2 (diabetes mellitus, type 2) ([facility] [E11.9] 12/03/2015    Essential hypertension [I10] 04/18/2012    Chronic respiratory failure (HCC) [J96.10] 12/22/2010       Current Facility-Administered Medications: Arformoterol Tartrate (BROVANA) nebulizer solution 15 mcg, 15 mcg, Nebulization, BID  budesonide (PULMICORT) nebulizer suspension 500 mcg, 0.5 mg, Nebulization, BID  methylPREDNISolone sodium (SOLU-MEDROL) injection 40 mg, 40 mg, Intravenous, Q8H  insulin glargine (LANTUS;BASAGLAR) injection pen 50 Units, 50 Units, Subcutaneous, Nightly  insulin lispro (1 Unit Dial) 10 Units, 10 Units, Subcutaneous, TID WC  hydrALAZINE (APRESOLINE) injection 10 mg, 10 mg, Intravenous, Q6H PRN  0.9 % sodium chloride bolus, 500 mL, Intravenous, PRN  potassium chloride (KLOR-CON M) extended release tablet 40 mEq, 40 mEq, Oral, PRN **OR** potassium bicarb-citric acid (EFFER-K) effervescent tablet 40 mEq, 40 mEq, Oral, PRN **OR** potassium chloride 10 mEq/100 mL IVPB (Peripheral Line), 10 mEq, Intravenous, PRN  tiotropium (SPIRIVA RESPIMAT) 2.5 MCG/ACT inhaler 2 puff, 2 puff, Inhalation, Daily  enoxaparin (LOVENOX) injection 40 mg, 40 mg, Subcutaneous, Daily  glucose (GLUTOSE) 40 % oral gel 15 g, 15 g, Oral, PRN  glucagon (rDNA) injection 1 mg, 1 mg, Intramuscular, PRN  insulin lispro (1 Unit Dial) 0-18 Units, 0-18 Units, Subcutaneous, TID WC  insulin lispro (1 Unit Dial) 0-9 Units, 0-9 Units, Subcutaneous, Nightly  gabapentin (NEURONTIN) capsule 300 mg, 300 mg, Oral, BID  promethazine-dextromethorphan (PROMETHAZINE-DM) 6.25-15 MG/5ML syrup 5 mL, 5 mL, Oral, Q4H PRN  guaiFENesin (ROBITUSSIN) 100 MG/5ML oral solution 200 mg, 200 mg, Oral, Q4H PRN  ipratropium-albuterol (DUONEB) nebulizer solution 1 ampule, 1 ampule, Inhalation, Q4H WA  ALPRAZolam (XANAX) tablet 1 mg, 1 mg, Oral, TID PRN  aspirin chewable tablet 81 mg, 81 mg, Oral, Daily  diclofenac sodium (VOLTAREN) 1 % gel 4 g, 4 g, Topical, BID  glipiZIDE (GLUCOTROL) tablet 5 mg, 5 mg, Oral, BID AC  hydrALAZINE (APRESOLINE) tablet 25 mg, 25 mg, Oral, TID  ibuprofen (ADVIL;MOTRIN) tablet 400 mg, 400 mg, Oral, Q6H PRN  isosorbide mononitrate (IMDUR) extended release tablet 60 mg, 60 mg, Oral, Daily  lisinopril (PRINIVIL;ZESTRIL) tablet 10 mg, 10 mg, Oral, Daily  nitroGLYCERIN (NITROSTAT) SL tablet 0.4 mg, 0.4 mg, Sublingual, Q5 Min PRN  pantoprazole (PROTONIX) tablet 40 mg, 40 mg, Oral, QAM AC  rosuvastatin (CRESTOR) tablet 5 mg, 5 mg, Oral, Nightly  sodium chloride (OCEAN, BABY AYR) 0.65 % nasal spray 1 spray, 1 spray, Nasal, PRN  furosemide (LASIX) injection 20 mg, 20 mg, Intravenous, Daily  alogliptin (NESINA) tablet 25 mg, 25 mg, Oral, Daily  glucose (GLUTOSE) 40 % oral gel 15 g, 15 g, Oral, PRN  dextrose 50 % IV solution, 12.5 g, Intravenous, PRN  glucagon (rDNA) injection 1 mg, 1 mg, Intramuscular, PRN  dextrose 5 % solution, 100 mL/hr, Intravenous, PRN  nystatin (MYCOSTATIN) powder, , Topical, BID  magnesium sulfate 1 g in dextrose 5% 100 mL IVPB, 1 g, Intravenous, PRN  magnesium oxide (MAG-OX) tablet 400 mg, 400 mg, Oral, Daily         Objective:  /62   Pulse 63   Temp 98.6 °F (37 °C) (Axillary)   Resp 16   Ht 5' 6\" (1.676 m)   Wt 273 lb 5.9 oz (124 kg)   SpO2 91%   BMI 44.12 kg/m²      Patient Vitals for the past 24 hrs:   BP Temp Temp src Pulse Resp SpO2 Weight   11/22/20 0817 114/62 98.6 °F (37 °C) Axillary 63 16 91 % --   11/22/20 0400 (!) 119/55 97.5 °F (36.4 °C) Oral 60 16 92 % 273 lb 5.9 oz (124 kg)   11/22/20 0021 (!) 113/55 97.5 °F (36.4 °C) Oral 69 18 94 % --   11/21/20 2045 125/60 97.5 °F (36.4 °C) Oral 69 16 92 % --   11/21/20 1600 (!) 113/53 -- -- 62 18 91 % --   11/21/20 1145 119/64 -- Axillary 66 16 94 % --     Patient Vitals for the past 96 hrs (Last 3 readings):   Weight   11/22/20 0400 273 lb 5.9 oz (124 kg)   11/21/20 0355 274 lb 11.1 oz (124.6 kg)   11/20/20 0352 277 lb 5.4 oz (125.8 kg)           Intake/Output Summary (Last 24 hours) at 11/22/2020 1010  Last data filed at 11/22/2020 0818  Gross per 24 hour   Intake 480 ml   Output 2375 ml   Net -1895 ml         Physical Exam:   /62   Pulse 63   Temp 98.6 °F (37 °C) (Axillary)   Resp 16   Ht 5' 6\" (1.676 m)   Wt 273 lb 5.9 oz (124 kg)   SpO2 91%   BMI 44.12 kg/m²   General appearance: alert, appears stated age and cooperative  Head: Normocephalic, without obvious abnormality, atraumatic  Lungs: rhonchi bilat  Heart: regular rate and rhythm, S1, S2 normal, no murmur, click, rub or gallop  Abdomen: soft, non-tender; bowel sounds normal; no masses,  no organomegaly  Extremities: extremities normal, atraumatic, no cyanosis or edema    Labs:  Lab Results   Component Value Date    WBC 11.1 (H) 11/20/2020    HGB 15.0 11/20/2020    HCT 46.0 11/20/2020     11/20/2020    CHOL 226 (H) 11/07/2019    TRIG 300 (H) 11/07/2019    HDL 34 (L) 11/07/2019    ALT 9 (L) 11/15/2020    AST 11 (L) 11/15/2020     (L) 11/20/2020    K 5.2 (H) 11/20/2020    CL 94 (L) 11/20/2020    CREATININE 0.9 11/20/2020    BUN 30 (H) 11/20/2020    CO2 28 11/20/2020    TSH 2.34 06/06/2011    INR 0.97 01/27/2020    LABA1C 7.3 11/19/2020    LABMICR YES 03/26/2020     Lab Results   Component Value Date    CKTOTAL 24 (L) 06/07/2018    CKMB 0.29 08/09/2011    TROPONINI <0.01 11/19/2020       Recent Imaging Results are Reviewed:  Xr Chest (2 Vw)    Result Date: 11/20/2020  EXAMINATION: TWO XRAY VIEWS OF THE CHEST 11/20/2020 3:12 pm COMPARISON: 11/18/2020 HISTORY: ORDERING SYSTEM PROVIDED HISTORY: cough TECHNOLOGIST PROVIDED HISTORY: Reason for exam:->cough Reason for Exam: cough Acuity: Acute Type of Exam: Initial FINDINGS: There is slight interval worsening in bibasilar pleural and parenchymal disease, greater on the left. Some of this may be artifactual and due to incomplete inspiration. Interval progression of bilateral pleural-parenchymal disease. Xr Chest (2 Vw)    Result Date: 11/18/2020  EXAMINATION: TWO XRAY VIEWS OF THE CHEST 11/18/2020 9:32 am COMPARISON: 11/15/2020, 03/24/2020, 03/18/2020 HISTORY: ORDERING SYSTEM PROVIDED HISTORY: persistent hypoxemia TECHNOLOGIST PROVIDED HISTORY: Reason for exam:->persistent hypoxemia Reason for Exam: Persistent hypoxemia Acuity: Unknown Type of Exam: Unknown FINDINGS: Frontal and lateral views of the chest were performed. There is no acute skeletal abnormality. There is degenerative change throughout the thoracic spine. The heart size is mildly enlarged, though stable from prior studies. The mediastinal contours are stable. There is stable mild chronic pulmonary vascular congestion, without overt edema. There appears to be chronic volume loss and airspace opacity within the lingula and left lower lobe, likely chronic partial left basilar lobar collapse. The lungs are otherwise clear, without acute airspace consolidation. There is no evidence of a pneumothorax. 1. No acute airspace consolidation.  2. Chronic opacity and associated volume loss within the lingula and left lower lobe, consistent with chronic partial left basilar collapse, most likely infectious or inflammatory in etiology. 3. Stable chronic cardiomegaly and pulmonary vascular congestion, without overt CHF. Xr Chest Portable    Result Date: 11/15/2020  EXAMINATION: ONE XRAY VIEW OF THE CHEST 11/15/2020 9:39 pm COMPARISON: March 24, 2020 HISTORY: ORDERING SYSTEM PROVIDED HISTORY: cp TECHNOLOGIST PROVIDED HISTORY: Reason for exam:->cp Reason for Exam: c/o chest pain and sob that radiates into back. took nitro with no relief at home Acuity: Acute Type of Exam: Initial FINDINGS: The heart size is magnified by portable technique but is likely at least mildly enlarged. There is mild pulmonary vascular congestion. No effusion is visualized. Mild pulmonary vascular congestion. Nm Cardiac Stress Test Nuclear Imaging    Result Date: 11/17/2020  Cardiac Perfusion Imaging  Demographics   Patient Name       Chayo WHITE   Date of Study      11/17/2020         Gender              Female   Patient Number     [id number]         Date of Birth       1945   Visit Number       [id number]          Age                 77 year(s)   Accession Number   [id number]         Room Number         5051   Corporate ID       [id number]           NM Technician       Levora Plater   Nurse              Kristopher Chisholm, RN Interpreting        Matilde Thomas MD,                                        Physician           Chris Casas   Ordering Physician Matilde Thomas MD,                     [facility] [place], [address]   The procedure was explained in detail to the patient. Risks,  complications and alternative treatments were reviewed. Written consent  was obtained. Procedure Procedure Type:   Nuclear Stress Test:Pharmacological, NM MYOCARDIAL SPECT REST EXERCISE OR  RX   Study location: [facility] - Nuclear Medicine   Indications: Chest pain. Hospital Status: Inpatient.   Height: 66 inches Weight: 260 pounds  Risk Factors   The patient risk factors include:prior PCI on 2001;obesity,  cerebrovascular disease, former tobacco use, treated and controlled  hypercholesterolemia, treated and controlled hypertension, family history of  premature CAD, orally-treated diabetes mellitus, chronic lung disease,  dyslipidemia, prior MI and ( years not smokin). Conclusions   Summary  SPECT images demonstrate homogeneous tracer distribution throughout the  myocardium. There is normal isotope uptake at stress and rest. There is no evidence of  myocardial ischemia or scar. Normal LV size and systolic function. Left ventricular ejection fraction of 76 %. Stress Protocols   Resting ECG  Sinus bradycardia. Poor R wave progression. Evidence of a prior anterior myocardial infarction. Resting HR:69 bpm       Resting BP:123/60 mmHg  Stress Protocol:Pharmacologic - Lexiscan's  Peak HR:86 bpm                               HR/BP product:47644  Peak BP:123/55 mmHg  Predicted HR: 146 bpm  % of predicted HR: 59  Test duration: 4 min  Reason for termination:Completed   ECG Findings  Normal response to lexiscan . Arrhythmias  No significant rhythm abnormality. Symptoms  Developed chest pressure (rated at a 4 on a scale of 0-10), shortness of  breath, and nausea symptoms likely related to [address] [place] [address]. Symptoms resolved  with aminophylline. Complications  Procedure complication was none. Stress Interpretation  Appropriate hemodynamic response to lexiscan with no significant ST  changes. Procedure Medications   - Lexiscan I.V. 0.4 mg.10 sec   - Aminophylline I.V. 100 mg. Imaging Protocols   - One Day   Rest                          Stress   Isotope:Myoview/Tetrofosmin   Isotope: Myoview/Tetrofosmin  Isotope dose:10.2 mCi         Isotope dose:32.5 mCi  Administration Route:I.V.      Administration Route:I.V.  Date:2020 07:30         Date:2020 08:50                                 Technique:      Gated  Imaging Results    Stress ejection    Ejection fraction:76 %    EDV :97 ml ESV :23 ml    Stroke volume :74 ml    LV mass :129 gr  Medical History   Additional Medical History   Past Medical History: Acute MI, Acute pyelonephritis, Anxiety  and depression, Arthritis, CAD (coronary artery disease),  Cancer, Cancer of skin of leg, Chronic obstructive pulmonary  disease, Claustrophobia, COPD (chronic obstructive pulmonary  disease), Esophageal stricture, Gastroesophageal reflux disease  without esophagitis, Hiatal hernia, Hiatal hernia,  Hypercholesteremia, Hypertension, IBS (irritable bowel  syndrome), Migraines, Movement disorder, Pneumonia, PONV  (postoperative nausea and vomiting), Type II or unspecified type  diabetes mellitus without mention of complication, not stated as  uncontrolled, and Unspecified cerebral artery occlusion with  cerebral infarction. Surgical History: Cardiac surgery; Gallbladder surgery; Hysterectomy; Appendectomy; Cholecystectomy; Colonoscopy; Upper  gastrointestinal endoscopy (4/20/12); Endoscopy, colon,  diagnostic; Tear duct surgery; Upper gastrointestinal endoscopy  (06/11/2018); Colonoscopy (06/11/2018); pr exc skin malig <5mm  remaindr body (N/A, 9/6/2018); pr split grft trunk,arm,leg  <100sqcm (N/A, 9/6/2018); skin biopsy; eye surgery; bronchoscopy  (N/A, 1/24/2020); bronchoscopy (N/A, 1/27/2020); Cataract  removal (2013 or 2014); and Esophagus dilation. Signatures   ------------------------------------------------------------------  Electronically signed by Kenzie Arias MD, [facility], [address]  (Interpreting physician) on 11/17/2020 at 11:57  ------------------------------------------------------------------        Assessment and Plan:  Principal Problem:    COPD with acute exacerbation ([address] -Established problem. Stable. On 2L now   Plan: try to wean down o2. Cont steroids. Poss bronch per Pulm 11/23  Active Problems:    Hyperlipemia -Established problem. Stable. Plan: cont statin    Chronic respiratory failure ([address] -Established problem. Stable. Plan: Continue present orders/plan. Essential hypertension -Established problem. Stable. bp 114/62 this am  Plan: stay on same meds    DM2 (diabetes mellitus, type 2) ([facility] -Established problem. Stable.   fsbs 156  Plan: cont ccc diet, sliding scale, home meds    Gastroesophageal reflux disease without esophagitis    Anxiety    AKIL (obstructive sleep apnea)    Grade II diastolic dysfunction       Javier Murguia  11/22/2020

## 2020-11-22 NOTE — PLAN OF CARE
Problem: Falls - Risk of:  Goal: Will remain free from falls  Description: Will remain free from falls  Outcome: Ongoing  Note: Fall precautions in place. Bed alarm on, non-skid socks on, call light in reach. Pt is independent to the bedside commode. Problem: Pain:  Goal: Pain level will decrease  Description: Pain level will decrease  Outcome: Ongoing  Note: No c/o of pain. Problem: OXYGENATION/RESPIRATORY FUNCTION  Goal: Patient will maintain patent airway  Outcome: Ongoing  Note: Pt requiring 2L 02 at night.

## 2020-11-23 ENCOUNTER — ANESTHESIA EVENT (OUTPATIENT)
Dept: ENDOSCOPY | Age: 75
DRG: 191 | End: 2020-11-23
Payer: COMMERCIAL

## 2020-11-23 ENCOUNTER — ANESTHESIA (OUTPATIENT)
Dept: ENDOSCOPY | Age: 75
DRG: 191 | End: 2020-11-23
Payer: COMMERCIAL

## 2020-11-23 VITALS
DIASTOLIC BLOOD PRESSURE: 54 MMHG | RESPIRATION RATE: 20 BRPM | SYSTOLIC BLOOD PRESSURE: 98 MMHG | OXYGEN SATURATION: 85 %

## 2020-11-23 LAB
ANION GAP SERPL CALCULATED.3IONS-SCNC: 9 MMOL/L (ref 3–16)
BANDED NEUTROPHILS RELATIVE PERCENT: 2 % (ref 0–7)
BASOPHILS ABSOLUTE: 0 K/UL (ref 0–0.2)
BASOPHILS RELATIVE PERCENT: 0 %
BUN BLDV-MCNC: 41 MG/DL (ref 7–20)
CALCIUM SERPL-MCNC: 9.6 MG/DL (ref 8.3–10.6)
CHLORIDE BLD-SCNC: 93 MMOL/L (ref 99–110)
CO2: 30 MMOL/L (ref 21–32)
CREAT SERPL-MCNC: 0.8 MG/DL (ref 0.6–1.2)
EOSINOPHILS ABSOLUTE: 0 K/UL (ref 0–0.6)
EOSINOPHILS RELATIVE PERCENT: 0 %
GFR AFRICAN AMERICAN: >60
GFR NON-AFRICAN AMERICAN: >60
GLUCOSE BLD-MCNC: 121 MG/DL (ref 70–99)
GLUCOSE BLD-MCNC: 126 MG/DL (ref 70–99)
GLUCOSE BLD-MCNC: 147 MG/DL (ref 70–99)
GLUCOSE BLD-MCNC: 156 MG/DL (ref 70–99)
GLUCOSE BLD-MCNC: 179 MG/DL (ref 70–99)
GLUCOSE BLD-MCNC: 185 MG/DL (ref 70–99)
GLUCOSE BLD-MCNC: 97 MG/DL (ref 70–99)
HCT VFR BLD CALC: 48 % (ref 36–48)
HEMOGLOBIN: 15.8 G/DL (ref 12–16)
INR BLD: 0.97 (ref 0.86–1.14)
LYMPHOCYTES ABSOLUTE: 1.5 K/UL (ref 1–5.1)
LYMPHOCYTES RELATIVE PERCENT: 11 %
MCH RBC QN AUTO: 28.6 PG (ref 26–34)
MCHC RBC AUTO-ENTMCNC: 32.8 G/DL (ref 31–36)
MCV RBC AUTO: 87.1 FL (ref 80–100)
METAMYELOCYTES RELATIVE PERCENT: 1 %
MONOCYTES ABSOLUTE: 1.2 K/UL (ref 0–1.3)
MONOCYTES RELATIVE PERCENT: 9 %
NEUTROPHILS ABSOLUTE: 11 K/UL (ref 1.7–7.7)
NEUTROPHILS RELATIVE PERCENT: 77 %
PDW BLD-RTO: 15.4 % (ref 12.4–15.4)
PERFORMED ON: ABNORMAL
PERFORMED ON: NORMAL
PLATELET # BLD: 230 K/UL (ref 135–450)
PMV BLD AUTO: 8.4 FL (ref 5–10.5)
POTASSIUM SERPL-SCNC: 5.1 MMOL/L (ref 3.5–5.1)
PROTHROMBIN TIME: 11.2 SEC (ref 10–13.2)
RBC # BLD: 5.52 M/UL (ref 4–5.2)
RBC # BLD: NORMAL 10*6/UL
SODIUM BLD-SCNC: 132 MMOL/L (ref 136–145)
WBC # BLD: 13.7 K/UL (ref 4–11)

## 2020-11-23 PROCEDURE — 87102 FUNGUS ISOLATION CULTURE: CPT

## 2020-11-23 PROCEDURE — 94640 AIRWAY INHALATION TREATMENT: CPT

## 2020-11-23 PROCEDURE — 80048 BASIC METABOLIC PNL TOTAL CA: CPT

## 2020-11-23 PROCEDURE — 88112 CYTOPATH CELL ENHANCE TECH: CPT

## 2020-11-23 PROCEDURE — 0B9J8ZX DRAINAGE OF LEFT LOWER LUNG LOBE, VIA NATURAL OR ARTIFICIAL OPENING ENDOSCOPIC, DIAGNOSTIC: ICD-10-PCS | Performed by: INTERNAL MEDICINE

## 2020-11-23 PROCEDURE — 1200000000 HC SEMI PRIVATE

## 2020-11-23 PROCEDURE — 87206 SMEAR FLUORESCENT/ACID STAI: CPT

## 2020-11-23 PROCEDURE — 2700000000 HC OXYGEN THERAPY PER DAY

## 2020-11-23 PROCEDURE — 6360000002 HC RX W HCPCS: Performed by: NURSE ANESTHETIST, CERTIFIED REGISTERED

## 2020-11-23 PROCEDURE — 99233 SBSQ HOSP IP/OBS HIGH 50: CPT | Performed by: INTERNAL MEDICINE

## 2020-11-23 PROCEDURE — 3700000001 HC ADD 15 MINUTES (ANESTHESIA): Performed by: INTERNAL MEDICINE

## 2020-11-23 PROCEDURE — 6360000002 HC RX W HCPCS: Performed by: INTERNAL MEDICINE

## 2020-11-23 PROCEDURE — 88305 TISSUE EXAM BY PATHOLOGIST: CPT

## 2020-11-23 PROCEDURE — 2709999900 HC NON-CHARGEABLE SUPPLY: Performed by: INTERNAL MEDICINE

## 2020-11-23 PROCEDURE — 6370000000 HC RX 637 (ALT 250 FOR IP): Performed by: INTERNAL MEDICINE

## 2020-11-23 PROCEDURE — 85025 COMPLETE CBC W/AUTO DIFF WBC: CPT

## 2020-11-23 PROCEDURE — 36415 COLL VENOUS BLD VENIPUNCTURE: CPT

## 2020-11-23 PROCEDURE — 2580000003 HC RX 258: Performed by: NURSE ANESTHETIST, CERTIFIED REGISTERED

## 2020-11-23 PROCEDURE — 85610 PROTHROMBIN TIME: CPT

## 2020-11-23 PROCEDURE — 7100000001 HC PACU RECOVERY - ADDTL 15 MIN: Performed by: INTERNAL MEDICINE

## 2020-11-23 PROCEDURE — 87070 CULTURE OTHR SPECIMN AEROBIC: CPT

## 2020-11-23 PROCEDURE — 94761 N-INVAS EAR/PLS OXIMETRY MLT: CPT

## 2020-11-23 PROCEDURE — 2580000003 HC RX 258: Performed by: INTERNAL MEDICINE

## 2020-11-23 PROCEDURE — 3700000000 HC ANESTHESIA ATTENDED CARE: Performed by: INTERNAL MEDICINE

## 2020-11-23 PROCEDURE — 87015 SPECIMEN INFECT AGNT CONCNTJ: CPT

## 2020-11-23 PROCEDURE — 7100000000 HC PACU RECOVERY - FIRST 15 MIN: Performed by: INTERNAL MEDICINE

## 2020-11-23 PROCEDURE — 2500000003 HC RX 250 WO HCPCS: Performed by: NURSE ANESTHETIST, CERTIFIED REGISTERED

## 2020-11-23 PROCEDURE — 31645 BRNCHSC W/THER ASPIR 1ST: CPT | Performed by: INTERNAL MEDICINE

## 2020-11-23 PROCEDURE — 87116 MYCOBACTERIA CULTURE: CPT

## 2020-11-23 PROCEDURE — 87205 SMEAR GRAM STAIN: CPT

## 2020-11-23 PROCEDURE — 3609027000 HC BRONCHOSCOPY: Performed by: INTERNAL MEDICINE

## 2020-11-23 RX ORDER — GLYCOPYRROLATE 1 MG/5 ML
SYRINGE (ML) INTRAVENOUS PRN
Status: DISCONTINUED | OUTPATIENT
Start: 2020-11-23 | End: 2020-11-23 | Stop reason: SDUPTHER

## 2020-11-23 RX ORDER — LIDOCAINE HYDROCHLORIDE 40 MG/ML
SOLUTION TOPICAL PRN
Status: DISCONTINUED | OUTPATIENT
Start: 2020-11-23 | End: 2020-11-23 | Stop reason: ALTCHOICE

## 2020-11-23 RX ORDER — LIDOCAINE HYDROCHLORIDE 20 MG/ML
INJECTION, SOLUTION EPIDURAL; INFILTRATION; INTRACAUDAL; PERINEURAL PRN
Status: DISCONTINUED | OUTPATIENT
Start: 2020-11-23 | End: 2020-11-23 | Stop reason: SDUPTHER

## 2020-11-23 RX ORDER — PROPOFOL 10 MG/ML
INJECTION, EMULSION INTRAVENOUS PRN
Status: DISCONTINUED | OUTPATIENT
Start: 2020-11-23 | End: 2020-11-23 | Stop reason: SDUPTHER

## 2020-11-23 RX ORDER — SODIUM CHLORIDE 9 MG/ML
INJECTION, SOLUTION INTRAVENOUS CONTINUOUS PRN
Status: DISCONTINUED | OUTPATIENT
Start: 2020-11-23 | End: 2020-11-23 | Stop reason: SDUPTHER

## 2020-11-23 RX ORDER — KETAMINE HYDROCHLORIDE 10 MG/ML
INJECTION, SOLUTION INTRAMUSCULAR; INTRAVENOUS PRN
Status: DISCONTINUED | OUTPATIENT
Start: 2020-11-23 | End: 2020-11-23 | Stop reason: SDUPTHER

## 2020-11-23 RX ORDER — SODIUM CHLORIDE 9 MG/ML
INJECTION, SOLUTION INTRAVENOUS CONTINUOUS
Status: DISCONTINUED | OUTPATIENT
Start: 2020-11-23 | End: 2020-11-24 | Stop reason: HOSPADM

## 2020-11-23 RX ADMIN — HYDRALAZINE HYDROCHLORIDE 25 MG: 25 TABLET, FILM COATED ORAL at 09:18

## 2020-11-23 RX ADMIN — ROSUVASTATIN CALCIUM 5 MG: 10 TABLET, FILM COATED ORAL at 20:58

## 2020-11-23 RX ADMIN — DICLOFENAC 4 G: 10 GEL TOPICAL at 20:17

## 2020-11-23 RX ADMIN — MAGNESIUM GLUCONATE 500 MG ORAL TABLET 400 MG: 500 TABLET ORAL at 09:18

## 2020-11-23 RX ADMIN — GABAPENTIN 300 MG: 300 CAPSULE ORAL at 20:58

## 2020-11-23 RX ADMIN — INSULIN GLARGINE 50 UNITS: 100 INJECTION, SOLUTION SUBCUTANEOUS at 22:00

## 2020-11-23 RX ADMIN — KETAMINE HYDROCHLORIDE 30 MG: 10 INJECTION, SOLUTION INTRAMUSCULAR; INTRAVENOUS at 11:31

## 2020-11-23 RX ADMIN — LIDOCAINE HYDROCHLORIDE 100 MG: 20 INJECTION, SOLUTION EPIDURAL; INFILTRATION; INTRACAUDAL; PERINEURAL at 11:31

## 2020-11-23 RX ADMIN — INSULIN LISPRO 10 UNITS: 100 INJECTION, SOLUTION INTRAVENOUS; SUBCUTANEOUS at 14:45

## 2020-11-23 RX ADMIN — PHENYLEPHRINE HYDROCHLORIDE 100 MCG: 10 INJECTION INTRAVENOUS at 11:50

## 2020-11-23 RX ADMIN — INSULIN LISPRO 3 UNITS: 100 INJECTION, SOLUTION INTRAVENOUS; SUBCUTANEOUS at 09:23

## 2020-11-23 RX ADMIN — DICLOFENAC 4 G: 10 GEL TOPICAL at 09:19

## 2020-11-23 RX ADMIN — NYSTATIN: 100000 POWDER TOPICAL at 21:00

## 2020-11-23 RX ADMIN — NYSTATIN: 100000 POWDER TOPICAL at 10:15

## 2020-11-23 RX ADMIN — INSULIN LISPRO 10 UNITS: 100 INJECTION, SOLUTION INTRAVENOUS; SUBCUTANEOUS at 09:23

## 2020-11-23 RX ADMIN — PROPOFOL 80 MG: 10 INJECTION, EMULSION INTRAVENOUS at 11:31

## 2020-11-23 RX ADMIN — BUDESONIDE 500 MCG: 0.5 SUSPENSION RESPIRATORY (INHALATION) at 20:27

## 2020-11-23 RX ADMIN — IPRATROPIUM BROMIDE AND ALBUTEROL SULFATE 1 AMPULE: .5; 3 SOLUTION RESPIRATORY (INHALATION) at 20:27

## 2020-11-23 RX ADMIN — INSULIN LISPRO 10 UNITS: 100 INJECTION, SOLUTION INTRAVENOUS; SUBCUTANEOUS at 17:04

## 2020-11-23 RX ADMIN — ALPRAZOLAM 1 MG: 0.5 TABLET ORAL at 21:41

## 2020-11-23 RX ADMIN — PROPOFOL 30 MG: 10 INJECTION, EMULSION INTRAVENOUS at 11:40

## 2020-11-23 RX ADMIN — FUROSEMIDE 20 MG: 10 INJECTION, SOLUTION INTRAMUSCULAR; INTRAVENOUS at 09:18

## 2020-11-23 RX ADMIN — METHYLPREDNISOLONE SODIUM SUCCINATE 40 MG: 40 INJECTION, POWDER, FOR SOLUTION INTRAMUSCULAR; INTRAVENOUS at 17:04

## 2020-11-23 RX ADMIN — IPRATROPIUM BROMIDE AND ALBUTEROL SULFATE 1 AMPULE: .5; 3 SOLUTION RESPIRATORY (INHALATION) at 15:56

## 2020-11-23 RX ADMIN — ARFORMOTEROL TARTRATE 15 MCG: 15 SOLUTION RESPIRATORY (INHALATION) at 20:27

## 2020-11-23 RX ADMIN — METHYLPREDNISOLONE SODIUM SUCCINATE 40 MG: 40 INJECTION, POWDER, FOR SOLUTION INTRAMUSCULAR; INTRAVENOUS at 05:30

## 2020-11-23 RX ADMIN — SODIUM CHLORIDE: 9 INJECTION, SOLUTION INTRAVENOUS at 11:29

## 2020-11-23 RX ADMIN — PHENYLEPHRINE HYDROCHLORIDE 100 MCG: 10 INJECTION INTRAVENOUS at 11:40

## 2020-11-23 RX ADMIN — GLIPIZIDE 5 MG: 5 TABLET ORAL at 16:40

## 2020-11-23 RX ADMIN — PROPOFOL 20 MG: 10 INJECTION, EMULSION INTRAVENOUS at 11:45

## 2020-11-23 RX ADMIN — GABAPENTIN 300 MG: 300 CAPSULE ORAL at 09:18

## 2020-11-23 RX ADMIN — LISINOPRIL 10 MG: 10 TABLET ORAL at 09:18

## 2020-11-23 RX ADMIN — SODIUM CHLORIDE: 9 INJECTION, SOLUTION INTRAVENOUS at 10:55

## 2020-11-23 RX ADMIN — ISOSORBIDE MONONITRATE 60 MG: 30 TABLET, EXTENDED RELEASE ORAL at 09:18

## 2020-11-23 RX ADMIN — GUAIFENESIN 200 MG: 100 SOLUTION ORAL at 21:41

## 2020-11-23 RX ADMIN — PANTOPRAZOLE SODIUM 40 MG: 40 TABLET, DELAYED RELEASE ORAL at 05:30

## 2020-11-23 RX ADMIN — ALPRAZOLAM 1 MG: 0.5 TABLET ORAL at 09:36

## 2020-11-23 RX ADMIN — PROPOFOL 20 MG: 10 INJECTION, EMULSION INTRAVENOUS at 11:35

## 2020-11-23 RX ADMIN — Medication 0.2 MG: at 11:31

## 2020-11-23 RX ADMIN — PHENYLEPHRINE HYDROCHLORIDE 100 MCG: 10 INJECTION INTRAVENOUS at 11:47

## 2020-11-23 RX ADMIN — GLIPIZIDE 5 MG: 5 TABLET ORAL at 05:30

## 2020-11-23 ASSESSMENT — PAIN SCALES - GENERAL
PAINLEVEL_OUTOF10: 0

## 2020-11-23 ASSESSMENT — ENCOUNTER SYMPTOMS: SHORTNESS OF BREATH: 1

## 2020-11-23 ASSESSMENT — PAIN - FUNCTIONAL ASSESSMENT: PAIN_FUNCTIONAL_ASSESSMENT: 0-10

## 2020-11-23 NOTE — PROGRESS NOTES
Nutrition Assessment     Type and Reason for Visit: Initial(LOS)    Nutrition Recommendations/Plan:   1. Continue current diet     Nutrition Assessment:  Pt is at low risk for malnutritional at this time. Pt wt has been stable. Appetite has been great during admission and pta. PO has been %. No needs at this time. Malnutrition Assessment:  Malnutrition Status: No malnutrition    Nutrition Related Findings: BLE trace edema.  I/O's: -12L      Current Nutrition Therapies:    Diet NPO, After Midnight    Anthropometric Measures:  · Height: 5' 6\" (167.6 cm)  · Current Body Wt: 275 lb (124.7 kg)   · BMI: 44.4    Nutrition Diagnosis:   No nutrition diagnosis at this time    Nutrition Interventions:   Food and/or Nutrient Delivery:  Continue Current Diet  Nutrition Education/Counseling:  No recommendation at this time   Coordination of Nutrition Care:  No recommendation at this time    Goals:  PO to remain greater than 75% at meals       Nutrition Monitoring and Evaluation:   Behavioral-Environmental Outcomes:  None Identified   Food/Nutrient Intake Outcomes:  Food and Nutrient Intake  Physical Signs/Symptoms Outcomes:  Weight     Discharge Planning:    No discharge needs at this time     Electronically signed by Mukudn Oneal RD, CNSC, LD on 11/23/20 at 11:22 AM EST    Contact: [phone]

## 2020-11-23 NOTE — PROCEDURES
Bronchoscopy Procedure Note    Date of Operation: 11/23/20  Pre-op Diagnosis: Mucous plugging  Post-op Diagnosis: Mucous plugging  Surgeon: Barbara Stoner  Anesthesia: Monitored Local Anesthesia with Sedation  Estimate Blood Loss: Minimal   Complications: None    Indications and History:  The patient is 77 y.o. female with congestion and mucus plugging. She has a history of COPD. She has required bronchoscopy twice in the past.  She has a history of mucous plugging. . The risks, benefits, complications, treatment options and expected outcomes were discussed with the patient. The possibilities of reaction to medication, pulmonary aspiration, perforation of a viscus, bleeding, failure to diagnose a condition and creating a complication requiring transfusion or operation were discussed with the patient who freely signed the consent. Description of Procedure: The patient was taken to endoscopy suite, identified as Phylis Dandy and the procedure verified as flexible fiberoptic bronchoscopy. A time out was held and the above information confirmed. After the induction of topical nasopharyngeal anesthesia, the patient was placed in appropriate position and the bronchoscope was passed through the oropharynx. The vocal cords were visualized and total of 3 ml of 2% Lidocaine was topically placed onto the cords. The cords were normal. The scope was then passed into the trachea. Lidocaine 2% 3 ml was used topically on the kell. Careful inspection of the tracheal lumen was accomplished. The scope was sequentially passed into the left main and then left upper and lower bronchi and segmental bronchi. The scope was then withdrawn and advanced into the right main bronchus and then into the RUL, RML, and RLL bronchi and segmental bronchi.      Endobronchial findings:   Trachea: Normal mucosa   Kell: Normal mucosa   Right main bronchus: Normal mucosa   Right upper lobe bronchus: Normal mucosa   Right middle lobe bronchus: Normal mucosa   Right lower lobe bronchus: Normal mucosa   Left main bronchus: Normal mucosa   Left upper lobe bronchus: Normal mucosa   Left lower lobe bronchus: Normal mucosa     Patient had thick, tenacious mucus throughout the tracheobronchial tree. This was most notable, however, in the left lower lobe. There are also mucous plugs in the left lower lobe. Saline lavage was performed several times in the left lower lobe to clear the airway of thick secretions. The patient tolerated the procedure well    The patient was taken to the endoscopy recovery area in satisfactory condition. Recommendation:   1. F/U on culture results   2. F/U on cytology results     Attestation: I performed the procedure.   Kiya Pardo

## 2020-11-23 NOTE — ANESTHESIA PRE PROCEDURE
[facility] Department of Anesthesiology  Pre-Anesthesia Evaluation/Consultation       Name:  Wendy Michelle  : 1945  Age:  77 y.o.                                            MRN:  [id number]  Date: 2020           Surgeon: Surgeon(s):  Tomeka Salmon MD    Procedure: Procedure(s):  BRONCHOSCOPY DIAGNOSTIC OR CELL [address] ONLY     Allergies   Allergen Reactions    Morphine Shortness Of Breath    Codeine Other (See Comments)     Chest pain    Hydromorphone Other (See Comments)     hallucinations  Hallucinations    But will take if needed in an emergency    Levaquin [Levofloxacin In D5w] Itching    Vicodin [Hydrocodone-Acetaminophen] Hives    Aspirin      Upsets hiatal hernia     Patient Active Problem List   Diagnosis    Chronic respiratory failure ([address]    CAD (coronary artery disease)    Hyperlipemia    Essential hypertension    DM2 (diabetes mellitus, type 2) (HCC)    Primary osteoarthritis involving multiple joints    Gastroesophageal reflux disease without esophagitis    Anxiety    Morbid obesity (HCC)    Precordial pain    Unstable angina ([address]    COPD with acute exacerbation ([facility])    AKIL (obstructive sleep apnea)    Chest congestion    DM (diabetes mellitus), secondary uncontrolled ([facility])    Grade II diastolic dysfunction     Past Medical History:   Diagnosis Date    Acute MI ([address]     Acute pyelonephritis 2015    Anxiety and depression     Arthritis     CAD (coronary artery disease)     two heart stent one in  and 2nd one 6 months later on , had MI in     Cancer [facility])     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 every  6 mo    Drug use: No     Medications  Current Facility-Administered Medications on File Prior to Visit   Medication Dose Route Frequency Provider Last Rate Last Dose    0.9 % sodium chloride infusion   Intravenous Continuous Asha Damon  mL/hr at 11/23/20 1055      Arformoterol Tartrate (BROVANA) nebulizer solution 15 mcg  15 mcg Nebulization BID Yusra Arenas MD   15 mcg at 11/22/20 1305    budesonide (PULMICORT) nebulizer suspension 500 mcg  0.5 mg Nebulization BID Yusra Arenas MD   500 mcg at 11/22/20 1305    methylPREDNISolone sodium (SOLU-MEDROL) injection 40 mg  40 mg Intravenous Q8H Yusra Arenas MD   40 mg at 11/23/20 0530    insulin glargine (LANTUS;BASAGLAR) injection pen 50 Units  50 Units Subcutaneous Nightly Kaleigh Wills MD   50 Units at 11/22/20 2027    insulin lispro (1 Unit Dial) 10 Units  10 Units Subcutaneous TID WC Kaleigh Wills MD   10 Units at 11/23/20 5593    hydrALAZINE (APRESOLINE) injection 10 mg  10 mg Intravenous Q6H PRN Amish Garcia MD        0.9 % sodium chloride bolus  500 mL Intravenous PRN Amish Garcia MD        potassium chloride (KLOR-CON M) extended release tablet 40 mEq  40 mEq Oral PRN Amish Garcia MD        Or    potassium bicarb-citric acid (EFFER-K) effervescent tablet 40 mEq  40 mEq Oral PRN Amish Garcia MD        Or    potassium chloride 10 mEq/100 mL IVPB (Peripheral Line)  10 mEq Intravenous PRN Amish Garcia MD        tiotropium (SPIRIVA RESPIMAT) 2.5 MCG/ACT inhaler 2 puff  2 puff Inhalation Daily Kaleigh Wills MD   2 puff at 11/21/20 0947    enoxaparin (LOVENOX) injection 40 mg  40 mg Subcutaneous Daily Kaleigh Wills MD   40 mg at 11/22/20 0900    glucose (GLUTOSE) 40 % oral gel 15 g  15 g Oral PRN Kaleigh Wills MD        glucagon (rDNA) injection 1 mg  1 mg Intramuscular PRN Paul Schreiber MD        insulin lispro (1 Unit Dial) 0-18 Units  0-18 Units Subcutaneous TID WC Paul Schreiber MD   3 Units at 11/23/20 0923    insulin lispro (1 Unit Dial) 0-9 Units  0-9 Units Subcutaneous Nightly Paul Schreiber MD   3 Units at 11/22/20 2027    gabapentin (NEURONTIN) capsule 300 mg  300 mg Oral BID Paul Schreiber MD   300 mg at 11/23/20 0918    promethazine-dextromethorphan (PROMETHAZINE-DM) 6.25-15 MG/5ML syrup 5 mL  5 mL Oral Q4H PRN Paul Schreiber MD   5 mL at 11/22/20 2229    guaiFENesin (ROBITUSSIN) 100 MG/5ML oral solution 200 mg  200 mg Oral Q4H PRN Paul Schreiber MD   200 mg at 11/18/20 0731    ipratropium-albuterol (DUONEB) nebulizer solution 1 ampule  1 ampule Inhalation Q4H WA Paul Schreiber MD   1 ampule at 11/22/20 1305    ALPRAZolam (XANAX) tablet 1 mg  1 mg Oral TID PRN Paul Schreiber MD   1 mg at 11/23/20 0936    aspirin chewable tablet 81 mg  81 mg Oral Daily Paul Schreiber MD   81 mg at 11/22/20 1739    diclofenac sodium (VOLTAREN) 1 % gel 4 g  4 g Topical BID Paul Schreiber MD   4 g at 11/23/20 0919    glipiZIDE (GLUCOTROL) tablet 5 mg  5 mg Oral BID AC Paul Schreiber MD   5 mg at 11/23/20 0530    hydrALAZINE (APRESOLINE) tablet 25 mg  25 mg Oral TID Paul Schreiber MD   25 mg at 11/23/20 0918    ibuprofen (ADVIL;MOTRIN) tablet 400 mg  400 mg Oral Q6H PRN Paul Schreiber MD   400 mg at 11/16/20 0506    isosorbide mononitrate (IMDUR) extended release tablet 60 mg  60 mg Oral Daily Paul Schreiber MD   60 mg at 11/23/20 0918    lisinopril (PRINIVIL;ZESTRIL) tablet 10 mg  10 mg Oral Daily Paul Schreiber MD   10 mg at 11/23/20 0918    nitroGLYCERIN (NITROSTAT) SL tablet 0.4 mg  0.4 mg Sublingual Q5 Min PRN Paul Schreiber MD   0.4 mg at 11/19/20 1041    pantoprazole (PROTONIX) tablet 40 mg  40 mg Oral QAM AC Paul Schreiber MD   40 mg at 11/23/20 0530    rosuvastatin (CRESTOR) tablet 5 mg  5 mg Oral Nightly Luis Rojas MD   5 mg at 11/22/20 2024    sodium chloride (OCEAN, BABY AYR) 0.65 % nasal spray 1 spray  1 spray Nasal PRN Luis Rojas MD        furosemide (LASIX) injection 20 mg  20 mg Intravenous Daily Luis Rojas MD   20 mg at 11/23/20 8189    alogliptin (NESINA) tablet 25 mg  25 mg Oral Daily Tanisha Mills MD   25 mg at 11/22/20 1327    glucose (GLUTOSE) 40 % oral gel 15 g  15 g Oral PRN Rohith Alejandro MD        dextrose 50 % IV solution  12.5 g Intravenous PRN Rohith Alejandro MD        glucagon (rDNA) injection 1 mg  1 mg Intramuscular PRN Rohith Alejandro MD        dextrose 5 % solution  100 mL/hr Intravenous PRN Rohith Alejandro MD        nystatin (MYCOSTATIN) powder   Topical BID Luis Rojas MD        magnesium sulfate 1 g in dextrose 5% 100 mL IVPB  1 g Intravenous PRN Luis Rojas MD        magnesium oxide (MAG-OX) tablet 400 mg  400 mg Oral Daily Luis Rojas MD   400 mg at 11/23/20 8342     Current Outpatient Medications on File Prior to Visit   Medication Sig Dispense Refill    predniSONE (DELTASONE) 10 MG tablet 4 tab daily x 4d, then 3 tab daily x 4d, then 2 tab daily x 4d, then 1 tab daily x 4d 40 tablet 0    omeprazole (PRILOSEC) 40 MG delayed release capsule Take 1 capsule by mouth daily 30 capsule 3    lisinopril (PRINIVIL;ZESTRIL) 10 MG tablet Take 1 tablet by mouth daily 90 tablet 1    ALPRAZolam (XANAX) 1 MG tablet Take 1 tablet by mouth 3 times daily as needed for Anxiety for up to 30 days. 90 tablet 0    linagliptin (TRADJENTA) 5 MG tablet Take 1 tablet by mouth daily 90 tablet 1    gabapentin (NEURONTIN) 300 MG capsule Take 1 capsule by mouth 2 times daily for 30 days.  Intended supply: 30 days 60 capsule 1    hydrALAZINE (APRESOLINE) 25 MG tablet Take 1 tablet by mouth 3 times daily 90 tablet 3    glipiZIDE (GLUCOTROL) 5 MG tablet Take 1 tablet by mouth 2 times daily (before meals) 60 tablet 2    isosorbide mononitrate (IMDUR) 60 MG (PULMICORT) nebulizer suspension 500 mcg  0.5 mg Nebulization BID Negrita Díaz MD   500 mcg at 11/22/20 1305    methylPREDNISolone sodium (SOLU-MEDROL) injection 40 mg  40 mg Intravenous Q8H Negrita Díaz MD   40 mg at 11/23/20 0530    insulin glargine (LANTUS;BASAGLAR) injection pen 50 Units  50 Units Subcutaneous Nightly Debbie Mcgee MD   50 Units at 11/22/20 2027    insulin lispro (1 Unit Dial) 10 Units  10 Units Subcutaneous TID  Debbie Mcgee MD   10 Units at 11/23/20 2634    hydrALAZINE (APRESOLINE) injection 10 mg  10 mg Intravenous Q6H PRN Chicho Valentine MD        0.9 % sodium chloride bolus  500 mL Intravenous PRN Chicho Valentine MD        potassium chloride (KLOR-CON M) extended release tablet 40 mEq  40 mEq Oral PRN Chicho Valentine MD        Or    potassium bicarb-citric acid (EFFER-K) effervescent tablet 40 mEq  40 mEq Oral PRN Chicho Valentine MD        Or    potassium chloride 10 mEq/100 mL IVPB (Peripheral Line)  10 mEq Intravenous PRN Chicho Valentine MD        tiotropium (SPIRIVA RESPIMAT) 2.5 MCG/ACT inhaler 2 puff  2 puff Inhalation Daily Debbie Mcgee MD   2 puff at 11/21/20 0947    enoxaparin (LOVENOX) injection 40 mg  40 mg Subcutaneous Daily Debbie Mcgee MD   40 mg at 11/22/20 0900    glucose (GLUTOSE) 40 % oral gel 15 g  15 g Oral PRN Debbie Mcgee MD        glucagon (rDNA) injection 1 mg  1 mg Intramuscular PRN Debbie Mcgee MD        insulin lispro (1 Unit Dial) 0-18 Units  0-18 Units Subcutaneous TID  Debbie Mcgee MD   3 Units at 11/23/20 8403    insulin lispro (1 Unit Dial) 0-9 Units  0-9 Units Subcutaneous Nightly Debbie Mcgee MD   3 Units at 11/22/20 2027    gabapentin (NEURONTIN) capsule 300 mg  300 mg Oral BID Debbie Mcgee MD   300 mg at 11/23/20 0918    promethazine-dextromethorphan (PROMETHAZINE-DM) 6.25-15 MG/5ML syrup 5 mL  5 mL Oral Q4H PRN Debbie Mcgee MD   5 mL at 11/22/20 2228    guaiFENesin (ROBITUSSIN) 100 MG/5ML oral solution 200 mg  200 mg Oral Q4H PRN Connor Cummings MD   200 mg at 11/18/20 0731    ipratropium-albuterol (DUONEB) nebulizer solution 1 ampule  1 ampule Inhalation Q4H WA Connor Cummings MD   1 ampule at 11/22/20 1305    ALPRAZolam (XANAX) tablet 1 mg  1 mg Oral TID PRN Connor Cummings MD   1 mg at 11/23/20 0936    aspirin chewable tablet 81 mg  81 mg Oral Daily Connor Cummings MD   81 mg at 11/22/20 4489    diclofenac sodium (VOLTAREN) 1 % gel 4 g  4 g Topical BID Connor Cummings MD   4 g at 11/23/20 0919    glipiZIDE (GLUCOTROL) tablet 5 mg  5 mg Oral BID AC Connor Cummings MD   5 mg at 11/23/20 0530    hydrALAZINE (APRESOLINE) tablet 25 mg  25 mg Oral TID Connor Cummings MD   25 mg at 11/23/20 0918    ibuprofen (ADVIL;MOTRIN) tablet 400 mg  400 mg Oral Q6H PRN Connor Cummings MD   400 mg at 11/16/20 0506    isosorbide mononitrate (IMDUR) extended release tablet 60 mg  60 mg Oral Daily Connor Cummings MD   60 mg at 11/23/20 0918    lisinopril (PRINIVIL;ZESTRIL) tablet 10 mg  10 mg Oral Daily Connor Cummings MD   10 mg at 11/23/20 0918    nitroGLYCERIN (NITROSTAT) SL tablet 0.4 mg  0.4 mg Sublingual Q5 Min PRN Connor Cummings MD   0.4 mg at 11/19/20 1041    pantoprazole (PROTONIX) tablet 40 mg  40 mg Oral QAM AC Connor Cummings MD   40 mg at 11/23/20 0530    rosuvastatin (CRESTOR) tablet 5 mg  5 mg Oral Nightly Connor Cummings MD   5 mg at 11/22/20 2024    sodium chloride (OCEAN, BABY AYR) 0.65 % nasal spray 1 spray  1 spray Nasal PRN Connor Cummings MD        furosemide (LASIX) injection 20 mg  20 mg Intravenous Daily Connor Cummings MD   20 mg at 11/23/20 1246    alogliptin (NESINA) tablet 25 mg  25 mg Oral Daily Stefan Germain MD   25 mg at 11/22/20 1327    glucose (GLUTOSE) 40 % oral gel 15 g  15 g Oral PRN Ronny Zarco MD        dextrose 50 % IV solution  12.5 g Intravenous PRN Ronny Zarco MD        glucagon (rDNA) injection 1 mg  1 mg Intramuscular PRN Prabhakar Nunez MD        dextrose 5 % solution  100 mL/hr Intravenous PRN Prabhakar Nunez MD        nystatin (MYCOSTATIN) powder   Topical BID Glen Leavitt MD        magnesium sulfate 1 g in dextrose 5% 100 mL IVPB  1 g Intravenous PRN Glen Leavitt MD        magnesium oxide (MAG-OX) tablet 400 mg  400 mg Oral Daily Glen Leavitt MD   400 mg at 20 9169     Vital Signs (Current)   There were no vitals filed for this visit. Vital Signs Statistics (for past 48 hrs)     Temp  Av.9 °F (36.6 °C)  Min: 97.2 °F (36.2 °C)   Min taken time: 20 104  Max: 98.9 °F (37.2 °C)   Max taken time: 20 0024  Pulse  Av.6  Min: 62   Min taken time: 20 0915  Max: 75   Max taken time: 20 1045  Resp  Av.4  Min: 12   Min taken time: 20 0817  Max: 18   Max taken time: 20 0915  BP  Min: 110/53   Min taken time: 20  Max: 160/55   Max taken time: 20 0501  MAP (mmHg)  Av.2  Min: 67   Min taken time: 20  Max: 90   Max taken time: 20 0501  SpO2  Av.6 %  Min: 88 %   Min taken time: 20 104  Max: 94 %   Max taken time: 20 0021    BP Readings from Last 3 Encounters:   20 120/73   20 120/80   10/30/20 120/80     BMI  There is no height or weight on file to calculate BMI. Estimated body mass index is 44.44 kg/m² as calculated from the following:    Height as of 11/15/20: 5' 6\" (1.676 m). Weight as of an earlier encounter on 20: 275 lb 5.7 oz (124.9 kg).     CBC   Lab Results   Component Value Date    WBC 13.7 2020    RBC 5.52 2020    HGB 15.8 2020    HCT 48.0 2020    MCV 87.1 2020    RDW 15.4 2020     2020     CMP    Lab Results   Component Value Date     2020    K 5.1 2020    K 3.3 11/15/2020    CL 93 2020    CO2 30 2020    BUN 41 2020    CREATININE 0.8 2020    GFRAA >60 2020    GFRAA >60 2013    AGRATIO 1.3 11/15/2020    LABGLOM >60 11/23/2020    GLUCOSE 185 11/23/2020    PROT 5.0 11/15/2020    PROT 7.6 12/02/2011    CALCIUM 9.6 11/23/2020    BILITOT <0.2 11/15/2020    ALKPHOS 66 11/15/2020    AST 11 11/15/2020    ALT 9 11/15/2020     BMP    Lab Results   Component Value Date     11/23/2020    K 5.1 11/23/2020    K 3.3 11/15/2020    CL 93 11/23/2020    CO2 30 11/23/2020    BUN 41 11/23/2020    CREATININE 0.8 11/23/2020    CALCIUM 9.6 11/23/2020    GFRAA >60 11/23/2020    GFRAA >60 05/02/2013    LABGLOM >60 11/23/2020    GLUCOSE 185 11/23/2020     POCGlucose  Recent Labs     11/23/20  0637   GLUCOSE 185*      Coags    Lab Results   Component Value Date    PROTIME 11.2 11/23/2020    INR 0.97 11/23/2020    APTT 29.1 19/79/7981     HCG (If Applicable) No results found for: PREGTESTUR, PREGSERUM, HCG, HCGQUANT   ABGs   Lab Results   Component Value Date    PHART 7.446 02/07/2017    PO2ART 77.3 02/07/2017    EXU6ZFF 33.6 02/07/2017    KDC2VUL 22.6 02/07/2017    BEART -0.6 02/07/2017    V0LYEZIQ 96.6 02/07/2017      Type & Screen (If Applicable)  No results found for: LABABO, LABRH                         BMI: Wt Readings from Last 3 Encounters:       NPO Status: 8 hours                          Anesthesia Evaluation  Patient summary reviewed   history of anesthetic complications: PONV.   Airway: Mallampati: IV  TM distance: >3 FB   Neck ROM: full   Dental:    (+) edentulous      Pulmonary:   (+) pneumonia:  COPD (2L O2 NC qhs):  shortness of breath:  sleep apnea: on CPAP,  decreased breath sounds,  wheezes,                             Cardiovascular:  Exercise tolerance: poor (<4 METS),   (+) hypertension (EF 60):, angina:, past MI:, CAD:, dysrhythmias (pacS):,       ECG reviewed  Rhythm: regular  Rate: normal  Echocardiogram reviewed         Beta Blocker:  Not on Beta Blocker         Neuro/Psych:   (+) CVA (no residual effects per pt):, headaches:, psychiatric history:depression/anxiety GI/Hepatic/Renal:   (+) hiatal hernia, GERD:, morbid obesity          Endo/Other:    (+) DiabetesType II DM, using insulin, . Abdominal:   (+) obese,         Vascular: negative vascular ROS. Anesthesia Plan      general     ASA 4     (Explained to patient risk of post operative intubation and ventilator need due to current respiratory status. Patient understands and would like to p)  Induction: intravenous. MIPS: Prophylactic antiemetics administered. Anesthetic plan and risks discussed with patient. Plan discussed with CRNA. This pre-anesthesia assessment may be used as a history and physical.    DOS STAFF ADDENDUM:    Pt seen and examined, chart reviewed (including anesthesia, drug and allergy history). No interval changes to history and physical examination. Anesthetic plan, risks, benefits, alternatives, and personnel involved discussed with patient. Questions and concerns addressed. Patient(family) verbalized an understanding and agrees to proceed.       Real Muro MD  November 23, 2020  11:05 AM

## 2020-11-23 NOTE — PROGRESS NOTES
[facility] Pulmonary/CCM Progress note      Admit Date: 11/15/2020    Chief Complaint: Shortness of breath and cough    Subjective: Interval History: Has a congested cough, thinks that mucus is breaking up but still not bringing up phlegm. Use chest vest twice with no clear benefit. On 2 L O2. Issues with mild hypoglycemia reported today.     Scheduled Meds:   Arformoterol Tartrate  15 mcg Nebulization BID    budesonide  0.5 mg Nebulization BID    methylPREDNISolone  40 mg Intravenous Q8H    insulin glargine  50 Units Subcutaneous Nightly    insulin lispro  10 Units Subcutaneous TID WC    tiotropium  2 puff Inhalation Daily    enoxaparin  40 mg Subcutaneous Daily    insulin lispro  0-18 Units Subcutaneous TID WC    insulin lispro  0-9 Units Subcutaneous Nightly    gabapentin  300 mg Oral BID    ipratropium-albuterol  1 ampule Inhalation Q4H WA    aspirin  81 mg Oral Daily    diclofenac sodium  4 g Topical BID    glipiZIDE  5 mg Oral BID AC    hydrALAZINE  25 mg Oral TID    isosorbide mononitrate  60 mg Oral Daily    lisinopril  10 mg Oral Daily    pantoprazole  40 mg Oral QAM AC    rosuvastatin  5 mg Oral Nightly    furosemide  20 mg Intravenous Daily    alogliptin  25 mg Oral Daily    nystatin   Topical BID    magnesium oxide  400 mg Oral Daily     Continuous Infusions:   dextrose       PRN Meds:hydrALAZINE, sodium chloride, potassium chloride **OR** potassium alternative oral replacement **OR** potassium chloride, glucose, glucagon (rDNA), promethazine-dextromethorphan, guaiFENesin, ALPRAZolam, ibuprofen, nitroGLYCERIN, sodium chloride, glucose, dextrose, glucagon (rDNA), dextrose, magnesium sulfate    Review of Systems  Constitutional: negative for fatigue, fevers, malaise and weight loss  Ears, nose, mouth, throat: negative for ear drainage, epistaxis, hoarseness, nasal congestion, sore throat and voice change  Respiratory: negative except for cough and shortness of breath  Cardiovascular: non-distended, normal bowel sounds, no masses or organomegaly  Lymph Nodes: Cervical, supraclavicular normal  Extremities: no cyanosis, clubbing or edema  Musculoskeletal: normal range of motion, no joint swelling, deformity or tenderness  Neurologic: alert, no focal neurologic deficits    Data Review:  CBC:   Lab Results   Component Value Date    WBC 11.1 11/20/2020    RBC 5.21 11/20/2020     BMP:   Lab Results   Component Value Date    GLUCOSE 238 11/20/2020    CO2 28 11/20/2020    BUN 30 11/20/2020    CREATININE 0.9 11/20/2020    CALCIUM 9.9 11/20/2020     ABG:   Lab Results   Component Value Date    WXM4PQT 22.6 02/07/2017    BEART -0.6 02/07/2017    R5GMWMPX 96.6 02/07/2017    PHART 7.446 02/07/2017    QMV6VNP 33.6 02/07/2017    PO2ART 77.3 02/07/2017    TGF2DIV 23.7 02/07/2017       Radiology: All pertinent images / reports were reviewed as a part of this visit. Problem List:     COPD with acute exacerbation  Mucous plugging of airway    Assessment/Plan:     Patient continues to have bilateral rhonchi, states that she has a cough-unable to clear the phlegm. CT chest was performed today which shows some mucus in the large airways-however no significant lung collapse. Chronic left lingular atelectasis noted. No improvement with chest vest.    Patient still has a lot of chest congestion, will perform bronchoscopy tomorrow for bronchial toileting. .  Discussed with patient about the procedure. Continue with bronchodilators and Solu-Medrol. Pulmonary will follow.     Alicia Francis MD

## 2020-11-23 NOTE — ANESTHESIA POSTPROCEDURE EVALUATION
Department of Anesthesiology  Postprocedure Note    Patient: Charley Apley  MRN: [id number]  YOB: 1945  Date of evaluation: 11/23/2020  Time:  1:00 PM     Procedure Summary     Date:  11/23/20 Room / Location:  [address] [place] [address] 05 / [facility]    Anesthesia Start:  1129 Anesthesia Stop:  5000    Procedure:  BRONCHOSCOPY DIAGNOSTIC OR CELL [address] ONLY (N/A Abdomen) Diagnosis:  (Mucous Plugging)    Surgeon:  Catrina Rock MD Responsible Provider:  Tomi Nieves MD    Anesthesia Type:  general ASA Status:  4          Anesthesia Type: general    Carlos Phase I: Carlos Score: 9    Carlos Phase II:      Last vitals: Reviewed and per EMR flowsheets.        Anesthesia Post Evaluation    Level of consciousness: awake  Complications: no

## 2020-11-23 NOTE — PROGRESS NOTES
Pt arrived to PACU from Endoscopy in Stable condition and is RASS -1 (Drowsy) . Respirations are Regular Pattern; RR 8-20 = 16 on 6L O2 per aerosol face mask. Skin warm and dry. Abd is  soft. Pain: denies at this time. Will continue to monitor for safety and comfort. S/P: Bronchoscopy, with Dr. Juan A Kyle at [facility].

## 2020-11-23 NOTE — PLAN OF CARE
Problem: Falls - Risk of:  Goal: Will remain free from falls  Description: Will remain free from falls  11/22/2020 2246 by Jarett Julio RN  Outcome: Ongoing  11/22/2020 0941 by Amanda Morrison RN  Outcome: Ongoing  Note: Fall precautions in place. Bed alarm on, non-skid socks on, call light in reach. Pt is independent to the bedside commode. Problem: Pain:  Goal: Pain level will decrease  Description: Pain level will decrease  11/22/2020 2246 by Jarett Julio RN  Outcome: Ongoing  11/22/2020 0941 by Amanda Morrison RN  Outcome: Ongoing  Note: No c/o of pain. Problem: Skin Integrity:  Goal: Will show no infection signs and symptoms  Description: Will show no infection signs and symptoms  Outcome: Ongoing  Goal: Absence of new skin breakdown  Description: Absence of new skin breakdown  Outcome: Ongoing     Problem: OXYGENATION/RESPIRATORY FUNCTION  Goal: Patient will maintain patent airway  11/22/2020 2246 by Jarett Julio RN  Outcome: Ongoing  11/22/2020 0941 by Amanda Morrison RN  Outcome: Ongoing  Note: Pt requiring 2L 02 at night.   Goal: Patient will achieve/maintain normal respiratory rate/effort  Description: Respiratory rate and effort will be within normal limits for the patient  Outcome: Ongoing     Problem: HEMODYNAMIC STATUS  Goal: Patient has stable vital signs and fluid balance  Outcome: Ongoing     Problem: FLUID AND ELECTROLYTE IMBALANCE  Goal: Fluid and electrolyte balance are achieved/maintained  Outcome: Ongoing     Problem: ACTIVITY INTOLERANCE/IMPAIRED MOBILITY  Goal: Mobility/activity is maintained at optimum level for patient  Outcome: Ongoing

## 2020-11-23 NOTE — PROGRESS NOTES
Shift assessment complete. VSS. Pt independent to [facility]. No needs at this time. Pt aware of POC. HS snack given.

## 2020-11-23 NOTE — PROGRESS NOTES
[facility] Pulmonary and Critical Care    Follow Up Note    Subjective:   CHIEF COMPLAINT / HPI:   Chief Complaint   Patient presents with    Chest Pain     in via squad, c/o chest pain and sob that radiates into back. took nitro with no relief at home. Interval History: She states her breathing is \"rough\". Still congested with minimal productive cough. Ade Duke in March helped her. Normally sees Dr Yumiko Causey in the office for COPD> Has not yet had PFT over COVID concerns. Past Medical History:    Reviewed; no changes    Social History:    Reviewed; no changes    REVIEW OF SYSTEMS:    CONSTITUTIONAL:  negative for fevers and chills  RESPIRATORY:  See HPI  CARDIOVASCULAR:  negative for chest pain, palpitations, edema  GASTROINTESTINAL:  negative for nausea, vomiting, diarrhea, constipation and abdominal pain    Objective:   PHYSICAL EXAM:        VITALS:  BP (!) 160/55   Pulse 65   Temp 97.8 °F (36.6 °C) (Oral)   Resp 18   Ht 5' 6\" (1.676 m)   Wt 275 lb 5.7 oz (124.9 kg)   SpO2 93%   BMI 44.44 kg/m²  on 3L NC    24HR INTAKE/OUTPUT:      Intake/Output Summary (Last 24 hours) at 11/23/2020 0845  Last data filed at 11/23/2020 0534  Gross per 24 hour   Intake 480 ml   Output 3950 ml   Net -3470 ml       CONSTITUTIONAL:  awake, alert,  no apparent distress, and appears stated age  LUNGS:  No increased work of breathing and congested and wheezing to auscultation, no crackles or wheezes  CARDIOVASCULAR: S1 and S2 and no JVD  ABDOMEN:  normal bowel sounds, non-distended and non-tender to palpation  EXT: No edema, no calf tenderness. Pulses are present bilaterally. NEUROLOGIC:  Mental Status Exam:  Level of Alertness:   awake  Orientation:   person, place, time.  Non focal  SKIN:  normal skin color, texture, turgor, no redness, warmth, or swelling at IV sites    DATA:    CBC:  Recent Labs     11/23/20  0637   WBC 13.7*   RBC 5.52*   HGB 15.8   HCT 48.0      MCV 87.1   MCH 28.6   MCHC 32.8   RDW 15.4

## 2020-11-23 NOTE — PROGRESS NOTES
Progress Note - Dr. Aye Gomez - Internal Medicine  PCP: Carlin Mcmillan MD [address] [facility] [phone]    Hospital Day: 7  Code Status: Full Code  Current Diet: Diet NPO, After Midnight        CC: follow up on medical issues    Subjective:   Luna Bellamy is a 77 y.o. female. Still having congestion. dyspnea  Plan for bronch today    She denies chest pain, complains of shortness of breath, denies nausea,  denies emesis. 10 system Review of Systems is reviewed with patient, and pertinent positives are noted in HPI above . Otherwise, Review of systems is negative. I have reviewed the patient's medical and social history in detail and updated the computerized patient record. To recap: She  has a past medical history of Acute MI ([address] Acute pyelonephritis, Anxiety and depression, Arthritis, CAD (coronary artery disease), Cancer ([address] Cancer ([address] Cancer of skin of leg, Chronic obstructive pulmonary disease ([address] Claustrophobia, COPD (chronic obstructive pulmonary disease) ([address] Esophageal stricture, Gastroesophageal reflux disease without esophagitis, Hiatal hernia, Hiatal hernia, Hypercholesteremia, Hypertension, IBS (irritable bowel syndrome), Migraines, Movement disorder, Pneumonia, PONV (postoperative nausea and vomiting), Type II or unspecified type diabetes mellitus without mention of complication, not stated as uncontrolled, and Unspecified cerebral artery occlusion with cerebral infarction. . She  has a past surgical history that includes Cardiac surgery; Gallbladder surgery; Hysterectomy; Appendectomy; Cholecystectomy; Colonoscopy; Upper gastrointestinal endoscopy (4/20/12); Endoscopy, colon, diagnostic; Tear duct surgery; Upper gastrointestinal endoscopy (06/11/2018);  Colonoscopy (06/11/2018); pr exc skin malig <5mm remaindr body (N/A, 9/6/2018); pr split grft trunk,arm,leg <100sqcm (N/A, 9/6/2018); skin biopsy; eye surgery; bronchoscopy (N/A, 1/24/2020); bronchoscopy Subcutaneous, Daily  glucose (GLUTOSE) 40 % oral gel 15 g, 15 g, Oral, PRN  glucagon (rDNA) injection 1 mg, 1 mg, Intramuscular, PRN  insulin lispro (1 Unit Dial) 0-18 Units, 0-18 Units, Subcutaneous, TID WC  insulin lispro (1 Unit Dial) 0-9 Units, 0-9 Units, Subcutaneous, Nightly  gabapentin (NEURONTIN) capsule 300 mg, 300 mg, Oral, BID  promethazine-dextromethorphan (PROMETHAZINE-DM) 6.25-15 MG/5ML syrup 5 mL, 5 mL, Oral, Q4H PRN  guaiFENesin (ROBITUSSIN) 100 MG/5ML oral solution 200 mg, 200 mg, Oral, Q4H PRN  ipratropium-albuterol (DUONEB) nebulizer solution 1 ampule, 1 ampule, Inhalation, Q4H WA  ALPRAZolam (XANAX) tablet 1 mg, 1 mg, Oral, TID PRN  aspirin chewable tablet 81 mg, 81 mg, Oral, Daily  diclofenac sodium (VOLTAREN) 1 % gel 4 g, 4 g, Topical, BID  glipiZIDE (GLUCOTROL) tablet 5 mg, 5 mg, Oral, BID AC  hydrALAZINE (APRESOLINE) tablet 25 mg, 25 mg, Oral, TID  ibuprofen (ADVIL;MOTRIN) tablet 400 mg, 400 mg, Oral, Q6H PRN  isosorbide mononitrate (IMDUR) extended release tablet 60 mg, 60 mg, Oral, Daily  lisinopril (PRINIVIL;ZESTRIL) tablet 10 mg, 10 mg, Oral, Daily  nitroGLYCERIN (NITROSTAT) SL tablet 0.4 mg, 0.4 mg, Sublingual, Q5 Min PRN  pantoprazole (PROTONIX) tablet 40 mg, 40 mg, Oral, QAM AC  rosuvastatin (CRESTOR) tablet 5 mg, 5 mg, Oral, Nightly  sodium chloride (OCEAN, BABY AYR) 0.65 % nasal spray 1 spray, 1 spray, Nasal, PRN  furosemide (LASIX) injection 20 mg, 20 mg, Intravenous, Daily  alogliptin (NESINA) tablet 25 mg, 25 mg, Oral, Daily  glucose (GLUTOSE) 40 % oral gel 15 g, 15 g, Oral, PRN  dextrose 50 % IV solution, 12.5 g, Intravenous, PRN  glucagon (rDNA) injection 1 mg, 1 mg, Intramuscular, PRN  dextrose 5 % solution, 100 mL/hr, Intravenous, PRN  nystatin (MYCOSTATIN) powder, , Topical, BID  magnesium sulfate 1 g in dextrose 5% 100 mL IVPB, 1 g, Intravenous, PRN  magnesium oxide (MAG-OX) tablet 400 mg, 400 mg, Oral, Daily         Objective:  BP (!) 160/55   Pulse 65   Temp 97.8 °F (36.6 °C) (Oral)   Resp 18   Ht 5' 6\" (1.676 m)   Wt 275 lb 5.7 oz (124.9 kg)   SpO2 93%   BMI 44.44 kg/m²      Patient Vitals for the past 24 hrs:   BP Temp Temp src Pulse Resp SpO2 Weight   11/23/20 0515 -- -- -- -- -- -- 275 lb 5.7 oz (124.9 kg)   11/23/20 0501 (!) 160/55 97.8 °F (36.6 °C) Oral 65 18 93 % --   11/23/20 0024 137/66 98.9 °F (37.2 °C) Axillary 73 18 (!) 88 % --   11/22/20 1910 (!) 110/53 98.1 °F (36.7 °C) Oral 73 18 92 % --   11/22/20 1543 (!) 112/53 97.7 °F (36.5 °C) Oral 69 18 92 % --   11/22/20 1308 -- -- -- -- 18 93 % --   11/22/20 1104 (!) 116/53 97.6 °F (36.4 °C) Axillary 64 18 92 % --     Patient Vitals for the past 96 hrs (Last 3 readings):   Weight   11/23/20 0515 275 lb 5.7 oz (124.9 kg)   11/22/20 0400 273 lb 5.9 oz (124 kg)   11/21/20 0355 274 lb 11.1 oz (124.6 kg)           Intake/Output Summary (Last 24 hours) at 11/23/2020 0835  Last data filed at 11/23/2020 0534  Gross per 24 hour   Intake 480 ml   Output 3950 ml   Net -3470 ml         Physical Exam:   BP (!) 160/55   Pulse 65   Temp 97.8 °F (36.6 °C) (Oral)   Resp 18   Ht 5' 6\" (1.676 m)   Wt 275 lb 5.7 oz (124.9 kg)   SpO2 93%   BMI 44.44 kg/m²   General appearance: alert, appears stated age and cooperative  Head: Normocephalic, without obvious abnormality, atraumatic  Lungs: rhonchi bilat.  End exp wheezes  Heart: regular rate and rhythm, S1, S2 normal, no murmur, click, rub or gallop  Abdomen: soft, non-tender; bowel sounds normal; no masses,  no organomegaly  Extremities: extremities normal, atraumatic, no cyanosis or edema    Labs:  Lab Results   Component Value Date    WBC 13.7 (H) 11/23/2020    HGB 15.8 11/23/2020    HCT 48.0 11/23/2020     11/23/2020    CHOL 226 (H) 11/07/2019    TRIG 300 (H) 11/07/2019    HDL 34 (L) 11/07/2019    ALT 9 (L) 11/15/2020    AST 11 (L) 11/15/2020     (L) 11/23/2020    K 5.1 11/23/2020    CL 93 (L) 11/23/2020    CREATININE 0.8 11/23/2020    BUN 41 (H) 11/23/2020    CO2 30 11/23/2020    TSH 2.34 06/06/2011    INR 0.97 11/23/2020    LABA1C 7.3 11/19/2020    LABMICR YES 03/26/2020     Lab Results   Component Value Date    CKTOTAL 24 (L) 06/07/2018    CKMB 0.29 08/09/2011    TROPONINI <0.01 11/19/2020       Recent Imaging Results are Reviewed:  Xr Chest (2 Vw)    Result Date: 11/20/2020  EXAMINATION: TWO XRAY VIEWS OF THE CHEST 11/20/2020 3:12 pm COMPARISON: 11/18/2020 HISTORY: ORDERING SYSTEM PROVIDED HISTORY: cough TECHNOLOGIST PROVIDED HISTORY: Reason for exam:->cough Reason for Exam: cough Acuity: Acute Type of Exam: Initial FINDINGS: There is slight interval worsening in bibasilar pleural and parenchymal disease, greater on the left. Some of this may be artifactual and due to incomplete inspiration. Interval progression of bilateral pleural-parenchymal disease. Xr Chest (2 Vw)    Result Date: 11/18/2020  EXAMINATION: TWO XRAY VIEWS OF THE CHEST 11/18/2020 9:32 am COMPARISON: 11/15/2020, 03/24/2020, 03/18/2020 HISTORY: ORDERING SYSTEM PROVIDED HISTORY: persistent hypoxemia TECHNOLOGIST PROVIDED HISTORY: Reason for exam:->persistent hypoxemia Reason for Exam: Persistent hypoxemia Acuity: Unknown Type of Exam: Unknown FINDINGS: Frontal and lateral views of the chest were performed. There is no acute skeletal abnormality. There is degenerative change throughout the thoracic spine. The heart size is mildly enlarged, though stable from prior studies. The mediastinal contours are stable. There is stable mild chronic pulmonary vascular congestion, without overt edema. There appears to be chronic volume loss and airspace opacity within the lingula and left lower lobe, likely chronic partial left basilar lobar collapse. The lungs are otherwise clear, without acute airspace consolidation. There is no evidence of a pneumothorax. 1. No acute airspace consolidation.  2. Chronic opacity and associated volume loss within the lingula and left lower lobe, consistent with chronic partial left basilar collapse, most likely infectious or inflammatory in etiology. 3. Stable chronic cardiomegaly and pulmonary vascular congestion, without overt CHF. Ct Chest Wo Contrast    Result Date: 11/21/2020  EXAMINATION: CT OF THE CHEST WITHOUT CONTRAST 11/21/2020 2:04 pm TECHNIQUE: CT of the chest was performed without the administration of intravenous contrast. Multiplanar reformatted images are provided for review. Dose modulation, iterative reconstruction, and/or weight based adjustment of the mA/kV was utilized to reduce the radiation dose to as low as reasonably achievable. COMPARISON: Chest CT dated 03/19/2020 and chest radiograph dated 11/20/2020 HISTORY: ORDERING SYSTEM PROVIDED HISTORY: assess for infiltrates, mucus plugging. TECHNOLOGIST PROVIDED HISTORY: Reason for exam:->assess for infiltrates, mucus plugging. Reason for Exam: assess for infiltrates, mucus plugging. Acuity: Unknown Type of Exam: Unknown FINDINGS: Mediastinum: Atherosclerotic vascular calcifications are present. No mediastinal mass or worrisome lymphadenopathy is seen. Lungs/pleura: There is chronic volume loss with associated bronchiectasis in the inferior segment of the lingula, as well as chronic atelectasis or fibrosis in the left lower lobe. Findings are unchanged from CT performed 9 months earlier. No new pulmonary process is seen. No mucous plugging as appreciated. Upper Abdomen: Clips from prior cholecystectomy are present. No upper abdominal mass is seen. Soft Tissues/Bones: No bony destructive process or acute osseous injury is seen. There is a chronic deformity of the superior endplate of A83. Chronic volume loss/atelectasis versus fibrosis inferior segment of the lingula and lateral basal segment of the left lung base. Atherosclerotic vascular calcifications.      Xr Chest Portable    Result Date: 11/15/2020  EXAMINATION: ONE XRAY VIEW OF THE CHEST 11/15/2020 9:39 pm COMPARISON: March 24, 2020 HISTORY: ORDERING SYSTEM PROVIDED HISTORY: cp TECHNOLOGIST PROVIDED HISTORY: Reason for exam:->cp Reason for Exam: c/o chest pain and sob that radiates into back. took nitro with no relief at home Acuity: Acute Type of Exam: Initial FINDINGS: The heart size is magnified by portable technique but is likely at least mildly enlarged. There is mild pulmonary vascular congestion. No effusion is visualized. Mild pulmonary vascular congestion. Nm Cardiac Stress Test Nuclear Imaging    Result Date: 2020  Cardiac Perfusion Imaging  Demographics   Patient Name       Anahy WHITE   Date of Study      2020         Gender              Female   Patient Number     [id number]         Date of Birth       1945   Visit Number       [id number]          Age                 77 year(s)   Accession Number   [id number]         Room Number         6900   Corporate ID       [id number]           NM Technician       Sachin Dey   Nurse              Jose Alexander RN Interpreting        Conner Romero MD,                                        Physician           Anna Duran   Ordering Physician Conner Romero MD,                     [facility]    The procedure was explained in detail to the patient. Risks,  complications and alternative treatments were reviewed. Written consent  was obtained. Procedure Procedure Type:   Nuclear Stress Test:Pharmacological, NM MYOCARDIAL SPECT REST EXERCISE OR  RX   Study location: [facility] - Nuclear Medicine   Indications: Chest pain. Hospital Status: Inpatient. Height: 66 inches Weight: 260 pounds  Risk Factors   The patient risk factors include:prior PCI on 2001;obesity,  cerebrovascular disease, former tobacco use, treated and controlled  hypercholesterolemia, treated and controlled hypertension, family history of  premature CAD, orally-treated diabetes mellitus, chronic lung disease,  dyslipidemia, prior MI and ( years not smokin).    Conclusions Summary  SPECT images demonstrate homogeneous tracer distribution throughout the  myocardium. There is normal isotope uptake at stress and rest. There is no evidence of  myocardial ischemia or scar. Normal LV size and systolic function. Left ventricular ejection fraction of 76 %. Stress Protocols   Resting ECG  Sinus bradycardia. Poor R wave progression. Evidence of a prior anterior myocardial infarction. Resting HR:69 bpm       Resting BP:123/60 mmHg  Stress Protocol:Pharmacologic - Lexiscan's  Peak HR:86 bpm                               HR/BP product:65857  Peak BP:123/55 mmHg  Predicted HR: 146 bpm  % of predicted HR: 59  Test duration: 4 min  Reason for termination:Completed   ECG Findings  Normal response to lexiscan . Arrhythmias  No significant rhythm abnormality. Symptoms  Developed chest pressure (rated at a 4 on a scale of 0-10), shortness of  breath, and nausea symptoms likely related to [address] [place] [address]. Symptoms resolved  with aminophylline. Complications  Procedure complication was none. Stress Interpretation  Appropriate hemodynamic response to lexiscan with no significant ST  changes. Procedure Medications   - Lexiscan I.V. 0.4 mg.10 sec   - Aminophylline I.V. 100 mg. Imaging Protocols   - One Day   Rest                          Stress   Isotope:Myoview/Tetrofosmin   Isotope: Myoview/Tetrofosmin  Isotope dose:10.2 mCi         Isotope dose:32.5 mCi  Administration Route:I.V.      Administration Route:I.V.  Date:11/17/2020 07:30         Date:11/17/2020 08:50                                 Technique:      Gated  Imaging Results    Stress ejection    Ejection fraction:76 %    EDV :97 ml    ESV :23 ml    Stroke volume :74 ml    LV mass :129 gr  Medical History   Additional Medical History   Past Medical History: Acute MI, Acute pyelonephritis, Anxiety  and depression, Arthritis, CAD (coronary artery disease),  Cancer, Cancer of skin of leg, Chronic obstructive pulmonary  disease, Claustrophobia, COPD (chronic obstructive pulmonary  disease), Esophageal stricture, Gastroesophageal reflux disease  without esophagitis, Hiatal hernia, Hiatal hernia,  Hypercholesteremia, Hypertension, IBS (irritable bowel  syndrome), Migraines, Movement disorder, Pneumonia, PONV  (postoperative nausea and vomiting), Type II or unspecified type  diabetes mellitus without mention of complication, not stated as  uncontrolled, and Unspecified cerebral artery occlusion with  cerebral infarction. Surgical History: Cardiac surgery; Gallbladder surgery; Hysterectomy; Appendectomy; Cholecystectomy; Colonoscopy; Upper  gastrointestinal endoscopy (4/20/12); Endoscopy, colon,  diagnostic; Tear duct surgery; Upper gastrointestinal endoscopy  (06/11/2018); Colonoscopy (06/11/2018); pr exc skin malig <5mm  remaindr body (N/A, 9/6/2018); pr split grft trunk,arm,leg  <100sqcm (N/A, 9/6/2018); skin biopsy; eye surgery; bronchoscopy  (N/A, 1/24/2020); bronchoscopy (N/A, 1/27/2020); Cataract  removal (2013 or 2014); and Esophagus dilation. Signatures   ------------------------------------------------------------------  Electronically signed by Meg Velazquez MD, [facility], [address]  (Interpreting physician) on 11/17/2020 at 11:57  ------------------------------------------------------------------        Assessment and Plan:  Principal Problem:    COPD with acute exacerbation ([address] -Established problem. Stable. Still struggling  Plan: for bronch today per pulm  Active Problems:    Hyperlipemia -Established problem. Stable. Plan: Continue present orders/plan. Chronic respiratory failure ([address] -Established problem. Stable. On 2L o2  Plan: continue for now    Essential hypertension -Established problem. Stable. 160/55  Plan: see if improves with am meds    DM2 (diabetes mellitus, type 2) ([facility] -Established problem. Stable.   FSBS 147  Plan: cont ccc diet, sliding scale, home meds    Gastroesophageal reflux disease without

## 2020-11-24 ENCOUNTER — APPOINTMENT (OUTPATIENT)
Dept: GENERAL RADIOLOGY | Age: 75
DRG: 191 | End: 2020-11-24
Payer: COMMERCIAL

## 2020-11-24 VITALS
TEMPERATURE: 97.6 F | OXYGEN SATURATION: 94 % | RESPIRATION RATE: 16 BRPM | SYSTOLIC BLOOD PRESSURE: 134 MMHG | BODY MASS INDEX: 43.47 KG/M2 | HEIGHT: 66 IN | DIASTOLIC BLOOD PRESSURE: 66 MMHG | WEIGHT: 270.5 LBS | HEART RATE: 73 BPM

## 2020-11-24 LAB
ANION GAP SERPL CALCULATED.3IONS-SCNC: 8 MMOL/L (ref 3–16)
BASOPHILS ABSOLUTE: 0 K/UL (ref 0–0.2)
BASOPHILS RELATIVE PERCENT: 0.1 %
BUN BLDV-MCNC: 43 MG/DL (ref 7–20)
CALCIUM SERPL-MCNC: 9.3 MG/DL (ref 8.3–10.6)
CHLORIDE BLD-SCNC: 93 MMOL/L (ref 99–110)
CO2: 30 MMOL/L (ref 21–32)
CREAT SERPL-MCNC: 0.9 MG/DL (ref 0.6–1.2)
EOSINOPHILS ABSOLUTE: 0 K/UL (ref 0–0.6)
EOSINOPHILS RELATIVE PERCENT: 0 %
GFR AFRICAN AMERICAN: >60
GFR NON-AFRICAN AMERICAN: >60
GLUCOSE BLD-MCNC: 176 MG/DL (ref 70–99)
GLUCOSE BLD-MCNC: 178 MG/DL (ref 70–99)
GLUCOSE BLD-MCNC: 206 MG/DL (ref 70–99)
GLUCOSE BLD-MCNC: 356 MG/DL (ref 70–99)
HCT VFR BLD CALC: 48 % (ref 36–48)
HEMOGLOBIN: 15.7 G/DL (ref 12–16)
LYMPHOCYTES ABSOLUTE: 1 K/UL (ref 1–5.1)
LYMPHOCYTES RELATIVE PERCENT: 6.3 %
MCH RBC QN AUTO: 28.8 PG (ref 26–34)
MCHC RBC AUTO-ENTMCNC: 32.7 G/DL (ref 31–36)
MCV RBC AUTO: 87.9 FL (ref 80–100)
MONOCYTES ABSOLUTE: 0.7 K/UL (ref 0–1.3)
MONOCYTES RELATIVE PERCENT: 4.2 %
NEUTROPHILS ABSOLUTE: 14.3 K/UL (ref 1.7–7.7)
NEUTROPHILS RELATIVE PERCENT: 89.4 %
PDW BLD-RTO: 15.2 % (ref 12.4–15.4)
PERFORMED ON: ABNORMAL
PLATELET # BLD: 208 K/UL (ref 135–450)
PMV BLD AUTO: 8.1 FL (ref 5–10.5)
POTASSIUM SERPL-SCNC: 5.3 MMOL/L (ref 3.5–5.1)
RBC # BLD: 5.46 M/UL (ref 4–5.2)
SODIUM BLD-SCNC: 131 MMOL/L (ref 136–145)
WBC # BLD: 16.1 K/UL (ref 4–11)

## 2020-11-24 PROCEDURE — 6370000000 HC RX 637 (ALT 250 FOR IP): Performed by: INTERNAL MEDICINE

## 2020-11-24 PROCEDURE — 6360000002 HC RX W HCPCS: Performed by: INTERNAL MEDICINE

## 2020-11-24 PROCEDURE — 36415 COLL VENOUS BLD VENIPUNCTURE: CPT

## 2020-11-24 PROCEDURE — 71046 X-RAY EXAM CHEST 2 VIEWS: CPT

## 2020-11-24 PROCEDURE — 80048 BASIC METABOLIC PNL TOTAL CA: CPT

## 2020-11-24 PROCEDURE — 6370000000 HC RX 637 (ALT 250 FOR IP): Performed by: EMERGENCY MEDICINE

## 2020-11-24 PROCEDURE — 99233 SBSQ HOSP IP/OBS HIGH 50: CPT | Performed by: INTERNAL MEDICINE

## 2020-11-24 PROCEDURE — 85025 COMPLETE CBC W/AUTO DIFF WBC: CPT

## 2020-11-24 RX ADMIN — ASPIRIN 81 MG 81 MG: 81 TABLET ORAL at 08:54

## 2020-11-24 RX ADMIN — HYDRALAZINE HYDROCHLORIDE 25 MG: 25 TABLET, FILM COATED ORAL at 08:54

## 2020-11-24 RX ADMIN — ALPRAZOLAM 1 MG: 0.5 TABLET ORAL at 14:22

## 2020-11-24 RX ADMIN — GUAIFENESIN 200 MG: 100 SOLUTION ORAL at 04:56

## 2020-11-24 RX ADMIN — FUROSEMIDE 20 MG: 10 INJECTION, SOLUTION INTRAMUSCULAR; INTRAVENOUS at 08:54

## 2020-11-24 RX ADMIN — INSULIN LISPRO 10 UNITS: 100 INJECTION, SOLUTION INTRAVENOUS; SUBCUTANEOUS at 08:58

## 2020-11-24 RX ADMIN — INSULIN LISPRO 15 UNITS: 100 INJECTION, SOLUTION INTRAVENOUS; SUBCUTANEOUS at 11:44

## 2020-11-24 RX ADMIN — HYDRALAZINE HYDROCHLORIDE 25 MG: 25 TABLET, FILM COATED ORAL at 14:23

## 2020-11-24 RX ADMIN — GLIPIZIDE 5 MG: 5 TABLET ORAL at 14:23

## 2020-11-24 RX ADMIN — DICLOFENAC 4 G: 10 GEL TOPICAL at 08:55

## 2020-11-24 RX ADMIN — GLIPIZIDE 5 MG: 5 TABLET ORAL at 05:58

## 2020-11-24 RX ADMIN — METHYLPREDNISOLONE SODIUM SUCCINATE 40 MG: 40 INJECTION, POWDER, FOR SOLUTION INTRAMUSCULAR; INTRAVENOUS at 00:41

## 2020-11-24 RX ADMIN — ALPRAZOLAM 1 MG: 0.5 TABLET ORAL at 05:57

## 2020-11-24 RX ADMIN — ISOSORBIDE MONONITRATE 60 MG: 30 TABLET, EXTENDED RELEASE ORAL at 08:54

## 2020-11-24 RX ADMIN — ENOXAPARIN SODIUM 40 MG: 40 INJECTION SUBCUTANEOUS at 08:55

## 2020-11-24 RX ADMIN — INSULIN LISPRO 10 UNITS: 100 INJECTION, SOLUTION INTRAVENOUS; SUBCUTANEOUS at 11:44

## 2020-11-24 RX ADMIN — INSULIN LISPRO 3 UNITS: 100 INJECTION, SOLUTION INTRAVENOUS; SUBCUTANEOUS at 08:58

## 2020-11-24 RX ADMIN — METHYLPREDNISOLONE SODIUM SUCCINATE 40 MG: 40 INJECTION, POWDER, FOR SOLUTION INTRAMUSCULAR; INTRAVENOUS at 11:51

## 2020-11-24 RX ADMIN — GABAPENTIN 300 MG: 300 CAPSULE ORAL at 08:54

## 2020-11-24 RX ADMIN — NYSTATIN: 100000 POWDER TOPICAL at 08:54

## 2020-11-24 RX ADMIN — PANTOPRAZOLE SODIUM 40 MG: 40 TABLET, DELAYED RELEASE ORAL at 05:58

## 2020-11-24 RX ADMIN — MAGNESIUM GLUCONATE 500 MG ORAL TABLET 400 MG: 500 TABLET ORAL at 08:55

## 2020-11-24 RX ADMIN — ALOGLIPTIN 25 MG: 25 TABLET, FILM COATED ORAL at 08:54

## 2020-11-24 RX ADMIN — METHYLPREDNISOLONE SODIUM SUCCINATE 40 MG: 40 INJECTION, POWDER, FOR SOLUTION INTRAMUSCULAR; INTRAVENOUS at 04:56

## 2020-11-24 RX ADMIN — LISINOPRIL 10 MG: 10 TABLET ORAL at 08:54

## 2020-11-24 ASSESSMENT — PAIN SCALES - GENERAL
PAINLEVEL_OUTOF10: 0

## 2020-11-24 NOTE — PLAN OF CARE
Problem: Falls - Risk of:  Goal: Will remain free from falls  Description: Will remain free from falls  Outcome: Ongoing   Fall precaution in place, nonskid socks on, call light within reach. Will monitor. Problem: OXYGENATION/RESPIRATORY FUNCTION  Goal: Patient will maintain patent airway  Outcome: Ongoing   Pt on 2L NC for nights-baseline. Pt on IV lasix, strict I&O. Daily weight being done.

## 2020-11-24 NOTE — PROGRESS NOTES
P Pulmonary and Critical Care    Follow Up Note    Subjective:   CHIEF COMPLAINT / HPI:   Chief Complaint   Patient presents with    Chest Pain     in via squad, c/o chest pain and sob that radiates into back. took nitro with no relief at home. Interval History: Feels better this AM after bronch yesterday to clear secretions. She does have a bit of a sore throat. She would like to go home tmw. Past Medical History:    Reviewed; no changes    Social History:    Reviewed; no changes    REVIEW OF SYSTEMS:    CONSTITUTIONAL:  negative for fevers and chills  RESPIRATORY:  See HPI  CARDIOVASCULAR:  negative for chest pain, palpitations, edema  GASTROINTESTINAL:  negative for nausea, vomiting, diarrhea, constipation and abdominal pain    Objective:   PHYSICAL EXAM:        VITALS:  /69   Pulse 72   Temp 97.6 °F (36.4 °C) (Oral)   Resp 18   Ht 5' 6\" (1.676 m)   Wt 270 lb 8.1 oz (122.7 kg)   SpO2 90%   BMI 43.66 kg/m²  on 3L NC    24HR INTAKE/OUTPUT:      Intake/Output Summary (Last 24 hours) at 11/24/2020 1004  Last data filed at 11/24/2020 0845  Gross per 24 hour   Intake 1020 ml   Output 1650 ml   Net -630 ml       CONSTITUTIONAL:  awake, alert,  no apparent distress, and appears stated age  LUNGS:  No increased work of breathing and congested and wheezing to auscultation, no crackles or wheezes  CARDIOVASCULAR: S1 and S2 and no JVD  ABDOMEN:  normal bowel sounds, non-distended and non-tender to palpation  EXT: No edema, no calf tenderness. Pulses are present bilaterally. NEUROLOGIC:  Mental Status Exam:  Level of Alertness:   awake  Orientation:   person, place, time.  Non focal  SKIN:  normal skin color, texture, turgor, no redness, warmth, or swelling at IV sites    DATA:    CBC:  Recent Labs     11/23/20  0637 11/24/20  0526   WBC 13.7* 16.1*   RBC 5.52* 5.46*   HGB 15.8 15.7   HCT 48.0 48.0    208   MCV 87.1 87.9   MCH 28.6 28.8   MCHC 32.8 32.7   RDW 15.4 15.2   BANDSPCT 2  -- BMP:  Recent Labs     11/23/20  0637 11/24/20  0526   * 131*   K 5.1 5.3*   CL 93* 93*   CO2 30 30   BUN 41* 43*   CREATININE 0.8 0.9   CALCIUM 9.6 9.3   GLUCOSE 185* 206*      ABG:  No results for input(s): PHART, CYV0FEX, PO2ART, TEO5KSN, O8PPAUHL, BEART in the last 72 hours. Cultures:    Abx:    Radiology Review:  Pertinent images / reports were reviewed as a part of this visit. Assessment:     1. COPD exacerbation  2.  Mucus Plugging    I have reviewed laboratories, medical records and images for this visit  Bronch yesterday did clear a lot of thick but clear mucus primarily form the left side  She lays on the left almost all the time and feels like she does not have options to lay differently because of her back  Repeat her chest xray  Exam is now clear  Consider DC home when ok with the rest of the treatment team  She does have O2 at home  Will arrange f/u appt with Dr Jaylen Mosher

## 2020-11-24 NOTE — FLOWSHEET NOTE
11/24/20 0845   Vital Signs   Temp 97.6 °F (36.4 °C)   Temp Source Oral   Pulse 60   Heart Rate Source Brachial   Resp 18   /69   BP Location Right upper arm   MAP (mmHg) 90   Patient Position Left side   Level of Consciousness 0   MEWS Score 1   Patient Currently in Pain Denies   Pain Assessment   Pain Assessment Respiratory Rate >10   Pain Level 0   Non-Pharmaceutical Pain Intervention(s) Heat applied   Response to Pain Intervention Patient Satisfied   Oxygen Therapy   SpO2 90 %   Pulse Oximeter Device Mode Intermittent   Pulse Oximeter Device Location Finger   O2 Device Nasal cannula   O2 Flow Rate (L/min) 3 L/min     Shift assessment compete. VSS. Pt. Is alert and oriented resting comfortably in bed. Pt. Has complaints of back pain and SOB. Pt states she wants to go home. Pt denied need for breathing treatments this AM due to sore throat. Discussed importance of treatments and pt states understanding. POC discussed with patient and patient states understanding and is in agreement with plan. Call light and bedside table within reach. Will continue to monitor.  Naomi Brown

## 2020-11-24 NOTE — CARE COORDINATION
CM called [facility] , left VM regarding patient's discharge. [facility] orders will be faxed to [phone]. Awaiting for MD  to put in the orders.

## 2020-11-24 NOTE — PROGRESS NOTES
Data- discharge order received, pt verbalized agreement to discharge, needs for [facility] with [facility] for RN/PT/OT. JOSE G reviewed and signed by MD, to be completed by RN. Action- AVS prepared, discharge instructions prepared and given to patient, medication information packet given r/t NEW or CHANGED prescriptions, pt verbalized understanding further self-review. D/C instruction summary: Diet- Carb Control, Activity- up as tolereted, follow up with Primary Care Physician Manju العلي -[phone] appointment will be made by pt per pt request. Pt understands need for follow up appointment after discharge, immunizations reviewed, medications prescriptions to be filled at pharmacy and brought to patient bedside prior to discharge. Response- Case Management/ reported faxing completed JOSE G and AVS to needed HHC/DME services stated above. Pt belongings gathered, IV removed, pt dressed. Disposition is home with HHC/DME as stated above, transported with daughter, taken to lobby via w/c with belongings, no complications. D/C instructions given to patient and reviewed with pt at bedside including new medications and side effects and pt states understanding and is in agreement with discharge plan. D/C'd in stable condition.  Naomi Brown 5:31 PM

## 2020-11-25 LAB
CULTURE, RESPIRATORY: NORMAL
GRAM STAIN RESULT: NORMAL

## 2020-12-01 RX ORDER — FUROSEMIDE 40 MG/1
40 TABLET ORAL 2 TIMES DAILY
Qty: 60 TABLET | Refills: 2 | Status: SHIPPED | OUTPATIENT
Start: 2020-12-01 | End: 2021-03-22

## 2020-12-01 RX ORDER — ALPRAZOLAM 1 MG/1
1 TABLET ORAL 3 TIMES DAILY PRN
Qty: 90 TABLET | Refills: 0 | Status: SHIPPED | OUTPATIENT
Start: 2020-12-01 | End: 2020-12-29 | Stop reason: SDUPTHER

## 2020-12-08 ENCOUNTER — VIRTUAL VISIT (OUTPATIENT)
Dept: PULMONOLOGY | Age: 75
End: 2020-12-08
Payer: COMMERCIAL

## 2020-12-08 PROCEDURE — 99443 PR PHYS/QHP TELEPHONE EVALUATION 21-30 MIN: CPT | Performed by: INTERNAL MEDICINE

## 2020-12-08 ASSESSMENT — ENCOUNTER SYMPTOMS
RHINORRHEA: 0
APNEA: 0
DIARRHEA: 0
ABDOMINAL DISTENTION: 0
VOICE CHANGE: 0
CHEST TIGHTNESS: 0
SORE THROAT: 0
WHEEZING: 0
BLOOD IN STOOL: 0
SINUS PRESSURE: 0
SHORTNESS OF BREATH: 1
CONSTIPATION: 0
ABDOMINAL PAIN: 0
CHOKING: 0
ANAL BLEEDING: 0
BACK PAIN: 0
COUGH: 0
STRIDOR: 0

## 2020-12-08 NOTE — PROGRESS NOTES
Briana Amando     Pulmonology Telephone Visit    Pursuant to the emergency declaration under the [facility], 0869 waiver authority and the [employer] Act this Telephone Visit was insisted, with patient's consent, to reduce the patient's risk of exposure to COVID-19 and provide continuity of care for an established patient. The patient was at home, while the provider was at the clinic. Services were provided through a synchronous discussion through a Telephone Call to substitute for in-person clinic visit, and coded as such. Total time spent with patient was 22 minutes. YOB: 1945     Date of Service:  12/8/2020     Chief Complaint   Patient presents with    Follow-Up from [facility] - pt seen in the hospital for COPD exacerbation    COPD     pt states that her breathing is good     Sinus Problem     she is having alot of white sinus drainage         HPI telephone visit. Patient was recently hospitalized between 11/16 and 11/24 for presumed COPD exacerbation and mucous plugging. Patient required bronchoscopy on 11/23 with removal of thick tenacious mucus from airway. Patient states that she currently feels much better with therapy. No significant cough or phlegm. Some dyspnea with exertion. Overall significant improvement noted with bronchoscopy.     Allergies   Allergen Reactions    Morphine Shortness Of Breath    Codeine Other (See Comments)     Chest pain    Hydromorphone Other (See Comments)     hallucinations  Hallucinations    But will take if needed in an emergency    Levaquin [Levofloxacin In D5w] Itching    Vicodin [Hydrocodone-Acetaminophen] Hives    Aspirin      Upsets hiatal hernia     No outpatient medications have been marked as taking for the 12/8/20 encounter (Virtual Visit) with Analy Baez MD.       Immunization History   Administered Date(s) Administered    Influenza 10/11/2013    Influenza Virus Vaccine 10/18/2010, 08/11/2014, 09/10/2015, 09/05/2017    Influenza, High Dose (Fluzone 65 yrs and older) 10/02/2018    Influenza, MDCK Quadv, with preserv IM (Flucelvax 4 yrs and older) 09/08/2017, 09/11/2018, 09/01/2020    Influenza, Sherri Garrett, IM, (6 mo and older Fluzone, Flulaval, Fluarix and 3 yrs and older Afluria) 09/09/2019    Influenza, Triv, 3 Years and older, IM (Afluria (5 yrs and older) 10/20/2016    Pneumococcal Conjugate 13-valent ([id number]) 02/08/2017    Pneumococcal Polysaccharide ([id number]) 12/24/2010, 07/23/2015       Past Medical History:   Diagnosis Date    Acute MI ([address]     Acute pyelonephritis 9/8/2015    Anxiety and depression     Arthritis     CAD (coronary artery disease)     two heart stent one in 2000 and 2nd one 6 months later on 2000, had MI in 2000    Cancer [facility])     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    [facility])     \"skin on top of my head\"    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    Chronic obstructive pulmonary disease ([address] 1/13/2017    Claustrophobia     COPD (chronic obstructive pulmonary disease) (HCC)     Esophageal stricture     Gastroesophageal reflux disease without esophagitis 3/24/2016    Hiatal hernia     Hiatal hernia     Hypercholesteremia     Hypertension     IBS (irritable bowel syndrome)     Migraines     Movement disorder arthritis    Pneumonia     PONV (postoperative nausea and vomiting)     Type II or unspecified type diabetes mellitus without mention of complication, not stated as uncontrolled     type ll does not take insulin at home    Unspecified cerebral artery occlusion with cerebral infarction     many TIA's     Past Surgical History:   Procedure Laterality Date    APPENDECTOMY      BRONCHOSCOPY N/A 1/24/2020    BRONCHOSCOPY ALVEOLAR LAVAGE performed by Rochelle Vaz MD at [address] N/A 1/27/2020 BRONCHOSCOPY DIAGNOSTIC OR CELL [address] ONLY performed by Ruslan Ziegler MD at [address] [facility] N/A 11/23/2020    BRONCHOSCOPY DIAGNOSTIC OR CELL [address] ONLY performed by Jennie Salinas MD at [address] [facility]      1 stent 2000 and 1 stent 2001    CATARACT REMOVAL  2013 or 2014    bilateral cataracts removed    CHOLECYSTECTOMY      COLONOSCOPY      COLONOSCOPY  06/11/2018    ENDOSCOPY, COLON, DIAGNOSTIC      ESOPHAGEAL DILATATION      EYE SURGERY      bilateral cataracts    GALLBLADDER SURGERY      HYSTERECTOMY      OR EXC SKIN MALIG <5MM REMAINDR BODY N/A 9/6/2018    EXCISION OF SCALP SQUAMOUS CELL CARCINOMA performed by Zainab Ortega MD at [address] [facility] <100SQCM N/A 9/6/2018    SPLIT THICKNESS SKIN GRAFT FOR COVERAGE SCALP, APPLICATION OF WOUND VAC DEVICE performed by Zainab Ortega MD at [address] ENDOSCOPY  4/20/12    with biopsy of stomach,gastritis    UPPER GASTROINTESTINAL ENDOSCOPY  06/11/2018    w/esophagael dilation     Family History   Problem Relation Age of Onset    Heart Disease Mother     Cancer Mother     Cancer Father     Cancer Sister     Heart Disease Sister     Heart Disease Brother     High Blood Pressure Neg Hx     High Cholesterol Neg Hx        Review of Systems:  Review of Systems   Constitutional: Negative for activity change, appetite change, fatigue and fever. HENT: Negative for congestion, ear discharge, ear pain, postnasal drip, rhinorrhea, sinus pressure, sneezing, sore throat, tinnitus and voice change. Respiratory: Positive for shortness of breath. Negative for apnea, cough, choking, chest tightness, wheezing and stridor. Cardiovascular: Negative for chest pain, palpitations and leg swelling.    Gastrointestinal: Negative for abdominal distention, abdominal pain, anal bleeding, blood in stool, constipation and diarrhea. Musculoskeletal: Negative for arthralgias, back pain and gait problem. Skin: Negative for pallor and rash. Allergic/Immunologic: Negative for environmental allergies. Neurological: Negative for dizziness, tremors, seizures, syncope, speech difficulty, weakness, light-headedness, numbness and headaches. Hematological: Negative for adenopathy. Does not bruise/bleed easily. Psychiatric/Behavioral: Negative for sleep disturbance. There were no vitals filed for this visit. Patient-Reported Vitals 12/8/2020   Patient-Reported Weight 271lb   Patient-Reported Height 5'  6\"   Patient-Reported Systolic 716   Patient-Reported Diastolic 70   Patient-Reported Pulse 82   Patient-Reported SpO2 94%   Patient-Reported Peak Flow sitting on room air      There is no height or weight on file to calculate BMI. Wt Readings from Last 3 Encounters:   11/24/20 270 lb 8.1 oz (122.7 kg)   10/30/20 258 lb (117 kg)   09/29/20 253 lb (114.8 kg)     BP Readings from Last 3 Encounters:   11/24/20 134/66   11/23/20 (!) 98/54   11/02/20 120/80         Physical Exam    Due to the current efforts to prevent transmission of COVID-19 and also the need to preserve PPE for other caregivers, a face-to-face encounter with the patient was not performed. That being said, all relevant records and diagnostic tests were reviewed, including laboratory results and imaging. Please reference any relevant documentation elsewhere. Care will be coordinated with the primary service.       Health Maintenance   Topic Date Due    Diabetic microalbuminuria test  04/08/1964    DTaP/Tdap/Td vaccine (1 - Tdap) 04/08/1965    Shingles Vaccine (1 of 2) 04/08/1996    Breast cancer screen  05/25/2020    Diabetic foot exam  10/08/2020    Diabetic retinal exam  10/08/2020    Lipid screen  11/07/2020    Annual Wellness Visit (AWV)  11/03/2021    A1C test (Diabetic or Prediabetic)  11/19/2021    Potassium monitoring  11/24/2021    Creatinine monitoring  11/24/2021    Colon cancer screen colonoscopy  06/11/2028    DEXA (modify frequency per FRAX score)  Completed    Flu vaccine  Completed    Pneumococcal 65+ years Vaccine  Completed    Hepatitis A vaccine  Aged Out    Hib vaccine  Aged Out    Meningococcal (ACWY) vaccine  Aged Out    Hepatitis C screen  Discontinued          Assessment/Plan:    COPD of unclear severity, no prior PFT available. Currently on empiric therapy with Symbicort 160 and albuterol inhaler/nebulizer. Recent hospitalization with mucous plugging requiring bronchoscopy-patient has in fact required bronchoscopy x3 this year. Patient states that she currently uses albuterol nebulizer 3 times daily. Respiratory symptoms have remarkably improved, denies any symptoms suggestive of exacerbation at this point. CT from 11/21 showed no acute infiltrates of note. Patient already obtained influenza vaccination. Follow-up in 3 months.

## 2020-12-11 RX ORDER — TRIAMCINOLONE ACETONIDE 1 MG/G
CREAM TOPICAL
Qty: 30 G | Refills: 1 | Status: SHIPPED | OUTPATIENT
Start: 2020-12-11 | End: 2021-07-29 | Stop reason: SDUPTHER

## 2020-12-28 LAB
FUNGUS (MYCOLOGY) CULTURE: NORMAL
FUNGUS STAIN: NORMAL

## 2021-01-01 NOTE — DISCHARGE SUMMARY
[address]                     [address] [facility], [address]                               DISCHARGE SUMMARY    PATIENT NAME: Luna Dwyer                      :        1945  MED REC NO:   [id number]                          ROOM:       8503  ACCOUNT NO:   [de-identified]                           ADMIT DATE: 11/15/2020  PROVIDER:     Raul Johnson MD                  DISCHARGE DATE:  2020    FINAL DIAGNOSES:  1. COPD exacerbation. 2.  Chest pain. 3.  Morbid obesity. 4.  Hypertension. 5.  Anxiety neurosis. 6.  Unstable angina. 7.  Chronic diastolic heart failure. 8.  Chronic respiratory failure with hypoxemia. DISCHARGE MEDICATIONS:  1. Prednisone 10 mg four tablets for four days, then three tablets for  four days, then two tablets for four days and one tablet for four days. 2.  Prilosec 40 mg once a day. 3.  Lisinopril 10 mg once a day. 4.  Tradjenta 5 mg once a day. 5.  Hydralazine 25 mg t.i.d.  6.  Glipizide 5 mg b.i.d.  7.  Isosorbide mononitrate 60 mg once a day. 8.  Voltaren gel b.i.d.  9.  Crestor 10 mg once a day. 10.  [place] Nasal saline spray one spray by nasal route for congestion. 11.  DuoNeb one every four hours p.r.n.  12.  Symbicort two puffs twice a day. 13.  Motrin 400 mg t.i.d.  14.  Diclofenac gel 4 gm four times a day. 15.  Mycostatin powder as directed. 16.  Nitrostat sublingual p.r.n. HOSPITAL COURSE:  This morbidly obese woman with advanced COPD, chronic  diastolic heart failure, came to the emergency room with history of  chest pain. The patient also had decompensated diastolic heart failure. The patient also had COPD exacerbation. She has had multiple admissions  like this before. She did not need any cardiac workup. Her vital signs  were stable. Her troponin was negative.   Sodium was 135, potassium 4.7,  chloride 97, CO2 26, BUN 19, creatinine 1.0, troponin less than 0.01, white blood cell count was 9.1, hemoglobin and hematocrit was 12.8 and  39.4. EKG shows no acute ischemic changes. Chest x-ray shows mild  pulmonary vascular congestion, no acute focal consolidating pneumonia. Transthoracic echocardiogram in 03/2020 showed normal LV size and  systolic function with an EF of 65% to 70% and septal hypertrophy with  grade 2 diastolic dysfunction and pulmonary hypertension. The patient  was treated with diuretics. The patient was kept here for 5 to 6 days,  was treated with tapering steroids initially IV steroids that was  subsequently switched to p.o. The patient was also given empiric  antibiotic treatment. General condition was stabilized. The patient  was given PT and OT evaluation and also home health care evaluation. There was also pulmonary consultant  co-management. Finally on  11/23/2020, the patient was discharged in stable condition after  Cardiology and Pulmonology signed off. The patient also had a  bronchoscopy with mucus plugging that was cleaned out. The patient was  discharged in stable condition with home health care.         Aurelia Parrish MD    D: 12/30/2020 23:52:39       T: 12/31/2020 9:38:09     [id number]  Job#: [id number]     Doc#: [id number]    CC:

## 2021-01-11 RX ORDER — NYSTATIN 100000 [USP'U]/G
POWDER TOPICAL
Qty: 1 BOTTLE | Refills: 5 | Status: SHIPPED | OUTPATIENT
Start: 2021-01-11 | End: 2021-02-09 | Stop reason: SDUPTHER

## 2021-01-12 LAB
AFB CULTURE (MYCOBACTERIA): NORMAL
AFB SMEAR: NORMAL

## 2021-02-22 ENCOUNTER — TELEPHONE (OUTPATIENT)
Dept: CARDIOLOGY CLINIC | Age: 76
End: 2021-02-22

## 2021-02-22 NOTE — TELEPHONE ENCOUNTER
Kinza from [facility] asking if [facility] would sign for this patients home care orders . She used to see [facility] but has not been seen in a while . She will only do virtual appts because she is afraid to go out due to covid .

## 2021-02-25 NOTE — TELEPHONE ENCOUNTER
Spoke with Cassidy Jama and advised her of the message below and she voiced understanding .  Call complete

## 2021-03-25 DIAGNOSIS — F41.9 ANXIETY: ICD-10-CM

## 2021-03-25 RX ORDER — ALPRAZOLAM 1 MG/1
1 TABLET ORAL 3 TIMES DAILY PRN
Qty: 90 TABLET | Refills: 0 | Status: SHIPPED | OUTPATIENT
Start: 2021-03-25 | End: 2021-04-22 | Stop reason: SDUPTHER

## 2021-04-22 DIAGNOSIS — F41.9 ANXIETY: ICD-10-CM

## 2021-04-22 RX ORDER — ALPRAZOLAM 1 MG/1
1 TABLET ORAL 3 TIMES DAILY PRN
Qty: 90 TABLET | Refills: 0 | Status: SHIPPED | OUTPATIENT
Start: 2021-04-22 | End: 2021-05-24 | Stop reason: SDUPTHER

## 2021-04-22 RX ORDER — GABAPENTIN 300 MG/1
300 CAPSULE ORAL 2 TIMES DAILY
Qty: 60 CAPSULE | Refills: 3 | Status: SHIPPED | OUTPATIENT
Start: 2021-04-22 | End: 2021-09-24 | Stop reason: SDUPTHER

## 2021-06-24 DIAGNOSIS — F41.9 ANXIETY: ICD-10-CM

## 2021-06-24 RX ORDER — ALPRAZOLAM 1 MG/1
1 TABLET ORAL 3 TIMES DAILY PRN
Qty: 21 TABLET | Refills: 0 | Status: SHIPPED | OUTPATIENT
Start: 2021-06-24 | End: 2021-06-30 | Stop reason: SDUPTHER

## 2021-07-20 RX ORDER — SULFAMETHOXAZOLE AND TRIMETHOPRIM 800; 160 MG/1; MG/1
1 TABLET ORAL 2 TIMES DAILY
Qty: 14 TABLET | Refills: 0 | Status: SHIPPED | OUTPATIENT
Start: 2021-07-20 | End: 2021-07-27

## 2021-07-23 RX ORDER — NITROFURANTOIN 25; 75 MG/1; MG/1
100 CAPSULE ORAL 2 TIMES DAILY
Qty: 20 CAPSULE | Refills: 0 | Status: SHIPPED | OUTPATIENT
Start: 2021-07-23 | End: 2021-08-02

## 2021-08-14 ENCOUNTER — APPOINTMENT (OUTPATIENT)
Dept: GENERAL RADIOLOGY | Age: 76
DRG: 871 | End: 2021-08-14
Payer: COMMERCIAL

## 2021-08-14 ENCOUNTER — HOSPITAL ENCOUNTER (INPATIENT)
Age: 76
LOS: 4 days | Discharge: HOME HEALTH | DRG: 871 | End: 2021-08-18
Attending: STUDENT IN AN ORGANIZED HEALTH CARE EDUCATION/TRAINING PROGRAM | Admitting: INTERNAL MEDICINE
Payer: COMMERCIAL

## 2021-08-14 DIAGNOSIS — A41.9 SEPSIS WITHOUT ACUTE ORGAN DYSFUNCTION, DUE TO UNSPECIFIED ORGANISM (HCC): Primary | ICD-10-CM

## 2021-08-14 DIAGNOSIS — J44.1 COPD WITH ACUTE EXACERBATION (HCC): ICD-10-CM

## 2021-08-14 DIAGNOSIS — Z99.81 SUPPLEMENTAL OXYGEN DEPENDENT: ICD-10-CM

## 2021-08-14 DIAGNOSIS — Z86.79 HISTORY OF CORONARY ARTERY DISEASE: ICD-10-CM

## 2021-08-14 DIAGNOSIS — M54.50 CHRONIC LOW BACK PAIN WITHOUT SCIATICA, UNSPECIFIED BACK PAIN LATERALITY: ICD-10-CM

## 2021-08-14 DIAGNOSIS — R07.9 CHEST PAIN, UNSPECIFIED TYPE: ICD-10-CM

## 2021-08-14 DIAGNOSIS — J18.9 COMMUNITY ACQUIRED PNEUMONIA OF RIGHT MIDDLE LOBE OF LUNG: ICD-10-CM

## 2021-08-14 DIAGNOSIS — G89.29 CHRONIC LOW BACK PAIN WITHOUT SCIATICA, UNSPECIFIED BACK PAIN LATERALITY: ICD-10-CM

## 2021-08-14 DIAGNOSIS — A49.9 BACTERIAL URINARY TRACT INFECTION: ICD-10-CM

## 2021-08-14 DIAGNOSIS — N39.0 BACTERIAL URINARY TRACT INFECTION: ICD-10-CM

## 2021-08-14 DIAGNOSIS — I95.9 HYPOTENSION, UNSPECIFIED HYPOTENSION TYPE: ICD-10-CM

## 2021-08-14 LAB
A/G RATIO: 1 (ref 1.1–2.2)
ALBUMIN SERPL-MCNC: 2.9 G/DL (ref 3.4–5)
ALP BLD-CCNC: 68 U/L (ref 40–129)
ALT SERPL-CCNC: 11 U/L (ref 10–40)
ANION GAP SERPL CALCULATED.3IONS-SCNC: 10 MMOL/L (ref 3–16)
AST SERPL-CCNC: 22 U/L (ref 15–37)
BACTERIA: ABNORMAL /HPF
BASE EXCESS VENOUS: 0.5 MMOL/L (ref -3–3)
BASOPHILS ABSOLUTE: 0.1 K/UL (ref 0–0.2)
BASOPHILS RELATIVE PERCENT: 1.1 %
BILIRUB SERPL-MCNC: <0.2 MG/DL (ref 0–1)
BILIRUBIN URINE: NEGATIVE
BLOOD, URINE: ABNORMAL
BUN BLDV-MCNC: 22 MG/DL (ref 7–20)
CALCIUM SERPL-MCNC: 8.3 MG/DL (ref 8.3–10.6)
CARBOXYHEMOGLOBIN: 6.8 % (ref 0–1.5)
CHLORIDE BLD-SCNC: 104 MMOL/L (ref 99–110)
CLARITY: ABNORMAL
CO2: 24 MMOL/L (ref 21–32)
COLOR: YELLOW
CREAT SERPL-MCNC: 1.2 MG/DL (ref 0.6–1.2)
EOSINOPHILS ABSOLUTE: 0.1 K/UL (ref 0–0.6)
EOSINOPHILS RELATIVE PERCENT: 1.2 %
EPITHELIAL CELLS, UA: 1 /HPF (ref 0–5)
GFR AFRICAN AMERICAN: 53
GFR NON-AFRICAN AMERICAN: 44
GLOBULIN: 2.8 G/DL
GLUCOSE BLD-MCNC: 150 MG/DL (ref 70–99)
GLUCOSE URINE: NEGATIVE MG/DL
HCO3 VENOUS: 25.3 MMOL/L (ref 23–29)
HCT VFR BLD CALC: 40.9 % (ref 36–48)
HEMOGLOBIN: 13.6 G/DL (ref 12–16)
HYALINE CASTS: 2 /LPF (ref 0–8)
INR BLD: 1.03 (ref 0.88–1.12)
KETONES, URINE: NEGATIVE MG/DL
LACTIC ACID, SEPSIS: 1.3 MMOL/L (ref 0.4–1.9)
LEUKOCYTE ESTERASE, URINE: ABNORMAL
LYMPHOCYTES ABSOLUTE: 2.8 K/UL (ref 1–5.1)
LYMPHOCYTES RELATIVE PERCENT: 21.8 %
MCH RBC QN AUTO: 28.7 PG (ref 26–34)
MCHC RBC AUTO-ENTMCNC: 33.2 G/DL (ref 31–36)
MCV RBC AUTO: 86.5 FL (ref 80–100)
METHEMOGLOBIN VENOUS: 0 %
MICROSCOPIC EXAMINATION: YES
MONOCYTES ABSOLUTE: 0.8 K/UL (ref 0–1.3)
MONOCYTES RELATIVE PERCENT: 6.2 %
NEUTROPHILS ABSOLUTE: 9.1 K/UL (ref 1.7–7.7)
NEUTROPHILS RELATIVE PERCENT: 69.7 %
NITRITE, URINE: NEGATIVE
O2 CONTENT, VEN: 18 VOL %
O2 SAT, VEN: 100 %
O2 THERAPY: ABNORMAL
PCO2, VEN: 40.6 MMHG (ref 40–50)
PDW BLD-RTO: 16.9 % (ref 12.4–15.4)
PH UA: 5.5 (ref 5–8)
PH VENOUS: 7.4 (ref 7.35–7.45)
PLATELET # BLD: 220 K/UL (ref 135–450)
PMV BLD AUTO: 8 FL (ref 5–10.5)
PO2, VEN: 158 MMHG (ref 25–40)
POTASSIUM REFLEX MAGNESIUM: 3.9 MMOL/L (ref 3.5–5.1)
PRO-BNP: 77 PG/ML (ref 0–449)
PROTEIN UA: ABNORMAL MG/DL
PROTHROMBIN TIME: 11.7 SEC (ref 9.9–12.7)
RBC # BLD: 4.73 M/UL (ref 4–5.2)
RBC UA: 16 /HPF (ref 0–4)
SODIUM BLD-SCNC: 138 MMOL/L (ref 136–145)
SPECIFIC GRAVITY UA: 1.02 (ref 1–1.03)
TCO2 CALC VENOUS: 60 MMOL/L
TOTAL PROTEIN: 5.7 G/DL (ref 6.4–8.2)
TROPONIN: <0.01 NG/ML
URINE REFLEX TO CULTURE: YES
URINE TYPE: ABNORMAL
UROBILINOGEN, URINE: 0.2 E.U./DL
WBC # BLD: 13 K/UL (ref 4–11)
WBC UA: 403 /HPF (ref 0–5)

## 2021-08-14 PROCEDURE — 2060000000 HC ICU INTERMEDIATE R&B

## 2021-08-14 PROCEDURE — 83036 HEMOGLOBIN GLYCOSYLATED A1C: CPT

## 2021-08-14 PROCEDURE — 93005 ELECTROCARDIOGRAM TRACING: CPT | Performed by: STUDENT IN AN ORGANIZED HEALTH CARE EDUCATION/TRAINING PROGRAM

## 2021-08-14 PROCEDURE — 87088 URINE BACTERIA CULTURE: CPT

## 2021-08-14 PROCEDURE — 36415 COLL VENOUS BLD VENIPUNCTURE: CPT

## 2021-08-14 PROCEDURE — 83880 ASSAY OF NATRIURETIC PEPTIDE: CPT

## 2021-08-14 PROCEDURE — 84484 ASSAY OF TROPONIN QUANT: CPT

## 2021-08-14 PROCEDURE — 87040 BLOOD CULTURE FOR BACTERIA: CPT

## 2021-08-14 PROCEDURE — 82803 BLOOD GASES ANY COMBINATION: CPT

## 2021-08-14 PROCEDURE — 71045 X-RAY EXAM CHEST 1 VIEW: CPT

## 2021-08-14 PROCEDURE — 96365 THER/PROPH/DIAG IV INF INIT: CPT

## 2021-08-14 PROCEDURE — 2700000000 HC OXYGEN THERAPY PER DAY

## 2021-08-14 PROCEDURE — 99284 EMERGENCY DEPT VISIT MOD MDM: CPT

## 2021-08-14 PROCEDURE — 80053 COMPREHEN METABOLIC PANEL: CPT

## 2021-08-14 PROCEDURE — 6360000002 HC RX W HCPCS: Performed by: PHYSICIAN ASSISTANT

## 2021-08-14 PROCEDURE — 96375 TX/PRO/DX INJ NEW DRUG ADDON: CPT

## 2021-08-14 PROCEDURE — 2580000003 HC RX 258: Performed by: PHYSICIAN ASSISTANT

## 2021-08-14 PROCEDURE — 6370000000 HC RX 637 (ALT 250 FOR IP): Performed by: PHYSICIAN ASSISTANT

## 2021-08-14 PROCEDURE — 96366 THER/PROPH/DIAG IV INF ADDON: CPT

## 2021-08-14 PROCEDURE — 83605 ASSAY OF LACTIC ACID: CPT

## 2021-08-14 PROCEDURE — 87086 URINE CULTURE/COLONY COUNT: CPT

## 2021-08-14 PROCEDURE — 96361 HYDRATE IV INFUSION ADD-ON: CPT

## 2021-08-14 PROCEDURE — 81001 URINALYSIS AUTO W/SCOPE: CPT

## 2021-08-14 PROCEDURE — 87186 SC STD MICRODIL/AGAR DIL: CPT

## 2021-08-14 PROCEDURE — 85025 COMPLETE CBC W/AUTO DIFF WBC: CPT

## 2021-08-14 PROCEDURE — 94761 N-INVAS EAR/PLS OXIMETRY MLT: CPT

## 2021-08-14 PROCEDURE — 1200000000 HC SEMI PRIVATE

## 2021-08-14 PROCEDURE — 94640 AIRWAY INHALATION TREATMENT: CPT

## 2021-08-14 PROCEDURE — 85610 PROTHROMBIN TIME: CPT

## 2021-08-14 RX ORDER — SODIUM CHLORIDE 0.9 % (FLUSH) 0.9 %
5-40 SYRINGE (ML) INJECTION PRN
Status: DISCONTINUED | OUTPATIENT
Start: 2021-08-14 | End: 2021-08-14 | Stop reason: SDUPTHER

## 2021-08-14 RX ORDER — ROSUVASTATIN CALCIUM 10 MG/1
5 TABLET, COATED ORAL NIGHTLY
Status: DISCONTINUED | OUTPATIENT
Start: 2021-08-15 | End: 2021-08-18 | Stop reason: HOSPADM

## 2021-08-14 RX ORDER — FENTANYL CITRATE 50 UG/ML
100 INJECTION, SOLUTION INTRAMUSCULAR; INTRAVENOUS ONCE
Status: COMPLETED | OUTPATIENT
Start: 2021-08-14 | End: 2021-08-14

## 2021-08-14 RX ORDER — SODIUM CHLORIDE 9 MG/ML
INJECTION, SOLUTION INTRAVENOUS CONTINUOUS
Status: DISCONTINUED | OUTPATIENT
Start: 2021-08-15 | End: 2021-08-16

## 2021-08-14 RX ORDER — GLIPIZIDE 5 MG/1
5 TABLET ORAL
Status: DISCONTINUED | OUTPATIENT
Start: 2021-08-15 | End: 2021-08-18 | Stop reason: HOSPADM

## 2021-08-14 RX ORDER — ALBUTEROL SULFATE 2.5 MG/3ML
5 SOLUTION RESPIRATORY (INHALATION) ONCE
Status: COMPLETED | OUTPATIENT
Start: 2021-08-14 | End: 2021-08-14

## 2021-08-14 RX ORDER — NITROGLYCERIN 0.4 MG/1
0.4 TABLET SUBLINGUAL EVERY 5 MIN PRN
Status: DISCONTINUED | OUTPATIENT
Start: 2021-08-14 | End: 2021-08-15 | Stop reason: ALTCHOICE

## 2021-08-14 RX ORDER — IPRATROPIUM BROMIDE AND ALBUTEROL SULFATE 2.5; .5 MG/3ML; MG/3ML
1 SOLUTION RESPIRATORY (INHALATION)
Status: DISCONTINUED | OUTPATIENT
Start: 2021-08-15 | End: 2021-08-18 | Stop reason: HOSPADM

## 2021-08-14 RX ORDER — SODIUM CHLORIDE 9 MG/ML
25 INJECTION, SOLUTION INTRAVENOUS PRN
Status: DISCONTINUED | OUTPATIENT
Start: 2021-08-14 | End: 2021-08-18 | Stop reason: HOSPADM

## 2021-08-14 RX ORDER — ACETAMINOPHEN 325 MG/1
650 TABLET ORAL EVERY 6 HOURS PRN
Status: DISCONTINUED | OUTPATIENT
Start: 2021-08-14 | End: 2021-08-18 | Stop reason: HOSPADM

## 2021-08-14 RX ORDER — OMEPRAZOLE 10 MG/1
40 CAPSULE, DELAYED RELEASE ORAL DAILY
Status: DISCONTINUED | OUTPATIENT
Start: 2021-08-15 | End: 2021-08-15 | Stop reason: CLARIF

## 2021-08-14 RX ORDER — NICOTINE POLACRILEX 4 MG
15 LOZENGE BUCCAL PRN
Status: DISCONTINUED | OUTPATIENT
Start: 2021-08-14 | End: 2021-08-18 | Stop reason: HOSPADM

## 2021-08-14 RX ORDER — SODIUM CHLORIDE 9 MG/ML
30 INJECTION, SOLUTION INTRAVENOUS ONCE
Status: COMPLETED | OUTPATIENT
Start: 2021-08-14 | End: 2021-08-14

## 2021-08-14 RX ORDER — IPRATROPIUM BROMIDE AND ALBUTEROL SULFATE 2.5; .5 MG/3ML; MG/3ML
1 SOLUTION RESPIRATORY (INHALATION) 4 TIMES DAILY
Status: DISCONTINUED | OUTPATIENT
Start: 2021-08-15 | End: 2021-08-15 | Stop reason: SDUPTHER

## 2021-08-14 RX ORDER — IBUPROFEN 400 MG/1
400 TABLET ORAL EVERY 6 HOURS PRN
Status: DISCONTINUED | OUTPATIENT
Start: 2021-08-14 | End: 2021-08-15 | Stop reason: ALTCHOICE

## 2021-08-14 RX ORDER — INSULIN LISPRO 100 [IU]/ML
0-6 INJECTION, SOLUTION INTRAVENOUS; SUBCUTANEOUS NIGHTLY
Status: DISCONTINUED | OUTPATIENT
Start: 2021-08-15 | End: 2021-08-18 | Stop reason: HOSPADM

## 2021-08-14 RX ORDER — INSULIN LISPRO 100 [IU]/ML
0-12 INJECTION, SOLUTION INTRAVENOUS; SUBCUTANEOUS
Status: DISCONTINUED | OUTPATIENT
Start: 2021-08-15 | End: 2021-08-18 | Stop reason: HOSPADM

## 2021-08-14 RX ORDER — HYDRALAZINE HYDROCHLORIDE 20 MG/ML
10 INJECTION INTRAMUSCULAR; INTRAVENOUS EVERY 6 HOURS PRN
Status: DISCONTINUED | OUTPATIENT
Start: 2021-08-14 | End: 2021-08-18 | Stop reason: HOSPADM

## 2021-08-14 RX ORDER — BUDESONIDE AND FORMOTEROL FUMARATE DIHYDRATE 160; 4.5 UG/1; UG/1
2 AEROSOL RESPIRATORY (INHALATION) 2 TIMES DAILY
Status: DISCONTINUED | OUTPATIENT
Start: 2021-08-15 | End: 2021-08-18 | Stop reason: HOSPADM

## 2021-08-14 RX ORDER — LISINOPRIL 10 MG/1
10 TABLET ORAL DAILY
Status: DISCONTINUED | OUTPATIENT
Start: 2021-08-15 | End: 2021-08-18 | Stop reason: HOSPADM

## 2021-08-14 RX ORDER — ASPIRIN 81 MG/1
81 TABLET, CHEWABLE ORAL DAILY
Status: DISCONTINUED | OUTPATIENT
Start: 2021-08-15 | End: 2021-08-18 | Stop reason: HOSPADM

## 2021-08-14 RX ORDER — ONDANSETRON 2 MG/ML
4 INJECTION INTRAMUSCULAR; INTRAVENOUS ONCE
Status: COMPLETED | OUTPATIENT
Start: 2021-08-14 | End: 2021-08-14

## 2021-08-14 RX ORDER — 0.9 % SODIUM CHLORIDE 0.9 %
500 INTRAVENOUS SOLUTION INTRAVENOUS PRN
Status: DISCONTINUED | OUTPATIENT
Start: 2021-08-14 | End: 2021-08-18 | Stop reason: HOSPADM

## 2021-08-14 RX ORDER — ONDANSETRON 2 MG/ML
4 INJECTION INTRAMUSCULAR; INTRAVENOUS EVERY 6 HOURS PRN
Status: DISCONTINUED | OUTPATIENT
Start: 2021-08-14 | End: 2021-08-18 | Stop reason: HOSPADM

## 2021-08-14 RX ORDER — AZITHROMYCIN 250 MG/1
500 TABLET, FILM COATED ORAL EVERY 24 HOURS
Status: DISCONTINUED | OUTPATIENT
Start: 2021-08-15 | End: 2021-08-17

## 2021-08-14 RX ORDER — DEXTROSE MONOHYDRATE 50 MG/ML
100 INJECTION, SOLUTION INTRAVENOUS PRN
Status: DISCONTINUED | OUTPATIENT
Start: 2021-08-14 | End: 2021-08-18 | Stop reason: HOSPADM

## 2021-08-14 RX ORDER — 0.9 % SODIUM CHLORIDE 0.9 %
1000 INTRAVENOUS SOLUTION INTRAVENOUS ONCE
Status: DISCONTINUED | OUTPATIENT
Start: 2021-08-14 | End: 2021-08-18 | Stop reason: HOSPADM

## 2021-08-14 RX ORDER — SODIUM CHLORIDE 0.9 % (FLUSH) 0.9 %
10 SYRINGE (ML) INJECTION PRN
Status: DISCONTINUED | OUTPATIENT
Start: 2021-08-14 | End: 2021-08-18 | Stop reason: HOSPADM

## 2021-08-14 RX ORDER — LANCETS 30 GAUGE
1 EACH MISCELLANEOUS 2 TIMES DAILY
Status: DISCONTINUED | OUTPATIENT
Start: 2021-08-15 | End: 2021-08-15

## 2021-08-14 RX ORDER — ONDANSETRON 4 MG/1
4 TABLET, ORALLY DISINTEGRATING ORAL EVERY 8 HOURS PRN
Status: DISCONTINUED | OUTPATIENT
Start: 2021-08-14 | End: 2021-08-18 | Stop reason: HOSPADM

## 2021-08-14 RX ORDER — 0.9 % SODIUM CHLORIDE 0.9 %
500 INTRAVENOUS SOLUTION INTRAVENOUS ONCE
Status: COMPLETED | OUTPATIENT
Start: 2021-08-14 | End: 2021-08-14

## 2021-08-14 RX ORDER — ACETAMINOPHEN 650 MG/1
650 SUPPOSITORY RECTAL EVERY 6 HOURS PRN
Status: DISCONTINUED | OUTPATIENT
Start: 2021-08-14 | End: 2021-08-18 | Stop reason: HOSPADM

## 2021-08-14 RX ORDER — ISOSORBIDE MONONITRATE 60 MG/1
60 TABLET, EXTENDED RELEASE ORAL DAILY
Status: DISCONTINUED | OUTPATIENT
Start: 2021-08-15 | End: 2021-08-18 | Stop reason: HOSPADM

## 2021-08-14 RX ORDER — ALPRAZOLAM 0.5 MG/1
1 TABLET ORAL 3 TIMES DAILY PRN
Status: DISCONTINUED | OUTPATIENT
Start: 2021-08-14 | End: 2021-08-18 | Stop reason: HOSPADM

## 2021-08-14 RX ORDER — SODIUM CHLORIDE 0.9 % (FLUSH) 0.9 %
5-40 SYRINGE (ML) INJECTION EVERY 12 HOURS SCHEDULED
Status: DISCONTINUED | OUTPATIENT
Start: 2021-08-14 | End: 2021-08-14 | Stop reason: SDUPTHER

## 2021-08-14 RX ORDER — NITROFURANTOIN 25; 75 MG/1; MG/1
100 CAPSULE ORAL 2 TIMES DAILY
Status: DISCONTINUED | OUTPATIENT
Start: 2021-08-15 | End: 2021-08-16 | Stop reason: ALTCHOICE

## 2021-08-14 RX ORDER — POTASSIUM CHLORIDE 7.45 MG/ML
10 INJECTION INTRAVENOUS PRN
Status: DISCONTINUED | OUTPATIENT
Start: 2021-08-14 | End: 2021-08-18 | Stop reason: HOSPADM

## 2021-08-14 RX ORDER — IPRATROPIUM BROMIDE AND ALBUTEROL SULFATE 2.5; .5 MG/3ML; MG/3ML
1 SOLUTION RESPIRATORY (INHALATION) ONCE
Status: COMPLETED | OUTPATIENT
Start: 2021-08-14 | End: 2021-08-14

## 2021-08-14 RX ORDER — ALBUTEROL SULFATE 90 UG/1
2 AEROSOL, METERED RESPIRATORY (INHALATION) 4 TIMES DAILY
Status: DISCONTINUED | OUTPATIENT
Start: 2021-08-15 | End: 2021-08-15

## 2021-08-14 RX ORDER — SODIUM CHLORIDE 9 MG/ML
25 INJECTION, SOLUTION INTRAVENOUS PRN
Status: DISCONTINUED | OUTPATIENT
Start: 2021-08-14 | End: 2021-08-14 | Stop reason: SDUPTHER

## 2021-08-14 RX ORDER — ASPIRIN 81 MG/1
324 TABLET, CHEWABLE ORAL ONCE
Status: DISCONTINUED | OUTPATIENT
Start: 2021-08-14 | End: 2021-08-14 | Stop reason: HOSPADM

## 2021-08-14 RX ORDER — HYDRALAZINE HYDROCHLORIDE 25 MG/1
25 TABLET, FILM COATED ORAL 3 TIMES DAILY
Status: DISCONTINUED | OUTPATIENT
Start: 2021-08-15 | End: 2021-08-18 | Stop reason: HOSPADM

## 2021-08-14 RX ORDER — DEXTROSE MONOHYDRATE 25 G/50ML
12.5 INJECTION, SOLUTION INTRAVENOUS PRN
Status: DISCONTINUED | OUTPATIENT
Start: 2021-08-14 | End: 2021-08-18 | Stop reason: HOSPADM

## 2021-08-14 RX ORDER — SODIUM CHLORIDE 0.9 % (FLUSH) 0.9 %
5-40 SYRINGE (ML) INJECTION EVERY 12 HOURS SCHEDULED
Status: DISCONTINUED | OUTPATIENT
Start: 2021-08-15 | End: 2021-08-18 | Stop reason: HOSPADM

## 2021-08-14 RX ORDER — POTASSIUM CHLORIDE 20 MEQ/1
40 TABLET, EXTENDED RELEASE ORAL PRN
Status: DISCONTINUED | OUTPATIENT
Start: 2021-08-14 | End: 2021-08-18 | Stop reason: HOSPADM

## 2021-08-14 RX ORDER — FUROSEMIDE 40 MG/1
40 TABLET ORAL DAILY
Status: DISCONTINUED | OUTPATIENT
Start: 2021-08-15 | End: 2021-08-18 | Stop reason: HOSPADM

## 2021-08-14 RX ADMIN — ONDANSETRON 4 MG: 2 INJECTION INTRAMUSCULAR; INTRAVENOUS at 19:19

## 2021-08-14 RX ADMIN — IPRATROPIUM BROMIDE AND ALBUTEROL SULFATE 1 AMPULE: .5; 3 SOLUTION RESPIRATORY (INHALATION) at 19:51

## 2021-08-14 RX ADMIN — SODIUM CHLORIDE 1779 ML: 9 INJECTION, SOLUTION INTRAVENOUS at 20:55

## 2021-08-14 RX ADMIN — SODIUM CHLORIDE 500 ML: 9 INJECTION, SOLUTION INTRAVENOUS at 19:20

## 2021-08-14 RX ADMIN — Medication 1000 MG: at 20:50

## 2021-08-14 RX ADMIN — ALBUTEROL SULFATE 5 MG: 2.5 SOLUTION RESPIRATORY (INHALATION) at 19:51

## 2021-08-14 RX ADMIN — AZITHROMYCIN MONOHYDRATE 500 MG: 500 INJECTION, POWDER, LYOPHILIZED, FOR SOLUTION INTRAVENOUS at 20:56

## 2021-08-14 RX ADMIN — FENTANYL CITRATE 100 MCG: 50 INJECTION, SOLUTION INTRAMUSCULAR; INTRAVENOUS at 19:20

## 2021-08-14 ASSESSMENT — PAIN SCALES - GENERAL
PAINLEVEL_OUTOF10: 6
PAINLEVEL_OUTOF10: 8
PAINLEVEL_OUTOF10: 9
PAINLEVEL_OUTOF10: 9

## 2021-08-14 ASSESSMENT — ENCOUNTER SYMPTOMS
DIARRHEA: 0
VOMITING: 0
COUGH: 1
ABDOMINAL PAIN: 0
SHORTNESS OF BREATH: 1
BACK PAIN: 1
NAUSEA: 1
CHEST TIGHTNESS: 0

## 2021-08-14 ASSESSMENT — PAIN DESCRIPTION - PROGRESSION: CLINICAL_PROGRESSION: NOT CHANGED

## 2021-08-14 ASSESSMENT — PAIN DESCRIPTION - DESCRIPTORS: DESCRIPTORS: PRESSURE;CONSTANT

## 2021-08-14 ASSESSMENT — PAIN DESCRIPTION - PAIN TYPE: TYPE: ACUTE PAIN

## 2021-08-14 ASSESSMENT — PAIN DESCRIPTION - LOCATION: LOCATION: BACK;ABDOMEN

## 2021-08-14 ASSESSMENT — PAIN DESCRIPTION - ONSET: ONSET: ON-GOING

## 2021-08-14 ASSESSMENT — PAIN DESCRIPTION - ORIENTATION: ORIENTATION: LOWER

## 2021-08-14 ASSESSMENT — PAIN DESCRIPTION - FREQUENCY: FREQUENCY: CONTINUOUS

## 2021-08-14 NOTE — ED NOTES
Bed: 12  Expected date:   Expected time:   Means of arrival:   Comments:  HUY Villanueva 68  08/14/21 1826

## 2021-08-14 NOTE — ED NOTES
[place], [place] notified of patients b/p 82/50, see new orders.       [address], RN  08/14/21 1950

## 2021-08-14 NOTE — ED NOTES
[facility], [place] notified of pts b/p of 89/69, states to hold nitro till fluids finish at this time.       [address], RN  08/14/21 8232

## 2021-08-14 NOTE — ED PROVIDER NOTES
[address] [facility]        Pt Name: Josep Monk  MRN: [id number]  [place] 1945  Date of evaluation: 8/14/2021  Provider: Kaia Garcia PA-C  PCP: Euell Babinski, MD  Note Started: 7:04 PM EDT       YOUSIF. I have evaluated this patient. My supervising physician was available for consultation. CHIEF COMPLAINT       Chief Complaint   Patient presents with    Urinary Tract Infection     pt sent by PCP due to UTI for around a month, pt with upset stomach as well, just started on new antibiotics    Chest Pain     pt states heaviness to chest upon transport, hx of 2 MIs, jaw and left arm pain as well, given ASA in route       HISTORY OF PRESENT ILLNESS   (Location, Timing/Onset, Context/Setting, Quality, Duration, Modifying Factors, Severity, Associated Signs and Symptoms)  Note limiting factors. Chief Complaint: Chest pain and concerns for ongoing urinary tract infection. Josep Monk is a 77 y.o. female who presents to the emergency department at the request of her primary care physician Dr. Raphael Sandoval. Patient states approximately 3:00 this afternoon she started experiencing significant heaviness in her chest.  It is midsternal in nature and does radiate to the left arm and to the left jaw. She also has associated symptoms of nausea with this as well. She states that she has not having difficulties as a pertains to diaphoresis. She denies fevers chills or change in her chronic cough from her COPD. She tells me that she is oxygen dependent at night \"because of everything\". Patient states she was given aspirin in route. She states that the pain essentially has not changed. She still feels it in the midsternal region as a heavy pressure and rates that pain to be 9 out of 10. She also does not report she is continue to have difficulties with dysuria urgency and frequency.   She states that she had finished a course of antibiotics for Claustrophobia     COPD (chronic obstructive pulmonary disease) (HCC)     Esophageal stricture     Gastroesophageal reflux disease without esophagitis 3/24/2016    Hiatal hernia     Hiatal hernia     Hypercholesteremia     Hypertension     IBS (irritable bowel syndrome)     Migraines     Movement disorder arthritis    Pneumonia     PONV (postoperative nausea and vomiting)     Type II or unspecified type diabetes mellitus without mention of complication, not stated as uncontrolled     type ll does not take insulin at home    Unspecified cerebral artery occlusion with cerebral infarction     many TIA's         SURGICAL HISTORY     Past Surgical History:   Procedure Laterality Date    APPENDECTOMY      BRONCHOSCOPY N/A 1/24/2020    BRONCHOSCOPY ALVEOLAR LAVAGE performed by Orlando Aguirre MD at [address] [facility] N/A 1/27/2020    BRONCHOSCOPY DIAGNOSTIC OR CELL [facility] ONLY performed by Simeon Kowalski MD at [address] [place] [address] N/A 11/23/2020    BRONCHOSCOPY DIAGNOSTIC OR CELL [facility] ONLY performed by Bev Sims MD at [address] [facility] [address]      1 stent 2000 and 1 stent 2001    CATARACT REMOVAL  2013 or 2014    bilateral cataracts removed    CHOLECYSTECTOMY      COLONOSCOPY      COLONOSCOPY  06/11/2018    ENDOSCOPY, COLON, DIAGNOSTIC      ESOPHAGEAL DILATATION      EYE SURGERY      bilateral cataracts    GALLBLADDER SURGERY      HYSTERECTOMY      TX EXC SKIN MALIG <5MM REMAINDR BODY N/A 9/6/2018    EXCISION OF SCALP SQUAMOUS CELL CARCINOMA performed by Marah Mireles MD at [address] [facility] <100SQCM N/A 9/6/2018    SPLIT THICKNESS SKIN GRAFT FOR COVERAGE SCALP, APPLICATION OF WOUND VAC DEVICE performed by Marah Mireles MD at [address] ENDOSCOPY  4/20/12    with biopsy of stomach,gastritis    UPPER GASTROINTESTINAL ENDOSCOPY  06/11/2018 w/esophagael dilation         CURRENTMEDICATIONS       Previous Medications    ALBUTEROL SULFATE  (90 BASE) MCG/ACT INHALER    INHALE TWO PUFFS BY MOUTH EVERY 6 HOURS AS NEEDED FOR WHEEZING    ALPRAZOLAM (XANAX) 1 MG TABLET    Take 1 tablet by mouth 3 times daily as needed for Anxiety for up to 30 days. ASPIRIN 81 MG TABLET    Take 81 mg by mouth daily    BLOOD GLUCOSE TEST STRIPS (TRUE METRIX BLOOD GLUCOSE TEST) STRIP    USE THREE TIMES A DAY AS NEEDED    BUDESONIDE-FORMOTEROL (SYMBICORT) 160-4.5 MCG/ACT AERO    Inhale 2 puffs into the lungs 2 times daily    DICLOFENAC SODIUM (VOLTAREN) 1 % GEL    Apply 2 g topically 2 times daily    FUROSEMIDE (LASIX) 40 MG TABLET    TAKE ONE TABLET BY MOUTH TWICE A DAY    GABAPENTIN (NEURONTIN) 300 MG CAPSULE    Take 1 capsule by mouth 2 times daily for 30 days. Intended supply: 30 days    GLIPIZIDE (GLUCOTROL) 5 MG TABLET    Take 1 tablet by mouth 2 times daily (before meals)    HYDRALAZINE (APRESOLINE) 25 MG TABLET    Take 1 tablet by mouth 3 times daily    IBUPROFEN (ADVIL;MOTRIN) 400 MG TABLET    Take 1 tablet by mouth every 6 hours as needed for Pain or Fever    IPRATROPIUM-ALBUTEROL (DUONEB) 0.5-2.5 (3) MG/3ML SOLN NEBULIZER SOLUTION    INHALE THREE MILLILITERS VIA NEBULIZATION BY MOUTH EVERY 4 HOURS AS NEEDED FOR SHORTNESS OF BREATH    ISOSORBIDE MONONITRATE (IMDUR) 60 MG EXTENDED RELEASE TABLET    Take 1 tablet by mouth daily    LANCETS MISC    1 each by Does not apply route 2 times daily    LINAGLIPTIN (TRADJENTA) 5 MG TABLET    Take 1 tablet by mouth daily    LISINOPRIL (PRINIVIL;ZESTRIL) 10 MG TABLET    TAKE ONE TABLET BY MOUTH DAILY    NITROFURANTOIN, MACROCRYSTAL-MONOHYDRATE, (MACROBID) 100 MG CAPSULE    Take 1 capsule by mouth 2 times daily for 5 days    NITROGLYCERIN (NITROSTAT) 0.4 MG SL TABLET    Place 1 tablet under the tongue every 5 minutes as needed for Chest pain.     NYSTATIN (MYCOSTATIN) 919250 UNIT/GM POWDER    Affected area    OMEPRAZOLE (PRILOSEC) 40 MG DELAYED RELEASE CAPSULE    Take 1 capsule by mouth daily    ROSUVASTATIN (CRESTOR) 10 MG TABLET    Take 0.5 tablets by mouth daily    SODIUM CHLORIDE (OCEAN, BABY AYR) 0.65 % NASAL SPRAY    1 spray by Nasal route as needed for Congestion    TRIAMCINOLONE (KENALOG) 0.1 % CREAM    Apply topically 2 times daily. ALLERGIES     Morphine, Codeine, Hydromorphone, Levaquin [levofloxacin in d5w], Vicodin [hydrocodone-acetaminophen], and Aspirin    FAMILYHISTORY       Family History   Problem Relation Age of Onset    Heart Disease Mother     Cancer Mother     Cancer Father     Cancer Sister     Heart Disease Sister     Heart Disease Brother     High Blood Pressure Neg Hx     High Cholesterol Neg Hx           SOCIAL HISTORY       Social History     Tobacco Use    Smoking status: Former Smoker     Packs/day: 0.25     Years: 49.00     Pack years: 12.25     Types: Cigarettes     Quit date: 2017     Years since quittin.1    Smokeless tobacco: Never Used    Tobacco comment: only smoke when real stressed  Nicotine patch   Vaping Use    Vaping Use: Former    Substances: Always   Substance Use Topics    Alcohol use: Yes     Alcohol/week: 1.0 standard drinks     Types: 1 Glasses of wine per week     Comment: occ. every  6 mo    Drug use: No       SCREENINGS             PHYSICAL EXAM    (up to 7 for level 4, 8 or more for level 5)     ED Triage Vitals [21 1830]   BP Temp Temp Source Pulse Resp SpO2 Height Weight   110/66 98 °F (36.7 °C) Oral 88 13 (!) 89 % 5' 6\" (1.676 m) 265 lb (120.2 kg)       Physical Exam  Vitals and nursing note reviewed. Constitutional:       General: She is awake. She is not in acute distress. Appearance: Normal appearance. She is well-developed. She is morbidly obese. She is ill-appearing (chronically). She is not diaphoretic. Interventions: Nasal cannula in place. HENT:      Head: Normocephalic and atraumatic.  No raccoon eyes, Chaves's sign or laceration. Right Ear: Hearing and external ear normal.      Left Ear: Hearing and external ear normal.      Nose: Nose normal.   Eyes:      General: No scleral icterus. Right eye: No discharge. Left eye: No discharge. Conjunctiva/sclera: Conjunctivae normal.   Neck:      Vascular: No JVD. Cardiovascular:      Rate and Rhythm: Normal rate and regular rhythm. Heart sounds: No murmur heard. No friction rub. No gallop. Pulmonary:      Effort: Pulmonary effort is normal. Tachypnea present. No accessory muscle usage or respiratory distress. Breath sounds: Decreased air movement present. Wheezing present. No rhonchi or rales. Comments: Body habitus does contribute to difficulties with air movement. Abdominal:      General: There is no distension. Palpations: Abdomen is soft. Abdomen is not rigid. There is no mass. Tenderness: There is no abdominal tenderness. There is no guarding or rebound. Musculoskeletal:      Cervical back: Normal range of motion. Skin:     General: Skin is warm and dry. Neurological:      Mental Status: She is alert and oriented to person, place, and time. GCS: GCS eye subscore is 4. GCS verbal subscore is 5. GCS motor subscore is 6. Cranial Nerves: No cranial nerve deficit. Sensory: No sensory deficit. Coordination: Coordination normal.   Psychiatric:         Behavior: Behavior normal. Behavior is cooperative.          DIAGNOSTIC RESULTS   LABS:    Labs Reviewed   CBC WITH AUTO DIFFERENTIAL - Abnormal; Notable for the following components:       Result Value    WBC 13.0 (*)     RDW 16.9 (*)     Neutrophils Absolute 9.1 (*)     All other components within normal limits    Narrative:     Performed at:  [facility] [address], [place], [facility] [address]   Phone [phone]   COMPREHENSIVE METABOLIC PANEL W/ REFLEX TO MG FOR LOW K - Abnormal; Notable for the following components: Glucose 150 (*)     BUN 22 (*)     GFR Non- 44 (*)     GFR  53 (*)     Total Protein 5.7 (*)     Albumin 2.9 (*)     Albumin/Globulin Ratio 1.0 (*)     All other components within normal limits    Narrative:     Performed at:  [facility] [address] [employer]   Phone [phone]   URINE RT REFLEX TO CULTURE - Abnormal; Notable for the following components:    Clarity, UA CLOUDY (*)     Blood, Urine TRACE (*)     Protein, UA TRACE (*)     Leukocyte Esterase, Urine LARGE (*)     All other components within normal limits    Narrative:     Performed at:  [facility] [address] [employer]   Phone [phone]   BLOOD GAS, VENOUS - Abnormal; Notable for the following components:    pO2, Avelino 158.0 (*)     Carboxyhemoglobin 6.8 (*)     All other components within normal limits    Narrative:     Performed at:  [facility] [address] [employer]   Phone [phone]   MICROSCOPIC URINALYSIS - Abnormal; Notable for the following components:    Bacteria, UA 4+ (*)     WBC,  (*)     RBC, UA 16 (*)     All other components within normal limits    Narrative:     Performed at:  [facility] [address] [employer], [employer]   Phone [phone]   CULTURE, URINE   CULTURE, BLOOD 1   CULTURE, BLOOD 2   TROPONIN    Narrative:     Performed at:  [facility] [address] [employer], [employer]   Phone [phone] PEPTIDE    Narrative:     Performed at:  [facility] [address] [employer], [employer]   Phone [phone]   PROTIME-INR    Narrative:     Performed at:  [facility] [address] [employer] [address] [employer], [employer]   Phone [phone]   LACTATE, SEPSIS    Narrative:     Performed at:  [facility] [address], [address]   Phone [phone]   LACTATE, SEPSIS       When ordered only abnormal lab results are displayed. All other labs were within normal range or not returned as of this dictation. EKG: When ordered, EKG's are interpreted by the Emergency Department Physician in the absence of a cardiologist.  Please see their note for interpretation of EKG. RADIOLOGY:   Non-plain film images such as CT, Ultrasound and MRI are read by the radiologist. Plain radiographic images are visualized and preliminarily interpreted by the ED Provider with the below findings:      Interpretation per the Radiologist below, if available at the time of this note:    XR CHEST PORTABLE   Final Result   Questionable right mid lung opacity. PROCEDURES   Unless otherwise noted below, none     Procedures    CRITICAL CARE TIME   Because of the high probability of sudden clinical deterioration of the patients condition and to prevent further deterioration, my critical care time involved my full attention to the patients condition, and included chart data review, documentation, medication ordering, viewing the patients old records, reevaluation of the patient's cardiac, pulmonary, and neurological status. Reassessing vital signs. Consutlations with off service physician. Ordering, interpreting reviewing diagnostic testing. Therefore my critical care time was 35 minutes of direct attention to the patients condition and did not include time spent on procedures. SEP-1 CORE MEASURE DATA    Classification: sepsis    Amount of fluids ordered: at least 30mL/kg based on ideal body weight due to obesity defined as BMI >30 (patient's BMI is Body mass index is 42.77 kg/m².  and IBW is Ideal body weight: 59.3 kg (130 lb 11.7 oz)Adjusted ideal body weight: 83.7 kg (184 lb 7 oz))    Time at which sepsis was identified: 2000    Broad-spectrum antibiotics chosen: based on suspected source of: Pulmonary - Community Acquired    Repeat lactate level: pending    On reassessment after fluid resuscitation:   I have reassessed tissue perfusion and hemodynamic status after fluid bolus    Exclusion criteria: the patient is NOT to be included for sepsis due to:  Patient has sepsis and no end-organ dysfunction is identified and so is not to be included        CONSULTS:  None      EMERGENCY DEPARTMENT COURSE and DIFFERENTIAL DIAGNOSIS/MDM:   Vitals:    Vitals:    08/14/21 2100 08/14/21 2120 08/14/21 2140 08/14/21 2200   BP: (!) 77/50 (!) 86/40 (!) 84/40 (!) 95/42   Pulse: 70 72 69 67   Resp: 21 17 18 16   Temp:       TempSrc:       SpO2: 92% 94% 96% 93%   Weight:       Height:           Patient was given the following medications:  Medications   sodium chloride flush 0.9 % injection 5-40 mL (has no administration in time range)   sodium chloride flush 0.9 % injection 5-40 mL (has no administration in time range)   0.9 % sodium chloride infusion (has no administration in time range)   aspirin chewable tablet 324 mg (has no administration in time range)   fentaNYL (SUBLIMAZE) injection 100 mcg (100 mcg Intravenous Given 8/14/21 1920)   ondansetron (ZOFRAN) injection 4 mg (4 mg Intravenous Given 8/14/21 1919)   ipratropium-albuterol (DUONEB) nebulizer solution 1 ampule (1 ampule Inhalation Given 8/14/21 1951)   albuterol (PROVENTIL) nebulizer solution 5 mg (5 mg Nebulization Given 8/14/21 1951)   0.9 % sodium chloride bolus (0 mLs Intravenous Stopped 8/14/21 2157)   0.9 % sodium chloride infusion (1,779 mLs Intravenous New Bag 8/14/21 2055)   cefTRIAXone (ROCEPHIN) 1000 mg in sterile water 10 mL IV syringe (1,000 mg Intravenous Given 8/14/21 2050)   azithromycin (ZITHROMAX) 500 mg in D5W 250ml Vial Mate (0 mg Intravenous Stopped 8/14/21 2202)           The patient's detailed history of present illness is documented as above.  Upon arrival to the emergency department the patient's vital signs are as documented. The patient is noted to be hemodynamically stable and afebrile. Physical examination findings are as above. Patient tells me she does have to wear oxygen at night because of difficulties with hypoxia. She was borderline here and was placed on supplemental oxygen. She was noted to be wheezing and was given DuoNeb and albuterol. EKG performed upon arrival demonstrates a sinus rhythm the rate of 85. PVCs noted. Left axis deviation. Normal intervals. No evidence of acute ST elevation. Please see attending physician details for further EKG interpretation and comparison. Portable chest x-ray demonstrates a questionable right midlung opacity. In light of the above-mentioned this will be treated as community-acquired pneumonia. CBC does demonstrate evidence of leukocytosis with white count of 13. No evidence of anemia and/or thrombocytopenia. BUN is 22 creatinine is 1.2 nonfasting glucose 150 electrolytes and LFTs unremarkable. Troponin is less than 0.01 proBNP is 77.  UA demonstrates evidence of infection. Antibiotics for pneumonia will cover UTI as well. Venous blood gas demonstrates a normal pH without hypercapnia or hypoxia. Patient did have soft pressures here in the emergency department. It seemed as if she initially had responded to IV fluids but then not so much the case. Sepsis level IV fluid was initiated with the above-mentioned antibiotics. Because of the soft pressure the nitroglycerin paste was held and it appears that this likely is secondary to the patient's pneumonia. Patient is resting comfortably and reports her pain is controlled and her chest pain is likely secondary to the pneumonia. At the time of this dictation the pressures responded nicely and she has very systolic blood pressure of 95. At the present time she has not received all of her sepsis fluid. I do not believe at the present time that she needs a central line or pressor support.   Case was discussed directly with Dr. Maddy Maxwell. Patient be admitted to medical surgical services for ongoing care management the diagnoses below. FINAL IMPRESSION      1. Sepsis without acute organ dysfunction, due to unspecified organism ([facility] [address]    2. Hypotension responsive to IV hydration, unspecified hypotension type    3. Community acquired pneumonia of right middle lobe of lung    4. COPD with acute exacerbation ([facility] [address]    5. Bacterial urinary tract infection    6. Chest pain, unspecified type    7. History of coronary artery disease    8. Chronic low back pain without sciatica, unspecified back pain laterality    9.  Supplemental oxygen dependent          DISPOSITION/PLAN   DISPOSITION: Admit medical stable condition         (Please note that portions of this note were completed with a voice recognition program.  Efforts were made to edit the dictations but occasionally words are mis-transcribed.)    Lois Birmingham PA-C (electronically signed)           Sean Richardson PA-C  08/14/21 0421

## 2021-08-15 ENCOUNTER — APPOINTMENT (OUTPATIENT)
Dept: GENERAL RADIOLOGY | Age: 76
DRG: 871 | End: 2021-08-15
Payer: COMMERCIAL

## 2021-08-15 LAB
A/G RATIO: 1.2 (ref 1.1–2.2)
ALBUMIN SERPL-MCNC: 3.1 G/DL (ref 3.4–5)
ALP BLD-CCNC: 71 U/L (ref 40–129)
ALT SERPL-CCNC: 12 U/L (ref 10–40)
ANION GAP SERPL CALCULATED.3IONS-SCNC: 9 MMOL/L (ref 3–16)
AST SERPL-CCNC: 19 U/L (ref 15–37)
BASOPHILS ABSOLUTE: 0.1 K/UL (ref 0–0.2)
BASOPHILS RELATIVE PERCENT: 0.7 %
BILIRUB SERPL-MCNC: <0.2 MG/DL (ref 0–1)
BUN BLDV-MCNC: 23 MG/DL (ref 7–20)
C DIFF TOXIN/ANTIGEN: NORMAL
CALCIUM SERPL-MCNC: 8.6 MG/DL (ref 8.3–10.6)
CHLORIDE BLD-SCNC: 102 MMOL/L (ref 99–110)
CO2: 26 MMOL/L (ref 21–32)
CREAT SERPL-MCNC: 1.2 MG/DL (ref 0.6–1.2)
EKG ATRIAL RATE: 85 BPM
EKG DIAGNOSIS: NORMAL
EKG P AXIS: 93 DEGREES
EKG P-R INTERVAL: 174 MS
EKG Q-T INTERVAL: 372 MS
EKG QRS DURATION: 112 MS
EKG QTC CALCULATION (BAZETT): 442 MS
EKG R AXIS: -55 DEGREES
EKG T AXIS: 82 DEGREES
EKG VENTRICULAR RATE: 85 BPM
EOSINOPHILS ABSOLUTE: 0.1 K/UL (ref 0–0.6)
EOSINOPHILS RELATIVE PERCENT: 1.1 %
ESTIMATED AVERAGE GLUCOSE: 174.3 MG/DL
GFR AFRICAN AMERICAN: 53
GFR NON-AFRICAN AMERICAN: 44
GLOBULIN: 2.5 G/DL
GLUCOSE BLD-MCNC: 131 MG/DL (ref 70–99)
GLUCOSE BLD-MCNC: 142 MG/DL (ref 70–99)
GLUCOSE BLD-MCNC: 159 MG/DL (ref 70–99)
GLUCOSE BLD-MCNC: 163 MG/DL (ref 70–99)
GLUCOSE BLD-MCNC: 188 MG/DL (ref 70–99)
GLUCOSE BLD-MCNC: 88 MG/DL (ref 70–99)
HBA1C MFR BLD: 7.7 %
HCT VFR BLD CALC: 40.7 % (ref 36–48)
HEMOGLOBIN: 13.3 G/DL (ref 12–16)
LACTIC ACID: 1.2 MMOL/L (ref 0.4–2)
LYMPHOCYTES ABSOLUTE: 2.6 K/UL (ref 1–5.1)
LYMPHOCYTES RELATIVE PERCENT: 25.3 %
MCH RBC QN AUTO: 28.5 PG (ref 26–34)
MCHC RBC AUTO-ENTMCNC: 32.7 G/DL (ref 31–36)
MCV RBC AUTO: 87.2 FL (ref 80–100)
MONOCYTES ABSOLUTE: 0.6 K/UL (ref 0–1.3)
MONOCYTES RELATIVE PERCENT: 6.2 %
NEUTROPHILS ABSOLUTE: 6.9 K/UL (ref 1.7–7.7)
NEUTROPHILS RELATIVE PERCENT: 66.7 %
PDW BLD-RTO: 16.5 % (ref 12.4–15.4)
PERFORMED ON: ABNORMAL
PERFORMED ON: NORMAL
PLATELET # BLD: 189 K/UL (ref 135–450)
PMV BLD AUTO: 7.6 FL (ref 5–10.5)
POTASSIUM REFLEX MAGNESIUM: 4.2 MMOL/L (ref 3.5–5.1)
PROCALCITONIN: 0.06 NG/ML (ref 0–0.15)
RBC # BLD: 4.67 M/UL (ref 4–5.2)
SODIUM BLD-SCNC: 137 MMOL/L (ref 136–145)
TOTAL PROTEIN: 5.6 G/DL (ref 6.4–8.2)
TROPONIN: <0.01 NG/ML
TROPONIN: <0.01 NG/ML
WBC # BLD: 10.4 K/UL (ref 4–11)

## 2021-08-15 PROCEDURE — 94640 AIRWAY INHALATION TREATMENT: CPT

## 2021-08-15 PROCEDURE — 93010 ELECTROCARDIOGRAM REPORT: CPT | Performed by: INTERNAL MEDICINE

## 2021-08-15 PROCEDURE — 87449 NOS EACH ORGANISM AG IA: CPT

## 2021-08-15 PROCEDURE — 6370000000 HC RX 637 (ALT 250 FOR IP): Performed by: INTERNAL MEDICINE

## 2021-08-15 PROCEDURE — 87205 SMEAR GRAM STAIN: CPT

## 2021-08-15 PROCEDURE — 87324 CLOSTRIDIUM AG IA: CPT

## 2021-08-15 PROCEDURE — 84484 ASSAY OF TROPONIN QUANT: CPT

## 2021-08-15 PROCEDURE — 2060000000 HC ICU INTERMEDIATE R&B

## 2021-08-15 PROCEDURE — 87070 CULTURE OTHR SPECIMN AEROBIC: CPT

## 2021-08-15 PROCEDURE — 6360000002 HC RX W HCPCS: Performed by: INTERNAL MEDICINE

## 2021-08-15 PROCEDURE — 2700000000 HC OXYGEN THERAPY PER DAY

## 2021-08-15 PROCEDURE — 85025 COMPLETE CBC W/AUTO DIFF WBC: CPT

## 2021-08-15 PROCEDURE — 71046 X-RAY EXAM CHEST 2 VIEWS: CPT

## 2021-08-15 PROCEDURE — 36415 COLL VENOUS BLD VENIPUNCTURE: CPT

## 2021-08-15 PROCEDURE — 94761 N-INVAS EAR/PLS OXIMETRY MLT: CPT

## 2021-08-15 PROCEDURE — 84145 PROCALCITONIN (PCT): CPT

## 2021-08-15 PROCEDURE — 51798 US URINE CAPACITY MEASURE: CPT

## 2021-08-15 PROCEDURE — 2580000003 HC RX 258: Performed by: INTERNAL MEDICINE

## 2021-08-15 PROCEDURE — 1200000000 HC SEMI PRIVATE

## 2021-08-15 PROCEDURE — 83605 ASSAY OF LACTIC ACID: CPT

## 2021-08-15 PROCEDURE — 80053 COMPREHEN METABOLIC PANEL: CPT

## 2021-08-15 PROCEDURE — 87040 BLOOD CULTURE FOR BACTERIA: CPT

## 2021-08-15 RX ORDER — GABAPENTIN 300 MG/1
600 CAPSULE ORAL NIGHTLY
Status: DISCONTINUED | OUTPATIENT
Start: 2021-08-15 | End: 2021-08-18 | Stop reason: HOSPADM

## 2021-08-15 RX ORDER — ALBUTEROL SULFATE 90 UG/1
2 AEROSOL, METERED RESPIRATORY (INHALATION) EVERY 4 HOURS PRN
Status: DISCONTINUED | OUTPATIENT
Start: 2021-08-15 | End: 2021-08-18 | Stop reason: HOSPADM

## 2021-08-15 RX ORDER — PANTOPRAZOLE SODIUM 40 MG/1
40 TABLET, DELAYED RELEASE ORAL
Status: DISCONTINUED | OUTPATIENT
Start: 2021-08-15 | End: 2021-08-18 | Stop reason: HOSPADM

## 2021-08-15 RX ORDER — DEXTROMETHORPHAN POLISTIREX 30 MG/5ML
30 SUSPENSION ORAL EVERY 12 HOURS SCHEDULED
Status: DISCONTINUED | OUTPATIENT
Start: 2021-08-15 | End: 2021-08-18 | Stop reason: HOSPADM

## 2021-08-15 RX ORDER — ALOGLIPTIN 12.5 MG/1
12.5 TABLET, FILM COATED ORAL DAILY
Status: DISCONTINUED | OUTPATIENT
Start: 2021-08-15 | End: 2021-08-18 | Stop reason: HOSPADM

## 2021-08-15 RX ADMIN — GABAPENTIN 600 MG: 300 CAPSULE ORAL at 21:57

## 2021-08-15 RX ADMIN — IPRATROPIUM BROMIDE AND ALBUTEROL SULFATE 1 AMPULE: .5; 3 SOLUTION RESPIRATORY (INHALATION) at 08:38

## 2021-08-15 RX ADMIN — ALPRAZOLAM 1 MG: 0.5 TABLET ORAL at 01:01

## 2021-08-15 RX ADMIN — INSULIN LISPRO 1 UNITS: 100 INJECTION, SOLUTION INTRAVENOUS; SUBCUTANEOUS at 22:09

## 2021-08-15 RX ADMIN — DICLOFENAC SODIUM 4 G: 10 GEL TOPICAL at 10:57

## 2021-08-15 RX ADMIN — AZITHROMYCIN MONOHYDRATE 500 MG: 250 TABLET ORAL at 21:59

## 2021-08-15 RX ADMIN — Medication 2 PUFF: at 08:38

## 2021-08-15 RX ADMIN — IPRATROPIUM BROMIDE AND ALBUTEROL SULFATE 1 AMPULE: .5; 3 SOLUTION RESPIRATORY (INHALATION) at 11:44

## 2021-08-15 RX ADMIN — ASPIRIN 81 MG: 81 TABLET, CHEWABLE ORAL at 08:28

## 2021-08-15 RX ADMIN — Medication 10 ML: at 23:51

## 2021-08-15 RX ADMIN — SODIUM CHLORIDE: 9 INJECTION, SOLUTION INTRAVENOUS at 00:03

## 2021-08-15 RX ADMIN — ISOSORBIDE MONONITRATE 60 MG: 60 TABLET, EXTENDED RELEASE ORAL at 08:27

## 2021-08-15 RX ADMIN — INSULIN LISPRO 2 UNITS: 100 INJECTION, SOLUTION INTRAVENOUS; SUBCUTANEOUS at 17:07

## 2021-08-15 RX ADMIN — GLIPIZIDE 5 MG: 5 TABLET ORAL at 16:58

## 2021-08-15 RX ADMIN — ALPRAZOLAM 1 MG: 0.5 TABLET ORAL at 07:04

## 2021-08-15 RX ADMIN — NITROFURANTOIN (MONOHYDRATE/MACROCRYSTALS) 100 MG: 75; 25 CAPSULE ORAL at 08:27

## 2021-08-15 RX ADMIN — SODIUM CHLORIDE: 9 INJECTION, SOLUTION INTRAVENOUS at 21:19

## 2021-08-15 RX ADMIN — ALOGLIPTIN 12.5 MG: 12.5 TABLET, FILM COATED ORAL at 08:28

## 2021-08-15 RX ADMIN — Medication 1000 MG: at 23:46

## 2021-08-15 RX ADMIN — ROSUVASTATIN CALCIUM 5 MG: 10 TABLET, COATED ORAL at 21:57

## 2021-08-15 RX ADMIN — GLIPIZIDE 5 MG: 5 TABLET ORAL at 08:41

## 2021-08-15 RX ADMIN — ONDANSETRON 4 MG: 4 TABLET, ORALLY DISINTEGRATING ORAL at 21:57

## 2021-08-15 RX ADMIN — FUROSEMIDE 40 MG: 40 TABLET ORAL at 08:48

## 2021-08-15 RX ADMIN — ENOXAPARIN SODIUM 40 MG: 40 INJECTION SUBCUTANEOUS at 08:27

## 2021-08-15 RX ADMIN — ROSUVASTATIN CALCIUM 5 MG: 10 TABLET, COATED ORAL at 01:01

## 2021-08-15 RX ADMIN — NITROFURANTOIN (MONOHYDRATE/MACROCRYSTALS) 100 MG: 75; 25 CAPSULE ORAL at 21:57

## 2021-08-15 RX ADMIN — DICLOFENAC SODIUM 4 G: 10 GEL TOPICAL at 21:59

## 2021-08-15 RX ADMIN — ALPRAZOLAM 1 MG: 0.5 TABLET ORAL at 21:58

## 2021-08-15 RX ADMIN — DEXTROMETHORPHAN POLISTIREX 30 MG: 30 SUSPENSION ORAL at 21:46

## 2021-08-15 RX ADMIN — INSULIN LISPRO 2 UNITS: 100 INJECTION, SOLUTION INTRAVENOUS; SUBCUTANEOUS at 13:01

## 2021-08-15 ASSESSMENT — PAIN DESCRIPTION - FREQUENCY
FREQUENCY: CONTINUOUS
FREQUENCY: INTERMITTENT

## 2021-08-15 ASSESSMENT — PAIN DESCRIPTION - LOCATION
LOCATION: GENERALIZED
LOCATION: HEAD;GENERALIZED
LOCATION: LEG
LOCATION: HEAD
LOCATION: GENERALIZED

## 2021-08-15 ASSESSMENT — PAIN SCALES - GENERAL
PAINLEVEL_OUTOF10: 7
PAINLEVEL_OUTOF10: 8
PAINLEVEL_OUTOF10: 10
PAINLEVEL_OUTOF10: 8
PAINLEVEL_OUTOF10: 10
PAINLEVEL_OUTOF10: 0

## 2021-08-15 ASSESSMENT — PAIN DESCRIPTION - PROGRESSION
CLINICAL_PROGRESSION: NOT CHANGED

## 2021-08-15 ASSESSMENT — PAIN DESCRIPTION - PAIN TYPE
TYPE: CHRONIC PAIN
TYPE: ACUTE PAIN
TYPE: CHRONIC PAIN
TYPE: ACUTE PAIN
TYPE: CHRONIC PAIN

## 2021-08-15 ASSESSMENT — ENCOUNTER SYMPTOMS
EYE REDNESS: 0
NAUSEA: 1
EYE PAIN: 0
SHORTNESS OF BREATH: 1
WHEEZING: 0
COUGH: 1
ABDOMINAL PAIN: 0
BACK PAIN: 1

## 2021-08-15 ASSESSMENT — PAIN DESCRIPTION - ONSET
ONSET: ON-GOING
ONSET: GRADUAL

## 2021-08-15 ASSESSMENT — PAIN DESCRIPTION - DESCRIPTORS
DESCRIPTORS: CRAMPING
DESCRIPTORS: ACHING

## 2021-08-15 ASSESSMENT — PAIN DESCRIPTION - ORIENTATION
ORIENTATION: LOWER
ORIENTATION: UPPER;RIGHT;LEFT
ORIENTATION: RIGHT;LEFT

## 2021-08-15 NOTE — PROGRESS NOTES
Patient is admitted to room 3367 from the emergency room. Patient has arrived to the floor at this time via stretcher and ambulated/transfered to bed. Oriented to room. Call light and bedside table within reach. Patient rates a high fall risk. Denies further needs at this time. Will continue to monitor.  Juanjose Grace

## 2021-08-15 NOTE — PROGRESS NOTES
Pt reports 10/10 pain but also states she cannot take any pain meds due to being allergic to most all pain meds. Pt requesting xanax at this time. Pt denies further needs for pain management. perfectserve message placed to admitting MD for pt request of home dose gabapentin and voltaren gel.

## 2021-08-15 NOTE — CONSULTS
Cataract removal (2013 or 2014); Esophagus dilation; and bronchoscopy (N/A, 11/23/2020). Allergies: Allergies   Allergen Reactions    Morphine Shortness Of Breath    Codeine Other (See Comments)     Chest pain    Hydromorphone Other (See Comments)     hallucinations  Hallucinations    But will take if needed in an emergency    Levaquin [Levofloxacin In D5w] Itching    Vicodin [Hydrocodone-Acetaminophen] Hives    Aspirin      Upsets hiatal hernia       Social History:  She reports that she quit smoking about 4 years ago. Her smoking use included cigarettes. She has a 12.25 pack-year smoking history. She has never used smokeless tobacco. She reports current alcohol use of about 1.0 standard drinks of alcohol per week. She reports that she does not use drugs. Family History:  family history includes Cancer in her father, mother, and sister; Heart Disease in her brother, mother, and sister.     Medications:   Scheduled Meds:   pantoprazole  40 mg Oral QAM AC    alogliptin  12.5 mg Oral Daily    diclofenac sodium  4 g Topical BID    gabapentin  600 mg Oral Nightly    aspirin  81 mg Oral Daily    rosuvastatin  5 mg Oral Nightly    isosorbide mononitrate  60 mg Oral Daily    hydrALAZINE  25 mg Oral TID    lisinopril  10 mg Oral Daily    furosemide  40 mg Oral Daily    albuterol sulfate HFA  2 puff Inhalation 4x daily    budesonide-formoterol  2 puff Inhalation BID    glipiZIDE  5 mg Oral BID AC    nitrofurantoin (macrocrystal-monohydrate)  100 mg Oral BID    sodium chloride flush  5-40 mL Intravenous 2 times per day    enoxaparin  40 mg Subcutaneous Daily    ipratropium-albuterol  1 ampule Inhalation Q4H WA    cefTRIAXone (ROCEPHIN) IV  1,000 mg Intravenous Q24H    And    azithromycin  500 mg Oral Q24H    insulin lispro  0-12 Units Subcutaneous TID WC    insulin lispro  0-6 Units Subcutaneous Nightly    sodium chloride  1,000 mL Intravenous Once     Continuous Infusions:   sodium chloride 75 mL/hr at 08/15/21 0511    sodium chloride      dextrose       PRN Meds:sodium chloride, ALPRAZolam, sodium chloride flush, sodium chloride, ondansetron **OR** ondansetron, magnesium hydroxide, acetaminophen **OR** acetaminophen, hydrALAZINE, sodium chloride, potassium chloride **OR** potassium alternative oral replacement **OR** potassium chloride, glucose, dextrose, glucagon (rDNA), dextrose    Review of Systems:  Constitutional: Negative for fever    Genitourinary: see HPI  Eyes: negative for sudden change in vision  EENT: no complaints  Cardiovascular: Negative for chest pain  Respiratory: Negative for shortness of breath  Gastrointestinal: Negative for nausea  Musculoskeletal: Negative for back pain   Neurological: Negative for weakness  Psychiatric: Negative for anxiety  Integumentary: Negative for rashes or adenopathy     Physical Exam:  Vitals:    08/15/21 1103   BP: 109/65   Pulse: 65   Resp: 18   Temp: 97.6 °F (36.4 °C)   SpO2: 94%     Constitutional: NAD, well-developed, well-nourished. HEENT: MMM. Hearing intact. PERRL  Neck: no thyroid masses appreciated. Trachea is midline. Neck appears unremarkable   Lymph: no palpable adenopathy in supraclavicular, or axillary lymph nodes  Cardiovascular: Regular rate. No peripheral edema  Respiratory: Respirations are even and non-labored. No audible breath sounds. Genitourinary: external genitals show no irritation or erythema, urethral meatus appears normal in size and location, urethra is without tenderness or masses, bladder is non-tender, vagina is without masses or discharge, adnexa is without masses or tenderness. Anus and Perineum are unremarkable, no external lesions appreciated. Abdomen: Soft. No distension, tenderness, hernias, masses or guarding. No CVA tenderness. No hernias appreciated. Liver and spleen appear normal  Psychiatric: A + O x 3, normal affect. Insight appears intact. Muskuloskeletal: CAROL x 4   Skin: Pink, warm and dry. No rashes on face and arms.     Labs:  Lab Results   Component Value Date    WBC 10.4 08/15/2021    HGB 13.3 08/15/2021    HCT 40.7 08/15/2021    MCV 87.2 08/15/2021     08/15/2021     Lab Results   Component Value Date    CREATININE 1.2 08/15/2021    BUN 23 (H) 08/15/2021     08/15/2021    K 4.2 08/15/2021     08/15/2021    CO2 26 08/15/2021       Imaging:   none    Micah Medina MD, MD  8/15/2021

## 2021-08-15 NOTE — FLOWSHEET NOTE
Pt requesting Voltaren gel for ble pain, gabapentin 600 mg qhs. Still has a lot of pressure and pain with urination, Pyridium, finally urinated 200 ml. /67, held hydralazine, lisinopril and Lasix. Lungs are very congested. Do you want me to hold Lasix until after CXR or give now? Awaiting response.

## 2021-08-15 NOTE — PROGRESS NOTES
Pt requested for cough, pharmacy confirmed she has had this in the past. MD response okay to order at this time.

## 2021-08-15 NOTE — PROGRESS NOTES
VSS. Ordered breakfast and lunch meals. States Xanax did help to decrease anxiety. No s/s of distress noted sat this time. Call light within reach.

## 2021-08-15 NOTE — H&P
History and Physical  Dr. Bellamy Breckinridge  8/14/2021    PCP: Adeline Caputo MD    Cc:   Chief Complaint   Patient presents with    Urinary Tract Infection     pt sent by PCP due to UTI for around a month, pt with upset stomach as well, just started on new antibiotics    Chest Pain     pt states heaviness to chest upon transport, hx of 2 MIs, jaw and left arm pain as well, given ASA in route       HPI:  Sudarshan Valenzuela is a 77 y.o. female who has a past medical history of Acute MI ([address] Acute pyelonephritis, Anxiety and depression, Arthritis, CAD (coronary artery disease), Cancer ([address] Cancer ([address] Cancer of skin of leg, Chronic obstructive pulmonary disease ([address] Claustrophobia, COPD (chronic obstructive pulmonary disease) ([address] Esophageal stricture, Gastroesophageal reflux disease without esophagitis, Hiatal hernia, Hiatal hernia, Hypercholesteremia, Hypertension, IBS (irritable bowel syndrome), Migraines, Movement disorder, Pneumonia, PONV (postoperative nausea and vomiting), Type II or unspecified type diabetes mellitus without mention of complication, not stated as uncontrolled, and Unspecified cerebral artery occlusion with cerebral infarction. Patient presents with Sepsis due to pneumonia [facility]). HPI      77 y.o. female who presents to the emergency department at the request of her primary care physician Dr. Tiffanie Bailey. Patient states approximately 3:00 this afternoon she started experiencing significant heaviness in her chest.  It is midsternal in nature and does radiate to the left arm and to the left jaw. She also has associated symptoms of nausea with this as well. She states that she has not having difficulties as a pertains to diaphoresis. She denies fevers chills or change in her chronic cough from her COPD. She tells me that she is oxygen dependent at night \"because of everything\". Patient states she was given aspirin in route. She states that the pain essentially has not changed.   She still feels it in the midsternal region as a heavy pressure and rates that pain to be 9 out of 10. She also does not report she is continue to have difficulties with dysuria urgency and frequency. She states that she had finished a course of antibiotics for urinary tract infection a little bit more than a month ago. She states she has this at present and also is having some back pain. She states she is not sure if the back pain is because of the cracked vertebrae she has in her back or if it is because of kidney pain. She denies red flags for back pain. Denies bowel or bladder dysfunction denies saddle anesthesia denies radicular symptoms. Found to have pneumonia - seen on imaging, reviewed by self, see below. Appears to be septic, BP is low, needing IVF to get above 80. Problem list of hospitalization thus far: Active Hospital Problems    Diagnosis     Hyperlipemia [E78.5]      Priority: High    Sepsis due to pneumonia ([facility] [J18.9, A41.9]     UTI (urinary tract infection) [N39.0]     Pneumonia [J18.9]     DM (diabetes mellitus), secondary uncontrolled ([facility] [E13.65]     Anxiety [F41.9]     DM2 (diabetes mellitus, type 2) ([facility] [E11.9]          Review of Systems: (1 system for EPF, 2-9 for detailed, 10+ for comprehensive)  Review of Systems   Constitutional: Positive for diaphoresis. Negative for chills and fever. HENT: Negative for ear discharge and ear pain. Eyes: Negative for pain and redness. Respiratory: Positive for cough and shortness of breath. Negative for wheezing. Cardiovascular: Positive for chest pain. Gastrointestinal: Positive for nausea. Negative for abdominal pain. Endocrine: Negative for cold intolerance and heat intolerance. Genitourinary: Positive for dysuria, frequency and urgency. Musculoskeletal: Positive for back pain. Negative for myalgias and neck stiffness. Allergic/Immunologic: Negative for food allergies. Neurological: Negative for tremors and numbness. Hematological: Does not bruise/bleed easily. Psychiatric/Behavioral: Negative for confusion. The patient is not nervous/anxious.             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    Chronic obstructive pulmonary disease ([facility] 1/13/2017    Claustrophobia     COPD (chronic obstructive pulmonary disease) (HCC)     Esophageal stricture     Gastroesophageal reflux disease without esophagitis 3/24/2016    Hiatal hernia     Hiatal hernia     Hypercholesteremia     Hypertension     IBS (irritable bowel syndrome)     Migraines     Movement disorder arthritis    Pneumonia     PONV (postoperative nausea and vomiting)     Type II or unspecified type diabetes mellitus without mention of complication, not stated as uncontrolled     type ll does not take insulin at home    Unspecified cerebral artery occlusion with cerebral infarction     many TIA's       Past Surgical History:   Past Surgical History:   Procedure Laterality Date    APPENDECTOMY      BRONCHOSCOPY N/A 1/24/2020    BRONCHOSCOPY ALVEOLAR LAVAGE performed by Naomi Casarez MD at [address] [place] [address] N/A 1/27/2020    BRONCHOSCOPY DIAGNOSTIC OR CELL [address] ONLY performed by Carlito Francois MD at [address] [place] [address] N/A 11/23/2020    BRONCHOSCOPY DIAGNOSTIC OR CELL [address] ONLY performed by Yeni Mooney MD at [address] [place] [address] St      1 stent 2000 and 1 stent 2001    CATARACT REMOVAL  2013 or 2014    bilateral cataracts removed    CHOLECYSTECTOMY      COLONOSCOPY      COLONOSCOPY 06/11/2018    ENDOSCOPY, COLON, DIAGNOSTIC      ESOPHAGEAL DILATATION      EYE SURGERY      bilateral cataracts    GALLBLADDER SURGERY      HYSTERECTOMY      PA EXC SKIN MALIG <5MM REMAINDR BODY N/A 9/6/2018    EXCISION OF SCALP SQUAMOUS CELL CARCINOMA performed by Jennifer Johnson MD at [address] [facility] <100SQCM N/A 9/6/2018    SPLIT THICKNESS SKIN GRAFT FOR COVERAGE SCALP, APPLICATION OF WOUND VAC DEVICE performed by Jennifer Johnson MD at [address] [facility] DUCT SURGERY      UPPER GASTROINTESTINAL ENDOSCOPY  4/20/12    with biopsy of stomach,gastritis    UPPER GASTROINTESTINAL ENDOSCOPY  06/11/2018    w/esophagael dilation       Social History:   Social History     Tobacco History     Smoking Status  Former Smoker Quit date  6/16/2017 Smoking Frequency  0.25 packs/day for 49 years (12.25 pk yrs) Smoking Tobacco Type  Cigarettes    Smokeless Tobacco Use  Never Used    Tobacco Comment  only smoke when real stressed  Nicotine patch          Alcohol History     Alcohol Use Status  Yes Drinks/Week  1 Glasses of wine per week Amount  1.0 standard drinks of alcohol/wk Comment  occ. every  6 mo          Drug Use     Drug Use Status  No          Sexual Activity     Sexually Active  Not Currently Partners  Male                Fam History:   Family History   Problem Relation Age of Onset    Heart Disease Mother     Cancer Mother     Cancer Father     Cancer Sister     Heart Disease Sister     Heart Disease Brother     High Blood Pressure Neg Hx     High Cholesterol Neg Hx        PFSH: The above PMHx, PSHx, SocHx, FamHx has been reviewed by myself. (1 area for detailed, 2-3 for comprehensive)      Code Status: Prior    Meds - following list of home medications fromelectronic chart has been reviewed by myself  Prior to Admission medications    Medication Sig Start Date End Date Taking?  Authorizing Provider   nitrofurantoin, macrocrystal-monohydrate, (MACROBID) 100 MG capsule Take 1 capsule by mouth 2 times daily for 5 days 8/14/21 8/19/21  Eli Chopra MD   ALPRAZolam Zak Torres) 1 MG tablet Take 1 tablet by mouth 3 times daily as needed for Anxiety for up to 30 days. 7/29/21 8/28/21  Eli Chopra MD   glipiZIDE (GLUCOTROL) 5 MG tablet Take 1 tablet by mouth 2 times daily (before meals) 7/29/21   Eli Chopra MD   triamcinolone (KENALOG) 0.1 % cream Apply topically 2 times daily. 7/29/21   Eli Chopra MD   ipratropium-albuterol (DUONEB) 0.5-2.5 (3) MG/3ML SOLN nebulizer solution INHALE THREE MILLILITERS VIA NEBULIZATION BY MOUTH EVERY 4 HOURS AS NEEDED FOR SHORTNESS OF BREATH 7/7/21   Eli Chopra MD   hydrALAZINE (APRESOLINE) 25 MG tablet Take 1 tablet by mouth 3 times daily 6/30/21   Eli Chopra MD   linagliptin (TRADJENTA) 5 MG tablet Take 1 tablet by mouth daily 6/30/21 9/28/21  Eli Chopra MD   lisinopril (PRINIVIL;ZESTRIL) 10 MG tablet TAKE ONE TABLET BY MOUTH DAILY 6/30/21   Eli Chopra MD   diclofenac sodium (VOLTAREN) 1 % GEL Apply 2 g topically 2 times daily 6/30/21   Eli Chopra MD   furosemide (LASIX) 40 MG tablet TAKE ONE TABLET BY MOUTH TWICE A DAY 6/30/21   Eli Chopra MD   albuterol sulfate  (90 Base) MCG/ACT inhaler INHALE TWO PUFFS BY MOUTH EVERY 6 HOURS AS NEEDED FOR WHEEZING 6/30/21   Eli Chopra MD   budesonide-formoterol (SYMBICORT) 160-4.5 MCG/ACT AERO Inhale 2 puffs into the lungs 2 times daily 6/30/21   Eli Chopra MD   omeprazole (PRILOSEC) 40 MG delayed release capsule Take 1 capsule by mouth daily 6/22/21   Eli Chopra MD   blood glucose test strips (TRUE METRIX BLOOD GLUCOSE TEST) strip USE THREE TIMES A DAY AS NEEDED 5/24/21   Eli Chopra MD   gabapentin (NEURONTIN) 300 MG capsule Take 1 capsule by mouth 2 times daily for 30 days.  Intended supply: 30 days 4/22/21 5/22/21  Eli Chopra MD   isosorbide mononitrate (IMDUR) 60 MG extended release tablet Take 1 tablet by mouth daily 2/25/21 5/26/21  Dax Caro MD   Lancets MISC 1 each by Does not apply route 2 times daily 2/25/21   Dax Caro MD   nystatin (MYCOSTATIN) 460945 UNIT/GM powder Affected area 2/9/21   Dax Caro MD   rosuvastatin (CRESTOR) 10 MG tablet Take 0.5 tablets by mouth daily 7/31/20   Jeannie Blanco MD   sodium chloride (OCEAN, BABY AYR) 0.65 % nasal spray 1 spray by Nasal route as needed for Congestion 3/27/20   Dax Caro MD   ibuprofen (ADVIL;MOTRIN) 400 MG tablet Take 1 tablet by mouth every 6 hours as needed for Pain or Fever 10/29/19   Dax Caro MD   aspirin 81 MG tablet Take 81 mg by mouth daily    Historical Provider, MD   nitroGLYCERIN (NITROSTAT) 0.4 MG SL tablet Place 1 tablet under the tongue every 5 minutes as needed for Chest pain.  4/22/12   Dax Caro MD         Allergies   Allergen Reactions    Morphine Shortness Of Breath    Codeine Other (See Comments)     Chest pain    Hydromorphone Other (See Comments)     hallucinations  Hallucinations    But will take if needed in an emergency    Levaquin [Levofloxacin In D5w] Itching    Vicodin [Hydrocodone-Acetaminophen] Hives    Aspirin      Upsets hiatal hernia             EXAM: (2-7 system for EPF/Detailed, ?8 for Comprehensive)  BP (!) 95/42   Pulse 67   Temp 98 °F (36.7 °C) (Oral)   Resp 16   Ht 5' 6\" (1.676 m)   Wt 265 lb (120.2 kg)   SpO2 93%   BMI 42.77 kg/m²   Constitutional: vitals as above: alert, appears stated age and cooperative  Psychiatric: normal insight and judgment, oriented to person, place, time, and general circumstances  Head: Normocephalic, without obvious abnormality, atraumatic  Eyes:lids and lashes normal, conjunctivae and sclerae normal and pupils equal, round, reactive to light and accomodation  EMNT: external ears normal, lips noraml  Neck: no adenopathy, supple, symmetrical, trachea midline and thyroid not enlarged, symmetric, no tenderness/mass/nodules   Respiratory: crackles in bases  Cardiovascular: normal rate, regular rhythm, normal S1 and S2 and no carotid bruits  Gastrointestinal: soft, non-tender, non-distended, normal bowel sounds, no masses or organomegaly  Lymphatic:   Extremities: no edema, no clubbing  Skin:No rashes or nodules noted.   Neurologic:    LABS:  Labs Reviewed   CBC WITH AUTO DIFFERENTIAL - Abnormal; Notable for the following components:       Result Value    WBC 13.0 (*)     RDW 16.9 (*)     Neutrophils Absolute 9.1 (*)     All other components within normal limits    Narrative:     Performed at:  [facility] [address],  [place], [address] [employer]   Phone [phone]   COMPREHENSIVE METABOLIC PANEL W/ REFLEX TO MG FOR LOW K - Abnormal; Notable for the following components:    Glucose 150 (*)     BUN 22 (*)     GFR Non- 44 (*)     GFR  53 (*)     Total Protein 5.7 (*)     Albumin 2.9 (*)     Albumin/Globulin Ratio 1.0 (*)     All other components within normal limits    Narrative:     Performed at:  [facility] [address], [place], [address]   Phone [phone]   URINE RT REFLEX TO CULTURE - Abnormal; Notable for the following components:    Clarity, UA CLOUDY (*)     Blood, Urine TRACE (*)     Protein, UA TRACE (*)     Leukocyte Esterase, Urine LARGE (*)     All other components within normal limits    Narrative:     Performed at:  [facility] [address] [address] [place]   Phone [phone]   BLOOD GAS, VENOUS - Abnormal; Notable for the following components:    pO2, Avelino 158.0 (*)     Carboxyhemoglobin 6.8 (*)     All other components within normal limits    Narrative:     Performed at:  [facility] [address] [facility]  [place] [facility] [employer]   Phone [phone]   MICROSCOPIC URINALYSIS - Abnormal; Notable for the following components:    Bacteria, UA 4+ (*)     WBC,  (*) Decreased GFR  Plan: admit, tx pna as below  Active Problems:    Hyperlipemia -Established problem. Stable. Plan: cont home meds    DM2 (diabetes mellitus, type 2) ([address] -Established problem. Stable. Plan: Patient placed on controlled carbohydrate diet. Fingerstick sugars to be checked to monitor for both hypoglycemia as well as hyperglycemia. Sliding scale insulin ordered. Glucagon and dextrose ordered for hypoglycemia. Patient will be continued on home medications. Hemoglobin a1c to be ordered to assess efficacy of therapy. Anxiety -Established problem. Stable. Plan: Continue present orders/plan. UTI (urinary tract infection) -Established problem. Uncontrolled. Chronic issues  Plan: cont abx - ask urology to eval    Pneumonia - New Problem to me. Plan: iv abx started - empiric rocephin/zmax. DuoNeb HHN ordered. O2 as needed. Will check serial CXR. WBC ordered for AM.  Respiratory therapy asked to see. Monitor for signs of antibiotic toxicity with serial exam and lab studies    Hypotension -Established problem. Uncontrolled. Plan: admit, ivf for now. tx uti as above. If remains low, may need pressors        Diagnoses as listed above, designated as new or established and plan outlined for each. Data Reviewed:   (1) Lab tests were reviewed or ordered. (1) Radiology tests were reviewed or ordered. (1) Medical test (Echo, EKG, PFT/jose juan) were ordered. (1)History was not obtained from someone other than patient  (1) Old records  were reviewed - see HPI/MDM for pertinent details if review done. (2) Case wasdiscussed with another health care provider:    (2) Imaging was viewed by myself. cxr  (2) EKG  was viewed by myself.        Total critical care time caring for this patient with life threatening illness, including direct patient contact, management of life support systems, review of data including imaging and labs, discussions with other team members and physicians at least 28

## 2021-08-15 NOTE — PROGRESS NOTES
Progress Note - Dr. Lynne Werner - Internal Medicine  PCP: Glen Leavitt MD [address] [facility] [phone]    Hospital Day: 1  Code Status: Full Code  Current Diet: ADULT DIET; Regular; 4 carb choices (60 gm/meal)        CC: follow up on medical issues    Subjective:   Coleen Bryson is a 77 y.o. female. About the same  No new issues    She complains of chest pain, complains of shortness of breath, denies nausea,  denies emesis. 10 system Review of Systems is reviewed with patient, and pertinent positives are noted in HPI above . Otherwise, Review of systems is negative. I have reviewed the patient's medical and social history in detail and updated the computerized patient record. To recap: She  has a past medical history of Acute MI ([address] Acute pyelonephritis, Anxiety and depression, Arthritis, CAD (coronary artery disease), Cancer ([address] Cancer ([address] Cancer of skin of leg, Chronic obstructive pulmonary disease ([address] Claustrophobia, COPD (chronic obstructive pulmonary disease) ([address] Esophageal stricture, Gastroesophageal reflux disease without esophagitis, Hiatal hernia, Hiatal hernia, Hypercholesteremia, Hypertension, IBS (irritable bowel syndrome), Migraines, Movement disorder, Pneumonia, PONV (postoperative nausea and vomiting), Type II or unspecified type diabetes mellitus without mention of complication, not stated as uncontrolled, and Unspecified cerebral artery occlusion with cerebral infarction. . She  has a past surgical history that includes Cardiac surgery; Gallbladder surgery; Hysterectomy; Appendectomy; Cholecystectomy; Colonoscopy; Upper gastrointestinal endoscopy (4/20/12); Endoscopy, colon, diagnostic; Tear duct surgery; Upper gastrointestinal endoscopy (06/11/2018);  Colonoscopy (06/11/2018); pr exc skin malig <5mm remaindr body (N/A, 9/6/2018); pr split grft trunk,arm,leg <100sqcm (N/A, 9/6/2018); skin biopsy; eye surgery; bronchoscopy (N/A, 1/24/2020); bronchoscopy (N/A, 1/27/2020); Cataract removal (2013 or 2014); Esophagus dilation; and bronchoscopy (N/A, 11/23/2020). . She  reports that she quit smoking about 4 years ago. Her smoking use included cigarettes. She has a 12.25 pack-year smoking history. She has never used smokeless tobacco. She reports current alcohol use of about 1.0 standard drinks of alcohol per week. She reports that she does not use drugs. .        Active Hospital Problems    Diagnosis Date Noted    Hyperlipemia [E78.5] 05/23/2011     Priority: High    Sepsis due to pneumonia ([facility] [J18.9, A41.9] 08/14/2021    UTI (urinary tract infection) [N39.0] 08/14/2021    Pneumonia [J18.9] 08/14/2021    DM (diabetes mellitus), secondary uncontrolled ([facility] [E13.65] 01/23/2020    Anxiety [F41.9] 01/19/2018    DM2 (diabetes mellitus, type 2) ([facility] [E11.9] 12/03/2015       Current Facility-Administered Medications: pantoprazole (PROTONIX) tablet 40 mg, 40 mg, Oral, QAM AC  alogliptin (NESINA) tablet 12.5 mg, 12.5 mg, Oral, Daily  diclofenac sodium (VOLTAREN) 1 % gel 4 g, 4 g, Topical, BID  gabapentin (NEURONTIN) capsule 600 mg, 600 mg, Oral, Nightly  aspirin chewable tablet 81 mg, 81 mg, Oral, Daily  sodium chloride (OCEAN, BABY AYR) 0.65 % nasal spray 1 spray, 1 spray, Nasal, PRN  rosuvastatin (CRESTOR) tablet 5 mg, 5 mg, Oral, Nightly  isosorbide mononitrate (IMDUR) extended release tablet 60 mg, 60 mg, Oral, Daily  hydrALAZINE (APRESOLINE) tablet 25 mg, 25 mg, Oral, TID  lisinopril (PRINIVIL;ZESTRIL) tablet 10 mg, 10 mg, Oral, Daily  furosemide (LASIX) tablet 40 mg, 40 mg, Oral, Daily  albuterol sulfate  (90 Base) MCG/ACT inhaler 2 puff, 2 puff, Inhalation, 4x daily  budesonide-formoterol (SYMBICORT) 160-4.5 MCG/ACT inhaler 2 puff, 2 puff, Inhalation, BID  ALPRAZolam (XANAX) tablet 1 mg, 1 mg, Oral, TID PRN  glipiZIDE (GLUCOTROL) tablet 5 mg, 5 mg, Oral, BID AC  nitrofurantoin (macrocrystal-monohydrate) (MACROBID) capsule 100 mg, 100 mg, Oral, BID  0.9 % sodium chloride infusion, , Intravenous, Continuous  sodium chloride flush 0.9 % injection 5-40 mL, 5-40 mL, Intravenous, 2 times per day  sodium chloride flush 0.9 % injection 10 mL, 10 mL, Intravenous, PRN  0.9 % sodium chloride infusion, 25 mL, Intravenous, PRN  enoxaparin (LOVENOX) injection 40 mg, 40 mg, Subcutaneous, Daily  ondansetron (ZOFRAN-ODT) disintegrating tablet 4 mg, 4 mg, Oral, Q8H PRN **OR** ondansetron (ZOFRAN) injection 4 mg, 4 mg, Intravenous, Q6H PRN  magnesium hydroxide (MILK OF MAGNESIA) 400 MG/5ML suspension 30 mL, 30 mL, Oral, Daily PRN  acetaminophen (TYLENOL) tablet 650 mg, 650 mg, Oral, Q6H PRN **OR** acetaminophen (TYLENOL) suppository 650 mg, 650 mg, Rectal, Q6H PRN  ipratropium-albuterol (DUONEB) nebulizer solution 1 ampule, 1 ampule, Inhalation, Q4H WA  cefTRIAXone (ROCEPHIN) 1000 mg in sterile water 10 mL IV syringe, 1,000 mg, Intravenous, Q24H **AND** azithromycin (ZITHROMAX) tablet 500 mg, 500 mg, Oral, Q24H  hydrALAZINE (APRESOLINE) injection 10 mg, 10 mg, Intravenous, Q6H PRN  0.9 % sodium chloride bolus, 500 mL, Intravenous, PRN  potassium chloride (KLOR-CON M) extended release tablet 40 mEq, 40 mEq, Oral, PRN **OR** potassium bicarb-citric acid (EFFER-K) effervescent tablet 40 mEq, 40 mEq, Oral, PRN **OR** potassium chloride 10 mEq/100 mL IVPB (Peripheral Line), 10 mEq, Intravenous, PRN  insulin lispro (1 Unit Dial) 0-12 Units, 0-12 Units, Subcutaneous, TID WC  insulin lispro (1 Unit Dial) 0-6 Units, 0-6 Units, Subcutaneous, Nightly  glucose (GLUTOSE) 40 % oral gel 15 g, 15 g, Oral, PRN  dextrose 50 % IV solution, 12.5 g, Intravenous, PRN  glucagon (rDNA) injection 1 mg, 1 mg, Intramuscular, PRN  dextrose 5 % solution, 100 mL/hr, Intravenous, PRN  0.9 % sodium chloride bolus, 1,000 mL, Intravenous, Once         Objective:  /67   Pulse 63   Temp 97.8 °F (36.6 °C) (Oral)   Resp 20   Ht 5' 6\" (1.676 m)   Wt 265 lb 9.6 oz (120.5 kg)   SpO2 92%   BMI 42.87 kg/m² Patient Vitals for the past 24 hrs:   BP Temp Temp src Pulse Resp SpO2 Height Weight   08/15/21 0839 -- -- -- -- 20 92 % -- --   08/15/21 0810 104/67 97.8 °F (36.6 °C) Oral 63 20 97 % -- --   08/15/21 0752 -- -- -- -- -- -- -- 265 lb 9.6 oz (120.5 kg)   08/15/21 0655 (!) 96/57 97.6 °F (36.4 °C) Oral 66 18 98 % -- --   08/15/21 0429 (!) 92/59 97.7 °F (36.5 °C) Oral 61 16 95 % -- --   08/15/21 0058 99/61 -- -- -- -- -- -- --   08/14/21 2341 93/60 98.1 °F (36.7 °C) Oral 66 20 96 % -- --   08/14/21 2315 91/79 -- -- -- -- -- -- --   08/14/21 2247 91/60 -- -- 60 17 96 % -- --   08/14/21 2200 (!) 95/42 -- -- 67 16 93 % -- --   08/14/21 2140 (!) 84/40 -- -- 69 18 96 % -- --   08/14/21 2120 (!) 86/40 -- -- 72 17 94 % -- --   08/14/21 2100 (!) 77/50 -- -- 70 21 92 % -- --   08/14/21 2050 (!) 84/56 -- -- 73 18 92 % -- --   08/14/21 2030 (!) 78/43 -- -- 72 25 92 % -- --   08/14/21 2020 91/67 -- -- 76 22 99 % -- --   08/14/21 2000 (!) 77/45 -- -- 69 16 96 % -- --   08/14/21 1958 -- -- -- -- 22 98 % -- --   08/14/21 1950 -- -- -- -- 19 92 % -- --   08/14/21 1940 (!) 82/47 -- -- 79 21 (!) 85 % -- --   08/14/21 1930 (!) 82/50 -- -- 77 17 93 % -- --   08/14/21 1900 89/69 -- -- 81 15 90 % -- --   08/14/21 1838 -- -- -- 86 23 91 % -- --   08/14/21 1830 110/66 98 °F (36.7 °C) Oral 88 13 (!) 89 % 5' 6\" (1.676 m) 265 lb (120.2 kg)     Patient Vitals for the past 96 hrs (Last 3 readings):   Weight   08/15/21 0752 265 lb 9.6 oz (120.5 kg)   08/14/21 1830 265 lb (120.2 kg)           Intake/Output Summary (Last 24 hours) at 8/15/2021 0950  Last data filed at 8/15/2021 0825  Gross per 24 hour   Intake 670.68 ml   Output 400 ml   Net 270.68 ml         Physical Exam:   /67   Pulse 63   Temp 97.8 °F (36.6 °C) (Oral)   Resp 20   Ht 5' 6\" (1.676 m)   Wt 265 lb 9.6 oz (120.5 kg)   SpO2 92%   BMI 42.87 kg/m²   General appearance: alert, appears stated age and cooperative  Head: Normocephalic, without obvious abnormality, atraumatic  Lungs: crackles in bases  Heart: regular rate and rhythm, S1, S2 normal, no murmur, click, rub or gallop  Abdomen: soft, non-tender; bowel sounds normal; no masses,  no organomegaly  Extremities: extremities normal, atraumatic, no cyanosis or edema    Labs:  Lab Results   Component Value Date    WBC 10.4 08/15/2021    HGB 13.3 08/15/2021    HCT 40.7 08/15/2021     08/15/2021    CHOL 226 (H) 11/07/2019    TRIG 300 (H) 11/07/2019    HDL 34 (L) 11/07/2019    ALT 12 08/15/2021    AST 19 08/15/2021     08/15/2021    K 4.2 08/15/2021     08/15/2021    CREATININE 1.2 08/15/2021    BUN 23 (H) 08/15/2021    CO2 26 08/15/2021    TSH 2.34 06/06/2011    INR 1.03 08/14/2021    LABA1C 7.2 05/24/2021    LABMICR YES 08/14/2021     Lab Results   Component Value Date    CKTOTAL 24 (L) 06/07/2018    CKMB 0.29 08/09/2011    TROPONINI <0.01 08/15/2021       Recent Imaging Results are Reviewed:  XR CHEST PORTABLE    Result Date: 8/14/2021  EXAMINATION: ONE XRAY VIEW OF THE CHEST 8/14/2021 7:17 pm COMPARISON: Chest x-ray 11/24/2020 and 11/20/2020. HISTORY: ORDERING SYSTEM PROVIDED HISTORY: CP TECHNOLOGIST PROVIDED HISTORY: Reason for exam:->CP Reason for Exam: UTI; chest pain, hx  MI Acuity: Acute Type of Exam: Initial FINDINGS: Cardiomediastinal silhouette is enlarged and suboptimally evaluated due to rotated projection. Suboptimal evaluation of the lungs due to body habitus. Questionable right mid lung opacity. No pleural effusion. Questionable right mid lung opacity. Assessment and Plan:  Principal Problem:    Sepsis due to pneumonia ([facility] -Established problem. Improving. Wbc lower, bp stable  Plan: cont tx as below  Active Problems:    Hyperlipemia -Established problem. Stable. Plan: Continue present orders/plan. DM2 (diabetes mellitus, type 2) ([address] -Established problem. Stable. FSBS 131  Plan: cont ccc diet, sliding scale, home meds    Anxiety- Established problem. Stable.     Plan: Continue present orders/plan. UTI (urinary tract infection) -Established problem. Stable. Plan: urology asked to see given duration of sx    Pneumonia -Established problem. Improving. Wbc lower  Plan: cont abx, o2, HHN tx.  Repeat cxr ordered    Dr Atnonieta Velazquez to resume care 8/16            Jesica Allen MD  8/15/2021

## 2021-08-15 NOTE — PROGRESS NOTES
Pt reports pain, pressure and irritation upon urinating in bladder region. Post void bladder scan shows 144mL.

## 2021-08-15 NOTE — PROGRESS NOTES
Speech Language Pathology  Aspiration Screen    Name: [place] Jarod  : 1945  Medical Diagnosis: History of coronary artery disease [Z86.79]  Supplemental oxygen dependent [Z99.81]  COPD with acute exacerbation ([address] [J44.1]  Bacterial urinary tract infection [N39.0, A49.9]  Hypotension, unspecified hypotension type [I95.9]  Sepsis due to pneumonia ([facility] [J18.9, A41.9]  Chest pain, unspecified type [R07.9]  Chronic low back pain without sciatica, unspecified back pain laterality [M54.5, G89.29]  Community acquired pneumonia of right middle lobe of lung [J18.9]  Sepsis without acute organ dysfunction, due to unspecified organism ([facility] [A41.9]    Swallow screen to rule out aspiration completed per pneumonia protocol. Patient demonstrates no significant high risk indicators for potential aspiration per swallow screen at this time. No further swallowing intervention is warranted at this time. Continue current diet as tolerated. Please consult speech therapy for bedside swallow evaluation if symptoms of swallowing dysfunction arise. Thanks   Rio DE OLIVEIRA  [address] [place] [address], [id number]  Speech-Language Pathologist   8/15/2021 9:01 AM

## 2021-08-16 ENCOUNTER — APPOINTMENT (OUTPATIENT)
Dept: ULTRASOUND IMAGING | Age: 76
DRG: 871 | End: 2021-08-16
Payer: COMMERCIAL

## 2021-08-16 ENCOUNTER — APPOINTMENT (OUTPATIENT)
Dept: GENERAL RADIOLOGY | Age: 76
DRG: 871 | End: 2021-08-16
Payer: COMMERCIAL

## 2021-08-16 LAB
ANION GAP SERPL CALCULATED.3IONS-SCNC: 8 MMOL/L (ref 3–16)
BASOPHILS ABSOLUTE: 0.1 K/UL (ref 0–0.2)
BASOPHILS RELATIVE PERCENT: 0.9 %
BUN BLDV-MCNC: 16 MG/DL (ref 7–20)
CALCIUM SERPL-MCNC: 9.2 MG/DL (ref 8.3–10.6)
CHLORIDE BLD-SCNC: 103 MMOL/L (ref 99–110)
CO2: 26 MMOL/L (ref 21–32)
CREAT SERPL-MCNC: 0.9 MG/DL (ref 0.6–1.2)
EOSINOPHILS ABSOLUTE: 0.1 K/UL (ref 0–0.6)
EOSINOPHILS RELATIVE PERCENT: 1.7 %
GFR AFRICAN AMERICAN: >60
GFR NON-AFRICAN AMERICAN: >60
GLUCOSE BLD-MCNC: 141 MG/DL (ref 70–99)
GLUCOSE BLD-MCNC: 143 MG/DL (ref 70–99)
GLUCOSE BLD-MCNC: 153 MG/DL (ref 70–99)
GLUCOSE BLD-MCNC: 190 MG/DL (ref 70–99)
GLUCOSE BLD-MCNC: 251 MG/DL (ref 70–99)
HCT VFR BLD CALC: 43.1 % (ref 36–48)
HEMOGLOBIN: 14 G/DL (ref 12–16)
LYMPHOCYTES ABSOLUTE: 2.1 K/UL (ref 1–5.1)
LYMPHOCYTES RELATIVE PERCENT: 27.6 %
MCH RBC QN AUTO: 28.6 PG (ref 26–34)
MCHC RBC AUTO-ENTMCNC: 32.5 G/DL (ref 31–36)
MCV RBC AUTO: 88 FL (ref 80–100)
MONOCYTES ABSOLUTE: 0.5 K/UL (ref 0–1.3)
MONOCYTES RELATIVE PERCENT: 6.1 %
NEUTROPHILS ABSOLUTE: 4.9 K/UL (ref 1.7–7.7)
NEUTROPHILS RELATIVE PERCENT: 63.7 %
PDW BLD-RTO: 16.6 % (ref 12.4–15.4)
PERFORMED ON: ABNORMAL
PLATELET # BLD: 170 K/UL (ref 135–450)
PMV BLD AUTO: 7.8 FL (ref 5–10.5)
POTASSIUM SERPL-SCNC: 4.4 MMOL/L (ref 3.5–5.1)
RBC # BLD: 4.9 M/UL (ref 4–5.2)
SODIUM BLD-SCNC: 137 MMOL/L (ref 136–145)
WBC # BLD: 7.7 K/UL (ref 4–11)

## 2021-08-16 PROCEDURE — 97165 OT EVAL LOW COMPLEX 30 MIN: CPT

## 2021-08-16 PROCEDURE — 2060000000 HC ICU INTERMEDIATE R&B

## 2021-08-16 PROCEDURE — 6370000000 HC RX 637 (ALT 250 FOR IP): Performed by: INTERNAL MEDICINE

## 2021-08-16 PROCEDURE — 97116 GAIT TRAINING THERAPY: CPT

## 2021-08-16 PROCEDURE — 97535 SELF CARE MNGMENT TRAINING: CPT

## 2021-08-16 PROCEDURE — 85025 COMPLETE CBC W/AUTO DIFF WBC: CPT

## 2021-08-16 PROCEDURE — 97530 THERAPEUTIC ACTIVITIES: CPT

## 2021-08-16 PROCEDURE — 97161 PT EVAL LOW COMPLEX 20 MIN: CPT

## 2021-08-16 PROCEDURE — 76770 US EXAM ABDO BACK WALL COMP: CPT

## 2021-08-16 PROCEDURE — 94640 AIRWAY INHALATION TREATMENT: CPT

## 2021-08-16 PROCEDURE — 36415 COLL VENOUS BLD VENIPUNCTURE: CPT

## 2021-08-16 PROCEDURE — 2580000003 HC RX 258: Performed by: INTERNAL MEDICINE

## 2021-08-16 PROCEDURE — 6360000002 HC RX W HCPCS: Performed by: INTERNAL MEDICINE

## 2021-08-16 PROCEDURE — 80048 BASIC METABOLIC PNL TOTAL CA: CPT

## 2021-08-16 PROCEDURE — 74018 RADEX ABDOMEN 1 VIEW: CPT

## 2021-08-16 PROCEDURE — 94761 N-INVAS EAR/PLS OXIMETRY MLT: CPT

## 2021-08-16 PROCEDURE — 2700000000 HC OXYGEN THERAPY PER DAY

## 2021-08-16 PROCEDURE — 1200000000 HC SEMI PRIVATE

## 2021-08-16 RX ORDER — ACETAZOLAMIDE 250 MG/1
250 TABLET ORAL DAILY
Status: DISCONTINUED | OUTPATIENT
Start: 2021-08-16 | End: 2021-08-18 | Stop reason: HOSPADM

## 2021-08-16 RX ADMIN — Medication 1000 MG: at 21:38

## 2021-08-16 RX ADMIN — ALOGLIPTIN 12.5 MG: 12.5 TABLET, FILM COATED ORAL at 10:37

## 2021-08-16 RX ADMIN — IPRATROPIUM BROMIDE AND ALBUTEROL SULFATE 1 AMPULE: .5; 3 SOLUTION RESPIRATORY (INHALATION) at 06:03

## 2021-08-16 RX ADMIN — ONDANSETRON 4 MG: 2 INJECTION INTRAMUSCULAR; INTRAVENOUS at 16:33

## 2021-08-16 RX ADMIN — GLIPIZIDE 5 MG: 5 TABLET ORAL at 10:37

## 2021-08-16 RX ADMIN — ASPIRIN 81 MG: 81 TABLET, CHEWABLE ORAL at 10:34

## 2021-08-16 RX ADMIN — ACETAZOLAMIDE 250 MG: 250 TABLET ORAL at 13:16

## 2021-08-16 RX ADMIN — INSULIN LISPRO 2 UNITS: 100 INJECTION, SOLUTION INTRAVENOUS; SUBCUTANEOUS at 12:57

## 2021-08-16 RX ADMIN — Medication 2 PUFF: at 06:05

## 2021-08-16 RX ADMIN — INSULIN LISPRO 2 UNITS: 100 INJECTION, SOLUTION INTRAVENOUS; SUBCUTANEOUS at 16:34

## 2021-08-16 RX ADMIN — FUROSEMIDE 40 MG: 40 TABLET ORAL at 10:37

## 2021-08-16 RX ADMIN — LISINOPRIL 10 MG: 10 TABLET ORAL at 10:37

## 2021-08-16 RX ADMIN — ISOSORBIDE MONONITRATE 60 MG: 60 TABLET, EXTENDED RELEASE ORAL at 10:37

## 2021-08-16 RX ADMIN — IPRATROPIUM BROMIDE AND ALBUTEROL SULFATE 1 AMPULE: .5; 3 SOLUTION RESPIRATORY (INHALATION) at 16:46

## 2021-08-16 RX ADMIN — Medication 10 ML: at 21:38

## 2021-08-16 RX ADMIN — INSULIN LISPRO 2 UNITS: 100 INJECTION, SOLUTION INTRAVENOUS; SUBCUTANEOUS at 09:33

## 2021-08-16 RX ADMIN — IPRATROPIUM BROMIDE AND ALBUTEROL SULFATE 1 AMPULE: .5; 3 SOLUTION RESPIRATORY (INHALATION) at 19:25

## 2021-08-16 RX ADMIN — ENOXAPARIN SODIUM 40 MG: 40 INJECTION SUBCUTANEOUS at 10:39

## 2021-08-16 RX ADMIN — NITROFURANTOIN (MONOHYDRATE/MACROCRYSTALS) 100 MG: 75; 25 CAPSULE ORAL at 10:37

## 2021-08-16 RX ADMIN — GLIPIZIDE 5 MG: 5 TABLET ORAL at 16:34

## 2021-08-16 RX ADMIN — HYDRALAZINE HYDROCHLORIDE 25 MG: 25 TABLET, FILM COATED ORAL at 23:04

## 2021-08-16 RX ADMIN — HYDRALAZINE HYDROCHLORIDE 25 MG: 25 TABLET, FILM COATED ORAL at 10:34

## 2021-08-16 RX ADMIN — DICLOFENAC SODIUM 4 G: 10 GEL TOPICAL at 22:52

## 2021-08-16 RX ADMIN — IPRATROPIUM BROMIDE AND ALBUTEROL SULFATE 1 AMPULE: .5; 3 SOLUTION RESPIRATORY (INHALATION) at 12:38

## 2021-08-16 RX ADMIN — INSULIN LISPRO 2 UNITS: 100 INJECTION, SOLUTION INTRAVENOUS; SUBCUTANEOUS at 22:59

## 2021-08-16 RX ADMIN — DEXTROMETHORPHAN POLISTIREX 30 MG: 30 SUSPENSION ORAL at 22:56

## 2021-08-16 RX ADMIN — AZITHROMYCIN MONOHYDRATE 500 MG: 250 TABLET ORAL at 21:35

## 2021-08-16 RX ADMIN — GABAPENTIN 600 MG: 300 CAPSULE ORAL at 22:52

## 2021-08-16 RX ADMIN — Medication 10 ML: at 16:33

## 2021-08-16 RX ADMIN — ALPRAZOLAM 1 MG: 0.5 TABLET ORAL at 23:08

## 2021-08-16 RX ADMIN — Medication 10 ML: at 10:30

## 2021-08-16 RX ADMIN — Medication 2 PUFF: at 19:26

## 2021-08-16 RX ADMIN — DICLOFENAC SODIUM 4 G: 10 GEL TOPICAL at 10:40

## 2021-08-16 RX ADMIN — ALPRAZOLAM 1 MG: 0.5 TABLET ORAL at 03:57

## 2021-08-16 ASSESSMENT — PAIN SCALES - GENERAL
PAINLEVEL_OUTOF10: 8
PAINLEVEL_OUTOF10: 0
PAINLEVEL_OUTOF10: 7
PAINLEVEL_OUTOF10: 0
PAINLEVEL_OUTOF10: 9

## 2021-08-16 ASSESSMENT — PAIN DESCRIPTION - PAIN TYPE
TYPE: CHRONIC PAIN

## 2021-08-16 ASSESSMENT — PAIN DESCRIPTION - PROGRESSION
CLINICAL_PROGRESSION: NOT CHANGED

## 2021-08-16 ASSESSMENT — PAIN DESCRIPTION - DESCRIPTORS
DESCRIPTORS: CONSTANT
DESCRIPTORS: ACHING
DESCRIPTORS: ACHING
DESCRIPTORS: CONSTANT

## 2021-08-16 ASSESSMENT — PAIN DESCRIPTION - LOCATION
LOCATION: BACK;LEG
LOCATION: ABDOMEN;BACK
LOCATION: BACK;LEG

## 2021-08-16 ASSESSMENT — PAIN DESCRIPTION - FREQUENCY
FREQUENCY: INTERMITTENT
FREQUENCY: CONTINUOUS

## 2021-08-16 ASSESSMENT — PAIN DESCRIPTION - ONSET
ONSET: GRADUAL
ONSET: ON-GOING

## 2021-08-16 ASSESSMENT — PAIN DESCRIPTION - ORIENTATION
ORIENTATION: RIGHT;LEFT;LOWER
ORIENTATION: LOWER;RIGHT;LEFT

## 2021-08-16 NOTE — PROGRESS NOTES
Physical Therapy    Facility/Department: [address]  Initial Assessment/Discharge Summary    NAME: Maribell Zaragoza  : 1945  MRN: [id number]    Date of Service: 2021    Discharge Recommendations:    Maribell Zaragoza scored a 18/24 on the AM-PAC short mobility form. At this time, no further PT is recommended upon discharge due to presenting at baseline functional mobility and independence. Recommend patient returns to prior setting with prior services. Assessment   Body structures, Functions, Activity limitations: Decreased functional mobility ; Decreased endurance;Decreased strength;Decreased ROM  Assessment: Pt presents with the above limitations, however pt presents at baseline functional mobility and independence. Pt has home fully equipped with everything she needs to assist her in day to day living. Pt has help from both family friends and neighbors everyday to complete daily tasks. Pt does not need continued skilled PT due to presentation at baseline function  Treatment Diagnosis: history of CAD  Prognosis: Good  Decision Making: Low Complexity  PT Education: Goals;PT Role;Plan of Care;Gait Training  Patient Education: Pt was educated on the importance of therapy, functional mobility and independence. Pt verbalized understanding  Barriers to Learning: none  REQUIRES PT FOLLOW UP: No  Activity Tolerance  Activity Tolerance: Patient Tolerated treatment well  Activity Tolerance: pt presents at baseline       Patient Diagnosis(es): The primary encounter diagnosis was Sepsis without acute organ dysfunction, due to unspecified organism ([address]  Diagnoses of Hypotension responsive to IV hydration, unspecified hypotension type, Community acquired pneumonia of right middle lobe of lung, COPD with acute exacerbation ([address] Bacterial urinary tract infection, Chest pain, unspecified type, History of coronary artery disease, Chronic low back pain without sciatica, unspecified back pain laterality, and Supplemental oxygen dependent were also pertinent to this visit. has a past medical history of Acute MI ([address] Acute pyelonephritis, Anxiety and depression, Arthritis, CAD (coronary artery disease), Cancer ([address] Cancer ([address] Cancer of skin of leg, Chronic obstructive pulmonary disease ([address] Claustrophobia, COPD (chronic obstructive pulmonary disease) ([address] Esophageal stricture, Gastroesophageal reflux disease without esophagitis, Hiatal hernia, Hiatal hernia, Hypercholesteremia, Hypertension, IBS (irritable bowel syndrome), Migraines, Movement disorder, Pneumonia, PONV (postoperative nausea and vomiting), Type II or unspecified type diabetes mellitus without mention of complication, not stated as uncontrolled, and Unspecified cerebral artery occlusion with cerebral infarction. has a past surgical history that includes Cardiac surgery; Gallbladder surgery; Hysterectomy; Appendectomy; Cholecystectomy; Colonoscopy; Upper gastrointestinal endoscopy (4/20/12); Endoscopy, colon, diagnostic; Tear duct surgery; Upper gastrointestinal endoscopy (06/11/2018); Colonoscopy (06/11/2018); pr exc skin malig <5mm remaindr body (N/A, 9/6/2018); pr split grft trunk,arm,leg <100sqcm (N/A, 9/6/2018); skin biopsy; eye surgery; bronchoscopy (N/A, 1/24/2020); bronchoscopy (N/A, 1/27/2020); Cataract removal (2013 or 2014); Esophagus dilation; and bronchoscopy (N/A, 11/23/2020). Restrictions  Restrictions/Precautions  Restrictions/Precautions: Fall Risk  Required Braces or Orthoses?: No  Position Activity Restriction  Other position/activity restrictions: Korina Watson is a 77 y.o. female who presents to the emergency department at the request of her primary care physician Dr. Mary Carmen Cornejo. Patient states approximately 3:00 this afternoon she started experiencing significant heaviness in her chest.  It is midsternal in nature and does radiate to the left arm and to the left jaw.   She also has associated symptoms of nausea with this as well. She states that she has not having difficulties as a pertains to diaphoresis. She denies fevers chills or change in her chronic cough from her COPD. She tells me that she is oxygen dependent at night \"because of everything\". Patient states she was given aspirin in route. She states that the pain essentially has not changed. She still feels it in the midsternal region as a heavy pressure and rates that pain to be 9 out of 10. She also does not report she is continue to have difficulties with dysuria urgency and frequency. She states that she had finished a course of antibiotics for urinary tract infection a little bit more than a month ago. She states she has this at present and also is having some back pain. She states she is not sure if the back pain is because of the cracked vertebrae she has in her back or if it is because of kidney pain. She denies red flags for back pain. Denies bowel or bladder dysfunction denies saddle anesthesia denies radicular symptoms. Patient states because of the increasing symptoms of both the above-mentioned she presents the ED for evaluation and treatment as requested.   Vision/Hearing  Vision: Impaired  Vision Exceptions: Wears glasses for reading  Hearing: Within functional limits     Subjective  General  Chart Reviewed: Yes  Patient assessed for rehabilitation services?: Yes  Family / Caregiver Present: No  Diagnosis: hx of coronary artery disease  Follows Commands: Within Functional Limits  Other (Comment): pt supine in bed upon arrival  General Comment  Comments: pt agreeable to therapy  Subjective  Subjective: pt states she doesnt move much but will do what is asked  Pain Screening  Patient Currently in Pain: Denies  Vital Signs  Patient Currently in Pain: Denies       Orientation  Orientation  Overall Orientation Status: Within Functional Limits  Social/Functional History  Social/Functional History  Lives With: Alone  Type of Home: House  Home Layout: One level  Home Access: Ramped entrance  Bathroom Shower/Tub: Tub/Shower unit (daughter helps her get into the tub)  Bathroom Toilet: Handicap height  Bathroom Equipment: Grab bars in shower, Shower chair, Hand-held shower (alarm string near toilet and bed)  Bathroom Accessibility: Accessible  Home Equipment: [address] Help From: Home health, Family, Personal care attendant  ADL Assistance: Needs assistance (daughter aid and nurse all help)  Homemaking Assistance: Needs assistance (has a hospital tray at home)  Homemaking Responsibilities: No  Ambulation Assistance: Independent (pt able to walk to bathroom and kitchen with rollator, outside uses scooter)  Transfer Assistance: Independent (pt able to get out of bed, pt uses lift chair when in chair)  Active : No  Leisure & Hobbies: drives scooter around the neighborhood with other neighbors with scooters  IADL Comments: daughter, aid and nurse that helps her with ADLS (bathing, dressing, taking out trash)- neighbors have keys to come in and help when needed  Additional Comments: states she is a fall risk and avoids anything that would possibly make her fall- pt states 3 falls in the last 3 months  Cognition   Cognition  Overall Cognitive Status: WFL    Objective     Observation/Palpation  Posture: Good    AROM RLE (degrees)  RLE AROM: WFL  AROM LLE (degrees)  LLE AROM : WFL  AROM RUE (degrees)  RUE AROM : WFL  RUE General AROM: limited ER but able to perform tasks  AROM LUE (degrees)  LUE AROM : WFL  LUE General AROM: limited ER but able to perform tasks  Strength RLE  Strength RLE: WFL  Strength LLE  Strength LLE: WFL  Strength RUE  Strength RUE: WFL  Strength LUE  Strength LUE: WFL     Sensation  Overall Sensation Status: WFL  Bed mobility  Rolling to Right: Independent  Supine to Sit: Independent  Sit to Supine: Independent  Comment: pt sat EOB while nurse came in  Transfers  Sit to Stand: Stand by assistance  Stand to sit: Stand by assistance  Comment: Minutes 43         Timed Code Treatment Minutes: 28 Minutes    Second Session Therapy Time:   Individual Concurrent Group Co-treatment   Time In 1862         Time Out 0541         Minutes 12           Timed Code Treatment Minutes:  40    Total Treatment Minutes:  Karlo Spring, SPT Willistine Heimlich, PT Therapist observed and directed the above evaluation.  Thanks, Willistine Heimlich, PT, DPT [id number]  2nd session: Thanks, Willistine Heimlich, RICHI, DPT [id number]

## 2021-08-16 NOTE — PROGRESS NOTES
Urology Progress Note  [facility]    Provider: KAE Cancino - CNP  Patient ID:  Admission Date: 2021 Name: Pastor Pappas Date: 2021 MRN: [id number]   Patient Location: [id number] : 1945  Attending: Adeline Caputo MD Date of Service: 2021  PCP: Adeline Caputo MD     Diagnoses:  1. Sepsis without acute organ dysfunction, due to unspecified organism ([address]    2. Hypotension responsive to IV hydration, unspecified hypotension type    3. Community acquired pneumonia of right middle lobe of lung    4. COPD with acute exacerbation ([facility]    5. Bacterial urinary tract infection    6. Chest pain, unspecified type    7. History of coronary artery disease    8. Chronic low back pain without sciatica, unspecified back pain laterality    9. Supplemental oxygen dependent      Here for UTI and PNE  Hx of recurrent UTI  Subpubic pain for >1 month  No hematuria    Assessment/Plan:  Urine cx pending  Recc probiotics, cranberry, estrace, weight loss  F/u in office to discuss treatment for reccurent uti      The patient had a chance to ask questions which were answered. she understands the above plan. Subjective:   Sudarshan Valenzuela is a 77 y.o. female. She was seen and examined this morning. Today we discussed follow up in office for reccurent uti. Discussed using esterace, probiotics, and cranberry as well as weight loss.       Objective:   Vitals:  Vitals:    21 0606   BP:    Pulse:    Resp:    Temp:    SpO2: 91%       Intake/Output Summary (Last 24 hours) at 2021 8643  Last data filed at 2021 0606  Gross per 24 hour   Intake 2142.59 ml   Output 2825 ml   Net -682.41 ml     Physical Exam:  Gen: Obese, Alert and oriented x3, no acute distress  CV: Regular rate   Resp: unlabored respirations  Abd: Soft, non-distended, non-tender, no masses  Ext: no peripheral edema noted, moves upper and lower extremities spontaneously  Skin: warmand well perfused, no rashes noted on the face, or arms.      Labs:  Lab Results   Component Value Date    WBC 7.7 08/16/2021    HGB 14.0 08/16/2021    HCT 43.1 08/16/2021    MCV 88.0 08/16/2021     08/16/2021     Lab Results   Component Value Date    CREATININE 0.9 08/16/2021    BUN 16 08/16/2021     08/16/2021    K 4.4 08/16/2021     08/16/2021    CO2 26 08/16/2021       Sarahi Aranda APRN - CNP   8/16/2021

## 2021-08-16 NOTE — CARE COORDINATION
Discharge Planning Assessment    RN/SW discharge planner met with patient/ (and family member) to discuss reason for admission, current living situation, and potential needs at the time of discharge    Demographics/Insurance verified Yes Abiola riojas, Call  ,  [address]     Current type of dwelling: level entry   [place] [facility]    Living arrangements: alone     Jack Mckee MD    DME: [place] [address],     Active with any community resources/agencies/skilled home care: Abiola riojas CM with [facility], Rekha Marie, [phone], [facility], [facility].  Now [facility]  Previously used [facility]    Medication compliance issues: no    Pharmacy/Financial issues that could impact healthcare: no    Transportation at the time of discharge: family

## 2021-08-16 NOTE — PROGRESS NOTES
Pt tearful after returning to bed from restroom, pt states \"I feel an anxiety attack coming on\". PRN xanax given per MAR. Pt denies further needs at this time.

## 2021-08-16 NOTE — PROGRESS NOTES
Occupational Therapy   Occupational Therapy Initial Assessment/Discharge  Date: 2021   Patient Name: Briana Goel  MRN: [id number]     : 1945    Date of Service: 2021    Discharge Recommendations: Briana Goel scored a  on the AM-PAC ADL Inpatient form. At this time, no further OT is recommended upon discharge due to patient at baseline level of functioning. Recommend patient returns to prior setting with prior services. OT Equipment Recommendations  Equipment Needed: No  Other: pt has everything that she needs at home    Assessment   Assessment: Pt at basline level of functioning, at this time no skilled OT services needed. Treatment Diagnosis: Pt at baseline level of functioning due to hx of coronary artery diseases  Prognosis: Good  Decision Making: Low Complexity  OT Education: OT Role;Plan of Care  Patient Education: Educated pt on OT role, plan of care and discharge plan. Pt is an independent learner. REQUIRES OT FOLLOW UP: No  Activity Tolerance  Activity Tolerance: Patient Tolerated treatment well  Safety Devices  Safety Devices in place: Yes  Type of devices: All fall risk precautions in place;Gait belt;Call light within reach; Patient at risk for falls; Chair alarm in place; Left in chair (high fall risk)           Patient Diagnosis(es): The primary encounter diagnosis was Sepsis without acute organ dysfunction, due to unspecified organism [facility]). Diagnoses of Hypotension responsive to IV hydration, unspecified hypotension type, Community acquired pneumonia of right middle lobe of lung, COPD with acute exacerbation ([facility] [address] [address] Bacterial urinary tract infection, Chest pain, unspecified type, History of coronary artery disease, Chronic low back pain without sciatica, unspecified back pain laterality, and Supplemental oxygen dependent were also pertinent to this visit.      has a past medical history of Acute MI ([address] Acute pyelonephritis, Anxiety and depression, Arthritis, CAD (coronary artery disease), Cancer ([address] Cancer ([address] Cancer of skin of leg, Chronic obstructive pulmonary disease ([address] Claustrophobia, COPD (chronic obstructive pulmonary disease) ([address] Esophageal stricture, Gastroesophageal reflux disease without esophagitis, Hiatal hernia, Hiatal hernia, Hypercholesteremia, Hypertension, IBS (irritable bowel syndrome), Migraines, Movement disorder, Pneumonia, PONV (postoperative nausea and vomiting), Type II or unspecified type diabetes mellitus without mention of complication, not stated as uncontrolled, and Unspecified cerebral artery occlusion with cerebral infarction. has a past surgical history that includes Cardiac surgery; Gallbladder surgery; Hysterectomy; Appendectomy; Cholecystectomy; Colonoscopy; Upper gastrointestinal endoscopy (4/20/12); Endoscopy, colon, diagnostic; Tear duct surgery; Upper gastrointestinal endoscopy (06/11/2018); Colonoscopy (06/11/2018); pr exc skin malig <5mm remaindr body (N/A, 9/6/2018); pr split grft trunk,arm,leg <100sqcm (N/A, 9/6/2018); skin biopsy; eye surgery; bronchoscopy (N/A, 1/24/2020); bronchoscopy (N/A, 1/27/2020); Cataract removal (2013 or 2014); Esophagus dilation; and bronchoscopy (N/A, 11/23/2020). Treatment Diagnosis: Pt at baseline level of functioning due to hx of coronary artery diseases      Restrictions  Restrictions/Precautions  Restrictions/Precautions: Fall Risk  Required Braces or Orthoses?: No  Position Activity Restriction  Other position/activity restrictions: Jodi Allison is a 77 y.o. female who presents to the emergency department at the request of her primary care physician Dr. Lamonte Richter. Patient states approximately 3:00 this afternoon she started experiencing significant heaviness in her chest.  It is midsternal in nature and does radiate to the left arm and to the left jaw. She also has associated symptoms of nausea with this as well.   She states that she has not having difficulties as a pertains to diaphoresis. She denies fevers chills or change in her chronic cough from her COPD. She tells me that she is oxygen dependent at night \"because of everything\". Patient states she was given aspirin in route. She states that the pain essentially has not changed. She still feels it in the midsternal region as a heavy pressure and rates that pain to be 9 out of 10. She also does not report she is continue to have difficulties with dysuria urgency and frequency. She states that she had finished a course of antibiotics for urinary tract infection a little bit more than a month ago. She states she has this at present and also is having some back pain. She states she is not sure if the back pain is because of the cracked vertebrae she has in her back or if it is because of kidney pain. She denies red flags for back pain. Denies bowel or bladder dysfunction denies saddle anesthesia denies radicular symptoms. Patient states because of the increasing symptoms of both the above-mentioned she presents the ED for evaluation and treatment as requested. Subjective   General  Chart Reviewed: Yes  Patient assessed for rehabilitation services?: Yes  Additional Pertinent Hx: arthritis, CAD, COPD, HTN, TIAs  Family / Caregiver Present: Yes (daughter came in at end of session)  Diagnosis: hx of coronary artery disease  Subjective  Subjective: Pt lying supine in bed, agreeable to therapy and denies pain.   Patient Currently in Pain: Denies  Pain Assessment  Clinical Progression: Not changed  Vital Signs  BP: 126/76  Patient Currently in Pain: Denies  Social/Functional History  Social/Functional History  Lives With: Alone  Type of Home: House  Home Layout: One level  Home Access: Ramped entrance  Bathroom Shower/Tub: Tub/Shower unit (daughter helps her get into the tub)  Bathroom Toilet: Handicap height  Bathroom Equipment: Grab bars in shower, Shower chair, Hand-held shower (alarm string near toilet and bed)  Bathroom Accessibility: Accessible  Home Equipment: Lift chair, Electric scooter, 4 wheeled walker, [address] [facility] bed, Reacher (uses scooter outside of the house, wheelchair inside)  Ti Help From: Home health, Family, Personal care attendant  ADL Assistance: Needs assistance (daughter aid and nurse all help)  Homemaking Assistance: Needs assistance (has a hospital tray at home)  Homemaking Responsibilities: No  Ambulation Assistance: Independent (pt able to walk to bathroom and kitchen with rollator, outside uses scooter)  Transfer Assistance: Independent (pt able to get out of bed, pt uses lift chair when in chair)  Active : No  Leisure & Hobbies: drives scooter around the neighborhood with other neighbors with scooters  IADL Comments: daughter, aid and nurse that helps her with ADLS (bathing, dressing, taking out trash)- neighbors have keys to come in and help when needed  Additional Comments: states she is a fall risk and avoids anything that would possibly make her fall- pt states 3 falls in the last 3 months       Objective   Vision: Impaired  Vision Exceptions: Wears glasses for reading  Hearing: Within functional limits    Orientation  Overall Orientation Status: Within Functional Limits  Observation/Palpation  Posture: Good  Toilet Transfers  Toilet - Technique: Ambulating  Equipment Used: Standard toilet (pt used grab bars to transfer on/off toilet)  Toilet Transfer: Stand by assistance  ADL  Grooming: Stand by assistance (washed hands at sink in stance, pt combed hair seated in recliner)  Toileting: Stand by assistance (ambulated to toilet with RW and used grab bars to help transfer on/off toilet. pt performed geoffrey independently.)  Additional Comments: pt denied need to perform other ADLs.  at baseline, pt requires assist for all ADL/IADLs, family and home health aid provide assistance with bathing, dressing, eating and toileting  Tone RUE  RUE Tone: Normotonic  Tone LUE  LUE Tone: Normotonic  Coordination  Movements Are Fluid And Coordinated: Yes     Bed mobility  Rolling to Right: Independent  Supine to Sit: Independent  Sit to Supine: Independent  Comment: HOB elevated, pt reports at home has hospital bed  Transfers  Sit to stand: Stand by assistance  Stand to sit: Stand by assistance     Cognition  Overall Cognitive Status: WFL        Sensation  Overall Sensation Status: WFL        LUE AROM (degrees)  LUE AROM : WFL (pt has limited ROM and reports it affects her ability to perform geoffrey care at times, during therapy session pt [facility] for ADL tasks and functional mobility/transfers)  Left Hand AROM (degrees)  Left Hand AROM: Exceptions (pt has arthritis in hands and presents with flexed DIP and PIP joints, however was [facility] during ADL tasks may impact fine motor control and coordination)  RUE AROM (degrees)  RUE AROM : WFL (pt has limited ROM and reports it affects her ability to perform geoffrey care at times, during therapy session pt was [facility] for ADL tasks and functional mobility/transfers)  Right Hand AROM (degrees)  Right Hand AROM: Exceptions (pt has arthritis in hands and presents with flexed DIP and PIP joints, however was [employer] during ADL tasks may impact fine motor control and coordination)  LUE Strength  Gross LUE Strength: [facility]  L Hand General: 3+/5  LUE Strength Comment: LUE MMT 3/5, pt unable to maintain strength upon resistance but reports is baseline  RUE Strength  Gross RUE Strength: WFL  R Hand General: 3+/5  RUE Strength Comment: RUE MMT 3/5, pt unable to maintain strength upon resistance but reports is baseline         2nd Session:  Patient alarm sounding. Patient up out of bed on the way to bathroom with standard walker and supervision. Patient require Mod I with toilet transfers. Dependent with geoffrey care, able to pull pants up and down. Max A to change brief. Hand washing in static stance with supervision. Ambulates back to bed with SW and supervision.   Mod I with bed mobility. All needs me and alarm in place. Plan   Plan  Times per week: eval and discharge    AM-PAC Score        AM-PAC Inpatient Daily Activity Raw Score: 11 (08/16/21 1206)  AM-PAC Inpatient ADL T-Scale Score : 29.04 (08/16/21 1206)  ADL Inpatient CMS 0-100% Score: 70.42 (08/16/21 1206)  ADL Inpatient CMS G-Code Modifier : CL (08/16/21 1206)    Goals  Short term goals  Time Frame for Short term goals: eval and discharge       Therapy Time   Individual Concurrent Group Co-treatment   Time In 1675         Time Out 1112         Minutes 44            Second Session Therapy Time:   Individual Concurrent Group Co-treatment   Time In 5724         Time Out 1157         Minutes 12           Timed Code Treatment Minutes:  41    Total Treatment Minutes:  Nal[address] Ol[address], [place]   Therapist directly observed and directed this treatment.   MIRI Conrad/L [id number]

## 2021-08-16 NOTE — PLAN OF CARE
Problem: Falls - Risk of:  Goal: Will remain free from falls  8/16/2021 0316 by Van Buren Aliment  Outcome: Ongoing     Problem: Falls - Risk of:  Goal: Absence of physical injury  8/16/2021 0316 by Vickie Aliment  Outcome: Ongoing     Problem: Pain:  Goal: Pain level will decrease  8/16/2021 0316 by Van Buren Aliment  Outcome: Ongoing     Problem: Pain:  Goal: Control of acute pain  Outcome: Ongoing     Problem: Pain:  Goal: Control of chronic pain  Outcome: Ongoing     Problem: Skin Integrity:  Goal: Will show no infection signs and symptoms  Outcome: Ongoing     Problem: Skin Integrity:  Goal: Absence of new skin breakdown  Outcome: Ongoing

## 2021-08-17 PROBLEM — Z16.12 ESBL (EXTENDED SPECTRUM BETA-LACTAMASE) PRODUCING BACTERIA INFECTION: Status: ACTIVE | Noted: 2021-08-17

## 2021-08-17 PROBLEM — A49.9 ESBL (EXTENDED SPECTRUM BETA-LACTAMASE) PRODUCING BACTERIA INFECTION: Status: ACTIVE | Noted: 2021-08-17

## 2021-08-17 LAB
ANION GAP SERPL CALCULATED.3IONS-SCNC: 9 MMOL/L (ref 3–16)
BUN BLDV-MCNC: 14 MG/DL (ref 7–20)
CALCIUM SERPL-MCNC: 9.8 MG/DL (ref 8.3–10.6)
CHLORIDE BLD-SCNC: 102 MMOL/L (ref 99–110)
CO2: 24 MMOL/L (ref 21–32)
CREAT SERPL-MCNC: 1 MG/DL (ref 0.6–1.2)
CULTURE, RESPIRATORY: NORMAL
GFR AFRICAN AMERICAN: >60
GFR NON-AFRICAN AMERICAN: 54
GLUCOSE BLD-MCNC: 121 MG/DL (ref 70–99)
GLUCOSE BLD-MCNC: 138 MG/DL (ref 70–99)
GLUCOSE BLD-MCNC: 158 MG/DL (ref 70–99)
GLUCOSE BLD-MCNC: 167 MG/DL (ref 70–99)
GLUCOSE BLD-MCNC: 167 MG/DL (ref 70–99)
GLUCOSE BLD-MCNC: 183 MG/DL (ref 70–99)
GRAM STAIN RESULT: NORMAL
HCT VFR BLD CALC: 41.5 % (ref 36–48)
HEMOGLOBIN: 13.4 G/DL (ref 12–16)
LV EF: 63 %
LVEF MODALITY: NORMAL
MCH RBC QN AUTO: 28.5 PG (ref 26–34)
MCHC RBC AUTO-ENTMCNC: 32.2 G/DL (ref 31–36)
MCV RBC AUTO: 88.5 FL (ref 80–100)
ORGANISM: ABNORMAL
PDW BLD-RTO: 16.8 % (ref 12.4–15.4)
PERFORMED ON: ABNORMAL
PLATELET # BLD: 128 K/UL (ref 135–450)
PMV BLD AUTO: 8.2 FL (ref 5–10.5)
POTASSIUM SERPL-SCNC: 4.7 MMOL/L (ref 3.5–5.1)
RBC # BLD: 4.69 M/UL (ref 4–5.2)
SODIUM BLD-SCNC: 135 MMOL/L (ref 136–145)
URINE CULTURE, ROUTINE: ABNORMAL
WBC # BLD: 7.7 K/UL (ref 4–11)

## 2021-08-17 PROCEDURE — 2700000000 HC OXYGEN THERAPY PER DAY

## 2021-08-17 PROCEDURE — 80048 BASIC METABOLIC PNL TOTAL CA: CPT

## 2021-08-17 PROCEDURE — 85027 COMPLETE CBC AUTOMATED: CPT

## 2021-08-17 PROCEDURE — 1200000000 HC SEMI PRIVATE

## 2021-08-17 PROCEDURE — 6370000000 HC RX 637 (ALT 250 FOR IP): Performed by: INTERNAL MEDICINE

## 2021-08-17 PROCEDURE — C8929 TTE W OR WO FOL WCON,DOPPLER: HCPCS

## 2021-08-17 PROCEDURE — 6360000002 HC RX W HCPCS: Performed by: INTERNAL MEDICINE

## 2021-08-17 PROCEDURE — 94640 AIRWAY INHALATION TREATMENT: CPT

## 2021-08-17 PROCEDURE — 36569 INSJ PICC 5 YR+ W/O IMAGING: CPT

## 2021-08-17 PROCEDURE — C1751 CATH, INF, PER/CENT/MIDLINE: HCPCS

## 2021-08-17 PROCEDURE — 02HV33Z INSERTION OF INFUSION DEVICE INTO SUPERIOR VENA CAVA, PERCUTANEOUS APPROACH: ICD-10-PCS | Performed by: INTERNAL MEDICINE

## 2021-08-17 PROCEDURE — 2580000003 HC RX 258: Performed by: INTERNAL MEDICINE

## 2021-08-17 PROCEDURE — 94761 N-INVAS EAR/PLS OXIMETRY MLT: CPT

## 2021-08-17 PROCEDURE — 2060000000 HC ICU INTERMEDIATE R&B

## 2021-08-17 PROCEDURE — 6360000004 HC RX CONTRAST MEDICATION: Performed by: INTERNAL MEDICINE

## 2021-08-17 PROCEDURE — 36415 COLL VENOUS BLD VENIPUNCTURE: CPT

## 2021-08-17 RX ORDER — SODIUM CHLORIDE 0.9 % (FLUSH) 0.9 %
5-40 SYRINGE (ML) INJECTION EVERY 12 HOURS SCHEDULED
Status: DISCONTINUED | OUTPATIENT
Start: 2021-08-17 | End: 2021-08-18 | Stop reason: HOSPADM

## 2021-08-17 RX ORDER — SODIUM CHLORIDE 9 MG/ML
25 INJECTION, SOLUTION INTRAVENOUS PRN
Status: DISCONTINUED | OUTPATIENT
Start: 2021-08-17 | End: 2021-08-18 | Stop reason: HOSPADM

## 2021-08-17 RX ORDER — SODIUM CHLORIDE 0.9 % (FLUSH) 0.9 %
5-40 SYRINGE (ML) INJECTION PRN
Status: DISCONTINUED | OUTPATIENT
Start: 2021-08-17 | End: 2021-08-18 | Stop reason: HOSPADM

## 2021-08-17 RX ORDER — CEFUROXIME AXETIL 250 MG/1
250 TABLET ORAL EVERY 12 HOURS SCHEDULED
Status: DISCONTINUED | OUTPATIENT
Start: 2021-08-17 | End: 2021-08-17

## 2021-08-17 RX ORDER — LIDOCAINE HYDROCHLORIDE 10 MG/ML
5 INJECTION, SOLUTION EPIDURAL; INFILTRATION; INTRACAUDAL; PERINEURAL ONCE
Status: DISCONTINUED | OUTPATIENT
Start: 2021-08-17 | End: 2021-08-18 | Stop reason: HOSPADM

## 2021-08-17 RX ADMIN — GLIPIZIDE 5 MG: 5 TABLET ORAL at 17:05

## 2021-08-17 RX ADMIN — GLIPIZIDE 5 MG: 5 TABLET ORAL at 09:37

## 2021-08-17 RX ADMIN — ENOXAPARIN SODIUM 40 MG: 40 INJECTION SUBCUTANEOUS at 09:36

## 2021-08-17 RX ADMIN — INSULIN LISPRO 1 UNITS: 100 INJECTION, SOLUTION INTRAVENOUS; SUBCUTANEOUS at 20:52

## 2021-08-17 RX ADMIN — GLIPIZIDE 5 MG: 5 TABLET ORAL at 17:04

## 2021-08-17 RX ADMIN — HYDRALAZINE HYDROCHLORIDE 25 MG: 25 TABLET, FILM COATED ORAL at 09:37

## 2021-08-17 RX ADMIN — Medication 10 ML: at 09:37

## 2021-08-17 RX ADMIN — PERFLUTREN 1.65 MG: 6.52 INJECTION, SUSPENSION INTRAVENOUS at 09:46

## 2021-08-17 RX ADMIN — DICLOFENAC SODIUM 4 G: 10 GEL TOPICAL at 20:50

## 2021-08-17 RX ADMIN — DICLOFENAC SODIUM 4 G: 10 GEL TOPICAL at 09:38

## 2021-08-17 RX ADMIN — FUROSEMIDE 40 MG: 40 TABLET ORAL at 09:37

## 2021-08-17 RX ADMIN — LISINOPRIL 10 MG: 10 TABLET ORAL at 09:37

## 2021-08-17 RX ADMIN — Medication 10 ML: at 20:38

## 2021-08-17 RX ADMIN — CEFUROXIME AXETIL 250 MG: 250 TABLET, FILM COATED ORAL at 11:49

## 2021-08-17 RX ADMIN — IPRATROPIUM BROMIDE AND ALBUTEROL SULFATE 1 AMPULE: .5; 3 SOLUTION RESPIRATORY (INHALATION) at 20:55

## 2021-08-17 RX ADMIN — GABAPENTIN 600 MG: 300 CAPSULE ORAL at 20:38

## 2021-08-17 RX ADMIN — DEXTROMETHORPHAN POLISTIREX 30 MG: 30 SUSPENSION ORAL at 11:49

## 2021-08-17 RX ADMIN — ISOSORBIDE MONONITRATE 60 MG: 60 TABLET, EXTENDED RELEASE ORAL at 09:37

## 2021-08-17 RX ADMIN — IPRATROPIUM BROMIDE AND ALBUTEROL SULFATE 1 AMPULE: .5; 3 SOLUTION RESPIRATORY (INHALATION) at 16:16

## 2021-08-17 RX ADMIN — ONDANSETRON 4 MG: 4 TABLET, ORALLY DISINTEGRATING ORAL at 03:48

## 2021-08-17 RX ADMIN — HYDRALAZINE HYDROCHLORIDE 25 MG: 25 TABLET, FILM COATED ORAL at 14:38

## 2021-08-17 RX ADMIN — INSULIN LISPRO 2 UNITS: 100 INJECTION, SOLUTION INTRAVENOUS; SUBCUTANEOUS at 13:22

## 2021-08-17 RX ADMIN — ACETAZOLAMIDE 250 MG: 250 TABLET ORAL at 09:37

## 2021-08-17 RX ADMIN — ALOGLIPTIN 12.5 MG: 12.5 TABLET, FILM COATED ORAL at 09:37

## 2021-08-17 RX ADMIN — MEROPENEM 1000 MG: 1 INJECTION, POWDER, FOR SOLUTION INTRAVENOUS at 17:10

## 2021-08-17 RX ADMIN — DEXTROMETHORPHAN POLISTIREX 30 MG: 30 SUSPENSION ORAL at 20:38

## 2021-08-17 RX ADMIN — Medication 2 PUFF: at 20:55

## 2021-08-17 RX ADMIN — ASPIRIN 81 MG: 81 TABLET, CHEWABLE ORAL at 09:37

## 2021-08-17 RX ADMIN — ALPRAZOLAM 1 MG: 0.5 TABLET ORAL at 09:41

## 2021-08-17 RX ADMIN — ALPRAZOLAM 1 MG: 0.5 TABLET ORAL at 17:05

## 2021-08-17 RX ADMIN — Medication 10 ML: at 20:55

## 2021-08-17 ASSESSMENT — PAIN DESCRIPTION - PAIN TYPE
TYPE: CHRONIC PAIN
TYPE: CHRONIC PAIN

## 2021-08-17 ASSESSMENT — PAIN DESCRIPTION - DESCRIPTORS
DESCRIPTORS: CONSTANT
DESCRIPTORS: OTHER (COMMENT)

## 2021-08-17 ASSESSMENT — PAIN DESCRIPTION - ONSET
ONSET: ON-GOING
ONSET: ON-GOING

## 2021-08-17 ASSESSMENT — PAIN SCALES - GENERAL
PAINLEVEL_OUTOF10: 8
PAINLEVEL_OUTOF10: 10

## 2021-08-17 ASSESSMENT — PAIN DESCRIPTION - FREQUENCY
FREQUENCY: INTERMITTENT
FREQUENCY: CONTINUOUS

## 2021-08-17 ASSESSMENT — PAIN DESCRIPTION - LOCATION
LOCATION: CHEST
LOCATION: ABDOMEN

## 2021-08-17 NOTE — PLAN OF CARE
Problem: Cardiovascular  Goal: Hemodynamic stability  Outcome: Ongoing     Problem: Falls - Risk of:  Goal: Will remain free from falls  Description: Will remain free from falls  Outcome: Ongoing  Goal: Absence of physical injury  Description: Absence of physical injury  Outcome: Ongoing     Vitals:    08/17/21 0400   BP: (!) 102/52   Pulse: 63   Resp: 20   Temp: 97.8 °F (36.6 °C)   SpO2: 92%     Pt awake/asleep on/off overnight. Zofran given at 0348 per pt request for c/o nausea. VSS with /52 at 0400. See [facility] & all flowsheets. Pt is a high fall risk. Pt remains free from falls, throughout night. Bed alarm remains in place, door open. Pt encouraged to use call light for needs throughout night; call light is within reach. Bed lock is in lowest position. Will continue to monitor throughout night.

## 2021-08-17 NOTE — PROGRESS NOTES
Nutrition Assessment     Type and Reason for Visit: Initial, Positive Nutrition Screen    Nutrition Recommendations/Plan:   Continue current diet     Nutrition Assessment:  Pt triggered nutrition evaluation based on MST of 2 indicating wt loss and poor po intake. Pt reports insignificant wt loss- actually reports \"a few pounds gains, then a few pounds lost\". Wt records shows overall stable wt over past year. PO intake recorded ast 50% or greater and snack wrappers at pt's bedside. No nutrition concerns at this time. Malnutrition Assessment:  Malnutrition Status: No malnutrition      Nutrition Related Findings: 8/15 BM; -2.4L, no edema; no teeth declines soft/ground foods      Current Nutrition Therapies:    ADULT DIET; Regular; 4 carb choices (60 gm/meal)    Anthropometric Measures:  · Height: 5' 6\" (167.6 cm)  · Current Body Wt: 265 lb (120.2 kg)   · BMI: 42.8    Nutrition Diagnosis:   No nutrition diagnosis at this time     Nutrition Interventions:   Food and/or Nutrient Delivery:  Continue Current Diet  Nutrition Education/Counseling:  Education not indicated   Coordination of Nutrition Care:  Continue to monitor while inpatient    Goals:  PO intake 50% or greater       Nutrition Monitoring and Evaluation:   Food/Nutrient Intake Outcomes:  Food and Nutrient Intake  Physical Signs/Symptoms Outcomes:  None Identified     Discharge Planning:     Too soon to determine     Electronically signed by Ave Baker RD, LD on 8/17/21 at 1:36 PM EDT  Contact: [phone]

## 2021-08-17 NOTE — FLOWSHEET NOTE
Pt refuses Protonix and reports that her daughter will bring in her Prevacid today so that we can get the bottle labeled and have order placed

## 2021-08-17 NOTE — FLOWSHEET NOTE
Pt back from renal U/S, in bed, on phone, and requesting ice water & all pills now;see MAR & all flowsheets. Will continue to monitor.

## 2021-08-17 NOTE — CONSULTS
PICC line education:    -Risks  -Benefits  -Alternatives  -Procedure    Discussed the above with patient, verbalized understanding, answered all questions. Provided with information on PICC care to review. PICC tip verified via 3[employer] (Ok to use). Reported off to patient's  Nurse Shweta Horvath.

## 2021-08-17 NOTE — CARE COORDINATION
Per Josy Sorto , tiffanyluna was active with Waiver program services through [facility], [phone], fax [phone]  To resume at discharge pending a HC order. Please complete & sign the JOSE G/AVS/Prescriptions,  RN please print JOSE G/AVS once completed.  Thank you, Chanel Lewis, MSVON, [phone]

## 2021-08-17 NOTE — PROGRESS NOTES
Department of Internal Medicine  General Internal Medicine   Progress Note     SUBJECTIVE   Moderate SOB and congestion cough is better     History obtained from chart review and the patient  General ROS: positive for  - fatigue and malaise  negative for - chills, fever or night sweats  Psychological ROS: negative  Respiratory ROS: positive for - shortness of breath and wheezing  negative for - cough, hemoptysis, sputum changes or stridor  Cardiovascular ROS: positive for - chest pain, dyspnea on exertion and edema  negative for - loss of consciousness, orthopnea or palpitations  Gastrointestinal ROS: no abdominal pain, change in bowel habits, or black or bloody stools    OBJECTIVE      Medications      Current Facility-Administered Medications: cefUROXime (CEFTIN) tablet 250 mg, 250 mg, Oral, 2 times per day  perflutren lipid microspheres (DEFINITY) injection 1.65 mg, 1.5 mL, Intravenous, ONCE PRN  acetaZOLAMIDE (DIAMOX) tablet 250 mg, 250 mg, Oral, Daily  pantoprazole (PROTONIX) tablet 40 mg, 40 mg, Oral, QAM AC  alogliptin (NESINA) tablet 12.5 mg, 12.5 mg, Oral, Daily  diclofenac sodium (VOLTAREN) 1 % gel 4 g, 4 g, Topical, BID  gabapentin (NEURONTIN) capsule 600 mg, 600 mg, Oral, Nightly  albuterol sulfate  (90 Base) MCG/ACT inhaler 2 puff, 2 puff, Inhalation, Q4H PRN  dextromethorphan (DELSYM) 30 MG/5ML extended release liquid 30 mg, 30 mg, Oral, 2 times per day  aspirin chewable tablet 81 mg, 81 mg, Oral, Daily  sodium chloride (OCEAN, BABY AYR) 0.65 % nasal spray 1 spray, 1 spray, Nasal, PRN  rosuvastatin (CRESTOR) tablet 5 mg, 5 mg, Oral, Nightly  isosorbide mononitrate (IMDUR) extended release tablet 60 mg, 60 mg, Oral, Daily  hydrALAZINE (APRESOLINE) tablet 25 mg, 25 mg, Oral, TID  lisinopril (PRINIVIL;ZESTRIL) tablet 10 mg, 10 mg, Oral, Daily  furosemide (LASIX) tablet 40 mg, 40 mg, Oral, Daily  budesonide-formoterol (SYMBICORT) 160-4.5 MCG/ACT inhaler 2 puff, 2 puff, Inhalation, BID  ALPRAZolam [place]) tablet 1 mg, 1 mg, Oral, TID PRN  glipiZIDE (GLUCOTROL) tablet 5 mg, 5 mg, Oral, BID AC  sodium chloride flush 0.9 % injection 5-40 mL, 5-40 mL, Intravenous, 2 times per day  sodium chloride flush 0.9 % injection 10 mL, 10 mL, Intravenous, PRN  0.9 % sodium chloride infusion, 25 mL, Intravenous, PRN  enoxaparin (LOVENOX) injection 40 mg, 40 mg, Subcutaneous, Daily  ondansetron (ZOFRAN-ODT) disintegrating tablet 4 mg, 4 mg, Oral, Q8H PRN **OR** ondansetron (ZOFRAN) injection 4 mg, 4 mg, Intravenous, Q6H PRN  magnesium hydroxide (MILK OF MAGNESIA) 400 MG/5ML suspension 30 mL, 30 mL, Oral, Daily PRN  acetaminophen (TYLENOL) tablet 650 mg, 650 mg, Oral, Q6H PRN **OR** acetaminophen (TYLENOL) suppository 650 mg, 650 mg, Rectal, Q6H PRN  ipratropium-albuterol (DUONEB) nebulizer solution 1 ampule, 1 ampule, Inhalation, Q4H WA  hydrALAZINE (APRESOLINE) injection 10 mg, 10 mg, Intravenous, Q6H PRN  0.9 % sodium chloride bolus, 500 mL, Intravenous, PRN  potassium chloride (KLOR-CON M) extended release tablet 40 mEq, 40 mEq, Oral, PRN **OR** potassium bicarb-citric acid (EFFER-K) effervescent tablet 40 mEq, 40 mEq, Oral, PRN **OR** potassium chloride 10 mEq/100 mL IVPB (Peripheral Line), 10 mEq, Intravenous, PRN  insulin lispro (1 Unit Dial) 0-12 Units, 0-12 Units, Subcutaneous, TID WC  insulin lispro (1 Unit Dial) 0-6 Units, 0-6 Units, Subcutaneous, Nightly  glucose (GLUTOSE) 40 % oral gel 15 g, 15 g, Oral, PRN  dextrose 50 % IV solution, 12.5 g, Intravenous, PRN  glucagon (rDNA) injection 1 mg, 1 mg, Intramuscular, PRN  dextrose 5 % solution, 100 mL/hr, Intravenous, PRN  0.9 % sodium chloride bolus, 1,000 mL, Intravenous, Once    Physical      VITALS:    BP (!) 102/52   Pulse 63   Temp 97.8 °F (36.6 °C) (Oral)   Resp 20   Ht 5' 6\" (1.676 m)   Wt 265 lb 9.6 oz (120.5 kg)   SpO2 92%   BMI 42.87 kg/m²   TEMPERATURE:  Current - Temp: 97.8 °F (36.6 °C);  Max - Temp  Av.8 °F (36.6 °C)  Min: 97.3 °F (36.3 °C) Max: 98.2 °F (36.8 °C)  RESPIRATIONS RANGE: Resp  Av.4  Min: 18  Max: 20  PULSE RANGE: Pulse  Av.3  Min: 61  Max: 75  BLOOD PRESSURE RANGE:  Systolic (26RYH), QMQ:156 , Min:92 , QRH:919   ; Diastolic (74XGH), YYY:44, Min:38, Max:76    PULSE OXIMETRY RANGE: SpO2  Av.9 %  Min: 90 %  Max: 94 %  24HR INTAKE/OUTPUT:      Intake/Output Summary (Last 24 hours) at 2021 0930  Last data filed at 2021 6503  Gross per 24 hour   Intake 1255.21 ml   Output 2825 ml   Net -1569.79 ml     CONSTITUTIONAL:  awake, alert, cooperative, no apparent distress, and appears stated age  NECK:  supple, symmetrical, trachea midline and skin normal  LUNGS:  Moderate increase in work of breathing bart crackles and exp wheezes   CARDIOVASCULAR:  Normal apical impulse, regular rate and rhythm, normal S1 and S2, no S3 or S4, and no murmur noted  ABDOMEN:  No scars, normal bowel sounds, soft, non-distended, non-tender, no masses palpated, no hepatosplenomegally  MUSCULOSKELETAL:  ++ edema  NEUROLOGIC:  No acute focal change    Data      Lab Results   Component Value Date    PHART 7.446 2017       Lab Results   Component Value Date     2021    K 4.7 2021    K 4.2 08/15/2021     2021    CO2 24 2021    BUN 14 2021    CREATININE 1.0 2021    GLUCOSE 158 2021    CALCIUM 9.8 2021     Lab Results   Component Value Date    WBC 7.7 2021    HGB 13.4 2021    HCT 41.5 2021    MCV 88.5 2021     2021         Lab Results   Component Value Date    INR 1.03 2021    PROTIME 11.7 2021       ASSESSMENT AND PLAN      Patient Active Problem List   Diagnosis    Chronic respiratory failure (HCC)    CAD (coronary artery disease)    Hyperlipemia    Essential hypertension    DM2 (diabetes mellitus, type 2) ([address]    Primary osteoarthritis involving multiple joints    Gastroesophageal reflux disease without esophagitis    Anxiety    Morbid obesity ([address]    Precordial pain    Unstable angina ([address]    COPD with acute exacerbation (HCC)    AKIL (obstructive sleep apnea)    Chest congestion    DM (diabetes mellitus), secondary uncontrolled (HCC)    Mucus plugging of bronchi    Grade II diastolic dysfunction    Sepsis due to pneumonia ([address]    UTI (urinary tract infection)    Pneumonia                          Ct Lasix    add Diamox    ct IV antibiotics    await cultures   get a Trans thoracic ECHO    Patient has home O2 and HHC

## 2021-08-17 NOTE — FLOWSHEET NOTE
Deana Salter from radiology called for pt to come to [address] for renal US. Pt has not been NPO. Deana Salter stated that pt does not need to be fasting for testing and will place request for transport to come to department now. D/W patient and giving antibiotics now. Pt refuses crestor now d/w side effects \"muscle cramping\" and requests all meds pushed back to be down after testing;all med times adjusted per pt request. See [facility] & all flowsheets. Will continue to monitor.

## 2021-08-17 NOTE — FLOWSHEET NOTE
Pt asleep on Left side @1945 then awake right after RN left bedside. Pt did not use call light but yells out door that she needs to go to the bathroom. Night RN at bedside & assisted pt up to bathroom with walker. Urine found in hat in toilet at [address] [place] [address]; emptied for next measurement. Pt did not have pullup/brief on & reports that she needs one on and that she cannot wipe herself after toileting. RN used bath wipes on periarea after pt urinated 350ml & placed clean pull-up on pt. Pt assisted back to bed with walker, and day RN at bedside then assisted pt in covering back up with sheet & blanket in bed for pt comfort. Output = 350ml; see I/O & all flowsheets. Will continue to monitor.

## 2021-08-17 NOTE — DISCHARGE INSTR - COC
GASTROINTESTINAL ENDOSCOPY  06/11/2018    w/esophagael dilation       Immunization History:   Immunization History   Administered Date(s) Administered    Influenza 10/11/2013    Influenza Virus Vaccine 10/18/2010, 08/11/2014, 09/10/2015, 09/05/2017    Influenza, High Dose (Fluzone 65 yrs and older) 10/02/2018    Influenza, MDCK Quadv, with preserv IM (Flucelvax 4 yrs and older) 09/08/2017, 09/11/2018, 09/01/2020    Influenza, Javier Half, IM, (6 mo and older Fluzone, Flulaval, Fluarix and 3 yrs and older Afluria) 09/09/2019    Influenza, Triv, 3 Years and older, IM (Afluria (5 yrs and older) 10/20/2016    Pneumococcal Conjugate 13-valent ([id number]) 02/08/2017    Pneumococcal Polysaccharide ([id number]) 12/24/2010, 07/23/2015       Active Problems:  Patient Active Problem List   Diagnosis Code    Chronic respiratory failure ([facility] [address] J96.10    CAD (coronary artery disease) I25.10    Hyperlipemia E78.5    Essential hypertension I10    DM2 (diabetes mellitus, type 2) ([facility] [address] [address] E11.9    Primary osteoarthritis involving multiple joints M89.49    Gastroesophageal reflux disease without esophagitis K21.9    Anxiety F41.9    Morbid obesity ([facility] [address] [address] E66.01    Precordial pain R07.2    Unstable angina (HCC) I20.0    COPD with acute exacerbation (HCC) J44.1    AKIL (obstructive sleep apnea) G47.33    Chest congestion R09.89    DM (diabetes mellitus), secondary uncontrolled (HCC) E13.65    Mucus plugging of bronchi T17.500A    Grade II diastolic dysfunction U43.8    Sepsis due to pneumonia (HCC) J18.9, A41.9    UTI (urinary tract infection) N39.0    Pneumonia J18.9    ESBL (extended spectrum beta-lactamase) producing bacteria infection A49.9, Z16.12       Isolation/Infection:   Isolation          No Isolation        Patient Infection Status     Infection Onset Added Last Indicated Last Indicated By Review Planned Expiration Resolved Resolved By    ESBL (Extended Spectrum Beta Lactamase) 08/14/21 08/17/21 08/14/21 Culture, Urine        Resolved    C-diff Rule Out 08/15/21 08/15/21 08/15/21 Clostridium difficile toxin/antigen (Ordered)   08/15/21 Rule-Out Test Resulted    C-diff Rule Out 20 Clostridium difficile toxin/antigen (Ordered)   20 Zenaida Pink RN    INFLUENZA  20 Ila Malone RN   20           Nurse Assessment:  Last Vital Signs: /71   Pulse 76   Temp 98.1 °F (36.7 °C) (Oral)   Resp 20   Ht 5' 6\" (1.676 m)   Wt 270 lb 14.4 oz (122.9 kg)   SpO2 90%   BMI 43.72 kg/m²     Last documented pain score (0-10 scale): Pain Level: 8  Last Weight:   Wt Readings from Last 1 Encounters:   21 270 lb 14.4 oz (122.9 kg)     Mental Status:  oriented and alert    IV Access:  PICC - right brachial    Nursing Mobility/ADLs:  Walking   Assisted  Transfer  Assisted  Bathing  Assisted  Dressing  Assisted  Toileting  Assisted  Feeding  Independent  Med Admin  Assisted  Med Delivery   whole    Wound Care Documentation and Therapy:        Elimination:  Continence:   · Bowel: Yes  · Bladder: Yes  Urinary Catheter: None   Colostomy/Ileostomy/Ileal Conduit: No       Date of Last BM: 2021    Intake/Output Summary (Last 24 hours) at 2021 1210  Last data filed at 2021 1240  Gross per 24 hour   Intake --   Output 600 ml   Net -600 ml     I/O last 3 completed shifts: In: 1 [P.O.:480; I.V.:25.4; IV Piggyback:202.6]  Out: 4625 [Urine:4625]    Safety Concerns: At Risk for Falls    Impairments/Disabilities:      None    Nutrition Therapy:  Current Nutrition Therapy:   - Oral Diet:  Carb Control 4 carbs/meal (1800kcals/day)    Routes of Feeding: Oral  Liquids: No Restrictions  Daily Fluid Restriction: no  Last Modified Barium Swallow with Video (Video Swallowing Test): not done    Treatments at the Time of Hospital Discharge:   Respiratory Treatments: see AVS  Oxygen Therapy:  is on oxygen at 2 L/min per nasal cannula.  At bedtime  Ventilator:    - No ventilator support    Rehab Therapies: Physical Therapy and Occupational Therapy  Weight Bearing Status/Restrictions: No weight bearing restirctions  Other Medical Equipment (for information only, NOT a DME order):  wheelchair, cane, walker, bedside commode and hospital bed  Other Treatments: none    Patient's personal belongings (please select all that are sent with patient):  belongings pt came in with    RN SIGNATURE:  Electronically signed by Tierra Dow RN on 21 at 5:15 PM EDT    CASE MANAGEMENT/SOCIAL WORK SECTION    Inpatient Status Date: 21    Readmission Risk Assessment Score:  Readmission Risk              Risk of Unplanned Readmission:  0           Discharging to Facility/ Agency   · Name: [facility], [phone], fax [phone]  · Address:  · Phone:  · Fax:    Dialysis Facility (if applicable)   · Name:    / signature: Electronically signed by NAPOLEON Torrez on 21 at 11:18 AM EDT    PHYSICIAN SECTION    Prognosis: Guarded    Condition at Discharge: Stable    Rehab Potential (if transferring to Rehab): Guarded    Recommended Labs or Other Treatments After Discharge: home health care IV antibiotics , nurse visits , home health aide     ertapenem (INVanz) 1000 mg IVPB miniba,000 mg, Intravenous, EVERY 24 HOURS x 7 days last day 2021    PICC line care  Please remove PICC line after completing IV abx  CBC, BMP LFT on  please send results to Dr. Deana García     Physician Certification: I certify the above information and transfer of Marci Eckert  is necessary for the continuing treatment of the diagnosis listed and that she requires Home Care for less 30 days.      Update Admission H&P: No change in H&P    PHYSICIAN SIGNATURE:  Electronically signed by Sofía Griggs MD on 21 at 8:48 AM EDT

## 2021-08-17 NOTE — FLOWSHEET NOTE
Nehemias Benoit MD messaged via [employer] at 6:48 AM, \"[facility] called with results from urine culture, ESBL. Please advise.  \"    Charge RN updated; isolation cart to be placed by pt room

## 2021-08-18 VITALS
OXYGEN SATURATION: 90 % | HEART RATE: 76 BPM | HEIGHT: 66 IN | SYSTOLIC BLOOD PRESSURE: 113 MMHG | WEIGHT: 270.9 LBS | DIASTOLIC BLOOD PRESSURE: 71 MMHG | BODY MASS INDEX: 43.54 KG/M2 | TEMPERATURE: 98.1 F | RESPIRATION RATE: 20 BRPM

## 2021-08-18 LAB
BLOOD CULTURE, ROUTINE: NORMAL
GLUCOSE BLD-MCNC: 122 MG/DL (ref 70–99)
GLUCOSE BLD-MCNC: 141 MG/DL (ref 70–99)
GLUCOSE BLD-MCNC: 149 MG/DL (ref 70–99)
GLUCOSE BLD-MCNC: 176 MG/DL (ref 70–99)
PERFORMED ON: ABNORMAL

## 2021-08-18 PROCEDURE — 2580000003 HC RX 258: Performed by: INTERNAL MEDICINE

## 2021-08-18 PROCEDURE — 6370000000 HC RX 637 (ALT 250 FOR IP): Performed by: INTERNAL MEDICINE

## 2021-08-18 PROCEDURE — 6360000002 HC RX W HCPCS: Performed by: INTERNAL MEDICINE

## 2021-08-18 RX ORDER — ONDANSETRON 4 MG/1
4 TABLET, ORALLY DISINTEGRATING ORAL EVERY 8 HOURS PRN
COMMUNITY
Start: 2021-08-18 | End: 2021-08-19 | Stop reason: SDUPTHER

## 2021-08-18 RX ORDER — ACETAZOLAMIDE 250 MG/1
250 TABLET ORAL DAILY
Qty: 30 TABLET | Refills: 2 | Status: SHIPPED | OUTPATIENT
Start: 2021-08-18 | End: 2022-05-12 | Stop reason: SDUPTHER

## 2021-08-18 RX ORDER — DEXTROMETHORPHAN POLISTIREX 30 MG/5ML
30 SUSPENSION ORAL EVERY 12 HOURS SCHEDULED
Qty: 100 ML | Refills: 0 | Status: SHIPPED | OUTPATIENT
Start: 2021-08-18 | End: 2021-08-28

## 2021-08-18 RX ADMIN — SODIUM CHLORIDE 25 ML: 9 INJECTION, SOLUTION INTRAVENOUS at 01:10

## 2021-08-18 RX ADMIN — ALOGLIPTIN 12.5 MG: 12.5 TABLET, FILM COATED ORAL at 10:24

## 2021-08-18 RX ADMIN — MEROPENEM 1000 MG: 1 INJECTION, POWDER, FOR SOLUTION INTRAVENOUS at 01:13

## 2021-08-18 RX ADMIN — INSULIN LISPRO 2 UNITS: 100 INJECTION, SOLUTION INTRAVENOUS; SUBCUTANEOUS at 11:54

## 2021-08-18 RX ADMIN — DICLOFENAC SODIUM 4 G: 10 GEL TOPICAL at 10:25

## 2021-08-18 RX ADMIN — ASPIRIN 81 MG: 81 TABLET, CHEWABLE ORAL at 10:24

## 2021-08-18 RX ADMIN — GLIPIZIDE 5 MG: 5 TABLET ORAL at 10:25

## 2021-08-18 RX ADMIN — Medication 10 ML: at 01:06

## 2021-08-18 RX ADMIN — MEROPENEM 1000 MG: 1 INJECTION, POWDER, FOR SOLUTION INTRAVENOUS at 11:47

## 2021-08-18 RX ADMIN — DEXTROMETHORPHAN POLISTIREX 30 MG: 30 SUSPENSION ORAL at 11:53

## 2021-08-18 RX ADMIN — Medication 10 ML: at 10:26

## 2021-08-18 RX ADMIN — ERTAPENEM SODIUM 1000 MG: 1 INJECTION, POWDER, LYOPHILIZED, FOR SOLUTION INTRAMUSCULAR; INTRAVENOUS at 16:22

## 2021-08-18 RX ADMIN — GLIPIZIDE 5 MG: 5 TABLET ORAL at 17:18

## 2021-08-18 RX ADMIN — FUROSEMIDE 40 MG: 40 TABLET ORAL at 10:24

## 2021-08-18 RX ADMIN — ALPRAZOLAM 1 MG: 0.5 TABLET ORAL at 10:24

## 2021-08-18 RX ADMIN — ACETAZOLAMIDE 250 MG: 250 TABLET ORAL at 10:24

## 2021-08-18 NOTE — CARE COORDINATION
[employer] contacted to check IV benefits. [employer] able to provide Home Health portion, and patient is already established with them. [employer] contacted writer to advise coverage at 100%.

## 2021-08-18 NOTE — CARE COORDINATION
CRISTIAN informed of pt's discharge today and need for home IV medications. Pt is covered at 100%. [employer] will be home IV company. Pt was initially apprehensive about administering her home IV meds d/t living alone and having \"crippled hands. \"  CRISTIAN spoke with [facility] who stated they could only administer and teach the first day, tomorrow, and would NOT be able send an RN all 5 days. CRISTIAN contacted pt's grandtr, Rhea, who will be there tomorrow with pt and C to be taught how to administer the meds and will administer the other 4 days. CRISTIAN called Duong Kraus from [facility], [phone], informing of this information and giving her grandtr's contact information. Called [facility] and informed this as well. Faxed discharge paperwork and orders to [employer] at [phone]. All needs met for case mgmt. Discharge Plan:  Home w/[facility] and [facility] for IV medications. Pao Soler will assist with administering meds.       Electronically signed by PIOTR Munguia, LSW on 8/18/2021 at 4:14 PM

## 2021-08-18 NOTE — DISCHARGE INSTR - DIET

## 2021-08-18 NOTE — PROGRESS NOTES
Data- discharge order received, pt verbalized agreement to discharge, needs for [address] with [facility] and [employer] for IV ABX, JOSE G reviewed and signed by MD, to be completed by RN. Action- AVS prepared, discharge instructions prepared and given to pt, medication information packet given r/t NEW or CHANGED prescriptions, pt verbalized understanding further self-review. D/C instruction summary: Diet- carb control, Activity- as tolerated, follow up with Primary Care Physician Debbie Mcgee [phone] appointment to be scheduled by pt, immunizations reviewed, medications prescriptions to be filled at [facility]. Contact information provided to above agencies used. Response- Case Management/ reported faxing completed JOSE G and AVS to needed HHC/DME services stated above. Pt belongings gathered, IV removed, pt dressed. Disposition is home with HHC/DME as stated above, transported with granddaughter, taken to lobby via w/c, no complications. 1. WEIGHT: Admit Weight: 265 lb (120.2 kg) (08/14/21 1830)        Today  Weight: 270 lb 14.4 oz (122.9 kg) (08/18/21 0751)       2.  O2 SAT.: SpO2: 90 % (08/18/21 1000)

## 2021-08-18 NOTE — PROGRESS NOTES
Department of Internal Medicine  General Internal Medicine   Progress Note     SUBJECTIVE   Still has considerable wheezing  Looks edematous     History obtained from chart review and the patient  General ROS: positive for  - fatigue and malaise  negative for - chills, fever or night sweats  Psychological ROS: negative  Respiratory ROS: positive for - shortness of breath and wheezing  negative for - cough, hemoptysis, sputum changes or stridor  Cardiovascular ROS: positive for - chest pain, dyspnea on exertion and edema  negative for - loss of consciousness, orthopnea or palpitations  Gastrointestinal ROS: no abdominal pain, change in bowel habits, or black or bloody stools    OBJECTIVE      Medications      Current Facility-Administered Medications: lidocaine PF 1 % injection 5 mL, 5 mL, Intradermal, Once  sodium chloride flush 0.9 % injection 5-40 mL, 5-40 mL, Intravenous, 2 times per day  sodium chloride flush 0.9 % injection 5-40 mL, 5-40 mL, Intravenous, PRN  0.9 % sodium chloride infusion, 25 mL, Intravenous, PRN  meropenem (MERREM) 1,000 mg in sodium chloride 0.9 % 100 mL IVPB (mini-bag), 1,000 mg, Intravenous, Q8H  acetaZOLAMIDE (DIAMOX) tablet 250 mg, 250 mg, Oral, Daily  pantoprazole (PROTONIX) tablet 40 mg, 40 mg, Oral, QAM AC  alogliptin (NESINA) tablet 12.5 mg, 12.5 mg, Oral, Daily  diclofenac sodium (VOLTAREN) 1 % gel 4 g, 4 g, Topical, BID  gabapentin (NEURONTIN) capsule 600 mg, 600 mg, Oral, Nightly  albuterol sulfate  (90 Base) MCG/ACT inhaler 2 puff, 2 puff, Inhalation, Q4H PRN  dextromethorphan (DELSYM) 30 MG/5ML extended release liquid 30 mg, 30 mg, Oral, 2 times per day  aspirin chewable tablet 81 mg, 81 mg, Oral, Daily  sodium chloride (OCEAN, BABY AYR) 0.65 % nasal spray 1 spray, 1 spray, Nasal, PRN  rosuvastatin (CRESTOR) tablet 5 mg, 5 mg, Oral, Nightly  isosorbide mononitrate (IMDUR) extended release tablet 60 mg, 60 mg, Oral, Daily  hydrALAZINE (APRESOLINE) tablet 25 mg, 25 mg, Oral, TID  lisinopril (PRINIVIL;ZESTRIL) tablet 10 mg, 10 mg, Oral, Daily  furosemide (LASIX) tablet 40 mg, 40 mg, Oral, Daily  budesonide-formoterol (SYMBICORT) 160-4.5 MCG/ACT inhaler 2 puff, 2 puff, Inhalation, BID  ALPRAZolam (XANAX) tablet 1 mg, 1 mg, Oral, TID PRN  glipiZIDE (GLUCOTROL) tablet 5 mg, 5 mg, Oral, BID AC  sodium chloride flush 0.9 % injection 5-40 mL, 5-40 mL, Intravenous, 2 times per day  sodium chloride flush 0.9 % injection 10 mL, 10 mL, Intravenous, PRN  0.9 % sodium chloride infusion, 25 mL, Intravenous, PRN  enoxaparin (LOVENOX) injection 40 mg, 40 mg, Subcutaneous, Daily  ondansetron (ZOFRAN-ODT) disintegrating tablet 4 mg, 4 mg, Oral, Q8H PRN **OR** ondansetron (ZOFRAN) injection 4 mg, 4 mg, Intravenous, Q6H PRN  magnesium hydroxide (MILK OF MAGNESIA) 400 MG/5ML suspension 30 mL, 30 mL, Oral, Daily PRN  acetaminophen (TYLENOL) tablet 650 mg, 650 mg, Oral, Q6H PRN **OR** acetaminophen (TYLENOL) suppository 650 mg, 650 mg, Rectal, Q6H PRN  ipratropium-albuterol (DUONEB) nebulizer solution 1 ampule, 1 ampule, Inhalation, Q4H WA  hydrALAZINE (APRESOLINE) injection 10 mg, 10 mg, Intravenous, Q6H PRN  0.9 % sodium chloride bolus, 500 mL, Intravenous, PRN  potassium chloride (KLOR-CON M) extended release tablet 40 mEq, 40 mEq, Oral, PRN **OR** potassium bicarb-citric acid (EFFER-K) effervescent tablet 40 mEq, 40 mEq, Oral, PRN **OR** potassium chloride 10 mEq/100 mL IVPB (Peripheral Line), 10 mEq, Intravenous, PRN  insulin lispro (1 Unit Dial) 0-12 Units, 0-12 Units, Subcutaneous, TID WC  insulin lispro (1 Unit Dial) 0-6 Units, 0-6 Units, Subcutaneous, Nightly  glucose (GLUTOSE) 40 % oral gel 15 g, 15 g, Oral, PRN  dextrose 50 % IV solution, 12.5 g, Intravenous, PRN  glucagon (rDNA) injection 1 mg, 1 mg, Intramuscular, PRN  dextrose 5 % solution, 100 mL/hr, Intravenous, PRN  0.9 % sodium chloride bolus, 1,000 mL, Intravenous, Once    Physical      VITALS:    BP 99/61   Pulse 68   Temp 97.7 °F (36.5 °C) (Oral)   Resp 18   Ht 5' 6\" (1.676 m)   Wt 265 lb 9.6 oz (120.5 kg)   SpO2 94%   BMI 42.87 kg/m²   TEMPERATURE:  Current - Temp: 97.7 °F (36.5 °C);  Max - Temp  Av.8 °F (36.6 °C)  Min: 97.6 °F (36.4 °C)  Max: 98.2 °F (36.8 °C)  RESPIRATIONS RANGE: Resp  Av.8  Min: 18  Max: 20  PULSE RANGE: Pulse  Av.5  Min: 61  Max: 77  BLOOD PRESSURE RANGE:  Systolic (59DGE), FKX:326 , Min:99 , PDB:467   ; Diastolic (43QPU), SHANE:03, Min:52, Max:72    PULSE OXIMETRY RANGE: SpO2  Av.3 %  Min: 90 %  Max: 94 %  24HR INTAKE/OUTPUT:      Intake/Output Summary (Last 24 hours) at 2021 2245  Last data filed at 2021 1442  Gross per 24 hour   Intake --   Output 1950 ml   Net -1950 ml     CONSTITUTIONAL:  awake, alert, cooperative, no apparent distress, and appears stated age  NECK:  supple, symmetrical, trachea midline and skin normal  LUNGS:  Moderate increase in work of breathing bart crackles and exp wheezes   CARDIOVASCULAR:  Normal apical impulse, regular rate and rhythm, normal S1 and S2, no S3 or S4, and no murmur noted  ABDOMEN:  No scars, normal bowel sounds, soft, non-distended, non-tender, no masses palpated, no hepatosplenomegally  MUSCULOSKELETAL:  ++ edema  NEUROLOGIC:  No acute focal change    Data      Lab Results   Component Value Date    PHART 7.446 2017       Lab Results   Component Value Date     2021    K 4.7 2021    K 4.2 08/15/2021     2021    CO2 24 2021    BUN 14 2021    CREATININE 1.0 2021    GLUCOSE 158 2021    CALCIUM 9.8 2021     Lab Results   Component Value Date    WBC 7.7 2021    HGB 13.4 2021    HCT 41.5 2021    MCV 88.5 2021     2021         Lab Results   Component Value Date    INR 1.03 2021    PROTIME 11.7 2021       ASSESSMENT AND PLAN      Patient Active Problem List   Diagnosis    Chronic respiratory failure (HCC)    CAD (coronary artery disease)  Hyperlipemia    Essential hypertension    DM2 (diabetes mellitus, type 2) (HCC)    Primary osteoarthritis involving multiple joints    Gastroesophageal reflux disease without esophagitis    Anxiety    Morbid obesity (HCC)    Precordial pain    Unstable angina (HCC)    COPD with acute exacerbation (HCC)    AKIL (obstructive sleep apnea)    Chest congestion    DM (diabetes mellitus), secondary uncontrolled (HCC)    Mucus plugging of bronchi    Grade II diastolic dysfunction    Sepsis due to pneumonia ([address]    UTI (urinary tract infection)    Pneumonia    ESBL (extended spectrum beta-lactamase) producing bacteria infection                          Ct present medical management    change IV antibiotics to Invanz   Picc Line    will need 7 days of Home IV antibiotics    she already has home O2 and HHC   TTE showed  LVEF 00-45%  Grade 2 diastolic dysfunction

## 2021-08-18 NOTE — PLAN OF CARE
Problem: Cardiovascular  Goal: Hemodynamic stability  Outcome: Ongoing     Problem: Falls - Risk of:  Goal: Will remain free from falls  Description: Will remain free from falls  Outcome: Ongoing     Vitals:    08/18/21 0100   BP: 97/60   Pulse: 67   Resp: 20   Temp: 97.7 °F (36.5 °C)   SpO2: 90%     VSS at 0100. Pt mostly asleep overnight. Pt is a high fall risk. Pt remains free from falls, throughout night. Bed alarm remains in place, door open. Pt encouraged to use call light for needs throughout night; call light is within reach. Bed lock is in lowest position. Will continue to monitor throughout night.

## 2021-08-19 LAB — CULTURE, BLOOD 2: NORMAL

## 2021-08-19 RX ORDER — ONDANSETRON 4 MG/1
4 TABLET, ORALLY DISINTEGRATING ORAL EVERY 8 HOURS PRN
Qty: 30 TABLET | Refills: 0 | Status: SHIPPED | OUTPATIENT
Start: 2021-08-19 | End: 2021-08-29

## 2021-08-24 ENCOUNTER — TELEPHONE (OUTPATIENT)
Dept: PULMONOLOGY | Age: 76
End: 2021-08-24

## 2021-08-24 NOTE — TELEPHONE ENCOUNTER
Marian[facility] called and said that they cover Incruse or Breztri, can we switch pt inhaler.     [phone]

## 2021-08-26 RX ORDER — UMECLIDINIUM 62.5 UG/1
1 AEROSOL, POWDER ORAL DAILY
Qty: 1 EACH | Refills: 6 | Status: SHIPPED | OUTPATIENT
Start: 2021-08-26

## 2021-09-13 PROBLEM — N39.0 UTI (URINARY TRACT INFECTION): Status: RESOLVED | Noted: 2021-08-14 | Resolved: 2021-09-13

## 2021-09-23 NOTE — DISCHARGE SUMMARY
[address]                     [facility], [address]                               DISCHARGE SUMMARY    PATIENT NAME: Carlos Linares                      :        1945  MED REC NO:   [id number]                          ROOM:       [id number]  ACCOUNT NO:   [de-identified]                           ADMIT DATE: 2021  PROVIDER:     Glen Leavitt MD                  DISCHARGE DATE:  2021    FINAL DIAGNOSES:  1. Sepsis due to pneumonia. 2.  Hyperlipidemia. 3.  Diabetes mellitus type 2.  4.  COPD. 5.  Anxiety neurosis. 6.  Urinary tract infection. 7.  Hypotension. DISCHARGE MEDICATIONS:  1.  Delsym cough syrup as directed 5 mL q.12 hours. 2.  Diamox 250 mg daily. 3.  Intravenous Invanz infusion 24 hours for 5 days. 4.  Zofran orally disintegrating tablet every 8 hours p.r.n. for nausea. 5.  Nitrofurantoin 100 mg p.o. b.i.d. for 5 days. 6.  Triamcinolone cream as directed. 7.  DuoNeb one every 4 hours p.r.n.  8.  Hydralazine 25 mg p.o. t.i.d.  9.  Tradjenta 5 mg daily. 10.  Prinivil 10 mg once a day. 11.  Voltaren gel apply three times a day over the involved joint. 12.  Lasix 40 mg twice a day. 13.  Albuterol sulfate two puffs every 4 hours p.r.n. 14.  Symbicort two puffs twice a day. 15.  Omeprazole 40 mg once a day. 16.  Gabapentin 300 mg p.o. b.i.d.  17.  Isosorbide mononitrate 60 mg once a day. 18.  Nystatin powder b.i.d. 19.  Rosuvastatin 10 mg once a day. 20.  [place] nasal saline spray one spray each nostril four times daily. 21.  Ibuprofen 400 p.r.n.  22.  Nitrostat sublingual p.r.n. HOSPITAL COURSE:  This 43-year-old white lady known patient of mine with  advanced COPD, recently treated as an outpatient for urinary tract  infection, had chest heaviness. The patient was admitted by Dr. José Antonio Porras. Vital signs were stable. Temperature 98.7, blood pressure was  95/42, BMI 42. Lungs had bilateral crackles.   White blood cell count  was 13,000, hemoglobin/hematocrit within normal limit. The patient  diagnosed with pneumonia. Chest x-ray was showing right mid lung  opacity. EKG was sinus rhythm. The patient treated with IV  antibiotics, nebulized aerosol, supplemental oxygen. The patient had  three to four days of treatment. Cultures were negative. After initial  couple of days, I assumed the care when the patient returned _____. The  patient had considerable wheezing and edema. Finally, we made necessary  arrangement for the patient to [facility]. _____ made necessary arrangement for  the patient to get discharged with home health care and IV antibiotics  at home. I will be following the patient as an outpatient. The patient  was discharged in stable condition.         Vikash Molina MD    D: 09/23/2021 5:24:54       T: 09/23/2021 11:29:46     [email or web address]  Job#: [id number]     Doc#: [id number]    CC:

## 2021-09-24 DIAGNOSIS — F41.9 ANXIETY: ICD-10-CM

## 2021-09-24 RX ORDER — GABAPENTIN 300 MG/1
300 CAPSULE ORAL 2 TIMES DAILY
Qty: 60 CAPSULE | Refills: 3 | Status: SHIPPED | OUTPATIENT
Start: 2021-09-24 | End: 2022-02-01 | Stop reason: SDUPTHER

## 2021-09-24 RX ORDER — FUROSEMIDE 40 MG/1
TABLET ORAL
Qty: 60 TABLET | Refills: 1 | Status: SHIPPED | OUTPATIENT
Start: 2021-09-24 | End: 2022-05-19 | Stop reason: SDUPTHER

## 2021-09-24 RX ORDER — ALPRAZOLAM 1 MG/1
1 TABLET ORAL 3 TIMES DAILY PRN
Qty: 9 TABLET | Refills: 0 | Status: SHIPPED | OUTPATIENT
Start: 2021-09-24 | End: 2021-09-27

## 2022-03-08 NOTE — PROGRESS NOTES
Department of Internal Medicine  General Internal Medicine   Progress Note     SUBJECTIVE :  Still  Wears O2 nocturnally  And audible wheezes do not seem to be quitting    History obtained from chart review and the patient  General ROS: positive for  - fatigue and malaise  negative for - chills, fever or night sweats  Psychological ROS: negative  Respiratory ROS: positive for - shortness of breath and wheezing  negative for - cough, hemoptysis, sputum changes or stridor  Cardiovascular ROS: positive for - chest pain, dyspnea on exertion and edema  negative for - loss of consciousness, orthopnea or palpitations  Gastrointestinal ROS: no abdominal pain, change in bowel habits, or black or bloody stools    OBJECTIVE      Medications      Current Facility-Administered Medications: insulin lispro (1 Unit Dial) 10 Units, 10 Units, Subcutaneous, TID WC  alogliptin (NESINA) tablet 12.5 mg, 12.5 mg, Oral, Daily  insulin glargine (LANTUS;BASAGLAR) injection pen 25 Units, 25 Units, Subcutaneous, Nightly  metFORMIN (GLUCOPHAGE-XR) extended release tablet 1,000 mg, 1,000 mg, Oral, BID WC  doxycycline hyclate (VIBRA-TABS) tablet 100 mg, 100 mg, Oral, 2 times per day  sodium chloride (OCEAN, BABY AYR) 0.65 % nasal spray 1 spray, 1 spray, Each Nostril, PRN  methylPREDNISolone sodium (SOLU-MEDROL) injection 40 mg, 40 mg, Intravenous, Q12H  promethazine-dextromethorphan (PROMETHAZINE-DM) 6.25-15 MG/5ML syrup 5 mL, 5 mL, Oral, Q4H PRN  albuterol sulfate  (90 Base) MCG/ACT inhaler 2 puff, 2 puff, Inhalation, Q6H PRN  ALPRAZolam (XANAX) tablet 1 mg, 1 mg, Oral, TID PRN  aspirin chewable tablet 81 mg, 81 mg, Oral, Daily  diclofenac sodium (VOLTAREN) 1 % gel 2 g, 2 g, Topical, BID  gabapentin (NEURONTIN) capsule 300 mg, 300 mg, Oral, BID  hydrALAZINE (APRESOLINE) tablet 25 mg, 25 mg, Oral, TID  ibuprofen (ADVIL;MOTRIN) tablet 400 mg, 400 mg, Oral, TID WC  ipratropium-albuterol (DUONEB) nebulizer solution 3 mL, 3 mL, Inhalation, Q4H REFERRING PHYSICIAN: Luis Foreman MD           Provider location: Home  Patient Location: Home in the [facility]     Consent for telehealth visit was verified with the patient, including risk of electronic data, possibility of equipment failure or need for in person visit if condition cannot be fully assessed by telehealth. Patient was also informed that consent to treat includes permission to submit charges to the patient's insurance.         Dear Luis Foreman MD:    On 2022, I had the pleasure to meet your patient, Joan Wilson, for purposes of genetic counseling due to a history of breast cancer.  As you know, Ms. Wilson, is a 74 year old female of English (no AJ) descent who presents for a telehealth visit via live interactive video with a secure connection. This visit was not recorded or stored. I spent 45 minutes with this patient, 30 of which was spent in counseling.    HISTORY OF PRESENT ILLNESS:  In 2022, Ms. Wilson underwent a mammogram due to a lump in the right breast, which identified a small 1 cm asymmetry deep in the central right breast. A biopsy was subsequently performed, which identified ER positive, ID negative, and Her2 negative well-differentiated invasive lobular carcinoma. Treatment decisions are pending.     Patient Active Problem List   Diagnosis   • Essential hypertension   • Severe obesity (BMI 35.0-39.9) with comorbidity (CMS/HCC)     MENSTRUAL HISTORY:  AGE AT MENARCHE: 14      AGE AT MENOPAUSE: 52    PREGNANCY HISTORY:      AGE AT FIRST LIVE BIRTH: 34    AGE AT LAST LIVE BIRTH: 36   BREASTFEEDING: YES # OF MONTHS: 9-10 mo, first child only    HISTORY OF HORMONE USE:  OCP USE: YES   TYPE: Pill      DURATION: 10 years  FERTILITY DRUGS: NO  TYPE: n/a                                  DURATION: n/a  HRT: NO         TYPE: n/a      DURATION: n/a    SURGICAL HISTORY:  HYSTERECTOMY: NO AGE: n/a  OOPHORECTOMY: NO AGE: n/a  TUBAL  LIGATION: NO AGE: n/a    SURGICAL HISTORY:  Past Surgical History:   Procedure Laterality Date   • Appendectomy     • Back surgery      two back surgery   • Colonoscopy diagnostic  2021   • Laparoscopy, cholecystectomy  2021   • Removal gallbladder  2021   • [place] tooth extraction       OVARIAN CANCER SCREENING:  PELVIC EXAM/PAP SMEAR: NO FREQUENCY: n/a            DATE OF LAST: 20 years ago   RESULTS: Nml    COLORECTAL CANCER SCREENING:  COLONOSCOPY: YES AGE(s): 73   RESULTS: Nml    SKIN CANCER SCREENING:  MOLES REMOVED: NO   PATH RESULTS: n/a    SOCIAL HISTORY: Ms. Wilson is retired. She previously worked as an . She is . She is 5 feet 4 inches in height and weighs 210 pounds. Ms. Wilson reports no history of mental health disorders and good family/social support. Sikh is reported as Islam.     Alcohol: none  Drugs: none  Tobacco:   Social History     Tobacco Use   Smoking Status Former Smoker   • Packs/day: 0.00   • Start date: 1968   • Quit date: 1970   • Years since quittin.2   Smokeless Tobacco Never Used   Tobacco Comment    Social    Physical Activity: 1-2 days/wk    FAMILY HISTORY:  Consanguinity is denied. The family history is per patient report unless otherwise noted.       Family Risk  Niece had Breast Cancer at age 39  Mother had Breast Cancer at age 73  Female First Cousin (Paternal) had Breast Cancer at age 73  Father had Prostate Cancer at age 77  Maternal Grandfather had Stomach or Gastric Cancer at age 86    RISK ASSESSMENT:  Although the majority of cancer is sporadic, up to 10% is hereditary. Ms. Wilson's personal and family history of breast cancer is suggestive of a hereditary cancer syndrome. She meets the NCCN Guidelines for genetic testing for hereditary breast cancer.    The differential diagnosis includes multiple genes linked to risk for breast cancer. There is significant overlap between these genetic  WA  isosorbide mononitrate (IMDUR) extended release tablet 60 mg, 60 mg, Oral, Daily  lisinopril (PRINIVIL;ZESTRIL) tablet 2.5 mg, 2.5 mg, Oral, Daily  nitroGLYCERIN (NITROSTAT) SL tablet 0.4 mg, 0.4 mg, Sublingual, Q5 Min PRN  miconazole (MICOTIN) 2 % powder, , Topical, BID  pantoprazole (PROTONIX) tablet 40 mg, 40 mg, Oral, QAM AC  rosuvastatin (CRESTOR) tablet 10 mg, 10 mg, Oral, Daily  triamcinolone (KENALOG) 0.1 % cream, , Topical, BID  glucose (GLUTOSE) 40 % oral gel 15 g, 15 g, Oral, PRN  dextrose 50 % IV solution, 12.5 g, Intravenous, PRN  glucagon (rDNA) injection 1 mg, 1 mg, Intramuscular, PRN  dextrose 5 % solution, 100 mL/hr, Intravenous, PRN  enoxaparin (LOVENOX) injection 40 mg, 40 mg, Subcutaneous, Daily  insulin lispro (1 Unit Dial) 0-18 Units, 0-18 Units, Subcutaneous, TID WC  insulin lispro (1 Unit Dial) 0-9 Units, 0-9 Units, Subcutaneous, Nightly    Physical      VITALS:    BP (!) 154/79   Pulse 74   Temp 98.2 °F (36.8 °C) (Oral)   Resp 18   Ht 5' 6\" (1.676 m)   Wt 282 lb 14.4 oz (128.3 kg)   SpO2 92%   Breastfeeding No   BMI 45.66 kg/m²   TEMPERATURE:  Current - Temp: 98.2 °F (36.8 °C);  Max - Temp  Av.4 °F (36.9 °C)  Min: 98.2 °F (36.8 °C)  Max: 98.5 °F (36.9 °C)  RESPIRATIONS RANGE: Resp  Av  Min: 18  Max: 18  PULSE RANGE: Pulse  Av  Min: 59  Max: 77  BLOOD PRESSURE RANGE:  Systolic (85MRY), NPB:946 , Min:107 , BTK:307   ; Diastolic (59ABQ), ELR:09, Min:62, Max:89    PULSE OXIMETRY RANGE: SpO2  Av.3 %  Min: 90 %  Max: 95 %  24HR INTAKE/OUTPUT:      Intake/Output Summary (Last 24 hours) at 3/25/2020 1626  Last data filed at 3/25/2020 1433  Gross per 24 hour   Intake 720 ml   Output --   Net 720 ml     CONSTITUTIONAL:  awake, alert, cooperative, no apparent distress, and appears stated age  NECK:  supple, symmetrical, trachea midline and skin normal  LUNGS:  No increased work of breathing, good air exchange, clear to auscultation bilaterally, no crackles or conditions; therefore, we discussed multi-gene panel testing.    GENETIC TESTING:  Genetic testing is indicated because identification of a hereditary predisposition to cancer would allow for more accurate cancer risk assessment and subsequent changes to medical management such as increased screening and risk-reduction options. Examples of medical management were provided in accordance with NCCN Guidelines. We reviewed that data is preliminary or limited for some genes, and not all genes have published medical management guidelines. If Ms. Wilson is found to have a gene mutation, we will discuss cancer risks and medical management specific to that gene in more detail. A referral to the [facility] may be recommended at that time.    We discussed the risks, benefits, and limitations of genetic testing, including possible test results:   1. Positive - A gene mutation was identified that increases the lifetime chance to develop cancer and/or tumors. Biological relatives are at risk to share the same gene mutation.  2. Negative - A gene mutation was not identified in any of the genes analyzed. Medical management should continue to be based upon known personal and family history.   3. Variant of Unknown/Uncertain Significance – The laboratory has limited or conflicting information related to the impact of the genetic variant. Upon further publication of scientific literature and/or genetic testing in the population, this variant may be reclassified as pathogenic or benign. The chance to identify a variant of unknown/uncertain significance increases with the use of multi-gene panels compared to single gene testing.     Most hereditary cancer genes are inherited in an autosomal dominant manner, which means first degree relatives (e.g., parents, siblings, children) have a 50% chance to inherit a known mutation. Some hereditary cancer genes are also related to genetic  wheezing  CARDIOVASCULAR:  Normal apical impulse, regular rate and rhythm, normal S1 and S2, no S3 or S4, and no murmur noted  ABDOMEN:  No scars, normal bowel sounds, soft, non-distended, non-tender, no masses palpated, no hepatosplenomegally  MUSCULOSKELETAL:  Trace edema  NEUROLOGIC:  No acute focal change    Data      Lab Results   Component Value Date    PHART 7.446 02/07/2017       Lab Results   Component Value Date     03/24/2020    K 5.0 03/24/2020    K 4.1 01/19/2020    CL 93 03/24/2020    CO2 28 03/24/2020    BUN 27 03/24/2020    CREATININE 0.8 03/24/2020    GLUCOSE 176 03/24/2020    CALCIUM 10.0 03/24/2020     Lab Results   Component Value Date    WBC 16.8 03/24/2020    HGB 17.0 03/24/2020    HCT 51.5 03/24/2020    MCV 87.9 03/24/2020     03/24/2020         Lab Results   Component Value Date    INR 0.97 01/27/2020    PROTIME 11.3 01/27/2020       ASSESSMENT AND PLAN      Patient Active Problem List   Diagnosis    Chronic respiratory failure ([facility]    COPD exacerbation ([facility]    CAD (coronary artery disease)    Hyperlipemia    Essential hypertension    DM2 (diabetes mellitus, type 2) ([address]    Primary osteoarthritis involving multiple joints    Gastroesophageal reflux disease without esophagitis    Anxiety    Morbid obesity ([address]    Precordial pain    Acute bronchitis    COPD with acute exacerbation ([address]    AKIL (obstructive sleep apnea)    Chest congestion    DM (diabetes mellitus), secondary uncontrolled (HCC)    Ineffective airway clearance    Mucus plugging of bronchi    Pneumonia of left lower lobe due to infectious organism ([address]    Acute pulmonary edema (HCC)    Grade II diastolic dysfunction     1. Asymmetric volume loss noted in the lingula and left lower lobe, slightly   increased in the left lower lobe since the previous exam on 09/24/2019.  This   is likely related to atelectasis, however, underlying pneumonia cannot be   excluded.    2. 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 Inheritance will be further discussed pending test results.     We reviewed the laws surrounding insurance or employment discrimination. The Genetic Information Nondiscrimination Act (PAUL) prohibits discrimination in health coverage and employment based on genetic information on the federal level; however, this law does not extend to life insurance, disability insurance, and long-term care insurance.    PLAN:  1. Ms. Wilson decided to pursue genetic testing. A blood sample will be sent to [facility] for analysis via the [facility] panel. The estimated turnaround time for this test is 2-3 weeks. I will contact Ms. Wilson when the results become available and provide a copy to your office.   2. Additional genetic testing and/or cancer screening recommendations may be made for Ms. Wilson and/or her family members upon genetic test receipt.     I enjoyed having the opportunity to meet Ms. Wilson. Should you or she have any additional questions or concerns, please do not hesitate to call me at [phone].  Thank you for allowing me to participate in the care of your patient.    Darlene Yo, [facility]  Licensed Certified Genetic Counselor

## 2022-05-24 ENCOUNTER — HOSPITAL ENCOUNTER (OUTPATIENT)
Age: 77
Setting detail: SPECIMEN
Discharge: HOME | End: 2022-05-24
Payer: COMMERCIAL

## 2022-05-24 LAB
ANION GAP SERPL CALCULATED.3IONS-SCNC: 11 MMOL/L (ref 3–16)
BUN BLDV-MCNC: 20 MG/DL (ref 7–20)
CALCIUM SERPL-MCNC: 10.1 MG/DL (ref 8.3–10.6)
CHLORIDE BLD-SCNC: 101 MMOL/L (ref 99–110)
CO2: 28 MMOL/L (ref 21–32)
CREAT SERPL-MCNC: 0.9 MG/DL (ref 0.6–1.2)
GFR AFRICAN AMERICAN: >60
GFR NON-AFRICAN AMERICAN: >60
GLUCOSE BLD-MCNC: 118 MG/DL (ref 70–99)
HCT VFR BLD CALC: 46.5 % (ref 36–48)
HEMOGLOBIN: 14.8 G/DL (ref 12–16)
MCH RBC QN AUTO: 27.3 PG (ref 26–34)
MCHC RBC AUTO-ENTMCNC: 31.9 G/DL (ref 31–36)
MCV RBC AUTO: 85.6 FL (ref 80–100)
PDW BLD-RTO: 16.3 % (ref 12.4–15.4)
PLATELET # BLD: 209 K/UL (ref 135–450)
PMV BLD AUTO: 8.6 FL (ref 5–10.5)
POTASSIUM SERPL-SCNC: 4 MMOL/L (ref 3.5–5.1)
RBC # BLD: 5.43 M/UL (ref 4–5.2)
SODIUM BLD-SCNC: 140 MMOL/L (ref 136–145)
WBC # BLD: 8.9 K/UL (ref 4–11)

## 2022-05-24 PROCEDURE — 36415 COLL VENOUS BLD VENIPUNCTURE: CPT

## 2022-05-24 PROCEDURE — 85027 COMPLETE CBC AUTOMATED: CPT

## 2022-05-24 PROCEDURE — 80048 BASIC METABOLIC PNL TOTAL CA: CPT

## 2022-07-06 DIAGNOSIS — E11.9 CONTROLLED TYPE 2 DIABETES MELLITUS WITHOUT COMPLICATION, WITHOUT LONG-TERM CURRENT USE OF INSULIN (HCC): Primary | ICD-10-CM

## 2022-07-06 RX ORDER — GLUCOSAMINE HCL/CHONDROITIN SU 500-400 MG
CAPSULE ORAL
Qty: 50 STRIP | Refills: 5 | Status: SHIPPED | OUTPATIENT
Start: 2022-07-06

## 2022-08-01 ENCOUNTER — APPOINTMENT (OUTPATIENT)
Dept: GENERAL RADIOLOGY | Age: 77
DRG: 065 | End: 2022-08-01
Payer: COMMERCIAL

## 2022-08-01 ENCOUNTER — HOSPITAL ENCOUNTER (INPATIENT)
Age: 77
LOS: 3 days | Discharge: HOME HEALTH | DRG: 065 | End: 2022-08-04
Attending: EMERGENCY MEDICINE | Admitting: INTERNAL MEDICINE
Payer: COMMERCIAL

## 2022-08-01 ENCOUNTER — APPOINTMENT (OUTPATIENT)
Dept: CT IMAGING | Age: 77
DRG: 065 | End: 2022-08-01
Payer: COMMERCIAL

## 2022-08-01 DIAGNOSIS — R53.1 LEFT-SIDED WEAKNESS: Primary | ICD-10-CM

## 2022-08-01 DIAGNOSIS — R47.1 DYSARTHRIA: ICD-10-CM

## 2022-08-01 PROBLEM — R20.9 CVA, OLD, ALTERATIONS OF SENSATIONS: Status: ACTIVE | Noted: 2022-08-01

## 2022-08-01 PROBLEM — I69.398 CVA, OLD, ALTERATIONS OF SENSATIONS: Status: ACTIVE | Noted: 2022-08-01

## 2022-08-01 LAB
A/G RATIO: 1.2 (ref 1.1–2.2)
ALBUMIN SERPL-MCNC: 3.7 G/DL (ref 3.4–5)
ALP BLD-CCNC: 82 U/L (ref 40–129)
ALT SERPL-CCNC: 14 U/L (ref 10–40)
ANION GAP SERPL CALCULATED.3IONS-SCNC: 8 MMOL/L (ref 3–16)
AST SERPL-CCNC: 20 U/L (ref 15–37)
BASOPHILS ABSOLUTE: 0 K/UL (ref 0–0.2)
BASOPHILS RELATIVE PERCENT: 0.3 %
BILIRUB SERPL-MCNC: 0.3 MG/DL (ref 0–1)
BUN BLDV-MCNC: 20 MG/DL (ref 7–20)
CALCIUM SERPL-MCNC: 9.8 MG/DL (ref 8.3–10.6)
CHLORIDE BLD-SCNC: 99 MMOL/L (ref 99–110)
CO2: 29 MMOL/L (ref 21–32)
CREAT SERPL-MCNC: 1 MG/DL (ref 0.6–1.2)
EOSINOPHILS ABSOLUTE: 0.1 K/UL (ref 0–0.6)
EOSINOPHILS RELATIVE PERCENT: 0.8 %
GFR AFRICAN AMERICAN: >60
GFR NON-AFRICAN AMERICAN: 54
GLUCOSE BLD-MCNC: 185 MG/DL
GLUCOSE BLD-MCNC: 185 MG/DL (ref 70–99)
HCT VFR BLD CALC: 43.4 % (ref 36–48)
HEMOGLOBIN: 14 G/DL (ref 12–16)
INR BLD: 0.99 (ref 0.87–1.14)
LYMPHOCYTES ABSOLUTE: 3.1 K/UL (ref 1–5.1)
LYMPHOCYTES RELATIVE PERCENT: 21.8 %
MCH RBC QN AUTO: 27.8 PG (ref 26–34)
MCHC RBC AUTO-ENTMCNC: 32.2 G/DL (ref 31–36)
MCV RBC AUTO: 86.5 FL (ref 80–100)
MONOCYTES ABSOLUTE: 0.8 K/UL (ref 0–1.3)
MONOCYTES RELATIVE PERCENT: 5.6 %
NEUTROPHILS ABSOLUTE: 10.3 K/UL (ref 1.7–7.7)
NEUTROPHILS RELATIVE PERCENT: 71.5 %
PDW BLD-RTO: 15.4 % (ref 12.4–15.4)
PLATELET # BLD: 233 K/UL (ref 135–450)
PMV BLD AUTO: 8.1 FL (ref 5–10.5)
POTASSIUM REFLEX MAGNESIUM: 3.8 MMOL/L (ref 3.5–5.1)
PROTHROMBIN TIME: 13 SEC (ref 11.7–14.5)
RBC # BLD: 5.02 M/UL (ref 4–5.2)
SODIUM BLD-SCNC: 136 MMOL/L (ref 136–145)
TOTAL PROTEIN: 6.8 G/DL (ref 6.4–8.2)
TROPONIN: <0.01 NG/ML
WBC # BLD: 14.4 K/UL (ref 4–11)

## 2022-08-01 PROCEDURE — 70496 CT ANGIOGRAPHY HEAD: CPT

## 2022-08-01 PROCEDURE — 6370000000 HC RX 637 (ALT 250 FOR IP): Performed by: INTERNAL MEDICINE

## 2022-08-01 PROCEDURE — 80053 COMPREHEN METABOLIC PANEL: CPT

## 2022-08-01 PROCEDURE — 84484 ASSAY OF TROPONIN QUANT: CPT

## 2022-08-01 PROCEDURE — 36415 COLL VENOUS BLD VENIPUNCTURE: CPT

## 2022-08-01 PROCEDURE — 2700000000 HC OXYGEN THERAPY PER DAY

## 2022-08-01 PROCEDURE — 70450 CT HEAD/BRAIN W/O DYE: CPT

## 2022-08-01 PROCEDURE — 6360000004 HC RX CONTRAST MEDICATION: Performed by: EMERGENCY MEDICINE

## 2022-08-01 PROCEDURE — 99285 EMERGENCY DEPT VISIT HI MDM: CPT

## 2022-08-01 PROCEDURE — 6370000000 HC RX 637 (ALT 250 FOR IP): Performed by: NURSE PRACTITIONER

## 2022-08-01 PROCEDURE — 1200000000 HC SEMI PRIVATE

## 2022-08-01 PROCEDURE — 6370000000 HC RX 637 (ALT 250 FOR IP): Performed by: EMERGENCY MEDICINE

## 2022-08-01 PROCEDURE — 2060000000 HC ICU INTERMEDIATE R&B

## 2022-08-01 PROCEDURE — 71045 X-RAY EXAM CHEST 1 VIEW: CPT

## 2022-08-01 PROCEDURE — 85025 COMPLETE CBC W/AUTO DIFF WBC: CPT

## 2022-08-01 PROCEDURE — 85610 PROTHROMBIN TIME: CPT

## 2022-08-01 PROCEDURE — 93005 ELECTROCARDIOGRAM TRACING: CPT | Performed by: EMERGENCY MEDICINE

## 2022-08-01 RX ORDER — ALPRAZOLAM 0.5 MG/1
1 TABLET ORAL 3 TIMES DAILY PRN
Status: DISCONTINUED | OUTPATIENT
Start: 2022-08-01 | End: 2022-08-04 | Stop reason: HOSPADM

## 2022-08-01 RX ORDER — ROSUVASTATIN CALCIUM 10 MG/1
5 TABLET, COATED ORAL DAILY
Status: DISCONTINUED | OUTPATIENT
Start: 2022-08-02 | End: 2022-08-04 | Stop reason: HOSPADM

## 2022-08-01 RX ORDER — IBUPROFEN 400 MG/1
400 TABLET ORAL EVERY 6 HOURS PRN
Status: DISCONTINUED | OUTPATIENT
Start: 2022-08-01 | End: 2022-08-04 | Stop reason: HOSPADM

## 2022-08-01 RX ORDER — IPRATROPIUM BROMIDE AND ALBUTEROL SULFATE 2.5; .5 MG/3ML; MG/3ML
1 SOLUTION RESPIRATORY (INHALATION) EVERY 4 HOURS PRN
Status: DISCONTINUED | OUTPATIENT
Start: 2022-08-01 | End: 2022-08-04 | Stop reason: HOSPADM

## 2022-08-01 RX ORDER — TRIAMCINOLONE ACETONIDE 1 MG/G
CREAM TOPICAL 2 TIMES DAILY
Status: DISCONTINUED | OUTPATIENT
Start: 2022-08-01 | End: 2022-08-04 | Stop reason: HOSPADM

## 2022-08-01 RX ORDER — FAMOTIDINE 20 MG/1
20 TABLET, FILM COATED ORAL ONCE
Status: COMPLETED | OUTPATIENT
Start: 2022-08-01 | End: 2022-08-01

## 2022-08-01 RX ORDER — ALBUTEROL SULFATE 90 UG/1
2 AEROSOL, METERED RESPIRATORY (INHALATION) EVERY 6 HOURS PRN
Status: DISCONTINUED | OUTPATIENT
Start: 2022-08-01 | End: 2022-08-04 | Stop reason: HOSPADM

## 2022-08-01 RX ORDER — GLIPIZIDE 5 MG/1
5 TABLET ORAL
Status: DISCONTINUED | OUTPATIENT
Start: 2022-08-02 | End: 2022-08-04 | Stop reason: HOSPADM

## 2022-08-01 RX ORDER — ISOSORBIDE MONONITRATE 60 MG/1
60 TABLET, EXTENDED RELEASE ORAL DAILY
Status: DISCONTINUED | OUTPATIENT
Start: 2022-08-02 | End: 2022-08-04

## 2022-08-01 RX ORDER — NITROGLYCERIN 0.4 MG/1
0.4 TABLET SUBLINGUAL EVERY 5 MIN PRN
Status: DISCONTINUED | OUTPATIENT
Start: 2022-08-01 | End: 2022-08-04 | Stop reason: HOSPADM

## 2022-08-01 RX ORDER — DEXTROSE MONOHYDRATE 100 MG/ML
INJECTION, SOLUTION INTRAVENOUS CONTINUOUS PRN
Status: DISCONTINUED | OUTPATIENT
Start: 2022-08-01 | End: 2022-08-04 | Stop reason: HOSPADM

## 2022-08-01 RX ORDER — PANTOPRAZOLE SODIUM 40 MG/1
40 TABLET, DELAYED RELEASE ORAL
Status: DISCONTINUED | OUTPATIENT
Start: 2022-08-02 | End: 2022-08-04 | Stop reason: HOSPADM

## 2022-08-01 RX ORDER — FUROSEMIDE 40 MG/1
40 TABLET ORAL 2 TIMES DAILY
Status: DISCONTINUED | OUTPATIENT
Start: 2022-08-02 | End: 2022-08-04 | Stop reason: HOSPADM

## 2022-08-01 RX ORDER — ASPIRIN 81 MG/1
81 TABLET, CHEWABLE ORAL DAILY
Status: DISCONTINUED | OUTPATIENT
Start: 2022-08-02 | End: 2022-08-04 | Stop reason: HOSPADM

## 2022-08-01 RX ORDER — GABAPENTIN 300 MG/1
300 CAPSULE ORAL 3 TIMES DAILY
Status: DISCONTINUED | OUTPATIENT
Start: 2022-08-02 | End: 2022-08-04 | Stop reason: HOSPADM

## 2022-08-01 RX ORDER — ASPIRIN 81 MG/1
324 TABLET, CHEWABLE ORAL ONCE
Status: COMPLETED | OUTPATIENT
Start: 2022-08-01 | End: 2022-08-01

## 2022-08-01 RX ORDER — ACETAZOLAMIDE 250 MG/1
250 TABLET ORAL DAILY
Status: DISCONTINUED | OUTPATIENT
Start: 2022-08-02 | End: 2022-08-04 | Stop reason: HOSPADM

## 2022-08-01 RX ORDER — LISINOPRIL 5 MG/1
2.5 TABLET ORAL DAILY
Status: DISCONTINUED | OUTPATIENT
Start: 2022-08-02 | End: 2022-08-04 | Stop reason: HOSPADM

## 2022-08-01 RX ORDER — HYDRALAZINE HYDROCHLORIDE 25 MG/1
25 TABLET, FILM COATED ORAL 3 TIMES DAILY
Status: DISCONTINUED | OUTPATIENT
Start: 2022-08-01 | End: 2022-08-04

## 2022-08-01 RX ADMIN — FAMOTIDINE 20 MG: 20 TABLET, FILM COATED ORAL at 20:23

## 2022-08-01 RX ADMIN — ASPIRIN 324 MG: 81 TABLET, CHEWABLE ORAL at 20:23

## 2022-08-01 RX ADMIN — DICLOFENAC SODIUM 2 G: 10 GEL TOPICAL at 23:57

## 2022-08-01 RX ADMIN — GABAPENTIN 300 MG: 300 CAPSULE ORAL at 23:57

## 2022-08-01 RX ADMIN — IOPAMIDOL 75 ML: 755 INJECTION, SOLUTION INTRAVENOUS at 18:37

## 2022-08-01 RX ADMIN — ALPRAZOLAM 1 MG: 0.5 TABLET ORAL at 23:57

## 2022-08-01 ASSESSMENT — LIFESTYLE VARIABLES
HOW MANY STANDARD DRINKS CONTAINING ALCOHOL DO YOU HAVE ON A TYPICAL DAY: PATIENT DOES NOT DRINK
HOW OFTEN DO YOU HAVE A DRINK CONTAINING ALCOHOL: NEVER
HOW OFTEN DO YOU HAVE A DRINK CONTAINING ALCOHOL: NEVER

## 2022-08-01 ASSESSMENT — PAIN SCALES - GENERAL: PAINLEVEL_OUTOF10: 9

## 2022-08-01 ASSESSMENT — PAIN DESCRIPTION - LOCATION: LOCATION: LEG

## 2022-08-01 ASSESSMENT — ENCOUNTER SYMPTOMS
ABDOMINAL PAIN: 0
VOMITING: 0
DIARRHEA: 0
CHEST TIGHTNESS: 0
SHORTNESS OF BREATH: 0
NAUSEA: 0

## 2022-08-01 ASSESSMENT — PAIN DESCRIPTION - ORIENTATION: ORIENTATION: RIGHT;LEFT

## 2022-08-01 ASSESSMENT — PAIN DESCRIPTION - DESCRIPTORS: DESCRIPTORS: BURNING;ACHING;TINGLING

## 2022-08-01 NOTE — ED NOTES
Repeat EKG aquired of pt due to pt being in tri geminy and c/o substernal chest pain radiating to posterior back center     Claire Kaba RN  08/01/22 3458

## 2022-08-01 NOTE — ED PROVIDER NOTES
[address] [facility]        Pt Name: Bernice Kenyon  MRN: [id number]  Kevgfclaudia 1945  Date of evaluation: 8/1/2022  Provider: KAE Bose - CEASAR  PCP: Mary Jane Burris MD  Note Started: 6:23 PM EDT        I have seen and evaluated this patient with my supervising physician Alexei Boland DO.    CHIEF COMPLAINT       Chief Complaint   Patient presents with    Cerebrovascular Accident     Elderly female coming in from home brought in by [facility] - w/ altered mental status - stroke alert called       HISTORY OF PRESENT ILLNESS   (Location, Timing/Onset, Context/Setting, Quality, Duration, Modifying Factors, Severity, Associated Signs and Symptoms)  Note limiting factors. Chief Complaint: stroke     Bernice Kenyon is a 69 y.o. female who presents to the emergency department with concern of stroke. Onset of symptoms 1745 while eating lunch with neighbors. EMS was called by patient's daughter. Staff report patient's symptoms worsen in route. They report that the patient's speech has become more slurred or dysarthric. Patient also has left arm weakness and left-sided facial droop. Reports history of stroke, labs and recent. Stroke alert called. Attending physician at bedside. Nursing Notes were all reviewed and agreed with or any disagreements were addressed in the HPI. REVIEW OF SYSTEMS    (2-9 systems for level 4, 10 or more for level 5)     Review of Systems   Constitutional:  Negative for activity change, chills and fever. Respiratory:  Negative for chest tightness and shortness of breath. Cardiovascular:  Negative for chest pain. Gastrointestinal:  Negative for abdominal pain, diarrhea, nausea and vomiting. Genitourinary:  Negative for dysuria. Neurological:  Positive for facial asymmetry, speech difficulty and weakness. All other systems reviewed and are negative.     Positives and Pertinent negatives as per HPI. Except as noted above in the ROS, all other systems were reviewed and negative.        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    Chronic obstructive pulmonary disease ([facility] [address] 01/13/2017    Claustrophobia     COPD (chronic obstructive pulmonary disease) (HCC)     ESBL (extended spectrum beta-lactamase) producing bacteria infection 08/14/2021    Esophageal stricture     Gastroesophageal reflux disease without esophagitis 03/24/2016    Hiatal hernia     Hiatal hernia     Hypercholesteremia     Hypertension     IBS (irritable bowel syndrome)     Migraines     Movement disorder arthritis    Pneumonia     PONV (postoperative nausea and vomiting)     Type II or unspecified type diabetes mellitus without mention of complication, not stated as uncontrolled     type ll does not take insulin at home    Unspecified cerebral artery occlusion with cerebral infarction     many TIA's         SURGICAL HISTORY     Past Surgical History:   Procedure Laterality Date    APPENDECTOMY      BRONCHOSCOPY N/A 1/24/2020    BRONCHOSCOPY ALVEOLAR LAVAGE performed by Yadi Sandoval MD at [address] N/A 1/27/2020    BRONCHOSCOPY DIAGNOSTIC OR CELL [address] ONLY performed by Dain Root MD at [address] N/A 11/23/2020    BRONCHOSCOPY DIAGNOSTIC OR CELL [address] ONLY performed by Manjit Montoya MD at [facility]      1 stent 2000 and 1 stent 2001    CATARACT REMOVAL  2013 or 2014    bilateral cataracts removed    CHOLECYSTECTOMY      COLONOSCOPY COLONOSCOPY  06/11/2018    ENDOSCOPY, COLON, DIAGNOSTIC      ESOPHAGEAL DILATATION      EYE SURGERY      bilateral cataracts    GALLBLADDER SURGERY      HYSTERECTOMY (CERVIX STATUS UNKNOWN)      MT EXC SKIN MALIG <5MM REMAINDR BODY N/A 9/6/2018    EXCISION OF SCALP SQUAMOUS CELL CARCINOMA performed by Tomasz Ulloa MD at [address] [facility] <100SQCM N/A 9/6/2018    SPLIT THICKNESS SKIN GRAFT FOR COVERAGE SCALP, APPLICATION OF WOUND VAC DEVICE performed by Tomasz Ulloa MD at [address] [place] [address],[address] ENDOSCOPY  4/20/12    with biopsy of stomach,gastritis    UPPER GASTROINTESTINAL ENDOSCOPY  06/11/2018    w/esophagael dilation         CURRENTMEDICATIONS       Previous Medications    ACETAZOLAMIDE (DIAMOX) 250 MG TABLET    Take 1 tablet by mouth daily    ALBUTEROL SULFATE HFA (PROVENTIL;VENTOLIN;PROAIR) 108 (90 BASE) MCG/ACT INHALER    INHALE TWO PUFFS BY MOUTH EVERY 6 HOURS AS NEEDED FOR WHEEZING    ALPRAZOLAM (XANAX) 1 MG TABLET    Take 1 tablet by mouth 3 times daily as needed for Anxiety for up to 30 days. ASPIRIN 81 MG TABLET    Take 81 mg by mouth daily    BLOOD GLUCOSE MONITOR STRIPS    Test 2 times a day & as needed for symptoms of irregular blood glucose. Dispense sufficient amount for indicated testing frequency plus additional to accommodate PRN testing needs. BLOOD GLUCOSE TEST STRIPS (TRUE METRIX BLOOD GLUCOSE TEST) STRIP    USE THREE TIMES A DAY AS NEEDED    BUDESONIDE-FORMOTEROL (SYMBICORT) 160-4.5 MCG/ACT AERO    Inhale 2 puffs into the lungs 2 times daily    DICLOFENAC SODIUM (VOLTAREN) 1 % GEL    Apply 2 g topically 2 times daily    FUROSEMIDE (LASIX) 40 MG TABLET    TAKE ONE TABLET BY MOUTH TWICE A DAY    GABAPENTIN (NEURONTIN) 300 MG CAPSULE    Take 1 capsule by mouth 4 times daily for 30 days.  Intended supply: 30 days    GLIPIZIDE (GLUCOTROL) 5 MG TABLET    Take 1 tablet by mouth 2 times daily (before meals) HYDRALAZINE (APRESOLINE) 25 MG TABLET    Take 1 tablet by mouth 3 times daily    IBUPROFEN (ADVIL;MOTRIN) 400 MG TABLET    Take 1 tablet by mouth every 6 hours as needed for Pain or Fever    IPRATROPIUM-ALBUTEROL (DUONEB) 0.5-2.5 (3) MG/3ML SOLN NEBULIZER SOLUTION    INHALE THREE MILLILITERS VIA NEBULIZATION BY MOUTH EVERY 4 HOURS AS NEEDED FOR SHORTNESS OF BREATH    ISOSORBIDE MONONITRATE (IMDUR) 60 MG EXTENDED RELEASE TABLET    Take 1 tablet by mouth daily    LANCETS MISC    1 each by Does not apply route 2 times daily    LINAGLIPTIN (TRADJENTA) 5 MG TABLET    Take 1 tablet by mouth daily    LISINOPRIL (PRINIVIL;ZESTRIL) 10 MG TABLET    TAKE ONE TABLET BY MOUTH DAILY    NITROGLYCERIN (NITROSTAT) 0.4 MG SL TABLET    Place 1 tablet under the tongue every 5 minutes as needed for Chest pain. NYSTATIN (MYCOSTATIN) 208116 UNIT/GM POWDER    Affected area    OMEPRAZOLE (PRILOSEC) 40 MG DELAYED RELEASE CAPSULE    Take 1 capsule by mouth daily    ROSUVASTATIN (CRESTOR) 10 MG TABLET    Take 0.5 tablets by mouth daily    SODIUM CHLORIDE (OCEAN, BABY AYR) 0.65 % NASAL SPRAY    1 spray by Nasal route as needed for Congestion    TRIAMCINOLONE (KENALOG) 0.1 % CREAM    Apply topically 2 times daily.     UMECLIDINIUM BROMIDE (INCRUSE ELLIPTA) 62.5 MCG/INH AEPB    Inhale 1 puff into the lungs daily         ALLERGIES     Morphine, Codeine, Hydromorphone, Levaquin [levofloxacin in d5w], Vicodin [hydrocodone-acetaminophen], and Aspirin    FAMILYHISTORY       Family History   Problem Relation Age of Onset    Heart Disease Mother     Cancer Mother     Cancer Father     Cancer Sister     Heart Disease Sister     Heart Disease Brother     High Blood Pressure Neg Hx     High Cholesterol Neg Hx           SOCIAL HISTORY       Social History     Tobacco Use    Smoking status: Former     Packs/day: 0.25     Years: 49.00     Pack years: 12.25     Types: Cigarettes     Quit date: 2017     Years since quittin.1    Smokeless tobacco: Never    Tobacco comments:     only smoke when real stressed  Nicotine patch   Vaping Use    Vaping Use: Former    Substances: Always   Substance Use Topics    Alcohol use: Yes     Alcohol/week: 1.0 standard drink     Types: 1 Glasses of wine per week     Comment: occ. every  6 mo    Drug use: No       SCREENINGS   NIH Stroke Scale  Interval: Baseline  Level of Consciousness (1a): Alert  LOC Questions (1b): Answers both correctly  LOC Commands (1c): Performs both tasks correctly  Best Gaze (2): Normal  Visual (3): No visual loss  Facial Palsy (4): (!) Minor paralysis  Motor Arm, Left (5a): Some effort against gravity  Motor Arm, Right (5b): No drift  Motor Leg, Right (6b): No drift  Limb Ataxia (7): Absent  Sensory (8): Normal  Best Language (9): No aphasia  Dysarthria (10): Mild to moderate, slurs some words  Extinction and Inattention (11): No abnormality         PHYSICAL EXAM    (up to 7 for level 4, 8 or more for level 5)     ED Triage Vitals   BP Temp Temp src Pulse Resp SpO2 Height Weight   -- -- -- -- -- -- -- --       Physical Exam  Vitals and nursing note reviewed. Constitutional:       Appearance: She is well-developed. She is not diaphoretic. HENT:      Head: Normocephalic and atraumatic. Right Ear: External ear normal.      Left Ear: External ear normal.   Eyes:      General: No visual field deficit. Right eye: No discharge. Left eye: No discharge. Neck:      Vascular: No JVD. Cardiovascular:      Rate and Rhythm: Normal rate. Pulses: Normal pulses. Pulmonary:      Effort: Pulmonary effort is normal. No respiratory distress. Abdominal:      Palpations: Abdomen is soft. Musculoskeletal:         General: Normal range of motion. Skin:     General: Skin is warm and dry. Coloration: Skin is not pale. Neurological:      Mental Status: She is alert. Cranial Nerves: Cranial nerve deficit, dysarthria and facial asymmetry present.       Sensory: Sensation is intact. Motor: Weakness present. Psychiatric:         Behavior: Behavior normal.       DIAGNOSTIC RESULTS   LABS:    Labs Reviewed   CBC WITH AUTO DIFFERENTIAL - Abnormal; Notable for the following components:       Result Value    WBC 14.4 (*)     Neutrophils Absolute 10.3 (*)     All other components within normal limits   COMPREHENSIVE METABOLIC PANEL W/ REFLEX TO MG FOR LOW K - Abnormal; Notable for the following components:    Glucose 185 (*)     GFR Non- 54 (*)     All other components within normal limits   POCT GLUCOSE - Normal   TROPONIN   PROTIME-INR   URINALYSIS WITH REFLEX TO CULTURE       When ordered only abnormal lab results are displayed. All other labs were within normal range or not returned as of this dictation. EKG: When ordered, EKG's are interpreted by the Emergency Department Physician in the absence of a cardiologist.  Please see their note for interpretation of EKG. RADIOLOGY:   Non-plain film images such as CT, Ultrasound and MRI are read by the radiologist. Plain radiographic images are visualized and preliminarily interpreted by the ED Provider with the below findings:        Interpretation per the Radiologist below, if available at the time of this note:    XR CHEST PORTABLE   Final Result   Rotated chest.  Cardiomegaly with no overt failure or significant infiltrate. CTA HEAD NECK W CONTRAST   Final Result   50% stenosis at the origin of the right internal carotid artery. Otherwise, no acute abnormality or flow-limiting stenosis in the remainder of   the major arteries of the head and neck. 4 mm lung nodule in the right upper lobe. Follow-up recommendation is listed   below. RECOMMENDATIONS:   Fleischner Society guidelines for follow-up and management of incidentally   detected pulmonary nodules:      Single Solid Nodule:      Nodule size less than 6 mm   In a low-risk patient, no routine follow-up.    In a high-risk patient, optional CT at 12 months. - Low risk patients include individuals with minimal or absent history of   smoking and other known risk factors. - High risk patients include individuals with a history or smoking or known   risk factors. Radiology 2017 http://pubs. rsna.org/doi/full/10.1148/radiol. 7053607453         CT Head W/O Contrast   Final Result   No acute intracranial abnormality. Results were reported to Dr. Christie Pham at 6:38 p.m. on August 1, 2022. MRI BRAIN WO CONTRAST    (Results Pending)     No results found. PROCEDURES   Unless otherwise noted below, none     Procedures    CRITICAL CARE TIME   There was a high probability of life-threatening clinical deterioration in the patient's condition requiring my urgent intervention. I personally saw the patient and independently provided 30 minutes of non-concurrent critical care out of the total shared critical care time provided, excluding separately reportable procedures. CONSULTS:  IP CONSULT TO PHARMACY  PHARMACY TO CHANGE BASE FLUIDS  IP CONSULT TO INTERNAL MEDICINE  IP CONSULT TO NEUROLOGY      EMERGENCY DEPARTMENT COURSE and DIFFERENTIAL DIAGNOSIS/MDM:   Vitals:    Vitals:    08/01/22 1914 08/01/22 1915 08/01/22 1916 08/01/22 1930   BP: 107/72 111/70 (!) 108/57 122/77   Pulse: 70 73 71 75   Resp: 21 21 23 22   SpO2: 91% 92% 92% 93%   Weight:       Height:           Patient was given the following medications:  Medications   perflutren lipid microspheres (DEFINITY) injection 1.65 mg (has no administration in time range)   iopamidol (ISOVUE-370) 76 % injection 75 mL (75 mLs IntraVENous Given 8/1/22 1837)   aspirin chewable tablet 324 mg (324 mg Oral Given 8/1/22 2023)   famotidine (PEPCID) tablet 20 mg (20 mg Oral Given 8/1/22 2023)         Is this patient to be included in the SEP-1 Core Measure due to severe sepsis or septic shock?    No   Exclusion criteria - the patient is NOT to be included for SEP-1 Core Measure due to:  Infection is not suspected    Briefly, this is a 69-year-old female with medical history including CVA, type 2 diabetes, hypertension, hypercholesterolemia, COPD, skin cancer, coronary artery disease, anxiety that presents to the emergency department today with concern of stroke that began 30 minutes prior to arrival at 1745. Stroke alert was called by myself, attending physician at bedside. Patient was taken directly to CT scan. Stroke team did provide telemetry neuro with patient and family at bedside. And family was able to give more clear details around events. Daughter at bedside states that her mother was having some facial weakness around her mouth at 12:30 PM after taking Xanax. She was feeling anxious after getting into an argument with her home health aide. Patient has not a candidate for Tenecteplase per stroke team given time of onset of syptoms. CT head    IMPRESSION:  No acute intracranial abnormality. CTA head and neck also shows no acute intracranial abnormality. Chest x-ray does show cardiomegaly. Labs show an elevated white blood cell count, otherwise unremarkable. Patient will be admitted for CVA rule out, left-sided weakness. Dr. Belén Sevilla does accept this patient in admission for likely an MRI brain tomorrow and further work-up. Patient and family updated regarding plan of care    FINAL IMPRESSION      1. Left-sided weakness          DISPOSITION/PLAN   DISPOSITION Admitted 08/01/2022 08:50:04 PM      PATIENT REFERRED TO:  No follow-up provider specified.     DISCHARGE MEDICATIONS:  New Prescriptions    No medications on file       DISCONTINUED MEDICATIONS:  Discontinued Medications    No medications on file              (Please note that portions of this note were completed with a voice recognition program.  Efforts were made to edit the dictations but occasionally words are mis-transcribed.)    KAE Torrez - CNP (electronically signed)           Scar Kraus Cynthia Shultz, KAE - CNP  08/01/22 2050

## 2022-08-01 NOTE — PLAN OF CARE
TELESTROKE PLAN OF CARE:    Called by Dr Diana Manley around 6:30pm regarding Ms Padma Mann, a 36XJ F with PMHx significant for DM, CHF, CAD, COPD, Migraines, and multiple prior strokes (on ASA) who is presenting with FP, LUE weakness, and dysarthria. I spoke to the patient's daughter, Bhavna Kwok, by phone, who stated that the patient was LKN night prior. When she came to check on her mother this afternoon at 12:30pm, she noted pronounced FP and mild dysarthria, said her mother was acting confused and anxious. She encouraged her mother to eat/drink, but later was noted to have additional LUE weakness prompting call to 911 and presentation to ED. CTH by my interpretation did not show an acute hemorrhage or large hypdenisty; CTA with severe LV4 stenosis with distal recon. Given her low NIHSS at this time and LKN, she is not a candidate for thrombolysis or EVT. Recommend allowing BP to autoregulate and load with antiplatelet. Discussed recs with Dr Diana Manley  Please call with critical results, change in patient exam, or questions.     Kiara Cantu MD

## 2022-08-02 ENCOUNTER — APPOINTMENT (OUTPATIENT)
Dept: GENERAL RADIOLOGY | Age: 77
DRG: 065 | End: 2022-08-02
Payer: COMMERCIAL

## 2022-08-02 PROBLEM — R53.1 LEFT-SIDED WEAKNESS: Status: ACTIVE | Noted: 2022-08-02

## 2022-08-02 PROBLEM — I48.0 PAF (PAROXYSMAL ATRIAL FIBRILLATION) (HCC): Status: ACTIVE | Noted: 2022-08-02

## 2022-08-02 PROBLEM — I63.231 ARTERIAL ISCHEMIC STROKE, ICA, RIGHT, ACUTE (HCC): Status: ACTIVE | Noted: 2022-08-02

## 2022-08-02 LAB
ANION GAP SERPL CALCULATED.3IONS-SCNC: 5 MMOL/L (ref 3–16)
BACTERIA: ABNORMAL /HPF
BILIRUBIN URINE: NEGATIVE
BLOOD, URINE: ABNORMAL
BUN BLDV-MCNC: 18 MG/DL (ref 7–20)
CALCIUM SERPL-MCNC: 9.5 MG/DL (ref 8.3–10.6)
CHLORIDE BLD-SCNC: 102 MMOL/L (ref 99–110)
CLARITY: ABNORMAL
CO2: 29 MMOL/L (ref 21–32)
COLOR: YELLOW
CREAT SERPL-MCNC: 0.9 MG/DL (ref 0.6–1.2)
EPITHELIAL CELLS, UA: ABNORMAL /HPF (ref 0–5)
GFR AFRICAN AMERICAN: >60
GFR NON-AFRICAN AMERICAN: >60
GLUCOSE BLD-MCNC: 123 MG/DL (ref 70–99)
GLUCOSE BLD-MCNC: 125 MG/DL (ref 70–99)
GLUCOSE BLD-MCNC: 126 MG/DL (ref 70–99)
GLUCOSE BLD-MCNC: 126 MG/DL (ref 70–99)
GLUCOSE BLD-MCNC: 128 MG/DL (ref 70–99)
GLUCOSE BLD-MCNC: 158 MG/DL (ref 70–99)
GLUCOSE URINE: NEGATIVE MG/DL
HCT VFR BLD CALC: 45.1 % (ref 36–48)
HEMOGLOBIN: 14.7 G/DL (ref 12–16)
KETONES, URINE: NEGATIVE MG/DL
LEUKOCYTE ESTERASE, URINE: ABNORMAL
LV EF: 70 %
LVEF MODALITY: NORMAL
MCH RBC QN AUTO: 28.6 PG (ref 26–34)
MCHC RBC AUTO-ENTMCNC: 32.6 G/DL (ref 31–36)
MCV RBC AUTO: 87.7 FL (ref 80–100)
MICROSCOPIC EXAMINATION: YES
NITRITE, URINE: POSITIVE
PDW BLD-RTO: 15.8 % (ref 12.4–15.4)
PERFORMED ON: ABNORMAL
PH UA: 6 (ref 5–8)
PLATELET # BLD: 198 K/UL (ref 135–450)
PMV BLD AUTO: 8.4 FL (ref 5–10.5)
POTASSIUM SERPL-SCNC: 4.1 MMOL/L (ref 3.5–5.1)
PROTEIN UA: ABNORMAL MG/DL
RBC # BLD: 5.14 M/UL (ref 4–5.2)
RBC UA: ABNORMAL /HPF (ref 0–4)
SODIUM BLD-SCNC: 136 MMOL/L (ref 136–145)
SPECIFIC GRAVITY UA: 1.02 (ref 1–1.03)
TSH REFLEX: 1.32 UIU/ML (ref 0.27–4.2)
URINE REFLEX TO CULTURE: YES
URINE TYPE: ABNORMAL
UROBILINOGEN, URINE: 0.2 E.U./DL
WBC # BLD: 8 K/UL (ref 4–11)
WBC UA: ABNORMAL /HPF (ref 0–5)

## 2022-08-02 PROCEDURE — 92526 ORAL FUNCTION THERAPY: CPT

## 2022-08-02 PROCEDURE — 97116 GAIT TRAINING THERAPY: CPT

## 2022-08-02 PROCEDURE — 87186 SC STD MICRODIL/AGAR DIL: CPT

## 2022-08-02 PROCEDURE — 36415 COLL VENOUS BLD VENIPUNCTURE: CPT

## 2022-08-02 PROCEDURE — 97535 SELF CARE MNGMENT TRAINING: CPT

## 2022-08-02 PROCEDURE — 74230 X-RAY XM SWLNG FUNCJ C+: CPT

## 2022-08-02 PROCEDURE — 92610 EVALUATE SWALLOWING FUNCTION: CPT

## 2022-08-02 PROCEDURE — 97162 PT EVAL MOD COMPLEX 30 MIN: CPT

## 2022-08-02 PROCEDURE — 92611 MOTION FLUOROSCOPY/SWALLOW: CPT

## 2022-08-02 PROCEDURE — 81001 URINALYSIS AUTO W/SCOPE: CPT

## 2022-08-02 PROCEDURE — 99223 1ST HOSP IP/OBS HIGH 75: CPT | Performed by: INTERNAL MEDICINE

## 2022-08-02 PROCEDURE — 1200000000 HC SEMI PRIVATE

## 2022-08-02 PROCEDURE — 85027 COMPLETE CBC AUTOMATED: CPT

## 2022-08-02 PROCEDURE — 6370000000 HC RX 637 (ALT 250 FOR IP): Performed by: INTERNAL MEDICINE

## 2022-08-02 PROCEDURE — 84443 ASSAY THYROID STIM HORMONE: CPT

## 2022-08-02 PROCEDURE — 80048 BASIC METABOLIC PNL TOTAL CA: CPT

## 2022-08-02 PROCEDURE — 93005 ELECTROCARDIOGRAM TRACING: CPT | Performed by: INTERNAL MEDICINE

## 2022-08-02 PROCEDURE — 6370000000 HC RX 637 (ALT 250 FOR IP): Performed by: NURSE PRACTITIONER

## 2022-08-02 PROCEDURE — 97165 OT EVAL LOW COMPLEX 30 MIN: CPT

## 2022-08-02 PROCEDURE — 97530 THERAPEUTIC ACTIVITIES: CPT

## 2022-08-02 PROCEDURE — 93306 TTE W/DOPPLER COMPLETE: CPT

## 2022-08-02 PROCEDURE — 87077 CULTURE AEROBIC IDENTIFY: CPT

## 2022-08-02 PROCEDURE — 87086 URINE CULTURE/COLONY COUNT: CPT

## 2022-08-02 PROCEDURE — 92523 SPEECH SOUND LANG COMPREHEN: CPT

## 2022-08-02 PROCEDURE — 99223 1ST HOSP IP/OBS HIGH 75: CPT | Performed by: PSYCHIATRY & NEUROLOGY

## 2022-08-02 RX ORDER — AMOXICILLIN 250 MG/1
500 CAPSULE ORAL EVERY 8 HOURS SCHEDULED
Status: DISCONTINUED | OUTPATIENT
Start: 2022-08-02 | End: 2022-08-04

## 2022-08-02 RX ORDER — LORAZEPAM 2 MG/ML
2 CONCENTRATE ORAL ONCE
Status: COMPLETED | OUTPATIENT
Start: 2022-08-02 | End: 2022-08-03

## 2022-08-02 RX ORDER — METOPROLOL SUCCINATE 25 MG/1
25 TABLET, EXTENDED RELEASE ORAL DAILY
Status: DISCONTINUED | OUTPATIENT
Start: 2022-08-02 | End: 2022-08-04 | Stop reason: HOSPADM

## 2022-08-02 RX ADMIN — ASPIRIN 81 MG: 81 TABLET, CHEWABLE ORAL at 09:30

## 2022-08-02 RX ADMIN — ISOSORBIDE MONONITRATE 60 MG: 60 TABLET, EXTENDED RELEASE ORAL at 09:30

## 2022-08-02 RX ADMIN — TRIAMCINOLONE ACETONIDE: 1 CREAM TOPICAL at 09:40

## 2022-08-02 RX ADMIN — DICLOFENAC SODIUM 2 G: 10 GEL TOPICAL at 20:54

## 2022-08-02 RX ADMIN — GABAPENTIN 300 MG: 300 CAPSULE ORAL at 20:53

## 2022-08-02 RX ADMIN — LISINOPRIL 2.5 MG: 5 TABLET ORAL at 09:29

## 2022-08-02 RX ADMIN — ALPRAZOLAM 1 MG: 0.5 TABLET ORAL at 21:03

## 2022-08-02 RX ADMIN — TRIAMCINOLONE ACETONIDE: 1 CREAM TOPICAL at 20:55

## 2022-08-02 RX ADMIN — ROSUVASTATIN 5 MG: 10 TABLET, FILM COATED ORAL at 09:30

## 2022-08-02 RX ADMIN — GLIPIZIDE 5 MG: 5 TABLET ORAL at 15:09

## 2022-08-02 RX ADMIN — DICLOFENAC SODIUM 2 G: 10 GEL TOPICAL at 09:41

## 2022-08-02 RX ADMIN — GLIPIZIDE 5 MG: 5 TABLET ORAL at 10:10

## 2022-08-02 RX ADMIN — GABAPENTIN 300 MG: 300 CAPSULE ORAL at 15:13

## 2022-08-02 RX ADMIN — FUROSEMIDE 40 MG: 40 TABLET ORAL at 17:01

## 2022-08-02 RX ADMIN — HYDRALAZINE HYDROCHLORIDE 25 MG: 25 TABLET, FILM COATED ORAL at 09:30

## 2022-08-02 RX ADMIN — PANTOPRAZOLE SODIUM 40 MG: 40 TABLET, DELAYED RELEASE ORAL at 07:17

## 2022-08-02 RX ADMIN — ALPRAZOLAM 1 MG: 0.5 TABLET ORAL at 09:29

## 2022-08-02 RX ADMIN — FUROSEMIDE 40 MG: 40 TABLET ORAL at 09:30

## 2022-08-02 RX ADMIN — AMOXICILLIN 500 MG: 250 CAPSULE ORAL at 20:52

## 2022-08-02 RX ADMIN — ALPRAZOLAM 1 MG: 0.5 TABLET ORAL at 15:09

## 2022-08-02 RX ADMIN — ACETAZOLAMIDE 250 MG: 250 TABLET ORAL at 09:30

## 2022-08-02 RX ADMIN — HYDRALAZINE HYDROCHLORIDE 25 MG: 25 TABLET, FILM COATED ORAL at 20:54

## 2022-08-02 RX ADMIN — GABAPENTIN 300 MG: 300 CAPSULE ORAL at 09:30

## 2022-08-02 ASSESSMENT — PAIN SCALES - GENERAL: PAINLEVEL_OUTOF10: 0

## 2022-08-02 NOTE — CONSULTS
In patient Neurology consult        [facility] Neurology      Cata Leon MD      Sary Longo  1945    Date of Service: 8/2/2022    Referring Physician: Jamse Severe, MD      Reason for the consult and CC: Acute left facial droop and possible another stroke    History was obtained from chart review and discussion with the patient and her nurse. Patient is not a good historian. HPI:   The patient is a 69y.o.  years old female with multiple medical problems who came into the ER yesterday afternoon with acute left facial droop, confusion and possible new stroke. Symptoms started hours prior to admission. Description sudden confusion with family and difficulties with communication with left facial droop. Degree was severe and persistent for hours. Other associated symptoms include left arm weakness. No other relieving or aggravating factors or falling. Duration was persistent. Family took her to the emergency room for evaluation. Initial imaging showed no acute stroke and moderate ICA stenosis. She was admitted to the hospital.  Today she is awake and alert. She was found to be in A. fib. Other review of system was unremarkable.       Family History   Problem Relation Age of Onset    Heart Disease Mother     Cancer Mother     Cancer Father     Cancer Sister     Heart Disease Sister     Heart Disease Brother     High Blood Pressure Neg Hx     High Cholesterol Neg Hx      Past Surgical History:   Procedure Laterality Date    APPENDECTOMY      BRONCHOSCOPY N/A 1/24/2020    BRONCHOSCOPY ALVEOLAR LAVAGE performed by Margoth Vera MD at [facility] N/A 1/27/2020    BRONCHOSCOPY DIAGNOSTIC OR CELL [address] ONLY performed by Isabel Jacinto MD at [facility] N/A 11/23/2020    BRONCHOSCOPY DIAGNOSTIC OR CELL [address] ONLY performed by Otilia Padron MD at [facility]      1 stent 2000 and 1 stent 2001    CATARACT REMOVAL 2013 or 2014    bilateral cataracts removed    CHOLECYSTECTOMY      COLONOSCOPY      COLONOSCOPY  06/11/2018    ENDOSCOPY, COLON, DIAGNOSTIC      ESOPHAGEAL DILATATION      EYE SURGERY      bilateral cataracts    GALLBLADDER SURGERY      HYSTERECTOMY (CERVIX STATUS UNKNOWN)      GA EXC SKIN MALIG <5MM REMAINDR BODY N/A 9/6/2018    EXCISION OF SCALP SQUAMOUS CELL CARCINOMA performed by Osmel Lewis MD at [facility] <100SQCM N/A 9/6/2018    SPLIT THICKNESS SKIN GRAFT FOR COVERAGE SCALP, APPLICATION OF WOUND VAC DEVICE performed by Osmel Lewis MD at [address] [facility] [facility] ENDOSCOPY  4/20/12    with biopsy of stomach,gastritis    UPPER GASTROINTESTINAL ENDOSCOPY  06/11/2018    w/esophagael dilation        Past Medical History:   Diagnosis Date    Acute MI ([address]     Acute pyelonephritis 09/08/2015    Anxiety and depression     Arthritis     CAD (coronary artery disease)     two heart stent one in 2000 and 2nd one 6 months later on 2000, had MI in 2000    Cancer [facility])     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    Cancer ([facility] [address]     \"skin on top of my head\"    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    Chronic obstructive pulmonary disease ([address] 01/13/2017    Claustrophobia     COPD (chronic obstructive pulmonary disease) (HCC)     ESBL (extended spectrum beta-lactamase) producing bacteria infection 08/14/2021    Esophageal stricture     Gastroesophageal reflux disease without esophagitis 03/24/2016    Hiatal hernia     Hiatal hernia     Hypercholesteremia     Hypertension     IBS (irritable bowel syndrome)     Migraines     Movement disorder arthritis    Pneumonia     PONV (postoperative nausea and vomiting)     Type II or unspecified type diabetes mellitus without mention of complication, not stated as uncontrolled     type ll does not take insulin at home    Unspecified cerebral artery occlusion with cerebral infarction     many TIA's     Social History     Tobacco Use    Smoking status: Former     Packs/day: 0.25     Years: 49.00     Pack years: 12.25     Types: Cigarettes     Quit date: 2017     Years since quittin.1    Smokeless tobacco: Never    Tobacco comments:     only smoke when real stressed  Nicotine patch   Vaping Use    Vaping Use: Former    Substances: Always   Substance Use Topics    Alcohol use:  Yes     Alcohol/week: 1.0 standard drink     Types: 1 Glasses of wine per week     Comment: occ. every  6 mo    Drug use: No     Allergies   Allergen Reactions    Morphine Shortness Of Breath    Codeine Other (See Comments)     Chest pain    Hydromorphone Other (See Comments)     hallucinations  Hallucinations    But will take if needed in an emergency    Levaquin [Levofloxacin In D5w] Itching    Vicodin [Hydrocodone-Acetaminophen] Hives    Aspirin      Upsets hiatal hernia     Current Facility-Administered Medications   Medication Dose Route Frequency Provider Last Rate Last Admin    metoprolol succinate (TOPROL XL) extended release tablet 25 mg  25 mg Oral Daily Calli Scott MD        acetaZOLAMIDE (DIAMOX) tablet 250 mg  250 mg Oral Daily Walter Vance MD   250 mg at 22 0930    albuterol sulfate HFA (PROVENTIL;VENTOLIN;PROAIR) 108 (90 Base) MCG/ACT inhaler 2 puff  2 puff Inhalation Q6H PRN Walter Vance MD        ALPRAZolam Foster Paci) tablet 1 mg  1 mg Oral TID PRN Walter Vance MD   1 mg at 22 4332    aspirin chewable tablet 81 mg  81 mg Oral Daily Walter Vance MD   81 mg at 22 0930    diclofenac sodium (VOLTAREN) 1 % gel 2 g  2 g Topical BID Walter Vance MD   2 g at 22 0941    furosemide (LASIX) tablet 40 mg  40 mg Oral BID Walter Vance MD   40 mg at 22 0930    glipiZIDE (GLUCOTROL) tablet 5 mg  5 mg Oral BID AC Walter Vance MD   5 mg at 22 1010    hydrALAZINE (APRESOLINE) tablet 25 mg  25 mg Oral TID Walter Vance MD   25 mg at 22 0930    ibuprofen (ADVIL;MOTRIN) tablet 400 mg  400 mg Oral Q6H PRN Amparo Ordonez MD        ipratropium-albuterol (DUONEB) nebulizer solution 1 ampule  1 ampule Inhalation Q4H PRN Amparo Ordonez MD        isosorbide mononitrate (IMDUR) extended release tablet 60 mg  60 mg Oral Daily Amparo Ordonez MD   60 mg at 08/02/22 0930    lisinopril (PRINIVIL;ZESTRIL) tablet 2.5 mg  2.5 mg Oral Daily Amparo Ordonez MD   2.5 mg at 08/02/22 0929    nitroGLYCERIN (NITROSTAT) SL tablet 0.4 mg  0.4 mg SubLINGual Q5 Min PRN Amparo Ordonez MD        pantoprazole (PROTONIX) tablet 40 mg  40 mg Oral QAM AC Amparo Ordonez MD   40 mg at 08/02/22 2380    rosuvastatin (CRESTOR) tablet 5 mg  5 mg Oral Daily Amparo Ordonez MD   5 mg at 08/02/22 0930    sodium chloride (OCEAN, BABY AYR) 0.65 % nasal spray 1 spray  1 spray Nasal PRN Amparo Ordonez MD        triamcinolone (KENALOG) 0.1 % cream   Topical BID Amparo Ordonez MD   Given at 08/02/22 0940    perflutren lipid microspheres (DEFINITY) injection 1.65 mg  1.5 mL IntraVENous ONCE PRN Amparo Ordonez MD        dextrose bolus 10% 125 mL  125 mL IntraVENous PRN Amparo Ordonez MD        Or    dextrose bolus 10% 250 mL  250 mL IntraVENous PRN Amparo Ordonez MD        glucagon (rDNA) injection 1 mg  1 mg SubCUTAneous PRN Amparo Ordonez MD        dextrose 10 % infusion   IntraVENous Continuous PRN Amparo Ordonez MD        gabapentin (NEURONTIN) capsule 300 mg  300 mg Oral TID KAE Shaw - CNP   300 mg at 08/02/22 0930       ROS : A 10-14 system review of constitutional, cardiovascular, respiratory, eyes, musculoskeletal, endocrine, GI, ENT, skin, hematological, genitourinary, psychiatric and neurologic systems was obtained and updated today and is unremarkable except as mentioned in my HPI      Exam:     Constitutional:   Vitals:    08/02/22 0808 08/02/22 0929 08/02/22 1126 08/02/22 1151   BP:  120/64 (!) 93/52 (!) 93/52   Pulse:   64 63   Resp:    16   Temp: 97.5 °F (36.4 °C)   TempSrc:    Axillary   SpO2:    90%   Weight: 265 lb 9.6 oz (120.5 kg)      Height:           General appearance and observation: Normal development and appear in no acute distress. Eye:  Fundus: No blurring of optic disc. Neck: supple  Cardiovascular: lower leg edema with good pulsation. Mental Status:   Oriented to person, place, problem, and time. Memory: POor immediate recall. Intact remote memory  Normal attention span and concentration. Language: intact naming, repeating and fluency   poor fund of Knowledge. unaware of current events and vocabulary   Cranial Nerves:   II: Visual fields: Full. Pupils: equal, round, reactive to light  III,IV,VI: Extra Ocular Movements are intact.  No nystagmus  V: Facial sensation is intact  VII: Facial strength and movements: rt facial droop  VIII: Hearing: Intact  IX: Palate elevation is symmetric  XI: Shoulder shrug is intact  XII: Tongue movements are normal  Musculoskeletal: Generalized diffuse weakness more left than right 4/5  1+ DTRs throughout in arms and legs  Downgoing plantars  Normal tone  Sensation: Decree sensation distally in her feet  Cerebellar: No tremors or dysmetria  Gait unable to stand due to weakness    Data:  LABS:   Lab Results   Component Value Date/Time     08/02/2022 08:47 AM    K 4.1 08/02/2022 08:47 AM    K 3.8 08/01/2022 06:19 PM     08/02/2022 08:47 AM    CO2 29 08/02/2022 08:47 AM    BUN 18 08/02/2022 08:47 AM    CREATININE 0.9 08/02/2022 08:47 AM    GFRAA >60 08/02/2022 08:47 AM    GFRAA >60 05/02/2013 10:40 AM    LABGLOM >60 08/02/2022 08:47 AM    GLUCOSE 128 08/02/2022 08:47 AM    MG 1.60 11/15/2020 10:02 PM    CALCIUM 9.5 08/02/2022 08:47 AM     Lab Results   Component Value Date/Time    WBC 8.0 08/02/2022 08:48 AM    RBC 5.14 08/02/2022 08:48 AM    HGB 14.7 08/02/2022 08:48 AM    HCT 45.1 08/02/2022 08:48 AM    MCV 87.7 08/02/2022 08:48 AM    RDW 15.8 08/02/2022 08:48 AM     08/02/2022 08:48 AM     Lab Results   Component Value Date    INR 0.99 08/01/2022    PROTIME 13.0 08/01/2022       Neuroimaging was independently reviewed by myself and discussed results with the patient and/or family  Reviewed notes from different physicians  Reviewed lab and blood testing    Impression:  Acute left-sided weakness with facial droop. CVA/TIA. Awaiting MRI  New onset A. fib  Hypertension, not controlled  Hyperlipidemia  Chronic cognitive impairment  Diabetes    Recommendation:  MRI brain  A1c  Statin  Lipid panel   Insulin sliding scale  Aspirin for now  PT OT  Speech  Telemetry  DVT and GI prophylaxis  Echo  Blood pressure monitor and control  Continue current blood pressure medications but hold if blood pressure below 110/70  Secondary stroke prevention discussed with the patient  Risk and benefit of anticoagulation will be determined depending on MRI results and PT and OT evaluation  Will follow up with above work-up    Discussed with her nurse. Thank you for referring such patient. If you have any questions regarding my consult note, please don't hesitate to call me. Elijah De Leon MD  [phone]    This dictation was generated by voice recognition computer software.  Although all attempts are made to edit the dictation for accuracy, there may be errors in the  transcription that are not intended

## 2022-08-02 NOTE — PROGRESS NOTES
Med rec completed w/ patient. Pt informed that MAR does not show gabapentin is available. pt states if she does not get gabapentin that \"I will die from leg pain\" and \"yell and scream out all night\".  Will notify provider of pt request.

## 2022-08-02 NOTE — PROGRESS NOTES
Casandra Stock scored a 19/24 on the AM-PAC ADL Inpatient form. Current research shows that an AM-PAC score of 18 or greater is typically associated with a discharge to the patient's home setting. Based on the patient's AM-PAC score, and their current ADL deficits, it is recommended that the patient have 2-3 sessions per week of Occupational Therapy at d/c to increase the patient's independence. At this time, this patient demonstrates the endurance and safety to discharge home with [address] [place] [address]home vs OP services) and a follow up treatment frequency of 2-3x/wk. Please see assessment section for further patient specific details. If patient discharges prior to next session this note will serve as a discharge summary. Please see below for the latest assessment towards goals.      HOME HEALTH CARE: LEVEL 1 STANDARD     - Initial home health evaluation to occur within 24-48 hours, in patient home  - Therapy to evaluate with goal of regaining prior level of functioning  - Therapy to evaluate if patient has [id number] needs for personal care     DME Required For Discharge: no DME required at discharge  Precautions/Restrictions: high fall risk  Weight Bearing Restrictions: no restrictions  [] Right Upper Extremity        [] Left Upper Extremity         [] Right Lower Extremity         [] Left Lower Extremity             Required Braces/Orthotics: no braces required                   [] Right            [] Left  Positional Restrictions:no positional restrictions     Pre-Admission Information   Social/Functional History  Lives With: Alone  Type of Home: House  Home Layout: One level  Home Access: Ramped entrance  Bathroom Shower/Tub: Tub/Shower unit (arina helps her get into the tub)  Bathroom Toilet: Handicap height  Bathroom Equipment: Grab bars in shower, Shower chair, Hand-held shower (alarm string near toilet and bed)  Bathroom Accessibility: Accessible  Home Equipment: Reacher, [id number], scooter, O2, hospital bed Receives Help From: Home health, Family, Personal care attendant  ADL Assistance: Needs assistance (daughter aid and nurse all help, grddtr helps with showers, pt has an aide 5x/week)  Homemaking Assistance: Needs assistance (has a hospital tray at home)  Homemaking Responsibilities: No  Ambulation Assistance: Independent (pt able to walk to bathroom and kitchen with rollator, outside uses scooter, leans on forearms on 4WW)  Transfer Assistance: Independent (pt able to get out of bed, pt uses lift chair when in chair)  Active : No  Leisure & Hobbies: drives scooter around the neighborhood with other neighbors with scooters  IADL Comments: daughter, aid and nurse that helps her with ADLS (bathing, dressing, taking out trash)- neighbors have keys to come in and help when needed  Additional Comments: states she is a fall risk and avoids anything that would possibly make her fall- pt states 3 falls in the last 3 months, neighbor or daughter sit with the pt if the pt feels she is having low glucose or symptoms of mini stroke (happens intermittently)     Examination  Vision:   Visual History: other - pt reported her vision changes when she is having mini strokes (for example, can't see part of the TV), pt had vertigo yesterday , sometimes this is due to hypoglycemia  Hearing:   [facility]  Observation:   General Observation:  L side of pt's face has more skin wrinkles and drooping skin than R, L eye drops compared to R, even smile  Posture:   poor  Sensation:   WFL  Proprioception:    diminished proprioception in (L) UE and LE -anticipated based on pt's weakness and previous mini strokes  Tone:   Normotonic  Coordination Testing:   WFL    ROM:   (B) UE AROM WFL  Strength:   (B) UE strength grossly WFL (with LUE< RUE, RUE 4+/5 elbow, shoulder, LUE 3+/5 elbow, shoulder.  Poor, B grasp)    Decision Making: low complexity  Clinical Presentation: stable      Subjective  General: Patient L sidelying upon arrival, agreeable with encouragement  Pain: 0/10  Pain Interventions: not applicable        Activities of Daily Living  Basic Activities of Daily Living  Feeding: setup assistance  Feeding Comments: Given thickened liquids, patient states \"my  was on this, I don't like it\"  Grooming: setup assistance stand by assistance  Grooming Comments: hand washing with wipe seated in recliner  Upper Extremity Bathing: setup assistance stand by assistance  Lower Extremity Bathing: stand by assistance contact guard assistance   Upper Extremity Dressing: setup assistance stand by assistance  Lower Extremity Dressing: minimal assistance  Dressing Equipment: none  Dressing Comments: assist to thread depends over feet  Toileting: minimal assistance moderate assistance. Toileting Equipment: none  Toileting Comments: assist for post geoffrey-area thoroughness  Instrumental Activities of Daily Living  No IADL completed on this date. Functional Mobility  Bed Mobility  Supine to Sit: stand by assistance  Scooting: stand by assistance  Comments:  Transfers  Sit to stand transfer:contact guard assistance  Stand to sit transfer: contact guard assistance  Bed / Chair transfer: contact guard assistance.     Bed / Chair equipment: rolling walker  Bed / Chair comments: patient with forward flexed posture (states that this is baseline, uses 4WW at home)  Toilet transfer: contact guard assistance  Toilet transfer equipment: standard toilet  Toilet transfer comments: use of R grab bar, cues for hand placement  Comments:  Functional Mobility:  Functional Mobility: .  stand by assistance, contact guard assistance  Functional Mobility Activity: to/from bathroom  Functional Mobility Device Use: rolling walker    Other Therapeutic Interventions    Functional Outcomes  AM-PAC Inpatient Daily Activity Raw Score: 19    Cognition  Overall Cognitive Status: Impaired  Safety Judgement: decreased awareness of need for assistance  Insights: decreased awareness of deficits  Orientation:    alert and oriented x 4  Command Following:   accurately follows one step commands     Education  Barriers To Learning: cognition  Patient Education: patient educated on goals, OT role and benefits, plan of care, discharge recommendations  Learning Assessment:  patient verbalizes understanding, would benefit from continued reinforcement    Assessment  Activity Tolerance: Tolerated fair (limited by safety awareness, fatigue0    Impairments Requiring Therapeutic Intervention: decreased functional mobility, decreased ADL status, decreased safety awareness, decreased endurance, decreased balance, decreased IADL, decreased posture  Prognosis: good  Clinical Assessment: Patient presents with the above deficits, which is below baseline, and would benefit from continued skilled OT to address  Safety Interventions: patient left in chair, chair alarm in place, call light within reach, patient at risk for falls, and nurse notified    Plan  Frequency: 5-7 x/week  Current Treatment Recommendations: balance training, functional mobility training, transfer training, endurance training, patient/caregiver education, ADL/self-care training, IADL training, and safety education    Goals  Patient Goals:  To return home   Short Term Goals:  Time Frame: Upon discharge  Patient will complete lower body ADL at supervision   Patient will complete toileting at modified independent   Patient will complete functional transfers at modified independent   Patient will complete functional mobility at modified independent   Patient will complete bed mobility at modified independent     Therapy Session Time     Individual Group Co-treatment   Time In    1121   Time Out    1218   Minutes    57        Timed Code Treatment Minutes:   42  Total Treatment Minutes:  57       Electronically Signed By: Lennette Gilford, OTR/CHRISTOPHER [id number]

## 2022-08-02 NOTE — PROCEDURES
Facility/Department: [address]  MODIFIED BARIUM SWALLOW EVALUATION    Patient: Kym Starr   : 1945   MRN: [id number]      Evaluation Date: 2022   Admitting Diagnosis: Left-sided weakness [R53.1]  CVA, old, alterations of sensations [I69.398, R20.9]  Treatment Diagnosis: Dysphagia   Pain: Pt denies pain at this time                       Date of Evaluation: 2022  Type of Study: Modified Barium Swallowing Study (MBS)  Diet Prior to Study:  Dysphagia III Soft and Bite-Sized with Mildly thick (nectar) liquids pending MBS results      Impression:  Modified Barium Swallow evaluation completed on 2022. Patient presents with moderate oropharyngeal dysphagia with premature bolus loss to pharynx, deep pharyngeal pooling and delayed airway protection during the swallow noted with all textures. Deep pharyngeal pooling prior to swallow initiation with thin liquids results in bolus loss to laryngeal inlet with deep SILENT laryngeal penetration intermittently noted during the swallow with thin liquids. Laryngeal penetration with thin liquids is largely self clearing with no overt aspiration directly viewed. However, Pt is at increased risk for aspiration of thin liquids due to deep pharyngeal pooling and delayed airway protection, especially at times of respiratory compromise. (Of note, Pt's respiratory status was stable with no increased WOB or wheezing noted at the time of this study)  Main factors contributing to oropharyngeal dysphagia include: reduced bolus control with premature bolus loss to pharynx, deep pharyngeal pooling to laryngeal inlet/pyriform with thin liquids, and to the underside of epiglottis with all textures; laryngeal penetration during the swallow intermittently with thin liquids via straw, and over successive po trials; Delayed epiglottic inversion; delayed/reduced hyolaryngeal excursion; reduced tongue based retraction and reduced sensation for timely reflexive cough in the presence of deep laryngeal penetration with thins.     Aspiration/Penetration Risk:  Increased risk with thin liquids in large volume amounts and by straws  High risk with thin liquids at times of respiratory compromise    Recommendations:    Diet Level:   Dysphagia III Soft and bite sized  with Thin liquids, No straws   Medication: Meds in puree     Strategies: Upright as possible with all PO intake , No straws , Small bites/sips , Swallow 2 times per bite , Eat/feed slowly, Remain upright 30-45 min     Treatments: Ongoing dysphagia therapy with SLP   Goals:  Pt will functionally tolerate recommended diet level with use of recommended compensatory strategies with no s/s of aspiration/penetration    Pt/family will demonstrate understanding of results Modified Barium Swallow Study, risk for aspiration, rationale for diet level and compensatory strategies  Pt will demonstrate improved sensory motor function via targeted exercises and appropriate treatment modalities   Pt will tolerate advanced to least restrictive diet as evidenced by repeat instrumental swallow study     Consistencies given: thin liquids, mildly (nectar) thick liquids , moderately (honey) thick liquids , puree , soft solids, mixed consistency , and regular solids     Oral Phase  Decreased bolus control   Prolonged/impaired mastication   Premature bolus loss   Impaired A-P bolus transport   Decreased/Impaired soft palate elevation     Pharyngeal Phase  Pharyngeal pooling prior to swallow initiation   Delayed swallow initiation   Decreased hyolaryngeal excursion   Delayed epiglottic inversion   Decreased tongue base retraction     Penetration/Aspiration Scores across consistencies   CONSISTENCY  Pen/Asp rating Description    Thin   1) Material does not enter the airway  4) Material enters the airway, contacts the vocal folds, and is ejected from the airway  Loss to laryngeal inlet with deep, self clearing laryngeal penetration with thins via straw

## 2022-08-02 NOTE — PROGRESS NOTES
[facility]   Electrophysiology Progress Note   Date: 8/3/2022  Admit Date: 8/1/2022     Reason for follow up: Atrial Fibrillation    Chief Complaint:   Chief Complaint   Patient presents with    Cerebrovascular Accident     Elderly female coming in from home brought in by [facility] - w/ altered mental status - stroke alert called     History of Present Illness: History obtained from patient and medical record. Mason Roth is a 69 y.o. female with a past medical history of COPD, CAD hx of stents , hypertension, hyperlipidemia, DM2, CVA, and AKIL cannot tolerte CPAP. Presented with left-sided weakness. Noted to be in AF and EP was consulted. Being worked up for possible recurrent CVA by neurology. Interval Hx: Today, she is being seen for follow up. Feeling better. Plans for MRI today. Symptoms improved. Remains in SR with PVCs  No new complaints today. No major events overnight. Denies having chest pain, palpitations, shortness of breath, orthopnea or dizziness. Patient seen and examined. Clinical notes reviewed. Telemetry reviewed. Assessment and Plan:  Paroxysmal  Atrial Fibrillation  - Converted to SR without recurrence  - On Toprol 25 mg daily   - [id number] score: 6 (age, gender, hypertension, DM, CVA, CAD); [id number] Vasc score and anticoagulation discussed. High risk for stroke and thromboembolism. Anticoagulation is recommended.   ~ ASA for now, will need [address] when cleared by neurology  - Afib risk factors including age, HTN, obesity, inactivity and AKIL were discussed with patient. Risk factor modification recommended   ~ TSH 1.32 (8/2/2022)     - Treatment options including cardioversion, rate control strategy, antiarrhythmics, anticoagulation and possible ablation were discussed with patient. Risks, benefits and alternative of each treatment options were explained.  All questions answered    ~ Monitor burden and recurrence, not a good candidate for ablation due to multiple comorbidities. Consider cardiac monitor as OP to assess rate control and burden. Acute left-sided weakness   - MRI ordered to rule out recurrent CVA   - Neurology following  Hypertension   - Controlled. Continue current medications  Hyperlipidemia  Hx of CVA  AKIL   - Untreated, she is not interested in referral at this time    - Needs [address] for AF prior to discharge, DOAC discussed and recommended  - Will follow MRI    All pertinent information and plan of care discussed with the EP physician. Multiple medical conditions with risk of decompensation. Problem List:   Patient Active Problem List    Diagnosis Date Noted    HTN (hypertension), benign     CAD (coronary artery disease) 05/23/2011    Hyperlipemia 05/23/2011    Hemiparesis ([address] 08/02/2022    Arterial ischemic stroke, ICA, right, acute ([address] 08/02/2022    PAF (paroxysmal atrial fibrillation) ([facility] 08/02/2022    CVA, old, alterations of sensations 08/01/2022    UTI (urinary tract infection) 08/14/2021    Grade II diastolic dysfunction 97/81/7707    AKIL (obstructive sleep apnea) 01/23/2020    Morbid obesity ([address] 06/18/2018    Dysarthria 06/07/2018    Anxiety 01/19/2018    Primary osteoarthritis involving multiple joints 03/24/2016    Gastroesophageal reflux disease without esophagitis 03/24/2016    TIA (transient ischemic attack)     DM2 (diabetes mellitus, type 2) ([address] 12/03/2015    DM type 2, controlled, with complication ([address]     Essential hypertension 04/18/2012    COPD (chronic obstructive pulmonary disease) ([facility] 08/09/2011    Chronic respiratory failure ([address] 12/22/2010      Allergies:   Allergies   Allergen Reactions    Morphine Shortness Of Breath    Codeine Other (See Comments)     Chest pain    Hydromorphone Other (See Comments)     hallucinations  Hallucinations    But will take if needed in an emergency    Levaquin [Levofloxacin In D5w] Itching    Vicodin [Hydrocodone-Acetaminophen] Hives    Aspirin      Upsets hiatal hernia UNIT/GM powder Affected area  Patient not taking: Reported on 8/1/2022 Yes Carlita Potts MD   rosuvastatin (CRESTOR) 10 MG tablet Take 0.5 tablets by mouth daily Yes Quintin Smith MD   sodium chloride (OCEAN, BABY AYR) 0.65 % nasal spray 1 spray by Nasal route as needed for Congestion Yes Carlita Potts MD   ibuprofen (ADVIL;MOTRIN) 400 MG tablet Take 1 tablet by mouth every 6 hours as needed for Pain or Fever Yes Carlita Potts MD   aspirin 81 MG tablet Take 81 mg by mouth daily Yes Historical Provider, MD   nitroGLYCERIN (NITROSTAT) 0.4 MG SL tablet Place 1 tablet under the tongue every 5 minutes as needed for Chest pain. Yes Carlita Potts MD   gabapentin (NEURONTIN) 300 MG capsule Take 1 capsule by mouth 4 times daily for 30 days.  Intended supply: 30 days  Carlita Potts MD   acetaZOLAMIDE (DIAMOX) 250 MG tablet Take 1 tablet by mouth daily  Carlita Potts MD   isosorbide mononitrate (IMDUR) 60 MG extended release tablet Take 1 tablet by mouth daily  Carlita Potts MD   linagliptin (TRADJENTA) 5 MG tablet Take 1 tablet by mouth daily  Carlita Potts MD   budesonide-formoterol (SYMBICORT) 160-4.5 MCG/ACT AERO Inhale 2 puffs into the lungs 2 times daily  Carlita Potts MD      Scheduled Meds:   metoprolol succinate  25 mg Oral Daily    LORazepam  2 mg Oral Once    amoxicillin  500 mg Oral 3 times per day    acetaZOLAMIDE  250 mg Oral Daily    aspirin  81 mg Oral Daily    diclofenac sodium  2 g Topical BID    furosemide  40 mg Oral BID    glipiZIDE  5 mg Oral BID AC    hydrALAZINE  25 mg Oral TID    isosorbide mononitrate  60 mg Oral Daily    lisinopril  2.5 mg Oral Daily    pantoprazole  40 mg Oral QAM AC    rosuvastatin  5 mg Oral Daily    triamcinolone   Topical BID    gabapentin  300 mg Oral TID     Continuous Infusions:   dextrose       PRN Meds:albuterol sulfate HFA, ALPRAZolam, ibuprofen, ipratropium-albuterol, nitroGLYCERIN, sodium chloride, perflutren lipid microspheres, dextrose bolus **OR** dextrose bolus, glucagon (rDNA), dextrose   Past Medical History:  Past Medical History:   Diagnosis Date    Acute MI ([facility]     Acute pyelonephritis 09/08/2015    Anxiety and depression     Arthritis     CAD (coronary artery disease)     two heart stent one in 2000 and 2nd one 6 months later on 2000, had MI in 2000    Cancer [facility])     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    Cancer ([facility]     \"skin on top of my head\"    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    Chronic obstructive pulmonary disease ([facility] 01/13/2017    Claustrophobia     COPD (chronic obstructive pulmonary disease) (HCC)     ESBL (extended spectrum beta-lactamase) producing bacteria infection 08/14/2021    Esophageal stricture     Gastroesophageal reflux disease without esophagitis 03/24/2016    Hiatal hernia     Hiatal hernia     Hypercholesteremia     Hypertension     IBS (irritable bowel syndrome)     Migraines     Movement disorder arthritis    Pneumonia     PONV (postoperative nausea and vomiting)     Type II or unspecified type diabetes mellitus without mention of complication, not stated as uncontrolled     type ll does not take insulin at home    Unspecified cerebral artery occlusion with cerebral infarction     many TIA's        Past Surgical History:    has a past surgical history that includes Cardiac surgery; Gallbladder surgery; Hysterectomy; Appendectomy; Cholecystectomy; Colonoscopy; Upper gastrointestinal endoscopy (4/20/12); Endoscopy, colon, diagnostic; Tear duct surgery; Upper gastrointestinal endoscopy (06/11/2018); Colonoscopy (06/11/2018); pr exc skin malig <5mm remaindr body (N/A, 9/6/2018); pr split grft trunk,arm,leg <100sqcm (N/A, 9/6/2018); skin biopsy; eye surgery; bronchoscopy (N/A, 1/24/2020); bronchoscopy (N/A, 1/27/2020); Cataract removal (2013 or 2014); Esophagus dilation; and bronchoscopy (N/A, 11/23/2020). Social History:  Reviewed.   reports that she quit smoking about 5 years ago. Her smoking use included cigarettes. She has a 12.25 pack-year smoking history. She has never used smokeless tobacco. She reports current alcohol use of about 1.0 standard drink per week. She reports that she does not use drugs. Family History:  Reviewed. family history includes Cancer in her father, mother, and sister; Heart Disease in her brother, mother, and sister. Denies family history of sudden cardiac death, arrhythmia, premature CAD    Review of Systems:  Constitutional: Negative for fever, weight changes, or weakness  Skin: Negative for bruising, bleeding, blood clots, or changes in skin pigment  HEENT: Negative for vision changes or dysphagia  Respiratory: Reviewed in HPI  Cardiovascular: Reviewed in HPI  Gastrointestinal: Negative for abdominal pain, N/V/D, constipation, or black/tarry stools  Genito-Urinary: Negative for hematuria  Musculoskeletal: No focal weakness  Neurological/Psych: Negative for confusion or TIA-like symptoms. No anxiety, depression, or insomnia    Physical Examination:  Vitals:    08/03/22 0700   BP: 127/71   Pulse: 62   Resp: 17   Temp: 97.5 °F (36.4 °C)   SpO2: 93%      No intake/output data recorded. Wt Readings from Last 3 Encounters:   08/03/22 268 lb 8 oz (121.8 kg)   07/14/22 264 lb (119.7 kg)   05/19/22 269 lb (122 kg)     No intake or output data in the 24 hours ending 08/03/22 0920  Telemetry: Personally Reviewed Normal sinus rhythm, PVC  Constitutional: Cooperative and in no apparent distress, and appears well nourished  Skin: Warm and pink; no cyanosis, bruising, or clubbing  HEENT: Symmetric and normocephalic. Conjunctiva pink with clear sclera. Mucus membranes pink and moist.   Cardiovascular: regular rate and rhythm. S1 & S2, negative for murmurs. Peripheral pulses 2+, capillary refill < 3 seconds. negative elevation of JVP. No significant edema  Respiratory: Respirations symmetric and unlabored.  Lungs to auscultation bilaterally, no crackles, or rhonchi + O2, wheezes  Gastrointestinal: Abdomen soft and round. Bowel sounds normoactive in all quadrants. Musculoskeletal: No focal weakness. Neurologic/Psych: Awake and orientated to person, place and time. Calm affect, appropriate mood    Pertinent labs, diagnostic, device, and imaging results reviewed as a part of this visit    Labs:    BMP:   Recent Labs     08/01/22 1819 08/02/22  0847    136   K 3.8 4.1   CL 99 102   CO2 29 29   BUN 20 18   CREATININE 1.0 0.9     Estimated Creatinine Clearance: 71 mL/min (based on SCr of 0.9 mg/dL). CBC:   Recent Labs     08/01/22 1819 08/02/22  0848   WBC 14.4* 8.0   HGB 14.0 14.7   HCT 43.4 45.1   MCV 86.5 87.7    198     Thyroid:   Lab Results   Component Value Date/Time    TSH 2.34 06/06/2011 09:40 AM     Lipids:   Lab Results   Component Value Date/Time    CHOL 226 11/07/2019 07:05 AM    HDL 34 11/07/2019 07:05 AM    HDL 32 04/19/2012 05:42 AM    TRIG 300 11/07/2019 07:05 AM     LFTS:   Lab Results   Component Value Date/Time    ALT 14 08/01/2022 06:19 PM    AST 20 08/01/2022 06:19 PM    ALKPHOS 82 08/01/2022 06:19 PM    PROT 6.8 08/01/2022 06:19 PM    PROT 7.6 12/02/2011 09:38 AM    AGRATIO 1.2 08/01/2022 06:19 PM    BILITOT 0.3 08/01/2022 06:19 PM     Cardiac Enzymes:   Lab Results   Component Value Date/Time    CKTOTAL 24 06/07/2018 12:19 AM    CKMB 0.29 08/09/2011 04:45 PM    TROPONINI <0.01 08/01/2022 06:19 PM    TROPONINI <0.01 08/15/2021 07:05 AM    TROPONINI <0.01 08/15/2021 12:43 AM     Coags:   Lab Results   Component Value Date/Time    PROTIME 13.0 08/01/2022 06:19 PM    INR 0.99 08/01/2022 06:19 PM     ECG: AF     ECHO:  8/17/2021  Technically difficult exam due to patient body habitus. Normal left ventricle size, wall thickness, and systolic function with an   estimated ejection fraction of 60-65%. Diastolic filling parameters suggests grade II diastolic dysfunction. Mild mitral regurgitation is present.    Mild aortic stenosis with a peak velocity of 2.27 m/s and a mean pressure   gradient of 11 mmHg. Stress Test: 11/17/2020   Summary    SPECT images demonstrate homogeneous tracer distribution throughout the    myocardium. There is normal isotope uptake at stress and rest. There is no evidence of    myocardial ischemia or scar. Normal LV size and systolic function. Left ventricular ejection fraction of 76 %. Cardiac Cath with FFR: 9/23/2019  Anatomy:   LM-Normal No disease   LAD-proximal stent 10% ISR, mid stent patent. Distal LAD 20% irregular stenosis. D2 70% ostial stent group home  Cx-Patent, mild luminal irregularities  OM1- ostial discrete 60% stenosis  RCA-Dominant, large sized vessel. Proximal 70% heavily calcified stenosis. RPDA- Patent, mild luminal irregularities     FFR of prox RCA 0.84     Impression  ~Angiography w/ single vessel moderate to severe disease. Patent LAD stents  ~LVEDP 20  ~Non ischemic FFR of RCA     Recommendation  ~Aggressive medical treatment and risk factor modification  1. Medications reviewed, no changes at this time. 2. Post cath IVF. Bedrest.  3. No indication for beta blocker, statin or anti platelet therapy  4. Patient has been advised on the importance of regular exercise of at least 20-30 minutes daily alternating with aerobic and isometric activities. 5. Patient counseled about and offered assistance for smoking cessation  6. No indication for cardiac rehab  4. Follow up in 2 months with cardiology     All questions and concerns were addressed to the patient. Alternatives to my treatment were discussed. I have discussed the above stated plan with patient and the nurse. The patient verbalized understanding and agreed with the plan. Thank you for allowing to us to participate in the care of Palmer Marianne.     Josue Richard, APRN-CNP  [address]   Office: [phone]

## 2022-08-02 NOTE — PROGRESS NOTES
[address] Department   Phone: [phone]    Physical Therapy    [x] Initial Evaluation            [] Daily Treatment Note         [] Discharge Summary      Patient: Stephen Abarca   : 1945   MRN: [id number]   Date of Service:  2022  Admitting Diagnosis: CVA, old, alterations of sensations  Current Admission Summary: The pt was admitted with LUE weakness and L facial droop. MRI of brain pending, positive for UTI. Past Medical History:  has a past medical history of Acute MI ([address] Acute pyelonephritis, Anxiety and depression, Arthritis, CAD (coronary artery disease), Cancer ([address] Cancer ([address] Cancer of skin of leg, Chronic obstructive pulmonary disease ([address] Claustrophobia, COPD (chronic obstructive pulmonary disease) ([facility] ESBL (extended spectrum beta-lactamase) producing bacteria infection, Esophageal stricture, Gastroesophageal reflux disease without esophagitis, Hiatal hernia, Hiatal hernia, Hypercholesteremia, Hypertension, IBS (irritable bowel syndrome), Migraines, Movement disorder, Pneumonia, PONV (postoperative nausea and vomiting), Type II or unspecified type diabetes mellitus without mention of complication, not stated as uncontrolled, and Unspecified cerebral artery occlusion with cerebral infarction. Past Surgical History:  has a past surgical history that includes Cardiac surgery; Gallbladder surgery; Hysterectomy; Appendectomy; Cholecystectomy; Colonoscopy; Upper gastrointestinal endoscopy (12); Endoscopy, colon, diagnostic; Tear duct surgery; Upper gastrointestinal endoscopy (2018); Colonoscopy (2018); pr exc skin malig <5mm remaindr body (N/A, 2018); pr split grft trunk,arm,leg <100sqcm (N/A, 2018); skin biopsy; eye surgery; bronchoscopy (N/A, 2020); bronchoscopy (N/A, 2020); Cataract removal ( or ); Esophagus dilation; and bronchoscopy (N/A, 2020).   Discharge Recommendations: Stephen Abarca scored a 19/24 on the AM-PAC short mobility form. Current research shows that an AM-PAC score of 18 or greater is typically associated with a discharge to the patient's home setting. Based on the patient's AM-PAC score and their current functional mobility deficits, it is recommended that the patient have 2-3 sessions per week of Physical Therapy at d/c to increase the patient's independence. At this time, this patient demonstrates the endurance and safety to discharge home with home services and a follow up treatment frequency of 2-3x/wk. Please see assessment section for further patient specific details. If patient discharges prior to next session this note will serve as a discharge summary. Please see below for the latest assessment towards goals.     HOME HEALTH CARE: LEVEL 1 STANDARD    - Initial home health evaluation to occur within 24-48 hours, in patient home   - Therapy to evaluate with goal of regaining prior level of functioning   - Therapy to evaluate if patient has [address] needs for personal care    DME Required For Discharge: no DME required at discharge  Precautions/Restrictions: high fall risk  Weight Bearing Restrictions: no restrictions  [] Right Upper Extremity  [] Left Upper Extremity [] Right Lower Extremity  [] Left Lower Extremity     Required Braces/Orthotics: no braces required   [] Right  [] Left  Positional Restrictions:no positional restrictions    Pre-Admission Information   Social/Functional History  Lives With: Alone  Type of Home: House  Home Layout: One level  Home Access: Ramped entrance  Bathroom Shower/Tub: Tub/Shower unit (arina helps her get into the tub)  Bathroom Toilet: Handicap height  Bathroom Equipment: Grab bars in shower, Shower chair, Hand-held shower (alarm string near toilet and bed)  Bathroom Accessibility: Accessible  Home Equipment: Reacher, [id number], scooter, O2, hospital bed    Receives Help From: Home health, Family, Personal care attendant  ADL Assistance: Needs assistance (daughter aid and nurse all help, grddtr helps with showers, pt has an aide 5x/week)  Homemaking Assistance: Needs assistance (has a hospital tray at home)  Homemaking Responsibilities: No  Ambulation Assistance: Independent (pt able to walk to bathroom and kitchen with rollator, outside uses scooter, leans on forearms on 4WW)  Transfer Assistance: Independent (pt able to get out of bed, pt uses lift chair when in chair)  Active : No  Leisure & Hobbies: drives scooter around the neighborhood with other neighbors with scooters  IADL Comments: daughter, aid and nurse that helps her with ADLS (bathing, dressing, taking out trash)- neighbors have keys to come in and help when needed  Additional Comments: states she is a fall risk and avoids anything that would possibly make her fall- pt states 3 falls in the last 3 months, neighbor or daughter sit with the pt if the pt feels she is having low glucose or symptoms of mini stroke (happens intermittently)    Examination   Vision:   Visual History: other - pt reported her vision changes when she is having mini strokes (for example, can't see part of the TV), pt had vertigo yesterday , sometimes this is due to hypoglycemia  Hearing:   [facility]  Observation:   General Observation:  L side of pt's face has more skin wrinkles and drooping skin than R, L eye drops compared to R, even smile  Posture:   poor  Sensation:   WFL  Proprioception:    diminished proprioception in (L) LE -anticipated based on pt's weakness and previous mini strokes   Tone:   Normotonic  Coordination Testing:   WFL    ROM:   (B) LE PROM WFL  Strength:   Pt stated she has had ongoing LLE weakness due to previous \"mini strokes\"  (L) Hip: -3        (R) Hip: +3  (L) Knee: -3     (R) Knee: -4  (L) Ankle: -3     (R) Ankle: +3  Decision Making: medium complexity  Clinical Presentation: evolving      Subjective  General: pt was sidelying in bed, RN in room upon PT arrival, agreeable to PT evaluation  Pain: 0/10  Pain Interventions: not applicable       Functional Mobility  Bed Mobility  Supine to Sit: stand by assistance  Scooting: stand by assistance  Comments:  Transfers  Sit to stand transfer: stand by assistance  Stand to sit transfer: stand by assistance  Comments:  Ambulation  Assistive Device: rolling walker  Assistance: contact guard assistance  Distance: 15', 30'  Gait Mechanics: flexed posture with intermittent forearms on walker, small steps, decreased cory  Comments:  pt stated this is her baseline posture and gait pattern   Stair Mobility  Stair mobility not completed on this date. Comments:  Wheelchair Mobility:  No w/c mobility completed on this date. Comments:  Balance  Static Sitting Balance: good: independent with functional balance in unsupported position  Dynamic Sitting Balance: fair (+): maintains balance at SBA/supervision without use of UE support  Static Standing Balance: fair (-): maintains balance at CGA with use of UE support  Dynamic Standing Balance: fair (-): maintains balance at CGA with use of UE support  Comments:pt was assisted with toileting and bathing while seated on the commode and then recliner     Other Therapeutic Interventions  Pt, PT and OT had discussion about inpatient rehab vs HHPT for discharge planning. The pt stated she will refuse to DC to a SNF and feels HHPT is not necessary again. She has had it in the past and stated she has her own exercise routine. She has the appropriate DME and home health aide/family assistance in order to return home. Education provided on thickened liquids per SLP orders. Functional Outcomes  AM-PAC Inpatient Mobility Raw Score : 19              Cognition  WFL  Comments: The pt had questionable cognition initially upon PT arrival but became more clear as the session carried on.    Orientation:    alert and oriented x 4  Command Following:   [facility]    Education  Barriers To Learning: none  Patient Education: patient educated on PT role and benefits, plan of care, general safety, discharge recommendations  Learning Assessment:  patient verbalizes understanding, would benefit from continued reinforcement    Assessment  Activity Tolerance: decreased, close to her baseline   Impairments Requiring Therapeutic Intervention: decreased functional mobility, decreased ROM, decreased strength, decreased endurance, decreased balance, decreased posture  Prognosis: fair  Clinical Assessment: The pt presents with chronic decreased LLE strength, decreased posture, decreased activity tolerance and decreased balance. Her ambulation distance is limited to <40' and her standing tolerance is poor. She has the appropriate DME at home as well as help from family and aides. Awaiting MRI results. Pt would benefit from HHPT to address her current physical impairments. Safety Interventions: patient left in chair, chair alarm in place, call light within reach, and nurse notified    Plan  Frequency: 5-7 x/week  Current Treatment Recommendations: strengthening, ROM, balance training, functional mobility training, transfer training, gait training, endurance training, neuromuscular re-education, and safety education    Goals  Patient Goals: return home independently  Short Term Goals:  Time Frame:  To be met prior to DC  Patient will complete bed mobility at [facility] independent   Patient will complete transfers at supervision   Patient will ambulate 40 ft with use of rollator (4WRW) at supervision    Therapy Session Time      Individual Group Co-treatment   Time In     1121   Time Out     1218   Minutes     57     Timed Code Treatment Minutes:  42 Minutes  Total Treatment Minutes:  57       Electronically Signed By: Paige Lan PT DPT [id number]

## 2022-08-02 NOTE — PROGRESS NOTES
Perfectserve message placed to hospitalist to notify of new A fib on telemetry and EKG results now available.

## 2022-08-02 NOTE — CONSULTS
[facility], [place]   Electrophysiology Consultation   Date: 8/2/2022  Reason for Consultation: Atrial fibrillation    Consult Requesting Physician: Mary Jane Burris MD   Chief Complaint   Patient presents with    Cerebrovascular Accident     Elderly female coming in from home brought in by [facility] - w/ altered mental status - stroke alert called       CC: Altered mental status   HPI: Bernice Kenyon is a 69 y.o. female who was brought to the hospital with slurred speech and left upper weakness. Her daughter found her with slurred speech, and confused. She was brought to the hospital for concern about stroke. She has history of DM, CAD, COPD, multiple strokes in the past on ASA. Patient has no prior known history of Afib. She also found to have atrial fibrillation. Patient states that she has had irregular heart beats when checking her BP, however, denies having any palpitation, or chest pain. She states that she has had many \"mini-stroke\". She reports claustrophobia and cannot tolerate CPAP. She also denies having any syncope. She       Assessment:   Atrial fibrillation, new diagnosis  CAD  History of pericarditis  DM  HTN  Stroke   Morbid obesity: Body mass index is 42.87 kg/m². Plan:   She has converted to sinus rhythm. Appears asymptomatic with atrial fibrillation. Multiple co morbidities and not a good candidate for invasive therapy (ablation)    High [id number] score and high risk for stroke and thromboembolism. Anticoagulation is recommended. I discussed anticoagulation to decrease the risk of thromboembolic events including stroke. Benefits and alternatives were discussed with patient. Risk of bleeding was discussed. Patient verbalized understanding. Warfarin vs DOACs discussed. Not interested in warfarin. Recommend DOACs (Xarelto 20 mg/day or Eliquis 5 mg bid) when [id number] with neurology and ruling out ICH. Toprol XL 25 mg/day  Echo  Aggressive risk factor modifications. Not interested in sleep apnea evaluation. Discussed with nursing staff. Active Hospital Problems    Diagnosis Date Noted    CVA, old, alterations of sensations [I69.398, R20.9] 2022     Priority: Medium       Diagnostic studies:   ECG: Atrial fibrillation, LBBB    Echo: 2021:    Summary   Technically difficult exam due to patient body habitus. Normal left ventricle size, wall thickness, and systolic function with an   estimated ejection fraction of 60-65%. Diastolic filling parameters suggests grade II diastolic dysfunction. Mild mitral regurgitation is present. Mild aortic stenosis with a peak velocity of 2.27 m/s and a mean pressure   gradient of 11 mmHg. Myoview: 2020: There is normal isotope uptake at stress and rest. There is no evidence of    myocardial ischemia or scar. Normal LV size and systolic function. Left ventricular ejection fraction of 76 %. I independently reviewed the cardiac diagnostic studies, ECG and relevant imaging studies. Lab Results   Component Value Date    LVEF 63 2021    LVEFMODE Echo 2019     Lab Results   Component Value Date    TSH 2.34 2011       Physical Examination:  Vitals:    22 0720   BP:    Pulse:    Resp:    Temp:    SpO2: 91%      No intake/output data recorded. Wt Readings from Last 3 Encounters:   22 265 lb 9.6 oz (120.5 kg)   22 264 lb (119.7 kg)   22 269 lb (122 kg)     Temp  Av.9 °F (36.6 °C)  Min: 97.5 °F (36.4 °C)  Max: 98 °F (36.7 °C)  Pulse  Av.5  Min: 61  Max: 78  BP  Min: 101/66  Max: 140/69  SpO2  Av.4 %  Min: 88 %  Max: 95 %  No intake or output data in the 24 hours ending 22 0853      I independently reviewed all cardiac tracing from cardiac telemetry. Constitutional: Oriented. No distress. Head: Normocephalic and atraumatic. Mouth/Throat: Oropharynx is clear and moist.   Eyes: Conjunctivae normal. EOM are normal.   Neck: Neck supple.  No JVD present. Cardiovascular: Normal rate, regular rhythm, S1&S2. Pulmonary/Chest: Bilateral respiratory sounds. No rhonchi. Abdominal: Soft. No tenderness. + obese   Musculoskeletal: No tenderness. No edema    Lymphadenopathy: Has no cervical adenopathy. Neurological: Alert and oriented. Follows command, No Gross deficit   Skin: Skin is warm, No rash noted. Psychiatric: Has a normal behavior       Scheduled Meds:   acetaZOLAMIDE  250 mg Oral Daily    aspirin  81 mg Oral Daily    diclofenac sodium  2 g Topical BID    furosemide  40 mg Oral BID    glipiZIDE  5 mg Oral BID AC    hydrALAZINE  25 mg Oral TID    isosorbide mononitrate  60 mg Oral Daily    lisinopril  2.5 mg Oral Daily    pantoprazole  40 mg Oral QAM AC    rosuvastatin  5 mg Oral Daily    triamcinolone   Topical BID    gabapentin  300 mg Oral TID     Continuous Infusions:   dextrose       PRN Meds:.albuterol sulfate HFA, ALPRAZolam, ibuprofen, ipratropium-albuterol, nitroGLYCERIN, sodium chloride, perflutren lipid microspheres, dextrose bolus **OR** dextrose bolus, glucagon (rDNA), dextrose     Review of System:  [x] Full ROS obtained and negative except as mentioned in HPI    Prior to Admission medications    Medication Sig Start Date End Date Taking? Authorizing Provider   ALPRAZolam Cindia Muss) 1 MG tablet Take 1 tablet by mouth 3 times daily as needed for Anxiety for up to 30 days. 7/14/22 8/13/22 Yes Arian Giang MD   diclofenac sodium (VOLTAREN) 1 % GEL Apply 2 g topically 2 times daily 7/14/22  Yes Mima Austin MD   omeprazole (PRILOSEC) 40 MG delayed release capsule Take 1 capsule by mouth daily 7/14/22  Yes Mima Austin MD   albuterol sulfate HFA (PROVENTIL;VENTOLIN;PROAIR) 108 (90 Base) MCG/ACT inhaler INHALE TWO PUFFS BY MOUTH EVERY 6 HOURS AS NEEDED FOR WHEEZING 7/14/22  Yes Mima Austin MD   blood glucose monitor strips Test 2 times a day & as needed for symptoms of irregular blood glucose.  Dispense sufficient amount for indicated testing frequency plus additional to accommodate PRN testing needs. 7/6/22  Yes Larisa Baez MD   hydrALAZINE (APRESOLINE) 25 MG tablet Take 1 tablet by mouth 3 times daily 6/29/22  Yes Larisa Baez MD   furosemide (LASIX) 40 MG tablet TAKE ONE TABLET BY MOUTH TWICE A DAY 5/19/22  Yes Larisa Baez MD   lisinopril (PRINIVIL;ZESTRIL) 10 MG tablet TAKE ONE TABLET BY MOUTH DAILY 4/19/22  Yes Larisa Baez MD   Umeclidinium Bromide (INCRUSE ELLIPTA) 62.5 MCG/INH AEPB Inhale 1 puff into the lungs daily  Patient not taking: Reported on 8/1/2022 8/26/21  Yes Earnestine Dhaliwal MD   glipiZIDE (GLUCOTROL) 5 MG tablet Take 1 tablet by mouth 2 times daily (before meals) 8/20/21  Yes Larisa Baez MD   triamcinolone (KENALOG) 0.1 % cream Apply topically 2 times daily.  7/29/21  Yes Larisa Baez MD   ipratropium-albuterol (DUONEB) 0.5-2.5 (3) MG/3ML SOLN nebulizer solution INHALE THREE MILLILITERS VIA NEBULIZATION BY MOUTH EVERY 4 HOURS AS NEEDED FOR SHORTNESS OF BREATH 7/7/21  Yes Larisa Baez MD   blood glucose test strips (TRUE METRIX BLOOD GLUCOSE TEST) strip USE THREE TIMES A DAY AS NEEDED 5/24/21  Yes Larisa Baez MD   Lancets MISC 1 each by Does not apply route 2 times daily 2/25/21  Yes Larisa Baez MD   nystatin (MYCOSTATIN) 276977 UNIT/GM powder Affected area  Patient not taking: Reported on 8/1/2022 2/9/21  Yes Larisa Baez MD   rosuvastatin (CRESTOR) 10 MG tablet Take 0.5 tablets by mouth daily 7/31/20  Yes Zulekia Arzola MD   sodium chloride (OCEAN, BABY AYR) 0.65 % nasal spray 1 spray by Nasal route as needed for Congestion 3/27/20  Yes Larisa Baez MD   ibuprofen (ADVIL;MOTRIN) 400 MG tablet Take 1 tablet by mouth every 6 hours as needed for Pain or Fever 10/29/19  Yes Larisa Baez MD   aspirin 81 MG tablet Take 81 mg by mouth daily   Yes Historical Provider, MD   nitroGLYCERIN (NITROSTAT) 0.4 MG SL tablet Place 1 tablet under the tongue every 5 minutes as needed for Chest pain. 4/22/12  Yes Mary Jane Burris MD   gabapentin (NEURONTIN) 300 MG capsule Take 1 capsule by mouth 4 times daily for 30 days.  Intended supply: 30 days 5/19/22 8/1/22  Mary Jane Burris MD   acetaZOLAMIDE (DIAMOX) 250 MG tablet Take 1 tablet by mouth daily 5/12/22 8/1/22  Mary Jane Burris MD   isosorbide mononitrate (IMDUR) 60 MG extended release tablet Take 1 tablet by mouth daily 3/11/22 8/1/22  Mary Jane Burris MD   linagliptin (TRADJENTA) 5 MG tablet Take 1 tablet by mouth daily 1/20/22 8/1/22  Mary Jane Burris MD   budesonide-formoterol [facility]) 160-4.5 MCG/ACT AERO Inhale 2 puffs into the lungs 2 times daily 12/21/21 8/1/22  Mary Jane Burris MD       Past Medical History:   Diagnosis Date    Acute MI [facility])     Acute pyelonephritis 09/08/2015    Anxiety and depression     Arthritis     CAD (coronary artery disease)     two heart stent one in 2000 and 2nd one 6 months later on 2000, had MI in 2000    Cancer [facility])     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    Cancer [facility])     \"skin on top of my head\"    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    Chronic obstructive pulmonary disease ([facility] [address] 01/13/2017    Claustrophobia     COPD (chronic obstructive pulmonary disease) ([facility])     ESBL (extended spectrum beta-lactamase) producing bacteria infection 08/14/2021    Esophageal stricture     Gastroesophageal reflux disease without esophagitis 03/24/2016    Hiatal hernia     Hiatal hernia     Hypercholesteremia     Hypertension     IBS (irritable bowel syndrome)     Migraines     Movement disorder arthritis    Pneumonia     PONV (postoperative nausea and vomiting)     Type II or unspecified type diabetes mellitus without mention of complication, not stated as uncontrolled     type ll does not take insulin at home    Unspecified cerebral artery occlusion with cerebral infarction     many TIA's        Past Surgical History:   Procedure Laterality Date    APPENDECTOMY BRONCHOSCOPY N/A 1/24/2020    BRONCHOSCOPY ALVEOLAR LAVAGE performed by Arrie Sever, MD at [address] [facility] N/A 1/27/2020    BRONCHOSCOPY DIAGNOSTIC OR CELL [address] ONLY performed by Aramis Santiago MD at [address] N/A 11/23/2020    BRONCHOSCOPY DIAGNOSTIC OR CELL [address] ONLY performed by Jay Ahmadi MD at [facility]      1 stent 2000 and 1 stent 2001    CATARACT REMOVAL  2013 or 2014    bilateral cataracts removed    CHOLECYSTECTOMY      COLONOSCOPY      COLONOSCOPY  06/11/2018    ENDOSCOPY, COLON, DIAGNOSTIC      ESOPHAGEAL DILATATION      EYE SURGERY      bilateral cataracts    GALLBLADDER SURGERY      HYSTERECTOMY (CERVIX STATUS UNKNOWN)      WV EXC SKIN MALIG <5MM REMAINDR BODY N/A 9/6/2018    EXCISION OF SCALP SQUAMOUS CELL CARCINOMA performed by Jay Mendez MD at [facility] <100SQCM N/A 9/6/2018    SPLIT THICKNESS SKIN GRAFT FOR COVERAGE SCALP, APPLICATION OF WOUND VAC DEVICE performed by Jay Mendez MD at [address] [facility] [facility] ENDOSCOPY  4/20/12    with biopsy of stomach,gastritis    UPPER GASTROINTESTINAL ENDOSCOPY  06/11/2018    w/esophagael dilation       Allergies   Allergen Reactions    Morphine Shortness Of Breath    Codeine Other (See Comments)     Chest pain    Hydromorphone Other (See Comments)     hallucinations  Hallucinations    But will take if needed in an emergency    Levaquin [Levofloxacin In D5w] Itching    Vicodin [Hydrocodone-Acetaminophen] Hives    Aspirin      Upsets hiatal hernia       Social History:  Reviewed. reports that she quit smoking about 5 years ago. Her smoking use included cigarettes. She has a 12.25 pack-year smoking history. She has never used smokeless tobacco. She reports current alcohol use of about 1.0 standard drink per week. She reports that she does not use drugs. Family History:  Reviewed.

## 2022-08-02 NOTE — PROGRESS NOTES
Speech Language Pathology  Facility/Department: [address]  SLP Clinical Swallow Evaluation and Speech Language Cognitive Assessment     Patient: Mason Roth   : [birth year]   MRN: [id number]      Evaluation Date: 2022      Admitting Dx: Left-sided weakness [R53.1]  CVA, old, alterations of sensations [I69.398, R20.9]  Pain: Denies                                  H&P: Mason Roth is a 69 y.o. female who presents to the emergency department with concern of stroke. Onset of symptoms 1745 while eating lunch with neighbors. EMS was called by patient's daughter. Staff report patient's symptoms worsen in route. They report that the patient's speech has become more slurred or dysarthric. Patient also has left arm weakness and left-sided facial droop. Imaging:  Chest X-ray:   Impression   Rotated chest.  Cardiomegaly with no overt failure or significant infiltrate. Head CT:   Impression   No acute intracranial abnormality. MRI ordered/pending:    History/Prior Level of Function:   Living Status: Home  Prior Dysphagia History: Per chart review, most recent clinical swallow evaluation completed at this facility on 3/21/16 (see report). Currently the Pt reports no difficulty with swallowing function. However upon arrival Pt eating breakfast with noted wheezing SOB and delayed coughing noted with po. Prior Speech History: No prior speech language history noted per chart review. Currently the Pt reports no noted speech language deficits. However, Pt is currently observed with dysarthric speech, poor insight into deficits, and impaired short term recall of recent events. Reason for referral: SLP evaluation orders received due to CVA protocol  and cognitive state .     DYSPHAGIA BEDSIDE SWALLOW EVALUATION   Dysphagia Impressions/Dysphagia Diagnosis: Oropharyngeal Dysphagia   Pt awake and eating breakfast upon my arrival. Pt noted with increased wheezing and SOB while eating drinking on 2L O2 via nasal canula. L facial droop noted at rest. Moderately reduced oral motor strength and ROM with L sided oral weakness and with tongue deviation to the L upon protrusion noted. Pt also unable to lateralize tongue from L to R side also consistent with L lingual weakness. Pt demonstrates significantly increased WOB and reduced breath support prior to and following po trials. With po trials, Pt demonstrates reduced oral posture with tongue protrusion with all po trials via tsp and cup. Moderately reduced bolus control and A-P propulsion noted with all textures. Premature bolus loss to pharynx also noted with all textures. Prolonged mastication, impaired oral clearing and need for a liquid \"rinse\" to clear oral cavity noted with solids. Clinical symptoms of pharyngeal pooling, delayed swallow initiation, reduced laryngeal excursion, poor coordination of breathe swallow, and suspected impaired pharyngeal clearing noted with all textures. Delayed cough noted intermittently following thin liquids via straw and with thin liquids given in combination with other textures. Increases SOB and increased WOB also noted with all po trials with wheezing also noted following po trials. Therefore, an MBS is indicated to conclusively rule out aspiration and to determine extent of oropharyngeal dysfunction.     Recommended Diet and Intervention:  Diet Solids Recommendation:  Dysphagia III Soft and bite sized  Liquid Consistency Recommendation:  Mildly (nectar) thick liquids  Recommended form of Meds: Crushed as able in puree       Recommend completion of Modified Barium Swallow study to further assess pharyngeal phase of swallow     Dysphagia Therapeutic Intervention:  Diet Tolerance Monitoring , Patient/Family Education , Instrumental assessment of swallow function (Modified Barium Swallow Study)     Compensatory Swallowing Strategies:  Upright as possible with all PO intake , No straws , Assist Feed , Small bites/sips , Swallow 2 times per bite , Lingual Sweeps , Eat/feed slowly, Remain upright 30-45 min , Cough/re-swallow     Oral Mechanism Exam:  []WFL []Mild   [] Moderate  []Severe  []To be assessed  Labial ROM, Labial Symmetry , Labial Strength, Labial Coordination , Lingual ROM, Lingual Symmetry , Lingual Strength , Lingual Coordination     SHORT TERM DYSPHAGIA GOALS  Pt will functionally tolerate recommended diet with no overt clinical s/s of aspiration   Pt will demonstrate understanding of aspiration risk and precautions via education/demonstration with occasional prompting  Pt will advance to least restrictive diet as indicated   Pt will participate in Modified Barium swallow study to further assess pharyngeal phase of swallow, assist with diet recommendations and to further direct plan of care     Patient Positioning: Upright in bed       SPEECH LANGUAGE COGNITIVE ASSESSMENT:     Speech Diagnosis:    Dysarthria , Cognitive-Linguistic Deficits , Speech Language Deficits     Impressions: Moderately dysarthria noted impacting articulatory precision, rate, intelligibility, breath support and resonance for single words and in connected speech. Moderately impaired auditory processing and comprehension noted impacting comprehension of 2-unit commands and complex questions. Mild/moderate verbal expressive deficits noted for naming and for thought organization for open ended self expression. Moderate cognitive linguistic deficits noted for orientation, immediate and short term recall and basic verbal problem solving tasks. Severity of current speech language deficits appear to represent a decline from the Pt's prior baseline function.       COMPREHENSION  Auditory Comprehension: Moderate   Functional Ability to Comprehend Basic Commands/Questions   Impaired Complex questions  Impaired Two step commands  Impaired Multi-step commands  Impaired Complex/Abstract commands    EXPRESSION  Verbal Expression: Moderate prior to next visit, this note will serve as discharge.      Time code minutes:  0 min  Total Time:  40 min    Zoran Lynne [id number]

## 2022-08-02 NOTE — PROGRESS NOTES
Hospitalist    EKG completed,  A-fib with . No prior hx A-fib. Patient admitted for possible CVA    Cardiology consult in am  ECHO in am  TSH free T4  Given suspected acute CVA will wait on anticoagulation until ok with Neurology.     Will treat if HR >110    Lolita Campos NP

## 2022-08-02 NOTE — ADT AUTH CERT
Patient Demographics    Name Patient ID SSN Gender Identity Birth Date   Marcela Meade [id number]  Female [birth year] (69 yrs)     Address Phone Email Employer    [address] [facility] [employer] [employer] [address] [facility] [address] [phone] (K)   [phone] (M) --  [employer] Race Occupation Emp Status    [employer] White (non-) -- Retired      Reg Status PCP Date Last Verified Next Review Date    Verified Amparo Ordonez MD  [phone] 22      Admission Date Discharge Date Admitting Provider     22 -- Amparo Ordonez MD       Marital Status Islam       Unknown        Emergency Contact 1 Emergency Contact 2   Thomas Car (C)   [place]   [phone] Dot Pleas) Mehran Cm (Q)   [phone] Dot Pleas)       [address] [place] [address] [de-identified]  CVG Subscriber Name/Sex/Relation Subscriber  Subscriber Address/Phone Subscriber Emp/Emp Phone   1. [facility]   [id number] Ricardo Mcghee Female   (Self) 1945 [address] [facility], [address] [facility]   [phone](R)   [phone](H) RETIRED        Utilization Reviews         PA Rec IP by Lenny Majano RN       Review Status Review Entered   In Primary 2022 13:43      Criteria Review   slr keep in  We recommend that the following pt's current hospitalization under INPATIENT status is APPROPRIATE . Name: Ruth Whitfield   : 1945   CSN: [id number]   INSURANCE: [facility]        Clinical summary Impression:  Acute left-sided weakness with facial droop. CVA/TIA.  Awaiting MRI  New onset A. fib  Hypertension, not controlled  Hyperlipidemia  Chronic cognitive impairment  Diabetes     Recommendation:  MRI brain  A1c  Statin  Lipid panel   Insulin sliding scale  Aspirin for now  PT OT  Speech  Telemetry  DVT and GI prophylaxis  Echo  Blood pressure monitor and control  Continue current blood pressure medications but hold if blood pressure below 110/70  Secondary stroke prevention discussed with the patient  Risk and benefit of anticoagulation will be determined depending on MRI results and PT and OT evaluation  Will follow up with above work-up   Vitals hypotensive  Labs and Imaging reviewed  MCG criteria applies yes,   Comments Inpt ken      This chart was reviewed at 12:26 PM 8/2/2022 [facility]   CELL : [phone]  _______________________________________________________________________________________             Neurology [address] Day 1 (8/1/2022) by Jacy Ugalde RN       Review Status Review Entered   Completed 8/2/2022 12:48      Criteria Review      Care Day: 1 Care Date: 8/1/2022 Level of Care: Inpatient Floor    Guideline Day 1    Level Of Care    ( ) ICU or intermediate care    8/2/2022 12:48 PM EDT by Mary Lou Lara      med surg    Clinical Status    (X) * Clinical Indications met    Interventions    (X) Inpatient interventions as needed    8/2/2022 12:48 PM EDT by Mary Lou Lara      neurology consult    * Milestone   Additional Notes   DATE:   8/1   ED/CD1         Chief Complaint/ED Presentation & Treatment/Diagnosis/Op Note:   Briefly, this is a 69 y.o. female here for left arm weakness and difficulty speaking. Per EMS report, symptoms began about 30 minutes prior to arrival while patient was speaking with her daughter. Nothing seems to make the symptoms any better or worse. Patient reports history of CAD as well as prior CVA. Vitals:     98.0    21, 23, 22, 22, 33, 34    75    111/50    91% on RA      Abnl/Pertinent Labs/Radiology/Diagnostic Studies:   wbc 14.4         ct head   No acute intracranial abnormality. cta head/neck   50% stenosis at the origin of the right internal carotid artery. Otherwise, no acute abnormality or flow-limiting stenosis in the remainder of   the major arteries of the head and neck. 4 mm lung nodule in the right upper lobe.   Follow-up recommendation is listed below.         cxr   Rotated chest.  Cardiomegaly with no overt failure or significant infiltrate      ekg   rate 71   Sinus rhythm with occasional Premature ventricular complexesLeft axis deviationLeft bundle branch blockAbnormal ECG            ED TX:   Aspirin 324mg po x1   Pepcid 20mg po x1         Admission Diagnosis/OP Note:   Cva      Pertinent Medical History:       has a past medical history of Acute MI ([facility] Acute pyelonephritis, Anxiety and depression, Arthritis, CAD (coronary artery disease), Cancer ([facility] Cancer ([facility] Cancer of skin of leg, Chronic obstructive pulmonary disease ([facility] Claustrophobia, COPD (chronic obstructive pulmonary disease) ([facility] ESBL (extended spectrum beta-lactamase) producing bacteria infection, Esophageal stricture, Gastroesophageal reflux disease without esophagitis, Hiatal hernia, Hiatal hernia, Hypercholesteremia, Hypertension, IBS (irritable bowel syndrome), Migraines, Movement disorder, Pneumonia, PONV (postoperative nausea and vomiting), Type II or unspecified type diabetes mellitus without mention of complication, not stated as uncontrolled, and Unspecified cerebral artery occlusion with cerebral infarction. Physical Exam:   Constitutional:        Appearance: She is well-developed. She is not diaphoretic. HENT:      Head: Normocephalic and atraumatic. Right Ear: External ear normal.      Left Ear: External ear normal.   Eyes:      General: No visual field deficit. Right eye: No discharge. Left eye: No discharge. Neck:      Vascular: No JVD. Cardiovascular:      Rate and Rhythm: Normal rate. Pulses: Normal pulses. Pulmonary:      Effort: Pulmonary effort is normal. No respiratory distress. Abdominal:      Palpations: Abdomen is soft. Musculoskeletal:          General: Normal range of motion. Skin:      General: Skin is warm and dry. Coloration: Skin is not pale.    Neurological:      Mental Status: She is alert.      Cranial Nerves: Cranial nerve deficit, dysarthria and facial asymmetry present. Sensory: Sensation is intact. Motor: Weakness present. Psychiatric:          Behavior: Behavior normal.      MD Consults/Assessments & Plans:   Limited notes:   ED PN:   Stroke alert was activated on my initial evaluation. NIHSS of 4. CT head without evidence of hemorrhage or mass-effect. CTA without large vessel occlusion or other acute abnormality. EKG no STEMI, troponin normal, very low suspicion for ACS. No malignant dysrhythmia. Laboratory work-up is overall reassuring without evidence of acute endorgan dysfunction or clinically significant electrolyte derangement. I discussed case with Dr. Delores Lopez for  stroke team who also discussed presentation with daughter. On further history obtained from daughter, patient actually first noted to be symptomatic around 200, daughter had not actually seen the patient in her normal state of health since yesterday evening. Given this additional history, patient is outside of time window for consideration of thrombolytic therapy by presentation emergency department, also with low NIHSS, no recommendation for thrombolytics. Patient was given aspirin, will plan for admission for further evaluation and care potential CVA. Case discussed with internal medicine team who will admit. Patient alert and hemodynamically stable throughout her emergency department course. Neurology PN:   Called by Dr Kvng Temple around 6:30pm regarding Ms Rosemarie House, a 65EA F with PMHx significant for DM, CHF, CAD, COPD, Migraines, and multiple prior strokes (on ASA) who is presenting with FP, LUE weakness, and dysarthria. I spoke to the patient's daughter, Bret Morris, by phone, who stated that the patient was LKN night prior. When she came to check on her mother this afternoon at 12:30pm, she noted pronounced FP and mild dysarthria, said her mother was acting confused and anxious.   She encouraged her mother to eat/drink, but later was noted to have additional LUE weakness prompting call to 911 and presentation to ED. CTH by my interpretation did not show an acute hemorrhage or large hypdenisty; CTA with severe LV4 stenosis with distal recon. Given her low NIHSS at this time and LKN, she is not a candidate for thrombolysis or EVT. Recommend allowing BP to autoregulate and load with antiplatelet.          Medications:   Aspirin 81mg po daily   Neurontin 300mg po tid   Xanax 1mg po tid prn x1         Orders:   oxygen prn   neuro checks q4hrs   NPO   slp   pt/ot   consult neurology                          Neurology [address] - Clinical Indications for Admission to Inpatient Care by Sarita Cho RN       Review Status Review Entered   Completed 8/2/2022 12:47      Criteria Review      Clinical Indications for Admission to Inpatient Care    Most Recent : Cande Zarco Most Recent Date: 8/2/2022 12:47 PM EDT    (X) Hospital admission is needed for appropriate care of the patient because of  1 or more  of    the following :       (X) Neurologic finding requiring inpatient care, as indicated by  1 or more  of the following        (32) (33) (34) (35) (36) (37) (38):          (X) Aphasia that is new onset or progressive          8/2/2022 12:47 PM EDT by Cande Zarco            slurred speech          (X) Weakness that is progressive or severe (eg, inpatient care needed due to functional disability,          concern for safety) (39)          8/2/2022 12:47 PM EDT by Cande Zarco            left sided weakness     (X) Other Indication: s/s      Entered 8/2/2022 12:47 PM EDT by Cande Zarco     slurred speech, left arm weakness, left sided facial droop

## 2022-08-02 NOTE — ED PROVIDER NOTES
In addition to the advanced practice provider, I personally saw Kip Jim and performed a substantive portion of the visit including all aspects of the medical decision making. Briefly, this is a 69 y.o. female here for left arm weakness and difficulty speaking. Per EMS report, symptoms began about 30 minutes prior to arrival while patient was speaking with her daughter. Nothing seems to make the symptoms any better or worse. Patient reports history of CAD as well as prior CVA. On exam, patient afebrile and nontoxic. Appears anxious however no tosha painful or respiratory distress. Heart RRR. Lungs CTAB. Abdomen soft, nondistended, nontender to palpation in all quadrants. A&Ox4. Possible very subtle left facial droop, speech is mildly dysarthric. No aphasia. PERRL, no nystagmus. CN 2-12 intact. Left upper extremity with drift to bed, right upper extremity without drift. Patient with effort against gravity of lower extremities evenly, states is chronic, unchanged today and that \"my legs are disabled. \"  Sensation grossly intact throughout. Gait not tested. EKG  EKG was reviewed by emergency department physician in the absence of a cardiologist    Wide complex sinus rhythm, rate 71, left axis deviation, normal WI and wide QRS intervals, normal Qtc, no specific ST elevations or depressions, TWI aVL, impression sinus rhythm with LBBB and PVCs, no STEMI      Screenings  NIH Stroke Scale  Interval: Reassessment  Level of Consciousness (1a): Alert  LOC Questions (1b): Answers both correctly  LOC Commands (1c): Performs both tasks correctly  Best Gaze (2): Normal  Visual (3): No visual loss  Facial Palsy (4): (!) Minor paralysis  Motor Arm, Left (5a): No drift  Motor Arm, Right (5b):  No drift  Motor Leg, Left (6a): Drift, but does not hit bed  Motor Leg, Right (6b): Drift, but does not hit bed  Limb Ataxia (7): Absent  Sensory (8): Normal  Best Language (9): Mild to moderate aphasia  Dysarthria (10): Normal  Extinction and Inattention (11): No abnormality  Total: 4Glasgow Coma Scale  Eye Opening: Spontaneous  Best Verbal Response: Oriented  Best Motor Response: Obeys commands  Gladstone Coma Scale Score: 15      Is this patient to be included in the SEP-1 Core Measure due to severe sepsis or septic shock? No   Exclusion criteria - the patient is NOT to be included for SEP-1 Core Measure due to: Infection is not suspected      MDM    Patient afebrile and nontoxic. Appears anxious however in no distress. No hypoglycemia. Stroke alert was activated on my initial evaluation. NIHSS of 4. CT head without evidence of hemorrhage or mass-effect. CTA without large vessel occlusion or other acute abnormality. EKG no STEMI, troponin normal, very low suspicion for ACS. No malignant dysrhythmia. Laboratory work-up is overall reassuring without evidence of acute endorgan dysfunction or clinically significant electrolyte derangement. I discussed case with Dr. Lindy Guzmán for  stroke team who also discussed presentation with daughter. On further history obtained from daughter, patient actually first noted to be symptomatic around [phone], daughter had not actually seen the patient in her normal state of health since yesterday evening. Given this additional history, patient is outside of time window for consideration of thrombolytic therapy by presentation emergency department, also with low NIHSS, no recommendation for thrombolytics. Patient was given aspirin, will plan for admission for further evaluation and care potential CVA. Case discussed with internal medicine team who will admit. Patient alert and hemodynamically stable throughout her emergency department course. I Dr. Marcy Ignacio am the primary clinician of record. Critical Care:    I have discussed the case with the advanced practice provider. I have personally performed a history, physical exam, and my own medical decision making.  I have reviewed the note and agree with the findings and plan. Upon my evaluation, this patient had a high probability of imminent or life-threatening deterioration due to acute dysarthria and left-sided weakness, suspected CVA which required my direct attention, intervention, and personal management. Emergent consultation with UC stroke team was obtained. I personally saw the patient and independently provided 34 minutes of non-concurrent critical care out of the total shared critical care time provided. The critical care time spent while evaluating and treating this patient was exclusive of any time spent doing separately billable procedures. This critical care time includes time at the bedside, data interpretation, medication management, monitoring for potential decompensation and physician consultation. Specifics of interventions taken and potentially life-threatening diagnostic considerations are listed above in the medical decision making. Patient Referrals:  No follow-up provider specified. Discharge Medications:  Current Discharge Medication List          FINAL IMPRESSION  1. Left-sided weakness    2. Dysarthria        Blood pressure (!) 140/69, pulse 62, temperature 98 °F (36.7 °C), temperature source Oral, resp. rate 20, height 5' 6\" (1.676 m), weight 264 lb (119.7 kg), SpO2 95 %, not currently breastfeeding. For further details of Ashutosh Cox emergency department encounter, please see documentation by advanced practice provider, Corina Cross NP.     Cecil Bowens DO (electronically signed)  Attending Emergency Physician       Cecil Bowens DO  08/02/22 0122

## 2022-08-02 NOTE — PROGRESS NOTES
08/02/22 0011   RT Protocol   History Pulmonary Disease 0   Respiratory pattern 0   Breath sounds 2   Cough 0   Indications for Bronchodilator Therapy On home bronchodilators   Bronchodilator Assessment Score 2

## 2022-08-02 NOTE — PROGRESS NOTES
Notified by Monitor tech the telemetry is now showing Atrial Fib in place of prior sinus rhythm. EKG ordered and RT notified of orders. Pt remains asymptomatic and denies any history of Atrial Fib.

## 2022-08-02 NOTE — CARE COORDINATION
According to Deirdre Latham, Patient is active with [facility] [phone] through [facility] [phone].

## 2022-08-02 NOTE — CONSULTS
Pharmacy to check patient copays for Eliquis:    Copay for patient will be: $0/month    Pharmacy will continue to follow the decision for anticoagulation and  the patient if appropriate.      Yakelin Merrill, PharmD  PGY-1 Pharmacy Resident  [id number]

## 2022-08-02 NOTE — ED NOTES
Report called to [facility], v/u and denies further questions at this time.       Saw Arzola RN  08/01/22 6596

## 2022-08-03 ENCOUNTER — APPOINTMENT (OUTPATIENT)
Dept: MRI IMAGING | Age: 77
DRG: 065 | End: 2022-08-03
Payer: COMMERCIAL

## 2022-08-03 ENCOUNTER — APPOINTMENT (OUTPATIENT)
Dept: CT IMAGING | Age: 77
DRG: 065 | End: 2022-08-03
Payer: COMMERCIAL

## 2022-08-03 PROBLEM — A41.9 SEPSIS DUE TO PNEUMONIA (HCC): Status: RESOLVED | Noted: 2021-08-14 | Resolved: 2022-08-03

## 2022-08-03 PROBLEM — R07.2 PRECORDIAL PAIN: Status: RESOLVED | Noted: 2018-09-21 | Resolved: 2022-08-03

## 2022-08-03 PROBLEM — A49.9 ESBL (EXTENDED SPECTRUM BETA-LACTAMASE) PRODUCING BACTERIA INFECTION: Status: RESOLVED | Noted: 2021-08-17 | Resolved: 2022-08-03

## 2022-08-03 PROBLEM — Z16.12 ESBL (EXTENDED SPECTRUM BETA-LACTAMASE) PRODUCING BACTERIA INFECTION: Status: RESOLVED | Noted: 2021-08-17 | Resolved: 2022-08-03

## 2022-08-03 PROBLEM — J18.9 PNEUMONIA: Status: RESOLVED | Noted: 2021-08-14 | Resolved: 2022-08-03

## 2022-08-03 PROBLEM — R09.89 CHEST CONGESTION: Status: RESOLVED | Noted: 2020-01-23 | Resolved: 2022-08-03

## 2022-08-03 PROBLEM — G81.90 HEMIPARESIS (HCC): Status: ACTIVE | Noted: 2022-08-02

## 2022-08-03 PROBLEM — J18.9 SEPSIS DUE TO PNEUMONIA (HCC): Status: RESOLVED | Noted: 2021-08-14 | Resolved: 2022-08-03

## 2022-08-03 PROBLEM — J44.1 COPD WITH ACUTE EXACERBATION (HCC): Status: RESOLVED | Noted: 2019-09-22 | Resolved: 2022-08-03

## 2022-08-03 PROBLEM — I20.0 UNSTABLE ANGINA (HCC): Status: RESOLVED | Noted: 2019-09-18 | Resolved: 2022-08-03

## 2022-08-03 LAB
EKG ATRIAL RATE: 312 BPM
EKG ATRIAL RATE: 71 BPM
EKG DIAGNOSIS: NORMAL
EKG DIAGNOSIS: NORMAL
EKG P AXIS: -25 DEGREES
EKG P-R INTERVAL: 182 MS
EKG Q-T INTERVAL: 356 MS
EKG Q-T INTERVAL: 404 MS
EKG QRS DURATION: 122 MS
EKG QRS DURATION: 128 MS
EKG QTC CALCULATION (BAZETT): 439 MS
EKG QTC CALCULATION (BAZETT): 461 MS
EKG R AXIS: -54 DEGREES
EKG R AXIS: -65 DEGREES
EKG T AXIS: 64 DEGREES
EKG T AXIS: 81 DEGREES
EKG VENTRICULAR RATE: 101 BPM
EKG VENTRICULAR RATE: 71 BPM
GLUCOSE BLD-MCNC: 117 MG/DL (ref 70–99)
GLUCOSE BLD-MCNC: 149 MG/DL (ref 70–99)
GLUCOSE BLD-MCNC: 156 MG/DL (ref 70–99)
GLUCOSE BLD-MCNC: 98 MG/DL (ref 70–99)
PERFORMED ON: ABNORMAL
PERFORMED ON: NORMAL
TSH REFLEX FT4: 0.72 UIU/ML (ref 0.27–4.2)

## 2022-08-03 PROCEDURE — 93010 ELECTROCARDIOGRAM REPORT: CPT | Performed by: INTERNAL MEDICINE

## 2022-08-03 PROCEDURE — 97130 THER IVNTJ EA ADDL 15 MIN: CPT

## 2022-08-03 PROCEDURE — 99233 SBSQ HOSP IP/OBS HIGH 50: CPT | Performed by: NURSE PRACTITIONER

## 2022-08-03 PROCEDURE — 70551 MRI BRAIN STEM W/O DYE: CPT

## 2022-08-03 PROCEDURE — 97129 THER IVNTJ 1ST 15 MIN: CPT

## 2022-08-03 PROCEDURE — 99233 SBSQ HOSP IP/OBS HIGH 50: CPT | Performed by: PSYCHIATRY & NEUROLOGY

## 2022-08-03 PROCEDURE — 6370000000 HC RX 637 (ALT 250 FOR IP): Performed by: INTERNAL MEDICINE

## 2022-08-03 PROCEDURE — 84443 ASSAY THYROID STIM HORMONE: CPT

## 2022-08-03 PROCEDURE — 70450 CT HEAD/BRAIN W/O DYE: CPT

## 2022-08-03 PROCEDURE — 6370000000 HC RX 637 (ALT 250 FOR IP): Performed by: NURSE PRACTITIONER

## 2022-08-03 PROCEDURE — 80061 LIPID PANEL: CPT

## 2022-08-03 PROCEDURE — 94760 N-INVAS EAR/PLS OXIMETRY 1: CPT

## 2022-08-03 PROCEDURE — 36415 COLL VENOUS BLD VENIPUNCTURE: CPT

## 2022-08-03 PROCEDURE — 92526 ORAL FUNCTION THERAPY: CPT

## 2022-08-03 PROCEDURE — 2700000000 HC OXYGEN THERAPY PER DAY

## 2022-08-03 PROCEDURE — 1200000000 HC SEMI PRIVATE

## 2022-08-03 RX ORDER — METOPROLOL SUCCINATE 25 MG/1
25 TABLET, EXTENDED RELEASE ORAL DAILY
Qty: 30 TABLET | Refills: 3 | Status: SHIPPED | OUTPATIENT
Start: 2022-08-04

## 2022-08-03 RX ORDER — LISINOPRIL 2.5 MG/1
2.5 TABLET ORAL DAILY
Qty: 30 TABLET | Refills: 3 | Status: SHIPPED | OUTPATIENT
Start: 2022-08-04

## 2022-08-03 RX ORDER — AMOXICILLIN 500 MG/1
500 CAPSULE ORAL EVERY 8 HOURS SCHEDULED
Qty: 13 CAPSULE | Refills: 0 | Status: SHIPPED
Start: 2022-08-03 | End: 2022-08-04 | Stop reason: HOSPADM

## 2022-08-03 RX ADMIN — FUROSEMIDE 40 MG: 40 TABLET ORAL at 10:01

## 2022-08-03 RX ADMIN — GABAPENTIN 300 MG: 300 CAPSULE ORAL at 10:01

## 2022-08-03 RX ADMIN — TRIAMCINOLONE ACETONIDE: 1 CREAM TOPICAL at 10:00

## 2022-08-03 RX ADMIN — ACETAZOLAMIDE 250 MG: 250 TABLET ORAL at 09:58

## 2022-08-03 RX ADMIN — ALPRAZOLAM 1 MG: 0.5 TABLET ORAL at 10:08

## 2022-08-03 RX ADMIN — Medication 2 MG: at 11:48

## 2022-08-03 RX ADMIN — METOPROLOL SUCCINATE 25 MG: 25 TABLET, EXTENDED RELEASE ORAL at 10:07

## 2022-08-03 RX ADMIN — ISOSORBIDE MONONITRATE 60 MG: 60 TABLET, EXTENDED RELEASE ORAL at 10:04

## 2022-08-03 RX ADMIN — ALPRAZOLAM 1 MG: 0.5 TABLET ORAL at 22:29

## 2022-08-03 RX ADMIN — FUROSEMIDE 40 MG: 40 TABLET ORAL at 17:20

## 2022-08-03 RX ADMIN — GLIPIZIDE 5 MG: 5 TABLET ORAL at 17:19

## 2022-08-03 RX ADMIN — LISINOPRIL 2.5 MG: 5 TABLET ORAL at 10:04

## 2022-08-03 RX ADMIN — TRIAMCINOLONE ACETONIDE: 1 CREAM TOPICAL at 21:51

## 2022-08-03 RX ADMIN — HYDRALAZINE HYDROCHLORIDE 25 MG: 25 TABLET, FILM COATED ORAL at 14:42

## 2022-08-03 RX ADMIN — GABAPENTIN 300 MG: 300 CAPSULE ORAL at 14:42

## 2022-08-03 RX ADMIN — AMOXICILLIN 500 MG: 250 CAPSULE ORAL at 14:41

## 2022-08-03 RX ADMIN — GABAPENTIN 300 MG: 300 CAPSULE ORAL at 21:51

## 2022-08-03 RX ADMIN — HYDRALAZINE HYDROCHLORIDE 25 MG: 25 TABLET, FILM COATED ORAL at 10:02

## 2022-08-03 RX ADMIN — AMOXICILLIN 500 MG: 250 CAPSULE ORAL at 21:51

## 2022-08-03 RX ADMIN — DICLOFENAC SODIUM 2 G: 10 GEL TOPICAL at 21:52

## 2022-08-03 RX ADMIN — GLIPIZIDE 5 MG: 5 TABLET ORAL at 10:02

## 2022-08-03 RX ADMIN — PANTOPRAZOLE SODIUM 40 MG: 40 TABLET, DELAYED RELEASE ORAL at 06:06

## 2022-08-03 RX ADMIN — ASPIRIN 81 MG: 81 TABLET, CHEWABLE ORAL at 09:59

## 2022-08-03 RX ADMIN — AMOXICILLIN 500 MG: 250 CAPSULE ORAL at 06:05

## 2022-08-03 RX ADMIN — DICLOFENAC SODIUM 2 G: 10 GEL TOPICAL at 10:01

## 2022-08-03 RX ADMIN — ROSUVASTATIN 5 MG: 10 TABLET, FILM COATED ORAL at 10:05

## 2022-08-03 ASSESSMENT — PAIN DESCRIPTION - LOCATION
LOCATION: LEG;FOOT
LOCATION: LEG;FOOT

## 2022-08-03 ASSESSMENT — PAIN SCALES - GENERAL
PAINLEVEL_OUTOF10: 0
PAINLEVEL_OUTOF10: 8
PAINLEVEL_OUTOF10: 0
PAINLEVEL_OUTOF10: 10
PAINLEVEL_OUTOF10: 0
PAINLEVEL_OUTOF10: 0

## 2022-08-03 ASSESSMENT — PAIN DESCRIPTION - ORIENTATION
ORIENTATION: RIGHT;LEFT
ORIENTATION: RIGHT;LEFT

## 2022-08-03 ASSESSMENT — PAIN DESCRIPTION - DESCRIPTORS
DESCRIPTORS: ACHING
DESCRIPTORS: CRAMPING;STABBING;SHARP

## 2022-08-03 NOTE — CARE COORDINATION
Notified [facility] [phone] through [facility] [phone] of discharge home today. Faxed home care order to [phone].

## 2022-08-03 NOTE — PROGRESS NOTES
Patient Active Problem List   Diagnosis    Chronic respiratory failure (HCC)    CAD (coronary artery disease)    Hyperlipemia    COPD (chronic obstructive pulmonary disease) ([facility]    Essential hypertension    DM type 2, controlled, with complication ([facility]    DM2 (diabetes mellitus, type 2) ([facility]    TIA (transient ischemic attack)    HTN (hypertension), benign    Primary osteoarthritis involving multiple joints    Gastroesophageal reflux disease without esophagitis    Anxiety    Dysarthria    Morbid obesity (HCC)    AKIL (obstructive sleep apnea)    Grade II diastolic dysfunction    CVA, old, alterations of sensations    Hemiparesis ([facility])    Arterial ischemic stroke, ICA, right, acute ([facility])    PAF (paroxysmal atrial fibrillation) ([facility]   UTI  H&P dictated

## 2022-08-03 NOTE — H&P
[facility]                     [address] [facility], [address]                              HISTORY AND PHYSICAL    PATIENT NAME: Alexis Moran                      :        1945  MED REC NO:   [id number]                          ROOM:       [id number]  ACCOUNT NO:   [de-identified]                           ADMIT DATE: 2022  PROVIDER:     Kelsie Fenton MD    HISTORY OF PRESENT ILLNESS:  The patient is a 67-year-old white American  lady known to me from my private practice, who came to emergency room  with history of slurring of speech, facial asymmetry, and left-sided  weakness. The onset could be more than 12 hours ago according to the  EMS. There was no convulsion. No loss of consciousness. No fevers, no  chills, no angina pectoris. Does have exertional shortness of breath. No resting shortness of breath. She appeared to be coherent, but she  feels like, \"my legs are disabled. \"  She did not report any sensory  loss. PAST MEDICAL HISTORY:  Pertinent for COPD, atherosclerotic heart  disease, acute MI, pyelonephritis, anxiety, neurosis, depression, skin  cancer, chronic obstructive lung disease, claustrophobia, ESBL-producing  bacterial infection, esophageal stricture, hiatal hernia,  hyperlipidemia, chronic migraine, movement disorder, pneumonia, type 2  diabetes mellitus, TIAs. PAST SURGICAL HISTORY:  Multiple upper GI endoscopy, colonoscopy,  bronchoscopy, cardiac surgery, gallbladder surgery, hysterectomy,  appendectomy, cholecystectomy, multiple colonoscopy, tear duct surgery,  skin biopsy, cataract removal, esophageal dilatation. MEDICATIONS:  The patient is on Xanax, Diamox, Tylenol, aspirin, Lasix,  gabapentin, glipizide, Apresoline, isosorbide mononitrate, metoprolol,  pantoprazole, rosuvastatin, triamcinolone cream.    ALLERGIES:  She is allergic to CODEINE, HYDROMORPHONE, LEVAQUIN,  HYDROCODONE, and ASPIRIN.     SOCIAL HISTORY:  She is a .  This was her second marriage. She has  totally three children; out of them, one of them is . He  a  couple of years ago. She has been a lifelong heavy smoker and quit just  a year ago, and no history of alcohol usage of any significance. Definitely, no history of drugs. 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 She lives by herself. FAMILY HISTORY:  Both her parents are  because of natural  causes. Both the parents had some kind of cancer and atherosclerotic  heart disease. Two of her siblings have atherosclerotic heart disease. REVIEW OF SYSTEMS:  Negative for loss of consciousness. No visual  blurring. Does have facial asymmetry. The speech is somewhat unclear  and slurred. Does have left-sided hemiparesis that has not entirely  gone away, but partially improved. No angina pectoris. Denies resting  shortness of breath, but does have exertional shortness of breath. No  orthopnea or paroxysmal nocturnal dyspnea. No abdominal pain. No  hematemesis. No melena. No genitourinary complaint. Does have chronic  musculoskeletal pain. PHYSICAL EXAMINATION:  GENERAL:  Alert, awake, oriented x2, moderately distressed, slightly  disoriented elderly woman, morbidly obese with a BMI of 42.87. VITAL SIGNS:  Temperature 98, blood pressure 120/64, respirations 16,  heart rate 62, O2 sat 91% on 2 liters. HEENT:  Oral mucosa dry. SKIN:  Warm and dry. NECK:  Supple. Mild jugular venous distention. Faint carotid bruit. No lymphadenopathy. No thyromegaly. LUNGS:  Vesicular breath sounds. Prolonged exhalation. No wheezing. HEART:  Regular rate and rhythm. S1 and S2 without any S3 or S4 gallop. ABDOMEN:  Soft, nontender. Bowel sounds present. EXTREMITIES:  Show bilateral 1 to 2+ pitting edema. NEUROLOGIC:  The patient appears grossly intact.     LABORATORY DATA:  Lab evaluation shows sodium 136, potassium 4.1,  chloride 102, CO2 of 29, BUN 18, creatinine 0.9. Blood glucose is 158. White blood cell count 14.4, hemoglobin/hematocrit 14 and 43.4, platelet  count is 489. Urine culture has been obtained, but not resulted yet. Urinalysis, turbid urine with large amount of leukocyte esterase, 51 to  100 wbc's with 3+ bacteria. DIAGNOSTIC DATA:  Head CT and CT of the head and neck with contrast,  they both were done, no acute intracranial abnormality. CT of the head  and neck shows 50% stenosis of the origin of the right internal carotid  artery, otherwise no acute abnormality of the flow-limiting stenosis in  the remainder of the major arteries of the head and neck. MRI of the  brain without contrast shows chronic microvascular ischemia without any  acute intracranial abnormality. ASSESSMENT:  TIA, hemiparesis, dysarthria, COPD, hypertension, morbid  obesity. PLAN:  Get him admitted. Put him on neuro checks. Transthoracic  echocardiogram.  PT, OT and Speech eval.  Dietary modification as  needed. The patient will definitely need home health care at the least.  She is a full code.         Williams Antonio MD    D: 08/03/2022 0:38:08       T: 08/03/2022 0:42:16     [id number]  Job#: [id number]     Doc#: [id number]    CC:

## 2022-08-03 NOTE — PROGRESS NOTES
[facility]  Neurology Follow-up  [facility] Neurology    Date of Service: 8/3/2022    Subjective:   CC: Follow up today regarding: acute stroke    Events noted. Chart and lab reviewed. The patient just came from MRI. Sleep from medication. No new concerns today. Was able to function normally before her MRI according to her nurse. ROS : A 10-12 system review limited.        Current Facility-Administered Medications   Medication Dose Route Frequency Provider Last Rate Last Admin    metoprolol succinate (TOPROL XL) extended release tablet 25 mg  25 mg Oral Daily Alli Coley MD   25 mg at 08/03/22 1007    amoxicillin (AMOXIL) capsule 500 mg  500 mg Oral 3 times per day Eve Degroot MD   500 mg at 08/03/22 1599    acetaZOLAMIDE (DIAMOX) tablet 250 mg  250 mg Oral Daily Eve Degroot MD   250 mg at 08/03/22 0958    albuterol sulfate HFA (PROVENTIL;VENTOLIN;PROAIR) 108 (90 Base) MCG/ACT inhaler 2 puff  2 puff Inhalation Q6H PRN Eve Degroot MD        ALPRAZolam Cordella Meganler) tablet 1 mg  1 mg Oral TID PRN Eve Degroot MD   1 mg at 08/03/22 1008    aspirin chewable tablet 81 mg  81 mg Oral Daily Eve Degroot MD   81 mg at 08/03/22 3817    diclofenac sodium (VOLTAREN) 1 % gel 2 g  2 g Topical BID Eve Degroot MD   2 g at 08/03/22 1001    furosemide (LASIX) tablet 40 mg  40 mg Oral BID Eve Degroot MD   40 mg at 08/03/22 1001    glipiZIDE (GLUCOTROL) tablet 5 mg  5 mg Oral BID AC Eve Degroot MD   5 mg at 08/03/22 1002    hydrALAZINE (APRESOLINE) tablet 25 mg  25 mg Oral TID Eve Degroot MD   25 mg at 08/03/22 1002    ibuprofen (ADVIL;MOTRIN) tablet 400 mg  400 mg Oral Q6H PRN Eve Degroot MD        ipratropium-albuterol (DUONEB) nebulizer solution 1 ampule  1 ampule Inhalation Q4H PRN Eve Degroot MD        isosorbide mononitrate (IMDUR) extended release tablet 60 mg  60 mg Oral Daily Eve Degroot MD   60 mg at 08/03/22 1004    lisinopril (PRINIVIL;ZESTRIL) tablet 2.5 mg 2.5 mg Oral Daily Silverio Babb MD   2.5 mg at 08/03/22 1004    nitroGLYCERIN (NITROSTAT) SL tablet 0.4 mg  0.4 mg SubLINGual Q5 Min PRN Silverio Babb MD        pantoprazole (PROTONIX) tablet 40 mg  40 mg Oral QAM AC Silverio Babb MD   40 mg at 08/03/22 0606    rosuvastatin (CRESTOR) tablet 5 mg  5 mg Oral Daily Silverio Babb MD   5 mg at 08/03/22 1005    sodium chloride (OCEAN, BABY AYR) 0.65 % nasal spray 1 spray  1 spray Nasal PRN Silverio Babb MD        triamcinolone (KENALOG) 0.1 % cream   Topical BID Silverio Babb MD   Given at 08/03/22 1000    perflutren lipid microspheres (DEFINITY) injection 1.65 mg  1.5 mL IntraVENous ONCE PRN Silverio Babb MD        dextrose bolus 10% 125 mL  125 mL IntraVENous PRN Silverio Babb MD        Or    dextrose bolus 10% 250 mL  250 mL IntraVENous PRN Silverio Babb MD        glucagon (rDNA) injection 1 mg  1 mg SubCUTAneous PRN Silverio Babb MD        dextrose 10 % infusion   IntraVENous Continuous PRN Silverio Babb MD        gabapentin (NEURONTIN) capsule 300 mg  300 mg Oral TID KAE Shaw - CNP   300 mg at 08/03/22 1001     Allergies   Allergen Reactions    Morphine Shortness Of Breath    Codeine Other (See Comments)     Chest pain    Hydromorphone Other (See Comments)     hallucinations  Hallucinations    But will take if needed in an emergency    Levaquin [Levofloxacin In D5w] Itching    Vicodin [Hydrocodone-Acetaminophen] Hives    Aspirin      Upsets hiatal hernia     Past Medical History:   Diagnosis Date    Acute MI ([facility]     Acute pyelonephritis 09/08/2015    Anxiety and depression     Arthritis     CAD (coronary artery disease)     two heart stent one in 2000 and 2nd one 6 months later on 2000, had MI in 2000    Cancer [facility])     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    Cancer ([facility]     \"skin on top of my head\"    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    Chronic obstructive pulmonary disease ([facility] 01/13/2017    Claustrophobia     COPD (chronic obstructive pulmonary disease) ([facility])     ESBL (extended spectrum beta-lactamase) producing bacteria infection 08/14/2021    Esophageal stricture     Gastroesophageal reflux disease without esophagitis 03/24/2016    Hiatal hernia     Hiatal hernia     Hypercholesteremia     Hypertension     IBS (irritable bowel syndrome)     Migraines     Movement disorder arthritis    Pneumonia     PONV (postoperative nausea and vomiting)     Type II or unspecified type diabetes mellitus without mention of complication, not stated as uncontrolled     type ll does not take insulin at home    Unspecified cerebral artery occlusion with cerebral infarction     many TIA's         Objective:  Exam:   Constitutional:   Vitals:    08/03/22 0700 08/03/22 0738 08/03/22 1002 08/03/22 1145   BP: 127/71  121/70 (!) 149/87   Pulse: 62   58   Resp: 17   18   Temp: 97.5 °F (36.4 °C)   98 °F (36.7 °C)   TempSrc: Axillary   Oral   SpO2: 93%   96%   Weight:  268 lb 8 oz (121.8 kg)     Height:         General appearance:  sleepy. Mental Status:   Open her eyes to vice and goes back to sleep  Lethargic  Poor attention    Cranial Nerves:   II:   Pupils: equal, round, reactive to light  III,IV,VI: Extra Ocular Movements are intact.  No nystagmus  V: Facial sensation is intact  VII: Facial strength and movements: intact and symmetric  XII: Tongue movements are normal  Musculoskeletal: WD to pain from rt and left  1+ DTRs throughout in arms and legs  Downgoing plantars  Normal tone  Sensation: WD to pain  Cerebellar: No tremors        Data:  LABS:   Lab Results   Component Value Date/Time     08/02/2022 08:47 AM    K 4.1 08/02/2022 08:47 AM    K 3.8 08/01/2022 06:19 PM     08/02/2022 08:47 AM    CO2 29 08/02/2022 08:47 AM    BUN 18 08/02/2022 08:47 AM    CREATININE 0.9 08/02/2022 08:47 AM    GFRAA >60 08/02/2022 08:47 AM    GFRAA >60 05/02/2013 10:40 AM    LABGLOM >60 08/02/2022 08:47 AM    GLUCOSE 128 08/02/2022 08:47 AM    MG 1.60 11/15/2020 10:02 PM    CALCIUM 9.5 08/02/2022 08:47 AM     Lab Results   Component Value Date/Time    WBC 8.0 08/02/2022 08:48 AM    RBC 5.14 08/02/2022 08:48 AM    HGB 14.7 08/02/2022 08:48 AM    HCT 45.1 08/02/2022 08:48 AM    MCV 87.7 08/02/2022 08:48 AM    RDW 15.8 08/02/2022 08:48 AM     08/02/2022 08:48 AM     Lab Results   Component Value Date    INR 0.99 08/01/2022    PROTIME 13.0 08/01/2022       I reviewed blood testing and other test results      Impression: Change  Acute left-sided weakness with facial droop. CVA/TIA. Awaiting MRI  New onset A. fib  Hypertension, not controlled  Hyperlipidemia  Chronic cognitive impairment  Diabetes      Recommendation  Continue current supportive care  Hold sedation   Awaiting MRI  PT, OT and speech  Insulin sliding scale  Blood sugar control  Aspirin  Telemetry  DVT and GI prophylaxis  Neurochecks  Statin  Continue current blood pressure medications    Will follow after MRI for further recommendation. Fouzia Rivera MD   [phone]      This dictation was generated by voice recognition computer software. Although all attempts are made to edit the dictation for accuracy, there may be errors in the transcription that are not intended.

## 2022-08-03 NOTE — PROGRESS NOTES
Physical Therapy  Yin Jurado   Attempted therapy on hold due to patient sedation for MRI. Pt presents lethargic and barely responsive. While supine in bed. Per RN and Dr. Perera Lipoma, pt received Ativan due to claustrophobia.    [place], [address]

## 2022-08-03 NOTE — PROGRESS NOTES
Speech Language Pathology  Facility/Department: [address]  Speech Language Pathology   Dysphagia and Speech Language/Cognitive Treatment Note    Patient: Dariusz Hernandez   : [id number]   MRN: [id number]      Evaluation Date: 8/3/2022      Admitting Dx: Left-sided weakness [R53.1]  CVA, old, alterations of sensations [I69.398, R20.9]  Treatment Diagnosis: Speech Language Deficits , Oropharyngeal Dysphagia   Pain: Did not state                                                Subjective:  Patient is alert and verbally responsive with improved WOB observed this date compared to previous session. Dysphagia Treatment:   Diet and Treatment Recommendations 8/3/2022:  Diet Solids Recommendation:  Easy to chew  Liquid Consistency Recommendation: Thin liquids, No straws  Recommended form of Meds: Meds in puree       Compensatory strategies: Alternate solids/liquids , Effortful Swallows , Upright as possible with all PO intake , No straws , Small bites/sips , Swallow 2 times per bite , Lingual Sweeps     Assessment of Texture Tolerance:  Diet level prior to treatment: Dysphagia III Soft and bite sized , Thin liquids   Tolerance of Current Diet Level:Poor oral intake Comment: Pt reports she will not eat the food on her current diet level, and that she obtained peanut butter and crackers to eat the previous night.      -Impressions: Pt was positioned Upright in chair, awake and alert. Currently on  2L O2 via nasal cannula . An oral mechanism exam revealed the patient has mild deviation of the tongue toward her left side. Pt demonstrated adequate ROM and strength for functional tasks including ability to lick peanut butter from specific places around her oral cavity, but lingual coordination was notably impaired; pt had difficulty protruding her tongue on command, difficulty lateralizing her tongue from side to side, and significant difficulty licking in a [place] around her lips.  Patient demonstrated clinical s/s of lingual motor apraxia characterized by difficulty coordinating/executing planned motor movements and reduced ability to imitate movements when given visual model. Labial and buccal coordination appear to be reduced as well, with patient unable to adequately bring her lips to midline to create a pucker. Trials of thin liquids, soft solids, and regular solids  were provided this date to assess swallow function. Pt's oral phase was characterized by reduced lingual coordination for bolus control and A-P propulsion with all textures. Pt with immediate reflexive cough following thin liquid trial via straw (x1) with successive swallowing. Pt benefited from verbal cues to maintain an upright position during all PO intake, and pt did not demonstrate coughing after this postural modification. Pt demonstrated suspected delayed initiation of the volitional swallow with all textures and had an audible swallow indicating premature spillage of bolus into the pharynx with thin liquids via cup sip. Pt had suspected reduced hyolaryngeal excursion based on manual laryngeal palpation and per recent MBS. Slight oral residue was noted upon protrusion of tongue post-swallow with soft solid and regular solid trials; pt benefited from cues to alternate liquids and solids and use lingual sweeps to clear her oral cavity. Pt had difficulty masticating the regular solid trial due to edentulism and required a liquid wash to initiate a volitional swallow with this texture. Based on today's assessment, pt demonstrates increased risk for aspiration due to cognitive state , prior hx of dysphagia , and new CVA . Additionally, pt's recent MBSS indicated intermittent laryngeal penetration with thin liquids. However, this penetration was predominantly self-clearing and pt is able to protect airway more efficiently with implementation of postural modifications.  Therefore, based on today's assessment recommend Easy to chew  with Thin liquids, No straws , Meds in puree  with use of compensatory swallow strategies (see above). 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 Upon inquiry, pt stated she does not recognize when she produces a sound incorrectly unless her listener points it out. Pt demonstrated clinical s/s of verbal apraxia this date, including impaired ability to plan and execute fine motor movements. Pt was able to repeat multi-syllabic words (e.g., \"buttercup buttercup\") but demonstrated assimilation of consonant sounds. Dysarthric errors became worse with increased length of utterance/syllables. Pt was able to participate in a diadochokinetic speech task (\"puh tuh kuh\") with reduced rate and segmentation. Pt with intact prosody, rate, and volume this date. Pt was perseverative throughout the session with fixation on diet modifications (pt consistently stated that she \"couldn't drink that thick stuff\" despite ongoing reminders that she has been upgraded to thin liquids) and with poor recall of new learning. Pt required max verbal cues and leading questions to recall results of MBS, compensatory swallowing strategies, and diet modifications.  Pt benefited from choice cues and category cues during tasks of verbal recall/retrieval. Pt was distracted throughout the session and demonstrated impaired sustained attention, benefiting from ongoing cues to attend to speaker and listen carefully before responding. Pt demonstrates poor insight into deficits and decreased self-awareness. Provided pt education regarding impact of cerebrovascular events on self-monitoring skills. Given cards depicting unsafe scenarios, pt was able to accurately identify the problem for 4/6 (67%) given moderate verbal cues including choice cues. Pt required step-by-step questioning to elaborate on answers during functional problem solving task. Based on today's session recommend continuation of speech therapy 3-5 times per week during pt's acute care stay to work on improving her memory, attention, insight/awareness, and other executive functioning skills. Recommend working to improve pt's lingual-motor and verbal apraxia for improved coordination of motor movements and flaccid dysarthria for improved speech intelligibility. Speech Language Short Term Goals:  Pt will improve articulatory precision, breath support and intelligibility via graded tasks to 80% (ongoing 8/3/2022)  Pt will improve auditory processing/comprehension of commands and questions to 80%, via graded tasks (ongoing 8/3/2022)  Pt will improve orientation and short term recall via graded tasks to 80% (ongoing 8/3/2022)  Pt will improve verbal expression for functional expression via graded tasks to 80%(ongoing 8/3/2022)  Pt will participate in ongoing cognitive assessment with goals to be established as indicated (ongoing 8/3/2022)      Assessment: Patient progressing toward goals    Plan: 3-5 times per week during acute care stay. Patient/Family Education:  Provided education regarding role of SLP, recommendations and general speech pathology plan of care.    [] Pt verbalized understanding and agreement   [x] Pt requires ongoing learning   [] No evidence of comprehension     Discharge Recommendations:  Recommend ongoing SLP for speech and dysphagia therapy upon discharge from hospital     Treatment time  Timed Code Treatment Minutes: 25 min  Total Treatment time: 38 min    If patient discharges prior to next session this note will serve as a discharge summary. Signature:   Saima Turner [facility]  Speech-Language Pathology Graduate Clinician    The speech-language pathologist was present, directed the patient's care, made skilled judgment and was responsible for assessment and treatment.     Daniel Toney [id number]-[id number]

## 2022-08-04 VITALS
BODY MASS INDEX: 42.87 KG/M2 | RESPIRATION RATE: 18 BRPM | SYSTOLIC BLOOD PRESSURE: 96 MMHG | TEMPERATURE: 97.6 F | HEART RATE: 53 BPM | DIASTOLIC BLOOD PRESSURE: 61 MMHG | WEIGHT: 266.76 LBS | OXYGEN SATURATION: 93 % | HEIGHT: 66 IN

## 2022-08-04 LAB
CHOLESTEROL, FASTING: 179 MG/DL (ref 0–199)
GLUCOSE BLD-MCNC: 136 MG/DL (ref 70–99)
GLUCOSE BLD-MCNC: 147 MG/DL (ref 70–99)
GLUCOSE BLD-MCNC: 171 MG/DL (ref 70–99)
HDLC SERPL-MCNC: 26 MG/DL (ref 40–60)
LDL CHOLESTEROL CALCULATED: ABNORMAL MG/DL
LDL CHOLESTEROL DIRECT: 85 MG/DL
ORGANISM: ABNORMAL
PERFORMED ON: ABNORMAL
TRIGLYCERIDE, FASTING: 325 MG/DL (ref 0–150)
URINE CULTURE, ROUTINE: ABNORMAL
VLDLC SERPL CALC-MCNC: ABNORMAL MG/DL

## 2022-08-04 PROCEDURE — 97535 SELF CARE MNGMENT TRAINING: CPT

## 2022-08-04 PROCEDURE — 99232 SBSQ HOSP IP/OBS MODERATE 35: CPT | Performed by: PSYCHIATRY & NEUROLOGY

## 2022-08-04 PROCEDURE — 92526 ORAL FUNCTION THERAPY: CPT

## 2022-08-04 PROCEDURE — 99233 SBSQ HOSP IP/OBS HIGH 50: CPT | Performed by: NURSE PRACTITIONER

## 2022-08-04 PROCEDURE — 92507 TX SP LANG VOICE COMM INDIV: CPT

## 2022-08-04 PROCEDURE — 6370000000 HC RX 637 (ALT 250 FOR IP): Performed by: INTERNAL MEDICINE

## 2022-08-04 PROCEDURE — 6370000000 HC RX 637 (ALT 250 FOR IP): Performed by: NURSE PRACTITIONER

## 2022-08-04 PROCEDURE — 97110 THERAPEUTIC EXERCISES: CPT

## 2022-08-04 PROCEDURE — 97530 THERAPEUTIC ACTIVITIES: CPT

## 2022-08-04 RX ORDER — CEPHALEXIN 250 MG/1
250 CAPSULE ORAL EVERY 8 HOURS SCHEDULED
Status: DISCONTINUED | OUTPATIENT
Start: 2022-08-04 | End: 2022-08-04 | Stop reason: HOSPADM

## 2022-08-04 RX ORDER — CEPHALEXIN 250 MG/1
250 CAPSULE ORAL EVERY 8 HOURS SCHEDULED
Qty: 15 CAPSULE | Refills: 0 | Status: ON HOLD
Start: 2022-08-04 | End: 2022-10-30 | Stop reason: HOSPADM

## 2022-08-04 RX ORDER — ISOSORBIDE MONONITRATE 30 MG/1
30 TABLET, EXTENDED RELEASE ORAL DAILY
Status: DISCONTINUED | OUTPATIENT
Start: 2022-08-05 | End: 2022-08-04 | Stop reason: HOSPADM

## 2022-08-04 RX ORDER — ISOSORBIDE MONONITRATE 30 MG/1
30 TABLET, EXTENDED RELEASE ORAL DAILY
Qty: 30 TABLET | Refills: 3 | Status: SHIPPED | OUTPATIENT
Start: 2022-08-05

## 2022-08-04 RX ADMIN — ASPIRIN 81 MG: 81 TABLET, CHEWABLE ORAL at 08:42

## 2022-08-04 RX ADMIN — GLIPIZIDE 5 MG: 5 TABLET ORAL at 16:04

## 2022-08-04 RX ADMIN — GABAPENTIN 300 MG: 300 CAPSULE ORAL at 13:38

## 2022-08-04 RX ADMIN — AMOXICILLIN 500 MG: 250 CAPSULE ORAL at 13:38

## 2022-08-04 RX ADMIN — ROSUVASTATIN 5 MG: 10 TABLET, FILM COATED ORAL at 08:42

## 2022-08-04 RX ADMIN — GABAPENTIN 300 MG: 300 CAPSULE ORAL at 08:51

## 2022-08-04 RX ADMIN — FUROSEMIDE 40 MG: 40 TABLET ORAL at 08:43

## 2022-08-04 RX ADMIN — PANTOPRAZOLE SODIUM 40 MG: 40 TABLET, DELAYED RELEASE ORAL at 05:51

## 2022-08-04 RX ADMIN — AMOXICILLIN 500 MG: 250 CAPSULE ORAL at 05:51

## 2022-08-04 RX ADMIN — CEPHALEXIN 250 MG: 250 CAPSULE ORAL at 16:04

## 2022-08-04 RX ADMIN — ALPRAZOLAM 1 MG: 0.5 TABLET ORAL at 13:38

## 2022-08-04 RX ADMIN — ACETAZOLAMIDE 250 MG: 250 TABLET ORAL at 08:42

## 2022-08-04 RX ADMIN — GLIPIZIDE 5 MG: 5 TABLET ORAL at 05:51

## 2022-08-04 RX ADMIN — TRIAMCINOLONE ACETONIDE: 1 CREAM TOPICAL at 08:50

## 2022-08-04 RX ADMIN — ISOSORBIDE MONONITRATE 60 MG: 60 TABLET, EXTENDED RELEASE ORAL at 08:43

## 2022-08-04 RX ADMIN — LISINOPRIL 2.5 MG: 5 TABLET ORAL at 08:43

## 2022-08-04 RX ADMIN — DICLOFENAC SODIUM 2 G: 10 GEL TOPICAL at 08:51

## 2022-08-04 ASSESSMENT — PAIN DESCRIPTION - ORIENTATION
ORIENTATION: RIGHT;LEFT
ORIENTATION: RIGHT;LEFT

## 2022-08-04 ASSESSMENT — PAIN DESCRIPTION - DESCRIPTORS
DESCRIPTORS: CRAMPING
DESCRIPTORS: CRAMPING;STABBING
DESCRIPTORS: CRAMPING

## 2022-08-04 ASSESSMENT — PAIN SCALES - GENERAL
PAINLEVEL_OUTOF10: 3
PAINLEVEL_OUTOF10: 8
PAINLEVEL_OUTOF10: 10

## 2022-08-04 ASSESSMENT — PAIN DESCRIPTION - LOCATION
LOCATION: FOOT;LEG

## 2022-08-04 ASSESSMENT — PAIN DESCRIPTION - PAIN TYPE
TYPE: CHRONIC PAIN
TYPE: CHRONIC PAIN

## 2022-08-04 NOTE — DISCHARGE INSTR - COC
Continuity of Care Form    Patient Name: Gonsalo Nazario   :  [id number]  MRN:  [id number]    Admit date:  2022  Discharge date:  2022    Code Status Order: Full Code   Advance Directives:     Admitting Physician:  Oksana Temple MD  PCP: Oksana Temple MD    Discharging Nurse: Gaby Godinez RN    [address] Unit/Room#: 8WK-4035/1351-11  Discharging Unit Phone Number: [phone]      Emergency Contact:   Extended Emergency Contact Information  Primary Emergency Contact: [address] [place] [address] Phone: [phone]  Relation: Child  Secondary Emergency Contact: Rhea Bedolla Phone: [phone]  Relation: Grandchild    Past Surgical History:  Past Surgical History:   Procedure Laterality Date    APPENDECTOMY      BRONCHOSCOPY N/A 2020    BRONCHOSCOPY ALVEOLAR LAVAGE performed by Yadi Sandoval MD at [place] [address] N/A 2020    BRONCHOSCOPY DIAGNOSTIC OR CELL [address] performed by Dain Root MD at [place] [address] N/A 2020    BRONCHOSCOPY DIAGNOSTIC OR CELL [address] performed by Manjit Montoya MD at [facility]      1 stent  and 1 stent     CATARACT REMOVAL   or 2014    bilateral cataracts removed    CHOLECYSTECTOMY      COLONOSCOPY      COLONOSCOPY  2018    ENDOSCOPY, COLON, DIAGNOSTIC      ESOPHAGEAL DILATATION      EYE SURGERY      bilateral cataracts    GALLBLADDER SURGERY      HYSTERECTOMY (CERVIX STATUS UNKNOWN)      [place] EXC SKIN MALIG <5MM REMAINDR BODY N/A 2018    EXCISION OF SCALP SQUAMOUS CELL CARCINOMA performed by William Suarez MD at [address] [place] [address] <100SQCM N/A 2018    SPLIT THICKNESS SKIN GRAFT FOR COVERAGE SCALP, APPLICATION OF WOUND [place] performed by William Suarez MD at [address] [facility] [facility] ENDOSCOPY  12    with biopsy of stomach,gastritis    UPPER GASTROINTESTINAL ENDOSCOPY  06/11/2018    w/esophagael dilation       Immunization History:   Immunization History   Administered Date(s) Administered    COVID-19, MODERNA BLUE border, Primary or Immunocompromised, (age 12y+), IM, 100 mcg/0.5mL 09/04/2021, 10/12/2021    Influenza 10/11/2013    Influenza Virus Vaccine 10/18/2010, 10/18/2010, 08/11/2014, 09/10/2015, 09/05/2017, 09/05/2017    Influenza, High Dose (Fluzone 65 yrs and older) 10/02/2018    Influenza, MDCK Quadv, with preserv IM (Flucelvax 2 yrs and older) 09/08/2017, 09/11/2018, 09/01/2020, 10/25/2021    Influenza, Bethany Brown, IM, (6 mo and older Fluzone, Flulaval, Fluarix and 3 yrs and older Afluria) 09/09/2019    Influenza, Triv, 3 Years and older, IM (Afluria (5 yrs and older) 10/20/2016    Pneumococcal Conjugate 13-valent ([id number]) 02/08/2017    Pneumococcal Polysaccharide ([id number]) 12/24/2010, 07/23/2015, 04/19/2022       Active Problems:  Patient Active Problem List   Diagnosis Code    Chronic respiratory failure ([facility]) J96.10    CAD (coronary artery disease) I25.10    Hyperlipemia E78.5    COPD (chronic obstructive pulmonary disease) ([facility] J44.9    Essential hypertension I10    DM type 2, controlled, with complication ([facility] V83.4    DM2 (diabetes mellitus, type 2) ([facility] E11.9    TIA (transient ischemic attack) G45.9    HTN (hypertension), benign I10    Primary osteoarthritis involving multiple joints M89.49    Gastroesophageal reflux disease without esophagitis K21.9    Anxiety F41.9    Dysarthria R47.1    Morbid obesity ([facility]) E66.01    AKIL (obstructive sleep apnea) G47.33    Grade II diastolic dysfunction Y25.08    UTI (urinary tract infection) N39.0    CVA, old, alterations of sensations I69.398, R20.9    Left-sided weakness R53.1    Arterial ischemic stroke, ICA, right, acute ([facility]) I63.231    PAF (paroxysmal atrial fibrillation) ([facility]) I48.0       Isolation/Infection:   Isolation            Contact          Patient Infection Status       Infection Onset Added Last Indicated Last Indicated By Review Planned Expiration Resolved Resolved By    ESBL (Extended Spectrum Beta Lactamase) 21 Culture, Urine        Resolved    C-diff Rule Out 08/15/21 08/15/21 08/15/21 Clostridium difficile toxin/antigen (Ordered)   08/15/21 Rule-Out Test Resulted    C-diff Rule Out 20 Clostridium difficile toxin/antigen (Ordered)   20 Mercy Yap RN    INFLUENZA  20 Mary Khan RN   20             Nurse Assessment:  Last Vital Signs: BP (!) 107/47   Pulse 53   Temp 98 °F (36.7 °C) (Oral)   Resp 18   Ht 5' 6\" (1.676 m)   Wt 266 lb 12.1 oz (121 kg)   SpO2 94%   BMI 43.06 kg/m²     Last documented pain score (0-10 scale): Pain Level: 8  Last Weight:   Wt Readings from Last 1 Encounters:   22 266 lb 12.1 oz (121 kg)     Mental Status:  oriented, alert, coherent, logical, thought processes intact, and able to concentrate and follow conversation    IV Access:  - None    Nursing Mobility/ADLs:  Walking   Assisted  Transfer  Assisted  Bathing  Assisted  Dressing  Assisted  Toileting  Assisted  Feeding  [address]  Assisted  Med Delivery   whole    Wound Care Documentation and Therapy:        Elimination:  Continence: Bowel: Yes  Bladder: Yes  Urinary Catheter: None   Colostomy/Ileostomy/Ileal Conduit: No       Date of Last BM: 2022      Intake/Output Summary (Last 24 hours) at 2022 1353  Last data filed at 2022 0549  Gross per 24 hour   Intake 480 ml   Output 400 ml   Net 80 ml     I/O last 3 completed shifts: In: 5 [P.O.:720]  Out: 400 [Urine:400]    Safety Concerns:     History of Falls (last 30 days) and At Risk for Falls    Impairments/Disabilities:      None    Nutrition Therapy:  Current Nutrition Therapy:   - Oral Diet:  Carb Control 4 carbs/meal (1800kcals/day) and Dental Soft    Routes of Feeding: Oral  Liquids:  Thin Liquids  Daily Fluid Restriction: no  Last Modified Barium Swallow with Video (Video Swallowing Test): not done    Treatments at the Time of Hospital Discharge:   Respiratory Treatments:     Oxygen Therapy:   o2 at night  Ventilator:    - No ventilator support    Rehab Therapies: Physical Therapy  Weight Bearing Status/Restrictions: No weight bearing restrictions  Other Medical Equipment (for information only, NOT a DME order):  walker  Other Treatments:       Patient's personal belongings (please select all that are sent with patient):  Glasses    RN SIGNATURE:  Electronically signed by John Giraldo RN on 8/4/22 at 1:58 PM EDT    CASE MANAGEMENT/SOCIAL WORK SECTION    Inpatient Status Date: ***    Readmission Risk Assessment Score:  Readmission Risk              Risk of Unplanned Readmission:  [id number]           Discharging to Facility/ [address]  Phone: [phone]  Fax: [phone]     / signature: Electronically signed by NAPOLEON Elliott on 8/4/22 at 2:32 PM EDT    PHYSICIAN SECTION    Prognosis: {Prognosis:4048160420}    Condition at Discharge: [address] [place] [address] Patient Condition:[id number]}    Rehab Potential (if transferring to Rehab): {Prognosis:8062903784}    Recommended Labs or Other Treatments After Discharge: ***    Physician Certification: I certify the above information and transfer of Kym Starr  is necessary for the continuing treatment of the diagnosis listed and that she requires {Admit to Appropriate Level of Care:64032} for {GREATER/LESS:037325433} 30 days.      Update Admission H&P: {P DME Changes in SLUTO:127079092}    PHYSICIAN SIGNATURE:  {Esignature:[id number]}

## 2022-08-04 NOTE — PROGRESS NOTES
Speech Language Pathology  Facility/Department: [address]  Dysphagia and Speech Language/Cognitive Treatment Note    Patient: Kym Starr   : 1945   MRN: [id number]      Evaluation Date: 2022      Admitting Dx: Left-sided weakness [R53.1]  CVA, old, alterations of sensations [I69.398, R20.9]  Treatment Diagnosis: Speech Language Deficits , Oropharyngeal Dysphagia   Pain: Did not state                                                Subjective:  Patient laying on her side in bed eating a muffin upon SLP arrival. Pt was willing to slightly reposition upright for therapy session. Dysphagia Treatment:   Diet and Treatment Recommendations 2022:  Diet Solids Recommendation:  Easy to chew  Liquid Consistency Recommendation: Thin liquids, No straws  Recommended form of Meds: Meds in puree       Compensatory strategies: Alternate solids/liquids , Effortful Swallows , Upright as possible with all PO intake , No straws , Small bites/sips , Swallow 2 times per bite , Lingual Sweeps     Assessment of Texture Tolerance:  Diet level prior to treatment: Easy to chew , Thin liquids, No straws   Tolerance of Current Diet Level:Per chart, no noted difficulty with current diet level      -Impressions: Pt was positioned Upright in bed , lethargic . Currently on  2L O2 via nasal cannula . Trials of thin liquids were provided to assess swallow function. Pt was able to sequentially swallow ~3oz water with no overt clinical s/s of aspiration at bedside. Pt reported that she has noticed increased mucus production from her sinus infection that intermittently triggers a cough response but that it does not correlate with PO intake. Pt reported she does not experience any change in vocal quality or globus sensation on current diet level.  Despite lack of overt clinical s/s of aspiration observed at bedside this date, pt demonstrates increased risk for aspiration due to cognitive state , co morbidities , and new CVA . Therefore, based on today's assessment recommend continuation of pt's current diet level: Easy to chew  with Thin liquids, No straws , Meds in puree  with use of compensatory swallow strategies (see above). Recommend continuation of speech therapy 1-2 times per week during pt's acute care stay to monitor diet tolerance and continue to assess swallowing function. Dysphagia Goals:  Pt will functionally tolerate recommended diet with no overt clinical s/s of aspiration (Ongoing 08/04/22)  Pt will demonstrate understanding of aspiration risk and precautions via education/demonstration with occasional prompting (Ongoing 08/04/22)  Pt will advance to least restrictive diet as indicated (Ongoing 08/04/22)      Speech Language/Cognitive Treatment:  Impressions: This date goals were targeted via conversation, short-term memory/recall task, and \"odd one out\" categorical task. Pt with poor carryover/recall from previous session and benefited from reiteration of recommendations/POC. Pt demonstrated improved skills this date in the areas of topic maintenance and insight into deficits, with ability to describe what happened (stroke) and what is being done to manage her underlying conditions. Pt did not perseverate on any topics this date; unsure whether pt's increased fatigue vs increased cognitive clarity is main contributing factor for this improvement. Pt was able to identify and name the \"odd item out\" for categorical task with 3/5 (60%) accuracy; this improved to 5/5 (100%) accuracy for identification only (pt was unable to name two of the items). Speech intelligibility appeared to be lower today than during yesterday's session, with slurring and imprecise word boundaries more prominent in connected speech. However, articulatory precision was not addressed this date due to pt's lethargy and ongoing need for cues to maintain alertness and stay awake.      Based on today's session recommend continued speech therapy 3-5 times per week during pt's acute care stay to work on the goals listed below. Pt benefits most from use of simple, direct sentences with repetition as needed and re-direction to task/topic throughout the session. Speech Language Short Term Goals:  Pt will improve articulatory precision, breath support and intelligibility via graded tasks to 80% (ongoing 8/4/2022)  Pt will improve auditory processing/comprehension of commands and questions to 80%, via graded tasks (ongoing 8/4/2022)  Pt will improve orientation and short term recall via graded tasks to 80% (ongoing 8/4/2022)  Pt will improve verbal expression for functional expression via graded tasks to 80%(ongoing 8/4/2022)  Pt will participate in ongoing cognitive assessment with goals to be established as indicated (ongoing 8/4/2022)      Assessment: Patient progressing toward goals    Plan: 3-5 times per week during acute care stay. Patient/Family Education:  Provided education regarding role of SLP, recommendations and general speech pathology plan of care. [] Pt verbalized understanding and agreement   [x] Pt requires ongoing learning   [] No evidence of comprehension     Discharge Recommendations:  Recommend ongoing SLP for speech therapy upon discharge from the hospital     Treatment time  Timed Code Treatment Minutes: 0 minutes   Total Treatment time: 25 minutes     If patient discharges prior to next session this note will serve as a discharge summary. Signature:   Eric Biggs, [facility]  Speech-Language Pathology Graduate Clinician    The speech-language pathologist was present, directed the patient's care, made skilled judgment and was responsible for assessment and treatment.     Wayne Grossman, [address] [facility]  Speech Language Pathologist

## 2022-08-04 NOTE — PLAN OF CARE
Reviewed care & treatment plan with pt & daughter, Eliane Tomlin. Pt & daughter are interested in setting up \"my chart\" to access doctors' reports. Grandson to look into this tomorrow with pt. Pt has not used call light prior to ambulating, bed alarm did work and this RN was at bedside before pt got out of bed. Pt was placed in low bed with mats, bed alarm on, increased frequency of rounds, no camera available at this time. Pt does have leg/foot pain described as \"normal pain\", neuropathic. Pt is not taking scheduled hydralazine due to low blood pressure, Dr. Maximo Mustafa notified. VSS. Addendum @ 0012:  pt wakes from sleep with calf/leg cramping rated 10 on scale 0-10 that causes her to scream out in pain; pt benefits from repeat lower leg massage. (See flowsheets) This RN continues increased frequency of rounds to ensure pt safety. Purewick in place for pt comfort. VSS    Addendum @ 0600:  pt does not use call light prior to ambulating; bed alarm notifies RN; camera located & placed in pt room; VSS; pt bathed, linens changed, purewick changed. Call light continues to be within reach.  Increased frequency of rounds; see flowsheets    Problem: Discharge Planning  Goal: Discharge to home or other facility with appropriate resources  8/3/2022 2058 by Neeru Brooks RN  Outcome: Progressing  8/3/2022 1411 by Kelsie Jerome RN  Outcome: Progressing     Problem: Pain  Goal: Verbalizes/displays adequate comfort level or baseline comfort level  8/3/2022 2058 by Neeru Brooks RN  Outcome: Progressing  8/3/2022 1411 by Kelsie Jerome RN  Outcome: Progressing     Problem: Safety - Adult  Goal: Free from fall injury  8/3/2022 2058 by Neeru Brooks RN  Outcome: Progressing  8/3/2022 1411 by Kelsie Jerome RN  Outcome: Progressing     Problem: ABCDS Injury Assessment  Goal: Absence of physical injury  8/3/2022 2058 by Neeru Brooks RN  Outcome: Chio Raring  8/3/2022 1411 by Briana James Rashi Jesus RN  Outcome: Progressing     Problem: Chronic Conditions and Co-morbidities  Goal: Patient's chronic conditions and co-morbidity symptoms are monitored and maintained or improved  8/3/2022 2058 by Barbara Mitchell RN  Outcome: Progressing  8/3/2022 1411 by Salina Maddox RN  Outcome: Progressing     Problem: Skin/Tissue Integrity  Goal: Absence of new skin breakdown  Description: 1. Monitor for areas of redness and/or skin breakdown  2. Assess vascular access sites hourly  3. Every 4-6 hours minimum:  Change oxygen saturation probe site  4. Every 4-6 hours:  If on nasal continuous positive airway pressure, respiratory therapy assess nares and determine need for appliance change or resting period.   8/3/2022 2058 by Barbara Mitchell RN  Outcome: Progressing  8/3/2022 1411 by Salina Maddox RN  Outcome: Progressing     Problem: Neurosensory - Adult  Goal: Achieves maximal functionality and self care  Outcome: Progressing     Problem: Respiratory - Adult  Goal: Achieves optimal ventilation and oxygenation  Outcome: Progressing     Problem: Cardiovascular - Adult  Goal: Maintains optimal cardiac output and hemodynamic stability  Outcome: Progressing     Problem: Skin/Tissue Integrity - Adult  Goal: Skin integrity remains intact  Outcome: Progressing  Goal: Oral mucous membranes remain intact  Outcome: Progressing     Problem: Musculoskeletal - Adult  Goal: Return mobility to safest level of function  Outcome: Progressing     Problem: Gastrointestinal - Adult  Goal: Minimal or absence of nausea and vomiting  Outcome: Progressing     Problem: Genitourinary - Adult  Goal: Absence of urinary retention  Outcome: Progressing     Problem: Metabolic/Fluid and Electrolytes - Adult  Goal: Electrolytes maintained within normal limits  Outcome: Progressing  Goal: Glucose maintained within prescribed range  Outcome: Progressing     Problem: Hematologic - Adult  Goal: Maintains hematologic stability  Outcome: Progressing

## 2022-08-04 NOTE — PROGRESS NOTES
Assisted pt to the bathroom using a walker. Pt sat on the commode with walker directly in front of her and RN standing on the Lt side of the pt. PT leaned forward to rest on the walker causing walker to tip forward. Pt landed on her right side on top of the walker. Pt stated she \"bumped\" her head. No injuries noted. No redness or swelling noted. Pt A&O X4, answering questions appropriately, no changes in cognition. MD made aware. STAT CT scan order received. Pt assisted back to bed with staff assist X3 via lift. Pt tolerated use of lift well. Pt taken to CT scan with RN.

## 2022-08-04 NOTE — PLAN OF CARE
Problem: Discharge Planning  Goal: Discharge to home or other facility with appropriate resources  Outcome: Completed     Problem: Pain  Goal: Verbalizes/displays adequate comfort level or baseline comfort level  Outcome: Completed     Problem: Safety - Adult  Goal: Free from fall injury  Outcome: Completed     Problem: ABCDS Injury Assessment  Goal: Absence of physical injury  Outcome: Completed     Problem: Chronic Conditions and Co-morbidities  Goal: Patient's chronic conditions and co-morbidity symptoms are monitored and maintained or improved  Outcome: Completed     Problem: Skin/Tissue Integrity  Goal: Absence of new skin breakdown  Description: 1. Monitor for areas of redness and/or skin breakdown  2. Assess vascular access sites hourly  3. Every 4-6 hours minimum:  Change oxygen saturation probe site  4. Every 4-6 hours:  If on nasal continuous positive airway pressure, respiratory therapy assess nares and determine need for appliance change or resting period.   Outcome: Completed     Problem: Neurosensory - Adult  Goal: Achieves maximal functionality and self care  Outcome: Completed     Problem: Respiratory - Adult  Goal: Achieves optimal ventilation and oxygenation  Outcome: Completed     Problem: Cardiovascular - Adult  Goal: Maintains optimal cardiac output and hemodynamic stability  Outcome: Completed  Flowsheets (Taken 8/4/2022 8428)  Maintains optimal cardiac output and hemodynamic stability:   Monitor blood pressure and heart rate   Monitor urine output and notify Licensed Independent Practitioner for values outside of normal range   Assess for signs of decreased cardiac output     Problem: Skin/Tissue Integrity - Adult  Goal: Skin integrity remains intact  Outcome: Completed  Goal: Oral mucous membranes remain intact  Outcome: Completed     Problem: Musculoskeletal - Adult  Goal: Return mobility to safest level of function  Outcome: Completed     Problem: Gastrointestinal - Adult  Goal: Minimal or absence of nausea and vomiting  Outcome: Completed     Problem: Genitourinary - Adult  Goal: Absence of urinary retention  Outcome: Completed  Flowsheets (Taken 8/4/2022 6456)  Absence of urinary retention:   Assess patients ability to void and empty bladder   Monitor intake/output and perform bladder scan as needed     Problem: Metabolic/Fluid and Electrolytes - Adult  Goal: Electrolytes maintained within normal limits  Outcome: Completed  Goal: Glucose maintained within prescribed range  Outcome: Completed     Problem: Hematologic - Adult  Goal: Maintains hematologic stability  Outcome: Completed

## 2022-08-04 NOTE — PROGRESS NOTES
chloride (OCEAN, BABY AYR) 0.65 % nasal spray 1 spray, 1 spray, Nasal, PRN  triamcinolone (KENALOG) 0.1 % cream, , Topical, BID  perflutren lipid microspheres (DEFINITY) injection 1.65 mg, 1.5 mL, IntraVENous, ONCE PRN  dextrose bolus 10% 125 mL, 125 mL, IntraVENous, PRN **OR** dextrose bolus 10% 250 mL, 250 mL, IntraVENous, PRN  glucagon (rDNA) injection 1 mg, 1 mg, SubCUTAneous, PRN  dextrose 10 % infusion, , IntraVENous, Continuous PRN  gabapentin (NEURONTIN) capsule 300 mg, 300 mg, Oral, TID    Physical      VITALS:    BP (!) 102/53   Pulse 61   Temp 97.6 °F (36.4 °C) (Axillary)   Resp 18   Ht 5' 6\" (1.676 m)   Wt 266 lb 12.1 oz (121 kg)   SpO2 92%   BMI 43.06 kg/m²   TEMPERATURE:  Current - Temp: 97.6 °F (36.4 °C);  Max - Temp  Av.9 °F (36.6 °C)  Min: 97.6 °F (36.4 °C)  Max: 98.5 °F (36.9 °C)  RESPIRATIONS RANGE: Resp  Av.6  Min: 16  Max: 18  PULSE RANGE: Pulse  Av.4  Min: 55  Max: 67  BLOOD PRESSURE RANGE:  Systolic (95RBB), QAK:179 , Min:94 , LXP:885   ; Diastolic (79RXU), NCB:90, Min:48, Max:87    PULSE OXIMETRY RANGE: SpO2  Av.6 %  Min: 90 %  Max: 97 %  24HR INTAKE/OUTPUT:      Intake/Output Summary (Last 24 hours) at 2022 3822  Last data filed at 2022 0549  Gross per 24 hour   Intake 720 ml   Output 400 ml   Net 320 ml     CONSTITUTIONAL:  awake, alert, cooperative, no apparent distress, and appears stated age  NECK:  supple, symmetrical, trachea midline and skin normal  LUNGS:  Moderate increase in work of breathing bart crackles and exp wheezes   CARDIOVASCULAR:  Normal apical impulse, regular rate and rhythm, normal S1 and S2, no S3 or S4, and no murmur noted  ABDOMEN:  No scars, normal bowel sounds, soft, non-distended, non-tender, no masses palpated, no hepatosplenomegally  MUSCULOSKELETAL:  ++ edema  NEUROLOGIC:  No acute focal change    Data      Lab Results   Component Value Date/Time    PHART 7.446 2017 12:26 PM       Lab Results   Component Value Date/Time  08/02/2022 08:47 AM    K 4.1 08/02/2022 08:47 AM    K 3.8 08/01/2022 06:19 PM     08/02/2022 08:47 AM    CO2 29 08/02/2022 08:47 AM    BUN 18 08/02/2022 08:47 AM    CREATININE 0.9 08/02/2022 08:47 AM    GLUCOSE 128 08/02/2022 08:47 AM    CALCIUM 9.5 08/02/2022 08:47 AM     Lab Results   Component Value Date/Time    WBC 8.0 08/02/2022 08:48 AM    HGB 14.7 08/02/2022 08:48 AM    HCT 45.1 08/02/2022 08:48 AM    MCV 87.7 08/02/2022 08:48 AM     08/02/2022 08:48 AM         Lab Results   Component Value Date    INR 0.99 08/01/2022    PROTIME 13.0 08/01/2022       ASSESSMENT AND PLAN      Patient Active Problem List   Diagnosis    Chronic respiratory failure (HCC)    CAD (coronary artery disease)    Hyperlipemia    COPD (chronic obstructive pulmonary disease) ([facility] [address]    Essential hypertension    DM type 2, controlled, with complication (HCC)    DM2 (diabetes mellitus, type 2) ([facility])    TIA (transient ischemic attack)    HTN (hypertension), benign    Primary osteoarthritis involving multiple joints    Gastroesophageal reflux disease without esophagitis    Anxiety    Dysarthria    Morbid obesity (HCC)    AKIL (obstructive sleep apnea)    Grade II diastolic dysfunction    UTI (urinary tract infection)    CVA, old, alterations of sensations    Left-sided weakness    Arterial ischemic stroke, ICA, right, acute (HCC)    PAF (paroxysmal atrial fibrillation) ([facility] [address]                          MRI  - very questionable and tiny  acute or subacute infarct in left posterior corona Radiata ,clinically not significant , she does not want to go to a NH , dismissal when OK with Neuro    I think she should be anticoagulated

## 2022-08-04 NOTE — PROGRESS NOTES
[address]   Electrophysiology Progress Note   Date: 8/4/2022  Admit Date: 8/1/2022     Reason for follow up: Atrial Fibrillation    Chief Complaint:   Chief Complaint   Patient presents with    Cerebrovascular Accident     Elderly female coming in from home brought in by [facility] - w/ altered mental status - stroke alert called     History of Present Illness: History obtained from patient and medical record. Dariusz Hernandez is a 69 y.o. female with a past medical history of COPD, CAD hx of stents , hypertension, hyperlipidemia, DM2, CVA, and AKIL cannot tolerte CPAP. Presented with left-sided weakness. Noted to be in AF and EP was consulted. Being worked up for possible recurrent CVA by neurology. Interval Hx: Today, she is being seen for follow up. Feeling well, hoping to go home. MRI completed, neurology to see today. SR with occ PVCs on telemetry, no recurrent AF. No new complaints today. No major events overnight. Denies having chest pain, palpitations, shortness of breath, orthopnea or dizziness. Patient seen and examined. Clinical notes reviewed. Telemetry reviewed. Assessment and Plan:  Paroxysmal  Atrial Fibrillation  - Converted to SR without recurrence  - On Toprol 25 mg daily   - [id number] score: 6 (age, gender, hypertension, DM, CVA, CAD); [id number] Vasc score and anticoagulation discussed. High risk for stroke and thromboembolism. Anticoagulation is recommended.   ~ ASA for now, will need [address] when cleared by neurology  - Afib risk factors including age, HTN, obesity, inactivity and AKIL were discussed with patient. Risk factor modification recommended   ~ TSH 1.32 (8/2/2022)     - Treatment options including cardioversion, rate control strategy, antiarrhythmics, anticoagulation and possible ablation were discussed with patient. Risks, benefits and alternative of each treatment options were explained.  All questions answered    ~ Not a good candidate for ablation due to multiple comorbidities. Discussed OP cardiac monitoring and she declines at this time, may consider as OP. She agrees to monitor HR at least daily at home  Acute left-sided weakness   - MRI ordered to rule out recurrent CVA   - Neurology following  Hypertension   - Controlled. Continue current medications  Hyperlipidemia   - Continue rosuvastatin   - Repeat lipids and if LDL elevated she needs increase to 10 mg daily   Hx of CVA  AKIL   - Untreated, she is not interested in referral at this time    - DOAC when clear by neurology  - Acceptable for discharge from EP perspective when otherwise medically able    All pertinent information and plan of care discussed with the EP physician. Multiple medical conditions with risk of decompensation. Problem List:   Patient Active Problem List    Diagnosis Date Noted    HTN (hypertension), benign     CAD (coronary artery disease) 05/23/2011    Hyperlipemia 05/23/2011    Left-sided weakness 08/02/2022    Arterial ischemic stroke, ICA, right, acute ([address] 08/02/2022    PAF (paroxysmal atrial fibrillation) ([address] 08/02/2022    CVA, old, alterations of sensations 08/01/2022    UTI (urinary tract infection) 08/14/2021    Grade II diastolic dysfunction 70/90/3815    AKIL (obstructive sleep apnea) 01/23/2020    Morbid obesity ([address] 06/18/2018    Dysarthria 06/07/2018    Anxiety 01/19/2018    Primary osteoarthritis involving multiple joints 03/24/2016    Gastroesophageal reflux disease without esophagitis 03/24/2016    TIA (transient ischemic attack)     DM2 (diabetes mellitus, type 2) ([address] 12/03/2015    DM type 2, controlled, with complication ([address]     Essential hypertension 04/18/2012    COPD (chronic obstructive pulmonary disease) ([address] 08/09/2011    Chronic respiratory failure ([address] 12/22/2010      Allergies:   Allergies   Allergen Reactions    Morphine Shortness Of Breath    Codeine Other (See Comments)     Chest pain    Hydromorphone Other (See Comments) hallucinations  Hallucinations    But will take if needed in an emergency    Levaquin [Levofloxacin In D5w] Itching    Vicodin [Hydrocodone-Acetaminophen] Hives    Aspirin      Upsets hiatal hernia     Home Meds:  Prior to Visit Medications    Medication Sig Taking? Authorizing Provider   isosorbide mononitrate (IMDUR) 30 MG extended release tablet Take 1 tablet by mouth in the morning. Yes Helena Darling MD   rivaroxaban (XARELTO) 15 MG TABS tablet Take 1 tablet by mouth in the morning. Yes Helena Darling MD   lisinopril (PRINIVIL;ZESTRIL) 2.5 MG tablet Take 1 tablet by mouth in the morning. Yes Helena Darling MD   metoprolol succinate (TOPROL XL) 25 MG extended release tablet Take 1 tablet by mouth in the morning. Yes Helena Darling MD   amoxicillin (AMOXIL) 500 MG capsule Take 1 capsule by mouth in the morning and 1 capsule at noon and 1 capsule before bedtime. Do all this for 13 doses. Yes Helena Darling MD   ALPRAZolam Darrol [place]) 1 MG tablet Take 1 tablet by mouth 3 times daily as needed for Anxiety for up to 30 days. Yes Helena Darling MD   diclofenac sodium (VOLTAREN) 1 % GEL Apply 2 g topically 2 times daily Yes Helena Darling MD   omeprazole (PRILOSEC) 40 MG delayed release capsule Take 1 capsule by mouth daily Yes Helena Darling MD   albuterol sulfate HFA (PROVENTIL;VENTOLIN;PROAIR) 108 (90 Base) MCG/ACT inhaler INHALE TWO PUFFS BY MOUTH EVERY 6 HOURS AS NEEDED FOR WHEEZING Yes Helena Darling MD   blood glucose monitor strips Test 2 times a day & as needed for symptoms of irregular blood glucose. Dispense sufficient amount for indicated testing frequency plus additional to accommodate PRN testing needs.  Yes Helena Darling MD   furosemide (LASIX) 40 MG tablet TAKE ONE TABLET BY MOUTH TWICE A DAY Yes Arian Balderrama MD   Umeclidinium Bromide (INCRUSE ELLIPTA) 62.5 MCG/INH AEPB Inhale 1 puff into the lungs daily Yes Al Leventhal, MD   glipiZIDE (GLUCOTROL) 5 MG tablet Take 1 tablet by mouth 2 times daily (before meals) Yes Walter Vance MD   triamcinolone (KENALOG) 0.1 % cream Apply topically 2 times daily. Yes Walter Vance MD   ipratropium-albuterol (DUONEB) 0.5-2.5 (3) MG/3ML SOLN nebulizer solution INHALE THREE MILLILITERS VIA NEBULIZATION BY MOUTH EVERY 4 HOURS AS NEEDED FOR SHORTNESS OF BREATH Yes Walter Vance MD   blood glucose test strips (TRUE METRIX BLOOD GLUCOSE TEST) strip USE THREE TIMES A DAY AS NEEDED Yes Walter Vance MD   Lancets MISC 1 each by Does not apply route 2 times daily Yes Walter Vance MD   nystatin (MYCOSTATIN) 823669 UNIT/GM powder Affected area Yes Walter Vance MD   rosuvastatin (CRESTOR) 10 MG tablet Take 0.5 tablets by mouth daily Yes Boris Carr MD   sodium chloride (OCEAN, BABY AYR) 0.65 % nasal spray 1 spray by Nasal route as needed for Congestion Yes Walter Vance MD   aspirin 81 MG tablet Take 81 mg by mouth daily Yes Kandis Seo MD   nitroGLYCERIN (NITROSTAT) 0.4 MG SL tablet Place 1 tablet under the tongue every 5 minutes as needed for Chest pain. Yes Walter Vance MD   hydrALAZINE (APRESOLINE) 25 MG tablet Take 1 tablet by mouth 3 times daily  Walter Vance MD   gabapentin (NEURONTIN) 300 MG capsule Take 1 capsule by mouth 4 times daily for 30 days.  Intended supply: 30 days  Walter Vance MD   acetaZOLAMIDE (DIAMOX) 250 MG tablet Take 1 tablet by mouth daily  Walter Vance MD   linagliptin (TRADJENTA) 5 MG tablet Take 1 tablet by mouth daily  Walter Vance MD   budesonide-formoterol (SYMBICORT) 160-4.5 MCG/ACT AERO Inhale 2 puffs into the lungs 2 times daily  Walter Vance MD   ibuprofen (ADVIL;MOTRIN) 400 MG tablet Take 1 tablet by mouth every 6 hours as needed for Pain or Fever  Walter Vance MD      Scheduled Meds:   rivaroxaban  15 mg Oral Daily    [START ON 8/5/2022] isosorbide mononitrate  30 mg Oral Daily    metoprolol succinate  25 mg Oral Daily    amoxicillin  500 mg Oral 3 times per day    acetaZOLAMIDE  250 mg Oral Daily    aspirin  81 mg Oral Daily    diclofenac sodium  2 g Topical BID    furosemide  40 mg Oral BID    glipiZIDE  5 mg Oral BID AC    lisinopril  2.5 mg Oral Daily    pantoprazole  40 mg Oral QAM AC    rosuvastatin  5 mg Oral Daily    triamcinolone   Topical BID    gabapentin  300 mg Oral TID     Continuous Infusions:   dextrose       PRN Meds:albuterol sulfate HFA, ALPRAZolam, ibuprofen, ipratropium-albuterol, nitroGLYCERIN, sodium chloride, perflutren lipid microspheres, dextrose bolus **OR** dextrose bolus, glucagon (rDNA), dextrose   Past Medical History:  Past Medical History:   Diagnosis Date    Acute MI ([facility]     Acute pyelonephritis 09/08/2015    Anxiety and depression     Arthritis     CAD (coronary artery disease)     two heart stent one in 2000 and 2nd one 6 months later on 2000, had MI in 2000    Cancer [facility])     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    Cancer ([facility]     \"skin on top of my head\"    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    Chronic obstructive pulmonary disease ([facility] 01/13/2017    Claustrophobia     COPD (chronic obstructive pulmonary disease) (H[facility])     ESBL (extended spectrum beta-lactamase) producing bacteria infection 08/14/2021    Esophageal stricture     Gastroesophageal reflux disease without esophagitis 03/24/2016    Hiatal hernia     Hiatal hernia     Hypercholesteremia     Hypertension     IBS (irritable bowel syndrome)     Migraines     Movement disorder arthritis    Pneumonia     PONV (postoperative nausea and vomiting)     Type II or unspecified type diabetes mellitus without mention of complication, not stated as uncontrolled     type ll does not take insulin at home    Unspecified cerebral artery occlusion with cerebral infarction     many TIA's        Past Surgical History:    has a past surgical history that includes Cardiac surgery; Gallbladder surgery; Hysterectomy; Appendectomy;  Cholecystectomy; Colonoscopy; Upper gastrointestinal endoscopy (4/20/12); Endoscopy, colon, diagnostic; Tear duct surgery; Upper gastrointestinal endoscopy (06/11/2018); Colonoscopy (06/11/2018); pr exc skin malig <5mm remaindr body (N/A, 9/6/2018); pr split grft trunk,arm,leg <100sqcm (N/A, 9/6/2018); skin biopsy; eye surgery; bronchoscopy (N/A, 1/24/2020); bronchoscopy (N/A, 1/27/2020); Cataract removal (2013 or 2014); Esophagus dilation; and bronchoscopy (N/A, 11/23/2020). Social History:  Reviewed. reports that she quit smoking about 5 years ago. Her smoking use included cigarettes. She has a 12.25 pack-year smoking history. She has never used smokeless tobacco. She reports current alcohol use of about 1.0 standard drink per week. She reports that she does not use drugs. Family History:  Reviewed. family history includes Cancer in her father, mother, and sister; Heart Disease in her brother, mother, and sister. Denies family history of sudden cardiac death, arrhythmia, premature CAD    Review of Systems:  Constitutional: Negative for fever, weight changes, or weakness  Skin: Negative for bruising, bleeding, blood clots, or changes in skin pigment  HEENT: Negative for vision changes or dysphagia  Respiratory: Reviewed in HPI  Cardiovascular: Reviewed in HPI  Gastrointestinal: Negative for abdominal pain, N/V/D, constipation, or black/tarry stools  Genito-Urinary: Negative for hematuria  Musculoskeletal: No focal weakness  Neurological/Psych: Negative for confusion or TIA-like symptoms.  No anxiety, depression, or insomnia    Physical Examination:  Vitals:    08/04/22 1128   BP: (!) 107/47   Pulse: 53   Resp: 18   Temp: 98 °F (36.7 °C)   SpO2: 94%      In: 720 [P.O.:720]  Out: 400    Wt Readings from Last 3 Encounters:   08/04/22 266 lb 12.1 oz (121 kg)   07/14/22 264 lb (119.7 kg)   05/19/22 269 lb (122 kg)       Intake/Output Summary (Last 24 hours) at 8/4/2022 1139  Last data filed at 8/4/2022 0549  Gross per 24 hour   Intake 480 ml   Output 400 ml   Net 80 ml     Telemetry: Personally Reviewed Normal sinus rhythm, PVC  Constitutional: Cooperative and in no apparent distress, and appears well nourished  Skin: Warm and pink; no cyanosis, bruising, or clubbing  HEENT: Symmetric and normocephalic. Conjunctiva pink with clear sclera. Mucus membranes pink and moist.   Cardiovascular: regular rate and rhythm. S1 & S2, negative for murmurs. Peripheral pulses 2+, capillary refill < 3 seconds. negative elevation of JVP. No significant edema  Respiratory: Respirations symmetric and unlabored. Lungs to auscultation bilaterally, no crackles, or rhonchi + O2, wheezes  Gastrointestinal: Abdomen soft and round. Bowel sounds normoactive in all quadrants. Musculoskeletal: No focal weakness. Neurologic/Psych: Awake and orientated to person, place and time. Calm affect, appropriate mood    Pertinent labs, diagnostic, device, and imaging results reviewed as a part of this visit    Labs:    BMP:   Recent Labs     08/01/22 1819 08/02/22  0847    136   K 3.8 4.1   CL 99 102   CO2 29 29   BUN 20 18   CREATININE 1.0 0.9     Estimated Creatinine Clearance: 71 mL/min (based on SCr of 0.9 mg/dL).    CBC:   Recent Labs     08/01/22 1819 08/02/22  0848   WBC 14.4* 8.0   HGB 14.0 14.7   HCT 43.4 45.1   MCV 86.5 87.7    198     Thyroid:   Lab Results   Component Value Date/Time    TSH 2.34 06/06/2011 09:40 AM     Lipids:   Lab Results   Component Value Date/Time    CHOL 226 11/07/2019 07:05 AM    HDL 34 11/07/2019 07:05 AM    HDL 32 04/19/2012 05:42 AM    TRIG 300 11/07/2019 07:05 AM     LFTS:   Lab Results   Component Value Date/Time    ALT 14 08/01/2022 06:19 PM    AST 20 08/01/2022 06:19 PM    ALKPHOS 82 08/01/2022 06:19 PM    PROT 6.8 08/01/2022 06:19 PM    PROT 7.6 12/02/2011 09:38 AM    AGRATIO 1.2 08/01/2022 06:19 PM    BILITOT 0.3 08/01/2022 06:19 PM     Cardiac Enzymes:   Lab Results   Component Value Date/Time    CKTOTAL 24 06/07/2018 12:19 AM CKMB 0.29 08/09/2011 04:45 PM    TROPONINI <0.01 08/01/2022 06:19 PM    TROPONINI <0.01 08/15/2021 07:05 AM    TROPONINI <0.01 08/15/2021 12:43 AM     Coags:   Lab Results   Component Value Date/Time    PROTIME 13.0 08/01/2022 06:19 PM    INR 0.99 08/01/2022 06:19 PM     ECG: AF     ECHO:  8/17/2021  Technically difficult exam due to patient body habitus. Normal left ventricle size, wall thickness, and systolic function with an   estimated ejection fraction of 60-65%. Diastolic filling parameters suggests grade II diastolic dysfunction. Mild mitral regurgitation is present. Mild aortic stenosis with a peak velocity of 2.27 m/s and a mean pressure   gradient of 11 mmHg. Stress Test: 11/17/2020   Summary    SPECT images demonstrate homogeneous tracer distribution throughout the    myocardium. There is normal isotope uptake at stress and rest. There is no evidence of    myocardial ischemia or scar. Normal LV size and systolic function. Left ventricular ejection fraction of 76 %. Cardiac Cath with FFR: 9/23/2019  Anatomy:   LM-Normal No disease   LAD-proximal stent 10% ISR, mid stent patent. Distal LAD 20% irregular stenosis. D2 70% ostial stent MCC  Cx-Patent, mild luminal irregularities  OM1- ostial discrete 60% stenosis  RCA-Dominant, large sized vessel. Proximal 70% heavily calcified stenosis. RPDA- Patent, mild luminal irregularities     FFR of prox RCA 0.84     Impression  ~Angiography w/ single vessel moderate to severe disease. Patent LAD stents  ~LVEDP 20  ~Non ischemic FFR of RCA     Recommendation  ~Aggressive medical treatment and risk factor modification  1. Medications reviewed, no changes at this time. 2. Post cath IVF. Bedrest.  3. No indication for beta blocker, statin or anti platelet therapy  4. Patient has been advised on the importance of regular exercise of at least 20-30 minutes daily alternating with aerobic and isometric activities.   5. Patient counseled about and offered assistance for smoking cessation  6. No indication for cardiac rehab  4. Follow up in 2 months with cardiology     All questions and concerns were addressed to the patient. Alternatives to my treatment were discussed. I have discussed the above stated plan with patient and the nurse. The patient verbalized understanding and agreed with the plan. Thank you for allowing to us to participate in the care of Drew Arenas.     Windy Bonilla, APRMONICA-CNP  [address]   Office: [phone]

## 2022-08-04 NOTE — PROGRESS NOTES
[facility] [address]   Phone: [phone]    Occupational Therapy    [] Initial Evaluation            [x] Daily Treatment Note         [] Discharge Summary      Patient: Katherine Wolf   : 1945   MRN: [id number]   Date of Service:  2022    Admitting Diagnosis: CVA, old, alterations of sensations  Current Admission Summary: The pt was admitted with LUE weakness and L facial droop. MRI of brain pending, positive for UTI. Past Medical History:  has a past medical history of Acute MI ([address] Acute pyelonephritis, Anxiety and depression, Arthritis, CAD (coronary artery disease), Cancer ([address] Cancer ([address] Cancer of skin of leg, Chronic obstructive pulmonary disease ([address] Claustrophobia, COPD (chronic obstructive pulmonary disease) [address] [address] ESBL (extended spectrum beta-lactamase) producing bacteria infection, Esophageal stricture, Gastroesophageal reflux disease without esophagitis, Hiatal hernia, Hiatal hernia, Hypercholesteremia, Hypertension, IBS (irritable bowel syndrome), Migraines, Movement disorder, Pneumonia, PONV (postoperative nausea and vomiting), Type II or unspecified type diabetes mellitus without mention of complication, not stated as uncontrolled, and Unspecified cerebral artery occlusion with cerebral infarction. Past Surgical History:  has a past surgical history that includes Cardiac surgery; Gallbladder surgery; Hysterectomy; Appendectomy; Cholecystectomy; Colonoscopy; Upper gastrointestinal endoscopy (12); Endoscopy, colon, diagnostic; Tear duct surgery; Upper gastrointestinal endoscopy (2018); Colonoscopy (2018); pr exc skin malig <5mm remaindr body (N/A, 2018); pr split grft trunk,arm,leg <100sqcm (N/A, 2018); skin biopsy; eye surgery; bronchoscopy (N/A, 2020); bronchoscopy (N/A, 2020); Cataract removal ( or ); Esophagus dilation; and bronchoscopy (N/A, 2020).   Discharge Recommendations: Shoaib Liz Luis Bonilla scored a 19/24 on the AM-PAC ADL Inpatient form. Current research shows that an AM-PAC score of 18 or greater is typically associated with a discharge to the patient's home setting. Based on the patient's AM-PAC score, and their current ADL deficits, it is recommended that the patient have 2-3 sessions per week of Occupational Therapy at d/c to increase the patient's independence. At this time, this patient demonstrates the endurance and safety to discharge home with [address] [place] [address]home vs OP services) and a follow up treatment frequency of 2-3x/wk. Please see assessment section for further patient specific details. If patient discharges prior to next session this note will serve as a discharge summary. Please see below for the latest assessment towards goals.      HOME HEALTH CARE: LEVEL 1 STANDARD     - Initial home health evaluation to occur within 24-48 hours, in patient home  - Therapy to evaluate with goal of regaining prior level of functioning  - Therapy to evaluate if patient has [id number] needs for personal care     DME Required For Discharge: no DME required at discharge  Precautions/Restrictions: high fall risk  Weight Bearing Restrictions: no restrictions  [] Right Upper Extremity        [] Left Upper Extremity         [] Right Lower Extremity         [] Left Lower Extremity             Required Braces/Orthotics: no braces required                   [] Right            [] Left  Positional Restrictions:no positional restrictions     Pre-Admission Information   Social/Functional History  Lives With: Alone  Type of Home: House  Home Layout: One level  Home Access: Ramped entrance  Bathroom Shower/Tub: Tub/Shower unit (arina helps her get into the tub)  Bathroom Toilet: Handicap height  Bathroom Equipment: Grab bars in shower, Shower chair, Hand-held shower (alarm string near toilet and bed)  Bathroom Accessibility: Accessible  Home Equipment: Reacher, [id number], scooter, O2, hospital bed Receives Help From: Home health, Family, Personal care attendant  ADL Assistance: Needs assistance (daughter aid and nurse all help, grddtr helps with showers, pt has an aide 5x/week)  Homemaking Assistance: Needs assistance (has a hospital tray at home)  Homemaking Responsibilities: No  Ambulation Assistance: Independent (pt able to walk to bathroom and kitchen with rollator, outside uses scooter, leans on forearms on 4WW)  Transfer Assistance: Independent (pt able to get out of bed, pt uses lift chair when in chair)  Active : No  Leisure & Hobbies: drives scooter around the neighborhood with other neighbors with scooters  IADL Comments: daughter, aid and nurse that helps her with ADLS (bathing, dressing, taking out trash)- neighbors have keys to come in and help when needed  Additional Comments: states she is a fall risk and avoids anything that would possibly make her fall- pt states 3 falls in the last 3 months, neighbor or daughter sit with the pt if the pt feels she is having low glucose or symptoms of mini stroke (happens intermittently)         Subjective  General: Patient L sidelying upon arrival /47 SpO2 94% , agreeable to OT session with encouragement but to only sit EOB. Pt reporting Max fatigue and back pain \"all over\". Does not rate upon questioning stating she has \"broken multiple vertebrae's over the years\". MD present at [address] [place] [address]. Pain: Patient does not rate upon questioning  Pain Interventions: repositioned         Activities of Daily Living  Basic Activities of Daily Living  Feeding: setup assistance  Feeding Comments: Given water side-lying in bed, pt declines offer to sit EOB to drink. Upper Extremity Bathing: stand by assistance minimal assistance  Upper Extremity Dressing: minimal assistance  Dressing Comments: Sponge bathe UE/gown chg seated EOB, assist for posterior wash and tie.  Pt demo'd ability to thread B UE   General Comments: Increased time for all ADL tasks this date d/t fatigue. Pt declines all further ADLs offered this date. Instrumental Activities of Daily Living  No IADL completed on this date. Functional Mobility  Bed Mobility  Supine to Sit: stand by assistance  Scooting: stand by assistance  Comments:Sit EOB ~10 Min  Transfers  Comments: Pt declines out of bed activity this date d/t fatigue  Functional Mobility:  No functional mobility completed on this date. Other Therapeutic Interventions    Functional Outcomes  AM-PAC Inpatient Daily Activity Raw Score: 19    Cognition  Overall Cognitive Status: Impaired  Safety Judgement: decreased awareness of need for assistance  Insights: decreased awareness of deficits  Orientation:    alert and oriented x 4  Command Following:   accurately follows one step commands     Education  Barriers To Learning: cognition  Patient Education: patient educated on goals, OT role and benefits, plan of care, discharge recommendations  Learning Assessment:  patient verbalizes understanding, would benefit from continued reinforcement    Assessment  Activity Tolerance:  Tolerated fair (limited by fatigue and some self-limiting behaviors, will participate with encouragement)    Impairments Requiring Therapeutic Intervention: decreased functional mobility, decreased ADL status, decreased safety awareness, decreased endurance, decreased balance, decreased IADL, decreased posture  Prognosis: good  Clinical Assessment: Patient presents with the above deficits, which is below baseline, and would benefit from continued skilled OT to address  Safety Interventions: patient left in bed, bed alarm in place, call light within reach, patient at risk for falls, and nurse notified MD present at [facility]    Plan  Frequency: 5-7 x/week  Current Treatment Recommendations: balance training, functional mobility training, transfer training, endurance training, patient/caregiver education, ADL/self-care training, IADL training, and safety education    Goals  Patient Goals:  To return home   Short Term Goals:  Time Frame: Upon discharge  Patient will complete lower body ADL at supervision   Patient will complete toileting at modified independent   Patient will complete functional transfers at modified independent   Patient will complete functional mobility at modified independent   Patient will complete bed mobility at modified independent     Continue Goal 8/04  Therapy Session Time     Individual Group Co-treatment   Time In 1118     Time Out 1145     Minutes 27          Timed Code Treatment Minutes:   27 Minutes  Total Treatment Minutes:  27 Minutes       Electronically Signed By: SHELBI [id number]

## 2022-08-04 NOTE — PROGRESS NOTES
Patient discharged to home with home care. IV removed without complication. Discharged instructions explained and all questions answered. Patient transported home by grand daughter \"Latoya\". Patient taken to lobby via wheel chair by PCA.

## 2022-08-04 NOTE — PROGRESS NOTES
[facility]   Phone: [phone]    Physical Therapy    [] Initial Evaluation            [x] Daily Treatment Note         [] Discharge Summary      Patient: Jayden Dorman   : 1945   MRN: [id number]   Date of Service:  2022  Admitting Diagnosis: CVA, old, alterations of sensations  Current Admission Summary: The pt was admitted with LUE weakness and L facial droop. MRI of brain pending, positive for UTI. Past Medical History:  has a past medical history of Acute MI ([address] Acute pyelonephritis, Anxiety and depression, Arthritis, CAD (coronary artery disease), Cancer ([address] Cancer ([address] Cancer of skin of leg, Chronic obstructive pulmonary disease ([address] Claustrophobia, COPD (chronic obstructive pulmonary disease) ([facility] [address] [facility] ESBL (extended spectrum beta-lactamase) producing bacteria infection, Esophageal stricture, Gastroesophageal reflux disease without esophagitis, Hiatal hernia, Hiatal hernia, Hypercholesteremia, Hypertension, IBS (irritable bowel syndrome), Migraines, Movement disorder, Pneumonia, PONV (postoperative nausea and vomiting), Type II or unspecified type diabetes mellitus without mention of complication, not stated as uncontrolled, and Unspecified cerebral artery occlusion with cerebral infarction. Past Surgical History:  has a past surgical history that includes Cardiac surgery; Gallbladder surgery; Hysterectomy; Appendectomy; Cholecystectomy; Colonoscopy; Upper gastrointestinal endoscopy (12); Endoscopy, colon, diagnostic; Tear duct surgery; Upper gastrointestinal endoscopy (2018); Colonoscopy (2018); pr exc skin malig <5mm remaindr body (N/A, 2018); pr split grft trunk,arm,leg <100sqcm (N/A, 2018); skin biopsy; eye surgery; bronchoscopy (N/A, 2020); bronchoscopy (N/A, 2020); Cataract removal ( or ); Esophagus dilation; and bronchoscopy (N/A, 2020).   Discharge Recommendations: Jayden Dorman scored a 17/24 on the AM-PAC short mobility form. Current research shows that an AM-PAC score of 17 or less is typically not associated with a discharge to the patient's home setting. Based on the patient's AM-PAC score and their current functional mobility deficits, it is recommended that the patient have 3-5 sessions per week of Physical Therapy at d/c to increase the patient's independence. Please see assessment section for further patient specific details. **If pt refuses SNF, recommend HHPT. HOME HEALTH CARE: LEVEL 1 STANDARD    - Initial home health evaluation to occur within 24-48 hours, in patient home   - Therapy to evaluate with goal of regaining prior level of functioning   - Therapy to evaluate if patient has [address] needs for personal care    If patient discharges prior to next session this note will serve as a discharge summary. Please see below for the latest assessment towards goals.       DME Required For Discharge: no DME required at discharge  Precautions/Restrictions: high fall risk  Weight Bearing Restrictions: no restrictions  [] Right Upper Extremity  [] Left Upper Extremity [] Right Lower Extremity  [] Left Lower Extremity     Required Braces/Orthotics: no braces required   [] Right  [] Left  Positional Restrictions:no positional restrictions    Pre-Admission Information   Social/Functional History  Lives With: Alone  Type of Home: House  Home Layout: One level  Home Access: Ramped entrance  Bathroom Shower/Tub: Tub/Shower unit (cristinodylon helps her get into the tub)  Bathroom Toilet: Handicap height  Bathroom Equipment: Grab bars in shower, Shower chair, Hand-held shower (alarm string near toilet and bed)  Bathroom Accessibility: Accessible  Home Equipment: Reacher, [id number], scooter, O2, hospital bed    Receives Help From: Home health, Family, Personal care attendant  ADL Assistance: Needs assistance (daughter aid and nurse all help, haroldo helps with showers, pt has an aide 5x/week)  Homemaking Assistance: Needs assistance (has a hospital tray at home)  Homemaking Responsibilities: No  Ambulation Assistance: Independent (pt able to walk to bathroom and kitchen with rollator, outside uses scooter, leans on forearms on 4WW)  Transfer Assistance: Independent (pt able to get out of bed, pt uses lift chair when in chair)  Active : No  Leisure & Hobbies: drives scooter around the neighborhood with other neighbors with scooters  IADL Comments: daughter, aid and nurse that helps her with ADLS (bathing, dressing, taking out trash)- neighbors have keys to come in and help when needed  Additional Comments: states she is a fall risk and avoids anything that would possibly make her fall- pt states 3 falls in the last 3 months, neighbor or daughter sit with the pt if the pt feels she is having low glucose or symptoms of mini stroke (happens intermittently)      Subjective  General: Pt sidelying in bed, with RN in room upon PT arrival. Pt agreeable to PT treatment. Pain: Patient does not rate upon questioning, pain in bart LE   Pain Interventions: RN notified and repositioned        Functional Mobility  Bed Mobility  Supine to Sit: stand by assistance  Scooting: stand by assistance  Comments:  Transfers  Sit to stand transfer: stand by assistance  Stand to sit transfer: stand by assistance  Comments: Verbal cueing required for hand placement during transfers, as well as for putting brakes on rollator prior to completing transfer as pt unlocks breaks (after therapist locks them) prior to transfers. Pt demonstrates limited carryover throughout session. Pt uses arms for momentum on rollator for sit>stand transfers, and demonstrates decreased eccentric control in stand>sit.   Ambulation  Assistive Device: rollator (5WSR)  Assistance: contact guard assistance  Distance: 15'+ 20' +30' +30' +30' +30'  Gait Mechanics: flexed posture with intermittent forearms on walker, small steps, decreased cory  Comments:  Pt demonstrates poor safety awareness, hitting objects in room with rollator and replying to therapist's cueing \"this is how I do it at home and it works fine\"  Pt ambulated to bathroom 15', to recliner 20', and completed two laps to window from recliner (30') and back (30') with seated rest breaks between. Stair Mobility  Stair mobility not completed on this date. Comments:  Wheelchair Mobility:  No w/c mobility completed on this date. Comments:  Balance  Static Sitting Balance: good: independent with functional balance in unsupported position  Dynamic Sitting Balance: fair (+): maintains balance at SBA/supervision without use of UE support  Static Standing Balance: fair (-): maintains balance at CGA with use of UE support  Dynamic Standing Balance: fair (-): maintains balance at CGA with use of UE support  Comments:pt was assisted with toileting and bathing while seated on the commode and then recliner     Other Therapeutic Interventions  Glute set: 8x4s hold   Toileting  Functional Outcomes  AM-PAC Inpatient Mobility Raw Score : 17              Cognition  Safety Judgement: decreased awareness of need for assistance, decreased awareness of need for safety  Comments: pt reports that she fears falling and \"does not do anything that might cause a fall\", however consistently avoids putting brakes on rollator and leaves rollator behind during stand> sit transfers despite therapist's cueing.    Orientation:    alert and oriented x 4  Command Following:   [facility]    Education  Barriers To Learning: none  Patient Education: patient educated on PT role and benefits, plan of care, HEP, general safety, functional mobility training, proper use of assistive device/equipment, injury prevention, transfer training, discharge recommendations  Learning Assessment:  patient verbalizes understanding, would benefit from continued reinforcement    Assessment  Activity Tolerance: Decreased, pt becomes fatigued quickly with minimal ambulating, requiring frequent rest breaks. Pt SOB after ambulation, with SpO2 92%. Impairments Requiring Therapeutic Intervention: decreased functional mobility, decreased ROM, decreased strength, decreased safety awareness, decreased endurance, decreased balance, increased pain, decreased posture  Prognosis: fair  Clinical Assessment: The pt is a 69 y.o female who presents with the above impairments. Pt demonstrates progress towards goals by ambulating in the room more than previous sessions, however has poor safety awareness and endurance throughout interventions. Due to pt becoming SOB with minimal activity, and limited carryover of therapists cues for hand placement and brake management throughout session, SNF placement upon discharge with physical therapy 3-5x per week is recommended. Pt has also experienced several falls at home, and is deconditioned. Pt will continue to benefit from skilled physical therapy. Safety Interventions: patient left in chair, chair alarm in place, call light within reach, gait belt, patient at risk for falls, sitter present, and nurse notified    Plan  Frequency: 5-7 x/week  Current Treatment Recommendations: strengthening, ROM, balance training, functional mobility training, transfer training, gait training, endurance training, neuromuscular re-education, patient/caregiver education, home exercise program, and safety education    Goals  Patient Goals: return home independently  Short Term Goals:  Time Frame:  To be met prior to DC  Patient will complete bed mobility at [facility] independent   Patient will complete transfers at supervision   Patient will ambulate 40 ft with use of rollator (4WRW) at supervision  **No goals met 8/4    Therapy Session Time      Individual Group Co-treatment   Time In 0840       Time Out 0950       Minutes 70         Timed Code Treatment Minutes:  70 Minutes  Total Treatment Minutes:  70 Minutes     Catarina Jung, SPT  PT providing direct supervision during session and assisting in making skilled judgements throughout session.     Electronically Signed By: RICHI Gonzales PT, DPT, [id number]

## 2022-08-04 NOTE — PROGRESS NOTES
Marnie Mclean  Neurology Follow-up  [facility] Neurology    Date of Service: 8/4/2022    Subjective:   CC: Follow up today regarding: Acute stroke    Events noted. Chart and lab reviewed. The patient is more awake and alert today. Denies any residual deficit. MRI showed possible small left ischemic frontal stroke. No headache, dysphagia or dysarthria. Other review of system was unremarkable. ROS : A 10-12 system review obtained and updated today and is unremarkable except as mentioned  in my interval history.        Current Facility-Administered Medications   Medication Dose Route Frequency Provider Last Rate Last Admin    rivaroxaban (XARELTO) tablet 15 mg  15 mg Oral Daily MD Jacky Roman ON 8/5/2022] isosorbide mononitrate (IMDUR) extended release tablet 30 mg  30 mg Oral Daily Pearl Bernabe MD        metoprolol succinate (TOPROL XL) extended release tablet 25 mg  25 mg Oral Daily Jomar Uribe MD   25 mg at 08/03/22 1007    amoxicillin (AMOXIL) capsule 500 mg  500 mg Oral 3 times per day Pearl Bernabe MD   500 mg at 08/04/22 0551    acetaZOLAMIDE (DIAMOX) tablet 250 mg  250 mg Oral Daily Pearl Bernabe MD   250 mg at 08/04/22 0842    albuterol sulfate HFA (PROVENTIL;VENTOLIN;PROAIR) 108 (90 Base) MCG/ACT inhaler 2 puff  2 puff Inhalation Q6H PRN Pearl Bernabe MD        ALPRAZolam Jennett Dubin) tablet 1 mg  1 mg Oral TID PRN Pearl Bernabe MD   1 mg at 08/03/22 2229    aspirin chewable tablet 81 mg  81 mg Oral Daily Pearl Bernabe MD   81 mg at 08/04/22 0929    diclofenac sodium (VOLTAREN) 1 % gel 2 g  2 g Topical BID Pearl Bernabe MD   2 g at 08/04/22 0851    furosemide (LASIX) tablet 40 mg  40 mg Oral BID Pearl Bernabe MD   40 mg at 08/04/22 0843    glipiZIDE (GLUCOTROL) tablet 5 mg  5 mg Oral BID AC Pearl Bernabe MD   5 mg at 08/04/22 0551    ibuprofen (ADVIL;MOTRIN) tablet 400 mg  400 mg Oral Q6H PRN Pearl Bernabe MD        ipratropium-albuterol (Daniel Mikaela) nebulizer solution 1 ampule  1 ampule Inhalation Q4H PRN Julita Lucero MD        lisinopril (PRINIVIL;ZESTRIL) tablet 2.5 mg  2.5 mg Oral Daily Julita Lucero MD   2.5 mg at 08/04/22 0843    nitroGLYCERIN (NITROSTAT) SL tablet 0.4 mg  0.4 mg SubLINGual Q5 Min PRN Julita Lucero MD        pantoprazole (PROTONIX) tablet 40 mg  40 mg Oral QAM AC Julita Lucero MD   40 mg at 08/04/22 0551    rosuvastatin (CRESTOR) tablet 5 mg  5 mg Oral Daily Julita Lucero MD   5 mg at 08/04/22 3104    sodium chloride (OCEAN, BABY AYR) 0.65 % nasal spray 1 spray  1 spray Nasal PRN Julita Lucero MD        triamcinolone (KENALOG) 0.1 % cream   Topical BID Julita Lucero MD   Given at 08/04/22 0790    perflutren lipid microspheres (DEFINITY) injection 1.65 mg  1.5 mL IntraVENous ONCE PRN Julita Lucero MD        dextrose bolus 10% 125 mL  125 mL IntraVENous PRN Julita Lucero MD        Or    dextrose bolus 10% 250 mL  250 mL IntraVENous PRN Julita Lucero MD        glucagon (rDNA) injection 1 mg  1 mg SubCUTAneous PRN Julita Lucero MD        dextrose 10 % infusion   IntraVENous Continuous PRN Julita Lucero MD        gabapentin (NEURONTIN) capsule 300 mg  300 mg Oral TID KAE Shaw - CNP   300 mg at 08/04/22 6687     Allergies   Allergen Reactions    Morphine Shortness Of Breath    Codeine Other (See Comments)     Chest pain    Hydromorphone Other (See Comments)     hallucinations  Hallucinations    But will take if needed in an emergency    Levaquin [Levofloxacin In D5w] Itching    Vicodin [Hydrocodone-Acetaminophen] Hives    Aspirin      Upsets hiatal hernia     Past Medical History:   Diagnosis Date    Acute MI ([facility] [address]     Acute pyelonephritis 09/08/2015    Anxiety and depression     Arthritis     CAD (coronary artery disease)     two heart stent one in 2000 and 2nd one 6 months later on 2000, had MI in 2000    Cancer [facility])     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    Cancer ([facility] [address]     \"skin on top of my head\"    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    Chronic obstructive pulmonary disease ([facility] [address] 01/13/2017    Claustrophobia     COPD (chronic obstructive pulmonary disease) (HCC)     ESBL (extended spectrum beta-lactamase) producing bacteria infection 08/14/2021    Esophageal stricture     Gastroesophageal reflux disease without esophagitis 03/24/2016    Hiatal hernia     Hiatal hernia     Hypercholesteremia     Hypertension     IBS (irritable bowel syndrome)     Migraines     Movement disorder arthritis    Pneumonia     PONV (postoperative nausea and vomiting)     Type II or unspecified type diabetes mellitus without mention of complication, not stated as uncontrolled     type ll does not take insulin at home    Unspecified cerebral artery occlusion with cerebral infarction     many TIA's         Objective:  Exam:   Constitutional:   Vitals:    08/04/22 0400 08/04/22 0728 08/04/22 0745 08/04/22 0828   BP: (!) 136/58  (!) 96/48 (!) 102/53   Pulse: 67  62 61   Resp: 18  18    Temp: 98 °F (36.7 °C)  97.6 °F (36.4 °C)    TempSrc: Oral  Axillary    SpO2: 92%  92%    Weight:  266 lb 12.1 oz (121 kg)     Height:         General appearance:  Normal development and appear in no acute distress. Mental Status:   Oriented to person, place, problem, and time. Memory: Poor immediate recall. Intact remote memory  Normal attention span and concentration. Language: intact naming, repeating and fluency   poor fund of Knowledge. Cranial Nerves:   II:   Pupils: equal, round, reactive to light  III,IV,VI: Extra Ocular Movements are intact.  No nystagmus  V: Facial sensation is intact  VII: Facial strength and movements: intact and symmetric  XII: Tongue movements are normal  Musculoskeletal: Generalized diffuse weakness with poor effort  1+ DTRs throughout in arms and legs  Downgoing plantars  Normal tone  Sensation: Decree sensation distally in her feet  Cerebellar: No tremors or dysmetria        Data:  LABS:   Lab Results   Component Value Date/Time     08/02/2022 08:47 AM    K 4.1 08/02/2022 08:47 AM    K 3.8 08/01/2022 06:19 PM     08/02/2022 08:47 AM    CO2 29 08/02/2022 08:47 AM    BUN 18 08/02/2022 08:47 AM    CREATININE 0.9 08/02/2022 08:47 AM    GFRAA >60 08/02/2022 08:47 AM    GFRAA >60 05/02/2013 10:40 AM    LABGLOM >60 08/02/2022 08:47 AM    GLUCOSE 128 08/02/2022 08:47 AM    MG 1.60 11/15/2020 10:02 PM    CALCIUM 9.5 08/02/2022 08:47 AM     Lab Results   Component Value Date/Time    WBC 8.0 08/02/2022 08:48 AM    RBC 5.14 08/02/2022 08:48 AM    HGB 14.7 08/02/2022 08:48 AM    HCT 45.1 08/02/2022 08:48 AM    MCV 87.7 08/02/2022 08:48 AM    RDW 15.8 08/02/2022 08:48 AM     08/02/2022 08:48 AM     Lab Results   Component Value Date    INR 0.99 08/01/2022    PROTIME 13.0 08/01/2022       I reviewed blood testing and other test results and discussed results with the patient  Neuroimaging reviewed and discussed results with the patient    Impression:   Acute to subacute left ischemic frontal stroke. Cardioembolic versus thromboembolic. New onset A. fib  Hypertension, not controlled  Hyperlipidemia  Chronic cognitive impairment  Diabetes      Recommendation    MRI results discussed with the patient and the stroke education provided  Pros and cons of anticoagulation addressed today  She denies any recent bleeding or history of recurrent falling  Agree with anticoagulation  PT and OT  Blood pressure control on current medications  Statin  Discussed risk of hemorrhagic conversion  Insulin sliding scale  Blood sugar control  Monitor blood pressure and blood sugar at home  Can be discharged once medically stable  No further recommendation        Celestino Caruso MD   [phone]      This dictation was generated by voice recognition computer software.  Although all attempts are made to edit the dictation for accuracy, there may be errors in the transcription that are not intended.

## 2022-08-16 ENCOUNTER — HOSPITAL ENCOUNTER (OUTPATIENT)
Age: 77
Setting detail: SPECIMEN
Discharge: HOME | End: 2022-08-16
Payer: COMMERCIAL

## 2022-08-16 PROCEDURE — 83036 HEMOGLOBIN GLYCOSYLATED A1C: CPT

## 2022-08-16 PROCEDURE — 36415 COLL VENOUS BLD VENIPUNCTURE: CPT

## 2022-08-17 LAB
ESTIMATED AVERAGE GLUCOSE: 162.8 MG/DL
HBA1C MFR BLD: 7.3 %

## 2022-09-01 RX ORDER — GLIPIZIDE 5 MG/1
5 TABLET ORAL
Qty: 180 TABLET | Refills: 3 | Status: SHIPPED | OUTPATIENT
Start: 2022-09-01

## 2022-09-02 PROBLEM — N39.0 UTI (URINARY TRACT INFECTION): Status: RESOLVED | Noted: 2021-08-14 | Resolved: 2022-09-02

## 2022-09-22 NOTE — DISCHARGE SUMMARY
[facility]                     [address] [facility], [address]                               DISCHARGE SUMMARY    PATIENT NAME: Hanna Wild                      :        1945  MED REC NO:   [id number]                          ROOM:       [id number]  ACCOUNT NO:   [de-identified]                           ADMIT DATE: 2022  PROVIDER:     Jamse Severe, MD                  DISCHARGE DATE:  2022    FINAL DIAGNOSES:  1. Dysarthria. 2.  TIA. 3.  Hypertension. 4.  COPD.  5.  Hemiparesis. 6.  Morbid obesity. DISCHARGE MEDICATIONS:  1.  Isosorbide mononitrate 30 mg in the morning. 2.  Keflex 250 mg in the morning, afternoon, and at bedtime. 3.  Prinivil 2.5 mg daily. 4.  Metoprolol succinate 25 mg once a day. 5.  Xarelto 15 mg every morning. 6.  Voltaren gel 2 gm b.i.d.  7.  Albuterol sulfate two puffs every 4 hours p.r.n.  8.  Lasix 40 mg b.i.d.  9.  Incruse Ellipta one puff daily. 10.  Kenalog cream 0.1% b.i.d. 11.  DuoNeb one every 4 hours p.r.n.  12.  Nystatin powder b.i.d.  13.  Crestor 10 mg half tablet daily. 14.  Aspirin 81 mg once a day. 15.  Nitrostat sublingual p.r.n.  16.  Neurontin 300 mg p.o. q.i.d. 17.  Diamox 250 mg once a day. 18.  Symbicort two puffs twice a day. HOSPITAL COURSE:  This elderly woman with history of _____ morbid  obesity came to the emergency room with history of slurring of speech,  left-sided weakness. Symptom resolved by itself. The patient underwent  for hospitalization neuro checks, Neurology consultation, MRI of the  brain. MRI of the brain shows microvascular ischemia with no acute  infarction. Vital signs were stable. Blood glucose was 158. Sodium  136, potassium 4.1. Head CT without contrast shows no acute  intracranial abnormality.   CT angiogram of the head and neck shows 50%  stenosis of the region of the right internal carotid artery, otherwise  no acute abnormality, although flow-limiting stenosis in the remaining  of the major arteries. The patient was given PT, OT and Speech evaluation. Her neurologic  symptoms had resolved. The patient was discharged in stable condition  with home health care.         Emilia Ahumada, MD    D: 09/21/2022 22:47:23       T: 09/22/2022 4:33:20     [email or web address]  Job#: [id number]     Doc#: [id number]    CC:

## 2022-10-26 ENCOUNTER — APPOINTMENT (OUTPATIENT)
Dept: CT IMAGING | Age: 77
DRG: 552 | End: 2022-10-26
Payer: COMMERCIAL

## 2022-10-26 ENCOUNTER — HOSPITAL ENCOUNTER (INPATIENT)
Age: 77
LOS: 4 days | Discharge: HOME HEALTH | DRG: 552 | End: 2022-10-30
Attending: EMERGENCY MEDICINE | Admitting: INTERNAL MEDICINE
Payer: COMMERCIAL

## 2022-10-26 DIAGNOSIS — R07.9 CHEST PAIN, UNSPECIFIED TYPE: Primary | ICD-10-CM

## 2022-10-26 DIAGNOSIS — M54.14 THORACIC RADICULOPATHY: ICD-10-CM

## 2022-10-26 LAB
A/G RATIO: 1.4 (ref 1.1–2.2)
ALBUMIN SERPL-MCNC: 3.8 G/DL (ref 3.4–5)
ALP BLD-CCNC: 84 U/L (ref 40–129)
ALT SERPL-CCNC: 13 U/L (ref 10–40)
ANION GAP SERPL CALCULATED.3IONS-SCNC: 8 MMOL/L (ref 3–16)
AST SERPL-CCNC: 18 U/L (ref 15–37)
BACTERIA: ABNORMAL /HPF
BASOPHILS ABSOLUTE: 0.1 K/UL (ref 0–0.2)
BASOPHILS RELATIVE PERCENT: 0.5 %
BILIRUB SERPL-MCNC: 0.3 MG/DL (ref 0–1)
BILIRUBIN URINE: NEGATIVE
BLOOD, URINE: NEGATIVE
BUN BLDV-MCNC: 19 MG/DL (ref 7–20)
CALCIUM SERPL-MCNC: 10 MG/DL (ref 8.3–10.6)
CHLORIDE BLD-SCNC: 99 MMOL/L (ref 99–110)
CLARITY: CLEAR
CO2: 29 MMOL/L (ref 21–32)
COLOR: YELLOW
CREAT SERPL-MCNC: 0.9 MG/DL (ref 0.6–1.2)
EOSINOPHILS ABSOLUTE: 0.1 K/UL (ref 0–0.6)
EOSINOPHILS RELATIVE PERCENT: 0.9 %
EPITHELIAL CELLS, UA: 0 /HPF (ref 0–5)
GFR SERPL CREATININE-BSD FRML MDRD: >60 ML/MIN/{1.73_M2}
GLUCOSE BLD-MCNC: 188 MG/DL (ref 70–99)
GLUCOSE URINE: NEGATIVE MG/DL
HCT VFR BLD CALC: 46.2 % (ref 36–48)
HEMOGLOBIN: 14.6 G/DL (ref 12–16)
HYALINE CASTS: 0 /LPF (ref 0–8)
KETONES, URINE: NEGATIVE MG/DL
LACTIC ACID: 1.3 MMOL/L (ref 0.4–2)
LEUKOCYTE ESTERASE, URINE: ABNORMAL
LIPASE: 37 U/L (ref 13–60)
LYMPHOCYTES ABSOLUTE: 2.6 K/UL (ref 1–5.1)
LYMPHOCYTES RELATIVE PERCENT: 22 %
MCH RBC QN AUTO: 27.2 PG (ref 26–34)
MCHC RBC AUTO-ENTMCNC: 31.7 G/DL (ref 31–36)
MCV RBC AUTO: 86 FL (ref 80–100)
MICROSCOPIC EXAMINATION: YES
MONOCYTES ABSOLUTE: 0.8 K/UL (ref 0–1.3)
MONOCYTES RELATIVE PERCENT: 6.7 %
NEUTROPHILS ABSOLUTE: 8.4 K/UL (ref 1.7–7.7)
NEUTROPHILS RELATIVE PERCENT: 69.9 %
NITRITE, URINE: POSITIVE
PDW BLD-RTO: 15.6 % (ref 12.4–15.4)
PH UA: 6 (ref 5–8)
PLATELET # BLD: 267 K/UL (ref 135–450)
PMV BLD AUTO: 8.6 FL (ref 5–10.5)
POTASSIUM REFLEX MAGNESIUM: 4.2 MMOL/L (ref 3.5–5.1)
PRO-BNP: 365 PG/ML (ref 0–449)
PROTEIN UA: NEGATIVE MG/DL
RBC # BLD: 5.37 M/UL (ref 4–5.2)
RBC UA: 1 /HPF (ref 0–4)
SODIUM BLD-SCNC: 136 MMOL/L (ref 136–145)
SPECIFIC GRAVITY UA: >=1.03 (ref 1–1.03)
TOTAL PROTEIN: 6.5 G/DL (ref 6.4–8.2)
TROPONIN: <0.01 NG/ML
URINE REFLEX TO CULTURE: ABNORMAL
URINE TYPE: ABNORMAL
UROBILINOGEN, URINE: 0.2 E.U./DL
WBC # BLD: 12 K/UL (ref 4–11)
WBC UA: 2 /HPF (ref 0–5)

## 2022-10-26 PROCEDURE — 6360000004 HC RX CONTRAST MEDICATION: Performed by: PHYSICIAN ASSISTANT

## 2022-10-26 PROCEDURE — 6360000002 HC RX W HCPCS: Performed by: PHYSICIAN ASSISTANT

## 2022-10-26 PROCEDURE — 6370000000 HC RX 637 (ALT 250 FOR IP): Performed by: PHYSICIAN ASSISTANT

## 2022-10-26 PROCEDURE — 96374 THER/PROPH/DIAG INJ IV PUSH: CPT

## 2022-10-26 PROCEDURE — 83605 ASSAY OF LACTIC ACID: CPT

## 2022-10-26 PROCEDURE — 6370000000 HC RX 637 (ALT 250 FOR IP): Performed by: INTERNAL MEDICINE

## 2022-10-26 PROCEDURE — 96375 TX/PRO/DX INJ NEW DRUG ADDON: CPT

## 2022-10-26 PROCEDURE — 81001 URINALYSIS AUTO W/SCOPE: CPT

## 2022-10-26 PROCEDURE — 93005 ELECTROCARDIOGRAM TRACING: CPT | Performed by: EMERGENCY MEDICINE

## 2022-10-26 PROCEDURE — 83690 ASSAY OF LIPASE: CPT

## 2022-10-26 PROCEDURE — 99285 EMERGENCY DEPT VISIT HI MDM: CPT

## 2022-10-26 PROCEDURE — 80053 COMPREHEN METABOLIC PANEL: CPT

## 2022-10-26 PROCEDURE — 84484 ASSAY OF TROPONIN QUANT: CPT

## 2022-10-26 PROCEDURE — 71250 CT THORAX DX C-: CPT

## 2022-10-26 PROCEDURE — 74174 CTA ABD&PLVS W/CONTRAST: CPT

## 2022-10-26 PROCEDURE — 36415 COLL VENOUS BLD VENIPUNCTURE: CPT

## 2022-10-26 PROCEDURE — 85025 COMPLETE CBC W/AUTO DIFF WBC: CPT

## 2022-10-26 PROCEDURE — 83880 ASSAY OF NATRIURETIC PEPTIDE: CPT

## 2022-10-26 PROCEDURE — 1200000000 HC SEMI PRIVATE

## 2022-10-26 RX ORDER — ACETAZOLAMIDE 250 MG/1
250 TABLET ORAL DAILY
Status: CANCELLED | OUTPATIENT
Start: 2022-10-26

## 2022-10-26 RX ORDER — GABAPENTIN 300 MG/1
300 CAPSULE ORAL ONCE
Status: COMPLETED | OUTPATIENT
Start: 2022-10-26 | End: 2022-10-26

## 2022-10-26 RX ORDER — ONDANSETRON 2 MG/ML
4 INJECTION INTRAMUSCULAR; INTRAVENOUS EVERY 8 HOURS PRN
Status: DISCONTINUED | OUTPATIENT
Start: 2022-10-26 | End: 2022-10-31 | Stop reason: HOSPADM

## 2022-10-26 RX ORDER — ALBUTEROL SULFATE 90 UG/1
2 AEROSOL, METERED RESPIRATORY (INHALATION) EVERY 6 HOURS PRN
Status: CANCELLED | OUTPATIENT
Start: 2022-10-26

## 2022-10-26 RX ORDER — ALPRAZOLAM 0.5 MG/1
1 TABLET ORAL 3 TIMES DAILY PRN
Status: CANCELLED | OUTPATIENT
Start: 2022-10-26

## 2022-10-26 RX ORDER — FENTANYL CITRATE 50 UG/ML
50 INJECTION, SOLUTION INTRAMUSCULAR; INTRAVENOUS ONCE
Status: COMPLETED | OUTPATIENT
Start: 2022-10-26 | End: 2022-10-26

## 2022-10-26 RX ORDER — IPRATROPIUM BROMIDE AND ALBUTEROL SULFATE 2.5; .5 MG/3ML; MG/3ML
1 SOLUTION RESPIRATORY (INHALATION) EVERY 4 HOURS PRN
Status: DISCONTINUED | OUTPATIENT
Start: 2022-10-26 | End: 2022-10-31 | Stop reason: HOSPADM

## 2022-10-26 RX ORDER — ALPRAZOLAM 0.5 MG/1
1 TABLET ORAL 3 TIMES DAILY PRN
Status: DISCONTINUED | OUTPATIENT
Start: 2022-10-26 | End: 2022-10-31 | Stop reason: HOSPADM

## 2022-10-26 RX ORDER — TRIAMCINOLONE ACETONIDE 1 MG/G
CREAM TOPICAL 2 TIMES DAILY
Status: DISCONTINUED | OUTPATIENT
Start: 2022-10-26 | End: 2022-10-31 | Stop reason: HOSPADM

## 2022-10-26 RX ORDER — ONDANSETRON 2 MG/ML
4 INJECTION INTRAMUSCULAR; INTRAVENOUS ONCE
Status: COMPLETED | OUTPATIENT
Start: 2022-10-26 | End: 2022-10-26

## 2022-10-26 RX ORDER — DIPHENHYDRAMINE HYDROCHLORIDE 50 MG/ML
25 INJECTION INTRAMUSCULAR; INTRAVENOUS ONCE
Status: COMPLETED | OUTPATIENT
Start: 2022-10-26 | End: 2022-10-26

## 2022-10-26 RX ORDER — FUROSEMIDE 40 MG/1
40 TABLET ORAL DAILY
Status: DISCONTINUED | OUTPATIENT
Start: 2022-10-27 | End: 2022-10-31 | Stop reason: HOSPADM

## 2022-10-26 RX ORDER — ALOGLIPTIN 25 MG/1
25 TABLET, FILM COATED ORAL DAILY
Status: DISCONTINUED | OUTPATIENT
Start: 2022-10-27 | End: 2022-10-31 | Stop reason: HOSPADM

## 2022-10-26 RX ORDER — ISOSORBIDE MONONITRATE 30 MG/1
30 TABLET, EXTENDED RELEASE ORAL DAILY
Status: DISCONTINUED | OUTPATIENT
Start: 2022-10-27 | End: 2022-10-31 | Stop reason: HOSPADM

## 2022-10-26 RX ORDER — GLIPIZIDE 5 MG/1
5 TABLET ORAL
Status: DISCONTINUED | OUTPATIENT
Start: 2022-10-27 | End: 2022-10-31 | Stop reason: HOSPADM

## 2022-10-26 RX ORDER — PANTOPRAZOLE SODIUM 40 MG/1
40 TABLET, DELAYED RELEASE ORAL
Status: DISCONTINUED | OUTPATIENT
Start: 2022-10-27 | End: 2022-10-31 | Stop reason: HOSPADM

## 2022-10-26 RX ORDER — ENOXAPARIN SODIUM 100 MG/ML
40 INJECTION SUBCUTANEOUS DAILY
Status: DISCONTINUED | OUTPATIENT
Start: 2022-10-26 | End: 2022-10-26 | Stop reason: ALTCHOICE

## 2022-10-26 RX ORDER — ASPIRIN 81 MG/1
81 TABLET ORAL DAILY
Status: DISCONTINUED | OUTPATIENT
Start: 2022-10-27 | End: 2022-10-31 | Stop reason: HOSPADM

## 2022-10-26 RX ORDER — NITROGLYCERIN 0.4 MG/1
0.4 TABLET SUBLINGUAL EVERY 5 MIN PRN
Status: DISCONTINUED | OUTPATIENT
Start: 2022-10-26 | End: 2022-10-31 | Stop reason: HOSPADM

## 2022-10-26 RX ORDER — GABAPENTIN 300 MG/1
300 CAPSULE ORAL 4 TIMES DAILY
Status: DISCONTINUED | OUTPATIENT
Start: 2022-10-26 | End: 2022-10-26

## 2022-10-26 RX ORDER — ROSUVASTATIN CALCIUM 10 MG/1
5 TABLET, COATED ORAL NIGHTLY
Status: DISCONTINUED | OUTPATIENT
Start: 2022-10-26 | End: 2022-10-27

## 2022-10-26 RX ORDER — FENTANYL CITRATE 50 UG/ML
25 INJECTION, SOLUTION INTRAMUSCULAR; INTRAVENOUS EVERY 6 HOURS PRN
Status: DISCONTINUED | OUTPATIENT
Start: 2022-10-26 | End: 2022-10-31 | Stop reason: HOSPADM

## 2022-10-26 RX ORDER — LISINOPRIL 5 MG/1
2.5 TABLET ORAL DAILY
Status: DISCONTINUED | OUTPATIENT
Start: 2022-10-27 | End: 2022-10-31 | Stop reason: HOSPADM

## 2022-10-26 RX ORDER — ALBUTEROL SULFATE 90 UG/1
2 AEROSOL, METERED RESPIRATORY (INHALATION) EVERY 4 HOURS PRN
Status: DISCONTINUED | OUTPATIENT
Start: 2022-10-26 | End: 2022-10-31 | Stop reason: HOSPADM

## 2022-10-26 RX ORDER — FENTANYL CITRATE 50 UG/ML
50 INJECTION, SOLUTION INTRAMUSCULAR; INTRAVENOUS ONCE
Status: DISCONTINUED | OUTPATIENT
Start: 2022-10-26 | End: 2022-10-31 | Stop reason: HOSPADM

## 2022-10-26 RX ORDER — METOPROLOL SUCCINATE 25 MG/1
25 TABLET, EXTENDED RELEASE ORAL DAILY
Status: DISCONTINUED | OUTPATIENT
Start: 2022-10-27 | End: 2022-10-31 | Stop reason: HOSPADM

## 2022-10-26 RX ORDER — GABAPENTIN 300 MG/1
600 CAPSULE ORAL 2 TIMES DAILY
Status: DISCONTINUED | OUTPATIENT
Start: 2022-10-27 | End: 2022-10-29

## 2022-10-26 RX ADMIN — DICLOFENAC SODIUM 4 G: 10 GEL TOPICAL at 22:54

## 2022-10-26 RX ADMIN — IOPAMIDOL 75 ML: 755 INJECTION, SOLUTION INTRAVENOUS at 15:40

## 2022-10-26 RX ADMIN — GABAPENTIN 300 MG: 300 CAPSULE ORAL at 22:53

## 2022-10-26 RX ADMIN — DIPHENHYDRAMINE HYDROCHLORIDE 25 MG: 50 INJECTION, SOLUTION INTRAMUSCULAR; INTRAVENOUS at 14:41

## 2022-10-26 RX ADMIN — ROSUVASTATIN CALCIUM 5 MG: 10 TABLET, COATED ORAL at 22:17

## 2022-10-26 RX ADMIN — ONDANSETRON 4 MG: 2 INJECTION INTRAMUSCULAR; INTRAVENOUS at 14:40

## 2022-10-26 RX ADMIN — FENTANYL CITRATE 50 MCG: 0.05 INJECTION, SOLUTION INTRAMUSCULAR; INTRAVENOUS at 14:39

## 2022-10-26 RX ADMIN — ASPIRIN 325 MG: 325 TABLET, COATED ORAL at 17:38

## 2022-10-26 RX ADMIN — ALPRAZOLAM 1 MG: 0.5 TABLET ORAL at 22:17

## 2022-10-26 RX ADMIN — GABAPENTIN 300 MG: 300 CAPSULE ORAL at 21:40

## 2022-10-26 ASSESSMENT — ENCOUNTER SYMPTOMS
BACK PAIN: 1
NAUSEA: 0
ABDOMINAL PAIN: 1
SHORTNESS OF BREATH: 0
VOMITING: 0
COLOR CHANGE: 0
PHOTOPHOBIA: 0
CONSTIPATION: 0
COUGH: 0
DIARRHEA: 0
CHEST TIGHTNESS: 1

## 2022-10-26 ASSESSMENT — PAIN DESCRIPTION - LOCATION
LOCATION: BACK
LOCATION: BACK
LOCATION: BACK;LEG

## 2022-10-26 ASSESSMENT — PAIN SCALES - GENERAL
PAINLEVEL_OUTOF10: 9
PAINLEVEL_OUTOF10: 10
PAINLEVEL_OUTOF10: 10
PAINLEVEL_OUTOF10: 4

## 2022-10-26 ASSESSMENT — PAIN DESCRIPTION - ORIENTATION: ORIENTATION: LEFT

## 2022-10-26 NOTE — ED PROVIDER NOTES
[facility]        Pt Name: Richie Leblanc  MRN: [id number]  [place] 1945  Date of evaluation: 10/26/2022  Provider: ELENA Albarran  PCP: Yoandy Rowland MD  Note Started: 5:18 PM EDT        I have seen and evaluated this patient with my supervising physician Erasmo Soulier, MD.    [facility]       Chief Complaint   Patient presents with    Back Pain     Pt brought in by  EMS due to back pain that she takes gabapentin for at home, states that the pain has progressively gotten worse over the past week, sent in by home health RN after Dr. Amber Gonzalez said take Pt to the hospital.  Pain in the left hip going down her left leg. Chest Pain     After being in the ER for 30 mins, Pt c/o 8/10 chest pain that stays in the center of her chest and is a pressure. HISTORY OF PRESENT ILLNESS   (Location, Timing/Onset, Context/Setting, Quality, Duration, Modifying Factors, Severity, Associated Signs and Symptoms)  Note limiting factors. Chief Complaint: Back Pain/CP     Richie Leblanc is a 69 y.o. female with past medical history of CAD, COPD, hiatal hernia, hypertension, hyperlipidemia, IBS, migraines and diabetes who presents to the ED with complaint of back pain. Patient arrives from home with complaint of back pain by EMS. Patient states she takes gabapentin for chronic back pain. States he was told she had some broken vertebrae in her back. Patient states she has some intermittent pain to her back in the past but states today was worse. Patient states has been gradually worsening over the past week. States radiates into the left hip and into the left thigh. Denies any radiation down to the toes. Denies any falls or injuries. Sent in by home health RN after consultation with PCP. Denies any bowel/bladder incontinence, urinary tension, saddle anesthesia or distal numbness/tingling.   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    Chronic obstructive pulmonary disease ([facility] [address] 01/13/2017    Claustrophobia     COPD (chronic obstructive pulmonary disease) (HCC)     ESBL (extended spectrum beta-lactamase) producing bacteria infection 08/14/2021    Esophageal stricture     Gastroesophageal reflux disease without esophagitis 03/24/2016    Hiatal hernia     Hiatal hernia     Hypercholesteremia     Hypertension     IBS (irritable bowel syndrome)     Migraines     Movement disorder arthritis    Pneumonia     PONV (postoperative nausea and vomiting)     Type II or unspecified type diabetes mellitus without mention of complication, not stated as uncontrolled     type ll does not take insulin at home    Unspecified cerebral artery occlusion with cerebral infarction     many TIA's         SURGICAL HISTORY     Past Surgical History:   Procedure Laterality Date    APPENDECTOMY      BRONCHOSCOPY N/A 1/24/2020    BRONCHOSCOPY ALVEOLAR LAVAGE performed by Phillip Monteiro MD at [address] N/A 1/27/2020    BRONCHOSCOPY DIAGNOSTIC OR CELL [address] ONLY performed by Yaakov Cramer MD at [address] N/A 11/23/2020    BRONCHOSCOPY DIAGNOSTIC OR CELL [address] ONLY performed by Rajinder Vázquez MD at [address]      1 stent 2000 and 1 stent 2001    CATARACT REMOVAL  2013 or 2014    bilateral cataracts removed    CHOLECYSTECTOMY      COLONOSCOPY      COLONOSCOPY  06/11/2018    ENDOSCOPY, COLON, DIAGNOSTIC      ESOPHAGEAL DILATATION      EYE SURGERY      bilateral cataracts    GALLBLADDER SURGERY      HYSTERECTOMY (CERVIX STATUS UNKNOWN)      IN EXC SKIN MALIG <5MM REMAINDR BODY N/A 9/6/2018    EXCISION OF SCALP SQUAMOUS CELL CARCINOMA performed by Jana Garcia MD at [address] [facility] <100SQCM N/A 9/6/2018    SPLIT THICKNESS SKIN GRAFT FOR COVERAGE SCALP, APPLICATION OF WOUND VAC DEVICE performed by Jana Garcia MD at [address] [facility] ENDOSCOPY  4/20/12    with biopsy of stomach,gastritis    UPPER GASTROINTESTINAL ENDOSCOPY  06/11/2018    w/esophagael dilation         CURRENTMEDICATIONS       Previous Medications    ACETAZOLAMIDE (DIAMOX) 250 MG TABLET    Take 1 tablet by mouth daily    ALBUTEROL SULFATE HFA (PROVENTIL;VENTOLIN;PROAIR) 108 (90 BASE) MCG/ACT INHALER    INHALE TWO PUFFS BY MOUTH EVERY 6 HOURS AS NEEDED FOR WHEEZING    ALPRAZOLAM (XANAX) 1 MG TABLET    Take 1 tablet by mouth 3 times daily as needed for Anxiety for up to 30 days. ASPIRIN 81 MG TABLET    Take 81 mg by mouth daily    BLOOD GLUCOSE MONITOR STRIPS    Test 2 times a day & as needed for symptoms of irregular blood glucose. Dispense sufficient amount for indicated testing frequency plus additional to accommodate PRN testing needs. BLOOD GLUCOSE TEST STRIPS (TRUE METRIX BLOOD GLUCOSE TEST) STRIP    USE THREE TIMES A DAY AS NEEDED    BUDESONIDE-FORMOTEROL (SYMBICORT) 160-4.5 MCG/ACT AERO    Inhale 2 puffs into the lungs 2 times daily    CEPHALEXIN (KEFLEX) 250 MG CAPSULE    Take 1 capsule by mouth in the morning and 1 capsule at noon and 1 capsule before bedtime. DICLOFENAC SODIUM (VOLTAREN) 1 % GEL    Apply 2 g topically 2 times daily    FUROSEMIDE (LASIX) 40 MG TABLET    TAKE ONE TABLET BY MOUTH TWICE A DAY    GABAPENTIN (NEURONTIN) 300 MG CAPSULE    Take 1 capsule by mouth 4 times daily for 30 days. Intended supply: 30 days    GABAPENTIN (NEURONTIN) 300 MG CAPSULE    Take 1 capsule by mouth 2 times daily for 30 days.  Intended supply: 30 days    GLIPIZIDE (GLUCOTROL) 5 MG TABLET    Take 1 tablet by mouth 2 times daily (before meals)    IPRATROPIUM-ALBUTEROL (DUONEB) 0.5-2.5 (3) MG/3ML SOLN NEBULIZER SOLUTION    INHALE THREE MILLILITERS VIA NEBULIZATION BY MOUTH EVERY 4 HOURS AS NEEDED FOR SHORTNESS OF BREATH    ISOSORBIDE MONONITRATE (IMDUR) 30 MG EXTENDED RELEASE TABLET    Take 1 tablet by mouth in the morning. LANCETS MISC    1 each by Does not apply route 2 times daily    LINAGLIPTIN (TRADJENTA) 5 MG TABLET    Take 1 tablet by mouth daily    LISINOPRIL (PRINIVIL;ZESTRIL) 2.5 MG TABLET    Take 1 tablet by mouth in the morning. METOPROLOL SUCCINATE (TOPROL XL) 25 MG EXTENDED RELEASE TABLET    Take 1 tablet by mouth in the morning. NITROGLYCERIN (NITROSTAT) 0.4 MG SL TABLET    Place 1 tablet under the tongue every 5 minutes as needed for Chest pain. NYSTATIN (MYCOSTATIN) 156484 UNIT/GM POWDER    Affected area    OMEPRAZOLE (PRILOSEC) 40 MG DELAYED RELEASE CAPSULE    Take 1 capsule by mouth daily    RIVAROXABAN (XARELTO) 15 MG TABS TABLET    Take 1 tablet by mouth in the morning. ROSUVASTATIN (CRESTOR) 10 MG TABLET    Take 0.5 tablets by mouth daily    SODIUM CHLORIDE (OCEAN, BABY AYR) 0.65 % NASAL SPRAY    1 spray by Nasal route as needed for Congestion    TRIAMCINOLONE (KENALOG) 0.1 % CREAM    Apply topically 2 times daily.     UMECLIDINIUM BROMIDE (INCRUSE ELLIPTA) 62.5 MCG/INH AEPB    Inhale 1 puff into the lungs daily         ALLERGIES     Morphine, Codeine, Hydromorphone, Levaquin [levofloxacin in d5w], Vicodin [hydrocodone-acetaminophen], and Aspirin    FAMILYHISTORY       Family History   Problem Relation Age of Onset    Heart Disease Mother     Cancer Mother     Cancer Father     Cancer Sister     Heart Disease Sister     Heart Disease Brother     High Blood Pressure Neg Hx     High Cholesterol Neg Hx           SOCIAL HISTORY       Social History     Tobacco Use    Smoking status: Former     Packs/day: 0.25     Years: 49.00     Pack years: 12.25     Types: Cigarettes     Quit date: 6/16/2017 Years since quittin.3    Smokeless tobacco: Never    Tobacco comments:     only smoke when real stressed  Nicotine patch   Vaping Use    Vaping Use: Former    Substances: Always   Substance Use Topics    Alcohol use: Yes     Alcohol/week: 1.0 standard drink     Types: 1 Glasses of wine per week     Comment: occ. every  6 mo    Drug use: No       SCREENINGS    Muse Coma Scale  Eye Opening: Spontaneous  Best Verbal Response: Oriented  Best Motor Response: Obeys commands  Muse Coma Scale Score: 15        PHYSICAL EXAM    (up to 7 for level 4, 8 or more for level 5)     ED Triage Vitals [10/26/22 1303]   BP Temp Temp Source Heart Rate Resp SpO2 Height Weight   (!) 141/57 98.2 °F (36.8 °C) Oral 87 17 91 % 5' 6\" (1.676 m) 268 lb (121.6 kg)       Physical Exam  Constitutional:       General: She is not in acute distress. Appearance: Normal appearance. She is well-developed. She is not ill-appearing, toxic-appearing or diaphoretic. HENT:      Head: Normocephalic and atraumatic. Right Ear: External ear normal.      Left Ear: External ear normal.      Mouth/Throat:      Mouth: Mucous membranes are moist.      Pharynx: No oropharyngeal exudate or posterior oropharyngeal erythema. Eyes:      General:         Right eye: No discharge. Left eye: No discharge. Extraocular Movements: Extraocular movements intact. Conjunctiva/sclera: Conjunctivae normal.      Pupils: Pupils are equal, round, and reactive to light. Cardiovascular:      Rate and Rhythm: Normal rate and regular rhythm. Pulses: Normal pulses. Heart sounds: Normal heart sounds. No murmur heard. No friction rub. No gallop. Comments: 2+ radial pulses bilaterally. No pedal edema. No calf tenderness. No JVD  Pulmonary:      Effort: Pulmonary effort is normal. No respiratory distress. Breath sounds: Normal breath sounds. No stridor. No wheezing, rhonchi or rales. Chest:      Chest wall: No tenderness. Abdominal:      General: Abdomen is flat. Bowel sounds are normal. There is no distension. Palpations: Abdomen is soft. There is no mass. Tenderness: There is no abdominal tenderness. There is no right CVA tenderness, left CVA tenderness, guarding or rebound. Negative signs include Silva's sign and McBurney's sign. Hernia: No hernia is present. Musculoskeletal:         General: Normal range of motion. Cervical back: Normal range of motion and neck supple. No rigidity or tenderness. Comments: Tenderness to palpation diffusely throughout the lumbar spine. There is no crepitus or step-off. No CVA tenderness. There is generalized weakness to the bilateral lower extremities without any focal deficit. She has difficulty with range of motion and strength to the bilateral hips, bilateral knees and bilateral ankles. She has full plantar flexion dorsiflexion. Dorsalis pedis pulse brisk bilaterally. Sensation intact light touch the bilateral lower extremity throughout. Gait deferred. Lymphadenopathy:      Cervical: No cervical adenopathy. Skin:     General: Skin is warm and dry. Coloration: Skin is not pale. Findings: No erythema or rash. Neurological:      General: No focal deficit present. Mental Status: She is alert and oriented to person, place, and time. GCS: GCS eye subscore is 4. GCS verbal subscore is 5. GCS motor subscore is 6. Cranial Nerves: Cranial nerves 2-12 are intact. No cranial nerve deficit, dysarthria or facial asymmetry. Sensory: Sensation is intact. No sensory deficit. Motor: Motor function is intact. No weakness. Comments: Gait deferred.    Psychiatric:         Behavior: Behavior normal.       DIAGNOSTIC RESULTS   LABS:    Labs Reviewed   CBC WITH AUTO DIFFERENTIAL - Abnormal; Notable for the following components:       Result Value    WBC 12.0 (*)     RBC 5.37 (*)     RDW 15.6 (*)     Neutrophils Absolute 8.4 (*)     All other components within normal limits   COMPREHENSIVE METABOLIC PANEL W/ REFLEX TO MG FOR LOW K - Abnormal; Notable for the following components:    Glucose 188 (*)     All other components within normal limits   LACTIC ACID   LIPASE   TROPONIN   BRAIN NATRIURETIC PEPTIDE   URINALYSIS WITH REFLEX TO CULTURE       When ordered only abnormal lab results are displayed. All other labs were within normal range or not returned as of this dictation. EKG: When ordered, EKG's are interpreted by the Emergency Department Physician in the absence of a cardiologist.  Please see their note for interpretation of EKG. RADIOLOGY:   Non-plain film images such as CT, Ultrasound and MRI are read by the radiologist. Plain radiographic images are visualized and preliminarily interpreted by the ED Provider with the below findings:        Interpretation per the Radiologist below, if available at the time of this note:    CTA CHEST [address]   Final Result   1. No aortic aneurysm or dissection. 2. Borderline cardiomegaly and advanced coronary arterial calcifications. 3. No acute findings in the abdomen or pelvis. 4. 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 5. Colonic diverticulosis. CT CHEST WO CONTRAST   Final Result   1. No aortic aneurysm or dissection. 2. Borderline cardiomegaly and advanced coronary arterial calcifications. 3. No acute findings in the abdomen or pelvis. 4. 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 5. Colonic diverticulosis. CT CHEST WO CONTRAST    Result Date: 10/26/2022  EXAMINATION: CTA OF THE CHEST, ABDOMEN AND PELVIS WITH CONTRAST; CT OF THE CHEST WITHOUT CONTRAST 10/26/2022 2:30 pm: TECHNIQUE: CTA of the chest, abdomen and pelvis was performed after the administration of intravenous contrast.  Multiplanar reformatted images are provided for review. MIP images are provided for review.  Automated exposure control, iterative reconstruction, and/or weight based adjustment of the mA/kV was utilized to reduce the radiation dose to as low as reasonably achievable.; CT of the chest was performed without the administration of intravenous contrast. Multiplanar reformatted images are provided for review. Automated exposure control, iterative reconstruction, and/or weight based adjustment of the mA/kV was utilized to reduce the radiation dose to as low as reasonably achievable. COMPARISON: Chest CT 03/19/2020. HISTORY: ORDERING SYSTEM PROVIDED HISTORY: cp- abd pain - back pain - ro dissection TECHNOLOGIST PROVIDED HISTORY: Reason for exam:->cp- abd pain - back pain - ro dissection Decision Support Exception - unselect if not a suspected or confirmed emergency medical condition->Emergency Medical Condition (MA) Reason for Exam: cp- abd pain - back pain - ro dissection FINDINGS: CTA CHEST: Thoracic aorta: The thoracic aorta is normal in caliber. There is no dissection, aneurysm or intramural hematoma. The branches of the aortic arch are normally patent. Mediastinum: There are no enlarged lymph nodes. There is borderline cardiomegaly. There are advanced coronary arterial calcifications. There is no pericardial effusion. There is no pulmonary embolism. Lungs/Pleura: There is mild dependent atelectasis in the right lower lobe. Linear opacities in the lungs likely represent atelectasis or scarring. Soft Tissues/Bones: No fracture or destructive bone lesion is identified. CTA ABDOMEN: Abdominal aorta/Branches: No abdominal aortic aneurysm or dissection is noted. The celiac artery is narrowed to 6 mm at its origin and measures 8 mm more distally for approximately 25% stenosis. The superior mesenteric and inferior mesenteric arteries and the bilateral renal arteries are normally patent. Accessory renal arteries supply the lower poles bilaterally. Organs: The liver, spleen, adrenal glands and pancreas are unremarkable. There has been a cholecystectomy.   There is no suspicious renal parenchymal lesion. There is no hydronephrosis or ureteral stone. GI/Bowel: There is no bowel dilatation or bowel wall thickening. There are colonic diverticula. The stomach is unremarkable. Peritoneum/Retroperitoneum: There are no enlarged lymph nodes. No fat stranding, free fluid, free air or focal fluid collection is identified. Bones/Soft Tissues: There are no suspicious bone lesions. CTA PELVIS: Aorta/Iliacs: There is normal patency of the iliac, common femoral and visualized portions of the superficial femoral arteries. Other: The bladder is unremarkable. Bones/Soft Tissues: There is a fat containing umbilical hernia. No suspicious bone lesion is identified. 1. No aortic aneurysm or dissection. 2. Borderline cardiomegaly and advanced coronary arterial calcifications. 3. No acute findings in the abdomen or pelvis. 4. 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 5. Colonic diverticulosis. CTA CHEST ABDOMEN PELVIS W CONTRAST    Result Date: 10/26/2022  EXAMINATION: CTA OF THE CHEST, ABDOMEN AND PELVIS WITH CONTRAST; CT OF THE CHEST WITHOUT CONTRAST 10/26/2022 2:30 pm: TECHNIQUE: CTA of the chest, abdomen and pelvis was performed after the administration of intravenous contrast.  Multiplanar reformatted images are provided for review. MIP images are provided for review. Automated exposure control, iterative reconstruction, and/or weight based adjustment of the mA/kV was utilized to reduce the radiation dose to as low as reasonably achievable.; CT of the chest was performed without the administration of intravenous contrast. Multiplanar reformatted images are provided for review. Automated exposure control, iterative reconstruction, and/or weight based adjustment of the mA/kV was utilized to reduce the radiation dose to as low as reasonably achievable. COMPARISON: Chest CT 03/19/2020.  HISTORY: ORDERING SYSTEM PROVIDED HISTORY: cp- abd pain - back pain - ro dissection TECHNOLOGIST PROVIDED HISTORY: Reason for exam:->cp- abd pain - back pain - ro dissection Decision Support Exception - unselect if not a suspected or confirmed emergency medical condition->Emergency Medical Condition (MA) Reason for Exam: cp- abd pain - back pain - ro dissection FINDINGS: CTA CHEST: Thoracic aorta: The thoracic aorta is normal in caliber. There is no dissection, aneurysm or intramural hematoma. The branches of the aortic arch are normally patent. Mediastinum: There are no enlarged lymph nodes. There is borderline cardiomegaly. There are advanced coronary arterial calcifications. There is no pericardial effusion. There is no pulmonary embolism. Lungs/Pleura: There is mild dependent atelectasis in the right lower lobe. Linear opacities in the lungs likely represent atelectasis or scarring. Soft Tissues/Bones: No fracture or destructive bone lesion is identified. CTA ABDOMEN: Abdominal aorta/Branches: No abdominal aortic aneurysm or dissection is noted. The celiac artery is narrowed to 6 mm at its origin and measures 8 mm more distally for approximately 25% stenosis. The superior mesenteric and inferior mesenteric arteries and the bilateral renal arteries are normally patent. Accessory renal arteries supply the lower poles bilaterally. Organs: The liver, spleen, adrenal glands and pancreas are unremarkable. There has been a cholecystectomy. There is no suspicious renal parenchymal lesion. There is no hydronephrosis or ureteral stone. GI/Bowel: There is no bowel dilatation or bowel wall thickening. There are colonic diverticula. The stomach is unremarkable. Peritoneum/Retroperitoneum: There are no enlarged lymph nodes. No fat stranding, free fluid, free air or focal fluid collection is identified. Bones/Soft Tissues: There are no suspicious bone lesions. CTA PELVIS: Aorta/Iliacs: There is normal patency of the iliac, common femoral and visualized portions of the superficial femoral arteries. Other:  The bladder is unremarkable. Bones/Soft Tissues: There is a fat containing umbilical hernia. No suspicious bone lesion is identified. 1. No aortic aneurysm or dissection. 2. Borderline cardiomegaly and advanced coronary arterial calcifications. 3. No acute findings in the abdomen or pelvis. 4. 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 5. Colonic diverticulosis. PROCEDURES   Unless otherwise noted below, none     Procedures    CRITICAL CARE TIME       CONSULTS:  IP CONSULT TO INTERNAL MEDICINE      EMERGENCY DEPARTMENT COURSE and DIFFERENTIAL DIAGNOSIS/MDM:   Vitals:    Vitals:    10/26/22 1600 10/26/22 1615 10/26/22 1630 10/26/22 1645   BP: (!) 127/58 113/73 111/72 116/71   Pulse: 66 64 59 59   Resp: 17 15 16 18   Temp:       TempSrc:       SpO2: 93% 93% 94% 95%   Weight:       Height:           Patient was given the following medications:  Medications   aspirin EC tablet 325 mg (has no administration in time range)   fentaNYL (SUBLIMAZE) injection 50 mcg (50 mcg IntraVENous Given 10/26/22 1439)   ondansetron (ZOFRAN) injection 4 mg (4 mg IntraVENous Given 10/26/22 1440)   diphenhydrAMINE (BENADRYL) injection 25 mg (25 mg IntraVENous Given 10/26/22 1441)   iopamidol (ISOVUE-370) 76 % injection 75 mL (75 mLs IntraVENous Given 10/26/22 1540)         Is this patient to be included in the SEP-1 Core Measure due to severe sepsis or septic shock? No   Exclusion criteria - the patient is NOT to be included for SEP-1 Core Measure due to: Infection is not suspected    Patient is a 50-year-old female who presents the ED with complaint of chest pain that radiates into the back and into the abdomen. Patient states she also has some back pain which sounds like it is chronic. Patient now complaining of mainly chest pain that she states began about 30 minutes ago. She does have a history of CAD with previous MI. Has not had a recent cardiac work-up. IV was established and blood work obtained.   She states she cannot take any pain medication. Review of records shows that she was given fentanyl in the past.  She was given some fentanyl here in the emergency department. Was also given Zofran and Benadryl. Lactic acid was normal.  CBC showed white count of 12 with normal hemoglobin and platelets. CMP relatively unremarkable. Lipase normal.  Troponin normal.  BNP unremarkable. CTA of the chest and pelvis obtained and showed no Aneurysm or dissection. There is cardiomegaly with coronary artery calcification noted. 25% stenosis to the celiac artery origin but again has normal lactic and abdomen is benign and do not believe represents mesenteric ischemia at this time. Patient was given enteric-coated aspirin given history of hiatal hernia and complaints of chest discomfort with history of CAD. Believe would benefit from mission for further evaluation and treatment. Case discussed with Dr. Frida Mejia who graciously agreed accept patient for admission at this time. FINAL IMPRESSION      1. Chest pain, unspecified type          DISPOSITION/PLAN   DISPOSITION Decision To Admit 10/26/2022 04:48:52 PM      PATIENT REFERRED TO:  No follow-up provider specified.     DISCHARGE MEDICATIONS:  New Prescriptions    No medications on file       DISCONTINUED MEDICATIONS:  Discontinued Medications    No medications on file              (Please note that portions of this note were completed with a voice recognition program.  Efforts were made to edit the dictations but occasionally words are mis-transcribed.)    ELENA Chun (electronically signed)           ELENA Jimenez  10/26/22 1930

## 2022-10-26 NOTE — ED PROVIDER NOTES
Attending Supervisory Note/Shared Visit   I have personally performed a face to face diagnostic evaluation on this patient. I have reviewed the mid-levels findings and agree. History and Exam by me shows well-appearing female no acute distress. Patient had initially been sent in by her family physician for evaluation of lower back pain. She denies any recent injury or fall but states that her pain progressively worsened despite prescribed medications. Denies numbness or weakness. She reports does radiate into her left leg. Denies changes in bowel or urine function. Patient also reports developing chest pain while waiting in the lobby. Patient has a history of chronic respiratory failure and states that she uses oxygen at night. Denies having to increase her oxygen use and will frequently. Denies cough or shortness of breath. Denies fevers. At time evaluation patient resting comfortably and states that chest pain is resolved. Telemetry evaluation patient resting comfortably. She is a normal respiratory effort lungs clear to auscultation. She has a regular rate and rhythm. Abdomen soft nontender distended. Sensation intact in the bilateral lower extremities. No strength deficit lower extremities. Patient initially seen by advanced practice provider. .  Laboratory work-up  Emergent normalities. Troponin within normal limits. CTA of the chest and abdomen did not demonstrate emergent abnormalities. Stenosis of the SMA noted. Patient's physician consulted and agreed to admit the patient. I agree with the YOUS[facility] plan of care. Please YOUSIF Note for full ED course and final disposition. EKG demonstrates a sinus rhythm with ventricular rate of 81 bpm.  MT interval is prolonged QTc interval within normal limits. There is ectopy noted. Patient has left axis deviation. There are no significant ST elevations or depressions EKG is nondiagnostic for ACS.   Compared EKG from 8/2/2020 a do not appreciate any significant change. Disposition:  1.  Chest pain, unspecified type          Kecia Velazquez MD  Attending Emergency Physician        Kecia Velazquez MD  11/03/22 0198

## 2022-10-27 ENCOUNTER — APPOINTMENT (OUTPATIENT)
Dept: CT IMAGING | Age: 77
DRG: 552 | End: 2022-10-27
Payer: COMMERCIAL

## 2022-10-27 PROBLEM — M54.14 THORACIC RADICULOPATHY: Status: ACTIVE | Noted: 2022-10-27

## 2022-10-27 LAB
ANION GAP SERPL CALCULATED.3IONS-SCNC: 10 MMOL/L (ref 3–16)
BASOPHILS ABSOLUTE: 0.1 K/UL (ref 0–0.2)
BASOPHILS RELATIVE PERCENT: 1.1 %
BUN BLDV-MCNC: 17 MG/DL (ref 7–20)
CALCIUM SERPL-MCNC: 9.6 MG/DL (ref 8.3–10.6)
CHLORIDE BLD-SCNC: 102 MMOL/L (ref 99–110)
CO2: 28 MMOL/L (ref 21–32)
CREAT SERPL-MCNC: 0.9 MG/DL (ref 0.6–1.2)
EKG ATRIAL RATE: 81 BPM
EKG DIAGNOSIS: NORMAL
EKG P AXIS: -17 DEGREES
EKG P-R INTERVAL: 176 MS
EKG Q-T INTERVAL: 376 MS
EKG QRS DURATION: 120 MS
EKG QTC CALCULATION (BAZETT): 436 MS
EKG R AXIS: -38 DEGREES
EKG T AXIS: 65 DEGREES
EKG VENTRICULAR RATE: 81 BPM
EOSINOPHILS ABSOLUTE: 0.1 K/UL (ref 0–0.6)
EOSINOPHILS RELATIVE PERCENT: 1.4 %
GFR SERPL CREATININE-BSD FRML MDRD: >60 ML/MIN/{1.73_M2}
GLUCOSE BLD-MCNC: 168 MG/DL (ref 70–99)
HCT VFR BLD CALC: 42.6 % (ref 36–48)
HEMOGLOBIN: 13.8 G/DL (ref 12–16)
LV EF: 58 %
LVEF MODALITY: NORMAL
LYMPHOCYTES ABSOLUTE: 2.6 K/UL (ref 1–5.1)
LYMPHOCYTES RELATIVE PERCENT: 25.8 %
MCH RBC QN AUTO: 27.9 PG (ref 26–34)
MCHC RBC AUTO-ENTMCNC: 32.5 G/DL (ref 31–36)
MCV RBC AUTO: 85.9 FL (ref 80–100)
MONOCYTES ABSOLUTE: 0.7 K/UL (ref 0–1.3)
MONOCYTES RELATIVE PERCENT: 7.1 %
NEUTROPHILS ABSOLUTE: 6.5 K/UL (ref 1.7–7.7)
NEUTROPHILS RELATIVE PERCENT: 64.6 %
PDW BLD-RTO: 15.5 % (ref 12.4–15.4)
PLATELET # BLD: 210 K/UL (ref 135–450)
PMV BLD AUTO: 8.6 FL (ref 5–10.5)
POTASSIUM SERPL-SCNC: 4.3 MMOL/L (ref 3.5–5.1)
RBC # BLD: 4.95 M/UL (ref 4–5.2)
SODIUM BLD-SCNC: 140 MMOL/L (ref 136–145)
TROPONIN: <0.01 NG/ML
WBC # BLD: 10.1 K/UL (ref 4–11)

## 2022-10-27 PROCEDURE — 84484 ASSAY OF TROPONIN QUANT: CPT

## 2022-10-27 PROCEDURE — 6370000000 HC RX 637 (ALT 250 FOR IP): Performed by: INTERNAL MEDICINE

## 2022-10-27 PROCEDURE — 1200000000 HC SEMI PRIVATE

## 2022-10-27 PROCEDURE — 72128 CT CHEST SPINE W/O DYE: CPT

## 2022-10-27 PROCEDURE — 94640 AIRWAY INHALATION TREATMENT: CPT

## 2022-10-27 PROCEDURE — 3430000000 HC RX DIAGNOSTIC RADIOPHARMACEUTICAL: Performed by: INTERNAL MEDICINE

## 2022-10-27 PROCEDURE — 93017 CV STRESS TEST TRACING ONLY: CPT

## 2022-10-27 PROCEDURE — 6360000002 HC RX W HCPCS: Performed by: INTERNAL MEDICINE

## 2022-10-27 PROCEDURE — A9502 TC99M TETROFOSMIN: HCPCS | Performed by: INTERNAL MEDICINE

## 2022-10-27 PROCEDURE — 94761 N-INVAS EAR/PLS OXIMETRY MLT: CPT

## 2022-10-27 PROCEDURE — 99223 1ST HOSP IP/OBS HIGH 75: CPT | Performed by: INTERNAL MEDICINE

## 2022-10-27 PROCEDURE — 2700000000 HC OXYGEN THERAPY PER DAY

## 2022-10-27 PROCEDURE — 72131 CT LUMBAR SPINE W/O DYE: CPT

## 2022-10-27 PROCEDURE — 85025 COMPLETE CBC W/AUTO DIFF WBC: CPT

## 2022-10-27 PROCEDURE — 80048 BASIC METABOLIC PNL TOTAL CA: CPT

## 2022-10-27 PROCEDURE — 36415 COLL VENOUS BLD VENIPUNCTURE: CPT

## 2022-10-27 PROCEDURE — 93010 ELECTROCARDIOGRAM REPORT: CPT | Performed by: INTERNAL MEDICINE

## 2022-10-27 PROCEDURE — 78452 HT MUSCLE IMAGE SPECT MULT: CPT

## 2022-10-27 PROCEDURE — 2500000003 HC RX 250 WO HCPCS: Performed by: INTERNAL MEDICINE

## 2022-10-27 RX ORDER — AMINOPHYLLINE DIHYDRATE 25 MG/ML
100 INJECTION, SOLUTION INTRAVENOUS ONCE
Status: COMPLETED | OUTPATIENT
Start: 2022-10-27 | End: 2022-10-27

## 2022-10-27 RX ORDER — ROSUVASTATIN CALCIUM 10 MG/1
20 TABLET, COATED ORAL NIGHTLY
Status: DISCONTINUED | OUTPATIENT
Start: 2022-10-27 | End: 2022-10-31 | Stop reason: HOSPADM

## 2022-10-27 RX ADMIN — ASPIRIN 81 MG: 81 TABLET, COATED ORAL at 15:52

## 2022-10-27 RX ADMIN — TETROFOSMIN 10 MILLICURIE: 1.38 INJECTION, POWDER, LYOPHILIZED, FOR SOLUTION INTRAVENOUS at 11:52

## 2022-10-27 RX ADMIN — Medication 2 PUFF: at 19:31

## 2022-10-27 RX ADMIN — MICONAZOLE NITRATE: 20 POWDER TOPICAL at 23:03

## 2022-10-27 RX ADMIN — LISINOPRIL 2.5 MG: 5 TABLET ORAL at 08:27

## 2022-10-27 RX ADMIN — ALOGLIPTIN 25 MG: 25 TABLET, FILM COATED ORAL at 08:28

## 2022-10-27 RX ADMIN — DICLOFENAC SODIUM 4 G: 10 GEL TOPICAL at 22:59

## 2022-10-27 RX ADMIN — REGADENOSON 0.4 MG: 0.08 INJECTION, SOLUTION INTRAVENOUS at 13:20

## 2022-10-27 RX ADMIN — ALPRAZOLAM 1 MG: 0.5 TABLET ORAL at 23:30

## 2022-10-27 RX ADMIN — DICLOFENAC SODIUM 4 G: 10 GEL TOPICAL at 08:29

## 2022-10-27 RX ADMIN — ONDANSETRON 4 MG: 2 INJECTION INTRAMUSCULAR; INTRAVENOUS at 10:04

## 2022-10-27 RX ADMIN — GLIPIZIDE 5 MG: 5 TABLET ORAL at 08:28

## 2022-10-27 RX ADMIN — ISOSORBIDE MONONITRATE 30 MG: 30 TABLET, EXTENDED RELEASE ORAL at 08:28

## 2022-10-27 RX ADMIN — TRIAMCINOLONE ACETONIDE: 1 CREAM TOPICAL at 22:59

## 2022-10-27 RX ADMIN — AMINOPHYLLINE 100 MG: 25 INJECTION, SOLUTION INTRAVENOUS at 13:35

## 2022-10-27 RX ADMIN — GABAPENTIN 600 MG: 300 CAPSULE ORAL at 22:57

## 2022-10-27 RX ADMIN — GLIPIZIDE 5 MG: 5 TABLET ORAL at 15:52

## 2022-10-27 RX ADMIN — ROSUVASTATIN CALCIUM 20 MG: 10 TABLET, COATED ORAL at 22:57

## 2022-10-27 RX ADMIN — GABAPENTIN 600 MG: 300 CAPSULE ORAL at 08:28

## 2022-10-27 RX ADMIN — TETROFOSMIN 30 MILLICURIE: 1.38 INJECTION, POWDER, LYOPHILIZED, FOR SOLUTION INTRAVENOUS at 13:04

## 2022-10-27 RX ADMIN — TIOTROPIUM BROMIDE INHALATION SPRAY 2 PUFF: 3.12 SPRAY, METERED RESPIRATORY (INHALATION) at 09:29

## 2022-10-27 RX ADMIN — FUROSEMIDE 40 MG: 40 TABLET ORAL at 08:28

## 2022-10-27 RX ADMIN — PANTOPRAZOLE SODIUM 40 MG: 40 TABLET, DELAYED RELEASE ORAL at 05:41

## 2022-10-27 RX ADMIN — Medication 2 PUFF: at 09:30

## 2022-10-27 RX ADMIN — TRIAMCINOLONE ACETONIDE: 1 CREAM TOPICAL at 08:31

## 2022-10-27 RX ADMIN — ALPRAZOLAM 1 MG: 0.5 TABLET ORAL at 08:38

## 2022-10-27 RX ADMIN — ALPRAZOLAM 1 MG: 0.5 TABLET ORAL at 18:50

## 2022-10-27 RX ADMIN — MICONAZOLE NITRATE: 20 POWDER TOPICAL at 08:34

## 2022-10-27 RX ADMIN — ONDANSETRON 4 MG: 2 INJECTION INTRAMUSCULAR; INTRAVENOUS at 00:21

## 2022-10-27 ASSESSMENT — PAIN DESCRIPTION - LOCATION: LOCATION: BACK;LEG

## 2022-10-27 ASSESSMENT — PAIN SCALES - GENERAL
PAINLEVEL_OUTOF10: 0
PAINLEVEL_OUTOF10: 0
PAINLEVEL_OUTOF10: 10
PAINLEVEL_OUTOF10: 8

## 2022-10-27 ASSESSMENT — PAIN DESCRIPTION - DESCRIPTORS: DESCRIPTORS: DISCOMFORT;SHARP

## 2022-10-27 ASSESSMENT — PAIN DESCRIPTION - ORIENTATION: ORIENTATION: RIGHT;LEFT

## 2022-10-27 NOTE — ED NOTES
Report given to [facility] RN at this time, all questions answered during handoff, and VSS at this time.      Mariely Sahu RN  10/26/22 2029

## 2022-10-27 NOTE — PROGRESS NOTES
Aminophylline given per lexiscan protocol in stress lab for c/o persistant hypotension and lightheadedness. Symptoms resolved after medicated and rest with caffeine.

## 2022-10-27 NOTE — PROGRESS NOTES
Instructed on Lexiscan Stress Test Procedure including possible side effects/ adverse reactions. Patient verbalizes  understanding and denies having any questions . See [address] [place] [address] Cardiology

## 2022-10-27 NOTE — ED NOTES
Report called to 3A, all questions answered during handoff, unit to send tele box down for Pt to travel to 3A.      Dejuan Rooney RN  10/26/22 2115

## 2022-10-27 NOTE — CONSULTS
[address]  Cardiac Consult     Referring Provider:  Michelle Cruz MD         History of Present Illness:  70 y/o female with h/o obesity, chronic back pain and CAD seen for chest pain. She has a h/o severe back pain with radiation to chest. She underwent cardiac cath in 2019 that showed moderate non-obstructive CAD. Similar presentation 2020 and stress myoview negative. She now says she has had severe pain in her \"back and hips\" for \"over a week\". Constant, no clear exacerbating or relieving factors. Yesterday morning she began having associated chest pressure. \"Like someone was sitting on my chest\". Lasted over an hour. Associated shortness of breath. Past Medical History:   has a past medical history of Acute MI ([address] Acute pyelonephritis, Anxiety and depression, Arthritis, CAD (coronary artery disease), Cancer ([address] Cancer ([address] Cancer of skin of leg, Chronic obstructive pulmonary disease ([address] Claustrophobia, COPD (chronic obstructive pulmonary disease) ([address] ESBL (extended spectrum beta-lactamase) producing bacteria infection, Esophageal stricture, Gastroesophageal reflux disease without esophagitis, Hiatal hernia, Hiatal hernia, Hypercholesteremia, Hypertension, IBS (irritable bowel syndrome), Migraines, Movement disorder, Pneumonia, PONV (postoperative nausea and vomiting), Type II or unspecified type diabetes mellitus without mention of complication, not stated as uncontrolled, and Unspecified cerebral artery occlusion with cerebral infarction. Surgical History:   has a past surgical history that includes Cardiac surgery; Gallbladder surgery; Hysterectomy; Appendectomy; Cholecystectomy; Colonoscopy; Upper gastrointestinal endoscopy (4/20/12); Endoscopy, colon, diagnostic; Tear duct surgery; Upper gastrointestinal endoscopy (06/11/2018);  Colonoscopy (06/11/2018); pr exc skin malig <5mm remaindr body (N/A, 9/6/2018); pr split grft trunk,arm,leg <100sqcm (N/A, 9/6/2018); skin biopsy; eye surgery; bronchoscopy (N/A, 1/24/2020); bronchoscopy (N/A, 1/27/2020); Cataract removal (2013 or 2014); Esophagus dilation; and bronchoscopy (N/A, 11/23/2020). Social History:   reports that she quit smoking about 5 years ago. Her smoking use included cigarettes. She has a 12.25 pack-year smoking history. She has never used smokeless tobacco. She reports current alcohol use of about 1.0 standard drink per week. She reports that she does not use drugs. Family History:  family history includes Cancer in her father, mother, and sister; Heart Disease in her brother, mother, and sister. Medications:   aspirin  325 mg Oral Daily    aspirin  81 mg Oral Daily    mometasone-formoterol  2 puff Inhalation BID    furosemide  40 mg Oral Daily    glipiZIDE  5 mg Oral BID AC    isosorbide mononitrate  30 mg Oral Daily    alogliptin  25 mg Oral Daily    lisinopril  2.5 mg Oral Daily    metoprolol succinate  25 mg Oral Daily    miconazole   Topical BID    pantoprazole  40 mg Oral QAM AC    rivaroxaban  15 mg Oral Daily    rosuvastatin  5 mg Oral Nightly    triamcinolone   Topical BID    tiotropium  2 puff Inhalation Daily    fentanNYL  50 mcg IntraVENous Once    gabapentin  600 mg Oral BID    diclofenac sodium  4 g Topical BID         Allergies:  Morphine, Codeine, Hydromorphone, Levaquin [levofloxacin in d5w], Vicodin [hydrocodone-acetaminophen], and Aspirin     [x] Medications and dosages reviewed.     ROS:  [x]Full ROS obtained and negative except as mentioned in HPI      Physical Examination:    Vitals:    10/27/22 0541   BP: 125/61   Pulse: 64   Resp:    Temp: 96.8   SpO2:         GENERAL: Well developed, well nourished, No acute distress  NEUROLOGICAL: Alert and oriented  PSYCH: Calm affect  SKIN: Warm and dry, No visible rash,   EYES: Pupils equal and round, Sclera non-icteric,   HENT:  External ears and nose unremarkable, mucus membranes moist  MUSCULOSKELETAL: Normal cephalic, neck supple  CAROTID: Normal upstroke, no bruits  CARDIAC: JVP normal, Normal PMI, regular rate and rhythm, normal S1S2, no murmur, rub, or gallop  RESPIRATORY: Normal respiratory effort, clear to auscultation bilaterally  EXTREMITIES: No edema  GASTROINTESTINAL: normal bowel sounds, soft, non-tender, No hepatomegaly     All testing and labs listed below were personally reviewed. CT CHEST, ABD, PELVIS  Impression:  1. No aortic aneurysm or dissection. 2. Borderline cardiomegaly and advanced coronary arterial calcifications. 3. No acute findings in the abdomen or pelvis. 4. 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 5. Colonic diverticulosis. LABS   Latest Reference Range & Units 10/26/22 13:40 10/27/22 04:28   Troponin <0.01 ng/mL <0.01 <0.01       Pro-MZL393tr/mL     Iigzuox12.0mg/dL Total Protein6.5g/dL Albumin3.8g/dL Albumin/Globulin Ratio1. 4 Total Bilirubin0.3mg/dL Alkaline Rptinfriqrb21X/L ALT13U/L AST18U/L     Bacteria, UA4+ Abnormal /HPF Hyaline Casts, UA0/LPF WBC, UA2/HPF RBC, UA1/HPF Epithelial Cells, UA0/HPF     Troponin<0.01ng/mL     WBC10.1K/uL RBC4.95M/uL Eyhgovnhrv38.8g/dL Eqzlybxleu59.6% MCV85. 9fL MCH27.9pg MCHC32.5g/dL RDW15.5 High % Riebvxdoy782A/uL     Wlspbw882xxii/L Potassium4.3mmol/L Xolbomof619zczx/L HX176ppcn/L Anion Gap10 Cvjsqxk923 High mg/dL QGI30yn/dL Creatinine0.9mg/dL     EKG: personally reviewed  NSR, PVC's, IVCD    ECHO 8/2022  Summary   Technically difficult exam due to body habitus. Normal global systolic function with an ejection fraction estimated at 70%. No regional wall motion abnormalities noted. Mild concentric left ventricular hypertrophy. Mild aortic stenosis with a peak velocity of 2.17m/s and a mean pressure   gradient of 12 mmHg. There is mild aortic insufficiency. Grade I diastolic dysfunction with normal LV filling pressures. Avg.   E/e'=18.2    MYOVIEW 11/2020   Summary    SPECT images demonstrate homogeneous tracer distribution throughout the    myocardium. There is normal isotope uptake at stress and rest. There is no evidence of    myocardial ischemia or scar. Normal LV size and systolic function. Left ventricular ejection fraction of 76 %. CARDIAC CATH 9/2019  Cardiac Cath with FFR:  LM-Normal No disease   LAD-proximal stent 10% ISR, mid stent patent. Distal LAD 20% irregular stenosis. D2 70% ostial stent alf  Cx-Patent, mild luminal irregularities  OM1- ostial discrete 60% stenosis  RCA-Dominant, large sized vessel. Proximal 70% heavily calcified stenosis. RPDA- Patent, mild luminal irregularities     FFR of prox RCA 0.84    ASSESSMENT:    Back Pain:  Chronic. On going. Plan per medicine    Chest Pain:  Likely related to back issues, but she has known borderline CAD with 70% RCA in 2019. Enzymes normal, EKG negative for ischemia. Plan stress myoview    CAD:  Moderate CAD as above. Stress myoview. Risk factor reduction    Hyperlipidemia:  LDL=85. On crestor 5. With CAD need \"High dose statin\". Increase to 20 mg    Afib:  H/o PAF with high CHADS2-Vasc. On Xarelto.   Hold in event cath is needed    Plan:  Lexiscan myoview  Increase crestor  Hold Xarelto    Thank you for allowing me to participate in the care of this individual.      Ivan Elias M.D., [facility] [place]

## 2022-10-27 NOTE — RT PROTOCOL NOTE
RT Inhaler-Nebulizer Bronchodilator Protocol Note    There is a bronchodilator order in the chart from a provider indicating to follow the RT Bronchodilator Protocol and there is an Initiate RT Inhaler-Nebulizer Bronchodilator Protocol order as well (see protocol at bottom of note). CXR Findings:  No results found. The findings from the last RT Protocol Assessment were as follows:   History Pulmonary Disease: Chronic pulmonary disease  Respiratory Pattern: Regular pattern and RR 12-20 bpm  Breath Sounds: Clear breath sounds  Cough: Strong, spontaneous, non-productive  Indication for Bronchodilator Therapy:    Bronchodilator Assessment Score: 2    Aerosolized bronchodilator medication orders have been revised according to the RT Inhaler-Nebulizer Bronchodilator Protocol below. Respiratory Therapist to perform RT Therapy Protocol Assessment initially then follow the protocol. Repeat RT Therapy Protocol Assessment PRN for score 0-3 or on second treatment, BID, and PRN for scores above 3. No Indications - adjust the frequency to every 6 hours PRN wheezing or bronchospasm, if no treatments needed after 48 hours then discontinue using Per Protocol order mode. If indication present, adjust the RT bronchodilator orders based on the Bronchodilator Assessment Score as indicated below. Use Inhaler orders unless patient has one or more of the following: on home nebulizer, not able to hold breath for 10 seconds, is not alert and oriented, cannot activate and use MDI correctly, or respiratory rate 25 breaths per minute or more, then use the equivalent nebulizer order(s) with same Frequency and PRN reasons based on the score. If a patient is on this medication at home then do not decrease Frequency below that used at home.     0-3 - enter or revise RT bronchodilator order(s) to equivalent RT Bronchodilator order with Frequency of every 4 hours PRN for wheezing or increased work of breathing using Per Protocol order mode. 4-6 - enter or revise RT Bronchodilator order(s) to two equivalent RT bronchodilator orders with one order with BID Frequency and one order with Frequency of every 4 hours PRN wheezing or increased work of breathing using Per Protocol order mode. 7-10 - enter or revise RT Bronchodilator order(s) to two equivalent RT bronchodilator orders with one order with TID Frequency and one order with Frequency of every 4 hours PRN wheezing or increased work of breathing using Per Protocol order mode. 11-13 - enter or revise RT Bronchodilator order(s) to one equivalent RT bronchodilator order with QID Frequency and an Albuterol order with Frequency of every 4 hours PRN wheezing or increased work of breathing using Per Protocol order mode. Greater than 13 - enter or revise RT Bronchodilator order(s) to one equivalent RT bronchodilator order with every 4 hours Frequency and an Albuterol order with Frequency of every 2 hours PRN wheezing or increased work of breathing using Per Protocol order mode. RT to enter RT Home Evaluation for COPD & MDI Assessment order using Per Protocol order mode.     Electronically signed by Bayron Becerril RCP on 10/26/2022 at 9:35 PM

## 2022-10-28 ENCOUNTER — APPOINTMENT (OUTPATIENT)
Dept: MRI IMAGING | Age: 77
DRG: 552 | End: 2022-10-28
Payer: COMMERCIAL

## 2022-10-28 LAB
GLUCOSE BLD-MCNC: 173 MG/DL (ref 70–99)
GLUCOSE BLD-MCNC: 257 MG/DL (ref 70–99)
GLUCOSE BLD-MCNC: 95 MG/DL (ref 70–99)
MAGNESIUM: 1.8 MG/DL (ref 1.8–2.4)
PERFORMED ON: ABNORMAL
PERFORMED ON: ABNORMAL
PERFORMED ON: NORMAL

## 2022-10-28 PROCEDURE — 72146 MRI CHEST SPINE W/O DYE: CPT

## 2022-10-28 PROCEDURE — 6370000000 HC RX 637 (ALT 250 FOR IP): Performed by: INTERNAL MEDICINE

## 2022-10-28 PROCEDURE — 1200000000 HC SEMI PRIVATE

## 2022-10-28 PROCEDURE — 99233 SBSQ HOSP IP/OBS HIGH 50: CPT | Performed by: NURSE PRACTITIONER

## 2022-10-28 PROCEDURE — 36415 COLL VENOUS BLD VENIPUNCTURE: CPT

## 2022-10-28 PROCEDURE — 6360000002 HC RX W HCPCS: Performed by: INTERNAL MEDICINE

## 2022-10-28 PROCEDURE — 72148 MRI LUMBAR SPINE W/O DYE: CPT

## 2022-10-28 PROCEDURE — 94640 AIRWAY INHALATION TREATMENT: CPT

## 2022-10-28 PROCEDURE — 83735 ASSAY OF MAGNESIUM: CPT

## 2022-10-28 RX ORDER — INSULIN LISPRO 100 [IU]/ML
0-8 INJECTION, SOLUTION INTRAVENOUS; SUBCUTANEOUS
Status: DISCONTINUED | OUTPATIENT
Start: 2022-10-28 | End: 2022-10-31 | Stop reason: HOSPADM

## 2022-10-28 RX ORDER — IBUPROFEN 400 MG/1
400 TABLET ORAL EVERY 6 HOURS PRN
Status: DISCONTINUED | OUTPATIENT
Start: 2022-10-28 | End: 2022-10-31 | Stop reason: HOSPADM

## 2022-10-28 RX ORDER — DEXTROMETHORPHAN HYDROBROMIDE AND PROMETHAZINE HYDROCHLORIDE 15; 6.25 MG/5ML; MG/5ML
5 SYRUP ORAL EVERY 4 HOURS PRN
Status: DISCONTINUED | OUTPATIENT
Start: 2022-10-28 | End: 2022-10-31 | Stop reason: HOSPADM

## 2022-10-28 RX ORDER — INSULIN LISPRO 100 [IU]/ML
0-4 INJECTION, SOLUTION INTRAVENOUS; SUBCUTANEOUS NIGHTLY
Status: DISCONTINUED | OUTPATIENT
Start: 2022-10-28 | End: 2022-10-31 | Stop reason: HOSPADM

## 2022-10-28 RX ORDER — DEXTROSE MONOHYDRATE 100 MG/ML
INJECTION, SOLUTION INTRAVENOUS CONTINUOUS PRN
Status: DISCONTINUED | OUTPATIENT
Start: 2022-10-28 | End: 2022-10-31 | Stop reason: HOSPADM

## 2022-10-28 RX ORDER — FENTANYL 50 UG/H
1 PATCH TRANSDERMAL
Status: DISCONTINUED | OUTPATIENT
Start: 2022-10-28 | End: 2022-10-28

## 2022-10-28 RX ORDER — KETOROLAC TROMETHAMINE 30 MG/ML
15 INJECTION, SOLUTION INTRAMUSCULAR; INTRAVENOUS EVERY 6 HOURS PRN
Status: DISCONTINUED | OUTPATIENT
Start: 2022-10-28 | End: 2022-10-31 | Stop reason: HOSPADM

## 2022-10-28 RX ORDER — HYDROMORPHONE HYDROCHLORIDE 2 MG/1
2 TABLET ORAL EVERY 4 HOURS PRN
Status: DISCONTINUED | OUTPATIENT
Start: 2022-10-28 | End: 2022-10-31 | Stop reason: HOSPADM

## 2022-10-28 RX ADMIN — ALPRAZOLAM 1 MG: 0.5 TABLET ORAL at 11:54

## 2022-10-28 RX ADMIN — Medication 2 PUFF: at 08:56

## 2022-10-28 RX ADMIN — LISINOPRIL 2.5 MG: 5 TABLET ORAL at 09:16

## 2022-10-28 RX ADMIN — ALOGLIPTIN 25 MG: 25 TABLET, FILM COATED ORAL at 09:16

## 2022-10-28 RX ADMIN — GABAPENTIN 600 MG: 300 CAPSULE ORAL at 20:53

## 2022-10-28 RX ADMIN — MICONAZOLE NITRATE: 20 POWDER TOPICAL at 09:18

## 2022-10-28 RX ADMIN — TIOTROPIUM BROMIDE INHALATION SPRAY 2 PUFF: 3.12 SPRAY, METERED RESPIRATORY (INHALATION) at 12:34

## 2022-10-28 RX ADMIN — ISOSORBIDE MONONITRATE 30 MG: 30 TABLET, EXTENDED RELEASE ORAL at 09:16

## 2022-10-28 RX ADMIN — KETOROLAC TROMETHAMINE 15 MG: 30 INJECTION, SOLUTION INTRAMUSCULAR at 22:29

## 2022-10-28 RX ADMIN — IBUPROFEN 400 MG: 400 TABLET, FILM COATED ORAL at 17:06

## 2022-10-28 RX ADMIN — DICLOFENAC SODIUM 4 G: 10 GEL TOPICAL at 20:54

## 2022-10-28 RX ADMIN — ONDANSETRON 4 MG: 2 INJECTION INTRAMUSCULAR; INTRAVENOUS at 16:01

## 2022-10-28 RX ADMIN — ASPIRIN 81 MG: 81 TABLET, COATED ORAL at 09:15

## 2022-10-28 RX ADMIN — ONDANSETRON 4 MG: 2 INJECTION INTRAMUSCULAR; INTRAVENOUS at 03:06

## 2022-10-28 RX ADMIN — ROSUVASTATIN CALCIUM 20 MG: 10 TABLET, COATED ORAL at 20:53

## 2022-10-28 RX ADMIN — GLIPIZIDE 5 MG: 5 TABLET ORAL at 16:14

## 2022-10-28 RX ADMIN — GABAPENTIN 600 MG: 300 CAPSULE ORAL at 09:16

## 2022-10-28 RX ADMIN — TRIAMCINOLONE ACETONIDE: 1 CREAM TOPICAL at 09:17

## 2022-10-28 RX ADMIN — PANTOPRAZOLE SODIUM 40 MG: 40 TABLET, DELAYED RELEASE ORAL at 06:36

## 2022-10-28 RX ADMIN — DICLOFENAC SODIUM 4 G: 10 GEL TOPICAL at 09:17

## 2022-10-28 RX ADMIN — FUROSEMIDE 40 MG: 40 TABLET ORAL at 09:15

## 2022-10-28 RX ADMIN — METOPROLOL SUCCINATE 25 MG: 25 TABLET, FILM COATED, EXTENDED RELEASE ORAL at 09:15

## 2022-10-28 RX ADMIN — ALPRAZOLAM 1 MG: 0.5 TABLET ORAL at 22:34

## 2022-10-28 RX ADMIN — GLIPIZIDE 5 MG: 5 TABLET ORAL at 06:36

## 2022-10-28 RX ADMIN — Medication 2 PUFF: at 20:21

## 2022-10-28 ASSESSMENT — PAIN SCALES - GENERAL
PAINLEVEL_OUTOF10: 0
PAINLEVEL_OUTOF10: 0
PAINLEVEL_OUTOF10: 8
PAINLEVEL_OUTOF10: 0
PAINLEVEL_OUTOF10: 7

## 2022-10-28 ASSESSMENT — PAIN DESCRIPTION - DESCRIPTORS
DESCRIPTORS: ACHING
DESCRIPTORS: ACHING;SHARP

## 2022-10-28 ASSESSMENT — PAIN DESCRIPTION - LOCATION
LOCATION: BACK
LOCATION: BACK

## 2022-10-28 ASSESSMENT — PAIN DESCRIPTION - ORIENTATION: ORIENTATION: RIGHT

## 2022-10-28 NOTE — PLAN OF CARE
Problem: Pain  Goal: Verbalizes/displays adequate comfort level or baseline comfort level  Outcome: Progressing   Pt can rate pain on a 0-10 scale. Pt pain will remain at a level that is tolerable to pt. PRN pain medication as needed. Problem: Safety - Adult  Goal: Free from fall injury  10/28/2022 0405 by Clay Hoskins RN  Outcome: Progressing  Note: Safety precautions in place. Bed locked, alarmed, lowest position. Call light in reach, gripper socks on. No falls or physical injury noted. Pt will remain free from falls. Fall precautions in place and alarm engaged. Bedside table and call light within reach. Pt aware to use call light for assistance ambulating. Problem: Skin/Tissue Integrity  Goal: Absence of new skin breakdown  Description: 1. Monitor for areas of redness and/or skin breakdown  2. Assess vascular access sites hourly  3. Every 4-6 hours minimum:  Change oxygen saturation probe site  4. Every 4-6 hours:  If on nasal continuous positive airway pressure, respiratory therapy assess nares and determine need for appliance change or resting period. Outcome: Progressing   Pt will remain free from skin breakdown. Pt turned Q2hrs, skin kept clean and dry, and skin assessed per shift or as needed.

## 2022-10-28 NOTE — PROGRESS NOTES
Physical/Occupational Therapy  Richie Leblanc  PT/OT orders noted and appreciated. Attempting evaluation and upon arrival, pt noted to be in sidelying position in bed, speaking on phone. Upon introduction of therapists and purpose of evaluation, pt refusing participation, stating \"I can't get up today, I'm in too much pain and if you touch me, I'll scream and jump through the roof\". Pt reporting she is allergic to all narcotic pain medication and is unable to take anything for pain. MRI of lumbar and thoracic spine pending. RN notified of refusal. PCA reporting pt ambulating to restroom yesterday for toileting. PT/OT educating pt that therapy will attempt again tomorrow following imaging to determine D/C needs. Pt verbalized understanding.   Thank you,  Cathie Andrea, PT, DPT, [id number]  Isaac CLINTON OTR/L [id number]

## 2022-10-28 NOTE — PLAN OF CARE
Problem: Safety - Adult  Goal: Free from fall injury  10/28/2022 0405 by Kye Brar RN  Outcome: Progressing  Note: Safety precautions in place. Bed locked, alarmed, lowest position. Call light in reach, gripper socks on. No falls or physical injury noted.      Problem: ABCDS Injury Assessment  Goal: Absence of physical injury  10/28/2022 0405 by Kye Brar RN  Outcome: Progressing     Problem: Chronic Conditions and Co-morbidities  Goal: Patient's chronic conditions and co-morbidity symptoms are monitored and maintained or improved  10/28/2022 0405 by Kye Brar RN  Outcome: Progressing

## 2022-10-28 NOTE — H&P
[facility]                     [facility], [address]                              HISTORY AND PHYSICAL    PATIENT NAME: Sridhar Valenzuela                      :        1945  MED REC NO:   [id number]                          ROOM:       [id number]  ACCOUNT NO:   [de-identified]                           ADMIT DATE: 10/26/2022  PROVIDER:     Frank Schlatter, MD    HISTORY OF PRESENT ILLNESS:  The patient is a 60-year-old white American  lady came to the emergency room with history of intractable back pain. The patient also had some chest pain. The patient was brought in by  [facility] due to back pain. She usually takes gabapentin at home,  stated that the pain has progressively gotten worse. She is unable to  ambulate. She has excruciating pain in the thoracolumbar region, which  was radiating all the way up to her hips. There is no hematuria. No  urinary symptoms. The pain was 8/10 and then the patient also developed  some chest pain, which was more of a pressure with associated shortness  of breath. PAST MEDICAL HISTORY:  Pertinent for COPD, diastolic heart failure,  pulmonary hypertension, TIA, paroxysmal atrial fibrillation, morbid  obesity, pyelonephritis, atherosclerotic heart disease, skin cancer,  chronic obstructive lung disease, claustrophobia, ESBL-producing  bacterial infection, esophageal stricture, hiatal hernia,  hyperlipidemia, irritable bowel syndrome, migraines, pneumonia, type 2  diabetes mellitus, CVA. PAST SURGICAL HISTORY:  Pertinent for upper GI endoscopy, colonoscopy,  multiple bronchoscopies, cardiac surgery, gallbladder surgery,  hysterectomy, appendectomy, cholecystectomy, multiple EGD and  colonoscopy, skin biopsy, tear duct surgery, cataract removal.    FAMILY HISTORY:  Both the parents are . They both had some kind  of cancer. Mother had atherosclerotic heart disease. SOCIAL HISTORY:  She is a .   She had two children, now there is one  living. Her life has been in turmoil since her son  of heroin  overdose couple of years ago. There is no history of alcohol usage. She was a heavy smoker and quit about a year and a half ago. No history  of illicit substance abuse. 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 MEDICATIONS:  The patient is on Januvia, aspirin, diclofenac, Lasix,  gabapentin, glipizide, isosorbide mononitrate, lisinopril, Toprol XL,  Micatin powder, Dulera, Protonix, Xarelto, Crestor, Spiriva and Kenalog. ALLERGIES:  She is allergic to MORPHINE, CODEINE, HYDROMORPHONE,  LEVAQUIN, VICODIN and ASPIRIN. REVIEW OF SYSTEMS:  Negative for loss of consciousness. Does have  excruciating low back pain with radiation. The patient is unable to  ambulate. Does have exertional shortness of breath. No orthopnea. No  paroxysmal nocturnal dyspnea. No hematemesis. No melena. No  genitourinary complaint. Does have chronic musculoskeletal pain. There  is no associated numbness. PHYSICAL EXAMINATION:  GENERAL:  Alert, awake, oriented x3, moderately distressed 76-year-old  white American lady, appears morbidly obese with a BMI of 43.64. VITAL SIGNS:  Her temperature is 97.7, blood pressure 108/53,  respirations 16, heart rate 51, O2 sat 94% on 2 L to 3 L, oral mucosa  dry. SKIN:  Warm and dry. NECK:  Supple. Mild jugular venous distention. No lymphadenopathy. No  thyromegaly. LUNGS:  Vesicular breath sounds. Chest wall tenderness. HEART:  Regular rate and rhythm. S1, S2 without any S3 or S4 gallop. ABDOMEN:  Soft, nontender. Bowel sounds present. EXTREMITIES:  Shows bilateral 2+ pitting edema. NEUROLOGICAL:  The patient appears grossly intact. LABORATORY DATA:  Lab evaluation shows sodium 140, potassium 4.3,  chloride 102, CO2 28, BUN 17, creatinine 0.9, calcium 9.6.   Troponin  less than 0.01.  Two sets of cardiac enzymes have been negative. White  blood cell count 10.1, hemoglobin/hematocrit 13.1 and 42.8, platelet  count is 381. Urinalysis negative. Positive for sugar and protein,  positive nitrite, 4+ bacteria, 2 wbc. IMAGING STUDIES:  The patient had a CT scan of the thoracic spine and CT  scan of the lumbar spine that has been ordered. The patient also has  been ordered to have a nuclear medicine myocardial perfusion scan by the  Cardiology group. ASSESSMENT:  Chest pain, thoracic and lumbar radiculopathy, COPD,  pulmonary hypertension, morbid obesity. PLAN:  Get her admitted. Treat her with chest pain to rule out pathway. Cardiology consultation. We will also do CT scan of the lumbar spine  and thoracic spine to properly evaluate excruciating radicular nature of  the pain. She is a full code.         Bao Ackerman MD    D: 10/27/2022 22:58:10       T: 10/27/2022 23:01:35     [email or web address]  Job#: [id number]     Doc#: [id number]    CC:

## 2022-10-28 NOTE — PROGRESS NOTES
Pt awake on phone talking to daughter. VSS, assessment complete and medications given. Denies any needs. Call light in reach and bed alarm engaged.

## 2022-10-28 NOTE — PROGRESS NOTES
Patient Active Problem List   Diagnosis    Chronic respiratory failure (HCC)    CAD (coronary artery disease)    Hyperlipemia    COPD (chronic obstructive pulmonary disease) ([facility]    Essential hypertension    DM type 2, controlled, with complication ([facility]    DM2 (diabetes mellitus, type 2) ([facility]    HTN (hypertension), benign    Primary osteoarthritis involving multiple joints    Gastroesophageal reflux disease without esophagitis    Anxiety    Dysarthria    Morbid obesity ([facility])    AKIL (obstructive sleep apnea)    Grade II diastolic dysfunction    CVA, old, alterations of sensations    Left-sided weakness    Arterial ischemic stroke, ICA, right, acute ([facility])    PAF (paroxysmal atrial fibrillation) ([facility])    Chest pain    Thoracic radiculopathy   H&P dictated

## 2022-10-28 NOTE — PROGRESS NOTES
[address]   Cardiology Progress Note     Date: 10/28/2022  Admit Date: 10/26/2022     Reason for consultation:     Chief Complaint   Patient presents with    Back Pain     Pt brought in by [facility] due to back pain that she takes gabapentin for at home, states that the pain has progressively gotten worse over the past week, sent in by home health RN after Dr. Karma Dhillon said take Pt to the hospital.  Pain in the left hip going down her left leg. Chest Pain     After being in the ER for 30 mins, Pt c/o 8/10 chest pain that stays in the center of her chest and is a pressure. History of Present Illness: History obtained from patient and medical record. Austin Benson is a 69 y.o. female with a past medical history of obesity, chronic back pain and CAD seen for chest pain. She has a h/o severe back pain with radiation to chest. She underwent cardiac cath in 2019 that showed moderate non-obstructive CAD. Similar presentation 2020 and stress myoview negative. She now says she has had severe pain in her \"back and hips\" for \"over a week\". Constant, no clear exacerbating or relieving factors. Yesterday morning she began having associated chest pressure. \"Like someone was sitting on my chest\". Lasted over an hour. Associated shortness of breath.  (per consult note)    Interval Hx: Today, she is feeling ok. Persistent back pain. Has some chest heaviness that she says is always associated with emotional stress. She states it does not feel how her chest felt prior to needing stents. Reports she \"knows her body\" well. Tele yesterday evening with ectopy. BB held yesterday for stress test. Improved this AM. Also has untreated AKIL. Patient seen and examined. Clinical notes reviewed. Telemetry reviewed. No new complaints today. No major events overnight. Denies having chest pain, palpitations, shortness of breath, orthopnea/PND, cough, or dizziness at the time of this visit.       Past Medical History:  Past Medical History:   Diagnosis Date    Acute MI (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 [address] 01/13/2017    Claustrophobia     COPD (chronic obstructive pulmonary disease) (HCC)     ESBL (extended spectrum beta-lactamase) producing bacteria infection 08/14/2021    Esophageal stricture     Gastroesophageal reflux disease without esophagitis 03/24/2016    Hiatal hernia     Hiatal hernia     Hypercholesteremia     Hypertension     IBS (irritable bowel syndrome)     Migraines     Movement disorder arthritis    Pneumonia     PONV (postoperative nausea and vomiting)     Type II or unspecified type diabetes mellitus without mention of complication, not stated as uncontrolled     type ll does not take insulin at home    Unspecified cerebral artery occlusion with cerebral infarction     many TIA's        Past Surgical History:    has a past surgical history that includes Cardiac surgery; Gallbladder surgery; Hysterectomy; Appendectomy; Cholecystectomy; Colonoscopy; Upper gastrointestinal endoscopy (4/20/12); Endoscopy, colon, diagnostic; Tear duct surgery; Upper gastrointestinal endoscopy (06/11/2018); Colonoscopy (06/11/2018); pr exc skin malig <5mm remaindr body (N/A, 9/6/2018); pr split grft trunk,arm,leg <100sqcm (N/A, 9/6/2018); skin biopsy; eye surgery; bronchoscopy (N/A, 1/24/2020); bronchoscopy (N/A, 1/27/2020); Cataract removal (2013 or 2014); Esophagus dilation; and bronchoscopy (N/A, 11/23/2020). Social History:  Reviewed. reports that she quit smoking about 5 years ago. Her smoking use included cigarettes. She has a 12.25 pack-year smoking history. She has never used smokeless tobacco. She reports current alcohol use of about 1.0 standard drink per week. She reports that she does not use drugs. Allergies: Allergies   Allergen Reactions    Morphine Shortness Of Breath    Codeine Other (See Comments)     Chest pain    Hydromorphone Other (See Comments)     hallucinations  Hallucinations    But will take if needed in an emergency    Levaquin [Levofloxacin In D5w] Itching    Vicodin [Hydrocodone-Acetaminophen] Hives    Aspirin      Upsets hiatal hernia       Family History:  Reviewed. family history includes Cancer in her father, mother, and sister; Heart Disease in her brother, mother, and sister. Denies family history of sudden cardiac death, arrhythmia, premature CAD    Home Meds:  Prior to Visit Medications    Medication Sig Taking? Authorizing Provider   furosemide (LASIX) 40 MG tablet TAKE ONE TABLET BY MOUTH TWICE A DAY  Arian Jenkins MD   ALPRAZolam Nixon Williamson) 1 MG tablet Take 1 tablet by mouth 3 times daily as needed for Anxiety for up to 30 days. Luis Antonio Hughes MD   omeprazole (PRILOSEC) 40 MG delayed release capsule Take 1 capsule by mouth daily  Luis Antonio Hughes MD   linagliptin (TRADJENTA) 5 MG tablet Take 1 tablet by mouth daily  Luis Antonio Hughes MD   gabapentin (NEURONTIN) 300 MG capsule Take 1 capsule by mouth 2 times daily for 30 days. Intended supply: 30 days  Luis Antonio Hughes MD   glipiZIDE (GLUCOTROL) 5 MG tablet Take 1 tablet by mouth 2 times daily (before meals)  Luis Antonio Hughes MD   rivaroxaban (XARELTO) 15 MG TABS tablet Take 1 tablet by mouth in the morning. Luis Antonio Hughes MD   isosorbide mononitrate (IMDUR) 30 MG extended release tablet Take 1 tablet by mouth in the morning. Luis Antonio Hughes MD   cephALEXin (KEFLEX) 250 MG capsule Take 1 capsule by mouth in the morning and 1 capsule at noon and 1 capsule before bedtime. Luis Antonio Hughes MD   lisinopril (PRINIVIL;ZESTRIL) 2.5 MG tablet Take 1 tablet by mouth in the morning. Idalmis Oglesby MD   metoprolol succinate (TOPROL XL) 25 MG extended release tablet Take 1 tablet by mouth in the morning. Idalmis Oglesby MD   diclofenac sodium (VOLTAREN) 1 % GEL Apply 2 g topically 2 times daily  Idalmis Oglesby MD   albuterol sulfate HFA (PROVENTIL;VENTOLIN;PROAIR) 108 (90 Base) MCG/ACT inhaler INHALE TWO PUFFS BY MOUTH EVERY 6 HOURS AS NEEDED FOR WHEEZING  Sugarwetal Karina Figueroa MD   blood glucose monitor strips Test 2 times a day & as needed for symptoms of irregular blood glucose. Dispense sufficient amount for indicated testing frequency plus additional to accommodate PRN testing needs. Idalmis Oglesby MD   hydrALAZINE (APRESOLINE) 25 MG tablet Take 1 tablet by mouth 3 times daily  Idalmis Oglesby MD   gabapentin (NEURONTIN) 300 MG capsule Take 1 capsule by mouth 4 times daily for 30 days. Intended supply: 30 days  Idalmis Oglesby MD   acetaZOLAMIDE (DIAMOX) 250 MG tablet Take 1 tablet by mouth daily  Idalmis Oglesby MD   budesonide-formoterol (SYMBICORT) 160-4.5 MCG/ACT AERO Inhale 2 puffs into the lungs 2 times daily  Idalmis Oglesby MD   Umeclidinium Bromide (INCRUSE ELLIPTA) 62.5 MCG/INH AEPB Inhale 1 puff into the lungs daily  Silverio Castro MD   triamcinolone (KENALOG) 0.1 % cream Apply topically 2 times daily.   Idalmis Oglesby MD   ipratropium-albuterol (DUONEB) 0.5-2.5 (3) MG/3ML SOLN nebulizer solution INHALE THREE MILLILITERS VIA NEBULIZATION BY MOUTH EVERY 4 HOURS AS NEEDED Waleska Barahona MD   blood glucose test strips (TRUE METRIX BLOOD GLUCOSE TEST) strip USE THREE TIMES A DAY AS NEEDED  Idalmis Oglesby MD   Lancets MISC 1 each by Does not apply route 2 times daily  Idalmis Oglesby MD   nystatin (MYCOSTATIN) 564141 UNIT/GM powder Affected area  Idalmis Oglesby MD   rosuvastatin (CRESTOR) 10 MG tablet Take 0.5 tablets by mouth daily  Sherif Bright MD   sodium chloride (OCEAN, BABY AYR) 0.65 % nasal spray 1 spray by Nasal route as needed for Naima Gentile MD   ibuprofen (ADVIL;MOTRIN) 400 MG tablet Take 1 tablet by mouth every 6 hours as needed for Pain or Fever  Pietro Wan MD   aspirin 81 MG tablet Take 81 mg by mouth daily  Historical Provider, MD   nitroGLYCERIN (NITROSTAT) 0.4 MG SL tablet Place 1 tablet under the tongue every 5 minutes as needed for Chest pain. Pietro Wan MD        Scheduled Meds:   rosuvastatin  20 mg Oral Nightly    aspirin  81 mg Oral Daily    mometasone-formoterol  2 puff Inhalation BID    furosemide  40 mg Oral Daily    glipiZIDE  5 mg Oral BID AC    isosorbide mononitrate  30 mg Oral Daily    alogliptin  25 mg Oral Daily    lisinopril  2.5 mg Oral Daily    metoprolol succinate  25 mg Oral Daily    miconazole   Topical BID    pantoprazole  40 mg Oral QAM AC    [Held by provider] rivaroxaban  15 mg Oral Daily    triamcinolone   Topical BID    tiotropium  2 puff Inhalation Daily    fentanNYL  50 mcg IntraVENous Once    gabapentin  600 mg Oral BID    diclofenac sodium  4 g Topical BID     Continuous Infusions:  PRN Meds:albuterol sulfate HFA, ALPRAZolam, ipratropium-albuterol, nitroGLYCERIN, sodium chloride, fentanNYL, ondansetron     Review of Systems:  Constitutional: Negative for fever, night sweats, chills,  Skin: Negative for rash, pruritus, bleeding, or bruising    HEENT: Negative for vision changes, ringing in the ears, dysphagia  Respiratory: Reviewed in HPI  Cardiovascular: Reviewed in HPI  Gastrointestinal: Negative for abdominal pain, N/V/D, constipation, or black/tarry stools  Genito-Urinary: Negative for dysuria, incontinence, or hematuria  Musculoskeletal: Negative for joint swelling, muscle pain, or injuries  Neurological/Psych: Negative for confusion, seizures, headaches, or TIA-like symptoms. No anxiety or depression.      Physical Examination:  Vitals:    10/28/22 0345   BP: (!) 104/52   Pulse: 66   Resp: 16   Temp: 97.7 °F (36.5 °C)   SpO2: 92%      In: -   Out: 1200    Wt Readings from Last 3 Encounters:   10/26/22 270 lb 6.4 oz (122.7 kg)   10/12/22 264 lb (119.7 kg)   08/04/22 266 lb 12.1 oz (121 kg)       Intake/Output Summary (Last 24 hours) at 10/28/2022 0815  Last data filed at 10/28/2022 0345  Gross per 24 hour   Intake --   Output 1200 ml   Net -1200 ml       Telemetry: Personally Reviewed  - Sinus rhythm   Constitutional: Cooperative and in no apparent distress, and appears overweight   Skin: Warm and pink; no pallor, cyanosis, clubbing, or bruising   HEENT: Symmetric and normocephalic. PERRL. Conjunctiva pink with clear sclera. Mucus membranes moist.   Cardiovascular: Regular rate and rhythm. S1/S2 present without murmurs, no rubs or gallops. Peripheral pulses 2+, capillary refill < 3 seconds. No elevation of JVP. No peripheral edema  Respiratory: Respirations symmetric and unlabored. Lungs clear to auscultation bilaterally, no wheezing, crackles, or rhonchi  Gastrointestinal: Abdomen soft and round. Bowel sounds active without tenderness. Musculoskeletal: Bilateral upper and lower extremity strength with generalized weakness   Neurologic/Psych: Awake and orientated to person, place and time. Calm affect, appropriate mood    Pertinent labs, diagnostic, device, and imaging results reviewed as a part of this visit    Labs:    BMP:   Recent Labs     10/26/22  1340 10/27/22  0428    140   K 4.2 4.3   CL 99 102   CO2 29 28   BUN 19 17   CREATININE 0.9 0.9     Estimated Creatinine Clearance: 71 mL/min (based on SCr of 0.9 mg/dL).    CBC:   Recent Labs     10/26/22  1340 10/27/22  0428   WBC 12.0* 10.1   HGB 14.6 13.8   HCT 46.2 42.6   MCV 86.0 85.9    210     Thyroid:   Lab Results   Component Value Date/Time    TSH 2.34 06/06/2011 09:40 AM     Lipids:   Lab Results   Component Value Date/Time    CHOL 226 11/07/2019 07:05 AM    HDL 26 08/03/2022 05:41 AM    HDL 32 04/19/2012 05:42 AM    TRIG 300 11/07/2019 07:05 AM     LFTS:   Lab Results   Component Value Date/Time ALT 13 10/26/2022 01:40 PM    AST 18 10/26/2022 01:40 PM    ALKPHOS 84 10/26/2022 01:40 PM    PROT 6.5 10/26/2022 01:40 PM    PROT 7.6 12/02/2011 09:38 AM    AGRATIO 1.4 10/26/2022 01:40 PM    BILITOT 0.3 10/26/2022 01:40 PM     Cardiac Enzymes:   Lab Results   Component Value Date/Time    CKTOTAL 24 06/07/2018 12:19 AM    CKMB 0.29 08/09/2011 04:45 PM    TROPONINI <0.01 10/27/2022 04:28 AM    TROPONINI <0.01 10/26/2022 01:40 PM    TROPONINI <0.01 08/01/2022 06:19 PM     Coags:   Lab Results   Component Value Date/Time    PROTIME 13.0 08/01/2022 06:19 PM    INR 0.99 08/01/2022 06:19 PM       ECG: 10/16/22  Sinus rhythm with frequent Premature ventricular complexes  Left axis deviation  Septal infarct , age undetermined  Abnormal ECG    ECHO:  8/2/22   Summary   Technically difficult exam due to body habitus. Normal global systolic function with an ejection fraction estimated at 70%. No regional wall motion abnormalities noted. Mild concentric left ventricular hypertrophy. Mild aortic stenosis with a peak velocity of 2.17m/s and a mean pressure   gradient of 12 mmHg. There is mild aortic insufficiency. Grade I diastolic dysfunction with normal LV filling pressures. Avg.   E/e'=18.2    Stress test: 11/17/20  Summary    SPECT images demonstrate homogeneous tracer distribution throughout the    myocardium. There is normal isotope uptake at stress and rest. There is no evidence of    myocardial ischemia or scar. Normal LV size and systolic function. Left ventricular ejection fraction of 76 %. Stress Test: 10/27/22  Summary    There is a mild fixed inferioapical defect with no associated wall motion    abnormality consistent with diaphragmatic attenuation artifact. No evidence of myocardium at risk for significant reversible ischemia. Normal LV size and systolic function. Overall findings represent a low risk scan.        Cath: 9/2019  Anatomy:   LM-Normal No disease   LAD-proximal stent 10% ISR, mid stent patent. Distal LAD 20% irregular stenosis. D2 70% ostial stent residential  Cx-Patent, mild luminal irregularities  OM1- ostial discrete 60% stenosis  RCA-Dominant, large sized vessel. Proximal 70% heavily calcified stenosis. RPDA- Patent, mild luminal irregularities     FFR of prox RCA 0.84    CT chest/abd/pelvis: 10/26/22  1. No aortic aneurysm or dissection. 2. Borderline cardiomegaly and advanced coronary arterial calcifications. 3. No acute findings in the abdomen or pelvis. 4. 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 5. Colonic diverticulosis. Problem List:   Patient Active Problem List    Diagnosis Date Noted    HTN (hypertension), benign     CAD (coronary artery disease) 05/23/2011    Hyperlipemia 05/23/2011    Thoracic radiculopathy 10/27/2022    Chest pain 10/26/2022    Left-sided weakness 08/02/2022    Arterial ischemic stroke, ICA, right, acute ([address] 08/02/2022    PAF (paroxysmal atrial fibrillation) ([address] 08/02/2022    CVA, old, alterations of sensations 08/01/2022    Grade II diastolic dysfunction 71/08/2451    AKIL (obstructive sleep apnea) 01/23/2020    Morbid obesity ([address] 06/18/2018    Dysarthria 06/07/2018    Anxiety 01/19/2018    Primary osteoarthritis involving multiple joints 03/24/2016    Gastroesophageal reflux disease without esophagitis 03/24/2016    DM2 (diabetes mellitus, type 2) ([address] 12/03/2015    DM type 2, controlled, with complication ([address]     Essential hypertension 04/18/2012    COPD (chronic obstructive pulmonary disease) ([address] 08/09/2011    Chronic respiratory failure ([address] 12/22/2010        Assessment and Plan:     Back pain   ~ chronic   ~ per primary, neurosurgery consulted      Chest pain   ~ troponin and ecg negative   ~ stress test 10/27/22: with artifact, no evidence of myocardium at risk for significant reversible ischemia  ~ pt thinks her current symptoms are associated with stress. No angina.       Coronary artery disease   ~ hx of LAD and D2 PCI  ~ last [facility] in 2019 with borderline CAD with FFR negative 70% RCA stenosis. ~ stress test 10/27/22: with artifact, negative for ishcmia   ~ continues on aspirin, Toprol, ACEi, statin, imdur     Paroxysmal Atrial fibrillation   ~ rate controlled   ~ AC with Xarelto    Ectopy   ~ bb held yesterday. Worse in evening. Does have AKIL and not compliant with CPAP. Has unchanged stable palpations. Improved today. Continue BB. Check mag. HLD   ~ continue statin     COPD     Pt stable from a cardiac standpoint. Follow up in office in 1 month. Multiple medical conditions with risk of decompensation. All pertinent information and plan of care discussed with the physician. All questions and concerns were addressed to the patient. Alternatives to my treatment were discussed. I have discussed the above stated plan with patient and the nurse. The patient verbalized understanding and agreed with the plan. Thank you for allowing to us to participate in the care of Matthew Valdez. Total visit time > 35 minutes; > 50% spend counseling / coordinating care. I reviewed interval history, physical exam, review of data including labs, imaging, development and implementation of treatment plan and coordination of complex care. Counseled on risk factor modifications. Lavon PAL-CNP  [facility]   Office: [phone]    NOTE:  This report was transcribed using voice recognition software. Every effort was made to ensure accuracy; however, inadvertent computerized transcription errors may be present.

## 2022-10-28 NOTE — CONSULTS
Neurosurgery Consult Note    Reason for Consult: Intractable back pain  Requesting Provider: Dr. Jerrell Sethi:  Hans Ser / HPI:  Christina Lan is a 69 y.o. female patient being seen for complaints of severe back pain that radiates across into her hips and down her entire left leg. She states her symptoms started about a week ago the only thing she recalls was turning her head. The pain is constant and described as stabbing burning pain. She has tried sitting, standing, walking or lying down all without any improvement or decrease in her pain. She has been using heat with a little bit of improvement as well as Voltaren gel. She states she cannot take any opiates due to allergies. Given the ongoing severe pain she then developed chest pain and ultimately was brought into the hospital.  Cardiology following with no acute cardiac etiology. Currently denies any chest pain. Does wear home O2 at nighttime. Currently has 3 L per nasal cannula on. She does note she has severe bilateral knee degenerative joint disease but is not an operative candidate given her multiple comorbidities. CT thoracic spine obtained in the ED which showed T3, T5 and T11 compression fractures of indeterminate age. Patient states she has had known vertebral fractures for 50 years.     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    Cancer [facility])     \"skin on top of my head\"    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    Chronic obstructive pulmonary disease ([facility] [address] 01/13/2017    Claustrophobia     COPD (chronic obstructive pulmonary disease) ([facility])     ESBL (extended spectrum beta-lactamase) producing bacteria infection 08/14/2021    Esophageal stricture     Gastroesophageal reflux disease without esophagitis 03/24/2016    Hiatal hernia     Hiatal hernia     Hypercholesteremia     Hypertension     IBS (irritable bowel syndrome)     Migraines     Movement disorder arthritis    Pneumonia     PONV (postoperative nausea and vomiting)     Type II or unspecified type diabetes mellitus without mention of complication, not stated as uncontrolled     type ll does not take insulin at home    Unspecified cerebral artery occlusion with cerebral infarction     many TIA's     Past Surgical History:   Procedure Laterality Date    APPENDECTOMY      BRONCHOSCOPY N/A 1/24/2020    BRONCHOSCOPY ALVEOLAR LAVAGE performed by Sahil Naidu MD at [address] [facility] N/A 1/27/2020    BRONCHOSCOPY DIAGNOSTIC OR CELL [address] ONLY performed by Olga Botello MD at [address] [facility] N/A 11/23/2020    BRONCHOSCOPY DIAGNOSTIC OR CELL [address] ONLY performed by Krystal Irby MD at [facility]      1 stent 2000 and 1 stent 2001    CATARACT REMOVAL  2013 or 2014    bilateral cataracts removed    CHOLECYSTECTOMY      COLONOSCOPY      COLONOSCOPY  06/11/2018    ENDOSCOPY, COLON, DIAGNOSTIC      ESOPHAGEAL DILATATION      EYE SURGERY      bilateral cataracts    GALLBLADDER SURGERY      HYSTERECTOMY (CERVIX STATUS UNKNOWN)      MO EXC SKIN MALIG <5MM REMAINDR BODY N/A 9/6/2018    EXCISION OF SCALP SQUAMOUS CELL CARCINOMA performed by Richard Hernandez MD at [address] [place] [address] <100SQCM N/A 9/6/2018    SPLIT THICKNESS SKIN GRAFT FOR COVERAGE SCALP, APPLICATION OF WOUND VAC DEVICE performed by Richard Hernandez MD at [address] [place] [address],[address] ENDOSCOPY  4/20/12    with biopsy of stomach,gastritis    UPPER GASTROINTESTINAL ENDOSCOPY  06/11/2018    w/esophagael dilation     Morphine, Codeine, Hydromorphone, Levaquin [levofloxacin in d5w], Vicodin [hydrocodone-acetaminophen], and Aspirin    Current Facility-Administered Medications:     rosuvastatin (CRESTOR) tablet 20 mg, 20 mg, Oral, Nightly, Makenna Garcia MD, 20 mg at 10/27/22 2257    albuterol sulfate HFA (PROVENTIL;VENTOLIN;PROAIR) 108 (90 Base) MCG/ACT inhaler 2 puff, 2 puff, Inhalation, Q4H PRN, Lamonte Johnson MD    ALPRAZolam [facility]) tablet 1 mg, 1 mg, Oral, TID PRN, Lamonte Johnson MD, 1 mg at 10/27/22 2330    aspirin EC tablet 81 mg, 81 mg, Oral, Daily, Lamonte Johnson MD, 81 mg at 10/28/22 0915    mometasone-formoterol (DULERA) 100-5 MCG/ACT inhaler 2 puff, 2 puff, Inhalation, BID, Lamonte Johnson MD, 2 puff at 10/27/22 1931    furosemide (LASIX) tablet 40 mg, 40 mg, Oral, Daily, Lamonte Johnson MD, 40 mg at 10/28/22 0915    glipiZIDE (GLUCOTROL) tablet 5 mg, 5 mg, Oral, BID AC, Lamonte Johnson MD, 5 mg at 10/28/22 0636    ipratropium-albuterol (DUONEB) nebulizer solution 1 ampule, 1 ampule, Inhalation, Q4H PRN, Lamonte Johnson MD    isosorbide mononitrate (IMDUR) extended release tablet 30 mg, 30 mg, Oral, Daily, Lamonte Johnson MD, 30 mg at 10/28/22 0916    alogliptin (NESINA) tablet 25 mg, 25 mg, Oral, Daily, Lamonte Johnson MD, 25 mg at 10/28/22 0916    lisinopril (PRINIVIL;ZESTRIL) tablet 2.5 mg, 2.5 mg, Oral, Daily, Lamonte Johnson MD, 2.5 mg at 10/28/22 0916    metoprolol succinate (TOPROL XL) extended release tablet 25 mg, 25 mg, Oral, Daily, Lamonte Johnson MD, 25 mg at 10/28/22 0915    nitroGLYCERIN (NITROSTAT) SL tablet 0.4 mg, 0.4 mg, SubLINGual, Q5 Min PRN, Lamonte Johnson MD    miconazole (MICOTIN) 2 % powder, , Topical, BID, Lamonte Johnson MD, Given at 10/28/22 0918    pantoprazole (PROTONIX) tablet 40 mg, 40 mg, Oral, QAM AC, Lamonte Johnson MD, 40 mg at 10/28/22 0636    [Held by provider] rivaroxaban (XARELTO) tablet 15 mg, 15 mg, Oral, Daily, Lamonte Johnson MD    sodium chloride (OCEAN, BABY AYR) 0.65 % nasal spray 1 spray, 1 spray, Nasal, PRN, Lamonte Johnson MD    triamcinolone (KENALOG) 0.1 % cream, , Topical, BID, Maureen Temple MD, Given at 10/28/22 0917    tiotropium (SPIRIVA RESPIMAT) 2.5 MCG/ACT inhaler 2 puff, 2 puff, Inhalation, Daily, Maureen Temple MD, 2 puff at 10/27/22 0929    fentaNYL (SUBLIMAZE) injection 25 mcg, 25 mcg, IntraVENous, Q6H PRN, Maureen Temple MD    ondansetron [facility]) injection 4 mg, 4 mg, IntraVENous, Q8H PRN, Maureen Temple MD, 4 mg at 10/28/22 0306    fentaNYL (SUBLIMAZE) injection 50 mcg, 50 mcg, IntraVENous, Once, Maureen Temple MD    gabapentin (NEURONTIN) capsule 600 mg, 600 mg, Oral, BID, Maureen Temple MD, 600 mg at 10/28/22 9734    diclofenac sodium (VOLTAREN) 1 % gel 4 g, 4 g, Topical, BID, Maureen Temple MD, 4 g at 10/28/22 3877  Social History     Socioeconomic History    Marital status:      Spouse name: Sandip Bonilla    Number of children: 3    Years of education: 12    Highest education level: Not on file   Occupational History    Not on file   Tobacco Use    Smoking status: Former     Packs/day: 0.25     Years: 49.00     Pack years: 12.25     Types: Cigarettes     Quit date: 2017     Years since quittin.3    Smokeless tobacco: Never    Tobacco comments:     only smoke when real stressed  Nicotine patch   Vaping Use    Vaping Use: Former    Substances: Always   Substance and Sexual Activity    Alcohol use:  Yes     Alcohol/week: 1.0 standard drink     Types: 1 Glasses of wine per week     Comment: occ. every  6 mo    Drug use: No    Sexual activity: Not Currently     Partners: Male   Other Topics Concern    Not on file   Social History Narrative    Not on file     Social Determinants of Health     Financial Resource Strain: Not on file   Food Insecurity: Not on file   Transportation Needs: Not on file   Physical Activity: Not on file   Stress: Not on file   Social Connections: Not on file   Intimate Partner Violence: Not on file   Housing Stability: Not on file     Family History   Problem Relation Age of Onset    Heart Disease Mother     Cancer Mother     Cancer Father     Cancer Sister     Heart Disease Sister     Heart Disease Brother     High Blood Pressure Neg Hx     High Cholesterol Neg Hx        ROS: Complete 10 point ROS was obtained with positives in HPI, otherwise:  Pt denies fevers, denies chills. Pt denies chest pain, denies SOB, denies nausea, denies vomiting, denies headache. PHYSICAL EXAMINATION:  /68   Pulse 74   Temp 97.8 °F (36.6 °C) (Oral)   Resp 16   Ht 5' 6\" (1.676 m)   Wt 270 lb 6.4 oz (122.7 kg)   SpO2 91%   BMI 43.64 kg/m²   GENERAL: Obese female lying in bed in no acute distress  HEENT: Sclerae clear, head nontraumatic, mucous membranes moist  NEUROLOGIC:  Awake, alert, oriented to name, place and time. Cranial nerves II-XII are grossly intact. Motor: Grasp decreased bilaterally related to severe hand arthritis. Barely able to lift either leg off of the bed. Bilateral knee swelling left greater than right. Flexors and extensors symmetric. Reflexes symmetric. Focal tenderness around T11 on palp patient as well as bilateral SI joints.     LABORATORY DATA:   CBC with Differential:    Lab Results   Component Value Date/Time    WBC 10.1 10/27/2022 04:28 AM    RBC 4.95 10/27/2022 04:28 AM    HGB 13.8 10/27/2022 04:28 AM    HCT 42.6 10/27/2022 04:28 AM     10/27/2022 04:28 AM    MCV 85.9 10/27/2022 04:28 AM    MCH 27.9 10/27/2022 04:28 AM    MCHC 32.5 10/27/2022 04:28 AM    RDW 15.5 10/27/2022 04:28 AM    SEGSPCT 65.5 10/19/2012 02:42 PM    BANDSPCT 2 11/23/2020 06:37 AM    METASPCT 1 11/23/2020 06:37 AM    LYMPHOPCT 25.8 10/27/2022 04:28 AM    MONOPCT 7.1 10/27/2022 04:28 AM    EOSPCT 0.8 04/18/2012 05:45 AM    BASOPCT 1.1 10/27/2022 04:28 AM    MONOSABS 0.7 10/27/2022 04:28 AM    LYMPHSABS 2.6 10/27/2022 04:28 AM    EOSABS 0.1 10/27/2022 04:28 AM    BASOSABS 0.1 10/27/2022 04:28 AM    DIFFTYPE Auto 10/19/2012 02:42 PM     CMP:    Lab Results   Component Value Date/Time     10/27/2022 04:28 AM    K 4.3 10/27/2022 04:28 AM    K 4.2 10/26/2022 01:40 PM     10/27/2022 04:28 AM    CO2 28 10/27/2022 04:28 AM    BUN 17 10/27/2022 04:28 AM    CREATININE 0.9 10/27/2022 04:28 AM    GFRAA >60 08/02/2022 08:47 AM    GFRAA >60 05/02/2013 10:40 AM    AGRATIO 1.4 10/26/2022 01:40 PM    LABGLOM >60 10/27/2022 04:28 AM    GLUCOSE 168 10/27/2022 04:28 AM    PROT 6.5 10/26/2022 01:40 PM    PROT 7.6 12/02/2011 09:38 AM    LABALBU 3.8 10/26/2022 01:40 PM    CALCIUM 9.6 10/27/2022 04:28 AM    BILITOT 0.3 10/26/2022 01:40 PM    ALKPHOS 84 10/26/2022 01:40 PM    AST 18 10/26/2022 01:40 PM    ALT 13 10/26/2022 01:40 PM     PT/INR:    Lab Results   Component Value Date/Time    PROTIME 13.0 08/01/2022 06:19 PM    INR 0.99 08/01/2022 06:19 PM     PTT:    Lab Results   Component Value Date/Time    APTT 29.1 09/18/2019 05:04 PM   [APTT}     IMAGING STUDIES:   CT of the Thoracic- Lumbar-Sacral Spine:  Date: 10/27/22; Result:   1. Multiple mild thoracic compression injuries involving T3, T5 and T11 with   depressed superior endplate. Normal alignment. The acuity of these   fractures needs to be correlated with clinical findings. 2. No evidence of acute fractures or malalignment in the lumbar spine. 3. Mild multilevel degenerative disc disease. IMPRESSION/PLAN:  Principal Problem:    Chest pain  Active Problems:    PAF (paroxysmal atrial fibrillation) (HCC)     Primary osteoarthritis involving multiple joints    Morbid obesity (HCC)    AKIL (obstructive sleep apnea)     Thoracic radiculopathy: Back,back ,pelvic and left leg pain in no clear dermatomal pattern. She does have some lower abdominal pain. There is no sensory level on exam.   Agree with thoracic MRI and will add lumbar MRI as well given her left leg pain. Defer medication management of pain to Dr. Douglas Ashraf but will add moist heat which has been helpful at home.    Further recommendations pending review of new testing. The patient's symptoms, exam, testing and plan of care have been discussed with Dr. Kathryn Benson. Thank you again for this consultation.     KAE Ayala - CEASAR  10/28/2022

## 2022-10-29 LAB
GLUCOSE BLD-MCNC: 139 MG/DL (ref 70–99)
GLUCOSE BLD-MCNC: 169 MG/DL (ref 70–99)
GLUCOSE BLD-MCNC: 214 MG/DL (ref 70–99)
GLUCOSE BLD-MCNC: 272 MG/DL (ref 70–99)
PERFORMED ON: ABNORMAL

## 2022-10-29 PROCEDURE — 6370000000 HC RX 637 (ALT 250 FOR IP): Performed by: INTERNAL MEDICINE

## 2022-10-29 PROCEDURE — 94640 AIRWAY INHALATION TREATMENT: CPT

## 2022-10-29 PROCEDURE — 94761 N-INVAS EAR/PLS OXIMETRY MLT: CPT

## 2022-10-29 PROCEDURE — 1200000000 HC SEMI PRIVATE

## 2022-10-29 PROCEDURE — 2700000000 HC OXYGEN THERAPY PER DAY

## 2022-10-29 RX ORDER — GABAPENTIN 300 MG/1
600 CAPSULE ORAL 3 TIMES DAILY
Status: DISCONTINUED | OUTPATIENT
Start: 2022-10-29 | End: 2022-10-31 | Stop reason: HOSPADM

## 2022-10-29 RX ADMIN — ALOGLIPTIN 25 MG: 25 TABLET, FILM COATED ORAL at 08:19

## 2022-10-29 RX ADMIN — GLIPIZIDE 5 MG: 5 TABLET ORAL at 16:36

## 2022-10-29 RX ADMIN — ROSUVASTATIN CALCIUM 20 MG: 10 TABLET, COATED ORAL at 21:17

## 2022-10-29 RX ADMIN — LISINOPRIL 2.5 MG: 5 TABLET ORAL at 08:18

## 2022-10-29 RX ADMIN — TIOTROPIUM BROMIDE INHALATION SPRAY 2 PUFF: 3.12 SPRAY, METERED RESPIRATORY (INHALATION) at 08:25

## 2022-10-29 RX ADMIN — INSULIN LISPRO 4 UNITS: 100 INJECTION, SOLUTION INTRAVENOUS; SUBCUTANEOUS at 11:35

## 2022-10-29 RX ADMIN — ALPRAZOLAM 1 MG: 0.5 TABLET ORAL at 21:17

## 2022-10-29 RX ADMIN — RIVAROXABAN 15 MG: 15 TABLET, FILM COATED ORAL at 08:18

## 2022-10-29 RX ADMIN — MICONAZOLE NITRATE: 20 POWDER TOPICAL at 08:21

## 2022-10-29 RX ADMIN — GABAPENTIN 600 MG: 300 CAPSULE ORAL at 08:19

## 2022-10-29 RX ADMIN — FUROSEMIDE 40 MG: 40 TABLET ORAL at 08:18

## 2022-10-29 RX ADMIN — METOPROLOL SUCCINATE 25 MG: 25 TABLET, FILM COATED, EXTENDED RELEASE ORAL at 08:19

## 2022-10-29 RX ADMIN — ISOSORBIDE MONONITRATE 30 MG: 30 TABLET, EXTENDED RELEASE ORAL at 08:19

## 2022-10-29 RX ADMIN — ASPIRIN 81 MG: 81 TABLET, COATED ORAL at 08:18

## 2022-10-29 RX ADMIN — DICLOFENAC SODIUM 4 G: 10 GEL TOPICAL at 08:20

## 2022-10-29 RX ADMIN — GLIPIZIDE 5 MG: 5 TABLET ORAL at 08:18

## 2022-10-29 RX ADMIN — GABAPENTIN 600 MG: 300 CAPSULE ORAL at 21:17

## 2022-10-29 RX ADMIN — IBUPROFEN 400 MG: 400 TABLET, FILM COATED ORAL at 16:36

## 2022-10-29 RX ADMIN — ALPRAZOLAM 1 MG: 0.5 TABLET ORAL at 15:05

## 2022-10-29 RX ADMIN — PANTOPRAZOLE SODIUM 40 MG: 40 TABLET, DELAYED RELEASE ORAL at 06:11

## 2022-10-29 RX ADMIN — DICLOFENAC SODIUM 4 G: 10 GEL TOPICAL at 21:17

## 2022-10-29 RX ADMIN — Medication 2 PUFF: at 08:25

## 2022-10-29 RX ADMIN — Medication 2 PUFF: at 19:00

## 2022-10-29 ASSESSMENT — PAIN DESCRIPTION - LOCATION
LOCATION: BACK;HIP
LOCATION: HEAD

## 2022-10-29 ASSESSMENT — PAIN DESCRIPTION - ORIENTATION: ORIENTATION: LEFT

## 2022-10-29 ASSESSMENT — PAIN SCALES - GENERAL
PAINLEVEL_OUTOF10: 0
PAINLEVEL_OUTOF10: 0
PAINLEVEL_OUTOF10: 3
PAINLEVEL_OUTOF10: 3

## 2022-10-29 ASSESSMENT — PAIN DESCRIPTION - DESCRIPTORS: DESCRIPTORS: ACHING

## 2022-10-29 NOTE — PROGRESS NOTES
Physical & Occupational Therapy  Patient sidelying in bed, sleeping. She was easily awoken. She states she doesn't want to move because she isn't having pain and is afraid if she does move, the pain will return. Patient educated on the deleterious effects of immobility including mm atrophy, potential for developing pneumonia and bed sores as well as blood clots. Patient continued to refuse stating she would rather have those things than the pain she was having before.   Odessa Roldan PT, DPT, ATC-R [id number]  Adwoa Cm, [address] [facility] [place] [place], OTR/L [id number]

## 2022-10-29 NOTE — PLAN OF CARE
Problem: Discharge Planning  Goal: Discharge to home or other facility with appropriate resources  Outcome: Progressing     Problem: Pain  Goal: Verbalizes/displays adequate comfort level or baseline comfort level  10/29/2022 1007 by Nola Salinas RN  Outcome: Progressing     Problem: Safety - Adult  Goal: Free from fall injury  10/29/2022 1007 by Nola Salinas RN  Outcome: Progressing     Problem: ABCDS Injury Assessment  Goal: Absence of physical injury  10/29/2022 1007 by Nola Salinas RN  Outcome: Progressing     Problem: Skin/Tissue Integrity  Goal: Absence of new skin breakdown  Description: 1. Monitor for areas of redness and/or skin breakdown  2. Assess vascular access sites hourly  3. Every 4-6 hours minimum:  Change oxygen saturation probe site  4. Every 4-6 hours:  If on nasal continuous positive airway pressure, respiratory therapy assess nares and determine need for appliance change or resting period.   10/29/2022 1007 by Nola Salinas RN  Outcome: Progressing

## 2022-10-29 NOTE — PROGRESS NOTES
Department of Internal Medicine  General Internal Medicine   Progress Note     SUBJECTIVE  excruciating pain  in mid back and left leg occasional weak    History obtained from chart review and the patient  General ROS: positive for  - fatigue and malaise  negative for - chills, fever or night sweats  Psychological ROS: negative  Respiratory ROS: positive for - shortness of breath and wheezing  negative for - cough, hemoptysis, sputum changes or stridor  Cardiovascular ROS: positive for - chest pain, dyspnea on exertion and edema  negative for - loss of consciousness, orthopnea or palpitations  Gastrointestinal ROS: no abdominal pain, change in bowel habits, or black or bloody stools    OBJECTIVE      Medications      Current Facility-Administered Medications: dextrose bolus 10% 125 mL, 125 mL, IntraVENous, PRN **OR** dextrose bolus 10% 250 mL, 250 mL, IntraVENous, PRN  glucagon (rDNA) injection 1 mg, 1 mg, SubCUTAneous, PRN  dextrose 10 % infusion, , IntraVENous, Continuous PRN  insulin lispro (HUMALOG) injection vial 0-8 Units, 0-8 Units, SubCUTAneous, TID WC  insulin lispro (HUMALOG) injection vial 0-4 Units, 0-4 Units, SubCUTAneous, Nightly  ibuprofen (ADVIL;MOTRIN) tablet 400 mg, 400 mg, Oral, Q6H PRN  ketorolac (TORADOL) injection 15 mg, 15 mg, IntraVENous, Q6H PRN  HYDROmorphone (DILAUDID) tablet 2 mg, 2 mg, Oral, Q4H PRN  promethazine-dextromethorphan (PROMETHAZINE-DM) 6.25-15 MG/5ML syrup 5 mL, 5 mL, Oral, Q4H PRN  rosuvastatin (CRESTOR) tablet 20 mg, 20 mg, Oral, Nightly  albuterol sulfate HFA (PROVENTIL;VENTOLIN;PROAIR) 108 (90 Base) MCG/ACT inhaler 2 puff, 2 puff, Inhalation, Q4H PRN  ALPRAZolam (XANAX) tablet 1 mg, 1 mg, Oral, TID PRN  aspirin EC tablet 81 mg, 81 mg, Oral, Daily  mometasone-formoterol (DULERA) 100-5 MCG/ACT inhaler 2 puff, 2 puff, Inhalation, BID  furosemide (LASIX) tablet 40 mg, 40 mg, Oral, Daily  glipiZIDE (GLUCOTROL) tablet 5 mg, 5 mg, Oral, BID AC  ipratropium-albuterol (DUONEB) nebulizer solution 1 ampule, 1 ampule, Inhalation, Q4H PRN  isosorbide mononitrate (IMDUR) extended release tablet 30 mg, 30 mg, Oral, Daily  alogliptin (NESINA) tablet 25 mg, 25 mg, Oral, Daily  lisinopril (PRINIVIL;ZESTRIL) tablet 2.5 mg, 2.5 mg, Oral, Daily  metoprolol succinate (TOPROL XL) extended release tablet 25 mg, 25 mg, Oral, Daily  nitroGLYCERIN (NITROSTAT) SL tablet 0.4 mg, 0.4 mg, SubLINGual, Q5 Min PRN  miconazole (MICOTIN) 2 % powder, , Topical, BID  pantoprazole (PROTONIX) tablet 40 mg, 40 mg, Oral, QAM AC  rivaroxaban (XARELTO) tablet 15 mg, 15 mg, Oral, Daily  sodium chloride (OCEAN, BABY AYR) 0.65 % nasal spray 1 spray, 1 spray, Nasal, PRN  triamcinolone (KENALOG) 0.1 % cream, , Topical, BID  tiotropium (SPIRIVA RESPIMAT) 2.5 MCG/ACT inhaler 2 puff, 2 puff, Inhalation, Daily  fentaNYL (SUBLIMAZE) injection 25 mcg, 25 mcg, IntraVENous, Q6H PRN  ondansetron (ZOFRAN) injection 4 mg, 4 mg, IntraVENous, Q8H PRN  fentaNYL (SUBLIMAZE) injection 50 mcg, 50 mcg, IntraVENous, Once  gabapentin (NEURONTIN) capsule 600 mg, 600 mg, Oral, BID  diclofenac sodium (VOLTAREN) 1 % gel 4 g, 4 g, Topical, BID    Physical      VITALS:    BP (!) 100/45   Pulse 79   Temp 98.9 °F (37.2 °C) (Oral)   Resp 18   Ht 5' 6\" (1.676 m)   Wt 272 lb (123.4 kg)   SpO2 94%   BMI 43.90 kg/m²   TEMPERATURE:  Current - Temp: 98.9 °F (37.2 °C);  Max - Temp  Av.9 °F (36.6 °C)  Min: 97.3 °F (36.3 °C)  Max: 98.9 °F (37.2 °C)  RESPIRATIONS RANGE: Resp  Av.6  Min: 16  Max: 18  PULSE RANGE: Pulse  Av.3  Min: 61  Max: 79  BLOOD PRESSURE RANGE:  Systolic (16SWI), OLZ:053 , Min:95 , QTW:643   ; Diastolic (87WGN), ONE:33, Min:45, Max:79    PULSE OXIMETRY RANGE: SpO2  Av.5 %  Min: 90 %  Max: 97 %  24HR INTAKE/OUTPUT:      Intake/Output Summary (Last 24 hours) at 10/29/2022 1240  Last data filed at 10/29/2022 1140  Gross per 24 hour   Intake 480 ml   Output 2500 ml   Net -2020 ml     CONSTITUTIONAL:  awake, alert, cooperative, no apparent distress, and appears stated age  NECK:  supple, symmetrical, trachea midline and skin normal  LUNGS:  Moderate increase in work of breathing bart crackles and exp wheezes   CARDIOVASCULAR:  Normal apical impulse, regular rate and rhythm, normal S1 and S2, no S3 or S4, and no murmur noted  ABDOMEN:  No scars, normal bowel sounds, soft, non-distended, non-tender, no masses palpated, no hepatosplenomegally  MUSCULOSKELETAL:  ++ edema  NEUROLOGIC:  No acute focal change    Data      Lab Results   Component Value Date/Time    PHART 7.446 02/07/2017 12:26 PM       Lab Results   Component Value Date/Time     10/27/2022 04:28 AM    K 4.3 10/27/2022 04:28 AM    K 4.2 10/26/2022 01:40 PM     10/27/2022 04:28 AM    CO2 28 10/27/2022 04:28 AM    BUN 17 10/27/2022 04:28 AM    CREATININE 0.9 10/27/2022 04:28 AM    GLUCOSE 168 10/27/2022 04:28 AM    CALCIUM 9.6 10/27/2022 04:28 AM     Lab Results   Component Value Date/Time    WBC 10.1 10/27/2022 04:28 AM    HGB 13.8 10/27/2022 04:28 AM    HCT 42.6 10/27/2022 04:28 AM    MCV 85.9 10/27/2022 04:28 AM     10/27/2022 04:28 AM         Lab Results   Component Value Date    INR 0.99 08/01/2022    PROTIME 13.0 08/01/2022       ASSESSMENT AND PLAN      Patient Active Problem List   Diagnosis    Chronic respiratory failure (HCC)    CAD (coronary artery disease)    Hyperlipemia    COPD (chronic obstructive pulmonary disease) ([facility] [address]    Essential hypertension    DM type 2, controlled, with complication ([facility] [address]    DM2 (diabetes mellitus, type 2) ([facility] [address]    HTN (hypertension), benign    Primary osteoarthritis involving multiple joints    Gastroesophageal reflux disease without esophagitis    Anxiety    Dysarthria    Morbid obesity (HCC)    AKIL (obstructive sleep apnea)    Grade II diastolic dysfunction    CVA, old, alterations of sensations    Left-sided weakness    Arterial ischemic stroke, ICA, right, acute (HCC)    PAF (paroxysmal atrial fibrillation) [facility])    Chest pain    Thoracic radiculopathy                          Stress myoview  was not positive   Awaiting MRI  Thoracic spine  and Lumbar spine    PT OT   Try dilaudid for pain  PO

## 2022-10-29 NOTE — RT PROTOCOL NOTE
RT Inhaler-Nebulizer Bronchodilator Protocol Note    There is a bronchodilator order in the chart from a provider indicating to follow the RT Bronchodilator Protocol and there is an Initiate RT Inhaler-Nebulizer Bronchodilator Protocol order as well (see protocol at bottom of note). CXR Findings:  No results found. The findings from the last RT Protocol Assessment were as follows:   History Pulmonary Disease: Chronic pulmonary disease  Respiratory Pattern: Regular pattern and RR 12-20 bpm  Breath Sounds: Clear breath sounds  Cough: Strong, spontaneous, non-productive  Indication for Bronchodilator Therapy:    Bronchodilator Assessment Score: 2    Aerosolized bronchodilator medication orders have been revised according to the RT Inhaler-Nebulizer Bronchodilator Protocol below. Respiratory Therapist to perform RT Therapy Protocol Assessment initially then follow the protocol. Repeat RT Therapy Protocol Assessment PRN for score 0-3 or on second treatment, BID, and PRN for scores above 3. No Indications - adjust the frequency to every 6 hours PRN wheezing or bronchospasm, if no treatments needed after 48 hours then discontinue using Per Protocol order mode. If indication present, adjust the RT bronchodilator orders based on the Bronchodilator Assessment Score as indicated below. Use Inhaler orders unless patient has one or more of the following: on home nebulizer, not able to hold breath for 10 seconds, is not alert and oriented, cannot activate and use MDI correctly, or respiratory rate 25 breaths per minute or more, then use the equivalent nebulizer order(s) with same Frequency and PRN reasons based on the score. If a patient is on this medication at home then do not decrease Frequency below that used at home.     0-3 - enter or revise RT bronchodilator order(s) to equivalent RT Bronchodilator order with Frequency of every 4 hours PRN for wheezing or increased work of breathing using Per Protocol order mode. 4-6 - enter or revise RT Bronchodilator order(s) to two equivalent RT bronchodilator orders with one order with BID Frequency and one order with Frequency of every 4 hours PRN wheezing or increased work of breathing using Per Protocol order mode. 7-10 - enter or revise RT Bronchodilator order(s) to two equivalent RT bronchodilator orders with one order with TID Frequency and one order with Frequency of every 4 hours PRN wheezing or increased work of breathing using Per Protocol order mode. 11-13 - enter or revise RT Bronchodilator order(s) to one equivalent RT bronchodilator order with QID Frequency and an Albuterol order with Frequency of every 4 hours PRN wheezing or increased work of breathing using Per Protocol order mode. Greater than 13 - enter or revise RT Bronchodilator order(s) to one equivalent RT bronchodilator order with every 4 hours Frequency and an Albuterol order with Frequency of every 2 hours PRN wheezing or increased work of breathing using Per Protocol order mode. RT to enter RT Home Evaluation for COPD & MDI Assessment order using Per Protocol order mode.     Electronically signed by Jordan Rainey RCP on 10/29/2022 at 7:52 PM

## 2022-10-30 VITALS
RESPIRATION RATE: 16 BRPM | SYSTOLIC BLOOD PRESSURE: 131 MMHG | BODY MASS INDEX: 43.71 KG/M2 | DIASTOLIC BLOOD PRESSURE: 63 MMHG | WEIGHT: 272 LBS | HEIGHT: 66 IN | TEMPERATURE: 97.6 F | OXYGEN SATURATION: 93 % | HEART RATE: 70 BPM

## 2022-10-30 LAB
GLUCOSE BLD-MCNC: 171 MG/DL (ref 70–99)
GLUCOSE BLD-MCNC: 191 MG/DL (ref 70–99)
GLUCOSE BLD-MCNC: 193 MG/DL (ref 70–99)
PERFORMED ON: ABNORMAL

## 2022-10-30 PROCEDURE — 97530 THERAPEUTIC ACTIVITIES: CPT

## 2022-10-30 PROCEDURE — 97165 OT EVAL LOW COMPLEX 30 MIN: CPT

## 2022-10-30 PROCEDURE — 94640 AIRWAY INHALATION TREATMENT: CPT

## 2022-10-30 PROCEDURE — 94761 N-INVAS EAR/PLS OXIMETRY MLT: CPT

## 2022-10-30 PROCEDURE — 6360000002 HC RX W HCPCS: Performed by: INTERNAL MEDICINE

## 2022-10-30 PROCEDURE — 6370000000 HC RX 637 (ALT 250 FOR IP): Performed by: INTERNAL MEDICINE

## 2022-10-30 PROCEDURE — 97161 PT EVAL LOW COMPLEX 20 MIN: CPT

## 2022-10-30 RX ORDER — IBUPROFEN 400 MG/1
400 TABLET ORAL EVERY 6 HOURS PRN
Qty: 120 TABLET | Refills: 1 | Status: SHIPPED | OUTPATIENT
Start: 2022-10-30

## 2022-10-30 RX ORDER — DEXTROMETHORPHAN HYDROBROMIDE AND PROMETHAZINE HYDROCHLORIDE 15; 6.25 MG/5ML; MG/5ML
5 SYRUP ORAL EVERY 4 HOURS PRN
Qty: 300 ML | Refills: 1 | Status: SHIPPED | OUTPATIENT
Start: 2022-10-30 | End: 2022-11-14

## 2022-10-30 RX ORDER — HYDROMORPHONE HYDROCHLORIDE 2 MG/1
2 TABLET ORAL EVERY 6 HOURS PRN
Qty: 40 TABLET | Refills: 0 | Status: SHIPPED | OUTPATIENT
Start: 2022-10-30 | End: 2022-11-09

## 2022-10-30 RX ORDER — GABAPENTIN 300 MG/1
600 CAPSULE ORAL 3 TIMES DAILY
Qty: 90 CAPSULE | Refills: 3 | Status: SHIPPED | OUTPATIENT
Start: 2022-10-30 | End: 2022-12-01 | Stop reason: SDUPTHER

## 2022-10-30 RX ADMIN — DICLOFENAC SODIUM 4 G: 10 GEL TOPICAL at 08:14

## 2022-10-30 RX ADMIN — ASPIRIN 81 MG: 81 TABLET, COATED ORAL at 08:15

## 2022-10-30 RX ADMIN — FUROSEMIDE 40 MG: 40 TABLET ORAL at 08:16

## 2022-10-30 RX ADMIN — KETOROLAC TROMETHAMINE 15 MG: 30 INJECTION, SOLUTION INTRAMUSCULAR at 00:59

## 2022-10-30 RX ADMIN — MICONAZOLE NITRATE: 20 POWDER TOPICAL at 08:18

## 2022-10-30 RX ADMIN — GLIPIZIDE 5 MG: 5 TABLET ORAL at 15:02

## 2022-10-30 RX ADMIN — Medication 2 PUFF: at 12:12

## 2022-10-30 RX ADMIN — GLIPIZIDE 5 MG: 5 TABLET ORAL at 07:34

## 2022-10-30 RX ADMIN — LISINOPRIL 2.5 MG: 5 TABLET ORAL at 08:15

## 2022-10-30 RX ADMIN — ALOGLIPTIN 25 MG: 25 TABLET, FILM COATED ORAL at 08:15

## 2022-10-30 RX ADMIN — ISOSORBIDE MONONITRATE 30 MG: 30 TABLET, EXTENDED RELEASE ORAL at 08:16

## 2022-10-30 RX ADMIN — METOPROLOL SUCCINATE 25 MG: 25 TABLET, FILM COATED, EXTENDED RELEASE ORAL at 08:15

## 2022-10-30 RX ADMIN — RIVAROXABAN 15 MG: 15 TABLET, FILM COATED ORAL at 08:17

## 2022-10-30 RX ADMIN — TIOTROPIUM BROMIDE INHALATION SPRAY 2 PUFF: 3.12 SPRAY, METERED RESPIRATORY (INHALATION) at 12:12

## 2022-10-30 RX ADMIN — PANTOPRAZOLE SODIUM 40 MG: 40 TABLET, DELAYED RELEASE ORAL at 07:33

## 2022-10-30 RX ADMIN — GABAPENTIN 600 MG: 300 CAPSULE ORAL at 15:02

## 2022-10-30 RX ADMIN — GABAPENTIN 600 MG: 300 CAPSULE ORAL at 08:21

## 2022-10-30 ASSESSMENT — PAIN DESCRIPTION - DESCRIPTORS: DESCRIPTORS: ACHING

## 2022-10-30 ASSESSMENT — PAIN DESCRIPTION - FREQUENCY: FREQUENCY: CONTINUOUS

## 2022-10-30 ASSESSMENT — PAIN DESCRIPTION - ONSET: ONSET: ON-GOING

## 2022-10-30 ASSESSMENT — PAIN SCALES - GENERAL
PAINLEVEL_OUTOF10: 7
PAINLEVEL_OUTOF10: 7

## 2022-10-30 ASSESSMENT — PAIN DESCRIPTION - ORIENTATION: ORIENTATION: MID

## 2022-10-30 ASSESSMENT — PAIN DESCRIPTION - LOCATION: LOCATION: BACK

## 2022-10-30 NOTE — DISCHARGE INSTR - COC
Continuity of Care Form    Patient Name: Archana Anglin   :  [id number]  MRN:  [id number]    Admit date:  10/26/2022  Discharge date:  10/30/2022    Code Status Order: Full Code   Advance Directives:     Admitting Physician:  Idalmis Oglesby MD  PCP: Idalmis Oglesby MD    Discharging Nurse: [facility] Unit/Room#: 3AN-3301/3301-01  Discharging Unit Phone Number: [phone]    Emergency Contact:   Extended Emergency Contact Information  Primary Emergency Contact: [address] [place] [address] of [address] [place] [address] Phone: [phone]  Relation: Child  Secondary Emergency Contact: Rhea Bedolla Phone: [phone]  Relation: Grandchild    Past Surgical History:  Past Surgical History:   Procedure Laterality Date    APPENDECTOMY      BRONCHOSCOPY N/A 2020    BRONCHOSCOPY ALVEOLAR LAVAGE performed by Barrington Govea MD at [address] [place] [address] N/A 2020    BRONCHOSCOPY DIAGNOSTIC OR CELL [address] performed by Silverio Castro MD at [address] [place] [address] N/A 2020    BRONCHOSCOPY DIAGNOSTIC OR CELL [address] ONLY performed by Lexie Mcneill MD at [facility]      1 stent  and 1 stent     CATARACT REMOVAL   or 2014    bilateral cataracts removed    CHOLECYSTECTOMY      COLONOSCOPY      COLONOSCOPY  2018    ENDOSCOPY, COLON, DIAGNOSTIC      ESOPHAGEAL DILATATION      EYE SURGERY      bilateral cataracts    GALLBLADDER SURGERY      HYSTERECTOMY (CERVIX STATUS UNKNOWN)      MA EXC SKIN MALIG <5MM REMAINDR BODY N/A 2018    EXCISION OF SCALP SQUAMOUS CELL CARCINOMA performed by Staci Marin MD at [facility] <100SQCM N/A 2018    SPLIT THICKNESS SKIN GRAFT FOR COVERAGE SCALP, APPLICATION OF WOUND [place] performed by Staci Marin MD at [address] [facility] ENDOSCOPY  12    with biopsy of stomach,gastritis    UPPER GASTROINTESTINAL ENDOSCOPY  06/11/2018    w/esophagael dilation       Immunization History:   Immunization History   Administered Date(s) Administered    COVID-19, MODERNA BLUE border, Primary or Immunocompromised, (age 12y+), IM, 100 mcg/0.5mL 09/04/2021, 10/12/2021    Influenza 10/11/2013    Influenza Virus Vaccine 10/18/2010, 10/18/2010, 08/11/2014, 09/10/2015, 09/05/2017, 09/05/2017    Influenza, AFLURIA (age 1 yrs+), FLUZONE, (age 10 mo+), MDV, 0.5mL 09/09/2019    Influenza, FLUCELVAX, (age 10 mo+), MDCK, MDV, 0.5mL 09/08/2017, 09/11/2018, 09/01/2020, 10/25/2021, 09/15/2022    Influenza, High Dose (Fluzone 65 yrs and older) 10/02/2018    Influenza, Triv, 3 Years and older, IM (Afluria (5 yrs and older) 10/20/2016    Pneumococcal Conjugate 13-valent ([id number]) 02/08/2017    Pneumococcal Polysaccharide ([id number]) 12/24/2010, 07/23/2015, 04/19/2022       Active Problems:  Patient Active Problem List   Diagnosis Code    Chronic respiratory failure ([facility]) J96.10    CAD (coronary artery disease) I25.10    Hyperlipemia E78.5    COPD (chronic obstructive pulmonary disease) ([facility] J44.9    Essential hypertension I10    DM type 2, controlled, with complication ([facility] W98.4    DM2 (diabetes mellitus, type 2) ([facility] E11.9    HTN (hypertension), benign I10    Primary osteoarthritis involving multiple joints M15.9    Gastroesophageal reflux disease without esophagitis K21.9    Anxiety F41.9    Dysarthria R47.1    Morbid obesity ([facility] E66.01    AKIL (obstructive sleep apnea) G47.33    Grade II diastolic dysfunction F67.96    CVA, old, alterations of sensations I69.398, R20.9    Left-sided weakness R53.1    Arterial ischemic stroke, ICA, right, acute ([facility]) I63.231    PAF (paroxysmal atrial fibrillation) (HCC) I48.0    Chest pain R07.9    Thoracic radiculopathy M54.14       Isolation/Infection:   Isolation            Contact          Patient Infection Status       Infection Onset Added Last Indicated Last Indicated By Review Planned Expiration Resolved Resolved By    ESBL (Extended Spectrum Beta Lactamase) 21 Culture, Urine        Resolved    C-diff Rule Out 08/15/21 08/15/21 08/15/21 Clostridium difficile toxin/antigen (Ordered)   08/15/21 Rule-Out Test Resulted    C-diff Rule Out 20 Clostridium difficile toxin/antigen (Ordered)   20 Giselle Andrew RN    Influenza  20 Natasha Myles RN   20             Nurse Assessment:  Last Vital Signs: /63   Pulse 70   Temp 97.6 °F (36.4 °C) (Oral)   Resp 16   Ht 5' 6\" (1.676 m)   Wt 272 lb (123.4 kg)   SpO2 93%   BMI 43.90 kg/m²     Last documented pain score (0-10 scale): Pain Level: 7  Last Weight:   Wt Readings from Last 1 Encounters:   10/29/22 272 lb (123.4 kg)     Mental Status:  oriented, alert, coherent, and logical    IV Access:  - None    Nursing Mobility/ADLs:  Walking   Assisted  Transfer  Assisted  Bathing  Assisted  Dressing  Assisted  Toileting  Assisted  Feeding  Independent  Med Admin  Independent  Med Delivery   whole    Wound Care Documentation and Therapy:        Elimination:  Continence: Bowel: Yes  Bladder: Yes  Urinary Catheter: None   Colostomy/Ileostomy/Ileal Conduit: No       Date of Last BM: 10/29/22    Intake/Output Summary (Last 24 hours) at 10/30/2022 1325  Last data filed at 10/30/2022 1102  Gross per 24 hour   Intake 960 ml   Output 2000 ml   Net -1040 ml     I/O last 3 completed shifts: In:  [P.O.:192]  Out: 3400 [Urine:3400]    Safety Concerns: At Risk for Falls    Impairments/Disabilities:      None    Nutrition Therapy:  Current Nutrition Therapy:   - Oral Diet:  General    Routes of Feeding: Oral  Liquids:  Thin Liquids  Daily Fluid Restriction: no  Last Modified Barium Swallow with Video (Video Swallowing Test): not done    Treatments at the Time of Hospital Discharge:   Respiratory Treatments: ***  Oxygen Therapy:  is on oxygen at 2 L/min per nasal

## 2022-10-30 NOTE — PROGRESS NOTES
Data- discharge order received, pt verbalized agreement to discharge, needs for [facility] with [facility] for PT/OT, JOSE G reviewed and signed by MD, to be completed by RN. Action- AVS prepared, discharge instructions prepared and given to patient, medication information packet given r/t NEW or CHANGED prescriptions, pt verbalized understanding further self-review. D/C instruction summary: Diet- general, Activity- up as tolerated, follow up with Primary Care Physician Yoel Spann [phone] appointment in a week, immunizations reviewed, medications prescriptions to be filled pharmacy of choice. Inpatient surgical procedural reviewed: n/a. Contact information provided to above agencies used. Response- Case Management/ reported faxing completed JOSE G and AVS to needed HHC/DME services stated above. Pt belongings gathered, IV removed, pt dressed. Disposition is home with HHC/DME as stated above, transported with family, taken to lobby via w/c with staff, no complications. 1. WEIGHT: Admit Weight: 268 lb (121.6 kg) (10/26/22 1303)        Today  Weight: 272 lb (123.4 kg) (10/29/22 0024)       2.  O2 SAT.: SpO2: 93 % (10/30/22 1214)

## 2022-10-30 NOTE — PROGRESS NOTES
Patient resting in bed, Philippe Fall Risk: High (45 and higher), Pain Level: 3, vitals stable, assisted to position of comfort, call light and belongings in reach, encouraged to call with needs, will continue to monitor.

## 2022-10-30 NOTE — PLAN OF CARE
Problem: Discharge Planning  Goal: Discharge to home or other facility with appropriate resources  10/30/2022 0730 by Negro Gunn RN  Outcome: Progressing     Problem: Pain  Goal: Verbalizes/displays adequate comfort level or baseline comfort level  10/30/2022 0730 by Negro Gunn RN  Outcome: Progressing     Problem: Safety - Adult  Goal: Free from fall injury  10/30/2022 0730 by Negro Gunn RN  Outcome: Progressing     Problem: ABCDS Injury Assessment  Goal: Absence of physical injury  10/30/2022 0730 by Negro Gunn RN  Outcome: Progressing     Problem: Skin/Tissue Integrity  Goal: Absence of new skin breakdown  Description: 1. Monitor for areas of redness and/or skin breakdown  2. Assess vascular access sites hourly  3. Every 4-6 hours minimum:  Change oxygen saturation probe site  4. Every 4-6 hours:  If on nasal continuous positive airway pressure, respiratory therapy assess nares and determine need for appliance change or resting period.   10/30/2022 0730 by Negro Gunn RN  Outcome: Progressing     Problem: Chronic Conditions and Co-morbidities  Goal: Patient's chronic conditions and co-morbidity symptoms are monitored and maintained or improved  10/30/2022 0730 by Negro Gunn RN  Outcome: Progressing

## 2022-10-30 NOTE — PROGRESS NOTES
Physical Therapy/Occupational Therapy      PT/OT evaluation attempted. Pt lying in bed and declined. Stating that mornings weren't good for her. Will re-attempt later if schedule permits.    Excell Givens PT, DPT [id number]  Cathy Salgado, OTR/L #[id number]

## 2022-10-30 NOTE — PROGRESS NOTES
[facility] [address]   Phone: [phone]    Occupational Therapy    [x] Initial Evaluation            [] Daily Treatment Note         [] Discharge Summary      Patient: Mirna Mayorga   : 3353   MRN: [id number]   Date of Service:  10/30/2022    Admitting Diagnosis:  Chest pain  Current Admission Summary: Mirna Mayorga is a 69 y.o. female patient being seen for complaints of severe back pain that radiates across into her hips and down her entire left leg. She states her symptoms started about a week ago the only thing she recalls was turning her head. The pain is constant and described as stabbing burning pain. She has tried sitting, standing, walking or lying down all without any improvement or decrease in her pain. She has been using heat with a little bit of improvement as well as Voltaren gel. She states she cannot take any opiates due to allergies. Given the ongoing severe pain she then developed chest pain and ultimately was brought into the hospital.  Cardiology following with no acute cardiac etiology. Currently denies any chest pain. Does wear home O2 at nighttime. Currently has 3 L per nasal cannula on. She does note she has severe bilateral knee degenerative joint disease but is not an operative candidate given her multiple comorbidities. CT thoracic spine obtained in the ED which showed T3, T5 and T11 compression fractures of indeterminate age. Patient states she has had known vertebral fractures for 50 years.   Past Medical History:  has a past medical history of Acute MI ([address] Acute pyelonephritis, Anxiety and depression, Arthritis, CAD (coronary artery disease), Cancer ([address] Cancer ([address] Cancer of skin of leg, Chronic obstructive pulmonary disease ([address] Claustrophobia, COPD (chronic obstructive pulmonary disease) ([address] ESBL (extended spectrum beta-lactamase) producing bacteria infection, Esophageal stricture, Gastroesophageal reflux disease without esophagitis, Hiatal hernia, Hiatal hernia, Hypercholesteremia, Hypertension, IBS (irritable bowel syndrome), Migraines, Movement disorder, Pneumonia, PONV (postoperative nausea and vomiting), Type II or unspecified type diabetes mellitus without mention of complication, not stated as uncontrolled, and Unspecified cerebral artery occlusion with cerebral infarction. Past Surgical History:  has a past surgical history that includes Cardiac surgery; Gallbladder surgery; Hysterectomy; Appendectomy; Cholecystectomy; Colonoscopy; Upper gastrointestinal endoscopy (4/20/12); Endoscopy, colon, diagnostic; Tear duct surgery; Upper gastrointestinal endoscopy (06/11/2018); Colonoscopy (06/11/2018); pr exc skin malig <5mm remaindr body (N/A, 9/6/2018); pr split grft trunk,arm,leg <100sqcm (N/A, 9/6/2018); skin biopsy; eye surgery; bronchoscopy (N/A, 1/24/2020); bronchoscopy (N/A, 1/27/2020); Cataract removal (2013 or 2014); Esophagus dilation; and bronchoscopy (N/A, 11/23/2020). Discharge Recommendations: Archana Anglin scored a 17/24 on the -[facility] ADL Inpatient form. At this time, no further OT is recommended upon discharge due to pt presenting at her baseline level of function. Recommend patient returns to prior setting with prior services.        DME Required For Discharge: patient has all required DME for discharge    Precautions/Restrictions: high fall risk  Weight Bearing Restrictions: no restrictions  [] Right Upper Extremity  [] Left Upper Extremity [] Right Lower Extremity  [] Left Lower Extremity     Required Braces/Orthotics: no braces required   [] Right  [] Left  Positional Restrictions:no positional restrictions    Pre-Admission Information   Lives With: alone    Type of Home: apartment, \"cottage\"- senior living  Home Layout: one level  Home Access: ramped entry  Bathroom Layout: tub/shower unit  [employer]: grab bars in shower, grab bars around toilet  Toilet Height: elevated height  Home Equipment: rolling walker, single point cane, electric scooter, hospital bed, lift chair  Transfer Assistance: modified independent with use of RW; pt has lift chair and elevated commode height  Ambulation Assistance:modified independent with use of RW  ADL Assistance:  assist with all- primarily dressing and bathing assist from Dtr, pt reports occasional help with toileting/geoffrey care  IADL Assistance:  aide 5 days a week for cooking/cleaning primarily; pt is IND with med management   Active :        [] Yes  [x] No  Hand Dominance: [] Left  [] Right  Current Employment: retired. Occupation: factory   Hobbies:   Recent Falls: pt denies hx of falls; CVA 6 months ago  w/ L side deficits     Nigh ttime O2 at home- 2L     Examination   Vision:   Vision Corrective Device: wears glasses for reading  Hearing:   WFL  Perception:   WFL  Observation:   General Observation:  3L O2 throughout eval; purewick in place  Posture:   Head forward/rounded shoulders   Sensation:   WFL; baseline N/T in LEs  Proprioception:    WFL  Tone:   Normotonic  Coordination Testing:   WFL    ROM:   (B) UE AROM WFL  Strength:   (B) UE strength grossly WFL    Decision Making: low complexity  Clinical Presentation: stable      Subjective  General: Pt agreeable to eval upon 2nd attempt with encouragement; pt reporting continuous pain in back- initially declining all OOB activity   Pain: 8/10. Location: side and back s  Pain Interventions: repositioned         Activities of Daily Living  Basic Activities of Daily Living  Toileting Comments: purewick in place however bedding wet- pt agreeable to brief and bed linen change- bed level rolling for brief management   General Comments: pt declined all other ADL needs; reports IND with meal intake; pt has assist at home as needed for all ADL completion   Instrumental Activities of Daily Living  No IADL completed on this date.     Functional Mobility  Bed Mobility  Supine to Sit: modified independent  Sit to Supine: modified independent  Rolling Left: modified independent  Rolling Right: modified independent  Comments:  Transfers  Sit to stand transfer:stand by assistance  Stand to sit transfer: stand by assistance  Stand pivot transfer: stand by assistance  Comments:SPT EOB <> recliner w/o AD  Functional Mobility:  No functional mobility completed on this date secondary to pt declined mobility . Other Therapeutic Interventions    Functional Outcomes  AM-PAC Inpatient Daily Activity Raw Score: 17    Cognition  WFL; pt self-limiting   Orientation:    alert and oriented x 4  Command Following:   [facility]     Education  Barriers To Learning: none  Patient Education: patient educated on goals, OT role and benefits, plan of care, discharge recommendations  Learning Assessment:  patient verbalizes and demonstrates understanding    Assessment  Activity Tolerance: limited by pain; fair tolerance overall, increased time for all tasks  Impairments Requiring Therapeutic Intervention: decreased functional mobility, increased pain  Prognosis: good and fair  Clinical Assessment: pt is a 69 yr old who presents with slightly decreased mobility secondary to pain in back; Pt has assist for all BADLs as needed and IADL tasks. Typically ambulates with RW in home to/from bathroom as needed. Pt is reporting preforming at her typically level of function w/o concerns for d/c to home when medically appropriate. No further OT indicated at this time. Recommend pt return home w/ family support and continued aide services. Safety Interventions: patient left in bed, bed alarm in place, call light within reach, and nurse notified    Plan  Frequency: Eval with same day discharge. No follow up required. Current Treatment Recommendations: not applicable, evaluation completed with same day discharge. Goals  Patient Goals: return home   Short Term Goals:  Time Frame: eval and d/c  Patient eval with same day discharge.   No goals set secondary to pt at or near her baseline level     Therapy Session Time     Individual Group Co-treatment   Time In     1300   Time Out     1338   Minutes     38        Timed Code Treatment Minutes:   23  Total Treatment Minutes:  38       Electronically Signed By: Jud Lyn, [facility], OTR/L #[id number]

## 2022-10-30 NOTE — PLAN OF CARE
Problem: Discharge Planning  Goal: Discharge to home or other facility with appropriate resources  10/29/2022 2346 by Marylene Gosselin, RN  Outcome: Progressing  10/29/2022 1007 by Jennie Santiago RN  Outcome: Progressing     Problem: Pain  Goal: Verbalizes/displays adequate comfort level or baseline comfort level  10/29/2022 2346 by Marylene Gosselin, RN  Outcome: Progressing  10/29/2022 1007 by Jennie Santiago RN  Outcome: Progressing     Problem: Safety - Adult  Goal: Free from fall injury  10/29/2022 2346 by Marylene Gosselin, RN  Outcome: Progressing  10/29/2022 1007 by Jennie Santiago RN  Outcome: Progressing     Problem: ABCDS Injury Assessment  Goal: Absence of physical injury  10/29/2022 2346 by Marylene Gosselin, RN  Outcome: Progressing  10/29/2022 1007 by Jennie Santiago RN  Outcome: Progressing     Problem: Skin/Tissue Integrity  Goal: Absence of new skin breakdown  Description: 1. Monitor for areas of redness and/or skin breakdown  2. Assess vascular access sites hourly  3. Every 4-6 hours minimum:  Change oxygen saturation probe site  4. Every 4-6 hours:  If on nasal continuous positive airway pressure, respiratory therapy assess nares and determine need for appliance change or resting period.   10/29/2022 2346 by Marylene Gosselin, RN  Outcome: Progressing  10/29/2022 1007 by Jennie Santiago RN  Outcome: Progressing     Problem: Chronic Conditions and Co-morbidities  Goal: Patient's chronic conditions and co-morbidity symptoms are monitored and maintained or improved  10/29/2022 2346 by Marylene Gosselin, RN  Outcome: Progressing  10/29/2022 1007 by Jennie Santiago RN  Outcome: Progressing

## 2022-10-30 NOTE — PROGRESS NOTES
[facility] [address]   Phone: [phone]    Physical Therapy    [x] Initial Evaluation            [] Daily Treatment Note         [x] Discharge Summary      Patient: Archana Anglin   : [birth year]   MRN: [id number]   Date of Service:  10/30/2022  Admitting Diagnosis: Chest pain  Current Admission Summary: Archana Anglin is a 69 y.o. female with past medical history of CAD, COPD, hiatal hernia, hypertension, hyperlipidemia, IBS, migraines and diabetes who presents to the ED with complaint of back pain. Patient arrives from home with complaint of back pain by EMS. Patient states she takes gabapentin for chronic back pain. States he was told she had some broken vertebrae in her back. Patient states she has some intermittent pain to her back in the past but states today was worse. Patient states has been gradually worsening over the past week. States radiates into the left hip and into the left thigh. Denies any radiation down to the toes. Denies any falls or injuries. Sent in by home health RN after consultation with PCP. Denies any bowel/bladder incontinence, urinary tension, saddle anesthesia or distal numbness/tingling. States pain is worsened with movement, palpation and ambulation. States that she arrived to the emergency department she now has chest pain. States pain is aching rated 8/10 to the substernal chest that she states radiates into her back and into her abdomen. She states that ongoing for the past 30 minutes. She denies any shortness of breath. Denies nausea or vomiting. Denies urinary symptoms or changes in bowel movements. Denies any cough. Denies diaphoresis, headache, lightheadedness/dizziness, syncope or near syncope.   Past Medical History:  has a past medical history of Acute MI ([address] Acute pyelonephritis, Anxiety and depression, Arthritis, CAD (coronary artery disease), Cancer ([address] Cancer ([address] Cancer of skin of leg, Chronic obstructive pulmonary disease (HCC), Claustrophobia, COPD (chronic obstructive pulmonary disease) (HCC), ESBL (extended spectrum beta-lactamase) producing bacteria infection, Esophageal stricture, Gastroesophageal reflux disease without esophagitis, Hiatal hernia, Hiatal hernia, Hypercholesteremia, Hypertension, IBS (irritable bowel syndrome), Migraines, Movement disorder, Pneumonia, PONV (postoperative nausea and vomiting), Type II or unspecified type diabetes mellitus without mention of complication, not stated as uncontrolled, and Unspecified cerebral artery occlusion with cerebral infarction. Past Surgical History:  has a past surgical history that includes Cardiac surgery; Gallbladder surgery; Hysterectomy; Appendectomy; Cholecystectomy; Colonoscopy; Upper gastrointestinal endoscopy (4/20/12); Endoscopy, colon, diagnostic; Tear duct surgery; Upper gastrointestinal endoscopy (06/11/2018); Colonoscopy (06/11/2018); pr exc skin malig <5mm remaindr body (N/A, 9/6/2018); pr split grft trunk,arm,leg <100sqcm (N/A, 9/6/2018); skin biopsy; eye surgery; bronchoscopy (N/A, 1/24/2020); bronchoscopy (N/A, 1/27/2020); Cataract removal (2013 or 2014); Esophagus dilation; and bronchoscopy (N/A, 11/23/2020). Discharge Recommendations: Richie Leblanc scored a 16/24 on the AM-PAC short mobility form. At this time, no further PT is recommended upon discharge due to proximity to functional baseline. Recommend patient returns to prior setting with prior services.       DME Required For Discharge: no DME required at discharge  Precautions/Restrictions: high fall risk  Weight Bearing Restrictions: no restrictions  [] Right Upper Extremity  [] Left Upper Extremity [] Right Lower Extremity  [] Left Lower Extremity     Required Braces/Orthotics: no braces required   [] Right  [] Left  Positional Restrictions:no positional restrictions    Pre-Admission Information   Lives With: alone                     Type of Home: apartment, \"cottage\"- senior living  Home Layout: one level  Home Access: ramped entry  [address]: tub/shower unit  [employer]: grab bars in shower, grab bars around toilet  Toilet Height: elevated height  Home Equipment: rolling walker, single point cane, electric scooter, lift chair, hospital bed   Transfer Assistance: modified independent with use of RW  Ambulation Assistance:modified independent with use of RW  ADL Assistance:  assist with all- primarily dressing and bathing assist from Dtr, pt reports occasional help with toileting/geoffrey care  IADL Assistance:  aid 5 days a week for cooking/cleaning; pt is IND with med management   Active :        [] Yes                 [x] No  Hand Dominance: [] Left                 [] Right  Current Employment: retired. Occupation: factory   Hobbies:   Recent Falls: pt denies hx of falls; CVA 6 months ago  w/ L side deficits      Nigh ttime O2 at home- 2L     Examination   Vision:   Vision Corrective Device: wears glasses for reading  Hearing:   WFL  Observation:   General Observation:  morbidly obese, on 3L O2, pur wick in place  Posture:   Fair   Sensation:   WFL    ROM:   B knee flex/ext chronically limited  Strength:   Generally weak but functional for a transfer  Decision Making: low complexity  Clinical Presentation: stable      Subjective  General: pt lying partially prone/SL upon arrival, agreed with encouragement   Pain: 8/10.   Location: side and back   Pain Interventions: repositioned        Functional Mobility  Bed Mobility  Supine to Sit: modified independent  Sit to Supine: modified independent  Rolling Left: modified independent  Rolling Right: modified independent  Comments: HOB slightly elevated, use of railings, when getting back into bed to places leg in first and then lies down  Transfers  Sit to stand transfer: stand by assistance  Stand to sit transfer: stand by assistance  Stand pivot transfer: stand by assistance  Comments: bed to/from recliner Ambulation  Ambulation not tested on this date secondary to declined. Distance:   Gait Mechanics:   Comments:    Stair Mobility  Stair mobility not completed on this date. Comments: pt has a ramp   Wheelchair Mobility:  No w/c mobility completed on this date. Comments:  Balance  Static Sitting Balance: good: independent with functional balance in unsupported position  Dynamic Sitting Balance: good: independent with functional balance in unsupported position  Static Standing Balance: fair (-): maintains balance at SBA with use of UE support  Dynamic Standing Balance: fair (-): maintains balance at SBA with use of UE support  Comments:    Other Therapeutic Interventions    Functional Outcomes  -PAC Inpatient Mobility Raw Score : 16              Cognition  WFL  Orientation:    alert and oriented x 4  Command Following:   [facility]    Education  Barriers To Learning: physical  Patient Education: patient educated on PT role and benefits, discharge recommendations  Learning Assessment:  patient verbalizes and demonstrates understanding    Assessment  Activity Tolerance: limited by pain  Impairments Requiring Therapeutic Intervention: none - eval with same day discharge  Prognosis: fair  Clinical Assessment: Pt functioning close to her baseline and reports chronic back pain. Pt limits mobility when back pain exacerbated and has assistance at home if needed. Pt declines any further PT services and has medical equipment needed. Discharge home once medically stable. Safety Interventions: patient left in bed, bed alarm in place, call light within reach, and nurse notified    Plan  Frequency: Eval with same day discharge. No follow up required. Current Treatment Recommendations: not applicable, evaluation completed with same day discharge. Goals  Patient Goals: go home    Short Term Goals:  Time Frame: dc  Patient eval with same day discharge. No goals set as patient is at baseline mobility status.       Therapy Session Time      Individual Group Co-treatment   Time In     1300   Time Out     1338   Minutes     38     Timed Code Treatment Minutes:  23 Minutes  Total Treatment Minutes:  38       Electronically Signed By: Mirella Abarca, [id number]

## 2022-10-30 NOTE — CARE COORDINATION
Home Health Care: patient previously served by [facility] and would like them again. Acceptance of patient verified by direct phone call. Home care order, face sheet, H+P, and discharge order all faxed to [facility] at [phone].   Jonelle Simpson RN

## 2022-10-30 NOTE — PROGRESS NOTES
Department of Internal Medicine  General Internal Medicine   Progress Note     SUBJECTIVE  denies angina , Back Pain is under control  no paresthesia today     History obtained from chart review and the patient  General ROS: positive for  - fatigue and malaise  negative for - chills, fever or night sweats  Psychological ROS: negative  Respiratory ROS: positive for - shortness of breath and wheezing  negative for - cough, hemoptysis, sputum changes or stridor  Cardiovascular ROS: positive for - chest pain, dyspnea on exertion and edema  negative for - loss of consciousness, orthopnea or palpitations  Gastrointestinal ROS: no abdominal pain, change in bowel habits, or black or bloody stools    OBJECTIVE      Medications      Current Facility-Administered Medications: gabapentin (NEURONTIN) capsule 600 mg, 600 mg, Oral, TID  dextrose bolus 10% 125 mL, 125 mL, IntraVENous, PRN **OR** dextrose bolus 10% 250 mL, 250 mL, IntraVENous, PRN  glucagon (rDNA) injection 1 mg, 1 mg, SubCUTAneous, PRN  dextrose 10 % infusion, , IntraVENous, Continuous PRN  insulin lispro (HUMALOG) injection vial 0-8 Units, 0-8 Units, SubCUTAneous, TID WC  insulin lispro (HUMALOG) injection vial 0-4 Units, 0-4 Units, SubCUTAneous, Nightly  ibuprofen (ADVIL;MOTRIN) tablet 400 mg, 400 mg, Oral, Q6H PRN  ketorolac (TORADOL) injection 15 mg, 15 mg, IntraVENous, Q6H PRN  HYDROmorphone (DILAUDID) tablet 2 mg, 2 mg, Oral, Q4H PRN  promethazine-dextromethorphan (PROMETHAZINE-DM) 6.25-15 MG/5ML syrup 5 mL, 5 mL, Oral, Q4H PRN  rosuvastatin (CRESTOR) tablet 20 mg, 20 mg, Oral, Nightly  albuterol sulfate HFA (PROVENTIL;VENTOLIN;PROAIR) 108 (90 Base) MCG/ACT inhaler 2 puff, 2 puff, Inhalation, Q4H PRN  ALPRAZolam (XANAX) tablet 1 mg, 1 mg, Oral, TID PRN  aspirin EC tablet 81 mg, 81 mg, Oral, Daily  mometasone-formoterol (DULERA) 100-5 MCG/ACT inhaler 2 puff, 2 puff, Inhalation, BID  furosemide (LASIX) tablet 40 mg, 40 mg, Oral, Daily  glipiZIDE (GLUCOTROL) tablet 5 mg, 5 mg, Oral, BID AC  ipratropium-albuterol (DUONEB) nebulizer solution 1 ampule, 1 ampule, Inhalation, Q4H PRN  isosorbide mononitrate (IMDUR) extended release tablet 30 mg, 30 mg, Oral, Daily  alogliptin (NESINA) tablet 25 mg, 25 mg, Oral, Daily  lisinopril (PRINIVIL;ZESTRIL) tablet 2.5 mg, 2.5 mg, Oral, Daily  metoprolol succinate (TOPROL XL) extended release tablet 25 mg, 25 mg, Oral, Daily  nitroGLYCERIN (NITROSTAT) SL tablet 0.4 mg, 0.4 mg, SubLINGual, Q5 Min PRN  miconazole (MICOTIN) 2 % powder, , Topical, BID  pantoprazole (PROTONIX) tablet 40 mg, 40 mg, Oral, QAM AC  rivaroxaban (XARELTO) tablet 15 mg, 15 mg, Oral, Daily  sodium chloride (OCEAN, BABY AYR) 0.65 % nasal spray 1 spray, 1 spray, Nasal, PRN  triamcinolone (KENALOG) 0.1 % cream, , Topical, BID  tiotropium (SPIRIVA RESPIMAT) 2.5 MCG/ACT inhaler 2 puff, 2 puff, Inhalation, Daily  fentaNYL (SUBLIMAZE) injection 25 mcg, 25 mcg, IntraVENous, Q6H PRN  ondansetron (ZOFRAN) injection 4 mg, 4 mg, IntraVENous, Q8H PRN  fentaNYL (SUBLIMAZE) injection 50 mcg, 50 mcg, IntraVENous, Once  diclofenac sodium (VOLTAREN) 1 % gel 4 g, 4 g, Topical, BID    Physical      VITALS:    /63   Pulse 70   Temp 97.6 °F (36.4 °C) (Oral)   Resp 16   Ht 5' 6\" (1.676 m)   Wt 272 lb (123.4 kg)   SpO2 93%   BMI 43.90 kg/m²   TEMPERATURE:  Current - Temp: 97.6 °F (36.4 °C);  Max - Temp  Av.6 °F (36.4 °C)  Min: 97 °F (36.1 °C)  Max: 98.3 °F (36.8 °C)  RESPIRATIONS RANGE: Resp  Av  Min: 16  Max: 16  PULSE RANGE: Pulse  Av.5  Min: 42  Max: 73  BLOOD PRESSURE RANGE:  Systolic (46KEE), NXR:977 , Min:103 , PZC:175   ; Diastolic (62UHU), XXX:27, Min:53, Max:70    PULSE OXIMETRY RANGE: SpO2  Av.5 %  Min: 90 %  Max: 93 %  24HR INTAKE/OUTPUT:      Intake/Output Summary (Last 24 hours) at 10/30/2022 1301  Last data filed at 10/30/2022 1102  Gross per 24 hour   Intake 960 ml   Output 2000 ml   Net -1040 ml     CONSTITUTIONAL:  awake, alert, cooperative, no apparent distress, and appears stated age  NECK:  supple, symmetrical, trachea midline and skin normal  LUNGS:  Moderate increase in work of breathing bart crackles and exp wheezes   CARDIOVASCULAR:  Normal apical impulse, regular rate and rhythm, normal S1 and S2, no S3 or S4, and no murmur noted  ABDOMEN:  No scars, normal bowel sounds, soft, non-distended, non-tender, no masses palpated, no hepatosplenomegally  MUSCULOSKELETAL:  ++ edema  NEUROLOGIC:  No acute focal change    Data      Lab Results   Component Value Date/Time    PHART 7.446 02/07/2017 12:26 PM       Lab Results   Component Value Date/Time     10/27/2022 04:28 AM    K 4.3 10/27/2022 04:28 AM    K 4.2 10/26/2022 01:40 PM     10/27/2022 04:28 AM    CO2 28 10/27/2022 04:28 AM    BUN 17 10/27/2022 04:28 AM    CREATININE 0.9 10/27/2022 04:28 AM    GLUCOSE 168 10/27/2022 04:28 AM    CALCIUM 9.6 10/27/2022 04:28 AM     Lab Results   Component Value Date/Time    WBC 10.1 10/27/2022 04:28 AM    HGB 13.8 10/27/2022 04:28 AM    HCT 42.6 10/27/2022 04:28 AM    MCV 85.9 10/27/2022 04:28 AM     10/27/2022 04:28 AM         Lab Results   Component Value Date    INR 0.99 08/01/2022    PROTIME 13.0 08/01/2022       ASSESSMENT AND PLAN      Patient Active Problem List   Diagnosis    Chronic respiratory failure (HCC)    CAD (coronary artery disease)    Hyperlipemia    COPD (chronic obstructive pulmonary disease) ([facility]    Essential hypertension    DM type 2, controlled, with complication ([facility]    DM2 (diabetes mellitus, type 2) ([facility]    HTN (hypertension), benign    Primary osteoarthritis involving multiple joints    Gastroesophageal reflux disease without esophagitis    Anxiety    Dysarthria    Morbid obesity (HCC)    AKIL (obstructive sleep apnea)    Grade II diastolic dysfunction    CVA, old, alterations of sensations    Left-sided weakness    Arterial ischemic stroke, ICA, right, acute (HCC)    PAF (paroxysmal atrial fibrillation) [facility])    Chest pain    Thoracic radiculopathy                          No cardiac intervention warranted   MRI showed no critical neurosurgical findings   Can be dismissed with [address]  and out patient pain management , SAM Hemigard Intro: Due to skin fragility and wound tension, it was decided to use HEMIGARD adhesive retention suture devices to permit a linear closure. The skin was cleaned and dried for a 6cm distance away from the wound. Excessive hair, if present, was removed to allow for adhesion.

## 2022-12-02 NOTE — DISCHARGE SUMMARY
[facility]                     [address] [facility], [address]                               DISCHARGE SUMMARY    PATIENT NAME: Luann Lux                      :        1945  MED REC NO:   [id number]                          ROOM:       9984  ACCOUNT NO:   [de-identified]                           ADMIT DATE: 10/26/2022  PROVIDER:     Jaylin Iverson MD                  DISCHARGE DATE:  10/30/2022    FINAL DIAGNOSES:  1. Chest pain. 2.  Thoracic and lumbar radiculopathy. 3.  COPD.  4.  Pulmonary hypertension. 5.  Morbid obesity. DISCHARGE MEDICATIONS:  1. Dilaudid 2 mg every 6 hours p.r.n.  2.  Advil 400 mg every 6 hours p.r.n.  3.  Promethazine DM 5 mL four times a day p.r.n.  4.  Gabapentin 300 mg two capsules three times a day. 5.  Prilosec 40 mg once a day. 6.  Tradjenta 5 mg daily. 7.  Glipizide 5 mg p.o. b.i.d.  8.  Proventil HFA two puffs every 4 hours p.r.n.  9.  Diamox 250 mg once a day. 10.  Symbicort two puffs twice a day. 11.  Incruse Ellipta one puff into her lung every day. 12.  Kenalog cream b.i.d. p.r.n. 13.  DuoNeb one every 4 hours p.r.n. 14.  Mycostatin powder in the groin and other areas as directed. 15.  Crestor 10 mg half tablet daily. 16.  [place] nasal saline spray as directed. 17.  Nitrostat sublingual p.r.n. 18.  Aspirin 81 mg once a day. HOSPITAL COURSE:  This 69-year-old morbidly obese woman came to the  emergency room with history of chest pain. She has had noncritical  coronary artery disease before. She was unable to ambulate. She also  has intractable back pain, which was appearing in the distribution of  thoracic lumbar and lumbar radicals. The patient was admitted with  stable vital signs. Blood pressure was 108/53, BMI 43, temperature  97.7, heart rate is 51, O2 sat 94% on 2 to 3 liters, which is her  baseline. Sodium 140, potassium 4.3, BUN 17, creatinine 0.9. Troponin  less than 0.01.   Two sets of cardiac enzymes negative. Hemoglobin/hematocrit is 13.1 and 42.8. Urinalysis negative. CT scan  of the thoracic spine and lumbar spine was ordered. Nuclear Medicine  myocardial perfusion stress test was ordered. The patient was given  parenteral pain relief. Stress test, appropriate hemodynamic response  to Lexiscan, no significant ST changes, no acute reversible ischemia. Recent echo was not so long ago in 08/2022. LVEF 70%, concentric left  ventricular hypertrophy, mild aortic stenosis, grade 1 diastolic  dysfunction. The patient was referred to Cardiology group. They did  not have any further aggressive intervention. Dr. Pedro Burgos also  saw the patient and signed off the care. No further cardiac  intervention recommended and medical management was going to be the  mainstay of the therapy. The patient is already on [address] [facility]. Cardiology group emphasized the management of back pain and the  musculoskeletal pain. The patient had a history of PCI of the left  anterior descending and second diagonal.  The patient was discharged in  stable condition with Home Health care on above regimen. Discharged in  stable condition with [facility].         Bao Ackerman MD    D: 12/01/2022 13:31:12       T: 12/01/2022 13:35:14     [email or web address]  Job#: [id number]     Doc#: [id number]    CC:

## 2022-12-28 RX ORDER — GABAPENTIN 300 MG/1
600 CAPSULE ORAL 3 TIMES DAILY
Qty: 540 CAPSULE | Refills: 1 | Status: SHIPPED | OUTPATIENT
Start: 2022-12-28 | End: 2023-03-28

## 2023-01-24 ENCOUNTER — HOSPITAL ENCOUNTER (INPATIENT)
Age: 78
LOS: 5 days | Discharge: HOME HEALTH | DRG: 247 | End: 2023-01-31
Attending: EMERGENCY MEDICINE | Admitting: INTERNAL MEDICINE
Payer: COMMERCIAL

## 2023-01-24 ENCOUNTER — APPOINTMENT (OUTPATIENT)
Dept: CT IMAGING | Age: 78
DRG: 247 | End: 2023-01-24
Payer: COMMERCIAL

## 2023-01-24 DIAGNOSIS — R07.9 CHEST PAIN, UNSPECIFIED TYPE: Primary | ICD-10-CM

## 2023-01-24 LAB
A/G RATIO: 1.2 (ref 1.1–2.2)
ALBUMIN SERPL-MCNC: 3.6 G/DL (ref 3.4–5)
ALP BLD-CCNC: 86 U/L (ref 40–129)
ALT SERPL-CCNC: 12 U/L (ref 10–40)
ANION GAP SERPL CALCULATED.3IONS-SCNC: 9 MMOL/L (ref 3–16)
AST SERPL-CCNC: 15 U/L (ref 15–37)
BASOPHILS ABSOLUTE: 0.1 K/UL (ref 0–0.2)
BASOPHILS RELATIVE PERCENT: 0.9 %
BILIRUB SERPL-MCNC: <0.2 MG/DL (ref 0–1)
BUN BLDV-MCNC: 18 MG/DL (ref 7–20)
CALCIUM SERPL-MCNC: 9.5 MG/DL (ref 8.3–10.6)
CHLORIDE BLD-SCNC: 106 MMOL/L (ref 99–110)
CO2: 25 MMOL/L (ref 21–32)
CREAT SERPL-MCNC: 0.9 MG/DL (ref 0.6–1.2)
D DIMER: 0.31 UG/ML FEU (ref 0–0.6)
EOSINOPHILS ABSOLUTE: 0.1 K/UL (ref 0–0.6)
EOSINOPHILS RELATIVE PERCENT: 0.9 %
GFR SERPL CREATININE-BSD FRML MDRD: >60 ML/MIN/{1.73_M2}
GLUCOSE BLD-MCNC: 130 MG/DL (ref 70–99)
GLUCOSE BLD-MCNC: 296 MG/DL (ref 70–99)
GLUCOSE BLD-MCNC: 90 MG/DL (ref 70–99)
HCT VFR BLD CALC: 44.4 % (ref 36–48)
HEMOGLOBIN: 14.1 G/DL (ref 12–16)
LYMPHOCYTES ABSOLUTE: 3 K/UL (ref 1–5.1)
LYMPHOCYTES RELATIVE PERCENT: 24.3 %
MCH RBC QN AUTO: 26.8 PG (ref 26–34)
MCHC RBC AUTO-ENTMCNC: 31.7 G/DL (ref 31–36)
MCV RBC AUTO: 84.6 FL (ref 80–100)
MONOCYTES ABSOLUTE: 0.7 K/UL (ref 0–1.3)
MONOCYTES RELATIVE PERCENT: 6.1 %
NEUTROPHILS ABSOLUTE: 8.4 K/UL (ref 1.7–7.7)
NEUTROPHILS RELATIVE PERCENT: 67.8 %
PDW BLD-RTO: 16.3 % (ref 12.4–15.4)
PERFORMED ON: ABNORMAL
PERFORMED ON: NORMAL
PLATELET # BLD: 227 K/UL (ref 135–450)
PMV BLD AUTO: 9 FL (ref 5–10.5)
POTASSIUM REFLEX MAGNESIUM: 3.9 MMOL/L (ref 3.5–5.1)
PRO-BNP: 374 PG/ML (ref 0–449)
RAPID INFLUENZA  B AGN: NEGATIVE
RAPID INFLUENZA A AGN: NEGATIVE
RBC # BLD: 5.25 M/UL (ref 4–5.2)
SARS-COV-2, NAAT: NOT DETECTED
SODIUM BLD-SCNC: 140 MMOL/L (ref 136–145)
TOTAL PROTEIN: 6.7 G/DL (ref 6.4–8.2)
TROPONIN: <0.01 NG/ML
TROPONIN: <0.01 NG/ML
WBC # BLD: 12.3 K/UL (ref 4–11)

## 2023-01-24 PROCEDURE — 6370000000 HC RX 637 (ALT 250 FOR IP): Performed by: EMERGENCY MEDICINE

## 2023-01-24 PROCEDURE — 6360000002 HC RX W HCPCS: Performed by: EMERGENCY MEDICINE

## 2023-01-24 PROCEDURE — 83880 ASSAY OF NATRIURETIC PEPTIDE: CPT

## 2023-01-24 PROCEDURE — 80053 COMPREHEN METABOLIC PANEL: CPT

## 2023-01-24 PROCEDURE — 94640 AIRWAY INHALATION TREATMENT: CPT

## 2023-01-24 PROCEDURE — 6370000000 HC RX 637 (ALT 250 FOR IP): Performed by: INTERNAL MEDICINE

## 2023-01-24 PROCEDURE — 84484 ASSAY OF TROPONIN QUANT: CPT

## 2023-01-24 PROCEDURE — 93005 ELECTROCARDIOGRAM TRACING: CPT | Performed by: EMERGENCY MEDICINE

## 2023-01-24 PROCEDURE — 87804 INFLUENZA ASSAY W/OPTIC: CPT

## 2023-01-24 PROCEDURE — 85025 COMPLETE CBC W/AUTO DIFF WBC: CPT

## 2023-01-24 PROCEDURE — G0378 HOSPITAL OBSERVATION PER HR: HCPCS

## 2023-01-24 PROCEDURE — 36415 COLL VENOUS BLD VENIPUNCTURE: CPT

## 2023-01-24 PROCEDURE — 6360000004 HC RX CONTRAST MEDICATION: Performed by: EMERGENCY MEDICINE

## 2023-01-24 PROCEDURE — 71260 CT THORAX DX C+: CPT | Performed by: EMERGENCY MEDICINE

## 2023-01-24 PROCEDURE — 85379 FIBRIN DEGRADATION QUANT: CPT

## 2023-01-24 PROCEDURE — 87635 SARS-COV-2 COVID-19 AMP PRB: CPT

## 2023-01-24 PROCEDURE — 99285 EMERGENCY DEPT VISIT HI MDM: CPT

## 2023-01-24 PROCEDURE — 2580000003 HC RX 258: Performed by: INTERNAL MEDICINE

## 2023-01-24 PROCEDURE — 96374 THER/PROPH/DIAG INJ IV PUSH: CPT

## 2023-01-24 PROCEDURE — 94761 N-INVAS EAR/PLS OXIMETRY MLT: CPT

## 2023-01-24 RX ORDER — ACETAMINOPHEN 325 MG/1
650 TABLET ORAL EVERY 6 HOURS PRN
Status: DISCONTINUED | OUTPATIENT
Start: 2023-01-24 | End: 2023-01-31 | Stop reason: HOSPADM

## 2023-01-24 RX ORDER — NITROGLYCERIN 0.4 MG/1
0.4 TABLET SUBLINGUAL EVERY 5 MIN PRN
Status: DISCONTINUED | OUTPATIENT
Start: 2023-01-24 | End: 2023-01-24

## 2023-01-24 RX ORDER — 0.9 % SODIUM CHLORIDE 0.9 %
500 INTRAVENOUS SOLUTION INTRAVENOUS PRN
Status: DISCONTINUED | OUTPATIENT
Start: 2023-01-24 | End: 2023-01-31 | Stop reason: HOSPADM

## 2023-01-24 RX ORDER — DEXAMETHASONE SODIUM PHOSPHATE 10 MG/ML
10 INJECTION, SOLUTION INTRAMUSCULAR; INTRAVENOUS ONCE
Status: COMPLETED | OUTPATIENT
Start: 2023-01-24 | End: 2023-01-24

## 2023-01-24 RX ORDER — ISOSORBIDE MONONITRATE 30 MG/1
30 TABLET, EXTENDED RELEASE ORAL DAILY
Status: DISCONTINUED | OUTPATIENT
Start: 2023-01-25 | End: 2023-01-25

## 2023-01-24 RX ORDER — POTASSIUM CHLORIDE 20 MEQ/1
40 TABLET, EXTENDED RELEASE ORAL PRN
Status: DISCONTINUED | OUTPATIENT
Start: 2023-01-24 | End: 2023-01-31 | Stop reason: HOSPADM

## 2023-01-24 RX ORDER — ATORVASTATIN CALCIUM 40 MG/1
40 TABLET, FILM COATED ORAL NIGHTLY
Status: DISCONTINUED | OUTPATIENT
Start: 2023-01-24 | End: 2023-01-31 | Stop reason: HOSPADM

## 2023-01-24 RX ORDER — ACETAMINOPHEN 650 MG/1
650 SUPPOSITORY RECTAL EVERY 6 HOURS PRN
Status: DISCONTINUED | OUTPATIENT
Start: 2023-01-24 | End: 2023-01-31 | Stop reason: HOSPADM

## 2023-01-24 RX ORDER — SODIUM CHLORIDE 0.9 % (FLUSH) 0.9 %
10 SYRINGE (ML) INJECTION PRN
Status: DISCONTINUED | OUTPATIENT
Start: 2023-01-24 | End: 2023-01-31 | Stop reason: HOSPADM

## 2023-01-24 RX ORDER — IPRATROPIUM BROMIDE AND ALBUTEROL SULFATE 2.5; .5 MG/3ML; MG/3ML
1 SOLUTION RESPIRATORY (INHALATION) ONCE
Status: COMPLETED | OUTPATIENT
Start: 2023-01-24 | End: 2023-01-24

## 2023-01-24 RX ORDER — ENOXAPARIN SODIUM 100 MG/ML
40 INJECTION SUBCUTANEOUS DAILY
Status: DISCONTINUED | OUTPATIENT
Start: 2023-01-25 | End: 2023-01-24

## 2023-01-24 RX ORDER — SODIUM CHLORIDE 0.9 % (FLUSH) 0.9 %
5-40 SYRINGE (ML) INJECTION EVERY 12 HOURS SCHEDULED
Status: DISCONTINUED | OUTPATIENT
Start: 2023-01-24 | End: 2023-01-31 | Stop reason: HOSPADM

## 2023-01-24 RX ORDER — GABAPENTIN 300 MG/1
600 CAPSULE ORAL 3 TIMES DAILY
Status: DISCONTINUED | OUTPATIENT
Start: 2023-01-24 | End: 2023-01-31 | Stop reason: HOSPADM

## 2023-01-24 RX ORDER — METOPROLOL SUCCINATE 25 MG/1
25 TABLET, EXTENDED RELEASE ORAL DAILY
Status: DISCONTINUED | OUTPATIENT
Start: 2023-01-25 | End: 2023-01-31 | Stop reason: HOSPADM

## 2023-01-24 RX ORDER — FUROSEMIDE 40 MG/1
40 TABLET ORAL 2 TIMES DAILY
Status: DISCONTINUED | OUTPATIENT
Start: 2023-01-24 | End: 2023-01-26

## 2023-01-24 RX ORDER — ASPIRIN 81 MG/1
81 TABLET, CHEWABLE ORAL DAILY
Status: DISCONTINUED | OUTPATIENT
Start: 2023-01-25 | End: 2023-01-31 | Stop reason: HOSPADM

## 2023-01-24 RX ORDER — HYDRALAZINE HYDROCHLORIDE 20 MG/ML
10 INJECTION INTRAMUSCULAR; INTRAVENOUS EVERY 6 HOURS PRN
Status: DISCONTINUED | OUTPATIENT
Start: 2023-01-24 | End: 2023-01-31 | Stop reason: HOSPADM

## 2023-01-24 RX ORDER — ALPRAZOLAM 0.5 MG/1
1 TABLET ORAL 3 TIMES DAILY PRN
Status: DISCONTINUED | OUTPATIENT
Start: 2023-01-24 | End: 2023-01-31 | Stop reason: HOSPADM

## 2023-01-24 RX ORDER — SODIUM CHLORIDE 9 MG/ML
INJECTION, SOLUTION INTRAVENOUS PRN
Status: DISCONTINUED | OUTPATIENT
Start: 2023-01-24 | End: 2023-01-31 | Stop reason: HOSPADM

## 2023-01-24 RX ORDER — POTASSIUM CHLORIDE 7.45 MG/ML
10 INJECTION INTRAVENOUS PRN
Status: DISCONTINUED | OUTPATIENT
Start: 2023-01-24 | End: 2023-01-31 | Stop reason: HOSPADM

## 2023-01-24 RX ORDER — ONDANSETRON 2 MG/ML
4 INJECTION INTRAMUSCULAR; INTRAVENOUS EVERY 6 HOURS PRN
Status: DISCONTINUED | OUTPATIENT
Start: 2023-01-24 | End: 2023-01-31 | Stop reason: HOSPADM

## 2023-01-24 RX ORDER — POTASSIUM CHLORIDE 7.45 MG/ML
10 INJECTION INTRAVENOUS PRN
Status: DISCONTINUED | OUTPATIENT
Start: 2023-01-24 | End: 2023-01-24

## 2023-01-24 RX ORDER — POTASSIUM CHLORIDE 20 MEQ/1
40 TABLET, EXTENDED RELEASE ORAL PRN
Status: DISCONTINUED | OUTPATIENT
Start: 2023-01-24 | End: 2023-01-24

## 2023-01-24 RX ORDER — IBUPROFEN 400 MG/1
400 TABLET ORAL EVERY 6 HOURS PRN
Status: DISCONTINUED | OUTPATIENT
Start: 2023-01-24 | End: 2023-01-25

## 2023-01-24 RX ORDER — ASPIRIN 81 MG/1
81 TABLET, CHEWABLE ORAL DAILY
Status: DISCONTINUED | OUTPATIENT
Start: 2023-01-25 | End: 2023-01-24

## 2023-01-24 RX ORDER — GLIPIZIDE 5 MG/1
5 TABLET ORAL
Status: DISCONTINUED | OUTPATIENT
Start: 2023-01-25 | End: 2023-01-31 | Stop reason: HOSPADM

## 2023-01-24 RX ORDER — ALBUTEROL SULFATE 90 UG/1
2 AEROSOL, METERED RESPIRATORY (INHALATION) EVERY 6 HOURS PRN
Status: DISCONTINUED | OUTPATIENT
Start: 2023-01-24 | End: 2023-01-31 | Stop reason: HOSPADM

## 2023-01-24 RX ORDER — PANTOPRAZOLE SODIUM 40 MG/1
40 TABLET, DELAYED RELEASE ORAL
Status: DISCONTINUED | OUTPATIENT
Start: 2023-01-25 | End: 2023-01-31 | Stop reason: HOSPADM

## 2023-01-24 RX ORDER — ACETAZOLAMIDE 250 MG/1
250 TABLET ORAL DAILY
Status: DISCONTINUED | OUTPATIENT
Start: 2023-01-25 | End: 2023-01-31 | Stop reason: HOSPADM

## 2023-01-24 RX ORDER — ASPIRIN 81 MG/1
243 TABLET, CHEWABLE ORAL ONCE
Status: DISCONTINUED | OUTPATIENT
Start: 2023-01-24 | End: 2023-01-31 | Stop reason: HOSPADM

## 2023-01-24 RX ORDER — IPRATROPIUM BROMIDE AND ALBUTEROL SULFATE 2.5; .5 MG/3ML; MG/3ML
1 SOLUTION RESPIRATORY (INHALATION) EVERY 4 HOURS PRN
Status: DISCONTINUED | OUTPATIENT
Start: 2023-01-24 | End: 2023-01-31 | Stop reason: HOSPADM

## 2023-01-24 RX ORDER — ROSUVASTATIN CALCIUM 10 MG/1
5 TABLET, COATED ORAL DAILY
Status: DISCONTINUED | OUTPATIENT
Start: 2023-01-25 | End: 2023-01-24

## 2023-01-24 RX ORDER — ONDANSETRON 4 MG/1
4 TABLET, ORALLY DISINTEGRATING ORAL EVERY 8 HOURS PRN
Status: DISCONTINUED | OUTPATIENT
Start: 2023-01-24 | End: 2023-01-31 | Stop reason: HOSPADM

## 2023-01-24 RX ORDER — LISINOPRIL 5 MG/1
2.5 TABLET ORAL DAILY
Status: DISCONTINUED | OUTPATIENT
Start: 2023-01-25 | End: 2023-01-31 | Stop reason: HOSPADM

## 2023-01-24 RX ORDER — NITROGLYCERIN 0.4 MG/1
0.4 TABLET SUBLINGUAL EVERY 5 MIN PRN
Status: DISCONTINUED | OUTPATIENT
Start: 2023-01-24 | End: 2023-01-31 | Stop reason: HOSPADM

## 2023-01-24 RX ORDER — DEXTROSE MONOHYDRATE 100 MG/ML
INJECTION, SOLUTION INTRAVENOUS CONTINUOUS PRN
Status: DISCONTINUED | OUTPATIENT
Start: 2023-01-24 | End: 2023-01-31 | Stop reason: HOSPADM

## 2023-01-24 RX ADMIN — Medication 10 ML: at 22:20

## 2023-01-24 RX ADMIN — ALPRAZOLAM 1 MG: 0.5 TABLET ORAL at 22:21

## 2023-01-24 RX ADMIN — IOPAMIDOL 75 ML: 755 INJECTION, SOLUTION INTRAVENOUS at 17:30

## 2023-01-24 RX ADMIN — DEXAMETHASONE SODIUM PHOSPHATE 10 MG: 10 INJECTION, SOLUTION INTRAMUSCULAR; INTRAVENOUS at 17:16

## 2023-01-24 RX ADMIN — IPRATROPIUM BROMIDE AND ALBUTEROL SULFATE 1 AMPULE: 2.5; .5 SOLUTION RESPIRATORY (INHALATION) at 16:38

## 2023-01-24 RX ADMIN — GABAPENTIN 600 MG: 300 CAPSULE ORAL at 22:21

## 2023-01-24 RX ADMIN — FUROSEMIDE 40 MG: 40 TABLET ORAL at 22:21

## 2023-01-24 ASSESSMENT — PAIN SCALES - GENERAL
PAINLEVEL_OUTOF10: 8
PAINLEVEL_OUTOF10: 8

## 2023-01-24 ASSESSMENT — PAIN DESCRIPTION - LOCATION
LOCATION: BACK
LOCATION: CHEST

## 2023-01-24 ASSESSMENT — PAIN DESCRIPTION - ORIENTATION
ORIENTATION: RIGHT;LEFT;LOWER;MID;UPPER
ORIENTATION: ANTERIOR

## 2023-01-24 ASSESSMENT — PAIN DESCRIPTION - DESCRIPTORS
DESCRIPTORS: ACHING
DESCRIPTORS: PRESSURE

## 2023-01-24 ASSESSMENT — PAIN DESCRIPTION - PAIN TYPE
TYPE: CHRONIC PAIN
TYPE: ACUTE PAIN

## 2023-01-24 ASSESSMENT — PAIN DESCRIPTION - ONSET
ONSET: PROGRESSIVE
ONSET: GRADUAL

## 2023-01-24 ASSESSMENT — PAIN DESCRIPTION - FREQUENCY
FREQUENCY: INTERMITTENT
FREQUENCY: CONTINUOUS

## 2023-01-24 ASSESSMENT — PAIN - FUNCTIONAL ASSESSMENT
PAIN_FUNCTIONAL_ASSESSMENT: PREVENTS OR INTERFERES SOME ACTIVE ACTIVITIES AND ADLS
PAIN_FUNCTIONAL_ASSESSMENT: PREVENTS OR INTERFERES SOME ACTIVE ACTIVITIES AND ADLS
PAIN_FUNCTIONAL_ASSESSMENT: 0-10

## 2023-01-24 ASSESSMENT — LIFESTYLE VARIABLES
HOW OFTEN DO YOU HAVE A DRINK CONTAINING ALCOHOL: NEVER
HOW MANY STANDARD DRINKS CONTAINING ALCOHOL DO YOU HAVE ON A TYPICAL DAY: PATIENT DOES NOT DRINK

## 2023-01-24 NOTE — ED PROVIDER NOTES
[facility] NURSING PROVIDER NOTE    Patient Identification  Pt Name: Matthew Valdez  MRN: [id number]  uLdivina 1945  Date of evaluation: 1/24/2023  Provider: Avni Man MD  PCP: Julius Rand MD    Chief Complaint  Chest Pain (Pt brought in by  EMS from home due to CP that started 3 days ago, SOB started this morning, hx of COPD, presents with cough/wheezing/rhonchi in her lungs, PVCs/bigeminy. Pt states that chest pain goes to her back that is pressure, afebrile, A and O x 4.)      HPI  History provided by patient   This is a 69 y.o. female who presents to the ED for chest pain. Pressure-like. Radiates towards her back. Never had before. Ongoing 3 days. Associate with shortness of breath. No fevers or chills. Has been having cough. Does not feel like her COPD. No unilateral leg swelling. Her weight has been stable when she checks it daily. No abdominal pain or headache. No clear exacerbating remitting factors. Christiano Frankel ROS  12 systems reviewed, pertinent positives/negatives per HPI otherwise noted to be negative.     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    Cancer ([facility] [address]     \"skin on top of my head\"    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    Chronic obstructive pulmonary disease ([address] 01/13/2017    Claustrophobia     COPD (chronic obstructive pulmonary disease) (HCC)     ESBL (extended spectrum beta-lactamase) producing bacteria infection 08/14/2021    Esophageal stricture     Gastroesophageal reflux disease without esophagitis 03/24/2016    Hiatal hernia     Hiatal hernia     Hypercholesteremia     Hypertension     IBS (irritable bowel syndrome)     Migraines     Movement disorder arthritis    Pneumonia     PONV (postoperative nausea and vomiting)     Type II or unspecified type diabetes mellitus without mention of complication, not stated as uncontrolled     type ll does not take insulin at home    Unspecified cerebral artery occlusion with cerebral infarction     many TIA's     Past surgical history:   Past Surgical History:   Procedure Laterality Date    APPENDECTOMY      BRONCHOSCOPY N/A 1/24/2020    BRONCHOSCOPY ALVEOLAR LAVAGE performed by Burt Tapia MD at [facility] N/A 1/27/2020    BRONCHOSCOPY DIAGNOSTIC OR CELL [address] ONLY performed by Margaret Grimm MD at [facility] N/A 11/23/2020    BRONCHOSCOPY DIAGNOSTIC OR CELL [address] ONLY performed by Jl Thorne MD at [facility]      1 stent 2000 and 1 stent 2001    CATARACT REMOVAL  2013 or 2014    bilateral cataracts removed    CHOLECYSTECTOMY      COLONOSCOPY      COLONOSCOPY  06/11/2018    ENDOSCOPY, COLON, DIAGNOSTIC      ESOPHAGEAL DILATATION      EYE SURGERY      bilateral cataracts    GALLBLADDER SURGERY      HYSTERECTOMY (CERVIX STATUS UNKNOWN)      KS EXCISION MALIGNANT LESION S/N/H/F/G 0.5 CM/< N/A 9/6/2018    EXCISION OF SCALP SQUAMOUS CELL CARCINOMA performed by Moira Hurtado MD at [address] AGRFT T/A/L 1ST 100 CM/&/1% BDY INFT/CHLD N/A 9/6/2018    SPLIT THICKNESS SKIN GRAFT FOR COVERAGE SCALP, APPLICATION OF WOUND VAC DEVICE performed by Moira Hurtado MD at [address] [place] [address],[address] ENDOSCOPY  4/20/12    with biopsy of stomach,gastritis    UPPER GASTROINTESTINAL ENDOSCOPY  06/11/2018    w/esophagael dilation       Home medications:   Current Discharge Medication List        CONTINUE these medications which have NOT CHANGED    Details   ALPRAZolam (XANAX) 1 MG tablet Take 1 tablet by mouth 3 times daily as needed for Anxiety for up to 30 days. Qty: 90 tablet, Refills: 0    Associated Diagnoses: Anxiety      gabapentin (NEURONTIN) 300 MG capsule Take 2 capsules by mouth 3 times daily for 90 days. Qty: 540 capsule, Refills: 1      diclofenac sodium (VOLTAREN) 1 % GEL Apply 2 g topically 2 times daily  Qty: 200 g, Refills: 5      furosemide (LASIX) 40 MG tablet TAKE ONE TABLET BY MOUTH TWICE A DAY  Qty: 180 tablet, Refills: 1      metoprolol succinate (TOPROL XL) 25 MG extended release tablet Take 1 tablet by mouth daily  Qty: 90 tablet, Refills: 0      isosorbide mononitrate (IMDUR) 30 MG extended release tablet Take 1 tablet by mouth daily  Qty: 90 tablet, Refills: 1      rivaroxaban (XARELTO) 15 MG TABS tablet Take 1 tablet by mouth daily  Qty: 90 tablet, Refills: 1      lisinopril (PRINIVIL;ZESTRIL) 2.5 MG tablet Take 1 tablet by mouth daily  Qty: 90 tablet, Refills: 1      acetaZOLAMIDE (DIAMOX) 250 MG tablet Take 1 tablet by mouth daily  Qty: 90 tablet, Refills: 1      ibuprofen (ADVIL;MOTRIN) 400 MG tablet Take 1 tablet by mouth every 6 hours as needed for Pain or Fever  Qty: 120 tablet, Refills: 1      omeprazole (PRILOSEC) 40 MG delayed release capsule Take 1 capsule by mouth daily  Qty: 30 capsule, Refills: 3      linagliptin (TRADJENTA) 5 MG tablet Take 1 tablet by mouth daily  Qty: 90 tablet, Refills: 1      glipiZIDE (GLUCOTROL) 5 MG tablet Take 1 tablet by mouth 2 times daily (before meals)  Qty: 180 tablet, Refills: 3      albuterol sulfate HFA (PROVENTIL;VENTOLIN;PROAIR) 108 (90 Base) MCG/ACT inhaler INHALE TWO PUFFS BY MOUTH EVERY 6 HOURS AS NEEDED FOR WHEEZING  Qty: 18 g, Refills: 5      !! blood glucose monitor strips Test 2 times a day & as needed for symptoms of irregular blood glucose.  Dispense sufficient amount for indicated testing frequency plus additional to accommodate PRN testing needs. Qty: 50 strip, Refills: 5    Comments: Brand per patient preference. May round up to next available package size. budesonide-formoterol (SYMBICORT) 160-4.5 MCG/ACT AERO Inhale 2 puffs into the lungs 2 times daily  Qty: 1 each, Refills: 3      Umeclidinium Bromide (INCRUSE ELLIPTA) 62.5 MCG/INH AEPB Inhale 1 puff into the lungs daily  Qty: 1 each, Refills: 6      triamcinolone (KENALOG) 0.1 % cream Apply topically 2 times daily. Qty: 60 g, Refills: 2      ipratropium-albuterol (DUONEB) 0.5-2.5 (3) MG/3ML SOLN nebulizer solution INHALE THREE MILLILITERS VIA NEBULIZATION BY MOUTH EVERY 4 HOURS AS NEEDED FOR SHORTNESS OF BREATH  Qty: 1 vial, Refills: 5      !! blood glucose test strips (TRUE METRIX BLOOD GLUCOSE TEST) strip USE THREE TIMES A DAY AS NEEDED  Qty: 100 strip, Refills: 5      Lancets MISC 1 each by Does not apply route 2 times daily  Qty: 300 each, Refills: 1      nystatin (MYCOSTATIN) 253722 UNIT/GM powder Affected area  Qty: 2 Bottle, Refills: 5      rosuvastatin (CRESTOR) 10 MG tablet Take 0.5 tablets by mouth daily  Qty: 90 tablet, Refills: 3    Associated Diagnoses: Hyperlipidemia, unspecified hyperlipidemia type      sodium chloride (OCEAN, BABY AYR) 0.65 % nasal spray 1 spray by Nasal route as needed for Congestion  Refills: 0      aspirin 81 MG tablet Take 81 mg by mouth daily      nitroGLYCERIN (NITROSTAT) 0.4 MG SL tablet Place 1 tablet under the tongue every 5 minutes as needed for Chest pain. Qty: 30 tablet, Refills: 0       !! - Potential duplicate medications found. Please discuss with provider. Social history:  reports that she quit smoking about 5 years ago. Her smoking use included cigarettes. She has a 12.25 pack-year smoking history. She has never used smokeless tobacco. She reports current alcohol use of about 1.0 standard drink per week. She reports that she does not use drugs.     Family history:    Family History   Problem Relation Age of Onset    Heart Disease Mother     Cancer Mother     Cancer Father     Cancer Sister     Heart Disease Sister     Heart Disease Brother     High Blood Pressure Neg Hx     High Cholesterol Neg Hx          Exam  ED Triage Vitals [01/24/23 1609]   BP Temp Temp Source Heart Rate Resp SpO2 Height Weight   131/68 98.2 °F (36.8 °C) Oral 68 20 96 % 5' 6\" (1.676 m) 269 lb (122 kg)     Nursing note and vitals reviewed. Constitutional: In no acute distress  HENT:      Head: Normocephalic      Ears: External ears normal.      Nose: Nose normal.     Mouth: Membrane mucosa moist   Eyes: No discharge. Neck: Supple. Trachea midline. Cardiovascular: Regular rate. Warm extremities  Pulmonary/Chest: Effort normal. No respiratory distress. Abdominal: Soft. No distension. Nontender  : Deferred  Rectal: Deferred   Musculoskeletal: Moves all extremities. No gross deformity. Neurological: Alert and oriented. Face symmetric. Speech is clear. Skin: Warm and dry. Psychiatric: Normal mood and affect. Behavior is normal.    Procedures      EKG  The Ekg interpreted by me in the absence of a cardiologist shows. Normal sinus rhythm. No specific ST changes appreciated. Hr 71  iLBBB, similar to 10/22 study. Bigeminy with frequent pvcs      Radiology  CT CHEST PULMONARY EMBOLISM W CONTRAST   Final Result   No evidence of pulmonary embolism or acute pulmonary abnormality.          NM Cardiac Stress Test Nuclear Imaging    (Results Pending)       Labs  Results for orders placed or performed during the hospital encounter of 01/24/23   COVID-19, Rapid    Specimen: Nasopharyngeal Swab   Result Value Ref Range    SARS-CoV-2, NAAT Not Detected Not Detected   Rapid influenza A/B antigens    Specimen: Nasopharyngeal   Result Value Ref Range    Rapid Influenza A Ag Negative Negative    Rapid Influenza B Ag Negative Negative   CBC with Auto Differential   Result Value Ref Range    WBC 12.3 (H) 4.0 - 11.0 K/uL    RBC 5.25 (H) 4.00 - 5.20 M/uL    Hemoglobin 14.1 12.0 - 16.0 g/dL    Hematocrit 44.4 36.0 - 48.0 %    MCV 84.6 80.0 - 100.0 fL    MCH 26.8 26.0 - 34.0 pg    MCHC 31.7 31.0 - 36.0 g/dL    RDW 16.3 (H) 12.4 - 15.4 %    Platelets 802 583 - 294 K/uL    MPV 9.0 5.0 - 10.5 fL    Neutrophils % 67.8 %    Lymphocytes % 24.3 %    Monocytes % 6.1 %    Eosinophils % 0.9 %    Basophils % 0.9 %    Neutrophils Absolute 8.4 (H) 1.7 - 7.7 K/uL    Lymphocytes Absolute 3.0 1.0 - 5.1 K/uL    Monocytes Absolute 0.7 0.0 - 1.3 K/uL    Eosinophils Absolute 0.1 0.0 - 0.6 K/uL    Basophils Absolute 0.1 0.0 - 0.2 K/uL   CMP w/ Reflex to MG   Result Value Ref Range    Sodium 140 136 - 145 mmol/L    Potassium reflex Magnesium 3.9 3.5 - 5.1 mmol/L    Chloride 106 99 - 110 mmol/L    CO2 25 21 - 32 mmol/L    Anion Gap 9 3 - 16    Glucose 130 (H) 70 - 99 mg/dL    BUN 18 7 - 20 mg/dL    Creatinine 0.9 0.6 - 1.2 mg/dL    Est, Glom Filt Rate >60 >60    Calcium 9.5 8.3 - 10.6 mg/dL    Total Protein 6.7 6.4 - 8.2 g/dL    Albumin 3.6 3.4 - 5.0 g/dL    Albumin/Globulin Ratio 1.2 1.1 - 2.2    Total Bilirubin <0.2 0.0 - 1.0 mg/dL    Alkaline Phosphatase 86 40 - 129 U/L    ALT 12 10 - 40 U/L    AST 15 15 - 37 U/L   Troponin   Result Value Ref Range    Troponin <0.01 <0.01 ng/mL   Brain Natriuretic Peptide   Result Value Ref Range    Pro- 0 - 449 pg/mL   Troponin   Result Value Ref Range    Troponin <0.01 <0.01 ng/mL   D-dimer, quantitative   Result Value Ref Range    D-Dimer, Quant 0.31 0.00 - 0.60 ug/mL FEU   POCT Glucose   Result Value Ref Range    POC Glucose 90 70 - 99 mg/dl    Performed on [facility]    POCT Glucose   Result Value Ref Range    POC Glucose 296 (H) 70 - 99 mg/dl    Performed on [facility]    EKG 12 Lead   Result Value Ref Range    Ventricular Rate 71 BPM    Atrial Rate 71 BPM    P-R Interval 186 ms    QRS Duration 124 ms    Q-T Interval 396 ms    QTc Calculation (Bazett) 430 ms    R Axis -81 degrees    T Axis 77 degrees    Diagnosis       Sinus rhythm with frequent Premature ventricular complexesLeft axis deviationLeft bundle branch block       Screenings   Machelle Coma Scale  Eye Opening: Spontaneous  Best Verbal Response: Oriented  Best Motor Response: Obeys commands  Machelle Coma Scale Score: 15       MDM and ED Course  This is a 69 y.o. female who presents to the ED for chest pain. May be secondary to ACS. Will give aspirin orally. Currently nitroglycerin x3 at home without improvement. No fevers but is having cough. Pneumonia in differential.  Pulmonary embolism in differential as well. Obtaining CT chest with contrast which would also check for obstruction. Last stress test was in October. Some mild wheezing, COPD likely a small factor in this. Will give duonebs and IV decadron. Anticipate admission for further chest pain work-up.     -------------------------    Troponin negative. Admitted to Dr. Bony Boland for further cardiac work-up. Had no improvement with duo nebs. I do not think that this is COPD    [unfilled]    Is this patient to be included in the SEP-1 Core Measure due to severe sepsis or septic shock? No   Exclusion criteria - the patient is NOT to be included for SEP-1 Core Measure due to:  2+ SIRS criteria are not met        Final Impression  1. Chest pain, unspecified type        Blood pressure (!) 110/56, pulse 52, temperature 97.6 °F (36.4 °C), temperature source Oral, resp. rate 22, height 5' 5\" (1.651 m), weight 273 lb (123.8 kg), SpO2 92 %, not currently breastfeeding. Disposition:  DISPOSITION Admitted 01/24/2023 07:00:45 PM      Patient Referrals:  No follow-up provider specified. Discharge Medications:  Current Discharge Medication List          Discontinued Medications:  Current Discharge Medication List        STOP taking these medications       hydrALAZINE (APRESOLINE) 25 MG tablet Comments:   Reason for Stopping: This chart was generated using the [address] [place] [address] [employer] [employer] system.  I created this record but it may contain dictation errors given the limitations of this technology.         Manav Corbin MD  01/24/23 9101

## 2023-01-24 NOTE — H&P
Observation History and Physical  Dr. Daniel Gamino  1/24/2023    PCP: Bala Zarate MD    Cc:   Chief Complaint   Patient presents with    Chest Pain     Pt brought in by [facility] from home due to CP that started 3 days ago, SOB started this morning, hx of COPD, presents with cough/wheezing/rhonchi in her lungs, PVCs/bigeminy. Pt states that chest pain goes to her back that is pressure, afebrile, A and O x 4. HPI:  Marie Herndon is a 69 y.o. female who has a past medical history of Acute MI ([address] Acute pyelonephritis, Anxiety and depression, Arthritis, CAD (coronary artery disease), Cancer ([address] Cancer ([address] Cancer of skin of leg, Chronic obstructive pulmonary disease ([address] Claustrophobia, COPD (chronic obstructive pulmonary disease) ([address] ESBL (extended spectrum beta-lactamase) producing bacteria infection, Esophageal stricture, Gastroesophageal reflux disease without esophagitis, Hiatal hernia, Hiatal hernia, Hypercholesteremia, Hypertension, IBS (irritable bowel syndrome), Migraines, Movement disorder, Pneumonia, PONV (postoperative nausea and vomiting), Type II or unspecified type diabetes mellitus without mention of complication, not stated as uncontrolled, and Unspecified cerebral artery occlusion with cerebral infarction. Patient presents with Chest pain. HPI  (1-3 for expanded problem focused, ?4 for detailed/comprehensive)     69 y.o. female who presents to the ED for chest pain. Pressure-like. Radiates towards her back. Never had before. Ongoing 3 days. Associate with shortness of breath. No fevers or chills. Has been having cough. Does not feel like her COPD. No unilateral leg swelling. Her weight has been stable when she checks it daily. No abdominal pain or headache. No clear exacerbating remitting factors. .        She has had frequent admits, mostly for COPD in past year. Review of old chart shows admission in oct 2022 with similar sx.  Stress test at that time was normal    Pt to be admitted, placed  on r.o mi pathway  Given recurrent chest pain complaints, to have cards consult    Problem list of hospitalization thus far: Active Hospital Problems    Diagnosis     Hyperlipemia [E78.5]      Priority: High    Chest pain [R07.9]      Priority: Medium    DM2 (diabetes mellitus, type 2) ([facility] [E11.9]     Essential hypertension [I10]          Review of Systems: (1 system for EPF, 2-9 for detailed, 10+ for comprehensive)  Constitutional: Negative for chills and fever. HENT: Negative for dental problem, nosebleeds and rhinorrhea. Eyes: Negative for photophobia and visual disturbance. Respiratory: Negative for cough, chest tightness and positive for shortness of breath. Cardiovascular: positive  for chest pain and negative for leg swelling. Gastrointestinal: Negative for diarrhea, nausea and vomiting. Endocrine: Negative for polydipsia and polyphagia. Genitourinary: Negative for frequency, hematuria and urgency. Musculoskeletal: Negative for back pain and myalgias. Skin: Negative for rash. Allergic/Immunologic: Negative for food allergies. Neurological: Negative for dizziness, seizures, syncope and facial asymmetry. Hematological: Negative for adenopathy. Psychiatric/Behavioral: Negative for dysphoric mood. The patient is not nervous/anxious.              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    Chronic obstructive pulmonary disease ([facility] 01/13/2017    Claustrophobia     COPD (chronic obstructive pulmonary disease) ([facility])     ESBL (extended spectrum beta-lactamase) producing bacteria infection 08/14/2021    Esophageal stricture     Gastroesophageal reflux disease without esophagitis 03/24/2016    Hiatal hernia     Hiatal hernia     Hypercholesteremia     Hypertension     IBS (irritable bowel syndrome)     Migraines     Movement disorder arthritis    Pneumonia     PONV (postoperative nausea and vomiting)     Type II or unspecified type diabetes mellitus without mention of complication, not stated as uncontrolled     type ll does not take insulin at home    Unspecified cerebral artery occlusion with cerebral infarction     many TIA's       Past Surgical History:   Past Surgical History:   Procedure Laterality Date    APPENDECTOMY      BRONCHOSCOPY N/A 1/24/2020    BRONCHOSCOPY ALVEOLAR LAVAGE performed by Sudarshan Zaldivar MD at [address] N/A 1/27/2020    BRONCHOSCOPY DIAGNOSTIC OR CELL [address] ONLY performed by Estella Gupta MD at [facility] N/A 11/23/2020    BRONCHOSCOPY DIAGNOSTIC OR CELL [address] ONLY performed by Martita Rodriguez MD at [facility]      1 stent 2000 and 1 stent 2001    CATARACT REMOVAL  2013 or 2014    bilateral cataracts removed    CHOLECYSTECTOMY      COLONOSCOPY      COLONOSCOPY  06/11/2018    ENDOSCOPY, COLON, DIAGNOSTIC      ESOPHAGEAL DILATATION      EYE SURGERY      bilateral cataracts    GALLBLADDER SURGERY      HYSTERECTOMY (CERVIX STATUS UNKNOWN)      RI EXCISION MALIGNANT LESION S/N/H/F/G 0.5 CM/< N/A 9/6/2018    EXCISION OF SCALP SQUAMOUS CELL CARCINOMA performed by Marlon Cardona MD at [address] AGRFT T/A/L 1ST 100 CM/&/1% BDY INFT/CHLD N/A 9/6/2018    SPLIT THICKNESS SKIN GRAFT FOR COVERAGE SCALP, APPLICATION OF WOUND VAC DEVICE performed by Marlon Cardona MD at [address] [place] [address],[address] ENDOSCOPY  4/20/12    with biopsy of stomach,gastritis    UPPER GASTROINTESTINAL ENDOSCOPY  06/11/2018 w/esophagael dilation       Social History:   Social History       Tobacco History       Smoking Status  Former Quit Date  6/16/2017 Smoking Frequency  0.25 packs/day for 49.00 years (12.25 pk-yrs) Smoking Tobacco Type  Cigarettes quit in 6/16/2017      Smokeless Tobacco Use  Never      Tobacco Comments  only smoke when real stressed  Nicotine patch              Alcohol History       Alcohol Use Status  Yes Drinks/Week  1 Glasses of wine per week Amount  1.0 standard drink of alcohol/wk Comment  occ. every  6 mo              Drug Use       Drug Use Status  No              Sexual Activity       Sexually Active  Not Currently Partners  Male                    Fam History:   Family History   Problem Relation Age of Onset    Heart Disease Mother     Cancer Mother     Cancer Father     Cancer Sister     Heart Disease Sister     Heart Disease Brother     High Blood Pressure Neg Hx     High Cholesterol Neg Hx        PFSH: The above PMHx, PSHx, SocHx, FamHx has been reviewed by myself. (1 area for detailed, 2-3 for comprehensive)      Code Status: Prior    Meds - following list of home medications fromelectronic chart has been reviewed by myself  Prior to Admission medications    Medication Sig Start Date End Date Taking? Authorizing Provider   ALPRAZolam Gely Stiles) 1 MG tablet Take 1 tablet by mouth 3 times daily as needed for Anxiety for up to 30 days. 1/13/23 2/12/23  Wendy Mason MD   gabapentin (NEURONTIN) 300 MG capsule Take 2 capsules by mouth 3 times daily for 90 days.  12/28/22 3/28/23  Wendy Mason MD   diclofenac sodium (VOLTAREN) 1 % GEL Apply 2 g topically 2 times daily 12/14/22   Wendy Mason MD   furosemide (LASIX) 40 MG tablet TAKE ONE TABLET BY MOUTH TWICE A DAY 12/1/22   Wendy Mason MD   metoprolol succinate (TOPROL XL) 25 MG extended release tablet Take 1 tablet by mouth daily 12/1/22 3/1/23  Wendy Mason MD   isosorbide mononitrate (IMDUR) 30 MG extended release tablet Take 1 tablet by mouth daily 12/1/22 3/1/23  Renzo Sauceda MD   rivaroxaban (XARELTO) 15 MG TABS tablet Take 1 tablet by mouth daily 12/1/22 3/1/23  Renzo Sauceda MD   lisinopril (PRINIVIL;ZESTRIL) 2.5 MG tablet Take 1 tablet by mouth daily 12/1/22 3/1/23  Renzo Sauceda MD   acetaZOLAMIDE (DIAMOX) 250 MG tablet Take 1 tablet by mouth daily 12/1/22 3/1/23  Renzo Sauceda MD   ibuprofen (ADVIL;MOTRIN) 400 MG tablet Take 1 tablet by mouth every 6 hours as needed for Pain or Fever 10/30/22   Renzo Sauceda MD   omeprazole (PRILOSEC) 40 MG delayed release capsule Take 1 capsule by mouth daily 9/15/22   Renzo Sauceda MD   linagliptin (TRADJENTA) 5 MG tablet Take 1 tablet by mouth daily 9/15/22 12/14/22  Renzo Sauceda MD   glipiZIDE (GLUCOTROL) 5 MG tablet Take 1 tablet by mouth 2 times daily (before meals) 9/1/22   Renzo Sauceda MD   albuterol sulfate HFA (PROVENTIL;VENTOLIN;PROAIR) 108 (90 Base) MCG/ACT inhaler INHALE TWO PUFFS BY MOUTH EVERY 6 HOURS AS NEEDED FOR WHEEZING 7/14/22   Renzo Sauceda MD   blood glucose monitor strips Test 2 times a day & as needed for symptoms of irregular blood glucose. Dispense sufficient amount for indicated testing frequency plus additional to accommodate PRN testing needs. 7/6/22   Renzo Sauceda MD   hydrALAZINE (APRESOLINE) 25 MG tablet Take 1 tablet by mouth 3 times daily 6/29/22 8/4/22  Renzo Sauceda MD   budesonide-formoterol [facility]) 160-4.5 MCG/ACT AERO Inhale 2 puffs into the lungs 2 times daily 12/21/21 8/1/22  Renzo Sauceda MD   Umeclidinium Bromide (INCRUSE ELLIPTA) 62.5 MCG/INH AEPB Inhale 1 puff into the lungs daily  Patient not taking: Reported on 1/24/2023 8/26/21   Mya Dorsey MD   triamcinolone (KENALOG) 0.1 % cream Apply topically 2 times daily.  7/29/21   Renzo Sauceda MD   ipratropium-albuterol (DUONEB) 0.5-2.5 (3) MG/3ML SOLN nebulizer solution INHALE THREE MILLILITERS VIA NEBULIZATION BY MOUTH EVERY 4 HOURS AS NEEDED FOR SHORTNESS OF BREATH 7/7/21   Frank Schlatter, MD   blood glucose test strips (TRUE METRIX BLOOD GLUCOSE TEST) strip USE THREE TIMES A DAY AS NEEDED 5/24/21   Frank Schlatter, MD   Lancets MISC 1 each by Does not apply route 2 times daily 2/25/21   Frank Schlatter, MD   nystatin (MYCOSTATIN) 335993 UNIT/GM powder Affected area 2/9/21   Frank Schlatter, MD   rosuvastatin (CRESTOR) 10 MG tablet Take 0.5 tablets by mouth daily 7/31/20   Karen Sanchez MD   sodium chloride (OCEAN, BABY AYR) 0.65 % nasal spray 1 spray by Nasal route as needed for Congestion 3/27/20   Frank Schlatter, MD   aspirin 81 MG tablet Take 81 mg by mouth daily    Historical Provider, MD   nitroGLYCERIN (NITROSTAT) 0.4 MG SL tablet Place 1 tablet under the tongue every 5 minutes as needed for Chest pain.  4/22/12   Frank Schlatter, MD         Allergies   Allergen Reactions    Morphine Shortness Of Breath    Codeine Other (See Comments)     Chest pain    Hydromorphone Other (See Comments)     hallucinations  Hallucinations    But will take if needed in an emergency    Levaquin [Levofloxacin In D5w] Itching    Vicodin [Hydrocodone-Acetaminophen] Hives    Aspirin      Upsets hiatal hernia             EXAM: (2-7 system for EPF/Detailed, ?8 for Comprehensive)  BP (!) 101/53   Pulse 65   Temp 98.2 °F (36.8 °C) (Oral)   Resp 24   Ht 5' 6\" (1.676 m)   Wt 269 lb (122 kg)   SpO2 91%   BMI 43.42 kg/m²   Constitutional: vitals as above: alert, appears stated age and cooperative    Psychiatric: normal insight and judgment, oriented to person, place, time, and general circumstances    Head: Normocephalic, without obvious abnormality, atraumatic    Eyes:lids and lashes normal, conjunctivae and sclerae normal and pupils equal, round, reactive to light and accomodation     EMNT: external ears normal, nares midline    Neck: no carotid bruit, supple, symmetrical, trachea midline and thyroid not enlarged, symmetric, no tenderness/mass/nodules     Respiratory: clear to auscultation and percussion bilaterally with normal respiratory effort    Cardiovascular: normal rate, regular rhythm, normal S1 and S2 and no murmurs    Gastrointestinal: soft, non-tender, non-distended, normal bowel sounds, no masses or organomegaly    Extremities: no clubbing, no edema    Skin:No rashes or nodules noted. Neurologic:negative         LABS:  Labs Reviewed   CBC WITH AUTO DIFFERENTIAL - Abnormal; Notable for the following components:       Result Value    WBC 12.3 (*)     RBC 5.25 (*)     RDW 16.3 (*)     Neutrophils Absolute 8.4 (*)     All other components within normal limits   COMPREHENSIVE METABOLIC PANEL W/ REFLEX TO MG FOR LOW K - Abnormal; Notable for the following components:    Glucose 130 (*)     All other components within normal limits   COVID-19, RAPID   RAPID INFLUENZA A/B ANTIGENS   TROPONIN   BRAIN NATRIURETIC PEPTIDE   POCT GLUCOSE         IMAGING:  Imaging results from the ER have been reviewed in the computerized chart. Independently reviewed tests are notated separately within this note. CT CHEST PULMONARY EMBOLISM W CONTRAST    Result Date: 1/24/2023  EXAMINATION: CTA OF THE CHEST, 1/24/2023 5:13 pm TECHNIQUE: CTA of the chest was performed after the administration of intravenous contrast.  Multiplanar reformatted images are provided for review. MIP images are provided for review. Automated exposure control, iterative reconstruction, and/or weight based adjustment of the mA/kV was utilized to reduce the radiation dose to as low as reasonably achievable.  COMPARISON: None HISTORY: ORDERING SYSTEM PROVIDED HISTORY:  Chest pain, dyspnea TECHNOLOGIST PROVIDED HISTORY: Reason for Exam:  Chest pain, dyspnea Decision Support Exception - unselect if not a suspected or confirmed emergency medical condition->Emergency Medical Condition (MA) Reason for Exam:  Chest pain, dyspnea, Chest Pain (Pt brought in by  EMS from home due to CP that started 3 days ago, SOB started this morning, hx of COPD, presents with cough/wheezing/rhonchi in her lungs, PVCs/bigeminy. Pt states that chest pain goes to her back that is pressure, afebrile, A and O x 4). FINDINGS: Pulmonary Arteries: Pulmonary arteries are adequately opacified for evaluation. No evidence of intraluminal filling defect to suggest pulmonary embolism. Main pulmonary artery is normal in caliber. Mediastinum: No evidence of mediastinal lymphadenopathy. The heart and pericardium demonstrate no acute abnormality. There is no acute abnormality of the thoracic aorta. Lungs/pleura: The lungs are without acute process. No focal consolidation or pulmonary edema. No evidence of pleural effusion or pneumothorax. Upper Abdomen: Limited images of the upper abdomen are unremarkable. Soft Tissues/Bones: No acute bone or soft tissue abnormality. There are multiple chronic mild thoracolumbar compression deformities. No evidence of pulmonary embolism or acute pulmonary abnormality. EKG:   EKG from ER, reviewed by self - it shows sinus rhythm at 71, no st elevation noted  Old chart reviewed, EKG dated 10/26/22 is reviewed, there is not difference noted. Old study shows sinus at 80      Lab Results   Component Value Date/Time    GLUCOSE 130 01/24/2023 04:24 PM     Lab Results   Component Value Date/Time    POCGLU 90 01/24/2023 06:06 PM     BP (!) 101/53   Pulse 65   Temp 98.2 °F (36.8 °C) (Oral)   Resp 24   Ht 5' 6\" (1.676 m)   Wt 269 lb (122 kg)   SpO2 91%   BMI 43.42 kg/m²     MEDICAL DECISION MAKING:    Principal Problem:    Chest pain -New Problem to me. Plan: Pt to be admitted to telemetry floor. Serial cardiac enzymes ordered. GXT myoview to be ordered. Will order ASA, NTG. Pt has IV morphine ordered for pain control. LMWH to be given. Active Problems:    Hyperlipemia -Established problem. Stable. Plan: cont statin    Essential hypertension -Established problem. Stable.    101/53  Plan: Pt home BP meds reviewed and will be continued. IV Hydralazine ordered for control of extremely high blood pressures. Will monitor labs to assess Creat/K for possible complications of medications. DM2 (diabetes mellitus, type 2) ([facility]  Plan: Patient placed on controlled carbohydrate diet. Fingerstick sugars to be checked to monitor for both hypoglycemia as well as hyperglycemia. Sliding scale insulin ordered. Glucagon and dextrose ordered for hypoglycemia. Patient will be continued on home medications. Hemoglobin a1c to be ordered to assess efficacy of therapy. Diagnoses as listed above, designated as new or established and plan outlined for each. Case discussed with: kartik HEATON    MDM Determination    PROBLEM  Moderate: new problem w/uncertain prognosis - ches tpain  PLUS  moderate: 3+ tests reviewed/ordered, external notes reviewed, independent historian - cbc, bmp, troponin    OR TIME BASED  N/A - see problem based determination above       The patient is being placed in observation status with the expectation of requiring a hospital stay spanning less than  two midnights for care and treatment of the problems noted in the problem list.  This determination is also based on thepatients comorbidities and past medical history, the severity and timing of the signs and symptoms upon presentation.     (Please note that portions of this note were completed with a voice recognition program.  Efforts were made to edit the dictations but occasionally words are mis-transcribed.)      Electronically signed by: Buena Lesches, MD 1/24/2023

## 2023-01-25 LAB
A/G RATIO: 1.2 (ref 1.1–2.2)
ALBUMIN SERPL-MCNC: 3.6 G/DL (ref 3.4–5)
ALP BLD-CCNC: 84 U/L (ref 40–129)
ALT SERPL-CCNC: 12 U/L (ref 10–40)
ANION GAP SERPL CALCULATED.3IONS-SCNC: 13 MMOL/L (ref 3–16)
AST SERPL-CCNC: 12 U/L (ref 15–37)
BASOPHILS ABSOLUTE: 0.1 K/UL (ref 0–0.2)
BASOPHILS RELATIVE PERCENT: 0.5 %
BILIRUB SERPL-MCNC: <0.2 MG/DL (ref 0–1)
BUN BLDV-MCNC: 21 MG/DL (ref 7–20)
CALCIUM SERPL-MCNC: 9.3 MG/DL (ref 8.3–10.6)
CHLORIDE BLD-SCNC: 101 MMOL/L (ref 99–110)
CHOLESTEROL, TOTAL: 180 MG/DL (ref 0–199)
CO2: 23 MMOL/L (ref 21–32)
CREAT SERPL-MCNC: 1.2 MG/DL (ref 0.6–1.2)
EKG ATRIAL RATE: 71 BPM
EKG ATRIAL RATE: 77 BPM
EKG DIAGNOSIS: NORMAL
EKG DIAGNOSIS: NORMAL
EKG P AXIS: 5 DEGREES
EKG P-R INTERVAL: 186 MS
EKG P-R INTERVAL: 190 MS
EKG Q-T INTERVAL: 396 MS
EKG Q-T INTERVAL: 402 MS
EKG QRS DURATION: 122 MS
EKG QRS DURATION: 124 MS
EKG QTC CALCULATION (BAZETT): 430 MS
EKG QTC CALCULATION (BAZETT): 454 MS
EKG R AXIS: -49 DEGREES
EKG R AXIS: -81 DEGREES
EKG T AXIS: 77 DEGREES
EKG T AXIS: 81 DEGREES
EKG VENTRICULAR RATE: 71 BPM
EKG VENTRICULAR RATE: 77 BPM
EOSINOPHILS ABSOLUTE: 0 K/UL (ref 0–0.6)
EOSINOPHILS RELATIVE PERCENT: 0 %
ESTIMATED AVERAGE GLUCOSE: 177.2 MG/DL
GFR SERPL CREATININE-BSD FRML MDRD: 47 ML/MIN/{1.73_M2}
GLUCOSE BLD-MCNC: 181 MG/DL (ref 70–99)
GLUCOSE BLD-MCNC: 230 MG/DL (ref 70–99)
GLUCOSE BLD-MCNC: 264 MG/DL (ref 70–99)
GLUCOSE BLD-MCNC: 325 MG/DL (ref 70–99)
HBA1C MFR BLD: 7.8 %
HCT VFR BLD CALC: 43 % (ref 36–48)
HDLC SERPL-MCNC: 34 MG/DL (ref 40–60)
HEMOGLOBIN: 13.6 G/DL (ref 12–16)
LDL CHOLESTEROL CALCULATED: 115 MG/DL
LYMPHOCYTES ABSOLUTE: 0.8 K/UL (ref 1–5.1)
LYMPHOCYTES RELATIVE PERCENT: 7.5 %
MCH RBC QN AUTO: 26.7 PG (ref 26–34)
MCHC RBC AUTO-ENTMCNC: 31.7 G/DL (ref 31–36)
MCV RBC AUTO: 84.4 FL (ref 80–100)
MONOCYTES ABSOLUTE: 0.2 K/UL (ref 0–1.3)
MONOCYTES RELATIVE PERCENT: 1.8 %
NEUTROPHILS ABSOLUTE: 9.9 K/UL (ref 1.7–7.7)
NEUTROPHILS RELATIVE PERCENT: 90.2 %
PDW BLD-RTO: 16.2 % (ref 12.4–15.4)
PERFORMED ON: ABNORMAL
PLATELET # BLD: 194 K/UL (ref 135–450)
PMV BLD AUTO: 9.1 FL (ref 5–10.5)
POTASSIUM REFLEX MAGNESIUM: 4 MMOL/L (ref 3.5–5.1)
RBC # BLD: 5.1 M/UL (ref 4–5.2)
SODIUM BLD-SCNC: 137 MMOL/L (ref 136–145)
TOTAL PROTEIN: 6.5 G/DL (ref 6.4–8.2)
TRIGL SERPL-MCNC: 157 MG/DL (ref 0–150)
TROPONIN: <0.01 NG/ML
VLDLC SERPL CALC-MCNC: 31 MG/DL
WBC # BLD: 11 K/UL (ref 4–11)

## 2023-01-25 PROCEDURE — 94761 N-INVAS EAR/PLS OXIMETRY MLT: CPT

## 2023-01-25 PROCEDURE — 93010 ELECTROCARDIOGRAM REPORT: CPT | Performed by: INTERNAL MEDICINE

## 2023-01-25 PROCEDURE — 6360000002 HC RX W HCPCS: Performed by: INTERNAL MEDICINE

## 2023-01-25 PROCEDURE — 84484 ASSAY OF TROPONIN QUANT: CPT

## 2023-01-25 PROCEDURE — 80053 COMPREHEN METABOLIC PANEL: CPT

## 2023-01-25 PROCEDURE — 99223 1ST HOSP IP/OBS HIGH 75: CPT | Performed by: INTERNAL MEDICINE

## 2023-01-25 PROCEDURE — 6370000000 HC RX 637 (ALT 250 FOR IP): Performed by: INTERNAL MEDICINE

## 2023-01-25 PROCEDURE — 2580000003 HC RX 258: Performed by: INTERNAL MEDICINE

## 2023-01-25 PROCEDURE — G0378 HOSPITAL OBSERVATION PER HR: HCPCS

## 2023-01-25 PROCEDURE — 85025 COMPLETE CBC W/AUTO DIFF WBC: CPT

## 2023-01-25 PROCEDURE — 96372 THER/PROPH/DIAG INJ SC/IM: CPT

## 2023-01-25 PROCEDURE — 80061 LIPID PANEL: CPT

## 2023-01-25 PROCEDURE — 97162 PT EVAL MOD COMPLEX 30 MIN: CPT

## 2023-01-25 PROCEDURE — 94669 MECHANICAL CHEST WALL OSCILL: CPT

## 2023-01-25 PROCEDURE — 97165 OT EVAL LOW COMPLEX 30 MIN: CPT

## 2023-01-25 PROCEDURE — 97535 SELF CARE MNGMENT TRAINING: CPT

## 2023-01-25 PROCEDURE — 36415 COLL VENOUS BLD VENIPUNCTURE: CPT

## 2023-01-25 PROCEDURE — 2700000000 HC OXYGEN THERAPY PER DAY

## 2023-01-25 PROCEDURE — 83036 HEMOGLOBIN GLYCOSYLATED A1C: CPT

## 2023-01-25 PROCEDURE — 94640 AIRWAY INHALATION TREATMENT: CPT

## 2023-01-25 PROCEDURE — 93005 ELECTROCARDIOGRAM TRACING: CPT | Performed by: INTERNAL MEDICINE

## 2023-01-25 RX ORDER — DEXTROSE MONOHYDRATE 100 MG/ML
INJECTION, SOLUTION INTRAVENOUS CONTINUOUS PRN
Status: DISCONTINUED | OUTPATIENT
Start: 2023-01-25 | End: 2023-01-31 | Stop reason: HOSPADM

## 2023-01-25 RX ORDER — ISOSORBIDE MONONITRATE 30 MG/1
30 TABLET, EXTENDED RELEASE ORAL 2 TIMES DAILY
Status: DISCONTINUED | OUTPATIENT
Start: 2023-01-25 | End: 2023-01-31

## 2023-01-25 RX ORDER — GUAIFENESIN/DEXTROMETHORPHAN 100-10MG/5
5 SYRUP ORAL EVERY 4 HOURS PRN
Status: DISCONTINUED | OUTPATIENT
Start: 2023-01-25 | End: 2023-01-31 | Stop reason: HOSPADM

## 2023-01-25 RX ORDER — IPRATROPIUM BROMIDE AND ALBUTEROL SULFATE 2.5; .5 MG/3ML; MG/3ML
1 SOLUTION RESPIRATORY (INHALATION) 3 TIMES DAILY
Status: DISCONTINUED | OUTPATIENT
Start: 2023-01-25 | End: 2023-01-25

## 2023-01-25 RX ORDER — IPRATROPIUM BROMIDE AND ALBUTEROL SULFATE 2.5; .5 MG/3ML; MG/3ML
1 SOLUTION RESPIRATORY (INHALATION) 4 TIMES DAILY
Status: DISCONTINUED | OUTPATIENT
Start: 2023-01-25 | End: 2023-01-31 | Stop reason: HOSPADM

## 2023-01-25 RX ORDER — ENOXAPARIN SODIUM 150 MG/ML
1 INJECTION SUBCUTANEOUS 2 TIMES DAILY
Status: DISCONTINUED | OUTPATIENT
Start: 2023-01-25 | End: 2023-01-26

## 2023-01-25 RX ADMIN — Medication 10 ML: at 15:20

## 2023-01-25 RX ADMIN — IPRATROPIUM BROMIDE AND ALBUTEROL SULFATE 1 AMPULE: 2.5; .5 SOLUTION RESPIRATORY (INHALATION) at 14:25

## 2023-01-25 RX ADMIN — IPRATROPIUM BROMIDE AND ALBUTEROL SULFATE 1 AMPULE: 2.5; .5 SOLUTION RESPIRATORY (INHALATION) at 07:38

## 2023-01-25 RX ADMIN — GLIPIZIDE 5 MG: 5 TABLET ORAL at 15:17

## 2023-01-25 RX ADMIN — IPRATROPIUM BROMIDE AND ALBUTEROL SULFATE 1 AMPULE: 2.5; .5 SOLUTION RESPIRATORY (INHALATION) at 00:54

## 2023-01-25 RX ADMIN — LISINOPRIL 2.5 MG: 5 TABLET ORAL at 15:17

## 2023-01-25 RX ADMIN — FUROSEMIDE 40 MG: 40 TABLET ORAL at 21:15

## 2023-01-25 RX ADMIN — ENOXAPARIN SODIUM 120 MG: 150 INJECTION SUBCUTANEOUS at 21:15

## 2023-01-25 RX ADMIN — ASPIRIN 81 MG 81 MG: 81 TABLET ORAL at 15:17

## 2023-01-25 RX ADMIN — ALPRAZOLAM 1 MG: 0.5 TABLET ORAL at 05:34

## 2023-01-25 RX ADMIN — METOPROLOL SUCCINATE 25 MG: 25 TABLET, EXTENDED RELEASE ORAL at 15:16

## 2023-01-25 RX ADMIN — GABAPENTIN 600 MG: 300 CAPSULE ORAL at 21:15

## 2023-01-25 RX ADMIN — ACETAZOLAMIDE 250 MG: 250 TABLET ORAL at 15:17

## 2023-01-25 RX ADMIN — Medication 10 ML: at 21:16

## 2023-01-25 RX ADMIN — PANTOPRAZOLE SODIUM 40 MG: 40 TABLET, DELAYED RELEASE ORAL at 05:34

## 2023-01-25 RX ADMIN — IPRATROPIUM BROMIDE AND ALBUTEROL SULFATE 1 AMPULE: 2.5; .5 SOLUTION RESPIRATORY (INHALATION) at 21:53

## 2023-01-25 RX ADMIN — ENOXAPARIN SODIUM 120 MG: 150 INJECTION SUBCUTANEOUS at 15:17

## 2023-01-25 RX ADMIN — ALPRAZOLAM 1 MG: 0.5 TABLET ORAL at 21:15

## 2023-01-25 RX ADMIN — ATORVASTATIN CALCIUM 40 MG: 40 TABLET, FILM COATED ORAL at 21:15

## 2023-01-25 RX ADMIN — GUAIFENESIN AND DEXTROMETHORPHAN 5 ML: 100; 10 SYRUP ORAL at 21:15

## 2023-01-25 RX ADMIN — GUAIFENESIN AND DEXTROMETHORPHAN 5 ML: 100; 10 SYRUP ORAL at 15:16

## 2023-01-25 RX ADMIN — GABAPENTIN 600 MG: 300 CAPSULE ORAL at 15:16

## 2023-01-25 RX ADMIN — ISOSORBIDE MONONITRATE 30 MG: 30 TABLET, EXTENDED RELEASE ORAL at 15:17

## 2023-01-25 NOTE — PROGRESS NOTES
Pt wheezing, Rt notified. O2 sats 95% on 3 L nc. Pt requesting to eat breakfast, NPO for cards consult.

## 2023-01-25 NOTE — ED NOTES
Pt report given to Yanelis LONG, all questions answered during handoff, VSS for the floor, and Pt transported on 3A monitor to 3A via Pt transport after verifying with [facility] CMT that rhythm was present on tele monitor.      Dayanara Newman RN  01/24/23 [id number]

## 2023-01-25 NOTE — PROGRESS NOTES
Patient had MPI stress test 10/27/22. If repeat testing is needed please consult cardiology to determine plan of care.

## 2023-01-25 NOTE — PROGRESS NOTES
Progress Note - Dr. Nasir Washington - Internal Medicine  PCP: Alli Jasso MD [address] [facility] [phone]    Hospital Day: 0  Code Status: Full Code  Current Diet: Diet NPO        CC: follow up on medical issues    Subjective:   Lindy Bernstein is a 69 y.o. female. Pt seen and examined  Chart reviewed since last visit, labs and imaging below      Doing a little better  No chest pain this am  Cards consult pending - not sure any further benefit from stress test given recent negative result      Review of Systems: (1 system for EPF, 2-9 for detailed, 10+ for comprehensive)  Constitutional: Negative for chills and fever. HENT: Negative for dental problem, nosebleeds and rhinorrhea. Eyes: Negative for photophobia and visual disturbance. Respiratory: Negative for cough, chest tightness and positive for shortness of breath. Cardiovascular: Negative for chest pain and leg swelling. Gastrointestinal: Negative for diarrhea, nausea and vomiting. Endocrine: Negative for polydipsia and polyphagia. Genitourinary: Negative for frequency, hematuria and urgency. Musculoskeletal: Negative for back pain and myalgias. Skin: Negative for rash. Allergic/Immunologic: Negative for food allergies. Neurological: Negative for dizziness, seizures, syncope and facial asymmetry. Hematological: Negative for adenopathy. Psychiatric/Behavioral: Negative for dysphoric mood. The patient is not nervous/anxious. I have reviewed the patient's medical and social history in detail and updated the computerized patient record.   To recap: She  has a past medical history of Acute MI ([address] Acute pyelonephritis, Anxiety and depression, Arthritis, CAD (coronary artery disease), Cancer ([address] Cancer ([address] Cancer of skin of leg, Chronic obstructive pulmonary disease ([address] Claustrophobia, COPD (chronic obstructive pulmonary disease) ([address] ESBL (extended spectrum beta-lactamase) producing bacteria infection, Esophageal stricture, Gastroesophageal reflux disease without esophagitis, Hiatal hernia, Hiatal hernia, Hypercholesteremia, Hypertension, IBS (irritable bowel syndrome), Migraines, Movement disorder, Pneumonia, PONV (postoperative nausea and vomiting), Type II or unspecified type diabetes mellitus without mention of complication, not stated as uncontrolled, and Unspecified cerebral artery occlusion with cerebral infarction. . She  has a past surgical history that includes Cardiac surgery; Gallbladder surgery; Hysterectomy; Appendectomy; Cholecystectomy; Colonoscopy; Upper gastrointestinal endoscopy (4/20/12); Endoscopy, colon, diagnostic; Tear duct surgery; Upper gastrointestinal endoscopy (06/11/2018); Colonoscopy (06/11/2018); pr excision malignant lesion s/n/h/f/g 0.5 cm/< (N/A, 9/6/2018); pr split agrft t/a/l 1st 100 cm/&/1% bdy inft/chld (N/A, 9/6/2018); skin biopsy; eye surgery; bronchoscopy (N/A, 1/24/2020); bronchoscopy (N/A, 1/27/2020); Cataract removal (2013 or 2014); Esophagus dilation; and bronchoscopy (N/A, 11/23/2020). . She  reports that she quit smoking about 5 years ago. Her smoking use included cigarettes. She has a 12.25 pack-year smoking history. She has never used smokeless tobacco. She reports current alcohol use of about 1.0 standard drink per week. She reports that she does not use drugs. .        Active Hospital Problems    Diagnosis Date Noted    Hyperlipemia [E78.5] 05/23/2011     Priority: High    Chest pain [R07.9] 10/26/2022     Priority: Medium    [facility] mellitus[facility] type [facility] [E11.9] 12/03/2015    Essential hypertension [I10] 04/18/2012       Current Facility-Administered Medications: ipratropium-albuterol (DUONEB) nebulizer solution 1 ampule, 1 ampule, Inhalation, TID  aspirin chewable tablet 243 mg, 243 mg, Oral, Once  aspirin chewable tablet 81 mg, 81 mg, Oral, Daily  sodium chloride (OCEAN, BABY AYR) 0.65 % nasal spray 1 spray, 1 spray, Nasal, PRN  ipratropium-albuterol (DUONEB) nebulizer solution 1 ampule, 1 ampule, Inhalation, Q4H PRN  albuterol sulfate HFA (PROVENTIL;VENTOLIN;PROAIR) 108 (90 Base) MCG/ACT inhaler 2 puff, 2 puff, Inhalation, Q6H PRN  glipiZIDE (GLUCOTROL) tablet 5 mg, 5 mg, Oral, BID AC  ibuprofen (ADVIL;MOTRIN) tablet 400 mg, 400 mg, Oral, Q6H PRN  furosemide (LASIX) tablet 40 mg, 40 mg, Oral, BID  metoprolol succinate (TOPROL XL) extended release tablet 25 mg, 25 mg, Oral, Daily  isosorbide mononitrate (IMDUR) extended release tablet 30 mg, 30 mg, Oral, Daily  rivaroxaban (XARELTO) tablet 15 mg, 15 mg, Oral, Daily  lisinopril (PRINIVIL;ZESTRIL) tablet 2.5 mg, 2.5 mg, Oral, Daily  acetaZOLAMIDE (DIAMOX) tablet 250 mg, 250 mg, Oral, Daily  gabapentin (NEURONTIN) capsule 600 mg, 600 mg, Oral, TID  ALPRAZolam (XANAX) tablet 1 mg, 1 mg, Oral, TID PRN  pantoprazole (PROTONIX) tablet 40 mg, 40 mg, Oral, QAM AC  sodium chloride flush 0.9 % injection 5-40 mL, 5-40 mL, IntraVENous, 2 times per day  sodium chloride flush 0.9 % injection 10 mL, 10 mL, IntraVENous, PRN  0.9 % sodium chloride infusion, , IntraVENous, PRN  ondansetron (ZOFRAN-ODT) disintegrating tablet 4 mg, 4 mg, Oral, Q8H PRN **OR** ondansetron (ZOFRAN) injection 4 mg, 4 mg, IntraVENous, Q6H PRN  acetaminophen (TYLENOL) tablet 650 mg, 650 mg, Oral, Q6H PRN **OR** acetaminophen (TYLENOL) suppository 650 mg, 650 mg, Rectal, Q6H PRN  magnesium hydroxide (MILK OF MAGNESIA) 400 MG/5ML suspension 30 mL, 30 mL, Oral, Daily PRN  atorvastatin (LIPITOR) tablet 40 mg, 40 mg, Oral, Nightly  nitroGLYCERIN (NITROSTAT) SL tablet 0.4 mg, 0.4 mg, SubLINGual, Q5 Min PRN  hydrALAZINE (APRESOLINE) injection 10 mg, 10 mg, IntraVENous, Q6H PRN  0.9 % sodium chloride bolus, 500 mL, IntraVENous, PRN  potassium chloride (KLOR-CON M) extended release tablet 40 mEq, 40 mEq, Oral, PRN **OR** potassium bicarb-citric acid (EFFER-K) effervescent tablet 40 mEq, 40 mEq, Oral, PRN **OR** potassium chloride 10 mEq/100 mL IVPB (Peripheral Line), 10 mEq, IntraVENous, PRN  dextrose bolus 10% 125 mL, 125 mL, IntraVENous, PRN **OR** dextrose bolus 10% 250 mL, 250 mL, IntraVENous, PRN  glucagon (rDNA) injection 1 mg, 1 mg, SubCUTAneous, PRN  dextrose 10 % infusion, , IntraVENous, Continuous PRN         Objective:  BP (!) 125/39   Pulse 80   Temp 97.9 °F (36.6 °C) (Oral)   Resp 20   Ht 5' 5\" (1.651 m)   Wt 274 lb 3.2 oz (124.4 kg)   SpO2 95%   BMI 45.63 kg/m²      Patient Vitals for the past 24 hrs:   BP Temp Temp src Pulse Resp SpO2 Height Weight   01/25/23 0815 (!) 125/39 97.9 °F (36.6 °C) Oral 80 20 95 % -- --   01/25/23 0738 -- -- -- -- 20 91 % -- --   01/25/23 0430 (!) 131/54 97.8 °F (36.6 °C) Oral 52 18 93 % -- 274 lb 3.2 oz (124.4 kg)   01/25/23 0054 -- -- -- -- -- 92 % -- --   01/25/23 0045 (!) 124/49 97.5 °F (36.4 °C) Oral 80 22 92 % -- --   01/24/23 2131 (!) 110/56 97.6 °F (36.4 °C) Oral 52 22 92 % 5' 5\" (1.651 m) 273 lb (123.8 kg)   01/24/23 2000 84/73 -- -- 71 21 90 % -- --   01/24/23 1930 107/71 -- -- 81 23 -- -- --   01/24/23 1900 107/86 -- -- 65 18 92 % -- --   01/24/23 1830 (!) 101/53 -- -- 65 24 91 % -- --   01/24/23 1800 (!) 91/51 -- -- 65 13 94 % -- --   01/24/23 1736 (!) 108/57 -- -- 69 21 94 % -- --   01/24/23 1700 (!) 111/96 -- -- 69 22 91 % -- --   01/24/23 1639 (!) 125/59 -- -- 67 20 93 % -- --   01/24/23 1609 131/68 98.2 °F (36.8 °C) Oral 68 20 96 % 5' 6\" (1.676 m) 269 lb (122 kg)     Patient Vitals for the past 96 hrs (Last 3 readings):   Weight   01/25/23 0430 274 lb 3.2 oz (124.4 kg)   01/24/23 2131 273 lb (123.8 kg)   01/24/23 1609 269 lb (122 kg)           Intake/Output Summary (Last 24 hours) at 1/25/2023 0837  Last data filed at 1/25/2023 0530  Gross per 24 hour   Intake --   Output 250 ml   Net -250 ml         Physical Exam: (2-7 system for EPF/Detailed, ?8 for Comprehensive)  BP (!) 125/39   Pulse 80   Temp 97.9 °F (36.6 °C) (Oral)   Resp 20   Ht 5' 5\" (1.651 m)   Wt 274 lb 3.2 oz (124.4 kg)   SpO2 95%   BMI 45.63 kg/m²   Constitutional: vitals as above: alert, appears stated age and cooperative    Psychiatric: normal insight and judgment, oriented to person, place, time, and general circumstances    Head: Normocephalic, without obvious abnormality, atraumatic    Eyes:lids and lashes normal, conjunctivae and sclerae normal and pupils equal, round, reactive to light and accomodation    EMNT: external ears normal, nares midline    Neck: no carotid bruit, supple, symmetrical, trachea midline and thyroid not enlarged, symmetric, no tenderness/mass/nodules     Respiratory: clear to auscultation and percussion bilaterally with normal respiratory effort    Cardiovascular: normal rate, regular rhythm, normal S1 and S2 and no murmurs    Gastrointestinal: soft, non-tender, non-distended, normal bowel sounds, no masses or organomegaly    Extremities: no clubbing, no edema    Skin:No rashes or nodules noted. Neurologic:negative         Labs:  Lab Results   Component Value Date    WBC 11.0 01/25/2023    HGB 13.6 01/25/2023    HCT 43.0 01/25/2023     01/25/2023    CHOL 226 (H) 11/07/2019    TRIG 300 (H) 11/07/2019    HDL 26 (L) 08/03/2022    LDLDIRECT 85 08/03/2022    ALT 12 01/25/2023    AST 12 (L) 01/25/2023     01/25/2023    K 4.0 01/25/2023     01/25/2023    CREATININE 1.2 01/25/2023    BUN 21 (H) 01/25/2023    CO2 23 01/25/2023    TSH 2.34 06/06/2011    INR 0.99 08/01/2022    LABA1C 7.3 08/16/2022    LABMICR YES 10/26/2022     Lab Results   Component Value Date    CKTOTAL 24 (L) 06/07/2018    CKMB 0.29 08/09/2011    TROPONINI <0.01 01/25/2023       Recent Imaging Results are Reviewed:  CT CHEST PULMONARY EMBOLISM W CONTRAST    Result Date: 1/24/2023  EXAMINATION: CTA OF THE CHEST, 1/24/2023 5:13 pm TECHNIQUE: CTA of the chest was performed after the administration of intravenous contrast.  Multiplanar reformatted images are provided for review.   MIP images are provided for review. Automated exposure control, iterative reconstruction, and/or weight based adjustment of the mA/kV was utilized to reduce the radiation dose to as low as reasonably achievable. COMPARISON: None HISTORY: ORDERING SYSTEM PROVIDED HISTORY:  Chest pain, dyspnea TECHNOLOGIST PROVIDED HISTORY: Reason for Exam:  Chest pain, dyspnea Decision Support Exception - unselect if not a suspected or confirmed emergency medical condition->Emergency Medical Condition (MA) Reason for Exam:  Chest pain, dyspnea, Chest Pain (Pt brought in by [facility] from home due to CP that started 3 days ago, SOB started this morning, hx of COPD, presents with cough/wheezing/rhonchi in her lungs, PVCs/bigeminy. Pt states that chest pain goes to her back that is pressure, afebrile, A and O x 4). FINDINGS: Pulmonary Arteries: Pulmonary arteries are adequately opacified for evaluation. No evidence of intraluminal filling defect to suggest pulmonary embolism. Main pulmonary artery is normal in caliber. Mediastinum: No evidence of mediastinal lymphadenopathy. The heart and pericardium demonstrate no acute abnormality. There is no acute abnormality of the thoracic aorta. Lungs/pleura: The lungs are without acute process. No focal consolidation or pulmonary edema. No evidence of pleural effusion or pneumothorax. Upper Abdomen: Limited images of the upper abdomen are unremarkable. Soft Tissues/Bones: No acute bone or soft tissue abnormality. There are multiple chronic mild thoracolumbar compression deformities. No evidence of pulmonary embolism or acute pulmonary abnormality.        Lab Results   Component Value Date/Time    GLUCOSE 325 01/25/2023 05:16 AM     Lab Results   Component Value Date/Time    POCGLU 230 01/25/2023 07:24 AM     BP (!) 125/39   Pulse 80   Temp 97.9 °F (36.6 °C) (Oral)   Resp 20   Ht 5' 5\" (1.651 m)   Wt 274 lb 3.2 oz (124.4 kg)   SpO2 95%   BMI 45.63 kg/m²     Assessment and Plan:  Principal Problem:    Chest pain -Established problem. Improving. No chest pain this am. Trop neg  Plan: await cards eval. If no further workup recommended, could go home  Active Problems:    Hyperlipemia -Established problem. Stable. On statin  Plan: cont meds    Essential hypertension -Established problem. Stable. 125/39  Plan: Continue medications. Continue to check BP q shift. CBC and BMP ordered to monitor for disease progression, medication side effect. DM2 (diabetes mellitus, type 2) ([address] -Established problem. Stable. glu 325  Plan: Continue on CCC diet, home medications. CBC and BMP ordered to monitor sugars and for other medication complications. Fingerstick glucose ordered to check for alterations in levels throughout day. Sliding scale insulin ordered to cover fingerstick levels.        Case discussed with: cards  Tests ordered/reviewed: cbc, bmp, trop    Disp - home if ok with cards          (Please note that portions of this note were completed with a voice recognition program.  Efforts were made to edit the dictations but occasionally words are mis-transcribed.)        Mj Collins MD  1/25/2023

## 2023-01-25 NOTE — CARE COORDINATION
Discharge Planning Assessment:     Patient admitted as Observation/OPIB with an anticipated short hospitalization length of stay. Chart reviewed and it appears that patient has minimal needs for discharge at this time. Discussed with patients nurse and requested that case management be notified if discharge needs are identified. *Case management will continue to follow progress and update discharge plan as needed. [facility] ([phone]) [facility] [employer] [phone] Electronically signed by Major Ruvalcaba RN on 1/25/2023 at 8:11 AM      Please fax AVS to [facility] at [phone].     Electronically signed by Major Ruvalcaba RN on 1/25/2023 at 9:20 AM

## 2023-01-25 NOTE — RT PROTOCOL NOTE
RT Inhaler-Nebulizer Bronchodilator Protocol Note    There is a bronchodilator order in the chart from a provider indicating to follow the RT Bronchodilator Protocol and there is an Initiate RT Inhaler-Nebulizer Bronchodilator Protocol order as well (see protocol at bottom of note). CXR Findings:  No results found. The findings from the last RT Protocol Assessment were as follows:   History Pulmonary Disease: Chronic pulmonary disease  Respiratory Pattern: Mild dyspnea at rest, irregular pattern, or RR 21-25 bpm  Breath Sounds: Inspiratory and expiratory or bilateral wheezing and/or rhonchi  Cough: Weak, productive  Indication for Bronchodilator Therapy:    Bronchodilator Assessment Score: 14    Aerosolized bronchodilator medication orders have been revised according to the RT Inhaler-Nebulizer Bronchodilator Protocol below. Respiratory Therapist to perform RT Therapy Protocol Assessment initially then follow the protocol. Repeat RT Therapy Protocol Assessment PRN for score 0-3 or on second treatment, BID, and PRN for scores above 3. No Indications - adjust the frequency to every 6 hours PRN wheezing or bronchospasm, if no treatments needed after 48 hours then discontinue using Per Protocol order mode. If indication present, adjust the RT bronchodilator orders based on the Bronchodilator Assessment Score as indicated below. Use Inhaler orders unless patient has one or more of the following: on home nebulizer, not able to hold breath for 10 seconds, is not alert and oriented, cannot activate and use MDI correctly, or respiratory rate 25 breaths per minute or more, then use the equivalent nebulizer order(s) with same Frequency and PRN reasons based on the score. If a patient is on this medication at home then do not decrease Frequency below that used at home.     0-3 - enter or revise RT bronchodilator order(s) to equivalent RT Bronchodilator order with Frequency of every 4 hours PRN for wheezing or increased work of breathing using Per Protocol order mode. 4-6 - enter or revise RT Bronchodilator order(s) to two equivalent RT bronchodilator orders with one order with BID Frequency and one order with Frequency of every 4 hours PRN wheezing or increased work of breathing using Per Protocol order mode. 7-10 - enter or revise RT Bronchodilator order(s) to two equivalent RT bronchodilator orders with one order with TID Frequency and one order with Frequency of every 4 hours PRN wheezing or increased work of breathing using Per Protocol order mode. 11-13 - enter or revise RT Bronchodilator order(s) to one equivalent RT bronchodilator order with QID Frequency and an Albuterol order with Frequency of every 4 hours PRN wheezing or increased work of breathing using Per Protocol order mode. Greater than 13 - enter or revise RT Bronchodilator order(s) to one equivalent RT bronchodilator order with every 4 hours Frequency and an Albuterol order with Frequency of every 2 hours PRN wheezing or increased work of breathing using Per Protocol order mode. RT to enter RT Home Evaluation for COPD & MDI Assessment order using Per Protocol order mode.     Electronically signed by Ashutosh Ramirez RCP on 1/25/2023 at 2:28 PM

## 2023-01-25 NOTE — CONSULTS
Cardiovascular Consultation     Attending Physician: Lizzette Can MD    PATIENT: Maribel Casas  : 1945  MRN: [id number]    Reason for Consultation:   Chief Complaint   Patient presents with    Chest Pain     Pt brought in by [facility] from home due to CP that started 3 days ago, SOB started this morning, hx of COPD, presents with cough/wheezing/rhonchi in her lungs, PVCs/bigeminy. Pt states that chest pain goes to her back that is pressure, afebrile, A and O x 4. History of present illness:   Ms. Maribel Casas is a 69 y.o. female patient of Dr. Sherif Messina with a cardiac history of CAD, PAF, hypertension, and hyperlipidemia, presented yesterday with worsening substernal chest pain radiating into her back. She followed with Dr. Vicki Wan for many years following PCI in . States her initial presentation was left arm pain and 6 months later when requiring a second stent, she experienced this exact type of chest pain into her back. Last coronary angiogram was in 2019 with FFR negative evaluation of borderline proximal RCA stenosis. She has tolerated antianginals and states that she was without any concerns for the past year until 3 days prior to admission. Reports her COPD to have been stable requiring nocturnal oxygen for many years. She is in senior citizen apartment community where her neighbor watches over her and her granddaughter visits to assist as well. She had daytime pulse ox readings \"at 96%\". No sputum production or edema. Found her sublingual nitroglycerin to be . Found partial relief with nitroglycerin upon arrival in hospital.  States she would want to move forward with work-up as she is alone at night. Her  passed away 7 years ago.       Medical History:      Diagnosis Date    Acute MI [facility])     Acute pyelonephritis 2015    Anxiety and depression     Arthritis     CAD (coronary artery disease)     two heart stent one in 2000 and 2nd one 6 months later on 2000, had MI in 2000    Cancer 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)     \"skin on top of my head\"    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    Chronic obstructive pulmonary disease ([facility] [address] 01/13/2017    Claustrophobia     COPD (chronic obstructive pulmonary disease) (HCC)     ESBL (extended spectrum beta-lactamase) producing bacteria infection 08/14/2021    Esophageal stricture     Gastroesophageal reflux disease without esophagitis 03/24/2016    Hiatal hernia     Hiatal hernia     Hypercholesteremia     Hypertension     IBS (irritable bowel syndrome)     Migraines     Movement disorder arthritis    Pneumonia     PONV (postoperative nausea and vomiting)     Type II or unspecified type diabetes mellitus without mention of complication, not stated as uncontrolled     type ll does not take insulin at home    Unspecified cerebral artery occlusion with cerebral infarction     many TIA's       Surgical History:      Procedure Laterality Date    APPENDECTOMY      BRONCHOSCOPY N/A 1/24/2020    BRONCHOSCOPY ALVEOLAR LAVAGE performed by Darren Grissom MD at [facility] N/A 1/27/2020    BRONCHOSCOPY DIAGNOSTIC OR CELL [address] ONLY performed by Venice Case MD at [address] [facility] N/A 11/23/2020    BRONCHOSCOPY DIAGNOSTIC OR CELL [address] ONLY performed by Chandrika Dean MD at [facility]      1 stent 2000 and 1 stent 2001    CATARACT REMOVAL  2013 or 2014    bilateral cataracts removed    CHOLECYSTECTOMY      COLONOSCOPY      COLONOSCOPY  06/11/2018    ENDOSCOPY, COLON, DIAGNOSTIC      ESOPHAGEAL DILATATION      EYE SURGERY      bilateral cataracts    GALLBLADDER SURGERY      HYSTERECTOMY (CERVIX STATUS UNKNOWN)      UT EXCISION MALIGNANT LESION S/N/H/F/G 0.5 CM/< N/A 9/6/2018    EXCISION OF SCALP SQUAMOUS CELL CARCINOMA performed by Silva Sloan MD at [place] [address] SPLIT AGRFT T/A/L 1ST 100 CM/&/1% BDY INFT/CHLD N/A 2018    SPLIT THICKNESS SKIN GRAFT FOR COVERAGE SCALP, APPLICATION OF WOUND VAC DEVICE performed by Ivonne Giraldo MD at [address] [place] [address],[address] ENDOSCOPY  12    with biopsy of stomach,gastritis    UPPER GASTROINTESTINAL ENDOSCOPY  2018    w/esophagael dilation       Social History:  Social History     Socioeconomic History    Marital status:      Spouse name: Shahnaz Mail    Number of children: 3    Years of education: 12    Highest education level: Not on file   Occupational History    Not on file   Tobacco Use    Smoking status: Former     Packs/day: 0.25     Years: 49.00     Pack years: 12.25     Types: Cigarettes     Quit date: 2017     Years since quittin.6    Smokeless tobacco: Never    Tobacco comments:     only smoke when real stressed  Nicotine patch   Vaping Use    Vaping Use: Former    Substances: Always   Substance and Sexual Activity    Alcohol use: Yes     Alcohol/week: 1.0 standard drink     Types: 1 Glasses of wine per week     Comment: occ. every  6 mo    Drug use: No    Sexual activity: Not Currently     Partners: Male   Other Topics Concern    Not on file   Social History Narrative    Not on file     Social Determinants of Health     Financial Resource Strain: Not on file   Food Insecurity: Not on file   Transportation Needs: Not on file   Physical Activity: Not on file   Stress: Not on file   Social Connections: Not on file   Intimate Partner Violence: Not on file   Housing Stability: Not on file        Family History:  No evidence for sudden cardiac death or premature CAD.       Problem Relation Age of Onset    Heart Disease Mother     Cancer Mother     Cancer Father     Cancer Sister     Heart Disease Sister     Heart Disease Brother     High Blood Pressure Neg Hx     High Cholesterol Neg Hx        Medications:  dextrose bolus 10% 125 mL, PRN   Or  dextrose bolus 10% 250 mL, PRN  glucagon (rDNA) injection 1 mg, PRN  dextrose 10 % infusion, Continuous PRN  guaiFENesin-dextromethorphan (ROBITUSSIN DM) 100-10 MG/5ML syrup 5 mL, Q4H PRN  enoxaparin (LOVENOX) injection 120 mg, BID  ipratropium-albuterol (DUONEB) nebulizer solution 1 ampule, 4x daily  aspirin chewable tablet 243 mg, Once  aspirin chewable tablet 81 mg, Daily  sodium chloride (OCEAN, BABY AYR) 0.65 % nasal spray 1 spray, PRN  ipratropium-albuterol (DUONEB) nebulizer solution 1 ampule, Q4H PRN  albuterol sulfate HFA (PROVENTIL;VENTOLIN;PROAIR) 108 (90 Base) MCG/ACT inhaler 2 puff, Q6H PRN  glipiZIDE (GLUCOTROL) tablet 5 mg, BID AC  furosemide (LASIX) tablet 40 mg, BID  metoprolol succinate (TOPROL XL) extended release tablet 25 mg, Daily  isosorbide mononitrate (IMDUR) extended release tablet 30 mg, Daily  [Held by provider] rivaroxaban (XARELTO) tablet 15 mg, Daily  lisinopril (PRINIVIL;ZESTRIL) tablet 2.5 mg, Daily  acetaZOLAMIDE (DIAMOX) tablet 250 mg, Daily  gabapentin (NEURONTIN) capsule 600 mg, TID  ALPRAZolam (XANAX) tablet 1 mg, TID PRN  pantoprazole (PROTONIX) tablet 40 mg, QAM AC  sodium chloride flush 0.9 % injection 5-40 mL, 2 times per day  sodium chloride flush 0.9 % injection 10 mL, PRN  0.9 % sodium chloride infusion, PRN  ondansetron (ZOFRAN-ODT) disintegrating tablet 4 mg, Q8H PRN   Or  ondansetron (ZOFRAN) injection 4 mg, Q6H PRN  acetaminophen (TYLENOL) tablet 650 mg, Q6H PRN   Or  acetaminophen (TYLENOL) suppository 650 mg, Q6H PRN  magnesium hydroxide (MILK OF MAGNESIA) 400 MG/5ML suspension 30 mL, Daily PRN  atorvastatin (LIPITOR) tablet 40 mg, Nightly  nitroGLYCERIN (NITROSTAT) SL tablet 0.4 mg, Q5 Min PRN  hydrALAZINE (APRESOLINE) injection 10 mg, Q6H PRN  0.9 % sodium chloride bolus, PRN  potassium chloride (KLOR-CON M) extended release tablet 40 mEq, PRN   Or  potassium bicarb-citric acid (EFFER-K) effervescent tablet 40 mEq, PRN   Or  potassium chloride 10 mEq/100 mL IVPB (Peripheral Line),  PRN  dextrose 10 % infusion, Continuous PRN      Allergies:  Morphine, Codeine, Hydromorphone, Levaquin [levofloxacin in d5w], Vicodin [hydrocodone-acetaminophen], and Aspirin     Review of Systems:   [x]Full ROS obtained and negative except as mentioned in HPI    Physical Examination:    BP (!) 105/59   Pulse 70   Temp 97.4 °F (36.3 °C) (Oral)   Resp 22   Ht 5' 5\" (1.651 m)   Wt 274 lb 3.2 oz (124.4 kg)   SpO2 93%   BMI 45.63 kg/m²   Wt Readings from Last 3 Encounters:   01/25/23 274 lb 3.2 oz (124.4 kg)   01/13/23 269 lb (122 kg)   12/14/22 270 lb (122.5 kg)       GENERAL: Well developed, well nourished, no acute distress  NEUROLOGICAL: Alert and oriented x3  PSYCH: Normal mood and affect   SKIN: Warm and dry, without lesions  HEENT: Normocephalic, atraumatic, Sclera non-icteric, mucous membranes moist  NECK: supple, JVP normal, thyroid not enlarged   CAROTID: Normal upstroke, no bruits  CARDIAC: Normal PMI, regular rate and rhythm, normal S1S2, no murmur, rub  RESPIRATORY: Normal respiratory effort, clear to auscultation bilaterally  EXTREMITIES: No cyanosis, clubbing or edema, palpable pulses bilaterally   MUSCULOSKELETAL: No joint swelling or tenderness, no chest wall tenderness  GASTROINTESTINAL:  soft, non-tender, no bruit    Labs:  Lab Review   Lab Results   Component Value Date/Time     01/25/2023 05:16 AM    K 4.0 01/25/2023 05:16 AM     01/25/2023 05:16 AM    CO2 23 01/25/2023 05:16 AM    BUN 21 01/25/2023 05:16 AM    CREATININE 1.2 01/25/2023 05:16 AM    GLUCOSE 325 01/25/2023 05:16 AM    CALCIUM 9.3 01/25/2023 05:16 AM     Lab Results   Component Value Date/Time    CKTOTAL 24 06/07/2018 12:19 AM    CKMB 0.29 08/09/2011 04:45 PM    TROPONINI <0.01 01/25/2023 12:38 AM     Lab Results   Component Value Date/Time    WBC 11.0 01/25/2023 05:16 AM    HGB 13.6 01/25/2023 05:16 AM    HCT 43.0 01/25/2023 05:16 AM    MCV 84.4 01/25/2023 05:16 AM     01/25/2023 05:16 AM     Lab Results Component Value Date/Time    CHOL 180 01/25/2023 05:16 AM    TRIG 157 01/25/2023 05:16 AM    HDL 34 01/25/2023 05:16 AM    HDL 32 04/19/2012 05:42 AM    LDLDIRECT 85 08/03/2022 05:41 AM       Imaging:  I have reviewed the below testing personally:    Eric[facility] 8/2016 (Leah Grossman)  70% ostial D-jailed by stent  LAD stent patent  Mild CX  60-70 prox RCA  Nornal LV FXN  Similar to prior cath 3/4/15--CPT. Cardiac Cath with FFR 9/2019 Bryan Comment):  LM-Normal No disease   LAD-proximal stent 10% ISR, mid stent patent. Distal LAD 20% irregular stenosis. D2 70% ostial stent MCFP  Cx-Patent, mild luminal irregularities  OM1- ostial discrete 60% stenosis  RCA-Dominant, large sized vessel. Proximal 70% heavily calcified stenosis. RPDA- Patent, mild luminal irregularities     FFR of prox RCA 0.84    ECHO 8/2/22   Summary   Technically difficult exam due to body habitus. Normal global systolic function with an ejection fraction estimated at 70%. No regional wall motion abnormalities noted. Mild concentric left ventricular hypertrophy. Mild aortic stenosis with a peak velocity of 2.17m/s and a mean pressure   gradient of 12 mmHg. There is mild aortic insufficiency. Grade I diastolic dysfunction with normal LV filling pressures. Avg.   E/e'=18.2    SPECT 10/27/22  Summary    There is a mild fixed inferoapical defect with no associated wall motion  abnormality consistent with diaphragmatic attenuation artifact. No evidence of myocardium at risk for significant reversible ischemia. Normal LV size and systolic function. Overall findings represent a low risk scan.      [id number] Score for Atrial Fibrillation Stroke Risk   Risk   Factors  Component Value   C CHF No 0   H HTN Yes 1   A2 Age >= 76 Yes,  (78 y.o.) 2   D DM Yes 1   S2 Prior Stroke/TIA Yes 2   V Vascular Disease No 0   A Age 74-69 No,  (78 y.o.) 0   Sc Sex female 1    [id number]  Score  7   Score last updated 1/25/23 60:18 PM EST    Click here for a link to the UpToDate guideline \"Atrial Fibrillation: Anticoagulation therapy to prevent embolization    Disclaimer: Risk Score calculation is dependent on accuracy of patient problem list and past encounter diagnosis. CT Pulmonary 1/24/23  No evidence of pulmonary embolism or acute pulmonary abnormality. EKG 1/25/23  Sinus rhythm with frequent Premature ventricular complexes in a pattern of bigeminy  Left axis deviation  Septal infarct , age undetermined  Non-specific intra-ventricular conduction block    Troponin negative x3      Impression/Recommendations    Ms. Alok Lee is a 69 y.o. female patient of Dr. Marky Coombs with    Chest pain, concerning for Class IV angina  CAD: hx remote PCI to LAD and D2, Moderate nonobstructive disease by 2019 [facility] (pRCA 70% stenosis, FFR 0.84)  PAF, new dx 8/2022 (setting of recurrent CVA), on [address]  Hypertension  Hyperlipidemia, suboptimal on Rosuvastatin 10 mg (1/2023:   HDL 34 )  Diabetes mellitus, NID A1C 7.3 (8/2022)  COPD  Obesity   GERD  AKIL, cannot tolerate CPAP  Former tobacco use       Breakthrough angina on Toprol/Imdur; nonischemic SPECT six months ago. Increased PVC burden as well. Last Xarelto dose 0900 on 1/24. Lovenox in interim. Documented ASA allergy. Tolerated Plavix for remote PCI. Plan for [facility] tomorrow if respiratory status remains stable. Risks, benefits, goals, and alternatives of left heart catheterization with the potential for percutaneous coronary intervention discussed with patient; including stroke, heart attack, kidney damage, death, paralysis, disability, damage to nerves/arteries/veins. All questions answered and informed consent obtained. Further recommendations pending coronary angiography and clinical course. Thank you for allowing me to participate in the care of your patient. Please do not hesitate to call.      Jesse Martin DO, [facility]  Interventional Cardiology     o: [phone]  [facility] [address], [address] [place], [address]      NOTE:  This report was transcribed using voice recognition software. Every effort was made to ensure accuracy; however, inadvertent computerized transcription errors may be present.

## 2023-01-25 NOTE — PROGRESS NOTES
Received patient to floor via stretcher from ER at 2114. Awake, alert, and oriented x4. O2 @ 2L NC donned. Abrasion to L elbow noted. Trops negative. D Dimer negative. Flu/Covid negative. BLE edema noted. NPO since MN for stress 1/25/23. 12 lead EKG 1/25/23 am. Productive cough. Purewick in place. @ 0040, patient called out panicking stating she couldn't breathe. When arrived in room, patient did not have O2 on and she was coughing and having trouble catching her breath. Replaced O2 and received order for neb. Sats were in the low 90's. Ventricular bigeminy with PVC's on monitor. Placed in contact isolation for old ESBL in urine. Observation bed. No dc plans at present.

## 2023-01-25 NOTE — PROGRESS NOTES
[facility] [address]   Phone: [phone]    Physical Therapy    [x] Initial Evaluation            [] Daily Treatment Note         [x] Discharge Summary      Patient: Tiffany Nam   : 1945   MRN: [id number]   Date of Service:  2023  Admitting Diagnosis: Chest pain  Current Admission Summary: 69 y.o. female who presents to the ED for chest pain. Pressure-like. Radiates towards her back. Never had before. Ongoing 3 days. Associate with shortness of breath. No fevers or chills. Has been having cough. Does not feel like her COPD. No unilateral leg swelling. Her weight has been stable when she checks it daily. No abdominal pain or headache. No clear exacerbating remitting factors. .    Past Medical History:  has a past medical history of Acute MI ([address] Acute pyelonephritis, Anxiety and depression, Arthritis, CAD (coronary artery disease), Cancer ([facility] [address] Cancer ([address] Cancer of skin of leg, Chronic obstructive pulmonary disease ([address] Claustrophobia, COPD (chronic obstructive pulmonary disease) ([address] ESBL (extended spectrum beta-lactamase) producing bacteria infection, Esophageal stricture, Gastroesophageal reflux disease without esophagitis, Hiatal hernia, Hiatal hernia, Hypercholesteremia, Hypertension, IBS (irritable bowel syndrome), Migraines, Movement disorder, Pneumonia, PONV (postoperative nausea and vomiting), Type II or unspecified type diabetes mellitus without mention of complication, not stated as uncontrolled, and Unspecified cerebral artery occlusion with cerebral infarction. Past Surgical History:  has a past surgical history that includes Cardiac surgery; Gallbladder surgery; Hysterectomy; Appendectomy; Cholecystectomy; Colonoscopy; Upper gastrointestinal endoscopy (12); Endoscopy, colon, diagnostic; Tear duct surgery; Upper gastrointestinal endoscopy (2018);  Colonoscopy (2018); pr excision malignant lesion s/n/h/f/g 0.5 cm/< (N/A, 9/6/2018); pr split agrft t/a/l 1st 100 cm/&/1% bdy inft/chld (N/A, 9/6/2018); skin biopsy; eye surgery; bronchoscopy (N/A, 1/24/2020); bronchoscopy (N/A, 1/27/2020); Cataract removal (2013 or 2014); Esophagus dilation; and bronchoscopy (N/A, 11/23/2020). Discharge Recommendations: Saurabh Styles scored a 19/24 on the AM-PAC short mobility form. At this time, no further PT is recommended upon discharge due to pt being near baseline functional mobility. Recommend patient returns to prior setting with prior services. DME Required For Discharge: no DME required at discharge  Precautions/Restrictions: high fall risk, contact precautions  Weight Bearing Restrictions: no restrictions  [] Right Upper Extremity  [] Left Upper Extremity [] Right Lower Extremity  [] Left Lower Extremity     Required Braces/Orthotics: no braces required   [] Right  [] Left  Positional Restrictions:no positional restrictions    Pre-Admission Information   Lives With: alone                     Type of Home: apartment, \"cottage\"- senior living  Home Layout: one level  Home Access: ramped entry  Bathroom Layout: tub/shower unit  [employer]: grab bars in shower, grab bars around toilet  Toilet Height: elevated height  Home Equipment: rolling walker, single point cane, electric scooter, lift chair, hospital bed, oxygen, hospital bed  Transfer Assistance: modified independent with use of rollator  Ambulation Assistance:modified independent with use of rollator  ADL Assistance:  assist with all- primarily dressing and bathing assist from Dtr, pt reports occasional help with toileting/geoffrey care  IADL Assistance:  aid 5 days a week for cooking/cleaning for 4 hours/day; RN 1day/week; pt is IND with med management   Active :        [] Yes                 [x] No  Current Employment: retired.   Occupation: factory   Recent Falls: pt denies hx of falls; CVA 6 months ago  w/ L side deficits      Night time O2 at home- 2L Pt ambulates with rollator at all times within her home. Daughter and neighbors come and check in on her  Examination   Vision:   Vision Gross Assessment: Impaired and Vision Corrective Device: wears glasses for reading  Hearing:   WFL  Posture:   Fair, increased thoracic kyphosis  Sensation:   denies numbness and tingling  ROM:   (B) LE AROM WFL  Strength:   (B) LE strength grossly WFL  Therapist Clinical Decision Making (Complexity): medium complexity  Clinical Presentation: evolving      Subjective  General: Pt asleep sidelying in bed upon arrival on 3L supplemental oxygen. Pt reports she is in pain and feeling weak from being NPO. Pt agreeable to participating in PT/OT session, but expresses that she does not want home-health PT. Pt insists that she has strategies in place for safety and knows what exercises she needs to do to get stronger; however, throughout session, pt had questionable safety awareness, especially with transfers. Pt uses no supplemental oxygen during the day, and pt's oxygen saturation remained >90% on no supplemental oxygen throughout the session. Pain: 8/10. Location: chest and throat  Pain Interventions: RN notified       Functional Mobility  Bed Mobility  Supine to Sit: modified independent  Sit to Supine: modified independent  Scooting: modified independent  Comments: HOB elevated, use of bart BR. Pt has a hospital bed at home. Transfers  Sit to stand transfer: stand by assistance, rollator  Stand to sit transfer: stand by assistance, rollator  Toilet transfer: stand by assistance, rollator  Comments: Pt utilized rollator to perform transfers throughout session. Pt initially attempted sit to stand from EOB pulling onto unlocked rollator. Pt stopped and cued to lock brakes and educated about proper hand placement. However, pt continued to pull herself up by rollator crossbar and required cues for properly locking the breaks during transfers.  During toilet transfer, pt given option to put [facility] over toilet, but refused. Pt required max cues to utilize grab bars instead of pulling onto rollator. Pt utilizes momentum strategies when performing transfers. Ambulation  Surface:level surface  Assistive Device: rollator (4FKU)  Assistance: supervision  Distance: 10 ft + 10 ft  Gait Mechanics: wide CAMMIE, decreased stride lengths, decreased cory, increased medial to lateral sway, forward trunk lean  Comments:  Pt had heavy reliance on rollator. Pt had forward trunk lean, and would put a significant amount of her weight through her forearms onto the rollator. Pt took one standing rest break by leaning onto unlocked rollator on her forearms. Pt required cues for safety for locking brakes on rollator. Stair Mobility  Stair mobility not completed on this date. Comments:  Wheelchair Mobility:  No w/c mobility completed on this date. Comments:  Balance  Static Sitting Balance: fair (+): maintains balance at SBA/supervision without use of UE support  Dynamic Sitting Balance: fair (+): maintains balance at SBA/supervision without use of UE support  Static Standing Balance: fair (-): maintains balance at SBA with use of UE support  Dynamic Standing Balance: fair (-): maintains balance at SBA with use of UE support  Comments: Pt sat at toilet ~5 minutes for attempted toileting with SBA for balance. Other Therapeutic Interventions  Oxygen monitoring - Pt remained on no supplement oxygen during therapy session. Pt's oxygen saturation remained >90% during all functional mobility tasks. Functional Outcomes  AM-PAC Inpatient Mobility Raw Score : 19              Cognition  Comments: Pt is aware that she needs assistance from her aides, RN, and granddaughter. However, pt is uninterested in learning new techniques from therapists regarding mobility. Pt reports she is very safe at home and has strategies in place, but demonstrates unsafe transfer techniques and use of rollator.  However, pt is resistant against therapists' advice. Orientation:    alert and oriented x 4  Command Following:   [facility]    Education  Barriers To Learning: none  Patient Education: patient educated on plan of care, precautions, general safety, functional mobility training, proper use of assistive device/equipment, energy conservation, disease specific education, transfer training, discharge recommendations  Learning Assessment:  patient verbalizes and demonstrates understanding    Assessment  Activity Tolerance: Limited by endurance. Impairments Requiring Therapeutic Intervention: decreased endurance  Prognosis: fair  Clinical Assessment: Pt presents with limited endurance and poor transfer techniques; however, pt is at baseline with all functional mobility. Pt has assistance at home and the required DME. Therefore, pt is not appropriate for skilled therapy currently, and can be discharged from therapy. Safety Interventions: patient left in bed, bed alarm in place, call light within reach, gait belt, patient at risk for falls, and nurse notified    Plan  Frequency: Eval with same day discharge. No follow up required. Current Treatment Recommendations: not applicable, evaluation completed with same day discharge. Goals  Patient Goals: not stated   Short Term Goals:  Time Frame: N/A--same day eval and discharge    Therapy Session Time      Individual Group Co-treatment   Time In     1326   Time Out     1404   Minutes     38     Timed Code Treatment Minutes:   0  Total Treatment Minutes:  38  Time spent with pt shared with OT as pt is under observation status. As such, pt is being billed 1 unit for evaluation. Neyda Ortiz, SPT    PT providing direct supervision during session and assisting in making skilled judgements throughout session.   [address], [place] [address]    Electronically Signed By: Jojo Valdez PT

## 2023-01-25 NOTE — PROGRESS NOTES
[facility] [address]   Phone: [phone]    Occupational Therapy    [x] Initial Evaluation            [] Daily Treatment Note         [x] Discharge Summary      Patient: Archana Anglin   : 1945   MRN: [id number]   Date of Service:  2023    Admitting Diagnosis:  Chest pain  Current Admission Summary: 69 y.o. female who presents to the ED for chest pain. Pressure-like. Radiates towards her back. Never had before. Ongoing 3 days. Associate with shortness of breath. No fevers or chills. Has been having cough. Does not feel like her COPD. No unilateral leg swelling. Her weight has been stable when she checks it daily. No abdominal pain or headache. No clear exacerbating remitting factors. .      Past Medical History:  has a past medical history of Acute MI ([address] Acute pyelonephritis, Anxiety and depression, Arthritis, CAD (coronary artery disease), Cancer ([address] Cancer ([address] Cancer of skin of leg, Chronic obstructive pulmonary disease ([address] Claustrophobia, COPD (chronic obstructive pulmonary disease) ([address] ESBL (extended spectrum beta-lactamase) producing bacteria infection, Esophageal stricture, Gastroesophageal reflux disease without esophagitis, Hiatal hernia, Hiatal hernia, Hypercholesteremia, Hypertension, IBS (irritable bowel syndrome), Migraines, Movement disorder, Pneumonia, PONV (postoperative nausea and vomiting), Type II or unspecified type diabetes mellitus without mention of complication, not stated as uncontrolled, and Unspecified cerebral artery occlusion with cerebral infarction. Past Surgical History:  has a past surgical history that includes Cardiac surgery; Gallbladder surgery; Hysterectomy; Appendectomy; Cholecystectomy; Colonoscopy; Upper gastrointestinal endoscopy (12); Endoscopy, colon, diagnostic; Tear duct surgery; Upper gastrointestinal endoscopy (2018);  Colonoscopy (2018); pr excision malignant lesion s/n/h/f/g 0.5 cm/< (N/A, 9/6/2018); pr split agrft t/a/l 1st 100 cm/&/1% bdy inft/chld (N/A, 9/6/2018); skin biopsy; eye surgery; bronchoscopy (N/A, 1/24/2020); bronchoscopy (N/A, 1/27/2020); Cataract removal (2013 or 2014); Esophagus dilation; and bronchoscopy (N/A, 11/23/2020). Discharge Recommendations: Francis Gibson scored a 17/24 on the AM-PAC ADL Inpatient form. At this time, no further OT is recommended upon discharge due to pt close to baseline, has good support at home, and is declining further therapy at this time. Recommend patient returns to prior setting with prior services. DME Required For Discharge: patient has all required DME for discharge    Precautions/Restrictions: high fall risk, contact precautions  Weight Bearing Restrictions: no restrictions  [] Right Upper Extremity  [] Left Upper Extremity [] Right Lower Extremity  [] Left Lower Extremity     Required Braces/Orthotics: no braces required   [] Right  [] Left  Positional Restrictions:no positional restrictions    Pre-Admission Information   Lives With: alone                     Type of Home: apartment, \"cottage\"- senior living  Home Layout: one level  Home Access: ramped entry  Bathroom Layout: tub/shower unit  [employer]: grab bars in shower, grab bars around toilet  Toilet Height: elevated height  Home Equipment: rolling walker, single point cane, electric scooter, lift chair, hospital bed, oxygen, hospital bed  Transfer Assistance: modified independent with use of rollator  Ambulation Assistance:modified independent with use of rollator  ADL Assistance:  assist with all- primarily dressing and bathing assist from Dtr, pt reports occasional help with toileting/geoffrey care  IADL Assistance:  aid 5 days a week for cooking/cleaning for 4 hours/day; pt is IND with med management   Active :        [] Yes                 [x] No  Hand Dominance: [] Left                 [] Right  Current Employment: retired.   Occupation: factory   Hobbies:   Recent Falls: pt denies hx of falls; CVA 6 months ago  w/ L side deficits      Night time O2 at home- 2L      Pt ambulates with rollator at all times within her home. Daughter and neighbors come and check in on her    Examination   Vision:   Vision Corrective Device: wears glasses for reading  Hearing:   [facility]  Posture: Forward flexed  Sensation:   reports numbness and tingling in (B) LE- baseline  Tone:   Normotonic  ROM:   (B) UE AROM WFL  Strength:   (B) UE strength grossly WFL    Therapist Clinical Decision Making (Complexity): low complexity  Clinical Presentation: stable      Subjective  General: patient sidelying in bed on arrival, agreeable to OT/PT evaluation. Pt on 2L via NC on arrival, doffed for mobility- pt reports she only wears O2 when she's laying down- O2 stats 95-96% with activity  Pain: 8/10. Location: throat/chest  Pain Interventions: RN notified        Activities of Daily Living  Basic Activities of Daily Living  Grooming: supervision Comment: standing at sink to comb hair and use mouth wash  Upper Extremity Dressing: setup assistance Comment: gown for line management  Lower Extremity Dressing: moderate assistance Comment: A to thread/unthread brief, don over hips with supervision  Toileting: supervision Comment: brief saturated; completes hygiene seated, sup for clothing management. Instrumental Activities of Daily Living  No IADL completed on this date.     Functional Mobility  Bed Mobility  Supine to Sit: modified independent  Sit to Supine: modified independent  Scooting: modified independent  Comments: HOB elevated, pt reports she sleeps in hospital bed at home  Transfers  Sit to stand transfer:stand by assistance  Stand to sit transfer: stand by assistance  Toilet transfer: stand by assistance  Comments: EOB and toilet to rollator with SBA, pt with decreased safety awareness with transfers and keeping breaks unlocked pulling up on walker-pt not engaging with transfer education attempts. Functional Mobility:  Functional Mobility: . supervision  Functional Mobility Activity: to/from bathroom, 12+12  Functional Mobility Device Use: rollator (3ALN)  Functional Mobility Comment: pt leaning on 4WW periodically with ambulation, requires cues for safety awareness, no LOB noted. Other Therapeutic Interventions    Functional Outcomes  AM-PAC Inpatient Daily Activity Raw Score: 17    Cognition  Overall Cognitive Status: WFL  Safety Judgement: decreased awareness of need for safety  Insights: decreased awareness of deficits  Orientation:    alert and oriented x 4  Command Following:   [facility]  Barriers To Learning: none  Patient Education: patient educated on OT role and benefits, plan of care, transfer training, discharge recommendations  Learning Assessment:  patient verbalizes understanding, would benefit from continued reinforcement    Assessment  Activity Tolerance: fair overall, demo mild SOB noted post ambulation  Impairments Requiring Therapeutic Intervention: none - eval with same day discharge  Prognosis: fair  Clinical Assessment: patient presents close to baseline. Pt has assistance from aids and granddaughter for ADLs/IADLs and ambulates short distances around the house with 4WW. Pt reports she has needed support at home and is not interested in further therapy outside of the hospital. Pt feels at her baseline and comfortable returning home. Will discharge from OT services at this time  Safety Interventions: patient left in bed, bed alarm in place, call light within reach, gait belt, and nurse notified    Plan  Frequency: Eval with same day discharge. No follow up required. Current Treatment Recommendations: not applicable, evaluation completed with same day discharge. Goals  Patient Goals: to return home   Short Term Goals:  Time Frame:   Patient eval with same day discharge. No goals set as patient is at baseline status.     Therapy Session Time     Individual Group Co-treatment   Time In    1285   Time Out    1404   Minutes    38        Timed Code Treatment Minutes:   23  Total Treatment Minutes:  38       Electronically Signed By: MIRI Murguia/CHRISTOPHER [id number]

## 2023-01-26 PROBLEM — I20.0 UNSTABLE ANGINA (HCC): Status: ACTIVE | Noted: 2023-01-26

## 2023-01-26 LAB
ANION GAP SERPL CALCULATED.3IONS-SCNC: 10 MMOL/L (ref 3–16)
BASOPHILS ABSOLUTE: 0.2 K/UL (ref 0–0.2)
BASOPHILS RELATIVE PERCENT: 1.4 %
BUN BLDV-MCNC: 26 MG/DL (ref 7–20)
CALCIUM SERPL-MCNC: 9.5 MG/DL (ref 8.3–10.6)
CHLORIDE BLD-SCNC: 102 MMOL/L (ref 99–110)
CO2: 25 MMOL/L (ref 21–32)
CREAT SERPL-MCNC: 1 MG/DL (ref 0.6–1.2)
EOSINOPHILS ABSOLUTE: 0.1 K/UL (ref 0–0.6)
EOSINOPHILS RELATIVE PERCENT: 0.4 %
GFR SERPL CREATININE-BSD FRML MDRD: 58 ML/MIN/{1.73_M2}
GLUCOSE BLD-MCNC: 104 MG/DL (ref 70–99)
GLUCOSE BLD-MCNC: 141 MG/DL (ref 70–99)
GLUCOSE BLD-MCNC: 162 MG/DL (ref 70–99)
GLUCOSE BLD-MCNC: 166 MG/DL (ref 70–99)
GLUCOSE BLD-MCNC: 183 MG/DL (ref 70–99)
HCT VFR BLD CALC: 39.7 % (ref 36–48)
HEMOGLOBIN: 12.9 G/DL (ref 12–16)
LEFT VENTRICULAR EJECTION FRACTION MODE: NORMAL
LV EF: 60 %
LYMPHOCYTES ABSOLUTE: 3 K/UL (ref 1–5.1)
LYMPHOCYTES RELATIVE PERCENT: 21.9 %
MCH RBC QN AUTO: 27.3 PG (ref 26–34)
MCHC RBC AUTO-ENTMCNC: 32.6 G/DL (ref 31–36)
MCV RBC AUTO: 83.8 FL (ref 80–100)
MONOCYTES ABSOLUTE: 1 K/UL (ref 0–1.3)
MONOCYTES RELATIVE PERCENT: 7.3 %
NEUTROPHILS ABSOLUTE: 9.5 K/UL (ref 1.7–7.7)
NEUTROPHILS RELATIVE PERCENT: 69 %
PDW BLD-RTO: 15.9 % (ref 12.4–15.4)
PERFORMED ON: ABNORMAL
PLATELET # BLD: 190 K/UL (ref 135–450)
PMV BLD AUTO: 9 FL (ref 5–10.5)
POTASSIUM SERPL-SCNC: 4.2 MMOL/L (ref 3.5–5.1)
RBC # BLD: 4.73 M/UL (ref 4–5.2)
SODIUM BLD-SCNC: 137 MMOL/L (ref 136–145)
WBC # BLD: 13.8 K/UL (ref 4–11)

## 2023-01-26 PROCEDURE — B2151ZZ FLUOROSCOPY OF LEFT HEART USING LOW OSMOLAR CONTRAST: ICD-10-PCS | Performed by: INTERNAL MEDICINE

## 2023-01-26 PROCEDURE — 2500000003 HC RX 250 WO HCPCS

## 2023-01-26 PROCEDURE — 6360000002 HC RX W HCPCS

## 2023-01-26 PROCEDURE — 6370000000 HC RX 637 (ALT 250 FOR IP): Performed by: INTERNAL MEDICINE

## 2023-01-26 PROCEDURE — 36415 COLL VENOUS BLD VENIPUNCTURE: CPT

## 2023-01-26 PROCEDURE — 2580000003 HC RX 258

## 2023-01-26 PROCEDURE — 85025 COMPLETE CBC W/AUTO DIFF WBC: CPT

## 2023-01-26 PROCEDURE — 94669 MECHANICAL CHEST WALL OSCILL: CPT

## 2023-01-26 PROCEDURE — 99153 MOD SED SAME PHYS/QHP EA: CPT

## 2023-01-26 PROCEDURE — 1200000000 HC SEMI PRIVATE

## 2023-01-26 PROCEDURE — 94761 N-INVAS EAR/PLS OXIMETRY MLT: CPT

## 2023-01-26 PROCEDURE — 99223 1ST HOSP IP/OBS HIGH 75: CPT | Performed by: INTERNAL MEDICINE

## 2023-01-26 PROCEDURE — 93458 L HRT ARTERY/VENTRICLE ANGIO: CPT

## 2023-01-26 PROCEDURE — 2700000000 HC OXYGEN THERAPY PER DAY

## 2023-01-26 PROCEDURE — 2709999900 HC NON-CHARGEABLE SUPPLY

## 2023-01-26 PROCEDURE — C1769 GUIDE WIRE: HCPCS

## 2023-01-26 PROCEDURE — 80048 BASIC METABOLIC PNL TOTAL CA: CPT

## 2023-01-26 PROCEDURE — 99152 MOD SED SAME PHYS/QHP 5/>YRS: CPT | Performed by: INTERNAL MEDICINE

## 2023-01-26 PROCEDURE — 94640 AIRWAY INHALATION TREATMENT: CPT

## 2023-01-26 PROCEDURE — C1894 INTRO/SHEATH, NON-LASER: HCPCS

## 2023-01-26 PROCEDURE — 2580000003 HC RX 258: Performed by: INTERNAL MEDICINE

## 2023-01-26 PROCEDURE — 99152 MOD SED SAME PHYS/QHP 5/>YRS: CPT

## 2023-01-26 PROCEDURE — 6360000004 HC RX CONTRAST MEDICATION: Performed by: INTERNAL MEDICINE

## 2023-01-26 PROCEDURE — 6360000002 HC RX W HCPCS: Performed by: INTERNAL MEDICINE

## 2023-01-26 PROCEDURE — 4A023N7 MEASUREMENT OF CARDIAC SAMPLING AND PRESSURE, LEFT HEART, PERCUTANEOUS APPROACH: ICD-10-PCS | Performed by: INTERNAL MEDICINE

## 2023-01-26 PROCEDURE — 93458 L HRT ARTERY/VENTRICLE ANGIO: CPT | Performed by: INTERNAL MEDICINE

## 2023-01-26 RX ORDER — SODIUM CHLORIDE 9 MG/ML
INJECTION, SOLUTION INTRAVENOUS PRN
Status: DISCONTINUED | OUTPATIENT
Start: 2023-01-26 | End: 2023-01-31 | Stop reason: HOSPADM

## 2023-01-26 RX ORDER — SODIUM CHLORIDE 0.9 % (FLUSH) 0.9 %
5-40 SYRINGE (ML) INJECTION PRN
Status: DISCONTINUED | OUTPATIENT
Start: 2023-01-26 | End: 2023-01-31 | Stop reason: HOSPADM

## 2023-01-26 RX ORDER — ENOXAPARIN SODIUM 150 MG/ML
1 INJECTION SUBCUTANEOUS 2 TIMES DAILY
Status: DISCONTINUED | OUTPATIENT
Start: 2023-01-26 | End: 2023-01-31

## 2023-01-26 RX ORDER — SODIUM CHLORIDE 0.9 % (FLUSH) 0.9 %
5-40 SYRINGE (ML) INJECTION EVERY 12 HOURS SCHEDULED
Status: DISCONTINUED | OUTPATIENT
Start: 2023-01-26 | End: 2023-01-31 | Stop reason: HOSPADM

## 2023-01-26 RX ORDER — CLOPIDOGREL BISULFATE 75 MG/1
75 TABLET ORAL DAILY
Status: DISCONTINUED | OUTPATIENT
Start: 2023-01-26 | End: 2023-01-31 | Stop reason: HOSPADM

## 2023-01-26 RX ORDER — FUROSEMIDE 10 MG/ML
40 INJECTION INTRAMUSCULAR; INTRAVENOUS 2 TIMES DAILY
Status: DISCONTINUED | OUTPATIENT
Start: 2023-01-26 | End: 2023-01-27

## 2023-01-26 RX ADMIN — CLOPIDOGREL BISULFATE 75 MG: 75 TABLET ORAL at 17:18

## 2023-01-26 RX ADMIN — ASPIRIN 81 MG 81 MG: 81 TABLET ORAL at 08:47

## 2023-01-26 RX ADMIN — ACETAZOLAMIDE 250 MG: 250 TABLET ORAL at 08:47

## 2023-01-26 RX ADMIN — GABAPENTIN 600 MG: 300 CAPSULE ORAL at 21:05

## 2023-01-26 RX ADMIN — ALPRAZOLAM 1 MG: 0.5 TABLET ORAL at 08:50

## 2023-01-26 RX ADMIN — ALPRAZOLAM 1 MG: 0.5 TABLET ORAL at 21:05

## 2023-01-26 RX ADMIN — FUROSEMIDE 40 MG: 10 INJECTION, SOLUTION INTRAMUSCULAR; INTRAVENOUS at 21:06

## 2023-01-26 RX ADMIN — GUAIFENESIN AND DEXTROMETHORPHAN 5 ML: 100; 10 SYRUP ORAL at 21:20

## 2023-01-26 RX ADMIN — PANTOPRAZOLE SODIUM 40 MG: 40 TABLET, DELAYED RELEASE ORAL at 05:26

## 2023-01-26 RX ADMIN — LISINOPRIL 2.5 MG: 5 TABLET ORAL at 08:48

## 2023-01-26 RX ADMIN — IPRATROPIUM BROMIDE AND ALBUTEROL SULFATE 1 AMPULE: 2.5; .5 SOLUTION RESPIRATORY (INHALATION) at 08:54

## 2023-01-26 RX ADMIN — METOPROLOL SUCCINATE 25 MG: 25 TABLET, EXTENDED RELEASE ORAL at 08:46

## 2023-01-26 RX ADMIN — GABAPENTIN 600 MG: 300 CAPSULE ORAL at 08:47

## 2023-01-26 RX ADMIN — IPRATROPIUM BROMIDE AND ALBUTEROL SULFATE 1 AMPULE: 2.5; .5 SOLUTION RESPIRATORY (INHALATION) at 12:41

## 2023-01-26 RX ADMIN — Medication 10 ML: at 21:05

## 2023-01-26 RX ADMIN — GLIPIZIDE 5 MG: 5 TABLET ORAL at 17:18

## 2023-01-26 RX ADMIN — ISOSORBIDE MONONITRATE 30 MG: 30 TABLET, EXTENDED RELEASE ORAL at 08:48

## 2023-01-26 RX ADMIN — Medication 10 ML: at 08:52

## 2023-01-26 RX ADMIN — ENOXAPARIN SODIUM 120 MG: 150 INJECTION SUBCUTANEOUS at 19:31

## 2023-01-26 RX ADMIN — ATORVASTATIN CALCIUM 40 MG: 40 TABLET, FILM COATED ORAL at 21:05

## 2023-01-26 RX ADMIN — IOPAMIDOL 104 ML: 755 INJECTION, SOLUTION INTRAVENOUS at 14:52

## 2023-01-26 ASSESSMENT — PAIN SCALES - GENERAL
PAINLEVEL_OUTOF10: 0

## 2023-01-26 NOTE — PLAN OF CARE
Problem: Discharge Planning  Goal: Discharge to home or other facility with appropriate resources  Outcome: Progressing     Problem: Pain  Goal: Verbalizes/displays adequate comfort level or baseline comfort level  Outcome: Progressing     Problem: Safety - Adult  Goal: Free from fall injury  Outcome: Progressing     Problem: ABCDS Injury Assessment  Goal: Absence of physical injury  Outcome: Progressing     Problem: Skin/Tissue Integrity  Goal: Absence of new skin breakdown  Description: 1. Monitor for areas of redness and/or skin breakdown  2. Assess vascular access sites hourly  3. Every 4-6 hours minimum:  Change oxygen saturation probe site  4. Every 4-6 hours:  If on nasal continuous positive airway pressure, respiratory therapy assess nares and determine need for appliance change or resting period.   Outcome: Progressing     Problem: Cardiovascular - Adult  Goal: Maintains optimal cardiac output and hemodynamic stability  Outcome: Progressing  Goal: Absence of cardiac dysrhythmias or at baseline  Outcome: Progressing

## 2023-01-26 NOTE — PLAN OF CARE
Problem: Pain  Goal: Verbalizes/displays adequate comfort level or baseline comfort level  1/26/2023 1111 by Robyn Garcia RN  Outcome: Progressing   Pt can rate pain on a 0-10 scale. Pt pain will remain at a level that is tolerable to pt. PRN pain medication as needed. Problem: Safety - Adult  Goal: Free from fall injury  1/26/2023 1111 by Robyn Garcia RN  Outcome: Progressing   Pt will remain free from falls. Fall precautions in place and alarm engaged. Bedside table and call light within reach. Pt aware to use call light for assistance ambulating. Problem: Skin/Tissue Integrity  Goal: Absence of new skin breakdown  Description: 1. Monitor for areas of redness and/or skin breakdown  2. Assess vascular access sites hourly  3. Every 4-6 hours minimum:  Change oxygen saturation probe site  4. Every 4-6 hours:  If on nasal continuous positive airway pressure, respiratory therapy assess nares and determine need for appliance change or resting period. 1/26/2023 1111 by Robyn Garcia RN  Outcome: Progressing   Pt will remain free from skin breakdown. Pt turned Q2hrs, skin kept clean and dry, and skin assessed per shift or as needed.

## 2023-01-26 NOTE — PROGRESS NOTES
Pt awake in bed. VSS, assessment complete and medications given. Pt denies any needs. Call light in reach and bed alarm engaged. Pt refusing to turn at this time.

## 2023-01-26 NOTE — OP NOTE
Cardiac Catheterization     Procedure: LHC, LVG   Complications: None  Medications: Procedural sedation with minimal conscious sedation (1 Versed/50 Fentanyl)  An independent trained observer pushed medications at my direction. We monitored the patient's level of consciousness and vital signs/physiologic status throughout the procedure duration (see start and stop times in log). Estimated Blood Loss: Less than 20 mL  Specimens: were not obtained  Access: 6 Fr Right Radial   Closure: TR band   Contrast: 104 cc  Start time: 1347  Stop time: 1426  Fluoro time: 3.6  Findings:     Left Heart Cath  LM-30% proximal, 10-20% distal disease   LAD-proximal mid stents patent with <20% ISR; 80% ostial jailed second diagonal;  hazy 95% distal LAD   LCx-high first marginal with 80% short, ostial stenosis; mild plaquing in circumflex   RCA-dominant, large caliber with 80% calcified, proximal stenosis; 50% distal RPDA   LVEDP- 20 mmHg  LVG: LVEF 60%       Impression/Recommendations:  Severe multivessel coronary disease. Autumn Steve is not interested in surgical opinion for CABG and is likely a poor candidate from a pulmonary perspective at a minimum. She prefers to discuss complex multivessel PCI with her daughter and granddaughter. IV diuresis and Plavix loading prior to potential return for staged PCI of LAD and RCA. Continue therapeutic AC, beta blocker, nitrates.     Rockney Severin, DO, [facility]  Interventional Cardiology     o: [phone]  [address] [place], [place] [address] [address] [facility], [address] [facility]

## 2023-01-26 NOTE — PROGRESS NOTES
Cardiovascular Progress Note      Chief Complaint:   Chief Complaint   Patient presents with    Chest Pain     Pt brought in by  EMS from home due to CP that started 3 days ago, SOB started this morning, hx of COPD, presents with cough/wheezing/rhonchi in her lungs, PVCs/bigeminy. Pt states that chest pain goes to her back that is pressure, afebrile, A and O x 4. Impression/Recommendations:    Unstable angina  CAD: hx remote PCI to LAD and D2, Moderate nonobstructive disease by 2019 [facility] (pRCA 70% stenosis, FFR 0.84)  PAF, new dx 8/2022 (setting of recurrent CVA), on [address]  Hypertension  Hyperlipidemia, suboptimal on Rosuvastatin 10 mg (1/2023:   HDL 34 )  Diabetes mellitus, NID A1C 7.3 (8/2022)  COPD  Obesity   GERD  AKIL, cannot tolerate CPAP  Former tobacco use       Severe multivessel coronary disease by 1/26/23 [facility]. Katy Medina is not interested in surgical opinion for CABG and is likely a poor candidate from a pulmonary perspective at a minimum. She prefers to discuss complex multivessel PCI with her daughter and granddaughter. IV diuresis and Plavix loading prior to potential return for staged PCI of LAD and RCA. Continue therapeutic AC, beta blocker, nitrates. Interval History:  No events overnight. No chest pain since 2 days ago. Nocturnal oxygen requirement is chronic. No SOB at rest.   No palpitations.      Medications:  furosemide (LASIX) injection 40 mg, BID  clopidogrel (PLAVIX) tablet 75 mg, Daily  sodium chloride flush 0.9 % injection 5-40 mL, 2 times per day  sodium chloride flush 0.9 % injection 5-40 mL, PRN  0.9 % sodium chloride infusion, PRN  dextrose bolus 10% 125 mL, PRN   Or  dextrose bolus 10% 250 mL, PRN  glucagon (rDNA) injection 1 mg, PRN  dextrose 10 % infusion, Continuous PRN  guaiFENesin-dextromethorphan (ROBITUSSIN DM) 100-10 MG/5ML syrup 5 mL, Q4H PRN  enoxaparin (LOVENOX) injection 120 mg, BID  ipratropium-albuterol (DUONEB) nebulizer solution 1 ampule, 4x daily  isosorbide mononitrate (IMDUR) extended release tablet 30 mg, BID  aspirin chewable tablet 243 mg, Once  aspirin chewable tablet 81 mg, Daily  sodium chloride (OCEAN, BABY AYR) 0.65 % nasal spray 1 spray, PRN  ipratropium-albuterol (DUONEB) nebulizer solution 1 ampule, Q4H PRN  albuterol sulfate HFA (PROVENTIL;VENTOLIN;PROAIR) 108 (90 Base) MCG/ACT inhaler 2 puff, Q6H PRN  glipiZIDE (GLUCOTROL) tablet 5 mg, BID AC  metoprolol succinate (TOPROL XL) extended release tablet 25 mg, Daily  [Held by provider] rivaroxaban (XARELTO) tablet 15 mg, Daily  [Held by provider] lisinopril (PRINIVIL;ZESTRIL) tablet 2.5 mg, Daily  acetaZOLAMIDE (DIAMOX) tablet 250 mg, Daily  gabapentin (NEURONTIN) capsule 600 mg, TID  ALPRAZolam (XANAX) tablet 1 mg, TID PRN  pantoprazole (PROTONIX) tablet 40 mg, QAM AC  sodium chloride flush 0.9 % injection 5-40 mL, 2 times per day  sodium chloride flush 0.9 % injection 10 mL, PRN  0.9 % sodium chloride infusion, PRN  ondansetron (ZOFRAN-ODT) disintegrating tablet 4 mg, Q8H PRN   Or  ondansetron (ZOFRAN) injection 4 mg, Q6H PRN  acetaminophen (TYLENOL) tablet 650 mg, Q6H PRN   Or  acetaminophen (TYLENOL) suppository 650 mg, Q6H PRN  magnesium hydroxide (MILK OF MAGNESIA) 400 MG/5ML suspension 30 mL, Daily PRN  atorvastatin (LIPITOR) tablet 40 mg, Nightly  nitroGLYCERIN (NITROSTAT) SL tablet 0.4 mg, Q5 Min PRN  hydrALAZINE (APRESOLINE) injection 10 mg, Q6H PRN  0.9 % sodium chloride bolus, PRN  potassium chloride (KLOR-CON M) extended release tablet 40 mEq, PRN   Or  potassium bicarb-citric acid (EFFER-K) effervescent tablet 40 mEq, PRN   Or  potassium chloride 10 mEq/100 mL IVPB (Peripheral Line), PRN  dextrose 10 % infusion, Continuous PRN        I/O:     Intake/Output Summary (Last 24 hours) at 1/26/2023 1502  Last data filed at 1/26/2023 1214  Gross per 24 hour   Intake 480 ml   Output 2000 ml   Net -1520 ml       Physical Exam:    BP (!) 113/59   Pulse 87   Temp 98 °F (36.7 °C) (Oral) Resp 18   Ht 5' 5\" (1.651 m)   Wt 274 lb 8 oz (124.5 kg)   SpO2 (!) 88%   BMI 45.68 kg/m²   Wt Readings from Last 3 Encounters:   01/26/23 274 lb 8 oz (124.5 kg)   01/13/23 269 lb (122 kg)   12/14/22 270 lb (122.5 kg)       GENERAL: Well developed, well nourished, no acute distress  NEUROLOGICAL: Alert and oriented x3  PSYCH: Normal mood and affect   SKIN: Warm and dry, without lesions  HEENT: Normocephalic, atraumatic, Sclera non-icteric, mucous membranes moist  NECK: supple, JVP normal, thyroid not enlarged   CAROTID: Normal upstroke, no bruits  CARDIAC: Normal PMI, regular rate and rhythm, normal S1S2, no murmur, rub  RESPIRATORY: Normal respiratory effort, clear to auscultation bilaterally  EXTREMITIES: No cyanosis, clubbing or edema, palpable pulses bilaterally   MUSCULOSKELETAL: No joint swelling or tenderness, no chest wall tenderness  GASTROINTESTINAL:  soft, non-tender, no bruit    Data Review:    CBC:   Recent Labs     01/24/23  1624 01/25/23  0516 01/26/23  0449   WBC 12.3* 11.0 13.8*   HGB 14.1 13.6 12.9   HCT 44.4 43.0 39.7   MCV 84.6 84.4 83.8    194 190     BMP:   Recent Labs     01/24/23  1624 01/25/23  0516 01/26/23  0449    137 137   K 3.9 4.0 4.2    101 102   CO2 25 23 25   BUN 18 21* 26*   CREATININE 0.9 1.2 1.0     LFTS:   Recent Labs     01/24/23  1624 01/25/23  0516   ALT 12 12   AST 15 12*   ALKPHOS 86 84   PROT 6.7 6.5   AGRATIO 1.2 1.2   BILITOT <0.2 <0.2     Cardiac Enzymes:   Recent Labs     01/24/23  1624 01/24/23  2150 01/25/23  0038   TROPONINI <0.01 <0.01 <0.01       Ponciano Peabody, DO, [address] [facility]  Interventional Cardiology     o: [phone]  [facility] [address] [facility], [address]      NOTE:  This report was transcribed using voice recognition software. Every effort was made to ensure accuracy; however, inadvertent computerized transcription errors may be present.

## 2023-01-26 NOTE — PRE SEDATION
Pre-Op Note/Sedation Assessment    William Morales  1945  [id number]  1:40 PM    Planned Procedure: Cardiac Catheterization Procedure  Post Procedure Plan: Return to same level of care  Consent: I have discussed with the patient and/or the patient representative the indication, alternatives, and the possible risks and/or complications of the planned procedure and the anesthesia methods. The patient and/or patient representative appear to understand and agree to proceed. Chief Complaint:   Anginal Equivalent      Indications for Cath Procedure:  Presentation:  Worsening Angina  2. Anginal Classification within 2 weeks:  CCS III - Symptoms with everyday living activities, i.e., moderate limitation  3. Angina Symptoms Assessment:  Typical Chest Pain  4. Heart Failure Class within last 2 weeks:  No symptoms  5. Cardiovascular Instability:  No    Prior Ischemic Workup/Eval:  Pre-Procedural Medications: Yes: ACE/ARB/ARNI, Aspirin, Beta Blockers, Long Acting Nitrates (Any), and STATIN  2. Stress Test Completed? Yes:  Stress or Imaging Studies Performed (within ANY time period):   Type:  Stress Nuclear  Results:  Negative Extent of Ischemia:  Intermediate    Does Patient need surgery? Cath Valve Surgery:  No    Pre-Procedure Medical History:  Vital Signs:  BP 96/62   Pulse 64   Temp 97.5 °F (36.4 °C) (Axillary)   Resp 18   Ht 5' 5\" (1.651 m)   Wt 274 lb 8 oz (124.5 kg)   SpO2 90%   BMI 45.68 kg/m²     Allergies:     Allergies   Allergen Reactions    Morphine Shortness Of Breath    Codeine Other (See Comments)     Chest pain    Hydromorphone Other (See Comments)     hallucinations  Hallucinations    But will take if needed in an emergency    Levaquin [Levofloxacin In D5w] Itching    Vicodin [Hydrocodone-Acetaminophen] Hives    Aspirin      Upsets hiatal hernia     Medications:    Current Facility-Administered Medications   Medication Dose Route Frequency Provider Last Rate Last Admin    dextrose bolus 10% 125 mL  125 mL IntraVENous PRN Clemente Leon MD        Or    dextrose bolus 10% 250 mL  250 mL IntraVENous PRN Clemente Leon MD        glucagon (rDNA) injection 1 mg  1 mg SubCUTAneous PRN Clemente Leon MD        dextrose 10 % infusion   IntraVENous Continuous PRN Celmente Leon MD        guaiFENesin-dextromethorphan [facility]) 100-10 MG/5ML syrup 5 mL  5 mL Oral Q4H PRN Clemente Leon MD   5 mL at 01/25/23 2115    enoxaparin (LOVENOX) injection 120 mg  1 mg/kg SubCUTAneous BID [place]), DO   120 mg at 01/25/23 2115    ipratropium-albuterol (DUONEB) nebulizer solution 1 ampule  1 ampule Inhalation 4x daily Clemente Leon MD   1 ampule at 01/26/23 1241    isosorbide mononitrate (IMDUR) extended release tablet 30 mg  30 mg Oral BID [facility] ([place]), DO   30 mg at 01/26/23 0848    aspirin chewable tablet 243 mg  243 mg Oral Once Manav Corbin MD        aspirin chewable tablet 81 mg  81 mg Oral Daily Clemente Leon MD   81 mg at 01/26/23 0847    sodium chloride (OCEAN, BABY AYR) 0.65 % nasal spray 1 spray  1 spray Nasal PRN Clemente Leon MD        ipratropium-albuterol (DUONEB) nebulizer solution 1 ampule  1 ampule Inhalation Q4H PRN Clemente Leon MD   1 ampule at 01/25/23 0054    albuterol sulfate HFA (PROVENTIL;VENTOLIN;PROAIR) 108 (90 Base) MCG/ACT inhaler 2 puff  2 puff Inhalation Q6H PRN Clemente Leon MD        glipiZIDE (GLUCOTROL) tablet 5 mg  5 mg Oral BID AC Clemente Leon MD   5 mg at 01/25/23 1517    furosemide (LASIX) tablet 40 mg  40 mg Oral BID Clemente Leon MD   40 mg at 01/25/23 2115    metoprolol succinate (TOPROL XL) extended release tablet 25 mg  25 mg Oral Daily Clemente Leon MD   25 mg at 01/26/23 0846    [Held by provider] rivaroxaban (XARELTO) tablet 15 mg  15 mg Oral Daily Clemente Leon MD        lisinopril (PRINIVIL;ZESTRIL) tablet 2.5 mg  2.5 mg Oral Daily Clemente Leon MD   2.5 mg at 01/26/23 0848    acetaZOLAMIDE (DIAMOX) tablet 250 mg  250 mg Oral Daily Carlos Mcdaniel MD   250 mg at 01/26/23 0847    gabapentin (NEURONTIN) capsule 600 mg  600 mg Oral TID Carlos Mcdaniel MD   600 mg at 01/26/23 0847    ALPRAZolam [place]) tablet 1 mg  1 mg Oral TID PRN Carlos Mcdaniel MD   1 mg at 01/26/23 0850    pantoprazole (PROTONIX) tablet 40 mg  40 mg Oral QAM AC Carlos Mcdaniel MD   40 mg at 01/26/23 0526    sodium chloride flush 0.9 % injection 5-40 mL  5-40 mL IntraVENous 2 times per day Carlos Mcdaniel MD   10 mL at 01/26/23 0852    sodium chloride flush 0.9 % injection 10 mL  10 mL IntraVENous PRN Carlos Mcdaniel MD        0.9 % sodium chloride infusion   IntraVENous PRN Carlos Mcdaniel MD        ondansetron (ZOFRAN-ODT) disintegrating tablet 4 mg  4 mg Oral Q8H PRN Carlos Mcdaniel MD        Or    ondansetron [facility]) injection 4 mg  4 mg IntraVENous Q6H PRN Carlos Mcdaniel MD        acetaminophen (TYLENOL) tablet 650 mg  650 mg Oral Q6H PRN Carlos Mcdaniel MD        Or    acetaminophen (TYLENOL) suppository 650 mg  650 mg Rectal Q6H PRN Carlos Mcdaniel MD        magnesium hydroxide (MILK OF MAGNESIA) 400 MG/5ML suspension 30 mL  30 mL Oral Daily PRN Carlos Mcdaniel MD        atorvastatin (LIPITOR) tablet 40 mg  40 mg Oral Nightly Carlos Mcdaniel MD   40 mg at 01/25/23 2115    nitroGLYCERIN (NITROSTAT) SL tablet 0.4 mg  0.4 mg SubLINGual Q5 Min PRN Carlos Mcdaniel MD        hydrALAZINE (APRESOLINE) injection 10 mg  10 mg IntraVENous Q6H PRN Carlos Mcdaniel MD        0.9 % sodium chloride bolus  500 mL IntraVENous PRN Carlos Mcdaniel MD        potassium chloride (KLOR-CON M) extended release tablet 40 mEq  40 mEq Oral PRN Carlos Mcdaniel MD        Or    potassium bicarb-citric acid (EFFER-K) effervescent tablet 40 mEq  40 mEq Oral PRN Carlos Mcdaniel MD        Or    potassium chloride 10 mEq/100 mL IVPB (Peripheral Line)  10 mEq IntraVENous PRN Carlos Mcdaniel MD        dextrose 10 % infusion   IntraVENous Continuous PRN Herlene Simmonds Eve Morris MD           Past Medical History:    Past Medical History:   Diagnosis Date    Acute MI [facility])     Acute pyelonephritis 09/08/2015    Anxiety and depression     Arthritis     CAD (coronary artery disease)     two heart stent one in 2000 and 2nd one 6 months later on 2000, had MI in 2000    Cancer [facility])     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    Cancer [facility])     \"skin on top of my head\"    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    Chronic obstructive pulmonary disease ([address] 01/13/2017    Claustrophobia     COPD (chronic obstructive pulmonary disease) (HCC)     ESBL (extended spectrum beta-lactamase) producing bacteria infection 08/14/2021    Esophageal stricture     Gastroesophageal reflux disease without esophagitis 03/24/2016    Hiatal hernia     Hiatal hernia     Hypercholesteremia     Hypertension     IBS (irritable bowel syndrome)     Migraines     Movement disorder arthritis    Pneumonia     PONV (postoperative nausea and vomiting)     Type II or unspecified type diabetes mellitus without mention of complication, not stated as uncontrolled     type ll does not take insulin at home    Unspecified cerebral artery occlusion with cerebral infarction     many TIA's       Surgical History:    Past Surgical History:   Procedure Laterality Date    APPENDECTOMY      BRONCHOSCOPY N/A 1/24/2020    BRONCHOSCOPY ALVEOLAR LAVAGE performed by Sriram Hernandez MD at [address] [place] [address] N/A 1/27/2020    BRONCHOSCOPY DIAGNOSTIC OR CELL [address] ONLY performed by Mya Dorsey MD at [facility] N/A 11/23/2020    BRONCHOSCOPY DIAGNOSTIC OR CELL [address] ONLY performed by Derrick Leyva MD at [facility]      1 stent 2000 and 1 stent 2001    CATARACT REMOVAL  2013 or 2014    bilateral cataracts removed    CHOLECYSTECTOMY      COLONOSCOPY      COLONOSCOPY  06/11/2018    ENDOSCOPY, COLON, DIAGNOSTIC      ESOPHAGEAL DILATATION EYE SURGERY      bilateral cataracts    GALLBLADDER SURGERY      HYSTERECTOMY (CERVIX STATUS UNKNOWN)      OK EXCISION MALIGNANT LESION S/N/H/F/G 0.5 CM/< N/A 9/6/2018    EXCISION OF SCALP SQUAMOUS CELL CARCINOMA performed by Casey Correa MD at [address]GR[address] T/A/L 1ST 100 CM/&/1% BDY INFT/CHLD N/A 9/6/2018    SPLIT THICKNESS SKIN GRAFT FOR COVERAGE SCALP, APPLICATION OF WOUND VAC DEVICE performed by Casey Correa MD at [address] [place] [address],[address] ENDOSCOPY  4/20/12    with biopsy of stomach,gastritis    UPPER GASTROINTESTINAL ENDOSCOPY  06/11/2018    w/esophagael dilation       Pre-Sedation:  Pre-Sedation Documentation and Exam:  I have assessed the patient and reviewed the H&P on the chart. Prior History of Anesthesia Complications:   none    Modified Mallampati:  II (soft palate, uvula, fauces visible)    ASA Classification:  Class 3 - A patient with severe systemic disease that limits activity but is not incapacitating    Carlos Scale: Activity:  2 - Able to move 4 extremities voluntarily on command  Respiration:  2 - Able to breathe deeply and cough freely  Circulation:  2 - BP+/- 20mmHg of normal  Consciousness:  2 - Fully awake  Oxygen Saturation (color):  2 - Able to maintain oxygen saturation >92% on room air    Sedation/Anesthesia Plan:  Guard the patient's safety and welfare. Minimize physical discomfort and pain. Minimize negative psychological responses to treatment by providing sedation and analgesia and maximize the potential amnesia. Patient to meet pre-procedure discharge plan.     Medication Planned:  midazolam intravenously and fentanyl intravenously    Patient is an appropriate candidate for plan of sedation:   yes      Akil Carbajal DO, [facility]  Interventional Cardiology     o: [phone]  [facility] [address], [address] [facility]

## 2023-01-26 NOTE — PROGRESS NOTES
Progress Note - Dr. Huff  - Internal Medicine  PCP: Ronn Bull MD [address] [facility] / [facility] [phone]    Hospital Day: 0  Code Status: Full Code  Current Diet: Diet NPO        CC: follow up on medical issues    Subjective:   Shira Dejesus is a 69 y.o. female. Pt seen and examined  Chart reviewed since last visit, labs and imaging below      Doing about the same  Sleepy this am  Cards consult appreciated  For cath today      Review of Systems: (1 system for EPF, 2-9 for detailed, 10+ for comprehensive)  Constitutional: Negative for chills and fever. HENT: Negative for dental problem, nosebleeds and rhinorrhea. Eyes: Negative for photophobia and visual disturbance. Respiratory: Negative for cough, chest tightness and positive for shortness of breath. Cardiovascular: Negative for chest pain and leg swelling. Gastrointestinal: Negative for diarrhea, nausea and vomiting. Endocrine: Negative for polydipsia and polyphagia. Genitourinary: Negative for frequency, hematuria and urgency. Musculoskeletal: Negative for back pain and myalgias. Skin: Negative for rash. Allergic/Immunologic: Negative for food allergies. Neurological: Negative for dizziness, seizures, syncope and facial asymmetry. Hematological: Negative for adenopathy. Psychiatric/Behavioral: Negative for dysphoric mood. The patient is not nervous/anxious. I have reviewed the patient's medical and social history in detail and updated the computerized patient record.   To recap: She  has a past medical history of Acute MI ([address] Acute pyelonephritis, Anxiety and depression, Arthritis, CAD (coronary artery disease), Cancer ([address] Cancer ([address] Cancer of skin of leg, Chronic obstructive pulmonary disease ([address] Claustrophobia, COPD (chronic obstructive pulmonary disease) ([address] ESBL (extended spectrum beta-lactamase) producing bacteria infection, Esophageal stricture, Gastroesophageal reflux disease without esophagitis, Hiatal hernia, Hiatal hernia, Hypercholesteremia, Hypertension, IBS (irritable bowel syndrome), Migraines, Movement disorder, Pneumonia, PONV (postoperative nausea and vomiting), Type II or unspecified type diabetes mellitus without mention of complication, not stated as uncontrolled, and Unspecified cerebral artery occlusion with cerebral infarction. . She  has a past surgical history that includes Cardiac surgery; Gallbladder surgery; Hysterectomy; Appendectomy; Cholecystectomy; Colonoscopy; Upper gastrointestinal endoscopy (4/20/12); Endoscopy, colon, diagnostic; Tear duct surgery; Upper gastrointestinal endoscopy (06/11/2018); Colonoscopy (06/11/2018); pr excision malignant lesion s/n/h/f/g 0.5 cm/< (N/A, 9/6/2018); pr split agrft t/a/l 1st 100 cm/&/1% bdy inft/chld (N/A, 9/6/2018); skin biopsy; eye surgery; bronchoscopy (N/A, 1/24/2020); bronchoscopy (N/A, 1/27/2020); Cataract removal (2013 or 2014); Esophagus dilation; and bronchoscopy (N/A, 11/23/2020). . She  reports that she quit smoking about 5 years ago. Her smoking use included cigarettes. She has a 12.25 pack-year smoking history. She has never used smokeless tobacco. She reports current alcohol use of about 1.0 standard drink per week. She reports that she does not use drugs. .        Active Hospital Problems    Diagnosis Date Noted    Hyperlipemia [E78.5] 05/23/2011     Priority: High    Chest pain [R07.9] 10/26/2022     Priority: Medium    DM2 (diabetes mellitus, type 2) ([facility] [E11.9] 12/03/2015    Essential hypertension [I10] 04/18/2012       Current Facility-Administered Medications: dextrose bolus 10% 125 mL, 125 mL, IntraVENous, PRN **OR** dextrose bolus 10% 250 mL, 250 mL, IntraVENous, PRN  glucagon (rDNA) injection 1 mg, 1 mg, SubCUTAneous, PRN  dextrose 10 % infusion, , IntraVENous, Continuous PRN  guaiFENesin-dextromethorphan (ROBITUSSIN DM) 100-10 MG/5ML syrup 5 mL, 5 mL, Oral, Q4H PRN  enoxaparin (LOVENOX) injection 120 mg, 1 mg/kg, SubCUTAneous, BID  ipratropium-albuterol (DUONEB) nebulizer solution 1 ampule, 1 ampule, Inhalation, 4x daily  isosorbide mononitrate (IMDUR) extended release tablet 30 mg, 30 mg, Oral, BID  aspirin chewable tablet 243 mg, 243 mg, Oral, Once  aspirin chewable tablet 81 mg, 81 mg, Oral, Daily  sodium chloride (OCEAN, BABY AYR) 0.65 % nasal spray 1 spray, 1 spray, Nasal, PRN  ipratropium-albuterol (DUONEB) nebulizer solution 1 ampule, 1 ampule, Inhalation, Q4H PRN  albuterol sulfate HFA (PROVENTIL;VENTOLIN;PROAIR) 108 (90 Base) MCG/ACT inhaler 2 puff, 2 puff, Inhalation, Q6H PRN  glipiZIDE (GLUCOTROL) tablet 5 mg, 5 mg, Oral, BID AC  furosemide (LASIX) tablet 40 mg, 40 mg, Oral, BID  metoprolol succinate (TOPROL XL) extended release tablet 25 mg, 25 mg, Oral, Daily  [Held by provider] rivaroxaban (XARELTO) tablet 15 mg, 15 mg, Oral, Daily  lisinopril (PRINIVIL;ZESTRIL) tablet 2.5 mg, 2.5 mg, Oral, Daily  acetaZOLAMIDE (DIAMOX) tablet 250 mg, 250 mg, Oral, Daily  gabapentin (NEURONTIN) capsule 600 mg, 600 mg, Oral, TID  ALPRAZolam (XANAX) tablet 1 mg, 1 mg, Oral, TID PRN  pantoprazole (PROTONIX) tablet 40 mg, 40 mg, Oral, QAM AC  sodium chloride flush 0.9 % injection 5-40 mL, 5-40 mL, IntraVENous, 2 times per day  sodium chloride flush 0.9 % injection 10 mL, 10 mL, IntraVENous, PRN  0.9 % sodium chloride infusion, , IntraVENous, PRN  ondansetron (ZOFRAN-ODT) disintegrating tablet 4 mg, 4 mg, Oral, Q8H PRN **OR** ondansetron (ZOFRAN) injection 4 mg, 4 mg, IntraVENous, Q6H PRN  acetaminophen (TYLENOL) tablet 650 mg, 650 mg, Oral, Q6H PRN **OR** acetaminophen (TYLENOL) suppository 650 mg, 650 mg, Rectal, Q6H PRN  magnesium hydroxide (MILK OF MAGNESIA) 400 MG/5ML suspension 30 mL, 30 mL, Oral, Daily PRN  atorvastatin (LIPITOR) tablet 40 mg, 40 mg, Oral, Nightly  nitroGLYCERIN (NITROSTAT) SL tablet 0.4 mg, 0.4 mg, SubLINGual, Q5 Min PRN  hydrALAZINE (APRESOLINE) injection 10 mg, 10 mg, IntraVENous, Q6H PRN  0.9 % sodium chloride bolus, 500 mL, IntraVENous, PRN  potassium chloride (KLOR-CON M) extended release tablet 40 mEq, 40 mEq, Oral, PRN **OR** potassium bicarb-citric acid (EFFER-K) effervescent tablet 40 mEq, 40 mEq, Oral, PRN **OR** potassium chloride 10 mEq/100 mL IVPB (Peripheral Line), 10 mEq, IntraVENous, PRN  dextrose 10 % infusion, , IntraVENous, Continuous PRN         Objective:  BP (!) 112/54   Pulse 65   Temp 98.2 °F (36.8 °C) (Oral)   Resp 18   Ht 5' 5\" (1.651 m)   Wt 274 lb 8 oz (124.5 kg)   SpO2 94%   BMI 45.68 kg/m²      Patient Vitals for the past 24 hrs:   BP Temp Temp src Pulse Resp SpO2 Weight   01/26/23 0854 -- -- -- 65 18 94 % --   01/26/23 0838 (!) 112/54 98.2 °F (36.8 °C) Oral 65 18 94 % --   01/26/23 0500 102/63 97.9 °F (36.6 °C) Oral 67 18 93 % 274 lb 8 oz (124.5 kg)   01/26/23 0000 124/81 98.4 °F (36.9 °C) Oral 69 18 95 % --   01/25/23 2232 -- -- -- -- -- 93 % --   01/25/23 2230 -- -- -- -- -- (!) 88 % --   01/25/23 2155 -- -- -- 68 18 96 % --   01/25/23 2045 116/60 97.5 °F (36.4 °C) Oral 74 18 94 % --   01/25/23 1745 -- -- -- -- -- 90 % --   01/25/23 1515 124/65 98 °F (36.7 °C) Oral 98 18 97 % --   01/25/23 1426 -- -- -- -- 22 93 % --   01/25/23 1253 (!) 105/59 97.4 °F (36.3 °C) Oral 70 18 98 % --       Patient Vitals for the past 96 hrs (Last 3 readings):   Weight   01/26/23 0500 274 lb 8 oz (124.5 kg)   01/25/23 0430 274 lb 3.2 oz (124.4 kg)   01/24/23 2131 273 lb (123.8 kg)             Intake/Output Summary (Last 24 hours) at 1/26/2023 0920  Last data filed at 1/26/2023 0518  Gross per 24 hour   Intake 480 ml   Output 1000 ml   Net -520 ml           Physical Exam: (2-7 system for EPF/Detailed, ?8 for Comprehensive)  BP (!) 112/54   Pulse 65   Temp 98.2 °F (36.8 °C) (Oral)   Resp 18   Ht 5' 5\" (1.651 m)   Wt 274 lb 8 oz (124.5 kg)   SpO2 94%   BMI 45.68 kg/m²   Constitutional: vitals as above: alert, appears stated age and cooperative    Psychiatric: normal insight and judgment, oriented to person, place, time, and general circumstances    Head: Normocephalic, without obvious abnormality, atraumatic    Eyes:lids and lashes normal, conjunctivae and sclerae normal and pupils equal, round, reactive to light and accomodation    EMNT: external ears normal, nares midline    Neck: no carotid bruit, supple, symmetrical, trachea midline and thyroid not enlarged, symmetric, no tenderness/mass/nodules     Respiratory: clear to auscultation and percussion bilaterally with normal respiratory effort    Cardiovascular: normal rate, regular rhythm, normal S1 and S2 and no murmurs    Gastrointestinal: soft, non-tender, non-distended, normal bowel sounds, no masses or organomegaly    Extremities: no clubbing, no edema    Skin:No rashes or nodules noted. Neurologic:negative         Labs:  Lab Results   Component Value Date    WBC 13.8 (H) 01/26/2023    HGB 12.9 01/26/2023    HCT 39.7 01/26/2023     01/26/2023    CHOL 180 01/25/2023    TRIG 157 (H) 01/25/2023    HDL 34 (L) 01/25/2023    LDLDIRECT 85 08/03/2022    ALT 12 01/25/2023    AST 12 (L) 01/25/2023     01/26/2023    K 4.2 01/26/2023     01/26/2023    CREATININE 1.0 01/26/2023    BUN 26 (H) 01/26/2023    CO2 25 01/26/2023    TSH 2.34 06/06/2011    INR 0.99 08/01/2022    LABA1C 7.8 01/25/2023    LABMICR YES 10/26/2022     Lab Results   Component Value Date    CKTOTAL 24 (L) 06/07/2018    CKMB 0.29 08/09/2011    TROPONINI <0.01 01/25/2023       Recent Imaging Results are Reviewed:  CT CHEST PULMONARY EMBOLISM W CONTRAST    Result Date: 1/24/2023  EXAMINATION: CTA OF THE CHEST, 1/24/2023 5:13 pm TECHNIQUE: CTA of the chest was performed after the administration of intravenous contrast.  Multiplanar reformatted images are provided for review. MIP images are provided for review.  Automated exposure control, iterative reconstruction, and/or weight based adjustment of the mA/kV was utilized to reduce the radiation dose to as low as reasonably achievable. COMPARISON: None HISTORY: ORDERING SYSTEM PROVIDED HISTORY:  Chest pain, dyspnea TECHNOLOGIST PROVIDED HISTORY: Reason for Exam:  Chest pain, dyspnea Decision Support Exception - unselect if not a suspected or confirmed emergency medical condition->Emergency Medical Condition (MA) Reason for Exam:  Chest pain, dyspnea, Chest Pain (Pt brought in by  EMS from home due to CP that started 3 days ago, SOB started this morning, hx of COPD, presents with cough/wheezing/rhonchi in her lungs, PVCs/bigeminy. Pt states that chest pain goes to her back that is pressure, afebrile, A and O x 4). FINDINGS: Pulmonary Arteries: Pulmonary arteries are adequately opacified for evaluation. No evidence of intraluminal filling defect to suggest pulmonary embolism. Main pulmonary artery is normal in caliber. Mediastinum: No evidence of mediastinal lymphadenopathy. The heart and pericardium demonstrate no acute abnormality. There is no acute abnormality of the thoracic aorta. Lungs/pleura: The lungs are without acute process. No focal consolidation or pulmonary edema. No evidence of pleural effusion or pneumothorax. Upper Abdomen: Limited images of the upper abdomen are unremarkable. Soft Tissues/Bones: No acute bone or soft tissue abnormality. There are multiple chronic mild thoracolumbar compression deformities. No evidence of pulmonary embolism or acute pulmonary abnormality. Lab Results   Component Value Date/Time    GLUCOSE 162 01/26/2023 04:49 AM     Lab Results   Component Value Date/Time    POCGLU 166 01/26/2023 07:52 AM     BP (!) 112/54   Pulse 65   Temp 98.2 °F (36.8 °C) (Oral)   Resp 18   Ht 5' 5\" (1.651 m)   Wt 274 lb 8 oz (124.5 kg)   SpO2 94%   BMI 45.68 kg/m²     Assessment and Plan:  Principal Problem:    Chest pain -Established problem. Improving.   No chest pain this am. Trop neg  Plan: per cards eval - cath today  Active Problems: Hyperlipemia -Established problem. Stable. On statin  Plan: cont meds    Essential hypertension -Established problem. Stable. 112/54  Plan: Continue medications. Continue to check BP q shift. CBC and BMP ordered to monitor for disease progression, medication side effect. DM2 (diabetes mellitus, type 2) ([address] -Established problem. Stable. glu 162  Plan: Continue on CCC diet, home medications. CBC and BMP ordered to monitor sugars and for other medication complications. Fingerstick glucose ordered to check for alterations in levels throughout day. Sliding scale insulin ordered to cover fingerstick levels.      Disp - per cards    Case discussed with: cards  Tests ordered/reviewed: cbc, bmp, trop            (Please note that portions of this note were completed with a voice recognition program.  Efforts were made to edit the dictations but occasionally words are mis-transcribed.)        Boris Salas MD  1/26/2023

## 2023-01-26 NOTE — PROGRESS NOTES
Pt awake in bed. Cath site WNL and no bleeding noted. Extremity warm to touch and moderate pulse. VSS and pt denies any needs. Call light in reach and bed alarm engaged.

## 2023-01-27 LAB
ANION GAP SERPL CALCULATED.3IONS-SCNC: 7 MMOL/L (ref 3–16)
BASOPHILS ABSOLUTE: 0.1 K/UL (ref 0–0.2)
BASOPHILS RELATIVE PERCENT: 0.9 %
BUN BLDV-MCNC: 28 MG/DL (ref 7–20)
CALCIUM SERPL-MCNC: 9.3 MG/DL (ref 8.3–10.6)
CHLORIDE BLD-SCNC: 102 MMOL/L (ref 99–110)
CO2: 28 MMOL/L (ref 21–32)
CREAT SERPL-MCNC: 1.1 MG/DL (ref 0.6–1.2)
EOSINOPHILS ABSOLUTE: 0.1 K/UL (ref 0–0.6)
EOSINOPHILS RELATIVE PERCENT: 1.1 %
GFR SERPL CREATININE-BSD FRML MDRD: 52 ML/MIN/{1.73_M2}
GLUCOSE BLD-MCNC: 117 MG/DL (ref 70–99)
GLUCOSE BLD-MCNC: 132 MG/DL (ref 70–99)
GLUCOSE BLD-MCNC: 134 MG/DL (ref 70–99)
GLUCOSE BLD-MCNC: 217 MG/DL (ref 70–99)
HCT VFR BLD CALC: 41.1 % (ref 36–48)
HEMOGLOBIN: 13.3 G/DL (ref 12–16)
LYMPHOCYTES ABSOLUTE: 2.9 K/UL (ref 1–5.1)
LYMPHOCYTES RELATIVE PERCENT: 27.3 %
MCH RBC QN AUTO: 27.4 PG (ref 26–34)
MCHC RBC AUTO-ENTMCNC: 32.5 G/DL (ref 31–36)
MCV RBC AUTO: 84.2 FL (ref 80–100)
MONOCYTES ABSOLUTE: 0.8 K/UL (ref 0–1.3)
MONOCYTES RELATIVE PERCENT: 7.8 %
NEUTROPHILS ABSOLUTE: 6.7 K/UL (ref 1.7–7.7)
NEUTROPHILS RELATIVE PERCENT: 62.9 %
PDW BLD-RTO: 16.1 % (ref 12.4–15.4)
PERFORMED ON: ABNORMAL
PLATELET # BLD: 188 K/UL (ref 135–450)
PMV BLD AUTO: 8.5 FL (ref 5–10.5)
POTASSIUM SERPL-SCNC: 4.3 MMOL/L (ref 3.5–5.1)
RBC # BLD: 4.88 M/UL (ref 4–5.2)
SODIUM BLD-SCNC: 137 MMOL/L (ref 136–145)
WBC # BLD: 10.7 K/UL (ref 4–11)

## 2023-01-27 PROCEDURE — 36415 COLL VENOUS BLD VENIPUNCTURE: CPT

## 2023-01-27 PROCEDURE — 94640 AIRWAY INHALATION TREATMENT: CPT

## 2023-01-27 PROCEDURE — 6370000000 HC RX 637 (ALT 250 FOR IP): Performed by: INTERNAL MEDICINE

## 2023-01-27 PROCEDURE — 80048 BASIC METABOLIC PNL TOTAL CA: CPT

## 2023-01-27 PROCEDURE — 94761 N-INVAS EAR/PLS OXIMETRY MLT: CPT

## 2023-01-27 PROCEDURE — 94669 MECHANICAL CHEST WALL OSCILL: CPT

## 2023-01-27 PROCEDURE — 6360000002 HC RX W HCPCS: Performed by: INTERNAL MEDICINE

## 2023-01-27 PROCEDURE — 2580000003 HC RX 258: Performed by: INTERNAL MEDICINE

## 2023-01-27 PROCEDURE — 2700000000 HC OXYGEN THERAPY PER DAY

## 2023-01-27 PROCEDURE — 85025 COMPLETE CBC W/AUTO DIFF WBC: CPT

## 2023-01-27 PROCEDURE — 99233 SBSQ HOSP IP/OBS HIGH 50: CPT | Performed by: INTERNAL MEDICINE

## 2023-01-27 PROCEDURE — 1200000000 HC SEMI PRIVATE

## 2023-01-27 RX ORDER — ACETYLCYSTEINE 100 MG/ML
4 SOLUTION ORAL; RESPIRATORY (INHALATION) 2 TIMES DAILY
Status: DISCONTINUED | OUTPATIENT
Start: 2023-01-27 | End: 2023-01-31 | Stop reason: HOSPADM

## 2023-01-27 RX ORDER — FUROSEMIDE 10 MG/ML
20 INJECTION INTRAMUSCULAR; INTRAVENOUS 2 TIMES DAILY
Status: DISCONTINUED | OUTPATIENT
Start: 2023-01-27 | End: 2023-01-31 | Stop reason: HOSPADM

## 2023-01-27 RX ADMIN — IPRATROPIUM BROMIDE AND ALBUTEROL SULFATE 1 AMPULE: 2.5; .5 SOLUTION RESPIRATORY (INHALATION) at 16:11

## 2023-01-27 RX ADMIN — PANTOPRAZOLE SODIUM 40 MG: 40 TABLET, DELAYED RELEASE ORAL at 06:17

## 2023-01-27 RX ADMIN — ISOSORBIDE MONONITRATE 30 MG: 30 TABLET, EXTENDED RELEASE ORAL at 08:16

## 2023-01-27 RX ADMIN — DICLOFENAC SODIUM 4 G: 10 GEL TOPICAL at 20:31

## 2023-01-27 RX ADMIN — ALPRAZOLAM 1 MG: 0.5 TABLET ORAL at 20:26

## 2023-01-27 RX ADMIN — CLOPIDOGREL BISULFATE 75 MG: 75 TABLET ORAL at 08:16

## 2023-01-27 RX ADMIN — FUROSEMIDE 20 MG: 10 INJECTION, SOLUTION INTRAMUSCULAR; INTRAVENOUS at 18:00

## 2023-01-27 RX ADMIN — ATORVASTATIN CALCIUM 40 MG: 40 TABLET, FILM COATED ORAL at 20:27

## 2023-01-27 RX ADMIN — GABAPENTIN 600 MG: 300 CAPSULE ORAL at 15:15

## 2023-01-27 RX ADMIN — GABAPENTIN 600 MG: 300 CAPSULE ORAL at 20:27

## 2023-01-27 RX ADMIN — ACETYLCYSTEINE 400 MG: 100 SOLUTION ORAL; RESPIRATORY (INHALATION) at 12:25

## 2023-01-27 RX ADMIN — ALPRAZOLAM 1 MG: 0.5 TABLET ORAL at 08:24

## 2023-01-27 RX ADMIN — IPRATROPIUM BROMIDE AND ALBUTEROL SULFATE 1 AMPULE: 2.5; .5 SOLUTION RESPIRATORY (INHALATION) at 21:59

## 2023-01-27 RX ADMIN — IPRATROPIUM BROMIDE AND ALBUTEROL SULFATE 1 AMPULE: 2.5; .5 SOLUTION RESPIRATORY (INHALATION) at 12:26

## 2023-01-27 RX ADMIN — DICLOFENAC SODIUM 4 G: 10 GEL TOPICAL at 12:21

## 2023-01-27 RX ADMIN — ACETAZOLAMIDE 250 MG: 250 TABLET ORAL at 08:16

## 2023-01-27 RX ADMIN — IPRATROPIUM BROMIDE AND ALBUTEROL SULFATE 1 AMPULE: 2.5; .5 SOLUTION RESPIRATORY (INHALATION) at 07:30

## 2023-01-27 RX ADMIN — ENOXAPARIN SODIUM 120 MG: 150 INJECTION SUBCUTANEOUS at 20:27

## 2023-01-27 RX ADMIN — Medication 10 ML: at 20:27

## 2023-01-27 RX ADMIN — METOPROLOL SUCCINATE 25 MG: 25 TABLET, EXTENDED RELEASE ORAL at 08:16

## 2023-01-27 RX ADMIN — ASPIRIN 81 MG 81 MG: 81 TABLET ORAL at 08:16

## 2023-01-27 RX ADMIN — GABAPENTIN 600 MG: 300 CAPSULE ORAL at 08:17

## 2023-01-27 RX ADMIN — ISOSORBIDE MONONITRATE 30 MG: 30 TABLET, EXTENDED RELEASE ORAL at 15:22

## 2023-01-27 RX ADMIN — ACETYLCYSTEINE 400 MG: 100 SOLUTION ORAL; RESPIRATORY (INHALATION) at 21:22

## 2023-01-27 RX ADMIN — GLIPIZIDE 5 MG: 5 TABLET ORAL at 15:22

## 2023-01-27 RX ADMIN — Medication 10 ML: at 08:17

## 2023-01-27 ASSESSMENT — PAIN SCALES - GENERAL
PAINLEVEL_OUTOF10: 0
PAINLEVEL_OUTOF10: 8

## 2023-01-27 ASSESSMENT — PAIN DESCRIPTION - LOCATION: LOCATION: CHEST

## 2023-01-27 NOTE — ADT AUTH CERT
Subscriber Details  Hospital Account [de-identified]  CVG Subscriber Name/Sex/Relation Subscriber  Subscriber Address/Phone Subscriber Emp/Emp Phone   1.  [address]   [id number] [address] [facility] Female   (Self) 1945 [address]   [phone](D)   [phone](I) RETIRED      Utilization Reviews       Chest Pain - Care Day 4 (2023) by Romana Sack, RN       Review Status Review Entered   Completed 2023 1341       Created By   Romana Sack, RN      Criteria Review      Care Day: 4 Care Date: 2023 Level of Care: Telemetry    Guideline Day 1    Level Of Care    (X) ICU, intermediate care, or telemetry    2023 1:41 PM EST by Kevin Washington      telemetry    Clinical Status    ( ) * Clinical Indications met    2023 1:41 PM EST by Tiffany Galdamez MD Reviewed, recommends Inpatient    Activity    (X) Bed rest    2023 1:41 PM EST by Paola Washington      Pt turned Q2hrs    Routes    ( ) Diet as tolerated    2023 1:41 PM EST by Paola Washington      Diet NPO    (X) IV or oral medications    2023 1:41 PM EST by Paola Washington      IV diuretics    Interventions    (X) Serial ECGs    2023 1:41 PM EST by Paola Washington      EKG Rhythm Strip    (X) Oxygen    2023 1:41 PM EST by Paola Washington      On 2-3 L o2    (X) Arrange stress test, invasive procedures as needed    2023 1:41 PM EST by Kevin Washington      s/p cardiac cath    Medications    ( ) Possible IV nitroglycerin    2023 1:41 PM EST by Paola Washington      absent    (X) Possible oral nitrates    2023 1:41 PM EST by Paola Washington      isosorbide mononitrate (IMDUR) extended release tablet 30 mg  Freq: 2 times daily Route: PO    (X) Possible antiplatelet agents, anticoagulants    2023 1:41 PM EST by Paola Hartman      enoxaparin (LOVENOX) injection 120 mg  Dose: 1 mg/kg  Freq: 2 TIMES DAILY Route: SC    aspirin chewable tablet 81 mg  Freq: DAILY Route: PO    (X) Possible beta-blocker    1/27/2023 1:41 PM EST by Paola Wilson      metoprolol succinate (TOPROL XL) extended release tablet 25 mg  Freq: DAILY Route: PO    (X) Possible statin    1/27/2023 1:41 PM EST by Paola Siddiqi      atorvastatin (LIPITOR) tablet 40 mg  Freq: NIGHTLY Route: PO    (X) Antihypertensive medication as needed to control blood pressure    1/27/2023 1:41 PM EST by Paola Wilson      clopidogrel (PLAVIX) tablet 75 mg  Freq: DAILY Route: PO    lisinopril (PRINIVIL;ZESTRIL) tablet 2.5 mg  Freq: DAILY Route: PO    * Milestone   Additional Notes   DATE:   1/27/23      Pertinent Updates: On Contact isolation    Pt turned Q2hrs   Still on NPO   Pt is tachypneic   On 2-3 L o2   Patient continues to require the need for IV DIURETICS, and frequent aerosols. Vitals:   T 97.7 (36.5)    R 19 20 22   P 71    /56   O2 2 3L/min    SPO2 86 Nasal cannula      lb 9.6 oz (122.7 kg)         Abnl/Pertinent Labs/Radiology/Diagnostic Studies:   BUN,BUNPL: 28 (H)   Est, Glom Filt Rate: 52 ! Glucose, Random: 117 (H)   POC Glucose: 132 (H)134 (H)         Physical Exam:   CARDIAC: Normal PMI, regular rate and rhythm, normal S1S2, no murmur, rub   RESPIRATORY: Normal respiratory effort, clear to auscultation bilaterally      MD Consults/ Assessments & Plans:   Cardiology   Impression/Recommendations:   Unstable angina   CAD: hx remote PCI to LAD and D2, Moderate nonobstructive disease by 2019 [facility] (pRCA 70% stenosis, FFR 0.84)   PAF, new dx 8/2022 (setting of recurrent CVA), on [address]   Hypertension   Hyperlipidemia, suboptimal on Rosuvastatin 10 mg (1/2023:   HDL 34 )   Diabetes mellitus, NID A1C 7.3 (8/2022)   COPD   Obesity    GERD   AKIL, cannot tolerate CPAP   Former tobacco use   Severe multivessel coronary disease by 1/26/23 [facility]. She is not interested in surgical opinion for CABG and is likely a poor candidate from a pulmonary perspective at a minimum.     She prefers to discuss complex multivessel PCI with her daughter and granddaughter. IV diuresis and Plavix loading prior to potential return for staged PCI of LAD and RCA. Continue therapeutic AC, beta blocker, nitrates. Internal Medicine   Assessment and Plan:   Unstable angina -Established problem. Stable. Cath with severe multivessel disease   Plan: per  pt does not want CABG. Try to optimize medically, possible staged PCI in future. Cont on iv lasix. Monitor electrolytes. KCl iv ordered     Hyperlipemia -Established problem. Stable. On statin   Plan: cont meds     Essential hypertension -Established problem. Stable. 118/79   Plan: Continue medications. Continue to check BP q shift. CBC and BMP ordered to monitor for disease progression, medication side effect. DM2 (diabetes mellitus, type 2)  -Established problem. Stable. glu 117   Plan: Continue on CCC diet, home medications. CBC and BMP ordered to monitor sugars and for other medication complications. Fingerstick glucose ordered to check for alterations in levels throughout day. Sliding scale insulin ordered to cover fingerstick levels.            Medications:   acetaZOLAMIDE (DIAMOX) tablet 250 mg   Freq: DAILY Route: PO      acetylcysteine (MUCOMYST) 10 % solution 400 mg   Freq: 2 TIMES DAILY Route: IN      furosemide (LASIX) injection 40 mg   Freq: 2 TIMES DAILY Route: IV      gabapentin (NEURONTIN) capsule 600 mg   Freq: 3 TIMES DAILY Route: PO      ipratropium-albuterol (DUONEB) nebulizer solution 1 ampule   Freq: 4 TIMES DAILY Route: IN      pantoprazole (PROTONIX) tablet 40 mg   Freq: DAILY BEFORE BREAKFAST Route: PO      ALPRAZolam (XANAX) tablet 1 mgX2   Freq: 3 TIMES DAILY PRN Route: PO      guaiFENesin-dextromethorphan (ROBITUSSIN DM) 100-10 MG/5ML syrup 5 mLX1   Freq: EVERY 4 HOURS PRN Route: PO         Order    EKG Rhythm Strip    Telemetry monitoring   Diet NPO     Up as tolerated               Chest Pain - Care Day 3 (1/26/2023) by Rosalina Mohan RN       Review Status Review Entered   Completed 1/27/2023 1324       Created By   Rosalina Mohan RN      Criteria Review      Care Day: 3 Care Date: 1/26/2023 Level of Care: Telemetry    Guideline Day 1    Level Of Care    (X) ICU, intermediate care, or telemetry    1/27/2023 1:24 PM EST by Sourav Garcia      Telemetry    Clinical Status    ( ) * Clinical Indications met    1/27/2023 1:24 PM EST by Sourav Garcia MD Reviewed, recommends Inpatient    Activity    (X) Bed rest    1/27/2023 1:24 PM EST by Paola Silverio      Pt turned Q2hrs    Routes    ( ) Diet as tolerated    1/27/2023 1:24 PM EST by Paola Silverio      Diet NPO    (X) IV or oral medications    1/27/2023 1:24 PM EST by Paola Wan      furosemide (LASIX) injection 40 mg  Freq: 2 TIMES DAILY Route: IV    acetaZOLAMIDE (DIAMOX) tablet 250 mg  Freq: DAILY Route: PO    gabapentin (NEURONTIN) capsule 600 mg  Freq: 3 TIMES DAILY Route: PO    ALPRAZolam (XANAX) tablet 1 mg TID POX2    Interventions    (X) Oxygen    1/27/2023 1:24 PM EST by Paola Silverio      O2 1L/min    (X) Arrange stress test, invasive procedures as needed    1/27/2023 1:24 PM EST by Sourav Garcia      S/IVETTE Cardiac cath    Medications    ( ) Possible IV nitroglycerin    1/27/2023 1:24 PM EST by Paola Silverio      absent    (X) Possible oral nitrates    1/27/2023 1:24 PM EST by Paola Silverio      isosorbide mononitrate (IMDUR) extended release tablet 30 mg  Freq: 2 times daily Route: PO    (X) Possible antiplatelet agents, anticoagulants    1/27/2023 1:24 PM EST by Paola Wan      aspirin chewable tablet 81 mg  Freq: DAILY Route: PO    enoxaparin (LOVENOX) injection 120 mg  Dose: 1 mg/kg  Freq: 2 TIMES DAILY Route: SC    (X) Possible beta-blocker    1/27/2023 1:24 PM EST by Austine [place], Paola      metoprolol succinate (TOPROL XL) extended release tablet 25 mg  Freq: DAILY Route: PO    (X) Possible statin    1/27/2023 1:24 PM EST by Mary Silverio atorvastatin (LIPITOR) tablet 40 mg  Freq: NIGHTLY Route: PO    (X) Antihypertensive medication as needed to control blood pressure    1/27/2023 1:24 PM EST by Paola Barrow      clopidogrel (PLAVIX) tablet 75 mg  Freq: DAILY Route: PO    lisinopril (PRINIVIL;ZESTRIL) tablet 2.5 mg  Freq: DAILY Route: PO    * Milestone   Additional Notes   DATE:   1/26/23      Pertinent Updates: On Contact isolation    Pt turned Q2hrs   Nocturnal oxygen requirement is chronic. For cath today   Still on NPO   Patient continues to require the need for IV DIURETICS, and frequent aerosols. Vitals:   T 98.2 (36.8)    R19  20    P 65  80   /63  110/55  90/55    O2 1L/min   SPO2 90 Nasal cannula          Abnl/Pertinent Labs/Radiology/Diagnostic Studies:   BUN,BUNPL: 26 (H)   Est, Glom Filt Rate: 58 ! Glucose, Random: 162 (H)   POC Glucose: 166 (H)141 (H)104 (H): 183 (H)   WBC: 13.8 (H)   Neutrophils Absolute: 9.5 (H)      Physical Exam:   CARDIAC: Normal PMI, regular rate and rhythm, normal S1S2, no murmur, rub   RESPIRATORY: Normal respiratory effort, clear to auscultation bilaterally      MD Consults/ Assessments & Plans:   Cardiology   Impression/Recommendations:   Unstable angina   CAD: hx remote PCI to LAD and D2, Moderate nonobstructive disease by 2019 [facility] (pRCA 70% stenosis, FFR 0.84)   PAF, new dx 8/2022 (setting of recurrent CVA), [facility]   Hypertension   Hyperlipidemia, suboptimal on Rosuvastatin 10 mg (1/2023:   HDL 34 )   Diabetes mellitus, NID A1C 7.3 (8/2022)   COPD   Obesity    GERD   AKIL, cannot tolerate CPAP   Former tobacco use   Severe multivessel coronary disease by 1/26/23 [facility]. Maximilian Wesley is not interested in surgical opinion for CABG and is likely a poor candidate from a pulmonary perspective at a minimum. She prefers to discuss complex multivessel PCI with her daughter and granddaughter. IV diuresis and Plavix loading prior to potential return for staged PCI of LAD and RCA. Continue therapeutic AC, beta blocker, nitrates. Internal Medicine   Assessment and Plan:     Chest pain -Established problem. Improving. No chest pain this am. Trop neg   Plan: per cards eval - cath today     Hyperlipemia -Established problem. Stable. On statin   Plan: cont meds     Essential hypertension -Established problem. Stable. 112/54   Plan: Continue medications. Continue to check BP q shift. CBC and BMP ordered to monitor for disease progression, medication side effect. DM2 (diabetes mellitus, type 2)  -Established problem. Stable. glu 162   Plan: Continue on CCC diet, home medications. CBC and BMP ordered to monitor sugars and for other medication complications. Fingerstick glucose ordered to check for alterations in levels throughout day. Sliding scale insulin ordered to cover fingerstick levels. Medications:   ipratropium-albuterol (DUONEB) nebulizer solution 1 ampule   Freq: 4 TIMES DAILY Route: IN      pantoprazole (PROTONIX) tablet 40 mg   Freq: DAILY BEFORE BREAKFAST Route: PO      guaiFENesin-dextromethorphan (ROBITUSSIN DM) 100-10 MG/5ML syrup 5 mLX1   Freq: EVERY 4 HOURS PRN Route: PO         Order    Telemetry monitoring   Diet NPO     Up as tolerated               PA Rec IP by Raul Armstrong RN       Review Status Review Entered   In Primary 2023 1244       Created By   Raul Armstrong RN      Criteria Review   obs list upgrade  We recommend that the following pt's current hospitalization under OBSERVATION status is upgraded to INPATIENT; if you agree, please place a new ADMIT order in [facility] as recommended. .       Name: Lucy Cm   : 1945   CSN: [id number]   INSURANCE: [facility]        Clinical summary    Assessment and Plan:  Principal Problem:  Chest pain -Established problem. Improving. No chest pain this am. Trop neg  Plan: per cards eval - cath today  Active Problems:  Hyperlipemia -Established problem. Stable.  On statin  Plan: cont meds  Essential hypertension -Established problem. Stable. 112/54  Plan: Continue medications. Continue to check BP q shift. CBC and BMP ordered to monitor for disease progression, medication side effect. DM2 (diabetes mellitus, type 2) ([address] -Established problem. Stable. glu 162  Plan: Continue on CCC diet, home medications. CBC and BMP ordered to monitor sugars and for other medication complications. Fingerstick glucose ordered to check for alterations in levels throughout day.  Sliding scale insulin ordered to cover fingerstick levels  Vitals vss  Labs and Imaging reviewed  MCG criteria applies yes,   Comments Rec upgrade to inpt      This chart was reviewed at 10:27 AM 1/26/2023    [address] [facility]   CELL : [phone]

## 2023-01-27 NOTE — PROGRESS NOTES
Progress Note - Dr. Timoteo Al - Internal Medicine  PCP: Lamonte Johnson MD [id number] [facility] / [place] Mccallin [phone]    Hospital Day: 1  Code Status: Full Code  Current Diet: Diet NPO        CC: follow up on medical issues    Subjective:   Vincent Carolina is a 69 y.o. female. Pt seen and examined  Chart reviewed since last visit, labs and imaging below      Doing about the same    Cath results reviewed  Left Heart Cath  LM-30% proximal, 10-20% distal disease   LAD-proximal mid stents patent with <20% ISR; 80% ostial jailed second diagonal;  hazy 95% distal LAD   LCx-high first marginal with 80% short, ostial stenosis; mild plaquing in circumflex   RCA-dominant, large caliber with 80% calcified, proximal stenosis; 50% distal RPDA   LVEDP- 20 mmHg  LVG: LVEF 60%         Impression/Recommendations:  Severe multivessel coronary disease. Maximilian Wesley is not interested in surgical opinion for CABG and is likely a poor candidate from a pulmonary perspective at a minimum. Plan is for iv diuresis, poss return for staged pci      Review of Systems: (1 system for EPF, 2-9 for detailed, 10+ for comprehensive)  Constitutional: Negative for chills and fever. HENT: Negative for dental problem, nosebleeds and rhinorrhea. Eyes: Negative for photophobia and visual disturbance. Respiratory: Negative for cough, chest tightness and positive for shortness of breath. Cardiovascular: Negative for chest pain and leg swelling. Gastrointestinal: Negative for diarrhea, nausea and vomiting. Endocrine: Negative for polydipsia and polyphagia. Genitourinary: Negative for frequency, hematuria and urgency. Musculoskeletal: Negative for back pain and myalgias. Skin: Negative for rash. Allergic/Immunologic: Negative for food allergies. Neurological: Negative for dizziness, seizures, syncope and facial asymmetry. Hematological: Negative for adenopathy.      Psychiatric/Behavioral: Negative for dysphoric mood. The patient is not nervous/anxious. I have reviewed the patient's medical and social history in detail and updated the computerized patient record. To recap: She  has a past medical history of Acute MI ([facility] [address] Acute pyelonephritis, Anxiety and depression, Arthritis, CAD (coronary artery disease), Cancer ([facility] [address] Cancer ([facility] [address] Cancer of skin of leg, Chronic obstructive pulmonary disease ([facility] [address] Claustrophobia, COPD (chronic obstructive pulmonary disease) ([facility] [address] ESBL (extended spectrum beta-lactamase) producing bacteria infection, Esophageal stricture, Gastroesophageal reflux disease without esophagitis, Hiatal hernia, Hiatal hernia, Hypercholesteremia, Hypertension, IBS (irritable bowel syndrome), Migraines, Movement disorder, Pneumonia, PONV (postoperative nausea and vomiting), Type II or unspecified type diabetes mellitus without mention of complication, not stated as uncontrolled, and Unspecified cerebral artery occlusion with cerebral infarction. . She  has a past surgical history that includes Cardiac surgery; Gallbladder surgery; Hysterectomy; Appendectomy; Cholecystectomy; Colonoscopy; Upper gastrointestinal endoscopy (4/20/12); Endoscopy, colon, diagnostic; Tear duct surgery; Upper gastrointestinal endoscopy (06/11/2018); Colonoscopy (06/11/2018); pr excision malignant lesion s/n/h/f/g 0.5 cm/< (N/A, 9/6/2018); pr split agrft t/a/l 1st 100 cm/&/1% bdy inft/chld (N/A, 9/6/2018); skin biopsy; eye surgery; bronchoscopy (N/A, 1/24/2020); bronchoscopy (N/A, 1/27/2020); Cataract removal (2013 or 2014); Esophagus dilation; and bronchoscopy (N/A, 11/23/2020). . She  reports that she quit smoking about 5 years ago. Her smoking use included cigarettes. She has a 12.25 pack-year smoking history. She has never used smokeless tobacco. She reports current alcohol use of about 1.0 standard drink per week. She reports that she does not use drugs. .        Active Hospital Problems    Diagnosis Date Noted Hyperlipemia [E78.5] 05/23/2011     Priority: High    Unstable angina ([facility] [I20.0] 01/26/2023     Priority: Medium    Chest pain [R07.9] 10/26/2022     Priority: Medium    DM2 (diabetes mellitus, type 2) ([facility] [E11.9] 12/03/2015    Essential hypertension [I10] 04/18/2012       Current Facility-Administered Medications: furosemide (LASIX) injection 40 mg, 40 mg, IntraVENous, BID  clopidogrel (PLAVIX) tablet 75 mg, 75 mg, Oral, Daily  sodium chloride flush 0.9 % injection 5-40 mL, 5-40 mL, IntraVENous, 2 times per day  sodium chloride flush 0.9 % injection 5-40 mL, 5-40 mL, IntraVENous, PRN  0.9 % sodium chloride infusion, , IntraVENous, PRN  enoxaparin (LOVENOX) injection 120 mg, 1 mg/kg, SubCUTAneous, BID  dextrose bolus 10% 125 mL, 125 mL, IntraVENous, PRN **OR** dextrose bolus 10% 250 mL, 250 mL, IntraVENous, PRN  glucagon (rDNA) injection 1 mg, 1 mg, SubCUTAneous, PRN  dextrose 10 % infusion, , IntraVENous, Continuous PRN  guaiFENesin-dextromethorphan (ROBITUSSIN DM) 100-10 MG/5ML syrup 5 mL, 5 mL, Oral, Q4H PRN  ipratropium-albuterol (DUONEB) nebulizer solution 1 ampule, 1 ampule, Inhalation, 4x daily  isosorbide mononitrate (IMDUR) extended release tablet 30 mg, 30 mg, Oral, BID  aspirin chewable tablet 243 mg, 243 mg, Oral, Once  aspirin chewable tablet 81 mg, 81 mg, Oral, Daily  sodium chloride (OCEAN, BABY AYR) 0.65 % nasal spray 1 spray, 1 spray, Nasal, PRN  ipratropium-albuterol (DUONEB) nebulizer solution 1 ampule, 1 ampule, Inhalation, Q4H PRN  albuterol sulfate HFA (PROVENTIL;VENTOLIN;PROAIR) 108 (90 Base) MCG/ACT inhaler 2 puff, 2 puff, Inhalation, Q6H PRN  glipiZIDE (GLUCOTROL) tablet 5 mg, 5 mg, Oral, BID AC  metoprolol succinate (TOPROL XL) extended release tablet 25 mg, 25 mg, Oral, Daily  [Held by provider] rivaroxaban (XARELTO) tablet 15 mg, 15 mg, Oral, Daily  [Held by provider] lisinopril (PRINIVIL;ZESTRIL) tablet 2.5 mg, 2.5 mg, Oral, Daily  acetaZOLAMIDE (DIAMOX) tablet 250 mg, 250 mg, Oral, Daily  gabapentin (NEURONTIN) capsule 600 mg, 600 mg, Oral, TID  ALPRAZolam (XANAX) tablet 1 mg, 1 mg, Oral, TID PRN  pantoprazole (PROTONIX) tablet 40 mg, 40 mg, Oral, QAM AC  sodium chloride flush 0.9 % injection 5-40 mL, 5-40 mL, IntraVENous, 2 times per day  sodium chloride flush 0.9 % injection 10 mL, 10 mL, IntraVENous, PRN  0.9 % sodium chloride infusion, , IntraVENous, PRN  ondansetron (ZOFRAN-ODT) disintegrating tablet 4 mg, 4 mg, Oral, Q8H PRN **OR** ondansetron (ZOFRAN) injection 4 mg, 4 mg, IntraVENous, Q6H PRN  acetaminophen (TYLENOL) tablet 650 mg, 650 mg, Oral, Q6H PRN **OR** acetaminophen (TYLENOL) suppository 650 mg, 650 mg, Rectal, Q6H PRN  magnesium hydroxide (MILK OF MAGNESIA) 400 MG/5ML suspension 30 mL, 30 mL, Oral, Daily PRN  atorvastatin (LIPITOR) tablet 40 mg, 40 mg, Oral, Nightly  nitroGLYCERIN (NITROSTAT) SL tablet 0.4 mg, 0.4 mg, SubLINGual, Q5 Min PRN  hydrALAZINE (APRESOLINE) injection 10 mg, 10 mg, IntraVENous, Q6H PRN  0.9 % sodium chloride bolus, 500 mL, IntraVENous, PRN  potassium chloride (KLOR-CON M) extended release tablet 40 mEq, 40 mEq, Oral, PRN **OR** potassium bicarb-citric acid (EFFER-K) effervescent tablet 40 mEq, 40 mEq, Oral, PRN **OR** potassium chloride 10 mEq/100 mL IVPB (Peripheral Line), 10 mEq, IntraVENous, PRN  dextrose 10 % infusion, , IntraVENous, Continuous PRN         Objective:  /79   Pulse 77   Temp 98.3 °F (36.8 °C) (Oral)   Resp 20   Ht 5' 5\" (1.651 m)   Wt 270 lb 9.6 oz (122.7 kg)   SpO2 94%   BMI 45.03 kg/m²      Patient Vitals for the past 24 hrs:   BP Temp Temp src Pulse Resp SpO2 Weight   01/27/23 0745 -- -- -- 77 20 94 % --   01/27/23 0730 -- -- -- 68 22 95 % --   01/27/23 0445 -- -- -- -- -- -- 270 lb 9.6 oz (122.7 kg)   01/27/23 0359 118/79 98.3 °F (36.8 °C) Oral 71 19 96 % --   01/26/23 2352 104/62 97.7 °F (36.5 °C) Oral 52 20 91 % --   01/26/23 2100 (!) 119/58 -- -- 62 -- 94 % --   01/26/23 1934 114/63 98.2 °F (36.8 °C) Oral 65 20 93 % --   01/26/23 1800 (!) 110/58 -- -- -- -- -- --   01/26/23 1711 (!) 90/55 -- -- 69 -- 90 % --   01/26/23 1639 122/64 -- -- 71 -- 92 % --   01/26/23 1600 (!) 103/55 -- -- 64 -- 94 % --   01/26/23 1545 (!) 99/56 -- -- 80 -- 91 % --   01/26/23 1530 116/64 -- -- 72 -- 91 % --   01/26/23 1515 116/63 -- -- 64 -- 90 % --   01/26/23 1500 (!) 113/59 98 °F (36.7 °C) Oral 87 18 93 % --   01/26/23 1241 -- -- -- 64 18 90 % --   01/26/23 1207 96/62 97.5 °F (36.4 °C) Axillary 65 18 93 % --   01/26/23 0854 -- -- -- 65 18 94 % --   01/26/23 0838 (!) 112/54 98.2 °F (36.8 °C) Oral 65 18 94 % --       Patient Vitals for the past 96 hrs (Last 3 readings):   Weight   01/27/23 0445 270 lb 9.6 oz (122.7 kg)   01/26/23 0500 274 lb 8 oz (124.5 kg)   01/25/23 0430 274 lb 3.2 oz (124.4 kg)             Intake/Output Summary (Last 24 hours) at 1/27/2023 0754  Last data filed at 1/27/2023 0888  Gross per 24 hour   Intake 240 ml   Output 3200 ml   Net -2960 ml           Physical Exam: (2-7 system for EPF/Detailed, ?8 for Comprehensive)  /79   Pulse 77   Temp 98.3 °F (36.8 °C) (Oral)   Resp 20   Ht 5' 5\" (1.651 m)   Wt 270 lb 9.6 oz (122.7 kg)   SpO2 94%   BMI 45.03 kg/m²   Constitutional: vitals as above: alert, appears stated age and cooperative    Psychiatric: normal insight and judgment, oriented to person, place, time, and general circumstances    Head: Normocephalic, without obvious abnormality, atraumatic    Eyes:lids and lashes normal, conjunctivae and sclerae normal and pupils equal, round, reactive to light and accomodation    EMNT: external ears normal, nares midline    Neck: no carotid bruit, supple, symmetrical, trachea midline and thyroid not enlarged, symmetric, no tenderness/mass/nodules     Respiratory: clear to auscultation and percussion bilaterally with normal respiratory effort    Cardiovascular: normal rate, regular rhythm, normal S1 and S2 and no murmurs    Gastrointestinal: soft, non-tender, non-distended, normal bowel sounds, no masses or organomegaly    Extremities: no clubbing, no edema    Skin:No rashes or nodules noted. Neurologic:negative         Labs:  Lab Results   Component Value Date    WBC 10.7 01/27/2023    HGB 13.3 01/27/2023    HCT 41.1 01/27/2023     01/27/2023    CHOL 180 01/25/2023    TRIG 157 (H) 01/25/2023    HDL 34 (L) 01/25/2023    LDLDIRECT 85 08/03/2022    ALT 12 01/25/2023    AST 12 (L) 01/25/2023     01/27/2023    K 4.3 01/27/2023     01/27/2023    CREATININE 1.1 01/27/2023    BUN 28 (H) 01/27/2023    CO2 28 01/27/2023    TSH 2.34 06/06/2011    INR 0.99 08/01/2022    LABA1C 7.8 01/25/2023    LABMICR YES 10/26/2022     Lab Results   Component Value Date    CKTOTAL 24 (L) 06/07/2018    CKMB 0.29 08/09/2011    TROPONINI <0.01 01/25/2023       Recent Imaging Results are Reviewed:  CT CHEST PULMONARY EMBOLISM W CONTRAST    Result Date: 1/24/2023  EXAMINATION: CTA OF THE CHEST, 1/24/2023 5:13 pm TECHNIQUE: CTA of the chest was performed after the administration of intravenous contrast.  Multiplanar reformatted images are provided for review. MIP images are provided for review. Automated exposure control, iterative reconstruction, and/or weight based adjustment of the mA/kV was utilized to reduce the radiation dose to as low as reasonably achievable. COMPARISON: None HISTORY: ORDERING SYSTEM PROVIDED HISTORY:  Chest pain, dyspnea TECHNOLOGIST PROVIDED HISTORY: Reason for Exam:  Chest pain, dyspnea Decision Support Exception - unselect if not a suspected or confirmed emergency medical condition->Emergency Medical Condition (MA) Reason for Exam:  Chest pain, dyspnea, Chest Pain (Pt brought in by  EMS from home due to CP that started 3 days ago, SOB started this morning, hx of COPD, presents with cough/wheezing/rhonchi in her lungs, PVCs/bigeminy. Pt states that chest pain goes to her back that is pressure, afebrile, A and O x 4).  FINDINGS: Pulmonary Arteries: Pulmonary arteries are adequately opacified for evaluation. No evidence of intraluminal filling defect to suggest pulmonary embolism. Main pulmonary artery is normal in caliber. Mediastinum: No evidence of mediastinal lymphadenopathy. The heart and pericardium demonstrate no acute abnormality. There is no acute abnormality of the thoracic aorta. Lungs/pleura: The lungs are without acute process. No focal consolidation or pulmonary edema. No evidence of pleural effusion or pneumothorax. Upper Abdomen: Limited images of the upper abdomen are unremarkable. Soft Tissues/Bones: No acute bone or soft tissue abnormality. There are multiple chronic mild thoracolumbar compression deformities. No evidence of pulmonary embolism or acute pulmonary abnormality. Lab Results   Component Value Date/Time    GLUCOSE 117 01/27/2023 04:31 AM     Lab Results   Component Value Date/Time    POCGLU 132 01/27/2023 07:48 AM     /79   Pulse 77   Temp 98.3 °F (36.8 °C) (Oral)   Resp 20   Ht 5' 5\" (1.651 m)   Wt 270 lb 9.6 oz (122.7 kg)   SpO2 94%   BMI 45.03 kg/m²     Assessment and Plan:  Principal Problem:    Unstable angina -Established problem. Stable. Cath with severe multivessel disease  Plan: per Dr Nika Lee, pt does not want CABG. Try to optimize medically, possible staged PCI in future. Cont on iv lasix. Monitor electrolytes. KCl iv ordered  Active Problems:    Hyperlipemia -Established problem. Stable. On statin  Plan: cont meds    Essential hypertension -Established problem. Stable. 118/79  Plan: Continue medications. Continue to check BP q shift. CBC and BMP ordered to monitor for disease progression, medication side effect. DM2 (diabetes mellitus, type 2) ([facility] [address] -Established problem. Stable. glu 117  Plan: Continue on CCC diet, home medications. CBC and BMP ordered to monitor sugars and for other medication complications. Fingerstick glucose ordered to check for alterations in levels throughout day. Sliding scale insulin ordered to cover fingerstick levels.      Disp - per cards    Case discussed with: cards  Tests ordered/reviewed: cbc, bmp, cath, K            (Please note that portions of this note were completed with a voice recognition program.  Efforts were made to edit the dictations but occasionally words are mis-transcribed.)        Laron Allen MD  1/27/2023

## 2023-01-27 NOTE — PROGRESS NOTES
Pt awake in bed. VSS and medications given. Denies any needs. Call light in reach and bed alarm engaged.

## 2023-01-27 NOTE — ADT AUTH CERT
Subscriber Details  Hospital Account [de-identified]  CVG Subscriber Name/Sex/Relation Subscriber  Subscriber Address/Phone Subscriber Emp/Emp Phone   7. [phone] [facility] [address]   [id number] Irasema Runner Female   (Self) 1945 [address] [facility], [address] [facility]   [phone](V)   [phone](O) RETIRED      Utilization Reviews       PA Rec IP by Mercedes Li RN       Review Status Review Entered   In Primary 2023 1244       Created By   Mecredes Li RN      Criteria Review   obs list upgrade  We recommend that the following pt's current hospitalization under OBSERVATION status is upgraded to INPATIENT; if you agree, please place a new ADMIT order in [facility] as recommended. .       Name: Mirna Mayorga   : 1945   CSN: [id number]   [facility]        Clinical summary    Assessment and Plan:  Principal Problem:  Chest pain -Established problem. Improving. No chest pain this am. Trop neg  Plan: per cards eval - cath today  Active Problems:  Hyperlipemia -Established problem. Stable. On statin  Plan: cont meds  Essential hypertension -Established problem. Stable. 112/54  Plan: Continue medications. Continue to check BP q shift. CBC and BMP ordered to monitor for disease progression, medication side effect. DM2 (diabetes mellitus, type 2) ([facility] -Established problem. Stable. glu 162  Plan: Continue on CCC diet, home medications. CBC and BMP ordered to monitor sugars and for other medication complications. Fingerstick glucose ordered to check for alterations in levels throughout day.  Sliding scale insulin ordered to cover fingerstick levels  Vitals vss  Labs and Imaging reviewed  MCG criteria applies yes,   Comments Rec upgrade to inpt      This chart was reviewed at 10:27 AM 2023    [address] [place], [place] [address]th [place]   CELL : [phone]  _______________________________________________________________________________________ Chest Pain - Care Day 2 (1/25/2023) by Destiny Melton RN       Review Status Review Entered   Completed 1/26/2023 0938       Created By   Destiny Melton RN      Criteria Review      Care Day: 2 Care Date: 1/25/2023 Level of Care: Telemetry    Guideline Day 1    Level Of Care    (X) ICU, intermediate care, or telemetry    1/26/2023 9:38 AM EST by Santiago Lynne      tele    Clinical Status    ( ) * Clinical Indications met    Activity    ( ) Bed rest    1/26/2023 9:38 AM EST by Santiago Lynne      up as tolerated    Routes    ( ) Diet as tolerated    1/26/2023 9:38 AM EST by Santiago Lynne      NPO    (X) IV or oral medications    1/26/2023 9:38 AM EST by Santiago Lynne      po    Interventions    ( ) Serial ECGs    1/26/2023 9:38 AM EST by Santiago Lynne      ekg    (X) Troponin    (X) Oxygen    (X) Arrange stress test, invasive procedures as needed    Medications    (X) Possible oral nitrates    1/26/2023 9:38 AM EST by Santiago Lynne      ntg sl prn    (X) Possible antiplatelet agents, anticoagulants    1/26/2023 9:38 AM EST by Merline St  lovenox  asa    (X) Possible beta-blocker    1/26/2023 9:38 AM EST by Santiago Lynne      metoprolol    (X) Possible statin    1/26/2023 9:38 AM EST by Santiago Lynne      atorvastatin    (X) Antihypertensive medication as needed to control blood pressure    1/26/2023 9:38 AM EST by Santiago Lynne      hydralazine iv prn    * Milestone   Additional Notes   DATE:   1/25  CD2      Relevant baselines: (lab values, vitals, o2 amount/delivery, etc.)   RA         Pertinent Updates:   Heart Cath planned tomorrow - pending resp status   Tachypnea,   oxygen need 3L/NC    91% - episodes of wheezing - duonebs increased to qid, also needed prn dose x1   hyperglycemic      Vitals:     97.8    22, 20, 20, 22     105/59    91% on 3L/NC         Abnl/Pertinent Labs/Radiology/Diagnostic Studies:    bun 21   glucose 325, 230, 181   HDL 34    Triglycerides 157   AST 12         ekg   rate 77   Sinus rhythm with frequent Premature ventricular complexes in a pattern of bigeminyLeft axis deviationSeptal infarct , age undeterminedNon-specific intra-ventricular conduction block            Physical Exam:      Neck: no carotid bruit, supple, symmetrical, trachea midline and thyroid not enlarged, symmetric, no tenderness/mass/nodules      Respiratory: clear to auscultation and percussion bilaterally with normal respiratory effort     Cardiovascular: normal rate, regular rhythm, normal S1 and S2 and no murmurs           MD Consults/Assessments & Plans:   IM PN:   Assessment and Plan:   Principal Problem:     Chest pain -Established problem. Improving. No chest pain this am. Trop neg   Plan: await cards eval. If no further workup recommended, could go home   Active Problems:     Hyperlipemia -Established problem. Stable. On statin   Plan: cont meds     Essential hypertension -Established problem. Stable. 125/39   Plan: Continue medications. Continue to check BP q shift. CBC and BMP ordered to monitor for disease progression, medication side effect. DM2 (diabetes mellitus, type 2) ([address] -Established problem. Stable. glu 325   Plan: Continue on CCC diet, home medications. CBC and BMP ordered to monitor sugars and for other medication complications. Fingerstick glucose ordered to check for alterations in levels throughout day. Sliding scale insulin ordered to cover fingerstick levels. Cardiology PN:   Impression/Recommendations       Ms. Pao Che is a 69 y.o. female patient of Dr. Marti Russell with       Chest pain, concerning for Class IV angina   CAD: hx remote PCI to LAD and D2, Moderate nonobstructive disease by 2019 [facility] (pRCA 70% stenosis, FFR 0.84)   PAF, new dx 8/2022 (setting of recurrent CVA), on [facility]   Hypertension   Hyperlipidemia, suboptimal on Rosuvastatin 10 mg (1/2023:   HDL 34 )   Diabetes mellitus, NID A1C 7.3 (8/2022)   COPD   Obesity    GERD   AKIL, cannot tolerate CPAP   Former tobacco use           Breakthrough angina on Toprol/Imdur; nonischemic SPECT six months ago. Increased PVC burden as well. Last Xarelto dose 0900 on 1/24. Lovenox in interim. Documented ASA allergy. Tolerated Plavix for remote PCI. Plan for [facility] tomorrow if respiratory status remains stable.            Medications:   Diamox 250mg po daily   Aspirin 81mg po daily   Lipitor 40mg po nightly   Lovenox 120mg sc bid   Lasix 40mg po bid   Glucotrol 5mg po bid   Duoneb 1 ampule qid   Imdur 30mg po bid   Llisinopril 2.5mg po daily   Metoprolol 25mg po daily   Protonix 40mg po daily   Xarelto 15mg po daily - held   Xanax 1mg po tid prn x2   Robitussin dm 100-10mg/5ml, 5ml po qid prn x2   Duoneb 1 ampule inh q4hrs prn x1            Orders:   consult cardiology   NPO   Acapella                       Chest Pain - Care Day 1 (1/24/2023) by Harriett Pantoja RN       Review Status Review Entered   Completed 1/25/2023 1155       Created By   Harriett Pantoja RN      Criteria Review      Care Day: 1 Care Date: 1/24/2023 Level of Care: Inpatient Floor    Guideline Day 1    Level Of Care    ( ) ICU, intermediate care, or telemetry    1/25/2023 11:55 AM EST by Josie Nicolas      med surg    Clinical Status    ( ) * Clinical Indications met    Routes    (X) Diet as tolerated    1/25/2023 11:55 AM EST by Josie Nicolas      reg, no caffeine diet  NPO after midnight    (X) IV or oral hydration    1/25/2023 11:55 AM EST by Josie Nicolas      po    (X) IV or oral medications    1/25/2023 11:55 AM EST by Josie Nicolas      iv and po meds    Interventions    ( ) Serial ECGs    1/25/2023 11:55 AM EST by Josie Nicolas      ekg    (X) Troponin    (X) Oxygen    1/25/2023 11:55 AM EST by Josie Nicolas      2L/NC    (X) Arrange stress test, invasive procedures as needed    Medications    (X) Possible oral nitrates    1/25/2023 11:55 AM EST by Magalene Stair      ntg sl prn    (X) Possible antiplatelet agents, anticoagulants    1/25/2023 11:55 AM EST by Magalene Stair      asa  lovenox    (X) Possible beta-blocker    1/25/2023 11:55 AM EST by Magalene Stair      metoprolol    (X) Possible statin    1/25/2023 11:55 AM EST by Magalene Stair      atorvastatin    (X) Antihypertensive medication as needed to control blood pressure    1/25/2023 11:55 AM EST by Magalene Stair      hydralazine iv prn    * Milestone   Additional Notes   DATE:   1/24   ED/CD1         Chief Complaint/ED Presentation & Treatment/Diagnosis/Op Note:   This is a 69 y.o. female who presents to the ED for chest pain. Pressure-like. Radiates towards her back. Never had before. Ongoing 3 days. Associate with shortness of breath. No fevers or chills. Has been having cough. Does not feel like her COPD. No unilateral leg swelling. Her weight has been stable when she checks it daily. No abdominal pain or headache. No clear exacerbating remitting factors. .           Relevant baselines: (lab values, vitals, o2 amount/delivery, etc.)   RA            Vitals:   98.2       22, 21, 24, 23, 22       68       91/51, 84/73    92% on 2L/NC      Abnl/Pertinent Labs/Radiology/Diagnostic Studies:   glucose 130, 296   wbc 12.3   rbc 5.25         ct chest pe   No evidence of pulmonary embolism or acute pulmonary abnormality      ekg   rate 71   Sinus rhythm with frequent Premature ventricular complexesLeft axis deviationLeft bundle branch block               ED TX:   decadron 10mg iv x1   duoneb 1 ampule inh x1            Admission Diagnosis/OP Note:   Chest Pain      Pertinent Medical History:       has a past medical history of Acute MI ([facility] Acute pyelonephritis, Anxiety and depression, Arthritis, CAD (coronary artery disease), Cancer ([address] Cancer ([address] Cancer of skin of leg, Chronic obstructive pulmonary disease ([facility] Claustrophobia, COPD (chronic obstructive pulmonary disease) ([facility] ESBL (extended spectrum beta-lactamase) producing bacteria infection, Esophageal stricture, Gastroesophageal reflux disease without esophagitis, Hiatal hernia, Hiatal hernia, Hypercholesteremia, Hypertension, IBS (irritable bowel syndrome), Migraines, Movement disorder, Pneumonia, PONV (postoperative nausea and vomiting), Type II or unspecified type diabetes mellitus without mention of complication, not stated as uncontrolled, and Unspecified cerebral artery occlusion with cerebral infarction. Physical Exam:   Constitutional: In no acute distress   HENT:       Head: Normocephalic       Ears: External ears normal.       Nose: Nose normal.      Mouth: Membrane mucosa moist    Eyes: No discharge. Neck: Supple. Trachea midline. Cardiovascular: Regular rate. Warm extremities   Pulmonary/Chest: Effort normal. No respiratory distress. Abdominal: Soft. No distension. Nontender   : Deferred   Rectal: Deferred    Musculoskeletal: Moves all extremities. No gross deformity. Neurological: Alert and oriented. Face symmetric. Speech is clear. Skin: Warm and dry. Psychiatric: Normal mood and affect. Behavior is normal.          MD Consults/Assessments & Plans:   IM PN:          MEDICAL DECISION MAKING:       Principal Problem:     Chest pain -New Problem to me. Plan: Pt to be admitted to telemetry floor. Serial cardiac enzymes ordered. GXT myoview to be ordered. Will order ASA, NTG. Pt has IV morphine ordered for pain control. LMWH to be given. Active Problems:     Hyperlipemia -Established problem. Stable. Plan: cont statin     Essential hypertension -Established problem. Stable. 101/53   Plan: Pt home BP meds reviewed and will be continued. IV Hydralazine ordered for control of extremely high blood pressures. Will monitor labs to assess Creat/K for possible complications of medications.       DM[facility] type [facility]) [facility]   Plan: Patient placed on controlled carbohydrate diet. Fingerstick sugars to be checked to monitor for both hypoglycemia as well as hyperglycemia. Sliding scale insulin ordered. Glucagon and dextrose ordered for hypoglycemia. Patient will be continued on home medications. Hemoglobin a1c to be ordered to assess efficacy of therapy.               Medications:   Atorvastatin 40mg po nightly   Decadron 10mg iv once   Lasix 40mg po bid   Neurontin 600mg po tid   Duoneb 1 ampule inh tid   Xanax 1mg po tid prn x1         Orders:   consult im   reg, no caffeine diet   contact isolation esbl   tele   NPO after midnight   oxygen prn   I&O   daily weights                       Chest Pain - Clinical Indications for Admission to Inpatient Care by Mercedes Li RN       Review Status Review Entered   Completed 1/25/2023 1149       Created By   Mercedes Li RN      Criteria Review      Clinical Indications for Admission to Inpatient Care    Most Recent : Kevin Cardona Most Recent Date: 1/25/2023 11:49 AM EST     (X) Other Indication: s/s      Entered 1/25/2023 11:49 AM EST by Kevin Cardona     chest pain x3 days,   sob this am

## 2023-01-27 NOTE — PROGRESS NOTES
Last units of air removed and TR band complete. No signs of bleeding noted. Extremity warm to touch and moderate pulses.

## 2023-01-27 NOTE — PLAN OF CARE
Problem: Discharge Planning  Goal: Discharge to home or other facility with appropriate resources  Outcome: Progressing     Problem: Pain  Goal: Verbalizes/displays adequate comfort level or baseline comfort level  1/26/2023 2216 by Eliza Monk RN  Outcome: Progressing  1/26/2023 1111 by Lorenzo Nixon RN  Outcome: Progressing     Problem: Safety - Adult  Goal: Free from fall injury  1/26/2023 2216 by Eliza Monk RN  Outcome: Progressing  1/26/2023 1111 by Lorenzo Nixon RN  Outcome: Progressing     Problem: ABCDS Injury Assessment  Goal: Absence of physical injury  Outcome: Progressing     Problem: Skin/Tissue Integrity  Goal: Absence of new skin breakdown  Description: 1. Monitor for areas of redness and/or skin breakdown  2. Assess vascular access sites hourly  3. Every 4-6 hours minimum:  Change oxygen saturation probe site  4. Every 4-6 hours:  If on nasal continuous positive airway pressure, respiratory therapy assess nares and determine need for appliance change or resting period.   1/26/2023 2216 by Eliza Monk RN  Outcome: Progressing  1/26/2023 1111 by Lorenzo Nixon RN  Outcome: Progressing     Problem: Cardiovascular - Adult  Goal: Maintains optimal cardiac output and hemodynamic stability  Outcome: Progressing  Goal: Absence of cardiac dysrhythmias or at baseline  Outcome: Progressing

## 2023-01-27 NOTE — PROGRESS NOTES
Pt awake in bed. VSS, assessment complete and medications given. Pt denies any needs. Call light in reach and bed alarm engaged.

## 2023-01-27 NOTE — PLAN OF CARE
Problem: Safety - Adult  Goal: Free from fall injury  Outcome: Progressing   Pt will remain free from falls. Fall precautions in place and alarm engaged. Bedside table and call light within reach. Pt aware to use call light for assistance ambulating. Problem: Skin/Tissue Integrity  Goal: Absence of new skin breakdown  Description: 1. Monitor for areas of redness and/or skin breakdown  2. Assess vascular access sites hourly  3. Every 4-6 hours minimum:  Change oxygen saturation probe site  4. Every 4-6 hours:  If on nasal continuous positive airway pressure, respiratory therapy assess nares and determine need for appliance change or resting period. Outcome: Progressing   Pt will remain free from skin breakdown. Pt turned Q2hrs, skin kept clean and dry, and skin assessed per shift or as needed.      Problem: Cardiovascular - Adult  Goal: Maintains optimal cardiac output and hemodynamic stability  Outcome: Progressing   VSS and monitored q4 hrs

## 2023-01-27 NOTE — PROGRESS NOTES
Cardiovascular Progress Note      Chief Complaint:   Chief Complaint   Patient presents with    Chest Pain     Pt brought in by  EMS from home due to CP that started 3 days ago, SOB started this morning, hx of COPD, presents with cough/wheezing/rhonchi in her lungs, PVCs/bigeminy. Pt states that chest pain goes to her back that is pressure, afebrile, A and O x 4. Impression/Recommendations:    Unstable angina  CAD: hx remote PCI to LAD and D2, Moderate nonobstructive disease by 2019 [facility] (pRCA 70% stenosis, FFR 0.84)  PAF, new dx 8/2022 (setting of recurrent CVA), on [facility]  Hypertension  Hyperlipidemia, suboptimal on Rosuvastatin 10 mg (1/2023:   HDL 34 )  Diabetes mellitus, NID A1C 7.3 (8/2022)  COPD  Obesity   GERD  AKIL, cannot tolerate CPAP  Former tobacco use       Severe multivessel coronary disease by 1/26/23 [facility]. Rene Duenas is not interested in surgical opinion for CABG and is likely a poor candidate from a pulmonary perspective at a minimum. She prefers to discuss complex multivessel PCI with her daughter and granddaughter. IV diuresis and Plavix loading prior to potential return for staged PCI of LAD and RCA. Continue therapeutic AC, beta blocker, nitrates. Interval History:  No events overnight. No chest pain. 1-2 L NC Oxygen. Able to lie flat. Getting breathing treatment during visit. States she wants to talk to daughter and granddaughter by phone with me later. No bleeding issues.   Tele: SR, Frequent PVCs  Right radial c/d/i  274-->270 lbs      1/26/2023  Left Heart Cath  LM-30% proximal, 10-20% distal disease   LAD-proximal mid stents patent with <20% ISR; 80% ostial jailed second diagonal;  hazy 95% distal LAD   LCx-high first marginal with 80% short, ostial stenosis; mild plaquing in circumflex   RCA-dominant, large caliber with 80% calcified, proximal stenosis; 50% distal RPDA   LVEDP- 20 mmHg  LVG: LVEF 60%     Medications:  furosemide (LASIX) injection 40 mg, BID  clopidogrel (PLAVIX) tablet 75 mg, Daily  sodium chloride flush 0.9 % injection 5-40 mL, 2 times per day  sodium chloride flush 0.9 % injection 5-40 mL, PRN  0.9 % sodium chloride infusion, PRN  enoxaparin (LOVENOX) injection 120 mg, BID  dextrose bolus 10% 125 mL, PRN   Or  dextrose bolus 10% 250 mL, PRN  glucagon (rDNA) injection 1 mg, PRN  dextrose 10 % infusion, Continuous PRN  guaiFENesin-dextromethorphan (ROBITUSSIN DM) 100-10 MG/5ML syrup 5 mL, Q4H PRN  ipratropium-albuterol (DUONEB) nebulizer solution 1 ampule, 4x daily  isosorbide mononitrate (IMDUR) extended release tablet 30 mg, BID  aspirin chewable tablet 243 mg, Once  aspirin chewable tablet 81 mg, Daily  sodium chloride (OCEAN, BABY AYR) 0.65 % nasal spray 1 spray, PRN  ipratropium-albuterol (DUONEB) nebulizer solution 1 ampule, Q4H PRN  albuterol sulfate HFA (PROVENTIL;VENTOLIN;PROAIR) 108 (90 Base) MCG/ACT inhaler 2 puff, Q6H PRN  glipiZIDE (GLUCOTROL) tablet 5 mg, BID AC  metoprolol succinate (TOPROL XL) extended release tablet 25 mg, Daily  [Held by provider] rivaroxaban (XARELTO) tablet 15 mg, Daily  [Held by provider] lisinopril (PRINIVIL;ZESTRIL) tablet 2.5 mg, Daily  acetaZOLAMIDE (DIAMOX) tablet 250 mg, Daily  gabapentin (NEURONTIN) capsule 600 mg, TID  ALPRAZolam (XANAX) tablet 1 mg, TID PRN  pantoprazole (PROTONIX) tablet 40 mg, QAM AC  sodium chloride flush 0.9 % injection 5-40 mL, 2 times per day  sodium chloride flush 0.9 % injection 10 mL, PRN  0.9 % sodium chloride infusion, PRN  ondansetron (ZOFRAN-ODT) disintegrating tablet 4 mg, Q8H PRN   Or  ondansetron (ZOFRAN) injection 4 mg, Q6H PRN  acetaminophen (TYLENOL) tablet 650 mg, Q6H PRN   Or  acetaminophen (TYLENOL) suppository 650 mg, Q6H PRN  magnesium hydroxide (MILK OF MAGNESIA) 400 MG/5ML suspension 30 mL, Daily PRN  atorvastatin (LIPITOR) tablet 40 mg, Nightly  nitroGLYCERIN (NITROSTAT) SL tablet 0.4 mg, Q5 Min PRN  hydrALAZINE (APRESOLINE) injection 10 mg, Q6H PRN  0.9 % sodium chloride bolus, PRN  potassium chloride (KLOR-CON M) extended release tablet 40 mEq, PRN   Or  potassium bicarb-citric acid (EFFER-K) effervescent tablet 40 mEq, PRN   Or  potassium chloride 10 mEq/100 mL IVPB (Peripheral Line), PRN  dextrose 10 % infusion, Continuous PRN      I/O:     Intake/Output Summary (Last 24 hours) at 1/27/2023 0636  Last data filed at 1/27/2023 6051  Gross per 24 hour   Intake 240 ml   Output 3200 ml   Net -2960 ml       Physical Exam:    /79   Pulse 71   Temp 98.3 °F (36.8 °C) (Oral)   Resp 19   Ht 5' 5\" (1.651 m)   Wt 270 lb 9.6 oz (122.7 kg)   SpO2 96%   BMI 45.03 kg/m²   Wt Readings from Last 3 Encounters:   01/27/23 270 lb 9.6 oz (122.7 kg)   01/13/23 269 lb (122 kg)   12/14/22 270 lb (122.5 kg)       GENERAL: Well developed, well nourished, no acute distress  NEUROLOGICAL: Alert and oriented x3  PSYCH: Normal mood and affect   SKIN: Warm and dry, without lesions  HEENT: Normocephalic, atraumatic, Sclera non-icteric, mucous membranes moist  NECK: supple, JVP normal, thyroid not enlarged   CAROTID: Normal upstroke, no bruits  CARDIAC: Normal PMI, regular rate and rhythm, normal S1S2, no murmur, rub  RESPIRATORY: Normal respiratory effort, clear to auscultation bilaterally  EXTREMITIES: No cyanosis, clubbing or edema, palpable pulses bilaterally   MUSCULOSKELETAL: No joint swelling or tenderness, no chest wall tenderness  GASTROINTESTINAL:  soft, non-tender, no bruit    Data Review:    CBC:   Recent Labs     01/25/23  0516 01/26/23  0449 01/27/23  0431   WBC 11.0 13.8* 10.7   HGB 13.6 12.9 13.3   HCT 43.0 39.7 41.1   MCV 84.4 83.8 84.2    190 188     BMP:   Recent Labs     01/25/23  0516 01/26/23  0449 01/27/23  0431    137 137   K 4.0 4.2 4.3    102 102   CO2 23 25 28   BUN 21* 26* 28*   CREATININE 1.2 1.0 1.1     LFTS:   Recent Labs     01/24/23  1624 01/25/23  0516   ALT 12 12   AST 15 12*   ALKPHOS 86 84   PROT 6.7 6.5   AGRATIO 1.2 1.2 BILITOT <0.2 <0.2     Cardiac Enzymes:   Recent Labs     01/24/23  1624 01/24/23  2150 01/25/23  0038   TROPONINI <0.01 <0.01 <0.01       Yary Summers DO, [facility]  Interventional Cardiology     o: [phone]  [address] [place] [address]., [address] [facility], [address] [place] [address]      NOTE:  This report was transcribed using voice recognition software. Every effort was made to ensure accuracy; however, inadvertent computerized transcription errors may be present.

## 2023-01-28 PROBLEM — E66.813 CLASS 3 SEVERE OBESITY DUE TO EXCESS CALORIES WITH SERIOUS COMORBIDITY AND BODY MASS INDEX (BMI) OF 45.0 TO 49.9 IN ADULT: Status: ACTIVE | Noted: 2018-06-18

## 2023-01-28 LAB
ANION GAP SERPL CALCULATED.3IONS-SCNC: 9 MMOL/L (ref 3–16)
BASOPHILS ABSOLUTE: 0.1 K/UL (ref 0–0.2)
BASOPHILS RELATIVE PERCENT: 0.6 %
BUN BLDV-MCNC: 22 MG/DL (ref 7–20)
CALCIUM SERPL-MCNC: 9.4 MG/DL (ref 8.3–10.6)
CHLORIDE BLD-SCNC: 103 MMOL/L (ref 99–110)
CO2: 27 MMOL/L (ref 21–32)
CREAT SERPL-MCNC: 1 MG/DL (ref 0.6–1.2)
EOSINOPHILS ABSOLUTE: 0.2 K/UL (ref 0–0.6)
EOSINOPHILS RELATIVE PERCENT: 1.6 %
GFR SERPL CREATININE-BSD FRML MDRD: 58 ML/MIN/{1.73_M2}
GLUCOSE BLD-MCNC: 152 MG/DL (ref 70–99)
GLUCOSE BLD-MCNC: 164 MG/DL (ref 70–99)
GLUCOSE BLD-MCNC: 175 MG/DL (ref 70–99)
GLUCOSE BLD-MCNC: 198 MG/DL (ref 70–99)
GLUCOSE BLD-MCNC: 201 MG/DL (ref 70–99)
HCT VFR BLD CALC: 40.1 % (ref 36–48)
HEMOGLOBIN: 13.1 G/DL (ref 12–16)
LYMPHOCYTES ABSOLUTE: 2 K/UL (ref 1–5.1)
LYMPHOCYTES RELATIVE PERCENT: 21.7 %
MCH RBC QN AUTO: 27.5 PG (ref 26–34)
MCHC RBC AUTO-ENTMCNC: 32.8 G/DL (ref 31–36)
MCV RBC AUTO: 84 FL (ref 80–100)
MONOCYTES ABSOLUTE: 0.6 K/UL (ref 0–1.3)
MONOCYTES RELATIVE PERCENT: 6.9 %
NEUTROPHILS ABSOLUTE: 6.5 K/UL (ref 1.7–7.7)
NEUTROPHILS RELATIVE PERCENT: 69.2 %
PDW BLD-RTO: 15.9 % (ref 12.4–15.4)
PERFORMED ON: ABNORMAL
PLATELET # BLD: 166 K/UL (ref 135–450)
PMV BLD AUTO: 8.7 FL (ref 5–10.5)
POTASSIUM SERPL-SCNC: 4 MMOL/L (ref 3.5–5.1)
PRO-BNP: 859 PG/ML (ref 0–449)
RBC # BLD: 4.77 M/UL (ref 4–5.2)
SODIUM BLD-SCNC: 139 MMOL/L (ref 136–145)
WBC # BLD: 9.3 K/UL (ref 4–11)

## 2023-01-28 PROCEDURE — 83880 ASSAY OF NATRIURETIC PEPTIDE: CPT

## 2023-01-28 PROCEDURE — 80048 BASIC METABOLIC PNL TOTAL CA: CPT

## 2023-01-28 PROCEDURE — 6370000000 HC RX 637 (ALT 250 FOR IP): Performed by: INTERNAL MEDICINE

## 2023-01-28 PROCEDURE — 2700000000 HC OXYGEN THERAPY PER DAY

## 2023-01-28 PROCEDURE — 6360000002 HC RX W HCPCS: Performed by: INTERNAL MEDICINE

## 2023-01-28 PROCEDURE — 85025 COMPLETE CBC W/AUTO DIFF WBC: CPT

## 2023-01-28 PROCEDURE — 2580000003 HC RX 258: Performed by: INTERNAL MEDICINE

## 2023-01-28 PROCEDURE — 1200000000 HC SEMI PRIVATE

## 2023-01-28 PROCEDURE — 94640 AIRWAY INHALATION TREATMENT: CPT

## 2023-01-28 PROCEDURE — 94761 N-INVAS EAR/PLS OXIMETRY MLT: CPT

## 2023-01-28 PROCEDURE — 99233 SBSQ HOSP IP/OBS HIGH 50: CPT | Performed by: CLINICAL NURSE SPECIALIST

## 2023-01-28 PROCEDURE — 94760 N-INVAS EAR/PLS OXIMETRY 1: CPT

## 2023-01-28 RX ORDER — IBUPROFEN 400 MG/1
400 TABLET ORAL 3 TIMES DAILY PRN
Status: DISCONTINUED | OUTPATIENT
Start: 2023-01-28 | End: 2023-01-31 | Stop reason: HOSPADM

## 2023-01-28 RX ADMIN — ATORVASTATIN CALCIUM 40 MG: 40 TABLET, FILM COATED ORAL at 21:31

## 2023-01-28 RX ADMIN — FUROSEMIDE 20 MG: 10 INJECTION, SOLUTION INTRAMUSCULAR; INTRAVENOUS at 08:34

## 2023-01-28 RX ADMIN — ISOSORBIDE MONONITRATE 30 MG: 30 TABLET, EXTENDED RELEASE ORAL at 08:34

## 2023-01-28 RX ADMIN — GABAPENTIN 600 MG: 300 CAPSULE ORAL at 15:12

## 2023-01-28 RX ADMIN — GLIPIZIDE 5 MG: 5 TABLET ORAL at 05:07

## 2023-01-28 RX ADMIN — GUAIFENESIN AND DEXTROMETHORPHAN 5 ML: 100; 10 SYRUP ORAL at 21:31

## 2023-01-28 RX ADMIN — GLIPIZIDE 5 MG: 5 TABLET ORAL at 16:02

## 2023-01-28 RX ADMIN — ACETYLCYSTEINE 400 MG: 100 SOLUTION ORAL; RESPIRATORY (INHALATION) at 09:21

## 2023-01-28 RX ADMIN — GABAPENTIN 600 MG: 300 CAPSULE ORAL at 08:33

## 2023-01-28 RX ADMIN — ALPRAZOLAM 1 MG: 0.5 TABLET ORAL at 08:38

## 2023-01-28 RX ADMIN — Medication 10 ML: at 08:42

## 2023-01-28 RX ADMIN — PANTOPRAZOLE SODIUM 40 MG: 40 TABLET, DELAYED RELEASE ORAL at 05:07

## 2023-01-28 RX ADMIN — ISOSORBIDE MONONITRATE 30 MG: 30 TABLET, EXTENDED RELEASE ORAL at 16:02

## 2023-01-28 RX ADMIN — IPRATROPIUM BROMIDE AND ALBUTEROL SULFATE 1 AMPULE: 2.5; .5 SOLUTION RESPIRATORY (INHALATION) at 09:21

## 2023-01-28 RX ADMIN — LISINOPRIL 2.5 MG: 5 TABLET ORAL at 08:33

## 2023-01-28 RX ADMIN — ALPRAZOLAM 1 MG: 0.5 TABLET ORAL at 16:03

## 2023-01-28 RX ADMIN — CLOPIDOGREL BISULFATE 75 MG: 75 TABLET ORAL at 08:34

## 2023-01-28 RX ADMIN — IPRATROPIUM BROMIDE AND ALBUTEROL SULFATE 1 AMPULE: 2.5; .5 SOLUTION RESPIRATORY (INHALATION) at 19:50

## 2023-01-28 RX ADMIN — ASPIRIN 81 MG 81 MG: 81 TABLET ORAL at 08:34

## 2023-01-28 RX ADMIN — Medication 10 ML: at 08:32

## 2023-01-28 RX ADMIN — FUROSEMIDE 20 MG: 10 INJECTION, SOLUTION INTRAMUSCULAR; INTRAVENOUS at 18:32

## 2023-01-28 RX ADMIN — Medication 10 ML: at 21:35

## 2023-01-28 RX ADMIN — ACETAZOLAMIDE 250 MG: 250 TABLET ORAL at 08:33

## 2023-01-28 RX ADMIN — IPRATROPIUM BROMIDE AND ALBUTEROL SULFATE 1 AMPULE: 2.5; .5 SOLUTION RESPIRATORY (INHALATION) at 03:54

## 2023-01-28 RX ADMIN — GABAPENTIN 600 MG: 300 CAPSULE ORAL at 21:31

## 2023-01-28 RX ADMIN — GUAIFENESIN AND DEXTROMETHORPHAN 5 ML: 100; 10 SYRUP ORAL at 03:44

## 2023-01-28 RX ADMIN — IPRATROPIUM BROMIDE AND ALBUTEROL SULFATE 1 AMPULE: 2.5; .5 SOLUTION RESPIRATORY (INHALATION) at 11:14

## 2023-01-28 RX ADMIN — ACETYLCYSTEINE 400 MG: 100 SOLUTION ORAL; RESPIRATORY (INHALATION) at 19:50

## 2023-01-28 RX ADMIN — IBUPROFEN 400 MG: 400 TABLET, FILM COATED ORAL at 22:26

## 2023-01-28 RX ADMIN — DICLOFENAC SODIUM 4 G: 10 GEL TOPICAL at 08:38

## 2023-01-28 RX ADMIN — ENOXAPARIN SODIUM 120 MG: 150 INJECTION SUBCUTANEOUS at 21:31

## 2023-01-28 RX ADMIN — ENOXAPARIN SODIUM 120 MG: 150 INJECTION SUBCUTANEOUS at 08:34

## 2023-01-28 ASSESSMENT — PAIN SCALES - GENERAL
PAINLEVEL_OUTOF10: 4
PAINLEVEL_OUTOF10: 2
PAINLEVEL_OUTOF10: 0
PAINLEVEL_OUTOF10: 4
PAINLEVEL_OUTOF10: 0
PAINLEVEL_OUTOF10: 10
PAINLEVEL_OUTOF10: 7

## 2023-01-28 ASSESSMENT — PAIN DESCRIPTION - LOCATION: LOCATION: CHEST

## 2023-01-28 NOTE — PROGRESS NOTES
Physician Progress Note      Estrada Wharton  CSN #:                  [id number]  :                       1945  ADMIT DATE:       2023 3:54 PM  [address] [place] DATE:  Marielyarl Barthel  PROVIDER #:        Vanessa Gavin MD          QUERY TEXT:    Patient admitted with chest pain, noted to have paroxysmal atrial fibrillation   and is maintained on Xarelto. If possible, please document in progress notes   and discharge summary if you are evaluating and/or treating any of the   following: The medical record reflects the following:  Risk Factors: 69year old female, HTN, DM, hx of CVA  Clinical Indicators: PAF, new dx 2022 (setting of recurrent CVA), on [address]  Treatment: Labs, Cardio consult, LHC, Xarelto  Options provided:  -- Secondary hypercoagulable state in a patient with atrial fibrillation  -- Other - I will add my own diagnosis  -- Disagree - Not applicable / Not valid  -- Disagree - Clinically unable to determine / Unknown  -- Refer to Clinical Documentation Reviewer    PROVIDER RESPONSE TEXT:    This patient has secondary hypercoagulable state in a patient with atrial   fibrillation.     Query created by: Aravind Ponce on 2023 1:54 PM      Electronically signed by:  Vanessa Gavin MD 2023 10:17 AM

## 2023-01-28 NOTE — RT PROTOCOL NOTE
RT Inhaler-Nebulizer Bronchodilator Protocol Note    There is a bronchodilator order in the chart from a provider indicating to follow the RT Bronchodilator Protocol and there is an Initiate RT Inhaler-Nebulizer Bronchodilator Protocol order as well (see protocol at bottom of note). CXR Findings:  No results found. The findings from the last RT Protocol Assessment were as follows:   History Pulmonary Disease: Chronic pulmonary disease  Respiratory Pattern: Mild dyspnea at rest, irregular pattern, or RR 21-25 bpm  Breath Sounds: Inspiratory and expiratory or bilateral wheezing and/or rhonchi  Cough: Strong, productive  Indication for Bronchodilator Therapy: On home bronchodilators  Bronchodilator Assessment Score: 13    Aerosolized bronchodilator medication orders have been revised according to the RT Inhaler-Nebulizer Bronchodilator Protocol below. Respiratory Therapist to perform RT Therapy Protocol Assessment initially then follow the protocol. Repeat RT Therapy Protocol Assessment PRN for score 0-3 or on second treatment, BID, and PRN for scores above 3. No Indications - adjust the frequency to every 6 hours PRN wheezing or bronchospasm, if no treatments needed after 48 hours then discontinue using Per Protocol order mode. If indication present, adjust the RT bronchodilator orders based on the Bronchodilator Assessment Score as indicated below. Use Inhaler orders unless patient has one or more of the following: on home nebulizer, not able to hold breath for 10 seconds, is not alert and oriented, cannot activate and use MDI correctly, or respiratory rate 25 breaths per minute or more, then use the equivalent nebulizer order(s) with same Frequency and PRN reasons based on the score. If a patient is on this medication at home then do not decrease Frequency below that used at home.     0-3 - enter or revise RT bronchodilator order(s) to equivalent RT Bronchodilator order with Frequency of every 4 hours PRN for wheezing or increased work of breathing using Per Protocol order mode. 4-6 - enter or revise RT Bronchodilator order(s) to two equivalent RT bronchodilator orders with one order with BID Frequency and one order with Frequency of every 4 hours PRN wheezing or increased work of breathing using Per Protocol order mode. 7-10 - enter or revise RT Bronchodilator order(s) to two equivalent RT bronchodilator orders with one order with TID Frequency and one order with Frequency of every 4 hours PRN wheezing or increased work of breathing using Per Protocol order mode. 11-13 - enter or revise RT Bronchodilator order(s) to one equivalent RT bronchodilator order with QID Frequency and an Albuterol order with Frequency of every 4 hours PRN wheezing or increased work of breathing using Per Protocol order mode. Greater than 13 - enter or revise RT Bronchodilator order(s) to one equivalent RT bronchodilator order with every 4 hours Frequency and an Albuterol order with Frequency of every 2 hours PRN wheezing or increased work of breathing using Per Protocol order mode. RT to enter RT Home Evaluation for COPD & MDI Assessment order using Per Protocol order mode.     Electronically signed by Kim Garcia RCP on 1/28/2023 at 9:29 AM

## 2023-01-28 NOTE — PROGRESS NOTES
Speech Language Pathology  Attempt   Lindy Bernstein   1945     1100 AM Second attempt made to see pt to complete evaluation. Pt reporting she needs assistance with getting cleaned up prior to participating in assessment. SLP notified RN. Will re-attempt to follow-up as therapy schedule allows. 5866 AM SLP evaluation orders received. Attempted to see pt for evaluation. Respiratory in with pt at time of attempt. Will re-attempt to follow-up as schedule allows.     Thanks,  Naun Beasley, [address]  Speech Language Pathologist

## 2023-01-28 NOTE — PROGRESS NOTES
Progress Note - Dr. Osvaldo Qiu - Internal Medicine  PCP: Chuyita Henderson MD [address] [facility] / [facility] [phone]    Hospital Day: 2  Code Status: Full Code  Current Diet: ADULT DIET; Regular; 4 carb choices (60 gm/meal); Low Fat/Low Chol/High Fiber/2 gm Na  Diet NPO        CC: follow up on medical issues    Subjective:   Gretta Hidalgo is a 69 y.o. female. Pt seen and examined  Chart reviewed since last visit, labs and imaging below      Doing about the same  Mild chest pain this AM      Plan is for iv diuresis, poss staged pci Monday per Dr Jessica Hughes  Total -5400 cc out    Review of Systems: (1 system for EPF, 2-9 for detailed, 10+ for comprehensive)  Constitutional: Negative for chills and fever. HENT: Negative for dental problem, nosebleeds and rhinorrhea. Eyes: Negative for photophobia and visual disturbance. Respiratory: Negative for cough, chest tightness and positive for shortness of breath. Cardiovascular: positive for chest pain and leg swelling. Gastrointestinal: Negative for diarrhea, nausea and vomiting. Endocrine: Negative for polydipsia and polyphagia. Genitourinary: Negative for frequency, hematuria and urgency. Musculoskeletal: Negative for back pain and myalgias. Skin: Negative for rash. Allergic/Immunologic: Negative for food allergies. Neurological: Negative for dizziness, seizures, syncope and facial asymmetry. Hematological: Negative for adenopathy. Psychiatric/Behavioral: Negative for dysphoric mood. The patient is not nervous/anxious. I have reviewed the patient's medical and social history in detail and updated the computerized patient record.   To recap: She  has a past medical history of Acute MI ([address] Acute pyelonephritis, Anxiety and depression, Arthritis, CAD (coronary artery disease), Cancer ([address] Cancer ([address] Cancer of skin of leg, Chronic obstructive pulmonary disease ([address] Claustrophobia, COPD (chronic obstructive pulmonary disease) (HCC), ESBL (extended spectrum beta-lactamase) producing bacteria infection, Esophageal stricture, Gastroesophageal reflux disease without esophagitis, Hiatal hernia, Hiatal hernia, Hypercholesteremia, Hypertension, IBS (irritable bowel syndrome), Migraines, Movement disorder, Pneumonia, PONV (postoperative nausea and vomiting), Type II or unspecified type diabetes mellitus without mention of complication, not stated as uncontrolled, and Unspecified cerebral artery occlusion with cerebral infarction. . She  has a past surgical history that includes Cardiac surgery; Gallbladder surgery; Hysterectomy; Appendectomy; Cholecystectomy; Colonoscopy; Upper gastrointestinal endoscopy (4/20/12); Endoscopy, colon, diagnostic; Tear duct surgery; Upper gastrointestinal endoscopy (06/11/2018); Colonoscopy (06/11/2018); pr excision malignant lesion s/n/h/f/g 0.5 cm/< (N/A, 9/6/2018); pr split agrft t/a/l 1st 100 cm/&/1% bdy inft/chld (N/A, 9/6/2018); skin biopsy; eye surgery; bronchoscopy (N/A, 1/24/2020); bronchoscopy (N/A, 1/27/2020); Cataract removal (2013 or 2014); Esophagus dilation; and bronchoscopy (N/A, 11/23/2020). . She  reports that she quit smoking about 5 years ago. Her smoking use included cigarettes. She has a 12.25 pack-year smoking history. She has never used smokeless tobacco. She reports current alcohol use of about 1.0 standard drink per week. She reports that she does not use drugs. .        Active Hospital Problems    Diagnosis Date Noted    Hyperlipemia [E78.5] 05/23/2011     Priority: High    Unstable angina ([facility] [I20.0] 01/26/2023     Priority: Medium    Chest pain [R07.9] 10/26/2022     Priority: Medium    Class 3 severe obesity due to excess calories with serious comorbidity and body mass index (BMI) of 45.0 to 49.9 in adult ([facility] [E66.01, Z68.42] 06/18/2018    DM2 (diabetes mellitus, type 2) ([facility] [E11.9] 12/03/2015    Primary hypertension [I10] 04/18/2012       Current Facility-Administered Medications: acetylcysteine (MUCOMYST) 10 % solution 400 mg, 4 mL, Inhalation, BID  diclofenac sodium (VOLTAREN) 1 % gel 4 g, 4 g, Topical, BID  furosemide (LASIX) injection 20 mg, 20 mg, IntraVENous, BID  clopidogrel (PLAVIX) tablet 75 mg, 75 mg, Oral, Daily  sodium chloride flush 0.9 % injection 5-40 mL, 5-40 mL, IntraVENous, 2 times per day  sodium chloride flush 0.9 % injection 5-40 mL, 5-40 mL, IntraVENous, PRN  0.9 % sodium chloride infusion, , IntraVENous, PRN  enoxaparin (LOVENOX) injection 120 mg, 1 mg/kg, SubCUTAneous, BID  dextrose bolus 10% 125 mL, 125 mL, IntraVENous, PRN **OR** dextrose bolus 10% 250 mL, 250 mL, IntraVENous, PRN  glucagon (rDNA) injection 1 mg, 1 mg, SubCUTAneous, PRN  dextrose 10 % infusion, , IntraVENous, Continuous PRN  guaiFENesin-dextromethorphan (ROBITUSSIN DM) 100-10 MG/5ML syrup 5 mL, 5 mL, Oral, Q4H PRN  ipratropium-albuterol (DUONEB) nebulizer solution 1 ampule, 1 ampule, Inhalation, 4x daily  isosorbide mononitrate (IMDUR) extended release tablet 30 mg, 30 mg, Oral, BID  aspirin chewable tablet 243 mg, 243 mg, Oral, Once  aspirin chewable tablet 81 mg, 81 mg, Oral, Daily  sodium chloride (OCEAN, BABY AYR) 0.65 % nasal spray 1 spray, 1 spray, Nasal, PRN  ipratropium-albuterol (DUONEB) nebulizer solution 1 ampule, 1 ampule, Inhalation, Q4H PRN  albuterol sulfate HFA (PROVENTIL;VENTOLIN;PROAIR) 108 (90 Base) MCG/ACT inhaler 2 puff, 2 puff, Inhalation, Q6H PRN  glipiZIDE (GLUCOTROL) tablet 5 mg, 5 mg, Oral, BID AC  metoprolol succinate (TOPROL XL) extended release tablet 25 mg, 25 mg, Oral, Daily  [Held by provider] rivaroxaban (XARELTO) tablet 15 mg, 15 mg, Oral, Daily  lisinopril (PRINIVIL;ZESTRIL) tablet 2.5 mg, 2.5 mg, Oral, Daily  acetaZOLAMIDE (DIAMOX) tablet 250 mg, 250 mg, Oral, Daily  gabapentin (NEURONTIN) capsule 600 mg, 600 mg, Oral, TID  ALPRAZolam (XANAX) tablet 1 mg, 1 mg, Oral, TID PRN  pantoprazole (PROTONIX) tablet 40 mg, 40 mg, Oral, QAM AC  sodium chloride flush 0.9 % injection 5-40 mL, 5-40 mL, IntraVENous, 2 times per day  sodium chloride flush 0.9 % injection 10 mL, 10 mL, IntraVENous, PRN  0.9 % sodium chloride infusion, , IntraVENous, PRN  ondansetron (ZOFRAN-ODT) disintegrating tablet 4 mg, 4 mg, Oral, Q8H PRN **OR** ondansetron (ZOFRAN) injection 4 mg, 4 mg, IntraVENous, Q6H PRN  acetaminophen (TYLENOL) tablet 650 mg, 650 mg, Oral, Q6H PRN **OR** acetaminophen (TYLENOL) suppository 650 mg, 650 mg, Rectal, Q6H PRN  magnesium hydroxide (MILK OF MAGNESIA) 400 MG/5ML suspension 30 mL, 30 mL, Oral, Daily PRN  atorvastatin (LIPITOR) tablet 40 mg, 40 mg, Oral, Nightly  nitroGLYCERIN (NITROSTAT) SL tablet 0.4 mg, 0.4 mg, SubLINGual, Q5 Min PRN  hydrALAZINE (APRESOLINE) injection 10 mg, 10 mg, IntraVENous, Q6H PRN  0.9 % sodium chloride bolus, 500 mL, IntraVENous, PRN  potassium chloride (KLOR-CON M) extended release tablet 40 mEq, 40 mEq, Oral, PRN **OR** potassium bicarb-citric acid (EFFER-K) effervescent tablet 40 mEq, 40 mEq, Oral, PRN **OR** potassium chloride 10 mEq/100 mL IVPB (Peripheral Line), 10 mEq, IntraVENous, PRN  dextrose 10 % infusion, , IntraVENous, Continuous PRN         Objective:  BP (!) 121/51   Pulse 83   Temp 97.7 °F (36.5 °C) (Oral)   Resp 18   Ht 5' 5\" (1.651 m)   Wt 271 lb 11.2 oz (123.2 kg)   SpO2 92%   BMI 45.21 kg/m²      Patient Vitals for the past 24 hrs:   BP Temp Temp src Pulse Resp SpO2 Weight   01/28/23 0922 -- -- -- 83 18 92 % --   01/28/23 0815 (!) 121/51 97.7 °F (36.5 °C) Oral 76 18 93 % --   01/28/23 0500 -- -- -- -- -- -- 271 lb 11.2 oz (123.2 kg)   01/28/23 0354 -- -- -- 71 20 92 % --   01/28/23 0330 (!) 114/47 97.5 °F (36.4 °C) Oral 74 20 92 % --   01/28/23 0325 -- -- -- -- -- 92 % --   01/27/23 2344 121/68 98.4 °F (36.9 °C) Oral 62 20 91 % --   01/27/23 2159 -- -- -- 59 20 92 % --   01/27/23 1926 (!) 139/58 97.6 °F (36.4 °C) Oral (!) 40 20 93 % --   01/27/23 1519 116/64 97.6 °F (36.4 °C) Oral 79 18 94 % --   01/27/23 1226 -- -- -- -- -- 92 % --   01/27/23 1156 -- -- -- -- -- 90 % --   01/27/23 1130 100/61 97.6 °F (36.4 °C) Oral 51 17 (!) 86 % --       Patient Vitals for the past 96 hrs (Last 3 readings):   Weight   01/28/23 0500 271 lb 11.2 oz (123.2 kg)   01/27/23 0445 270 lb 9.6 oz (122.7 kg)   01/26/23 0500 274 lb 8 oz (124.5 kg)             Intake/Output Summary (Last 24 hours) at 1/28/2023 1017  Last data filed at 1/28/2023 0617  Gross per 24 hour   Intake 240 ml   Output 1100 ml   Net -860 ml           Physical Exam: (2-7 system for EPF/Detailed, ?8 for Comprehensive)  BP (!) 121/51   Pulse 83   Temp 97.7 °F (36.5 °C) (Oral)   Resp 18   Ht 5' 5\" (1.651 m)   Wt 271 lb 11.2 oz (123.2 kg)   SpO2 92%   BMI 45.21 kg/m²   Constitutional: vitals as above: alert, appears stated age and cooperative    Psychiatric: normal insight and judgment, oriented to person, place, time, and general circumstances    Head: Normocephalic, without obvious abnormality, atraumatic    Eyes:lids and lashes normal, conjunctivae and sclerae normal and pupils equal, round, reactive to light and accomodation    EMNT: external ears normal, nares midline    Neck: no carotid bruit, supple, symmetrical, trachea midline and thyroid not enlarged, symmetric, no tenderness/mass/nodules     Respiratory: clear to auscultation and percussion bilaterally with normal respiratory effort    Cardiovascular: normal rate, regular rhythm, normal S1 and S2 and no murmurs    Gastrointestinal: soft, non-tender, non-distended, normal bowel sounds, no masses or organomegaly    Extremities: no clubbing, no edema    Skin:No rashes or nodules noted.     Neurologic:negative         Labs:  Lab Results   Component Value Date    WBC 9.3 01/28/2023    HGB 13.1 01/28/2023    HCT 40.1 01/28/2023     01/28/2023    CHOL 180 01/25/2023    TRIG 157 (H) 01/25/2023    HDL 34 (L) 01/25/2023    LDLDIRECT 85 08/03/2022    ALT 12 01/25/2023    AST 12 (L) 01/25/2023  01/28/2023    K 4.0 01/28/2023     01/28/2023    CREATININE 1.0 01/28/2023    BUN 22 (H) 01/28/2023    CO2 27 01/28/2023    TSH 2.34 06/06/2011    INR 0.99 08/01/2022    LABA1C 7.8 01/25/2023    LABMICR YES 10/26/2022     Lab Results   Component Value Date    CKTOTAL 24 (L) 06/07/2018    CKMB 0.29 08/09/2011    TROPONINI <0.01 01/25/2023       Recent Imaging Results are Reviewed:  CT CHEST PULMONARY EMBOLISM W CONTRAST    Result Date: 1/24/2023  EXAMINATION: CTA OF THE CHEST, 1/24/2023 5:13 pm TECHNIQUE: CTA of the chest was performed after the administration of intravenous contrast.  Multiplanar reformatted images are provided for review. MIP images are provided for review. Automated exposure control, iterative reconstruction, and/or weight based adjustment of the mA/kV was utilized to reduce the radiation dose to as low as reasonably achievable. COMPARISON: None HISTORY: ORDERING SYSTEM PROVIDED HISTORY:  Chest pain, dyspnea TECHNOLOGIST PROVIDED HISTORY: Reason for Exam:  Chest pain, dyspnea Decision Support Exception - unselect if not a suspected or confirmed emergency medical condition->Emergency Medical Condition (MA) Reason for Exam:  Chest pain, dyspnea, Chest Pain (Pt brought in by  EMS from home due to CP that started 3 days ago, SOB started this morning, hx of COPD, presents with cough/wheezing/rhonchi in her lungs, PVCs/bigeminy. Pt states that chest pain goes to her back that is pressure, afebrile, A and O x 4). FINDINGS: Pulmonary Arteries: Pulmonary arteries are adequately opacified for evaluation. No evidence of intraluminal filling defect to suggest pulmonary embolism. Main pulmonary artery is normal in caliber. Mediastinum: No evidence of mediastinal lymphadenopathy. The heart and pericardium demonstrate no acute abnormality. There is no acute abnormality of the thoracic aorta. Lungs/pleura: The lungs are without acute process. No focal consolidation or pulmonary edema.   No evidence of pleural effusion or pneumothorax. Upper Abdomen: Limited images of the upper abdomen are unremarkable. Soft Tissues/Bones: No acute bone or soft tissue abnormality. There are multiple chronic mild thoracolumbar compression deformities. No evidence of pulmonary embolism or acute pulmonary abnormality. Lab Results   Component Value Date/Time    GLUCOSE 175 01/28/2023 04:47 AM     Lab Results   Component Value Date/Time    POCGLU 152 01/28/2023 07:29 AM     BP (!) 121/51   Pulse 83   Temp 97.7 °F (36.5 °C) (Oral)   Resp 18   Ht 5' 5\" (1.651 m)   Wt 271 lb 11.2 oz (123.2 kg)   SpO2 92%   BMI 45.21 kg/m²     Assessment and Plan:  Principal Problem:    Unstable angina -Established problem. Stable. Cath with severe multivessel disease. Plan: per Dr Quincy Del Rio, pt does not want CABG. Try to optimize medically, staged PCI on Monday. Cont on iv lasix. Monitor electrolytes. KCl iv ordered  Active Problems:    Hyperlipemia -Established problem. Stable. On statin  Plan: cont meds    Essential hypertension -Established problem. Stable. 121/51  Plan: Continue medications. Continue to check BP q shift. CBC and BMP ordered to monitor for disease progression, medication side effect. DM2 (diabetes mellitus, type 2) ([address] -Established problem. Stable. FSBS 152  Plan: Continue on CCC diet, home medications. CBC and BMP ordered to monitor sugars and for other medication complications. Fingerstick glucose ordered to check for alterations in levels throughout day. Sliding scale insulin ordered to cover fingerstick levels. Case discussed with: cards  Tests ordered/reviewed: cbc, bmp, tele.   On iv med requiring monitoring            (Please note that portions of this note were completed with a voice recognition program.  Efforts were made to edit the dictations but occasionally words are mis-transcribed.)        Rekha Magana MD  1/28/2023

## 2023-01-28 NOTE — PROGRESS NOTES
Speech Language Pathology  Attempt  Mary Shaikh  1945    1:26PM- Third attempt made today to see pt to complete evaluation. Pt asleep upon SLP's arrival but was easily awakened and declines to participate in the bedside evaluation. She reports that she dislikes the meal items she received for her afternoon meal and could not eat more at this time. She requests to complete the evaluation at a later time. SLP notified pt's RN. Will re-attempt to follow up as schedule allows and pt appropriate. Danielle Biggs M.S. [id number] [facility] #SP. [id number] [facility]

## 2023-01-28 NOTE — PROGRESS NOTES
[facility]   Daily Progress Note      Admit Date:  1/24/2023    HPI:    Ms. Erick Watts is a 69year old female with history of PAF, hypertension, hdl, DM,COPD, obesity, GERD, duglas not tolerant of cpap    She is admitted with cough, wheezing and chest pain to back. LHC done which showed severe multivessel CAD 1/26/2023 on LHC. She is not interested in CABG and likely poor candidate from pulmonary standpoint      Subjective:  Patient is being seen for CAD. There were no acute overnight cardiac events. She is lying in the bed on 2 L which is her baseline. She denies any chest pain, palpitations, lightheadedness or increased shortness of breath. She did not like her breakfast this morning. Sats 92-93% creat 1.0 Hgb 13.0    Objective:   BP (!) 121/51   Pulse 71   Temp 97.7 °F (36.5 °C)   Resp 18   Ht 5' 5\" (1.651 m)   Wt 271 lb 11.2 oz (123.2 kg)   SpO2 93%   BMI 45.21 kg/m²     Intake/Output Summary (Last 24 hours) at 1/28/2023 0853  Last data filed at 1/28/2023 0617  Gross per 24 hour   Intake 240 ml   Output 1100 ml   Net -860 ml          Physical Exam:  General:  Awake, alert, oriented in NAD  Skin:  Warm and dry. No unusual bruising or rash  Neck:  Supple. No JVD or carotid bruit appreciated  Chest:  Normal effort.   Bilateral wheezing  Cardiovascular:  RRR, S1/S2, no murmur/gallop/rub  Abdomen:  Soft, nontender, +bowel sounds, obese  Extremities:  No edema  Neurological: No focal deficits  Psychological: Normal mood and affect      Medications:    acetylcysteine  4 mL Inhalation BID    diclofenac sodium  4 g Topical BID    furosemide  20 mg IntraVENous BID    clopidogrel  75 mg Oral Daily    sodium chloride flush  5-40 mL IntraVENous 2 times per day    enoxaparin  1 mg/kg SubCUTAneous BID    ipratropium-albuterol  1 ampule Inhalation 4x daily    isosorbide mononitrate  30 mg Oral BID    aspirin  243 mg Oral Once    aspirin  81 mg Oral Daily    glipiZIDE  5 mg Oral BID AC    metoprolol succinate  25 mg Oral Daily    [Held by provider] rivaroxaban  15 mg Oral Daily    lisinopril  2.5 mg Oral Daily    acetaZOLAMIDE  250 mg Oral Daily    gabapentin  600 mg Oral TID    pantoprazole  40 mg Oral QAM AC    sodium chloride flush  5-40 mL IntraVENous 2 times per day    atorvastatin  40 mg Oral Nightly      sodium chloride      dextrose      sodium chloride      dextrose         Lab Data:  CBC:   Recent Labs     01/26/23  0449 01/27/23  0431 01/28/23  0447   WBC 13.8* 10.7 9.3   HGB 12.9 13.3 13.1    188 166     BMP:    Recent Labs     01/26/23  0449 01/27/23  0431 01/28/23  0447    137 139   K 4.2 4.3 4.0   CO2 25 28 27   BUN 26* 28* 22*   CREATININE 1.0 1.1 1.0     INR:  No results for input(s): INR in the last 72 hours. BNP:  No results for input(s): PROBNP in the last 72 hours. Diagnostics:  [facility] 1/26/2023 Dr [facility]  1/26/2023  Left Heart Cath  LM-30% proximal, 10-20% distal disease   LAD-proximal mid stents patent with <20% ISR; 80% ostial jailed second diagonal;  hazy 95% distal LAD   LCx-high first marginal with 80% short, ostial stenosis; mild plaquing in circumflex   RCA-dominant, large caliber with 80% calcified, proximal stenosis; 50% distal RPDA   LVEDP- 20 mmHg  LVG: LVEF 60%    [facility] 8/2016 ([place])  70% ostial D-jailed by stent  LAD stent patent  Mild CX  60-70 prox RCA  [place]orn[place] LV FXN  Similar to prior cath 3/4/15--CPT. Cardiac Cath with FFR 9/2019 [place]):  LM-Normal No disease   LAD-proximal stent 10% ISR, mid stent patent. Distal LAD 20% irregular stenosis. D2 70% ostial stent FPC  Cx-Patent, mild luminal irregularities  OM1- ostial discrete 60% stenosis  RCA-Dominant, large sized vessel. Proximal 70% heavily calcified stenosis. RPDA- Patent, mild luminal irregularities     FFR of prox RCA 0.84     ECHO 8/2/22   Summary   Technically difficult exam due to body habitus. Normal global systolic function with an ejection fraction estimated at 70%.    No regional wall motion abnormalities noted. Mild concentric left ventricular hypertrophy. Mild aortic stenosis with a peak velocity of 2.17m/s and a mean pressure   gradient of 12 mmHg. There is mild aortic insufficiency. Grade I diastolic dysfunction with normal LV filling pressures. Avg.   E/e'=18.2     SPECT 10/27/22  Summary    There is a mild fixed inferoapical defect with no associated wall motion  abnormality consistent with diaphragmatic attenuation artifact. No evidence of myocardium at risk for significant reversible ischemia. Normal LV size and systolic function. Overall findings represent a low risk scan. Assessment:    1. Chest pain  2. CAD, on plavix  3. PAF  4. Hypertension  5. COPD  6. Obesity  7. Tadeo not tolerant of cpap      Plan:    Continue lisinopril 2.5 mg daily  Continue metoprolol 25 mg daily   Continue IV lasix 20 mg twice a day   Add bnp to blood in lab  Plans for staged PCI on monday      Discussed with patient who is agreeable with plan of care. Thank you for allowing me to participate in the care of your patient.     KAE Crane - CNS, CNS

## 2023-01-29 LAB
GLUCOSE BLD-MCNC: 155 MG/DL (ref 70–99)
GLUCOSE BLD-MCNC: 166 MG/DL (ref 70–99)
GLUCOSE BLD-MCNC: 206 MG/DL (ref 70–99)
GLUCOSE BLD-MCNC: 209 MG/DL (ref 70–99)
PERFORMED ON: ABNORMAL

## 2023-01-29 PROCEDURE — 92526 ORAL FUNCTION THERAPY: CPT

## 2023-01-29 PROCEDURE — 1200000000 HC SEMI PRIVATE

## 2023-01-29 PROCEDURE — 92610 EVALUATE SWALLOWING FUNCTION: CPT

## 2023-01-29 PROCEDURE — 94640 AIRWAY INHALATION TREATMENT: CPT

## 2023-01-29 PROCEDURE — 6360000002 HC RX W HCPCS: Performed by: INTERNAL MEDICINE

## 2023-01-29 PROCEDURE — 2700000000 HC OXYGEN THERAPY PER DAY

## 2023-01-29 PROCEDURE — 94761 N-INVAS EAR/PLS OXIMETRY MLT: CPT

## 2023-01-29 PROCEDURE — 6370000000 HC RX 637 (ALT 250 FOR IP): Performed by: INTERNAL MEDICINE

## 2023-01-29 PROCEDURE — 99232 SBSQ HOSP IP/OBS MODERATE 35: CPT | Performed by: CLINICAL NURSE SPECIALIST

## 2023-01-29 PROCEDURE — 94760 N-INVAS EAR/PLS OXIMETRY 1: CPT

## 2023-01-29 PROCEDURE — 2580000003 HC RX 258: Performed by: INTERNAL MEDICINE

## 2023-01-29 RX ADMIN — FUROSEMIDE 20 MG: 10 INJECTION, SOLUTION INTRAMUSCULAR; INTRAVENOUS at 08:38

## 2023-01-29 RX ADMIN — ISOSORBIDE MONONITRATE 30 MG: 30 TABLET, EXTENDED RELEASE ORAL at 08:37

## 2023-01-29 RX ADMIN — METOPROLOL SUCCINATE 25 MG: 25 TABLET, EXTENDED RELEASE ORAL at 08:37

## 2023-01-29 RX ADMIN — ASPIRIN 81 MG 81 MG: 81 TABLET ORAL at 08:38

## 2023-01-29 RX ADMIN — ALPRAZOLAM 1 MG: 0.5 TABLET ORAL at 20:20

## 2023-01-29 RX ADMIN — ACETYLCYSTEINE 400 MG: 100 SOLUTION ORAL; RESPIRATORY (INHALATION) at 08:59

## 2023-01-29 RX ADMIN — ATORVASTATIN CALCIUM 40 MG: 40 TABLET, FILM COATED ORAL at 20:20

## 2023-01-29 RX ADMIN — GABAPENTIN 600 MG: 300 CAPSULE ORAL at 08:37

## 2023-01-29 RX ADMIN — GLIPIZIDE 5 MG: 5 TABLET ORAL at 16:36

## 2023-01-29 RX ADMIN — DICLOFENAC SODIUM 4 G: 10 GEL TOPICAL at 08:39

## 2023-01-29 RX ADMIN — Medication 10 ML: at 20:20

## 2023-01-29 RX ADMIN — ACETAZOLAMIDE 250 MG: 250 TABLET ORAL at 08:36

## 2023-01-29 RX ADMIN — GABAPENTIN 600 MG: 300 CAPSULE ORAL at 14:44

## 2023-01-29 RX ADMIN — ACETYLCYSTEINE 400 MG: 100 SOLUTION ORAL; RESPIRATORY (INHALATION) at 20:23

## 2023-01-29 RX ADMIN — ENOXAPARIN SODIUM 120 MG: 150 INJECTION SUBCUTANEOUS at 20:20

## 2023-01-29 RX ADMIN — Medication 10 ML: at 08:40

## 2023-01-29 RX ADMIN — IPRATROPIUM BROMIDE AND ALBUTEROL SULFATE 1 AMPULE: 2.5; .5 SOLUTION RESPIRATORY (INHALATION) at 15:57

## 2023-01-29 RX ADMIN — IPRATROPIUM BROMIDE AND ALBUTEROL SULFATE 1 AMPULE: 2.5; .5 SOLUTION RESPIRATORY (INHALATION) at 08:59

## 2023-01-29 RX ADMIN — PANTOPRAZOLE SODIUM 40 MG: 40 TABLET, DELAYED RELEASE ORAL at 08:37

## 2023-01-29 RX ADMIN — LISINOPRIL 2.5 MG: 5 TABLET ORAL at 08:37

## 2023-01-29 RX ADMIN — FUROSEMIDE 20 MG: 10 INJECTION, SOLUTION INTRAMUSCULAR; INTRAVENOUS at 17:52

## 2023-01-29 RX ADMIN — IPRATROPIUM BROMIDE AND ALBUTEROL SULFATE 1 AMPULE: 2.5; .5 SOLUTION RESPIRATORY (INHALATION) at 20:23

## 2023-01-29 RX ADMIN — GLIPIZIDE 5 MG: 5 TABLET ORAL at 08:37

## 2023-01-29 RX ADMIN — ENOXAPARIN SODIUM 120 MG: 150 INJECTION SUBCUTANEOUS at 08:39

## 2023-01-29 RX ADMIN — GABAPENTIN 600 MG: 300 CAPSULE ORAL at 20:20

## 2023-01-29 RX ADMIN — CLOPIDOGREL BISULFATE 75 MG: 75 TABLET ORAL at 08:38

## 2023-01-29 ASSESSMENT — PAIN SCALES - GENERAL
PAINLEVEL_OUTOF10: 0
PAINLEVEL_OUTOF10: 3
PAINLEVEL_OUTOF10: 2
PAINLEVEL_OUTOF10: 0
PAINLEVEL_OUTOF10: 3

## 2023-01-29 NOTE — PROGRESS NOTES
Facility/Department: [address] NURSING  Initial Assessment  DYSPHAGIA BEDSIDE SWALLOW EVALUATION     Patient: Vincent Carolina   : 1945   MRN: [id number]      Evaluation Date: 2023   Admitting Diagnosis: Chest pain [R07.9]  Unstable angina ([address] [I20.0]  Pain: Denies                                                       H&P: Vincent Carolina is a 69 y.o. female who has a past medical history of Acute MI ([address] Acute pyelonephritis, Anxiety and depression, Arthritis, CAD (coronary artery disease), Cancer ([address] Cancer ([address] Cancer of skin of leg, Chronic obstructive pulmonary disease ([address] Claustrophobia, COPD (chronic obstructive pulmonary disease) ([address] ESBL (extended spectrum beta-lactamase) producing bacteria infection, Esophageal stricture, Gastroesophageal reflux disease without esophagitis, Hiatal hernia, Hiatal hernia, Hypercholesteremia, Hypertension, IBS (irritable bowel syndrome), Migraines, Movement disorder, Pneumonia, PONV (postoperative nausea and vomiting), Type II or unspecified type diabetes mellitus without mention of complication, not stated as uncontrolled, and Unspecified cerebral artery occlusion with cerebral infarction. Imaging:  CT Chest:  23  Impression   No evidence of pulmonary embolism or acute pulmonary abnormality. Prior Modified Barium Swallow Study: 22  Modified Barium Swallow evaluation completed on 2022. Patient presents with moderate oropharyngeal dysphagia with premature bolus loss to pharynx, deep pharyngeal pooling and delayed airway protection during the swallow noted with all textures. Deep pharyngeal pooling prior to swallow initiation with thin liquids results in bolus loss to laryngeal inlet with deep SILENT laryngeal penetration intermittently noted during the swallow with thin liquids. Laryngeal penetration with thin liquids is largely self clearing with no overt aspiration directly viewed.  However, Pt is at increased risk for aspiration of thin liquids due to deep pharyngeal pooling and delayed airway protection, especially at times of respiratory compromise. (Of note, Pt's respiratory status was stable with no increased WOB or wheezing noted at the time of this study)  Main factors contributing to oropharyngeal dysphagia include: reduced bolus control with premature bolus loss to pharynx, deep pharyngeal pooling to laryngeal inlet/pyriform with thin liquids, and to the underside of epiglottis with all textures; laryngeal penetration during the swallow intermittently with thin liquids via straw, and over successive po trials; Delayed epiglottic inversion; delayed/reduced hyolaryngeal excursion; reduced tongue based retraction and reduced sensation for timely reflexive cough in the presence of deep laryngeal penetration with thins. History/Prior Level of Function:   Living Status: Home  Prior Dysphagia History: Per chart review, pt most recently seen by SLP services in August 2022 with recommendations for an Easy to [employer] with Thin Liquids/no straws, meds in puree following completion of MBS (see above). Reason for referral: SLP evaluation orders received due to   respiratory status     Dysphagia Impressions/Diagnosis: Oropharyngeal Dysphagia   Pt alert and upright in bed, eating breakfast upon SLP arrival. Pt currently on 4L O2 via nasal cannula. Pt denies dysphagia but reported dislike of hospital food options. Oral motor exam revealed reduced lingual/labial ROM and coordination. Various textures provided to assess swallow function. Pt self fed all trials this date. Oral phase characterized by prolonged mastication, decreased AP transit, decreased bolus cohesion, delayed oral clearing and suspected premature bolus loss to pharynx. Thin liquids via cup/straw revealed concern for delayed swallow initiation and seemingly reduced laryngeal elevation however no overt clinical s/s of aspiration.  Pt achieved good oral clearance of soft solids but required liquid wash to achieve adequate oral clearing of regular solids. Pt seen taking various medications whole with water with no overt difficulty or symptoms of aspiration. Discussed downgrade to Easy to Chew textures in order to ease mastication and pt verbalized preference for remaining on a regular texture diet and to choose softer selections. Education provided regarding findings from most recent MBS, aspiration risk and recommendations for use of compensatory strategies. Pt verbalized understanding. At this time, recommend continuation of regular texture diet with thin liquids, meds whole with water or in puree with close diet monitoring. Recommended Diet and Intervention 1/29/2023:  Diet Solids Recommendation:  Regular texture diet  Liquid Consistency Recommendation: Thin liquids  Recommended form of Meds: Meds whole with water or Meds in puree         Compensatory Swallowing Strategies:  Upright as possible with all PO intake , Small bites/sips , Eat/feed slowly, Remain upright 30-45 min     SHORT TERM DYSPHAGIA GOALS/PLAN OF CARE: Speech therapy for dysphagia tx 3-5 times per week during acute care stay.     Pt will functionally tolerate recommended diet with no overt clinical s/s of aspiration   Pt will demonstrate understanding of aspiration risk and precautions via education/demonstration with occasional prompting    Dysphagia Therapeutic Intervention:  Diet Tolerance Monitoring , Patient/Family Education , Therapeutic Trials with SLP     Discharge Recommendations: Discharge recommendations to be determined pending ongoing follow-up during acute care stay    Patient Positioning: Upright in bed     Current Diet Level (prior to evaluation): Regular texture diet  Thin liquids      Respiratory Status:   []Room Air   [x]O2 via nasal cannula: 4L   []Other:    Dentition:  []Adequate  []Dentures   []Missing Many Teeth  [x]Edentulous  []Other:    Baseline Vocal Quality:  [x]Normal  []Dysphonic   []Aphonic []Hoarse  []Wet  []Weak  []Other:    Volitional Cough:  Not Elicited     Volitional Swallow:   []Absent   [x]Delayed     []Adequate     []Required use of drink     Oral Mechanism Exam:  []WFL [x]Mild   [] Moderate  []Severe  []To be assessed  Impaired:   []Left side      []Right side    [x]Labial ROM/Coordination    []Labial Symmetry   [x]Lingual ROM/Coordination   []Lingual Symmetry  []Gag  []Other:     Oral Phase: []WFL [x]Mild   [] Moderate  []Severe  []To be assessed   [x]Impaired/Prolonged Mastication:   []Oral Holding:   []Spillage Left:   []Spillage Right:  []Pocketing Left:   []Pocketing Right:   [x]Decreased Anterior to Posterior Transit:   [x]Suspected Premature Bolus Loss:   [x]Lingual/Palatal Residue:   []Other:     Pharyngeal Phase: []WFL [x]Mild   [] Moderate  []Severe  []To be assessed   [x]Delayed Swallow:   []Suspected Pharyngeal Pooling:   [x]Decreased Laryngeal Elevation:   []Absent Swallow:  []Wet Vocal Quality:   []Throat Clearing-Immediate:   []Throat Clearing-Delayed:   []Cough-Immediate:   []Cough-Delayed:  []Change in Vital Signs:  []Suspected Delayed Pharyngeal Clearing:  []Other:     Eating Assistance:  []Independent  [x]Setup or clean-up assistance   [] Supervision or touching assistance   [] Partial or moderate assistance   [] Substantial or maximal assistance  [] Dependent     EDUCATION:   Provided education regarding role of SLP, results of assessment, recommendations and general speech pathology plan of care. [x] Pt verbalized understanding and agreement   [x] Pt requires ongoing learning   [] No evidence of comprehension     If patient discharges prior to next visit, this note will serve as discharge.      Treatment time:  Timed Code Treatment Minutes: 0  Total Treatment time: 25 minutes    Electronically signed by:    Shira Ulloa M.A., [address] [place] [address]  Speech-Language Pathologist

## 2023-01-29 NOTE — PROGRESS NOTES
[address]   Daily Progress Note      Admit Date:  1/24/2023    HPI:    Ms. Roland Valencia is a 69year old female with history of PAF, hypertension, hdl, DM,COPD, obesity, GERD, duglas not tolerant of cpap    She is admitted with cough, wheezing and chest pain to back. LHC done which showed severe multivessel CAD 1/26/2023 on LHC. She is not interested in CABG and likely poor candidate from pulmonary standpoint. Planning staged PCIs      Subjective:  Patient is being seen for CAD. There were no acute overnight cardiac events. She is lying in the bed on 2 L which is her baseline. She is sleeping soundly this morning on her side and flat. No chest pain, increased shortness of breath or lightheadedness. Sats 92-93% creat 1.0 Hgb 13.0 bnp 859     Objective:   /73   Pulse 87   Temp 98 °F (36.7 °C) (Oral)   Resp 18   Ht 5' 5\" (1.651 m)   Wt 267 lb 11.2 oz (121.4 kg)   SpO2 91%   BMI 44.55 kg/m²     Intake/Output Summary (Last 24 hours) at 1/29/2023 0950  Last data filed at 1/29/2023 0401  Gross per 24 hour   Intake 240 ml   Output 200 ml   Net 40 ml          Physical Exam:  General:  Awake, alert, oriented in NAD  Skin:  Warm and dry. No unusual bruising or rash  Neck:  Supple. No JVD or carotid bruit appreciated  Chest:  Normal effort.   Bilateral wheezing improved  Cardiovascular:  RRR, S1/S2, no murmur/gallop/rub  Abdomen:  Soft, nontender, +bowel sounds, obese  Extremities:  No edema  Neurological: No focal deficits  Psychological: Normal mood and affect      Medications:    acetylcysteine  4 mL Inhalation BID    diclofenac sodium  4 g Topical BID    furosemide  20 mg IntraVENous BID    clopidogrel  75 mg Oral Daily    sodium chloride flush  5-40 mL IntraVENous 2 times per day    enoxaparin  1 mg/kg SubCUTAneous BID    ipratropium-albuterol  1 ampule Inhalation 4x daily    isosorbide mononitrate  30 mg Oral BID    aspirin  243 mg Oral Once    aspirin  81 mg Oral Daily    glipiZIDE  5 mg Oral BID AC    metoprolol succinate  25 mg Oral Daily    [Held by provider] rivaroxaban  15 mg Oral Daily    lisinopril  2.5 mg Oral Daily    acetaZOLAMIDE  250 mg Oral Daily    gabapentin  600 mg Oral TID    pantoprazole  40 mg Oral QAM AC    sodium chloride flush  5-40 mL IntraVENous 2 times per day    atorvastatin  40 mg Oral Nightly      sodium chloride      dextrose      sodium chloride      dextrose         Lab Data:  CBC:   Recent Labs     01/27/23  0431 01/28/23  0447   WBC 10.7 9.3   HGB 13.3 13.1    166     BMP:    Recent Labs     01/27/23  0431 01/28/23  0447    139   K 4.3 4.0   CO2 28 27   BUN 28* 22*   CREATININE 1.1 1.0     INR:  No results for input(s): INR in the last 72 hours. BNP:    Recent Labs     01/28/23 0447   PROBNP 859*         Diagnostics:  [facility] 1/26/2023 Dr Cathy Martinez  1/26/2023  Left Heart Cath  LM-30% proximal, 10-20% distal disease   LAD-proximal mid stents patent with <20% ISR; 80% ostial jailed second diagonal;  hazy 95% distal LAD   LCx-high first marginal with 80% short, ostial stenosis; mild plaquing in circumflex   RCA-dominant, large caliber with 80% calcified, proximal stenosis; 50% distal RPDA   LVEDP- 20 mmHg  LVG: LVEF 60%    [facility] 8/2016 ([place])  70% ostial D-jailed by stent  LAD stent patent  Mild CX  60-70 prox RCA  [place] LV FXN  Similar to prior cath 3/4/15--CPT. Cardiac Cath with FFR 9/2019 [facility]):  LM-Normal No disease   LAD-proximal stent 10% ISR, mid stent patent. Distal LAD 20% irregular stenosis. D2 70% ostial stent FCI  Cx-Patent, mild luminal irregularities  OM1- ostial discrete 60% stenosis  RCA-Dominant, large sized vessel. Proximal 70% heavily calcified stenosis. RPDA- Patent, mild luminal irregularities     FFR of prox RCA 0.84     ECHO 8/2/22   Summary   Technically difficult exam due to body habitus. Normal global systolic function with an ejection fraction estimated at 70%. No regional wall motion abnormalities noted.    Mild concentric left ventricular hypertrophy. Mild aortic stenosis with a peak velocity of 2.17m/s and a mean pressure   gradient of 12 mmHg. There is mild aortic insufficiency. Grade I diastolic dysfunction with normal LV filling pressures. Avg.   E/e'=18.2     SPECT 10/27/22  Summary    There is a mild fixed inferoapical defect with no associated wall motion  abnormality consistent with diaphragmatic attenuation artifact. No evidence of myocardium at risk for significant reversible ischemia. Normal LV size and systolic function. Overall findings represent a low risk scan. Assessment:    1. Chest pain  2. CAD, on plavix  3. PAF  4. Hypertension  5. COPD  6. Obesity  7. Tadeo not tolerant of cpap      Plan:    Continue lisinopril 2.5 mg daily  Continue metoprolol 25 mg daily   Continue IV lasix 20 mg twice a day   Npo after MN  Plans for staged PCI on Monday  Continue imdur 30 mg twice a day     Not sure she needs diamox. Bedside nurse to discuss with hospitalist    Discussed with patient who is agreeable with plan of care. Thank you for allowing me to participate in the care of your patient.     KAE Fields - CNS, CNS

## 2023-01-29 NOTE — PROGRESS NOTES
Progress Note - Dr. Tracie Warren - Internal Medicine  PCP: Birdie Loya MD [address] [facility] [phone]    Hospital Day: 3  Code Status: Full Code  Current Diet: ADULT DIET; Regular; 4 carb choices (60 gm/meal); Low Fat/Low Chol/High Fiber/2 gm Na  Diet NPO        CC: follow up on medical issues    Subjective:   Mary Shaikh is a 69 y.o. female. Pt seen and examined  Chart reviewed since last visit, labs and imaging below      Doing about the same  Mild chest pain this AM      Plan is for iv diuresis, poss staged pci Monday per Dr Dejah Atkinson  Total -5100 cc out    Review of Systems: (1 system for EPF, 2-9 for detailed, 10+ for comprehensive)  Constitutional: Negative for chills and fever. HENT: Negative for dental problem, nosebleeds and rhinorrhea. Eyes: Negative for photophobia and visual disturbance. Respiratory: Negative for cough, chest tightness and positive for shortness of breath. Cardiovascular: positive for chest pain and leg swelling. Gastrointestinal: Negative for diarrhea, nausea and vomiting. Endocrine: Negative for polydipsia and polyphagia. Genitourinary: Negative for frequency, hematuria and urgency. Musculoskeletal: Negative for back pain and myalgias. Skin: Negative for rash. Allergic/Immunologic: Negative for food allergies. Neurological: Negative for dizziness, seizures, syncope and facial asymmetry. Hematological: Negative for adenopathy. Psychiatric/Behavioral: Negative for dysphoric mood. The patient is not nervous/anxious. I have reviewed the patient's medical and social history in detail and updated the computerized patient record.   To recap: She  has a past medical history of Acute MI ([address] Acute pyelonephritis, Anxiety and depression, Arthritis, CAD (coronary artery disease), Cancer ([address] Cancer ([address] Cancer of skin of leg, Chronic obstructive pulmonary disease ([address] Claustrophobia, COPD (chronic obstructive pulmonary disease) (HCC), ESBL (extended spectrum beta-lactamase) producing bacteria infection, Esophageal stricture, Gastroesophageal reflux disease without esophagitis, Hiatal hernia, Hiatal hernia, Hypercholesteremia, Hypertension, IBS (irritable bowel syndrome), Migraines, Movement disorder, Pneumonia, PONV (postoperative nausea and vomiting), Type II or unspecified type diabetes mellitus without mention of complication, not stated as uncontrolled, and Unspecified cerebral artery occlusion with cerebral infarction. . She  has a past surgical history that includes Cardiac surgery; Gallbladder surgery; Hysterectomy; Appendectomy; Cholecystectomy; Colonoscopy; Upper gastrointestinal endoscopy (4/20/12); Endoscopy, colon, diagnostic; Tear duct surgery; Upper gastrointestinal endoscopy (06/11/2018); Colonoscopy (06/11/2018); pr excision malignant lesion s/n/h/f/g 0.5 cm/< (N/A, 9/6/2018); pr split agrft t/a/l 1st 100 cm/&/1% bdy inft/chld (N/A, 9/6/2018); skin biopsy; eye surgery; bronchoscopy (N/A, 1/24/2020); bronchoscopy (N/A, 1/27/2020); Cataract removal (2013 or 2014); Esophagus dilation; and bronchoscopy (N/A, 11/23/2020). . She  reports that she quit smoking about 5 years ago. Her smoking use included cigarettes. She has a 12.25 pack-year smoking history. She has never used smokeless tobacco. She reports current alcohol use of about 1.0 standard drink per week. She reports that she does not use drugs. .        Active Hospital Problems    Diagnosis Date Noted    Hyperlipemia [E78.5] 05/23/2011     Priority: High    Unstable angina ([facility] [I20.0] 01/26/2023     Priority: Medium    Chest pain [R07.9] 10/26/2022     Priority: Medium    Class 3 severe obesity due to excess calories with serious comorbidity and body mass index (BMI) of 45.0 to 49.9 in adult ([facility] [E66.01, Z68.42] 06/18/2018    DM2 (diabetes mellitus, type 2) ([facility] [E11.9] 12/03/2015    Primary hypertension [I10] 04/18/2012       Current Facility-Administered Medications: ibuprofen (ADVIL;MOTRIN) tablet 400 mg, 400 mg, Oral, TID PRN  acetylcysteine (MUCOMYST) 10 % solution 400 mg, 4 mL, Inhalation, BID  diclofenac sodium (VOLTAREN) 1 % gel 4 g, 4 g, Topical, BID  furosemide (LASIX) injection 20 mg, 20 mg, IntraVENous, BID  clopidogrel (PLAVIX) tablet 75 mg, 75 mg, Oral, Daily  sodium chloride flush 0.9 % injection 5-40 mL, 5-40 mL, IntraVENous, 2 times per day  sodium chloride flush 0.9 % injection 5-40 mL, 5-40 mL, IntraVENous, PRN  0.9 % sodium chloride infusion, , IntraVENous, PRN  enoxaparin (LOVENOX) injection 120 mg, 1 mg/kg, SubCUTAneous, BID  dextrose bolus 10% 125 mL, 125 mL, IntraVENous, PRN **OR** dextrose bolus 10% 250 mL, 250 mL, IntraVENous, PRN  glucagon (rDNA) injection 1 mg, 1 mg, SubCUTAneous, PRN  dextrose 10 % infusion, , IntraVENous, Continuous PRN  guaiFENesin-dextromethorphan (ROBITUSSIN DM) 100-10 MG/5ML syrup 5 mL, 5 mL, Oral, Q4H PRN  ipratropium-albuterol (DUONEB) nebulizer solution 1 ampule, 1 ampule, Inhalation, 4x daily  isosorbide mononitrate (IMDUR) extended release tablet 30 mg, 30 mg, Oral, BID  aspirin chewable tablet 243 mg, 243 mg, Oral, Once  aspirin chewable tablet 81 mg, 81 mg, Oral, Daily  sodium chloride (OCEAN, BABY AYR) 0.65 % nasal spray 1 spray, 1 spray, Nasal, PRN  ipratropium-albuterol (DUONEB) nebulizer solution 1 ampule, 1 ampule, Inhalation, Q4H PRN  albuterol sulfate HFA (PROVENTIL;VENTOLIN;PROAIR) 108 (90 Base) MCG/ACT inhaler 2 puff, 2 puff, Inhalation, Q6H PRN  glipiZIDE (GLUCOTROL) tablet 5 mg, 5 mg, Oral, BID AC  metoprolol succinate (TOPROL XL) extended release tablet 25 mg, 25 mg, Oral, Daily  [Held by provider] rivaroxaban (XARELTO) tablet 15 mg, 15 mg, Oral, Daily  lisinopril (PRINIVIL;ZESTRIL) tablet 2.5 mg, 2.5 mg, Oral, Daily  acetaZOLAMIDE (DIAMOX) tablet 250 mg, 250 mg, Oral, Daily  gabapentin (NEURONTIN) capsule 600 mg, 600 mg, Oral, TID  ALPRAZolam (XANAX) tablet 1 mg, 1 mg, Oral, TID PRN  pantoprazole (PROTONIX) tablet 40 mg, 40 mg, Oral, QAM AC  sodium chloride flush 0.9 % injection 5-40 mL, 5-40 mL, IntraVENous, 2 times per day  sodium chloride flush 0.9 % injection 10 mL, 10 mL, IntraVENous, PRN  0.9 % sodium chloride infusion, , IntraVENous, PRN  ondansetron (ZOFRAN-ODT) disintegrating tablet 4 mg, 4 mg, Oral, Q8H PRN **OR** ondansetron (ZOFRAN) injection 4 mg, 4 mg, IntraVENous, Q6H PRN  acetaminophen (TYLENOL) tablet 650 mg, 650 mg, Oral, Q6H PRN **OR** acetaminophen (TYLENOL) suppository 650 mg, 650 mg, Rectal, Q6H PRN  magnesium hydroxide (MILK OF MAGNESIA) 400 MG/5ML suspension 30 mL, 30 mL, Oral, Daily PRN  atorvastatin (LIPITOR) tablet 40 mg, 40 mg, Oral, Nightly  nitroGLYCERIN (NITROSTAT) SL tablet 0.4 mg, 0.4 mg, SubLINGual, Q5 Min PRN  hydrALAZINE (APRESOLINE) injection 10 mg, 10 mg, IntraVENous, Q6H PRN  0.9 % sodium chloride bolus, 500 mL, IntraVENous, PRN  potassium chloride (KLOR-CON M) extended release tablet 40 mEq, 40 mEq, Oral, PRN **OR** potassium bicarb-citric acid (EFFER-K) effervescent tablet 40 mEq, 40 mEq, Oral, PRN **OR** potassium chloride 10 mEq/100 mL IVPB (Peripheral Line), 10 mEq, IntraVENous, PRN  dextrose 10 % infusion, , IntraVENous, Continuous PRN         Objective:  /73   Pulse 87   Temp 98 °F (36.7 °C) (Oral)   Resp 18   Ht 5' 5\" (1.651 m)   Wt 267 lb 11.2 oz (121.4 kg)   SpO2 91%   BMI 44.55 kg/m²      Patient Vitals for the past 24 hrs:   BP Temp Temp src Pulse Resp SpO2 Weight   01/29/23 0905 -- -- -- 87 18 91 % --   01/29/23 0830 116/73 98 °F (36.7 °C) Oral 66 16 92 % --   01/29/23 0627 -- -- -- 74 -- -- --   01/29/23 0413 -- -- -- 80 -- -- --   01/29/23 0401 (!) 116/53 98.2 °F (36.8 °C) Oral 80 18 90 % 267 lb 11.2 oz (121.4 kg)   01/29/23 0247 -- -- -- 84 -- -- --   01/29/23 0045 126/73 98.2 °F (36.8 °C) Oral 82 13 92 % --   01/28/23 2353 -- -- -- 75 -- -- --   01/28/23 2100 (!) 143/85 97.5 °F (36.4 °C) Oral 89 18 95 % --   01/28/23 1950 -- -- -- 69 18 95 % --   01/28/23 1602 (!) 127/51 97.6 °F (36.4 °C) Oral 69 16 95 % --   01/28/23 1215 (!) 143/65 98.2 °F (36.8 °C) Oral 74 16 91 % --   01/28/23 1116 -- -- -- 87 18 94 % --       Patient Vitals for the past 96 hrs (Last 3 readings):   Weight   01/29/23 0401 267 lb 11.2 oz (121.4 kg)   01/28/23 0500 271 lb 11.2 oz (123.2 kg)   01/27/23 0445 270 lb 9.6 oz (122.7 kg)             Intake/Output Summary (Last 24 hours) at 1/29/2023 1105  Last data filed at 1/29/2023 0401  Gross per 24 hour   Intake 240 ml   Output 200 ml   Net 40 ml           Physical Exam: (2-7 system for EPF/Detailed, ?8 for Comprehensive)  /73   Pulse 87   Temp 98 °F (36.7 °C) (Oral)   Resp 18   Ht 5' 5\" (1.651 m)   Wt 267 lb 11.2 oz (121.4 kg)   SpO2 91%   BMI 44.55 kg/m²   Constitutional: vitals as above: alert, appears stated age and cooperative    Psychiatric: normal insight and judgment, oriented to person, place, time, and general circumstances    Head: Normocephalic, without obvious abnormality, atraumatic    Eyes:lids and lashes normal, conjunctivae and sclerae normal and pupils equal, round, reactive to light and accomodation    EMNT: external ears normal, nares midline    Neck: no carotid bruit, supple, symmetrical, trachea midline and thyroid not enlarged, symmetric, no tenderness/mass/nodules     Respiratory: clear to auscultation and percussion bilaterally with normal respiratory effort    Cardiovascular: normal rate, regular rhythm, normal S1 and S2 and no murmurs    Gastrointestinal: soft, non-tender, non-distended, normal bowel sounds, no masses or organomegaly    Extremities: no clubbing, no edema    Skin:No rashes or nodules noted.     Neurologic:negative         Labs:  Lab Results   Component Value Date    WBC 9.3 01/28/2023    HGB 13.1 01/28/2023    HCT 40.1 01/28/2023     01/28/2023    CHOL 180 01/25/2023    TRIG 157 (H) 01/25/2023    HDL 34 (L) 01/25/2023    LDLDIRECT 85 08/03/2022    ALT 12 01/25/2023    AST 12 (L) 01/25/2023     01/28/2023    K 4.0 01/28/2023     01/28/2023    CREATININE 1.0 01/28/2023    BUN 22 (H) 01/28/2023    CO2 27 01/28/2023    TSH 2.34 06/06/2011    INR 0.99 08/01/2022    LABA1C 7.8 01/25/2023    LABMICR YES 10/26/2022     Lab Results   Component Value Date    CKTOTAL 24 (L) 06/07/2018    CKMB 0.29 08/09/2011    TROPONINI <0.01 01/25/2023       Recent Imaging Results are Reviewed:  CT CHEST PULMONARY EMBOLISM W CONTRAST    Result Date: 1/24/2023  EXAMINATION: CTA OF THE CHEST, 1/24/2023 5:13 pm TECHNIQUE: CTA of the chest was performed after the administration of intravenous contrast.  Multiplanar reformatted images are provided for review. MIP images are provided for review. Automated exposure control, iterative reconstruction, and/or weight based adjustment of the mA/kV was utilized to reduce the radiation dose to as low as reasonably achievable. COMPARISON: None HISTORY: ORDERING SYSTEM PROVIDED HISTORY:  Chest pain, dyspnea TECHNOLOGIST PROVIDED HISTORY: Reason for Exam:  Chest pain, dyspnea Decision Support Exception - unselect if not a suspected or confirmed emergency medical condition->Emergency Medical Condition (MA) Reason for Exam:  Chest pain, dyspnea, Chest Pain (Pt brought in by  EMS from home due to CP that started 3 days ago, SOB started this morning, hx of COPD, presents with cough/wheezing/rhonchi in her lungs, PVCs/bigeminy. Pt states that chest pain goes to her back that is pressure, afebrile, A and O x 4). FINDINGS: Pulmonary Arteries: Pulmonary arteries are adequately opacified for evaluation. No evidence of intraluminal filling defect to suggest pulmonary embolism. Main pulmonary artery is normal in caliber. Mediastinum: No evidence of mediastinal lymphadenopathy. The heart and pericardium demonstrate no acute abnormality. There is no acute abnormality of the thoracic aorta. Lungs/pleura: The lungs are without acute process.   No focal consolidation or pulmonary edema. No evidence of pleural effusion or pneumothorax. Upper Abdomen: Limited images of the upper abdomen are unremarkable. Soft Tissues/Bones: No acute bone or soft tissue abnormality. There are multiple chronic mild thoracolumbar compression deformities. No evidence of pulmonary embolism or acute pulmonary abnormality. Lab Results   Component Value Date/Time    GLUCOSE 175 01/28/2023 04:47 AM     Lab Results   Component Value Date/Time    POCGLU 209 01/29/2023 07:50 AM     /73   Pulse 87   Temp 98 °F (36.7 °C) (Oral)   Resp 18   Ht 5' 5\" (1.651 m)   Wt 267 lb 11.2 oz (121.4 kg)   SpO2 91%   BMI 44.55 kg/m²     Assessment and Plan:  Principal Problem:    Unstable angina -Established problem. Stable. Cath with severe multivessel disease. Plan: per Dr Scott Rao, pt does not want CABG. Try to optimize medically, staged PCI on Monday. Cont on iv lasix. Monitor electrolytes. KCl iv ordered  Active Problems:    Hyperlipemia -Established problem. Stable. On statin  Plan: cont meds    Essential hypertension -Established problem. Stable. 116/73  Plan: Continue medications. Continue to check BP q shift. CBC and BMP ordered to monitor for disease progression, medication side effect. DM2 (diabetes mellitus, type 2) ([address] -Established problem. Stable. FSBS 209  Plan: Continue on CCC diet, home medications. CBC and BMP ordered to monitor sugars and for other medication complications. Fingerstick glucose ordered to check for alterations in levels throughout day. Sliding scale insulin ordered to cover fingerstick levels. Case discussed with: cards  Tests ordered/reviewed: cbc, bmp, tele.   On iv med requiring monitoring            (Please note that portions of this note were completed with a voice recognition program.  Efforts were made to edit the dictations but occasionally words are mis-transcribed.)        Buena Lesches, MD  1/29/2023

## 2023-01-30 DIAGNOSIS — I25.83 CORONARY ARTERY DISEASE DUE TO LIPID RICH PLAQUE: Primary | ICD-10-CM

## 2023-01-30 DIAGNOSIS — I25.10 CORONARY ARTERY DISEASE DUE TO LIPID RICH PLAQUE: Primary | ICD-10-CM

## 2023-01-30 LAB
ANION GAP SERPL CALCULATED.3IONS-SCNC: 10 MMOL/L (ref 3–16)
BASOPHILS ABSOLUTE: 0 K/UL (ref 0–0.2)
BASOPHILS RELATIVE PERCENT: 0.4 %
BUN BLDV-MCNC: 19 MG/DL (ref 7–20)
CALCIUM SERPL-MCNC: 9.5 MG/DL (ref 8.3–10.6)
CHLORIDE BLD-SCNC: 98 MMOL/L (ref 99–110)
CO2: 29 MMOL/L (ref 21–32)
CREAT SERPL-MCNC: 0.9 MG/DL (ref 0.6–1.2)
EOSINOPHILS ABSOLUTE: 0.1 K/UL (ref 0–0.6)
EOSINOPHILS RELATIVE PERCENT: 1.3 %
GFR SERPL CREATININE-BSD FRML MDRD: >60 ML/MIN/{1.73_M2}
GLUCOSE BLD-MCNC: 165 MG/DL (ref 70–99)
GLUCOSE BLD-MCNC: 167 MG/DL (ref 70–99)
GLUCOSE BLD-MCNC: 187 MG/DL (ref 70–99)
GLUCOSE BLD-MCNC: 203 MG/DL (ref 70–99)
GLUCOSE BLD-MCNC: 233 MG/DL (ref 70–99)
HCT VFR BLD CALC: 43.8 % (ref 36–48)
HEMOGLOBIN: 14 G/DL (ref 12–16)
LYMPHOCYTES ABSOLUTE: 1.9 K/UL (ref 1–5.1)
LYMPHOCYTES RELATIVE PERCENT: 21.5 %
MCH RBC QN AUTO: 26.8 PG (ref 26–34)
MCHC RBC AUTO-ENTMCNC: 31.9 G/DL (ref 31–36)
MCV RBC AUTO: 84.1 FL (ref 80–100)
MONOCYTES ABSOLUTE: 0.9 K/UL (ref 0–1.3)
MONOCYTES RELATIVE PERCENT: 10 %
NEUTROPHILS ABSOLUTE: 5.8 K/UL (ref 1.7–7.7)
NEUTROPHILS RELATIVE PERCENT: 66.8 %
PDW BLD-RTO: 15.9 % (ref 12.4–15.4)
PERFORMED ON: ABNORMAL
PLATELET # BLD: 169 K/UL (ref 135–450)
PMV BLD AUTO: 8.4 FL (ref 5–10.5)
POC ACT LR: 228 SEC
POC ACT LR: 298 SEC
POC ACT LR: 340 SEC
POTASSIUM SERPL-SCNC: 3.6 MMOL/L (ref 3.5–5.1)
RBC # BLD: 5.21 M/UL (ref 4–5.2)
SODIUM BLD-SCNC: 137 MMOL/L (ref 136–145)
WBC # BLD: 8.6 K/UL (ref 4–11)

## 2023-01-30 PROCEDURE — 36415 COLL VENOUS BLD VENIPUNCTURE: CPT

## 2023-01-30 PROCEDURE — 6370000000 HC RX 637 (ALT 250 FOR IP): Performed by: INTERNAL MEDICINE

## 2023-01-30 PROCEDURE — 94669 MECHANICAL CHEST WALL OSCILL: CPT

## 2023-01-30 PROCEDURE — C1894 INTRO/SHEATH, NON-LASER: HCPCS

## 2023-01-30 PROCEDURE — C1725 CATH, TRANSLUMIN NON-LASER: HCPCS

## 2023-01-30 PROCEDURE — 94761 N-INVAS EAR/PLS OXIMETRY MLT: CPT

## 2023-01-30 PROCEDURE — B2111ZZ FLUOROSCOPY OF MULTIPLE CORONARY ARTERIES USING LOW OSMOLAR CONTRAST: ICD-10-PCS | Performed by: INTERNAL MEDICINE

## 2023-01-30 PROCEDURE — 1200000000 HC SEMI PRIVATE

## 2023-01-30 PROCEDURE — C1874 STENT, COATED/COV W/DEL SYS: HCPCS

## 2023-01-30 PROCEDURE — 2700000000 HC OXYGEN THERAPY PER DAY

## 2023-01-30 PROCEDURE — 2500000003 HC RX 250 WO HCPCS

## 2023-01-30 PROCEDURE — 6360000004 HC RX CONTRAST MEDICATION: Performed by: INTERNAL MEDICINE

## 2023-01-30 PROCEDURE — 027034Z DILATION OF CORONARY ARTERY, ONE ARTERY WITH DRUG-ELUTING INTRALUMINAL DEVICE, PERCUTANEOUS APPROACH: ICD-10-PCS | Performed by: INTERNAL MEDICINE

## 2023-01-30 PROCEDURE — 99221 1ST HOSP IP/OBS SF/LOW 40: CPT | Performed by: INTERNAL MEDICINE

## 2023-01-30 PROCEDURE — 85025 COMPLETE CBC W/AUTO DIFF WBC: CPT

## 2023-01-30 PROCEDURE — 6360000002 HC RX W HCPCS: Performed by: INTERNAL MEDICINE

## 2023-01-30 PROCEDURE — 92920 PRQ TRLUML C ANGIOP 1ART&/BR: CPT

## 2023-01-30 PROCEDURE — 92928 PRQ TCAT PLMT NTRAC ST 1 LES: CPT | Performed by: INTERNAL MEDICINE

## 2023-01-30 PROCEDURE — 6360000002 HC RX W HCPCS

## 2023-01-30 PROCEDURE — 85347 COAGULATION TIME ACTIVATED: CPT

## 2023-01-30 PROCEDURE — C1769 GUIDE WIRE: HCPCS

## 2023-01-30 PROCEDURE — 94640 AIRWAY INHALATION TREATMENT: CPT

## 2023-01-30 PROCEDURE — C1887 CATHETER, GUIDING: HCPCS

## 2023-01-30 PROCEDURE — 2580000003 HC RX 258

## 2023-01-30 PROCEDURE — 2580000003 HC RX 258: Performed by: INTERNAL MEDICINE

## 2023-01-30 PROCEDURE — 80048 BASIC METABOLIC PNL TOTAL CA: CPT

## 2023-01-30 PROCEDURE — C9600 PERC DRUG-EL COR STENT SING: HCPCS

## 2023-01-30 RX ORDER — ROSUVASTATIN CALCIUM 40 MG/1
40 TABLET, COATED ORAL NIGHTLY
Status: DISCONTINUED | OUTPATIENT
Start: 2023-01-30 | End: 2023-01-31 | Stop reason: HOSPADM

## 2023-01-30 RX ORDER — ACETAMINOPHEN 325 MG/1
650 TABLET ORAL EVERY 4 HOURS PRN
Status: DISCONTINUED | OUTPATIENT
Start: 2023-01-30 | End: 2023-01-31 | Stop reason: HOSPADM

## 2023-01-30 RX ORDER — SODIUM CHLORIDE 0.9 % (FLUSH) 0.9 %
5-40 SYRINGE (ML) INJECTION PRN
Status: DISCONTINUED | OUTPATIENT
Start: 2023-01-30 | End: 2023-01-31 | Stop reason: HOSPADM

## 2023-01-30 RX ORDER — METOPROLOL SUCCINATE 25 MG/1
25 TABLET, EXTENDED RELEASE ORAL DAILY
Status: DISCONTINUED | OUTPATIENT
Start: 2023-01-30 | End: 2023-01-31

## 2023-01-30 RX ORDER — SODIUM CHLORIDE 9 MG/ML
INJECTION, SOLUTION INTRAVENOUS CONTINUOUS
Status: ACTIVE | OUTPATIENT
Start: 2023-01-30 | End: 2023-01-31

## 2023-01-30 RX ORDER — ONDANSETRON 2 MG/ML
4 INJECTION INTRAMUSCULAR; INTRAVENOUS EVERY 6 HOURS PRN
Status: DISCONTINUED | OUTPATIENT
Start: 2023-01-30 | End: 2023-01-31 | Stop reason: HOSPADM

## 2023-01-30 RX ORDER — ASPIRIN 81 MG/1
81 TABLET ORAL DAILY
Status: DISCONTINUED | OUTPATIENT
Start: 2023-01-31 | End: 2023-01-31 | Stop reason: HOSPADM

## 2023-01-30 RX ORDER — SODIUM CHLORIDE 0.9 % (FLUSH) 0.9 %
5-40 SYRINGE (ML) INJECTION EVERY 12 HOURS SCHEDULED
Status: DISCONTINUED | OUTPATIENT
Start: 2023-01-30 | End: 2023-01-31 | Stop reason: HOSPADM

## 2023-01-30 RX ORDER — CLOPIDOGREL BISULFATE 75 MG/1
75 TABLET ORAL DAILY
Status: DISCONTINUED | OUTPATIENT
Start: 2023-01-31 | End: 2023-01-31

## 2023-01-30 RX ORDER — SODIUM CHLORIDE 9 MG/ML
INJECTION, SOLUTION INTRAVENOUS PRN
Status: DISCONTINUED | OUTPATIENT
Start: 2023-01-30 | End: 2023-01-31 | Stop reason: HOSPADM

## 2023-01-30 RX ADMIN — GUAIFENESIN AND DEXTROMETHORPHAN 5 ML: 100; 10 SYRUP ORAL at 00:54

## 2023-01-30 RX ADMIN — FUROSEMIDE 20 MG: 10 INJECTION, SOLUTION INTRAMUSCULAR; INTRAVENOUS at 08:37

## 2023-01-30 RX ADMIN — IPRATROPIUM BROMIDE AND ALBUTEROL SULFATE 1 AMPULE: 2.5; .5 SOLUTION RESPIRATORY (INHALATION) at 08:04

## 2023-01-30 RX ADMIN — IOPAMIDOL 72 ML: 755 INJECTION, SOLUTION INTRAVENOUS at 14:36

## 2023-01-30 RX ADMIN — Medication 10 ML: at 08:42

## 2023-01-30 RX ADMIN — PANTOPRAZOLE SODIUM 40 MG: 40 TABLET, DELAYED RELEASE ORAL at 05:39

## 2023-01-30 RX ADMIN — ALPRAZOLAM 1 MG: 0.5 TABLET ORAL at 08:37

## 2023-01-30 RX ADMIN — GABAPENTIN 600 MG: 300 CAPSULE ORAL at 08:37

## 2023-01-30 RX ADMIN — FUROSEMIDE 20 MG: 10 INJECTION, SOLUTION INTRAMUSCULAR; INTRAVENOUS at 18:30

## 2023-01-30 RX ADMIN — ACETAZOLAMIDE 250 MG: 250 TABLET ORAL at 08:37

## 2023-01-30 RX ADMIN — GABAPENTIN 600 MG: 300 CAPSULE ORAL at 15:39

## 2023-01-30 RX ADMIN — ACETYLCYSTEINE 400 MG: 100 SOLUTION ORAL; RESPIRATORY (INHALATION) at 08:04

## 2023-01-30 RX ADMIN — ATORVASTATIN CALCIUM 40 MG: 40 TABLET, FILM COATED ORAL at 21:15

## 2023-01-30 RX ADMIN — IPRATROPIUM BROMIDE AND ALBUTEROL SULFATE 1 AMPULE: 2.5; .5 SOLUTION RESPIRATORY (INHALATION) at 15:19

## 2023-01-30 RX ADMIN — IPRATROPIUM BROMIDE AND ALBUTEROL SULFATE 1 AMPULE: 2.5; .5 SOLUTION RESPIRATORY (INHALATION) at 20:39

## 2023-01-30 RX ADMIN — ENOXAPARIN SODIUM 120 MG: 150 INJECTION SUBCUTANEOUS at 21:15

## 2023-01-30 RX ADMIN — ASPIRIN 81 MG 81 MG: 81 TABLET ORAL at 08:37

## 2023-01-30 RX ADMIN — CLOPIDOGREL BISULFATE 75 MG: 75 TABLET ORAL at 08:37

## 2023-01-30 RX ADMIN — GABAPENTIN 600 MG: 300 CAPSULE ORAL at 21:15

## 2023-01-30 RX ADMIN — ISOSORBIDE MONONITRATE 30 MG: 30 TABLET, EXTENDED RELEASE ORAL at 08:37

## 2023-01-30 RX ADMIN — ROSUVASTATIN 40 MG: 40 TABLET, FILM COATED ORAL at 21:15

## 2023-01-30 RX ADMIN — ALPRAZOLAM 1 MG: 0.5 TABLET ORAL at 21:15

## 2023-01-30 ASSESSMENT — PAIN SCALES - GENERAL
PAINLEVEL_OUTOF10: 0

## 2023-01-30 NOTE — PROGRESS NOTES
[address] Daily Progress Note      Admit Date:  1/24/2023    Chief Complaint   Patient presents with    Chest Pain     Pt brought in by [facility] from home due to CP that started 3 days ago, SOB started this morning, hx of COPD, presents with cough/wheezing/rhonchi in her lungs, PVCs/bigeminy. Pt states that chest pain goes to her back that is pressure, afebrile, A and O x 4. Subjective:  Ms. Leidy Lyons is being seen for known CAD. denies exertional chest pain, SOB/MUNROE, PND, palpitations, light-headedness, or edema. Patient is lethargic but arousable this afternoon. Was given scheduled xanax dose.     Objective:   /73   Pulse 66   Temp 97.9 °F (36.6 °C)   Resp 20   Ht 5' 5\" (1.651 m)   Wt 268 lb 11.2 oz (121.9 kg)   SpO2 92%   BMI 44.71 kg/m²     Intake/Output Summary (Last 24 hours) at 1/30/2023 1305  Last data filed at 1/30/2023 0451  Gross per 24 hour   Intake 850 ml   Output 1850 ml   Net -1000 ml       TELEMETRY: Sinus     Physical Exam:  General:  Awake, alert, oriented x 3, NAD  Skin:  Warm and dry  Neck:  JVD flat  Chest:  normal air entry  Cardiovascular:  RRR S1S2, no S3, no mrmr  Abdomen:  Soft, ND, NT, No HSM  Extremities:  No edema    Medications:    acetylcysteine  4 mL Inhalation BID    diclofenac sodium  4 g Topical BID    furosemide  20 mg IntraVENous BID    clopidogrel  75 mg Oral Daily    sodium chloride flush  5-40 mL IntraVENous 2 times per day    enoxaparin  1 mg/kg SubCUTAneous BID    ipratropium-albuterol  1 ampule Inhalation 4x daily    isosorbide mononitrate  30 mg Oral BID    aspirin  243 mg Oral Once    aspirin  81 mg Oral Daily    glipiZIDE  5 mg Oral BID AC    metoprolol succinate  25 mg Oral Daily    [Held by provider] rivaroxaban  15 mg Oral Daily    lisinopril  2.5 mg Oral Daily    acetaZOLAMIDE  250 mg Oral Daily    gabapentin  600 mg Oral TID    pantoprazole  40 mg Oral QAM AC    sodium chloride flush  5-40 mL IntraVENous 2 times per day    atorvastatin 40 mg Oral Nightly      sodium chloride      dextrose      sodium chloride      dextrose       ibuprofen, sodium chloride flush, sodium chloride, dextrose bolus **OR** dextrose bolus, glucagon (rDNA), dextrose, guaiFENesin-dextromethorphan, sodium chloride, ipratropium-albuterol, albuterol sulfate HFA, ALPRAZolam, sodium chloride flush, sodium chloride, ondansetron **OR** ondansetron, acetaminophen **OR** acetaminophen, magnesium hydroxide, nitroGLYCERIN, hydrALAZINE, sodium chloride, potassium chloride **OR** potassium alternative oral replacement **OR** potassium chloride, dextrose    Lab Data:  CBC:   Recent Labs     01/28/23  0447 01/30/23  0459   WBC 9.3 8.6   HGB 13.1 14.0   HCT 40.1 43.8   MCV 84.0 84.1    169     BMP:   Recent Labs     01/28/23 0447 01/30/23 0459    137   K 4.0 3.6    98*   CO2 27 29   BUN 22* 19   CREATININE 1.0 0.9     LIVER PROFILE: No results for input(s): AST, ALT, LIPASE, BILIDIR, BILITOT, ALKPHOS in the last 72 hours. Invalid input(s): AMYLASE,  ALB  PT/INR: No results for input(s): PROTIME, INR in the last 72 hours. APTT: No results for input(s): APTT in the last 72 hours. BNP:  No results for input(s): BNP in the last 72 hours. IMAGING:   [facility] 1/26/2023 Vic Tomlin  1/26/2023  Left Heart Cath  LM-30% proximal, 10-20% distal disease   LAD-proximal mid stents patent with <20% ISR; 80% ostial jailed second diagonal;  hazy 95% distal LAD   LCx-high first marginal with 80% short, ostial stenosis; mild plaquing in circumflex   RCA-dominant, large caliber with 80% calcified, proximal stenosis; 50% distal RPDA   LVEDP- 20 mmHg  LVG: LVEF 60%     [facility] 8/2016 ([place])  70% ostial D-jailed by stent  LAD stent patent  Mild CX  60-70 prox RCA  [place] LV FXN  Similar to prior cath 3/4/15--CPT. Cardiac Cath with [facility] 9/2019 Hayley Navarro):  LM-Normal No disease   LAD-proximal stent 10% ISR, mid stent patent. Distal LAD 20% irregular stenosis.  D2 70% ostial stent detention  Cx-Patent, mild luminal irregularities  OM1- ostial discrete 60% stenosis  RCA-Dominant, large sized vessel. Proximal 70% heavily calcified stenosis. RPDA- Patent, mild luminal irregularities     FFR of prox RCA 0.84     ECHO 8/2/22   Summary   Technically difficult exam due to body habitus. Normal global systolic function with an ejection fraction estimated at 70%. No regional wall motion abnormalities noted. Mild concentric left ventricular hypertrophy. Mild aortic stenosis with a peak velocity of 2.17m/s and a mean pressure   gradient of 12 mmHg. There is mild aortic insufficiency. Grade I diastolic dysfunction with normal LV filling pressures. Avg.   E/e'=18.2     SPECT 10/27/22  Summary    There is a mild fixed inferoapical defect with no associated wall motion  abnormality consistent with diaphragmatic attenuation artifact. No evidence of myocardium at risk for significant reversible ischemia. Normal LV size and systolic function. Overall findings represent a low risk scan. Assessment/Plan:  Principal Problem:    Chest pain  Plan: on imdur. Severe Ischemic CAD on [facility]. plan for staged PCI to LAD and RCA today. Based on these findings I recommend left heart cath for definitive evaluation of coronary arteries. Risks, benefits, expectations, and alternative treatments were discussed. Questions appropriately answered. Austin Pretty agrees to proceed and verbalized understanding. Cont aspirin and plavix.     Hyperlipemia  Plan: increased to lipitor 40, recheck lipids as outpt        Primary hypertension  Plan: on Lisinopril / metoprolol   Stable    T2DM  Plan: management per primary team     COPD   On 4L O2, baseline 2L   Titrate down as tolerated    Management per primary team              Amy Hickman MD, 1/30/2023 1:05 PM

## 2023-01-30 NOTE — PROGRESS NOTES
Progress Note - Dr. Tianna Helton - Internal Medicine  PCP: Maureen Temple MD [address] [facility] / [facility] [phone]    Hospital Day: 4  Code Status: Full Code  Current Diet: Diet NPO        CC: follow up on medical issues    Subjective:   Leslee Laird is a 69 y.o. female. Pt seen and examined  Chart reviewed since last visit, labs and imaging below      Doing about the same  Denies chest pain this AM    Continues on iv diuresis,   Total -6100 cc out  Plan for  pci today per Dr Scott Rao      Review of Systems: (1 system for EPF, 2-9 for detailed, 10+ for comprehensive)  Constitutional: Negative for chills and fever. HENT: Negative for dental problem, nosebleeds and rhinorrhea. Eyes: Negative for photophobia and visual disturbance. Respiratory: Negative for cough, chest tightness and positive for shortness of breath. Cardiovascular: positive for chest pain and leg swelling. Gastrointestinal: Negative for diarrhea, nausea and vomiting. Endocrine: Negative for polydipsia and polyphagia. Genitourinary: Negative for frequency, hematuria and urgency. Musculoskeletal: Negative for back pain and myalgias. Skin: Negative for rash. Allergic/Immunologic: Negative for food allergies. Neurological: Negative for dizziness, seizures, syncope and facial asymmetry. Hematological: Negative for adenopathy. Psychiatric/Behavioral: Negative for dysphoric mood. The patient is not nervous/anxious. I have reviewed the patient's medical and social history in detail and updated the computerized patient record.   To recap: She  has a past medical history of Acute MI ([address] Acute pyelonephritis, Anxiety and depression, Arthritis, CAD (coronary artery disease), Cancer ([address] Cancer ([address] Cancer of skin of leg, Chronic obstructive pulmonary disease ([address] Claustrophobia, COPD (chronic obstructive pulmonary disease) ([address] ESBL (extended spectrum beta-lactamase) producing bacteria infection, Esophageal stricture, Gastroesophageal reflux disease without esophagitis, Hiatal hernia, Hiatal hernia, Hypercholesteremia, Hypertension, IBS (irritable bowel syndrome), Migraines, Movement disorder, Pneumonia, PONV (postoperative nausea and vomiting), Type II or unspecified type diabetes mellitus without mention of complication, not stated as uncontrolled, and Unspecified cerebral artery occlusion with cerebral infarction. . She  has a past surgical history that includes Cardiac surgery; Gallbladder surgery; Hysterectomy; Appendectomy; Cholecystectomy; Colonoscopy; Upper gastrointestinal endoscopy (4/20/12); Endoscopy, colon, diagnostic; Tear duct surgery; Upper gastrointestinal endoscopy (06/11/2018); Colonoscopy (06/11/2018); pr excision malignant lesion s/n/h/f/g 0.5 cm/< (N/A, 9/6/2018); pr split agrft t/a/l 1st 100 cm/&/1% bdy inft/chld (N/A, 9/6/2018); skin biopsy; eye surgery; bronchoscopy (N/A, 1/24/2020); bronchoscopy (N/A, 1/27/2020); Cataract removal (2013 or 2014); Esophagus dilation; and bronchoscopy (N/A, 11/23/2020). . She  reports that she quit smoking about 5 years ago. Her smoking use included cigarettes. She has a 12.25 pack-year smoking history. She has never used smokeless tobacco. She reports current alcohol use of about 1.0 standard drink per week. She reports that she does not use drugs. .        Active Hospital Problems    Diagnosis Date Noted    Hyperlipemia [E78.5] 05/23/2011     Priority: High    Unstable angina ([facility] [I20.0] 01/26/2023     Priority: Medium    Chest pain [R07.9] 10/26/2022     Priority: Medium    Class 3 severe obesity due to excess calories with serious comorbidity and body mass index (BMI) of 45.0 to 49.9 in adult ([facility] [E66.01, Z68.42] 06/18/2018    DM2 (diabetes mellitus, type 2) ([facility] [E11.9] 12/03/2015    Primary hypertension [I10] 04/18/2012       Current Facility-Administered Medications: ibuprofen (ADVIL;MOTRIN) tablet 400 mg, 400 mg, Oral, TID PRN  acetylcysteine (MUCOMYST) 10 % solution 400 mg, 4 mL, Inhalation, BID  diclofenac sodium (VOLTAREN) 1 % gel 4 g, 4 g, Topical, BID  furosemide (LASIX) injection 20 mg, 20 mg, IntraVENous, BID  clopidogrel (PLAVIX) tablet 75 mg, 75 mg, Oral, Daily  sodium chloride flush 0.9 % injection 5-40 mL, 5-40 mL, IntraVENous, 2 times per day  sodium chloride flush 0.9 % injection 5-40 mL, 5-40 mL, IntraVENous, PRN  0.9 % sodium chloride infusion, , IntraVENous, PRN  enoxaparin (LOVENOX) injection 120 mg, 1 mg/kg, SubCUTAneous, BID  dextrose bolus 10% 125 mL, 125 mL, IntraVENous, PRN **OR** dextrose bolus 10% 250 mL, 250 mL, IntraVENous, PRN  glucagon (rDNA) injection 1 mg, 1 mg, SubCUTAneous, PRN  dextrose 10 % infusion, , IntraVENous, Continuous PRN  guaiFENesin-dextromethorphan (ROBITUSSIN DM) 100-10 MG/5ML syrup 5 mL, 5 mL, Oral, Q4H PRN  ipratropium-albuterol (DUONEB) nebulizer solution 1 ampule, 1 ampule, Inhalation, 4x daily  isosorbide mononitrate (IMDUR) extended release tablet 30 mg, 30 mg, Oral, BID  aspirin chewable tablet 243 mg, 243 mg, Oral, Once  aspirin chewable tablet 81 mg, 81 mg, Oral, Daily  sodium chloride (OCEAN, BABY AYR) 0.65 % nasal spray 1 spray, 1 spray, Nasal, PRN  ipratropium-albuterol (DUONEB) nebulizer solution 1 ampule, 1 ampule, Inhalation, Q4H PRN  albuterol sulfate HFA (PROVENTIL;VENTOLIN;PROAIR) 108 (90 Base) MCG/ACT inhaler 2 puff, 2 puff, Inhalation, Q6H PRN  glipiZIDE (GLUCOTROL) tablet 5 mg, 5 mg, Oral, BID AC  metoprolol succinate (TOPROL XL) extended release tablet 25 mg, 25 mg, Oral, Daily  [Held by provider] rivaroxaban (XARELTO) tablet 15 mg, 15 mg, Oral, Daily  lisinopril (PRINIVIL;ZESTRIL) tablet 2.5 mg, 2.5 mg, Oral, Daily  acetaZOLAMIDE (DIAMOX) tablet 250 mg, 250 mg, Oral, Daily  gabapentin (NEURONTIN) capsule 600 mg, 600 mg, Oral, TID  ALPRAZolam (XANAX) tablet 1 mg, 1 mg, Oral, TID PRN  pantoprazole (PROTONIX) tablet 40 mg, 40 mg, Oral, QAM AC  sodium chloride flush 0.9 % injection 5-40 mL, 5-40 mL, IntraVENous, 2 times per day  sodium chloride flush 0.9 % injection 10 mL, 10 mL, IntraVENous, PRN  0.9 % sodium chloride infusion, , IntraVENous, PRN  ondansetron (ZOFRAN-ODT) disintegrating tablet 4 mg, 4 mg, Oral, Q8H PRN **OR** ondansetron (ZOFRAN) injection 4 mg, 4 mg, IntraVENous, Q6H PRN  acetaminophen (TYLENOL) tablet 650 mg, 650 mg, Oral, Q6H PRN **OR** acetaminophen (TYLENOL) suppository 650 mg, 650 mg, Rectal, Q6H PRN  magnesium hydroxide (MILK OF MAGNESIA) 400 MG/5ML suspension 30 mL, 30 mL, Oral, Daily PRN  atorvastatin (LIPITOR) tablet 40 mg, 40 mg, Oral, Nightly  nitroGLYCERIN (NITROSTAT) SL tablet 0.4 mg, 0.4 mg, SubLINGual, Q5 Min PRN  hydrALAZINE (APRESOLINE) injection 10 mg, 10 mg, IntraVENous, Q6H PRN  0.9 % sodium chloride bolus, 500 mL, IntraVENous, PRN  potassium chloride (KLOR-CON M) extended release tablet 40 mEq, 40 mEq, Oral, PRN **OR** potassium bicarb-citric acid (EFFER-K) effervescent tablet 40 mEq, 40 mEq, Oral, PRN **OR** potassium chloride 10 mEq/100 mL IVPB (Peripheral Line), 10 mEq, IntraVENous, PRN  dextrose 10 % infusion, , IntraVENous, Continuous PRN         Objective:  BP (!) 136/50   Pulse 78   Temp 98.6 °F (37 °C) (Oral)   Resp 14   Ht 5' 5\" (1.651 m)   Wt 268 lb 11.2 oz (121.9 kg)   SpO2 91%   BMI 44.71 kg/m²      Patient Vitals for the past 24 hrs:   BP Temp Temp src Pulse Resp SpO2 Weight   01/30/23 0614 -- -- -- 78 -- -- --   01/30/23 0341 (!) 136/50 98.6 °F (37 °C) Oral 81 14 91 % --   01/30/23 0236 -- -- -- 69 -- -- --   01/30/23 0202 -- -- -- 74 -- -- --   01/30/23 0050 112/72 97.9 °F (36.6 °C) Oral 83 18 93 % 268 lb 11.2 oz (121.9 kg)   01/29/23 2246 -- -- -- 79 -- -- --   01/29/23 2226 -- -- -- 80 -- -- --   01/29/23 2023 -- -- -- 56 20 93 % --   01/29/23 2019 (!) 93/55 98.4 °F (36.9 °C) Oral 51 14 91 % --   01/29/23 1630 (!) 100/53 98.4 °F (36.9 °C) Oral 78 16 -- --   01/29/23 1558 -- -- -- 86 18 92 % --   01/29/23 1230 101/65 97 °F (36.1 °C) Oral 67 16 91 % --   01/29/23 0905 -- -- -- 87 18 91 % --   01/29/23 0830 116/73 98 °F (36.7 °C) Oral 66 16 92 % --       Patient Vitals for the past 96 hrs (Last 3 readings):   Weight   01/30/23 0050 268 lb 11.2 oz (121.9 kg)   01/29/23 0401 267 lb 11.2 oz (121.4 kg)   01/28/23 0500 271 lb 11.2 oz (123.2 kg)             Intake/Output Summary (Last 24 hours) at 1/30/2023 0743  Last data filed at 1/30/2023 0451  Gross per 24 hour   Intake 850 ml   Output 1850 ml   Net -1000 ml           Physical Exam: (2-7 system for EPF/Detailed, ?8 for Comprehensive)  BP (!) 136/50   Pulse 78   Temp 98.6 °F (37 °C) (Oral)   Resp 14   Ht 5' 5\" (1.651 m)   Wt 268 lb 11.2 oz (121.9 kg)   SpO2 91%   BMI 44.71 kg/m²   Constitutional: vitals as above: alert, appears stated age and cooperative    Psychiatric: normal insight and judgment, oriented to person, place, time, and general circumstances    Head: Normocephalic, without obvious abnormality, atraumatic    Eyes:lids and lashes normal, conjunctivae and sclerae normal and pupils equal, round, reactive to light and accomodation    EMNT: external ears normal, nares midline    Neck: no carotid bruit, supple, symmetrical, trachea midline and thyroid not enlarged, symmetric, no tenderness/mass/nodules     Respiratory: clear to auscultation and percussion bilaterally with normal respiratory effort    Cardiovascular: normal rate, regular rhythm, normal S1 and S2 and no murmurs    Gastrointestinal: soft, non-tender, non-distended, normal bowel sounds, no masses or organomegaly    Extremities: no clubbing, no edema    Skin:No rashes or nodules noted.     Neurologic:negative         Labs:  Lab Results   Component Value Date    WBC 8.6 01/30/2023    HGB 14.0 01/30/2023    HCT 43.8 01/30/2023     01/30/2023    CHOL 180 01/25/2023    TRIG 157 (H) 01/25/2023    HDL 34 (L) 01/25/2023    LDLDIRECT 85 08/03/2022    ALT 12 01/25/2023    AST 12 (L) 01/25/2023     01/30/2023    K 3.6 01/30/2023    CL 98 (L) 01/30/2023    CREATININE 0.9 01/30/2023    BUN 19 01/30/2023    CO2 29 01/30/2023    TSH 2.34 06/06/2011    INR 0.99 08/01/2022    LABA1C 7.8 01/25/2023    LABMICR YES 10/26/2022     Lab Results   Component Value Date    CKTOTAL 24 (L) 06/07/2018    CKMB 0.29 08/09/2011    TROPONINI <0.01 01/25/2023       Recent Imaging Results are Reviewed:  CT CHEST PULMONARY EMBOLISM W CONTRAST    Result Date: 1/24/2023  EXAMINATION: CTA OF THE CHEST, 1/24/2023 5:13 pm TECHNIQUE: CTA of the chest was performed after the administration of intravenous contrast.  Multiplanar reformatted images are provided for review. MIP images are provided for review. Automated exposure control, iterative reconstruction, and/or weight based adjustment of the mA/kV was utilized to reduce the radiation dose to as low as reasonably achievable. COMPARISON: None HISTORY: ORDERING SYSTEM PROVIDED HISTORY:  Chest pain, dyspnea TECHNOLOGIST PROVIDED HISTORY: Reason for Exam:  Chest pain, dyspnea Decision Support Exception - unselect if not a suspected or confirmed emergency medical condition->Emergency Medical Condition (MA) Reason for Exam:  Chest pain, dyspnea, Chest Pain (Pt brought in by  EMS from home due to CP that started 3 days ago, SOB started this morning, hx of COPD, presents with cough/wheezing/rhonchi in her lungs, PVCs/bigeminy. Pt states that chest pain goes to her back that is pressure, afebrile, A and O x 4). FINDINGS: Pulmonary Arteries: Pulmonary arteries are adequately opacified for evaluation. No evidence of intraluminal filling defect to suggest pulmonary embolism. Main pulmonary artery is normal in caliber. Mediastinum: No evidence of mediastinal lymphadenopathy. The heart and pericardium demonstrate no acute abnormality. There is no acute abnormality of the thoracic aorta. Lungs/pleura: The lungs are without acute process. No focal consolidation or pulmonary edema.   No evidence of pleural effusion or pneumothorax. Upper Abdomen: Limited images of the upper abdomen are unremarkable. Soft Tissues/Bones: No acute bone or soft tissue abnormality. There are multiple chronic mild thoracolumbar compression deformities. No evidence of pulmonary embolism or acute pulmonary abnormality. Lab Results   Component Value Date/Time    GLUCOSE 187 01/30/2023 04:59 AM     Lab Results   Component Value Date/Time    POCGLU 165 01/30/2023 07:24 AM     BP (!) 136/50   Pulse 78   Temp 98.6 °F (37 °C) (Oral)   Resp 14   Ht 5' 5\" (1.651 m)   Wt 268 lb 11.2 oz (121.9 kg)   SpO2 91%   BMI 44.71 kg/m²     Assessment and Plan:  Principal Problem:    Unstable angina -Established problem. Stable. Cath with severe multivessel disease. Plan: per Dr Sebastian Blankenship, pt does not want CABG. Try to optimize medically, staged PCI on today. Cont on iv lasix. Monitor electrolytes. KCl iv ordered  Active Problems:    Hyperlipemia -Established problem. Stable. On statin  Plan: cont meds    Essential hypertension -Established problem. Stable. 136/50  Plan: Continue medications. Continue to check BP q shift. CBC and BMP ordered to monitor for disease progression, medication side effect. DM2 (diabetes mellitus, type 2) ([address] -Established problem. Stable. FSBS 165  Plan: Continue on CCC diet, home medications. CBC and BMP ordered to monitor sugars and for other medication complications. Fingerstick glucose ordered to check for alterations in levels throughout day. Sliding scale insulin ordered to cover fingerstick levels. Case discussed with: cards  Tests ordered/reviewed: cbc, bmp, tele.   On iv med requiring monitoring    Disp - per cards post cath        (Please note that portions of this note were completed with a voice recognition program.  Efforts were made to edit the dictations but occasionally words are mis-transcribed.)        Xiang Knutson MD  1/30/2023

## 2023-01-30 NOTE — PROGRESS NOTES
Speech Language Pathology  Attempt   Lonn Cast   1945     Attempted to see pt for dysphagia therapy follow-up. Per chart pt NPO at this time. Will hold at this time and re-attempt to follow-up as therapy schedule allows.      Thanks,  Dianelys Garcia, [facility]  Speech Language Pathologist

## 2023-01-30 NOTE — PRE SEDATION
Brief Pre-Op Note/Sedation Assessment      Rey Burger  1945  [id number]  1:08 PM    Planned Procedure: Cardiac Catheterization Procedure  Post Procedure Plan: Return to same level of care  Consent: I have discussed with the patient and/or the patient representative the indication, alternatives, and the possible risks and/or complications of the planned procedure and the anesthesia methods. The patient and/or patient representative appear to understand and agree to proceed. Chief Complaint:   Anginal Equivalent      Indications for Cath Procedure:  Presentation:  Worsening Angina and Stable Known CAD  2. Anginal Classification within 2 weeks:  CCS III - Symptoms with everyday living activities, i.e., moderate limitation  3. Angina Symptoms Assessment:  Typical Chest Pain  4. Heart Failure Class within last 2 weeks:  No symptoms  5. Cardiovascular Instability:  No    Prior Ischemic Workup/Eval:  Pre-Procedural Medications: Yes: ACE/ARB/ARNI, Aspirin, Beta Blockers, Long Acting Nitrates (Any), and STATIN  2. Stress Test Completed? Yes:  Stress or Imaging Studies Performed (within ANY time period):   Type:  Stress Nuclear  Results:  Negative Extent of Ischemia:  Low Risk (<1% annual death or MI)    Does Patient need surgery? Cath Valve Surgery:  No    Pre-Procedure Medical History:  Vital Signs:  /73   Pulse 66   Temp 97.9 °F (36.6 °C)   Resp 20   Ht 5' 5\" (1.651 m)   Wt 268 lb 11.2 oz (121.9 kg)   SpO2 92%   BMI 44.71 kg/m²     Allergies:     Allergies   Allergen Reactions    Morphine Shortness Of Breath    Codeine Other (See Comments)     Chest pain    Hydromorphone Other (See Comments)     hallucinations  Hallucinations    But will take if needed in an emergency    Levaquin [Levofloxacin In D5w] Itching    Vicodin [Hydrocodone-Acetaminophen] Hives    Aspirin      Upsets hiatal hernia     Medications:    Current Facility-Administered Medications   Medication Dose Route Frequency Provider Last Rate Last Admin    ibuprofen (ADVIL;MOTRIN) tablet 400 mg  400 mg Oral TID PRN Doyle Crouch MD   400 mg at 01/28/23 2226    acetylcysteine (MUCOMYST) 10 % solution 400 mg  4 mL Inhalation BID Doyle Crouch MD   400 mg at 01/30/23 8225    diclofenac sodium (VOLTAREN) 1 % gel 4 g  4 g Topical BID Doyle Crouch MD   4 g at 01/29/23 5464    furosemide (LASIX) injection 20 mg  20 mg IntraVENous BID Renelle Stage, DO   20 mg at 01/30/23 0707    clopidogrel (PLAVIX) tablet 75 mg  75 mg Oral Daily Renelle Stage, DO   75 mg at 01/30/23 5864    sodium chloride flush 0.9 % injection 5-40 mL  5-40 mL IntraVENous 2 times per day Renelle Stage, DO   10 mL at 01/29/23 2020    sodium chloride flush 0.9 % injection 5-40 mL  5-40 mL IntraVENous PRN Renelle Stage, DO        0.9 % sodium chloride infusion   IntraVENous PRN Renelle Stage, DO        enoxaparin (LOVENOX) injection 120 mg  1 mg/kg SubCUTAneous BID Doyle Crouch MD   120 mg at 01/29/23 2020    dextrose bolus 10% 125 mL  125 mL IntraVENous PRN Doyle Crouch MD        Or    dextrose bolus 10% 250 mL  250 mL IntraVENous PRN Doyle Crouch MD        glucagon (rDNA) injection 1 mg  1 mg SubCUTAneous PRN Doyle Crouch MD        dextrose 10 % infusion   IntraVENous Continuous PRN Doyle Crouch MD        guaiFENesin-dextromethorphan (ROBITUSSIN DM) 100-10 MG/5ML syrup 5 mL  5 mL Oral Q4H PRN Doyle Crouch MD   5 mL at 01/30/23 0054    ipratropium-albuterol (DUONEB) nebulizer solution 1 ampule  1 ampule Inhalation 4x daily Doyle Crouch MD   1 ampule at 01/30/23 0804    isosorbide mononitrate (IMDUR) extended release tablet 30 mg  30 mg Oral BID Renelle Stage, DO   30 mg at 01/30/23 5431    aspirin chewable tablet 243 mg  243 mg Oral Once Avni Man MD        aspirin chewable tablet 81 mg  81 mg Oral Daily Doyle Crouch MD   81 mg at 01/30/23 0837    sodium chloride (OCEAN, BABY AYR) 0.65 % nasal spray 1 spray 1 spray Nasal PRN Clemente Leon MD        ipratropium-albuterol (DUONEB) nebulizer solution 1 ampule  1 ampule Inhalation Q4H PRN Clemente Leon MD   1 ampule at 01/28/23 0354    albuterol sulfate HFA (PROVENTIL;VENTOLIN;PROAIR) 108 (90 Base) MCG/ACT inhaler 2 puff  2 puff Inhalation Q6H PRN Clemente Leon MD        glipiZIDE (GLUCOTROL) tablet 5 mg  5 mg Oral BID  Clemente Leon MD   5 mg at 01/29/23 1636    metoprolol succinate (TOPROL XL) extended release tablet 25 mg  25 mg Oral Daily Clemente Leon MD   25 mg at 01/29/23 4325    [Held by provider] rivaroxaban (XARELTO) tablet 15 mg  15 mg Oral Daily Clemente Leon MD        lisinopril (PRINIVIL;ZESTRIL) tablet 2.5 mg  2.5 mg Oral Daily Clemente Leon MD   2.5 mg at 01/29/23 1683    acetaZOLAMIDE (DIAMOX) tablet 250 mg  250 mg Oral Daily Clemente Leon MD   250 mg at 01/30/23 8119    gabapentin (NEURONTIN) capsule 600 mg  600 mg Oral TID Clemente Leon MD   600 mg at 01/30/23 2893    ALPRAZolam Edward Gilda) tablet 1 mg  1 mg Oral TID PRN Clemente Leon MD   1 mg at 01/30/23 0837    pantoprazole (PROTONIX) tablet 40 mg  40 mg Oral QAM  Clemente Leon MD   40 mg at 01/30/23 0539    sodium chloride flush 0.9 % injection 5-40 mL  5-40 mL IntraVENous 2 times per day Clemente Leon MD   10 mL at 01/30/23 0842    sodium chloride flush 0.9 % injection 10 mL  10 mL IntraVENous PRN Clemente Leon MD        0.9 % sodium chloride infusion   IntraVENous PRN Clemente Leon MD        ondansetron (ZOFRAN-ODT) disintegrating tablet 4 mg  4 mg Oral Q8H PRN Clemente Leon MD        Or    ondansetron [facility]) injection 4 mg  4 mg IntraVENous Q6H PRN Clemente Leon MD        acetaminophen (TYLENOL) tablet 650 mg  650 mg Oral Q6H PRN Clemente Leon MD        Or    acetaminophen (TYLENOL) suppository 650 mg  650 mg Rectal Q6H PRN Clemente Leon MD        magnesium hydroxide (MILK OF MAGNESIA) 400 MG/5ML suspension 30 mL  30 mL Oral Daily PRN Dylan Hansen Sarabjit Patel MD        atorvastatin (LIPITOR) tablet 40 mg  40 mg Oral Nightly Sera Cardenas MD   40 mg at 01/29/23 2020    nitroGLYCERIN (NITROSTAT) SL tablet 0.4 mg  0.4 mg SubLINGual Q5 Min PRN Sera Cardenas MD        hydrALAZINE (APRESOLINE) injection 10 mg  10 mg IntraVENous Q6H PRN Sera Cardenas MD        0.9 % sodium chloride bolus  500 mL IntraVENous PRN Sera Cardenas MD        potassium chloride (KLOR-CON M) extended release tablet 40 mEq  40 mEq Oral PRN Sera Cardenas MD        Or    potassium bicarb-citric acid (EFFER-K) effervescent tablet 40 mEq  40 mEq Oral PRN Sera Cardenas MD        Or    potassium chloride 10 mEq/100 mL IVPB (Peripheral Line)  10 mEq IntraVENous PRN Sera Cardenas MD        dextrose 10 % infusion   IntraVENous Continuous PRN Sera Cardenas MD           Past Medical History:    Past Medical History:   Diagnosis Date    Acute MI [facility])     Acute pyelonephritis 09/08/2015    Anxiety and depression     Arthritis     CAD (coronary artery disease)     two heart stent one in 2000 and 2nd one 6 months later on 2000, had MI in 2000    Cancer [facility])     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    Cancer [facility])     \"skin on top of my head\"    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    Chronic obstructive pulmonary disease ([facility] [address] 01/13/2017    Claustrophobia     COPD (chronic obstructive pulmonary disease) (HCC)     ESBL (extended spectrum beta-lactamase) producing bacteria infection 08/14/2021    Esophageal stricture     Gastroesophageal reflux disease without esophagitis 03/24/2016    Hiatal hernia     Hiatal hernia     Hypercholesteremia     Hypertension     IBS (irritable bowel syndrome)     Migraines     Movement disorder arthritis    Pneumonia     PONV (postoperative nausea and vomiting)     Type II or unspecified type diabetes mellitus without mention of complication, not stated as uncontrolled     type ll does not take insulin at home    Unspecified cerebral artery occlusion with cerebral infarction     many TIA's       Surgical History:    Past Surgical History:   Procedure Laterality Date    APPENDECTOMY      BRONCHOSCOPY N/A 1/24/2020    BRONCHOSCOPY ALVEOLAR LAVAGE performed by Burt Tapia MD at [place] [address] N/A 1/27/2020    BRONCHOSCOPY DIAGNOSTIC OR CELL [address] ONLY performed by Margaret Grimm MD at [place] [address] N/A 11/23/2020    BRONCHOSCOPY DIAGNOSTIC OR CELL [address] ONLY performed by Jl Thorne MD at [facility]      1 stent 2000 and 1 stent 2001    CATARACT REMOVAL  2013 or 2014    bilateral cataracts removed    CHOLECYSTECTOMY      COLONOSCOPY      COLONOSCOPY  06/11/2018    ENDOSCOPY, COLON, DIAGNOSTIC      ESOPHAGEAL DILATATION      EYE SURGERY      bilateral cataracts    GALLBLADDER SURGERY      HYSTERECTOMY (CERVIX STATUS UNKNOWN)      SC EXCISION MALIGNANT LESION S/N/H/F/G 0.5 CM/< N/A 9/6/2018    EXCISION OF SCALP SQUAMOUS CELL CARCINOMA performed by Moira Hurtado MD at [address] AGRFT T/A/L 1ST 100 CM/&/1% BDY INFT/CHLD N/A 9/6/2018    SPLIT THICKNESS SKIN GRAFT FOR COVERAGE SCALP, APPLICATION OF WOUND VAC DEVICE performed by Moira Hurtado MD at [address] [place] [address],[address] ENDOSCOPY  4/20/12    with biopsy of stomach,gastritis    UPPER GASTROINTESTINAL ENDOSCOPY  06/11/2018    w/esophagael dilation             Pre-Sedation:  Pre-Sedation Documentation and Exam:  I have assessed the patient and reviewed the H&P on the chart. Prior History of Anesthesia Complications:   none    Modified Mallampati:  II (soft palate, uvula, fauces visible)    ASA Classification:  Class 3 - A patient with severe systemic disease that limits activity but is not incapacitating    Carlos Scale:   Activity:  2 - Able to move 4 extremities voluntarily on command  Respiration:  2 - Able to breathe deeply and cough freely  Circulation:  2 - BP+/- 20mmHg of normal  Consciousness:  2 - Fully awake  Oxygen Saturation (color):  2 - Able to maintain oxygen saturation >92% on room air    Sedation/Anesthesia Plan:  Guard the patient's safety and welfare. Minimize physical discomfort and pain. Minimize negative psychological responses to treatment by providing sedation and analgesia and maximize the potential amnesia. Patient to meet pre-procedure discharge plan.     Medication Planned:  midazolam intravenously and fentanyl intravenously    Patient is an appropriate candidate for plan of sedation:   yes      Electronically signed by Ck Urias MD on 1/30/2023 at 1:08 PM

## 2023-01-30 NOTE — OP NOTE
Operative Note  Patient:  Richie Leblanc   :   1945    Procedural Summary  ~Consent:   Obtained written and verbal consent      Risks/benefits explained in detail  ~Procedure:    Left Heart Catheterization  ~Medications:    Procedural sedation with minimal conscious sedation  ~Complications:   None  ~Blood Loss:    <10cc  ~Specimens:    None obtained  ~Pre-sedation re-evaluation: Performed immediately prior to procedure. Medication and Procedural Reconciliation:  An independent trained observer pushed medications at my direction. We monitored the patient's level of consciousness and vital signs/physiologic status throughout the procedure duration (see start and stop times below). Sedation: 0 mg Versed, 0 mcg Fentanyl  Sedation start: 1304  Sedation stop: 1425    Cardiac Cath PCI:  Anatomy:     LAD-prox 70-80% ISR, mid 70-80%, distal 90%    RCA-prox 90%  RPDA- nml    Intervention  ~Successful PCI to RCA with 4.0x18 KATHLEEN. PD with 4.0x8 NC. PCI to Distal LAD 2.75x38 KATHLEEN. PCI to Prox/mid LAD 3.25x38 KATHLEEN. Excellent Result. Contrast: 72  Flouro Time: 11.7  Access: R radial a    Impression  ~Coronary Angiography w/ severe 2VD  ~Successful complex angioplasty and stenting of LAD/RCA        Recommendation  ~Aggressive medical treatment and risk factor modification  ~1. Post cath IVF. Bedrest.   2. Recommend beta blocker, ace, high potency statin, aspirin and plavix for at least 12 months. Stop imdur  3. Referral to outpatient cardiac rehab phase II will be deferred until patient follow-up in office and then determine patient safety and appropriateness to proceed  4. Patient has been advised on the importance of regular exercise of at least 20-30 minutes daily. 5. Patient counseled about and offered assistance for smoking cessation   6.  Follow up in 2 weeks with cardiology            Alber Wan MD, MD 2023 2:26 PM

## 2023-01-30 NOTE — CARE COORDINATION
Chart reviewed for discharge planning    CM/SW has continued to follow patient progress to anticipate potential discharge needs. At this time, patient is not ready for discharge. Inpatient day #- 4    Barrier(s) to discharge-patient-  [facility] pn 1/26 abnormal, Plan for  PCI today per Dr Sebastian Blankenship    Tentative discharge plan-  home, [facility] services    Tentative discharge date-  TBD    *Case management will continue to follow progress and update discharge plan as needed.       Electronically signed by Crystal Estevez RN on 1/30/2023 at 8:23 AM

## 2023-01-31 VITALS
RESPIRATION RATE: 18 BRPM | SYSTOLIC BLOOD PRESSURE: 104 MMHG | BODY MASS INDEX: 45.68 KG/M2 | HEIGHT: 65 IN | DIASTOLIC BLOOD PRESSURE: 64 MMHG | OXYGEN SATURATION: 95 % | HEART RATE: 68 BPM | WEIGHT: 274.2 LBS | TEMPERATURE: 99.1 F

## 2023-01-31 LAB
ANION GAP SERPL CALCULATED.3IONS-SCNC: 8 MMOL/L (ref 3–16)
BASOPHILS ABSOLUTE: 0 K/UL (ref 0–0.2)
BASOPHILS RELATIVE PERCENT: 0.5 %
BUN BLDV-MCNC: 16 MG/DL (ref 7–20)
CALCIUM SERPL-MCNC: 8.9 MG/DL (ref 8.3–10.6)
CHLORIDE BLD-SCNC: 102 MMOL/L (ref 99–110)
CO2: 28 MMOL/L (ref 21–32)
CREAT SERPL-MCNC: 0.9 MG/DL (ref 0.6–1.2)
EOSINOPHILS ABSOLUTE: 0.2 K/UL (ref 0–0.6)
EOSINOPHILS RELATIVE PERCENT: 2.2 %
GFR SERPL CREATININE-BSD FRML MDRD: >60 ML/MIN/{1.73_M2}
GLUCOSE BLD-MCNC: 200 MG/DL (ref 70–99)
GLUCOSE BLD-MCNC: 206 MG/DL (ref 70–99)
GLUCOSE BLD-MCNC: 251 MG/DL (ref 70–99)
GLUCOSE BLD-MCNC: 255 MG/DL (ref 70–99)
HCT VFR BLD CALC: 42.4 % (ref 36–48)
HEMOGLOBIN: 13.5 G/DL (ref 12–16)
LYMPHOCYTES ABSOLUTE: 2 K/UL (ref 1–5.1)
LYMPHOCYTES RELATIVE PERCENT: 27.4 %
MCH RBC QN AUTO: 27 PG (ref 26–34)
MCHC RBC AUTO-ENTMCNC: 31.9 G/DL (ref 31–36)
MCV RBC AUTO: 84.6 FL (ref 80–100)
MONOCYTES ABSOLUTE: 0.8 K/UL (ref 0–1.3)
MONOCYTES RELATIVE PERCENT: 10.6 %
NEUTROPHILS ABSOLUTE: 4.3 K/UL (ref 1.7–7.7)
NEUTROPHILS RELATIVE PERCENT: 59.3 %
PDW BLD-RTO: 16 % (ref 12.4–15.4)
PERFORMED ON: ABNORMAL
PLATELET # BLD: 159 K/UL (ref 135–450)
PMV BLD AUTO: 8.4 FL (ref 5–10.5)
POTASSIUM SERPL-SCNC: 4 MMOL/L (ref 3.5–5.1)
RBC # BLD: 5.01 M/UL (ref 4–5.2)
SODIUM BLD-SCNC: 138 MMOL/L (ref 136–145)
WBC # BLD: 7.2 K/UL (ref 4–11)

## 2023-01-31 PROCEDURE — 6360000002 HC RX W HCPCS: Performed by: INTERNAL MEDICINE

## 2023-01-31 PROCEDURE — 2580000003 HC RX 258: Performed by: INTERNAL MEDICINE

## 2023-01-31 PROCEDURE — 6370000000 HC RX 637 (ALT 250 FOR IP): Performed by: INTERNAL MEDICINE

## 2023-01-31 PROCEDURE — 36415 COLL VENOUS BLD VENIPUNCTURE: CPT

## 2023-01-31 PROCEDURE — 94761 N-INVAS EAR/PLS OXIMETRY MLT: CPT

## 2023-01-31 PROCEDURE — 80048 BASIC METABOLIC PNL TOTAL CA: CPT

## 2023-01-31 PROCEDURE — 2700000000 HC OXYGEN THERAPY PER DAY

## 2023-01-31 PROCEDURE — 94669 MECHANICAL CHEST WALL OSCILL: CPT

## 2023-01-31 PROCEDURE — 99232 SBSQ HOSP IP/OBS MODERATE 35: CPT | Performed by: INTERNAL MEDICINE

## 2023-01-31 PROCEDURE — 94680 O2 UPTK RST&XERS DIR SIMPLE: CPT

## 2023-01-31 PROCEDURE — 92526 ORAL FUNCTION THERAPY: CPT

## 2023-01-31 PROCEDURE — 94640 AIRWAY INHALATION TREATMENT: CPT

## 2023-01-31 PROCEDURE — 85025 COMPLETE CBC W/AUTO DIFF WBC: CPT

## 2023-01-31 RX ORDER — CLOPIDOGREL BISULFATE 75 MG/1
75 TABLET ORAL DAILY
Qty: 30 TABLET | Refills: 3 | Status: ON HOLD | OUTPATIENT
Start: 2023-01-31

## 2023-01-31 RX ORDER — ROSUVASTATIN CALCIUM 40 MG/1
40 TABLET, COATED ORAL NIGHTLY
Qty: 30 TABLET | Refills: 3 | Status: ON HOLD | OUTPATIENT
Start: 2023-01-31

## 2023-01-31 RX ORDER — FUROSEMIDE 10 MG/ML
20 INJECTION INTRAMUSCULAR; INTRAVENOUS ONCE
Status: COMPLETED | OUTPATIENT
Start: 2023-01-31 | End: 2023-01-31

## 2023-01-31 RX ORDER — ALBUTEROL SULFATE 90 UG/1
AEROSOL, METERED RESPIRATORY (INHALATION)
Qty: 18 G | Refills: 1 | Status: ON HOLD | OUTPATIENT
Start: 2023-01-31

## 2023-01-31 RX ADMIN — CLOPIDOGREL BISULFATE 75 MG: 75 TABLET ORAL at 08:32

## 2023-01-31 RX ADMIN — ACETYLCYSTEINE 400 MG: 100 SOLUTION ORAL; RESPIRATORY (INHALATION) at 07:35

## 2023-01-31 RX ADMIN — GLIPIZIDE 5 MG: 5 TABLET ORAL at 08:32

## 2023-01-31 RX ADMIN — ALPRAZOLAM 1 MG: 0.5 TABLET ORAL at 14:18

## 2023-01-31 RX ADMIN — IPRATROPIUM BROMIDE AND ALBUTEROL SULFATE 1 AMPULE: 2.5; .5 SOLUTION RESPIRATORY (INHALATION) at 12:30

## 2023-01-31 RX ADMIN — IPRATROPIUM BROMIDE AND ALBUTEROL SULFATE 1 AMPULE: 2.5; .5 SOLUTION RESPIRATORY (INHALATION) at 15:31

## 2023-01-31 RX ADMIN — ACETAZOLAMIDE 250 MG: 250 TABLET ORAL at 08:32

## 2023-01-31 RX ADMIN — GABAPENTIN 600 MG: 300 CAPSULE ORAL at 08:32

## 2023-01-31 RX ADMIN — FUROSEMIDE 20 MG: 10 INJECTION, SOLUTION INTRAMUSCULAR; INTRAVENOUS at 10:17

## 2023-01-31 RX ADMIN — IPRATROPIUM BROMIDE AND ALBUTEROL SULFATE 1 AMPULE: 2.5; .5 SOLUTION RESPIRATORY (INHALATION) at 07:35

## 2023-01-31 RX ADMIN — ASPIRIN 81 MG: 81 TABLET, COATED ORAL at 08:32

## 2023-01-31 RX ADMIN — ONDANSETRON 4 MG: 2 INJECTION INTRAMUSCULAR; INTRAVENOUS at 05:18

## 2023-01-31 RX ADMIN — GUAIFENESIN AND DEXTROMETHORPHAN 5 ML: 100; 10 SYRUP ORAL at 02:43

## 2023-01-31 RX ADMIN — METOPROLOL SUCCINATE 25 MG: 25 TABLET, EXTENDED RELEASE ORAL at 08:34

## 2023-01-31 RX ADMIN — FUROSEMIDE 20 MG: 10 INJECTION, SOLUTION INTRAMUSCULAR; INTRAVENOUS at 08:32

## 2023-01-31 RX ADMIN — ENOXAPARIN SODIUM 120 MG: 150 INJECTION SUBCUTANEOUS at 08:33

## 2023-01-31 RX ADMIN — LISINOPRIL 2.5 MG: 5 TABLET ORAL at 08:32

## 2023-01-31 RX ADMIN — GABAPENTIN 600 MG: 300 CAPSULE ORAL at 14:16

## 2023-01-31 RX ADMIN — PANTOPRAZOLE SODIUM 40 MG: 40 TABLET, DELAYED RELEASE ORAL at 05:27

## 2023-01-31 RX ADMIN — SODIUM CHLORIDE, PRESERVATIVE FREE 10 ML: 5 INJECTION INTRAVENOUS at 05:18

## 2023-01-31 ASSESSMENT — PAIN SCALES - GENERAL
PAINLEVEL_OUTOF10: 0

## 2023-01-31 NOTE — CARE COORDINATION
Case Management Assessment  Initial Evaluation    Date/Time of Evaluation: 1/31/2023 9:06 AM  Assessment Completed by: Major Ruvalcaba RN    If patient is discharged prior to next notation, then this note serves as note for discharge by case management. Patient Name: Gretta Hidalgo                   YOB: 1945  Diagnosis: Chest pain [R07.9]  Unstable angina [facility]) [I20.0]                   Date / Time: 1/24/2023  3:54 PM    Patient Admission Status: Inpatient   Readmission Risk (Low < 19, Mod (19-27), High > 27): Readmission Risk Score: 15.2    Current PCP: Chuyita Henderson MD  PCP verified by CM? Yes    Chart Reviewed: Yes      History Provided by: Patient  Patient Orientation: Alert and Oriented    Patient Cognition: Alert    Hospitalization in the last 30 days (Readmission):  No    If yes, Readmission Assessment in  Navigator will be completed. Advance Directives:      Code Status: Full Code   Patient's Primary Decision Maker is: Legal Next of Kin      Discharge Planning:    Patient lives with: Alone Type of Home: Apartment  Primary Care Giver: Other (Comment) (Has HHC and personal aide 5/wk for 4hrs/day)  Patient Support Systems include: Family Members, Friends/Neighbors, Home Care Staff, Other (Comment)   Current Financial resources: Medicare, Medicaid, Other (Comment) ([facility]  Apartment, [address] Waiver for home health aide)  Current community resources: Housing  Current services prior to admission: [address], [facility]            Current DME: Bedside Table, Oxygen Therapy (Comment), Shower Chair, Walker, Other (Comment) (power scooter, lift chair)            Type of Home Care services:  Aide Services, OT, PT, Nursing Services    ADLS  Prior functional level: Assistance with the following:, Housework, Cooking, Shopping, Bathing, Dressing, Mobility  Current functional level: Assistance with the following:, Bathing, Dressing, Housework, Cooking, Shopping, Mobility    PT AM-PAC: 19 /24  OT AM-PAC: 17 /24    Family can provide assistance at DC: Yes  Would you like Case Management to discuss the discharge plan with any other family members/significant others, and if so, who? Yes  Plans to Return to Present Housing: Yes  Other Identified Issues/Barriers to RETURNING to current housing: potential need for daytime O2, currently at HS only  Potential Assistance needed at discharge: [address]              Patient expects to discharge to: [address] [place] [address] for transportation at discharge: Self    Financial    Payor: Kofi Jones / Plan: Salome Carreon / Product Type: *No Product type* /     Does insurance require precert for SNF: Yes    Potential assistance Purchasing Medications: No  Meds-to-Beds request:        Elo Kauffman [id number]  [address] [facility]  Phone: [phone] Fax: [phone]      Notes:    Factors facilitating achievement of predicted outcomes: Family support, Caregiver support, Friend support, Good insight into deficits, Has needed Durable Medical Equipment at home, and Home is wheelchair accessible    Barriers to discharge: Upper extremity weakness, Lower extremity weakness, and Long standing deficits    Additional Case Management Notes: Pt is from home alone is a [address], has ramp entry. Pt has all needed DME at Home, has O2 concentrator for HS use only, has [facility] services and personal aide thru [employer]. Pt's grand-dtr and dtr are good supports that assist pt in the evenings, aide comes in mornings.     Home Care Information:   Currently active with Home Health Care : Yes - PT/OT/RN  Home Care Agency:   [facility]  Phone: [phone]  Fax: [phone]     Passport/Waiver : Yes -     Passport/ Waiver Services: [address] for 4 hours/ day for 5 days per week     Caring [address] [place] [address] and Respiratory Equipment:   Has HOME OXYGEN prior to admission: Yes  Current Liter Flow/min:  2LPM at HS only  [address]:   [address],  Dejuan merchant, [address]  Phone:  [phone]  Fax: [phone]    Informed of need to bring portable home O2 tank on day of DISCHARGE for nursing to connect prior to leaving: No - pt reports not having portable tanks because she only wears O2 at HS    Pt currently on 5LPM O2, CM discussed with RN need to try and decrease O2 need before DC. If unable to remain O2 free, pt will need a home O2 eval, DME RX and tanks from [facility]      The Plan for Transition of Care is related to the following treatment goals of Chest pain [R07.9]  Unstable angina ([address] [D28.9]    IF APPLICABLE: The Patient and/or patient representative [place] and her family were provided with a choice of provider and agrees with the discharge plan. Freedom of choice list with basic dialogue that supports the patient's individualized plan of care/goals and shares the quality data associated with the providers was provided to: Patient   Patient Representative Name:       The Patient and/or Patient Representative Agree with the Discharge Plan?  Yes    Electronically signed by Shellie Laird RN on 1/31/2023 at 9:14 AM

## 2023-01-31 NOTE — PROGRESS NOTES
Facility/Department: [address] NURSING  Speech Language Pathology   Dysphagia Treatment Note    Patient: Lucy Cm   : [birth year]   MRN: [id number]      Evaluation Date: 2023      Admitting Dx: Chest pain [R07.9]  Unstable angina ([address] [I20.0]  Treatment Diagnosis: Oropharyngeal Dysphagia   Pain: Did not state                                              Diet and Treatment Recommendations 2023:  Diet Solids Recommendation:  Regular texture diet  Liquid Consistency Recommendation: Thin liquids  Recommended form of Meds: Meds whole with water       Compensatory strategies:   Upright as possible with all PO intake , Small bites/sips , Eat/feed slowly, Remain upright 30-45 min     Assessment of Texture Tolerance:  Diet level prior to treatment: Regular texture diet , Thin liquids   Tolerance of Current Diet Level: Per chart, no noted difficulty with current diet level     Impressions: Pt was positioned in bed, sleeping upon SLP entry but easily alerted to verbal stimuli. Leaning to left and propped up on elbow, declined repositioning. Currently on  2L O2 via nasal cannula . Weak, congested cough noted prior to PO intake. Pt accepted limited trials of thin liquids and regular solids  were provided to assess swallow function. SLP provided set up and pt independently fed herself. Pt with prolonged munching mastication of regular solids, suspect secondary to edentulism, and decreased A-P transit. Pt independently implemented liquid wash to clear oral cavity. Concern for delayed swallow initiation but no overt clinical s/s of aspiration. Again discussed option for diet modification, pt reports limited PO intake, states she is a [place] eater\" and does not like to eat alone. Educated pt on safe swallowing strategies and rationale for intervention, pt with reduced recall of previous speech therapy intervention and was inconsistently receptive to education this date.  Pt demonstrates increased risk for aspiration due to cognitive state , respiratory status , and prior hx of dysphagia . Based on today's assessment recommend continuation of Regular texture diet  with Thin liquids , Meds whole with water. Dysphagia Goals:   Pt will functionally tolerate recommended diet with no overt clinical s/s of aspiration (Ongoing 01/31/23)  Pt will demonstrate understanding of aspiration risk and precautions via education/demonstration with occasional prompting (Ongoing 01/31/23)    Plan:   3-5 times per week during acute care stay. Patient/Family Education:  Provided education regarding role of SLP, recommendations and general speech pathology plan of care. [x] Pt verbalized understanding and agreement   [x] Pt requires ongoing learning   [] No evidence of comprehension     Discharge Recommendations:    Discharge recommendations to be determined pending ongoing follow-up during acute care stay    Treatment time:  Timed Code Treatment Minutes: 0  Total Treatment time: 15    If patient discharges prior to next session this note will serve as a discharge summary. Signature:   Eulalio Yanes [facility]  Speech-Language Pathology Graduate Clinician    The speech-language pathologist was present, directed the patient's care, made skilled judgment and was responsible for assessment and treatment.     Alfred Hooper, [address] [facility]  Speech-Language Pathologist  [phone]

## 2023-01-31 NOTE — PROGRESS NOTES
01/31/23 1443   Resting (Room Air)   SpO2 85   HR 69   Resting (On O2)   SpO2 90   HR 68   O2 Device Nasal cannula   O2 Flow Rate (l/min) 3 l/min   During Walk (On O2)   SpO2 94   O2 Device Nasal cannula   O2 Flow Rate (l/min) 5 l/min   Need Additional O2 Flow Rate Rows Yes   O2 Flow Rate (l/min) 4 l/min   O2 Saturation 91   O2 Flow Rate (l/min) 3 l/min   Walk/Assistance Device Walker   After Walk   Does the Patient Qualify for Home O2 Yes

## 2023-01-31 NOTE — DISCHARGE INSTR - COC
Continuity of Care Form    Patient Name: Marie Herndon   :  [birth year]  MRN:  [id number]    Admit date:  2023  Discharge date:  23    Code Status Order: Full Code   Advance Directives:     Admitting Physician:  Zuleika Vinson MD  PCP: Bala Zarate MD    Discharging Nurse: [facility] Unit/Room#: 5ZK-2389/7009-24  Discharging Unit Phone Number: [phone]    Emergency Contact:   Extended Emergency Contact Information  Primary Emergency Contact: [address] [place] [address] Phone: [phone]  Relation: Child  Secondary Emergency Contact: Rhea Bedolla  [place] Phone: [phone]  Relation: Grandchild    Past Surgical History:  Past Surgical History:   Procedure Laterality Date    APPENDECTOMY      BRONCHOSCOPY N/A 2020    BRONCHOSCOPY ALVEOLAR LAVAGE performed by Maryam Rodriguez MD at [facility] N/A 2020    BRONCHOSCOPY DIAGNOSTIC OR CELL [address] performed by Jena Cummings MD at [facility] N/A 2020    BRONCHOSCOPY DIAGNOSTIC OR CELL [address] performed by Sweetie Harris MD at [facility]      1 stent  and 1 stent     CATARACT REMOVAL   or     bilateral cataracts removed    CHOLECYSTECTOMY      COLONOSCOPY      COLONOSCOPY  2018    ENDOSCOPY, COLON, DIAGNOSTIC      ESOPHAGEAL DILATATION      EYE SURGERY      bilateral cataracts    GALLBLADDER SURGERY      HYSTERECTOMY (CERVIX STATUS UNKNOWN)      SD EXCISION MALIGNANT LESION S/N/H/F/G 0.5 CM/< N/A 2018    EXCISION OF SCALP SQUAMOUS CELL CARCINOMA performed by Alena White MD at [address] T/A/L 1ST 100 CM/&/1% BDY INFT/CHLD N/A 2018    SPLIT THICKNESS SKIN GRAFT FOR COVERAGE SCALP, APPLICATION OF WOUND [place] performed by Alena White MD at [address] [place] [address],[address] ENDOSCOPY  12    with biopsy of stomach,gastritis    UPPER GASTROINTESTINAL ENDOSCOPY  06/11/2018    w/esophagael dilation       Immunization History:   Immunization History   Administered Date(s) Administered    COVID-19, MODERNA BLUE border, Primary or Immunocompromised, (age 12y+), IM, 100 mcg/0.5mL 09/04/2021, 10/12/2021    Influenza 10/11/2013    Influenza Virus Vaccine 10/18/2010, 10/18/2010, 08/11/2014, 09/10/2015, 09/05/2017, 09/05/2017    Influenza, AFLURIA (age 3 yrs+), FLUZONE, (age 6 mo+), MDV, 0.5mL 09/09/2019    Influenza, FLUCELVAX, (age 6 mo+), MDCK, MDV, 0.5mL 09/08/2017, 09/11/2018, 09/01/2020, 10/25/2021, 09/15/2022    Influenza, High Dose (Fluzone 65 yrs and older) 10/02/2018    Influenza, Triv, 3 Years and older, IM (Afluria (5 yrs and older) 10/20/2016    Pneumococcal Conjugate 13-valent (Vebmbja12) 02/08/2017    Pneumococcal Polysaccharide ([id number]) 12/24/2010, 07/23/2015, 04/19/2022       Active Problems:  Patient Active Problem List   Diagnosis Code    Chronic respiratory failure ([facility]) J96.10    CAD (coronary artery disease) I25.10    Hyperlipemia E78.5    COPD (chronic obstructive pulmonary disease) ([facility]) J44.9    Primary hypertension I10    DM type 2, controlled, with complication ([facility]) E11.8    DM2 (diabetes mellitus, type 2) ([facility]) E11.9    HTN (hypertension), benign I10    Primary osteoarthritis involving multiple joints M15.9    Gastroesophageal reflux disease without esophagitis K21.9    Anxiety F41.9    Dysarthria R47.1    Class 3 severe obesity due to excess calories with serious comorbidity and body mass index (BMI) of 45.0 to 49.9 in adult ([facility]) E66.01, Z68.42    AKIL (obstructive sleep apnea) G47.33    Grade II diastolic dysfunction I51.89    CVA, old, alterations of sensations I69.398, R20.9    Left-sided weakness R53.1    Arterial ischemic stroke, ICA, right, acute ([facility]) I63.231    PAF (paroxysmal atrial fibrillation) ([facility]) I48.0    Chest pain R07.9    Thoracic radiculopathy M54.14    Unstable angina ([facility])  I20.0       Isolation/Infection:   Isolation            Contact          Patient Infection Status       Infection Onset Added Last Indicated Last Indicated By Review Planned Expiration Resolved Resolved By    ESBL (Extended Spectrum Beta Lactamase) 21 Culture, Urine        Resolved    COVID-19 (Rule Out) 23 COVID-19, Rapid (Ordered)   23 Rule-Out Test Resulted    C-diff Rule Out 08/15/21 08/15/21 08/15/21 Clostridium difficile toxin/antigen (Ordered)   08/15/21 Rule-Out Test Resulted    C-diff Rule Out 20 Clostridium difficile toxin/antigen (Ordered)   20 Marky Blanton RN    Influenza  20 Nick Ozuna RN   20             Nurse Assessment:  Last Vital Signs: BP (!) 110/58   Pulse 81   Temp 97.4 °F (36.3 °C) (Oral)   Resp 20   Ht 5' 5\" (1.651 m)   Wt 274 lb 3.2 oz (124.4 kg)   SpO2 93%   BMI 45.63 kg/m²     Last documented pain score (0-10 scale): Pain Level: 0  Last Weight:   Wt Readings from Last 1 Encounters:   23 274 lb 3.2 oz (124.4 kg)     Mental Status:  oriented and alert    IV Access:  - None    Nursing Mobility/ADLs:  Walking   Assisted  Transfer  Assisted  Bathing  Assisted  Dressing  Assisted  Toileting  Assisted  Feeding  Independent  Med Admin  Independent  Med Delivery   whole    Wound Care Documentation and Therapy:        Elimination:  Continence: Bowel: No  Bladder: No  Urinary Catheter: None   Colostomy/Ileostomy/Ileal Conduit: No       Date of Last BM: 23    Intake/Output Summary (Last 24 hours) at 2023 0807  Last data filed at 2023 0430  Gross per 24 hour   Intake 1360 ml   Output 1900 ml   Net -540 ml     I/O last 3 completed shifts: In: 7592 [P.O.:720; I.V.:1010]  Out: 3250 [Urine:2800; Stool:450]    Safety Concerns:      At Risk for Falls    Impairments/Disabilities:      Vision and Hearing    Nutrition Therapy:  Current Nutrition Therapy:   - Oral Diet:  General    Routes of Feeding: Oral  Liquids: Thin Liquids  Daily Fluid Restriction: no  Last Modified Barium Swallow with Video (Video Swallowing Test): not done    Treatments at the Time of Hospital Discharge:   Respiratory Treatments: duoneb, albuterol inhaler, symbicort  Oxygen Therapy: Pt will be going home with oxygen. It is to be on 3L at rest and 4L with ambulation via nasal canula. Ventilator:    - No ventilator support    Rehab Therapies: Physical Therapy, Occupational Therapy, and Nursing  Weight Bearing Status/Restrictions: No weight bearing restrictions  Other Medical Equipment (for information only, NOT a DME order):  wheelchair, walker, scooter, hospital bed  Other Treatments:     Patient's personal belongings (please select all that are sent with patient):  None    RN SIGNATURE:  Electronically signed by Anton Walker RN on 1/31/23 at 4:57 PM EST    CASE MANAGEMENT/SOCIAL WORK SECTION    Inpatient Status Date: 01/26/2023    Readmission Risk Assessment Score: 15  Readmission Risk              Risk of Unplanned Readmission:  20           Discharging to Facility/ [address]  Phone: [phone]  Fax: [address]: [address],  [address]  Phone:  [phone]  Fax: [phone]        / signature: Electronically signed by Spencer Smith RN on 1/31/23 at 8:21 AM EST    PHYSICIAN SECTION    Prognosis: Guarded    Condition at Discharge: Stable    Rehab Potential (if transferring to Rehab): Good    Recommended Labs or Other Treatments After Discharge: ***    Physician Certification: I certify the above information and transfer of Saurabh Arroyo  is necessary for the continuing treatment of the diagnosis listed and that she requires Home Care for greater 30 days.      Update Admission H&P: No change in H&P    PHYSICIAN SIGNATURE:  Electronically signed by Barney Grimes MD on 1/31/23 at 8:07 AM EST

## 2023-01-31 NOTE — RT PROTOCOL NOTE
RT Inhaler-Nebulizer Bronchodilator Protocol Note    There is a bronchodilator order in the chart from a provider indicating to follow the RT Bronchodilator Protocol and there is an Initiate RT Inhaler-Nebulizer Bronchodilator Protocol order as well (see protocol at bottom of note). CXR Findings:  No results found. The findings from the last RT Protocol Assessment were as follows:   History Pulmonary Disease: Chronic pulmonary disease  Respiratory Pattern: Mild dyspnea at rest, irregular pattern, or RR 21-25 bpm  Breath Sounds: Inspiratory and expiratory or bilateral wheezing and/or rhonchi  Cough: Strong, productive  Indication for Bronchodilator Therapy: On home bronchodilators  Bronchodilator Assessment Score: 13    Aerosolized bronchodilator medication orders have been revised according to the RT Inhaler-Nebulizer Bronchodilator Protocol below. Respiratory Therapist to perform RT Therapy Protocol Assessment initially then follow the protocol. Repeat RT Therapy Protocol Assessment PRN for score 0-3 or on second treatment, BID, and PRN for scores above 3. No Indications - adjust the frequency to every 6 hours PRN wheezing or bronchospasm, if no treatments needed after 48 hours then discontinue using Per Protocol order mode. If indication present, adjust the RT bronchodilator orders based on the Bronchodilator Assessment Score as indicated below. Use Inhaler orders unless patient has one or more of the following: on home nebulizer, not able to hold breath for 10 seconds, is not alert and oriented, cannot activate and use MDI correctly, or respiratory rate 25 breaths per minute or more, then use the equivalent nebulizer order(s) with same Frequency and PRN reasons based on the score. If a patient is on this medication at home then do not decrease Frequency below that used at home.     0-3 - enter or revise RT bronchodilator order(s) to equivalent RT Bronchodilator order with Frequency of every 4 hours PRN for wheezing or increased work of breathing using Per Protocol order mode. 4-6 - enter or revise RT Bronchodilator order(s) to two equivalent RT bronchodilator orders with one order with BID Frequency and one order with Frequency of every 4 hours PRN wheezing or increased work of breathing using Per Protocol order mode. 7-10 - enter or revise RT Bronchodilator order(s) to two equivalent RT bronchodilator orders with one order with TID Frequency and one order with Frequency of every 4 hours PRN wheezing or increased work of breathing using Per Protocol order mode. 11-13 - enter or revise RT Bronchodilator order(s) to one equivalent RT bronchodilator order with QID Frequency and an Albuterol order with Frequency of every 4 hours PRN wheezing or increased work of breathing using Per Protocol order mode. Greater than 13 - enter or revise RT Bronchodilator order(s) to one equivalent RT bronchodilator order with every 4 hours Frequency and an Albuterol order with Frequency of every 2 hours PRN wheezing or increased work of breathing using Per Protocol order mode. RT to enter RT Home Evaluation for COPD & MDI Assessment order using Per Protocol order mode.     Electronically signed by Michelle Rojas RCP on 1/31/2023 at 8:31 AM

## 2023-01-31 NOTE — CARE COORDINATION
Discharge note:      CM/SW has been notified of discharge. Patient noted to have the following needs at discharge. CM/SW has coordinated the following services:  Resume home services    [address] [facility]  Phone: [phone]  Fax: [phone]    [address] [facility] ([address])    [address]: [address],  [place], [address]  Phone:  [phone]  Fax: [phone]    Pt to DC home with 4 LPM with exertion and 3 LPM at rest      Discharge Destination: Home  Transportation: family        Comment:  All documents faxed to Romel Leblanc at [facility]. CM has confirmed DME-O2 order with Reida Neat at [facility]      All CM/SW needs met, will sign off.

## 2023-01-31 NOTE — DISCHARGE SUMMARY
Rita Collazo RCP   Respiratory Therapist   Specialty:  Respiratory Therapy   Progress Notes       Signed   Date of Service:  1/31/2023  3:08 PM                 Signed                         01/31/23 1443   Resting (Room Air)   SpO2 85   HR 69   Resting (On O2)   SpO2 90   HR 68   O2 Device Nasal cannula   O2 Flow Rate (l/min) 3 l/min   During Walk (On O2)   SpO2 94   O2 Device Nasal cannula   O2 Flow Rate (l/min) 5 l/min   Need Additional O2 Flow Rate Rows Yes   O2 Flow Rate (l/min) 4 l/min   O2 Saturation 91   O2 Flow Rate (l/min) 3 l/min   Walk/Assistance Device Walker   After Walk   Does the Patient Qualify for Home O2 Yes

## 2023-01-31 NOTE — DISCHARGE SUMMARY
[facility] -- Physician Discharge Summary     Radha Sterling  1945  MRN: [id number]    Admit Date: 1/24/2023  Discharge Date: No discharge date for patient encounter. Attending MD: Linda Carmona MD  Discharging MD: Linda Carmona MD  PCP: Robbi Bucio MD [address] / [address] [phone]    Admission Diagnosis: Chest pain [R07.9]  Unstable angina ([facility] [I20.0]  DISCHARGE DIAGNOSIS: same    Full Hospital Problem List:  Active Hospital Problems    Diagnosis Date Noted    Hyperlipemia [E78.5] 05/23/2011     Priority: High    Unstable angina ([facility] [I20.0] 01/26/2023     Priority: Medium    Chest pain [R07.9] 10/26/2022     Priority: Medium    Class 3 severe obesity due to excess calories with serious comorbidity and body mass index (BMI) of 45.0 to 49.9 in adult ([facility] [E66.01, Z68.42] 06/18/2018    DM2 (diabetes mellitus, type 2) ([facility] [E11.9] 12/03/2015    Primary hypertension [I10] 04/18/2012           Hospital Course:     69 y.o. female who presents to the ED for chest pain. Pressure-like. Radiates towards her back. Never had before. Ongoing 3 days. Associate with shortness of breath. No fevers or chills. Has been having cough. Does not feel like her COPD. No unilateral leg swelling. Her weight has been stable when she checks it daily. No abdominal pain or headache. No clear exacerbating remitting factors. .         She has had frequent admits, mostly for COPD in past year. Review of old chart shows admission in oct 2022 with similar sx. Stress test at that time was normal     Pt to be admitted, placed  on r.o mi pathway  Given recurrent chest pain complaints, to have cards consult    Cath recommended  Cardiac Cath PCI:  Anatomy:      LAD-prox 70-80% ISR, mid 70-80%, distal 90%     RCA-prox 90%  RPDA- nml     Intervention  ~Successful PCI to RCA with 4.0x18 KATHLEEN. PD with 4.0x8 NC. PCI to Distal LAD 2.75x38 KATHLEEN. PCI to Prox/mid LAD 3.25x38 KATHLEEN.  Excellent Notify the patient that his 3-month diabetes test called hemoglobin A1c was better.  It decreased from 6.4 down to 6.2.  That is a result of better diet and weight loss.  Continue that and we will recheck as planned. Result. Pt to return for outpt cards eval in 2wk to consider further intervention  Sent home on plavix  Rosuvastatin increased to 40 qd  No chest pain on day of d/c    Consults made during Hospitalization:  IP CONSULT TO INTERNAL MEDICINE  IP CONSULT TO CARDIOLOGY  IP CONSULT TO CARDIAC REHAB  IP CONSULT TO CARDIAC REHAB    Treatment team at time of Discharge: Treatment Team: Attending Provider: Alexander Salinas MD; Consulting Physician: Alexander Salinas MD; Consulting Physician: Shalonda Cruz MD; Utilization Reviewer: Babette Fothergill, LPN; Respiratory Therapist (Day): Lisa Baker RCP; Registered Nurse: Mick Mitchell RN; Speech Language Pathologist: NARESH Mina    Imaging Results:  CT CHEST PULMONARY EMBOLISM W CONTRAST    Result Date: 1/24/2023  EXAMINATION: CTA OF THE CHEST, 1/24/2023 5:13 pm TECHNIQUE: CTA of the chest was performed after the administration of intravenous contrast.  Multiplanar reformatted images are provided for review. MIP images are provided for review. Automated exposure control, iterative reconstruction, and/or weight based adjustment of the mA/kV was utilized to reduce the radiation dose to as low as reasonably achievable. COMPARISON: None HISTORY: ORDERING SYSTEM PROVIDED HISTORY:  Chest pain, dyspnea TECHNOLOGIST PROVIDED HISTORY: Reason for Exam:  Chest pain, dyspnea Decision Support Exception - unselect if not a suspected or confirmed emergency medical condition->Emergency Medical Condition (MA) Reason for Exam:  Chest pain, dyspnea, Chest Pain (Pt brought in by  EMS from home due to CP that started 3 days ago, SOB started this morning, hx of COPD, presents with cough/wheezing/rhonchi in her lungs, PVCs/bigeminy. Pt states that chest pain goes to her back that is pressure, afebrile, A and O x 4). FINDINGS: Pulmonary Arteries: Pulmonary arteries are adequately opacified for evaluation. No evidence of intraluminal filling defect to suggest pulmonary embolism.   Main pulmonary artery is normal in caliber. Mediastinum: No evidence of mediastinal lymphadenopathy. The heart and pericardium demonstrate no acute abnormality. There is no acute abnormality of the thoracic aorta. Lungs/pleura: The lungs are without acute process. No focal consolidation or pulmonary edema. No evidence of pleural effusion or pneumothorax. Upper Abdomen: Limited images of the upper abdomen are unremarkable. Soft Tissues/Bones: No acute bone or soft tissue abnormality. There are multiple chronic mild thoracolumbar compression deformities. No evidence of pulmonary embolism or acute pulmonary abnormality. Discharge Exam:  Constitutional: vitals as above: alert, appears stated age and cooperative    Psychiatric: normal insight and judgment, oriented to person, place, time, and general circumstances    Head: Normocephalic, without obvious abnormality, atraumatic    Eyes:lids and lashes normal, conjunctivae and sclerae normal and pupils equal, round, reactive to light and accomodation    EMNT: external ears normal, nares midline    Neck: no carotid bruit, supple, symmetrical, trachea midline and thyroid not enlarged, symmetric, no tenderness/mass/nodules     Respiratory: clear to auscultation and percussion bilaterally with normal respiratory effort    Cardiovascular: normal rate, regular rhythm, normal S1 and S2 and no murmurs    Gastrointestinal: soft, non-tender, non-distended, normal bowel sounds, no masses or organomegaly    Extremities: no clubbing, no edema    Skin:No rashes or nodules noted.     Neurologic:negative      Disposition: home    Condition: stable    Discharge Medications:     Medication List        START taking these medications      clopidogrel 75 MG tablet  Commonly known as: PLAVIX  Take 1 tablet by mouth daily            CHANGE how you take these medications      rosuvastatin 40 MG tablet  Commonly known as: CRESTOR  Take 1 tablet by mouth nightly  What changed: medication strength  how much to take  when to take this            CONTINUE taking these medications      acetaZOLAMIDE 250 MG tablet  Commonly known as: DIAMOX  Take 1 tablet by mouth daily     albuterol sulfate  (90 Base) MCG/ACT inhaler  Commonly known as: PROVENTIL;VENTOLIN;PROAIR  INHALE TWO PUFFS BY MOUTH EVERY 6 HOURS AS NEEDED FOR WHEEZING     ALPRAZolam 1 MG tablet  Commonly known as: Xanax  Take 1 tablet by mouth 3 times daily as needed for Anxiety for up to 30 days. aspirin 81 MG tablet     budesonide-formoterol 160-4.5 MCG/ACT Aero  Commonly known as: Symbicort  Inhale 2 puffs into the lungs 2 times daily     diclofenac sodium 1 % Gel  Commonly known as: VOLTAREN  Apply 2 g topically 2 times daily     furosemide 40 MG tablet  Commonly known as: LASIX  TAKE ONE TABLET BY MOUTH TWICE A DAY     gabapentin 300 MG capsule  Commonly known as: NEURONTIN  Take 2 capsules by mouth 3 times daily for 90 days. glipiZIDE 5 MG tablet  Commonly known as: GLUCOTROL  Take 1 tablet by mouth 2 times daily (before meals)     ibuprofen 400 MG tablet  Commonly known as: ADVIL;MOTRIN  Take 1 tablet by mouth every 6 hours as needed for Pain or Fever     ipratropium-albuterol 0.5-2.5 (3) MG/3ML Soln nebulizer solution  Commonly known as: DUONEB  INHALE THREE MILLILITERS VIA NEBULIZATION BY MOUTH EVERY 4 HOURS AS NEEDED FOR SHORTNESS OF BREATH     Lancets Misc  1 each by Does not apply route 2 times daily     linagliptin 5 MG tablet  Commonly known as: TRADJENTA  Take 1 tablet by mouth daily     lisinopril 2.5 MG tablet  Commonly known as: PRINIVIL;ZESTRIL  Take 1 tablet by mouth daily     metoprolol succinate 25 MG extended release tablet  Commonly known as: TOPROL XL  Take 1 tablet by mouth daily     nitroGLYCERIN 0.4 MG SL tablet  Commonly known as: NITROSTAT  Place 1 tablet under the tongue every 5 minutes as needed for Chest pain.      omeprazole 40 MG delayed release capsule  Commonly known as: PRILOSEC  Take 1 capsule by mouth daily     rivaroxaban 15 MG Tabs tablet  Commonly known as: XARELTO  Take 1 tablet by mouth daily     sodium chloride 0.65 % nasal spray  Commonly known as: OCEAN, BABY AYR  1 spray by Nasal route as needed for Congestion     triamcinolone 0.1 % cream  Commonly known as: KENALOG  Apply topically 2 times daily. * True Metrix Blood Glucose Test strip  Generic drug: blood glucose test strips  USE THREE TIMES A DAY AS NEEDED     * blood glucose test strips  Test 2 times a day & as needed for symptoms of irregular blood glucose. Dispense sufficient amount for indicated testing frequency plus additional to accommodate PRN testing needs. * This list has 2 medication(s) that are the same as other medications prescribed for you. Read the directions carefully, and ask your doctor or other care provider to review them with you. STOP taking these medications      hydrALAZINE 25 MG tablet  Commonly known as: APRESOLINE     isosorbide mononitrate 30 MG extended release tablet  Commonly known as: IMDUR     nystatin 393932 UNIT/GM powder  Commonly known as: MYCOSTATIN            ASK your doctor about these medications      Incruse Ellipta 62.5 MCG/ACT Aepb  Generic drug: Umeclidinium Bromide  Inhale 1 puff into the lungs daily               Where to Get Your Medications        These medications were sent to [facility] [id number], [address], [address] [place] [address]      Phone: [phone]   clopidogrel 75 MG tablet  rosuvastatin 40 MG tablet            Allergies:   Allergies   Allergen Reactions    Morphine Shortness Of Breath    Codeine Other (See Comments)     Chest pain    Hydromorphone Other (See Comments)     hallucinations  Hallucinations    But will take if needed in an emergency    Levaquin [Levofloxacin In D5w] Itching    Vicodin [Hydrocodone-Acetaminophen] Hives    Aspirin      Upsets hiatal hernia       Follow up Instructions: Follow-up with PCP: Flaco Black MD in 2 wk .       Total time spent on day of discharge including face-to-face visit, examination, documentation, counseling, preparation of discharge plans and followup, and discharge medicine reconciliation and presciptions is 34 minutes    Signed:  Mj Collins MD  1/31/2023

## 2023-01-31 NOTE — PROGRESS NOTES
Data- discharge order received, pt verbalized agreement to discharge, needs for [facility] with [facility] for PT/OT/nursing and/or new Durable Medical Equipment through [address] [employer] for O2 tanks, JOSE G reviewed and signed by MD, to be completed by RN. Action- AVS prepared, discharge instructions prepared and given to pt and daughter, medication information packet given r/t NEW or CHANGED prescriptions, pt verbalized understanding further self-review. D/C instruction summary: Diet- regular, Activity- up as tolerated, follow up with Primary Care Physician Birdie Loya [phone] appointment to be scheduled for 1-2 weeks, immunizations reviewed, medications prescriptions to be filled at pharmacy of choice. Inpatient surgical procedural reviewed: [facility]. Contact information provided to above agencies used. Response- Case Management/ reported faxing completed JOSE G and AVS to needed HHC/DME services stated above. Pt belongings gathered, IV removed, pt dressed. Disposition is home with HHC/DME as stated above, transported with belongings, taken to lobby via w/c with daughter, no complications. 1. WEIGHT: Admit Weight: 269 lb (122 kg) (01/24/23 1609)        Today  Weight: 274 lb 3.2 oz (124.4 kg) (01/31/23 0430)       2.  O2 SAT.: SpO2: 95 % (01/31/23 1532)

## 2023-02-01 ENCOUNTER — APPOINTMENT (OUTPATIENT)
Dept: GENERAL RADIOLOGY | Age: 78
DRG: 190 | End: 2023-02-01
Payer: COMMERCIAL

## 2023-02-01 ENCOUNTER — APPOINTMENT (OUTPATIENT)
Dept: CT IMAGING | Age: 78
DRG: 190 | End: 2023-02-01
Payer: COMMERCIAL

## 2023-02-01 ENCOUNTER — TELEPHONE (OUTPATIENT)
Dept: OTHER | Facility: CLINIC | Age: 78
End: 2023-02-01

## 2023-02-01 ENCOUNTER — HOSPITAL ENCOUNTER (INPATIENT)
Age: 78
LOS: 5 days | Discharge: HOME HEALTH | DRG: 190 | End: 2023-02-06
Attending: EMERGENCY MEDICINE | Admitting: INTERNAL MEDICINE
Payer: COMMERCIAL

## 2023-02-01 DIAGNOSIS — J44.1 COPD EXACERBATION (HCC): ICD-10-CM

## 2023-02-01 DIAGNOSIS — R06.89 CO2 NARCOSIS: ICD-10-CM
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
## 2023-02-01 LAB
A/G RATIO: 1.1 (ref 1.1–2.2)
ALBUMIN SERPL-MCNC: 3.5 G/DL (ref 3.4–5)
ALP BLD-CCNC: 79 U/L (ref 40–129)
ALT SERPL-CCNC: 15 U/L (ref 10–40)
ANION GAP SERPL CALCULATED.3IONS-SCNC: 6 MMOL/L (ref 3–16)
AST SERPL-CCNC: 17 U/L (ref 15–37)
BASE EXCESS VENOUS: 2.4 MMOL/L (ref -3–3)
BASE EXCESS VENOUS: 2.5 MMOL/L (ref -3–3)
BASOPHILS ABSOLUTE: 0 K/UL (ref 0–0.2)
BASOPHILS RELATIVE PERCENT: 0.4 %
BILIRUB SERPL-MCNC: 0.3 MG/DL (ref 0–1)
BUN BLDV-MCNC: 20 MG/DL (ref 7–20)
CALCIUM SERPL-MCNC: 9.7 MG/DL (ref 8.3–10.6)
CARBOXYHEMOGLOBIN: 3.2 % (ref 0–1.5)
CARBOXYHEMOGLOBIN: 3.6 % (ref 0–1.5)
CHLORIDE BLD-SCNC: 98 MMOL/L (ref 99–110)
CO2: 33 MMOL/L (ref 21–32)
CREAT SERPL-MCNC: 1.1 MG/DL (ref 0.6–1.2)
EKG ATRIAL RATE: 84 BPM
EKG DIAGNOSIS: NORMAL
EKG P-R INTERVAL: 180 MS
EKG Q-T INTERVAL: 400 MS
EKG QRS DURATION: 124 MS
EKG QTC CALCULATION (BAZETT): 472 MS
EKG R AXIS: -51 DEGREES
EKG T AXIS: 91 DEGREES
EKG VENTRICULAR RATE: 84 BPM
EOSINOPHILS ABSOLUTE: 0.1 K/UL (ref 0–0.6)
EOSINOPHILS RELATIVE PERCENT: 0.9 %
GFR SERPL CREATININE-BSD FRML MDRD: 52 ML/MIN/{1.73_M2}
GLUCOSE BLD-MCNC: 238 MG/DL (ref 70–99)
GLUCOSE BLD-MCNC: 248 MG/DL (ref 70–99)
GLUCOSE BLD-MCNC: 355 MG/DL (ref 70–99)
HCO3 VENOUS: 30.4 MMOL/L (ref 23–29)
HCO3 VENOUS: 31.5 MMOL/L (ref 23–29)
HCT VFR BLD CALC: 44 % (ref 36–48)
HEMOGLOBIN, VEN, REDUCED: 12 %
HEMOGLOBIN, VEN, REDUCED: 7 %
HEMOGLOBIN: 13.8 G/DL (ref 12–16)
LACTIC ACID, SEPSIS: 1.6 MMOL/L (ref 0.4–1.9)
LYMPHOCYTES ABSOLUTE: 2 K/UL (ref 1–5.1)
LYMPHOCYTES RELATIVE PERCENT: 21.8 %
MCH RBC QN AUTO: 26.6 PG (ref 26–34)
MCHC RBC AUTO-ENTMCNC: 31.5 G/DL (ref 31–36)
MCV RBC AUTO: 84.6 FL (ref 80–100)
METHEMOGLOBIN VENOUS: 0.3 %
METHEMOGLOBIN VENOUS: 0.5 %
MONOCYTES ABSOLUTE: 0.7 K/UL (ref 0–1.3)
MONOCYTES RELATIVE PERCENT: 7.9 %
NEUTROPHILS ABSOLUTE: 6.3 K/UL (ref 1.7–7.7)
NEUTROPHILS RELATIVE PERCENT: 69 %
O2 CONTENT, VEN: 17 VOL %
O2 CONTENT, VEN: 17 VOL %
O2 SAT, VEN: 88 %
O2 SAT, VEN: 93 %
O2 THERAPY: ABNORMAL
O2 THERAPY: ABNORMAL
PCO2, VEN: 61.3 MMHG (ref 40–50)
PCO2, VEN: 68.5 MMHG (ref 40–50)
PDW BLD-RTO: 16.1 % (ref 12.4–15.4)
PERFORMED ON: ABNORMAL
PERFORMED ON: ABNORMAL
PH VENOUS: 7.27 (ref 7.35–7.45)
PH VENOUS: 7.3 (ref 7.35–7.45)
PLATELET # BLD: 161 K/UL (ref 135–450)
PMV BLD AUTO: 8.3 FL (ref 5–10.5)
PO2, VEN: 56.7 MMHG (ref 25–40)
PO2, VEN: 65.7 MMHG (ref 25–40)
POTASSIUM REFLEX MAGNESIUM: 3.8 MMOL/L (ref 3.5–5.1)
PRO-BNP: 415 PG/ML (ref 0–449)
RBC # BLD: 5.2 M/UL (ref 4–5.2)
SODIUM BLD-SCNC: 137 MMOL/L (ref 136–145)
TCO2 CALC VENOUS: 72 MMOL/L
TCO2 CALC VENOUS: 75 MMOL/L
TOTAL CK: 115 U/L (ref 26–192)
TOTAL PROTEIN: 6.7 G/DL (ref 6.4–8.2)
TROPONIN: <0.01 NG/ML
WBC # BLD: 9.1 K/UL (ref 4–11)

## 2023-02-01 PROCEDURE — 6370000000 HC RX 637 (ALT 250 FOR IP): Performed by: INTERNAL MEDICINE

## 2023-02-01 PROCEDURE — 2580000003 HC RX 258: Performed by: EMERGENCY MEDICINE

## 2023-02-01 PROCEDURE — 6360000004 HC RX CONTRAST MEDICATION: Performed by: EMERGENCY MEDICINE

## 2023-02-01 PROCEDURE — 6360000002 HC RX W HCPCS: Performed by: INTERNAL MEDICINE

## 2023-02-01 PROCEDURE — 80053 COMPREHEN METABOLIC PANEL: CPT

## 2023-02-01 PROCEDURE — 83036 HEMOGLOBIN GLYCOSYLATED A1C: CPT

## 2023-02-01 PROCEDURE — 6370000000 HC RX 637 (ALT 250 FOR IP): Performed by: EMERGENCY MEDICINE

## 2023-02-01 PROCEDURE — 99285 EMERGENCY DEPT VISIT HI MDM: CPT

## 2023-02-01 PROCEDURE — 72125 CT NECK SPINE W/O DYE: CPT

## 2023-02-01 PROCEDURE — 94760 N-INVAS EAR/PLS OXIMETRY 1: CPT

## 2023-02-01 PROCEDURE — 93010 ELECTROCARDIOGRAM REPORT: CPT | Performed by: INTERNAL MEDICINE

## 2023-02-01 PROCEDURE — 83880 ASSAY OF NATRIURETIC PEPTIDE: CPT

## 2023-02-01 PROCEDURE — 82803 BLOOD GASES ANY COMBINATION: CPT

## 2023-02-01 PROCEDURE — 84484 ASSAY OF TROPONIN QUANT: CPT

## 2023-02-01 PROCEDURE — 93005 ELECTROCARDIOGRAM TRACING: CPT | Performed by: EMERGENCY MEDICINE

## 2023-02-01 PROCEDURE — 1200000000 HC SEMI PRIVATE

## 2023-02-01 PROCEDURE — 36415 COLL VENOUS BLD VENIPUNCTURE: CPT

## 2023-02-01 PROCEDURE — 87040 BLOOD CULTURE FOR BACTERIA: CPT

## 2023-02-01 PROCEDURE — 82550 ASSAY OF CK (CPK): CPT

## 2023-02-01 PROCEDURE — 71260 CT THORAX DX C+: CPT

## 2023-02-01 PROCEDURE — 2700000000 HC OXYGEN THERAPY PER DAY

## 2023-02-01 PROCEDURE — 85025 COMPLETE CBC W/AUTO DIFF WBC: CPT

## 2023-02-01 PROCEDURE — 6360000002 HC RX W HCPCS: Performed by: EMERGENCY MEDICINE

## 2023-02-01 PROCEDURE — 73501 X-RAY EXAM HIP UNI 1 VIEW: CPT

## 2023-02-01 PROCEDURE — 3209999900 CT LUMBAR SPINE TRAUMA RECONSTRUCTION

## 2023-02-01 PROCEDURE — 70450 CT HEAD/BRAIN W/O DYE: CPT

## 2023-02-01 PROCEDURE — 83605 ASSAY OF LACTIC ACID: CPT

## 2023-02-01 PROCEDURE — 3209999900 CT THORACIC SPINE TRAUMA RECONSTRUCTION

## 2023-02-01 PROCEDURE — 94640 AIRWAY INHALATION TREATMENT: CPT

## 2023-02-01 RX ORDER — NITROGLYCERIN 0.4 MG/1
0.4 TABLET SUBLINGUAL EVERY 5 MIN PRN
Status: DISCONTINUED | OUTPATIENT
Start: 2023-02-01 | End: 2023-02-06 | Stop reason: HOSPADM

## 2023-02-01 RX ORDER — DEXAMETHASONE SODIUM PHOSPHATE 10 MG/ML
10 INJECTION, SOLUTION INTRAMUSCULAR; INTRAVENOUS ONCE
Status: DISCONTINUED | OUTPATIENT
Start: 2023-02-01 | End: 2023-02-01 | Stop reason: ALTCHOICE

## 2023-02-01 RX ORDER — INSULIN LISPRO 100 [IU]/ML
0-4 INJECTION, SOLUTION INTRAVENOUS; SUBCUTANEOUS NIGHTLY
Status: DISCONTINUED | OUTPATIENT
Start: 2023-02-01 | End: 2023-02-03

## 2023-02-01 RX ORDER — FUROSEMIDE 40 MG/1
40 TABLET ORAL 2 TIMES DAILY
Status: DISCONTINUED | OUTPATIENT
Start: 2023-02-01 | End: 2023-02-04

## 2023-02-01 RX ORDER — ALBUTEROL SULFATE 90 UG/1
2 AEROSOL, METERED RESPIRATORY (INHALATION) EVERY 6 HOURS PRN
Status: DISCONTINUED | OUTPATIENT
Start: 2023-02-01 | End: 2023-02-06 | Stop reason: HOSPADM

## 2023-02-01 RX ORDER — IPRATROPIUM BROMIDE AND ALBUTEROL SULFATE 2.5; .5 MG/3ML; MG/3ML
1 SOLUTION RESPIRATORY (INHALATION) EVERY 4 HOURS PRN
Status: DISCONTINUED | OUTPATIENT
Start: 2023-02-01 | End: 2023-02-06 | Stop reason: HOSPADM

## 2023-02-01 RX ORDER — ROSUVASTATIN CALCIUM 40 MG/1
40 TABLET, COATED ORAL NIGHTLY
Status: DISCONTINUED | OUTPATIENT
Start: 2023-02-01 | End: 2023-02-06 | Stop reason: HOSPADM

## 2023-02-01 RX ORDER — NALOXONE HYDROCHLORIDE 1 MG/ML
0.4 INJECTION INTRAMUSCULAR; INTRAVENOUS; SUBCUTANEOUS ONCE
Status: COMPLETED | OUTPATIENT
Start: 2023-02-01 | End: 2023-02-01

## 2023-02-01 RX ORDER — ALOGLIPTIN 12.5 MG/1
12.5 TABLET, FILM COATED ORAL DAILY
Status: DISCONTINUED | OUTPATIENT
Start: 2023-02-02 | End: 2023-02-06 | Stop reason: HOSPADM

## 2023-02-01 RX ORDER — CEPHALEXIN 250 MG/1
500 CAPSULE ORAL EVERY 8 HOURS SCHEDULED
Status: DISCONTINUED | OUTPATIENT
Start: 2023-02-01 | End: 2023-02-06

## 2023-02-01 RX ORDER — INSULIN LISPRO 100 [IU]/ML
0-8 INJECTION, SOLUTION INTRAVENOUS; SUBCUTANEOUS
Status: DISCONTINUED | OUTPATIENT
Start: 2023-02-02 | End: 2023-02-03

## 2023-02-01 RX ORDER — IPRATROPIUM BROMIDE AND ALBUTEROL SULFATE 2.5; .5 MG/3ML; MG/3ML
3 SOLUTION RESPIRATORY (INHALATION) ONCE
Status: COMPLETED | OUTPATIENT
Start: 2023-02-01 | End: 2023-02-01

## 2023-02-01 RX ORDER — DEXTROSE MONOHYDRATE 100 MG/ML
INJECTION, SOLUTION INTRAVENOUS CONTINUOUS PRN
Status: DISCONTINUED | OUTPATIENT
Start: 2023-02-01 | End: 2023-02-01 | Stop reason: SDUPTHER

## 2023-02-01 RX ORDER — LISINOPRIL 5 MG/1
2.5 TABLET ORAL DAILY
Status: DISCONTINUED | OUTPATIENT
Start: 2023-02-02 | End: 2023-02-02

## 2023-02-01 RX ORDER — CLOPIDOGREL BISULFATE 75 MG/1
75 TABLET ORAL DAILY
Status: DISCONTINUED | OUTPATIENT
Start: 2023-02-01 | End: 2023-02-06 | Stop reason: HOSPADM

## 2023-02-01 RX ORDER — 0.9 % SODIUM CHLORIDE 0.9 %
500 INTRAVENOUS SOLUTION INTRAVENOUS ONCE
Status: COMPLETED | OUTPATIENT
Start: 2023-02-01 | End: 2023-02-01

## 2023-02-01 RX ORDER — IPRATROPIUM BROMIDE AND ALBUTEROL SULFATE 2.5; .5 MG/3ML; MG/3ML
1 SOLUTION RESPIRATORY (INHALATION)
Status: DISCONTINUED | OUTPATIENT
Start: 2023-02-01 | End: 2023-02-01

## 2023-02-01 RX ORDER — TRIAMCINOLONE ACETONIDE 1 MG/G
CREAM TOPICAL 2 TIMES DAILY
Status: DISCONTINUED | OUTPATIENT
Start: 2023-02-01 | End: 2023-02-01

## 2023-02-01 RX ORDER — IPRATROPIUM BROMIDE AND ALBUTEROL SULFATE 2.5; .5 MG/3ML; MG/3ML
1 SOLUTION RESPIRATORY (INHALATION) 3 TIMES DAILY
Status: DISCONTINUED | OUTPATIENT
Start: 2023-02-01 | End: 2023-02-06 | Stop reason: HOSPADM

## 2023-02-01 RX ORDER — METHYLPREDNISOLONE SODIUM SUCCINATE 40 MG/ML
40 INJECTION, POWDER, LYOPHILIZED, FOR SOLUTION INTRAMUSCULAR; INTRAVENOUS EVERY 8 HOURS
Status: DISCONTINUED | OUTPATIENT
Start: 2023-02-01 | End: 2023-02-03

## 2023-02-01 RX ORDER — IBUPROFEN 400 MG/1
400 TABLET ORAL EVERY 6 HOURS PRN
Status: DISCONTINUED | OUTPATIENT
Start: 2023-02-01 | End: 2023-02-06 | Stop reason: HOSPADM

## 2023-02-01 RX ORDER — DEXAMETHASONE SODIUM PHOSPHATE 10 MG/ML
10 INJECTION, SOLUTION INTRAMUSCULAR; INTRAVENOUS ONCE
Status: COMPLETED | OUTPATIENT
Start: 2023-02-01 | End: 2023-02-01

## 2023-02-01 RX ORDER — METHYLPREDNISOLONE SODIUM SUCCINATE 40 MG/ML
40 INJECTION, POWDER, LYOPHILIZED, FOR SOLUTION INTRAMUSCULAR; INTRAVENOUS EVERY 8 HOURS
Status: DISCONTINUED | OUTPATIENT
Start: 2023-02-01 | End: 2023-02-01

## 2023-02-01 RX ORDER — METOPROLOL SUCCINATE 25 MG/1
25 TABLET, EXTENDED RELEASE ORAL DAILY
Status: DISCONTINUED | OUTPATIENT
Start: 2023-02-02 | End: 2023-02-02

## 2023-02-01 RX ORDER — ACETAZOLAMIDE 250 MG/1
250 TABLET ORAL DAILY
Status: DISCONTINUED | OUTPATIENT
Start: 2023-02-02 | End: 2023-02-06 | Stop reason: HOSPADM

## 2023-02-01 RX ORDER — GABAPENTIN 300 MG/1
600 CAPSULE ORAL 3 TIMES DAILY
Status: DISCONTINUED | OUTPATIENT
Start: 2023-02-01 | End: 2023-02-06 | Stop reason: HOSPADM

## 2023-02-01 RX ORDER — PANTOPRAZOLE SODIUM 40 MG/1
40 TABLET, DELAYED RELEASE ORAL
Status: DISCONTINUED | OUTPATIENT
Start: 2023-02-02 | End: 2023-02-06

## 2023-02-01 RX ORDER — GLIPIZIDE 5 MG/1
5 TABLET ORAL 2 TIMES DAILY WITH MEALS
Status: DISCONTINUED | OUTPATIENT
Start: 2023-02-01 | End: 2023-02-03

## 2023-02-01 RX ORDER — DEXTROSE MONOHYDRATE 100 MG/ML
INJECTION, SOLUTION INTRAVENOUS CONTINUOUS PRN
Status: DISCONTINUED | OUTPATIENT
Start: 2023-02-01 | End: 2023-02-06 | Stop reason: HOSPADM

## 2023-02-01 RX ORDER — ALPRAZOLAM 0.5 MG/1
1 TABLET ORAL 3 TIMES DAILY PRN
Status: DISCONTINUED | OUTPATIENT
Start: 2023-02-01 | End: 2023-02-06 | Stop reason: HOSPADM

## 2023-02-01 RX ADMIN — IPRATROPIUM BROMIDE AND ALBUTEROL SULFATE 1 AMPULE: 2.5; .5 SOLUTION RESPIRATORY (INHALATION) at 20:24

## 2023-02-01 RX ADMIN — DEXAMETHASONE SODIUM PHOSPHATE 10 MG: 10 INJECTION, SOLUTION INTRAMUSCULAR; INTRAVENOUS at 15:24

## 2023-02-01 RX ADMIN — CEPHALEXIN 500 MG: 250 CAPSULE ORAL at 17:30

## 2023-02-01 RX ADMIN — METHYLPREDNISOLONE SODIUM SUCCINATE 40 MG: 40 INJECTION, POWDER, FOR SOLUTION INTRAMUSCULAR; INTRAVENOUS at 22:33

## 2023-02-01 RX ADMIN — ROSUVASTATIN 40 MG: 40 TABLET, FILM COATED ORAL at 22:34

## 2023-02-01 RX ADMIN — DICLOFENAC SODIUM 2 G: 10 GEL TOPICAL at 22:37

## 2023-02-01 RX ADMIN — IOPAMIDOL 75 ML: 755 INJECTION, SOLUTION INTRAVENOUS at 12:49

## 2023-02-01 RX ADMIN — NALOXONE HYDROCHLORIDE 0.4 MG: 1 INJECTION PARENTERAL at 12:09

## 2023-02-01 RX ADMIN — INSULIN LISPRO 4 UNITS: 100 INJECTION, SOLUTION INTRAVENOUS; SUBCUTANEOUS at 22:37

## 2023-02-01 RX ADMIN — ALPRAZOLAM 1 MG: 0.5 TABLET ORAL at 22:34

## 2023-02-01 RX ADMIN — IPRATROPIUM BROMIDE AND ALBUTEROL SULFATE 3 AMPULE: .5; 3 SOLUTION RESPIRATORY (INHALATION) at 12:23

## 2023-02-01 RX ADMIN — Medication 2 PUFF: at 20:24

## 2023-02-01 RX ADMIN — CEPHALEXIN 500 MG: 250 CAPSULE ORAL at 22:34

## 2023-02-01 RX ADMIN — SODIUM CHLORIDE 500 ML: 9 INJECTION, SOLUTION INTRAVENOUS at 13:41

## 2023-02-01 NOTE — PROGRESS NOTES
.4 Eyes Skin Assessment     NAME:  Michelle Arias OF BIRTH:  1945  MEDICAL RECORD NUMBER:  [id number]    The patient is being assessed for  Admission    I agree that One RN have performed a thorough Head to Toe Skin Assessment on the patient. ALL assessment sites listed below have been assessed. Areas assessed by both nurses:    Head to Toe assessment- bruising to stomach and left hip with small abrasion        Does the Patient have a Wound?  Small abrasion to left hip       Guerrero Prevention initiated by RN: Yes   Wound Care Orders initiated by RN: No    Pressure Injury (Stage 3,4, Unstageable, DTI, NWPT, and Complex wounds) if present place referral order by RN under : No    New and Established Ostomies, if present place, referral order under : No      Nurse 1 eSignature: Electronically signed by Carlos Gee RN on 2/1/23 at 6:36 PM EST    **SHARE this note so that the co-signing nurse is able to place an eSignature**    Nurse 2 eSignature: Electronically signed by Michael Ordonez RN on 2/1/23 at 7:12 PM EST

## 2023-02-01 NOTE — PROGRESS NOTES
Alarms sounding. Patient alert and blood pressure in her usual range per discussion, changed cycle time to 30 minutes. Patient in bigeminy on monitor, increased number of PVC's per minute in limits to decrease sounding of alarm. Nurse informed.

## 2023-02-01 NOTE — PROGRESS NOTES
02/01/23 1400   RT Protocol   History Pulmonary Disease 2   Respiratory pattern 0   Breath sounds 2   Cough 2   Bronchodilator Assessment Score 6

## 2023-02-01 NOTE — ED NOTES
Pt report given to 5T RN, states no questions or concerns at this time. Pt transported to floor via bed by transport on portable telemetry and 2 LPM via NC of oxygen.      Rhea Solis RN  02/01/23 2776

## 2023-02-01 NOTE — ED PROVIDER NOTES
[facility] PROVIDER NOTE    Patient Identification  Pt Name: Delmy Collins  MRN: [id number]  [place] 1945  Date of evaluation: 2/1/2023  Provider: Wai Birmingham MD  PCP: Abigail Alexander MD    Chief Complaint  fall    HPI  History provided by patient and ems  This is a 69 y.o. female who presents to the ED for fall. She fell twice. Initially, she fell at home and could not get up so she called paramedics and they lifted her onto her recliner but she declined to come to the hospital.  She fell a second time and can get up and laid on the floor all night. States that everything hurts right now. Patient's history is limited due to confusion. Rena Lara ----  Per patient's daughter, she is supposed to use a CPAP machine but due to terrible claustrophobia, she cannot tolerate a facemask. The patient often takes off the nasal cannula on her face and sometimes does not even turn it on.   This morning, the patient's home health aide found her on the floor with her oxygen off  ROS  Cant obtain    I have reviewed the following nursing documentation:  Allergies: Morphine, Codeine, Hydromorphone, Levaquin [levofloxacin in d5w], Vicodin [hydrocodone-acetaminophen], and Aspirin    Past medical history:   Past Medical History:   Diagnosis Date    Acute MI ([address]     Acute pyelonephritis 09/08/2015    Anxiety and depression     Arthritis     CAD (coronary artery disease)     two heart stent one in 2000 and 2nd one 6 months later on 2000, had MI in 2000    Cancer 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)     ESBL (extended spectrum beta-lactamase) producing bacteria infection 08/14/2021    Esophageal stricture     Gastroesophageal reflux disease without esophagitis 03/24/2016    Hiatal hernia     Hiatal hernia     Hypercholesteremia     Hypertension     IBS (irritable bowel syndrome)     Migraines     Movement disorder arthritis    Pneumonia     PONV (postoperative nausea and vomiting)     Type II or unspecified type diabetes mellitus without mention of complication, not stated as uncontrolled     type ll does not take insulin at home    Unspecified cerebral artery occlusion with cerebral infarction     many TIA's     Past surgical history:   Past Surgical History:   Procedure Laterality Date    APPENDECTOMY      BRONCHOSCOPY N/A 1/24/2020    BRONCHOSCOPY ALVEOLAR LAVAGE performed by Andres Jett MD at [facility] N/A 1/27/2020    BRONCHOSCOPY DIAGNOSTIC OR CELL [address] ONLY performed by Que Nazario MD at [facility] N/A 11/23/2020    BRONCHOSCOPY DIAGNOSTIC OR CELL [address] ONLY performed by Martina Danielle MD at [facility]      1 stent 2000 and 1 stent 2001    CATARACT REMOVAL  2013 or 2014    bilateral cataracts removed    CHOLECYSTECTOMY      COLONOSCOPY      COLONOSCOPY  06/11/2018    ENDOSCOPY, COLON, DIAGNOSTIC      ESOPHAGEAL DILATATION      EYE SURGERY      bilateral cataracts    GALLBLADDER SURGERY      HYSTERECTOMY (CERVIX STATUS UNKNOWN)      IA EXCISION MALIGNANT LESION S/N/H/F/G 0.5 CM/< N/A 9/6/2018    EXCISION OF SCALP SQUAMOUS CELL CARCINOMA performed by Keron Hoyos MD at [address] AGRFT T/A/L 1ST 100 CM/&/1% BDY INFT/CHLD N/A 9/6/2018    SPLIT THICKNESS SKIN GRAFT FOR COVERAGE SCALP, APPLICATION OF WOUND VAC DEVICE performed by Keron Hoyos MD at [address] [place] [address],[address] ENDOSCOPY  4/20/12    with biopsy of stomach,gastritis    UPPER GASTROINTESTINAL ENDOSCOPY  06/11/2018    w/esophagael dilation       Home medications:   Previous Medications    ACETAZOLAMIDE (DIAMOX) 250 MG TABLET    Take 1 tablet by mouth daily    ALBUTEROL SULFATE HFA (PROVENTIL;VENTOLIN;PROAIR) 108 (90 BASE) MCG/ACT INHALER    INHALE TWO PUFFS BY MOUTH EVERY 6 HOURS AS NEEDED FOR WHEEZING    ALPRAZOLAM (XANAX) 1 MG TABLET    Take 1 tablet by mouth 3 times daily as needed for Anxiety for up to 30 days. BLOOD GLUCOSE MONITOR STRIPS    Test 2 times a day & as needed for symptoms of irregular blood glucose. Dispense sufficient amount for indicated testing frequency plus additional to accommodate PRN testing needs. BLOOD GLUCOSE TEST STRIPS (TRUE METRIX BLOOD GLUCOSE TEST) STRIP    USE THREE TIMES A DAY AS NEEDED    BUDESONIDE-FORMOTEROL (SYMBICORT) 160-4.5 MCG/ACT AERO    Inhale 2 puffs into the lungs 2 times daily    CLOPIDOGREL (PLAVIX) 75 MG TABLET    Take 1 tablet by mouth daily    DICLOFENAC SODIUM (VOLTAREN) 1 % GEL    Apply 2 g topically 2 times daily    FUROSEMIDE (LASIX) 40 MG TABLET    TAKE ONE TABLET BY MOUTH TWICE A DAY    GABAPENTIN (NEURONTIN) 300 MG CAPSULE    Take 2 capsules by mouth 3 times daily for 90 days. GLIPIZIDE (GLUCOTROL) 5 MG TABLET    Take 1 tablet by mouth 2 times daily (before meals)    IBUPROFEN (ADVIL;MOTRIN) 400 MG TABLET    Take 1 tablet by mouth every 6 hours as needed for Pain or Fever    IPRATROPIUM-ALBUTEROL (DUONEB) 0.5-2.5 (3) MG/3ML SOLN NEBULIZER SOLUTION    INHALE THREE MILLILITERS VIA NEBULIZATION BY MOUTH EVERY 4 HOURS AS NEEDED FOR SHORTNESS OF BREATH    LANCETS MISC    1 each by Does not apply route 2 times daily    LINAGLIPTIN (TRADJENTA) 5 MG TABLET    Take 1 tablet by mouth daily    LISINOPRIL (PRINIVIL;ZESTRIL) 2.5 MG TABLET    Take 1 tablet by mouth daily    METOPROLOL SUCCINATE (TOPROL XL) 25 MG EXTENDED RELEASE TABLET    Take 1 tablet by mouth daily    NITROGLYCERIN (NITROSTAT) 0.4 MG SL TABLET    Place 1 tablet under the tongue every 5 minutes as needed for Chest pain.     OMEPRAZOLE (PRILOSEC) 40 MG DELAYED RELEASE CAPSULE    Take 1 capsule by mouth daily    RIVAROXABAN (XARELTO) 15 MG TABS TABLET    Take 1 tablet by mouth daily    ROSUVASTATIN (CRESTOR) 40 MG TABLET    Take 1 tablet by mouth nightly    SODIUM CHLORIDE (OCEAN, BABY AYR) 0.65 % NASAL SPRAY    1 spray by Nasal route as needed for Congestion    TRIAMCINOLONE (KENALOG) 0.1 % CREAM    Apply topically 2 times daily. UMECLIDINIUM BROMIDE (INCRUSE ELLIPTA) 62.5 MCG/INH AEPB    Inhale 1 puff into the lungs daily       Social history:  reports that she quit smoking about 5 years ago. Her smoking use included cigarettes. She has a 12.25 pack-year smoking history. She has never used smokeless tobacco. She reports current alcohol use of about 1.0 standard drink per week. She reports that she does not use drugs. Family history:    Family History   Problem Relation Age of Onset    Heart Disease Mother     Cancer Mother     Cancer Father     Cancer Sister     Heart Disease Sister     Heart Disease Brother     High Blood Pressure Neg Hx     High Cholesterol Neg Hx          Exam  ED Triage Vitals   BP Temp Temp src Pulse Resp SpO2 Height Weight   -- -- -- -- -- -- -- --     Nursing note and vitals reviewed. Constitutional: Ill appearing  HENT:      Head: Normocephalic      Ears: External ears normal.      Nose: Nose normal.     Mouth: Membrane mucosa very dry   Eyes: No discharge. Neck: Supple. Trachea midline. Cardiovascular: Regular rate. Warm extremities  Pulmonary/Chest: Effort normal. No respiratory distress. Bilateral wheezes  Abdominal: Soft. No distension. Nontender. Bruising on the abdomen. : Deferred  Rectal: Deferred   Musculoskeletal: Moves all extremities. No gross deformity. Bruise on thigh  Neurological: Alert and oriented to person. Inconsistently follows commands. Pupils 3 mm bilaterally and reactive. Skin: Warm and dry. Psychiatric: Normal mood and affect. Behavior is normal.    Procedures      EKG  The Ekg interpreted by me in the absence of a cardiologist shows.   Normal sinus rhythm with pattern of bigeminy with PVCs similar in character to January 25, 2023 study. Old inverted T wave lead aVL. Ventricular rate 84      Radiology  XR HIP 1 VW W PELVIS LEFT   Final Result   1. No acute fracture or dislocation. 2.  Bilateral hip osteoarthritis. CT LUMBAR SPINE TRAUMA RECONSTRUCTION   Final Result   Addendum (preliminary) 1 of 1   ADDENDUM:   There is lumbarization of the S1 vertebra. There is mild compression   fracture of the L1 vertebral body, which appears old. Final   There are no fractures noted. CT THORACIC SPINE TRAUMA RECONSTRUCTION   Final Result   Old mild compression fractures of the T5 and T11 vertebral bodies. CT CHEST ABDOMEN PELVIS W CONTRAST Additional Contrast? None   Final Result   No acute process identified. 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 Coronary artery calcifications noted. Prior granulomatous disease. Mild   scarring in the lingula. Diverticular disease of the left colon without   diverticulitis. Small paraumbilical hernia. There is mild compression fracture of the T 11 and L1 vertebral bodies, which   appear old. There is lumbar lies a shin of the S1 vertebra. CT CERVICAL SPINE WO CONTRAST   Final Result   No acute abnormality of the cervical spine. CT HEAD WO CONTRAST   Final Result   No acute intracranial abnormality.              Labs  Results for orders placed or performed during the hospital encounter of 02/01/23   CBC with Auto Differential   Result Value Ref Range    WBC 9.1 4.0 - 11.0 K/uL    RBC 5.20 4.00 - 5.20 M/uL    Hemoglobin 13.8 12.0 - 16.0 g/dL    Hematocrit 44.0 36.0 - 48.0 %    MCV 84.6 80.0 - 100.0 fL    MCH 26.6 26.0 - 34.0 pg    MCHC 31.5 31.0 - 36.0 g/dL    RDW 16.1 (H) 12.4 - 15.4 %    Platelets 733 205 - 427 K/uL    MPV 8.3 5.0 - 10.5 fL    Neutrophils % 69.0 %    Lymphocytes % 21.8 %    Monocytes % 7.9 %    Eosinophils % 0.9 %    Basophils % 0.4 %    Neutrophils Absolute 6. 3 1.7 - 7.7 K/uL    Lymphocytes Absolute 2.0 1.0 - 5.1 K/uL    Monocytes Absolute 0.7 0.0 - 1.3 K/uL    Eosinophils Absolute 0.1 0.0 - 0.6 K/uL    Basophils Absolute 0.0 0.0 - 0.2 K/uL   CMP w/ Reflex to MG   Result Value Ref Range    Sodium 137 136 - 145 mmol/L    Potassium reflex Magnesium 3.8 3.5 - 5.1 mmol/L    Chloride 98 (L) 99 - 110 mmol/L    CO2 33 (H) 21 - 32 mmol/L    Anion Gap 6 3 - 16    Glucose 238 (H) 70 - 99 mg/dL    BUN 20 7 - 20 mg/dL    Creatinine 1.1 0.6 - 1.2 mg/dL    Est, Glom Filt Rate 52 (A) >60    Calcium 9.7 8.3 - 10.6 mg/dL    Total Protein 6.7 6.4 - 8.2 g/dL    Albumin 3.5 3.4 - 5.0 g/dL    Albumin/Globulin Ratio 1.1 1.1 - 2.2    Total Bilirubin 0.3 0.0 - 1.0 mg/dL    Alkaline Phosphatase 79 40 - 129 U/L    ALT 15 10 - 40 U/L    AST 17 15 - 37 U/L   Troponin   Result Value Ref Range    Troponin <0.01 <0.01 ng/mL   Brain Natriuretic Peptide   Result Value Ref Range    Pro- 0 - 449 pg/mL   Lactate, Sepsis   Result Value Ref Range    Lactic Acid, Sepsis 1.6 0.4 - 1.9 mmol/L   Blood gas, venous   Result Value Ref Range    pH, Avelino 7.272 (L) 7.350 - 7.450    pCO2, Avelino 68.5 (H) 40.0 - 50.0 mmHg    pO2, Avelino 56.7 (H) 25.0 - 40.0 mmHg    HCO3, Venous 31.5 (H) 23.0 - 29.0 mmol/L    Base Excess, Avelino 2.5 -3.0 - 3.0 mmol/L    O2 Sat, Avelino 88 Not Established %    Carboxyhemoglobin 3.6 (H) 0.0 - 1.5 %    MetHgb, Avelino 0.3 <1.5 %    TC02 (Calc), Avelino 75 Not Established mmol/L    O2 Content, Avelino 17 Not Established VOL %    O2 Therapy Unknown     Hemoglobin, Aevlino, Reduced 12 %   CK   Result Value Ref Range    Total  26 - 192 U/L   Blood gas, venous   Result Value Ref Range    pH, Avelino 7.304 (L) 7.350 - 7.450    pCO2, Avelino 61.3 (H) 40.0 - 50.0 mmHg    pO2, Avelino 65.7 (H) 25.0 - 40.0 mmHg    HCO3, Venous 30.4 (H) 23.0 - 29.0 mmol/L    Base Excess, Avelino 2.4 -3.0 - 3.0 mmol/L    O2 Sat, Avelino 93 Not Established %    Carboxyhemoglobin 3.2 (H) 0.0 - 1.5 %    MetHgb, Avelino 0.5 <1.5 %    TC02 (Calc), Avelino 72 Not Established mmol/L    O2 Content, Avelino 17 Not Established VOL %    O2 Therapy Unknown     Hemoglobin, Avelino, Reduced 7 %   POCT Glucose   Result Value Ref Range    POC Glucose 248 (H) 70 - 99 mg/dl    Performed on ACCU-CHEK    EKG 12 Lead   Result Value Ref Range    Ventricular Rate 84 BPM    Atrial Rate 84 BPM    P-R Interval 180 ms    QRS Duration 124 ms    Q-T Interval 400 ms    QTc Calculation (Bazett) 472 ms    R Axis -51 degrees    T Axis 91 degrees    Diagnosis       Sinus rhythm with frequent Premature ventricular complexes in a pattern of bigeminyLeft axis deviationLeft bundle branch blockAbnormal ECG       Screenings   Machelle Coma Scale  Eye Opening: To speech  Best Verbal Response: Confused  Best Motor Response: Obeys commands  [place]lake Coma Scale Score: 13       MDM and ED Course  This is a 69 y.o. female who presents to the ED for fall. History is limited due to confusion. Will obtain CT head, chest abdomen pelvis CT and L-spine to look for any traumatic injury. She is anticoagulated on Xarelto. Blood pressures have been fine. Appears dehydrated. Recently had stent placement. We will check for heart failure before giving IV fluids. We will check for sources of infection such as UTI and pneumonia. No hypoglycemia found. Wheezy initially. Unclear if this is COPD or failure. We will give duo nebs. Initially per EMS there could have been runs of v tach. Her ekg shows bigeminy. I reviewed her monitor and saw no runs of v tach.   -----------    I reassessed the patient. Her mental status is definitely improved. She is following commands easily. She has pain in her lower back and buttocks where she fell. She abdominal pain or chest discomfort at this time. No shortness of breath.       -----------    Blood work reveals respiratory acidosis.   Unfortunately, while I would typically place the patient on BiPAP, I do not believe that this would be fruitful since patient has terrible claustrophobia and cannot tolerate any facemask. Either way, she is definitely more awake now and ventilating better. We did give Narcan but I am not convinced that that is what helped her out.      --------    CT shows old compression fractures. [unfilled]    Is this patient to be included in the SEP-1 Core Measure due to severe sepsis or septic shock? No   Exclusion criteria - the patient is NOT to be included for SEP-1 Core Measure due to:  2+ SIRS criteria are not met        Final Impression  1. Fall, initial encounter    2. COPD exacerbation (HCC)    3. CO2 narcosis        Blood pressure (!) 128/56, pulse 80, temperature 96.8 °F (36 °C), temperature source Oral, resp. rate 19, height 5' 5\" (1.651 m), weight 275 lb (124.7 kg), SpO2 93 %, not currently breastfeeding. Disposition:  DISPOSITION        Patient Referrals:  No follow-up provider specified. Discharge Medications:  New Prescriptions    No medications on file       Discontinued Medications:  Discontinued Medications    No medications on file       This chart was generated using the [address] [place] [address] dictation system. I created this record but it may contain dictation errors given the limitations of this technology.         Kan Batista MD  02/01/23 1142

## 2023-02-01 NOTE — PLAN OF CARE
Problem: Skin/Tissue Integrity  Goal: Absence of new skin breakdown  Description: 1. Monitor for areas of redness and/or skin breakdown  2. Assess vascular access sites hourly  3. Every 4-6 hours minimum:  Change oxygen saturation probe site  4. Every 4-6 hours:  If on nasal continuous positive airway pressure, respiratory therapy assess nares and determine need for appliance change or resting period.   Outcome: Progressing     Problem: ABCDS Injury Assessment  Goal: Absence of physical injury  Outcome: Progressing  Flowsheets (Taken 2/1/2023 8044)  Absence of Physical Injury: Implement safety measures based on patient assessment     Problem: Safety - Adult  Goal: Free from fall injury  Outcome: Progressing

## 2023-02-01 NOTE — PROGRESS NOTES
Patient has arrived to unit in stable condition. Vitals stable. Patient is awake, alert and oriented. Respirations are easy and labored with exertion. Patient does not appear to be in distress. Patient oriented to room and call light. Plan of care discussed with patient, patient agreeable. Call light within reach. Tele applied and alarms on. Fall precautions in place.  Oxygen increased to 3 liter per N/C, 93%

## 2023-02-01 NOTE — TELEPHONE ENCOUNTER
Writer contacted ED provider to inform of 30 day readmission risk.    ED provider informed writer of readmission.    Call Back: If you need to call back to inform of disposition you can contact me at [phone]

## 2023-02-01 NOTE — PROGRESS NOTES
Entered patient's room twice to initiate admission; however, patient appears to be sleeping and is softly snoring. No distress noted. Will return if time allows prior to her moving to .

## 2023-02-02 PROBLEM — F32.A ANXIETY AND DEPRESSION: Status: ACTIVE | Noted: 2018-01-19
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
## 2023-02-02 LAB
EKG ATRIAL RATE: 80 BPM
EKG DIAGNOSIS: NORMAL
EKG P AXIS: 37 DEGREES
EKG P-R INTERVAL: 190 MS
EKG Q-T INTERVAL: 392 MS
EKG QRS DURATION: 128 MS
EKG QTC CALCULATION (BAZETT): 452 MS
EKG R AXIS: -56 DEGREES
EKG T AXIS: 86 DEGREES
EKG VENTRICULAR RATE: 80 BPM
ESTIMATED AVERAGE GLUCOSE: 180 MG/DL
GLUCOSE BLD-MCNC: 283 MG/DL (ref 70–99)
GLUCOSE BLD-MCNC: 330 MG/DL (ref 70–99)
GLUCOSE BLD-MCNC: 342 MG/DL (ref 70–99)
GLUCOSE BLD-MCNC: 461 MG/DL (ref 70–99)
HBA1C MFR BLD: 7.9 %
PERFORMED ON: ABNORMAL

## 2023-02-02 PROCEDURE — 6360000002 HC RX W HCPCS: Performed by: INTERNAL MEDICINE

## 2023-02-02 PROCEDURE — 2580000003 HC RX 258: Performed by: INTERNAL MEDICINE

## 2023-02-02 PROCEDURE — 6370000000 HC RX 637 (ALT 250 FOR IP): Performed by: INTERNAL MEDICINE

## 2023-02-02 PROCEDURE — 94761 N-INVAS EAR/PLS OXIMETRY MLT: CPT

## 2023-02-02 PROCEDURE — 1200000000 HC SEMI PRIVATE

## 2023-02-02 PROCEDURE — 99223 1ST HOSP IP/OBS HIGH 75: CPT | Performed by: INTERNAL MEDICINE

## 2023-02-02 PROCEDURE — 2700000000 HC OXYGEN THERAPY PER DAY

## 2023-02-02 PROCEDURE — 94669 MECHANICAL CHEST WALL OSCILL: CPT

## 2023-02-02 PROCEDURE — 93005 ELECTROCARDIOGRAM TRACING: CPT | Performed by: INTERNAL MEDICINE

## 2023-02-02 PROCEDURE — 93010 ELECTROCARDIOGRAM REPORT: CPT | Performed by: INTERNAL MEDICINE

## 2023-02-02 PROCEDURE — 94640 AIRWAY INHALATION TREATMENT: CPT

## 2023-02-02 RX ORDER — 0.9 % SODIUM CHLORIDE 0.9 %
500 INTRAVENOUS SOLUTION INTRAVENOUS ONCE
Status: COMPLETED | OUTPATIENT
Start: 2023-02-02 | End: 2023-02-02

## 2023-02-02 RX ORDER — DEXTROMETHORPHAN HYDROBROMIDE AND PROMETHAZINE HYDROCHLORIDE 15; 6.25 MG/5ML; MG/5ML
5 SYRUP ORAL EVERY 4 HOURS PRN
Status: DISCONTINUED | OUTPATIENT
Start: 2023-02-02 | End: 2023-02-06 | Stop reason: HOSPADM

## 2023-02-02 RX ORDER — GUAIFENESIN 600 MG/1
600 TABLET, EXTENDED RELEASE ORAL 2 TIMES DAILY
Status: DISCONTINUED | OUTPATIENT
Start: 2023-02-02 | End: 2023-02-06 | Stop reason: HOSPADM

## 2023-02-02 RX ORDER — GUAIFENESIN/DEXTROMETHORPHAN 100-10MG/5
5 SYRUP ORAL EVERY 4 HOURS PRN
Status: DISCONTINUED | OUTPATIENT
Start: 2023-02-02 | End: 2023-02-02

## 2023-02-02 RX ORDER — MIDODRINE HYDROCHLORIDE 5 MG/1
5 TABLET ORAL
Status: DISCONTINUED | OUTPATIENT
Start: 2023-02-02 | End: 2023-02-06 | Stop reason: HOSPADM

## 2023-02-02 RX ORDER — INSULIN GLARGINE 100 [IU]/ML
15 INJECTION, SOLUTION SUBCUTANEOUS NIGHTLY
Status: DISCONTINUED | OUTPATIENT
Start: 2023-02-02 | End: 2023-02-03

## 2023-02-02 RX ORDER — NITROGLYCERIN 0.4 MG/1
0.4 TABLET SUBLINGUAL EVERY 5 MIN PRN
Qty: 25 TABLET | Refills: 3 | Status: SHIPPED
Start: 2023-02-02 | End: 2023-02-06 | Stop reason: HOSPADM

## 2023-02-02 RX ADMIN — GUAIFENESIN 600 MG: 600 TABLET, EXTENDED RELEASE ORAL at 12:22

## 2023-02-02 RX ADMIN — CEPHALEXIN 500 MG: 250 CAPSULE ORAL at 21:16

## 2023-02-02 RX ADMIN — CEPHALEXIN 500 MG: 250 CAPSULE ORAL at 05:13

## 2023-02-02 RX ADMIN — ALPRAZOLAM 1 MG: 0.5 TABLET ORAL at 20:11

## 2023-02-02 RX ADMIN — INSULIN GLARGINE 15 UNITS: 100 INJECTION, SOLUTION SUBCUTANEOUS at 01:45

## 2023-02-02 RX ADMIN — ROSUVASTATIN 40 MG: 40 TABLET, FILM COATED ORAL at 20:12

## 2023-02-02 RX ADMIN — IBUPROFEN 400 MG: 400 TABLET, FILM COATED ORAL at 20:12

## 2023-02-02 RX ADMIN — CLOPIDOGREL BISULFATE 75 MG: 75 TABLET ORAL at 07:53

## 2023-02-02 RX ADMIN — GLIPIZIDE 5 MG: 5 TABLET ORAL at 18:08

## 2023-02-02 RX ADMIN — Medication 2 PUFF: at 20:21

## 2023-02-02 RX ADMIN — CEPHALEXIN 500 MG: 250 CAPSULE ORAL at 13:28

## 2023-02-02 RX ADMIN — METOPROLOL SUCCINATE 25 MG: 25 TABLET, EXTENDED RELEASE ORAL at 07:54

## 2023-02-02 RX ADMIN — INSULIN LISPRO 8 UNITS: 100 INJECTION, SOLUTION INTRAVENOUS; SUBCUTANEOUS at 12:02

## 2023-02-02 RX ADMIN — GLIPIZIDE 5 MG: 5 TABLET ORAL at 07:54

## 2023-02-02 RX ADMIN — FUROSEMIDE 40 MG: 40 TABLET ORAL at 07:53

## 2023-02-02 RX ADMIN — ACETAZOLAMIDE 250 MG: 250 TABLET ORAL at 08:40

## 2023-02-02 RX ADMIN — DICLOFENAC SODIUM 2 G: 10 GEL TOPICAL at 20:14

## 2023-02-02 RX ADMIN — GUAIFENESIN 600 MG: 600 TABLET, EXTENDED RELEASE ORAL at 20:12

## 2023-02-02 RX ADMIN — GABAPENTIN 600 MG: 300 CAPSULE ORAL at 13:28

## 2023-02-02 RX ADMIN — LISINOPRIL 2.5 MG: 5 TABLET ORAL at 07:52

## 2023-02-02 RX ADMIN — INSULIN LISPRO 4 UNITS: 100 INJECTION, SOLUTION INTRAVENOUS; SUBCUTANEOUS at 21:19

## 2023-02-02 RX ADMIN — GABAPENTIN 600 MG: 300 CAPSULE ORAL at 20:12

## 2023-02-02 RX ADMIN — GLIPIZIDE 5 MG: 5 TABLET ORAL at 07:53

## 2023-02-02 RX ADMIN — GUAIFENESIN AND DEXTROMETHORPHAN 5 ML: 100; 10 SYRUP ORAL at 01:37

## 2023-02-02 RX ADMIN — IBUPROFEN 400 MG: 400 TABLET, FILM COATED ORAL at 05:13

## 2023-02-02 RX ADMIN — FUROSEMIDE 40 MG: 40 TABLET ORAL at 18:08

## 2023-02-02 RX ADMIN — GABAPENTIN 600 MG: 300 CAPSULE ORAL at 00:02

## 2023-02-02 RX ADMIN — GABAPENTIN 600 MG: 300 CAPSULE ORAL at 07:52

## 2023-02-02 RX ADMIN — METHYLPREDNISOLONE SODIUM SUCCINATE 40 MG: 40 INJECTION, POWDER, FOR SOLUTION INTRAMUSCULAR; INTRAVENOUS at 18:11

## 2023-02-02 RX ADMIN — PANTOPRAZOLE SODIUM 40 MG: 40 TABLET, DELAYED RELEASE ORAL at 05:13

## 2023-02-02 RX ADMIN — IPRATROPIUM BROMIDE AND ALBUTEROL SULFATE 1 AMPULE: 2.5; .5 SOLUTION RESPIRATORY (INHALATION) at 13:23

## 2023-02-02 RX ADMIN — SODIUM CHLORIDE 500 ML: 9 INJECTION, SOLUTION INTRAVENOUS at 12:29

## 2023-02-02 RX ADMIN — INSULIN GLARGINE 15 UNITS: 100 INJECTION, SOLUTION SUBCUTANEOUS at 21:18

## 2023-02-02 RX ADMIN — METHYLPREDNISOLONE SODIUM SUCCINATE 40 MG: 40 INJECTION, POWDER, FOR SOLUTION INTRAMUSCULAR; INTRAVENOUS at 23:26

## 2023-02-02 RX ADMIN — IPRATROPIUM BROMIDE AND ALBUTEROL SULFATE 1 AMPULE: 2.5; .5 SOLUTION RESPIRATORY (INHALATION) at 20:21

## 2023-02-02 RX ADMIN — MIDODRINE HYDROCHLORIDE 5 MG: 5 TABLET ORAL at 18:08

## 2023-02-02 RX ADMIN — INSULIN LISPRO 4 UNITS: 100 INJECTION, SOLUTION INTRAVENOUS; SUBCUTANEOUS at 18:05

## 2023-02-02 RX ADMIN — ALOGLIPTIN 12.5 MG: 12.5 TABLET, FILM COATED ORAL at 07:53

## 2023-02-02 RX ADMIN — METHYLPREDNISOLONE SODIUM SUCCINATE 40 MG: 40 INJECTION, POWDER, FOR SOLUTION INTRAMUSCULAR; INTRAVENOUS at 08:41

## 2023-02-02 RX ADMIN — RIVAROXABAN 15 MG: 15 TABLET, FILM COATED ORAL at 07:54

## 2023-02-02 RX ADMIN — MIDODRINE HYDROCHLORIDE 5 MG: 5 TABLET ORAL at 12:19

## 2023-02-02 ASSESSMENT — PAIN - FUNCTIONAL ASSESSMENT
PAIN_FUNCTIONAL_ASSESSMENT: ACTIVITIES ARE NOT PREVENTED
PAIN_FUNCTIONAL_ASSESSMENT: PREVENTS OR INTERFERES WITH ALL ACTIVE AND SOME PASSIVE ACTIVITIES

## 2023-02-02 ASSESSMENT — PAIN SCALES - GENERAL
PAINLEVEL_OUTOF10: 9
PAINLEVEL_OUTOF10: 8

## 2023-02-02 ASSESSMENT — PAIN DESCRIPTION - LOCATION
LOCATION: BACK;LEG
LOCATION: GENERALIZED

## 2023-02-02 ASSESSMENT — PAIN DESCRIPTION - DESCRIPTORS: DESCRIPTORS: DISCOMFORT

## 2023-02-02 ASSESSMENT — PAIN DESCRIPTION - PAIN TYPE: TYPE: CHRONIC PAIN

## 2023-02-02 ASSESSMENT — PAIN DESCRIPTION - ORIENTATION: ORIENTATION: MID

## 2023-02-02 ASSESSMENT — PAIN DESCRIPTION - FREQUENCY: FREQUENCY: CONTINUOUS

## 2023-02-02 ASSESSMENT — PAIN DESCRIPTION - ONSET: ONSET: ON-GOING

## 2023-02-02 NOTE — H&P
[facility]                     [facility], [address]                              HISTORY AND PHYSICAL    PATIENT NAME: Kelton Ramírez                      :        1945  MED REC NO:   [id number]                          ROOM:       0379  ACCOUNT NO:   [de-identified]                           ADMIT DATE: 2023  PROVIDER:     Veronica Miller MD    HISTORY OF PRESENT ILLNESS:  The patient is a 41-year-old white American  lady who was recently discharged from the hospital following cardiac  stenting, having been treated for unstable angina and had been seen by  Dr. Hernan Matta in the cardiology group. The patient went home and she had  a fall. She fell twice. The patient was increasingly lethargic and had  a change in mental status. She has an oxygen at home, but she does not  wear it consistently. Initially, she declined to come to the hospital,  but when she fell for the second time, this patient's granddaughter  insisted she come here and the patient is increasingly confused. She is  supposed to use the CPAP machine at home and she gets terribly  claustrophobic, so she does not wear it. She suffers from chronic  anxiety neurosis and would not even tolerate a face _____. PAST MEDICAL HISTORY:  Pertinent for COPD, atherosclerotic heart  disease, acute MI, acute pyelonephritis, anxiety neurosis, depression,  _____. SOCIAL HISTORY:  She is a . She had three children. She lost one  of them to drug overdose. Since then she has been in quite a bit of  turmoil. She used to work for a company that [employer] and [employer]. No history of substance abuse. She lives by herself with  close supervision from her granddaughter. She is morbidly obese with a  BMI of 45.76. MEDICATIONS:  The patient is on Plavix, lisinopril, Xanax, Toprol,  Dulera, DuoNeb, Humalog, Lantus, gabapentin, potassium chloride,  lithium, and _____.     ALLERGIES: She is allergic to MORPHINE, CODEINE, HYDROMORPHONE,  LEVAQUIN, VICODIN and ASPIRIN. REVIEW OF SYSTEMS:  Negative for angina pectoris, which is typical, but  she does have typical chest pain with no pleuritic complaints. She is  fairly obese with a BMI of 45. There is increased sputum production. There is increased confusion. No abdominal pain. No hematemesis or  melena. No genitourinary complaints. PHYSICAL EXAMINATION:  GENERAL:  She is alert, awake and oriented x3, moderately-distressed,  61-year-old white American lady looking consistent with her stated age. VITAL SIGNS:  Her temperature is 97.6, blood pressure 114/70,  respiration 17, heart rate is 74, O2 sat is 93% on 3.5 L. SKIN:  Warm and dry. HEENT:  Oral mucosa is dry. NECK:  Supple. Mild jugular venous distention. LUNGS:  Bronchovesicular breathing pattern with bilateral expiratory  wheezes. Scattered crackles present. HEART:  Regular rate and rhythm, S1 and S2. Tachycardic. ABDOMEN:  Soft and nontender. Bowel sounds are present. EXTREMITIES:  Shows 1 to 2+ pitting edema. NEUROLOGIC:  The patient appears grossly intact. LABORATORY DATA:  Lab evaluation shows sodium 137, potassium 3.8,  chloride 98, CO2 of 33, BUN 20, creatinine 1.1. Blood glucose 238,  total CK at 115, proBNP level is 415, troponin is less than 0.05. White  blood cell count 9.1, hemoglobin and hematocrit is 13.8 and 44.0. Carboxyhemoglobin level 3.2. ABG showed pH of 7.27, pCO2 of 68.5, pO2  of 56.7, bicarbonate 31.5. DIAGNOSTIC DATA:  Head CT shows no acute intracranial abnormality. No  acute abnormality present in cervical spine. CT of the chest, abdomen  and pelvis with contrast shows no acute process, pelvic lipoma, coronary  artery calcification. Mild paraumbilical hernia. Mild compression  fracture at T11 and L1, which appears old. ASSESSMENT:  COPD exacerbation, hypercarbia, hypertension,  atherosclerotic heart disease.     PLAN:  Get her admitted. Treat her with IV Solu-Medrol, BiPAP,  noninvasive mechanical ventilation. Follow serial ABGs. The patient is  a full code.         Talib Monk MD    D: 02/02/2023 1:35:33       T: 02/02/2023 3:52:16     [email or web address]  Job#: [id number]     Doc#: [id number]    CC:

## 2023-02-02 NOTE — CARE COORDINATION
02/02/23 1553   Readmission Assessment   Number of Days since last admission? 1-7 days   Previous Disposition Home with Home Health   Who is being Interviewed Patient   What was the patient's/caregiver's perception as to why they think they needed to return back to the hospital? Other (Comment)  (Patient refused SNF at last DC when recommended)   Did you visit your Primary Care Physician after you left the hospital, before you returned this time? No   Why weren't you able to visit your PCP? Could not get an appointment  (pt came back within 24 hours due to a fall)   Who advised the patient to return to the hospital? Self-referral   Does the patient report anything that got in the way of taking their medications? No   In our efforts to provide the best possible care to you and others like you, can you think of anything that we could have done to help you after you left the hospital the first time, so that you might not have needed to return so soon? Arrange for more help when leaving the hospital;Identify patient's health literacy needs; Teach back instructions regarding management of illness

## 2023-02-02 NOTE — CARE COORDINATION
Discharge Planning Note:  Spoke with Sb Coley with [address] she is her CM.  [phone]. Please keep her in the loop.      CM to Cont to follow    Electronically signed by Dione Aguilera RN on 2/2/2023 at 3:57 PM

## 2023-02-02 NOTE — CONSULTS
PULMONARY AND CRITICAL CARE MEDICINE CONSULTATION NOTE    CONSULTING PHYSICIAN:  Nuvia Dickson MD    ADMISSION DATE: 2/1/2023  ADMISSION DIAGNOSIS: COPD exacerbation ([facility] [address] [J44.1]  COPD with acute exacerbation ([facility] [address] [J44.1]  CO2 narcosis [R06.89]  Fall, initial encounter [W19. XXXA]    REASON FOR CONSULT:   Chief Complaint   Patient presents with    Fall     PT states 2 falls yesterday, pt responsive to voice upon arrival, unsure what happened, pt with stents placed yesterday, medics concerned for runs of vfib in route       DATE OF CONSULT: 2/1/2023    HISTORY OF PRESENT ILLNESS: 69y.o. year old female with a past medical history significant for COPD of unknown severity, morbid obesity, possible obstructive sleep apnea untreated, hypertension who presented to the hospital with shortness of breath, generalized weakness as well as fall at home. Patient reports that she has been feeling weak and sleepy at home. This led to a fall at home but she declined to come to the hospital.  After a day or 2 she had another fall after which her daughter convinced her to come to the hospital.  Patient reports that she continues to have intermittent shortness of breath and cough. She feels congested unable to effectively expectorate. In the past she has required repeated bronchoscopies for airway clearance. She has not followed with pulmonary office for almost a year. Still takes Symbicort and albuterol inhaler regularly. Uses oxygen at 2 L/min at nighttime and as needed. She is severely claustrophobic and does not use CPAP therapy. Recently had a diagnosis of coronary artery disease requiring PCI. At the time of my evaluation patient is sleeping in the bed in a prone position, she has just had her breakfast.   Intermittent cough persist.  Patient vehemently refuses to consider BiPAP therapy as she is severely claustrophobic. Patient reports that she has quit smoking. Off-and-on she does use vape.     REVIEW OF SYSTEMS:     CONSTITUTIONAL SYMPTOMS: The patient denies fever, fatigue, night sweats, weight loss or weight gain. HEENT: No vision changes. No tinnitus, Denies sinus pain. No hoarseness, or dysphagia. NECK: Patient denies swelling in the neck. CARDIOVASCULAR: Denies chest pain, palpitation, syncope. RESPIRATORY: See HPI. GASTROINTESTINAL: Denies nausea, abdominal pain or change in bowel function. GENITOURINARY: Denies obstructive symptoms. No history of incontinence. BREASTS: No masses or lumps in the breasts. SKIN: No rashes or itching. MUSCULOSKELETAL: Denies weakness or bone pain. NEUROLOGICAL: No headaches or seizures. PSYCHIATRIC: Denies mood swings or depression. ENDOCRINE: Denies heat or cold intolerance or excessive thirst.  HEMATOLOGIC/LYMPHATIC: Denies easy bruising or lymph node swelling. ALLERGIC/IMMUNOLOGIC: No environmental allergies.     PAST MEDICAL HISTORY:   Past Medical History:   Diagnosis Date    Acute MI ([facility] [address]     Acute pyelonephritis 09/08/2015    Anxiety and depression     Arthritis     CAD (coronary artery disease)     two heart stent one in 2000 and 2nd one 6 months later on 2000, had MI in 2000    Cancer 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    Chronic obstructive pulmonary disease ([facility] [address] 01/13/2017    Claustrophobia     COPD (chronic obstructive pulmonary disease) (HCC)     ESBL (extended spectrum beta-lactamase) producing bacteria infection 08/14/2021    Esophageal stricture     Gastroesophageal reflux disease without esophagitis 03/24/2016    Hiatal hernia     Hiatal hernia     Hypercholesteremia     Hypertension     IBS (irritable bowel syndrome)     Migraines     Movement disorder arthritis    Pneumonia     PONV (postoperative nausea and vomiting)     Type II or unspecified type diabetes mellitus without mention of complication, not stated as uncontrolled     type ll does not take insulin at home    Unspecified cerebral artery occlusion with cerebral infarction     many TIA's       PAST SURGICAL HISTORY:   Past Surgical History:   Procedure Laterality Date    APPENDECTOMY      BRONCHOSCOPY N/A 2020    BRONCHOSCOPY ALVEOLAR LAVAGE performed by Felicity Jean MD at [place] [address] N/A 2020    BRONCHOSCOPY DIAGNOSTIC OR CELL [address] ONLY performed by Becka Hinton MD at [place] [address] N/A 2020    BRONCHOSCOPY DIAGNOSTIC OR CELL [address] ONLY performed by Megan Lee MD at [facility]      1 stent  and 1 stent     CATARACT REMOVAL   or     bilateral cataracts removed    CHOLECYSTECTOMY      COLONOSCOPY      COLONOSCOPY  2018    ENDOSCOPY, COLON, DIAGNOSTIC      ESOPHAGEAL DILATATION      EYE SURGERY      bilateral cataracts    GALLBLADDER SURGERY      HYSTERECTOMY (CERVIX STATUS UNKNOWN)      MO EXCISION MALIGNANT LESION S/N/H/F/G 0.5 CM/< N/A 2018    EXCISION OF SCALP SQUAMOUS CELL CARCINOMA performed by Ashlyn Isaacs MD at [address]GR[address] T/A/L 1ST 100 CM/&/1% BDY INFT/CHLD N/A 2018    SPLIT THICKNESS SKIN GRAFT FOR COVERAGE SCALP, APPLICATION OF WOUND VAC DEVICE performed by Ashlyn Isaacs MD at [address] [place] [address],[address] ENDOSCOPY  12    with biopsy of stomach,gastritis    UPPER GASTROINTESTINAL ENDOSCOPY  2018    w/esophagael dilation       SOCIAL HISTORY:   Social History     Tobacco Use    Smoking status: Former     Packs/day: 0.25     Years: 49.00     Pack years: 12.25     Types: Cigarettes     Quit date: 2017     Years since quittin.6    Smokeless tobacco: Never    Tobacco comments:     only smoke when real stressed  Nicotine patch   Vaping Use    Vaping Use: Former    Substances: Always   Substance Use Topics    Alcohol use:  Yes     Alcohol/week: 1.0 standard drink     Types: 1 Glasses of wine per week     Comment: occ. every  6 mo    Drug use: No       FAMILY HISTORY:   Family History   Problem Relation Age of Onset    Heart Disease Mother     Cancer Mother     Cancer Father     Cancer Sister     Heart Disease Sister     Heart Disease Brother     High Blood Pressure Neg Hx     High Cholesterol Neg Hx        MEDICATIONS:     No current facility-administered medications on file prior to encounter. Current Outpatient Medications on File Prior to Encounter   Medication Sig Dispense Refill    clopidogrel (PLAVIX) 75 MG tablet Take 1 tablet by mouth daily 30 tablet 3    rosuvastatin (CRESTOR) 40 MG tablet Take 1 tablet by mouth nightly 30 tablet 3    albuterol sulfate HFA (PROVENTIL;VENTOLIN;PROAIR) 108 (90 Base) MCG/ACT inhaler INHALE TWO PUFFS BY MOUTH EVERY 6 HOURS AS NEEDED FOR WHEEZING 18 g 1    ALPRAZolam (XANAX) 1 MG tablet Take 1 tablet by mouth 3 times daily as needed for Anxiety for up to 30 days. 90 tablet 0    gabapentin (NEURONTIN) 300 MG capsule Take 2 capsules by mouth 3 times daily for 90 days.  540 capsule 1    diclofenac sodium (VOLTAREN) 1 % GEL Apply 2 g topically 2 times daily 200 g 5    furosemide (LASIX) 40 MG tablet TAKE ONE TABLET BY MOUTH TWICE A  tablet 1    metoprolol succinate (TOPROL XL) 25 MG extended release tablet Take 1 tablet by mouth daily 90 tablet 0    rivaroxaban (XARELTO) 15 MG TABS tablet Take 1 tablet by mouth daily 90 tablet 1    lisinopril (PRINIVIL;ZESTRIL) 2.5 MG tablet Take 1 tablet by mouth daily 90 tablet 1    acetaZOLAMIDE (DIAMOX) 250 MG tablet Take 1 tablet by mouth daily 90 tablet 1    ibuprofen (ADVIL;MOTRIN) 400 MG tablet Take 1 tablet by mouth every 6 hours as needed for Pain or Fever 120 tablet 1    omeprazole (PRILOSEC) 40 MG delayed release capsule Take 1 capsule by mouth daily 30 capsule 3    linagliptin (TRADJENTA) 5 MG tablet Take 1 tablet by mouth daily 90 tablet 1    glipiZIDE (GLUCOTROL) 5 MG tablet Take 1 tablet by mouth 2 times daily (before meals) 180 tablet 3    blood glucose monitor strips Test 2 times a day & as needed for symptoms of irregular blood glucose. Dispense sufficient amount for indicated testing frequency plus additional to accommodate PRN testing needs. 50 strip 5    [DISCONTINUED] hydrALAZINE (APRESOLINE) 25 MG tablet Take 1 tablet by mouth 3 times daily 90 tablet 3    budesonide-formoterol (SYMBICORT) 160-4.5 MCG/ACT AERO Inhale 2 puffs into the lungs 2 times daily 1 each 3    Umeclidinium Bromide (INCRUSE ELLIPTA) 62.5 MCG/INH AEPB Inhale 1 puff into the lungs daily (Patient not taking: No sig reported) 1 each 6    triamcinolone (KENALOG) 0.1 % cream Apply topically 2 times daily. 60 g 2    ipratropium-albuterol (DUONEB) 0.5-2.5 (3) MG/3ML SOLN nebulizer solution INHALE THREE MILLILITERS VIA NEBULIZATION BY MOUTH EVERY 4 HOURS AS NEEDED FOR SHORTNESS OF BREATH 1 vial 5    blood glucose test strips (TRUE METRIX BLOOD GLUCOSE TEST) strip USE THREE TIMES A DAY AS NEEDED 100 strip 5    Lancets MISC 1 each by Does not apply route 2 times daily 300 each 1    sodium chloride (OCEAN, BABY AYR) 0.65 % nasal spray 1 spray by Nasal route as needed for Congestion  0    nitroGLYCERIN (NITROSTAT) 0.4 MG SL tablet Place 1 tablet under the tongue every 5 minutes as needed for Chest pain.  30 tablet 0        insulin glargine  15 Units SubCUTAneous Nightly    cephALEXin  500 mg Oral 3 times per day    acetaZOLAMIDE  250 mg Oral Daily    mometasone-formoterol  2 puff Inhalation BID    clopidogrel  75 mg Oral Daily    diclofenac sodium  2 g Topical BID    furosemide  40 mg Oral BID    gabapentin  600 mg Oral TID    glipiZIDE  5 mg Oral BID WC    alogliptin  12.5 mg Oral Daily    lisinopril  2.5 mg Oral Daily    metoprolol succinate  25 mg Oral Daily    pantoprazole  40 mg Oral QAM AC    rivaroxaban  15 mg Oral Daily    rosuvastatin  40 mg Oral Nightly    ipratropium-albuterol  1 ampule Inhalation TID methylPREDNISolone  40 mg IntraVENous Q8H    insulin lispro  0-8 Units SubCUTAneous TID WC    insulin lispro  0-4 Units SubCUTAneous Nightly      dextrose       guaiFENesin-dextromethorphan, albuterol sulfate HFA, ALPRAZolam, ibuprofen, nitroGLYCERIN, sodium chloride, ipratropium-albuterol, dextrose bolus **OR** dextrose bolus, glucagon (rDNA), dextrose      ALLERGIES:   Allergies as of 02/01/2023 - Fully Reviewed 02/01/2023   Allergen Reaction Noted    Morphine Shortness Of Breath 12/08/2011    Codeine Other (See Comments) 10/26/2010    Hydromorphone Other (See Comments) 01/25/2013    Levaquin [levofloxacin in d5w] Itching 04/25/2019    Vicodin [hydrocodone-acetaminophen] Hives 12/22/2010    Aspirin  03/14/2020      OBJECTIVE:   height is 5' 5\" (1.651 m) and weight is 275 lb (124.7 kg). Her oral temperature is 98.7 °F (37.1 °C). Her blood pressure is 130/55 (abnormal) and her pulse is 70. Her respiration is 18 and oxygen saturation is 92%. I/O this shift:  In: 240 [P.O.:240]  Out: -      PHYSICAL EXAM:    CONSTITUTIONAL: She is a 69y.o.-year-old who appears her stated age. She is alert and oriented x 3 and in no acute distress. HEENT: PERRLA, EOMI. No scleral icterus. No thrush, atraumatic, normocephalic. NECK: Supple, without cervical or supraclavicular lymphadenopathy:  CARDIOVASCULAR: S1 S2 RRR. Without murmer  RESPIRATORY & CHEST: Bibasilar decreased breath sounds. No wheezing or crackles heard. GASTROINTESTINAL & ABDOMEN: Soft, nontender, positive bowel sounds in all quadrants, non-distended, without hepatosplenomegaly. GENITOURINARY: Deferred. MUSCULOSKELETAL: No tenderness to palpation of the axial skeleton. There is no clubbing. No cyanosis. No edema of the lower extremities. SKIN OF BODY: No rash or jaundice. PSYCHIATRIC EVALUATION: Normal affect. Patient answers questions appropriately. HEMATOLOGIC/LYMPHATIC/ IMMUNOLOGIC: No palpable lymphadenopathy.   NEUROLOGIC: Alert and oriented x 3.Groslly non-focal. Motor strength is 5+/5 in all muscle groups. The patient has a normal sensorium globally. LABS:  Recent Labs     01/31/23  0553 02/01/23  1214   WBC 7.2 9.1   HGB 13.5 13.8   HCT 42.4 44.0    161   ALT  --  15   AST  --  17    137   K 4.0 3.8    98*   CREATININE 0.9 1.1   BUN 16 20   CO2 28 33*       Recent Labs     01/31/23  0553 02/01/23  1214   GLUCOSE 206* 238*   CALCIUM 8.9 9.7    137   K 4.0 3.8   CO2 28 33*    98*   BUN 16 20   CREATININE 0.9 1.1       No results for input(s): PHART, LJZ4RGX, PO2ART, NJW7IGW, R5MLJJXO, BEART, M0KPFGTH in the last 72 hours. Lab Results   Component Value Date    INR 0.99 08/01/2022    INR 1.03 08/14/2021    INR 0.97 11/23/2020    PROTIME 13.0 08/01/2022    PROTIME 11.7 08/14/2021    PROTIME 11.2 11/23/2020     Lab Results   Component Value Date/Time    AMYLASE 28 09/08/2015 08:33 AM      Lab Results   Component Value Date    LABA1C 7.9 02/01/2023     Lab Results   Component Value Date    .0 02/01/2023     Lab Results   Component Value Date    TSH 2.34 06/06/2011     Lab Results   Component Value Date    CKTOTAL 115 02/01/2023    CKMB 0.29 08/09/2011    TROPONINI <0.01 02/01/2023      Lab Results   Component Value Date    CRP 4.7 02/25/2014      Lab Results   Component Value Date    BNP <15 04/18/2012      Lab Results   Component Value Date    DDIMER 0.31 01/24/2023      No results found for: FERRITIN   Lab Results   Component Value Date    LACTA 1.3 10/26/2022         Transthoracic echocardiogram done on 3/20/2020    Normal left ventricular cavity size and systolic function with an ejection fraction estimated at 65-70%. Mild septal hypertrophy. No regional wall motion abnormalities noted. Diastolic filling parameters suggest grade II diastolic dysfunction. Mild mitral regurgitation. Mild aortic stenosis with a peak velocity of 2.26 m/s and a mean pressure gradient of 11 mmHg.  There is mild aortic insufficiency. There is mild to moderate right ventricular hypertrophy. The right ventricle is normal in size and function. Unable to estimate pulmonary artery pressure secondary to incomplete TR jetenvelope. Electronically signed by Bill Estrada DO [facility]      IMAGING:    Narrative   EXAMINATION:   CT OF THE CHEST, ABDOMEN, AND PELVIS WITH CONTRAST 2/1/2023 12:49 pm           Impression   No acute process identified. 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 Coronary artery calcifications noted. Prior granulomatous disease. Mild   scarring in the lingula. Diverticular disease of the left colon without   diverticulitis. Small paraumbilical hernia. There is mild compression fracture of the T 11 and L1 vertebral bodies, which   appear old. There is lumbar lies a shin of the S1 vertebra. IMPRESSION:     COPD of unknown severity in exacerbation  Heart failure with preserved ejection fraction  Chronic hypoxic respiratory failure, stable  Acute on chronic hypercapnic respiratory failure  Morbid obesity  High suspicion for obstructive sleep apnea  Previous history of tobacco abuse  Coronary artery disease s/p PCI  Paroxysmal A-fib on chronic anticoagulation      RECOMMENDATION:     Patient has again presented to the hospital with increased shortness of breath and chest pain. There is a high suspicion that she has COPD due to previous history of tobacco abuse. Has never undergone a PFT in order to assess the severity. I personally reviewed her CT chest.  It does not show any focal consolidation, mucous plugging, pleural effusions, pneumothorax. She may be having mild exacerbation of COPD along with untreated sleep apnea which is leading to acute on chronic hypercapnic respiratory failure. Patient repeatedly refused to use BiPAP therapy as she is severely claustrophobic. I offered to find a smaller mask interface to help or use the BiPAP therapy.   She told me that she will \" think about it\". Continue with Dulera inhaler therapy and DuoNeb nebulization around-the-clock. I will add Mucinex, Acapella as well as MetaNeb therapy to the regimen to help her expectorate. Continue with Solu-Medrol 40 mg IV every 8 hours for today. But tomorrow we will start to wean it down. Oxygen supplementation to maintain SPO2 between 88 to 92%  She is afebrile, without leukocytosis and no clear consolidation on chest imaging. Antibiotics can be stopped. Thank you for your consultation. We will continue to follow the patient. Silvio Rodríguez MD  Pulmonary Critical Care and Sleep Medicine  2/1/2023, 11:34 AM    This note was completed using dragon medical speech recognition software. Grammatical errors, random word insertions, pronoun errors and incomplete sentences are occasional consequences of this technology due to software limitations. If there are questions or concerns about the content of this note of information contained within the body of this dictation they should be addressed with the provider for clarification.

## 2023-02-02 NOTE — CONSULTS
[facility]   Electrophysiology Consultation   Date: 2/2/2023  Reason for Consultation: Bradycardia   Consult Requesting Physician: Octavia Beal MD   Chief Complaint   Patient presents with    Fall     PT states 2 falls yesterday, pt responsive to voice upon arrival, unsure what happened, pt with stents placed yesterday, medics concerned for runs of vfib in route       CC: Fall    HPI: Sienna Dejesus is a 69 y.o. female presented with fall. She complains of generalized weakness and being tired. Has had two falls but no syncope. She feels sleepy and weak. She has morbid obesity, COPD untreated AKIL, HTN, and CAD s/p PCI to LAD and RCA on 1/30/2023. She also has had paroxysmal atrial fibrillation and was on Xarelto. She also has chronic shortness of breath. Complains of feeling skipped beats for the past few months. She reports no palpitation. Occasional chest discomfort. States that she has not been feeling well since PCI. She states that she was taken off metoprolol before but was put back on it again. Checks her BP and pulse at home. Reports no bradycardia. Assessment:   Generalized weakness  Frequent PVCs, bigeminy  CAD s/p recent PCI  History of PAF  HTN  AKIL, untreated   COPD  Morbid obesity: Body mass index is 45.76 kg/m². Plan:   EP has been consulted for bradycardia. Tele reviewed. She has had frequent PVCs and bigeminy on tele. It is hard to say if her symptoms are related to PVCs or not. She has had PVC for years, since 2019 and appeared to be asymptomatic with it. Now she had bigeminy on tele. Discussed treatment options with her and her family members present. Options include antiarrhythmic therapy with Amiodarone vs ablation. I explained the risk, benefits and risk in details. She has morbid obesity, CAD and AKIL which increases risk of any cardiac procedure and she is aware of it. She is not sure and would like to think about her options.      Ablation can be scheduled as outpatient. Will arrange for outpatient Holter monitor at discharge to assess for PVC burdens. She has morbid obesity and untreated AKIL. These may contribute to her weakness and fatigue. Highly recommend treatment of AKIL. Has had recent PCI and asking about metoprolol. She is not interested in taking it. Follow up with IC. Discussed with nursing staff. Active Hospital Problems    Diagnosis Date Noted    Hypercholesteremia [E78.00] 2011     Priority: High    COPD with acute exacerbation ([facility] [address] [J44.1] 2023     Priority: Medium    Hypercarbia [R06.89] 2020    Anxiety and depression [F41.9, F32.A] 2018    ASHD (arteriosclerotic heart disease) [I25.10]        Diagnostic studies:   ECG: Sinus rhythm, Bigeminy   PVC: Left bundle, transition V3, inferior axis   Pro-BNP: 415   Troponin < 0.01   CXR: No infiltrate   Cath: 2023: PCI to LAD/RCA    Prior ECGs reviewed  Device interrogations reviewed  Holter monitor results reviewed   Outside notes reviewed. I independently reviewed the cardiac diagnostic studies, ECG and relevant imaging studies.      Lab Results   Component Value Date    LVEF 60 2023    LVEFMODE Cardiac Cath 2023     Lab Results   Component Value Date    TSHFT4 0.72 2022    TSH 2.34 2011       Physical Examination:  Vitals:    23 1142   BP: (!) 105/57   Pulse: (!) 42   Resp:    Temp:    SpO2: 95%      In: 480 [P.O.:480]  Out: 725    Wt Readings from Last 3 Encounters:   23 275 lb (124.7 kg)   23 274 lb 3.2 oz (124.4 kg)   23 269 lb (122 kg)     Temp  Av.9 °F (36.6 °C)  Min: 97 °F (36.1 °C)  Max: 98.7 °F (37.1 °C)  Pulse  Av.4  Min: 42  Max: 92  BP  Min: 105/57  Max: 141/87  SpO2  Av.7 %  Min: 92 %  Max: 100 %    Intake/Output Summary (Last 24 hours) at 2023 1248  Last data filed at 2023 1230  Gross per 24 hour   Intake 480 ml   Output 725 ml   Net -245 ml         I independently reviewed all cardiac tracing from cardiac telemetry. Constitutional: Oriented. No distress. Head: Normocephalic and atraumatic. Mouth/Throat: Oropharynx is clear and moist.   Eyes: Conjunctivae normal. EOM are normal.   Neck: Neck supple. No JVD present. Cardiovascular: Normal rate, Irregular rhythm, S1&S2. Pulmonary/Chest: Bilateral respiratory sounds. No rhonchi. Abdominal: Soft. No tenderness. + obese   Musculoskeletal: No tenderness. No edema    Lymphadenopathy: Has no cervical adenopathy. Neurological: Alert and oriented. Follows command, No Gross deficit   Skin: Skin is warm, No rash noted. Psychiatric: Has a normal behavior       Scheduled Meds:   insulin glargine  15 Units SubCUTAneous Nightly    guaiFENesin  600 mg Oral BID    midodrine  5 mg Oral TID WC    sodium chloride  500 mL IntraVENous Once    cephALEXin  500 mg Oral 3 times per day    acetaZOLAMIDE  250 mg Oral Daily    mometasone-formoterol  2 puff Inhalation BID    clopidogrel  75 mg Oral Daily    diclofenac sodium  2 g Topical BID    furosemide  40 mg Oral BID    gabapentin  600 mg Oral TID    glipiZIDE  5 mg Oral BID WC    alogliptin  12.5 mg Oral Daily    pantoprazole  40 mg Oral QAM AC    rivaroxaban  15 mg Oral Daily    rosuvastatin  40 mg Oral Nightly    ipratropium-albuterol  1 ampule Inhalation TID    methylPREDNISolone  40 mg IntraVENous Q8H    insulin lispro  0-8 Units SubCUTAneous TID WC    insulin lispro  0-4 Units SubCUTAneous Nightly     Continuous Infusions:   dextrose       PRN Meds:.albuterol sulfate HFA, ALPRAZolam, ibuprofen, nitroGLYCERIN, sodium chloride, ipratropium-albuterol, dextrose bolus **OR** dextrose bolus, glucagon (rDNA), dextrose     Review of System:  [x] Full ROS obtained and negative except as mentioned in HPI    Prior to Admission medications    Medication Sig Start Date End Date Taking?  Authorizing Provider   clopidogrel (PLAVIX) 75 MG tablet Take 1 tablet by mouth daily 1/31/23   Raegan Levin MD rosuvastatin (CRESTOR) 40 MG tablet Take 1 tablet by mouth nightly 1/31/23   Ingrid Quiñonez MD   albuterol sulfate HFA (PROVENTIL;VENTOLIN;PROAIR) 108 (90 Base) MCG/ACT inhaler INHALE TWO PUFFS BY MOUTH EVERY 6 HOURS AS NEEDED FOR WHEEZING 1/31/23   Ingrid Quiñonez MD   ALPRAZolam Alfie Sleek) 1 MG tablet Take 1 tablet by mouth 3 times daily as needed for Anxiety for up to 30 days. 1/13/23 2/12/23  Anthony Pink MD   gabapentin (NEURONTIN) 300 MG capsule Take 2 capsules by mouth 3 times daily for 90 days. 12/28/22 3/28/23  Anthony Pink MD   diclofenac sodium (VOLTAREN) 1 % GEL Apply 2 g topically 2 times daily 12/14/22   Anthony Pink MD   furosemide (LASIX) 40 MG tablet TAKE ONE TABLET BY MOUTH TWICE A DAY 12/1/22   Anthony Pink MD   metoprolol succinate (TOPROL XL) 25 MG extended release tablet Take 1 tablet by mouth daily 12/1/22 3/1/23  Anthony Pink MD   rivaroxaban (XARELTO) 15 MG TABS tablet Take 1 tablet by mouth daily 12/1/22 3/1/23  Anthony Pink MD   lisinopril (PRINIVIL;ZESTRIL) 2.5 MG tablet Take 1 tablet by mouth daily 12/1/22 3/1/23  Anthony Pink MD   acetaZOLAMIDE (DIAMOX) 250 MG tablet Take 1 tablet by mouth daily 12/1/22 3/1/23  Anthony Pink MD   ibuprofen (ADVIL;MOTRIN) 400 MG tablet Take 1 tablet by mouth every 6 hours as needed for Pain or Fever 10/30/22   Anthony Pink MD   omeprazole (PRILOSEC) 40 MG delayed release capsule Take 1 capsule by mouth daily 9/15/22   Anthony Pink MD   linagliptin (TRADJENTA) 5 MG tablet Take 1 tablet by mouth daily 9/15/22 12/14/22  Anthony Pink MD   glipiZIDE (GLUCOTROL) 5 MG tablet Take 1 tablet by mouth 2 times daily (before meals) 9/1/22   Anthony Pink MD   blood glucose monitor strips Test 2 times a day & as needed for symptoms of irregular blood glucose. Dispense sufficient amount for indicated testing frequency plus additional to accommodate PRN testing needs.  7/6/22   Anthony Pink MD   hydrALAZINE (APRESOLINE) 25 MG tablet Take 1 tablet by mouth 3 times daily 6/29/22 8/4/22  Dania Anderson MD   budesonide-formoterol [facility]) 160-4.5 MCG/ACT AERO Inhale 2 puffs into the lungs 2 times daily 12/21/21 8/1/22  Dania Anderson MD   Umeclidinium Bromide (INCRUSE ELLIPTA) 62.5 MCG/INH AEPB Inhale 1 puff into the lungs daily  Patient not taking: No sig reported 8/26/21   Diana Ring MD   triamcinolone (KENALOG) 0.1 % cream Apply topically 2 times daily. 7/29/21   Dania Anderson MD   ipratropium-albuterol (DUONEB) 0.5-2.5 (3) MG/3ML SOLN nebulizer solution INHALE THREE MILLILITERS VIA NEBULIZATION BY MOUTH EVERY 4 HOURS AS NEEDED FOR SHORTNESS OF BREATH 7/7/21   Dania Anderson MD   blood glucose test strips (TRUE METRIX BLOOD GLUCOSE TEST) strip USE THREE TIMES A DAY AS NEEDED 5/24/21   Dania Anderson MD   Lancets MISC 1 each by Does not apply route 2 times daily 2/25/21   Dania Anderson MD   sodium chloride (OCEAN, BABY AYR) 0.65 % nasal spray 1 spray by Nasal route as needed for Congestion 3/27/20   Dania Anderson MD   nitroGLYCERIN (NITROSTAT) 0.4 MG SL tablet Place 1 tablet under the tongue every 5 minutes as needed for Chest pain.  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    Chronic obstructive pulmonary disease ([facility] [address] 01/13/2017    Claustrophobia     COPD (chronic obstructive pulmonary disease) (HCC)     ESBL (extended spectrum beta-lactamase) producing bacteria infection 08/14/2021    Esophageal stricture     Gastroesophageal reflux disease without esophagitis 03/24/2016    Hiatal hernia Hiatal hernia     Hypercholesteremia     Hypertension     IBS (irritable bowel syndrome)     Migraines     Movement disorder arthritis    Pneumonia     PONV (postoperative nausea and vomiting)     Type II or unspecified type diabetes mellitus without mention of complication, not stated as uncontrolled     type ll does not take insulin at home    Unspecified cerebral artery occlusion with cerebral infarction     many TIA's        Past Surgical History:   Procedure Laterality Date    APPENDECTOMY      BRONCHOSCOPY N/A 1/24/2020    BRONCHOSCOPY ALVEOLAR LAVAGE performed by Mu Fisher MD at [facility] N/A 1/27/2020    BRONCHOSCOPY DIAGNOSTIC OR CELL [address] ONLY performed by Yari Smiley MD at [facility] N/A 11/23/2020    BRONCHOSCOPY DIAGNOSTIC OR CELL [address] ONLY performed by Candance Ogren, MD at [facility]      1 stent 2000 and 1 stent 2001    CATARACT REMOVAL  2013 or 2014    bilateral cataracts removed    CHOLECYSTECTOMY      COLONOSCOPY      COLONOSCOPY  06/11/2018    ENDOSCOPY, COLON, DIAGNOSTIC      ESOPHAGEAL DILATATION      EYE SURGERY      bilateral cataracts    GALLBLADDER SURGERY      HYSTERECTOMY (CERVIX STATUS UNKNOWN)      AK EXCISION MALIGNANT LESION S/N/H/F/G 0.5 CM/< N/A 9/6/2018    EXCISION OF SCALP SQUAMOUS CELL CARCINOMA performed by Clara Mason MD at [address] AGR[facility] T/A/L 1ST 100 CM/&/1% BDY INFT/CHLD N/A 9/6/2018    SPLIT THICKNESS SKIN GRAFT FOR COVERAGE SCALP, APPLICATION OF WOUND VAC DEVICE performed by Clara Mason MD at [address] [place] [address],[address] ENDOSCOPY  4/20/12    with biopsy of stomach,gastritis    UPPER GASTROINTESTINAL ENDOSCOPY  06/11/2018    w/esophagael dilation       Allergies   Allergen Reactions    Morphine Shortness Of Breath    Codeine Other (See Comments)     Chest pain    Hydromorphone Other (See Comments) hallucinations  Hallucinations    But will take if needed in an emergency    Levaquin [Levofloxacin In D5w] Itching    Vicodin [Hydrocodone-Acetaminophen] Hives    Aspirin      Upsets hiatal hernia       Social History:  Reviewed. reports that she quit smoking about 5 years ago. Her smoking use included cigarettes. She has a 12.25 pack-year smoking history. She has never used smokeless tobacco. She reports current alcohol use of about 1.0 standard drink per week. She reports that she does not use drugs. Family History:  Reviewed. Reviewed. No family history of SCD. Relevant and available labs, and cardiovascular diagnostics reviewed. Reviewed. Recent Labs     01/31/23  0553 02/01/23  1214    137   K 4.0 3.8    98*   CO2 28 33*   BUN 16 20   CREATININE 0.9 1.1     Recent Labs     01/31/23  0553 02/01/23  1214   WBC 7.2 9.1   HGB 13.5 13.8   HCT 42.4 44.0   MCV 84.6 84.6    161     Estimated Creatinine Clearance: 58 mL/min (based on SCr of 1.1 mg/dL). Lab Results   Component Value Date/Time    BNP <15 04/18/2012 05:45 AM    BNP <15 12/22/2010 04:25 AM       I independently reviewed all cardiac tracing from cardiac telemetry. I independently reviewed relevant and available cardiac diagnostic tests ECG, CXR, Echo, Stress test, Device interrogation, Holter, CT scan. Outside medical records via Care everywhere reviewed and summarized in H&P above. Complex medical condition with multiple medical problems affecting prognosis and outcome of EP interventions  Severe exacerbation of underlying medical condition requiring hospitalization and at risk of decompensation. All questions and concerns were addressed to the patient/family. Alternatives to my treatment were discussed. I have discussed the above stated plan and the patient verbalized understanding and agreed with the plan. NOTE: This report was transcribed using voice recognition software.  Every effort was made to ensure accuracy, however, inadvertent computerized transcription errors may be present.      Raheel Ortega MD, MPH  [facility]   Office: [phone]  Fax: [phone]

## 2023-02-02 NOTE — PLAN OF CARE
Problem: Skin/Tissue Integrity  Goal: Absence of new skin breakdown  Description: 1. Monitor for areas of redness and/or skin breakdown  2. Assess vascular access sites hourly  3. Every 4-6 hours minimum:  Change oxygen saturation probe site  4. Every 4-6 hours:  If on nasal continuous positive airway pressure, respiratory therapy assess nares and determine need for appliance change or resting period.   2/2/2023 0808 by Mirtha Wills RN  Outcome: Progressing  2/2/2023 0150 by Atiya Chowdary RN  Outcome: Progressing  2/1/2023 1850 by Mirtha Wills RN  Outcome: Progressing     Problem: ABCDS Injury Assessment  Goal: Absence of physical injury  2/2/2023 0808 by Mirtha Wills RN  Outcome: Progressing  Flowsheets (Taken 2/2/2023 0806)  Absence of Physical Injury: Implement safety measures based on patient assessment  2/2/2023 0150 by Atiya Chowdary RN  Outcome: Progressing  2/1/2023 1850 by Mirtha Wills RN  Outcome: Progressing  Flowsheets (Taken 2/1/2023 1845)  Absence of Physical Injury: Implement safety measures based on patient assessment     Problem: Safety - Adult  Goal: Free from fall injury  2/2/2023 0808 by Mirtha Wills RN  Outcome: Progressing  Flowsheets (Taken 2/2/2023 0806)  Free From Fall Injury: Instruct family/caregiver on patient safety  2/2/2023 0150 by Atiya Chowdary RN  Outcome: Progressing  2/1/2023 1850 by Mirtha Wills RN  Outcome: Progressing

## 2023-02-02 NOTE — PROGRESS NOTES
Found pt nasal cannula put on the yellow flometer (air) pt was 84% on room air. Placed pt on oxygen 3 L, sat 94%.

## 2023-02-02 NOTE — PROGRESS NOTES
Night shift assessment completed. Routine vitals have been obtained. Scheduled medications given. Patient is awake, alert and oriented/ talking to staff. Respirations are easy and unlabored with no c/o SOB at rest and is on 3L O2 supplement via NC . Skin has been assessed per writer. IV site is C/D/I. Patient does not appear to be in distress. Call light within reach. Standard safety measures are in place. All needs have been met at this time.

## 2023-02-02 NOTE — PROGRESS NOTES
Patient noted with pulse of 36, STAT EKG ordered and message sent to Dr Genet Lynne. Spoke with Dr Genet Lynne with new orders to discontinue Toprol and Zestril, Midodrine three times daily and dose given at this time. New order for inpatient consult with cardiology. EKG results reported as well and Dr Genet Lynne stated to notify Cardiology. EKG stating sinus rhythm with frequent PVC in pattern of bigeminy.

## 2023-02-02 NOTE — PROGRESS NOTES
Patient Active Problem List   Diagnosis    Chronic respiratory failure ([facility])    CAD (coronary artery disease)    Hypercholesteremia    COPD (chronic obstructive pulmonary disease) ([facility] [address]    Primary hypertension    ASHD (arteriosclerotic heart disease)    DM type 2, controlled, with complication ([facility])    DM2 (diabetes mellitus, type 2) ([facility])    HTN (hypertension), benign    Primary osteoarthritis involving multiple joints    Gastroesophageal reflux disease without esophagitis    Anxiety and depression    Dysarthria    Class 3 severe obesity due to excess calories with serious comorbidity and body mass index (BMI) of 45.0 to 49.9 in adult ([facility])    AKIL (obstructive sleep apnea)    Hypercarbia    Grade II diastolic dysfunction    CVA, old, alterations of sensations    Left-sided weakness    Arterial ischemic stroke, ICA, right, acute ([facility])    PAF (paroxysmal atrial fibrillation) ([facility])    Chest pain    Thoracic radiculopathy    Unstable angina ([facility] [address]    [facility]   H&P dictated

## 2023-02-02 NOTE — PROGRESS NOTES
Shift assessment complete. VSS. Patient has no complaints at this time. No signs of pain/distress at this time. Call light within reach. Will continue to monitor.

## 2023-02-02 NOTE — PLAN OF CARE
Problem: Skin/Tissue Integrity  Goal: Absence of new skin breakdown  Description: 1. Monitor for areas of redness and/or skin breakdown  2. Assess vascular access sites hourly  3. Every 4-6 hours minimum:  Change oxygen saturation probe site  4. Every 4-6 hours:  If on nasal continuous positive airway pressure, respiratory therapy assess nares and determine need for appliance change or resting period.   2/2/2023 0150 by Jn Martínez RN  Outcome: Progressing  2/1/2023 1850 by Linden Bence, RN  Outcome: Progressing     Problem: ABCDS Injury Assessment  Goal: Absence of physical injury  2/2/2023 0150 by Jn Martínez RN  Outcome: Progressing  2/1/2023 1850 by Linden Bence, RN  Outcome: Progressing  Flowsheets (Taken 2/1/2023 1845)  Absence of Physical Injury: Implement safety measures based on patient assessment     Problem: Safety - Adult  Goal: Free from fall injury  2/2/2023 0150 by Jn Martínez RN  Outcome: Progressing  2/1/2023 1850 by Linden Bence, RN  Outcome: Progressing

## 2023-02-03 LAB
GLUCOSE BLD-MCNC: 326 MG/DL (ref 70–99)
GLUCOSE BLD-MCNC: 435 MG/DL (ref 70–99)
GLUCOSE BLD-MCNC: 441 MG/DL (ref 70–99)
GLUCOSE BLD-MCNC: 445 MG/DL (ref 70–99)
GLUCOSE BLD-MCNC: 459 MG/DL (ref 70–99)
GLUCOSE BLD-MCNC: 479 MG/DL (ref 70–99)
PERFORMED ON: ABNORMAL
TROPONIN: <0.01 NG/ML

## 2023-02-03 PROCEDURE — 94669 MECHANICAL CHEST WALL OSCILL: CPT

## 2023-02-03 PROCEDURE — 6370000000 HC RX 637 (ALT 250 FOR IP): Performed by: INTERNAL MEDICINE

## 2023-02-03 PROCEDURE — 84484 ASSAY OF TROPONIN QUANT: CPT

## 2023-02-03 PROCEDURE — 6360000002 HC RX W HCPCS: Performed by: INTERNAL MEDICINE

## 2023-02-03 PROCEDURE — 94640 AIRWAY INHALATION TREATMENT: CPT

## 2023-02-03 PROCEDURE — 2700000000 HC OXYGEN THERAPY PER DAY

## 2023-02-03 PROCEDURE — 99232 SBSQ HOSP IP/OBS MODERATE 35: CPT | Performed by: NURSE PRACTITIONER

## 2023-02-03 PROCEDURE — 99233 SBSQ HOSP IP/OBS HIGH 50: CPT | Performed by: INTERNAL MEDICINE

## 2023-02-03 PROCEDURE — 94761 N-INVAS EAR/PLS OXIMETRY MLT: CPT

## 2023-02-03 PROCEDURE — 1200000000 HC SEMI PRIVATE

## 2023-02-03 PROCEDURE — 36415 COLL VENOUS BLD VENIPUNCTURE: CPT

## 2023-02-03 RX ORDER — INSULIN LISPRO 100 [IU]/ML
0-16 INJECTION, SOLUTION INTRAVENOUS; SUBCUTANEOUS
Status: DISCONTINUED | OUTPATIENT
Start: 2023-02-03 | End: 2023-02-06 | Stop reason: HOSPADM

## 2023-02-03 RX ORDER — GLIPIZIDE 5 MG/1
10 TABLET ORAL 2 TIMES DAILY WITH MEALS
Status: DISCONTINUED | OUTPATIENT
Start: 2023-02-03 | End: 2023-02-06 | Stop reason: HOSPADM

## 2023-02-03 RX ORDER — INSULIN GLARGINE 100 [IU]/ML
30 INJECTION, SOLUTION SUBCUTANEOUS NIGHTLY
Status: DISCONTINUED | OUTPATIENT
Start: 2023-02-03 | End: 2023-02-06 | Stop reason: HOSPADM

## 2023-02-03 RX ORDER — METHYLPREDNISOLONE SODIUM SUCCINATE 40 MG/ML
40 INJECTION, POWDER, LYOPHILIZED, FOR SOLUTION INTRAMUSCULAR; INTRAVENOUS EVERY 12 HOURS
Status: DISCONTINUED | OUTPATIENT
Start: 2023-02-04 | End: 2023-02-05

## 2023-02-03 RX ORDER — INSULIN GLARGINE 100 [IU]/ML
20 INJECTION, SOLUTION SUBCUTANEOUS ONCE
Status: COMPLETED | OUTPATIENT
Start: 2023-02-03 | End: 2023-02-03

## 2023-02-03 RX ORDER — INSULIN LISPRO 100 [IU]/ML
0-4 INJECTION, SOLUTION INTRAVENOUS; SUBCUTANEOUS NIGHTLY
Status: DISCONTINUED | OUTPATIENT
Start: 2023-02-03 | End: 2023-02-06 | Stop reason: HOSPADM

## 2023-02-03 RX ADMIN — CLOPIDOGREL BISULFATE 75 MG: 75 TABLET ORAL at 09:15

## 2023-02-03 RX ADMIN — ALPRAZOLAM 1 MG: 0.5 TABLET ORAL at 21:33

## 2023-02-03 RX ADMIN — MIDODRINE HYDROCHLORIDE 5 MG: 5 TABLET ORAL at 13:19

## 2023-02-03 RX ADMIN — INSULIN LISPRO 16 UNITS: 100 INJECTION, SOLUTION INTRAVENOUS; SUBCUTANEOUS at 18:03

## 2023-02-03 RX ADMIN — GABAPENTIN 600 MG: 300 CAPSULE ORAL at 21:34

## 2023-02-03 RX ADMIN — PANTOPRAZOLE SODIUM 40 MG: 40 TABLET, DELAYED RELEASE ORAL at 06:17

## 2023-02-03 RX ADMIN — Medication 2 PUFF: at 10:08

## 2023-02-03 RX ADMIN — INSULIN LISPRO 4 UNITS: 100 INJECTION, SOLUTION INTRAVENOUS; SUBCUTANEOUS at 21:48

## 2023-02-03 RX ADMIN — NITROGLYCERIN 0.4 MG: 0.4 TABLET, ORALLY DISINTEGRATING SUBLINGUAL at 04:27

## 2023-02-03 RX ADMIN — GLIPIZIDE 5 MG: 5 TABLET ORAL at 09:14

## 2023-02-03 RX ADMIN — IBUPROFEN 400 MG: 400 TABLET, FILM COATED ORAL at 21:33

## 2023-02-03 RX ADMIN — MIDODRINE HYDROCHLORIDE 5 MG: 5 TABLET ORAL at 09:14

## 2023-02-03 RX ADMIN — NITROGLYCERIN 0.4 MG: 0.4 TABLET, ORALLY DISINTEGRATING SUBLINGUAL at 04:33

## 2023-02-03 RX ADMIN — GABAPENTIN 600 MG: 300 CAPSULE ORAL at 09:14

## 2023-02-03 RX ADMIN — GUAIFENESIN 600 MG: 600 TABLET, EXTENDED RELEASE ORAL at 09:14

## 2023-02-03 RX ADMIN — CEPHALEXIN 500 MG: 250 CAPSULE ORAL at 15:39

## 2023-02-03 RX ADMIN — CEPHALEXIN 500 MG: 250 CAPSULE ORAL at 06:17

## 2023-02-03 RX ADMIN — ACETAZOLAMIDE 250 MG: 250 TABLET ORAL at 09:15

## 2023-02-03 RX ADMIN — DICLOFENAC SODIUM 2 G: 10 GEL TOPICAL at 21:42

## 2023-02-03 RX ADMIN — MIDODRINE HYDROCHLORIDE 5 MG: 5 TABLET ORAL at 18:03

## 2023-02-03 RX ADMIN — RIVAROXABAN 15 MG: 15 TABLET, FILM COATED ORAL at 09:15

## 2023-02-03 RX ADMIN — INSULIN LISPRO 8 UNITS: 100 INJECTION, SOLUTION INTRAVENOUS; SUBCUTANEOUS at 13:19

## 2023-02-03 RX ADMIN — METHYLPREDNISOLONE SODIUM SUCCINATE 40 MG: 40 INJECTION, POWDER, FOR SOLUTION INTRAMUSCULAR; INTRAVENOUS at 15:39

## 2023-02-03 RX ADMIN — NITROGLYCERIN 0.4 MG: 0.4 TABLET, ORALLY DISINTEGRATING SUBLINGUAL at 04:21

## 2023-02-03 RX ADMIN — DICLOFENAC SODIUM 2 G: 10 GEL TOPICAL at 09:15

## 2023-02-03 RX ADMIN — GLIPIZIDE 10 MG: 5 TABLET ORAL at 18:03

## 2023-02-03 RX ADMIN — ALOGLIPTIN 12.5 MG: 12.5 TABLET, FILM COATED ORAL at 09:15

## 2023-02-03 RX ADMIN — PROMETHAZINE HYDROCHLORIDE AND DEXTROMETHORPHAN HYDROBROMIDE ORAL SOLUTION 5 ML: 15; 6.25 SOLUTION ORAL at 23:45

## 2023-02-03 RX ADMIN — INSULIN GLARGINE 20 UNITS: 100 INJECTION, SOLUTION SUBCUTANEOUS at 22:52

## 2023-02-03 RX ADMIN — Medication 2 PUFF: at 20:08

## 2023-02-03 RX ADMIN — INSULIN LISPRO 6 UNITS: 100 INJECTION, SOLUTION INTRAVENOUS; SUBCUTANEOUS at 09:15

## 2023-02-03 RX ADMIN — FUROSEMIDE 40 MG: 40 TABLET ORAL at 09:14

## 2023-02-03 RX ADMIN — ROSUVASTATIN 40 MG: 40 TABLET, FILM COATED ORAL at 21:34

## 2023-02-03 RX ADMIN — IPRATROPIUM BROMIDE AND ALBUTEROL SULFATE 1 AMPULE: 2.5; .5 SOLUTION RESPIRATORY (INHALATION) at 20:07

## 2023-02-03 RX ADMIN — IPRATROPIUM BROMIDE AND ALBUTEROL SULFATE 1 AMPULE: 2.5; .5 SOLUTION RESPIRATORY (INHALATION) at 10:07

## 2023-02-03 RX ADMIN — METHYLPREDNISOLONE SODIUM SUCCINATE 40 MG: 40 INJECTION, POWDER, FOR SOLUTION INTRAMUSCULAR; INTRAVENOUS at 06:18

## 2023-02-03 RX ADMIN — GUAIFENESIN 600 MG: 600 TABLET, EXTENDED RELEASE ORAL at 21:33

## 2023-02-03 RX ADMIN — IPRATROPIUM BROMIDE AND ALBUTEROL SULFATE 1 AMPULE: 2.5; .5 SOLUTION RESPIRATORY (INHALATION) at 15:11

## 2023-02-03 RX ADMIN — INSULIN GLARGINE 30 UNITS: 100 INJECTION, SOLUTION SUBCUTANEOUS at 16:14

## 2023-02-03 RX ADMIN — GABAPENTIN 600 MG: 300 CAPSULE ORAL at 15:39

## 2023-02-03 RX ADMIN — CEPHALEXIN 500 MG: 250 CAPSULE ORAL at 21:34

## 2023-02-03 RX ADMIN — INSULIN HUMAN 7 UNITS: 100 INJECTION, SOLUTION PARENTERAL at 18:03

## 2023-02-03 RX ADMIN — FUROSEMIDE 40 MG: 40 TABLET ORAL at 18:03

## 2023-02-03 ASSESSMENT — PAIN DESCRIPTION - DESCRIPTORS
DESCRIPTORS: ACHING
DESCRIPTORS: DULL
DESCRIPTORS: DISCOMFORT
DESCRIPTORS: BURNING
DESCRIPTORS: PRESSURE;SHARP
DESCRIPTORS: PRESSURE;SHARP

## 2023-02-03 ASSESSMENT — PAIN SCALES - GENERAL
PAINLEVEL_OUTOF10: 6
PAINLEVEL_OUTOF10: 3
PAINLEVEL_OUTOF10: 8
PAINLEVEL_OUTOF10: 7
PAINLEVEL_OUTOF10: 0
PAINLEVEL_OUTOF10: 10
PAINLEVEL_OUTOF10: 2
PAINLEVEL_OUTOF10: 7

## 2023-02-03 ASSESSMENT — PAIN DESCRIPTION - LOCATION
LOCATION: CHEST
LOCATION: CHEST;GENERALIZED
LOCATION: BACK;LEG
LOCATION: CHEST
LOCATION: CHEST
LOCATION: GENERALIZED

## 2023-02-03 ASSESSMENT — PAIN DESCRIPTION - FREQUENCY
FREQUENCY: INTERMITTENT
FREQUENCY: CONTINUOUS

## 2023-02-03 ASSESSMENT — PAIN - FUNCTIONAL ASSESSMENT: PAIN_FUNCTIONAL_ASSESSMENT: ACTIVITIES ARE NOT PREVENTED

## 2023-02-03 ASSESSMENT — PAIN DESCRIPTION - ORIENTATION
ORIENTATION: MID
ORIENTATION: MID

## 2023-02-03 ASSESSMENT — PAIN DESCRIPTION - PAIN TYPE: TYPE: ACUTE PAIN

## 2023-02-03 NOTE — PROGRESS NOTES
[facility]   Electrophysiology Progress Note     Date: 2/3/2023  Admit Date: 2/1/2023     Reason for consultation: PVC, Bradycardia    Chief Complaint:   Chief Complaint   Patient presents with    Fall     PT states 2 falls yesterday, pt responsive to voice upon arrival, unsure what happened, pt with stents placed yesterday, medics concerned for runs of vfib in route     History of Present Illness: History obtained from patient and medical record. Queenie Velez is a 69 y.o. female with a past medical history of morbid obesity, COPD untreated AKIL, HTN, and CAD s/p PCI to LAD and RCA on 1/30/2023. She also has had paroxysmal atrial fibrillation and was on Xarelto. She also has chronic shortness of breath. Pt presented with fall. She complains of generalized weakness and being tired. Has had two falls but no syncope. She feels sleepy and weak. Pt complains of feeling skipped beats for the past few months. She reports no palpitation. Occasional chest discomfort. States that she has not been feeling well since PCI. She states that she was taken off metoprolol before but was put back on it again. Checks her BP and pulse at home. Reports no bradycardia. Interval Hx: Today, she is being seen for EP follow up. She remains in sinus rhythm with PVCs. Her BP is stable. She states she had an episode of chest pain last night that awoke her from her sleep. Her pain resolved after SL nitro. Patient seen and examined. Clinical notes reviewed. Telemetry reviewed. No new complaints today. No major events overnight. Denies having chest pain, palpitations, shortness of breath, orthopnea/PND, cough, or dizziness at the time of this visit. Allergies:   Allergies   Allergen Reactions    Morphine Shortness Of Breath    Codeine Other (See Comments)     Chest pain    Hydromorphone Other (See Comments)     hallucinations  Hallucinations    But will take if needed in an emergency    Levaquin [Levofloxacin In D5w] Itching    Vicodin [Hydrocodone-Acetaminophen] Hives    Aspirin      Upsets hiatal hernia     Home Meds:  Prior to Visit Medications    Medication Sig Taking? Authorizing Provider   nitroGLYCERIN (NITROSTAT) 0.4 MG SL tablet Place 1 tablet under the tongue every 5 minutes as needed for Chest pain up to max of 3 total doses. If no relief after 1 dose, call 911. Carri Levine MD   clopidogrel (PLAVIX) 75 MG tablet Take 1 tablet by mouth daily  Raegan Levin MD   rosuvastatin (CRESTOR) 40 MG tablet Take 1 tablet by mouth nightly  Raegan Levin MD   albuterol sulfate HFA (PROVENTIL;VENTOLIN;PROAIR) 108 (90 Base) MCG/ACT inhaler INHALE TWO PUFFS BY MOUTH EVERY 6 HOURS AS NEEDED FOR WHEEZING  Raegan Levin MD   ALPRAZolam Dao Ally) 1 MG tablet Take 1 tablet by mouth 3 times daily as needed for Anxiety for up to 30 days. Carri Levine MD   gabapentin (NEURONTIN) 300 MG capsule Take 2 capsules by mouth 3 times daily for 90 days.   Carri Levine MD   diclofenac sodium (VOLTAREN) 1 % GEL Apply 2 g topically 2 times daily  Carri Levine MD   furosemide (LASIX) 40 MG tablet TAKE ONE TABLET BY MOUTH TWICE A DAY  Arian Suarez MD   metoprolol succinate (TOPROL XL) 25 MG extended release tablet Take 1 tablet by mouth daily  Carri Levine MD   rivaroxaban (XARELTO) 15 MG TABS tablet Take 1 tablet by mouth daily  Carri Levine MD   lisinopril (PRINIVIL;ZESTRIL) 2.5 MG tablet Take 1 tablet by mouth daily  Carri Levine MD   acetaZOLAMIDE (DIAMOX) 250 MG tablet Take 1 tablet by mouth daily  Carri Levine MD   ibuprofen (ADVIL;MOTRIN) 400 MG tablet Take 1 tablet by mouth every 6 hours as needed for Pain or Fever  Carri Levine MD   omeprazole (PRILOSEC) 40 MG delayed release capsule Take 1 capsule by mouth daily  Carri Levine MD   linagliptin (TRADJENTA) 5 MG tablet Take 1 tablet by mouth daily  Carri Levine MD   glipiZIDE (GLUCOTROL) 5 MG tablet Take 1 tablet by mouth 2 times daily (before meals)  Win Bermudez MD   blood glucose monitor strips Test 2 times a day & as needed for symptoms of irregular blood glucose. Dispense sufficient amount for indicated testing frequency plus additional to accommodate PRN testing needs. Win Bermudez MD   hydrALAZINE (APRESOLINE) 25 MG tablet Take 1 tablet by mouth 3 times daily  Win Bermudez MD   budesonide-formoterol (SYMBICORT) 160-4.5 MCG/ACT AERO Inhale 2 puffs into the lungs 2 times daily  Win Bermudez MD   Umeclidinium Bromide (INCRUSE ELLIPTA) 62.5 MCG/INH AEPB Inhale 1 puff into the lungs daily  Patient not taking: No sig reported  Gonzalez Dorantes MD   triamcinolone (KENALOG) 0.1 % cream Apply topically 2 times daily. Win Bermudez MD   ipratropium-albuterol (DUONEB) 0.5-2.5 (3) MG/3ML SOLN nebulizer solution INHALE THREE MILLILITERS VIA NEBULIZATION BY MOUTH EVERY 4 HOURS AS NEEDED Cheryl Bundy MD   blood glucose test strips (TRUE METRIX BLOOD GLUCOSE TEST) strip USE THREE TIMES A DAY AS NEEDED  Win Bermudez MD   Lancets MISC 1 each by Does not apply route 2 times daily  Win Bermudez MD   sodium chloride (OCEAN, BABY AYR) 0.65 % nasal spray 1 spray by Nasal route as needed for Congestion  Win Bermudez MD   nitroGLYCERIN (NITROSTAT) 0.4 MG SL tablet Place 1 tablet under the tongue every 5 minutes as needed for Chest pain.   Win Bermudez MD      Scheduled Meds:   insulin glargine  15 Units SubCUTAneous Nightly    guaiFENesin  600 mg Oral BID    midodrine  5 mg Oral TID WC    cephALEXin  500 mg Oral 3 times per day    acetaZOLAMIDE  250 mg Oral Daily    mometasone-formoterol  2 puff Inhalation BID    clopidogrel  75 mg Oral Daily    diclofenac sodium  2 g Topical BID    furosemide  40 mg Oral BID    gabapentin  600 mg Oral TID    glipiZIDE  5 mg Oral BID WC    alogliptin  12.5 mg Oral Daily    pantoprazole  40 mg Oral QAM AC    rivaroxaban  15 mg Oral Daily    rosuvastatin  40 mg Oral Nightly    ipratropium-albuterol  1 ampule Inhalation TID    methylPREDNISolone  40 mg IntraVENous Q8H    insulin lispro  0-8 Units SubCUTAneous TID WC    insulin lispro  0-4 Units SubCUTAneous Nightly     Continuous Infusions:   dextrose       PRN Meds:promethazine-dextromethorphan, albuterol sulfate HFA, ALPRAZolam, ibuprofen, nitroGLYCERIN, sodium chloride, ipratropium-albuterol, dextrose bolus **OR** dextrose bolus, glucagon (rDNA), dextrose     Past Medical History:  Past Medical History:   Diagnosis Date    Acute MI ([facility]     Acute pyelonephritis 09/08/2015    Anxiety and depression     Arthritis     CAD (coronary artery disease)     two heart stent one in 2000 and 2nd one 6 months later on 2000, had MI in 2000    Cancer [facility])     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    Cancer ([facility]     \"skin on top of my head\"    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    Chronic obstructive pulmonary disease ([facility] 01/13/2017    Claustrophobia     COPD (chronic obstructive pulmonary disease) ([facility])     ESBL (extended spectrum beta-lactamase) producing bacteria infection 08/14/2021    Esophageal stricture     Gastroesophageal reflux disease without esophagitis 03/24/2016    Hiatal hernia     Hiatal hernia     Hypercholesteremia     Hypertension     IBS (irritable bowel syndrome)     Migraines     Movement disorder arthritis    Pneumonia     PONV (postoperative nausea and vomiting)     Type II or unspecified type diabetes mellitus without mention of complication, not stated as uncontrolled     type ll does not take insulin at home    Unspecified cerebral artery occlusion with cerebral infarction     many TIA's      Past Surgical History:    has a past surgical history that includes Cardiac surgery; Gallbladder surgery; Hysterectomy; Appendectomy; Cholecystectomy; Colonoscopy; Upper gastrointestinal endoscopy (4/20/12); Endoscopy, colon, diagnostic; Tear duct surgery;  Upper gastrointestinal endoscopy (06/11/2018); Colonoscopy (06/11/2018); pr excision malignant lesion s/n/h/f/g 0.5 cm/< (N/A, 9/6/2018); pr split agrft t/a/l 1st 100 cm/&/1% bdy inft/chld (N/A, 9/6/2018); skin biopsy; eye surgery; bronchoscopy (N/A, 1/24/2020); bronchoscopy (N/A, 1/27/2020); Cataract removal (2013 or 2014); Esophagus dilation; and bronchoscopy (N/A, 11/23/2020). Social History:  Reviewed. reports that she quit smoking about 5 years ago. Her smoking use included cigarettes. She has a 12.25 pack-year smoking history. She has never used smokeless tobacco. She reports current alcohol use of about 1.0 standard drink per week. She reports that she does not use drugs. Family History:  Reviewed. family history includes Cancer in her father, mother, and sister; Heart Disease in her brother, mother, and sister. Review of Systems:  Constitutional: Negative for fever, night sweats, chills, weight changes, or weakness  Skin: Negative for rash, dry skin, pruritus, bruising, bleeding, blood clots, or changes in skin pigment  HEENT: Negative for vision changes, ringing in the ears, sore throat, dysphagia, or swollen lymph nodes  Respiratory: Reviewed in HPI  Cardiovascular: Reviewed in HPI  Gastrointestinal: Negative for abdominal pain, N/V/D, constipation, or black/tarry stools  Genito-Urinary: Negative for dysuria, incontinence, urgency, or hematuria  Musculoskeletal: Negative for joint swelling, muscle pain, or injuries  Neurological/Psych: Negative for confusion, seizures, headaches, balance issues or TIA-like symptoms.  No anxiety, depression, or insomnia    Physical Examination:  Vitals:    02/03/23 1208   BP:    Pulse: 78   Resp:    Temp:    SpO2:       In: 440 [P.O.:440]  Out: 225    Wt Readings from Last 3 Encounters:   02/01/23 275 lb (124.7 kg)   01/31/23 274 lb 3.2 oz (124.4 kg)   01/13/23 269 lb (122 kg)       Intake/Output Summary (Last 24 hours) at 2/3/2023 1218  Last data filed at 2/3/2023 0566  Gross per 24 hour   Intake 200 ml   Output 225 ml   Net -25 ml       Telemetry: Personally Reviewed  - Sinus rhythm with PVC  Constitutional: Cooperative and in no apparent distress, and appears well nourished  Skin: Warm and pink; no pallor, cyanosis, bruising, or clubbing  HEENT: Symmetric and normocephalic. PERRL, EOM intact. Conjunctiva pink with clear sclera. Mucus membranes pink and moist. Teeth intact. Thyroid smooth without nodules or goiter. Cardiovascular: Regular rate and rhythm. S1/S2 present without rubs, or gallops. + Murmur. Peripheral pulses 2+, capillary refill < 3 seconds. No elevation of JVP. No peripheral edema  Respiratory: Respirations symmetric and unlabored. Lungs clear/diminished to auscultation bilaterally, no wheezing, crackles, or rhonchi  Gastrointestinal: Abdomen soft and round. Bowel sounds normoactive in all quadrants without tenderness or masses. ++ Obese  Musculoskeletal: Bilateral upper and lower extremity strength 5/5 with full ROM  Neurologic/Psych: Awake and orientated to person, place and time. Calm affect, appropriate mood    Pertinent labs, diagnostic, device, and imaging results reviewed as a part of this visit    Labs:    BMP:   Recent Labs     02/01/23  1214      K 3.8   CL 98*   CO2 33*   BUN 20   CREATININE 1.1     Estimated Creatinine Clearance: 58 mL/min (based on SCr of 1.1 mg/dL).    CBC:   Recent Labs     02/01/23  1214   WBC 9.1   HGB 13.8   HCT 44.0   MCV 84.6        Thyroid:   Lab Results   Component Value Date/Time    TSH 2.34 06/06/2011 09:40 AM     Lipids:   Lab Results   Component Value Date/Time    CHOL 180 01/25/2023 05:16 AM    HDL 34 01/25/2023 05:16 AM    HDL 32 04/19/2012 05:42 AM    TRIG 157 01/25/2023 05:16 AM     LFTS:   Lab Results   Component Value Date/Time    ALT 15 02/01/2023 12:14 PM    AST 17 02/01/2023 12:14 PM    ALKPHOS 79 02/01/2023 12:14 PM    PROT 6.7 02/01/2023 12:14 PM    PROT 7.6 12/02/2011 09:38 AM    AGRATIO 1.1 02/01/2023 12:14 PM BILITOT 0.3 2023 12:14 PM     Cardiac Enzymes:   Lab Results   Component Value Date/Time    CKTOTAL 115 2023 12:14 PM    CKMB 0.29 2011 04:45 PM    TROPONINI <0.01 2023 12:14 PM    TROPONINI <0.01 2023 12:38 AM    TROPONINI <0.01 2023 09:50 PM     Coags:   Lab Results   Component Value Date/Time    PROTIME 13.0 2022 06:19 PM    INR 0.99 2022 06:19 PM       EC23 (Personally Interpreted)   - NSR with LBBB and frequent PVC    ECHO:    Normal global systolic function with an ejection fraction estimated at 70%. No regional wall motion abnormalities noted. Mild concentric left ventricular hypertrophy. Mild aortic stenosis with a peak velocity of 2.17m/s and a mean pressure   gradient of 12 mmHg. There is mild aortic insufficiency. Grade I diastolic dysfunction with normal LV filling pressures. Avg. E/e'=18.2    Stress Test: 10/22   There is a mild fixed inferioapical defect with no associated wall motion    abnormality consistent with diaphragmatic attenuation artifact. No evidence of myocardium at risk for significant reversible ischemia. Normal LV size and systolic function. Overall findings represent a low risk scan. Cath: 23  Anatomy:   LAD-prox 70-80% ISR, mid 70-80%, distal 90%  RCA-prox 90%  RPDA- nml     Intervention  ~Successful PCI to RCA with 4.0x18 KATHLEEN. PD with 4.0x8 NC. PCI to Distal LAD 2.75x38 KATHLEEN. PCI to Prox/mid LAD 3.25x38 KATHLEEN. Excellent Result.      Problem List:   Patient Active Problem List    Diagnosis Date Noted    HTN (hypertension), benign     CAD (coronary artery disease) 2011    Hypercholesteremia 2011    Fall 2023    COPD with acute exacerbation ([address] 2023    Unstable angina ([address] 2023    Thoracic radiculopathy 10/27/2022    Chest pain 10/26/2022    Left-sided weakness 2022    Arterial ischemic stroke, ICA, right, acute ([address] 2022    PAF (paroxysmal [facility] fibrillation) ([facility] 08/02/2022    CVA, old, alterations of sensations 08/01/2022    Grade II diastolic dysfunction 21/39/9114    AKIL (obstructive sleep apnea) 01/23/2020    Hypercarbia 01/23/2020    Class 3 severe obesity due to excess calories with serious comorbidity and body mass index (BMI) of 45.0 to 49.9 in adult ([facility] 06/18/2018    Dysarthria 06/07/2018    Anxiety and depression 01/19/2018    Primary osteoarthritis involving multiple joints 03/24/2016    Gastroesophageal reflux disease without esophagitis 03/24/2016    DM2 (diabetes mellitus, type 2) ([facility] 12/03/2015    DM type 2, controlled, with complication ([facility]     ASHD (arteriosclerotic heart disease)     Primary hypertension 04/18/2012    COPD (chronic obstructive pulmonary disease) ([facility] 08/09/2011    Chronic respiratory failure ([facility] 12/22/2010        Assessment and Plan:     Bradycardia   - Telemetry has been reviewed. She does not have any actual bradycardia on telemetry. Her bradycardia is secondary to PVCs, which falsely makes her HR appear bradycardia on pulse oximetry/BP cuff     - Off metoprolol. Pt states she won't take it due to her low HR. Avoid AV shayy agents at this time   - Will assess HR profile with holter. If tachy-kirsten or symptomatic bradycardia noted, then PPM may be needed, but no indication at this time    2. PVC's  - Noted on telemetry. Episodes of bigeminy on tele. She has PVCs on past EKG so doubt these are related to her symptoms    - Discussed PVCs, risks, therapies and alternatives with patient. All questions were answered  - Treatment options including medical therapy with antiarrhythmic drugs or ablation discussed   ~ Will do holter at d/c to assess PVC burden and HR profile    3. CAD  - S/p PCI to RCA and LAD (1/30/23)  - Stable ?  - Continue Plavix, ACEI, and statin   ~ No BB due to pt choice and ? bradycardia     - Pt had an episode of chest pain overnight that awoke her from her sleep and improved with SL nitro. Check troponin.  If troponin negative, no need for further testing. If she continues to have chest pains, will need to consider adding anti-anginal vs repeat cath    4. Paroxysmal Atrial Fibrillation  - Currently in NSR  - Off BB due ? bradycardia  - [id number] score:high; [id number] Vasc score and anticoagulation discussed. High risk for stroke and thromboembolism. Anticoagulation is recommended. Risk of bleeding was discussed.  ~ On Xarelto 15 mg daily (recommend 20 mg dosage given her kidney function/weight)    5. HTN  - Controlled: Goal <130/80  - Continue current medications    6. AKIL  - Multiple risk factors for sleep apnea  - Discussed long term effects of untreated AKIL    7. COPD ?   - Likely main culprit for her SOB  - Plans for possible PFT     If troponin negative, ok for d/c later today from EP standpoint with holter monitor. Will follow up in office in 3 months. EP will sign off. Please call if there are any questions. Thank you for consult. All pertinent information and plan of care discussed with the EP physician. All questions and concerns were addressed to the patient. Alternatives to my treatment were discussed. I have discussed the above stated plan with patient and the nurse. The patient verbalized understanding and agreed with the plan. Thank you for allowing to us to participate in the care of Riky Palmer    The patient was seen for >35 minutes. I reviewed interval history, physical exam, review of data including labs, imaging, development and implementation of treatment plan and coordination of complex care.  More than 50% of the time was devoted to counseling the patient on their diagnoses/treatments, as well as coordination of their care    KAE Corea-CNP  [facility]   Office: [phone]

## 2023-02-03 NOTE — PROGRESS NOTES
PULMONARY AND CRITICAL CARE MEDICINE PROGRESS NOTE    Subjective: Patient lying in bed in no apparent respiratory distress. Reports that her breathing is improved. Oxygen requirements stable at 3 L/min with patient saturating in mid 90s. Able to expectorate more with ongoing respiratory therapy. Did not use BiPAP therapy last night. REVIEW OF SYSTEMS:   8 point review of system is negative except that mentioned in the subjective portion.         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    Chronic obstructive pulmonary disease ([facility] [address] 01/13/2017    Claustrophobia     COPD (chronic obstructive pulmonary disease) (HCC)     ESBL (extended spectrum beta-lactamase) producing bacteria infection 08/14/2021    Esophageal stricture     Gastroesophageal reflux disease without esophagitis 03/24/2016    Hiatal hernia     Hiatal hernia     Hypercholesteremia     Hypertension     IBS (irritable bowel syndrome)     Migraines     Movement disorder arthritis    Pneumonia     PONV (postoperative nausea and vomiting)     Type II or unspecified type diabetes mellitus without mention of complication, not stated as uncontrolled     type ll does not take insulin at home    Unspecified cerebral artery occlusion with cerebral infarction     many TIA's       PAST SURGICAL HISTORY:   Past Surgical History:   Procedure Laterality Date    APPENDECTOMY      BRONCHOSCOPY N/A 1/24/2020    BRONCHOSCOPY ALVEOLAR LAVAGE performed by Star Ly MD at [facility] N/A 1/27/2020    BRONCHOSCOPY DIAGNOSTIC OR CELL 8 Charline CARDOZA performed by Marquis Susan MD at [address] [facility] N/A 2020    BRONCHOSCOPY DIAGNOSTIC OR CELL [facility] ONLY performed by Freya Earl MD at [facility]      1 stent  and 1 stent     CATARACT REMOVAL   or     bilateral cataracts removed    CHOLECYSTECTOMY      COLONOSCOPY      COLONOSCOPY  2018    ENDOSCOPY, COLON, DIAGNOSTIC      ESOPHAGEAL DILATATION      EYE SURGERY      bilateral cataracts    GALLBLADDER SURGERY      HYSTERECTOMY (CERVIX STATUS UNKNOWN)      CT EXCISION MALIGNANT LESION S/N/H/F/G 0.5 CM/< N/A 2018    EXCISION OF SCALP SQUAMOUS CELL CARCINOMA performed by Jayla Hurst MD at [address] AGRFT T/A/L 1ST 100 CM/&/1% BDY INFT/CHLD N/A 2018    SPLIT THICKNESS SKIN GRAFT FOR COVERAGE SCALP, APPLICATION OF WOUND VAC DEVICE performed by Jayla Hurst MD at [address] [place] [address],[address] ENDOSCOPY  12    with biopsy of stomach,gastritis    UPPER GASTROINTESTINAL ENDOSCOPY  2018    w/esophagael dilation       SOCIAL HISTORY:   Social History     Tobacco Use    Smoking status: Former     Packs/day: 0.25     Years: 49.00     Pack years: 12.25     Types: Cigarettes     Quit date: 2017     Years since quittin.6    Smokeless tobacco: Never    Tobacco comments:     only smoke when real stressed  Nicotine patch   Vaping Use    Vaping Use: Former    Substances: Always   Substance Use Topics    Alcohol use:  Yes     Alcohol/week: 1.0 standard drink     Types: 1 Glasses of wine per week     Comment: occ. every  6 mo    Drug use: No       FAMILY HISTORY:   Family History   Problem Relation Age of Onset    Heart Disease Mother     Cancer Mother     Cancer Father     Cancer Sister     Heart Disease Sister     Heart Disease Brother     High Blood Pressure Neg Hx     High Cholesterol Neg Hx        MEDICATIONS:     No current facility-administered medications on file prior to encounter. Current Outpatient Medications on File Prior to Encounter   Medication Sig Dispense Refill    clopidogrel (PLAVIX) 75 MG tablet Take 1 tablet by mouth daily 30 tablet 3    rosuvastatin (CRESTOR) 40 MG tablet Take 1 tablet by mouth nightly 30 tablet 3    albuterol sulfate HFA (PROVENTIL;VENTOLIN;PROAIR) 108 (90 Base) MCG/ACT inhaler INHALE TWO PUFFS BY MOUTH EVERY 6 HOURS AS NEEDED FOR WHEEZING 18 g 1    ALPRAZolam (XANAX) 1 MG tablet Take 1 tablet by mouth 3 times daily as needed for Anxiety for up to 30 days. 90 tablet 0    gabapentin (NEURONTIN) 300 MG capsule Take 2 capsules by mouth 3 times daily for 90 days. 540 capsule 1    diclofenac sodium (VOLTAREN) 1 % GEL Apply 2 g topically 2 times daily 200 g 5    furosemide (LASIX) 40 MG tablet TAKE ONE TABLET BY MOUTH TWICE A  tablet 1    metoprolol succinate (TOPROL XL) 25 MG extended release tablet Take 1 tablet by mouth daily 90 tablet 0    rivaroxaban (XARELTO) 15 MG TABS tablet Take 1 tablet by mouth daily 90 tablet 1    lisinopril (PRINIVIL;ZESTRIL) 2.5 MG tablet Take 1 tablet by mouth daily 90 tablet 1    acetaZOLAMIDE (DIAMOX) 250 MG tablet Take 1 tablet by mouth daily 90 tablet 1    ibuprofen (ADVIL;MOTRIN) 400 MG tablet Take 1 tablet by mouth every 6 hours as needed for Pain or Fever 120 tablet 1    omeprazole (PRILOSEC) 40 MG delayed release capsule Take 1 capsule by mouth daily 30 capsule 3    linagliptin (TRADJENTA) 5 MG tablet Take 1 tablet by mouth daily 90 tablet 1    glipiZIDE (GLUCOTROL) 5 MG tablet Take 1 tablet by mouth 2 times daily (before meals) 180 tablet 3    blood glucose monitor strips Test 2 times a day & as needed for symptoms of irregular blood glucose. Dispense sufficient amount for indicated testing frequency plus additional to accommodate PRN testing needs.  50 strip 5    [DISCONTINUED] hydrALAZINE (APRESOLINE) 25 MG tablet Take 1 tablet by mouth 3 times daily 90 tablet 3 budesonide-formoterol (SYMBICORT) 160-4.5 MCG/ACT AERO Inhale 2 puffs into the lungs 2 times daily 1 each 3    Umeclidinium Bromide (INCRUSE ELLIPTA) 62.5 MCG/INH AEPB Inhale 1 puff into the lungs daily (Patient not taking: No sig reported) 1 each 6    triamcinolone (KENALOG) 0.1 % cream Apply topically 2 times daily. 60 g 2    ipratropium-albuterol (DUONEB) 0.5-2.5 (3) MG/3ML SOLN nebulizer solution INHALE THREE MILLILITERS VIA NEBULIZATION BY MOUTH EVERY 4 HOURS AS NEEDED FOR SHORTNESS OF BREATH 1 vial 5    blood glucose test strips (TRUE METRIX BLOOD GLUCOSE TEST) strip USE THREE TIMES A DAY AS NEEDED 100 strip 5    Lancets MISC 1 each by Does not apply route 2 times daily 300 each 1    sodium chloride (OCEAN, BABY AYR) 0.65 % nasal spray 1 spray by Nasal route as needed for Congestion  0    nitroGLYCERIN (NITROSTAT) 0.4 MG SL tablet Place 1 tablet under the tongue every 5 minutes as needed for Chest pain.  30 tablet 0        glipiZIDE  10 mg Oral BID WC    insulin glargine  30 Units SubCUTAneous Nightly    insulin lispro  0-16 Units SubCUTAneous TID WC    insulin lispro  0-4 Units SubCUTAneous Nightly    insulin regular  7 Units SubCUTAneous TID AC    guaiFENesin  600 mg Oral BID    midodrine  5 mg Oral TID WC    cephALEXin  500 mg Oral 3 times per day    acetaZOLAMIDE  250 mg Oral Daily    mometasone-formoterol  2 puff Inhalation BID    clopidogrel  75 mg Oral Daily    diclofenac sodium  2 g Topical BID    furosemide  40 mg Oral BID    gabapentin  600 mg Oral TID    alogliptin  12.5 mg Oral Daily    pantoprazole  40 mg Oral QAM AC    rivaroxaban  15 mg Oral Daily    rosuvastatin  40 mg Oral Nightly    ipratropium-albuterol  1 ampule Inhalation TID    methylPREDNISolone  40 mg IntraVENous Q8H      dextrose       promethazine-dextromethorphan, albuterol sulfate HFA, ALPRAZolam, ibuprofen, nitroGLYCERIN, sodium chloride, ipratropium-albuterol, dextrose bolus **OR** dextrose bolus, glucagon (rDNA), dextrose      ALLERGIES:   Allergies as of 02/01/2023 - Fully Reviewed 02/01/2023   Allergen Reaction Noted    Morphine Shortness Of Breath 12/08/2011    Codeine Other (See Comments) 10/26/2010    Hydromorphone Other (See Comments) 01/25/2013    Levaquin [levofloxacin in d5w] Itching 04/25/2019    Vicodin [hydrocodone-acetaminophen] Hives 12/22/2010    Aspirin  03/14/2020      OBJECTIVE:   height is 5' 5\" (1.651 m) and weight is 275 lb (124.7 kg). Her oral temperature is 97.8 °F (36.6 °C). Her blood pressure is 126/59 (abnormal) and her pulse is 67. Her respiration is 18 and oxygen saturation is 96%. I/O this shift:  In: 200 [P.O.:200]  Out: -      PHYSICAL EXAM:    CONSTITUTIONAL: She is a 69y.o.-year-old who appears her stated age. She is alert and oriented x 3 and in no acute distress. HEENT: PERRLA, EOMI. No scleral icterus. No thrush, atraumatic, normocephalic. NECK: Supple, without cervical or supraclavicular lymphadenopathy:  CARDIOVASCULAR: S1 S2 RRR. Without murmer  RESPIRATORY & CHEST: Bilateral scattered wheezing heard. GASTROINTESTINAL & ABDOMEN: Soft, nontender, positive bowel sounds in all quadrants, non-distended, without hepatosplenomegaly. GENITOURINARY: Deferred. MUSCULOSKELETAL: No tenderness to palpation of the axial skeleton. There is no clubbing. No cyanosis. No edema of the lower extremities. SKIN OF BODY: No rash or jaundice. PSYCHIATRIC EVALUATION: Normal affect. Patient answers questions appropriately. HEMATOLOGIC/LYMPHATIC/ IMMUNOLOGIC: No palpable lymphadenopathy. NEUROLOGIC: Alert and oriented x 3. Groslly non-focal. Motor strength is 5+/5 in all muscle groups. The patient has a normal sensorium globally.       LABS:  Recent Labs     02/01/23  1214   WBC 9.1   HGB 13.8   HCT 44.0      ALT 15   AST 17      K 3.8   CL 98*   CREATININE 1.1   BUN 20   CO2 33*       Recent Labs     02/01/23  1214   GLUCOSE 238*   CALCIUM 9.7      K 3.8   CO2 33*   CL 98* BUN 20   CREATININE 1.1       No results for input(s): PHART, LLD3NML, PO2ART, HSA8BYX, A9GBKHIH, BEART, G7LARZAD in the last 72 hours. Lab Results   Component Value Date    INR 0.99 08/01/2022    INR 1.03 08/14/2021    INR 0.97 11/23/2020    PROTIME 13.0 08/01/2022    PROTIME 11.7 08/14/2021    PROTIME 11.2 11/23/2020     Lab Results   Component Value Date/Time    AMYLASE 28 09/08/2015 08:33 AM      Lab Results   Component Value Date    LABA1C 7.9 02/01/2023     Lab Results   Component Value Date    .0 02/01/2023     Lab Results   Component Value Date    TSH 2.34 06/06/2011     Lab Results   Component Value Date    CKTOTAL 115 02/01/2023    CKMB 0.29 08/09/2011    TROPONINI <0.01 02/03/2023      Lab Results   Component Value Date    CRP 4.7 02/25/2014      Lab Results   Component Value Date    BNP <15 04/18/2012      Lab Results   Component Value Date    DDIMER 0.31 01/24/2023      No results found for: FERRITIN   Lab Results   Component Value Date    LACTA 1.3 10/26/2022         Transthoracic echocardiogram done on 3/20/2020    Normal left ventricular cavity size and systolic function with an ejection fraction estimated at 65-70%. Mild septal hypertrophy. No regional wall motion abnormalities noted. Diastolic filling parameters suggest grade II diastolic dysfunction. Mild mitral regurgitation. Mild aortic stenosis with a peak velocity of 2.26 m/s and a mean pressure gradient of 11 mmHg. There is mild aortic insufficiency. There is mild to moderate right ventricular hypertrophy. The right ventricle is normal in size and function. Unable to estimate pulmonary artery pressure secondary to incomplete TR jetenvelope. Electronically signed by George Lennox, DO, FACC      IMAGING:    Narrative   EXAMINATION:   CT OF THE CHEST, ABDOMEN, AND PELVIS WITH CONTRAST 2/1/2023 12:49 pm           Impression   No acute process identified. 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        Coronary artery calcifications noted. Prior granulomatous disease. Mild   scarring in the lingula. Diverticular disease of the left colon without   diverticulitis. Small paraumbilical hernia. There is mild compression fracture of the T 11 and L1 vertebral bodies, which   appear old. There is lumbar lies a shin of the S1 vertebra. IMPRESSION:     COPD of unknown severity in exacerbation  Heart failure with preserved ejection fraction  Chronic hypoxic respiratory failure, stable  Acute on chronic hypercapnic respiratory failure  Morbid obesity  High suspicion for obstructive sleep apnea  Previous history of tobacco abuse  Coronary artery disease s/p PCI  Paroxysmal A-fib on chronic anticoagulation      RECOMMENDATION:     Patient's respiratory status is slow to improve. She is now moving more air in her lungs than before. Oxygen requirement stable at 3 L/min. She may be having mild exacerbation of COPD along with untreated sleep apnea which is leading to acute on chronic hypercapnic respiratory failure. Patient repeatedly refused to use BiPAP therapy as she is severely claustrophobic. I strongly advised her to give it a try with a small nasal mask tonight. She has agreed to this. Continue with Dulera inhaler therapy and DuoNeb nebulization around-the-clock. Continue with Mucinex, Acapella as well as MetaNeb therapy to the regimen to help her expectorate. I will decrease the frequency of Solu-Medrol to every 12 hours today. Oxygen supplementation to maintain SPO2 between 88 to 92%  Hopefully she will be able to get discharged back home over the weekend. Antonio Strickland MD  Pulmonary Critical Care and Sleep Medicine  2/1/2023, 4:18 PM    This note was completed using dragon medical speech recognition software. Grammatical errors, random word insertions, pronoun errors and incomplete sentences are occasional consequences of this technology due to software limitations.  If there are questions or concerns about the content of this note of information contained within the body of this dictation they should be addressed with the provider for clarification.

## 2023-02-03 NOTE — PROGRESS NOTES
Night shift assessment completed. Routine vitals have been obtained. Scheduled medications given. Patient is awake, alert and oriented/ talking to staff. Respirations are easy and continues with 3L of O2 supplement and no c/o SOB at rest. Skin has been assessed per writer. IV site is C/D/I. Patient does not appear to be in distress. Call light within reach. Standard safety measures are in place. All needs have been met at this time. Family is at bedside.

## 2023-02-03 NOTE — PROGRESS NOTES
MD Rajesh Schneider has been notified about c/o chest pain now being resolved with the administration of nitro 3x and repositioning. Stat Ekg was ordered but has been discontinued since chest pain has been resolved and patient had EKG done on 2/2/2023. VS have been obtained . Patient is comfortable in bed resting.

## 2023-02-03 NOTE — PROGRESS NOTES
Patient woke up from sleep with heavy sharp pressure pain in her chest . Pain doesn't radiate, no complaints of palpitations. VS at the beginning were 111/71 BP 55 HR , 94% on 3L 18 RR 98.2 temp. 3 PRN nitro was administered q5 minutes apart. Pain started out at a 10 out of 10 and has been decreased to a 7 out of 10. Patient is in bed. Safety measure are in place.

## 2023-02-03 NOTE — PLAN OF CARE
Problem: Skin/Tissue Integrity  Goal: Absence of new skin breakdown  Description: 1. Monitor for areas of redness and/or skin breakdown  2. Assess vascular access sites hourly  3. Every 4-6 hours minimum:  Change oxygen saturation probe site  4. Every 4-6 hours:  If on nasal continuous positive airway pressure, respiratory therapy assess nares and determine need for appliance change or resting period.   2/2/2023 2129 by Michael Tong RN  Outcome: Progressing  2/2/2023 0808 by Christiano Rizvi RN  Outcome: Progressing     Problem: ABCDS Injury Assessment  Goal: Absence of physical injury  2/2/2023 2129 by Michael Tong RN  Outcome: Progressing  2/2/2023 0808 by Christiano Rizvi RN  Outcome: Progressing  Flowsheets (Taken 2/2/2023 0806)  Absence of Physical Injury: Implement safety measures based on patient assessment     Problem: Safety - Adult  Goal: Free from fall injury  2/2/2023 2129 by Michael Tong RN  Outcome: Progressing  2/2/2023 0808 by Christiano Rizvi RN  Outcome: Progressing  Flowsheets (Taken 2/2/2023 0806)  Free From Fall Injury: Instruct family/caregiver on patient safety     Problem: Pain  Goal: Verbalizes/displays adequate comfort level or baseline comfort level  Outcome: Progressing

## 2023-02-04 LAB
GLUCOSE BLD-MCNC: 254 MG/DL (ref 70–99)
GLUCOSE BLD-MCNC: 299 MG/DL (ref 70–99)
GLUCOSE BLD-MCNC: 344 MG/DL (ref 70–99)
GLUCOSE BLD-MCNC: 367 MG/DL (ref 70–99)
PERFORMED ON: ABNORMAL

## 2023-02-04 PROCEDURE — 6370000000 HC RX 637 (ALT 250 FOR IP): Performed by: INTERNAL MEDICINE

## 2023-02-04 PROCEDURE — 6360000002 HC RX W HCPCS: Performed by: INTERNAL MEDICINE

## 2023-02-04 PROCEDURE — 99232 SBSQ HOSP IP/OBS MODERATE 35: CPT | Performed by: NURSE PRACTITIONER

## 2023-02-04 PROCEDURE — 1200000000 HC SEMI PRIVATE

## 2023-02-04 PROCEDURE — 99233 SBSQ HOSP IP/OBS HIGH 50: CPT | Performed by: INTERNAL MEDICINE

## 2023-02-04 PROCEDURE — 94669 MECHANICAL CHEST WALL OSCILL: CPT

## 2023-02-04 PROCEDURE — 94761 N-INVAS EAR/PLS OXIMETRY MLT: CPT

## 2023-02-04 PROCEDURE — 94660 CPAP INITIATION&MGMT: CPT

## 2023-02-04 PROCEDURE — 2700000000 HC OXYGEN THERAPY PER DAY

## 2023-02-04 PROCEDURE — 94640 AIRWAY INHALATION TREATMENT: CPT

## 2023-02-04 RX ORDER — FUROSEMIDE 40 MG/1
40 TABLET ORAL DAILY
Status: DISCONTINUED | OUTPATIENT
Start: 2023-02-05 | End: 2023-02-06 | Stop reason: HOSPADM

## 2023-02-04 RX ADMIN — INSULIN HUMAN 7 UNITS: 100 INJECTION, SOLUTION PARENTERAL at 08:35

## 2023-02-04 RX ADMIN — PROMETHAZINE HYDROCHLORIDE AND DEXTROMETHORPHAN HYDROBROMIDE ORAL SOLUTION 5 ML: 15; 6.25 SOLUTION ORAL at 23:31

## 2023-02-04 RX ADMIN — PANTOPRAZOLE SODIUM 40 MG: 40 TABLET, DELAYED RELEASE ORAL at 06:17

## 2023-02-04 RX ADMIN — IPRATROPIUM BROMIDE AND ALBUTEROL SULFATE 1 AMPULE: 2.5; .5 SOLUTION RESPIRATORY (INHALATION) at 20:16

## 2023-02-04 RX ADMIN — METHYLPREDNISOLONE SODIUM SUCCINATE 40 MG: 40 INJECTION, POWDER, FOR SOLUTION INTRAMUSCULAR; INTRAVENOUS at 04:04

## 2023-02-04 RX ADMIN — INSULIN HUMAN 7 UNITS: 100 INJECTION, SOLUTION PARENTERAL at 17:54

## 2023-02-04 RX ADMIN — INSULIN LISPRO 12 UNITS: 100 INJECTION, SOLUTION INTRAVENOUS; SUBCUTANEOUS at 08:35

## 2023-02-04 RX ADMIN — GLIPIZIDE 10 MG: 5 TABLET ORAL at 08:34

## 2023-02-04 RX ADMIN — MIDODRINE HYDROCHLORIDE 5 MG: 5 TABLET ORAL at 08:34

## 2023-02-04 RX ADMIN — CEPHALEXIN 500 MG: 250 CAPSULE ORAL at 06:17

## 2023-02-04 RX ADMIN — Medication 2 PUFF: at 20:16

## 2023-02-04 RX ADMIN — INSULIN LISPRO 16 UNITS: 100 INJECTION, SOLUTION INTRAVENOUS; SUBCUTANEOUS at 11:54

## 2023-02-04 RX ADMIN — INSULIN HUMAN 7 UNITS: 100 INJECTION, SOLUTION PARENTERAL at 11:53

## 2023-02-04 RX ADMIN — Medication 2 PUFF: at 08:18

## 2023-02-04 RX ADMIN — GUAIFENESIN 600 MG: 600 TABLET, EXTENDED RELEASE ORAL at 08:35

## 2023-02-04 RX ADMIN — CEPHALEXIN 500 MG: 250 CAPSULE ORAL at 15:27

## 2023-02-04 RX ADMIN — ROSUVASTATIN 40 MG: 40 TABLET, FILM COATED ORAL at 20:38

## 2023-02-04 RX ADMIN — DICLOFENAC SODIUM 2 G: 10 GEL TOPICAL at 20:39

## 2023-02-04 RX ADMIN — CLOPIDOGREL BISULFATE 75 MG: 75 TABLET ORAL at 08:34

## 2023-02-04 RX ADMIN — GLIPIZIDE 10 MG: 5 TABLET ORAL at 17:54

## 2023-02-04 RX ADMIN — GABAPENTIN 600 MG: 300 CAPSULE ORAL at 08:34

## 2023-02-04 RX ADMIN — METHYLPREDNISOLONE SODIUM SUCCINATE 40 MG: 40 INJECTION, POWDER, FOR SOLUTION INTRAMUSCULAR; INTRAVENOUS at 15:27

## 2023-02-04 RX ADMIN — RIVAROXABAN 15 MG: 15 TABLET, FILM COATED ORAL at 08:34

## 2023-02-04 RX ADMIN — ALOGLIPTIN 12.5 MG: 12.5 TABLET, FILM COATED ORAL at 08:34

## 2023-02-04 RX ADMIN — GUAIFENESIN 600 MG: 600 TABLET, EXTENDED RELEASE ORAL at 20:38

## 2023-02-04 RX ADMIN — INSULIN LISPRO 8 UNITS: 100 INJECTION, SOLUTION INTRAVENOUS; SUBCUTANEOUS at 17:54

## 2023-02-04 RX ADMIN — MIDODRINE HYDROCHLORIDE 5 MG: 5 TABLET ORAL at 11:55

## 2023-02-04 RX ADMIN — GABAPENTIN 600 MG: 300 CAPSULE ORAL at 15:27

## 2023-02-04 RX ADMIN — CEPHALEXIN 500 MG: 250 CAPSULE ORAL at 20:38

## 2023-02-04 RX ADMIN — IPRATROPIUM BROMIDE AND ALBUTEROL SULFATE 1 AMPULE: 2.5; .5 SOLUTION RESPIRATORY (INHALATION) at 08:18

## 2023-02-04 RX ADMIN — INSULIN GLARGINE 30 UNITS: 100 INJECTION, SOLUTION SUBCUTANEOUS at 20:41

## 2023-02-04 RX ADMIN — IBUPROFEN 400 MG: 400 TABLET, FILM COATED ORAL at 20:38

## 2023-02-04 RX ADMIN — ACETAZOLAMIDE 250 MG: 250 TABLET ORAL at 08:34

## 2023-02-04 RX ADMIN — GABAPENTIN 600 MG: 300 CAPSULE ORAL at 20:38

## 2023-02-04 ASSESSMENT — PAIN DESCRIPTION - DESCRIPTORS: DESCRIPTORS: DISCOMFORT

## 2023-02-04 ASSESSMENT — PAIN SCALES - GENERAL: PAINLEVEL_OUTOF10: 3

## 2023-02-04 ASSESSMENT — PAIN DESCRIPTION - ORIENTATION: ORIENTATION: MID

## 2023-02-04 ASSESSMENT — PAIN DESCRIPTION - LOCATION: LOCATION: GENERALIZED

## 2023-02-04 NOTE — PROGRESS NOTES
Department of Internal Medicine  General Internal Medicine   Progress Note     SUBJECTIVE  moderate wheezing and SOB HR dropped to 40s per nursing staff and BS are running high     History obtained from chart review and the patient  General ROS: positive for  - fatigue and malaise  negative for - chills, fever or night sweats  Psychological ROS: negative  Respiratory ROS: positive for - shortness of breath and wheezing  negative for - cough, hemoptysis, sputum changes or stridor  Cardiovascular ROS: positive for - chest pain, dyspnea on exertion and edema  negative for - loss of consciousness, orthopnea or palpitations  Gastrointestinal ROS: no abdominal pain, change in bowel habits, or black or bloody stools    OBJECTIVE      Medications      Current Facility-Administered Medications: glipiZIDE (GLUCOTROL) tablet 10 mg, 10 mg, Oral, BID WC  insulin glargine (LANTUS) injection vial 30 Units, 30 Units, SubCUTAneous, Nightly  insulin lispro (HUMALOG) injection vial 0-16 Units, 0-16 Units, SubCUTAneous, TID WC  insulin lispro (HUMALOG) injection vial 0-4 Units, 0-4 Units, SubCUTAneous, Nightly  insulin regular (HUMULIN R;NOVOLIN R) injection 7 Units, 7 Units, SubCUTAneous, TID AC  [START ON 2/4/2023] methylPREDNISolone sodium (SOLU-MEDROL) injection 40 mg, 40 mg, IntraVENous, Q12H  insulin glargine (LANTUS) injection vial 20 Units, 20 Units, SubCUTAneous, Once  guaiFENesin (MUCINEX) extended release tablet 600 mg, 600 mg, Oral, BID  midodrine (PROAMATINE) tablet 5 mg, 5 mg, Oral, TID WC  promethazine-dextromethorphan (PROMETHAZINE-DM) 6.25-15 MG/5ML syrup 5 mL, 5 mL, Oral, Q4H PRN  cephALEXin (KEFLEX) capsule 500 mg, 500 mg, Oral, 3 times per day  acetaZOLAMIDE (DIAMOX) tablet 250 mg, 250 mg, Oral, Daily  albuterol sulfate HFA (PROVENTIL;VENTOLIN;PROAIR) 108 (90 Base) MCG/ACT inhaler 2 puff, 2 puff, Inhalation, Q6H PRN  ALPRAZolam (XANAX) tablet 1 mg, 1 mg, Oral, TID PRN  mometasone-formoterol (DULERA) 200-5 MCG/ACT inhaler 2 puff, 2 puff, Inhalation, BID  clopidogrel (PLAVIX) tablet 75 mg, 75 mg, Oral, Daily  diclofenac sodium (VOLTAREN) 1 % gel 2 g, 2 g, Topical, BID  furosemide (LASIX) tablet 40 mg, 40 mg, Oral, BID  gabapentin (NEURONTIN) capsule 600 mg, 600 mg, Oral, TID  ibuprofen (ADVIL;MOTRIN) tablet 400 mg, 400 mg, Oral, Q6H PRN  alogliptin (NESINA) tablet 12.5 mg, 12.5 mg, Oral, Daily  nitroGLYCERIN (NITROSTAT) SL tablet 0.4 mg, 0.4 mg, SubLINGual, Q5 Min PRN  pantoprazole (PROTONIX) tablet 40 mg, 40 mg, Oral, QAM AC  rivaroxaban (XARELTO) tablet 15 mg, 15 mg, Oral, Daily  rosuvastatin (CRESTOR) tablet 40 mg, 40 mg, Oral, Nightly  sodium chloride (OCEAN, BABY AYR) 0.65 % nasal spray 1 spray, 1 spray, Nasal, PRN  ipratropium-albuterol (DUONEB) nebulizer solution 1 ampule, 1 ampule, Inhalation, TID  ipratropium-albuterol (DUONEB) nebulizer solution 1 ampule, 1 ampule, Inhalation, Q4H PRN  dextrose bolus 10% 125 mL, 125 mL, IntraVENous, PRN **OR** dextrose bolus 10% 250 mL, 250 mL, IntraVENous, PRN  glucagon (rDNA) injection 1 mg, 1 mg, SubCUTAneous, PRN  dextrose 10 % infusion, , IntraVENous, Continuous PRN    Physical      VITALS:    BP (!) 128/55   Pulse 54   Temp 98.2 °F (36.8 °C) (Oral)   Resp 18   Ht 5' 5\" (1.651 m)   Wt 275 lb (124.7 kg)   SpO2 94%   BMI 45.76 kg/m²   TEMPERATURE:  Current - Temp: 98.2 °F (36.8 °C);  Max - Temp  Av.9 °F (36.6 °C)  Min: 97.4 °F (36.3 °C)  Max: 98.2 °F (36.8 °C)  RESPIRATIONS RANGE: Resp  Av  Min: 16  Max: 20  PULSE RANGE: Pulse  Av.9  Min: 35  Max: 78  BLOOD PRESSURE RANGE:  Systolic (87LEU), BIO:321 , Min:108 , MVD:264   ; Diastolic (67WPV), PAR:42, Min:54, Max:98    PULSE OXIMETRY RANGE: SpO2  Av.9 %  Min: 92 %  Max: 96 %  24HR INTAKE/OUTPUT:      Intake/Output Summary (Last 24 hours) at 2/3/2023 2230  Last data filed at 2/3/2023 1300  Gross per 24 hour   Intake 400 ml   Output --   Net 400 ml     CONSTITUTIONAL:  awake, alert, cooperative, no apparent distress, and appears stated age  NECK:  supple, symmetrical, trachea midline and skin normal  LUNGS:  Moderate increase in work of breathing bart crackles and exp wheezes   CARDIOVASCULAR:  Normal apical impulse, regular rate and rhythm, normal S1 and S2, no S3 or S4, and no murmur noted  ABDOMEN:  No scars, normal bowel sounds, soft, non-distended, non-tender, no masses palpated, no hepatosplenomegally  MUSCULOSKELETAL:  ++ edema  NEUROLOGIC:  No acute focal change    Data      Lab Results   Component Value Date/Time    PHART 7.446 02/07/2017 12:26 PM       Lab Results   Component Value Date/Time     02/01/2023 12:14 PM    K 3.8 02/01/2023 12:14 PM    CL 98 02/01/2023 12:14 PM    CO2 33 02/01/2023 12:14 PM    BUN 20 02/01/2023 12:14 PM    CREATININE 1.1 02/01/2023 12:14 PM    GLUCOSE 238 02/01/2023 12:14 PM    CALCIUM 9.7 02/01/2023 12:14 PM     Lab Results   Component Value Date/Time    WBC 9.1 02/01/2023 12:14 PM    HGB 13.8 02/01/2023 12:14 PM    HCT 44.0 02/01/2023 12:14 PM    MCV 84.6 02/01/2023 12:14 PM     02/01/2023 12:14 PM         Lab Results   Component Value Date    INR 0.99 08/01/2022    PROTIME 13.0 08/01/2022       ASSESSMENT AND PLAN      Patient Active Problem List   Diagnosis    Chronic respiratory failure (HCC)    CAD (coronary artery disease)    Hypercholesteremia    COPD (chronic obstructive pulmonary disease) ([facility]    Primary hypertension    ASHD (arteriosclerotic heart disease)    DM type 2, controlled, with complication ([facility])    DM2 (diabetes mellitus, type 2) ([facility] [address]    HTN (hypertension), benign    Primary osteoarthritis involving multiple joints    Gastroesophageal reflux disease without esophagitis    Anxiety and depression    Dysarthria    Class 3 severe obesity due to excess calories with serious comorbidity and body mass index (BMI) of 45.0 to 49.9 in adult ([facility])    AKIL (obstructive sleep apnea)    Hypercarbia    Grade II diastolic dysfunction    CVA, old, alterations of sensations    Left-sided weakness    Arterial ischemic stroke, ICA, right, acute (HCC)    PAF (paroxysmal atrial fibrillation) (HCC)    Chest pain    Thoracic radiculopathy    Unstable angina ([address]    COPD with acute exacerbation ([address]    Fall                          Wean steroids    Cardiology consult    Noninvasive mechanical vent   Hold Beta Blocker    Give midodrin for low BP

## 2023-02-04 NOTE — PROGRESS NOTES
Department of Internal Medicine  General Internal Medicine   Progress Note     SUBJECTIVE  wheezing and SOB have improved  no abdominal pain no dizziness , HR improved to 60s     History obtained from chart review and the patient  General ROS: positive for  - fatigue and malaise  negative for - chills, fever or night sweats  Psychological ROS: negative  Respiratory ROS: positive for - shortness of breath and wheezing  negative for - cough, hemoptysis, sputum changes or stridor  Cardiovascular ROS: positive for - chest pain, dyspnea on exertion and edema  negative for - loss of consciousness, orthopnea or palpitations  Gastrointestinal ROS: no abdominal pain, change in bowel habits, or black or bloody stools    OBJECTIVE      Medications      Current Facility-Administered Medications: glipiZIDE (GLUCOTROL) tablet 10 mg, 10 mg, Oral, BID WC  insulin glargine (LANTUS) injection vial 30 Units, 30 Units, SubCUTAneous, Nightly  insulin lispro (HUMALOG) injection vial 0-16 Units, 0-16 Units, SubCUTAneous, TID WC  insulin lispro (HUMALOG) injection vial 0-4 Units, 0-4 Units, SubCUTAneous, Nightly  insulin regular (HUMULIN R;NOVOLIN R) injection 7 Units, 7 Units, SubCUTAneous, TID AC  methylPREDNISolone sodium (SOLU-MEDROL) injection 40 mg, 40 mg, IntraVENous, Q12H  guaiFENesin (MUCINEX) extended release tablet 600 mg, 600 mg, Oral, BID  midodrine (PROAMATINE) tablet 5 mg, 5 mg, Oral, TID WC  promethazine-dextromethorphan (PROMETHAZINE-DM) 6.25-15 MG/5ML syrup 5 mL, 5 mL, Oral, Q4H PRN  cephALEXin (KEFLEX) capsule 500 mg, 500 mg, Oral, 3 times per day  acetaZOLAMIDE (DIAMOX) tablet 250 mg, 250 mg, Oral, Daily  albuterol sulfate HFA (PROVENTIL;VENTOLIN;PROAIR) 108 (90 Base) MCG/ACT inhaler 2 puff, 2 puff, Inhalation, Q6H PRN  ALPRAZolam (XANAX) tablet 1 mg, 1 mg, Oral, TID PRN  mometasone-formoterol (DULERA) 200-5 MCG/ACT inhaler 2 puff, 2 puff, Inhalation, BID  clopidogrel (PLAVIX) tablet 75 mg, 75 mg, Oral, Daily  diclofenac sodium (VOLTAREN) 1 % gel 2 g, 2 g, Topical, BID  furosemide (LASIX) tablet 40 mg, 40 mg, Oral, BID  gabapentin (NEURONTIN) capsule 600 mg, 600 mg, Oral, TID  ibuprofen (ADVIL;MOTRIN) tablet 400 mg, 400 mg, Oral, Q6H PRN  alogliptin (NESINA) tablet 12.5 mg, 12.5 mg, Oral, Daily  nitroGLYCERIN (NITROSTAT) SL tablet 0.4 mg, 0.4 mg, SubLINGual, Q5 Min PRN  pantoprazole (PROTONIX) tablet 40 mg, 40 mg, Oral, QAM AC  rivaroxaban (XARELTO) tablet 15 mg, 15 mg, Oral, Daily  rosuvastatin (CRESTOR) tablet 40 mg, 40 mg, Oral, Nightly  sodium chloride (OCEAN, BABY AYR) 0.65 % nasal spray 1 spray, 1 spray, Nasal, PRN  ipratropium-albuterol (DUONEB) nebulizer solution 1 ampule, 1 ampule, Inhalation, TID  ipratropium-albuterol (DUONEB) nebulizer solution 1 ampule, 1 ampule, Inhalation, Q4H PRN  dextrose bolus 10% 125 mL, 125 mL, IntraVENous, PRN **OR** dextrose bolus 10% 250 mL, 250 mL, IntraVENous, PRN  glucagon (rDNA) injection 1 mg, 1 mg, SubCUTAneous, PRN  dextrose 10 % infusion, , IntraVENous, Continuous PRN    Physical      VITALS:    BP 97/64   Pulse 51   Temp 97.8 °F (36.6 °C) (Oral)   Resp 18   Ht 5' 5\" (1.651 m)   Wt 275 lb (124.7 kg)   SpO2 94%   BMI 45.76 kg/m²   TEMPERATURE:  Current - Temp: 97.8 °F (36.6 °C);  Max - Temp  Av °F (36.7 °C)  Min: 97.6 °F (36.4 °C)  Max: 98.2 °F (36.8 °C)  RESPIRATIONS RANGE: Resp  Av.4  Min: 18  Max: 20  PULSE RANGE: Pulse  Av.6  Min: 35  Max: 73  BLOOD PRESSURE RANGE:  Systolic (80DJB), WKJ:824 , Min:97 , XJU:272   ; Diastolic (99DKL), JFO:49, Min:47, Max:81    PULSE OXIMETRY RANGE: SpO2  Av.4 %  Min: 92 %  Max: 96 %  24HR INTAKE/OUTPUT:    No intake or output data in the 24 hours ending 23 1456    CONSTITUTIONAL:  awake, alert, cooperative, no apparent distress, and appears stated age  NECK:  supple, symmetrical, trachea midline and skin normal  LUNGS:  Moderate increase in work of breathing bart crackles and exp wheezes CARDIOVASCULAR:  Normal apical impulse, regular rate and rhythm, normal S1 and S2, no S3 or S4, and no murmur noted  ABDOMEN:  No scars, normal bowel sounds, soft, non-distended, non-tender, no masses palpated, no hepatosplenomegally  MUSCULOSKELETAL:  ++ edema  NEUROLOGIC:  No acute focal change    Data      Lab Results   Component Value Date/Time    PHART 7.446 02/07/2017 12:26 PM       Lab Results   Component Value Date/Time     02/01/2023 12:14 PM    K 3.8 02/01/2023 12:14 PM    CL 98 02/01/2023 12:14 PM    CO2 33 02/01/2023 12:14 PM    BUN 20 02/01/2023 12:14 PM    CREATININE 1.1 02/01/2023 12:14 PM    GLUCOSE 238 02/01/2023 12:14 PM    CALCIUM 9.7 02/01/2023 12:14 PM     Lab Results   Component Value Date/Time    WBC 9.1 02/01/2023 12:14 PM    HGB 13.8 02/01/2023 12:14 PM    HCT 44.0 02/01/2023 12:14 PM    MCV 84.6 02/01/2023 12:14 PM     02/01/2023 12:14 PM         Lab Results   Component Value Date    INR 0.99 08/01/2022    PROTIME 13.0 08/01/2022       ASSESSMENT AND PLAN      Patient Active Problem List   Diagnosis    Chronic respiratory failure (HCC)    COPD exacerbation (HCC)    CAD (coronary artery disease)    Hypercholesteremia    COPD (chronic obstructive pulmonary disease) ([facility] [address]    Primary hypertension    Morbid obesity (HCC)    ASHD (arteriosclerotic heart disease)    DM type 2, controlled, with complication ([facility])    DM2 (diabetes mellitus, type 2) ([facility])    HTN (hypertension), benign    Primary osteoarthritis involving multiple joints    Gastroesophageal reflux disease without esophagitis    Anxiety and depression    Dysarthria    Class 3 severe obesity due to excess calories with serious comorbidity and body mass index (BMI) of 45.0 to 49.9 in adult ([facility])    AKIL (obstructive sleep apnea)    Hypercarbia    Grade II diastolic dysfunction    CVA, old, alterations of sensations    Left-sided weakness    Arterial ischemic stroke, ICA, right, acute ([facility])    PAF (paroxysmal atrial fibrillation) ([address]    Chest pain    Thoracic radiculopathy    Unstable angina ([facility] [address]    COPD with acute exacerbation ([facility] [address]    Fall                          Wean steroids    Cardiology consult and Pulmonary consult noted    Proamatine for low BP    Adjust insulin dosage   PT OT

## 2023-02-04 NOTE — PROGRESS NOTES
PULMONARY AND CRITICAL CARE MEDICINE PROGRESS NOTE      SUBJECTIVE: Patient was laying comfortably in the bed. She was sleeping when I saw the patient this morning. Stated that she only wore BiPAP with nasal mask for roughly 20 minutes overnight. REVIEW OF SYSTEMS:   CONSTITUTIONAL SYMPTOMS: The patient denies fever, fatigue, night sweats, weight loss or weight gain. HEENT: No vision changes. No tinnitus, Denies sinus pain. No hoarseness, or dysphagia. CARDIOVASCULAR: Denies chest pain, palpitation, syncope. RESPIRATORY: Denies shortness of breath or cough. GASTROINTESTINAL: Denies nausea, abdominal pain or change in bowel function. SKIN: No rashes or itching. MUSCULOSKELETAL: Denies weakness or bone pain. NEUROLOGICAL: No headaches or seizures.      MEDICATIONS:      glipiZIDE  10 mg Oral BID WC    insulin glargine  30 Units SubCUTAneous Nightly    insulin lispro  0-16 Units SubCUTAneous TID WC    insulin lispro  0-4 Units SubCUTAneous Nightly    insulin regular  7 Units SubCUTAneous TID AC    methylPREDNISolone  40 mg IntraVENous Q12H    guaiFENesin  600 mg Oral BID    midodrine  5 mg Oral TID WC    cephALEXin  500 mg Oral 3 times per day    acetaZOLAMIDE  250 mg Oral Daily    mometasone-formoterol  2 puff Inhalation BID    clopidogrel  75 mg Oral Daily    diclofenac sodium  2 g Topical BID    furosemide  40 mg Oral BID    gabapentin  600 mg Oral TID    alogliptin  12.5 mg Oral Daily    pantoprazole  40 mg Oral QAM AC    rivaroxaban  15 mg Oral Daily    rosuvastatin  40 mg Oral Nightly    ipratropium-albuterol  1 ampule Inhalation TID      dextrose       promethazine-dextromethorphan, albuterol sulfate HFA, ALPRAZolam, ibuprofen, nitroGLYCERIN, sodium chloride, ipratropium-albuterol, dextrose bolus **OR** dextrose bolus, glucagon (rDNA), dextrose     ALLERGIES:   Allergies as of 02/01/2023 - Fully Reviewed 02/01/2023   Allergen Reaction Noted    Morphine Shortness Of Breath 12/08/2011 Codeine Other (See Comments) 10/26/2010    Hydromorphone Other (See Comments) 01/25/2013    Levaquin [levofloxacin in d5w] Itching 04/25/2019    Vicodin [hydrocodone-acetaminophen] Hives 12/22/2010    Aspirin  03/14/2020        OBJECTIVE:   height is 5' 5\" (1.651 m) and weight is 275 lb (124.7 kg). Her oral temperature is 97.8 °F (36.6 °C). Her blood pressure is 97/64 and her pulse is 51. Her respiration is 18 and oxygen saturation is 94%. No intake/output data recorded. PHYSICAL EXAM:  CONSTITUTIONAL: She is a 69y.o.-year-old who appears her stated age. She is alert and oriented x 3 and in no acute distress. Morbidly obese. CARDIOVASCULAR: S1 S2 RRR. Without murmer  RESPIRATORY & CHEST: Lungs are clear to auscultation and percussion. No wheezing, no crackles. Good air movement  GASTROINTESTINAL & ABDOMEN: Soft, nontender, positive bowel sounds in all quadrants, non-distended, without hepatosplenomegaly. GENITOURINARY: Deferred. MUSCULOSKELETAL: No tenderness to palpation of the axial skeleton. There is no clubbing. No cyanosis. No edema of the lower extremities. SKIN OF BODY: No rash or jaundice. PSYCHIATRIC EVALUATION: Normal affect. Patient answers questions appropriately. HEMATOLOGIC/LYMPHATIC/ IMMUNOLOGIC: No palpable lymphadenopathy. NEUROLOGIC: Alert and oriented x 3. Groslly non-focal. Motor strength is 5+/5 in all muscle groups. The patient has a normal sensorium globally.       LABS:   Lab Results   Component Value Date    WBC 9.1 02/01/2023    HGB 13.8 02/01/2023    HCT 44.0 02/01/2023     02/01/2023    CHOL 180 01/25/2023    TRIG 157 (H) 01/25/2023    HDL 34 (L) 01/25/2023    LDLDIRECT 85 08/03/2022    ALT 15 02/01/2023    AST 17 02/01/2023     02/01/2023    K 3.8 02/01/2023    CL 98 (L) 02/01/2023    CREATININE 1.1 02/01/2023    BUN 20 02/01/2023    CO2 33 (H) 02/01/2023    TSH 2.34 06/06/2011    INR 0.99 08/01/2022       Lab Results   Component Value Date    GLUCOSE 238 (H) 02/01/2023    CALCIUM 9.7 02/01/2023     02/01/2023    K 3.8 02/01/2023    CO2 33 (H) 02/01/2023    CL 98 (L) 02/01/2023    BUN 20 02/01/2023    CREATININE 1.1 02/01/2023       Lab Results   Component Value Date    GLUCOSE 238 (H) 02/01/2023    CALCIUM 9.7 02/01/2023     02/01/2023    K 3.8 02/01/2023    CO2 33 (H) 02/01/2023    CL 98 (L) 02/01/2023    BUN 20 02/01/2023    CREATININE 1.1 02/01/2023       IMPRESSION:   COPD of unknown severity in exacerbation  Heart failure with preserved ejection fraction  Chronic hypoxic respiratory failure, stable  Acute on chronic hypercapnic respiratory failure  Morbid obesity  High suspicion for obstructive sleep apnea  Previous history of tobacco abuse  Coronary artery disease s/p PCI  Paroxysmal A-fib on chronic anticoagulation       RECOMMENDATION:   Patient's respiratory status has improved. She is maintaining saturation on minimal FiO2 at 2 to 3 L/min. Patient is not wheezing. Patient has been noncompliant with BiPAP therapy. She is severely claustrophobic. She wore BiPAP with nasal mask for roughly 20 minutes overnight. I strongly advised her to wear BiPAP for the whole night. Continue Dulera and DuoNebs around-the-clock. Continue pulmonary toilet with Mucinex, Acapella and MetaNebs. Solu-Medrol can be switched to prednisone 40 mg daily with slow taper. Patient can be discharged from pulmonary perspective. Pulmonary will sign off. Juanjose Rendon MD  Pulmonary Critical Care and Sleep Medicine  2/4/2023, 1:44 PM    This note was completed using dragon medical speech recognition software. Grammatical errors, random word insertions, pronoun errors and incomplete sentences are occasional consequences of this technology due to software limitations. If there are questions or concerns about the content of this note of information contained within the body of this dictation they should be addressed with the provider for clarification.

## 2023-02-04 NOTE — RT PROTOCOL NOTE
RT Nebulizer Bronchodilator Protocol Note    There is a bronchodilator order in the chart from a provider indicating to follow the RT Bronchodilator Protocol and there is an Initiate RT Bronchodilator Protocol order as well (see protocol at bottom of note). CXR Findings:  No results found. The findings from the last RT Protocol Assessment were as follows:  Smoking: Chronic pulmonary disease  Respiratory Pattern: Dyspnea on exertion or RR 21-25 bpm  Breath Sounds: Slightly diminished and/or crackles  Cough: Weak, productive  Indication for Bronchodilator Therapy:    Bronchodilator Assessment Score: 8    Aerosolized bronchodilator medication orders have been revised according to the RT Nebulizer Bronchodilator Protocol below. Respiratory Therapist to perform RT Therapy Protocol Assessment initially then follow the protocol. Repeat RT Therapy Protocol Assessment PRN for score 0-3 or on second treatment, BID, and PRN for scores above 3. No Indications - adjust the frequency to every 6 hours PRN wheezing or bronchospasm, if no treatments needed after 48 hours then discontinue using Per Protocol order mode. If indication present, adjust the RT bronchodilator orders based on the Bronchodilator Assessment Score as indicated below. If a patient is on this medication at home then do not decrease Frequency below that used at home. 0-3 - enter or revise RT bronchodilator order(s) to equivalent RT Bronchodilator order with Frequency of every 4 hours PRN for wheezing or increased work of breathing using Per Protocol order mode. 4-6 - enter or revise RT Bronchodilator order(s) to two equivalent RT bronchodilator orders with one order with BID Frequency and one order with Frequency of every 4 hours PRN wheezing or increased work of breathing using Per Protocol order mode.          7-10 - enter or revise RT Bronchodilator order(s) to two equivalent RT bronchodilator orders with one order with TID Frequency and one order with Frequency of every 4 hours PRN wheezing or increased work of breathing using Per Protocol order mode. 11-13 - enter or revise RT Bronchodilator order(s) to one equivalent RT bronchodilator order with QID Frequency and an Albuterol order with Frequency of every 4 hours PRN wheezing or increased work of breathing using Per Protocol order mode. Greater than 13 - enter or revise RT Bronchodilator order(s) to one equivalent RT bronchodilator order with every 4 hours Frequency and an Albuterol order with Frequency of every 2 hours PRN wheezing or increased work of breathing using Per Protocol order mode. RT to enter RT Home Evaluation for COPD & MDI Assessment order using Per Protocol order mode.     Electronically signed by Campbell Parry RCP on 2/4/2023 at 2:59 PM

## 2023-02-04 NOTE — PROGRESS NOTES
Physician Progress Note      Richard Fraga  CSN #:                  [id number]  :                       1945  ADMIT DATE:       2023 11:43 AM  DISCH DATE:  RESPONDING  PROVIDER #:        Denisse Kate MD          QUERY TEXT:    Patient admitted with COPD exacerbation, noted to have paroxysmal atrial   fibrillation and is maintained on Xarelto. If possible, please document in   progress notes and discharge summary if you are evaluating and/or treating any   of the following: The medical record reflects the following:  Risk Factors: 69year old female with a-fib  Clinical Indicators: Per 2/3 EP note- \" Paroxysmal Atrial Fibrillation  - Currently in NSR  - Off BB due ? bradycardia  - [id number] score:high; [id number] Vasc score and anticoagulation discussed. High risk for stroke and thromboembolism. Anticoagulation is recommended. Risk   of bleeding was discussed. On Xarelto 15 mg daily (recommend 20 mg dosage given her kidney   function/weight)\"  Treatment: Cardio consult, Xarelto  Options provided:  -- Secondary hypercoagulable state in a patient with atrial fibrillation  -- Other - I will add my own diagnosis  -- Disagree - Not applicable / Not valid  -- Disagree - Clinically unable to determine / Unknown  -- Refer to Clinical Documentation Reviewer    PROVIDER RESPONSE TEXT:    This patient has secondary hypercoagulable state in a patient with atrial   fibrillation. Query created by: Ravinder Pickens on 2/3/2023 2:17 PM      Electronically signed by:   Denisse Kate MD 2023 5:08 PM

## 2023-02-04 NOTE — PROGRESS NOTES
[facility]   Electrophysiology Progress Note     Date: 2/4/2023  Admit Date: 2/1/2023     Reason for consultation: PVC, Bradycardia    Chief Complaint:   Chief Complaint   Patient presents with    Fall     PT states 2 falls yesterday, pt responsive to voice upon arrival, unsure what happened, pt with stents placed yesterday, medics concerned for runs of vfib in route     History of Present Illness: History obtained from patient and medical record. Akua Antoine is a 69 y.o. female with a past medical history of morbid obesity, COPD untreated AKIL, HTN, and CAD s/p PCI to LAD and RCA on 1/30/2023. She also has had paroxysmal atrial fibrillation and was on Xarelto. She also has chronic shortness of breath. Pt presented with fall. She complains of generalized weakness and being tired. Has had two falls but no syncope. She feels sleepy and weak. Pt complains of feeling skipped beats for the past few months. She reports no palpitation. Occasional chest discomfort. States that she has not been feeling well since PCI. She states that she was taken off metoprolol before but was put back on it again. Checks her BP and pulse at home. Reports no bradycardia. Interval Hx: Today, she is being seen for EP follow up. She is doing pretty well. No further chest pains. She remains in sinus rhythm with PVCs (episodes of trigeminy at times. Her BP remains on low side, but stable. Pt reports she was able to wear the CPAP for a brief amount of time overnight. Patient seen and examined. Clinical notes reviewed. Telemetry reviewed. No new complaints today. No major events overnight. Denies having chest pain, palpitations, shortness of breath, orthopnea/PND, cough, or dizziness at the time of this visit. Allergies:   Allergies   Allergen Reactions    Morphine Shortness Of Breath    Codeine Other (See Comments)     Chest pain    Hydromorphone Other (See Comments)     hallucinations  Hallucinations    But will take if needed in an emergency    Levaquin [Levofloxacin In D5w] Itching    Vicodin [Hydrocodone-Acetaminophen] Hives    Aspirin      Upsets hiatal hernia     Home Meds:  Prior to Visit Medications    Medication Sig Taking? Authorizing Provider   nitroGLYCERIN (NITROSTAT) 0.4 MG SL tablet Place 1 tablet under the tongue every 5 minutes as needed for Chest pain up to max of 3 total doses. If no relief after 1 dose, call 911. Carri Levine MD   clopidogrel (PLAVIX) 75 MG tablet Take 1 tablet by mouth daily  Raegan Levin MD   rosuvastatin (CRESTOR) 40 MG tablet Take 1 tablet by mouth nightly  Raegan Levin MD   albuterol sulfate HFA (PROVENTIL;VENTOLIN;PROAIR) 108 (90 Base) MCG/ACT inhaler INHALE TWO PUFFS BY MOUTH EVERY 6 HOURS AS NEEDED FOR WHEEZING  Raegan Levin MD   ALPRAZolam Dao Ally) 1 MG tablet Take 1 tablet by mouth 3 times daily as needed for Anxiety for up to 30 days. Carri Levine MD   gabapentin (NEURONTIN) 300 MG capsule Take 2 capsules by mouth 3 times daily for 90 days.   Carri Leivne MD   diclofenac sodium (VOLTAREN) 1 % GEL Apply 2 g topically 2 times daily  Carri Levine MD   furosemide (LASIX) 40 MG tablet TAKE ONE TABLET BY MOUTH TWICE A DAY  Arian Suarez MD   metoprolol succinate (TOPROL XL) 25 MG extended release tablet Take 1 tablet by mouth daily  Carri Levine MD   rivaroxaban (XARELTO) 15 MG TABS tablet Take 1 tablet by mouth daily  Carri Levine MD   lisinopril (PRINIVIL;ZESTRIL) 2.5 MG tablet Take 1 tablet by mouth daily  Carri Levine MD   acetaZOLAMIDE (DIAMOX) 250 MG tablet Take 1 tablet by mouth daily  Carri Levine MD   ibuprofen (ADVIL;MOTRIN) 400 MG tablet Take 1 tablet by mouth every 6 hours as needed for Pain or Fever  Carri Levine MD   omeprazole (PRILOSEC) 40 MG delayed release capsule Take 1 capsule by mouth daily  Carri Levine MD   linagliptin (TRADJENTA) 5 MG tablet Take 1 tablet by mouth daily  Carri Levine MD   glipiZIDE (GLUCOTROL) 5 MG tablet Take 1 tablet by mouth 2 times daily (before meals)  Maximus Anthony MD   blood glucose monitor strips Test 2 times a day & as needed for symptoms of irregular blood glucose. Dispense sufficient amount for indicated testing frequency plus additional to accommodate PRN testing needs. Maximus Anthony MD   hydrALAZINE (APRESOLINE) 25 MG tablet Take 1 tablet by mouth 3 times daily  Maximus Anthony MD   budesonide-formoterol (SYMBICORT) 160-4.5 MCG/ACT AERO Inhale 2 puffs into the lungs 2 times daily  Maximus Anthony MD   Umeclidinium Bromide (INCRUSE ELLIPTA) 62.5 MCG/INH AEPB Inhale 1 puff into the lungs daily  Patient not taking: No sig reported  Roxy Bro MD   triamcinolone (KENALOG) 0.1 % cream Apply topically 2 times daily. Maximus Anthony MD   ipratropium-albuterol (DUONEB) 0.5-2.5 (3) MG/3ML SOLN nebulizer solution INHALE THREE MILLILITERS VIA NEBULIZATION BY MOUTH EVERY 4 HOURS AS NEEDED Ba Pal MD   blood glucose test strips (TRUE METRIX BLOOD GLUCOSE TEST) strip USE THREE TIMES A DAY AS NEEDED  Maximus Anthony MD   Lancets MISC 1 each by Does not apply route 2 times daily  Maximus Anthony MD   sodium chloride (OCEAN, BABY AYR) 0.65 % nasal spray 1 spray by Nasal route as needed for Congestion  Maximus Anthony MD   nitroGLYCERIN (NITROSTAT) 0.4 MG SL tablet Place 1 tablet under the tongue every 5 minutes as needed for Chest pain.   Maximus Anthony MD      Scheduled Meds:   glipiZIDE  10 mg Oral BID     insulin glargine  30 Units SubCUTAneous Nightly    insulin lispro  0-16 Units SubCUTAneous TID WC    insulin lispro  0-4 Units SubCUTAneous Nightly    insulin regular  7 Units SubCUTAneous TID AC    methylPREDNISolone  40 mg IntraVENous Q12H    guaiFENesin  600 mg Oral BID    midodrine  5 mg Oral TID WC    cephALEXin  500 mg Oral 3 times per day    acetaZOLAMIDE  250 mg Oral Daily    mometasone-formoterol  2 puff Inhalation BID clopidogrel  75 mg Oral Daily    diclofenac sodium  2 g Topical BID    furosemide  40 mg Oral BID    gabapentin  600 mg Oral TID    alogliptin  12.5 mg Oral Daily    pantoprazole  40 mg Oral QAM AC    rivaroxaban  15 mg Oral Daily    rosuvastatin  40 mg Oral Nightly    ipratropium-albuterol  1 ampule Inhalation TID     Continuous Infusions:   dextrose       PRN Meds:promethazine-dextromethorphan, albuterol sulfate HFA, ALPRAZolam, ibuprofen, nitroGLYCERIN, sodium chloride, ipratropium-albuterol, dextrose bolus **OR** dextrose bolus, glucagon (rDNA), dextrose     Past Medical History:  Past Medical History:   Diagnosis Date    Acute [facility] ([facility]     Acute pyelonephritis 09/08/2015    Anxiety and depression     Arthritis     CAD (coronary artery disease)     two heart stent one in 2000 and 2nd one 6 months later on 2000, had MI in 2000    Cancer [facility])     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    Cancer ([facility]     \"skin on top of my head\"    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    Chronic obstructive pulmonary disease ([facility] 01/13/2017    Claustrophobia     COPD (chronic obstructive pulmonary disease) (HCC)     ESBL (extended spectrum beta-lactamase) producing bacteria infection 08/14/2021    Esophageal stricture     Gastroesophageal reflux disease without esophagitis 03/24/2016    Hiatal hernia     Hiatal hernia     Hypercholesteremia     Hypertension     IBS (irritable bowel syndrome)     Migraines     Movement disorder arthritis    Pneumonia     PONV (postoperative nausea and vomiting)     Type II or unspecified type diabetes mellitus without mention of complication, not stated as uncontrolled     type ll does not take insulin at home    Unspecified cerebral artery occlusion with cerebral infarction     many TIA's      Past Surgical History:    has a past surgical history that includes Cardiac surgery; Gallbladder surgery; Hysterectomy; Appendectomy; Cholecystectomy; Colonoscopy;  Upper gastrointestinal endoscopy (4/20/12); Endoscopy, colon, diagnostic; Tear duct surgery; Upper gastrointestinal endoscopy (06/11/2018); Colonoscopy (06/11/2018); pr excision malignant lesion s/n/h/f/g 0.5 cm/< (N/A, 9/6/2018); pr split agrft t/a/l 1st 100 cm/&/1% bdy inft/chld (N/A, 9/6/2018); skin biopsy; eye surgery; bronchoscopy (N/A, 1/24/2020); bronchoscopy (N/A, 1/27/2020); Cataract removal (2013 or 2014); Esophagus dilation; and bronchoscopy (N/A, 11/23/2020). Social History:  Reviewed. reports that she quit smoking about 5 years ago. Her smoking use included cigarettes. She has a 12.25 pack-year smoking history. She has never used smokeless tobacco. She reports current alcohol use of about 1.0 standard drink per week. She reports that she does not use drugs. Family History:  Reviewed. family history includes Cancer in her father, mother, and sister; Heart Disease in her brother, mother, and sister. Review of Systems:  Constitutional: Positive for fatigue, weakness. Negative for fever, night sweats, chills, weight changes  Skin: Negative for rash, dry skin, pruritus, bruising, bleeding, blood clots, or changes in skin pigment  HEENT: Negative for vision changes, ringing in the ears, sore throat, dysphagia, or swollen lymph nodes  Respiratory: Reviewed in HPI  Cardiovascular: Reviewed in HPI  Gastrointestinal: Negative for abdominal pain, N/V/D, constipation, or black/tarry stools  Genito-Urinary: Negative for dysuria, incontinence, urgency, or hematuria  Musculoskeletal: Negative for joint swelling, muscle pain, or injuries  Neurological/Psych: Negative for confusion, seizures, headaches, balance issues or TIA-like symptoms.  No anxiety, depression, or insomnia    Physical Examination:  Vitals:    02/04/23 0827   BP: (!) 97/47   Pulse: 71   Resp:    Temp: 97.6 °F (36.4 °C)   SpO2: 93%      In: 400 [P.O.:400]  Out: -    Wt Readings from Last 3 Encounters:   02/01/23 275 lb (124.7 kg)   01/31/23 274 lb 3.2 oz (124.4 kg)   01/13/23 269 lb (122 kg)       Intake/Output Summary (Last 24 hours) at 2/4/2023 0844  Last data filed at 2/3/2023 1300  Gross per 24 hour   Intake 400 ml   Output --   Net 400 ml       Telemetry: Personally Reviewed  - Sinus rhythm with PVC  Constitutional: Cooperative and in no apparent distress, and appears well nourished  Skin: Warm and pink; no pallor, cyanosis, bruising, or clubbing  HEENT: Symmetric and normocephalic. PERRL, EOM intact. Conjunctiva pink with clear sclera. Mucus membranes pink and moist. Teeth intact. Thyroid smooth without nodules or goiter. Cardiovascular: Regular rate and rhythm. S1/S2 present without rubs, or gallops. + Murmur. Peripheral pulses 2+, capillary refill < 3 seconds. No elevation of JVP. No peripheral edema  Respiratory: Respirations symmetric and unlabored. Lungs clear/diminished to auscultation bilaterally, no wheezing, crackles, or rhonchi  Gastrointestinal: Abdomen soft and round. Bowel sounds normoactive in all quadrants without tenderness or masses. ++ Obese  Musculoskeletal: Bilateral upper and lower extremity strength 5/5 with full ROM  Neurologic/Psych: Awake and orientated to person, place and time. Calm affect, appropriate mood    Pertinent labs, diagnostic, device, and imaging results reviewed as a part of this visit    Labs:    BMP:   Recent Labs     02/01/23  1214      K 3.8   CL 98*   CO2 33*   BUN 20   CREATININE 1.1     Estimated Creatinine Clearance: 58 mL/min (based on SCr of 1.1 mg/dL).    CBC:   Recent Labs     02/01/23  1214   WBC 9.1   HGB 13.8   HCT 44.0   MCV 84.6        Thyroid:   Lab Results   Component Value Date/Time    TSH 2.34 06/06/2011 09:40 AM     Lipids:   Lab Results   Component Value Date/Time    CHOL 180 01/25/2023 05:16 AM    HDL 34 01/25/2023 05:16 AM    HDL 32 04/19/2012 05:42 AM    TRIG 157 01/25/2023 05:16 AM     LFTS:   Lab Results   Component Value Date/Time    ALT 15 02/01/2023 12:14 PM    AST 17 2023 12:14 PM    ALKPHOS 79 2023 12:14 PM    PROT 6.7 2023 12:14 PM    PROT 7.6 2011 09:38 AM    AGRATIO 1.1 2023 12:14 PM    BILITOT 0.3 2023 12:14 PM     Cardiac Enzymes:   Lab Results   Component Value Date/Time    CKTOTAL 115 2023 12:14 PM    CKMB 0.29 2011 04:45 PM    TROPONINI <0.01 2023 12:57 PM    TROPONINI <0.01 2023 12:14 PM    TROPONINI <0.01 2023 12:38 AM     Coags:   Lab Results   Component Value Date/Time    PROTIME 13.0 2022 06:19 PM    INR 0.99 2022 06:19 PM       EC23 (Personally Interpreted)   - NSR with LBBB and frequent PVC    ECHO:    Normal global systolic function with an ejection fraction estimated at 70%. No regional wall motion abnormalities noted. Mild concentric left ventricular hypertrophy. Mild aortic stenosis with a peak velocity of 2.17m/s and a mean pressure   gradient of 12 mmHg. There is mild aortic insufficiency. Grade I diastolic dysfunction with normal LV filling pressures. Avg. E/e'=18.2    Stress Test: 10/22   There is a mild fixed inferioapical defect with no associated wall motion    abnormality consistent with diaphragmatic attenuation artifact. No evidence of myocardium at risk for significant reversible ischemia. Normal LV size and systolic function. Overall findings represent a low risk scan. Cath: 23  Anatomy:   LAD-prox 70-80% ISR, mid 70-80%, distal 90%  RCA-prox 90%  RPDA- nml     Intervention  ~Successful PCI to RCA with 4.0x18 KATHLEEN. PD with 4.0x8 NC. PCI to Distal LAD 2.75x38 KATHLEEN. PCI to Prox/mid LAD 3.25x38 KATHLEEN. Excellent Result.      Problem List:   Patient Active Problem List    Diagnosis Date Noted    HTN (hypertension), benign     CAD (coronary artery disease) 2011    Hypercholesteremia 2011    Fall 2023    COPD with acute exacerbation ([facility] 2023    Unstable angina ([facility] 2023    Thoracic radiculopathy 10/27/2022    Chest pain 10/26/2022    Left-sided weakness 08/02/2022    Arterial ischemic stroke, ICA, right, acute ([facility] 08/02/2022    [facility]F [facility]oxysmal [facility] fibrillation) [facility] 08/02/2022    CVA, old, alterations of sensations 08/01/2022    Grade II diastolic dysfunction 42/01/1012    AKIL (obstructive sleep apnea) 01/23/2020    Hypercarbia 01/23/2020    Class 3 severe obesity due to excess calories with serious comorbidity and body mass index (BMI) of 45.0 to 49.9 in adult ([facility] 06/18/2018    Dysarthria 06/07/2018    Anxiety and depression 01/19/2018    Primary osteoarthritis involving multiple joints 03/24/2016    Gastroesophageal reflux disease without esophagitis 03/24/2016    DM2 (diabetes mellitus, type 2) ([facility] 12/03/2015    DM type 2, controlled, with complication ([facility]     ASHD (arteriosclerotic heart disease)     Primary hypertension 04/18/2012    COPD (chronic obstructive pulmonary disease) ([facility] 08/09/2011    Chronic respiratory failure ([facility] 12/22/2010        Assessment and Plan:     Bradycardia   - Telemetry has been reviewed. She does not have any significant bradycardia on telemetry. Her bradycardia is secondary to PVCs, which falsely makes her HR appear bradycardia on pulse oximetry/BP cuff. She does drop into the 40s during overnight hours, which is secondary to her untreated sleep apnea     - Off metoprolol. Pt states she won't take it due to her low HR. Avoid AV shayy agents at this time   - Will assess HR profile with 72 hour holter. We can place a holter as outpatient if she discharged over weekend. If tachy-kirsten or symptomatic bradycardia noted, then PPM may be needed, but no indication at this time    2. PVC's  - Noted on telemetry. Episodes of bigeminy on tele. She has PVCs on past EKG so doubt these are related to her symptoms that brought her in    - Discussed PVCs, risks, therapies and alternatives with patient.  All questions were answered  - Treatment options including medical therapy with antiarrhythmic drugs or ablation discussed   ~ Will do holter as outpatient to assess PVC burden and HR profile    3. CAD  - S/p PCI to RCA and LAD (1/30/23)  - Stable  - Continue Plavix, and statin   ~ No BB due to pt choice and ? Bradycardia. No ASA due to Xarelto     - Pt had an episode of chest pain yesterday that awoke her from her sleep and improved with SL nitro. Troponin negative. Her pain is reproducible on palpitation so likely musculoskeletal from her coughing/COPD    4. Paroxysmal Atrial Fibrillation  - Currently in NSR  - Off BB due bradycardia. Limited rate control options given borderline HR and her co-morbidities (CAD, COPD)  - [id number] score:high; [id number] Vasc score and anticoagulation discussed. High risk for stroke and thromboembolism. Anticoagulation is recommended. Risk of bleeding was discussed.  ~ On Xarelto 15 mg daily (recommend 20 mg dosage given her kidney function/weight)    5. HTN  - Controlled, on low side  - Continue current medications    6. AKIL  - Multiple risk factors for sleep apnea  - Discussed long term effects of untreated AKIL. Pt wore CPAP for brief time overnight    7. COPD   - Likely main culprit for her SOB   - On 3L of oxygen   - On inhalers  - Plans for possible PFTs as outpatient    Ok for d/c this weekend from cardiology standpoint if ok with other consultants. Will follow up in office as scheduled     All pertinent information and plan of care discussed with the EP physician. All questions and concerns were addressed to the patient. Alternatives to my treatment were discussed. I have discussed the above stated plan with patient and the nurse. The patient verbalized understanding and agreed with the plan. Thank you for allowing to us to participate in the care of Aren Parker    The patient was seen for >35 minutes.  I reviewed interval history, physical exam, review of data including labs, imaging, development and implementation of treatment plan and coordination of complex care. More than 50% of the time was devoted to counseling the patient on their diagnoses/treatments, as well as coordination of their care    KAE Guadalupe-CNP  [place]    Office: [phone]

## 2023-02-05 LAB
ANION GAP SERPL CALCULATED.3IONS-SCNC: 4 MMOL/L (ref 3–16)
BLOOD CULTURE, ROUTINE: NORMAL
BUN BLDV-MCNC: 33 MG/DL (ref 7–20)
CALCIUM SERPL-MCNC: 10 MG/DL (ref 8.3–10.6)
CHLORIDE BLD-SCNC: 99 MMOL/L (ref 99–110)
CO2: 30 MMOL/L (ref 21–32)
CREAT SERPL-MCNC: 0.9 MG/DL (ref 0.6–1.2)
CULTURE, BLOOD 2: NORMAL
GFR SERPL CREATININE-BSD FRML MDRD: >60 ML/MIN/{1.73_M2}
GLUCOSE BLD-MCNC: 152 MG/DL (ref 70–99)
GLUCOSE BLD-MCNC: 174 MG/DL (ref 70–99)
GLUCOSE BLD-MCNC: 239 MG/DL (ref 70–99)
GLUCOSE BLD-MCNC: 290 MG/DL (ref 70–99)
GLUCOSE BLD-MCNC: 298 MG/DL (ref 70–99)
HCT VFR BLD CALC: 40 % (ref 36–48)
HEMOGLOBIN: 12.8 G/DL (ref 12–16)
MCH RBC QN AUTO: 27 PG (ref 26–34)
MCHC RBC AUTO-ENTMCNC: 32.1 G/DL (ref 31–36)
MCV RBC AUTO: 84.4 FL (ref 80–100)
PDW BLD-RTO: 15.7 % (ref 12.4–15.4)
PERFORMED ON: ABNORMAL
PLATELET # BLD: 182 K/UL (ref 135–450)
PMV BLD AUTO: 8.3 FL (ref 5–10.5)
POTASSIUM SERPL-SCNC: 4.6 MMOL/L (ref 3.5–5.1)
RBC # BLD: 4.74 M/UL (ref 4–5.2)
SODIUM BLD-SCNC: 133 MMOL/L (ref 136–145)
WBC # BLD: 14.3 K/UL (ref 4–11)

## 2023-02-05 PROCEDURE — 36415 COLL VENOUS BLD VENIPUNCTURE: CPT

## 2023-02-05 PROCEDURE — 99232 SBSQ HOSP IP/OBS MODERATE 35: CPT | Performed by: NURSE PRACTITIONER

## 2023-02-05 PROCEDURE — 80048 BASIC METABOLIC PNL TOTAL CA: CPT

## 2023-02-05 PROCEDURE — 94669 MECHANICAL CHEST WALL OSCILL: CPT

## 2023-02-05 PROCEDURE — 2700000000 HC OXYGEN THERAPY PER DAY

## 2023-02-05 PROCEDURE — 1200000000 HC SEMI PRIVATE

## 2023-02-05 PROCEDURE — 6360000002 HC RX W HCPCS: Performed by: INTERNAL MEDICINE

## 2023-02-05 PROCEDURE — 6370000000 HC RX 637 (ALT 250 FOR IP): Performed by: INTERNAL MEDICINE

## 2023-02-05 PROCEDURE — 94640 AIRWAY INHALATION TREATMENT: CPT

## 2023-02-05 PROCEDURE — 85027 COMPLETE CBC AUTOMATED: CPT

## 2023-02-05 PROCEDURE — 94761 N-INVAS EAR/PLS OXIMETRY MLT: CPT

## 2023-02-05 RX ORDER — PREDNISONE 20 MG/1
20 TABLET ORAL 2 TIMES DAILY
Status: DISCONTINUED | OUTPATIENT
Start: 2023-02-05 | End: 2023-02-06 | Stop reason: HOSPADM

## 2023-02-05 RX ORDER — LANOLIN ALCOHOL/MO/W.PET/CERES
6 CREAM (GRAM) TOPICAL NIGHTLY PRN
Status: DISCONTINUED | OUTPATIENT
Start: 2023-02-05 | End: 2023-02-06 | Stop reason: HOSPADM

## 2023-02-05 RX ADMIN — GABAPENTIN 600 MG: 300 CAPSULE ORAL at 20:52

## 2023-02-05 RX ADMIN — INSULIN LISPRO 4 UNITS: 100 INJECTION, SOLUTION INTRAVENOUS; SUBCUTANEOUS at 08:32

## 2023-02-05 RX ADMIN — IPRATROPIUM BROMIDE AND ALBUTEROL SULFATE 1 AMPULE: 2.5; .5 SOLUTION RESPIRATORY (INHALATION) at 07:56

## 2023-02-05 RX ADMIN — GLIPIZIDE 10 MG: 5 TABLET ORAL at 08:32

## 2023-02-05 RX ADMIN — ALPRAZOLAM 1 MG: 0.5 TABLET ORAL at 00:01

## 2023-02-05 RX ADMIN — INSULIN HUMAN 7 UNITS: 100 INJECTION, SOLUTION PARENTERAL at 17:58

## 2023-02-05 RX ADMIN — GUAIFENESIN 600 MG: 600 TABLET, EXTENDED RELEASE ORAL at 20:52

## 2023-02-05 RX ADMIN — GABAPENTIN 600 MG: 300 CAPSULE ORAL at 08:32

## 2023-02-05 RX ADMIN — Medication 2 PUFF: at 20:25

## 2023-02-05 RX ADMIN — CEPHALEXIN 500 MG: 250 CAPSULE ORAL at 20:52

## 2023-02-05 RX ADMIN — GABAPENTIN 600 MG: 300 CAPSULE ORAL at 17:58

## 2023-02-05 RX ADMIN — CEPHALEXIN 500 MG: 250 CAPSULE ORAL at 05:36

## 2023-02-05 RX ADMIN — INSULIN GLARGINE 30 UNITS: 100 INJECTION, SOLUTION SUBCUTANEOUS at 20:53

## 2023-02-05 RX ADMIN — INSULIN LISPRO 8 UNITS: 100 INJECTION, SOLUTION INTRAVENOUS; SUBCUTANEOUS at 11:50

## 2023-02-05 RX ADMIN — METHYLPREDNISOLONE SODIUM SUCCINATE 40 MG: 40 INJECTION, POWDER, FOR SOLUTION INTRAMUSCULAR; INTRAVENOUS at 05:36

## 2023-02-05 RX ADMIN — RIVAROXABAN 15 MG: 15 TABLET, FILM COATED ORAL at 08:32

## 2023-02-05 RX ADMIN — INSULIN HUMAN 7 UNITS: 100 INJECTION, SOLUTION PARENTERAL at 08:33

## 2023-02-05 RX ADMIN — PREDNISONE 20 MG: 20 TABLET ORAL at 20:52

## 2023-02-05 RX ADMIN — Medication 2 PUFF: at 07:57

## 2023-02-05 RX ADMIN — ROSUVASTATIN 40 MG: 40 TABLET, FILM COATED ORAL at 20:52

## 2023-02-05 RX ADMIN — GLIPIZIDE 10 MG: 5 TABLET ORAL at 17:58

## 2023-02-05 RX ADMIN — GUAIFENESIN 600 MG: 600 TABLET, EXTENDED RELEASE ORAL at 08:32

## 2023-02-05 RX ADMIN — CEPHALEXIN 500 MG: 250 CAPSULE ORAL at 17:58

## 2023-02-05 RX ADMIN — IPRATROPIUM BROMIDE AND ALBUTEROL SULFATE 1 AMPULE: 2.5; .5 SOLUTION RESPIRATORY (INHALATION) at 14:17

## 2023-02-05 RX ADMIN — IPRATROPIUM BROMIDE AND ALBUTEROL SULFATE 1 AMPULE: 2.5; .5 SOLUTION RESPIRATORY (INHALATION) at 20:24

## 2023-02-05 RX ADMIN — CLOPIDOGREL BISULFATE 75 MG: 75 TABLET ORAL at 08:32

## 2023-02-05 RX ADMIN — DICLOFENAC SODIUM 2 G: 10 GEL TOPICAL at 20:53

## 2023-02-05 RX ADMIN — ALPRAZOLAM 1 MG: 0.5 TABLET ORAL at 20:52

## 2023-02-05 RX ADMIN — ALOGLIPTIN 12.5 MG: 12.5 TABLET, FILM COATED ORAL at 08:32

## 2023-02-05 RX ADMIN — INSULIN HUMAN 7 UNITS: 100 INJECTION, SOLUTION PARENTERAL at 11:50

## 2023-02-05 RX ADMIN — ACETAZOLAMIDE 250 MG: 250 TABLET ORAL at 08:32

## 2023-02-05 NOTE — PROGRESS NOTES
[facility]   Electrophysiology Progress Note     Date: 2/5/2023  Admit Date: 2/1/2023     Reason for consultation: PVC, Bradycardia    Chief Complaint:   Chief Complaint   Patient presents with    Fall     PT states 2 falls yesterday, pt responsive to voice upon arrival, unsure what happened, pt with stents placed yesterday, medics concerned for runs of vfib in route     History of Present Illness: History obtained from patient and medical record. Vignesh Suresh is a 69 y.o. female with a past medical history of morbid obesity, COPD untreated AKIL, HTN, and CAD s/p PCI to LAD and RCA on 1/30/2023. She also has had paroxysmal atrial fibrillation and was on Xarelto. She also has chronic shortness of breath. Pt presented with fall. She complains of generalized weakness and being tired. Has had two falls but no syncope. She feels sleepy and weak. Pt complains of feeling skipped beats for the past few months. She reports no palpitation. Occasional chest discomfort. States that she has not been feeling well since PCI. She states that she was taken off metoprolol before but was put back on it again. Checks her BP and pulse at home. Reports no bradycardia. Interval Hx: Today, she is being seen for follow up. She is resting comfortably in bed. She remains in sinus rhythm with occasional PVCs. No further CP episodes. She is unable to tolerate bi-pap at night due to claustrophobia. Patient seen and examined. Clinical notes reviewed. Telemetry reviewed. No new complaints today. No major events overnight. Denies having chest pain, palpitations, shortness of breath, orthopnea/PND, cough, or dizziness at the time of this visit. Allergies:   Allergies   Allergen Reactions    Morphine Shortness Of Breath    Codeine Other (See Comments)     Chest pain    Hydromorphone Other (See Comments)     hallucinations  Hallucinations    But will take if needed in an emergency    Levaquin [Levofloxacin In D5w] Itching    Vicodin [Hydrocodone-Acetaminophen] Hives    Aspirin      Upsets hiatal hernia     Home Meds:  Prior to Visit Medications    Medication Sig Taking? Authorizing Provider   nitroGLYCERIN (NITROSTAT) 0.4 MG SL tablet Place 1 tablet under the tongue every 5 minutes as needed for Chest pain up to max of 3 total doses. If no relief after 1 dose, call 911. Rohith Wing MD   clopidogrel (PLAVIX) 75 MG tablet Take 1 tablet by mouth daily  Godwin Christian MD   rosuvastatin (CRESTOR) 40 MG tablet Take 1 tablet by mouth nightly  Godwin Christian MD   albuterol sulfate HFA (PROVENTIL;VENTOLIN;PROAIR) 108 (90 Base) MCG/ACT inhaler INHALE TWO PUFFS BY MOUTH EVERY 6 HOURS AS NEEDED FOR WHEEZING  Godwin Christian MD   ALPRAZolam Jm Marysollly) 1 MG tablet Take 1 tablet by mouth 3 times daily as needed for Anxiety for up to 30 days. Rohith Wing MD   gabapentin (NEURONTIN) 300 MG capsule Take 2 capsules by mouth 3 times daily for 90 days.   Rohith Wing MD   diclofenac sodium (VOLTAREN) 1 % GEL Apply 2 g topically 2 times daily  Rohith Wing MD   furosemide (LASIX) 40 MG tablet TAKE ONE TABLET BY MOUTH TWICE A DAY  Arian Bui MD   metoprolol succinate (TOPROL XL) 25 MG extended release tablet Take 1 tablet by mouth daily  Rohith Wing MD   rivaroxaban (XARELTO) 15 MG TABS tablet Take 1 tablet by mouth daily  Rohith Wing MD   lisinopril (PRINIVIL;ZESTRIL) 2.5 MG tablet Take 1 tablet by mouth daily  Rohith Wing MD   acetaZOLAMIDE (DIAMOX) 250 MG tablet Take 1 tablet by mouth daily  Rohith Wing MD   ibuprofen (ADVIL;MOTRIN) 400 MG tablet Take 1 tablet by mouth every 6 hours as needed for Pain or Fever  Rohith Wing MD   omeprazole (PRILOSEC) 40 MG delayed release capsule Take 1 capsule by mouth daily  Rohith Wing MD   linagliptin (TRADJENTA) 5 MG tablet Take 1 tablet by mouth daily  Rohith Wing MD   glipiZIDE (GLUCOTROL) 5 MG tablet Take 1 tablet by mouth 2 times daily (before meals)  Mirta Horton MD   blood glucose monitor strips Test 2 times a day & as needed for symptoms of irregular blood glucose. Dispense sufficient amount for indicated testing frequency plus additional to accommodate PRN testing needs. Mirta Horton MD   hydrALAZINE (APRESOLINE) 25 MG tablet Take 1 tablet by mouth 3 times daily  Mirta Horton MD   budesonide-formoterol (SYMBICORT) 160-4.5 MCG/ACT AERO Inhale 2 puffs into the lungs 2 times daily  Mirta Horton MD   Umeclidinium Bromide (INCRUSE ELLIPTA) 62.5 MCG/INH AEPB Inhale 1 puff into the lungs daily  Patient not taking: No sig reported  Josselyn Mcghee MD   triamcinolone (KENALOG) 0.1 % cream Apply topically 2 times daily. Mirta oHrton MD   ipratropium-albuterol (DUONEB) 0.5-2.5 (3) MG/3ML SOLN nebulizer solution INHALE THREE MILLILITERS VIA NEBULIZATION BY MOUTH EVERY 4 HOURS AS NEEDED Herbert Mejias MD   blood glucose test strips (TRUE METRIX BLOOD GLUCOSE TEST) strip USE THREE TIMES A DAY AS NEEDED  Mirta Horton MD   Lancets MISC 1 each by Does not apply route 2 times daily  Mirta Horton MD   sodium chloride (OCEAN, BABY AYR) 0.65 % nasal spray 1 spray by Nasal route as needed for Congestion  Mirta Horton MD   nitroGLYCERIN (NITROSTAT) 0.4 MG SL tablet Place 1 tablet under the tongue every 5 minutes as needed for Chest pain.   Mirta Horton MD      Scheduled Meds:   furosemide  40 mg Oral Daily    glipiZIDE  10 mg Oral BID WC    insulin glargine  30 Units SubCUTAneous Nightly    insulin lispro  0-16 Units SubCUTAneous TID     insulin lispro  0-4 Units SubCUTAneous Nightly    insulin regular  7 Units SubCUTAneous TID AC    methylPREDNISolone  40 mg IntraVENous Q12H    guaiFENesin  600 mg Oral BID    [Held by provider] midodrine  5 mg Oral TID WC    cephALEXin  500 mg Oral 3 times per day    acetaZOLAMIDE  250 mg Oral Daily    mometasone-formoterol  2 puff Inhalation BID    clopidogrel 75 mg Oral Daily    diclofenac sodium  2 g Topical BID    gabapentin  600 mg Oral TID    alogliptin  12.5 mg Oral Daily    pantoprazole  40 mg Oral QAM AC    rivaroxaban  15 mg Oral Daily    rosuvastatin  40 mg Oral Nightly    ipratropium-albuterol  1 ampule Inhalation TID     Continuous Infusions:   dextrose       PRN Meds:promethazine-dextromethorphan, albuterol sulfate HFA, ALPRAZolam, ibuprofen, nitroGLYCERIN, sodium chloride, ipratropium-albuterol, dextrose bolus **OR** dextrose bolus, glucagon (rDNA), dextrose     Past Medical History:  Past Medical History:   Diagnosis Date    Acute MI ([facility] [address]     Acute pyelonephritis 09/08/2015    Anxiety and depression     Arthritis     CAD (coronary artery disease)     two heart stent one in 2000 and 2nd one 6 months later on 2000, had MI in 2000    Cancer [facility])     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    [facility] ([facility]     \"skin on top of my head\"    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    Chronic obstructive pulmonary disease ([facility] 01/13/2017    Claustrophobia     COPD (chronic obstructive pulmonary disease) (HCC)     ESBL (extended spectrum beta-lactamase) producing bacteria infection 08/14/2021    Esophageal stricture     Gastroesophageal reflux disease without esophagitis 03/24/2016    Hiatal hernia     Hiatal hernia     Hypercholesteremia     Hypertension     IBS (irritable bowel syndrome)     Migraines     Movement disorder arthritis    Pneumonia     PONV (postoperative nausea and vomiting)     Type II or unspecified type diabetes mellitus without mention of complication, not stated as uncontrolled     type ll does not take insulin at home    Unspecified cerebral artery occlusion with cerebral infarction     many TIA's      Past Surgical History:    has a past surgical history that includes Cardiac surgery; Gallbladder surgery; Hysterectomy; Appendectomy; Cholecystectomy; Colonoscopy; Upper gastrointestinal endoscopy (4/20/12);  Endoscopy, colon, diagnostic; Tear duct surgery; Upper gastrointestinal endoscopy (06/11/2018); Colonoscopy (06/11/2018); pr excision malignant lesion s/n/h/f/g 0.5 cm/< (N/A, 9/6/2018); pr split agrft t/a/l 1st 100 cm/&/1% bdy inft/chld (N/A, 9/6/2018); skin biopsy; eye surgery; bronchoscopy (N/A, 1/24/2020); bronchoscopy (N/A, 1/27/2020); Cataract removal (2013 or 2014); Esophagus dilation; and bronchoscopy (N/A, 11/23/2020). Social History:  Reviewed. reports that she quit smoking about 5 years ago. Her smoking use included cigarettes. She has a 12.25 pack-year smoking history. She has never used smokeless tobacco. She reports current alcohol use of about 1.0 standard drink per week. She reports that she does not use drugs. Family History:  Reviewed. family history includes Cancer in her father, mother, and sister; Heart Disease in her brother, mother, and sister. Review of Systems:  Constitutional: Positive for fatigue, weakness. Negative for fever, night sweats, chills, weight changes  Skin: Negative for rash, dry skin, pruritus, bruising, bleeding, blood clots, or changes in skin pigment  HEENT: Negative for vision changes, ringing in the ears, sore throat, dysphagia, or swollen lymph nodes  Respiratory: Reviewed in HPI  Cardiovascular: Reviewed in HPI  Gastrointestinal: Negative for abdominal pain, N/V/D, constipation, or black/tarry stools  Genito-Urinary: Negative for dysuria, incontinence, urgency, or hematuria  Musculoskeletal: Negative for joint swelling, muscle pain, or injuries  Neurological/Psych: Negative for confusion, seizures, headaches, balance issues or TIA-like symptoms.  No anxiety, depression, or insomnia    Physical Examination:  Vitals:    02/05/23 0826   BP: (!) 108/49   Pulse: 69   Resp: 16   Temp: 97.7 °F (36.5 °C)   SpO2: 92%      In: 480 [P.O.:480]  Out: 800    Wt Readings from Last 3 Encounters:   02/01/23 275 lb (124.7 kg)   01/31/23 274 lb 3.2 oz (124.4 kg)   01/13/23 269 lb (122 kg)       Intake/Output Summary (Last 24 hours) at 2/5/2023 1010  Last data filed at 2/5/2023 0535  Gross per 24 hour   Intake 480 ml   Output 800 ml   Net -320 ml       Telemetry: Personally Reviewed  - Sinus rhythm with PVC  Constitutional: Cooperative and in no apparent distress, and appears well nourished  Skin: Warm and pink; no pallor, cyanosis, bruising, or clubbing  HEENT: Symmetric and normocephalic. PERRL, EOM intact. Conjunctiva pink with clear sclera. Mucus membranes pink and moist. Teeth intact. Thyroid smooth without nodules or goiter. Cardiovascular: Regular rate and rhythm. S1/S2 present without rubs, or gallops. + Murmur. Peripheral pulses 2+, capillary refill < 3 seconds. No elevation of JVP. No peripheral edema  Respiratory: Respirations symmetric and unlabored. Lungs clear/diminished to auscultation bilaterally, no wheezing, crackles, or rhonchi  Gastrointestinal: Abdomen soft and round. Bowel sounds normoactive in all quadrants without tenderness or masses. ++ Obese  Musculoskeletal: Bilateral upper and lower extremity strength 5/5 with full ROM  Neurologic/Psych: Awake and orientated to person, place and time. Calm affect, appropriate mood    Pertinent labs, diagnostic, device, and imaging results reviewed as a part of this visit    Labs:    BMP:   Recent Labs     02/05/23  0716   *   K 4.6   CL 99   CO2 30   BUN 33*   CREATININE 0.9     Estimated Creatinine Clearance: 71 mL/min (based on SCr of 0.9 mg/dL).    CBC:   Recent Labs     02/05/23  0716   WBC 14.3*   HGB 12.8   HCT 40.0   MCV 84.4        Thyroid:   Lab Results   Component Value Date/Time    TSH 2.34 06/06/2011 09:40 AM     Lipids:   Lab Results   Component Value Date/Time    CHOL 180 01/25/2023 05:16 AM    HDL 34 01/25/2023 05:16 AM    HDL 32 04/19/2012 05:42 AM    TRIG 157 01/25/2023 05:16 AM     LFTS:   Lab Results   Component Value Date/Time    ALT 15 02/01/2023 12:14 PM    AST 17 02/01/2023 12:14 PM    ALKPHOS 79 2023 12:14 PM    PROT 6.7 2023 12:14 PM    PROT 7.6 2011 09:38 AM    AGRATIO 1.1 2023 12:14 PM    BILITOT 0.3 2023 12:14 PM     Cardiac Enzymes:   Lab Results   Component Value Date/Time    CKTOTAL 115 2023 12:14 PM    CKMB 0.29 2011 04:45 PM    TROPONINI <0.01 2023 12:57 PM    TROPONINI <0.01 2023 12:14 PM    TROPONINI <0.01 2023 12:38 AM     Coags:   Lab Results   Component Value Date/Time    PROTIME 13.0 2022 06:19 PM    INR 0.99 2022 06:19 PM       EC23 (Personally Interpreted)   - NSR with LBBB and frequent PVC    ECHO:    Normal global systolic function with an ejection fraction estimated at 70%. No regional wall motion abnormalities noted. Mild concentric left ventricular hypertrophy. Mild aortic stenosis with a peak velocity of 2.17m/s and a mean pressure   gradient of 12 mmHg. There is mild aortic insufficiency. Grade I diastolic dysfunction with normal LV filling pressures. Avg. E/e'=18.2    Stress Test: 10/22   There is a mild fixed inferioapical defect with no associated wall motion    abnormality consistent with diaphragmatic attenuation artifact. No evidence of myocardium at risk for significant reversible ischemia. Normal LV size and systolic function. Overall findings represent a low risk scan. Cath: 23  Anatomy:   LAD-prox 70-80% ISR, mid 70-80%, distal 90%  RCA-prox 90%  RPDA- nml     Intervention  ~Successful PCI to RCA with 4.0x18 KATHLEEN. PD with 4.0x8 NC. PCI to Distal LAD 2.75x38 KATHLEEN. PCI to Prox/mid LAD 3.25x38 KATHLEEN. Excellent Result.      Problem List:   Patient Active Problem List    Diagnosis Date Noted    HTN (hypertension), benign     CAD (coronary artery disease) 2011    Hypercholesteremia 2011    Fall 2023    COPD with acute exacerbation ([facility] [address] 2023    Unstable angina ([facility] [address] 2023    Thoracic radiculopathy 10/27/2022    Chest pain 10/26/2022    Left-sided weakness 08/02/2022    Arterial ischemic stroke, ICA, right, acute ([facility] [address] 08/02/2022    PAF (paroxysmal atrial fibrillation) ([facility] [address] 08/02/2022    CVA, old, alterations of sensations 08/01/2022    Grade II diastolic dysfunction 87/78/7173    AKIL (obstructive sleep apnea) 01/23/2020    Hypercarbia 01/23/2020    Class 3 severe obesity due to excess calories with serious comorbidity and body mass index (BMI) of 45.0 to 49.9 in adult ([facility] [address] 06/18/2018    Dysarthria 06/07/2018    Anxiety and depression 01/19/2018    Primary osteoarthritis involving multiple joints 03/24/2016    Gastroesophageal reflux disease without esophagitis 03/24/2016    DM2 (diabetes mellitus, type 2) ([facility] [address] 12/03/2015    DM type 2, controlled, with complication ([facility] [address]     ASHD (arteriosclerotic heart disease)     [facility] 02/26/2014    Primary hypertension 04/18/2012    COPD (chronic obstructive pulmonary disease) ([facility] 08/09/2011    Chronic respiratory failure ([facility] 12/22/2010    COPD exacerbation ([facility] 12/22/2010        Assessment and Plan:     Bradycardia   - Telemetry has been reviewed. She does not have any significant bradycardia/AV block on telemetry. Her bradycardia is secondary to PVCs, which falsely makes her HR appear bradycardia on pulse oximetry/BP cuff. She does drop into the 40-50s during overnight hours, which is secondary to her untreated sleep apnea     - Off metoprolol. Pt states she won't take it due to her low HR. Avoid AV shayy agents at this time   - Will assess HR profile with 72 hour holter. We can place a holter as outpatient if she discharged over weekend. If tachy-kirsten or symptomatic bradycardia noted, then PPM may be needed, but no indication at this time    2. PVC's  - Noted on telemetry. Episodes of bigeminy on tele. She has PVCs on past EKG so doubt these are related to her symptoms that brought her in    - Discussed PVCs, risks, therapies and alternatives with patient.  All questions were answered  - Treatment options including medical therapy with antiarrhythmic drugs or ablation discussed   ~ Will do holter as outpatient to assess PVC burden and HR profile    3. CAD  - S/p PCI to RCA and LAD (1/30/23)  - Stable  - Continue Plavix, and statin   ~ No BB due to pt choice and ? Bradycardia. No ASA due to Xarelto     - No further CP episodes. Troponin negative on Friday. Her pain is reproducible on palpitation so likely musculoskeletal from her coughing/COPD    4. Paroxysmal Atrial Fibrillation  - Currently in NSR  - Off BB due bradycardia. Limited rate control options given borderline HR and her co-morbidities (CAD, COPD)  - [id number] score:high; [id number] Vasc score and anticoagulation discussed. High risk for stroke and thromboembolism. Anticoagulation is recommended. Risk of bleeding was discussed.  ~ On Xarelto 15 mg daily (recommend 20 mg dosage given her kidney function/weight)    5. HTN  - Controlled, on low side  - Continue current medications    6. AKIL  - Multiple risk factors for sleep apnea  - Discussed long term effects of untreated AKIL. Pt wore CPAP for brief time overnight    7. COPD   - Likely main culprit for her SOB   - On 3L of oxygen   - On inhalers  - Plans for possible PFTs as outpatient    Ok for d/c this weekend from cardiology standpoint. Will follow up in office as scheduled     All pertinent information and plan of care discussed with the EP physician. All questions and concerns were addressed to the patient. Alternatives to my treatment were discussed. I have discussed the above stated plan with patient and the nurse. The patient verbalized understanding and agreed with the plan. Thank you for allowing to us to participate in the care of Parrish Leesa    The patient was seen for >35 minutes. I reviewed interval history, physical exam, review of data including labs, imaging, development and implementation of treatment plan and coordination of complex care.  More than 50% of the time was devoted to counseling the patient on their diagnoses/treatments, as well as coordination of their care    [address]   Office: [phone]

## 2023-02-05 NOTE — PROGRESS NOTES
Shift assessment completed. Routine vitals obtained. Pt refused scheduled voltaren at this time. Other scheduled medications given. Patient is awake, alert and oriented. Patient does not appear to be in distress, resting comfortably at this time. Bed alarm engaged. Bed locked and in low position. Call light within reach. Fall precautions in place. No further needs expressed.

## 2023-02-05 NOTE — PROGRESS NOTES
Department of Internal Medicine  General Internal Medicine   Progress Note     SUBJECTIVE  wheezing and dyspnea persist but much improved HR and BP improving too infatc some what on HTN side     History obtained from chart review and the patient  General ROS: positive for  - fatigue and malaise  negative for - chills, fever or night sweats  Psychological ROS: negative  Respiratory ROS: positive for - shortness of breath and wheezing  negative for - cough, hemoptysis, sputum changes or stridor  Cardiovascular ROS: positive for - chest pain, dyspnea on exertion and edema  negative for - loss of consciousness, orthopnea or palpitations  Gastrointestinal ROS: no abdominal pain, change in bowel habits, or black or bloody stools    OBJECTIVE      Medications      Current Facility-Administered Medications: furosemide (LASIX) tablet 40 mg, 40 mg, Oral, Daily  glipiZIDE (GLUCOTROL) tablet 10 mg, 10 mg, Oral, BID WC  insulin glargine (LANTUS) injection vial 30 Units, 30 Units, SubCUTAneous, Nightly  insulin lispro (HUMALOG) injection vial 0-16 Units, 0-16 Units, SubCUTAneous, TID WC  insulin lispro (HUMALOG) injection vial 0-4 Units, 0-4 Units, SubCUTAneous, Nightly  insulin regular (HUMULIN R;NOVOLIN R) injection 7 Units, 7 Units, SubCUTAneous, TID AC  methylPREDNISolone sodium (SOLU-MEDROL) injection 40 mg, 40 mg, IntraVENous, Q12H  guaiFENesin (MUCINEX) extended release tablet 600 mg, 600 mg, Oral, BID  [Held by provider] midodrine (PROAMATINE) tablet 5 mg, 5 mg, Oral, TID WC  promethazine-dextromethorphan (PROMETHAZINE-DM) 6.25-15 MG/5ML syrup 5 mL, 5 mL, Oral, Q4H PRN  cephALEXin (KEFLEX) capsule 500 mg, 500 mg, Oral, 3 times per day  acetaZOLAMIDE (DIAMOX) tablet 250 mg, 250 mg, Oral, Daily  albuterol sulfate HFA (PROVENTIL;VENTOLIN;PROAIR) 108 (90 Base) MCG/ACT inhaler 2 puff, 2 puff, Inhalation, Q6H PRN  ALPRAZolam (XANAX) tablet 1 mg, 1 mg, Oral, TID PRN  mometasone-formoterol (DULERA) 200-5 MCG/ACT inhaler 2 puff, 2 puff, Inhalation, BID  clopidogrel (PLAVIX) tablet 75 mg, 75 mg, Oral, Daily  diclofenac sodium (VOLTAREN) 1 % gel 2 g, 2 g, Topical, BID  gabapentin (NEURONTIN) capsule 600 mg, 600 mg, Oral, TID  ibuprofen (ADVIL;MOTRIN) tablet 400 mg, 400 mg, Oral, Q6H PRN  alogliptin (NESINA) tablet 12.5 mg, 12.5 mg, Oral, Daily  nitroGLYCERIN (NITROSTAT) SL tablet 0.4 mg, 0.4 mg, SubLINGual, Q5 Min PRN  pantoprazole (PROTONIX) tablet 40 mg, 40 mg, Oral, QAM AC  rivaroxaban (XARELTO) tablet 15 mg, 15 mg, Oral, Daily  rosuvastatin (CRESTOR) tablet 40 mg, 40 mg, Oral, Nightly  sodium chloride (OCEAN, BABY AYR) 0.65 % nasal spray 1 spray, 1 spray, Nasal, PRN  ipratropium-albuterol (DUONEB) nebulizer solution 1 ampule, 1 ampule, Inhalation, TID  ipratropium-albuterol (DUONEB) nebulizer solution 1 ampule, 1 ampule, Inhalation, Q4H PRN  dextrose bolus 10% 125 mL, 125 mL, IntraVENous, PRN **OR** dextrose bolus 10% 250 mL, 250 mL, IntraVENous, PRN  glucagon (rDNA) injection 1 mg, 1 mg, SubCUTAneous, PRN  dextrose 10 % infusion, , IntraVENous, Continuous PRN    Physical      VITALS:    /64   Pulse 63   Temp 97.7 °F (36.5 °C) (Oral)   Resp 18   Ht 5' 5\" (1.651 m)   Wt 275 lb (124.7 kg)   SpO2 95%   BMI 45.76 kg/m²   TEMPERATURE:  Current - Temp: 97.7 °F (36.5 °C);  Max - Temp  Av.6 °F (36.4 °C)  Min: 97.3 °F (36.3 °C)  Max: 98.1 °F (36.7 °C)  RESPIRATIONS RANGE: Resp  Av.4  Min: 16  Max: 18  PULSE RANGE: Pulse  Av.7  Min: 59  Max: 69  BLOOD PRESSURE RANGE:  Systolic (27OZV), JZY:732 , Min:108 , RWN:690   ; Diastolic (55VPE), IQZ:98, Min:49, Max:90    PULSE OXIMETRY RANGE: SpO2  Av.6 %  Min: 90 %  Max: 96 %  24HR INTAKE/OUTPUT:      Intake/Output Summary (Last 24 hours) at 2023 1452  Last data filed at 2023 0535  Gross per 24 hour   Intake 480 ml   Output 800 ml   Net -320 ml       CONSTITUTIONAL:  awake, alert, cooperative, no apparent distress, and appears stated age  NECK:  supple, symmetrical, trachea midline and skin normal  LUNGS:  Moderate increase in work of breathing bart crackles and exp wheezes   CARDIOVASCULAR:  Normal apical impulse, regular rate and rhythm, normal S1 and S2, no S3 or S4, and no murmur noted  ABDOMEN:  No scars, normal bowel sounds, soft, non-distended, non-tender, no masses palpated, no hepatosplenomegally  MUSCULOSKELETAL:  ++ edema  NEUROLOGIC:  No acute focal change    Data      Lab Results   Component Value Date/Time    PHART 7.446 02/07/2017 12:26 PM       Lab Results   Component Value Date/Time     02/05/2023 07:16 AM    K 4.6 02/05/2023 07:16 AM    K 3.8 02/01/2023 12:14 PM    CL 99 02/05/2023 07:16 AM    CO2 30 02/05/2023 07:16 AM    BUN 33 02/05/2023 07:16 AM    CREATININE 0.9 02/05/2023 07:16 AM    GLUCOSE 290 02/05/2023 07:16 AM    CALCIUM 10.0 02/05/2023 07:16 AM     Lab Results   Component Value Date/Time    WBC 14.3 02/05/2023 07:16 AM    HGB 12.8 02/05/2023 07:16 AM    HCT 40.0 02/05/2023 07:16 AM    MCV 84.4 02/05/2023 07:16 AM     02/05/2023 07:16 AM         Lab Results   Component Value Date    INR 0.99 08/01/2022    PROTIME 13.0 08/01/2022       ASSESSMENT AND PLAN      Patient Active Problem List   Diagnosis    Chronic respiratory failure (HCC)    COPD exacerbation (HCC)    CAD (coronary artery disease)    Hypercholesteremia    COPD (chronic obstructive pulmonary disease) ([facility]    Primary hypertension    Morbid obesity ([facility]    ASHD (arteriosclerotic heart disease)    DM type 2, controlled, with complication ([facility]    DM2 (diabetes mellitus, type 2) ([facility] [address]    HTN (hypertension), benign    Primary osteoarthritis involving multiple joints    Gastroesophageal reflux disease without esophagitis    Anxiety and depression    Dysarthria    Class 3 severe obesity due to excess calories with serious comorbidity and body mass index (BMI) of 45.0 to 49.9 in adult ([facility])    AKIL (obstructive sleep apnea)    Hypercarbia    Grade II diastolic dysfunction    CVA, old, alterations of sensations    Left-sided weakness    Arterial ischemic stroke, ICA, right, acute (HCC)    PAF (paroxysmal atrial fibrillation) ([facility])    Chest pain    Thoracic radiculopathy    Unstable angina ([facility])    COPD with acute exacerbation ([facility]    Fall                          Wean steroids    Cardiology consult and Pulmonary consult noted    Proamatine  to be held with parameters    Ct Insulin management  for steroid induced Hyperglycemia   PT OT

## 2023-02-05 NOTE — PROGRESS NOTES
Night shift assessment completed. Routine vitals have been obtained. Scheduled medications given. Patient is awake, alert and oriented/ talking to staff. Respirations are easy and unlabored with no c/o SOB at rest and continues on 3L of O2 supplement. Skin has been assessed per writer. IV site is C/D/I. Patient does not appear to be in distress. Call light within reach. Standard safety measures are in place. All needs have been met at this time.

## 2023-02-06 VITALS
TEMPERATURE: 98 F | WEIGHT: 275 LBS | BODY MASS INDEX: 45.82 KG/M2 | OXYGEN SATURATION: 94 % | HEIGHT: 65 IN | RESPIRATION RATE: 16 BRPM | HEART RATE: 64 BPM | DIASTOLIC BLOOD PRESSURE: 86 MMHG | SYSTOLIC BLOOD PRESSURE: 147 MMHG

## 2023-02-06 LAB
GLUCOSE BLD-MCNC: 218 MG/DL (ref 70–99)
GLUCOSE BLD-MCNC: 446 MG/DL (ref 70–99)
PERFORMED ON: ABNORMAL
PERFORMED ON: ABNORMAL

## 2023-02-06 PROCEDURE — 94660 CPAP INITIATION&MGMT: CPT

## 2023-02-06 PROCEDURE — 6370000000 HC RX 637 (ALT 250 FOR IP): Performed by: INTERNAL MEDICINE

## 2023-02-06 PROCEDURE — 94640 AIRWAY INHALATION TREATMENT: CPT

## 2023-02-06 PROCEDURE — 2700000000 HC OXYGEN THERAPY PER DAY

## 2023-02-06 PROCEDURE — 94669 MECHANICAL CHEST WALL OSCILL: CPT

## 2023-02-06 RX ORDER — INSULIN GLARGINE 100 [IU]/ML
30 INJECTION, SOLUTION SUBCUTANEOUS NIGHTLY
Qty: 10 ML | Refills: 3 | Status: SHIPPED | OUTPATIENT
Start: 2023-02-06

## 2023-02-06 RX ORDER — GLIPIZIDE 10 MG/1
10 TABLET ORAL 2 TIMES DAILY WITH MEALS
Qty: 60 TABLET | Refills: 3 | Status: SHIPPED | OUTPATIENT
Start: 2023-02-06

## 2023-02-06 RX ORDER — OMEPRAZOLE 10 MG/1
40 CAPSULE, DELAYED RELEASE ORAL DAILY
Status: DISCONTINUED | OUTPATIENT
Start: 2023-02-06 | End: 2023-02-06

## 2023-02-06 RX ORDER — OMEPRAZOLE 40 MG/1
40 CAPSULE, DELAYED RELEASE ORAL DAILY
Status: DISCONTINUED | OUTPATIENT
Start: 2023-02-06 | End: 2023-02-06 | Stop reason: HOSPADM

## 2023-02-06 RX ORDER — DEXTROMETHORPHAN HYDROBROMIDE AND PROMETHAZINE HYDROCHLORIDE 15; 6.25 MG/5ML; MG/5ML
5 SYRUP ORAL EVERY 4 HOURS PRN
Qty: 200 ML | Refills: 0 | Status: SHIPPED | OUTPATIENT
Start: 2023-02-06 | End: 2023-02-13

## 2023-02-06 RX ORDER — IPRATROPIUM BROMIDE AND ALBUTEROL SULFATE 2.5; .5 MG/3ML; MG/3ML
3 SOLUTION RESPIRATORY (INHALATION) EVERY 4 HOURS PRN
Qty: 360 ML | Refills: 1 | Status: SHIPPED | OUTPATIENT
Start: 2023-02-06

## 2023-02-06 RX ORDER — LANOLIN ALCOHOL/MO/W.PET/CERES
6 CREAM (GRAM) TOPICAL NIGHTLY PRN
Refills: 3 | COMMUNITY
Start: 2023-02-06

## 2023-02-06 RX ORDER — PREDNISONE 20 MG/1
20 TABLET ORAL 2 TIMES DAILY
Qty: 20 TABLET | Refills: 0 | Status: SHIPPED | OUTPATIENT
Start: 2023-02-06 | End: 2023-02-16

## 2023-02-06 RX ORDER — GUAIFENESIN 600 MG/1
600 TABLET, EXTENDED RELEASE ORAL 2 TIMES DAILY
Qty: 40 TABLET | Refills: 0 | Status: SHIPPED | OUTPATIENT
Start: 2023-02-06

## 2023-02-06 RX ORDER — FUROSEMIDE 40 MG/1
40 TABLET ORAL DAILY
Qty: 60 TABLET | Refills: 3 | Status: SHIPPED | OUTPATIENT
Start: 2023-02-07

## 2023-02-06 RX ORDER — INSULIN LISPRO 100 [IU]/ML
0-16 INJECTION, SOLUTION INTRAVENOUS; SUBCUTANEOUS
Qty: 1 EACH | Refills: 1 | Status: SHIPPED | OUTPATIENT
Start: 2023-02-06

## 2023-02-06 RX ADMIN — PREDNISONE 20 MG: 20 TABLET ORAL at 10:17

## 2023-02-06 RX ADMIN — RIVAROXABAN 15 MG: 15 TABLET, FILM COATED ORAL at 10:17

## 2023-02-06 RX ADMIN — INSULIN HUMAN 7 UNITS: 100 INJECTION, SOLUTION PARENTERAL at 14:11

## 2023-02-06 RX ADMIN — OMEPRAZOLE 40 MG: 40 CAPSULE, DELAYED RELEASE ORAL at 11:39

## 2023-02-06 RX ADMIN — Medication 2 PUFF: at 07:40

## 2023-02-06 RX ADMIN — CEPHALEXIN 500 MG: 250 CAPSULE ORAL at 06:13

## 2023-02-06 RX ADMIN — ALOGLIPTIN 12.5 MG: 12.5 TABLET, FILM COATED ORAL at 10:16

## 2023-02-06 RX ADMIN — INSULIN LISPRO 16 UNITS: 100 INJECTION, SOLUTION INTRAVENOUS; SUBCUTANEOUS at 14:11

## 2023-02-06 RX ADMIN — DICLOFENAC SODIUM 2 G: 10 GEL TOPICAL at 10:20

## 2023-02-06 RX ADMIN — GUAIFENESIN 600 MG: 600 TABLET, EXTENDED RELEASE ORAL at 10:18

## 2023-02-06 RX ADMIN — GABAPENTIN 600 MG: 300 CAPSULE ORAL at 14:11

## 2023-02-06 RX ADMIN — INSULIN HUMAN 7 UNITS: 100 INJECTION, SOLUTION PARENTERAL at 10:25

## 2023-02-06 RX ADMIN — CLOPIDOGREL BISULFATE 75 MG: 75 TABLET ORAL at 10:17

## 2023-02-06 RX ADMIN — ACETAZOLAMIDE 250 MG: 250 TABLET ORAL at 11:39

## 2023-02-06 RX ADMIN — FUROSEMIDE 40 MG: 40 TABLET ORAL at 10:18

## 2023-02-06 RX ADMIN — GABAPENTIN 600 MG: 300 CAPSULE ORAL at 10:16

## 2023-02-06 RX ADMIN — MELATONIN TAB 3 MG 6 MG: 3 TAB at 00:26

## 2023-02-06 RX ADMIN — IPRATROPIUM BROMIDE AND ALBUTEROL SULFATE 1 AMPULE: 2.5; .5 SOLUTION RESPIRATORY (INHALATION) at 07:39

## 2023-02-06 RX ADMIN — GLIPIZIDE 10 MG: 5 TABLET ORAL at 10:18

## 2023-02-06 RX ADMIN — INSULIN LISPRO 4 UNITS: 100 INJECTION, SOLUTION INTRAVENOUS; SUBCUTANEOUS at 10:25

## 2023-02-06 ASSESSMENT — PAIN DESCRIPTION - LOCATION
LOCATION: LEG;BACK;BUTTOCKS
LOCATION: LEG;BACK;BUTTOCKS

## 2023-02-06 ASSESSMENT — PAIN SCALES - GENERAL
PAINLEVEL_OUTOF10: 8
PAINLEVEL_OUTOF10: 8

## 2023-02-06 ASSESSMENT — PAIN DESCRIPTION - DESCRIPTORS
DESCRIPTORS: STABBING
DESCRIPTORS: STABBING

## 2023-02-06 NOTE — CARE COORDINATION
02/06/23 1342   Service Assessment   Patient Orientation Alert and Oriented   Cognition Alert   History Provided By Patient   Primary Caregiver Family  (Pt states she has a Home health aide two hours per day and family is with her most of the time.)   Support Systems Family Members   PCP Verified by CM Yes  (Leny Colvin visit 1/13/23)   Last Visit to PCP Within last 3 months   Current Functional Level Independent in ADLs/IADLs;Dressing;Cooking;Housework; Shopping;Mobility; Bathing   Can patient return to prior living arrangement   (It was recommended for SNF but pt refuses.)   Ability to make needs known: Fair   Family able to assist with home care needs: Yes  (Pt states she has family to help her but she fell 3 times and was found on floor without her O2 on per COA)   Would you like for me to discuss the discharge plan with any other family members/significant others, and if so, who? Yes  (family)   Financial Resources Medicare;Medicaid   Community Resources None   CM/SW Referral Abuse or neglect concerns; Safety/Abuse  (CM reported the falls and being found on the floor without O2 to [address] [place] [address])   Social/Functional History   Lives With Alone   Type of Home Apartment   Discharge Planning   Type of Residence House   Living Arrangements Alone   Current Services Prior To Admission Home Care;Durable Medical Equipment   Current DME Prior to [address] (Comment)   Potential Assistance Needed Outpatient PT/OT; Home Care   DME Ordered? Oxygen therapy (comment)   Potential Assistance Purchasing Medications No   Type of Home Care Services Aide Services;OT;PT;Nursing Services; Safety Alert   Patient expects to be discharged to: Apartment   History of falls?  1   Services At/After Discharge   Transition of Care Consult (CM Consult) Home Health  (Pt has COA in place as well)   Services At/After Discharge [facility] Follow Up Transport Family  (per Pt family is coming with pertable oxygen) Condition of Participation: Discharge Planning   The Patient and/or Patient Representative was provided with a Choice of Provider? Patient   The Patient and/Or Patient Representative agree with the Discharge Plan? Yes   Freedom of Choice list was provided with basic dialogue that supports the patient's individualized plan of care/goals, treatment preferences, and shares the quality data associated with the providers?   Yes

## 2023-02-06 NOTE — CARE COORDINATION
Case Management Assessment  Initial Evaluation    Date/Time of Evaluation: 2/6/2023 1:51 PM  Assessment Completed by: Fabio Parsons RN    If patient is discharged prior to next notation, then this note serves as note for discharge by case management. Patient Name: Aren Parker                   YOB: 1945  Diagnosis: COPD exacerbation ([facility] [J44.1]  COPD with acute exacerbation ([facility] [J44.1]  CO2 narcosis [R06.89]  Fall, initial encounter [W19. XXXA]                   Date / Time: 2/1/2023 11:43 AM    Patient Admission Status: Inpatient   Readmission Risk (Low < 19, Mod (19-27), High > 27): Readmission Risk Score: 23.5    Current PCP: Ros Martines MD  PCP verified by CM? Yes (Arnaldo Curtisanna visit 1/13/23)    Chart Reviewed: Yes      History Provided by: Patient  Patient Orientation: Alert and Oriented    Patient Cognition: Alert    Hospitalization in the last 30 days (Readmission):  Yes    If yes, Readmission Assessment in  Navigator will be completed. Advance Directives:      Code Status: Full Code   Patient's Primary Decision Maker is:        Discharge Planning:    Patient lives with: Alone Type of Home: House  Primary Care Giver: Family (Pt states she has a Home health aide two hours per day and family is with her most of the time.)  Patient Support Systems include: Family Members   Current Financial resources: Medicare, Medicaid  Current community resources: None  Current services prior to admission: Home Care, Durable Medical Equipment            Current DME: Oxygen Therapy (Comment)            Type of Home Care services:  Deborah, OT, PT, Nursing Services, Safety Alert    ADLS  Prior functional level:    Current functional level:  Independent in ADLs/IADLs, Dressing, Cooking, Housework, Shopping, Mobility, Bathing    PT AM-PAC:   /24  OT AM-PAC:   /24    Family can provide assistance at DC: Yes (Pt states she has family to help her but she fell 3 times and was found on floor without her O2 on per COA)  Would you like Case Management to discuss the discharge plan with any other family members/significant others, and if so, who? Yes (family)  Plans to Return to Present Housing:  (It was recommended for SNF but pt refuses.)  Other Identified Issues/Barriers to RETURNING to current housing: The patient is a fall risk, fell three times since last DC. She was found at home alone without her O2 on. Potential Assistance needed at discharge: Outpatient PT/OT, Home Care            Potential DME:    Patient expects to discharge to: [address] [place] [address] for transportation at discharge: Family (per Pt family is coming with pertable oxygen)    Financial    Payor: Yadira Fermin / Plan: Delmy Maloney / Product Type: *No Product type* /     Does insurance require precert for SNF: No    Potential assistance Purchasing Medications: No  Meds-to-Beds request:        Clemente Harper [id number] Tamika Nuñez, [address]  [facility]  Phone: [phone] Fax: [phone]      Notes:    Factors facilitating achievement of predicted outcomes: Has needed Durable Medical Equipment at home    Barriers to discharge: Limited family support, Impulsivity, Limited safety awareness, Decreased motivation, Depression, Limited insight into deficits, and Unrealistic expectations    Additional Case Management Notes: Pt is going home with family to transport with portable oxygen. Ariel Pierce set up to resume. COA contacted as well     The Plan for Transition of Care is related to the following treatment goals of COPD exacerbation ([facility] [address] [J44.1]  COPD with acute exacerbation ([address] [J44.1]  CO2 narcosis [R06.89]  Fall, initial encounter [W19. XXXA]    IF APPLICABLE: The Patient and/or patient representative Rose Snyder and her family were provided with a choice of provider and agrees with the discharge plan.  Freedom of choice list with basic dialogue that supports the patient's individualized plan of care/goals and shares the quality data associated with the providers was provided to: Patient   Patient Representative Name:       The Patient and/or Patient Representative Agree with the Discharge Plan?  Yes      Electronically signed by Estephanie Urrutia RN on 2/6/2023 at 1:54 PM

## 2023-02-06 NOTE — CARE COORDINATION
Discharge Planning Note:  CM/SW has been notified of discharge. Patient noted to have the following needs at discharge. CM/SW has coordinated the following services:  Resume home services     [address]  Phone: [phone]  Fax: [phone]     [facility] ([address])     [address]: [address],  [address]  Phone:  [phone]  Fax: [phone]     Pt to DC home with 4 LPM with exertion and 3 LPM at rest  Patient will have a NIV delivered to home. ABG's need to be drawn before pt leaves . Discharge Destination: Home  Transportation: family    Elise Pert and spoke with Sahhbaz Sanchez, faxed the [employer] orders to [phone]  Spoke to [place] at [address] [phone] she is aware of her home return. Spoke to APS to give report of the patients falls and refusing SNF. APS Catina Pitts to look at report and start investigation. Had a conversation with family and pt that CM will have to call APS because of her history and several falls. Family states that they will be there for her when she returns home.     Electronically signed by Marie Marcelino RN on 2/6/2023 at 11:59 AM

## 2023-02-06 NOTE — PROGRESS NOTES
Department of Internal Medicine  General Internal Medicine   Progress Note     SUBJECTIVE   breathing distress has stabilized but still quite fatigued  no syncope or dizziness     History obtained from chart review and the patient  General ROS: positive for  - fatigue and malaise  negative for - chills, fever or night sweats  Psychological ROS: negative  Respiratory ROS: positive for - shortness of breath and wheezing  negative for - cough, hemoptysis, sputum changes or stridor  Cardiovascular ROS: positive for - chest pain, dyspnea on exertion and edema  negative for - loss of consciousness, orthopnea or palpitations  Gastrointestinal ROS: no abdominal pain, change in bowel habits, or black or bloody stools    OBJECTIVE      Medications      Current Facility-Administered Medications: predniSONE (DELTASONE) tablet 20 mg, 20 mg, Oral, BID  melatonin tablet 6 mg, 6 mg, Oral, Nightly PRN  furosemide (LASIX) tablet 40 mg, 40 mg, Oral, Daily  glipiZIDE (GLUCOTROL) tablet 10 mg, 10 mg, Oral, BID WC  insulin glargine (LANTUS) injection vial 30 Units, 30 Units, SubCUTAneous, Nightly  insulin lispro (HUMALOG) injection vial 0-16 Units, 0-16 Units, SubCUTAneous, TID WC  insulin lispro (HUMALOG) injection vial 0-4 Units, 0-4 Units, SubCUTAneous, Nightly  insulin regular (HUMULIN R;NOVOLIN R) injection 7 Units, 7 Units, SubCUTAneous, TID AC  guaiFENesin (MUCINEX) extended release tablet 600 mg, 600 mg, Oral, BID  [Held by provider] midodrine (PROAMATINE) tablet 5 mg, 5 mg, Oral, TID WC  promethazine-dextromethorphan (PROMETHAZINE-DM) 6.25-15 MG/5ML syrup 5 mL, 5 mL, Oral, Q4H PRN  cephALEXin (KEFLEX) capsule 500 mg, 500 mg, Oral, 3 times per day  acetaZOLAMIDE (DIAMOX) tablet 250 mg, 250 mg, Oral, Daily  albuterol sulfate HFA (PROVENTIL;VENTOLIN;PROAIR) 108 (90 Base) MCG/ACT inhaler 2 puff, 2 puff, Inhalation, Q6H PRN  ALPRAZolam (XANAX) tablet 1 mg, 1 mg, Oral, TID PRN  mometasone-formoterol (DULERA) 200-5 MCG/ACT inhaler 2 puff, 2 puff, Inhalation, BID  clopidogrel (PLAVIX) tablet 75 mg, 75 mg, Oral, Daily  diclofenac sodium (VOLTAREN) 1 % gel 2 g, 2 g, Topical, BID  gabapentin (NEURONTIN) capsule 600 mg, 600 mg, Oral, TID  ibuprofen (ADVIL;MOTRIN) tablet 400 mg, 400 mg, Oral, Q6H PRN  alogliptin (NESINA) tablet 12.5 mg, 12.5 mg, Oral, Daily  nitroGLYCERIN (NITROSTAT) SL tablet 0.4 mg, 0.4 mg, SubLINGual, Q5 Min PRN  pantoprazole (PROTONIX) tablet 40 mg, 40 mg, Oral, QAM AC  rivaroxaban (XARELTO) tablet 15 mg, 15 mg, Oral, Daily  rosuvastatin (CRESTOR) tablet 40 mg, 40 mg, Oral, Nightly  sodium chloride (OCEAN, BABY AYR) 0.65 % nasal spray 1 spray, 1 spray, Nasal, PRN  ipratropium-albuterol (DUONEB) nebulizer solution 1 ampule, 1 ampule, Inhalation, TID  ipratropium-albuterol (DUONEB) nebulizer solution 1 ampule, 1 ampule, Inhalation, Q4H PRN  dextrose bolus 10% 125 mL, 125 mL, IntraVENous, PRN **OR** dextrose bolus 10% 250 mL, 250 mL, IntraVENous, PRN  glucagon (rDNA) injection 1 mg, 1 mg, SubCUTAneous, PRN  dextrose 10 % infusion, , IntraVENous, Continuous PRN    Physical      VITALS:    /64   Pulse 60   Temp 97.8 °F (36.6 °C) (Oral)   Resp 16   Ht 5' 5\" (1.651 m)   Wt 275 lb (124.7 kg)   SpO2 91%   BMI 45.76 kg/m²   TEMPERATURE:  Current - Temp: 97.8 °F (36.6 °C);  Max - Temp  Av.8 °F (36.6 °C)  Min: 97.7 °F (36.5 °C)  Max: 98 °F (36.7 °C)  RESPIRATIONS RANGE: Resp  Av.7  Min: 11  Max: 19  PULSE RANGE: Pulse  Av.9  Min: 60  Max: 69  BLOOD PRESSURE RANGE:  Systolic (23NFR), BGJ:091 , Min:108 , SX   ; Diastolic (60XEU), GGW:72, Min:49, Max:71    PULSE OXIMETRY RANGE: SpO2  Av.4 %  Min: 90 %  Max: 96 %  24HR INTAKE/OUTPUT:      Intake/Output Summary (Last 24 hours) at 2023 0740  Last data filed at 2023 1703  Gross per 24 hour   Intake --   Output 1300 ml   Net -1300 ml       CONSTITUTIONAL:  awake, alert, cooperative, no apparent distress, and appears stated age  NECK:  supple, symmetrical, trachea midline and skin normal  LUNGS:  Moderate increase in work of breathing bart crackles and exp wheezes   CARDIOVASCULAR:  Normal apical impulse, regular rate and rhythm, normal S1 and S2, no S3 or S4, and no murmur noted  ABDOMEN:  No scars, normal bowel sounds, soft, non-distended, non-tender, no masses palpated, no hepatosplenomegally  MUSCULOSKELETAL:  ++ edema  NEUROLOGIC:  No acute focal change    Data      Lab Results   Component Value Date/Time    PHART 7.446 02/07/2017 12:26 PM       Lab Results   Component Value Date/Time     02/05/2023 07:16 AM    K 4.6 02/05/2023 07:16 AM    K 3.8 02/01/2023 12:14 PM    CL 99 02/05/2023 07:16 AM    CO2 30 02/05/2023 07:16 AM    BUN 33 02/05/2023 07:16 AM    CREATININE 0.9 02/05/2023 07:16 AM    GLUCOSE 290 02/05/2023 07:16 AM    CALCIUM 10.0 02/05/2023 07:16 AM     Lab Results   Component Value Date/Time    WBC 14.3 02/05/2023 07:16 AM    HGB 12.8 02/05/2023 07:16 AM    HCT 40.0 02/05/2023 07:16 AM    MCV 84.4 02/05/2023 07:16 AM     02/05/2023 07:16 AM         Lab Results   Component Value Date    INR 0.99 08/01/2022    PROTIME 13.0 08/01/2022       ASSESSMENT AND PLAN      Patient Active Problem List   Diagnosis    Chronic respiratory failure (HCC)    COPD exacerbation (HCC)    CAD (coronary artery disease)    Hypercholesteremia    COPD (chronic obstructive pulmonary disease) ([facility] [address]    Primary hypertension    Morbid obesity ([facility] [address]    ASHD (arteriosclerotic heart disease)    DM type 2, controlled, with complication ([facility] [address]    DM2 (diabetes mellitus, type 2) ([facility] [address] [address]    HTN (hypertension), benign    Primary osteoarthritis involving multiple joints    Gastroesophageal reflux disease without esophagitis    Anxiety and depression    Dysarthria    Class 3 severe obesity due to excess calories with serious comorbidity and body mass index (BMI) of 45.0 to 49.9 in adult ([facility])    AKIL (obstructive sleep apnea)    Hypercarbia    Grade II diastolic dysfunction    CVA, old, alterations of sensations    Left-sided weakness    Arterial ischemic stroke, ICA, right, acute (HCC)    PAF (paroxysmal atrial fibrillation) ([facility])    Chest pain    Thoracic radiculopathy    Unstable angina (HCC)    COPD with acute exacerbation ([facility] [address] [address]    Fall                          Wean steroids  further to PO prednisone ,   Stevenson Jamison arrhythmia not critical  and no emergent need of PPM  Next challenge is to get her compliant with CPAP at home , she says she gets claustrophobic , AKIL is a real challenge   S/p PCI for RCA and LAD few weeks ago   Ct Xarelto  for paroxysmal  a fib   BS are normalizing

## 2023-02-06 NOTE — DISCHARGE INSTR - COC
Continuity of Care Form    Patient Name: Aren Parker   :  [id number]  MRN:  [id number]    Admit date:  2023  Discharge date:  2023    Code Status Order: Full Code   Advance Directives:     Admitting Physician:  Ros Martines MD  PCP: Ros Martines MD    Discharging Nurse: [facility] Unit/Room#: 5TS-7162/2321-47  Discharging Unit Phone Number: [phone]    Emergency Contact:   Extended Emergency Contact Information  Primary Emergency Contact: [address] [place] [address] Phone: [phone]  Relation: Child  Secondary Emergency Contact: Rhea Bedolla Phone: [phone]  Relation: Grandchild    Past Surgical History:  Past Surgical History:   Procedure Laterality Date    APPENDECTOMY      BRONCHOSCOPY N/A 2020    BRONCHOSCOPY ALVEOLAR LAVAGE performed by Royal Ulrich MD at [address] [place] [address] N/A 2020    BRONCHOSCOPY DIAGNOSTIC OR CELL [address] performed by Dinorah Cormier MD at [address] [place] [address] N/A 2020    BRONCHOSCOPY DIAGNOSTIC OR CELL [address] performed by Kristian Collazo MD at [facility]      1 stent  and 1 stent     CATARACT REMOVAL   or     bilateral cataracts removed    CHOLECYSTECTOMY      COLONOSCOPY      COLONOSCOPY  2018    ENDOSCOPY, COLON, DIAGNOSTIC      ESOPHAGEAL DILATATION      EYE SURGERY      bilateral cataracts    GALLBLADDER SURGERY      HYSTERECTOMY (CERVIX STATUS UNKNOWN)      [place] EXCISION MALIGNANT LESION S/N/H/F/G 0.5 CM/< N/A 2018    EXCISION OF SCALP SQUAMOUS CELL CARCINOMA performed by Miya Perdomo MD at [address]GR[address] T/A/L 1ST 100 CM/&/1% BDY INFT/CHLD N/A 2018    SPLIT THICKNESS SKIN GRAFT FOR COVERAGE SCALP, APPLICATION OF WOUND [place] performed by Miya Perdomo MD at [address] [place] [address],[address] ENDOSCOPY  12    with biopsy of stomach,gastritis    UPPER GASTROINTESTINAL ENDOSCOPY  06/11/2018    w/esophagael dilation       Immunization History:   Immunization History   Administered Date(s) Administered    COVID-19, MODERNA BLUE border, Primary or Immunocompromised, (age 12y+), IM, 100 mcg/0.5mL 09/04/2021, 10/12/2021    Influenza 10/11/2013    Influenza Virus Vaccine 10/18/2010, 10/18/2010, 08/11/2014, 09/10/2015, 09/05/2017, 09/05/2017    Influenza, AFLURIA (age 1 yrs+), FLUZONE, (age 10 mo+), MDV, 0.5mL 09/09/2019    Influenza, FLUCELVAX, (age 10 mo+), MDCK, MDV, 0.5mL 09/08/2017, 09/11/2018, 09/01/2020, 10/25/2021, 09/15/2022    Influenza, High Dose (Fluzone 65 yrs and older) 10/02/2018    Influenza, Triv, 3 Years and older, IM (Afluria (5 yrs and older) 10/20/2016    Pneumococcal Conjugate 13-valent ([id number]) 02/08/2017    Pneumococcal Polysaccharide ([id number]) 12/24/2010, 07/23/2015, 04/19/2022       Active Problems:  Patient Active Problem List   Diagnosis Code    Chronic respiratory failure ([facility]) J96.10    COPD exacerbation ([facility] J44.1    CAD (coronary artery disease) I25.10    Hypercholesteremia E78.00    COPD (chronic obstructive pulmonary disease) ([address] J44.9    Primary hypertension I10    Morbid obesity ([address] E66.01    ASHD (arteriosclerotic heart disease) I25.10    DM type 2, controlled, with complication ([address] G59.2    DM2 (diabetes mellitus, type 2) ([address] E11.9    HTN (hypertension), benign I10    Primary osteoarthritis involving multiple joints M15.9    Gastroesophageal reflux disease without esophagitis K21.9    Anxiety and depression F41.9, F32. A    Dysarthria R47.1    Class 3 severe obesity due to excess calories with serious comorbidity and body mass index (BMI) of 45.0 to 49.9 in adult ([facility]) E66.01, Z68.42    AKIL (obstructive sleep apnea) G47.33    Hypercarbia R06.89    Grade II diastolic dysfunction F68.95    CVA, old, alterations of sensations I69.398, R20.9    Left-sided weakness R53.1    Arterial ischemic stroke, ICA, right, acute ([facility]) I63.231    PAF (paroxysmal atrial fibrillation) ([facility]) I48.0    Chest pain R07.9    Thoracic radiculopathy M54.14    Unstable angina ([facility]) I20.0    COPD with acute exacerbation ([facility] J44.1    Fall W19. [facility]       Isolation/Infection:   Isolation            Contact          Patient Infection Status       Infection Onset Added Last Indicated Last Indicated By Review Planned Expiration Resolved Resolved By    ESBL (Extended Spectrum Beta Lactamase) 21 Culture, Urine        Resolved    COVID-19 (Rule Out) 23 COVID-19, Rapid (Ordered)   23 Rule-Out Test Resulted    C-diff Rule Out 08/15/21 08/15/21 08/15/21 Clostridium difficile toxin/antigen (Ordered)   08/15/21 Rule-Out Test Resulted    C-diff Rule Out 20 Clostridium difficile toxin/antigen (Ordered)   20 Laura De La Rosa RN    Influenza  20 Isa Harris RN   20             Nurse Assessment:  Last Vital Signs: /72   Pulse 68   Temp 98.2 °F (36.8 °C) (Oral)   Resp 16   Ht 5' 5\" (1.651 m)   Wt 275 lb (124.7 kg)   SpO2 93%   BMI 45.76 kg/m²     Last documented pain score (0-10 scale): Pain Level: 8  Last Weight:   Wt Readings from Last 1 Encounters:   23 275 lb (124.7 kg)     Mental Status:  oriented and alert    IV Access:  - None    Nursing Mobility/ADLs:  Walking   Assisted  Transfer  Assisted  Bathing  Assisted  Dressing  Assisted  Toileting  Assisted  Feeding  Independent  Med Admin  Assisted  Med Delivery   whole    Wound Care Documentation and Therapy:        Elimination:  Continence:    Bowel: Yes  Bladder: No  Urinary Catheter: None   Colostomy/Ileostomy/Ileal Conduit: No       Date of Last BM: none documented since 23    Intake/Output Summary (Last 24 hours) at 2023 1139  Last data filed at 2023 1703  Gross per 24 hour   Intake --   Output 1300 ml   Net -1300 ml     I/O last 3 completed shifts: In: 480 [P.O.:480]  Out: 2100 [Urine:2100]    Safety Concerns:     History of Falls (last 30 days)    Impairments/Disabilities:      Hearing    Nutrition Therapy:  Current Nutrition Therapy:   - Oral Diet:  General    Routes of Feeding: Oral  Liquids: Thin Liquids  Daily Fluid Restriction: no  Last Modified Barium Swallow with Video (Video Swallowing Test): 08/02/22    Treatments at the Time of Hospital Discharge:   Respiratory Treatments:   albuterol sulfate HFA (PROVENTIL;VENTOLIN;PROAIR) 108 (90 Base) MCG/ACT inhaler 2 puff   [[id number]]  Order Details  Ordered Dose: 2 puff Route: Inhalation Frequency: EVERY 6 HOURS PRN for Wheezing   albuterol sulfate HFA (PROVENTIL;VENTOLIN;PROAIR) 108 (90 Base) MCG/ACT inhaler 2 puff   [[id number]]  Order Details  Ordered Dose: 2 puff Route: Inhalation Frequency: EVERY 6 HOURS PRN for Wheezing   ipratropium-albuterol (DUONEB) nebulizer solution 1 ampule   [[id number]]  Order Details  Ordered Dose: 1 ampule Route: Inhalation Frequency: 3 TIMES DAILY   mometasone-formoterol (DULERA) 200-5 MCG/ACT inhaler 2 puff   [[id number]]  Order Details  Ordered Dose: 2 puff Route: Inhalation Frequency: 2 TIMES DAILY   sodium chloride (OCEAN, BABY AYR) 0.65 % nasal spray 1 spray   [[id number]]  Order Details  Ordered Dose: 1 spray Route: Nasal Frequency: PRN for Congestion     Oxygen Therapy:  is on oxygen at 2 L/min per nasal cannula.   Ventilator:    - No ventilator support    Rehab Therapies: Physical Therapy and Occupational Therapy  Weight Bearing Status/Restrictions: No weight bearing restrictions  Other Medical Equipment (for information only, NOT a DME order):  walker  Other Treatments: n/a    Patient's personal belongings (please select all that are sent with patient):  None    RN SIGNATURE:  Electronically signed by Demetrio Chowdary RN on 2/6/23 at 2:29 PM EST    CASE MANAGEMENT/SOCIAL WORK SECTION    Inpatient Status Date: 2/1/23    Readmission Risk Assessment Score:23  Readmission Risk              Risk of Unplanned Readmission:  19           Discharging to Facility/ [address]  Phone: [phone]  Fax: [phone] [facility]  [address] [facility] [address] [facility]  Phone:  [phone]  Fax: [phone] home with Oxygen 3-4L        [place] [address]    Family to transport by 1500 and bringing portable oxygen       / signature: Electronically signed by Gauri Christian RN on 2/6/23 at 11:55 AM EST    PHYSICIAN SECTION    Prognosis: Guarded    Condition at Discharge: Stable    Rehab Potential (if transferring to Rehab): Guarded    Recommended Labs or Other Treatments After Discharge: home health care Home Oxygen , Home CPAP     Physician Certification: I certify the above information and transfer of Remonia Brittle  is necessary for the continuing treatment of the diagnosis listed and that she requires Home Care for greater 30 days.      Update Admission H&P: No change in H&P    PHYSICIAN SIGNATURE:  Electronically signed by Crystal Barbour MD on 2/6/23 at 11:39 AM EST

## 2023-03-02 ENCOUNTER — OFFICE VISIT (OUTPATIENT)
Dept: CARDIOLOGY CLINIC | Age: 78
End: 2023-03-02
Payer: COMMERCIAL

## 2023-03-02 ENCOUNTER — TELEPHONE (OUTPATIENT)
Dept: CARDIOLOGY CLINIC | Age: 78
End: 2023-03-02

## 2023-03-02 VITALS
HEIGHT: 65 IN | HEART RATE: 65 BPM | OXYGEN SATURATION: 95 % | DIASTOLIC BLOOD PRESSURE: 60 MMHG | WEIGHT: 265 LBS | BODY MASS INDEX: 44.15 KG/M2 | SYSTOLIC BLOOD PRESSURE: 114 MMHG

## 2023-03-02 DIAGNOSIS — I10 HTN (HYPERTENSION), BENIGN: ICD-10-CM

## 2023-03-02 DIAGNOSIS — I25.10 CORONARY ARTERY DISEASE INVOLVING NATIVE CORONARY ARTERY OF NATIVE HEART WITHOUT ANGINA PECTORIS: Primary | ICD-10-CM

## 2023-03-02 DIAGNOSIS — E78.00 HYPERCHOLESTEREMIA: ICD-10-CM

## 2023-03-02 DIAGNOSIS — I35.9 AORTIC VALVE DISEASE: ICD-10-CM

## 2023-03-02 PROCEDURE — G8417 CALC BMI ABV UP PARAM F/U: HCPCS | Performed by: NURSE PRACTITIONER

## 2023-03-02 PROCEDURE — 1090F PRES/ABSN URINE INCON ASSESS: CPT | Performed by: NURSE PRACTITIONER

## 2023-03-02 PROCEDURE — G8427 DOCREV CUR MEDS BY ELIG CLIN: HCPCS | Performed by: NURSE PRACTITIONER

## 2023-03-02 PROCEDURE — G8399 PT W/DXA RESULTS DOCUMENT: HCPCS | Performed by: NURSE PRACTITIONER

## 2023-03-02 PROCEDURE — G8482 FLU IMMUNIZE ORDER/ADMIN: HCPCS | Performed by: NURSE PRACTITIONER

## 2023-03-02 PROCEDURE — 1123F ACP DISCUSS/DSCN MKR DOCD: CPT | Performed by: NURSE PRACTITIONER

## 2023-03-02 PROCEDURE — 1036F TOBACCO NON-USER: CPT | Performed by: NURSE PRACTITIONER

## 2023-03-02 PROCEDURE — 3074F SYST BP LT 130 MM HG: CPT | Performed by: NURSE PRACTITIONER

## 2023-03-02 PROCEDURE — 1111F DSCHRG MED/CURRENT MED MERGE: CPT | Performed by: NURSE PRACTITIONER

## 2023-03-02 PROCEDURE — 3078F DIAST BP <80 MM HG: CPT | Performed by: NURSE PRACTITIONER

## 2023-03-02 PROCEDURE — 99214 OFFICE O/P EST MOD 30 MIN: CPT | Performed by: NURSE PRACTITIONER

## 2023-03-02 RX ORDER — ROSUVASTATIN CALCIUM 20 MG/1
20 TABLET, COATED ORAL NIGHTLY
Qty: 30 TABLET | Refills: 1 | Status: SHIPPED | OUTPATIENT
Start: 2023-03-02

## 2023-03-02 NOTE — TELEPHONE ENCOUNTER
Patient was referred by [facility] to the [facility] location with Dr. Bertram Mcmillan. Patient has been scheduled for 6/16/23 at 2:00 pm.  Patient verbalized understanding of appointment instructions. Call complete.

## 2023-03-02 NOTE — PROGRESS NOTES
[facility]     Outpatient Follow Up Note    CHIEF COMPLAINT / HPI: Hospital Follow Up secondary to status post coronary artery stenting    Hospital record has been reviewed  Hospital Course progressed as follows per discharge summary:     Hospital Course:   1/24-1/31/23  77 y.o. female who presents to the ED for chest pain.  Pressure-like.  Radiates towards her back.  Never had before.  Ongoing 3 days.  Associate with shortness of breath.  No fevers or chills.  Has been having cough.  Does not feel like her COPD.  No unilateral leg swelling.  Her weight has been stable when she checks it daily.  No abdominal pain or headache.  No clear exacerbating remitting factors..      She has had frequent admits, mostly for COPD in past year. Review of old chart shows admission in oct 2022 with similar sx. Stress test at that time was normal     Pt to be admitted, placed  on r.o mi pathway  Given recurrent chest pain complaints, to have cards consult     Cath   Intervention  ~Successful PCI to RCA with 4.0x18 KATHLEEN. PD with 4.0x8 NC. PCI to Distal LAD 2.75x38 KATHLEEN. PCI to Prox/mid LAD 3.25x38 KATHLEEN. Excellent Result.     Hospitalization  2/1/23-2/6/23    S/p fall. EP was consulted for bradycardia but didn't appear to have any actual bradycardia on tele. Some low HR at night 2/2 untreated AKIL.      Milana Pardo is 77 y.o. female who presents today for a routine follow up after a recent hospitalization related to the above mentioned issues.  Subjective:   Milana Pardo has a significant past medical history of CAD, HLD, HTN, PAF, recurrent CVA, DM in addition to what is mentioned above.     Today, she denies significant chest pain. There is no SOB/MUNROE. The patient denies orthopnea/PND. Wearing oxygen at night. Denies swelling and her weight is stable at 265lbs. The patient is not experiencing palpitations or dizziness. Since starting crestor she is having leg pain.   She feels wore out and tired. Sinuses acting up and  having HAs. After she left the hospital  she states she had no therapy and thinks that is why she fell leading to subsequent readmission. With regard to medication therapy the patient has been compliant with prescribed regimen. They have tolerated therapy to date.      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    Chronic obstructive pulmonary disease ([facility] [address] 2017    Claustrophobia     COPD (chronic obstructive pulmonary disease) ([facility])     ESBL (extended spectrum beta-lactamase) producing bacteria infection 2021    Esophageal stricture     Gastroesophageal reflux disease without esophagitis 2016    Hiatal hernia     Hiatal hernia     Hypercholesteremia     Hypertension     IBS (irritable bowel syndrome)     Migraines     Movement disorder arthritis    Pneumonia     PONV (postoperative nausea and vomiting)     Type II or unspecified type diabetes mellitus without mention of complication, not stated as uncontrolled     type ll does not take insulin at home    Unspecified cerebral artery occlusion with cerebral infarction     many TIA's     Social History:    Social History     Tobacco Use   Smoking Status Former    Packs/day: 0.25    Years: 49.00    Pack years: 12.25    Types: Cigarettes    Quit date: 2017    Years since quittin.7   Smokeless Tobacco Never   Tobacco Comments    only smoke when real stressed  Nicotine patch     Current Medications:  Current Outpatient Medications   Medication Sig Dispense Refill    rosuvastatin (CRESTOR) 20 MG tablet Take 1 tablet by mouth nightly 30 tablet 1    ALPRAZolam (XANAX) 1 MG tablet Take 1 tablet by mouth 3 times daily as needed for Anxiety for up to 30 days. 90 tablet 0    linagliptin (TRADJENTA) 5 MG tablet Take 1 tablet by mouth daily 90 tablet 1    omeprazole (PRILOSEC) 40 MG delayed release capsule Take 1 capsule by mouth daily 90 capsule 3    ipratropium-albuterol (DUONEB) 0.5-2.5 (3) MG/3ML SOLN nebulizer solution Inhale 3 mLs into the lungs every 4 hours as needed for Shortness of Breath 360 mL 1    glipiZIDE (GLUCOTROL) 10 MG tablet Take 1 tablet by mouth 2 times daily (with meals) 60 tablet 3    guaiFENesin (MUCINEX) 600 MG extended release tablet Take 1 tablet by mouth 2 times daily (Patient taking differently: Take 600 mg by mouth as needed) 40 tablet 0    furosemide (LASIX) 40 MG tablet Take 1 tablet by mouth daily 60 tablet 3    clopidogrel (PLAVIX) 75 MG tablet Take 1 tablet by mouth daily 30 tablet 3    albuterol sulfate HFA (PROVENTIL;VENTOLIN;PROAIR) 108 (90 Base) MCG/ACT inhaler INHALE TWO PUFFS BY MOUTH EVERY 6 HOURS AS NEEDED FOR WHEEZING 18 g 1    gabapentin (NEURONTIN) 300 MG capsule Take 2 capsules by mouth 3 times daily for 90 days. 540 capsule 1    diclofenac sodium (VOLTAREN) 1 % GEL Apply 2 g topically 2 times daily 200 g 5    rivaroxaban (XARELTO) 15 MG TABS tablet Take 1 tablet by mouth daily 90 tablet 1    acetaZOLAMIDE (DIAMOX) 250 MG tablet Take 1 tablet by mouth daily 90 tablet 1    budesonide-formoterol (SYMBICORT) 160-4.5 MCG/ACT AERO Inhale 2 puffs into the lungs 2 times daily 1 each 3    triamcinolone (KENALOG) 0.1 % cream Apply topically 2 times daily. 60 g 2    sodium chloride (OCEAN, BABY AYR) 0.65 % nasal spray 1 spray by Nasal route as needed for Congestion  0    nitroGLYCERIN (NITROSTAT) 0.4 MG SL tablet Place 1 tablet under the tongue every 5 minutes as needed for Chest pain.  30 tablet 0    insulin glargine (LANTUS) 100 UNIT/ML injection vial Inject 30 Units into the skin nightly (Patient not taking: Reported on 3/2/2023) 10 mL 3    insulin lispro (HUMALOG) 100 UNIT/ML SOLN injection vial Inject 0-16 Units into the skin 3 times daily (with meals) (Patient not taking: Reported on 3/2/2023) 1 each 1    insulin regular (HUMULIN R;NOVOLIN R) 100 UNIT/ML injection Inject 7 Units into the skin 3 times daily (before meals) (Patient not taking: Reported on 3/2/2023) 10 mL 3    blood glucose monitor strips Test 2 times a day & as needed for symptoms of irregular blood glucose. Dispense sufficient amount for indicated testing frequency plus additional to accommodate PRN testing needs. 50 strip 5    blood glucose test strips (TRUE METRIX BLOOD GLUCOSE TEST) strip USE THREE TIMES A DAY AS NEEDED 100 strip 5    Lancets MISC 1 each by Does not apply route 2 times daily 300 each 1     No current facility-administered medications for this visit. REVIEW OF SYSTEMS:   CONSTITUTIONAL: No major weight gain or loss, night sweats, fever, fatigue, or weakness. HEENT: No new vision difficulties or ringing in the ears. RESPIRATORY: No new SOB, PND, orthopnea or cough. CARDIOVASCULAR: See HPI  GI: No N/V/D, constipation, or abdominal pain. No black/tarry stools  : No urinary urgency, incontinence, or hematuria. SKIN: No cyanosis or skin lesions. MUSCULOSKELETAL: No new muscle or joint pain. NEUROLOGICAL: No syncope or TIA-like symptoms.   PSYCHIATRIC: No anxiety, pain, insomnia or depression    Objective:   PHYSICAL EXAM:    Wt Readings from Last 3 Encounters:   03/02/23 265 lb (120.2 kg)   02/15/23 278 lb (126.1 kg)   02/01/23 275 lb (124.7 kg)     BP Readings from Last 3 Encounters:   03/02/23 114/60   02/15/23 138/76   02/06/23 (!) 147/86     Pulse Readings from Last 3 Encounters:   03/02/23 65   02/06/23 64   01/31/23 68         VITALS:  /60 (Site: Left Upper Arm, Position: Sitting, Cuff Size: Medium Adult)   Pulse 65   Ht 5' 5\" (1.651 m)   Wt 265 lb (120.2 kg)   SpO2 95%   BMI 44.10 kg/m²     CONSTITUTIONAL: Cooperative, no apparent distress, and appears overweight   NEUROLOGIC:  Awake and oriented to person, place, and time. PSYCH: Calm affect. SKIN: Warm and dry. HEENT: Sclera non-icteric, normocephalic, neck supple. RESPIRATORY:  No increased work of breathing and clear to auscultation, no crackles or wheezing. CARDIOVASCULAR:  Regular rate and rhythm without murmur. Normal S1 and S2. No gallops or rubs. Normal PMI. No elevation of JVP. Normal carotid pulses with no bruits. GI:  Normal bowel sounds. Non-distended. Non-tender to palpation  EXT: No edema. No calf tenderness. Pulses are present bilaterally.     DATA:    Lab Results   Component Value Date    ALT 15 02/01/2023    AST 17 02/01/2023    ALKPHOS 79 02/01/2023    BILITOT 0.3 02/01/2023     Lab Results   Component Value Date    CREATININE 0.9 02/05/2023    BUN 33 (H) 02/05/2023     (L) 02/05/2023    K 4.6 02/05/2023    CL 99 02/05/2023    CO2 30 02/05/2023     Lab Results   Component Value Date    TSH 2.34 06/06/2011     Lab Results   Component Value Date    WBC 14.3 (H) 02/05/2023    HGB 12.8 02/05/2023    HCT 40.0 02/05/2023    MCV 84.4 02/05/2023     02/05/2023     No components found for: CHLPL  Lab Results   Component Value Date    TRIG 157 (H) 01/25/2023    TRIG 300 (H) 11/07/2019    TRIG 284 (H) 09/22/2018     Lab Results   Component Value Date    HDL 34 (L) 01/25/2023    HDL 26 (L) 08/03/2022    HDL 34 (L) 11/07/2019     Lab Results   Component Value Date    LDLCALC 115 (H) 01/25/2023    LDLCALC see below 08/03/2022    LDLCALC 132 (H) 11/07/2019     Lab Results   Component Value Date    LABVLDL 31 01/25/2023    LABVLDL see below 08/03/2022    LABVLDL 60 11/07/2019     Lab Results   Component Value Date/Time    PROBNP 415 02/01/2023 12:14 PM    PROBNP 859 01/28/2023 04:47 AM    PROBNP 374 01/24/2023 04:24 PM     Radiology Review:  Pertinent images / reports were reviewed as a part of this visit and reveals the following:    Last Echo: 8/2/22   Summary   Technically difficult exam due to body habitus. Normal global systolic function with an ejection fraction estimated at 70%. No regional wall motion abnormalities noted. Mild concentric left ventricular hypertrophy. Mild aortic stenosis with a peak velocity of 2.17m/s and a mean pressure   gradient of 12 mmHg. There is mild aortic insufficiency. Grade I diastolic dysfunction with normal LV filling pressures. Avg.   E/e'=18.2    Last Stress Test: 10/27/22  Summary    There is a mild fixed inferioapical defect with no associated wall motion    abnormality consistent with diaphragmatic attenuation artifact. No evidence of myocardium at risk for significant reversible ischemia. Normal LV size and systolic function. Overall findings represent a low risk scan. 1/26/23  Left Heart Cath  LM-30% proximal, 10-20% distal disease   LAD-proximal mid stents patent with <20% ISR; 80% ostial jailed second diagonal;  hazy 95% distal LAD   LCx-high first marginal with 80% short, ostial stenosis; mild plaquing in circumflex   RCA-dominant, large caliber with 80% calcified, proximal stenosis; 50% distal RPDA   LVEDP- 20 mmHg  LVG: LVEF 60%     Impression/Recommendations:  Severe multivessel coronary disease. Gisela Zavala is not interested in surgical opinion for CABG and is likely a poor candidate from a pulmonary perspective at a minimum. She prefers to discuss complex multivessel PCI with her daughter and granddaughter. IV diuresis and Plavix loading prior to potential return for staged PCI of LAD and RCA. Continue therapeutic AC, beta blocker, nitrates. Cardiac Cath PCI: 1/30/23  Anatomy:   LAD-prox 70-80% ISR, mid 70-80%, distal 90%     RCA-prox 90%  RPDA- nml     Intervention  ~Successful PCI to RCA with 4.0x18 KATHLEEN. PD with 4.0x8 NC. PCI to Distal LAD 2.75x38 KATHLEEN. PCI to Prox/mid LAD 3.25x38 KATHLEEN. Excellent Result.       Contrast: 72  Flouro Time: 11.7  Access: R radial a     Impression  ~Coronary Angiography w/ severe 2VD  ~Successful complex angioplasty and stenting of LAD/RCA     Recommendation  ~Aggressive medical treatment and risk factor modification  ~1. Post cath IVF. Bedrest.   2. Recommend beta blocker, ace, high potency statin, aspirin and plavix for at least 12 months. Stop imdur  3. Referral to outpatient cardiac rehab phase II will be deferred until patient follow-up in office and then determine patient safety and appropriateness to proceed  4. Patient has been advised on the importance of regular exercise of at least 20-30 minutes daily.   5. Patient counseled about and offered assistance for smoking cessation   6. Follow up in 2 weeks with cardiology    Assessment:     Coronary artery disease   ~ hx of LAD and D2 PCI  ~ [facility] in 2019 with borderline CAD with FFR negative 70% RCA stenosis.   ~ stress test 10/27/22: with artifact, negative for ischemia     ~ [facility] 1/30/23: s/p PCI of LAD and RCA    ~ stable; denies recurrence of CP since PCI   ~ continue plavix (no aspirin, on Xarelto) and statin. No bb d/t bradycardia per reports     Hypertension   ~ controlled     Hyperlipidemia    ~  (1/2023); crestor increased to 40 at hospital [place]. Now c/o leg cramps     Aortic valve disease   ~ Echo 3/2020 and 8/2021: mild AS with MG 11mmHg, mild AI  ~ Echo 8/2022: mild AS with MG 12mmHg, mild AI  ~ stable     Paroxysmal atrial fibrillation   ~ new dx 8/2022 (setting of recurrent CVA)  ~ AC with Xarelto   ~ follows with EP     Shortness of breath   ~ high suspicion for COPD and AKIL    DM- A1C 7.3  Recurrent CVA  Hx of tobacco use    Patient  is stable since hospital discharge.  Plan:     Decrease crestor to 20mg to see if that will help leg symptoms. If not, call office.   Will defer need for Holter monitor to EP, has appt 3/7.  Follow up with pulmonary   Follow up in 3 months     I have addressed the patient's cardiac risk factors and adjusted pharmacologic treatment as needed. In addition, I have reinforced the need  for patient directed risk factor modification. Further evaluation will be based upon the patient's clinical course and testing results. All questions and concerns were addressed to the patient. Alternatives to my treatment were discussed. The patient verbalizes understanding not to stop medications without discussing with us. The patient is not currently smoking. The risks related to smoking were reviewed with the patient. Recommend maintaining a smoke-free lifestyle. Products available for smoking cessation were discussed. Patient is not on a beta blocker. hx of bradycardia   Patient is not on an ACE/ARB/ARNI. no hx of systolic CHF. Patient is on a statin    Antiplatelet  and Anticoagulation  therapy has been recommended / prescribed for this patient. Education conducted on adverse reactions including bleeding were discussed. Discussed exercise: 30min of sustained cardiovascular exercise most days of the week   Discussed Low saturated fat / Cholesterol diet. Thank you for allowing us to participate in the care of Mahesh Rodriguez. Valery PAL-CNP  [facility]   Office: [phone]    Documentation of today's visit sent to PCP    NOTE:  This report was transcribed using voice recognition software. Every effort was made to ensure accuracy; however, inadvertent computerized transcription errors may be present.

## 2023-03-02 NOTE — PATIENT INSTRUCTIONS
Decrease crestor to 20mg to see if that will help leg symptoms. If not call office.      [facility]  Pulmonary, [address], [address]  [phone]    Follow up in 3 months
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
## 2023-04-17 ENCOUNTER — TELEPHONE (OUTPATIENT)
Dept: CARDIOLOGY CLINIC | Age: 78
End: 2023-04-17

## 2023-04-17 NOTE — TELEPHONE ENCOUNTER
----- Message from Mordecai Snellen, APRN - CNP sent at 4/14/2023  3:58 PM EDT -----  20% PVC burden. Appt with EP 4/18. Recommend updating blood work prior to appt. Orders in epic. Not on bb d/t reports of bradycardia. Avg HR 68. Defer to EP.

## 2023-04-17 NOTE — TELEPHONE ENCOUNTER
LMOM for pt to call back for message from NATHANAEL. Unable to leave results message per HIPAA. I advised to get labs before her appt.

## 2023-04-19 ENCOUNTER — TELEPHONE (OUTPATIENT)
Dept: CARDIOLOGY CLINIC | Age: 78
End: 2023-04-19

## 2023-04-19 NOTE — TELEPHONE ENCOUNTER
Jessica Stark states she can draw the labs if we fax the orders over to her. States we can fax them to [facility]ATIBHA @ [phone]. She is also asking to please add a CBC. Please advise.

## 2023-04-20 ENCOUNTER — HOSPITAL ENCOUNTER (INPATIENT)
Age: 78
LOS: 4 days | Discharge: HOME HEALTH | DRG: 378 | End: 2023-04-24
Attending: EMERGENCY MEDICINE | Admitting: INTERNAL MEDICINE
Payer: COMMERCIAL

## 2023-04-20 ENCOUNTER — APPOINTMENT (OUTPATIENT)
Dept: CT IMAGING | Age: 78
DRG: 378 | End: 2023-04-20
Payer: COMMERCIAL

## 2023-04-20 ENCOUNTER — APPOINTMENT (OUTPATIENT)
Dept: GENERAL RADIOLOGY | Age: 78
DRG: 378 | End: 2023-04-20
Payer: COMMERCIAL

## 2023-04-20 DIAGNOSIS — R77.8 ELEVATED TROPONIN: ICD-10-CM

## 2023-04-20 DIAGNOSIS — K92.2 GASTROINTESTINAL HEMORRHAGE, UNSPECIFIED GASTROINTESTINAL HEMORRHAGE TYPE: Primary | ICD-10-CM

## 2023-04-20 DIAGNOSIS — R04.2 HEMOPTYSIS: ICD-10-CM

## 2023-04-20 LAB
ALBUMIN SERPL-MCNC: 3.4 G/DL (ref 3.4–5)
ALBUMIN/GLOB SERPL: 1.1 {RATIO} (ref 1.1–2.2)
ALP SERPL-CCNC: 86 U/L (ref 40–129)
ALT SERPL-CCNC: 12 U/L (ref 10–40)
ANION GAP SERPL CALCULATED.3IONS-SCNC: 9 MMOL/L (ref 3–16)
APTT BLD: 25.5 SEC (ref 22.7–35.9)
AST SERPL-CCNC: 15 U/L (ref 15–37)
BASOPHILS # BLD: 0.1 K/UL (ref 0–0.2)
BASOPHILS NFR BLD: 0.7 %
BILIRUB SERPL-MCNC: 0.3 MG/DL (ref 0–1)
BUN SERPL-MCNC: 12 MG/DL (ref 7–20)
CALCIUM SERPL-MCNC: 9.6 MG/DL (ref 8.3–10.6)
CHLORIDE SERPL-SCNC: 106 MMOL/L (ref 99–110)
CO2 SERPL-SCNC: 25 MMOL/L (ref 21–32)
CREAT SERPL-MCNC: 0.8 MG/DL (ref 0.6–1.2)
DEPRECATED RDW RBC AUTO: 15.8 % (ref 12.4–15.4)
EKG ATRIAL RATE: 70 BPM
EKG DIAGNOSIS: NORMAL
EKG P-R INTERVAL: 182 MS
EKG Q-T INTERVAL: 414 MS
EKG QRS DURATION: 112 MS
EKG QTC CALCULATION (BAZETT): 447 MS
EKG R AXIS: -69 DEGREES
EKG T AXIS: 67 DEGREES
EKG VENTRICULAR RATE: 70 BPM
EOSINOPHIL # BLD: 0.1 K/UL (ref 0–0.6)
EOSINOPHIL NFR BLD: 1.5 %
GFR SERPLBLD CREATININE-BSD FMLA CKD-EPI: >60 ML/MIN/{1.73_M2}
GLUCOSE BLD-MCNC: 169 MG/DL (ref 70–99)
GLUCOSE BLD-MCNC: 82 MG/DL (ref 70–99)
GLUCOSE SERPL-MCNC: 194 MG/DL (ref 70–99)
HCT VFR BLD AUTO: 38.8 % (ref 36–48)
HEMOCCULT STL QL: ABNORMAL
HGB BLD-MCNC: 12.4 G/DL (ref 12–16)
INR PPP: 1.04 (ref 0.84–1.16)
LIPASE SERPL-CCNC: 26 U/L (ref 13–60)
LYMPHOCYTES # BLD: 1.8 K/UL (ref 1–5.1)
LYMPHOCYTES NFR BLD: 19.5 %
MCH RBC QN AUTO: 27.6 PG (ref 26–34)
MCHC RBC AUTO-ENTMCNC: 32 G/DL (ref 31–36)
MCV RBC AUTO: 86.2 FL (ref 80–100)
MONOCYTES # BLD: 0.7 K/UL (ref 0–1.3)
MONOCYTES NFR BLD: 7.7 %
NEUTROPHILS # BLD: 6.5 K/UL (ref 1.7–7.7)
NEUTROPHILS NFR BLD: 70.6 %
NT-PROBNP SERPL-MCNC: 367 PG/ML (ref 0–449)
PERFORMED ON: ABNORMAL
PERFORMED ON: NORMAL
PLATELET # BLD AUTO: 188 K/UL (ref 135–450)
PMV BLD AUTO: 8.5 FL (ref 5–10.5)
POTASSIUM SERPL-SCNC: 4.4 MMOL/L (ref 3.5–5.1)
PROT SERPL-MCNC: 6.4 G/DL (ref 6.4–8.2)
PROTHROMBIN TIME: 13.7 SEC (ref 11.5–14.8)
RBC # BLD AUTO: 4.5 M/UL (ref 4–5.2)
REASON FOR REJECTION: NORMAL
REJECTED TEST: NORMAL
SODIUM SERPL-SCNC: 140 MMOL/L (ref 136–145)
TROPONIN, HIGH SENSITIVITY: 15 NG/L (ref 0–14)
WBC # BLD AUTO: 9.3 K/UL (ref 4–11)

## 2023-04-20 PROCEDURE — 71260 CT THORAX DX C+: CPT

## 2023-04-20 PROCEDURE — 83690 ASSAY OF LIPASE: CPT

## 2023-04-20 PROCEDURE — C9113 INJ PANTOPRAZOLE SODIUM, VIA: HCPCS | Performed by: INTERNAL MEDICINE

## 2023-04-20 PROCEDURE — 80053 COMPREHEN METABOLIC PANEL: CPT

## 2023-04-20 PROCEDURE — 6360000004 HC RX CONTRAST MEDICATION: Performed by: PHYSICIAN ASSISTANT

## 2023-04-20 PROCEDURE — 85025 COMPLETE CBC W/AUTO DIFF WBC: CPT

## 2023-04-20 PROCEDURE — 6370000000 HC RX 637 (ALT 250 FOR IP): Performed by: INTERNAL MEDICINE

## 2023-04-20 PROCEDURE — 82270 OCCULT BLOOD FECES: CPT

## 2023-04-20 PROCEDURE — 93005 ELECTROCARDIOGRAM TRACING: CPT | Performed by: PHYSICIAN ASSISTANT

## 2023-04-20 PROCEDURE — 99285 EMERGENCY DEPT VISIT HI MDM: CPT

## 2023-04-20 PROCEDURE — 94640 AIRWAY INHALATION TREATMENT: CPT

## 2023-04-20 PROCEDURE — 6360000002 HC RX W HCPCS: Performed by: PHYSICIAN ASSISTANT

## 2023-04-20 PROCEDURE — C9113 INJ PANTOPRAZOLE SODIUM, VIA: HCPCS | Performed by: PHYSICIAN ASSISTANT

## 2023-04-20 PROCEDURE — 93005 ELECTROCARDIOGRAM TRACING: CPT | Performed by: INTERNAL MEDICINE

## 2023-04-20 PROCEDURE — 85730 THROMBOPLASTIN TIME PARTIAL: CPT

## 2023-04-20 PROCEDURE — 2580000003 HC RX 258: Performed by: INTERNAL MEDICINE

## 2023-04-20 PROCEDURE — 85610 PROTHROMBIN TIME: CPT

## 2023-04-20 PROCEDURE — 6360000002 HC RX W HCPCS: Performed by: INTERNAL MEDICINE

## 2023-04-20 PROCEDURE — 84484 ASSAY OF TROPONIN QUANT: CPT

## 2023-04-20 PROCEDURE — 2060000000 HC ICU INTERMEDIATE R&B

## 2023-04-20 PROCEDURE — 83880 ASSAY OF NATRIURETIC PEPTIDE: CPT

## 2023-04-20 PROCEDURE — 74174 CTA ABD&PLVS W/CONTRAST: CPT

## 2023-04-20 PROCEDURE — 71045 X-RAY EXAM CHEST 1 VIEW: CPT

## 2023-04-20 PROCEDURE — 93010 ELECTROCARDIOGRAM REPORT: CPT | Performed by: INTERNAL MEDICINE

## 2023-04-20 PROCEDURE — 36415 COLL VENOUS BLD VENIPUNCTURE: CPT

## 2023-04-20 RX ORDER — LISINOPRIL 5 MG/1
2.5 TABLET ORAL DAILY
Status: DISCONTINUED | OUTPATIENT
Start: 2023-04-21 | End: 2023-04-24 | Stop reason: HOSPADM

## 2023-04-20 RX ORDER — INSULIN GLARGINE 100 [IU]/ML
30 INJECTION, SOLUTION SUBCUTANEOUS NIGHTLY
Status: DISCONTINUED | OUTPATIENT
Start: 2023-04-20 | End: 2023-04-20

## 2023-04-20 RX ORDER — TRIAMCINOLONE ACETONIDE 1 MG/G
CREAM TOPICAL 2 TIMES DAILY
Status: DISCONTINUED | OUTPATIENT
Start: 2023-04-20 | End: 2023-04-24 | Stop reason: HOSPADM

## 2023-04-20 RX ORDER — INSULIN LISPRO 100 [IU]/ML
0-16 INJECTION, SOLUTION INTRAVENOUS; SUBCUTANEOUS
Status: DISCONTINUED | OUTPATIENT
Start: 2023-04-20 | End: 2023-04-20

## 2023-04-20 RX ORDER — DEXTROSE MONOHYDRATE 100 MG/ML
INJECTION, SOLUTION INTRAVENOUS CONTINUOUS PRN
Status: DISCONTINUED | OUTPATIENT
Start: 2023-04-20 | End: 2023-04-24 | Stop reason: HOSPADM

## 2023-04-20 RX ORDER — GABAPENTIN 300 MG/1
600 CAPSULE ORAL 3 TIMES DAILY
Status: DISCONTINUED | OUTPATIENT
Start: 2023-04-20 | End: 2023-04-24 | Stop reason: HOSPADM

## 2023-04-20 RX ORDER — ALPRAZOLAM 0.5 MG/1
1 TABLET ORAL 3 TIMES DAILY PRN
Status: DISCONTINUED | OUTPATIENT
Start: 2023-04-20 | End: 2023-04-24 | Stop reason: HOSPADM

## 2023-04-20 RX ORDER — FENTANYL CITRATE 50 UG/ML
25 INJECTION, SOLUTION INTRAMUSCULAR; INTRAVENOUS ONCE
Status: COMPLETED | OUTPATIENT
Start: 2023-04-20 | End: 2023-04-20

## 2023-04-20 RX ORDER — GUAIFENESIN 600 MG/1
600 TABLET, EXTENDED RELEASE ORAL 2 TIMES DAILY
Status: DISCONTINUED | OUTPATIENT
Start: 2023-04-20 | End: 2023-04-24 | Stop reason: HOSPADM

## 2023-04-20 RX ORDER — PANTOPRAZOLE SODIUM 40 MG/10ML
40 INJECTION, POWDER, LYOPHILIZED, FOR SOLUTION INTRAVENOUS 2 TIMES DAILY
Status: DISCONTINUED | OUTPATIENT
Start: 2023-04-20 | End: 2023-04-24

## 2023-04-20 RX ORDER — WATER 1000 ML/1000ML
INJECTION, SOLUTION INTRAVENOUS
Status: DISPENSED
Start: 2023-04-20 | End: 2023-04-21

## 2023-04-20 RX ORDER — CLOPIDOGREL BISULFATE 75 MG/1
75 TABLET ORAL DAILY
Status: DISCONTINUED | OUTPATIENT
Start: 2023-04-21 | End: 2023-04-24 | Stop reason: HOSPADM

## 2023-04-20 RX ORDER — ROSUVASTATIN CALCIUM 20 MG/1
20 TABLET, COATED ORAL NIGHTLY
Status: DISCONTINUED | OUTPATIENT
Start: 2023-04-20 | End: 2023-04-24 | Stop reason: HOSPADM

## 2023-04-20 RX ORDER — ISOSORBIDE MONONITRATE 30 MG/1
30 TABLET, EXTENDED RELEASE ORAL DAILY
COMMUNITY

## 2023-04-20 RX ORDER — GLIPIZIDE 5 MG/1
10 TABLET ORAL 2 TIMES DAILY WITH MEALS
Status: DISCONTINUED | OUTPATIENT
Start: 2023-04-20 | End: 2023-04-24 | Stop reason: HOSPADM

## 2023-04-20 RX ORDER — NITROGLYCERIN 0.4 MG/1
0.4 TABLET SUBLINGUAL EVERY 5 MIN PRN
Status: DISCONTINUED | OUTPATIENT
Start: 2023-04-20 | End: 2023-04-24 | Stop reason: HOSPADM

## 2023-04-20 RX ORDER — LISINOPRIL 2.5 MG/1
2.5 TABLET ORAL DAILY
COMMUNITY

## 2023-04-20 RX ORDER — ALBUTEROL SULFATE 90 UG/1
2 AEROSOL, METERED RESPIRATORY (INHALATION) 4 TIMES DAILY
Status: DISCONTINUED | OUTPATIENT
Start: 2023-04-20 | End: 2023-04-24 | Stop reason: HOSPADM

## 2023-04-20 RX ORDER — ISOSORBIDE MONONITRATE 30 MG/1
30 TABLET, EXTENDED RELEASE ORAL DAILY
Status: DISCONTINUED | OUTPATIENT
Start: 2023-04-21 | End: 2023-04-24 | Stop reason: HOSPADM

## 2023-04-20 RX ORDER — PANTOPRAZOLE SODIUM 40 MG/10ML
40 INJECTION, POWDER, LYOPHILIZED, FOR SOLUTION INTRAVENOUS ONCE
Status: COMPLETED | OUTPATIENT
Start: 2023-04-20 | End: 2023-04-20

## 2023-04-20 RX ORDER — SODIUM CHLORIDE, SODIUM LACTATE, POTASSIUM CHLORIDE, CALCIUM CHLORIDE 600; 310; 30; 20 MG/100ML; MG/100ML; MG/100ML; MG/100ML
INJECTION, SOLUTION INTRAVENOUS CONTINUOUS
Status: DISCONTINUED | OUTPATIENT
Start: 2023-04-20 | End: 2023-04-24

## 2023-04-20 RX ORDER — IPRATROPIUM BROMIDE AND ALBUTEROL SULFATE 2.5; .5 MG/3ML; MG/3ML
3 SOLUTION RESPIRATORY (INHALATION) EVERY 4 HOURS PRN
Status: DISCONTINUED | OUTPATIENT
Start: 2023-04-20 | End: 2023-04-24 | Stop reason: HOSPADM

## 2023-04-20 RX ORDER — ACETAZOLAMIDE 250 MG/1
250 TABLET ORAL DAILY
Status: DISCONTINUED | OUTPATIENT
Start: 2023-04-21 | End: 2023-04-24 | Stop reason: HOSPADM

## 2023-04-20 RX ORDER — ALOGLIPTIN 25 MG/1
25 TABLET, FILM COATED ORAL DAILY
Status: DISCONTINUED | OUTPATIENT
Start: 2023-04-21 | End: 2023-04-24 | Stop reason: HOSPADM

## 2023-04-20 RX ADMIN — ALPRAZOLAM 1 MG: 0.5 TABLET ORAL at 18:41

## 2023-04-20 RX ADMIN — PANTOPRAZOLE SODIUM 40 MG: 40 INJECTION, POWDER, FOR SOLUTION INTRAVENOUS at 20:25

## 2023-04-20 RX ADMIN — ROSUVASTATIN 20 MG: 20 TABLET, FILM COATED ORAL at 20:25

## 2023-04-20 RX ADMIN — FENTANYL CITRATE 25 MCG: 50 INJECTION, SOLUTION INTRAMUSCULAR; INTRAVENOUS at 13:03

## 2023-04-20 RX ADMIN — Medication 2 PUFF: at 20:01

## 2023-04-20 RX ADMIN — SODIUM CHLORIDE, POTASSIUM CHLORIDE, SODIUM LACTATE AND CALCIUM CHLORIDE: 600; 310; 30; 20 INJECTION, SOLUTION INTRAVENOUS at 20:36

## 2023-04-20 RX ADMIN — GUAIFENESIN 600 MG: 600 TABLET, EXTENDED RELEASE ORAL at 20:25

## 2023-04-20 RX ADMIN — GLIPIZIDE 10 MG: 5 TABLET ORAL at 18:41

## 2023-04-20 RX ADMIN — IOPAMIDOL 95 ML: 755 INJECTION, SOLUTION INTRAVENOUS at 14:28

## 2023-04-20 RX ADMIN — PANTOPRAZOLE SODIUM 40 MG: 40 INJECTION, POWDER, FOR SOLUTION INTRAVENOUS at 15:15

## 2023-04-20 RX ADMIN — GABAPENTIN 600 MG: 300 CAPSULE ORAL at 20:25

## 2023-04-20 ASSESSMENT — ENCOUNTER SYMPTOMS
SHORTNESS OF BREATH: 0
COLOR CHANGE: 0
WHEEZING: 0
CONSTIPATION: 0
ABDOMINAL PAIN: 1
COUGH: 1
VOMITING: 0
STRIDOR: 0
BACK PAIN: 0
NAUSEA: 0
DIARRHEA: 0
BLOOD IN STOOL: 1

## 2023-04-20 ASSESSMENT — PAIN SCALES - GENERAL
PAINLEVEL_OUTOF10: 9

## 2023-04-20 ASSESSMENT — PAIN DESCRIPTION - LOCATION: LOCATION: ABDOMEN;GENERALIZED

## 2023-04-20 ASSESSMENT — LIFESTYLE VARIABLES
HOW OFTEN DO YOU HAVE A DRINK CONTAINING ALCOHOL: MONTHLY OR LESS
HOW MANY STANDARD DRINKS CONTAINING ALCOHOL DO YOU HAVE ON A TYPICAL DAY: 1 OR 2

## 2023-04-20 ASSESSMENT — PAIN - FUNCTIONAL ASSESSMENT: PAIN_FUNCTIONAL_ASSESSMENT: 0-10

## 2023-04-20 NOTE — TELEPHONE ENCOUNTER
Spoke with Patient in which she stated that she was not feeling well and most likely will be going to the hospital because of continuous bleeding. But the message was spoken with understanding on her lab work.    genesis

## 2023-04-21 PROBLEM — K58.9 IBS (IRRITABLE BOWEL SYNDROME): Status: ACTIVE | Noted: 2019-08-09

## 2023-04-21 LAB
ANION GAP SERPL CALCULATED.3IONS-SCNC: 9 MMOL/L (ref 3–16)
BASOPHILS # BLD: 0.1 K/UL (ref 0–0.2)
BASOPHILS NFR BLD: 0.6 %
BUN SERPL-MCNC: 10 MG/DL (ref 7–20)
CALCIUM SERPL-MCNC: 9.7 MG/DL (ref 8.3–10.6)
CHLORIDE SERPL-SCNC: 106 MMOL/L (ref 99–110)
CO2 SERPL-SCNC: 24 MMOL/L (ref 21–32)
CREAT SERPL-MCNC: 0.8 MG/DL (ref 0.6–1.2)
DEPRECATED RDW RBC AUTO: 16.2 % (ref 12.4–15.4)
EKG ATRIAL RATE: 66 BPM
EKG DIAGNOSIS: NORMAL
EKG P-R INTERVAL: 188 MS
EKG Q-T INTERVAL: 418 MS
EKG QRS DURATION: 120 MS
EKG QTC CALCULATION (BAZETT): 438 MS
EKG R AXIS: -56 DEGREES
EKG T AXIS: 71 DEGREES
EKG VENTRICULAR RATE: 66 BPM
EOSINOPHIL # BLD: 0.2 K/UL (ref 0–0.6)
EOSINOPHIL NFR BLD: 2.1 %
GFR SERPLBLD CREATININE-BSD FMLA CKD-EPI: >60 ML/MIN/{1.73_M2}
GLUCOSE BLD-MCNC: 102 MG/DL (ref 70–99)
GLUCOSE BLD-MCNC: 132 MG/DL (ref 70–99)
GLUCOSE BLD-MCNC: 158 MG/DL (ref 70–99)
GLUCOSE BLD-MCNC: 205 MG/DL (ref 70–99)
GLUCOSE SERPL-MCNC: 136 MG/DL (ref 70–99)
HCT VFR BLD AUTO: 40.1 % (ref 36–48)
HCT VFR BLD AUTO: 40.3 % (ref 36–48)
HCT VFR BLD AUTO: 42.1 % (ref 36–48)
HGB BLD-MCNC: 12.4 G/DL (ref 12–16)
HGB BLD-MCNC: 12.6 G/DL (ref 12–16)
HGB BLD-MCNC: 12.8 G/DL (ref 12–16)
LYMPHOCYTES # BLD: 1.7 K/UL (ref 1–5.1)
LYMPHOCYTES NFR BLD: 19.9 %
MCH RBC QN AUTO: 27.9 PG (ref 26–34)
MCHC RBC AUTO-ENTMCNC: 31.7 G/DL (ref 31–36)
MCV RBC AUTO: 88 FL (ref 80–100)
MONOCYTES # BLD: 0.6 K/UL (ref 0–1.3)
MONOCYTES NFR BLD: 6.6 %
NEUTROPHILS # BLD: 6 K/UL (ref 1.7–7.7)
NEUTROPHILS NFR BLD: 70.8 %
PERFORMED ON: ABNORMAL
PLATELET # BLD AUTO: 137 K/UL (ref 135–450)
PLATELET BLD QL SMEAR: ADEQUATE
PMV BLD AUTO: 8.5 FL (ref 5–10.5)
POTASSIUM SERPL-SCNC: 4.6 MMOL/L (ref 3.5–5.1)
RBC # BLD AUTO: 4.59 M/UL (ref 4–5.2)
SLIDE REVIEW: ABNORMAL
SODIUM SERPL-SCNC: 139 MMOL/L (ref 136–145)
WBC # BLD AUTO: 8.5 K/UL (ref 4–11)

## 2023-04-21 PROCEDURE — 94640 AIRWAY INHALATION TREATMENT: CPT

## 2023-04-21 PROCEDURE — 93010 ELECTROCARDIOGRAM REPORT: CPT | Performed by: INTERNAL MEDICINE

## 2023-04-21 PROCEDURE — 80048 BASIC METABOLIC PNL TOTAL CA: CPT

## 2023-04-21 PROCEDURE — 2580000003 HC RX 258: Performed by: INTERNAL MEDICINE

## 2023-04-21 PROCEDURE — 94761 N-INVAS EAR/PLS OXIMETRY MLT: CPT

## 2023-04-21 PROCEDURE — 6370000000 HC RX 637 (ALT 250 FOR IP): Performed by: INTERNAL MEDICINE

## 2023-04-21 PROCEDURE — 85025 COMPLETE CBC W/AUTO DIFF WBC: CPT

## 2023-04-21 PROCEDURE — 36415 COLL VENOUS BLD VENIPUNCTURE: CPT

## 2023-04-21 PROCEDURE — C9113 INJ PANTOPRAZOLE SODIUM, VIA: HCPCS | Performed by: INTERNAL MEDICINE

## 2023-04-21 PROCEDURE — 2060000000 HC ICU INTERMEDIATE R&B

## 2023-04-21 PROCEDURE — 85018 HEMOGLOBIN: CPT

## 2023-04-21 PROCEDURE — 6360000002 HC RX W HCPCS: Performed by: INTERNAL MEDICINE

## 2023-04-21 PROCEDURE — 85014 HEMATOCRIT: CPT

## 2023-04-21 PROCEDURE — 2700000000 HC OXYGEN THERAPY PER DAY

## 2023-04-21 RX ADMIN — ROSUVASTATIN 20 MG: 20 TABLET, FILM COATED ORAL at 20:31

## 2023-04-21 RX ADMIN — GABAPENTIN 600 MG: 300 CAPSULE ORAL at 09:31

## 2023-04-21 RX ADMIN — Medication 2 PUFF: at 12:30

## 2023-04-21 RX ADMIN — CLOPIDOGREL BISULFATE 75 MG: 75 TABLET ORAL at 13:22

## 2023-04-21 RX ADMIN — Medication 2 PUFF: at 09:04

## 2023-04-21 RX ADMIN — GABAPENTIN 600 MG: 300 CAPSULE ORAL at 13:21

## 2023-04-21 RX ADMIN — PANTOPRAZOLE SODIUM 40 MG: 40 INJECTION, POWDER, FOR SOLUTION INTRAVENOUS at 09:31

## 2023-04-21 RX ADMIN — DICLOFENAC SODIUM 2 G: 10 GEL TOPICAL at 09:32

## 2023-04-21 RX ADMIN — GABAPENTIN 600 MG: 300 CAPSULE ORAL at 20:31

## 2023-04-21 RX ADMIN — ALPRAZOLAM 1 MG: 0.5 TABLET ORAL at 20:31

## 2023-04-21 RX ADMIN — GLIPIZIDE 10 MG: 5 TABLET ORAL at 09:32

## 2023-04-21 RX ADMIN — Medication 2 PUFF: at 15:19

## 2023-04-21 RX ADMIN — GLIPIZIDE 10 MG: 5 TABLET ORAL at 15:58

## 2023-04-21 RX ADMIN — GUAIFENESIN 600 MG: 600 TABLET, EXTENDED RELEASE ORAL at 09:32

## 2023-04-21 RX ADMIN — ISOSORBIDE MONONITRATE 30 MG: 30 TABLET, EXTENDED RELEASE ORAL at 09:32

## 2023-04-21 RX ADMIN — SODIUM CHLORIDE, POTASSIUM CHLORIDE, SODIUM LACTATE AND CALCIUM CHLORIDE: 600; 310; 30; 20 INJECTION, SOLUTION INTRAVENOUS at 15:44

## 2023-04-21 RX ADMIN — LISINOPRIL 2.5 MG: 5 TABLET ORAL at 09:31

## 2023-04-21 RX ADMIN — GUAIFENESIN 600 MG: 600 TABLET, EXTENDED RELEASE ORAL at 20:31

## 2023-04-21 RX ADMIN — PANTOPRAZOLE SODIUM 40 MG: 40 INJECTION, POWDER, FOR SOLUTION INTRAVENOUS at 20:31

## 2023-04-21 RX ADMIN — ALPRAZOLAM 1 MG: 0.5 TABLET ORAL at 03:48

## 2023-04-21 RX ADMIN — TRIAMCINOLONE ACETONIDE: 1 CREAM TOPICAL at 20:35

## 2023-04-21 RX ADMIN — SODIUM CHLORIDE, POTASSIUM CHLORIDE, SODIUM LACTATE AND CALCIUM CHLORIDE: 600; 310; 30; 20 INJECTION, SOLUTION INTRAVENOUS at 01:38

## 2023-04-21 RX ADMIN — ALPRAZOLAM 1 MG: 0.5 TABLET ORAL at 13:21

## 2023-04-21 RX ADMIN — DICLOFENAC SODIUM 2 G: 10 GEL TOPICAL at 19:23

## 2023-04-21 RX ADMIN — ALOGLIPTIN 25 MG: 25 TABLET, FILM COATED ORAL at 09:32

## 2023-04-21 RX ADMIN — ACETAZOLAMIDE 250 MG: 250 TABLET ORAL at 09:32

## 2023-04-21 ASSESSMENT — PAIN SCALES - GENERAL
PAINLEVEL_OUTOF10: 0
PAINLEVEL_OUTOF10: 0
PAINLEVEL_OUTOF10: 10
PAINLEVEL_OUTOF10: 0
PAINLEVEL_OUTOF10: 10
PAINLEVEL_OUTOF10: 0

## 2023-04-21 ASSESSMENT — PAIN DESCRIPTION - LOCATION
LOCATION: ABDOMEN
LOCATION: GENERALIZED

## 2023-04-21 ASSESSMENT — PAIN DESCRIPTION - DESCRIPTORS: DESCRIPTORS: ACHING

## 2023-04-21 ASSESSMENT — PAIN DESCRIPTION - PAIN TYPE: TYPE: CHRONIC PAIN

## 2023-04-22 LAB
ANION GAP SERPL CALCULATED.3IONS-SCNC: 9 MMOL/L (ref 3–16)
BASOPHILS # BLD: 0.3 K/UL (ref 0–0.2)
BASOPHILS NFR BLD: 4 %
BUN SERPL-MCNC: 8 MG/DL (ref 7–20)
C DIFF TOX A+B STL QL IA: NORMAL
CALCIUM SERPL-MCNC: 9.7 MG/DL (ref 8.3–10.6)
CHLORIDE SERPL-SCNC: 107 MMOL/L (ref 99–110)
CO2 SERPL-SCNC: 26 MMOL/L (ref 21–32)
CREAT SERPL-MCNC: 0.8 MG/DL (ref 0.6–1.2)
DEPRECATED RDW RBC AUTO: 16.2 % (ref 12.4–15.4)
EOSINOPHIL # BLD: 0.2 K/UL (ref 0–0.6)
EOSINOPHIL NFR BLD: 2.4 %
GFR SERPLBLD CREATININE-BSD FMLA CKD-EPI: >60 ML/MIN/{1.73_M2}
GLUCOSE BLD-MCNC: 117 MG/DL (ref 70–99)
GLUCOSE BLD-MCNC: 144 MG/DL (ref 70–99)
GLUCOSE BLD-MCNC: 197 MG/DL (ref 70–99)
GLUCOSE BLD-MCNC: 208 MG/DL (ref 70–99)
GLUCOSE SERPL-MCNC: 128 MG/DL (ref 70–99)
HCT VFR BLD AUTO: 39 % (ref 36–48)
HCT VFR BLD AUTO: 43.9 % (ref 36–48)
HGB BLD-MCNC: 12.2 G/DL (ref 12–16)
HGB BLD-MCNC: 13.9 G/DL (ref 12–16)
LYMPHOCYTES # BLD: 1.1 K/UL (ref 1–5.1)
LYMPHOCYTES NFR BLD: 15.1 %
MCH RBC QN AUTO: 27.5 PG (ref 26–34)
MCHC RBC AUTO-ENTMCNC: 31.6 G/DL (ref 31–36)
MCV RBC AUTO: 86.9 FL (ref 80–100)
MONOCYTES # BLD: 0.4 K/UL (ref 0–1.3)
MONOCYTES NFR BLD: 5.1 %
NEUTROPHILS # BLD: 5.4 K/UL (ref 1.7–7.7)
NEUTROPHILS NFR BLD: 73.4 %
PERFORMED ON: ABNORMAL
PLATELET # BLD AUTO: 155 K/UL (ref 135–450)
PMV BLD AUTO: 8.5 FL (ref 5–10.5)
POTASSIUM SERPL-SCNC: 4.3 MMOL/L (ref 3.5–5.1)
RBC # BLD AUTO: 5.05 M/UL (ref 4–5.2)
SODIUM SERPL-SCNC: 142 MMOL/L (ref 136–145)
WBC # BLD AUTO: 7.3 K/UL (ref 4–11)

## 2023-04-22 PROCEDURE — 94640 AIRWAY INHALATION TREATMENT: CPT

## 2023-04-22 PROCEDURE — 85014 HEMATOCRIT: CPT

## 2023-04-22 PROCEDURE — 80048 BASIC METABOLIC PNL TOTAL CA: CPT

## 2023-04-22 PROCEDURE — 85025 COMPLETE CBC W/AUTO DIFF WBC: CPT

## 2023-04-22 PROCEDURE — 87324 CLOSTRIDIUM AG IA: CPT

## 2023-04-22 PROCEDURE — 94761 N-INVAS EAR/PLS OXIMETRY MLT: CPT

## 2023-04-22 PROCEDURE — 6360000002 HC RX W HCPCS: Performed by: INTERNAL MEDICINE

## 2023-04-22 PROCEDURE — 2060000000 HC ICU INTERMEDIATE R&B

## 2023-04-22 PROCEDURE — 02HV33Z INSERTION OF INFUSION DEVICE INTO SUPERIOR VENA CAVA, PERCUTANEOUS APPROACH: ICD-10-PCS | Performed by: INTERNAL MEDICINE

## 2023-04-22 PROCEDURE — 87506 IADNA-DNA/RNA PROBE TQ 6-11: CPT

## 2023-04-22 PROCEDURE — 6370000000 HC RX 637 (ALT 250 FOR IP): Performed by: INTERNAL MEDICINE

## 2023-04-22 PROCEDURE — 85018 HEMOGLOBIN: CPT

## 2023-04-22 PROCEDURE — 87449 NOS EACH ORGANISM AG IA: CPT

## 2023-04-22 PROCEDURE — 87328 CRYPTOSPORIDIUM AG IA: CPT

## 2023-04-22 PROCEDURE — 36569 INSJ PICC 5 YR+ W/O IMAGING: CPT

## 2023-04-22 PROCEDURE — 36415 COLL VENOUS BLD VENIPUNCTURE: CPT

## 2023-04-22 PROCEDURE — C9113 INJ PANTOPRAZOLE SODIUM, VIA: HCPCS | Performed by: INTERNAL MEDICINE

## 2023-04-22 PROCEDURE — 2700000000 HC OXYGEN THERAPY PER DAY

## 2023-04-22 PROCEDURE — 87336 ENTAMOEB HIST DISPR AG IA: CPT

## 2023-04-22 PROCEDURE — C1751 CATH, INF, PER/CENT/MIDLINE: HCPCS

## 2023-04-22 RX ORDER — SODIUM CHLORIDE 0.9 % (FLUSH) 0.9 %
5-40 SYRINGE (ML) INJECTION PRN
Status: DISCONTINUED | OUTPATIENT
Start: 2023-04-22 | End: 2023-04-24 | Stop reason: HOSPADM

## 2023-04-22 RX ORDER — LIDOCAINE HYDROCHLORIDE 10 MG/ML
5 INJECTION, SOLUTION EPIDURAL; INFILTRATION; INTRACAUDAL; PERINEURAL ONCE
Status: DISCONTINUED | OUTPATIENT
Start: 2023-04-23 | End: 2023-04-24 | Stop reason: HOSPADM

## 2023-04-22 RX ORDER — SODIUM CHLORIDE 0.9 % (FLUSH) 0.9 %
5-40 SYRINGE (ML) INJECTION EVERY 12 HOURS SCHEDULED
Status: DISCONTINUED | OUTPATIENT
Start: 2023-04-23 | End: 2023-04-24 | Stop reason: HOSPADM

## 2023-04-22 RX ORDER — SODIUM CHLORIDE 9 MG/ML
25 INJECTION, SOLUTION INTRAVENOUS PRN
Status: DISCONTINUED | OUTPATIENT
Start: 2023-04-22 | End: 2023-04-24 | Stop reason: HOSPADM

## 2023-04-22 RX ADMIN — DICLOFENAC SODIUM 2 G: 10 GEL TOPICAL at 08:48

## 2023-04-22 RX ADMIN — ALPRAZOLAM 1 MG: 0.5 TABLET ORAL at 21:12

## 2023-04-22 RX ADMIN — GABAPENTIN 600 MG: 300 CAPSULE ORAL at 08:48

## 2023-04-22 RX ADMIN — DICLOFENAC SODIUM 2 G: 10 GEL TOPICAL at 21:16

## 2023-04-22 RX ADMIN — ACETAZOLAMIDE 250 MG: 250 TABLET ORAL at 08:47

## 2023-04-22 RX ADMIN — Medication 2 PUFF: at 09:00

## 2023-04-22 RX ADMIN — Medication 2 PUFF: at 21:12

## 2023-04-22 RX ADMIN — TRIAMCINOLONE ACETONIDE: 1 CREAM TOPICAL at 21:13

## 2023-04-22 RX ADMIN — PANTOPRAZOLE SODIUM 40 MG: 40 INJECTION, POWDER, FOR SOLUTION INTRAVENOUS at 08:47

## 2023-04-22 RX ADMIN — CLOPIDOGREL BISULFATE 75 MG: 75 TABLET ORAL at 08:47

## 2023-04-22 RX ADMIN — ALOGLIPTIN 25 MG: 25 TABLET, FILM COATED ORAL at 08:48

## 2023-04-22 RX ADMIN — LISINOPRIL 2.5 MG: 5 TABLET ORAL at 08:48

## 2023-04-22 RX ADMIN — Medication 2 PUFF: at 12:45

## 2023-04-22 RX ADMIN — GUAIFENESIN 600 MG: 600 TABLET, EXTENDED RELEASE ORAL at 08:48

## 2023-04-22 RX ADMIN — GABAPENTIN 600 MG: 300 CAPSULE ORAL at 14:21

## 2023-04-22 RX ADMIN — ROSUVASTATIN 20 MG: 20 TABLET, FILM COATED ORAL at 21:13

## 2023-04-22 RX ADMIN — GUAIFENESIN 600 MG: 600 TABLET, EXTENDED RELEASE ORAL at 21:13

## 2023-04-22 RX ADMIN — ALPRAZOLAM 1 MG: 0.5 TABLET ORAL at 08:49

## 2023-04-22 RX ADMIN — GLIPIZIDE 10 MG: 5 TABLET ORAL at 08:47

## 2023-04-22 RX ADMIN — Medication 2 PUFF: at 15:48

## 2023-04-22 RX ADMIN — TRIAMCINOLONE ACETONIDE: 1 CREAM TOPICAL at 08:48

## 2023-04-22 RX ADMIN — ISOSORBIDE MONONITRATE 30 MG: 30 TABLET, EXTENDED RELEASE ORAL at 08:47

## 2023-04-22 RX ADMIN — GLIPIZIDE 10 MG: 5 TABLET ORAL at 15:59

## 2023-04-22 RX ADMIN — GABAPENTIN 600 MG: 300 CAPSULE ORAL at 21:12

## 2023-04-22 ASSESSMENT — PAIN SCALES - GENERAL: PAINLEVEL_OUTOF10: 0

## 2023-04-23 LAB
CRYPTOSP AG STL QL IA: NORMAL
E HISTOLYT AG STL QL IA: NORMAL
G LAMBLIA AG STL QL IA: NORMAL
GI PATHOGENS PNL STL NAA+PROBE: NORMAL
GLUCOSE BLD-MCNC: 168 MG/DL (ref 70–99)
GLUCOSE BLD-MCNC: 175 MG/DL (ref 70–99)
GLUCOSE BLD-MCNC: 184 MG/DL (ref 70–99)
GLUCOSE BLD-MCNC: 184 MG/DL (ref 70–99)
PERFORMED ON: ABNORMAL

## 2023-04-23 PROCEDURE — 2060000000 HC ICU INTERMEDIATE R&B

## 2023-04-23 PROCEDURE — 94640 AIRWAY INHALATION TREATMENT: CPT

## 2023-04-23 PROCEDURE — 6370000000 HC RX 637 (ALT 250 FOR IP): Performed by: INTERNAL MEDICINE

## 2023-04-23 PROCEDURE — 6360000002 HC RX W HCPCS: Performed by: INTERNAL MEDICINE

## 2023-04-23 PROCEDURE — C9113 INJ PANTOPRAZOLE SODIUM, VIA: HCPCS | Performed by: INTERNAL MEDICINE

## 2023-04-23 PROCEDURE — 2700000000 HC OXYGEN THERAPY PER DAY

## 2023-04-23 PROCEDURE — 2580000003 HC RX 258: Performed by: INTERNAL MEDICINE

## 2023-04-23 PROCEDURE — 94761 N-INVAS EAR/PLS OXIMETRY MLT: CPT

## 2023-04-23 RX ADMIN — SODIUM CHLORIDE, PRESERVATIVE FREE 10 ML: 5 INJECTION INTRAVENOUS at 20:28

## 2023-04-23 RX ADMIN — ISOSORBIDE MONONITRATE 30 MG: 30 TABLET, EXTENDED RELEASE ORAL at 08:08

## 2023-04-23 RX ADMIN — Medication 2 PUFF: at 20:11

## 2023-04-23 RX ADMIN — GABAPENTIN 600 MG: 300 CAPSULE ORAL at 08:08

## 2023-04-23 RX ADMIN — GUAIFENESIN 600 MG: 600 TABLET, EXTENDED RELEASE ORAL at 20:28

## 2023-04-23 RX ADMIN — CLOPIDOGREL BISULFATE 75 MG: 75 TABLET ORAL at 08:08

## 2023-04-23 RX ADMIN — Medication 2 PUFF: at 09:12

## 2023-04-23 RX ADMIN — GLIPIZIDE 10 MG: 5 TABLET ORAL at 15:13

## 2023-04-23 RX ADMIN — GABAPENTIN 600 MG: 300 CAPSULE ORAL at 15:13

## 2023-04-23 RX ADMIN — GLIPIZIDE 10 MG: 5 TABLET ORAL at 08:08

## 2023-04-23 RX ADMIN — ACETAZOLAMIDE 250 MG: 250 TABLET ORAL at 08:08

## 2023-04-23 RX ADMIN — DICLOFENAC SODIUM 2 G: 10 GEL TOPICAL at 08:08

## 2023-04-23 RX ADMIN — ROSUVASTATIN 20 MG: 20 TABLET, FILM COATED ORAL at 20:28

## 2023-04-23 RX ADMIN — ALPRAZOLAM 1 MG: 0.5 TABLET ORAL at 08:13

## 2023-04-23 RX ADMIN — PANTOPRAZOLE SODIUM 40 MG: 40 INJECTION, POWDER, FOR SOLUTION INTRAVENOUS at 08:08

## 2023-04-23 RX ADMIN — DICLOFENAC SODIUM 2 G: 10 GEL TOPICAL at 20:27

## 2023-04-23 RX ADMIN — ALOGLIPTIN 25 MG: 25 TABLET, FILM COATED ORAL at 08:08

## 2023-04-23 RX ADMIN — GABAPENTIN 600 MG: 300 CAPSULE ORAL at 20:28

## 2023-04-23 RX ADMIN — LISINOPRIL 2.5 MG: 5 TABLET ORAL at 08:08

## 2023-04-23 RX ADMIN — TRIAMCINOLONE ACETONIDE: 1 CREAM TOPICAL at 20:26

## 2023-04-23 RX ADMIN — GUAIFENESIN 600 MG: 600 TABLET, EXTENDED RELEASE ORAL at 08:08

## 2023-04-23 RX ADMIN — Medication 2 PUFF: at 17:10

## 2023-04-23 RX ADMIN — Medication 2 PUFF: at 14:30

## 2023-04-23 RX ADMIN — SODIUM CHLORIDE, PRESERVATIVE FREE 10 ML: 5 INJECTION INTRAVENOUS at 08:08

## 2023-04-23 RX ADMIN — PANTOPRAZOLE SODIUM 40 MG: 40 INJECTION, POWDER, FOR SOLUTION INTRAVENOUS at 20:26

## 2023-04-23 RX ADMIN — PANTOPRAZOLE SODIUM 40 MG: 40 INJECTION, POWDER, FOR SOLUTION INTRAVENOUS at 00:10

## 2023-04-23 RX ADMIN — TRIAMCINOLONE ACETONIDE: 1 CREAM TOPICAL at 08:08

## 2023-04-23 RX ADMIN — ALPRAZOLAM 1 MG: 0.5 TABLET ORAL at 20:28

## 2023-04-24 VITALS
SYSTOLIC BLOOD PRESSURE: 109 MMHG | HEART RATE: 77 BPM | OXYGEN SATURATION: 94 % | RESPIRATION RATE: 16 BRPM | HEIGHT: 66 IN | BODY MASS INDEX: 45.64 KG/M2 | DIASTOLIC BLOOD PRESSURE: 59 MMHG | WEIGHT: 284 LBS | TEMPERATURE: 97.6 F

## 2023-04-24 LAB
GLUCOSE BLD-MCNC: 147 MG/DL (ref 70–99)
GLUCOSE BLD-MCNC: 194 MG/DL (ref 70–99)
PERFORMED ON: ABNORMAL
PERFORMED ON: ABNORMAL

## 2023-04-24 PROCEDURE — 97530 THERAPEUTIC ACTIVITIES: CPT

## 2023-04-24 PROCEDURE — 94640 AIRWAY INHALATION TREATMENT: CPT

## 2023-04-24 PROCEDURE — 6360000002 HC RX W HCPCS: Performed by: INTERNAL MEDICINE

## 2023-04-24 PROCEDURE — 94761 N-INVAS EAR/PLS OXIMETRY MLT: CPT

## 2023-04-24 PROCEDURE — 2700000000 HC OXYGEN THERAPY PER DAY

## 2023-04-24 PROCEDURE — 2580000003 HC RX 258: Performed by: INTERNAL MEDICINE

## 2023-04-24 PROCEDURE — 97162 PT EVAL MOD COMPLEX 30 MIN: CPT

## 2023-04-24 PROCEDURE — 97116 GAIT TRAINING THERAPY: CPT

## 2023-04-24 PROCEDURE — 97165 OT EVAL LOW COMPLEX 30 MIN: CPT

## 2023-04-24 PROCEDURE — 97535 SELF CARE MNGMENT TRAINING: CPT

## 2023-04-24 PROCEDURE — C9113 INJ PANTOPRAZOLE SODIUM, VIA: HCPCS | Performed by: INTERNAL MEDICINE

## 2023-04-24 PROCEDURE — 97161 PT EVAL LOW COMPLEX 20 MIN: CPT

## 2023-04-24 PROCEDURE — 6370000000 HC RX 637 (ALT 250 FOR IP): Performed by: INTERNAL MEDICINE

## 2023-04-24 RX ADMIN — GUAIFENESIN 600 MG: 600 TABLET, EXTENDED RELEASE ORAL at 08:56

## 2023-04-24 RX ADMIN — DICLOFENAC SODIUM 2 G: 10 GEL TOPICAL at 09:03

## 2023-04-24 RX ADMIN — GABAPENTIN 600 MG: 300 CAPSULE ORAL at 14:26

## 2023-04-24 RX ADMIN — LISINOPRIL 2.5 MG: 5 TABLET ORAL at 08:56

## 2023-04-24 RX ADMIN — Medication 2 PUFF: at 12:13

## 2023-04-24 RX ADMIN — Medication 2 PUFF: at 15:35

## 2023-04-24 RX ADMIN — CLOPIDOGREL BISULFATE 75 MG: 75 TABLET ORAL at 08:56

## 2023-04-24 RX ADMIN — GLIPIZIDE 10 MG: 5 TABLET ORAL at 08:56

## 2023-04-24 RX ADMIN — ACETAZOLAMIDE 250 MG: 250 TABLET ORAL at 08:56

## 2023-04-24 RX ADMIN — TRIAMCINOLONE ACETONIDE: 1 CREAM TOPICAL at 09:04

## 2023-04-24 RX ADMIN — PANTOPRAZOLE SODIUM 40 MG: 40 INJECTION, POWDER, FOR SOLUTION INTRAVENOUS at 09:00

## 2023-04-24 RX ADMIN — GABAPENTIN 600 MG: 300 CAPSULE ORAL at 08:56

## 2023-04-24 RX ADMIN — Medication 2 PUFF: at 08:33

## 2023-04-24 RX ADMIN — ALOGLIPTIN 25 MG: 25 TABLET, FILM COATED ORAL at 08:58

## 2023-04-24 RX ADMIN — SODIUM CHLORIDE, PRESERVATIVE FREE 10 ML: 5 INJECTION INTRAVENOUS at 09:00

## 2023-04-24 RX ADMIN — ISOSORBIDE MONONITRATE 30 MG: 30 TABLET, EXTENDED RELEASE ORAL at 08:56

## 2023-04-24 NOTE — PROGRESS NOTES
Department of Internal Medicine  General Internal Medicine   Progress Note     SUBJECTIVE   no gross hematochezia     History obtained from chart review and the patient  General ROS: positive for  - fatigue and malaise  negative for - chills, fever or night sweats  Psychological ROS: negative  Respiratory ROS: positive for - shortness of breath and wheezing  negative for - cough, hemoptysis, sputum changes or stridor  Cardiovascular ROS: positive for - chest pain, dyspnea on exertion and edema  negative for - loss of consciousness, orthopnea or palpitations  Gastrointestinal ROS: no abdominal pain, change in bowel habits, or black or bloody stools    OBJECTIVE      Medications      Current Facility-Administered Medications: lidocaine PF 1 % injection 5 mL, 5 mL, IntraDERmal, Once  sodium chloride flush 0.9 % injection 5-40 mL, 5-40 mL, IntraVENous, 2 times per day  sodium chloride flush 0.9 % injection 5-40 mL, 5-40 mL, IntraVENous, PRN  0.9 % sodium chloride infusion, 25 mL, IntraVENous, PRN  lactated ringers IV soln infusion, , IntraVENous, Continuous  acetaZOLAMIDE (DIAMOX) tablet 250 mg, 250 mg, Oral, Daily  albuterol sulfate HFA (PROVENTIL;VENTOLIN;PROAIR) 108 (90 Base) MCG/ACT inhaler 2 puff, 2 puff, Inhalation, 4x daily  ALPRAZolam (XANAX) tablet 1 mg, 1 mg, Oral, TID PRN  mometasone-formoterol (DULERA) 200-5 MCG/ACT inhaler 2 puff, 2 puff, Inhalation, BID  clopidogrel (PLAVIX) tablet 75 mg, 75 mg, Oral, Daily  diclofenac sodium (VOLTAREN) 1 % gel 2 g, 2 g, Topical, BID  gabapentin (NEURONTIN) capsule 600 mg, 600 mg, Oral, TID  glipiZIDE (GLUCOTROL) tablet 10 mg, 10 mg, Oral, BID WC  guaiFENesin (MUCINEX) extended release tablet 600 mg, 600 mg, Oral, BID  ipratropium-albuterol (DUONEB) nebulizer solution 3 mL, 3 mL, Inhalation, Q4H PRN  isosorbide mononitrate (IMDUR) extended release tablet 30 mg, 30 mg, Oral, Daily  alogliptin (NESINA) tablet 25 mg, 25 mg, Oral, Daily  lisinopril (PRINIVIL;ZESTRIL) tablet

## 2023-04-24 NOTE — CARE COORDINATION
CM had spoken to Hypericjacquelin Mealing with [facility] earlier this am [phone] and verified that pt is active with them for nursing. She states even when therapy ordered at home will refuse it from them. CM called her back and informed pt is discharging today. CM will fax paperwork and orders when completed. CM met with pt earlier and she is refusing snf even if recommended by therapy. Therapy pending at this time.       Antwan William RN, BSN  [phone]

## 2023-04-24 NOTE — CARE COORDINATION
Case Management Assessment  Initial Evaluation    Date/Time of Evaluation: 4/24/2023 11:53 AM  Assessment Completed by: Tomas Rangel RN    If patient is discharged prior to next notation, then this note serves as note for discharge by case management. Patient Name: Maribel Casas                   YOB: 1945  Diagnosis: Lower GI hemorrhage [K92.2]  Elevated troponin [R77.8]  Hemoptysis [R04.2]  Gastrointestinal hemorrhage, unspecified gastrointestinal hemorrhage type [K92.2]                   Date / Time: 4/20/2023 12:12 PM    Patient Admission Status: Inpatient   Readmission Risk (Low < 19, Mod (19-27), High > 27): Readmission Risk Score: 27.1    Current PCP: Bere Dawkins MD  PCP verified by ? Yes    Chart Reviewed: Yes      History Provided by: Patient  Patient Orientation: Alert and Oriented, Person, Situation, Place    Patient Cognition: Alert    Hospitalization in the last 30 days (Readmission):  No    If yes, Readmission Assessment in  Navigator will be completed. Advance Directives:      Code Status: Full Code   Patient's Primary Decision Maker is: Legal Next of Kin      Discharge Planning:    Patient lives with: Alone Type of Home: Apartment  Primary Care Giver: Other (Comment) (pt has [facility] through [facility] 5 days a week for 5 hrs daily. and daughter and granddaughter assist pt)  Patient Support Systems include: Children, Family Members   Current Financial resources: Medicaid, Medicare  Current community resources:  Other (Comment) ([facility] home health aide)  Current services prior to admission: Durable Medical Equipment, Home Care, Other (Comment) (HC for RN, [facility])            Current DME: Jaspreet Jacques, Other (Comment), Oxygen Therapy (Comment), Shower Chair (O2 through [employer], has concentrator and portalbe and pt states she uses 2 L at night, has [facility] 5 days week/ 5 hrs a day/ has electric scooter)            Type of Home Care services:  Mallory Kuhn

## 2023-04-24 NOTE — PLAN OF CARE
Problem: Skin/Tissue Integrity  Goal: Absence of new skin breakdown  Description: 1. Monitor for areas of redness and/or skin breakdown  2. Assess vascular access sites hourly  3. Every 4-6 hours minimum:  Change oxygen saturation probe site  4. Every 4-6 hours:  If on nasal continuous positive airway pressure, respiratory therapy assess nares and determine need for appliance change or resting period.   4/23/2023 2005 by Candice Hoover RN  Outcome: Progressing  4/23/2023 0913 by Giovanny Walker RN  Outcome: Progressing     Problem: Discharge Planning  Goal: Discharge to home or other facility with appropriate resources  4/23/2023 2005 by Candice Hoover RN  Outcome: Progressing  4/23/2023 0913 by Giovanny Walker RN  Outcome: Progressing     Problem: Pain  Goal: Verbalizes/displays adequate comfort level or baseline comfort level  4/23/2023 2005 by Candice Hoover RN  Outcome: Progressing  4/23/2023 0913 by Giovanny Walker RN  Outcome: Progressing     Problem: Safety - Adult  Goal: Free from fall injury  4/23/2023 2005 by Candice Hoover RN  Outcome: Progressing  4/23/2023 0913 by Giovanny Walker RN  Outcome: Progressing     Problem: Respiratory - Adult  Goal: Achieves optimal ventilation and oxygenation  4/23/2023 2005 by Candice Hoover RN  Outcome: Progressing  4/23/2023 0913 by Giovanny Walker RN  Outcome: Progressing     Problem: Skin/Tissue Integrity - Adult  Goal: Skin integrity remains intact  4/23/2023 2005 by Cadnice Hoover RN  Outcome: Progressing  4/23/2023 0913 by Giovanny Walker RN  Outcome: Progressing     Problem: Musculoskeletal - Adult  Goal: Return mobility to safest level of function  4/23/2023 2005 by Candice Hoover RN  Outcome: Progressing  4/23/2023 0913 by Giovanny Walker RN  Outcome: Progressing     Problem: Gastrointestinal - Adult  Goal: Minimal or absence of nausea and vomiting  4/23/2023 2005 by Candice Hoover RN  Outcome: Progressing  4/23/2023 0913 by Giovanny Walker RN  Outcome: Progressing

## 2023-04-24 NOTE — CARE COORDINATION
CM faxed to [address] [place] [address] [phone]: home care orders, heena/avs, last md progress note, last GI note. Patient discharged 4/2/2023 to home with continued [facility] services and [address] services.    All discharge needs met per case management     Pallavi Hodges RN, BSN  [phone]

## 2023-04-24 NOTE — CARE COORDINATION
04/24/23 1133   IMM Letter   IMM Letter given to Patient/Family/Significant other/Guardian/POA/by: Jordyn Tanner RN CM   IMM Letter date given: 04/24/23   IMM Letter time given: 6028  (copy made for hard chart)

## 2023-04-24 NOTE — PROGRESS NOTES
Department of Internal Medicine  General Internal Medicine   Progress Note     SUBJECTIVE   no BM no blood per rectum    History obtained from chart review and the patient  General ROS: positive for  - fatigue and malaise  negative for - chills, fever or night sweats  Psychological ROS: negative  Respiratory ROS: positive for - shortness of breath and wheezing  negative for - cough, hemoptysis, sputum changes or stridor  Cardiovascular ROS: positive for - chest pain, dyspnea on exertion and edema  negative for - loss of consciousness, orthopnea or palpitations  Gastrointestinal ROS: no abdominal pain, change in bowel habits, or black or bloody stools    OBJECTIVE      Medications      Current Facility-Administered Medications: lidocaine PF 1 % injection 5 mL, 5 mL, IntraDERmal, Once  sodium chloride flush 0.9 % injection 5-40 mL, 5-40 mL, IntraVENous, 2 times per day  sodium chloride flush 0.9 % injection 5-40 mL, 5-40 mL, IntraVENous, PRN  0.9 % sodium chloride infusion, 25 mL, IntraVENous, PRN  lactated ringers IV soln infusion, , IntraVENous, Continuous  acetaZOLAMIDE (DIAMOX) tablet 250 mg, 250 mg, Oral, Daily  albuterol sulfate HFA (PROVENTIL;VENTOLIN;PROAIR) 108 (90 Base) MCG/ACT inhaler 2 puff, 2 puff, Inhalation, 4x daily  ALPRAZolam (XANAX) tablet 1 mg, 1 mg, Oral, TID PRN  mometasone-formoterol (DULERA) 200-5 MCG/ACT inhaler 2 puff, 2 puff, Inhalation, BID  clopidogrel (PLAVIX) tablet 75 mg, 75 mg, Oral, Daily  diclofenac sodium (VOLTAREN) 1 % gel 2 g, 2 g, Topical, BID  gabapentin (NEURONTIN) capsule 600 mg, 600 mg, Oral, TID  glipiZIDE (GLUCOTROL) tablet 10 mg, 10 mg, Oral, BID WC  guaiFENesin (MUCINEX) extended release tablet 600 mg, 600 mg, Oral, BID  ipratropium-albuterol (DUONEB) nebulizer solution 3 mL, 3 mL, Inhalation, Q4H PRN  isosorbide mononitrate (IMDUR) extended release tablet 30 mg, 30 mg, Oral, Daily  alogliptin (NESINA) tablet 25 mg, 25 mg, Oral, Daily  lisinopril (PRINIVIL;ZESTRIL) tablet

## 2023-04-24 NOTE — PROGRESS NOTES
[facility]   Phone: [phone]    Physical Therapy    [x] Initial Evaluation            [] Daily Treatment Note         [x] Discharge Summary      Patient: Austin Benson   : [birth year]   MRN: [id number]   Date of Service:  2023  Admitting Diagnosis: Lower GI hemorrhage  Current Admission Summary: Austin Benson is a 69 y.o. female who presents to the emergency department stating that for the past 2 days, she has had low abdominal cramping and bright red blood per rectum. She states that sometimes she reports dark stool as well but primarily it has been bright red blood. Despite triage note, patient states that she did not vomit up blood but rather she coughed up blood. She denies chest pain or shortness of breath. She rates her low abdominal cramping to be a 9 out of 10 on pain scale. She stopped taking plavix last night because of the bloody stool. She wears 2 L of oxygen for copd. Past Medical History:  has a past medical history of Acute MI ([address] Acute pyelonephritis, Anxiety and depression, Arthritis, CAD (coronary artery disease), Cancer ([address] Cancer ([address] Cancer of skin of leg, Chronic obstructive pulmonary disease ([address] Claustrophobia, COPD (chronic obstructive pulmonary disease) ([address] ESBL (extended spectrum beta-lactamase) producing bacteria infection, Esophageal stricture, Gastroesophageal reflux disease without esophagitis, Hiatal hernia, Hiatal hernia, Hypercholesteremia, Hypertension, IBS (irritable bowel syndrome), Migraines, Movement disorder, Pneumonia, PONV (postoperative nausea and vomiting), Type II or unspecified type diabetes mellitus without mention of complication, not stated as uncontrolled, and Unspecified cerebral artery occlusion with cerebral infarction. Past Surgical History:  has a past surgical history that includes Cardiac surgery; Gallbladder surgery; Hysterectomy; Appendectomy; Cholecystectomy; Colonoscopy;  Upper

## 2023-04-24 NOTE — PROGRESS NOTES
[facility]   Phone: [phone]    Occupational Therapy    . ot Initial Evaluation            [] Daily Treatment Note         [] Discharge Summary      Patient: Yas Reyes   : 1945   MRN: [id number]   Date of Service:  2023    Admitting Diagnosis:  Lower GI hemorrhage  Current Admission Summary: The patient is a 67-year-old white American  lady came to the emergency room with history of bright red rectal  bleeding. No abdominal pain. No fever. No chills. There is no  associated pain. Bleeding could be at times associated with or without  bowels. No tenesmus. No weight loss. Past Medical History:  has a past medical history of Acute MI ([address] Acute pyelonephritis, Anxiety and depression, Arthritis, CAD (coronary artery disease), Cancer ([address] Cancer ([address] Cancer of skin of leg, Chronic obstructive pulmonary disease ([address] Claustrophobia, COPD (chronic obstructive pulmonary disease) ([address] ESBL (extended spectrum beta-lactamase) producing bacteria infection, Esophageal stricture, Gastroesophageal reflux disease without esophagitis, Hiatal hernia, Hiatal hernia, Hypercholesteremia, Hypertension, IBS (irritable bowel syndrome), Migraines, Movement disorder, Pneumonia, PONV (postoperative nausea and vomiting), Type II or unspecified type diabetes mellitus without mention of complication, not stated as uncontrolled, and Unspecified cerebral artery occlusion with cerebral infarction. Past Surgical History:  has a past surgical history that includes Cardiac surgery; Gallbladder surgery; Hysterectomy; Appendectomy; Cholecystectomy; Colonoscopy; Upper gastrointestinal endoscopy (12); Endoscopy, colon, diagnostic; Tear duct surgery; Upper gastrointestinal endoscopy (2018);  Colonoscopy (2018); pr excision malignant lesion s/n/h/f/g 0.5 cm/< (N/A, 2018); pr split agrft t/a/l 1st 100 cm/&/1% bdy inft/chld (N/A, 2018); skin

## 2023-04-24 NOTE — DISCHARGE INSTRUCTIONS
Your information:  Name: Mirna Mayorga  : 1945    Your Discharge Instructions    What to do after you leave the hospital:    Read, review and familiarize yourself with the information provided below and in a separate packet on   GI Bleed; Hemoptysis    Diet: Regular    Recommended activity:   as tolerated  Avoid strenuous activity until instructed by your physician to resume; balance rest with periods of light to normal activity. If you experience any of the following: Unusual or inadequately controlled pain; unusual transient shortness of breath; recurrent or persistent nausea, heartburn, palpitations or lightheadedness; increased swelling; increased fatigue; fever >100; please follow up with @[email or web address]@ [unfilled] or go to the Emergency Room. Home Health/ Outpatient Services:   Name: [address] [place] [address]  Phone: [phone]      Information obtained by:  By signing below, I understand and acknowledge receipt of the instructions indicated above, and I understand that if any problems occur once I leave the hospital I am to contact [email or web address]@.

## 2023-04-24 NOTE — RT PROTOCOL NOTE
PRN for wheezing or increased work of breathing using Per Protocol order mode. 4-6 - enter or revise RT Bronchodilator order(s) to two equivalent RT bronchodilator orders with one order with BID Frequency and one order with Frequency of every 4 hours PRN wheezing or increased work of breathing using Per Protocol order mode. 7-10 - enter or revise RT Bronchodilator order(s) to two equivalent RT bronchodilator orders with one order with TID Frequency and one order with Frequency of every 4 hours PRN wheezing or increased work of breathing using Per Protocol order mode. 11-13 - enter or revise RT Bronchodilator order(s) to one equivalent RT bronchodilator order with QID Frequency and an Albuterol order with Frequency of every 4 hours PRN wheezing or increased work of breathing using Per Protocol order mode. Greater than 13 - enter or revise RT Bronchodilator order(s) to one equivalent RT bronchodilator order with every 4 hours Frequency and an Albuterol order with Frequency of every 2 hours PRN wheezing or increased work of breathing using Per Protocol order mode. RT to enter RT Home Evaluation for COPD & MDI Assessment order using Per Protocol order mode.     Electronically signed by Sosa Garcia RCP on 4/24/2023 at 5:43 AM

## 2023-04-24 NOTE — DISCHARGE INSTR - COC
R20.9    Left-sided weakness R53.1    Arterial ischemic stroke, ICA, right, acute ([facility]) I63.231    PAF (paroxysmal atrial fibrillation) ([facility]) I48.0    Chest pain R07.9    Thoracic radiculopathy M54.14    Unstable angina ([facility] I20.0    COPD with acute exacerbation ([facility]) J44.1    Lower GI hemorrhage K92.2    Esophageal stricture K22.2       Isolation/Infection:   Isolation            Contact          Patient Infection Status       Infection Onset Added Last Indicated Last Indicated By Review Planned Expiration Resolved Resolved By    ESBL (Extended Spectrum Beta Lactamase) 21 Culture, Urine        Resolved    C-diff Rule Out 23 Clostridium Difficile Toxin/Antigen (Ordered)   23 Rule-Out Test Resulted    COVID-19 (Rule Out) 23 COVID-19, Rapid (Ordered)   23 Rule-Out Test Resulted    C-diff Rule Out 08/15/21 08/15/21 08/15/21 Clostridium difficile toxin/antigen (Ordered)   08/15/21 Rule-Out Test Resulted    C-diff Rule Out 20 Clostridium difficile toxin/antigen (Ordered)   20 Jose Aragon RN    Influenza  20 Liza Cortes RN   20 Infection             Nurse Assessment:  Last Vital Signs: BP (!) 130/53   Pulse 63   Temp 98.4 °F (36.9 °C) (Axillary)   Resp 19   Ht 5' 6\" (1.676 m)   Wt 284 lb (128.8 kg)   SpO2 93%   BMI 45.84 kg/m²     Last documented pain score (0-10 scale): Pain Level: 0  Last Weight:   Wt Readings from Last 1 Encounters:   23 284 lb (128.8 kg)     Mental Status:  oriented and alert    IV Access:  - None    Nursing Mobility/ADLs:  Walking   Assisted  Transfer  Assisted  Bathing  Assisted  Dressing  Assisted  Toileting  Assisted  Feeding  Assisted  Med Admin  Assisted  Med Delivery   whole    Wound Care Documentation and Therapy:        Elimination:  Continence:    Bowel: Yes  Bladder: Yes  Urinary Catheter: None   Colostomy/Ileostomy/Ileal Conduit:

## 2023-04-25 ENCOUNTER — CARE COORDINATION (OUTPATIENT)
Dept: CASE MANAGEMENT | Age: 78
End: 2023-04-25

## 2023-04-25 NOTE — CARE COORDINATION
Care Transitions Outreach Attempt    Call within 2 business days of discharge: Yes     Initial 24 hr call attempted, contact info left on vm. Patient: Vincent Carolina Patient : 1945 MRN: [id number]    Last Discharge  Street       Date Complaint Diagnosis Description Type Department Provider    23 [place] Gastrointestinal hemorrhage, unspecified gastrointestinal hemorrhage type . .. ED to Hosp-Admission (Discharged) (ADMITTED) Raffi Saul MD; Marlo Tapia. .. Was this an external facility discharge?  No Discharge Facility:     Noted following upcoming appointments from discharge chart review:   [address] follow up appointment(s):   Future Appointments   Date Time Provider Gertrudis Mcclelland   2023  3:15 PM MD EDWARD Brown Cardio [facility]   2023  2:00 PM Home Calhoun MD FF Cardio ELEN Gomez, MERCEDES

## 2023-04-26 ENCOUNTER — CARE COORDINATION (OUTPATIENT)
Dept: CASE MANAGEMENT | Age: 78
End: 2023-04-26

## 2023-04-26 DIAGNOSIS — K92.2 LOWER GI HEMORRHAGE: Primary | ICD-10-CM

## 2023-04-26 PROCEDURE — 1111F DSCHRG MED/CURRENT MED MERGE: CPT | Performed by: INTERNAL MEDICINE

## 2023-04-26 NOTE — CARE COORDINATION
[facility] Care Transitions Initial Follow Up Call    Call within 2 business days of discharge: Yes    Patient Current Location:  Home: [address] [place] [address]  Emerita Sparks [address] [address]    Care Transition Nurse contacted the patient by telephone to perform post hospital discharge assessment. Verified name and  with patient as identifiers. Provided introduction to self, and explanation of the Care Transition Nurse role. Patient: Mary Shaikh Patient : 1945   MRN: [id number]  Reason for Admission:   Discharge Date: 23 RARS: Readmission Risk Score: 27.1      Last Discharge [address]       Date Complaint Diagnosis Description Type Department Provider    23 Melena Gastrointestinal hemorrhage, unspecified gastrointestinal hemorrhage type . .. ED to Hosp-Admission (Discharged) (ADMITTED) Gil Samaniego MD; Carole Henderson. .. Was this an external facility discharge? No Discharge Facility:     Challenges to be reviewed by the provider   Additional needs identified to be addressed with provider: No  none               Method of communication with provider: none. Pt states doing well, no issues or concerns. Denies any further bleeding. [facility]. nurse coming today. F/U appts listed below. Agreed to more CTC f/u calls. Care Transition Nurse reviewed discharge instructions with patient who verbalized understanding. The patient was given an opportunity to ask questions and does not have any further questions or concerns at this time. Were discharge instructions available to patient? Yes. Reviewed appropriate site of care based on symptoms and resources available to patient including: When to call 911. The patient agrees to contact the PCP office for questions related to their healthcare. Advance Care Planning:   Does patient have an Advance Directive: reviewed and current.     Medication reconciliation was performed with patient, who verbalizes understanding of administration of home

## 2023-04-28 RX ORDER — GUAIFENESIN 600 MG/1
600 TABLET, EXTENDED RELEASE ORAL 2 TIMES DAILY
Qty: 60 TABLET | Refills: 5 | Status: SHIPPED | OUTPATIENT
Start: 2023-04-28 | End: 2023-05-28

## 2023-05-03 ENCOUNTER — CARE COORDINATION (OUTPATIENT)
Dept: CASE MANAGEMENT | Age: 78
End: 2023-05-03

## 2023-05-03 NOTE — CARE COORDINATION
Care Transitions Outreach Attempt    Follow up call attempted, left contact info on vm. Patient: Amish Norris Patient : 1945 MRN: [id number]    Last Discharge  Street       Date Complaint Diagnosis Description Type Department Provider    23 [place] Gastrointestinal hemorrhage, unspecified gastrointestinal hemorrhage type . .. ED to Hosp-Admission (Discharged) (ADMITTED) Arsenio Quintanilla MD; aMlgorzata Whitmore. ..           Noted following upcoming appointments from discharge chart review:   [facility] follow up appointment(s):   Future Appointments   Date Time Provider Gertrudis Mcclelland   2023  3:15 PM Ramon Lundberg MD FF Cardio [facility]   2023  2:00 PM Rigo Lilly MD FF Cardio [facility]       Laila Sutton RN

## 2023-05-05 ENCOUNTER — CARE COORDINATION (OUTPATIENT)
Dept: CASE MANAGEMENT | Age: 78
End: 2023-05-05

## 2023-05-05 NOTE — CARE COORDINATION
Care Transitions Outreach Attempt    2nd and final attempt at a Follow up call, left contact info on vm. Patient: Akua Antoine Patient : 1945 MRN: [id number]    Last Discharge  Street       Date Complaint Diagnosis Description Type Department Provider    23 [place] Gastrointestinal hemorrhage, unspecified gastrointestinal hemorrhage type . .. ED to Hosp-Admission (Discharged) (ADMITTED) Socorro Patterson MD; Ruma Sarmiento. ..               Noted following upcoming appointments from discharge chart review:   [facility] follow up appointment(s):   Future Appointments   Date Time Provider Gertrudis Mcclelland   2023  3:15 PM Raheel Ortega MD FF Cardio [facility]   2023  2:00 PM Mary Jo Cortez MD FF Cardio [facility]     Jojo Salcedo RN,

## 2023-05-10 RX ORDER — ROSUVASTATIN CALCIUM 20 MG/1
20 TABLET, COATED ORAL NIGHTLY
Qty: 90 TABLET | Refills: 3 | Status: SHIPPED | OUTPATIENT
Start: 2023-05-10

## 2023-05-23 ENCOUNTER — OFFICE VISIT (OUTPATIENT)
Dept: CARDIOLOGY CLINIC | Age: 78
End: 2023-05-23
Payer: COMMERCIAL

## 2023-05-23 VITALS
SYSTOLIC BLOOD PRESSURE: 126 MMHG | DIASTOLIC BLOOD PRESSURE: 74 MMHG | WEIGHT: 272 LBS | HEART RATE: 70 BPM | OXYGEN SATURATION: 95 % | BODY MASS INDEX: 43.71 KG/M2 | HEIGHT: 66 IN

## 2023-05-23 DIAGNOSIS — I25.10 CORONARY ARTERY DISEASE INVOLVING NATIVE CORONARY ARTERY OF NATIVE HEART WITHOUT ANGINA PECTORIS: Primary | ICD-10-CM

## 2023-05-23 DIAGNOSIS — I48.0 PAF (PAROXYSMAL ATRIAL FIBRILLATION) (HCC): ICD-10-CM

## 2023-05-23 DIAGNOSIS — I10 HTN (HYPERTENSION), BENIGN: ICD-10-CM

## 2023-05-23 DIAGNOSIS — I49.3 PVC (PREMATURE VENTRICULAR CONTRACTION): Chronic | ICD-10-CM

## 2023-05-23 PROBLEM — Z86.73 H/O: CVA (CEREBROVASCULAR ACCIDENT): Status: ACTIVE | Noted: 2023-05-23

## 2023-05-23 PROCEDURE — 99214 OFFICE O/P EST MOD 30 MIN: CPT | Performed by: INTERNAL MEDICINE

## 2023-05-23 PROCEDURE — G8427 DOCREV CUR MEDS BY ELIG CLIN: HCPCS | Performed by: INTERNAL MEDICINE

## 2023-05-23 PROCEDURE — 93000 ELECTROCARDIOGRAM COMPLETE: CPT | Performed by: INTERNAL MEDICINE

## 2023-05-23 PROCEDURE — 1036F TOBACCO NON-USER: CPT | Performed by: INTERNAL MEDICINE

## 2023-05-23 PROCEDURE — 1090F PRES/ABSN URINE INCON ASSESS: CPT | Performed by: INTERNAL MEDICINE

## 2023-05-23 PROCEDURE — G8417 CALC BMI ABV UP PARAM F/U: HCPCS | Performed by: INTERNAL MEDICINE

## 2023-05-23 PROCEDURE — G8399 PT W/DXA RESULTS DOCUMENT: HCPCS | Performed by: INTERNAL MEDICINE

## 2023-05-23 PROCEDURE — 3078F DIAST BP <80 MM HG: CPT | Performed by: INTERNAL MEDICINE

## 2023-05-23 PROCEDURE — 1123F ACP DISCUSS/DSCN MKR DOCD: CPT | Performed by: INTERNAL MEDICINE

## 2023-05-23 PROCEDURE — 3074F SYST BP LT 130 MM HG: CPT | Performed by: INTERNAL MEDICINE

## 2023-05-23 PROCEDURE — 1111F DSCHRG MED/CURRENT MED MERGE: CPT | Performed by: INTERNAL MEDICINE

## 2023-05-23 NOTE — PROGRESS NOTES
[facility]   Electrophysiology Follow up   Date: 5/23/2023  I had the privilege of visiting Jeanine Hammonds in the office. CC: PVC, Fall    HPI: Jeanine Hammonds is a 69 y.o. female  with past medical history of morbid obesity,  CVA  08/2022 COPD, untreated  AKIL,HTN,  MI 2000,  CAD with PCI to LAD and RCA 01/30/2023 , PAF. Returned to ED 02/01/2023 s/p falls. C/o skipped beats. Ventricular Bigeminy noted on telemetry. Ablation discussed but she declined. 04/20/2023 presented to ER with blood in stool and emesis. H/H stable, no endoscopy. Esvin Alexander presents today to follow up on monitor results. Her sister is with her today. She does not feeling her PVCs and is not symptomatic. Denies any chest pain, palpitation, syncope. Assessment and plan:   - PVC    EKG  today Sinus rhythm, no PVC   High PVC burden. 20% burden on Monitor 03/2023,   NSVT longest 4 beats, Symptoms with NSR and PVC      No BB due to reports  of bradycardia      Discussed treatment options including antiarrhythmic therapy or ablation. Risks, benefits and alternative were explained. All questions answered. Patient verbalized understanding. She is not interested in ablation or further medication at this time. EF is normal by echo. Will continue to monitor clinically   May benefit from monitoring of her EF by echo     Patient stopped Metoprolol stopped due to low HTN      -PAF    EKG today sinus rhythm,   None on monitor 03/2023     New 08/2022    Patient has a [id number] Score of 6 ( age, gender, CAD, CVA, HTN, DM )   She is on Xarelto. TSH 08/2022 1.32        She has had GI bleeding. She states that she was taking NSAIDS and has stopped taking them. We discussed OLEG closure, watchman implantation, risks, benefits and alternatives. I answered all her questions. She is not interested in Cerda.       - Coronary artery Disease   - s/p PCI to LAD, RCA 01/30/2023    - s/p PCI to LAD 08/2021 and 08/202   -

## 2023-06-05 DIAGNOSIS — E78.00 HYPERCHOLESTEREMIA: Primary | ICD-10-CM

## 2023-06-07 RX ORDER — LOPERAMIDE HYDROCHLORIDE 2 MG/1
2 CAPSULE ORAL 4 TIMES DAILY PRN
Qty: 20 CAPSULE | Refills: 0 | Status: SHIPPED | OUTPATIENT
Start: 2023-06-07 | End: 2023-08-08 | Stop reason: SDUPTHER

## 2023-06-07 RX ORDER — PROMETHAZINE HYDROCHLORIDE 25 MG/1
25 TABLET ORAL 3 TIMES DAILY PRN
Qty: 12 TABLET | Refills: 0 | Status: SHIPPED | OUTPATIENT
Start: 2023-06-07 | End: 2023-06-14

## 2023-07-01 RX ORDER — ISOSORBIDE MONONITRATE 30 MG/1
30 TABLET, EXTENDED RELEASE ORAL DAILY
Qty: 90 TABLET | Refills: 1 | Status: SHIPPED | OUTPATIENT
Start: 2023-07-01 | End: 2023-12-28

## 2023-07-01 RX ORDER — CLOPIDOGREL BISULFATE 75 MG/1
75 TABLET ORAL DAILY
Qty: 90 TABLET | Refills: 1 | Status: SHIPPED | OUTPATIENT
Start: 2023-07-01 | End: 2023-12-28

## 2023-07-01 RX ORDER — LISINOPRIL 2.5 MG/1
2.5 TABLET ORAL DAILY
Qty: 90 TABLET | Refills: 1 | Status: SHIPPED | OUTPATIENT
Start: 2023-07-01 | End: 2023-12-28

## 2023-07-07 ENCOUNTER — APPOINTMENT (OUTPATIENT)
Dept: GENERAL RADIOLOGY | Age: 78
End: 2023-07-07
Payer: COMMERCIAL

## 2023-07-07 ENCOUNTER — HOSPITAL ENCOUNTER (EMERGENCY)
Age: 78
Discharge: HOME | End: 2023-07-07
Attending: INTERNAL MEDICINE
Payer: COMMERCIAL

## 2023-07-07 VITALS
WEIGHT: 270 LBS | OXYGEN SATURATION: 92 % | HEIGHT: 66 IN | SYSTOLIC BLOOD PRESSURE: 120 MMHG | TEMPERATURE: 97.8 F | HEART RATE: 68 BPM | BODY MASS INDEX: 43.39 KG/M2 | RESPIRATION RATE: 15 BRPM | DIASTOLIC BLOOD PRESSURE: 67 MMHG

## 2023-07-07 DIAGNOSIS — R07.89 ATYPICAL CHEST PAIN: Primary | ICD-10-CM

## 2023-07-07 LAB
ANION GAP SERPL CALCULATED.3IONS-SCNC: 13 MMOL/L (ref 3–16)
BASOPHILS # BLD: 0.1 K/UL (ref 0–0.2)
BASOPHILS NFR BLD: 0.8 %
BUN SERPL-MCNC: 17 MG/DL (ref 7–20)
CALCIUM SERPL-MCNC: 9.7 MG/DL (ref 8.3–10.6)
CHLORIDE SERPL-SCNC: 107 MMOL/L (ref 99–110)
CO2 SERPL-SCNC: 22 MMOL/L (ref 21–32)
CREAT SERPL-MCNC: 1.3 MG/DL (ref 0.6–1.2)
DEPRECATED RDW RBC AUTO: 16.2 % (ref 12.4–15.4)
EOSINOPHIL # BLD: 0.1 K/UL (ref 0–0.6)
EOSINOPHIL NFR BLD: 1.3 %
GFR SERPLBLD CREATININE-BSD FMLA CKD-EPI: 42 ML/MIN/{1.73_M2}
GLUCOSE SERPL-MCNC: 222 MG/DL (ref 70–99)
HCT VFR BLD AUTO: 40.6 % (ref 36–48)
HGB BLD-MCNC: 12.7 G/DL (ref 12–16)
LYMPHOCYTES # BLD: 2.3 K/UL (ref 1–5.1)
LYMPHOCYTES NFR BLD: 22.1 %
MCH RBC QN AUTO: 27.1 PG (ref 26–34)
MCHC RBC AUTO-ENTMCNC: 31.4 G/DL (ref 31–36)
MCV RBC AUTO: 86.2 FL (ref 80–100)
MONOCYTES # BLD: 0.7 K/UL (ref 0–1.3)
MONOCYTES NFR BLD: 6.6 %
NEUTROPHILS # BLD: 7.1 K/UL (ref 1.7–7.7)
NEUTROPHILS NFR BLD: 69.2 %
PLATELET # BLD AUTO: 211 K/UL (ref 135–450)
PMV BLD AUTO: 8.8 FL (ref 5–10.5)
POTASSIUM SERPL-SCNC: 4.1 MMOL/L (ref 3.5–5.1)
RBC # BLD AUTO: 4.7 M/UL (ref 4–5.2)
SODIUM SERPL-SCNC: 142 MMOL/L (ref 136–145)
TROPONIN, HIGH SENSITIVITY: 16 NG/L (ref 0–14)
TROPONIN, HIGH SENSITIVITY: 17 NG/L (ref 0–14)
WBC # BLD AUTO: 10.3 K/UL (ref 4–11)

## 2023-07-07 PROCEDURE — 84484 ASSAY OF TROPONIN QUANT: CPT

## 2023-07-07 PROCEDURE — 71045 X-RAY EXAM CHEST 1 VIEW: CPT

## 2023-07-07 PROCEDURE — 85025 COMPLETE CBC W/AUTO DIFF WBC: CPT

## 2023-07-07 PROCEDURE — 93005 ELECTROCARDIOGRAM TRACING: CPT | Performed by: INTERNAL MEDICINE

## 2023-07-07 PROCEDURE — 6360000002 HC RX W HCPCS: Performed by: INTERNAL MEDICINE

## 2023-07-07 PROCEDURE — 99285 EMERGENCY DEPT VISIT HI MDM: CPT

## 2023-07-07 PROCEDURE — 80048 BASIC METABOLIC PNL TOTAL CA: CPT

## 2023-07-07 PROCEDURE — 96374 THER/PROPH/DIAG INJ IV PUSH: CPT

## 2023-07-07 PROCEDURE — 96375 TX/PRO/DX INJ NEW DRUG ADDON: CPT

## 2023-07-07 RX ORDER — FENTANYL CITRATE 50 UG/ML
25 INJECTION, SOLUTION INTRAMUSCULAR; INTRAVENOUS ONCE
Status: COMPLETED | OUTPATIENT
Start: 2023-07-07 | End: 2023-07-07

## 2023-07-07 RX ORDER — ONDANSETRON 2 MG/ML
4 INJECTION INTRAMUSCULAR; INTRAVENOUS ONCE
Status: COMPLETED | OUTPATIENT
Start: 2023-07-07 | End: 2023-07-07

## 2023-07-07 RX ADMIN — FENTANYL CITRATE 25 MCG: 50 INJECTION, SOLUTION INTRAMUSCULAR; INTRAVENOUS at 18:33

## 2023-07-07 RX ADMIN — ONDANSETRON 4 MG: 2 INJECTION INTRAMUSCULAR; INTRAVENOUS at 18:33

## 2023-07-07 ASSESSMENT — PAIN DESCRIPTION - LOCATION
LOCATION: CHEST
LOCATION: CHEST

## 2023-07-07 ASSESSMENT — PAIN SCALES - GENERAL
PAINLEVEL_OUTOF10: 10
PAINLEVEL_OUTOF10: 3
PAINLEVEL_OUTOF10: 9

## 2023-07-07 ASSESSMENT — LIFESTYLE VARIABLES: HOW OFTEN DO YOU HAVE A DRINK CONTAINING ALCOHOL: NEVER

## 2023-07-07 ASSESSMENT — PAIN - FUNCTIONAL ASSESSMENT: PAIN_FUNCTIONAL_ASSESSMENT: 0-10

## 2023-07-07 NOTE — ED PROVIDER NOTES
ms    QTc Calculation (Bazett) 478 ms    P Axis 8 degrees    R Axis -58 degrees    T Axis 68 degrees    Diagnosis       Sinus rhythm with frequent Premature ventricular complexesLeft axis deviationSeptal infarct , age undeterminedAbnormal ECG       SEP-1  Is this patient to be included in the SEP-1 Core Measure due to severe sepsis or septic shock? No    Screenings                    Medications Given During ED Visit  Medications   ondansetron (ZOFRAN) injection 4 mg (4 mg IntraVENous Given 7/7/23 1833)   fentaNYL (SUBLIMAZE) injection 25 mcg (25 mcg IntraVENous Given 7/7/23 1833)       Records Reviewed  I reviewed the patient's previous records    Social Determinants of Health  None    Chronic Conditions affecting care   has a past medical history of Acute MI ([address]), Acute pyelonephritis (09/08/2015), Anxiety and depression, Arthritis, CAD (coronary artery disease), Cancer ([address]), Cancer ([address]), Cancer of skin of leg (basel cell removed 6 wks ago), Chronic obstructive pulmonary disease ([address]) (01/13/2017), Claustrophobia, COPD (chronic obstructive pulmonary disease) ([address]), ESBL (extended spectrum beta-lactamase) producing bacteria infection (08/14/2021), Esophageal stricture, Gastroesophageal reflux disease without esophagitis (03/24/2016), Hiatal hernia, Hiatal hernia, Hypercholesteremia, Hypertension, IBS (irritable bowel syndrome), Migraines, Movement disorder (arthritis), Pneumonia, PONV (postoperative nausea and vomiting), Type II or unspecified type diabetes mellitus without mention of complication, not stated as uncontrolled, and Unspecified cerebral artery occlusion with cerebral infarction. MDM and ED Course  Chest x-ray shows mild pulmonary congestion and the patient denies any shortness of breath. Patient states the pain is worse when she takes a deep breath and it is sharp. Troponin is slightly elevated and on the second blood draw appears to be quite stable.   EKG does not show any acute changes

## 2023-07-08 DIAGNOSIS — F41.9 ANXIETY: ICD-10-CM

## 2023-07-08 LAB
EKG ATRIAL RATE: 85 BPM
EKG DIAGNOSIS: NORMAL
EKG P AXIS: 8 DEGREES
EKG P-R INTERVAL: 176 MS
EKG Q-T INTERVAL: 402 MS
EKG QRS DURATION: 116 MS
EKG QTC CALCULATION (BAZETT): 478 MS
EKG R AXIS: -58 DEGREES
EKG T AXIS: 68 DEGREES
EKG VENTRICULAR RATE: 85 BPM

## 2023-07-08 PROCEDURE — 93010 ELECTROCARDIOGRAM REPORT: CPT | Performed by: INTERNAL MEDICINE

## 2023-07-08 RX ORDER — ALPRAZOLAM 1 MG/1
1 TABLET ORAL 3 TIMES DAILY PRN
Qty: 15 TABLET | Refills: 0 | Status: SHIPPED | OUTPATIENT
Start: 2023-07-08 | End: 2023-07-11 | Stop reason: SDUPTHER

## 2023-07-08 NOTE — ED NOTES
Reviewed discharge instructions with patient, patient verbalized understanding, patient used wheelchair to get into grandsons car for discharge.       Janna Dorantes RN  07/07/23 [id number]

## 2023-08-16 RX ORDER — CEPHALEXIN 500 MG/1
500 CAPSULE ORAL 4 TIMES DAILY
Qty: 28 CAPSULE | Refills: 0 | Status: SHIPPED | OUTPATIENT
Start: 2023-08-16 | End: 2023-08-23

## 2023-08-31 RX ORDER — ACETAZOLAMIDE 250 MG/1
250 TABLET ORAL DAILY
Qty: 90 TABLET | Refills: 1 | Status: SHIPPED | OUTPATIENT
Start: 2023-08-31 | End: 2024-02-27

## 2024-02-05 DIAGNOSIS — Z91.81 AT HIGH RISK FOR INJURY RELATED TO FALL: ICD-10-CM

## 2024-02-05 DIAGNOSIS — R26.2 UNABLE TO AMBULATE: Primary | ICD-10-CM

## 2024-02-05 DIAGNOSIS — R26.89 IMBALANCE: ICD-10-CM

## 2024-03-05 ENCOUNTER — APPOINTMENT (OUTPATIENT)
Dept: CT IMAGING | Age: 79
End: 2024-03-05
Payer: COMMERCIAL

## 2024-03-05 ENCOUNTER — APPOINTMENT (OUTPATIENT)
Dept: GENERAL RADIOLOGY | Age: 79
End: 2024-03-05
Payer: COMMERCIAL

## 2024-03-05 ENCOUNTER — HOSPITAL ENCOUNTER (OUTPATIENT)
Age: 79
Setting detail: OBSERVATION
Discharge: HOME | End: 2024-03-07
Attending: EMERGENCY MEDICINE | Admitting: INTERNAL MEDICINE
Payer: COMMERCIAL

## 2024-03-05 DIAGNOSIS — R07.9 CHEST PAIN, UNSPECIFIED TYPE: Primary | ICD-10-CM

## 2024-03-05 DIAGNOSIS — N30.00 ACUTE CYSTITIS WITHOUT HEMATURIA: ICD-10-CM

## 2024-03-05 LAB
ALBUMIN SERPL-MCNC: 3.7 G/DL (ref 3.4–5)
ALBUMIN/GLOB SERPL: 1.2 {RATIO} (ref 1.1–2.2)
ALP SERPL-CCNC: 111 U/L (ref 40–129)
ALT SERPL-CCNC: 15 U/L (ref 10–40)
ANION GAP SERPL CALCULATED.3IONS-SCNC: 12 MMOL/L (ref 3–16)
AST SERPL-CCNC: 29 U/L (ref 15–37)
BACTERIA URNS QL MICRO: ABNORMAL /HPF
BASOPHILS # BLD: 0.1 K/UL (ref 0–0.2)
BASOPHILS NFR BLD: 0.7 %
BILIRUB SERPL-MCNC: <0.2 MG/DL (ref 0–1)
BILIRUB UR QL STRIP.AUTO: NEGATIVE
BUN SERPL-MCNC: 15 MG/DL (ref 7–20)
CALCIUM SERPL-MCNC: 9.5 MG/DL (ref 8.3–10.6)
CHLORIDE SERPL-SCNC: 104 MMOL/L (ref 99–110)
CLARITY UR: ABNORMAL
CO2 SERPL-SCNC: 23 MMOL/L (ref 21–32)
COLOR UR: YELLOW
CREAT SERPL-MCNC: 1 MG/DL (ref 0.6–1.2)
DEPRECATED RDW RBC AUTO: 15.8 % (ref 12.4–15.4)
EOSINOPHIL # BLD: 0.1 K/UL (ref 0–0.6)
EOSINOPHIL NFR BLD: 1.2 %
EPI CELLS #/AREA URNS AUTO: 6 /HPF (ref 0–5)
GFR SERPLBLD CREATININE-BSD FMLA CKD-EPI: 58 ML/MIN/{1.73_M2}
GLUCOSE SERPL-MCNC: 273 MG/DL (ref 70–99)
GLUCOSE UR STRIP.AUTO-MCNC: NEGATIVE MG/DL
HCT VFR BLD AUTO: 41.6 % (ref 36–48)
HGB BLD-MCNC: 13.6 G/DL (ref 12–16)
HGB UR QL STRIP.AUTO: NEGATIVE
HYALINE CASTS #/AREA URNS AUTO: 1 /LPF (ref 0–8)
KETONES UR STRIP.AUTO-MCNC: NEGATIVE MG/DL
LACTATE BLDV-SCNC: 2.1 MMOL/L (ref 0.4–1.9)
LEUKOCYTE ESTERASE UR QL STRIP.AUTO: ABNORMAL
LYMPHOCYTES # BLD: 2.1 K/UL (ref 1–5.1)
LYMPHOCYTES NFR BLD: 18.6 %
MCH RBC QN AUTO: 28.5 PG (ref 26–34)
MCHC RBC AUTO-ENTMCNC: 32.8 G/DL (ref 31–36)
MCV RBC AUTO: 87 FL (ref 80–100)
MONOCYTES # BLD: 0.7 K/UL (ref 0–1.3)
MONOCYTES NFR BLD: 6.6 %
NEUTROPHILS # BLD: 8.2 K/UL (ref 1.7–7.7)
NEUTROPHILS NFR BLD: 72.9 %
NITRITE UR QL STRIP.AUTO: NEGATIVE
PH UR STRIP.AUTO: 5.5 [PH] (ref 5–8)
PLATELET # BLD AUTO: 209 K/UL (ref 135–450)
PMV BLD AUTO: 8.5 FL (ref 5–10.5)
POTASSIUM SERPL-SCNC: 3.9 MMOL/L (ref 3.5–5.1)
PROT SERPL-MCNC: 6.7 G/DL (ref 6.4–8.2)
PROT UR STRIP.AUTO-MCNC: ABNORMAL MG/DL
RBC # BLD AUTO: 4.78 M/UL (ref 4–5.2)
RBC CLUMPS #/AREA URNS AUTO: 1 /HPF (ref 0–4)
SODIUM SERPL-SCNC: 139 MMOL/L (ref 136–145)
SP GR UR STRIP.AUTO: 1.02 (ref 1–1.03)
TROPONIN, HIGH SENSITIVITY: 13 NG/L (ref 0–14)
TROPONIN, HIGH SENSITIVITY: 14 NG/L (ref 0–14)
UA COMPLETE W REFLEX CULTURE PNL UR: YES
UA DIPSTICK W REFLEX MICRO PNL UR: YES
URN SPEC COLLECT METH UR: ABNORMAL
UROBILINOGEN UR STRIP-ACNC: 1 E.U./DL
WBC # BLD AUTO: 11.2 K/UL (ref 4–11)
WBC #/AREA URNS AUTO: 172 /HPF (ref 0–5)

## 2024-03-05 PROCEDURE — 70450 CT HEAD/BRAIN W/O DYE: CPT

## 2024-03-05 PROCEDURE — 84484 ASSAY OF TROPONIN QUANT: CPT

## 2024-03-05 PROCEDURE — 87086 URINE CULTURE/COLONY COUNT: CPT

## 2024-03-05 PROCEDURE — 87186 SC STD MICRODIL/AGAR DIL: CPT

## 2024-03-05 PROCEDURE — 87077 CULTURE AEROBIC IDENTIFY: CPT

## 2024-03-05 PROCEDURE — 71045 X-RAY EXAM CHEST 1 VIEW: CPT

## 2024-03-05 PROCEDURE — 2580000003 HC RX 258: Performed by: PHYSICIAN ASSISTANT

## 2024-03-05 PROCEDURE — 99285 EMERGENCY DEPT VISIT HI MDM: CPT

## 2024-03-05 PROCEDURE — 80053 COMPREHEN METABOLIC PANEL: CPT

## 2024-03-05 PROCEDURE — 6360000002 HC RX W HCPCS: Performed by: PHYSICIAN ASSISTANT

## 2024-03-05 PROCEDURE — 96375 TX/PRO/DX INJ NEW DRUG ADDON: CPT

## 2024-03-05 PROCEDURE — 96365 THER/PROPH/DIAG IV INF INIT: CPT

## 2024-03-05 PROCEDURE — 85025 COMPLETE CBC W/AUTO DIFF WBC: CPT

## 2024-03-05 PROCEDURE — 81001 URINALYSIS AUTO W/SCOPE: CPT

## 2024-03-05 PROCEDURE — 83605 ASSAY OF LACTIC ACID: CPT

## 2024-03-05 RX ORDER — ACETAMINOPHEN 500 MG
1000 TABLET ORAL ONCE
Status: DISCONTINUED | OUTPATIENT
Start: 2024-03-05 | End: 2024-03-05

## 2024-03-05 RX ORDER — ONDANSETRON 2 MG/ML
4 INJECTION INTRAMUSCULAR; INTRAVENOUS EVERY 6 HOURS PRN
Status: DISCONTINUED | OUTPATIENT
Start: 2024-03-05 | End: 2024-03-06

## 2024-03-05 RX ORDER — FENTANYL CITRATE 50 UG/ML
25 INJECTION, SOLUTION INTRAMUSCULAR; INTRAVENOUS ONCE
Status: COMPLETED | OUTPATIENT
Start: 2024-03-05 | End: 2024-03-05

## 2024-03-05 RX ADMIN — ONDANSETRON 4 MG: 2 INJECTION INTRAMUSCULAR; INTRAVENOUS at 22:55

## 2024-03-05 RX ADMIN — FENTANYL CITRATE 25 MCG: 50 INJECTION INTRAMUSCULAR; INTRAVENOUS at 22:54

## 2024-03-05 RX ADMIN — CEFTRIAXONE SODIUM 1000 MG: 1 INJECTION, POWDER, FOR SOLUTION INTRAMUSCULAR; INTRAVENOUS at 22:56

## 2024-03-05 ASSESSMENT — PAIN SCALES - GENERAL
PAINLEVEL_OUTOF10: 10
PAINLEVEL_OUTOF10: 10

## 2024-03-05 ASSESSMENT — PAIN DESCRIPTION - ONSET: ONSET: ON-GOING

## 2024-03-05 ASSESSMENT — PAIN - FUNCTIONAL ASSESSMENT
PAIN_FUNCTIONAL_ASSESSMENT: INTOLERABLE, UNABLE TO DO ANY ACTIVE OR PASSIVE ACTIVITIES
PAIN_FUNCTIONAL_ASSESSMENT: 0-10

## 2024-03-05 ASSESSMENT — ENCOUNTER SYMPTOMS
ABDOMINAL PAIN: 0
SHORTNESS OF BREATH: 0
DIARRHEA: 0
COUGH: 0
NAUSEA: 0
RHINORRHEA: 0
VOMITING: 0

## 2024-03-05 ASSESSMENT — PAIN DESCRIPTION - LOCATION: LOCATION: CHEST

## 2024-03-05 ASSESSMENT — PAIN DESCRIPTION - PAIN TYPE: TYPE: ACUTE PAIN

## 2024-03-05 ASSESSMENT — PAIN DESCRIPTION - DESCRIPTORS: DESCRIPTORS: SHARP

## 2024-03-05 ASSESSMENT — PAIN DESCRIPTION - FREQUENCY: FREQUENCY: CONTINUOUS

## 2024-03-06 LAB
EKG ATRIAL RATE: 85 BPM
EKG DIAGNOSIS: NORMAL
EKG P AXIS: -19 DEGREES
EKG P-R INTERVAL: 156 MS
EKG Q-T INTERVAL: 366 MS
EKG QRS DURATION: 124 MS
EKG QTC CALCULATION (BAZETT): 437 MS
EKG R AXIS: -25 DEGREES
EKG T AXIS: 102 DEGREES
EKG VENTRICULAR RATE: 86 BPM
GLUCOSE BLD-MCNC: 160 MG/DL (ref 70–99)
GLUCOSE BLD-MCNC: 173 MG/DL (ref 70–99)
GLUCOSE BLD-MCNC: 198 MG/DL (ref 70–99)
GLUCOSE BLD-MCNC: 207 MG/DL (ref 70–99)
PERFORMED ON: ABNORMAL

## 2024-03-06 PROCEDURE — 78452 HT MUSCLE IMAGE SPECT MULT: CPT | Performed by: INTERNAL MEDICINE

## 2024-03-06 PROCEDURE — G0378 HOSPITAL OBSERVATION PER HR: HCPCS

## 2024-03-06 PROCEDURE — 94761 N-INVAS EAR/PLS OXIMETRY MLT: CPT

## 2024-03-06 PROCEDURE — 2580000003 HC RX 258: Performed by: PHYSICIAN ASSISTANT

## 2024-03-06 PROCEDURE — A9502 TC99M TETROFOSMIN: HCPCS | Performed by: INTERNAL MEDICINE

## 2024-03-06 PROCEDURE — 6360000002 HC RX W HCPCS: Performed by: INTERNAL MEDICINE

## 2024-03-06 PROCEDURE — 96375 TX/PRO/DX INJ NEW DRUG ADDON: CPT

## 2024-03-06 PROCEDURE — 6360000002 HC RX W HCPCS: Performed by: PHYSICIAN ASSISTANT

## 2024-03-06 PROCEDURE — 96366 THER/PROPH/DIAG IV INF ADDON: CPT

## 2024-03-06 PROCEDURE — 3430000000 HC RX DIAGNOSTIC RADIOPHARMACEUTICAL: Performed by: INTERNAL MEDICINE

## 2024-03-06 PROCEDURE — 96376 TX/PRO/DX INJ SAME DRUG ADON: CPT

## 2024-03-06 PROCEDURE — 94640 AIRWAY INHALATION TREATMENT: CPT

## 2024-03-06 PROCEDURE — 6370000000 HC RX 637 (ALT 250 FOR IP): Performed by: PHYSICIAN ASSISTANT

## 2024-03-06 PROCEDURE — 99223 1ST HOSP IP/OBS HIGH 75: CPT | Performed by: INTERNAL MEDICINE

## 2024-03-06 PROCEDURE — 6370000000 HC RX 637 (ALT 250 FOR IP): Performed by: STUDENT IN AN ORGANIZED HEALTH CARE EDUCATION/TRAINING PROGRAM

## 2024-03-06 PROCEDURE — 93017 CV STRESS TEST TRACING ONLY: CPT | Performed by: INTERNAL MEDICINE

## 2024-03-06 RX ORDER — LACTOBACILLUS RHAMNOSUS GG 10B CELL
1 CAPSULE ORAL 2 TIMES DAILY WITH MEALS
Status: DISCONTINUED | OUTPATIENT
Start: 2024-03-06 | End: 2024-03-07 | Stop reason: HOSPADM

## 2024-03-06 RX ORDER — INSULIN LISPRO 100 [IU]/ML
0-4 INJECTION, SOLUTION INTRAVENOUS; SUBCUTANEOUS NIGHTLY
Status: DISCONTINUED | OUTPATIENT
Start: 2024-03-06 | End: 2024-03-07 | Stop reason: HOSPADM

## 2024-03-06 RX ORDER — ACETAMINOPHEN 325 MG/1
650 TABLET ORAL EVERY 6 HOURS PRN
Status: DISCONTINUED | OUTPATIENT
Start: 2024-03-06 | End: 2024-03-07 | Stop reason: HOSPADM

## 2024-03-06 RX ORDER — LISINOPRIL 5 MG/1
2.5 TABLET ORAL DAILY
Status: DISCONTINUED | OUTPATIENT
Start: 2024-03-06 | End: 2024-03-07 | Stop reason: HOSPADM

## 2024-03-06 RX ORDER — AMINOPHYLLINE 25 MG/ML
100 INJECTION, SOLUTION INTRAVENOUS ONCE
Status: COMPLETED | OUTPATIENT
Start: 2024-03-06 | End: 2024-03-06

## 2024-03-06 RX ORDER — POTASSIUM CHLORIDE 7.45 MG/ML
10 INJECTION INTRAVENOUS PRN
Status: DISCONTINUED | OUTPATIENT
Start: 2024-03-06 | End: 2024-03-07 | Stop reason: HOSPADM

## 2024-03-06 RX ORDER — ASPIRIN 81 MG/1
81 TABLET, CHEWABLE ORAL DAILY
Status: DISCONTINUED | OUTPATIENT
Start: 2024-03-07 | End: 2024-03-06

## 2024-03-06 RX ORDER — GUAIFENESIN 600 MG/1
600 TABLET, EXTENDED RELEASE ORAL 2 TIMES DAILY
Status: DISCONTINUED | OUTPATIENT
Start: 2024-03-06 | End: 2024-03-07 | Stop reason: HOSPADM

## 2024-03-06 RX ORDER — SODIUM CHLORIDE 0.9 % (FLUSH) 0.9 %
10 SYRINGE (ML) INJECTION PRN
Status: DISCONTINUED | OUTPATIENT
Start: 2024-03-06 | End: 2024-03-07 | Stop reason: HOSPADM

## 2024-03-06 RX ORDER — GLUCAGON 1 MG/ML
1 KIT INJECTION PRN
Status: DISCONTINUED | OUTPATIENT
Start: 2024-03-06 | End: 2024-03-07 | Stop reason: HOSPADM

## 2024-03-06 RX ORDER — CLOPIDOGREL BISULFATE 75 MG/1
75 TABLET ORAL DAILY
Status: DISCONTINUED | OUTPATIENT
Start: 2024-03-06 | End: 2024-03-07 | Stop reason: HOSPADM

## 2024-03-06 RX ORDER — PANTOPRAZOLE SODIUM 40 MG/1
40 TABLET, DELAYED RELEASE ORAL
Status: DISCONTINUED | OUTPATIENT
Start: 2024-03-06 | End: 2024-03-07 | Stop reason: HOSPADM

## 2024-03-06 RX ORDER — IPRATROPIUM BROMIDE AND ALBUTEROL SULFATE 2.5; .5 MG/3ML; MG/3ML
1 SOLUTION RESPIRATORY (INHALATION) EVERY 4 HOURS PRN
Status: DISCONTINUED | OUTPATIENT
Start: 2024-03-06 | End: 2024-03-07 | Stop reason: HOSPADM

## 2024-03-06 RX ORDER — POTASSIUM CHLORIDE 20 MEQ/1
40 TABLET, EXTENDED RELEASE ORAL PRN
Status: DISCONTINUED | OUTPATIENT
Start: 2024-03-06 | End: 2024-03-07 | Stop reason: HOSPADM

## 2024-03-06 RX ORDER — REGADENOSON 0.08 MG/ML
0.4 INJECTION, SOLUTION INTRAVENOUS
Status: CANCELLED | OUTPATIENT
Start: 2024-03-06

## 2024-03-06 RX ORDER — ISOSORBIDE MONONITRATE 30 MG/1
30 TABLET, EXTENDED RELEASE ORAL DAILY
Status: DISCONTINUED | OUTPATIENT
Start: 2024-03-06 | End: 2024-03-07 | Stop reason: HOSPADM

## 2024-03-06 RX ORDER — SODIUM CHLORIDE 9 MG/ML
INJECTION, SOLUTION INTRAVENOUS PRN
Status: DISCONTINUED | OUTPATIENT
Start: 2024-03-06 | End: 2024-03-07 | Stop reason: HOSPADM

## 2024-03-06 RX ORDER — SODIUM CHLORIDE 0.9 % (FLUSH) 0.9 %
10 SYRINGE (ML) INJECTION EVERY 12 HOURS SCHEDULED
Status: DISCONTINUED | OUTPATIENT
Start: 2024-03-06 | End: 2024-03-07 | Stop reason: HOSPADM

## 2024-03-06 RX ORDER — ONDANSETRON 2 MG/ML
4 INJECTION INTRAMUSCULAR; INTRAVENOUS EVERY 6 HOURS PRN
Status: DISCONTINUED | OUTPATIENT
Start: 2024-03-06 | End: 2024-03-07 | Stop reason: HOSPADM

## 2024-03-06 RX ORDER — ROSUVASTATIN CALCIUM 20 MG/1
20 TABLET, COATED ORAL NIGHTLY
Status: DISCONTINUED | OUTPATIENT
Start: 2024-03-06 | End: 2024-03-07 | Stop reason: HOSPADM

## 2024-03-06 RX ORDER — ACETAMINOPHEN 650 MG/1
650 SUPPOSITORY RECTAL EVERY 6 HOURS PRN
Status: DISCONTINUED | OUTPATIENT
Start: 2024-03-06 | End: 2024-03-07 | Stop reason: HOSPADM

## 2024-03-06 RX ORDER — ACETAZOLAMIDE 250 MG/1
250 TABLET ORAL DAILY
Status: DISCONTINUED | OUTPATIENT
Start: 2024-03-06 | End: 2024-03-07 | Stop reason: HOSPADM

## 2024-03-06 RX ORDER — ALBUTEROL SULFATE 90 UG/1
2 AEROSOL, METERED RESPIRATORY (INHALATION) EVERY 6 HOURS PRN
Status: DISCONTINUED | OUTPATIENT
Start: 2024-03-06 | End: 2024-03-07 | Stop reason: HOSPADM

## 2024-03-06 RX ORDER — NITROGLYCERIN 0.4 MG/1
0.4 TABLET SUBLINGUAL EVERY 5 MIN PRN
Status: DISCONTINUED | OUTPATIENT
Start: 2024-03-06 | End: 2024-03-07 | Stop reason: HOSPADM

## 2024-03-06 RX ORDER — SENNOSIDES A AND B 8.6 MG/1
1 TABLET, FILM COATED ORAL DAILY PRN
Status: DISCONTINUED | OUTPATIENT
Start: 2024-03-06 | End: 2024-03-07 | Stop reason: HOSPADM

## 2024-03-06 RX ORDER — REGADENOSON 0.08 MG/ML
0.4 INJECTION, SOLUTION INTRAVENOUS
Status: COMPLETED | OUTPATIENT
Start: 2024-03-06 | End: 2024-03-06

## 2024-03-06 RX ORDER — ALPRAZOLAM 0.5 MG/1
1 TABLET ORAL 3 TIMES DAILY PRN
Status: DISCONTINUED | OUTPATIENT
Start: 2024-03-06 | End: 2024-03-07 | Stop reason: HOSPADM

## 2024-03-06 RX ORDER — TRIAMCINOLONE ACETONIDE 1 MG/G
CREAM TOPICAL 2 TIMES DAILY
Status: DISCONTINUED | OUTPATIENT
Start: 2024-03-06 | End: 2024-03-07 | Stop reason: HOSPADM

## 2024-03-06 RX ORDER — DEXTROSE MONOHYDRATE 100 MG/ML
INJECTION, SOLUTION INTRAVENOUS CONTINUOUS PRN
Status: DISCONTINUED | OUTPATIENT
Start: 2024-03-06 | End: 2024-03-07 | Stop reason: HOSPADM

## 2024-03-06 RX ORDER — FUROSEMIDE 40 MG/1
40 TABLET ORAL DAILY
Status: DISCONTINUED | OUTPATIENT
Start: 2024-03-06 | End: 2024-03-06

## 2024-03-06 RX ORDER — MAGNESIUM SULFATE IN WATER 40 MG/ML
2000 INJECTION, SOLUTION INTRAVENOUS PRN
Status: DISCONTINUED | OUTPATIENT
Start: 2024-03-06 | End: 2024-03-07 | Stop reason: HOSPADM

## 2024-03-06 RX ORDER — GABAPENTIN 300 MG/1
600 CAPSULE ORAL 3 TIMES DAILY
Status: DISCONTINUED | OUTPATIENT
Start: 2024-03-06 | End: 2024-03-07 | Stop reason: HOSPADM

## 2024-03-06 RX ORDER — INSULIN LISPRO 100 [IU]/ML
0-4 INJECTION, SOLUTION INTRAVENOUS; SUBCUTANEOUS
Status: DISCONTINUED | OUTPATIENT
Start: 2024-03-06 | End: 2024-03-07 | Stop reason: HOSPADM

## 2024-03-06 RX ADMIN — ISOSORBIDE MONONITRATE 30 MG: 30 TABLET, EXTENDED RELEASE ORAL at 08:21

## 2024-03-06 RX ADMIN — GUAIFENESIN 600 MG: 600 TABLET, EXTENDED RELEASE ORAL at 01:55

## 2024-03-06 RX ADMIN — SODIUM CHLORIDE, PRESERVATIVE FREE 10 ML: 5 INJECTION INTRAVENOUS at 20:33

## 2024-03-06 RX ADMIN — ALPRAZOLAM 1 MG: 0.5 TABLET ORAL at 01:55

## 2024-03-06 RX ADMIN — GABAPENTIN 600 MG: 300 CAPSULE ORAL at 16:06

## 2024-03-06 RX ADMIN — ROSUVASTATIN CALCIUM 20 MG: 20 TABLET, FILM COATED ORAL at 20:32

## 2024-03-06 RX ADMIN — ONDANSETRON 4 MG: 2 INJECTION INTRAMUSCULAR; INTRAVENOUS at 21:02

## 2024-03-06 RX ADMIN — GABAPENTIN 600 MG: 300 CAPSULE ORAL at 08:20

## 2024-03-06 RX ADMIN — Medication 1 CAPSULE: at 16:53

## 2024-03-06 RX ADMIN — Medication 1 CAPSULE: at 08:20

## 2024-03-06 RX ADMIN — SODIUM CHLORIDE, PRESERVATIVE FREE 10 ML: 5 INJECTION INTRAVENOUS at 08:21

## 2024-03-06 RX ADMIN — CLOPIDOGREL BISULFATE 75 MG: 75 TABLET ORAL at 08:21

## 2024-03-06 RX ADMIN — DICLOFENAC SODIUM 2 G: 10 GEL TOPICAL at 20:33

## 2024-03-06 RX ADMIN — TETROFOSMIN 10 MILLICURIE: 1.38 INJECTION, POWDER, LYOPHILIZED, FOR SOLUTION INTRAVENOUS at 13:26

## 2024-03-06 RX ADMIN — Medication 2 PUFF: at 11:00

## 2024-03-06 RX ADMIN — REGADENOSON 0.4 MG: 0.08 INJECTION, SOLUTION INTRAVENOUS at 14:11

## 2024-03-06 RX ADMIN — AMINOPHYLLINE 100 MG: 25 INJECTION, SOLUTION INTRAVENOUS at 14:19

## 2024-03-06 RX ADMIN — ALPRAZOLAM 1 MG: 0.5 TABLET ORAL at 09:27

## 2024-03-06 RX ADMIN — RIVAROXABAN 15 MG: 15 TABLET, FILM COATED ORAL at 08:20

## 2024-03-06 RX ADMIN — ACETAZOLAMIDE 250 MG: 250 TABLET ORAL at 09:27

## 2024-03-06 RX ADMIN — GABAPENTIN 600 MG: 300 CAPSULE ORAL at 20:33

## 2024-03-06 RX ADMIN — INSULIN LISPRO 1 UNITS: 100 INJECTION, SOLUTION INTRAVENOUS; SUBCUTANEOUS at 16:53

## 2024-03-06 RX ADMIN — ALPRAZOLAM 1 MG: 0.5 TABLET ORAL at 21:02

## 2024-03-06 RX ADMIN — TRIAMCINOLONE ACETONIDE: 1 CREAM TOPICAL at 08:30

## 2024-03-06 RX ADMIN — TETROFOSMIN 30 MILLICURIE: 1.38 INJECTION, POWDER, LYOPHILIZED, FOR SOLUTION INTRAVENOUS at 14:15

## 2024-03-06 RX ADMIN — GUAIFENESIN 600 MG: 600 TABLET, EXTENDED RELEASE ORAL at 08:20

## 2024-03-06 RX ADMIN — LISINOPRIL 2.5 MG: 5 TABLET ORAL at 08:20

## 2024-03-06 RX ADMIN — GUAIFENESIN 600 MG: 600 TABLET, EXTENDED RELEASE ORAL at 20:33

## 2024-03-06 ASSESSMENT — PAIN SCALES - GENERAL: PAINLEVEL_OUTOF10: 0

## 2024-03-06 ASSESSMENT — LIFESTYLE VARIABLES: HOW OFTEN DO YOU HAVE A DRINK CONTAINING ALCOHOL: NEVER

## 2024-03-06 NOTE — PROGRESS NOTES
Instructed on Lexiscan Stress Test Procedure including possible side effects/ adverse reactions. Patient verbalizes  understanding and denies having any questions.  See Epic Cardiology.  Lima Noel RN

## 2024-03-06 NOTE — PROGRESS NOTES
Patient is on her Home Regimen. COPD diagnosis. Patient is prescribed Symbicort at home, will be placed on Dulera here per hospital exchange.

## 2024-03-06 NOTE — PROGRESS NOTES
V2.0    [facility] [place]      Name:  Milana Pardo /Age/Sex: 1945  (78 y.o. female)   MRN & CSN:  [id number] & [id number] Encounter Date/Time: 3/6/2024 10:12 AM EST   Location:  [facility]2914/2914-01 PCP: Arian Brandon MD     Attending:Lázaro Her MD       Hospital Day: 2    Assessment and Recommendations   Milana Pardo is a 78 y.o. female who presents with Chest pain      Plan:   Chest pain  Stress test  Cardiology consultmonitor on tele    UTI   Continue iv abx  Await culture    COPD   At [facility]    Chronic CHF  Continue home dose lasix          Diet Diet NPO Exceptions are: Sips of Water with Meds   DVT Prophylaxis    Code Status Full Code   Disposition          Personally reviewed Lab Studies and Imaging         Subjective:     Chief Complaint:     Milana Pardo is a 78 y.o. female who presents with still with chest pain         Review of Systems:      Pertinent positives and negatives discussed in HPI    Objective:     Intake/Output Summary (Last 24 hours) at 3/6/2024 1012  Last data filed at 3/6/2024 0600  Gross per 24 hour   Intake --   Output 100 ml   Net -100 ml      Vitals:   Vitals:    24 0045 24 0100 24 0128 24 0430   BP: (!) 125/58 134/61 139/61 (!) 121/58   Pulse: 66 58 62 67   Resp: 18 22 17 27   Temp:   97.9 °F (36.6 °C) 98.2 °F (36.8 °C)   TempSrc:   Temporal Temporal   SpO2:   90% 95%   Weight:   124.6 kg (274 lb 11.1 oz)    Height:   1.676 m (5' 6\")          Physical Exam:      General: NAD  Eyes: EOMI  ENT: neck supple  Cardiovascular: Regular rate.  Respiratory: Clear to auscultation  Gastrointestinal: Soft, non tender  Genitourinary: no suprapubic tenderness  Musculoskeletal: No edema  Skin: warm, dry  Neuro: Alert.  Psych: Mood appropriate.         Medications:   Medications:    cefTRIAXone (ROCEPHIN) IV  1,000 mg IntraVENous Q24H    lactobacillus  1 capsule Oral BID WC    acetaZOLAMIDE  250 mg Oral Daily    mometasone-formoterol  2 puff Inhalation BID

## 2024-03-06 NOTE — RT PROTOCOL NOTE
7-10 - enter or revise RT Bronchodilator order(s) to two equivalent RT bronchodilator orders with one order with TID Frequency and one order with Frequency of every 4 hours PRN wheezing or increased work of breathing using Per Protocol order mode.       11-13 - enter or revise RT Bronchodilator order(s) to one equivalent RT bronchodilator order with QID Frequency and an Albuterol order with Frequency of every 4 hours PRN wheezing or increased work of breathing using Per Protocol order mode.      Greater than 13 - enter or revise RT Bronchodilator order(s) to one equivalent RT bronchodilator order with every 4 hours Frequency and an Albuterol order with Frequency of every 2 hours PRN wheezing or increased work of breathing using Per Protocol order mode.     RT to enter RT Home Evaluation for COPD & MDI Assessment order using Per Protocol order mode.    Electronically signed by Kassandra Arteaga RCP on 3/6/2024 at 5:22 AM

## 2024-03-06 NOTE — CONSULTS
[facility]  Cardiology Note  [phone]      Chief Complaint   Patient presents with    Chest Pain     Nitro given in squad        History of Present Illness:  Milana Pardo is a 78 y.o. patient PMHx CAD s/p PCI to RCA, p+m+dLAD 1/2023, pAF, CVA, COPD, esophageal stricture, DM-II who presented to the hospital with complaints of chest pain    Patient presented with left sided chest pain/pressure that improved with nitroglycerine. Denies similar exertional symptoms since stent placement but is predominantly sedentary.     Found to have UTI    Past Medical History:   has a past medical history of Acute MI (HCC), Acute pyelonephritis, Anxiety and depression, Arthritis, CAD (coronary artery disease), Cancer (HCC), Cancer (HCC), Cancer of skin of leg, Chronic obstructive pulmonary disease (HCC), Claustrophobia, COPD (chronic obstructive pulmonary disease) (HCC), ESBL (extended spectrum beta-lactamase) producing bacteria infection, Esophageal stricture, Gastroesophageal reflux disease without esophagitis, Hiatal hernia, Hiatal hernia, Hypercholesteremia, Hypertension, IBS (irritable bowel syndrome), Migraines, Movement disorder, Pneumonia, PONV (postoperative nausea and vomiting), Type II or unspecified type diabetes mellitus without mention of complication, not stated as uncontrolled, and Unspecified cerebral artery occlusion with cerebral infarction.    Surgical History:   has a past surgical history that includes Cardiac surgery; Gallbladder surgery; Hysterectomy; Appendectomy; Cholecystectomy; Colonoscopy; Upper gastrointestinal endoscopy (04/20/2012); Endoscopy, colon, diagnostic; Tear duct surgery; Upper gastrointestinal endoscopy (06/11/2018); Colonoscopy (06/11/2018); pr excision malignant lesion s/n/h/f/g 0.5 cm/< (N/A, 09/06/2018); pr split agrft t/a/l 1st 100 cm/&/1% bdy inft/chld (N/A, 09/06/2018); skin biopsy; eye surgery; bronchoscopy (N/A, 01/24/2020); bronchoscopy (N/A, 01/27/2020); Cataract

## 2024-03-06 NOTE — ED PROVIDER NOTES
[facility] EMERGENCY DEPARTMENT  EMERGENCY DEPARTMENT ENCOUNTER        Pt Name: Milana Pardo  MRN: [id number]  Birthdate 1945  Date of evaluation: 3/5/2024  Provider: Alaina Gleason PA-C  PCP: Arian Brandon MD  Note Started: 10:47 PM EST 3/5/24       I have seen and evaluated this patient with my supervising physician Benjamín Boyer MD.      CHIEF COMPLAINT       Chief Complaint   Patient presents with    Chest Pain     Nitro given in squad       HISTORY OF PRESENT ILLNESS: 1 or more Elements     History From: Patient  Limitations to history : None    Milana Pardo is a 78 y.o. female who presents to the emergency department today for evaluation for concerns of chest pain.  The patient states that she does have a history of paroxysmal A-fib, and she is anticoagulated on Xarelto.  She has a history of coronary artery disease, diabetes, hypertension, hyperlipidemia.  Patient states that she had a stent placed over a year ago.  The patient states that she was sitting today, watching TV, when she noticed a tight and pressure-like sensation to the left side of her chest, underneath her rib cage.  She did have associated shortness of breath.  Patient reports that she did receive nitro and round and she states that this did resolve her pain however she is complaining of a headache since receiving the nitro.  Patient also reports that she thought that she lost vision in a portion of her eye, but tells me that this has been ongoing for 4 days and tells me she has a history of TIAs.  She denies any vision changes at this time.  She denies any dizziness, or lightheadedness.  She has no recent illnesses, she otherwise has no other complaint    Nursing Notes were all reviewed and agreed with or any disagreements were addressed in the HPI.    REVIEW OF SYSTEMS :      Review of Systems   Constitutional:  Negative for activity change, appetite change, chills and fever.   HENT:  Negative for 
rhythm at a rate of 85 beats a minute with no acute ST elevations or depressions or pathologic Q waves.    Exam:    The patient had no visual abnormalities.  She had no focal motor 60 deficits throughout.  She had a NIH score 0.  Heart was regular rate rhythm with no murmurs rubs or gallops the chest was clear to auscultation bilaterally.      Medical decision makin-year female presents with multiple complaints including some blurry vision and chest pain.  The patient's workup so far was unremarkable normal.  Workup did show a urinary tract infection.  In addition, the patient also has some possible pulmonary congestion.  The patient will be admitted for further care and evaluation.  She is currently in stable condition.  A CT of the head is negative.    Is this patient to be included in the SEP-1 Core Measure due to severe sepsis or septic shock?   No   Exclusion criteria - the patient is NOT to be included for SEP-1 Core Measure due to:  Infection is not suspected      FINAL IMPRESSION:    1. Chest pain, unspecified type    2. Acute cystitis without hematuria           Benjamín Boyer MD  [phone]

## 2024-03-06 NOTE — H&P
I have seen and examined this patient I agree with the documentation as outlined below by the physician assistant      [facility] Medicine History & Physical      Patient Name: Milana Pardo    : 1945    PCP: Arian Brandon MD    Date of Service:  Patient seen and examined on 3/5/2024    Chief Complaint:  Chest pain    History Of Present Illness:    Milana Pardo is a 78 y.o. female who presented to ED for evaluation of left sided chest pressure that began today while sitting and watching TV.  She reports associated shortness of breath.  She reports she was given nitro and her chest pain resolved.  She reports she did get a headache from the nitro.  She also reports some blurred vision off and on over the last 4 days.  She reports headache resolved with fentanyl in ED.  She denies any blurred vision or other vision changes at this time. She denies fever, dizziness, palpitations, diaphoresis, cough, edema, abdominal pain, nausea, vomiting, diarrhea, constipation, urinary symptoms.  She reports she had a stent placed over a year ago.  She is anticoagulated on Xarelto for A fib.  She denies any other complaints or concerns at this time.      Past Medical History:    Patient has a past medical history of Acute MI (HCC), Acute pyelonephritis, Anxiety and depression, Arthritis, CAD (coronary artery disease), Cancer (HCC), Cancer (HCC), Cancer of skin of leg, Chronic obstructive pulmonary disease (HCC), Claustrophobia, COPD (chronic obstructive pulmonary disease) ([facility]), ESBL (extended spectrum beta-lactamase) producing bacteria infection, Esophageal stricture, Gastroesophageal reflux disease without esophagitis, Hiatal hernia, Hiatal hernia, Hypercholesteremia, Hypertension, IBS (irritable bowel syndrome), Migraines, Movement disorder, Pneumonia, PONV (postoperative nausea and vomiting), Type II or unspecified type diabetes mellitus without mention of complication, not stated as uncontrolled, and Unspecified

## 2024-03-06 NOTE — PROGRESS NOTES
Spoke with Belén (cardiology RN) and she communicated per Dr. Shin, it is ok to proceed with NM Stress Test.  Lima Noel RN, Stress Lab

## 2024-03-06 NOTE — CARE COORDINATION
Chart reviewed at this time for discharge planning.     From home. Cardiology consulted.      Patient admitted as Observation/OPIB with an anticipated short hospitalization length of stay. Chart reviewed and it appears that patient has minimal needs for discharge at this time. Discussed with patient’s nurse and requested that case management be notified if discharge needs are identified.     *Case management will continue to follow progress and update discharge plan as needed.       Aline Estrada RN, BSN  [phone]

## 2024-03-07 VITALS
BODY MASS INDEX: 43.75 KG/M2 | DIASTOLIC BLOOD PRESSURE: 55 MMHG | OXYGEN SATURATION: 98 % | HEART RATE: 72 BPM | SYSTOLIC BLOOD PRESSURE: 112 MMHG | HEIGHT: 66 IN | WEIGHT: 272.2 LBS | TEMPERATURE: 97.1 F | RESPIRATION RATE: 18 BRPM

## 2024-03-07 LAB
BACTERIA UR CULT: ABNORMAL
BACTERIA UR CULT: ABNORMAL
GLUCOSE BLD-MCNC: 191 MG/DL (ref 70–99)
GLUCOSE BLD-MCNC: 208 MG/DL (ref 70–99)
ORGANISM: ABNORMAL
ORGANISM: ABNORMAL
PERFORMED ON: ABNORMAL
PERFORMED ON: ABNORMAL

## 2024-03-07 PROCEDURE — G0378 HOSPITAL OBSERVATION PER HR: HCPCS

## 2024-03-07 PROCEDURE — 96366 THER/PROPH/DIAG IV INF ADDON: CPT

## 2024-03-07 PROCEDURE — 6370000000 HC RX 637 (ALT 250 FOR IP): Performed by: PHYSICIAN ASSISTANT

## 2024-03-07 PROCEDURE — 94761 N-INVAS EAR/PLS OXIMETRY MLT: CPT

## 2024-03-07 PROCEDURE — 93005 ELECTROCARDIOGRAM TRACING: CPT | Performed by: HOSPITALIST

## 2024-03-07 PROCEDURE — 2580000003 HC RX 258: Performed by: PHYSICIAN ASSISTANT

## 2024-03-07 PROCEDURE — 6360000002 HC RX W HCPCS: Performed by: PHYSICIAN ASSISTANT

## 2024-03-07 PROCEDURE — 94640 AIRWAY INHALATION TREATMENT: CPT

## 2024-03-07 RX ORDER — CIPROFLOXACIN 250 MG/1
250 TABLET, FILM COATED ORAL 2 TIMES DAILY
Qty: 14 TABLET | Refills: 0 | Status: SHIPPED | OUTPATIENT
Start: 2024-03-07 | End: 2024-03-14

## 2024-03-07 RX ADMIN — CEFTRIAXONE SODIUM 1000 MG: 1 INJECTION, POWDER, FOR SOLUTION INTRAMUSCULAR; INTRAVENOUS at 01:58

## 2024-03-07 RX ADMIN — SODIUM CHLORIDE, PRESERVATIVE FREE 10 ML: 5 INJECTION INTRAVENOUS at 08:54

## 2024-03-07 RX ADMIN — GABAPENTIN 600 MG: 300 CAPSULE ORAL at 13:59

## 2024-03-07 RX ADMIN — ALPRAZOLAM 1 MG: 0.5 TABLET ORAL at 15:25

## 2024-03-07 RX ADMIN — ISOSORBIDE MONONITRATE 30 MG: 30 TABLET, EXTENDED RELEASE ORAL at 08:54

## 2024-03-07 RX ADMIN — ACETAZOLAMIDE 250 MG: 250 TABLET ORAL at 08:54

## 2024-03-07 RX ADMIN — INSULIN LISPRO 1 UNITS: 100 INJECTION, SOLUTION INTRAVENOUS; SUBCUTANEOUS at 11:58

## 2024-03-07 RX ADMIN — Medication 1 CAPSULE: at 08:54

## 2024-03-07 RX ADMIN — RIVAROXABAN 15 MG: 15 TABLET, FILM COATED ORAL at 08:54

## 2024-03-07 RX ADMIN — Medication 2 PUFF: at 07:43

## 2024-03-07 RX ADMIN — TRIAMCINOLONE ACETONIDE: 1 CREAM TOPICAL at 00:03

## 2024-03-07 RX ADMIN — GUAIFENESIN 600 MG: 600 TABLET, EXTENDED RELEASE ORAL at 08:54

## 2024-03-07 RX ADMIN — LISINOPRIL 2.5 MG: 5 TABLET ORAL at 08:54

## 2024-03-07 RX ADMIN — DICLOFENAC SODIUM 2 G: 10 GEL TOPICAL at 08:59

## 2024-03-07 RX ADMIN — CLOPIDOGREL BISULFATE 75 MG: 75 TABLET ORAL at 08:54

## 2024-03-07 RX ADMIN — GABAPENTIN 600 MG: 300 CAPSULE ORAL at 08:53

## 2024-03-07 NOTE — PROGRESS NOTES
Data- discharge order received, pt verbalized agreement to discharge, disposition to previous residence, no needs for HHC/DME.     Action- discharge instructions prepared and given to pt, pt verbalized understanding. Medication information packet given r/t NEW and/or CHANGED prescriptions emphasizing name/purpose/side effects, pt verbalized understanding. Discharge instruction summary: Diet- carb control/ cardiac, Activity- as tolerated, Primary Care Physician as follows: Arian Brandon -[phone] f/u appointment in 1 week, go to your scheduled appointment with cardiology, immunizations reviewed, prescription medications filled at [facility], [place], [place]. CHF Education reviewed.     1. WEIGHT: Admit Weight - Scale: 118.8 kg (262 lb) (03/05/24 2045)        Today  Weight - Scale: 123.5 kg (272 lb 3.2 oz) (03/07/24 0530)       2. O2 SAT.: SpO2: 98 % (03/07/24 1230)    Response- Pt belongings gathered, IV removed. Disposition is home (no HHC/DME needs), transported with grandson, taken to lobby via w/c w/ RN, no complications.

## 2024-03-07 NOTE — PROGRESS NOTES
ADVANCED CARE PLANNING    Name:Milana Pardo       :  1945              MRN:  [id number]      Purpose of Encounter: Advanced care planning in light of problem listed above   Parties in attendance: :Lázaro Fernandez MD, Family members:  Decisional Capacity:Yes    Diagnosis: Principal Problem:    Chest pain  Active Problems:    CAD S/P percutaneous coronary angioplasty  Resolved Problems:    * No resolved hospital problems. *    Patients Medical Story:Presented with worsening symptom of dx above. With at risk for life threatening event. Procedure and testing as noted in progress noted. We discussed patient long term goal and also wishes and aggressive care. Discussed in detail about code status and what it means with detailed explanation.   Goals of Care Determinations: Patient wishes to focus on full code with aggressive care, CPR, intubation long term vent and facility as well.   Plan: Will notify Arian Brandon MD of change in care plan. Will look at further interventions as needed.   Code Status: At this time patient wishes to be Full Code  Time Spent with Patient: 21 minutes      Electronically signed by Lázaro Her MD on 3/7/2024 at 1:59 PM  Thank you Arian Brandon MD for the opportunity to be involved in this patient's care.

## 2024-03-07 NOTE — DISCHARGE INSTR - COC
(irritable bowel syndrome) K58.9    AKIL (obstructive sleep apnea) G47.33    Hypercarbia R06.89    Grade II diastolic dysfunction I51.89    CVA, old, alterations of sensations I69.398, R20.9    Left-sided weakness R53.1    Arterial ischemic stroke, ICA, right, acute ([facility]) I63.231    PAF (paroxysmal atrial fibrillation) ([facility]) I48.0    Chest pain R07.9    Thoracic radiculopathy M54.14    Unstable angina ([facility]) I20.0    COPD with acute exacerbation ([facility]) J44.1    Lower GI hemorrhage K92.2    Esophageal stricture K22.2    H/O: CVA (cerebrovascular accident) Z86.73       Isolation/Infection:   Isolation            Contact          Patient Infection Status       Infection Onset Added Last Indicated Last Indicated By Review Planned Expiration Resolved Resolved By    ESBL (Extended Spectrum Beta Lactamase) 21 Culture, Urine        Resolved    C-diff Rule Out 23 Clostridium Difficile Toxin/Antigen   23 Rule-Out Test Resulted    Influenza  20 Anna Mensah RN   20 Infection                        Nurse Assessment:  Last Vital Signs: BP (!) 118/53   Pulse 65   Temp 98 °F (36.7 °C) (Temporal)   Resp 18   Ht 1.676 m (5' 6\")   Wt 123.5 kg (272 lb 3.2 oz)   SpO2 95%   BMI 43.93 kg/m²     Last documented pain score (0-10 scale): Pain Level: 0  Last Weight:   Wt Readings from Last 1 Encounters:   24 123.5 kg (272 lb 3.2 oz)     Mental Status:  {IP PT MENTAL STATUS:}    IV Access:  { JOSE G IV ACCESS:862770192}    Nursing Mobility/ADLs:  Walking   {CHP DME ADLs:320527584}  Transfer  {CHP DME ADLs:126624881}  Bathing  {CHP DME ADLs:005295284}  Dressing  {CHP DME ADLs:603790983}  Toileting  {CHP DME ADLs:995842286}  Feeding  {CHP DME ADLs:690307221}  Med Admin  {P DME ADLs:897049399}  Med Delivery   {MH JOSE G MED Delivery:022275141}    Wound Care Documentation and Therapy:        Elimination:  Continence:   Bowel: {YES /

## 2024-03-07 NOTE — PLAN OF CARE
Problem: Pain  Goal: Verbalizes/displays adequate comfort level or baseline comfort level  3/6/2024 1914 by [place],   Problem: Discharge Planning  Goal: Discharge to home or other facility with appropriate resources  Outcome: Progressing     Problem: Neurosensory - Adult  Goal: Achieves stable or improved neurological status  Outcome: Progressing  Goal: Achieves maximal functionality and self care  Outcome: Progressing     Problem: Respiratory - Adult  Goal: Achieves optimal ventilation and oxygenation  Outcome: Progressing     Problem: Cardiovascular - Adult  Goal: Maintains optimal cardiac output and hemodynamic stability  Outcome: Progressing  Goal: Absence of cardiac dysrhythmias or at baseline  Outcome: Progressing     Problem: Skin/Tissue Integrity - Adult  Goal: Skin integrity remains intact  Outcome: Progressing  Goal: Oral mucous membranes remain intact  Outcome: Progressing     Problem: Musculoskeletal - Adult  Goal: Return mobility to safest level of function  Outcome: Progressing  Goal: Maintain proper alignment of affected body part  Outcome: Progressing  Goal: Return ADL status to a safe level of function  Outcome: Progressing     Problem: Gastrointestinal - Adult  Goal: Minimal or absence of nausea and vomiting  Outcome: Progressing  Goal: Maintains or returns to baseline bowel function  Outcome: Progressing  Goal: Maintains adequate nutritional intake  Outcome: Progressing     Problem: Genitourinary - Adult  Goal: Absence of urinary retention  Outcome: Progressing     Problem: Infection - Adult  Goal: Absence of infection at discharge  Outcome: Progressing  Goal: Absence of infection during hospitalization  Outcome: Progressing     Problem: Metabolic/Fluid and Electrolytes - Adult  Goal: Electrolytes maintained within normal limits  Outcome: Progressing  Goal: Hemodynamic stability and optimal renal function maintained  Outcome: Progressing  Goal: Glucose maintained within prescribed

## 2024-03-07 NOTE — PLAN OF CARE
Problem: Pain  Goal: Verbalizes/displays adequate comfort level or baseline comfort level  Outcome: Progressing  Flowsheets (Taken 3/7/2024 0923)  Verbalizes/displays adequate comfort level or baseline comfort level:   Encourage patient to monitor pain and request assistance   Assess pain using appropriate pain scale     Problem: Safety - Adult  Goal: Free from fall injury  Outcome: Progressing  Flowsheets (Taken 3/7/2024 0923)  Free From Fall Injury: Instruct family/caregiver on patient safety     Problem: Chronic Conditions and Co-morbidities  Goal: Patient's chronic conditions and co-morbidity symptoms are monitored and maintained or improved  Outcome: Progressing  Flowsheets (Taken 3/7/2024 0923)  Care Plan - Patient's Chronic Conditions and Co-Morbidity Symptoms are Monitored and Maintained or Improved:   Collaborate with multidisciplinary team to address chronic and comorbid conditions and prevent exacerbation or deterioration   Monitor and assess patient's chronic conditions and comorbid symptoms for stability, deterioration, or improvement   Update acute care plan with appropriate goals if chronic or comorbid symptoms are exacerbated and prevent overall improvement and discharge     Problem: Discharge Planning  Goal: Discharge to home or other facility with appropriate resources  Outcome: Progressing  Flowsheets (Taken 3/7/2024 0923)  Discharge to home or other facility with appropriate resources:   Identify barriers to discharge with patient and caregiver   Arrange for needed discharge resources and transportation as appropriate     Problem: Neurosensory - Adult  Goal: Achieves stable or improved neurological status  Outcome: Progressing  Flowsheets (Taken 3/7/2024 0923)  Achieves stable or improved neurological status: Assess for and report changes in neurological status  Goal: Achieves maximal functionality and self care  Outcome: Progressing  Flowsheets (Taken 3/7/2024 0923)  Achieves maximal

## 2024-03-07 NOTE — DISCHARGE INSTR - DIET

## 2024-03-08 LAB
EKG ATRIAL RATE: 61 BPM
EKG DIAGNOSIS: NORMAL
EKG P AXIS: -62 DEGREES
EKG P-R INTERVAL: 194 MS
EKG Q-T INTERVAL: 422 MS
EKG QRS DURATION: 128 MS
EKG QTC CALCULATION (BAZETT): 424 MS
EKG R AXIS: -71 DEGREES
EKG T AXIS: 41 DEGREES
EKG VENTRICULAR RATE: 61 BPM

## 2024-03-08 NOTE — CARE COORDINATION
Note created on 3/8/2024 at 11:12 AM.    CM received call from Lima at [facility] [phone] stating pt is active with them for nursing and need hc order to resume services.    Email sent to Dr Virgen to see if he could place. When placed CM will fax to agency.       Aline Estrada RN, BSN  [phone]     Updated at 11:29 AM     HC orders placed by MD and CM faxed orders, last MD note and cardiology note to [facility] at [phone].    Aline Estrada RN, BSN  [phone]

## 2024-04-04 ENCOUNTER — APPOINTMENT (OUTPATIENT)
Dept: GENERAL RADIOLOGY | Age: 79
End: 2024-04-04
Payer: COMMERCIAL

## 2024-04-04 ENCOUNTER — HOSPITAL ENCOUNTER (EMERGENCY)
Age: 79
Discharge: HOME | End: 2024-04-04
Attending: INTERNAL MEDICINE
Payer: COMMERCIAL

## 2024-04-04 VITALS
OXYGEN SATURATION: 90 % | DIASTOLIC BLOOD PRESSURE: 61 MMHG | SYSTOLIC BLOOD PRESSURE: 125 MMHG | RESPIRATION RATE: 19 BRPM | TEMPERATURE: 97.8 F | HEART RATE: 66 BPM

## 2024-04-04 DIAGNOSIS — R07.89 ATYPICAL CHEST PAIN: ICD-10-CM

## 2024-04-04 DIAGNOSIS — M79.604 RIGHT LEG PAIN: Primary | ICD-10-CM

## 2024-04-04 LAB
ALBUMIN SERPL-MCNC: 3.8 G/DL (ref 3.4–5)
ALBUMIN/GLOB SERPL: 1.3 {RATIO} (ref 1.1–2.2)
ALP SERPL-CCNC: 109 U/L (ref 40–129)
ALT SERPL-CCNC: 17 U/L (ref 10–40)
ANION GAP SERPL CALCULATED.3IONS-SCNC: 11 MMOL/L (ref 3–16)
APTT BLD: 41 SEC (ref 22.1–36.4)
AST SERPL-CCNC: 32 U/L (ref 15–37)
BASOPHILS # BLD: 0.1 K/UL (ref 0–0.2)
BASOPHILS NFR BLD: 0.7 %
BILIRUB SERPL-MCNC: 0.4 MG/DL (ref 0–1)
BUN SERPL-MCNC: 12 MG/DL (ref 7–20)
CALCIUM SERPL-MCNC: 9.7 MG/DL (ref 8.3–10.6)
CHLORIDE SERPL-SCNC: 101 MMOL/L (ref 99–110)
CO2 SERPL-SCNC: 26 MMOL/L (ref 21–32)
CREAT SERPL-MCNC: 0.9 MG/DL (ref 0.6–1.2)
D DIMER: 0.47 UG/ML FEU (ref 0–0.6)
DEPRECATED RDW RBC AUTO: 15.1 % (ref 12.4–15.4)
EKG ATRIAL RATE: 77 BPM
EKG DIAGNOSIS: NORMAL
EKG P-R INTERVAL: 166 MS
EKG Q-T INTERVAL: 384 MS
EKG QRS DURATION: 118 MS
EKG QTC CALCULATION (BAZETT): 434 MS
EKG R AXIS: -40 DEGREES
EKG T AXIS: 67 DEGREES
EKG VENTRICULAR RATE: 77 BPM
EOSINOPHIL # BLD: 0.1 K/UL (ref 0–0.6)
EOSINOPHIL NFR BLD: 1.3 %
GFR SERPLBLD CREATININE-BSD FMLA CKD-EPI: 65 ML/MIN/{1.73_M2}
GLUCOSE SERPL-MCNC: 211 MG/DL (ref 70–99)
HCT VFR BLD AUTO: 42.6 % (ref 36–48)
HGB BLD-MCNC: 14.2 G/DL (ref 12–16)
INR PPP: 1.83 (ref 0.85–1.15)
LYMPHOCYTES # BLD: 1.7 K/UL (ref 1–5.1)
LYMPHOCYTES NFR BLD: 16.6 %
MCH RBC QN AUTO: 29.2 PG (ref 26–34)
MCHC RBC AUTO-ENTMCNC: 33.4 G/DL (ref 31–36)
MCV RBC AUTO: 87.4 FL (ref 80–100)
MONOCYTES # BLD: 0.5 K/UL (ref 0–1.3)
MONOCYTES NFR BLD: 5.4 %
NEUTROPHILS # BLD: 7.6 K/UL (ref 1.7–7.7)
NEUTROPHILS NFR BLD: 76 %
NT-PROBNP SERPL-MCNC: 228 PG/ML (ref 0–449)
PLATELET # BLD AUTO: 229 K/UL (ref 135–450)
PMV BLD AUTO: 8.3 FL (ref 5–10.5)
POTASSIUM SERPL-SCNC: 4.2 MMOL/L (ref 3.5–5.1)
PROT SERPL-MCNC: 6.7 G/DL (ref 6.4–8.2)
PROTHROMBIN TIME: 21.3 SEC (ref 11.9–14.9)
RBC # BLD AUTO: 4.87 M/UL (ref 4–5.2)
SODIUM SERPL-SCNC: 138 MMOL/L (ref 136–145)
TROPONIN, HIGH SENSITIVITY: 13 NG/L (ref 0–14)
TROPONIN, HIGH SENSITIVITY: 14 NG/L (ref 0–14)
WBC # BLD AUTO: 10 K/UL (ref 4–11)

## 2024-04-04 PROCEDURE — 85379 FIBRIN DEGRADATION QUANT: CPT

## 2024-04-04 PROCEDURE — 36415 COLL VENOUS BLD VENIPUNCTURE: CPT

## 2024-04-04 PROCEDURE — 85610 PROTHROMBIN TIME: CPT

## 2024-04-04 PROCEDURE — 80053 COMPREHEN METABOLIC PANEL: CPT

## 2024-04-04 PROCEDURE — 71045 X-RAY EXAM CHEST 1 VIEW: CPT

## 2024-04-04 PROCEDURE — 84484 ASSAY OF TROPONIN QUANT: CPT

## 2024-04-04 PROCEDURE — 85025 COMPLETE CBC W/AUTO DIFF WBC: CPT

## 2024-04-04 PROCEDURE — 83880 ASSAY OF NATRIURETIC PEPTIDE: CPT

## 2024-04-04 PROCEDURE — 93971 EXTREMITY STUDY: CPT

## 2024-04-04 PROCEDURE — 85730 THROMBOPLASTIN TIME PARTIAL: CPT

## 2024-04-04 PROCEDURE — 99285 EMERGENCY DEPT VISIT HI MDM: CPT

## 2024-04-04 PROCEDURE — 93010 ELECTROCARDIOGRAM REPORT: CPT | Performed by: INTERNAL MEDICINE

## 2024-04-04 PROCEDURE — 6370000000 HC RX 637 (ALT 250 FOR IP): Performed by: PHYSICIAN ASSISTANT

## 2024-04-04 PROCEDURE — 93005 ELECTROCARDIOGRAM TRACING: CPT | Performed by: INTERNAL MEDICINE

## 2024-04-04 RX ORDER — ALPRAZOLAM 0.5 MG/1
1 TABLET ORAL ONCE
Status: COMPLETED | OUTPATIENT
Start: 2024-04-04 | End: 2024-04-04

## 2024-04-04 RX ADMIN — ALPRAZOLAM 1 MG: 0.5 TABLET ORAL at 13:05

## 2024-04-04 ASSESSMENT — ENCOUNTER SYMPTOMS
WHEEZING: 0
RHINORRHEA: 0
VOMITING: 0
ABDOMINAL PAIN: 0
COUGH: 0
SHORTNESS OF BREATH: 0
NAUSEA: 0

## 2024-04-04 ASSESSMENT — PAIN SCALES - GENERAL
PAINLEVEL_OUTOF10: 9
PAINLEVEL_OUTOF10: 9

## 2024-04-04 ASSESSMENT — PAIN DESCRIPTION - FREQUENCY: FREQUENCY: CONTINUOUS

## 2024-04-04 ASSESSMENT — PAIN DESCRIPTION - PAIN TYPE: TYPE: ACUTE PAIN

## 2024-04-04 ASSESSMENT — PAIN DESCRIPTION - LOCATION: LOCATION: CHEST

## 2024-04-04 ASSESSMENT — PAIN DESCRIPTION - ONSET: ONSET: SUDDEN

## 2024-04-04 ASSESSMENT — PAIN - FUNCTIONAL ASSESSMENT
PAIN_FUNCTIONAL_ASSESSMENT: 0-10
PAIN_FUNCTIONAL_ASSESSMENT: PREVENTS OR INTERFERES WITH ALL ACTIVE AND SOME PASSIVE ACTIVITIES

## 2024-04-04 ASSESSMENT — PAIN DESCRIPTION - DESCRIPTORS: DESCRIPTORS: SHARP
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
## 2024-04-04 NOTE — ED PROVIDER NOTES
In addition to the advanced practice provider, I personally saw Milana Pardo and performed a substantive portion of the visit including all aspects of the medical decision making.    Medical Decision Making  78-year-old female presents the ER for right leg pain.  She states that her right leg has been swollen and warm along with being erythematous for the past 3 days.  Patient arrived from facility and they were concerned for DVT.  Patient is anticoagulated on Xarelto and Plavix.  Right lower extremity is neurovascular intact.  Heart has a regular rhythm and rate.  Lungs are clear bilaterally.  D-dimer is not found to be elevated.  Troponin x 2 was not found to be elevated.  Chest x-ray did not show any acute findings.  Ultrasound of the right lower leg did not show evidence of DVT.  Patient states that she also had some chest pain and route to the hospital.  She is concerned for her anxiety.  EKG did not show any acute findings.  Low suspicion for ACS.  Patient is stable for discharge.  I agree with the assessment and plan.        EKG  EKG done at 12:44 PM shows sinus rhythm with 1 PVC at a rate of 77.  MN intervals 166.  QRSD intervals 118.  QTc intervals 434.  Good overall axis.  Poor R wave transition.  Positive for left axis deviation.  No ST elevation.  No ST depression.  When compared with an old EKG done on March 7, 2024 the overall axis is the same.  The R wave progression is the same.  The left axis deviation is the same.  R wave progression is the same.  Both EKGs are similar.    SEP-1  Is this patient to be included in the SEP-1 Core Measure due to severe sepsis or septic shock?   No    Screenings     Machelle Coma Scale  Eye Opening: Spontaneous  Best Verbal Response: Oriented  Best Motor Response: Obeys commands  Machelle Coma Scale Score: 15             Patient Referrals:  Arian Brandon MD  [address] [facility] [address] [phone]    Schedule an appointment as soon as possible for a 
presents for evaluation of right leg pain and swelling 3 days ago.  On exam no acute distress and, she appears anxious and toxic.  Vitals are stable and she is afebrile.  Lungs are clear to auscultation bilaterally.  Abdomen is soft, nontender.  She does not have any tenderness to bilateral lower extremities.  No asymmetric edema, erythema or warmth.  Neurovascularly intact distally.  She was given home dose of Xanax and will be reevaluated.  Please see attending note for EKG interpretation.    Disposition Considerations (tests considered but not done, Admit vs D/C, Shared Decision Making, Pt Expectation of Test or Tx.): CBC CMP are unremarkable aside from glucose of 211.  No evidence of diabetic ketoacidosis.  Troponin 13.  Coags elevated with an INR of 1.83.  D-dimer is negative.  Chest x-ray showing stable cardiac enlargement and vascular prominence.  No evidence of DVT of the right lower extremity.  Patient feeling better on reevaluation.  No active chest pain.  Due to recent negative cardiac workup despite risk factors I believe she can be safely discharged home.    The patient has been evaluated and the history and physical exam suggest a benign etiology. I see nothing to suggest acute coronary syndrome, myocardial infarction, pulmonary embolism, thoracic aortic dissection, significant pericarditis, pneumonia, pneumothorax, or acute abdomen.  I feel the patient can be safely discharged to home with outpatient follow up.  Instructions have been given for the patient to return to the Emergency Department for any worsening of the symptoms, including but not limited to increased pain, shortness of breath, abdominal pain or weakness.  Stable for discharge.        I am the Primary Clinician of Record.  FINAL IMPRESSION      1. Right leg pain    2. Atypical chest pain          DISPOSITION/PLAN     DISPOSITION Decision To Discharge 04/04/2024 02:55:48 PM      PATIENT REFERRED TO:  Arian Brandon MD  [address]

## 2024-04-22 PROBLEM — E66.01 CLASS 3 SEVERE OBESITY DUE TO EXCESS CALORIES WITH SERIOUS COMORBIDITY AND BODY MASS INDEX (BMI) OF 45.0 TO 49.9 IN ADULT (HCC): Status: RESOLVED | Noted: 2018-06-18 | Resolved: 2024-04-22

## 2024-04-22 PROBLEM — I20.0 UNSTABLE ANGINA (HCC): Status: RESOLVED | Noted: 2023-01-26 | Resolved: 2024-04-22

## 2024-04-22 PROBLEM — R53.1 LEFT-SIDED WEAKNESS: Status: RESOLVED | Noted: 2022-08-02 | Resolved: 2024-04-22

## 2024-04-22 PROBLEM — R07.9 CHEST PAIN: Status: RESOLVED | Noted: 2022-10-26 | Resolved: 2024-04-22

## 2024-04-22 PROBLEM — E66.813 CLASS 3 SEVERE OBESITY DUE TO EXCESS CALORIES WITH SERIOUS COMORBIDITY AND BODY MASS INDEX (BMI) OF 45.0 TO 49.9 IN ADULT: Status: RESOLVED | Noted: 2018-06-18 | Resolved: 2024-04-22

## 2024-04-22 PROBLEM — K92.2 LOWER GI HEMORRHAGE: Status: RESOLVED | Noted: 2023-04-20 | Resolved: 2024-04-22

## 2024-04-22 PROBLEM — J44.1 COPD WITH ACUTE EXACERBATION (HCC): Status: RESOLVED | Noted: 2023-02-01 | Resolved: 2024-04-22

## 2024-05-09 ENCOUNTER — APPOINTMENT (OUTPATIENT)
Dept: GENERAL RADIOLOGY | Age: 79
DRG: 191 | End: 2024-05-09
Payer: COMMERCIAL

## 2024-05-09 ENCOUNTER — HOSPITAL ENCOUNTER (INPATIENT)
Age: 79
LOS: 4 days | Discharge: HOME HEALTH | DRG: 191 | End: 2024-05-13
Attending: EMERGENCY MEDICINE | Admitting: INTERNAL MEDICINE
Payer: COMMERCIAL

## 2024-05-09 DIAGNOSIS — R06.2 WHEEZING: ICD-10-CM

## 2024-05-09 DIAGNOSIS — J44.1 COPD WITH ACUTE EXACERBATION (HCC): ICD-10-CM

## 2024-05-09 DIAGNOSIS — N17.9 AKI (ACUTE KIDNEY INJURY) (HCC): ICD-10-CM

## 2024-05-09 DIAGNOSIS — J10.1 INFLUENZA A: Primary | ICD-10-CM

## 2024-05-09 PROBLEM — J96.90 RESPIRATORY FAILURE (HCC): Status: ACTIVE | Noted: 2024-05-09

## 2024-05-09 LAB
ALBUMIN SERPL-MCNC: 3.6 G/DL (ref 3.4–5)
ALBUMIN/GLOB SERPL: 1.1 {RATIO} (ref 1.1–2.2)
ALP SERPL-CCNC: 100 U/L (ref 40–129)
ALT SERPL-CCNC: 35 U/L (ref 10–40)
ANION GAP SERPL CALCULATED.3IONS-SCNC: 17 MMOL/L (ref 3–16)
AST SERPL-CCNC: 87 U/L (ref 15–37)
BASOPHILS # BLD: 0 K/UL (ref 0–0.2)
BASOPHILS NFR BLD: 0.5 %
BILIRUB SERPL-MCNC: 0.4 MG/DL (ref 0–1)
BUN SERPL-MCNC: 15 MG/DL (ref 7–20)
CALCIUM SERPL-MCNC: 9.4 MG/DL (ref 8.3–10.6)
CHLORIDE SERPL-SCNC: 96 MMOL/L (ref 99–110)
CO2 SERPL-SCNC: 21 MMOL/L (ref 21–32)
CREAT SERPL-MCNC: 1.5 MG/DL (ref 0.6–1.2)
DEPRECATED RDW RBC AUTO: 15.5 % (ref 12.4–15.4)
EOSINOPHIL # BLD: 0.1 K/UL (ref 0–0.6)
EOSINOPHIL NFR BLD: 2 %
FLUAV RNA RESP QL NAA+PROBE: DETECTED
FLUBV RNA RESP QL NAA+PROBE: NOT DETECTED
GFR SERPLBLD CREATININE-BSD FMLA CKD-EPI: 35 ML/MIN/{1.73_M2}
GLUCOSE SERPL-MCNC: 154 MG/DL (ref 70–99)
HCT VFR BLD AUTO: 44.6 % (ref 36–48)
HGB BLD-MCNC: 14.3 G/DL (ref 12–16)
LACTATE BLDV-SCNC: 2.4 MMOL/L (ref 0.4–1.9)
LYMPHOCYTES # BLD: 1.5 K/UL (ref 1–5.1)
LYMPHOCYTES NFR BLD: 24.9 %
MCH RBC QN AUTO: 28.2 PG (ref 26–34)
MCHC RBC AUTO-ENTMCNC: 32.1 G/DL (ref 31–36)
MCV RBC AUTO: 87.6 FL (ref 80–100)
MONOCYTES # BLD: 0.5 K/UL (ref 0–1.3)
MONOCYTES NFR BLD: 9.2 %
NEUTROPHILS # BLD: 3.7 K/UL (ref 1.7–7.7)
NEUTROPHILS NFR BLD: 63.4 %
NT-PROBNP SERPL-MCNC: 61 PG/ML (ref 0–449)
PLATELET # BLD AUTO: 198 K/UL (ref 135–450)
PMV BLD AUTO: 8.8 FL (ref 5–10.5)
POTASSIUM SERPL-SCNC: 3.6 MMOL/L (ref 3.5–5.1)
PROT SERPL-MCNC: 7 G/DL (ref 6.4–8.2)
RBC # BLD AUTO: 5.09 M/UL (ref 4–5.2)
RSV AG NOSE QL: NEGATIVE
SARS-COV-2 RNA RESP QL NAA+PROBE: NOT DETECTED
SODIUM SERPL-SCNC: 134 MMOL/L (ref 136–145)
TROPONIN, HIGH SENSITIVITY: 13 NG/L (ref 0–14)
TROPONIN, HIGH SENSITIVITY: 17 NG/L (ref 0–14)
WBC # BLD AUTO: 5.9 K/UL (ref 4–11)

## 2024-05-09 PROCEDURE — 83605 ASSAY OF LACTIC ACID: CPT

## 2024-05-09 PROCEDURE — 99285 EMERGENCY DEPT VISIT HI MDM: CPT

## 2024-05-09 PROCEDURE — 2580000003 HC RX 258: Performed by: EMERGENCY MEDICINE

## 2024-05-09 PROCEDURE — 6370000000 HC RX 637 (ALT 250 FOR IP): Performed by: NURSE PRACTITIONER

## 2024-05-09 PROCEDURE — 87636 SARSCOV2 & INF A&B AMP PRB: CPT

## 2024-05-09 PROCEDURE — 83880 ASSAY OF NATRIURETIC PEPTIDE: CPT

## 2024-05-09 PROCEDURE — 6370000000 HC RX 637 (ALT 250 FOR IP): Performed by: EMERGENCY MEDICINE

## 2024-05-09 PROCEDURE — 1200000000 HC SEMI PRIVATE

## 2024-05-09 PROCEDURE — 85025 COMPLETE CBC W/AUTO DIFF WBC: CPT

## 2024-05-09 PROCEDURE — 71045 X-RAY EXAM CHEST 1 VIEW: CPT

## 2024-05-09 PROCEDURE — 2580000003 HC RX 258: Performed by: NURSE PRACTITIONER

## 2024-05-09 PROCEDURE — 94640 AIRWAY INHALATION TREATMENT: CPT

## 2024-05-09 PROCEDURE — 87807 RSV ASSAY W/OPTIC: CPT

## 2024-05-09 PROCEDURE — 80053 COMPREHEN METABOLIC PANEL: CPT

## 2024-05-09 PROCEDURE — 93005 ELECTROCARDIOGRAM TRACING: CPT | Performed by: EMERGENCY MEDICINE

## 2024-05-09 PROCEDURE — 87040 BLOOD CULTURE FOR BACTERIA: CPT

## 2024-05-09 PROCEDURE — 6360000002 HC RX W HCPCS: Performed by: EMERGENCY MEDICINE

## 2024-05-09 PROCEDURE — 36415 COLL VENOUS BLD VENIPUNCTURE: CPT

## 2024-05-09 PROCEDURE — 84484 ASSAY OF TROPONIN QUANT: CPT

## 2024-05-09 RX ORDER — OSELTAMIVIR PHOSPHATE 75 MG/1
75 CAPSULE ORAL ONCE
Status: COMPLETED | OUTPATIENT
Start: 2024-05-09 | End: 2024-05-09

## 2024-05-09 RX ORDER — 0.9 % SODIUM CHLORIDE 0.9 %
500 INTRAVENOUS SOLUTION INTRAVENOUS ONCE
Status: COMPLETED | OUTPATIENT
Start: 2024-05-09 | End: 2024-05-10

## 2024-05-09 RX ORDER — IPRATROPIUM BROMIDE AND ALBUTEROL SULFATE 2.5; .5 MG/3ML; MG/3ML
1 SOLUTION RESPIRATORY (INHALATION) ONCE
Status: COMPLETED | OUTPATIENT
Start: 2024-05-09 | End: 2024-05-09

## 2024-05-09 RX ADMIN — OSELTAMIVIR PHOSPHATE 75 MG: 75 CAPSULE ORAL at 23:33

## 2024-05-09 RX ADMIN — IPRATROPIUM BROMIDE AND ALBUTEROL SULFATE 1 DOSE: .5; 3 SOLUTION RESPIRATORY (INHALATION) at 23:29

## 2024-05-09 RX ADMIN — SODIUM CHLORIDE 500 ML: 9 INJECTION, SOLUTION INTRAVENOUS at 23:32

## 2024-05-09 RX ADMIN — WATER 125 MG: 1 INJECTION INTRAMUSCULAR; INTRAVENOUS; SUBCUTANEOUS at 23:33

## 2024-05-09 ASSESSMENT — PAIN DESCRIPTION - DESCRIPTORS: DESCRIPTORS: ACHING

## 2024-05-09 ASSESSMENT — LIFESTYLE VARIABLES
HOW MANY STANDARD DRINKS CONTAINING ALCOHOL DO YOU HAVE ON A TYPICAL DAY: PATIENT DOES NOT DRINK
HOW OFTEN DO YOU HAVE A DRINK CONTAINING ALCOHOL: NEVER

## 2024-05-09 ASSESSMENT — PAIN - FUNCTIONAL ASSESSMENT: PAIN_FUNCTIONAL_ASSESSMENT: 0-10

## 2024-05-09 ASSESSMENT — PAIN DESCRIPTION - LOCATION: LOCATION: GENERALIZED

## 2024-05-09 ASSESSMENT — PAIN SCALES - GENERAL: PAINLEVEL_OUTOF10: 10

## 2024-05-10 LAB
ANION GAP SERPL CALCULATED.3IONS-SCNC: 16 MMOL/L (ref 3–16)
BASOPHILS # BLD: 0 K/UL (ref 0–0.2)
BASOPHILS NFR BLD: 0.3 %
BUN SERPL-MCNC: 18 MG/DL (ref 7–20)
CALCIUM SERPL-MCNC: 9.2 MG/DL (ref 8.3–10.6)
CHLORIDE SERPL-SCNC: 96 MMOL/L (ref 99–110)
CO2 SERPL-SCNC: 21 MMOL/L (ref 21–32)
CREAT SERPL-MCNC: 1.3 MG/DL (ref 0.6–1.2)
DEPRECATED RDW RBC AUTO: 15.6 % (ref 12.4–15.4)
EKG ATRIAL RATE: 92 BPM
EKG DIAGNOSIS: NORMAL
EKG P-R INTERVAL: 192 MS
EKG Q-T INTERVAL: 364 MS
EKG QRS DURATION: 114 MS
EKG QTC CALCULATION (BAZETT): 450 MS
EKG R AXIS: -65 DEGREES
EKG T AXIS: 75 DEGREES
EKG VENTRICULAR RATE: 92 BPM
EOSINOPHIL # BLD: 0 K/UL (ref 0–0.6)
EOSINOPHIL NFR BLD: 0.2 %
GFR SERPLBLD CREATININE-BSD FMLA CKD-EPI: 42 ML/MIN/{1.73_M2}
GLUCOSE BLD-MCNC: 300 MG/DL (ref 70–99)
GLUCOSE BLD-MCNC: 338 MG/DL (ref 70–99)
GLUCOSE BLD-MCNC: 366 MG/DL (ref 70–99)
GLUCOSE BLD-MCNC: 383 MG/DL (ref 70–99)
GLUCOSE SERPL-MCNC: 329 MG/DL (ref 70–99)
HCT VFR BLD AUTO: 41 % (ref 36–48)
HGB BLD-MCNC: 13.1 G/DL (ref 12–16)
LACTATE BLDV-SCNC: 1.3 MMOL/L (ref 0.4–1.9)
LYMPHOCYTES # BLD: 0.4 K/UL (ref 1–5.1)
LYMPHOCYTES NFR BLD: 10.2 %
MCH RBC QN AUTO: 28 PG (ref 26–34)
MCHC RBC AUTO-ENTMCNC: 32 G/DL (ref 31–36)
MCV RBC AUTO: 87.6 FL (ref 80–100)
MONOCYTES # BLD: 0.1 K/UL (ref 0–1.3)
MONOCYTES NFR BLD: 1.7 %
NEUTROPHILS # BLD: 3.4 K/UL (ref 1.7–7.7)
NEUTROPHILS NFR BLD: 87.6 %
PERFORMED ON: ABNORMAL
PLATELET # BLD AUTO: 154 K/UL (ref 135–450)
PMV BLD AUTO: 8.7 FL (ref 5–10.5)
POTASSIUM SERPL-SCNC: 3.9 MMOL/L (ref 3.5–5.1)
RBC # BLD AUTO: 4.68 M/UL (ref 4–5.2)
SODIUM SERPL-SCNC: 133 MMOL/L (ref 136–145)
WBC # BLD AUTO: 3.9 K/UL (ref 4–11)

## 2024-05-10 PROCEDURE — 97535 SELF CARE MNGMENT TRAINING: CPT

## 2024-05-10 PROCEDURE — 93010 ELECTROCARDIOGRAM REPORT: CPT | Performed by: INTERNAL MEDICINE

## 2024-05-10 PROCEDURE — 6360000002 HC RX W HCPCS: Performed by: PEDIATRICS

## 2024-05-10 PROCEDURE — 94761 N-INVAS EAR/PLS OXIMETRY MLT: CPT

## 2024-05-10 PROCEDURE — 80048 BASIC METABOLIC PNL TOTAL CA: CPT

## 2024-05-10 PROCEDURE — 83036 HEMOGLOBIN GLYCOSYLATED A1C: CPT

## 2024-05-10 PROCEDURE — 2580000003 HC RX 258: Performed by: PEDIATRICS

## 2024-05-10 PROCEDURE — 2700000000 HC OXYGEN THERAPY PER DAY

## 2024-05-10 PROCEDURE — 1200000000 HC SEMI PRIVATE

## 2024-05-10 PROCEDURE — 85025 COMPLETE CBC W/AUTO DIFF WBC: CPT

## 2024-05-10 PROCEDURE — 6370000000 HC RX 637 (ALT 250 FOR IP): Performed by: INTERNAL MEDICINE

## 2024-05-10 PROCEDURE — 83605 ASSAY OF LACTIC ACID: CPT

## 2024-05-10 PROCEDURE — 36415 COLL VENOUS BLD VENIPUNCTURE: CPT

## 2024-05-10 PROCEDURE — 6370000000 HC RX 637 (ALT 250 FOR IP): Performed by: PEDIATRICS

## 2024-05-10 PROCEDURE — 97530 THERAPEUTIC ACTIVITIES: CPT

## 2024-05-10 PROCEDURE — 94640 AIRWAY INHALATION TREATMENT: CPT

## 2024-05-10 PROCEDURE — 97166 OT EVAL MOD COMPLEX 45 MIN: CPT

## 2024-05-10 PROCEDURE — 97161 PT EVAL LOW COMPLEX 20 MIN: CPT

## 2024-05-10 PROCEDURE — 6370000000 HC RX 637 (ALT 250 FOR IP): Performed by: NURSE PRACTITIONER

## 2024-05-10 RX ORDER — GLUCAGON 1 MG/ML
1 KIT INJECTION PRN
Status: DISCONTINUED | OUTPATIENT
Start: 2024-05-10 | End: 2024-05-10 | Stop reason: SDUPTHER

## 2024-05-10 RX ORDER — POTASSIUM CHLORIDE 20 MEQ/1
40 TABLET, EXTENDED RELEASE ORAL PRN
Status: DISCONTINUED | OUTPATIENT
Start: 2024-05-10 | End: 2024-05-13 | Stop reason: HOSPADM

## 2024-05-10 RX ORDER — POLYETHYLENE GLYCOL 3350 17 G/17G
17 POWDER, FOR SOLUTION ORAL DAILY PRN
Status: DISCONTINUED | OUTPATIENT
Start: 2024-05-10 | End: 2024-05-13 | Stop reason: HOSPADM

## 2024-05-10 RX ORDER — INSULIN LISPRO 100 [IU]/ML
0-4 INJECTION, SOLUTION INTRAVENOUS; SUBCUTANEOUS NIGHTLY
Status: DISCONTINUED | OUTPATIENT
Start: 2024-05-10 | End: 2024-05-12

## 2024-05-10 RX ORDER — GABAPENTIN 300 MG/1
600 CAPSULE ORAL 3 TIMES DAILY
Status: DISCONTINUED | OUTPATIENT
Start: 2024-05-10 | End: 2024-05-13 | Stop reason: HOSPADM

## 2024-05-10 RX ORDER — DEXTROSE MONOHYDRATE 100 MG/ML
INJECTION, SOLUTION INTRAVENOUS CONTINUOUS PRN
Status: DISCONTINUED | OUTPATIENT
Start: 2024-05-10 | End: 2024-05-13 | Stop reason: HOSPADM

## 2024-05-10 RX ORDER — SODIUM CHLORIDE 9 MG/ML
INJECTION, SOLUTION INTRAVENOUS PRN
Status: DISCONTINUED | OUTPATIENT
Start: 2024-05-10 | End: 2024-05-13 | Stop reason: HOSPADM

## 2024-05-10 RX ORDER — ALPRAZOLAM 0.5 MG/1
1 TABLET ORAL 3 TIMES DAILY PRN
Status: DISCONTINUED | OUTPATIENT
Start: 2024-05-10 | End: 2024-05-13 | Stop reason: HOSPADM

## 2024-05-10 RX ORDER — ENOXAPARIN SODIUM 100 MG/ML
30 INJECTION SUBCUTANEOUS 2 TIMES DAILY
Status: DISCONTINUED | OUTPATIENT
Start: 2024-05-10 | End: 2024-05-10

## 2024-05-10 RX ORDER — LOPERAMIDE HYDROCHLORIDE 2 MG/1
2 CAPSULE ORAL 4 TIMES DAILY PRN
Status: DISCONTINUED | OUTPATIENT
Start: 2024-05-10 | End: 2024-05-13 | Stop reason: HOSPADM

## 2024-05-10 RX ORDER — ONDANSETRON 4 MG/1
4 TABLET, ORALLY DISINTEGRATING ORAL EVERY 8 HOURS PRN
Status: DISCONTINUED | OUTPATIENT
Start: 2024-05-10 | End: 2024-05-13 | Stop reason: HOSPADM

## 2024-05-10 RX ORDER — ISOSORBIDE MONONITRATE 30 MG/1
30 TABLET, EXTENDED RELEASE ORAL DAILY
Status: DISCONTINUED | OUTPATIENT
Start: 2024-05-10 | End: 2024-05-13 | Stop reason: HOSPADM

## 2024-05-10 RX ORDER — MAGNESIUM SULFATE IN WATER 40 MG/ML
2000 INJECTION, SOLUTION INTRAVENOUS PRN
Status: DISCONTINUED | OUTPATIENT
Start: 2024-05-10 | End: 2024-05-13 | Stop reason: HOSPADM

## 2024-05-10 RX ORDER — SODIUM CHLORIDE 0.9 % (FLUSH) 0.9 %
5-40 SYRINGE (ML) INJECTION EVERY 12 HOURS SCHEDULED
Status: DISCONTINUED | OUTPATIENT
Start: 2024-05-10 | End: 2024-05-13 | Stop reason: HOSPADM

## 2024-05-10 RX ORDER — INSULIN LISPRO 100 [IU]/ML
0-8 INJECTION, SOLUTION INTRAVENOUS; SUBCUTANEOUS
Status: DISCONTINUED | OUTPATIENT
Start: 2024-05-10 | End: 2024-05-12

## 2024-05-10 RX ORDER — POTASSIUM CHLORIDE 7.45 MG/ML
10 INJECTION INTRAVENOUS PRN
Status: DISCONTINUED | OUTPATIENT
Start: 2024-05-10 | End: 2024-05-13 | Stop reason: HOSPADM

## 2024-05-10 RX ORDER — PANTOPRAZOLE SODIUM 40 MG/1
40 TABLET, DELAYED RELEASE ORAL
Status: DISCONTINUED | OUTPATIENT
Start: 2024-05-10 | End: 2024-05-13 | Stop reason: HOSPADM

## 2024-05-10 RX ORDER — DEXTROMETHORPHAN HYDROBROMIDE AND PROMETHAZINE HYDROCHLORIDE 15; 6.25 MG/5ML; MG/5ML
5 SYRUP ORAL EVERY 4 HOURS PRN
Status: DISCONTINUED | OUTPATIENT
Start: 2024-05-10 | End: 2024-05-13 | Stop reason: HOSPADM

## 2024-05-10 RX ORDER — GLIPIZIDE 5 MG/1
10 TABLET ORAL 2 TIMES DAILY WITH MEALS
Status: DISCONTINUED | OUTPATIENT
Start: 2024-05-10 | End: 2024-05-12

## 2024-05-10 RX ORDER — CLOPIDOGREL BISULFATE 75 MG/1
75 TABLET ORAL DAILY
Status: DISCONTINUED | OUTPATIENT
Start: 2024-05-10 | End: 2024-05-13 | Stop reason: HOSPADM

## 2024-05-10 RX ORDER — SODIUM CHLORIDE 0.9 % (FLUSH) 0.9 %
5-40 SYRINGE (ML) INJECTION PRN
Status: DISCONTINUED | OUTPATIENT
Start: 2024-05-10 | End: 2024-05-13 | Stop reason: HOSPADM

## 2024-05-10 RX ORDER — GUAIFENESIN 600 MG/1
600 TABLET, EXTENDED RELEASE ORAL 2 TIMES DAILY
Status: DISCONTINUED | OUTPATIENT
Start: 2024-05-10 | End: 2024-05-10

## 2024-05-10 RX ORDER — ALBUTEROL SULFATE 90 UG/1
4 AEROSOL, METERED RESPIRATORY (INHALATION) EVERY 4 HOURS
Status: DISCONTINUED | OUTPATIENT
Start: 2024-05-10 | End: 2024-05-13 | Stop reason: HOSPADM

## 2024-05-10 RX ORDER — ACETAMINOPHEN 650 MG/1
650 SUPPOSITORY RECTAL EVERY 6 HOURS PRN
Status: DISCONTINUED | OUTPATIENT
Start: 2024-05-10 | End: 2024-05-13 | Stop reason: HOSPADM

## 2024-05-10 RX ORDER — DEXTROSE MONOHYDRATE 100 MG/ML
INJECTION, SOLUTION INTRAVENOUS CONTINUOUS PRN
Status: DISCONTINUED | OUTPATIENT
Start: 2024-05-10 | End: 2024-05-10 | Stop reason: SDUPTHER

## 2024-05-10 RX ORDER — ROSUVASTATIN CALCIUM 20 MG/1
20 TABLET, COATED ORAL NIGHTLY
Status: DISCONTINUED | OUTPATIENT
Start: 2024-05-10 | End: 2024-05-13 | Stop reason: HOSPADM

## 2024-05-10 RX ORDER — ACETAMINOPHEN 325 MG/1
650 TABLET ORAL EVERY 6 HOURS PRN
Status: DISCONTINUED | OUTPATIENT
Start: 2024-05-10 | End: 2024-05-13 | Stop reason: HOSPADM

## 2024-05-10 RX ORDER — OSELTAMIVIR PHOSPHATE 30 MG/1
30 CAPSULE ORAL 2 TIMES DAILY
Status: DISCONTINUED | OUTPATIENT
Start: 2024-05-10 | End: 2024-05-13 | Stop reason: HOSPADM

## 2024-05-10 RX ORDER — AMOXICILLIN AND CLAVULANATE POTASSIUM 875; 125 MG/1; MG/1
1 TABLET, FILM COATED ORAL EVERY 12 HOURS SCHEDULED
Status: DISCONTINUED | OUTPATIENT
Start: 2024-05-10 | End: 2024-05-13 | Stop reason: HOSPADM

## 2024-05-10 RX ORDER — ONDANSETRON 2 MG/ML
4 INJECTION INTRAMUSCULAR; INTRAVENOUS EVERY 6 HOURS PRN
Status: DISCONTINUED | OUTPATIENT
Start: 2024-05-10 | End: 2024-05-13 | Stop reason: HOSPADM

## 2024-05-10 RX ORDER — SODIUM CHLORIDE 9 MG/ML
INJECTION, SOLUTION INTRAVENOUS CONTINUOUS
Status: DISCONTINUED | OUTPATIENT
Start: 2024-05-10 | End: 2024-05-10

## 2024-05-10 RX ADMIN — Medication 2 PUFF: at 04:00

## 2024-05-10 RX ADMIN — OSELTAMIVIR PHOSPHATE 30 MG: 30 CAPSULE ORAL at 09:09

## 2024-05-10 RX ADMIN — WATER 60 MG: 1 INJECTION INTRAMUSCULAR; INTRAVENOUS; SUBCUTANEOUS at 12:04

## 2024-05-10 RX ADMIN — Medication 2 PUFF: at 09:23

## 2024-05-10 RX ADMIN — RIVAROXABAN 15 MG: 15 TABLET, FILM COATED ORAL at 09:09

## 2024-05-10 RX ADMIN — ISOSORBIDE MONONITRATE 30 MG: 30 TABLET, EXTENDED RELEASE ORAL at 09:09

## 2024-05-10 RX ADMIN — CLOPIDOGREL BISULFATE 75 MG: 75 TABLET ORAL at 09:09

## 2024-05-10 RX ADMIN — GABAPENTIN 600 MG: 300 CAPSULE ORAL at 23:56

## 2024-05-10 RX ADMIN — Medication 10 ML: at 10:02

## 2024-05-10 RX ADMIN — INSULIN LISPRO 8 UNITS: 100 INJECTION, SOLUTION INTRAVENOUS; SUBCUTANEOUS at 09:48

## 2024-05-10 RX ADMIN — SODIUM CHLORIDE: 9 INJECTION, SOLUTION INTRAVENOUS at 05:44

## 2024-05-10 RX ADMIN — INSULIN LISPRO 6 UNITS: 100 INJECTION, SOLUTION INTRAVENOUS; SUBCUTANEOUS at 17:09

## 2024-05-10 RX ADMIN — SODIUM CHLORIDE 3000 MG: 900 INJECTION INTRAVENOUS at 05:45

## 2024-05-10 RX ADMIN — ROSUVASTATIN CALCIUM 20 MG: 20 TABLET, FILM COATED ORAL at 23:56

## 2024-05-10 RX ADMIN — ALPRAZOLAM 1 MG: 0.5 TABLET ORAL at 09:48

## 2024-05-10 RX ADMIN — Medication 4 PUFF: at 04:01

## 2024-05-10 RX ADMIN — Medication 4 PUFF: at 09:23

## 2024-05-10 RX ADMIN — INSULIN LISPRO 8 UNITS: 100 INJECTION, SOLUTION INTRAVENOUS; SUBCUTANEOUS at 12:04

## 2024-05-10 RX ADMIN — SODIUM CHLORIDE 3000 MG: 900 INJECTION INTRAVENOUS at 12:17

## 2024-05-10 RX ADMIN — ENOXAPARIN SODIUM 30 MG: 100 INJECTION SUBCUTANEOUS at 02:36

## 2024-05-10 RX ADMIN — LOPERAMIDE HYDROCHLORIDE 2 MG: 2 CAPSULE ORAL at 23:56

## 2024-05-10 RX ADMIN — WATER 60 MG: 1 INJECTION INTRAMUSCULAR; INTRAVENOUS; SUBCUTANEOUS at 23:56

## 2024-05-10 RX ADMIN — OSELTAMIVIR PHOSPHATE 30 MG: 30 CAPSULE ORAL at 23:55

## 2024-05-10 RX ADMIN — SODIUM CHLORIDE 3000 MG: 900 INJECTION INTRAVENOUS at 17:12

## 2024-05-10 RX ADMIN — Medication 4 PUFF: at 20:46

## 2024-05-10 RX ADMIN — GLIPIZIDE 10 MG: 5 TABLET ORAL at 18:06

## 2024-05-10 RX ADMIN — Medication 2 PUFF: at 20:46

## 2024-05-10 RX ADMIN — AMOXICILLIN AND CLAVULANATE POTASSIUM 1 TABLET: 875; 125 TABLET, FILM COATED ORAL at 23:55

## 2024-05-10 ASSESSMENT — PAIN SCALES - GENERAL
PAINLEVEL_OUTOF10: 8
PAINLEVEL_OUTOF10: 7
PAINLEVEL_OUTOF10: 8
PAINLEVEL_OUTOF10: 8
PAINLEVEL_OUTOF10: 9

## 2024-05-10 ASSESSMENT — PAIN DESCRIPTION - PAIN TYPE: TYPE: CHRONIC PAIN

## 2024-05-10 ASSESSMENT — PAIN DESCRIPTION - LOCATION
LOCATION: GENERALIZED

## 2024-05-10 ASSESSMENT — PAIN DESCRIPTION - DESCRIPTORS
DESCRIPTORS: SHARP
DESCRIPTORS: ACHING

## 2024-05-10 ASSESSMENT — PAIN DESCRIPTION - FREQUENCY: FREQUENCY: CONTINUOUS

## 2024-05-10 ASSESSMENT — PAIN DESCRIPTION - ONSET: ONSET: ON-GOING

## 2024-05-10 ASSESSMENT — PAIN DESCRIPTION - ORIENTATION: ORIENTATION: OTHER (COMMENT)

## 2024-05-10 NOTE — H&P
V2.0  History and Physical      Name:  Milana Pardo /Age/Sex: 1945  (79 y.o. female)   MRN & CSN:  [id number] & [id number] Encounter Date/Time: 5/10/2024 11:09 PM EDT   Location:  [facility]3326/3326-01 PCP: Arian Brandon MD       Hospital Day: 2    Assessment and Plan:   Milana Pardo is a 79 y.o. 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   • Chronic obstructive pulmonary disease (HCC) 2017   • Claustrophobia    • COPD (chronic obstructive pulmonary disease) ([facility])    • Emphysema lung ([facility])    • ESBL (extended spectrum beta-lactamase) producing bacteria infection 2021   • Esophageal stricture    • Gastroesophageal reflux disease without esophagitis 2016   • Hiatal hernia    • Hypercholesteremia    • Hypertension    • IBS (irritable bowel syndrome)    • Migraines    • Movement disorder arthritis   • Paroxysmal atrial fibrillation ([facility])    • Pneumonia    • PONV (postoperative nausea and vomiting)    • Thoracic radiculopathy    • Type II or unspecified type diabetes mellitus without mention of complication, not stated as uncontrolled     type ll does not take insulin at home   • Unspecified cerebral artery occlusion with cerebral infarction     many TIA's         who presents with Respiratory failure ([facility])      Hospital Problems             Last Modified POA    * (Principal) Respiratory failure ([facility]) 2024 Yes       COPD exacerbation, acute respiratory failure with hypoxia, Influenza A:   - oxygen support, wean as tolerated  - oseltamivir   - albuterol q4 hours   - ipratropium q8 hrs   - symbicort BID - HELD     MAYANK:   - NS

## 2024-05-10 NOTE — ED PROVIDER NOTES
segments: no ST elevations or depressions  T waves: no abnormal inversions  Non-specific T wave changes: present  Prior EKG comparison: EKG dated 4/4/24 is not significantly different      RADIOLOGY  Any applicable radiology studies including x-ray, CT, MRI, and/or ultrasound, were reviewed independently by me in addition to the radiologist.  I reviewed all radiology images and reports as well from this evaluation.    XR CHEST PORTABLE    Result Date: 5/9/2024  1. No evident acute cardiopulmonary process. 2. Pulmonary vascular congestion and mild cardiomegaly.      ED COURSE/MDM  Patient seen and evaluated. Old records reviewed. Labs and imaging reviewed.      After initial evaluation, differential diagnostic considerations included but not limited to: acute coronary syndrome, pulmonary embolism, COPD/asthma, pneumonia, sepsis, pericardial tamponade, pneumothorax, CHF, thoracic aortic dissection, anxiety    The patient's ED workup was notable for positive influenza A test, with a history of COPD likely an acute exacerbation.  She typically wears oxygen nightly only and has borderline oxygen saturation at rest at 90-91% and so will be placed on oxygen.  Nothing suggests CHF exacerbation today, no pulmonary edema.  Chest x-ray is without infiltrate as well.  EKG is not acutely concerning today.  Given her viral infection, sepsis was considered but no bacterial sources identified and as such antibiotics and sepsis protocols are not indicated.  The patient was given 500 cc of IV fluids though as well as starting on p.o. Tamiflu, IV Solu-Medrol and a DuoNeb breathing treatment.  Will be admitted to the hospital for further treatment and evaluation with some mild renal insufficiency from baseline.      Is this patient to be included in the SEP-1 Core Measure?  No   Exclusion criteria - the patient is NOT to be included for SEP-1 Core Measure due to:  Viral etiology found or highly suspected (including COVID-19) without 
administration in time range)   ipratropium 0.5 mg-albuterol 2.5 mg (DUONEB) nebulizer solution 1 Dose (has no administration in time range)             Is this patient to be included in the SEP-1 Core Measure due to severe sepsis or septic shock?   No   Exclusion criteria - the patient is NOT to be included for SEP-1 Core Measure due to:  Viral etiology found or highly suspected (including COVID-19) without concomitant bacterial infection    CONSULTS: (Who and What was discussed)  None  Discussion with Other Profesionals : Admitting Team dr. brennan    Social Determinants : Patient lacks transportation    Records Reviewed : Outpatient Notes internal medicine visit dr. Brandon 5/2/2024 medicare wellness     CC/HPI Summary, DDx, ED Course, and Reassessment:     Briefly, this is a very pleasant 79-year-old female who presents to the emergency department with complaint of shortness of breath.  Patient reports productive cough over the past 2 days with increasing shortness of breath with history of COPD.  Former cigarette smoker, quit 5 years ago.  States that 2 days ago she came down with tonsillitis and has been on antibiotics since, cough started at the same time.  She does wear oxygen at nighttime only.  She denies fever.    Patient does have MAYANK today with creatinine value 1.5 up from 0.9 just a month ago, and GFR today is 35 from an previously normal number.  Her troponin is 17, will repeat.  She is not having chest pain.  CBC is unremarkable.  Lactate is 2.4, repeat after 500 amounts of fluid.  Influenza A is positive.  COVID-negative.    Patient given Solu-Medrol and nebs.  Will admit as she is 91% on 2 L of oxygen and is requiring increased oxygen at baseline.  COPD exacerbation, influenza A, and MAYANK.  Patient is agreeable regarding plan of care.  Admit to hospital services    Disposition Considerations (include 1 Tests not done, Shared Decision Making, Pt Expectation of Test or Tx.): Shared decision making:

## 2024-05-10 NOTE — CARE COORDINATION
Case Management Assessment  Initial Evaluation    Date/Time of Evaluation: 5/10/2024 4:45 PM  Assessment Completed by: PIOTR Yin, LSW    If patient is discharged prior to next notation, then this note serves as note for discharge by case management.    Patient Name: Milana Pardo                   YOB: 1945  Diagnosis: Wheezing [R06.2]  Respiratory failure (HCC) [J96.90]  Influenza A [J10.1]  MAYANK (acute kidney injury) (HCC) [N17.9]  COPD with acute exacerbation (HCC) [J44.1]                   Date / Time: 5/9/2024  9:01 PM    Patient Admission Status: Inpatient   Readmission Risk (Low < 19, Mod (19-27), High > 27): Readmission Risk Score: 16.5    Current PCP: Arian Brandon MD  PCP verified by CM? Yes    Chart Reviewed: Yes      History Provided by: Patient  Patient Orientation: Alert and Oriented    Patient Cognition: Alert    Hospitalization in the last 30 days (Readmission):  No    If yes, Readmission Assessment in  Navigator will be completed.    Advance Directives:      Code Status: Full Code   Patient's Primary Decision Maker is:      Primary Decision Maker: Brenda Bedolla - [facility] - [phone]    Discharge Planning:    Patient lives with: Alone Type of Home: Apartment  Primary Care Giver: Self  Patient Support Systems include: Family Members   Current Financial resources: Medicare, Medicaid  Current community resources: None  Current services prior to admission: Durable Medical Equipment, Home Care (Active w/[facility] (RN only))            Current DME: Other (Comment) (grab bars, shower chair, 4WW)            Type of Home Care services:  Nursing Services    ADLS  Prior functional level: Independent in ADLs/IADLs  Current functional level: Independent in ADLs/IADLs    PT AM-PAC: 22 /24  OT AM-PAC: 19 /24    Family can provide assistance at DC: Yes (when needed)  Would you like Case Management to discuss the discharge plan with any other family members/significant others, and if

## 2024-05-10 NOTE — PLAN OF CARE
Problem: Discharge Planning  Goal: Discharge to home or other facility with appropriate resources  5/10/2024 1019 by Sunday Olivier RN  Outcome: Progressing  5/10/2024 0821 by Heather Cantrell RN  Outcome: Progressing     Problem: Pain  Goal: Verbalizes/displays adequate comfort level or baseline comfort level  5/10/2024 1019 by Sunday Olivier RN  Outcome: Progressing  5/10/2024 0821 by Heather Cantrell RN  Outcome: Progressing     Problem: Safety - Adult  Goal: Free from fall injury  5/10/2024 1019 by Sunday Olivier RN  Outcome: Progressing  5/10/2024 0821 by Heather Cantrell RN  Outcome: Progressing     Problem: Respiratory - Adult  Goal: Achieves optimal ventilation and oxygenation  5/10/2024 1019 by Sunday Olivier RN  Outcome: Progressing  5/10/2024 0821 by Heather Cantrell RN  Outcome: Progressing     Problem: Cardiovascular - Adult  Goal: Maintains optimal cardiac output and hemodynamic stability  5/10/2024 1019 by Sunday Olivier RN  Outcome: Progressing  5/10/2024 0821 by Heather Cantrell RN  Outcome: Progressing     Problem: Skin/Tissue Integrity - Adult  Goal: Skin integrity remains intact  5/10/2024 1019 by Sunday Olivier RN  Outcome: Progressing  5/10/2024 0821 by Heather Cantrell RN  Outcome: Progressing  Goal: Oral mucous membranes remain intact  5/10/2024 1019 by Sunday Olivier RN  Outcome: Progressing  5/10/2024 0821 by Heather Cantrell RN  Outcome: Progressing     Problem: Gastrointestinal - Adult  Goal: Minimal or absence of nausea and vomiting  5/10/2024 1019 by Sunday Olivier RN  Outcome: Progressing  5/10/2024 0821 by Heather Cantrell RN  Outcome: Progressing  Goal: Maintains or returns to baseline bowel function  5/10/2024 1019 by Sunday Olivier RN  Outcome: Progressing  5/10/2024 0821 by Heather Cantrell RN  Outcome: Progressing  Goal: Maintains adequate nutritional intake  5/10/2024 1019 by Sunday Olivier RN  Outcome: Progressing  5/10/2024 0821 by Heather Cantrell

## 2024-05-10 NOTE — ED NOTES
Location: Generalized  Pain Descriptors: Aching  Last documented pain score (0-10 scale) Pain Level: 10  Last documented pain medication administered:   Mental Status: alert, coherent, and logical  Orientation Level:    NIH Score:    C-SSRS: Risk of Suicide: No Risk  Bedside swallow:    Machelle Coma Scale (GCS): Machelle Coma Scale  Eye Opening: Spontaneous  Best Verbal Response: Oriented  Best Motor Response: Obeys commands  Machelle Coma Scale Score: 15  Active LDA's:   Peripheral IV 05/09/24 Posterior;Right Hand (Active)   Site Assessment Clean, dry & intact 05/09/24 2142   Line Status Flushed;Blood return noted 05/09/24 2142                 PO Status: Regular  Pertinent or High Risk Medications/Drips: no   If Yes, please provide details:   Pending Blood Product Administration: no       You may also review the ED PT Care Timeline found under the Summary Nursing Index tab.    Recommendation    Pending orders Inpatient orders  Plan for Discharge (if known):   Additional Comments: Able to swallow pills whole. Uses walker/cane at home. Uses call light. Wears O2 at night.   If any further questions, please call Sending RN at [phone]    Electronically signed by: Electronically signed by Elli Montenegro RN on 5/10/2024 at 12:42 AM

## 2024-05-10 NOTE — PLAN OF CARE
Problem: Discharge Planning  Goal: Discharge to home or other facility with appropriate resources  Outcome: Progressing     Problem: Pain  Goal: Verbalizes/displays adequate comfort level or baseline comfort level  Outcome: Progressing     Problem: Safety - Adult  Goal: Free from fall injury  Outcome: Progressing     Problem: Respiratory - Adult  Goal: Achieves optimal ventilation and oxygenation  Outcome: Progressing     Problem: Cardiovascular - Adult  Goal: Maintains optimal cardiac output and hemodynamic stability  Outcome: Progressing     Problem: Skin/Tissue Integrity - Adult  Goal: Skin integrity remains intact  Outcome: Progressing  Goal: Oral mucous membranes remain intact  Outcome: Progressing     Problem: Gastrointestinal - Adult  Goal: Minimal or absence of nausea and vomiting  Outcome: Progressing  Goal: Maintains or returns to baseline bowel function  Outcome: Progressing  Goal: Maintains adequate nutritional intake  Outcome: Progressing     Problem: Genitourinary - Adult  Goal: Urinary catheter remains patent  Outcome: Progressing     Problem: Infection - Adult  Goal: Absence of infection during hospitalization  Outcome: Progressing     Problem: Metabolic/Fluid and Electrolytes - Adult  Goal: Electrolytes maintained within normal limits  Outcome: Progressing  Goal: Hemodynamic stability and optimal renal function maintained  Outcome: Progressing  Goal: Glucose maintained within prescribed range  Outcome: Progressing     Problem: Hematologic - Adult  Goal: Maintains hematologic stability  Outcome: Progressing

## 2024-05-11 LAB
ANION GAP SERPL CALCULATED.3IONS-SCNC: 14 MMOL/L (ref 3–16)
BUN SERPL-MCNC: 20 MG/DL (ref 7–20)
CALCIUM SERPL-MCNC: 9.8 MG/DL (ref 8.3–10.6)
CHLORIDE SERPL-SCNC: 101 MMOL/L (ref 99–110)
CO2 SERPL-SCNC: 16 MMOL/L (ref 21–32)
CREAT SERPL-MCNC: 1.2 MG/DL (ref 0.6–1.2)
DEPRECATED RDW RBC AUTO: 14.5 % (ref 12.4–15.4)
EST. AVERAGE GLUCOSE BLD GHB EST-MCNC: 188.6 MG/DL
GFR SERPLBLD CREATININE-BSD FMLA CKD-EPI: 46 ML/MIN/{1.73_M2}
GLUCOSE BLD-MCNC: 364 MG/DL (ref 70–99)
GLUCOSE BLD-MCNC: 390 MG/DL (ref 70–99)
GLUCOSE SERPL-MCNC: 383 MG/DL (ref 70–99)
HBA1C MFR BLD: 8.2 %
HCT VFR BLD AUTO: 41.9 % (ref 36–48)
HGB BLD-MCNC: 13.6 G/DL (ref 12–16)
MCH RBC QN AUTO: 28.4 PG (ref 26–34)
MCHC RBC AUTO-ENTMCNC: 32.5 G/DL (ref 31–36)
MCV RBC AUTO: 87.4 FL (ref 80–100)
PERFORMED ON: ABNORMAL
PERFORMED ON: ABNORMAL
PLATELET # BLD AUTO: 150 K/UL (ref 135–450)
PMV BLD AUTO: 8.6 FL (ref 5–10.5)
POTASSIUM SERPL-SCNC: 5.1 MMOL/L (ref 3.5–5.1)
RBC # BLD AUTO: 4.8 M/UL (ref 4–5.2)
SODIUM SERPL-SCNC: 131 MMOL/L (ref 136–145)
WBC # BLD AUTO: 5.5 K/UL (ref 4–11)

## 2024-05-11 PROCEDURE — 2580000003 HC RX 258: Performed by: PEDIATRICS

## 2024-05-11 PROCEDURE — 1200000000 HC SEMI PRIVATE

## 2024-05-11 PROCEDURE — 6370000000 HC RX 637 (ALT 250 FOR IP): Performed by: NURSE PRACTITIONER

## 2024-05-11 PROCEDURE — 80048 BASIC METABOLIC PNL TOTAL CA: CPT

## 2024-05-11 PROCEDURE — 6370000000 HC RX 637 (ALT 250 FOR IP): Performed by: PEDIATRICS

## 2024-05-11 PROCEDURE — 85027 COMPLETE CBC AUTOMATED: CPT

## 2024-05-11 PROCEDURE — 2700000000 HC OXYGEN THERAPY PER DAY

## 2024-05-11 PROCEDURE — 6360000002 HC RX W HCPCS: Performed by: PEDIATRICS

## 2024-05-11 PROCEDURE — 94640 AIRWAY INHALATION TREATMENT: CPT

## 2024-05-11 PROCEDURE — 6370000000 HC RX 637 (ALT 250 FOR IP): Performed by: INTERNAL MEDICINE

## 2024-05-11 PROCEDURE — 36415 COLL VENOUS BLD VENIPUNCTURE: CPT

## 2024-05-11 PROCEDURE — 94761 N-INVAS EAR/PLS OXIMETRY MLT: CPT

## 2024-05-11 RX ORDER — OXYCODONE HYDROCHLORIDE 5 MG/1
5 TABLET ORAL EVERY 6 HOURS PRN
Status: DISCONTINUED | OUTPATIENT
Start: 2024-05-11 | End: 2024-05-13 | Stop reason: HOSPADM

## 2024-05-11 RX ORDER — PREDNISONE 20 MG/1
20 TABLET ORAL DAILY
Status: DISCONTINUED | OUTPATIENT
Start: 2024-05-11 | End: 2024-05-13 | Stop reason: HOSPADM

## 2024-05-11 RX ADMIN — Medication 4 PUFF: at 15:04

## 2024-05-11 RX ADMIN — OSELTAMIVIR PHOSPHATE 30 MG: 30 CAPSULE ORAL at 23:20

## 2024-05-11 RX ADMIN — INSULIN LISPRO 4 UNITS: 100 INJECTION, SOLUTION INTRAVENOUS; SUBCUTANEOUS at 00:14

## 2024-05-11 RX ADMIN — PROMETHAZINE HYDROCHLORIDE AND DEXTROMETHORPHAN HYDROBROMIDE ORAL SOLUTION 5 ML: 15; 6.25 SOLUTION ORAL at 00:01

## 2024-05-11 RX ADMIN — INSULIN LISPRO 8 UNITS: 100 INJECTION, SOLUTION INTRAVENOUS; SUBCUTANEOUS at 12:13

## 2024-05-11 RX ADMIN — GABAPENTIN 600 MG: 300 CAPSULE ORAL at 23:20

## 2024-05-11 RX ADMIN — Medication 2 PUFF: at 07:02

## 2024-05-11 RX ADMIN — GLIPIZIDE 10 MG: 5 TABLET ORAL at 18:16

## 2024-05-11 RX ADMIN — CLOPIDOGREL BISULFATE 75 MG: 75 TABLET ORAL at 08:45

## 2024-05-11 RX ADMIN — AMOXICILLIN AND CLAVULANATE POTASSIUM 1 TABLET: 875; 125 TABLET, FILM COATED ORAL at 23:20

## 2024-05-11 RX ADMIN — RIVAROXABAN 15 MG: 15 TABLET, FILM COATED ORAL at 08:44

## 2024-05-11 RX ADMIN — Medication 2 PUFF: at 00:59

## 2024-05-11 RX ADMIN — Medication 4 PUFF: at 04:10

## 2024-05-11 RX ADMIN — ROSUVASTATIN CALCIUM 20 MG: 20 TABLET, FILM COATED ORAL at 23:20

## 2024-05-11 RX ADMIN — Medication 2 PUFF: at 21:52

## 2024-05-11 RX ADMIN — Medication 4 PUFF: at 11:03

## 2024-05-11 RX ADMIN — OSELTAMIVIR PHOSPHATE 30 MG: 30 CAPSULE ORAL at 08:44

## 2024-05-11 RX ADMIN — Medication 4 PUFF: at 07:02

## 2024-05-11 RX ADMIN — WATER 60 MG: 1 INJECTION INTRAMUSCULAR; INTRAVENOUS; SUBCUTANEOUS at 12:14

## 2024-05-11 RX ADMIN — GABAPENTIN 600 MG: 300 CAPSULE ORAL at 08:44

## 2024-05-11 RX ADMIN — AMOXICILLIN AND CLAVULANATE POTASSIUM 1 TABLET: 875; 125 TABLET, FILM COATED ORAL at 08:45

## 2024-05-11 RX ADMIN — GABAPENTIN 600 MG: 300 CAPSULE ORAL at 15:06

## 2024-05-11 RX ADMIN — Medication 10 ML: at 00:00

## 2024-05-11 RX ADMIN — Medication 4 PUFF: at 00:57

## 2024-05-11 RX ADMIN — ALPRAZOLAM 1 MG: 0.5 TABLET ORAL at 00:03

## 2024-05-11 RX ADMIN — ALPRAZOLAM 1 MG: 0.5 TABLET ORAL at 08:47

## 2024-05-11 RX ADMIN — Medication 10 ML: at 08:45

## 2024-05-11 RX ADMIN — Medication 10 ML: at 23:20

## 2024-05-11 RX ADMIN — INSULIN LISPRO 8 UNITS: 100 INJECTION, SOLUTION INTRAVENOUS; SUBCUTANEOUS at 08:45

## 2024-05-11 RX ADMIN — PREDNISONE 20 MG: 20 TABLET ORAL at 18:16

## 2024-05-11 RX ADMIN — Medication 2 PUFF: at 15:04

## 2024-05-11 RX ADMIN — GLIPIZIDE 10 MG: 5 TABLET ORAL at 08:44

## 2024-05-11 RX ADMIN — Medication 4 PUFF: at 21:52

## 2024-05-11 RX ADMIN — ISOSORBIDE MONONITRATE 30 MG: 30 TABLET, EXTENDED RELEASE ORAL at 08:44

## 2024-05-11 RX ADMIN — ALPRAZOLAM 1 MG: 0.5 TABLET ORAL at 15:06

## 2024-05-11 ASSESSMENT — PAIN SCALES - GENERAL: PAINLEVEL_OUTOF10: 3

## 2024-05-11 ASSESSMENT — PAIN DESCRIPTION - LOCATION: LOCATION: GENERALIZED

## 2024-05-11 NOTE — PLAN OF CARE
Problem: Safety - Adult  Goal: Free from fall injury  5/11/2024 0937 by Tiffany Ahumada RN  Outcome: Progressing  Flowsheets (Taken 5/11/2024 0937)  Free From Fall Injury:   Instruct family/caregiver on patient safety   Based on caregiver fall risk screen, instruct family/caregiver to ask for assistance with transferring infant if caregiver noted to have fall risk factors     Problem: Respiratory - Adult  Goal: Achieves optimal ventilation and oxygenation  5/11/2024 0937 by Tiffany Ahumada RN  Outcome: Progressing  Flowsheets (Taken 5/11/2024 0937)  Achieves optimal ventilation and oxygenation:   Assess for changes in respiratory status   Assess for changes in mentation and behavior   Position to facilitate oxygenation and minimize respiratory effort   Oxygen supplementation based on oxygen saturation or arterial blood gases     Problem: Cardiovascular - Adult  Goal: Maintains optimal cardiac output and hemodynamic stability  5/11/2024 0937 by Tiffany Ahumada RN  Outcome: Completed     Problem: Skin/Tissue Integrity - Adult  Goal: Skin integrity remains intact  5/11/2024 0937 by Tiffany Ahumada RN  Outcome: Progressing  Flowsheets (Taken 5/11/2024 0937)  Skin Integrity Remains Intact: Monitor for areas of redness and/or skin breakdown     Problem: Skin/Tissue Integrity - Adult  Goal: Oral mucous membranes remain intact  5/11/2024 0937 by Tiffany Ahumada RN  Outcome: Completed     Problem: Gastrointestinal - Adult  Goal: Minimal or absence of nausea and vomiting  5/11/2024 0937 by Tiffany Ahumada RN  Outcome: Completed     Problem: Gastrointestinal - Adult  Goal: Maintains or returns to baseline bowel function  5/11/2024 0937 by Tiffany Ahumada RN  Outcome: Completed     Problem: Gastrointestinal - Adult  Goal: Maintains adequate nutritional intake  5/11/2024 0937 by Tiffany Ahumada RN  Outcome: Completed     Problem: Genitourinary - Adult  Goal: Urinary

## 2024-05-11 NOTE — PLAN OF CARE
Problem: Discharge Planning  Goal: Discharge to home or other facility with appropriate resources  Outcome: Progressing     Problem: Pain  Goal: Verbalizes/displays adequate comfort level or baseline comfort level  Outcome: Progressing     Problem: Safety - Adult  Goal: Free from fall injury  Outcome: Progressing     Problem: Respiratory - Adult  Goal: Achieves optimal ventilation and oxygenation  Outcome: Progressing     Problem: Cardiovascular - Adult  Goal: Maintains optimal cardiac output and hemodynamic stability  Outcome: Progressing     Problem: Skin/Tissue Integrity - Adult  Goal: Skin integrity remains intact  Outcome: Progressing  Goal: Oral mucous membranes remain intact  Outcome: Progressing     Problem: Gastrointestinal - Adult  Goal: Minimal or absence of nausea and vomiting  Outcome: Progressing  Goal: Maintains or returns to baseline bowel function  Outcome: Progressing  Goal: Maintains adequate nutritional intake  Outcome: Progressing     Problem: Genitourinary - Adult  Goal: Urinary catheter remains patent  Outcome: Progressing     Problem: Infection - Adult  Goal: Absence of infection during hospitalization  Outcome: Progressing     Problem: Metabolic/Fluid and Electrolytes - Adult  Goal: Electrolytes maintained within normal limits  Outcome: Progressing  Goal: Hemodynamic stability and optimal renal function maintained  Outcome: Progressing  Goal: Glucose maintained within prescribed range  Outcome: Progressing     Problem: Hematologic - Adult  Goal: Maintains hematologic stability  Outcome: Progressing     Problem: ABCDS Injury Assessment  Goal: Absence of physical injury  Outcome: Progressing

## 2024-05-12 LAB
GLUCOSE BLD-MCNC: 171 MG/DL (ref 70–99)
GLUCOSE BLD-MCNC: 293 MG/DL (ref 70–99)
GLUCOSE BLD-MCNC: 301 MG/DL (ref 70–99)
GLUCOSE BLD-MCNC: 320 MG/DL (ref 70–99)
GLUCOSE BLD-MCNC: 335 MG/DL (ref 70–99)
GLUCOSE BLD-MCNC: 392 MG/DL (ref 70–99)
PERFORMED ON: ABNORMAL

## 2024-05-12 PROCEDURE — 6370000000 HC RX 637 (ALT 250 FOR IP): Performed by: PEDIATRICS

## 2024-05-12 PROCEDURE — 1200000000 HC SEMI PRIVATE

## 2024-05-12 PROCEDURE — 6370000000 HC RX 637 (ALT 250 FOR IP): Performed by: INTERNAL MEDICINE

## 2024-05-12 PROCEDURE — 94761 N-INVAS EAR/PLS OXIMETRY MLT: CPT

## 2024-05-12 PROCEDURE — 6370000000 HC RX 637 (ALT 250 FOR IP): Performed by: NURSE PRACTITIONER

## 2024-05-12 PROCEDURE — 94640 AIRWAY INHALATION TREATMENT: CPT

## 2024-05-12 PROCEDURE — 2700000000 HC OXYGEN THERAPY PER DAY

## 2024-05-12 PROCEDURE — 2580000003 HC RX 258: Performed by: PEDIATRICS

## 2024-05-12 RX ORDER — ONDANSETRON 4 MG/1
4 TABLET, ORALLY DISINTEGRATING ORAL EVERY 8 HOURS PRN
Qty: 30 TABLET | Refills: 0 | COMMUNITY
Start: 2024-05-12

## 2024-05-12 RX ORDER — DEXTROMETHORPHAN HYDROBROMIDE AND PROMETHAZINE HYDROCHLORIDE 15; 6.25 MG/5ML; MG/5ML
5 SYRUP ORAL EVERY 4 HOURS PRN
Qty: 200 ML | Refills: 0 | Status: SHIPPED | OUTPATIENT
Start: 2024-05-12 | End: 2024-05-19

## 2024-05-12 RX ORDER — POLYETHYLENE GLYCOL 3350 17 G/17G
17 POWDER, FOR SOLUTION ORAL DAILY PRN
Qty: 527 G | Refills: 1 | COMMUNITY
Start: 2024-05-12 | End: 2024-07-11

## 2024-05-12 RX ORDER — INSULIN LISPRO 100 [IU]/ML
0-4 INJECTION, SOLUTION INTRAVENOUS; SUBCUTANEOUS NIGHTLY
Status: DISCONTINUED | OUTPATIENT
Start: 2024-05-12 | End: 2024-05-13 | Stop reason: HOSPADM

## 2024-05-12 RX ORDER — INSULIN LISPRO 100 [IU]/ML
0-16 INJECTION, SOLUTION INTRAVENOUS; SUBCUTANEOUS
Status: DISCONTINUED | OUTPATIENT
Start: 2024-05-12 | End: 2024-05-13 | Stop reason: HOSPADM

## 2024-05-12 RX ORDER — INSULIN LISPRO 100 [IU]/ML
0-4 INJECTION, SOLUTION INTRAVENOUS; SUBCUTANEOUS NIGHTLY
Status: DISCONTINUED | OUTPATIENT
Start: 2024-05-12 | End: 2024-05-12

## 2024-05-12 RX ORDER — PREDNISONE 20 MG/1
20 TABLET ORAL DAILY
Qty: 10 TABLET | Refills: 0 | Status: SHIPPED | OUTPATIENT
Start: 2024-05-13 | End: 2024-05-23

## 2024-05-12 RX ORDER — LOPERAMIDE HYDROCHLORIDE 2 MG/1
2 CAPSULE ORAL 4 TIMES DAILY PRN
COMMUNITY
Start: 2024-05-12 | End: 2024-05-22

## 2024-05-12 RX ORDER — DEXTROSE MONOHYDRATE 100 MG/ML
INJECTION, SOLUTION INTRAVENOUS CONTINUOUS PRN
Status: DISCONTINUED | OUTPATIENT
Start: 2024-05-12 | End: 2024-05-12 | Stop reason: SDUPTHER

## 2024-05-12 RX ORDER — AMOXICILLIN AND CLAVULANATE POTASSIUM 875; 125 MG/1; MG/1
1 TABLET, FILM COATED ORAL EVERY 12 HOURS SCHEDULED
Qty: 10 TABLET | Refills: 0 | Status: SHIPPED | OUTPATIENT
Start: 2024-05-12 | End: 2024-05-17

## 2024-05-12 RX ORDER — INSULIN GLARGINE 100 [IU]/ML
0.25 INJECTION, SOLUTION SUBCUTANEOUS NIGHTLY
Qty: 10 ML | Refills: 3 | Status: SHIPPED | OUTPATIENT
Start: 2024-05-12

## 2024-05-12 RX ORDER — OSELTAMIVIR PHOSPHATE 30 MG/1
30 CAPSULE ORAL 2 TIMES DAILY
Qty: 4 CAPSULE | Refills: 0 | Status: SHIPPED | OUTPATIENT
Start: 2024-05-12 | End: 2024-05-14

## 2024-05-12 RX ORDER — INSULIN LISPRO 100 [IU]/ML
0-16 INJECTION, SOLUTION INTRAVENOUS; SUBCUTANEOUS
Status: DISCONTINUED | OUTPATIENT
Start: 2024-05-12 | End: 2024-05-12

## 2024-05-12 RX ORDER — GLUCAGON 1 MG/ML
1 KIT INJECTION PRN
Status: DISCONTINUED | OUTPATIENT
Start: 2024-05-12 | End: 2024-05-12 | Stop reason: SDUPTHER

## 2024-05-12 RX ORDER — INSULIN LISPRO 100 [IU]/ML
0-16 INJECTION, SOLUTION INTRAVENOUS; SUBCUTANEOUS
Qty: 1 EACH | Refills: 2 | Status: SHIPPED | OUTPATIENT
Start: 2024-05-12

## 2024-05-12 RX ORDER — GLUCAGON 1 MG/ML
1 KIT INJECTION PRN
Qty: 1 EACH | Refills: 1 | Status: SHIPPED | OUTPATIENT
Start: 2024-05-12

## 2024-05-12 RX ORDER — INSULIN LISPRO 100 [IU]/ML
0.08 INJECTION, SOLUTION INTRAVENOUS; SUBCUTANEOUS
Status: DISCONTINUED | OUTPATIENT
Start: 2024-05-12 | End: 2024-05-13 | Stop reason: HOSPADM

## 2024-05-12 RX ORDER — INSULIN GLARGINE 100 [IU]/ML
0.25 INJECTION, SOLUTION SUBCUTANEOUS NIGHTLY
Status: DISCONTINUED | OUTPATIENT
Start: 2024-05-12 | End: 2024-05-13 | Stop reason: HOSPADM

## 2024-05-12 RX ADMIN — OXYCODONE 5 MG: 5 TABLET ORAL at 00:20

## 2024-05-12 RX ADMIN — Medication 4 PUFF: at 13:23

## 2024-05-12 RX ADMIN — Medication 2 PUFF: at 17:26

## 2024-05-12 RX ADMIN — GLIPIZIDE 10 MG: 5 TABLET ORAL at 09:06

## 2024-05-12 RX ADMIN — Medication 4 PUFF: at 17:25

## 2024-05-12 RX ADMIN — OSELTAMIVIR PHOSPHATE 30 MG: 30 CAPSULE ORAL at 09:05

## 2024-05-12 RX ADMIN — Medication 4 PUFF: at 10:32

## 2024-05-12 RX ADMIN — GABAPENTIN 600 MG: 300 CAPSULE ORAL at 09:06

## 2024-05-12 RX ADMIN — OXYCODONE 5 MG: 5 TABLET ORAL at 20:11

## 2024-05-12 RX ADMIN — Medication 4 PUFF: at 21:35

## 2024-05-12 RX ADMIN — RIVAROXABAN 15 MG: 15 TABLET, FILM COATED ORAL at 09:06

## 2024-05-12 RX ADMIN — ISOSORBIDE MONONITRATE 30 MG: 30 TABLET, EXTENDED RELEASE ORAL at 09:05

## 2024-05-12 RX ADMIN — ALPRAZOLAM 1 MG: 0.5 TABLET ORAL at 16:25

## 2024-05-12 RX ADMIN — CLOPIDOGREL BISULFATE 75 MG: 75 TABLET ORAL at 09:06

## 2024-05-12 RX ADMIN — INSULIN LISPRO 8 UNITS: 100 INJECTION, SOLUTION INTRAVENOUS; SUBCUTANEOUS at 09:09

## 2024-05-12 RX ADMIN — Medication 2 PUFF: at 21:36

## 2024-05-12 RX ADMIN — OSELTAMIVIR PHOSPHATE 30 MG: 30 CAPSULE ORAL at 20:10

## 2024-05-12 RX ADMIN — AMOXICILLIN AND CLAVULANATE POTASSIUM 1 TABLET: 875; 125 TABLET, FILM COATED ORAL at 20:10

## 2024-05-12 RX ADMIN — INSULIN LISPRO 9 UNITS: 100 INJECTION, SOLUTION INTRAVENOUS; SUBCUTANEOUS at 16:25

## 2024-05-12 RX ADMIN — Medication 4 PUFF: at 04:17

## 2024-05-12 RX ADMIN — Medication 2 PUFF: at 10:32

## 2024-05-12 RX ADMIN — Medication 2 PUFF: at 00:42

## 2024-05-12 RX ADMIN — INSULIN LISPRO 4 UNITS: 100 INJECTION, SOLUTION INTRAVENOUS; SUBCUTANEOUS at 02:45

## 2024-05-12 RX ADMIN — GABAPENTIN 600 MG: 300 CAPSULE ORAL at 20:11

## 2024-05-12 RX ADMIN — GABAPENTIN 600 MG: 300 CAPSULE ORAL at 14:32

## 2024-05-12 RX ADMIN — Medication 10 ML: at 20:13

## 2024-05-12 RX ADMIN — INSULIN GLARGINE 29 UNITS: 100 INJECTION, SOLUTION SUBCUTANEOUS at 20:12

## 2024-05-12 RX ADMIN — INSULIN LISPRO 12 UNITS: 100 INJECTION, SOLUTION INTRAVENOUS; SUBCUTANEOUS at 16:26

## 2024-05-12 RX ADMIN — ROSUVASTATIN CALCIUM 20 MG: 20 TABLET, FILM COATED ORAL at 20:11

## 2024-05-12 RX ADMIN — PROMETHAZINE HYDROCHLORIDE AND DEXTROMETHORPHAN HYDROBROMIDE ORAL SOLUTION 5 ML: 15; 6.25 SOLUTION ORAL at 17:30

## 2024-05-12 RX ADMIN — INSULIN LISPRO 12 UNITS: 100 INJECTION, SOLUTION INTRAVENOUS; SUBCUTANEOUS at 12:20

## 2024-05-12 RX ADMIN — PREDNISONE 20 MG: 20 TABLET ORAL at 09:06

## 2024-05-12 RX ADMIN — AMOXICILLIN AND CLAVULANATE POTASSIUM 1 TABLET: 875; 125 TABLET, FILM COATED ORAL at 09:05

## 2024-05-12 RX ADMIN — EMPAGLIFLOZIN 25 MG: 10 TABLET, FILM COATED ORAL at 12:18

## 2024-05-12 RX ADMIN — Medication 4 PUFF: at 00:42

## 2024-05-12 ASSESSMENT — PAIN SCALES - GENERAL: PAINLEVEL_OUTOF10: 5

## 2024-05-12 NOTE — PLAN OF CARE
Problem: Discharge Planning  Goal: Discharge to home or other facility with appropriate resources  Outcome: Progressing     Problem: Pain  Goal: Verbalizes/displays adequate comfort level or baseline comfort level  Outcome: Progressing     Problem: Safety - Adult  Goal: Free from fall injury  Outcome: Progressing     Problem: Respiratory - Adult  Goal: Achieves optimal ventilation and oxygenation  Outcome: Progressing     Problem: Skin/Tissue Integrity - Adult  Goal: Skin integrity remains intact  Outcome: Progressing     Problem: Infection - Adult  Goal: Absence of infection during hospitalization  Outcome: Progressing     Problem: Metabolic/Fluid and Electrolytes - Adult  Goal: Electrolytes maintained within normal limits  Outcome: Progressing  Goal: Glucose maintained within prescribed range  Outcome: Progressing     Problem: ABCDS Injury Assessment  Goal: Absence of physical injury  Outcome: Progressing     Problem: Chronic Conditions and Co-morbidities  Goal: Patient's chronic conditions and co-morbidity symptoms are monitored and maintained or improved  Outcome: Progressing

## 2024-05-12 NOTE — DISCHARGE INSTR - COC
Continuity of Care Form    Patient Name: Milana Pardo   :  1945  MRN:  [id number]    Admit date:  2024  Discharge date:  2024    Code Status Order: Full Code   Advance Directives:     Admitting Physician:  Arian Brandon MD  PCP: Arian Brandon MD    Discharging Nurse: ***  Discharging Hospital Unit/Room#: 4TN-4460/4460-01  Discharging Unit Phone Number: ***    Emergency Contact:   Extended Emergency Contact Information  Primary Emergency Contact: Brenda Bedolla   [facility]  Home Phone: [phone]  Relation: Child  Secondary Emergency Contact: Rhea Bedolla  Home Phone: [phone]  Relation: Grandchild    Past Surgical History:  Past Surgical History:   Procedure Laterality Date    APPENDECTOMY      BRONCHOSCOPY N/A 2020    BRONCHOSCOPY ALVEOLAR LAVAGE performed by Tc Carter MD at [facility] ENDOSCOPY    BRONCHOSCOPY N/A 2020    BRONCHOSCOPY DIAGNOSTIC OR CELL WASH ONLY performed by Jarocho Finley MD at [facility] ENDOSCOPY    BRONCHOSCOPY N/A 2020    BRONCHOSCOPY DIAGNOSTIC OR CELL WASH ONLY performed by Abran Abreu MD at [facility] ENDOSCOPY    CARDIAC SURGERY      1 stent  and 1 stent     CATARACT REMOVAL   or     bilateral cataracts removed    CHOLECYSTECTOMY      COLONOSCOPY      COLONOSCOPY  2018    CORONARY ANGIOPLASTY WITH STENT PLACEMENT      ENDOSCOPY, COLON, DIAGNOSTIC      ESOPHAGEAL DILATATION      EYE SURGERY      bilateral cataracts    GALLBLADDER SURGERY      HYSTERECTOMY (CERVIX STATUS UNKNOWN)      RI EXCISION MALIGNANT LESION S/N/H/F/G 0.5 CM/< N/A 2018    EXCISION OF SCALP SQUAMOUS CELL CARCINOMA performed by Devyn Colin MD at [facility] OR    RI SPLIT AGRFT T/A/L 1ST 100 CM/&/1% BDY INFT/CHLD N/A 2018    SPLIT THICKNESS SKIN GRAFT FOR COVERAGE SCALP, APPLICATION OF WOUND VAC DEVICE performed by Devyn Colin MD at [facility] OR    SKIN BIOPSY      TEAR DUCT SURGERY      UPPER

## 2024-05-13 VITALS
BODY MASS INDEX: 40.92 KG/M2 | WEIGHT: 254.6 LBS | OXYGEN SATURATION: 93 % | HEIGHT: 66 IN | DIASTOLIC BLOOD PRESSURE: 60 MMHG | SYSTOLIC BLOOD PRESSURE: 136 MMHG | TEMPERATURE: 97.6 F | HEART RATE: 57 BPM | RESPIRATION RATE: 16 BRPM

## 2024-05-13 LAB
BACTERIA BLD CULT: NORMAL
GLUCOSE BLD-MCNC: 110 MG/DL (ref 70–99)
GLUCOSE BLD-MCNC: 185 MG/DL (ref 70–99)
PERFORMED ON: ABNORMAL
PERFORMED ON: ABNORMAL

## 2024-05-13 PROCEDURE — 94640 AIRWAY INHALATION TREATMENT: CPT

## 2024-05-13 PROCEDURE — 94761 N-INVAS EAR/PLS OXIMETRY MLT: CPT

## 2024-05-13 PROCEDURE — 6370000000 HC RX 637 (ALT 250 FOR IP): Performed by: INTERNAL MEDICINE

## 2024-05-13 PROCEDURE — 6370000000 HC RX 637 (ALT 250 FOR IP): Performed by: PEDIATRICS

## 2024-05-13 PROCEDURE — 2580000003 HC RX 258: Performed by: PEDIATRICS

## 2024-05-13 RX ADMIN — INSULIN LISPRO 9 UNITS: 100 INJECTION, SOLUTION INTRAVENOUS; SUBCUTANEOUS at 08:15

## 2024-05-13 RX ADMIN — OSELTAMIVIR PHOSPHATE 30 MG: 30 CAPSULE ORAL at 08:22

## 2024-05-13 RX ADMIN — EMPAGLIFLOZIN 25 MG: 10 TABLET, FILM COATED ORAL at 08:16

## 2024-05-13 RX ADMIN — RIVAROXABAN 15 MG: 15 TABLET, FILM COATED ORAL at 08:15

## 2024-05-13 RX ADMIN — Medication 2 PUFF: at 09:23

## 2024-05-13 RX ADMIN — ISOSORBIDE MONONITRATE 30 MG: 30 TABLET, EXTENDED RELEASE ORAL at 08:16

## 2024-05-13 RX ADMIN — Medication 4 PUFF: at 13:06

## 2024-05-13 RX ADMIN — Medication 4 PUFF: at 09:23

## 2024-05-13 RX ADMIN — PREDNISONE 20 MG: 20 TABLET ORAL at 08:16

## 2024-05-13 RX ADMIN — Medication 10 ML: at 08:17

## 2024-05-13 RX ADMIN — CLOPIDOGREL BISULFATE 75 MG: 75 TABLET ORAL at 08:16

## 2024-05-13 RX ADMIN — GABAPENTIN 600 MG: 300 CAPSULE ORAL at 08:16

## 2024-05-13 RX ADMIN — INSULIN LISPRO 9 UNITS: 100 INJECTION, SOLUTION INTRAVENOUS; SUBCUTANEOUS at 11:56

## 2024-05-13 RX ADMIN — AMOXICILLIN AND CLAVULANATE POTASSIUM 1 TABLET: 875; 125 TABLET, FILM COATED ORAL at 08:16

## 2024-05-13 RX ADMIN — ALPRAZOLAM 1 MG: 0.5 TABLET ORAL at 08:23

## 2024-05-13 ASSESSMENT — PAIN DESCRIPTION - FREQUENCY: FREQUENCY: INTERMITTENT

## 2024-05-13 ASSESSMENT — PAIN SCALES - GENERAL
PAINLEVEL_OUTOF10: 2
PAINLEVEL_OUTOF10: 8

## 2024-05-13 ASSESSMENT — PAIN DESCRIPTION - LOCATION: LOCATION: GENERALIZED

## 2024-05-13 ASSESSMENT — PAIN DESCRIPTION - PAIN TYPE: TYPE: CHRONIC PAIN

## 2024-05-13 ASSESSMENT — PAIN DESCRIPTION - ONSET: ONSET: ON-GOING

## 2024-05-13 ASSESSMENT — PAIN DESCRIPTION - DESCRIPTORS: DESCRIPTORS: ACHING

## 2024-05-13 NOTE — CARE COORDINATION
05/13/24 1115   IMM Letter   IMM Letter given to Patient/Family/Significant other/Guardian/POA/by: Verbally provided to patient by  due to Influenza Isolation Precautions. Copy provided to Nurse, Brenda, to provide to patient.   IMM Letter date given: 05/13/24   IMM Letter time given: 1112     Discharge Planning:     (CM) called [facility]) at [phone] and spoke with staff, sommer Amato informed of discharge today. Per Lima's request, CM faxed patient's JOSE G/AVS and H&P to [facility] at [phone].     Patient reported that her family will be transporting her home at discharge today.        Pinky SALDAÑA, TAYLOR-S, [facility]  Social Work -   [phone]    Electronically signed by PIOTR Lund on 5/13/2024 at 11:45 AM

## 2024-05-13 NOTE — PLAN OF CARE
Problem: Gastrointestinal - Adult  Goal: Minimal or absence of nausea and vomiting  Outcome: Progressing  Goal: Maintains or returns to baseline bowel function  Outcome: Progressing     Problem: Musculoskeletal - Adult  Goal: Return mobility to safest level of function  Outcome: Progressing  Goal: Maintain proper alignment of affected body part  Outcome: Progressing

## 2024-05-14 LAB — BACTERIA BLD CULT ORG #2: NORMAL

## 2024-05-16 NOTE — PROGRESS NOTES
[facility] - Inpatient Rehabilitation Department   Phone: [phone]    Physical Therapy    [x] Initial Evaluation            [] Daily Treatment Note         [x] Discharge Summary      Patient: Milana Pardo   : 1945   MRN: [id number]   Date of Service:  5/10/2024  Admitting Diagnosis: Respiratory failure (HCC)  Current Admission Summary: Milana Pardo is a 79 y.o. female who presents to the emergency department with complaint of shortness of breath.  Patient reports productive cough over the past 2 days with increasing shortness of breath with history of COPD.  Former cigarette smoker, quit 5 years ago.  States that 2 days ago she came down with tonsillitis and has been on antibiotics since, cough started at the same time.  She does wear oxygen at nighttime only.  She denies fever.   Past Medical History:  has a past medical history of Acute MI (HCC), Anxiety and depression, Arthritis, CAD (coronary artery disease), Cancer (HCC), Cancer (HCC), Cancer of skin of leg, Chronic obstructive pulmonary disease (HCC), Claustrophobia, COPD (chronic obstructive pulmonary disease) (HCC), Emphysema lung (HCC), ESBL (extended spectrum beta-lactamase) producing bacteria infection, Esophageal stricture, Gastroesophageal reflux disease without esophagitis, Hiatal hernia, Hypercholesteremia, Hypertension, IBS (irritable bowel syndrome), Migraines, Movement disorder, Paroxysmal atrial fibrillation (HCC), Pneumonia, PONV (postoperative nausea and vomiting), Thoracic radiculopathy, Type II or unspecified type diabetes mellitus without mention of complication, not stated as uncontrolled, and Unspecified cerebral artery occlusion with cerebral infarction.  Past Surgical History:  has a past surgical history that includes Cardiac surgery; Gallbladder surgery; Hysterectomy; Appendectomy; Cholecystectomy; Colonoscopy; Upper gastrointestinal endoscopy (2012); Endoscopy, colon, diagnostic; Tear duct surgery; Upper 
  Physician Progress Note      PATIENT:               SERA OLIVARES  [facility] #:                  [id number]  :                       1945  ADMIT DATE:       2024 9:01 PM  DISCH DATE:        2024 1:33 PM  RESPONDING  PROVIDER #:        Arian Brandon MD          QUERY TEXT:    Patient admitted with SOB. Noted documentation of acute respiratory failure in   H&P on 5/10/24. In order to support the diagnosis of acute respiratory   failure, please include additional clinical indicators in your documentation.    Or please document if the diagnosis of acute respiratory failure has been   ruled out after further study.    The medical record reflects the following:  Risk Factors: Age, Hx- COPD, On Home O2 at HS, Former cigarette smoker, MI,   CAD, HTN, PAF, PNA, DM2  Clinical Indicators: VS- 98.2, 94, 20, 101/47    80-91% RA   94% 2L   .....Influenza A-Detected...Per ED provider note on 24- Positive for   cough, chest tightness and shortness of breath.  Pulmonary effort is normal.   No respiratory distress.  Treatment: Oxygen, Duoneb, Solu IV, albuterol q4 hours, ipratropium q8 hrs    Acute Respiratory Failure Clinical Indicators per  MS-DRG Training Guide and   Quick Reference Guide:  pO2 < 60 mmHg or SpO2 (pulse oximetry) < 91% breathing room air  pCO2 > 50 and pH < 7.35  P/F ratio (pO2 / FIO2) < 300  pO2 decrease or pCO2 increase by 10 mmHg from baseline (if known)  Supplemental oxygen of 40% or more  Presence of respiratory distress, tachypnea, dyspnea, shortness of breath,   wheezing  Unable to speak in complete sentences  Use of accessory muscles to breathe  Extreme anxiety and feeling of impending doom  Tripod position  Confusion/altered mental status/obtunded    Thank You,  Brittany Gonzalez RN BSN CDS CRCR  [email or web address]  Options provided:  -- Acute Respiratory Failure as evidenced by, Please document evidence.  -- Acute Respiratory Failure ruled out after study  -- Acute Respiratory 
Department of Internal Medicine  General Internal Medicine   Progress Note     SUBJECTIVE  breathing  back to normal but Blood sugars have been in 300s     History obtained from chart review and the patient  General ROS: positive for  - fatigue and malaise  negative for - chills, fever or night sweats  Psychological ROS: negative  Respiratory ROS: positive for - shortness of breath and wheezing  negative for - cough, hemoptysis, sputum changes or stridor  Cardiovascular ROS: positive for - chest pain, dyspnea on exertion and edema  negative for - loss of consciousness, orthopnea or palpitations  Gastrointestinal ROS: no abdominal pain, change in bowel habits, or black or bloody stools    OBJECTIVE      Medications      Current Facility-Administered Medications: insulin lispro (HUMALOG,ADMELOG) injection vial 0-16 Units, 0-16 Units, SubCUTAneous, TID WC  insulin lispro (HUMALOG,ADMELOG) injection vial 0-4 Units, 0-4 Units, SubCUTAneous, Nightly  empagliflozin (JARDIANCE) tablet 25 mg, 25 mg, Oral, Daily  insulin glargine (LANTUS) injection vial 29 Units, 0.25 Units/kg, SubCUTAneous, Nightly  insulin lispro (HUMALOG,ADMELOG) injection vial 9 Units, 0.08 Units/kg, SubCUTAneous, TID WC  predniSONE (DELTASONE) tablet 20 mg, 20 mg, Oral, Daily  oxyCODONE (ROXICODONE) immediate release tablet 5 mg, 5 mg, Oral, Q6H PRN  sodium chloride flush 0.9 % injection 5-40 mL, 5-40 mL, IntraVENous, 2 times per day  sodium chloride flush 0.9 % injection 5-40 mL, 5-40 mL, IntraVENous, PRN  0.9 % sodium chloride infusion, , IntraVENous, PRN  potassium chloride (KLOR-CON M) extended release tablet 40 mEq, 40 mEq, Oral, PRN **OR** potassium bicarb-citric acid (EFFER-K) effervescent tablet 40 mEq, 40 mEq, Oral, PRN **OR** potassium chloride 10 mEq/100 mL IVPB (Peripheral Line), 10 mEq, IntraVENous, PRN  magnesium sulfate 2000 mg in 50 mL IVPB premix, 2,000 mg, IntraVENous, PRN  ondansetron (ZOFRAN-ODT) disintegrating tablet 4 mg, 4 mg, 
Department of Internal Medicine  General Internal Medicine   Progress Note     SUBJECTIVE  breathing easier no wheezing on room air      History obtained from chart review and the patient  General ROS: positive for  - fatigue and malaise  negative for - chills, fever or night sweats  Psychological ROS: negative  Respiratory ROS: positive for - shortness of breath and wheezing  negative for - cough, hemoptysis, sputum changes or stridor  Cardiovascular ROS: positive for - chest pain, dyspnea on exertion and edema  negative for - loss of consciousness, orthopnea or palpitations  Gastrointestinal ROS: no abdominal pain, change in bowel habits, or black or bloody stools    OBJECTIVE      Medications      Current Facility-Administered Medications: insulin lispro (HUMALOG,ADMELOG) injection vial 0-16 Units, 0-16 Units, SubCUTAneous, TID WC  insulin lispro (HUMALOG,ADMELOG) injection vial 0-4 Units, 0-4 Units, SubCUTAneous, Nightly  predniSONE (DELTASONE) tablet 20 mg, 20 mg, Oral, Daily  oxyCODONE (ROXICODONE) immediate release tablet 5 mg, 5 mg, Oral, Q6H PRN  sodium chloride flush 0.9 % injection 5-40 mL, 5-40 mL, IntraVENous, 2 times per day  sodium chloride flush 0.9 % injection 5-40 mL, 5-40 mL, IntraVENous, PRN  0.9 % sodium chloride infusion, , IntraVENous, PRN  potassium chloride (KLOR-CON M) extended release tablet 40 mEq, 40 mEq, Oral, PRN **OR** potassium bicarb-citric acid (EFFER-K) effervescent tablet 40 mEq, 40 mEq, Oral, PRN **OR** potassium chloride 10 mEq/100 mL IVPB (Peripheral Line), 10 mEq, IntraVENous, PRN  magnesium sulfate 2000 mg in 50 mL IVPB premix, 2,000 mg, IntraVENous, PRN  ondansetron (ZOFRAN-ODT) disintegrating tablet 4 mg, 4 mg, Oral, Q8H PRN **OR** ondansetron (ZOFRAN) injection 4 mg, 4 mg, IntraVENous, Q6H PRN  polyethylene glycol (GLYCOLAX) packet 17 g, 17 g, Oral, Daily PRN  acetaminophen (TYLENOL) tablet 650 mg, 650 mg, Oral, Q6H PRN **OR** acetaminophen (TYLENOL) suppository 650 mg, 
Dr. Brandon is primary physician and requesting to assume hospital care.  Discussed with attending and will transition care to Dr. Brandon, he was made attending.  Patient not seen or examined by myself.     KAE Brito-CNP    
Occupational Therapy      [facility] - Inpatient Rehabilitation Department   Phone: [phone]    Occupational Therapy    [x] Initial Evaluation            [] Daily Treatment Note         [] Discharge Summary      Patient: Milana Pardo   : 1945   MRN: [id number]   Date of Service:  5/10/2024    Admitting Diagnosis:  Respiratory failure (HCC)  Current Admission Summary:   1. Influenza A    2. COPD with acute exacerbation (HCC)    3. Wheezing    4. MAYANK (acute kidney injury) (HCC)      Past Medical History:  has a past medical history of Acute MI (HCC), Anxiety and depression, Arthritis, CAD (coronary artery disease), Cancer (HCC), Cancer (HCC), Cancer of skin of leg, Chronic obstructive pulmonary disease (HCC), Claustrophobia, COPD (chronic obstructive pulmonary disease) (HCC), Emphysema lung (HCC), ESBL (extended spectrum beta-lactamase) producing bacteria infection, Esophageal stricture, Gastroesophageal reflux disease without esophagitis, Hiatal hernia, Hypercholesteremia, Hypertension, IBS (irritable bowel syndrome), Migraines, Movement disorder, Paroxysmal atrial fibrillation (HCC), Pneumonia, PONV (postoperative nausea and vomiting), Thoracic radiculopathy, Type II or unspecified type diabetes mellitus without mention of complication, not stated as uncontrolled, and Unspecified cerebral artery occlusion with cerebral infarction.  Past Surgical History:  has a past surgical history that includes Cardiac surgery; Gallbladder surgery; Hysterectomy; Appendectomy; Cholecystectomy; Colonoscopy; Upper gastrointestinal endoscopy (2012); Endoscopy, colon, diagnostic; Tear duct surgery; Upper gastrointestinal endoscopy (2018); Colonoscopy (2018); pr excision malignant lesion s/n/h/f/g 0.5 cm/< (N/A, 2018); pr split agrft t/a/l 1st 100 cm/&/1% bdy inft/chld (N/A, 2018); skin biopsy; eye surgery; bronchoscopy (N/A, 2020); bronchoscopy (N/A, 2020); Cataract 
Pt A&OX4, VSS, pt received oxycodone for generalized pain, head to toe assessment done, medication given per MAR, safety precaution in place, pt refused bed alarm on, can ambulate to the bathroom with walker, call light within reach.   
Pt asked to pass on 6am mdi's  
Pt resting awake in bed. Pt is axox4 and able to answer questions and follow commands throughout assessment. Discussed to call out before ambulating pt states \"if I wait for someone to come it will be too late.\" States she ambulates at home with a walker without incident and states she will refuse to have a bed alarm . Discussed possible negative outcomes of a fall in a hospital setting including injury or death. Pt states she continues to refuse bed alarm. Agrees to call out for assistance if needed. Otherwise states she is feeling better overall and has not had fevers overnight. Denies pain. Is tolerating diet. PO meds taken easily with water. Call light in easy reach. Remians in contact/droplet isolation.    
Shift assessment completed. Neuro WNL. Reports pain 8/10 at this time; pt states she does not want prn pain meds. AM meds administered per MAR. The care plan and education has been reviewed and mutually agreed upon with the patient. Standard safety measures in place.    Patient at high risk for fall; score 87 and reviewed with patient.   Patient refuses bed alarm/camera. Reviewed risk of falling and injury with patient.  Fall risk arm band on patient. Yellow blanket at foot of bed. Fall risk sign and light visible outside door. Call light in reach . Personal items within reach of patient. Patient instructed to call for assistance with toileting/ambulation or any other needs.     1:18 PM  IV removed prior dc. The care plan and education has been resolved and completed. Discharge instructions and medications reviewed with patient. Patient voices understanding and denies further concerns at this time. Patient discharged home per order with all personal belonging.      
Q4H  ipratropium (ATROVENT HFA) 17 MCG/ACT inhaler 2 puff, 2 puff, Inhalation, q8h  ALPRAZolam (XANAX) tablet 1 mg, 1 mg, Oral, TID PRN  rosuvastatin (CRESTOR) tablet 20 mg, 20 mg, Oral, Nightly  rivaroxaban (XARELTO) tablet 15 mg, 15 mg, Oral, Daily  pantoprazole (PROTONIX) tablet 40 mg, 40 mg, Oral, QAM AC  sodium chloride (OCEAN, BABY AYR) 0.65 % nasal spray 1 spray, 1 spray, Nasal, PRN  isosorbide mononitrate (IMDUR) extended release tablet 30 mg, 30 mg, Oral, Daily  clopidogrel (PLAVIX) tablet 75 mg, 75 mg, Oral, Daily  mometasone-formoterol (DULERA) 200-5 MCG/ACT inhaler 2 puff, 2 puff, Inhalation, BID RT  dextrose bolus 10% 125 mL, 125 mL, IntraVENous, PRN **OR** dextrose bolus 10% 250 mL, 250 mL, IntraVENous, PRN  dextrose 10 % infusion, , IntraVENous, Continuous PRN  insulin lispro (HUMALOG) injection vial 0-8 Units, 0-8 Units, SubCUTAneous, TID WC  insulin lispro (HUMALOG) injection vial 0-4 Units, 0-4 Units, SubCUTAneous, Nightly  amoxicillin-clavulanate (AUGMENTIN) 875-125 MG per tablet 1 tablet, 1 tablet, Oral, 2 times per day  gabapentin (NEURONTIN) capsule 600 mg, 600 mg, Oral, TID  glipiZIDE (GLUCOTROL) tablet 10 mg, 10 mg, Oral, BID WC  loperamide (IMODIUM) capsule 2 mg, 2 mg, Oral, 4x Daily PRN  promethazine-dextromethorphan (PROMETHAZINE-DM) 6.25-15 MG/5ML syrup 5 mL, 5 mL, Oral, Q4H PRN    Physical      VITALS:    BP (!) 112/53   Pulse 60   Temp 97.5 °F (36.4 °C) (Oral)   Resp 20   Ht 1.676 m (5' 6\")   Wt 115.5 kg (254 lb 9.6 oz)   SpO2 92%   BMI 41.09 kg/m²   TEMPERATURE:  Current - Temp: 97.5 °F (36.4 °C); Max - Temp  Av.5 °F (36.4 °C)  Min: 97.5 °F (36.4 °C)  Max: 97.5 °F (36.4 °C)  RESPIRATIONS RANGE: Resp  Av  Min: 18  Max: 20  PULSE RANGE: Pulse  Av.7  Min: 59  Max: 79  BLOOD PRESSURE RANGE:  Systolic (24hrs), Av , Min:112 , Max:178     ; Diastolic (24hrs), Av, Min:53, Max:89      PULSE OXIMETRY RANGE: SpO2  Av.7 %  Min: 92 %  Max: 98 %  24HR

## 2024-06-04 NOTE — PROGRESS NOTES
[facility]   Cardiac Evaluation      Patient: Milana Pardo  YOB: 1945         Chief Complaint   Patient presents with    Hypertension     Dizzy. Back pain, headache.     Atrial Fibrillation    6 Month Follow-Up        Referring provider: Arian Brandon MD    History of Present Illness:   Milana Pardo is a 79 y.o. female presenting in follow up with me for CAD. She was new to me 1/2023 as inpt with chest pain. LHC reveled MVD. Not a good surgical candidate. Received KATHLEEN x3.     Today she is here alone. She was recently in the hospital with the flu. She had a recent episode of pain in between her shoulder blades that lasted 2-3 days. Took nitro, it did not help. Nothing made it better or worse. She denies SOB/MUNROE.     With regard to medication therapy he/she has been compliant with prescribed regimen and has tolerated therapy to date.    Past Medical History:   has a past medical history of Acute MI (HCC), Anxiety and depression, Arthritis, CAD (coronary artery disease), Cancer (HCC), Cancer (HCC), Cancer of skin of leg, Chronic obstructive pulmonary disease (HCC), Claustrophobia, COPD (chronic obstructive pulmonary disease) (HCC), Emphysema lung (HCC), ESBL (extended spectrum beta-lactamase) producing bacteria infection, Esophageal stricture, Gastroesophageal reflux disease without esophagitis, Hiatal hernia, Hypercholesteremia, Hypertension, IBS (irritable bowel syndrome), Macular degeneration, Migraines, Movement disorder, Paroxysmal atrial fibrillation (HCC), Pneumonia, PONV (postoperative nausea and vomiting), Thoracic radiculopathy, Type II or unspecified type diabetes mellitus without mention of complication, not stated as uncontrolled, and Unspecified cerebral artery occlusion with cerebral infarction.    Surgical History:   has a past surgical history that includes Cardiac surgery; Gallbladder surgery; Hysterectomy; Appendectomy; Cholecystectomy; Colonoscopy; Upper

## 2024-06-05 ENCOUNTER — OFFICE VISIT (OUTPATIENT)
Dept: CARDIOLOGY CLINIC | Age: 79
End: 2024-06-05
Payer: COMMERCIAL

## 2024-06-05 VITALS
DIASTOLIC BLOOD PRESSURE: 74 MMHG | OXYGEN SATURATION: 94 % | SYSTOLIC BLOOD PRESSURE: 130 MMHG | HEART RATE: 76 BPM | BODY MASS INDEX: 40.5 KG/M2 | WEIGHT: 252 LBS | HEIGHT: 66 IN

## 2024-06-05 DIAGNOSIS — I25.10 CORONARY ARTERY DISEASE INVOLVING NATIVE CORONARY ARTERY OF NATIVE HEART WITHOUT ANGINA PECTORIS: Primary | ICD-10-CM

## 2024-06-05 DIAGNOSIS — E11.8 DM TYPE 2, CONTROLLED, WITH COMPLICATION (HCC): ICD-10-CM

## 2024-06-05 DIAGNOSIS — J43.8 OTHER EMPHYSEMA (HCC): ICD-10-CM

## 2024-06-05 DIAGNOSIS — I10 PRIMARY HYPERTENSION: ICD-10-CM

## 2024-06-05 DIAGNOSIS — E78.00 HYPERCHOLESTEREMIA: ICD-10-CM

## 2024-06-05 DIAGNOSIS — I48.0 PAF (PAROXYSMAL ATRIAL FIBRILLATION) (HCC): ICD-10-CM

## 2024-06-05 DIAGNOSIS — G47.33 OSA (OBSTRUCTIVE SLEEP APNEA): ICD-10-CM

## 2024-06-05 PROCEDURE — G8417 CALC BMI ABV UP PARAM F/U: HCPCS | Performed by: INTERNAL MEDICINE

## 2024-06-05 PROCEDURE — 1036F TOBACCO NON-USER: CPT | Performed by: INTERNAL MEDICINE

## 2024-06-05 PROCEDURE — 1111F DSCHRG MED/CURRENT MED MERGE: CPT | Performed by: INTERNAL MEDICINE

## 2024-06-05 PROCEDURE — 1090F PRES/ABSN URINE INCON ASSESS: CPT | Performed by: INTERNAL MEDICINE

## 2024-06-05 PROCEDURE — 3078F DIAST BP <80 MM HG: CPT | Performed by: INTERNAL MEDICINE

## 2024-06-05 PROCEDURE — G8399 PT W/DXA RESULTS DOCUMENT: HCPCS | Performed by: INTERNAL MEDICINE

## 2024-06-05 PROCEDURE — 3052F HG A1C>EQUAL 8.0%<EQUAL 9.0%: CPT | Performed by: INTERNAL MEDICINE

## 2024-06-05 PROCEDURE — 3075F SYST BP GE 130 - 139MM HG: CPT | Performed by: INTERNAL MEDICINE

## 2024-06-05 PROCEDURE — 99214 OFFICE O/P EST MOD 30 MIN: CPT | Performed by: INTERNAL MEDICINE

## 2024-06-05 PROCEDURE — G8427 DOCREV CUR MEDS BY ELIG CLIN: HCPCS | Performed by: INTERNAL MEDICINE

## 2024-06-05 PROCEDURE — 1123F ACP DISCUSS/DSCN MKR DOCD: CPT | Performed by: INTERNAL MEDICINE

## 2024-06-05 PROCEDURE — 3023F SPIROM DOC REV: CPT | Performed by: INTERNAL MEDICINE

## 2024-06-05 NOTE — PATIENT INSTRUCTIONS
Thank you for coming to see me today   Continue to be active   Talk with Dr. Brandon about pain between shoulders  Okay to stop plavix  Have blood work complete

## 2024-06-09 PROBLEM — J10.1 INFLUENZA A: Status: RESOLVED | Noted: 2020-01-26 | Resolved: 2024-06-09

## 2024-06-14 RX ORDER — BUDESONIDE AND FORMOTEROL FUMARATE DIHYDRATE 160; 4.5 UG/1; UG/1
2 AEROSOL RESPIRATORY (INHALATION) 2 TIMES DAILY
Qty: 30.6 G | Refills: 1 | Status: SHIPPED | OUTPATIENT
Start: 2024-06-14

## 2024-06-19 ENCOUNTER — HOSPITAL ENCOUNTER (OUTPATIENT)
Age: 79
Setting detail: SPECIMEN
Discharge: HOME | End: 2024-06-19
Payer: COMMERCIAL

## 2024-06-19 LAB
ALBUMIN SERPL-MCNC: 3.6 G/DL (ref 3.4–5)
ANION GAP SERPL CALCULATED.3IONS-SCNC: 13 MMOL/L (ref 3–16)
BUN SERPL-MCNC: 15 MG/DL (ref 7–20)
CALCIUM SERPL-MCNC: 9.8 MG/DL (ref 8.3–10.6)
CHLORIDE SERPL-SCNC: 100 MMOL/L (ref 99–110)
CO2 SERPL-SCNC: 23 MMOL/L (ref 21–32)
CREAT SERPL-MCNC: 0.8 MG/DL (ref 0.6–1.2)
GFR SERPLBLD CREATININE-BSD FMLA CKD-EPI: 75 ML/MIN/{1.73_M2}
GLUCOSE SERPL-MCNC: 233 MG/DL (ref 70–99)
PHOSPHATE SERPL-MCNC: 3 MG/DL (ref 2.5–4.9)
POTASSIUM SERPL-SCNC: 3.8 MMOL/L (ref 3.5–5.1)
SODIUM SERPL-SCNC: 136 MMOL/L (ref 136–145)

## 2024-06-19 PROCEDURE — 80069 RENAL FUNCTION PANEL: CPT

## 2024-06-19 PROCEDURE — 80061 LIPID PANEL: CPT

## 2024-06-19 PROCEDURE — 36415 COLL VENOUS BLD VENIPUNCTURE: CPT

## 2024-06-20 LAB
CHOLEST SERPL-MCNC: 185 MG/DL (ref 0–199)
HDLC SERPL-MCNC: 29 MG/DL (ref 40–60)
LDLC SERPL CALC-MCNC: ABNORMAL MG/DL
LDLC SERPL-MCNC: 95 MG/DL
TRIGL SERPL-MCNC: 387 MG/DL (ref 0–150)
VLDLC SERPL CALC-MCNC: ABNORMAL MG/DL

## 2024-06-26 ENCOUNTER — HOSPITAL ENCOUNTER (OUTPATIENT)
Age: 79
Setting detail: SPECIMEN
Discharge: HOME | End: 2024-06-26
Payer: COMMERCIAL

## 2024-06-26 LAB
ALBUMIN SERPL-MCNC: 3.7 G/DL (ref 3.4–5)
ALP SERPL-CCNC: 95 U/L (ref 40–129)
ALT SERPL-CCNC: 7 U/L (ref 10–40)
AST SERPL-CCNC: 20 U/L (ref 15–37)
BILIRUB DIRECT SERPL-MCNC: <0.2 MG/DL (ref 0–0.3)
BILIRUB INDIRECT SERPL-MCNC: ABNORMAL MG/DL (ref 0–1)
BILIRUB SERPL-MCNC: 0.3 MG/DL (ref 0–1)
PROT SERPL-MCNC: 6.4 G/DL (ref 6.4–8.2)

## 2024-06-26 PROCEDURE — 80076 HEPATIC FUNCTION PANEL: CPT

## 2024-06-26 PROCEDURE — 36415 COLL VENOUS BLD VENIPUNCTURE: CPT

## 2024-08-23 RX ORDER — ALBUTEROL SULFATE 90 UG/1
AEROSOL, METERED RESPIRATORY (INHALATION)
Qty: 18 G | Refills: 5 | Status: SHIPPED | OUTPATIENT
Start: 2024-08-23

## 2024-10-06 ENCOUNTER — HOSPITAL ENCOUNTER (INPATIENT)
Age: 79
LOS: 2 days | Discharge: HOME HEALTH | DRG: 880 | End: 2024-10-08
Attending: EMERGENCY MEDICINE | Admitting: INTERNAL MEDICINE
Payer: COMMERCIAL

## 2024-10-06 ENCOUNTER — APPOINTMENT (OUTPATIENT)
Dept: GENERAL RADIOLOGY | Age: 79
DRG: 880 | End: 2024-10-06
Payer: COMMERCIAL

## 2024-10-06 DIAGNOSIS — R55 NEAR SYNCOPE: ICD-10-CM

## 2024-10-06 DIAGNOSIS — R07.9 ACUTE CHEST PAIN: Primary | ICD-10-CM

## 2024-10-06 LAB
ALBUMIN SERPL-MCNC: 3.6 G/DL (ref 3.4–5)
ALBUMIN/GLOB SERPL: 1.2 {RATIO} (ref 1.1–2.2)
ALP SERPL-CCNC: 92 U/L (ref 40–129)
ALT SERPL-CCNC: 16 U/L (ref 10–40)
ANION GAP SERPL CALCULATED.3IONS-SCNC: 7 MMOL/L (ref 3–16)
AST SERPL-CCNC: 32 U/L (ref 15–37)
BASOPHILS # BLD: 0.1 K/UL (ref 0–0.2)
BASOPHILS NFR BLD: 0.9 %
BILIRUB SERPL-MCNC: 0.3 MG/DL (ref 0–1)
BUN SERPL-MCNC: 15 MG/DL (ref 7–20)
CALCIUM SERPL-MCNC: 9.3 MG/DL (ref 8.3–10.6)
CHLORIDE SERPL-SCNC: 105 MMOL/L (ref 99–110)
CO2 SERPL-SCNC: 27 MMOL/L (ref 21–32)
CREAT SERPL-MCNC: 0.9 MG/DL (ref 0.6–1.2)
DEPRECATED RDW RBC AUTO: 15 % (ref 12.4–15.4)
EOSINOPHIL # BLD: 0.1 K/UL (ref 0–0.6)
EOSINOPHIL NFR BLD: 1.5 %
GFR SERPLBLD CREATININE-BSD FMLA CKD-EPI: 65 ML/MIN/{1.73_M2}
GLUCOSE BLD-MCNC: 91 MG/DL (ref 70–99)
GLUCOSE SERPL-MCNC: 95 MG/DL (ref 70–99)
HCT VFR BLD AUTO: 41.9 % (ref 36–48)
HGB BLD-MCNC: 13.3 G/DL (ref 12–16)
INR PPP: 1 (ref 0.85–1.15)
LYMPHOCYTES # BLD: 2.3 K/UL (ref 1–5.1)
LYMPHOCYTES NFR BLD: 22.7 %
MCH RBC QN AUTO: 27.7 PG (ref 26–34)
MCHC RBC AUTO-ENTMCNC: 31.8 G/DL (ref 31–36)
MCV RBC AUTO: 87.3 FL (ref 80–100)
MONOCYTES # BLD: 0.7 K/UL (ref 0–1.3)
MONOCYTES NFR BLD: 6.7 %
NEUTROPHILS # BLD: 6.8 K/UL (ref 1.7–7.7)
NEUTROPHILS NFR BLD: 68.2 %
NT-PROBNP SERPL-MCNC: 305 PG/ML (ref 0–449)
PERFORMED ON: NORMAL
PLATELET # BLD AUTO: 209 K/UL (ref 135–450)
PMV BLD AUTO: 8.9 FL (ref 5–10.5)
POTASSIUM SERPL-SCNC: 4.2 MMOL/L (ref 3.5–5.1)
PROT SERPL-MCNC: 6.5 G/DL (ref 6.4–8.2)
PROTHROMBIN TIME: 13.4 SEC (ref 11.9–14.9)
RBC # BLD AUTO: 4.8 M/UL (ref 4–5.2)
REASON FOR REJECTION: NORMAL
REJECTED TEST: NORMAL
SODIUM SERPL-SCNC: 139 MMOL/L (ref 136–145)
TROPONIN, HIGH SENSITIVITY: 13 NG/L (ref 0–14)
TROPONIN, HIGH SENSITIVITY: 13 NG/L (ref 0–14)
TROPONIN, HIGH SENSITIVITY: 15 NG/L (ref 0–14)
WBC # BLD AUTO: 9.9 K/UL (ref 4–11)

## 2024-10-06 PROCEDURE — 85610 PROTHROMBIN TIME: CPT

## 2024-10-06 PROCEDURE — 99285 EMERGENCY DEPT VISIT HI MDM: CPT

## 2024-10-06 PROCEDURE — 6360000002 HC RX W HCPCS: Performed by: EMERGENCY MEDICINE

## 2024-10-06 PROCEDURE — 96376 TX/PRO/DX INJ SAME DRUG ADON: CPT

## 2024-10-06 PROCEDURE — 96374 THER/PROPH/DIAG INJ IV PUSH: CPT

## 2024-10-06 PROCEDURE — 1200000000 HC SEMI PRIVATE

## 2024-10-06 PROCEDURE — 84484 ASSAY OF TROPONIN QUANT: CPT

## 2024-10-06 PROCEDURE — 85025 COMPLETE CBC W/AUTO DIFF WBC: CPT

## 2024-10-06 PROCEDURE — 6370000000 HC RX 637 (ALT 250 FOR IP): Performed by: INTERNAL MEDICINE

## 2024-10-06 PROCEDURE — 93005 ELECTROCARDIOGRAM TRACING: CPT | Performed by: EMERGENCY MEDICINE

## 2024-10-06 PROCEDURE — 80053 COMPREHEN METABOLIC PANEL: CPT

## 2024-10-06 PROCEDURE — 36415 COLL VENOUS BLD VENIPUNCTURE: CPT

## 2024-10-06 PROCEDURE — 83880 ASSAY OF NATRIURETIC PEPTIDE: CPT

## 2024-10-06 PROCEDURE — 71045 X-RAY EXAM CHEST 1 VIEW: CPT

## 2024-10-06 RX ORDER — GABAPENTIN 300 MG/1
600 CAPSULE ORAL 3 TIMES DAILY
Status: DISCONTINUED | OUTPATIENT
Start: 2024-10-06 | End: 2024-10-08 | Stop reason: HOSPADM

## 2024-10-06 RX ORDER — FUROSEMIDE 40 MG
40 TABLET ORAL DAILY
Status: DISCONTINUED | OUTPATIENT
Start: 2024-10-07 | End: 2024-10-08 | Stop reason: HOSPADM

## 2024-10-06 RX ORDER — ALBUTEROL SULFATE 90 UG/1
2 INHALANT RESPIRATORY (INHALATION) EVERY 6 HOURS PRN
Status: DISCONTINUED | OUTPATIENT
Start: 2024-10-06 | End: 2024-10-08 | Stop reason: HOSPADM

## 2024-10-06 RX ORDER — FENTANYL CITRATE 50 UG/ML
25 INJECTION, SOLUTION INTRAMUSCULAR; INTRAVENOUS
Status: COMPLETED | OUTPATIENT
Start: 2024-10-06 | End: 2024-10-06

## 2024-10-06 RX ORDER — GLIPIZIDE 5 MG/1
10 TABLET ORAL 2 TIMES DAILY WITH MEALS
Status: DISCONTINUED | OUTPATIENT
Start: 2024-10-07 | End: 2024-10-08 | Stop reason: HOSPADM

## 2024-10-06 RX ORDER — PANTOPRAZOLE SODIUM 40 MG/1
40 TABLET, DELAYED RELEASE ORAL
Status: DISCONTINUED | OUTPATIENT
Start: 2024-10-07 | End: 2024-10-08 | Stop reason: HOSPADM

## 2024-10-06 RX ORDER — ROSUVASTATIN CALCIUM 20 MG/1
20 TABLET, COATED ORAL NIGHTLY
Status: DISCONTINUED | OUTPATIENT
Start: 2024-10-06 | End: 2024-10-08 | Stop reason: HOSPADM

## 2024-10-06 RX ORDER — IPRATROPIUM BROMIDE AND ALBUTEROL SULFATE 2.5; .5 MG/3ML; MG/3ML
1 SOLUTION RESPIRATORY (INHALATION) EVERY 4 HOURS PRN
Status: DISCONTINUED | OUTPATIENT
Start: 2024-10-06 | End: 2024-10-06

## 2024-10-06 RX ORDER — GUAIFENESIN 600 MG/1
600 TABLET, EXTENDED RELEASE ORAL 2 TIMES DAILY
Status: DISCONTINUED | OUTPATIENT
Start: 2024-10-06 | End: 2024-10-08 | Stop reason: HOSPADM

## 2024-10-06 RX ORDER — ACETAMINOPHEN 325 MG/1
650 TABLET ORAL ONCE
Status: DISCONTINUED | OUTPATIENT
Start: 2024-10-06 | End: 2024-10-06 | Stop reason: HOSPADM

## 2024-10-06 RX ORDER — NITROGLYCERIN 0.4 MG/1
0.4 TABLET SUBLINGUAL EVERY 5 MIN PRN
Status: DISCONTINUED | OUTPATIENT
Start: 2024-10-06 | End: 2024-10-08 | Stop reason: HOSPADM

## 2024-10-06 RX ORDER — ISOSORBIDE MONONITRATE 30 MG/1
30 TABLET, EXTENDED RELEASE ORAL DAILY
Status: DISCONTINUED | OUTPATIENT
Start: 2024-10-07 | End: 2024-10-08 | Stop reason: HOSPADM

## 2024-10-06 RX ORDER — BUDESONIDE AND FORMOTEROL FUMARATE DIHYDRATE 160; 4.5 UG/1; UG/1
2 AEROSOL RESPIRATORY (INHALATION) 2 TIMES DAILY
Status: DISCONTINUED | OUTPATIENT
Start: 2024-10-06 | End: 2024-10-06

## 2024-10-06 RX ORDER — ONDANSETRON 4 MG/1
4 TABLET, ORALLY DISINTEGRATING ORAL EVERY 8 HOURS PRN
Status: DISCONTINUED | OUTPATIENT
Start: 2024-10-06 | End: 2024-10-08 | Stop reason: HOSPADM

## 2024-10-06 RX ORDER — ACETAZOLAMIDE 250 MG/1
250 TABLET ORAL DAILY
Status: DISCONTINUED | OUTPATIENT
Start: 2024-10-07 | End: 2024-10-06

## 2024-10-06 RX ORDER — ALPRAZOLAM 0.5 MG
1 TABLET ORAL 3 TIMES DAILY PRN
Status: DISCONTINUED | OUTPATIENT
Start: 2024-10-06 | End: 2024-10-08 | Stop reason: HOSPADM

## 2024-10-06 RX ORDER — ALOGLIPTIN 12.5 MG/1
25 TABLET, FILM COATED ORAL DAILY
Status: DISCONTINUED | OUTPATIENT
Start: 2024-10-07 | End: 2024-10-08 | Stop reason: HOSPADM

## 2024-10-06 RX ORDER — LISINOPRIL 5 MG/1
2.5 TABLET ORAL DAILY
Status: DISCONTINUED | OUTPATIENT
Start: 2024-10-07 | End: 2024-10-08 | Stop reason: HOSPADM

## 2024-10-06 RX ORDER — KETOROLAC TROMETHAMINE 15 MG/ML
15 INJECTION, SOLUTION INTRAMUSCULAR; INTRAVENOUS EVERY 6 HOURS PRN
Status: DISCONTINUED | OUTPATIENT
Start: 2024-10-06 | End: 2024-10-08 | Stop reason: HOSPADM

## 2024-10-06 RX ADMIN — ROSUVASTATIN 20 MG: 20 TABLET, FILM COATED ORAL at 23:00

## 2024-10-06 RX ADMIN — FENTANYL CITRATE 25 MCG: 50 INJECTION INTRAMUSCULAR; INTRAVENOUS at 19:22

## 2024-10-06 RX ADMIN — ALPRAZOLAM 1 MG: 0.5 TABLET ORAL at 23:00

## 2024-10-06 RX ADMIN — DICLOFENAC SODIUM 2 G: 10 GEL TOPICAL at 23:03

## 2024-10-06 RX ADMIN — GUAIFENESIN 600 MG: 600 TABLET, EXTENDED RELEASE ORAL at 23:00

## 2024-10-06 RX ADMIN — FENTANYL CITRATE 25 MCG: 50 INJECTION INTRAMUSCULAR; INTRAVENOUS at 16:50

## 2024-10-06 RX ADMIN — GABAPENTIN 600 MG: 300 CAPSULE ORAL at 22:59

## 2024-10-06 ASSESSMENT — PAIN SCALES - GENERAL
PAINLEVEL_OUTOF10: 0
PAINLEVEL_OUTOF10: 1
PAINLEVEL_OUTOF10: 4
PAINLEVEL_OUTOF10: 10

## 2024-10-06 ASSESSMENT — PAIN DESCRIPTION - LOCATION
LOCATION: LEG
LOCATION: CHEST

## 2024-10-06 ASSESSMENT — PAIN DESCRIPTION - DESCRIPTORS
DESCRIPTORS: ACHING
DESCRIPTORS: ACHING

## 2024-10-06 ASSESSMENT — PAIN DESCRIPTION - ORIENTATION: ORIENTATION: RIGHT;LEFT

## 2024-10-06 NOTE — ED PROVIDER NOTES
EMERGENCY DEPARTMENT PROVIDER NOTE    Patient Identification  Pt Name: Milana Pardo  MRN: [id number]  Birthdate 1945  Date of evaluation: 10/6/2024  Provider: Hubert Tan DO  PCP: Arian Brandon MD    Chief Complaint  Chest Pain (Pt brought in by  ems from home, pt had chest pain while getting up to go to bathroom.  Took her home nitro, got dizzy and fell.  Pt has extensive heart history.  Pain to knees, pt still having chest pain 9/10)      HPI  (History provided by patient and EMS)  This is a 79 y.o. female with pertinent past medical history of CAD, hypertension, diabetes who was brought in by EMS transportation for chest pain which began about 2 hours prior to arrival.  Patient states pain began when she was walking to the bathroom, felt as pressure in her central chest and rating into her left neck and down her left arm.  Took nitroglycerin at home which slightly relieved her pain, although afterwards patient felt lightheaded and nauseous and fell to her knees.  She denies any head injury or loss of consciousness.  Patient received 324 mg aspirin and 4 mg IV Zofran en route by EMS.      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

## 2024-10-07 LAB
BACTERIA URNS QL MICRO: ABNORMAL /HPF
BILIRUB UR QL STRIP.AUTO: NEGATIVE
CLARITY UR: ABNORMAL
COLOR UR: YELLOW
EKG ATRIAL RATE: 53 BPM
EKG DIAGNOSIS: NORMAL
EKG P AXIS: 7 DEGREES
EKG P-R INTERVAL: 168 MS
EKG Q-T INTERVAL: 428 MS
EKG QRS DURATION: 126 MS
EKG QTC CALCULATION (BAZETT): 401 MS
EKG R AXIS: -61 DEGREES
EKG T AXIS: 57 DEGREES
EKG VENTRICULAR RATE: 53 BPM
EPI CELLS #/AREA URNS AUTO: 2 /HPF (ref 0–5)
GLUCOSE BLD-MCNC: 230 MG/DL (ref 70–99)
GLUCOSE UR STRIP.AUTO-MCNC: NEGATIVE MG/DL
HGB UR QL STRIP.AUTO: NEGATIVE
HYALINE CASTS #/AREA URNS AUTO: 1 /LPF (ref 0–8)
KETONES UR STRIP.AUTO-MCNC: NEGATIVE MG/DL
LEUKOCYTE ESTERASE UR QL STRIP.AUTO: ABNORMAL
NITRITE UR QL STRIP.AUTO: POSITIVE
PERFORMED ON: ABNORMAL
PH UR STRIP.AUTO: 5.5 [PH] (ref 5–8)
PROT UR STRIP.AUTO-MCNC: NEGATIVE MG/DL
RBC CLUMPS #/AREA URNS AUTO: 0 /HPF (ref 0–4)
SP GR UR STRIP.AUTO: 1.01 (ref 1–1.03)
UA COMPLETE W REFLEX CULTURE PNL UR: YES
UA DIPSTICK W REFLEX MICRO PNL UR: YES
URN SPEC COLLECT METH UR: ABNORMAL
UROBILINOGEN UR STRIP-ACNC: 0.2 E.U./DL
WBC #/AREA URNS AUTO: 47 /HPF (ref 0–5)

## 2024-10-07 PROCEDURE — 1200000000 HC SEMI PRIVATE

## 2024-10-07 PROCEDURE — 87186 SC STD MICRODIL/AGAR DIL: CPT

## 2024-10-07 PROCEDURE — 6370000000 HC RX 637 (ALT 250 FOR IP): Performed by: INTERNAL MEDICINE

## 2024-10-07 PROCEDURE — 99222 1ST HOSP IP/OBS MODERATE 55: CPT | Performed by: INTERNAL MEDICINE

## 2024-10-07 PROCEDURE — 93010 ELECTROCARDIOGRAM REPORT: CPT | Performed by: INTERNAL MEDICINE

## 2024-10-07 PROCEDURE — 81001 URINALYSIS AUTO W/SCOPE: CPT

## 2024-10-07 PROCEDURE — 87077 CULTURE AEROBIC IDENTIFY: CPT

## 2024-10-07 PROCEDURE — 87086 URINE CULTURE/COLONY COUNT: CPT

## 2024-10-07 RX ORDER — CEFUROXIME AXETIL 500 MG/1
500 TABLET ORAL 2 TIMES DAILY
Qty: 10 TABLET | Refills: 0 | Status: SHIPPED | OUTPATIENT
Start: 2024-10-07 | End: 2024-10-12

## 2024-10-07 RX ORDER — CEFUROXIME AXETIL 250 MG/1
500 TABLET ORAL EVERY 12 HOURS SCHEDULED
Status: DISCONTINUED | OUTPATIENT
Start: 2024-10-07 | End: 2024-10-07

## 2024-10-07 RX ORDER — CEFUROXIME AXETIL 250 MG/1
500 TABLET ORAL 2 TIMES DAILY
Status: DISCONTINUED | OUTPATIENT
Start: 2024-10-07 | End: 2024-10-08 | Stop reason: HOSPADM

## 2024-10-07 RX ORDER — IBUPROFEN 400 MG/1
400 TABLET, FILM COATED ORAL EVERY 8 HOURS PRN
Status: DISCONTINUED | OUTPATIENT
Start: 2024-10-07 | End: 2024-10-08 | Stop reason: HOSPADM

## 2024-10-07 RX ORDER — GLUCAGON 1 MG/ML
1 KIT INJECTION PRN
Status: DISCONTINUED | OUTPATIENT
Start: 2024-10-07 | End: 2024-10-08 | Stop reason: HOSPADM

## 2024-10-07 RX ORDER — DEXTROSE MONOHYDRATE 100 MG/ML
INJECTION, SOLUTION INTRAVENOUS CONTINUOUS PRN
Status: DISCONTINUED | OUTPATIENT
Start: 2024-10-07 | End: 2024-10-08 | Stop reason: HOSPADM

## 2024-10-07 RX ADMIN — GUAIFENESIN 600 MG: 600 TABLET, EXTENDED RELEASE ORAL at 08:28

## 2024-10-07 RX ADMIN — DICLOFENAC SODIUM 2 G: 10 GEL TOPICAL at 08:33

## 2024-10-07 RX ADMIN — GABAPENTIN 600 MG: 300 CAPSULE ORAL at 14:06

## 2024-10-07 RX ADMIN — DICLOFENAC SODIUM 2 G: 10 GEL TOPICAL at 19:52

## 2024-10-07 RX ADMIN — PANTOPRAZOLE SODIUM 40 MG: 40 TABLET, DELAYED RELEASE ORAL at 05:29

## 2024-10-07 RX ADMIN — GUAIFENESIN 600 MG: 600 TABLET, EXTENDED RELEASE ORAL at 19:51

## 2024-10-07 RX ADMIN — LISINOPRIL 2.5 MG: 5 TABLET ORAL at 08:28

## 2024-10-07 RX ADMIN — GLIPIZIDE 10 MG: 5 TABLET ORAL at 16:16

## 2024-10-07 RX ADMIN — FUROSEMIDE 40 MG: 40 TABLET ORAL at 08:28

## 2024-10-07 RX ADMIN — ALOGLIPTIN 25 MG: 12.5 TABLET, FILM COATED ORAL at 08:28

## 2024-10-07 RX ADMIN — ISOSORBIDE MONONITRATE 30 MG: 30 TABLET, EXTENDED RELEASE ORAL at 08:28

## 2024-10-07 RX ADMIN — GLIPIZIDE 10 MG: 5 TABLET ORAL at 08:28

## 2024-10-07 RX ADMIN — CEFUROXIME AXETIL 500 MG: 250 TABLET ORAL at 19:51

## 2024-10-07 RX ADMIN — IBUPROFEN 400 MG: 400 TABLET, FILM COATED ORAL at 14:06

## 2024-10-07 RX ADMIN — ALPRAZOLAM 1 MG: 0.5 TABLET ORAL at 11:19

## 2024-10-07 RX ADMIN — ROSUVASTATIN 20 MG: 20 TABLET, FILM COATED ORAL at 19:51

## 2024-10-07 RX ADMIN — ALPRAZOLAM 1 MG: 0.5 TABLET ORAL at 22:11

## 2024-10-07 RX ADMIN — GABAPENTIN 600 MG: 300 CAPSULE ORAL at 08:28

## 2024-10-07 RX ADMIN — GABAPENTIN 600 MG: 300 CAPSULE ORAL at 19:51

## 2024-10-07 RX ADMIN — CEFUROXIME AXETIL 500 MG: 250 TABLET ORAL at 14:06

## 2024-10-07 RX ADMIN — RIVAROXABAN 15 MG: 15 TABLET, FILM COATED ORAL at 08:29

## 2024-10-07 ASSESSMENT — PAIN SCALES - GENERAL
PAINLEVEL_OUTOF10: 4
PAINLEVEL_OUTOF10: 3
PAINLEVEL_OUTOF10: 5

## 2024-10-07 ASSESSMENT — PAIN DESCRIPTION - LOCATION
LOCATION: GENERALIZED
LOCATION: HEAD

## 2024-10-07 NOTE — RT PROTOCOL NOTE
RT Inhaler-Nebulizer Bronchodilator Protocol Note    There is a bronchodilator order in the chart from a provider indicating to follow the RT Bronchodilator Protocol and there is an “Initiate RT Inhaler-Nebulizer Bronchodilator Protocol” order as well (see protocol at bottom of note).    CXR Findings:  XR CHEST PORTABLE    Result Date: 10/6/2024  Mild cardiomegaly and mild central pulmonary congestion which is more apparent Hazy bibasilar interstitial edema versus early infiltrates or atelectasis.       The findings from the last RT Protocol Assessment were as follows:   History Pulmonary Disease: Chronic pulmonary disease  Respiratory Pattern: Dyspnea on exertion or RR 21-25 bpm  Breath Sounds: Slightly diminished and/or crackles  Cough: Weak, non-productive  Indication for Bronchodilator Therapy: On home bronchodilators  Bronchodilator Assessment Score: 9    Aerosolized bronchodilator medication orders have been revised according to the RT Inhaler-Nebulizer Bronchodilator Protocol below.    Respiratory Therapist to perform RT Therapy Protocol Assessment initially then follow the protocol.  Repeat RT Therapy Protocol Assessment PRN for score 0-3 or on second treatment, BID, and PRN for scores above 3.    No Indications - adjust the frequency to every 6 hours PRN wheezing or bronchospasm, if no treatments needed after 48 hours then discontinue using Per Protocol order mode.     If indication present, adjust the RT bronchodilator orders based on the Bronchodilator Assessment Score as indicated below.  Use Inhaler orders unless patient has one or more of the following: on home nebulizer, not able to hold breath for 10 seconds, is not alert and oriented, cannot activate and use MDI correctly, or respiratory rate 25 breaths per minute or more, then use the equivalent nebulizer order(s) with same Frequency and PRN reasons based on the score.  If a patient is on this medication at home then do not decrease Frequency below

## 2024-10-07 NOTE — RT PROTOCOL NOTE
RT Inhaler-Nebulizer Bronchodilator Protocol Note    There is a bronchodilator order in the chart from a provider indicating to follow the RT Bronchodilator Protocol and there is an “Initiate RT Inhaler-Nebulizer Bronchodilator Protocol” order as well (see protocol at bottom of note).    CXR Findings:  XR CHEST PORTABLE    Result Date: 10/6/2024  Mild cardiomegaly and mild central pulmonary congestion which is more apparent Hazy bibasilar interstitial edema versus early infiltrates or atelectasis.       The findings from the last RT Protocol Assessment were as follows:   History Pulmonary Disease: Chronic pulmonary disease  Respiratory Pattern: Dyspnea on exertion or RR 21-25 bpm  Breath Sounds: Intermittent or unilateral wheezes  Cough: Weak, non-productive  Indication for Bronchodilator Therapy: On home bronchodilators  Bronchodilator Assessment Score: 11    Aerosolized bronchodilator medication orders have been revised according to the RT Inhaler-Nebulizer Bronchodilator Protocol below.    Respiratory Therapist to perform RT Therapy Protocol Assessment initially then follow the protocol.  Repeat RT Therapy Protocol Assessment PRN for score 0-3 or on second treatment, BID, and PRN for scores above 3.    No Indications - adjust the frequency to every 6 hours PRN wheezing or bronchospasm, if no treatments needed after 48 hours then discontinue using Per Protocol order mode.     If indication present, adjust the RT bronchodilator orders based on the Bronchodilator Assessment Score as indicated below.  Use Inhaler orders unless patient has one or more of the following: on home nebulizer, not able to hold breath for 10 seconds, is not alert and oriented, cannot activate and use MDI correctly, or respiratory rate 25 breaths per minute or more, then use the equivalent nebulizer order(s) with same Frequency and PRN reasons based on the score.  If a patient is on this medication at home then do not decrease Frequency below

## 2024-10-07 NOTE — PLAN OF CARE
Problem: Chronic Conditions and Co-morbidities  Goal: Patient's chronic conditions and co-morbidity symptoms are monitored and maintained or improved  10/7/2024 1927 by Ashley Valenzuela, RN  Outcome: Progressing

## 2024-10-07 NOTE — PLAN OF CARE
Problem: Chronic Conditions and Co-morbidities  Goal: Patient's chronic conditions and co-morbidity symptoms are monitored and maintained or improved  10/7/2024 1146 by Karla Gayle RN  Outcome: Progressing     Problem: Discharge Planning  Goal: Discharge to home or other facility with appropriate resources  10/7/2024 1146 by Karla Gayle, RN  Outcome: Progressing     Problem: Safety - Adult  Goal: Free from fall injury  10/7/2024 1146 by Karla Gayle, RN  Outcome: Progressing

## 2024-10-07 NOTE — CONSULTS
[facility] [place]   CONSULTATION  [phone]      Chief Complaint   Patient presents with    Chest Pain     Pt brought in by ff ems from home, pt had chest pain while getting up to go to bathroom.  Took her home nitro, got dizzy and fell.  Pt has extensive heart history.  Pain to knees, pt still having chest pain 9/10            History of Present Illness:  Milana Pardo is a 79 y.o. patient who presented to the hospital with complaints of chest pain. She had substernal chest tightness radiating to the L arm. It began while she was at home. She took a nitro and felt dizzy and fell down. She called EMS who brought her to the ED. She is currently pain free but is complaining of fatigue, has concern about getting oob on her own. I have been asked to provide consultation regarding further management and testing.      Past Medical History:   has a past medical history of Acute MI (HCC), Anxiety and depression, Arthritis, CAD (coronary artery disease), Cancer (HCC), Cancer (HCC), Cancer of skin of leg, Chronic obstructive pulmonary disease (HCC), Claustrophobia, COPD (chronic obstructive pulmonary disease) (HCC), Emphysema lung (HCC), ESBL (extended spectrum beta-lactamase) producing bacteria infection, Esophageal stricture, Gastroesophageal reflux disease without esophagitis, Hiatal hernia, Hypercholesteremia, Hypertension, IBS (irritable bowel syndrome), Macular degeneration, Migraines, Movement disorder, Paroxysmal atrial fibrillation (HCC), Pneumonia, PONV (postoperative nausea and vomiting), Thoracic radiculopathy, Type II or unspecified type diabetes mellitus without mention of complication, not stated as uncontrolled, and Unspecified cerebral artery occlusion with cerebral infarction.    Surgical History:   has a past surgical history that includes Cardiac surgery; Gallbladder surgery; Hysterectomy; Appendectomy; Cholecystectomy; Colonoscopy; Upper gastrointestinal endoscopy (04/20/2012); Endoscopy, colon,

## 2024-10-07 NOTE — FLOWSHEET NOTE
10/07/24 1920   Assessment   Charting Type Shift assessment   Psychosocial   Psychosocial (WDL) WDL   Neurological   Neuro (WDL) WDL   [place] Coma Scale   Eye Opening 4   Best Verbal Response 5   Best Motor Response 6   [place] Coma Scale Score 15   HEENT (Head, Ears, Eyes, Nose, & Throat)   HEENT (WDL) WDL   Respiratory   Respiratory (WDL) X   Respiratory Pattern Regular   Respiratory Depth Normal   Respiratory Quality/Effort Unlabored;Dyspnea with exertion   L Breath Sounds Diminished   R Breath Sounds Diminished   Cardiac   Cardiac (WDL) X   Cardiac Regularity Regular   Cardiac Rhythm Sinus kirsten   Gastrointestinal   Abdominal (WDL) X   Abdomen Inspection Obese;Soft   RUQ Bowel Sounds Active   LUQ Bowel Sounds Active   RLQ Bowel Sounds Active   LLQ Bowel Sounds Active   Tenderness Soft;Nontender   Genitourinary   Genitourinary (WDL) X   Skin Integumentary    Skin Integumentary (WDL) WDL   Handoff   Communication Given Shift Handoff   Handoff Given To Ashley   Handoff Received From Karla   Handoff Communication Face to Face   Time Handoff Given 1921   End of Shift Check Performed Yes

## 2024-10-07 NOTE — H&P
[place] [address]                           HISTORY & PHYSICAL      PATIENT NAME: SERA OLIVARES                : 1945  MED REC NO: [id number]                      ROOM: [place] [address]  ACCOUNT NO: [id number]                       ADMIT DATE: 10/06/2024  PROVIDER: Arian Brandon MD      HISTORY OF PRESENT ILLNESS:  The patient is a 79-year-old white American lady, known to me from my private practice and recent hospitalization, who came to the emergency room with history of atypical chest pain.  She was brought in from the home.  She was trying to get up, go to the bathroom, got dizzy and fell.  The patient has extensive heart history.  Her last angiogram resulted into stent placement on 2 blood vessels about 18 months ago.    PAST MEDICAL HISTORY:  Pertinent for hypertension, ASHD, COPD, acute MI, anxiety neurosis, depression, pneumonia, gastroesophageal reflux disease, claustrophobia, esophageal stricture, tobacco abuse, skin cancer of the lungs, chronic obstructive lung disease, CKD, type 2 diabetes mellitus, history of CVA, paroxysmal atrial fibrillation, emphysema of the lung, thoracic radiculopathy and macular degeneration.    PAST SURGICAL HISTORY:  Pertinent for upper GI endoscopy, multiple colonoscopies, bronchoscopy, cardiac surgery, gallbladder surgery, hysterectomy, appendectomy, esophageal dilation, coronary angioplasty with stent placement.    MEDICATIONS:  The patient is on Xanax, amlodipine, Voltaren Gel, Lasix, gabapentin, glipizide, Mucinex, isosorbide mononitrate, Prinivil, Protonix, Xarelto, and Crestor.    ALLERGIES:  THE PATIENT IS ALLERGIC TO 6 DIFFERENT SUBSTANCES THAT INCLUDE MORPHINE, CODEINE, HYDROMORPHONE, LEVAQUIN, VICODIN, AND ASPIRIN.      SOCIAL HISTORY:  She is a .  She has 2 grown children.  There have been drug overdose deaths in her family.  She has been a moderately heavy smoker all alone,

## 2024-10-07 NOTE — DISCHARGE INSTR - COC
Continuity of Care Form    Patient Name: Milana Pardo   :  1945  MRN:  [id number]    Admit date:  10/6/2024  Discharge date:  10/8/2024    Code Status Order: Full Code   Advance Directives:   Advance Care Flowsheet Documentation             Admitting Physician:  Arian Brandon MD  PCP: Arian Brandon MD    Discharging Nurse: AS  Discharging Hospital Unit/Room#: U7J-1633/5902-01  Discharging Unit Phone Number: [phone]    Emergency Contact:   Extended Emergency Contact Information  Primary Emergency Contact: Brenda Bedolla   [facility]  Home Phone: [phone]  Relation: Child  Secondary Emergency Contact: Rhea Bedolla  Home Phone: [phone]  Relation: Grandchild    Past Surgical History:  Past Surgical History:   Procedure Laterality Date    APPENDECTOMY      BRONCHOSCOPY N/A 2020    BRONCHOSCOPY ALVEOLAR LAVAGE performed by Tc Carter MD at [facility] ENDOSCOPY    BRONCHOSCOPY N/A 2020    BRONCHOSCOPY DIAGNOSTIC OR CELL WASH ONLY performed by Jarocho Finley MD at [facility] ENDOSCOPY    BRONCHOSCOPY N/A 2020    BRONCHOSCOPY DIAGNOSTIC OR CELL WASH ONLY performed by Abran Abreu MD at [facility] ENDOSCOPY    CARDIAC SURGERY      1 stent  and 1 stent     CATARACT REMOVAL   or     bilateral cataracts removed    CHOLECYSTECTOMY      COLONOSCOPY      COLONOSCOPY  2018    CORONARY ANGIOPLASTY WITH STENT PLACEMENT      ENDOSCOPY, COLON, DIAGNOSTIC      ESOPHAGEAL DILATATION      EYE SURGERY      bilateral cataracts    GALLBLADDER SURGERY      HYSTERECTOMY (CERVIX STATUS UNKNOWN)      WV EXCISION MALIGNANT LESION S/N/H/F/G 0.5 CM/< N/A 2018    EXCISION OF SCALP SQUAMOUS CELL CARCINOMA performed by Devyn Colin MD at [facility] OR    WV SPLIT AGRFT T/A/L 1ST 100 CM/&/1% BDY INFT/CHLD N/A 2018    SPLIT THICKNESS SKIN GRAFT FOR COVERAGE SCALP, APPLICATION OF WOUND VAC DEVICE performed by Devyn Colin MD at [facility] OR

## 2024-10-08 VITALS
WEIGHT: 273.81 LBS | BODY MASS INDEX: 44.01 KG/M2 | OXYGEN SATURATION: 92 % | DIASTOLIC BLOOD PRESSURE: 62 MMHG | SYSTOLIC BLOOD PRESSURE: 142 MMHG | TEMPERATURE: 97.6 F | HEART RATE: 64 BPM | RESPIRATION RATE: 18 BRPM | HEIGHT: 66 IN

## 2024-10-08 LAB
GLUCOSE BLD-MCNC: 198 MG/DL (ref 70–99)
PERFORMED ON: ABNORMAL

## 2024-10-08 PROCEDURE — 97530 THERAPEUTIC ACTIVITIES: CPT

## 2024-10-08 PROCEDURE — 97161 PT EVAL LOW COMPLEX 20 MIN: CPT

## 2024-10-08 PROCEDURE — 97165 OT EVAL LOW COMPLEX 30 MIN: CPT

## 2024-10-08 PROCEDURE — 6370000000 HC RX 637 (ALT 250 FOR IP): Performed by: INTERNAL MEDICINE

## 2024-10-08 RX ADMIN — CEFUROXIME AXETIL 500 MG: 250 TABLET ORAL at 10:04

## 2024-10-08 RX ADMIN — FUROSEMIDE 40 MG: 40 TABLET ORAL at 10:04

## 2024-10-08 RX ADMIN — ALOGLIPTIN 25 MG: 12.5 TABLET, FILM COATED ORAL at 10:03

## 2024-10-08 RX ADMIN — LISINOPRIL 2.5 MG: 5 TABLET ORAL at 10:03

## 2024-10-08 RX ADMIN — ISOSORBIDE MONONITRATE 30 MG: 30 TABLET, EXTENDED RELEASE ORAL at 10:04

## 2024-10-08 RX ADMIN — RIVAROXABAN 15 MG: 15 TABLET, FILM COATED ORAL at 10:04

## 2024-10-08 RX ADMIN — GABAPENTIN 600 MG: 300 CAPSULE ORAL at 10:03

## 2024-10-08 RX ADMIN — DICLOFENAC SODIUM 2 G: 10 GEL TOPICAL at 10:09

## 2024-10-08 RX ADMIN — GUAIFENESIN 600 MG: 600 TABLET, EXTENDED RELEASE ORAL at 10:04

## 2024-10-08 RX ADMIN — GLIPIZIDE 10 MG: 5 TABLET ORAL at 10:04

## 2024-10-08 RX ADMIN — ALPRAZOLAM 1 MG: 0.5 TABLET ORAL at 10:05

## 2024-10-08 RX ADMIN — PANTOPRAZOLE SODIUM 40 MG: 40 TABLET, DELAYED RELEASE ORAL at 05:25

## 2024-10-08 ASSESSMENT — PAIN SCALES - GENERAL: PAINLEVEL_OUTOF10: 8

## 2024-10-08 ASSESSMENT — PAIN DESCRIPTION - ORIENTATION: ORIENTATION: LEFT;RIGHT

## 2024-10-08 ASSESSMENT — PAIN DESCRIPTION - DESCRIPTORS: DESCRIPTORS: ACHING

## 2024-10-08 ASSESSMENT — PAIN - FUNCTIONAL ASSESSMENT: PAIN_FUNCTIONAL_ASSESSMENT: PREVENTS OR INTERFERES SOME ACTIVE ACTIVITIES AND ADLS

## 2024-10-08 ASSESSMENT — PAIN DESCRIPTION - LOCATION: LOCATION: HEAD;BACK;KNEE

## 2024-10-08 NOTE — CARE COORDINATION
Case Management Assessment  Initial Evaluation    Date/Time of Evaluation: 10/7/2024 4:20 PM  Assessment Completed by: JESUS NUNEZ RN    If patient is discharged prior to next notation, then this note serves as note for discharge by case management.    Patient Name: Milana Pardo                   YOB: 1945  Diagnosis: Chest pain [R07.9]  Acute chest pain [R07.9]  Near syncope [R55]                   Date / Time: 10/6/2024  3:47 PM    Patient Admission Status: Inpatient   Readmission Risk (Low < 19, Mod (19-27), High > 27): Readmission Risk Score: 12.9    Current PCP: Arian Brandon MD  PCP verified by CM? No    Chart Reviewed: Yes      History Provided by: Patient, Medical Record  Patient Orientation: Alert and Oriented, Person, Place, Situation    Patient Cognition: Alert    Hospitalization in the last 30 days (Readmission):  No    If yes, Readmission Assessment in  Navigator will be completed.    Advance Directives:      Code Status: Full Code   Patient's Primary Decision Maker is: Legal Next of Kin    Primary Decision Maker: Brenda Bedolla [facility] - [phone]    Discharge Planning:    Patient lives with: Alone Type of Home: Apartment (Senior Living apartment)  Primary Care Giver: Self  Patient Support Systems include: Home Care Staff, Meals on Wheels, ZHANE/Passport, Homemaker (Active with [facility]  & [facility] [place])   Current Financial resources: Medicare  Current community resources: ECF/Home Care, Transportation  Current services prior to admission: Durable Medical Equipment, Home Care, ZHANE/Passport, Emergency Call  System            Current DME: Hospital Bed, Cane, Shower Chair, Walker, Wheelchair, Other (Comment) (Lift chair, Electrical scooter)            Type of Home Care services:  Meals on Wheels, Nursing Services, Aide Services, Housekeeping    ADLS  Prior functional level: Assistance with the following:, Bathing, Dressing, Cooking, Housework, Shopping, Mobility  Current 
home.      Electronically signed by JESUS NUNEZ RN/BSN/CCM on 10/8/2024

## 2024-10-09 LAB
BACTERIA UR CULT: ABNORMAL
ORGANISM: ABNORMAL

## 2024-10-10 NOTE — PROGRESS NOTES
[facility] - Inpatient Rehabilitation Department   Phone: [phone]    Occupational Therapy    [x] Initial Evaluation            [] Daily Treatment Note         [] Discharge Summary      Patient: Milana Pardo   : 1945   MRN: [id number]   Date of Service:  10/8/2024    Admitting Diagnosis:  Chest pain  Current Admission Summary: 79 y.o. patient who presented to the hospital with complaints of chest pain. She had substernal chest tightness radiating to the L arm. It began while she was at home. She took a nitro and felt dizzy and fell down. She called EMS who brought her to the ED. She is currently pain free but is complaining of fatigue   Past Medical History:  has a past medical history of Acute MI (HCC), Anxiety and depression, Arthritis, CAD (coronary artery disease), Cancer (HCC), Cancer (HCC), Cancer of skin of leg, Chronic obstructive pulmonary disease (HCC), Claustrophobia, COPD (chronic obstructive pulmonary disease) (HCC), Emphysema lung (HCC), ESBL (extended spectrum beta-lactamase) producing bacteria infection, Esophageal stricture, Gastroesophageal reflux disease without esophagitis, Hiatal hernia, Hypercholesteremia, Hypertension, IBS (irritable bowel syndrome), Macular degeneration, Migraines, Movement disorder, Paroxysmal atrial fibrillation (HCC), Pneumonia, PONV (postoperative nausea and vomiting), Thoracic radiculopathy, Type II or unspecified type diabetes mellitus without mention of complication, not stated as uncontrolled, and Unspecified cerebral artery occlusion with cerebral infarction.  Past Surgical History:  has a past surgical history that includes Cardiac surgery; Gallbladder surgery; Hysterectomy; Appendectomy; Cholecystectomy; Colonoscopy; Upper gastrointestinal endoscopy (2012); Endoscopy, colon, diagnostic; Tear duct surgery; Upper gastrointestinal endoscopy (2018); Colonoscopy (2018); pr excision malignant lesion s/n/h/f/g 0.5 cm/< (N/A, 
  Physician Progress Note      PATIENT:               SERA OLIVARES  CSN #:                  [id number]  :                       1945  ADMIT DATE:       10/6/2024 3:47 PM  DISCH DATE:        10/8/2024 11:40 AM  RESPONDING  PROVIDER #:        Arian Brandon MD          QUERY TEXT:    Pt admitted with atypical Chest pain.  Pt noted to have \" chest pain while   getting up to go to bathroom\" (ED notes 10/6) If possible, please document in   progress notes and discharge summary if you are evaluating and/or treating any   of the following:    The medical record reflects the following:  Risk Factors: COPD, anxiety, IBS, GERD  Clinical Indicators: ED notes 10/6 \"pt had chest pain while getting up to go   to bathroom. \"; MD notes 10/8 \"HS trop 15, 13, 13  Ecg unchanged from prior.  No cardiac cause for chest pan.\"  Treatment: Cardiac panel, cardiology consult, Meds-Imdur, Protonix, Xanax  Options provided:  -- Chest pain due to GERD  -- Chest pain due to anxiety  -- Other - I will add my own diagnosis  -- Disagree - Not applicable / Not valid  -- Disagree - Clinically unable to determine / Unknown  -- Refer to Clinical Documentation Reviewer    PROVIDER RESPONSE TEXT:    Chest pain due to anxiety    Query created by: Shanae Monaco on 10/8/2024 11:36 AM      Electronically signed by:  Arian Brandon MD 10/10/2024 1:58 PM          
4 Eyes Skin Assessment     NAME:  Milana Pardo  YOB: 1945  MEDICAL RECORD NUMBER:  [id number]    The patient is being assessed for  Admission    I agree that at least one RN has performed a thorough Head to Toe Skin Assessment on the patient. ALL assessment sites listed below have been assessed.      Areas assessed by both nurses:    Head, Face, Ears, Shoulders, Back, Chest, Arms, Elbows, Hands, Sacrum. Buttock, Coccyx, Ischium, Legs. Feet and Heels, and Under Medical Devices         Does the Patient have a Wound? No noted wound(s)       Guerrero Prevention initiated by RN: Yes  Wound Care Orders initiated by RN: No    Pressure Injury (Stage 3,4, Unstageable, DTI, NWPT, and Complex wounds) if present, place Wound referral order by RN under : No    New Ostomies, if present place, Ostomy referral order under : No     Nurse 1 eSignature: Electronically signed by LORA TORRES RN on 10/6/24 at 10:05 PM EDT    SHARE this note so that the co-signing nurse can place an eSignature  Nurse 2 eSignature: Electronically signed by Heather Bernal RN on 10/6/24 at 10:07 PM EDT   
CLINICAL PHARMACY NOTE: MEDS TO BEDS    Total # of Prescriptions Filled: 1   The following medications were delivered to the patient:  CEFUROXIME AXETIL 500MG TABS    Additional Documentation: Delivered to Neyda LONG=signed  Frances Alcocer Pharmacy Tech  
D pt chest pain has returend following last pain medication administration  A pain medication given per mar  R will continue to monitor protocol.   
Data- discharge order received, pt verbalized agreement to discharge, needs for Home Health Care with *** for PT/OT/RN, JOSE G reviewed and signed by MD, to be completed by RN.    Action- AVS prepared, discharge instructions prepared and given to pt, medication information packet given r/t NEW or CHANGED prescriptions, pt verbalized understanding further self-review.   D/C instruction summary: Diet- cardiac, Activity- up as tolerated, follow up with Primary Care Physician Arian Brandon -[phone] appointment in a week, immunizations reviewed and updated, medications prescriptions to be filled at pharmacy of choice. Inpatient surgical procedural reviewed: NA. Contact information provided to above agencies used.    Response- Case Management/ reported faxing completed JOSE G and AVS to needed HHC/DME services stated above.   Pt belongings gathered, IV removed, pt dressed appropriately. Disposition is home with HHC/DME as stated above, transported with belongings, discharge order and discharge med, taken to lobby via w/c with RN, no complications.     1. WEIGHT: Admit Weight - Scale: 124.3 kg (274 lb) (10/06/24 2151)        Today  Weight - Scale: 124.2 kg (273 lb 13 oz) (10/08/24 0315)       2. O2 SAT.: SpO2: 92 % (10/08/24 1000)   
ED TO INPATIENT SBAR HANDOFF    Patient Name: Milana Pardo   :  1945  79 y.o.   MRN:  [id number]  Preferred Name    ED Room #:  ED-0011/11  Family/Caregiver Present no   Restraints no   Sitter no   Sepsis Risk Score      Situation  Code Status: Prior No additional code details.    Allergies: Morphine, Codeine, Hydromorphone, Levaquin [levofloxacin in d5w], Vicodin [hydrocodone-acetaminophen], and Aspirin  Weight: No data found.  Arrived from: home  Chief Complaint:   Chief Complaint   Patient presents with    Chest Pain     Pt brought in by  ems from home, pt had chest pain while getting up to go to bathroom.  Took her home nitro, got dizzy and fell.  Pt has extensive heart history.  Pain to knees, pt still having chest pain 9/10     Hospital Problem/Diagnosis:  Principal Problem:    Chest pain  Resolved Problems:    * No resolved hospital problems. *    Imaging:   XR CHEST PORTABLE   Final Result   Mild cardiomegaly and mild central pulmonary congestion which is more apparent      Hazy bibasilar interstitial edema versus early infiltrates or atelectasis.           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 
Hospital Problems             Last Modified POA    * (Principal) Chest pain 10/6/2024 Yes    ASHD (arteriosclerotic heart disease) 10/6/2024 Yes    Overview Signed 5/23/2011  7:47 AM by Archana Melvin RN     8/01 stent -LAD         HTN (hypertension) 10/6/2024 Yes    COPD (chronic obstructive pulmonary disease) (HCC) 10/6/2024 Yes    Morbid obesity due to excess calories 10/6/2024 Yes   H&P dictated     
How does patient ambulate?   [x]Low Fall Risk (ambulates by themselves without support)  []Stand by assist   []Contact Guard   []Front wheel walker  []Wheelchair   []Steady  []Bed bound  []History of Lower Extremity Amputation  []Unknown, did not assess in the emergency department   How does patient take pills?  [x]Whole with Water  []Crushed in applesauce  []Crushed in pudding  []Other  []Unknown no oral medications were given in the ED  Is patient alert?   [x]Alert  []Drowsy but responds to voice  []Doesn't respond to voice but responds to painful stimuli  []Unresponsive  Is patient oriented?   [x]To person  [x]To place  [x]To time  [x]To situation  []Confused  []Agitated  []Follows commands  If patient is disoriented or from a Skill Nursing Facility has family been notified of admission?   []Yes   [x]No  Patient belongings?   [x]Cell phone  []Wallet   []Dentures  [x]Clothing  Any specific patient or family belongings/needs/dynamics?     Miscellaneous comments/pending orders?       If there are any additional questions please reach out to the Emergency Department.         
Pharmacy Home Medication Reconciliation Note    A medication reconciliation has been completed for Milana Pardo 1945    Pharmacy: [facility] [address] [place] [address]  Information provided by: patient    The patient's home medication list is as follows:  No current facility-administered medications on file prior to encounter.     Current Outpatient Medications on File Prior to Encounter   Medication Sig Dispense Refill    OXYGEN Inhale 2 L/min into the lungs nightly      ALPRAZolam (XANAX) 1 MG tablet Take 1 tablet by mouth 3 times daily as needed for Anxiety for up to 30 days. Max Daily Amount: 3 mg 90 tablet 0    ondansetron (ZOFRAN-ODT) 4 MG disintegrating tablet Take 1 tablet by mouth every 8 hours as needed for Nausea or Vomiting 30 tablet 0    albuterol sulfate HFA (PROVENTIL;VENTOLIN;PROAIR) 108 (90 Base) MCG/ACT inhaler INHALE TWO PUFFS BY MOUTH EVERY 6 HOURS AS NEEDED FOR WHEEZING (Patient taking differently: Inhale 2 puffs into the lungs every 6 hours as needed for Wheezing INHALE TWO PUFFS BY MOUTH EVERY 6 HOURS AS NEEDED FOR WHEEZING) 18 g 5    diclofenac sodium (VOLTAREN) 1 % GEL Apply 2 g topically 2 times daily 200 g 5    fluticasone-umeclidin-vilant (TRELEGY ELLIPTA) 200-62.5-25 MCG/ACT AEPB inhaler Inhale 1 puff into the lungs daily 3 each 3    glipiZIDE (GLUCOTROL) 10 MG tablet Take 1 tablet by mouth 2 times daily (with meals) 180 tablet 3    omeprazole (PRILOSEC) 40 MG delayed release capsule Take 1 capsule by mouth daily 90 capsule 3    rivaroxaban (XARELTO) 15 MG TABS tablet Take 1 tablet by mouth daily (Patient taking differently: Take 1 tablet by mouth daily (with breakfast)) 90 tablet 0    linagliptin (TRADJENTA) 5 MG tablet Take 1 tablet by mouth daily 90 tablet 1    guaiFENesin (MUCINEX) 600 MG extended release tablet Take 1 tablet by mouth 2 times daily (Patient taking differently: Take 1 tablet by mouth 2 times daily as needed for Congestion) 60 tablet 11    lisinopril 
Pt Aox4 VVS pt sated no pain or SOB place pt on 2l o2. pt orientated to  room call light in reach bed alarm on.   
Oral, Daily with breakfast  rosuvastatin (CRESTOR) tablet 20 mg, 20 mg, Oral, Nightly  sodium chloride (OCEAN, BABY AYR) 0.65 % nasal spray 1 spray, 1 spray, Nasal, PRN  nitroGLYCERIN (NITROSTAT) SL tablet 0.4 mg, 0.4 mg, SubLINGual, Q5 Min PRN  ketorolac (TORADOL) injection 15 mg, 15 mg, IntraVENous, Q6H PRN    Physical      VITALS:    /86   Pulse 81   Temp 97.9 °F (36.6 °C) (Temporal)   Resp 16   Ht 1.676 m (5' 6\")   Wt 124.2 kg (273 lb 13 oz)   SpO2 90%   BMI 44.19 kg/m²   TEMPERATURE:  Current - Temp: 97.9 °F (36.6 °C); Max - Temp  Av.2 °F (36.2 °C)  Min: 96.9 °F (36.1 °C)  Max: 97.9 °F (36.6 °C)  RESPIRATIONS RANGE: Resp  Av  Min: 16  Max: 16  PULSE RANGE: Pulse  Av.3  Min: 53  Max: 81  BLOOD PRESSURE RANGE:  Systolic (24hrs), Av , Min:108 , Max:122     ; Diastolic (24hrs), Av, Min:63, Max:93      PULSE OXIMETRY RANGE: SpO2  Av.3 %  Min: 90 %  Max: 93 %  24HR INTAKE/OUTPUT:      Intake/Output Summary (Last 24 hours) at 10/8/2024 0840  Last data filed at 10/8/2024 0321  Gross per 24 hour   Intake 480 ml   Output 2100 ml   Net -1620 ml       CONSTITUTIONAL:  awake, alert, cooperative, no apparent distress, and appears stated age  NECK:  supple, symmetrical, trachea midline and skin normal  LUNGS:  Moderate increase in work of breathing abrt crackles and exp wheezes   CARDIOVASCULAR:  Normal apical impulse, regular rate and rhythm, normal S1 and S2, no S3 or S4, and no murmur noted  ABDOMEN:  No scars, normal bowel sounds, soft, non-distended, non-tender, no masses palpated, no hepatosplenomegally  MUSCULOSKELETAL:  ++ edema  NEUROLOGIC:  No acute focal change    Data      Lab Results   Component Value Date/Time    PHART 7.446 2017 12:26 PM    PO2ART 77.3 2017 12:26 PM    CTI2KLF 33.6 2017 12:26 PM    OKL5OSX 22.6 2017 12:26 PM    BEART -0.6 2017 12:26 PM    Z3LADMLK 96.6 2017 12:26 PM       Lab Results   Component Value Date/Time    NA 
ADL/self-care training, IADL training, pain management, and positioning    Goals  Patient Goals: Return home   Short Term Goals:  Time Frame: upon d/c  Patient will complete bed mobility at Independent   Patient will complete transfers at modified independent   Patient will ambulate 10 ft with use of rollator (4WRW) at stand by assistance    Above goals reviewed on 10/8/2024.  All goals are ongoing at this time unless indicated above.      Therapy Session Time      Individual Group Co-treatment   Time In     0845   Time Out     0958   Minutes     73     Timed Code Treatment Minutes:   58 minutes   Total Treatment Minutes:  73 Minutes       Electronically Signed By: JESUSITA Barraza    PT providing direct supervision during session and assisting in making skilled judgements throughout session.     Ana Fermin PT, DPT [id number]

## 2024-10-28 DIAGNOSIS — I51.89 GRADE II DIASTOLIC DYSFUNCTION: ICD-10-CM

## 2024-10-28 DIAGNOSIS — E66.01 MORBID OBESITY DUE TO EXCESS CALORIES: Primary | ICD-10-CM

## 2024-10-28 DIAGNOSIS — J43.8 OTHER EMPHYSEMA (HCC): ICD-10-CM

## 2024-11-23 ENCOUNTER — HOSPITAL ENCOUNTER (INPATIENT)
Age: 79
LOS: 7 days | Discharge: HOME | End: 2024-11-30
Attending: INTERNAL MEDICINE | Admitting: INTERNAL MEDICINE
Payer: COMMERCIAL

## 2024-11-23 ENCOUNTER — APPOINTMENT (OUTPATIENT)
Dept: GENERAL RADIOLOGY | Age: 79
End: 2024-11-23
Payer: COMMERCIAL

## 2024-11-23 DIAGNOSIS — J44.1 CHRONIC OBSTRUCTIVE PULMONARY DISEASE WITH ACUTE EXACERBATION (HCC): Primary | ICD-10-CM

## 2024-11-23 DIAGNOSIS — K92.1 HEMATOCHEZIA: ICD-10-CM

## 2024-11-23 DIAGNOSIS — J18.9 PNEUMONIA DUE TO INFECTIOUS ORGANISM, UNSPECIFIED LATERALITY, UNSPECIFIED PART OF LUNG: ICD-10-CM

## 2024-11-23 LAB
ALBUMIN SERPL-MCNC: 3.5 G/DL (ref 3.4–5)
ALBUMIN/GLOB SERPL: 1.2 {RATIO} (ref 1.1–2.2)
ALP SERPL-CCNC: 101 U/L (ref 40–129)
ALT SERPL-CCNC: 17 U/L (ref 10–40)
ANION GAP SERPL CALCULATED.3IONS-SCNC: 11 MMOL/L (ref 3–16)
AST SERPL-CCNC: 33 U/L (ref 15–37)
BASOPHILS # BLD: 0.1 K/UL (ref 0–0.2)
BASOPHILS NFR BLD: 0.7 %
BILIRUB SERPL-MCNC: 0.4 MG/DL (ref 0–1)
BUN SERPL-MCNC: 11 MG/DL (ref 7–20)
CALCIUM SERPL-MCNC: 9.8 MG/DL (ref 8.3–10.6)
CHLORIDE SERPL-SCNC: 97 MMOL/L (ref 99–110)
CO2 SERPL-SCNC: 28 MMOL/L (ref 21–32)
CREAT SERPL-MCNC: 0.9 MG/DL (ref 0.6–1.2)
DEPRECATED RDW RBC AUTO: 15.9 % (ref 12.4–15.4)
EOSINOPHIL # BLD: 0.1 K/UL (ref 0–0.6)
EOSINOPHIL NFR BLD: 1.7 %
FLUAV RNA RESP QL NAA+PROBE: NOT DETECTED
FLUBV RNA RESP QL NAA+PROBE: NOT DETECTED
GFR SERPLBLD CREATININE-BSD FMLA CKD-EPI: 65 ML/MIN/{1.73_M2}
GLUCOSE BLD-MCNC: 313 MG/DL (ref 70–99)
GLUCOSE BLD-MCNC: 390 MG/DL (ref 70–99)
GLUCOSE SERPL-MCNC: 235 MG/DL (ref 70–99)
HCT VFR BLD AUTO: 42.6 % (ref 36–48)
HGB BLD-MCNC: 14 G/DL (ref 12–16)
LACTATE BLDV-SCNC: 1.8 MMOL/L (ref 0.4–1.9)
LACTATE BLDV-SCNC: 2.9 MMOL/L (ref 0.4–1.9)
LYMPHOCYTES # BLD: 2.3 K/UL (ref 1–5.1)
LYMPHOCYTES NFR BLD: 31.3 %
MCH RBC QN AUTO: 28.2 PG (ref 26–34)
MCHC RBC AUTO-ENTMCNC: 32.8 G/DL (ref 31–36)
MCV RBC AUTO: 86.1 FL (ref 80–100)
MONOCYTES # BLD: 0.4 K/UL (ref 0–1.3)
MONOCYTES NFR BLD: 4.9 %
NEUTROPHILS # BLD: 4.6 K/UL (ref 1.7–7.7)
NEUTROPHILS NFR BLD: 61.4 %
NT-PROBNP SERPL-MCNC: 344 PG/ML (ref 0–449)
PERFORMED ON: ABNORMAL
PERFORMED ON: ABNORMAL
PLATELET # BLD AUTO: 151 K/UL (ref 135–450)
PMV BLD AUTO: 7.9 FL (ref 5–10.5)
POTASSIUM SERPL-SCNC: 4.1 MMOL/L (ref 3.5–5.1)
PROCALCITONIN SERPL IA-MCNC: 0.07 NG/ML (ref 0–0.15)
PROT SERPL-MCNC: 6.5 G/DL (ref 6.4–8.2)
RBC # BLD AUTO: 4.95 M/UL (ref 4–5.2)
SARS-COV-2 RNA RESP QL NAA+PROBE: NOT DETECTED
SODIUM SERPL-SCNC: 136 MMOL/L (ref 136–145)
TROPONIN, HIGH SENSITIVITY: 11 NG/L (ref 0–14)
TROPONIN, HIGH SENSITIVITY: 13 NG/L (ref 0–14)
WBC # BLD AUTO: 7.5 K/UL (ref 4–11)

## 2024-11-23 PROCEDURE — 2580000003 HC RX 258: Performed by: INTERNAL MEDICINE

## 2024-11-23 PROCEDURE — 6360000002 HC RX W HCPCS: Performed by: PHYSICIAN ASSISTANT

## 2024-11-23 PROCEDURE — 94761 N-INVAS EAR/PLS OXIMETRY MLT: CPT

## 2024-11-23 PROCEDURE — 93005 ELECTROCARDIOGRAM TRACING: CPT | Performed by: INTERNAL MEDICINE

## 2024-11-23 PROCEDURE — 87636 SARSCOV2 & INF A&B AMP PRB: CPT

## 2024-11-23 PROCEDURE — 96375 TX/PRO/DX INJ NEW DRUG ADDON: CPT

## 2024-11-23 PROCEDURE — 99285 EMERGENCY DEPT VISIT HI MDM: CPT

## 2024-11-23 PROCEDURE — 87449 NOS EACH ORGANISM AG IA: CPT

## 2024-11-23 PROCEDURE — 87040 BLOOD CULTURE FOR BACTERIA: CPT

## 2024-11-23 PROCEDURE — 84145 PROCALCITONIN (PCT): CPT

## 2024-11-23 PROCEDURE — 6360000002 HC RX W HCPCS: Performed by: INTERNAL MEDICINE

## 2024-11-23 PROCEDURE — 85025 COMPLETE CBC W/AUTO DIFF WBC: CPT

## 2024-11-23 PROCEDURE — 83605 ASSAY OF LACTIC ACID: CPT

## 2024-11-23 PROCEDURE — 96374 THER/PROPH/DIAG INJ IV PUSH: CPT

## 2024-11-23 PROCEDURE — 87070 CULTURE OTHR SPECIMN AEROBIC: CPT

## 2024-11-23 PROCEDURE — 83036 HEMOGLOBIN GLYCOSYLATED A1C: CPT

## 2024-11-23 PROCEDURE — 83880 ASSAY OF NATRIURETIC PEPTIDE: CPT

## 2024-11-23 PROCEDURE — 1200000000 HC SEMI PRIVATE

## 2024-11-23 PROCEDURE — 6370000000 HC RX 637 (ALT 250 FOR IP): Performed by: INTERNAL MEDICINE

## 2024-11-23 PROCEDURE — 94640 AIRWAY INHALATION TREATMENT: CPT

## 2024-11-23 PROCEDURE — 6370000000 HC RX 637 (ALT 250 FOR IP): Performed by: PHYSICIAN ASSISTANT

## 2024-11-23 PROCEDURE — 71045 X-RAY EXAM CHEST 1 VIEW: CPT

## 2024-11-23 PROCEDURE — 2580000003 HC RX 258: Performed by: PHYSICIAN ASSISTANT

## 2024-11-23 PROCEDURE — 87205 SMEAR GRAM STAIN: CPT

## 2024-11-23 PROCEDURE — 84484 ASSAY OF TROPONIN QUANT: CPT

## 2024-11-23 PROCEDURE — 80053 COMPREHEN METABOLIC PANEL: CPT

## 2024-11-23 RX ORDER — INSULIN LISPRO 100 [IU]/ML
0-8 INJECTION, SOLUTION INTRAVENOUS; SUBCUTANEOUS
Status: DISCONTINUED | OUTPATIENT
Start: 2024-11-23 | End: 2024-11-24

## 2024-11-23 RX ORDER — IPRATROPIUM BROMIDE AND ALBUTEROL SULFATE 2.5; .5 MG/3ML; MG/3ML
1 SOLUTION RESPIRATORY (INHALATION)
Status: DISCONTINUED | OUTPATIENT
Start: 2024-11-23 | End: 2024-11-23

## 2024-11-23 RX ORDER — ACETAMINOPHEN 325 MG/1
650 TABLET ORAL ONCE
Status: DISCONTINUED | OUTPATIENT
Start: 2024-11-23 | End: 2024-11-23

## 2024-11-23 RX ORDER — POTASSIUM CHLORIDE 1500 MG/1
40 TABLET, EXTENDED RELEASE ORAL PRN
Status: DISCONTINUED | OUTPATIENT
Start: 2024-11-23 | End: 2024-11-30 | Stop reason: HOSPADM

## 2024-11-23 RX ORDER — ONDANSETRON 4 MG/1
4 TABLET, ORALLY DISINTEGRATING ORAL EVERY 8 HOURS PRN
Status: DISCONTINUED | OUTPATIENT
Start: 2024-11-23 | End: 2024-11-23 | Stop reason: SDUPTHER

## 2024-11-23 RX ORDER — ONDANSETRON 4 MG/1
4 TABLET, ORALLY DISINTEGRATING ORAL EVERY 8 HOURS PRN
Status: DISCONTINUED | OUTPATIENT
Start: 2024-11-23 | End: 2024-11-30 | Stop reason: HOSPADM

## 2024-11-23 RX ORDER — GLIPIZIDE 5 MG/1
10 TABLET ORAL 2 TIMES DAILY WITH MEALS
Status: DISCONTINUED | OUTPATIENT
Start: 2024-11-23 | End: 2024-11-30 | Stop reason: HOSPADM

## 2024-11-23 RX ORDER — FUROSEMIDE 40 MG/1
40 TABLET ORAL DAILY
Status: DISCONTINUED | OUTPATIENT
Start: 2024-11-23 | End: 2024-11-30 | Stop reason: HOSPADM

## 2024-11-23 RX ORDER — ROSUVASTATIN CALCIUM 20 MG/1
20 TABLET, COATED ORAL NIGHTLY
Status: DISCONTINUED | OUTPATIENT
Start: 2024-11-23 | End: 2024-11-30 | Stop reason: HOSPADM

## 2024-11-23 RX ORDER — ALPRAZOLAM 0.5 MG
1 TABLET ORAL 3 TIMES DAILY PRN
Status: DISCONTINUED | OUTPATIENT
Start: 2024-11-23 | End: 2024-11-30 | Stop reason: HOSPADM

## 2024-11-23 RX ORDER — PANTOPRAZOLE SODIUM 40 MG/1
40 TABLET, DELAYED RELEASE ORAL
Status: DISCONTINUED | OUTPATIENT
Start: 2024-11-24 | End: 2024-11-30 | Stop reason: HOSPADM

## 2024-11-23 RX ORDER — ENOXAPARIN SODIUM 100 MG/ML
30 INJECTION SUBCUTANEOUS 2 TIMES DAILY
Status: DISCONTINUED | OUTPATIENT
Start: 2024-11-23 | End: 2024-11-30 | Stop reason: HOSPADM

## 2024-11-23 RX ORDER — IPRATROPIUM BROMIDE AND ALBUTEROL SULFATE 2.5; .5 MG/3ML; MG/3ML
1 SOLUTION RESPIRATORY (INHALATION) EVERY 4 HOURS PRN
Status: DISCONTINUED | OUTPATIENT
Start: 2024-11-23 | End: 2024-11-23

## 2024-11-23 RX ORDER — POTASSIUM CHLORIDE 7.45 MG/ML
10 INJECTION INTRAVENOUS PRN
Status: DISCONTINUED | OUTPATIENT
Start: 2024-11-23 | End: 2024-11-30 | Stop reason: HOSPADM

## 2024-11-23 RX ORDER — DEXTROSE MONOHYDRATE 100 MG/ML
INJECTION, SOLUTION INTRAVENOUS CONTINUOUS PRN
Status: DISCONTINUED | OUTPATIENT
Start: 2024-11-23 | End: 2024-11-24

## 2024-11-23 RX ORDER — 0.9 % SODIUM CHLORIDE 0.9 %
500 INTRAVENOUS SOLUTION INTRAVENOUS PRN
Status: DISCONTINUED | OUTPATIENT
Start: 2024-11-23 | End: 2024-11-30 | Stop reason: HOSPADM

## 2024-11-23 RX ORDER — SODIUM CHLORIDE 0.9 % (FLUSH) 0.9 %
10 SYRINGE (ML) INJECTION PRN
Status: DISCONTINUED | OUTPATIENT
Start: 2024-11-23 | End: 2024-11-30 | Stop reason: HOSPADM

## 2024-11-23 RX ORDER — IPRATROPIUM BROMIDE AND ALBUTEROL SULFATE 2.5; .5 MG/3ML; MG/3ML
1 SOLUTION RESPIRATORY (INHALATION) ONCE
Status: COMPLETED | OUTPATIENT
Start: 2024-11-23 | End: 2024-11-23

## 2024-11-23 RX ORDER — GLUCAGON 1 MG/ML
1 KIT INJECTION PRN
Status: DISCONTINUED | OUTPATIENT
Start: 2024-11-23 | End: 2024-11-24

## 2024-11-23 RX ORDER — ALBUTEROL SULFATE 5 MG/ML
5 SOLUTION RESPIRATORY (INHALATION) ONCE
Status: COMPLETED | OUTPATIENT
Start: 2024-11-23 | End: 2024-11-23

## 2024-11-23 RX ORDER — ACETAMINOPHEN 650 MG/1
650 SUPPOSITORY RECTAL EVERY 6 HOURS PRN
Status: DISCONTINUED | OUTPATIENT
Start: 2024-11-23 | End: 2024-11-30 | Stop reason: HOSPADM

## 2024-11-23 RX ORDER — ACETAMINOPHEN 325 MG/1
650 TABLET ORAL EVERY 6 HOURS PRN
Status: DISCONTINUED | OUTPATIENT
Start: 2024-11-23 | End: 2024-11-30 | Stop reason: HOSPADM

## 2024-11-23 RX ORDER — FENTANYL CITRATE 50 UG/ML
25 INJECTION, SOLUTION INTRAMUSCULAR; INTRAVENOUS ONCE
Status: COMPLETED | OUTPATIENT
Start: 2024-11-23 | End: 2024-11-23

## 2024-11-23 RX ORDER — LISINOPRIL 5 MG/1
2.5 TABLET ORAL DAILY
Status: DISCONTINUED | OUTPATIENT
Start: 2024-11-23 | End: 2024-11-30 | Stop reason: HOSPADM

## 2024-11-23 RX ORDER — BUDESONIDE AND FORMOTEROL FUMARATE DIHYDRATE 160; 4.5 UG/1; UG/1
2 AEROSOL RESPIRATORY (INHALATION)
Status: DISCONTINUED | OUTPATIENT
Start: 2024-11-23 | End: 2024-11-30 | Stop reason: HOSPADM

## 2024-11-23 RX ORDER — GUAIFENESIN 600 MG/1
600 TABLET, EXTENDED RELEASE ORAL 2 TIMES DAILY
Status: DISCONTINUED | OUTPATIENT
Start: 2024-11-23 | End: 2024-11-30 | Stop reason: HOSPADM

## 2024-11-23 RX ORDER — HYDRALAZINE HYDROCHLORIDE 20 MG/ML
10 INJECTION INTRAMUSCULAR; INTRAVENOUS EVERY 6 HOURS PRN
Status: DISCONTINUED | OUTPATIENT
Start: 2024-11-23 | End: 2024-11-30 | Stop reason: HOSPADM

## 2024-11-23 RX ORDER — ALBUTEROL SULFATE 90 UG/1
2 INHALANT RESPIRATORY (INHALATION) EVERY 6 HOURS PRN
Status: DISCONTINUED | OUTPATIENT
Start: 2024-11-23 | End: 2024-11-30 | Stop reason: HOSPADM

## 2024-11-23 RX ORDER — IPRATROPIUM BROMIDE AND ALBUTEROL SULFATE 2.5; .5 MG/3ML; MG/3ML
1 SOLUTION RESPIRATORY (INHALATION)
Status: DISCONTINUED | OUTPATIENT
Start: 2024-11-23 | End: 2024-11-26

## 2024-11-23 RX ORDER — TRAMADOL HYDROCHLORIDE 50 MG/1
50 TABLET ORAL EVERY 6 HOURS PRN
Status: DISCONTINUED | OUTPATIENT
Start: 2024-11-23 | End: 2024-11-30 | Stop reason: HOSPADM

## 2024-11-23 RX ORDER — AZITHROMYCIN 250 MG/1
500 TABLET, FILM COATED ORAL EVERY 24 HOURS
Status: DISCONTINUED | OUTPATIENT
Start: 2024-11-23 | End: 2024-11-25

## 2024-11-23 RX ORDER — ONDANSETRON 2 MG/ML
4 INJECTION INTRAMUSCULAR; INTRAVENOUS EVERY 6 HOURS PRN
Status: DISCONTINUED | OUTPATIENT
Start: 2024-11-23 | End: 2024-11-30 | Stop reason: HOSPADM

## 2024-11-23 RX ORDER — GUAIFENESIN/DEXTROMETHORPHAN 100-10MG/5
5 SYRUP ORAL EVERY 4 HOURS PRN
Status: DISCONTINUED | OUTPATIENT
Start: 2024-11-23 | End: 2024-11-30 | Stop reason: HOSPADM

## 2024-11-23 RX ORDER — ONDANSETRON 2 MG/ML
4 INJECTION INTRAMUSCULAR; INTRAVENOUS ONCE
Status: COMPLETED | OUTPATIENT
Start: 2024-11-23 | End: 2024-11-23

## 2024-11-23 RX ORDER — SODIUM CHLORIDE 0.9 % (FLUSH) 0.9 %
5-40 SYRINGE (ML) INJECTION EVERY 12 HOURS SCHEDULED
Status: DISCONTINUED | OUTPATIENT
Start: 2024-11-23 | End: 2024-11-30 | Stop reason: HOSPADM

## 2024-11-23 RX ORDER — SODIUM CHLORIDE 9 MG/ML
INJECTION, SOLUTION INTRAVENOUS PRN
Status: DISCONTINUED | OUTPATIENT
Start: 2024-11-23 | End: 2024-11-30 | Stop reason: HOSPADM

## 2024-11-23 RX ADMIN — WATER 125 MG: 1 INJECTION INTRAMUSCULAR; INTRAVENOUS; SUBCUTANEOUS at 13:23

## 2024-11-23 RX ADMIN — ALPRAZOLAM 1 MG: 0.5 TABLET ORAL at 22:34

## 2024-11-23 RX ADMIN — LISINOPRIL 2.5 MG: 5 TABLET ORAL at 16:38

## 2024-11-23 RX ADMIN — ALBUTEROL SULFATE 5 MG: 2.5 SOLUTION RESPIRATORY (INHALATION) at 11:52

## 2024-11-23 RX ADMIN — AZITHROMYCIN MONOHYDRATE 500 MG: 500 INJECTION, POWDER, LYOPHILIZED, FOR SOLUTION INTRAVENOUS at 13:36

## 2024-11-23 RX ADMIN — IPRATROPIUM BROMIDE AND ALBUTEROL SULFATE 1 DOSE: .5; 3 SOLUTION RESPIRATORY (INHALATION) at 11:52

## 2024-11-23 RX ADMIN — AZITHROMYCIN DIHYDRATE 500 MG: 250 TABLET ORAL at 16:34

## 2024-11-23 RX ADMIN — FENTANYL CITRATE 25 MCG: 50 INJECTION INTRAMUSCULAR; INTRAVENOUS at 13:23

## 2024-11-23 RX ADMIN — ENOXAPARIN SODIUM 30 MG: 100 INJECTION SUBCUTANEOUS at 22:34

## 2024-11-23 RX ADMIN — IPRATROPIUM BROMIDE AND ALBUTEROL SULFATE 1 DOSE: .5; 3 SOLUTION RESPIRATORY (INHALATION) at 21:08

## 2024-11-23 RX ADMIN — GUAIFENESIN 600 MG: 600 TABLET, EXTENDED RELEASE ORAL at 22:34

## 2024-11-23 RX ADMIN — Medication 2 PUFF: at 21:09

## 2024-11-23 RX ADMIN — WATER 1000 MG: 1 INJECTION INTRAMUSCULAR; INTRAVENOUS; SUBCUTANEOUS at 13:23

## 2024-11-23 RX ADMIN — TRAMADOL HYDROCHLORIDE 50 MG: 50 TABLET ORAL at 18:32

## 2024-11-23 RX ADMIN — ONDANSETRON 4 MG: 2 INJECTION, SOLUTION INTRAMUSCULAR; INTRAVENOUS at 13:23

## 2024-11-23 RX ADMIN — Medication 10 ML: at 22:36

## 2024-11-23 RX ADMIN — INSULIN LISPRO 6 UNITS: 100 INJECTION, SOLUTION INTRAVENOUS; SUBCUTANEOUS at 16:34

## 2024-11-23 RX ADMIN — INSULIN LISPRO 8 UNITS: 100 INJECTION, SOLUTION INTRAVENOUS; SUBCUTANEOUS at 23:07

## 2024-11-23 RX ADMIN — FUROSEMIDE 40 MG: 40 TABLET ORAL at 16:38

## 2024-11-23 RX ADMIN — GLIPIZIDE 10 MG: 5 TABLET ORAL at 16:40

## 2024-11-23 ASSESSMENT — PAIN - FUNCTIONAL ASSESSMENT: PAIN_FUNCTIONAL_ASSESSMENT: 0-10

## 2024-11-23 ASSESSMENT — PAIN DESCRIPTION - ORIENTATION
ORIENTATION: RIGHT;LEFT

## 2024-11-23 ASSESSMENT — PAIN DESCRIPTION - PAIN TYPE
TYPE: ACUTE PAIN
TYPE: ACUTE PAIN

## 2024-11-23 ASSESSMENT — PAIN SCALES - GENERAL
PAINLEVEL_OUTOF10: 8
PAINLEVEL_OUTOF10: 9
PAINLEVEL_OUTOF10: 10
PAINLEVEL_OUTOF10: 3
PAINLEVEL_OUTOF10: 10

## 2024-11-23 ASSESSMENT — PAIN DESCRIPTION - LOCATION
LOCATION: HEAD;SHOULDER
LOCATION: LEG;HEAD
LOCATION: SHOULDER;HEAD
LOCATION: THROAT;HEAD

## 2024-11-23 ASSESSMENT — PAIN DESCRIPTION - DESCRIPTORS
DESCRIPTORS: ACHING

## 2024-11-23 NOTE — PROGRESS NOTES
11/23/24 1620   RT Protocol   History Pulmonary Disease 2   Respiratory pattern 2   Breath sounds 4   Cough 1   Bronchodilator Assessment Score 9

## 2024-11-23 NOTE — RT PROTOCOL NOTE
RT Inhaler-Nebulizer Bronchodilator Protocol Note    There is a bronchodilator order in the chart from a provider indicating to follow the RT Bronchodilator Protocol and there is an “Initiate RT Inhaler-Nebulizer Bronchodilator Protocol” order as well (see protocol at bottom of note).    CXR Findings:  XR CHEST PORTABLE    Result Date: 11/23/2024  Increased left upper lobe airspace disease, either atelectasis or pneumonia.       The findings from the last RT Protocol Assessment were as follows:   History Pulmonary Disease: Chronic pulmonary disease  Respiratory Pattern: Dyspnea on exertion or RR 21-25 bpm  Breath Sounds: Intermittent or unilateral wheezes  Cough: Strong, productive  Indication for Bronchodilator Therapy:    Bronchodilator Assessment Score: 9    Aerosolized bronchodilator medication orders have been revised according to the RT Inhaler-Nebulizer Bronchodilator Protocol below.    Respiratory Therapist to perform RT Therapy Protocol Assessment initially then follow the protocol.  Repeat RT Therapy Protocol Assessment PRN for score 0-3 or on second treatment, BID, and PRN for scores above 3.    No Indications - adjust the frequency to every 6 hours PRN wheezing or bronchospasm, if no treatments needed after 48 hours then discontinue using Per Protocol order mode.     If indication present, adjust the RT bronchodilator orders based on the Bronchodilator Assessment Score as indicated below.  Use Inhaler orders unless patient has one or more of the following: on home nebulizer, not able to hold breath for 10 seconds, is not alert and oriented, cannot activate and use MDI correctly, or respiratory rate 25 breaths per minute or more, then use the equivalent nebulizer order(s) with same Frequency and PRN reasons based on the score.  If a patient is on this medication at home then do not decrease Frequency below that used at home.    0-3 - enter or revise RT bronchodilator order(s) to equivalent RT  Bronchodilator order with Frequency of every 4 hours PRN for wheezing or increased work of breathing using Per Protocol order mode.        4-6 - enter or revise RT Bronchodilator order(s) to two equivalent RT bronchodilator orders with one order with BID Frequency and one order with Frequency of every 4 hours PRN wheezing or increased work of breathing using Per Protocol order mode.        7-10 - enter or revise RT Bronchodilator order(s) to two equivalent RT bronchodilator orders with one order with TID Frequency and one order with Frequency of every 4 hours PRN wheezing or increased work of breathing using Per Protocol order mode.       11-13 - enter or revise RT Bronchodilator order(s) to one equivalent RT bronchodilator order with QID Frequency and an Albuterol order with Frequency of every 4 hours PRN wheezing or increased work of breathing using Per Protocol order mode.      Greater than 13 - enter or revise RT Bronchodilator order(s) to one equivalent RT bronchodilator order with every 4 hours Frequency and an Albuterol order with Frequency of every 2 hours PRN wheezing or increased work of breathing using Per Protocol order mode.     RT to enter RT Home Evaluation for COPD & MDI Assessment order using Per Protocol order mode.    Electronically signed by Elicia Gonzalez RCP on 11/23/2024 at 4:21 PM

## 2024-11-23 NOTE — PROGRESS NOTES
Pt admitted for pna, resp fail    Full h+p to follow    Active Hospital Problems    Diagnosis Date Noted    HTN (hypertension) [I10]      Priority: High    Pneumonia [J18.9] 11/23/2024    DM type 2, controlled, with complication (HCC) [E11.8]     COPD (chronic obstructive pulmonary disease) (HCC) [J44.9] 08/09/2011    Acute on chronic respiratory failure with hypoxia [J96.21] 12/22/2010       Please use PerfectServe to contact me with any questions during the day.   The hospitalist service will provide cross-coverage for this patient from 7pm to 7am.    During those hours, contact the on-call hospitalist MD/YOUSIF for questions.

## 2024-11-23 NOTE — ED PROVIDER NOTES
EKG NOTE:    Sinus rhythm at a rate of 69 beats a minute with no acute ST elevations or depressions or pathologic Q waves.  Left axis deviation.  Occasional PVC.    I was not involved with the care of this patient.       Benjamín Boyer MD  11/23/24 [id number]

## 2024-11-23 NOTE — ED PROVIDER NOTES
[facility] EMERGENCY DEPARTMENT  EMERGENCY DEPARTMENT ENCOUNTER        Pt Name: Milana Pardo  MRN: [id number]  Birthdate 1945  Date of evaluation: 11/23/2024  Provider: ELENA Quesada  PCP: Arian Brandon MD  Note Started: 11:41 AM EST 11/23/24      YOUSIF. I have evaluated this patient.        CHIEF COMPLAINT       Chief Complaint   Patient presents with    Shortness of Breath    Pharyngitis     Pt brought in by [facility] from home c/o SOB x 3 days. Pt states she was dx with tonsillitis x3 days ago but states it feels like her tonsils are swelling up and blocking her airway. States she has been compliant with antibiotics. States she has cardiac & copd hx, wears oxygen at night time 2L NC.        HISTORY OF PRESENT ILLNESS: 1 or more Elements     History From: Patient  Limitations to history : None    Milana Pardo is a 79 y.o. female with past medical history of hypertension, hyperlipidemia, atrial fibrillation anticoagulated on Xarelto, diabetes, COPD on 2 L nasal cannula oxygen at night, CAD, anxiety, depression, obstructive sleep apnea and grade 2 diastolic dysfunction documented who presents ED with complaint of shortness of breath.  Patient states has been short of breath for the past 3 days.  She came by EMS for further evaluation and treatment.  Patient reports he was diagnosed with tonsillitis a couple days ago.  Reports was put on antibiotics with cephalexin.  She reports she continues to have sore throat.  States now she is having cough, chest tightness and shortness of breath.  Feels like she is having flareup of her COPD.  She reports chronic pain but denies any new pain at this time.  Rates her pain as a 10/10 diffusely.  Denies fever, chills, lightheadedness/dizziness, syncope or near syncope.  Denies abdominal pain, nausea or vomiting.  Denies specific chest pain, hemoptysis or pleuritic pain.  Denies orthopnea, pedal edema or calf tenderness.  Denies rashes or  patch   Vaping Use    Vaping status: Former   Substance Use Topics    Alcohol use: Yes     Alcohol/week: 1.0 standard drink of alcohol     Types: 1 Glasses of wine per week     Comment: occ. every  6 mo    Drug use: No       SCREENINGS          Machelle Coma Scale  Eye Opening: Spontaneous  Best Verbal Response: Oriented  Best Motor Response: Obeys commands  Crowley Coma Scale Score: 15                        CIWA Assessment  BP: (!) 146/76  Pulse: 94             PHYSICAL EXAM  1 or more Elements     ED Triage Vitals [11/23/24 1128]   BP Systolic BP Percentile Diastolic BP Percentile Temp Temp Source Pulse Respirations SpO2   (!) 145/93 -- -- 98.8 °F (37.1 °C) Oral 76 18 94 %      Height Weight - Scale         1.676 m (5' 6\") 124.7 kg (275 lb)             Physical Exam  Constitutional:       General: She is not in acute distress.     Appearance: She is well-developed. She is not ill-appearing, toxic-appearing or diaphoretic.   HENT:      Head: Normocephalic and atraumatic.      Right Ear: External ear normal.      Left Ear: External ear normal.      Nose: Nose normal. No congestion or rhinorrhea.      Mouth/Throat:      Mouth: Mucous membranes are moist.      Pharynx: Posterior oropharyngeal erythema present. No oropharyngeal exudate.   Eyes:      General:         Right eye: No discharge.         Left eye: No discharge.      Conjunctiva/sclera: Conjunctivae normal.   Cardiovascular:      Rate and Rhythm: Normal rate and regular rhythm.      Pulses: Normal pulses.      Heart sounds: Normal heart sounds. No murmur heard.     No friction rub. No gallop.      Comments: 2+ radial pulses bilaterally.  No pulse deficit.  No JVD.  No calf tenderness.  No pitting pedal edema  Pulmonary:      Effort: Pulmonary effort is normal. No respiratory distress.      Breath sounds: No stridor. Wheezing and rhonchi present. No rales.   Chest:      Chest wall: No tenderness.   Abdominal:      General: Abdomen is flat. Bowel sounds are

## 2024-11-23 NOTE — ED NOTES
How does patient ambulate?   []Low Fall Risk (ambulates by themselves without support)  []Stand by assist   []Contact Guard   [x]Front wheel walker  [x]Wheelchair   []Steady  []Bed bound  []History of Lower Extremity Amputation  []Unknown, did not assess in the emergency department   How does patient take pills?  [x]Whole with Water  []Crushed in applesauce  []Crushed in pudding  []Other  []Unknown no oral medications were given in the ED  Is patient alert?   [x]Alert  []Drowsy but responds to voice  []Doesn't respond to voice but responds to painful stimuli  []Unresponsive  Is patient oriented?   [x]To person  [x]To place  [x]To time  [x]To situation  []Confused  []Agitated  []Follows commands  If patient is disoriented or from a [facility] Nursing Facility has family been notified of admission?   []Yes   []No  Patient belongings?   [x]Cell phone  [x]Wallet   []Dentures  [x]Clothing  Any specific patient or family belongings/needs/dynamics?   NA  Miscellaneous comments/pending orders?  NA     If there are any additional questions please reach out to the Emergency Department.

## 2024-11-23 NOTE — H&P
History and Physical  Dr. Almendarez  11/23/2024    PCP: Arian Brandon MD    Cc:   Chief Complaint   Patient presents with    Shortness of Breath    Pharyngitis     Pt brought in by [facility] from home c/o SOB x 3 days. Pt states she was dx with tonsillitis x3 days ago but states it feels like her tonsils are swelling up and blocking her airway. States she has been compliant with antibiotics. States she has cardiac & copd hx, wears oxygen at night time 2L NC.        HPI:  Milana Pardo is a 79 y.o. female who has a past medical history of Acute MI (HCC), Anxiety and depression, Arthritis, CAD (coronary artery disease), Cancer (HCC), Cancer (HCC), Cancer of skin of leg, Chronic obstructive pulmonary disease (HCC), Claustrophobia, COPD (chronic obstructive pulmonary disease) (HCC), Emphysema lung (HCC), ESBL (extended spectrum beta-lactamase) producing bacteria infection, Esophageal stricture, Gastroesophageal reflux disease without esophagitis, Hiatal hernia, Hypercholesteremia, Hypertension, IBS (irritable bowel syndrome), Macular degeneration, Migraines, Movement disorder, Paroxysmal atrial fibrillation (HCC), Pneumonia, PONV (postoperative nausea and vomiting), Thoracic radiculopathy, Type II or unspecified type diabetes mellitus without mention of complication, not stated as uncontrolled, and Unspecified cerebral artery occlusion with cerebral infarction.     Patient presents with Pneumonia.  HPI  (1-3 for expanded problem focused, >=4 for detailed/comprehensive)     79 y.o. female with past medical history of hypertension, hyperlipidemia, atrial fibrillation anticoagulated on Xarelto, diabetes, COPD on 2 L nasal cannula oxygen at night, CAD, anxiety, depression, obstructive sleep apnea and grade 2 diastolic dysfunction documented who presents ED with complaint of shortness of breath.  Patient states has been short of breath for the past 3 days.  She came by EMS for further evaluation and treatment.  Patient  hyperglycemia.  Sliding scale insulin ordered.  Glucagon and dextrose ordered for hypoglycemia.  Patient will be continued on home medications. Hemoglobin a1c to be ordered to assess efficacy of therapy.           Diagnoses as listed above, designated as new or established and plan outlined for each.      Case discussed with: amee    [facility] Determination    PROBLEM  High: life threatening unstable chronic illness (I.e. severe COPD, asthma) or acute illness (i.e. MI, PE, MAYANK, stroke,severe resp distress, acute change in neurologic status, suicidal ideation)  PLUS  high: iv narcotics or other iv meds which require monitoring (e.g. digoxin, amiodarone, vancomycin, coumadin, heparin, antiepileptics, chemotherapy,insulin drip, procrit) and high: at least 2 of: personally interpret study, discuss with external specialist, review/order 3+ tests - cbc bmp cxr procal lactate    OR TIME BASED  N/A - see problem based determination above           The patient is being placed in inpatient status with the expectation of requiring a hospital stay spanning at least two midnights for care and treatment of the problems noted in the problem list.  This determination is also based on thepatients comorbidities and past medical history, the severity and timing of the signs and symptoms upon presentation.    (Please note that portions of this note were completed with a voice recognition program.  Efforts were made to edit the dictations but occasionally words are mis-transcribed.)      Electronically signed by: Taj Almendarez MD 11/23/2024    Please use bitmovin to contact me with any questions during the day.   The hospitalist service will provide cross-coverage for this patient from 7pm to 7am.    During those hours, contact the on-call hospitalist MD/YOUSIF for questions.           Never

## 2024-11-23 NOTE — PROGRESS NOTES
Patient transferred to the unit from ER, A&O, VSS, will continue to monitor.     1640: Admission assessment complete, VSS on RA, pt A&Ox4 in bed. C/o bilateral legs 8/10. Scheduled med given per MAR, POC and education reviewed with the patient. Patient oriented to the room and educated on call light. Safety measures provided. All needs met at this time, call light in reach, will continue to monitor.

## 2024-11-24 ENCOUNTER — APPOINTMENT (OUTPATIENT)
Dept: CT IMAGING | Age: 79
End: 2024-11-24
Payer: COMMERCIAL

## 2024-11-24 ENCOUNTER — APPOINTMENT (OUTPATIENT)
Dept: GENERAL RADIOLOGY | Age: 79
End: 2024-11-24
Payer: COMMERCIAL

## 2024-11-24 PROBLEM — K92.1 HEMATOCHEZIA: Status: ACTIVE | Noted: 2024-11-24

## 2024-11-24 LAB
ANION GAP SERPL CALCULATED.3IONS-SCNC: 10 MMOL/L (ref 3–16)
BASOPHILS # BLD: 0.1 K/UL (ref 0–0.2)
BASOPHILS NFR BLD: 0.5 %
BUN SERPL-MCNC: 16 MG/DL (ref 7–20)
CALCIUM SERPL-MCNC: 9.9 MG/DL (ref 8.3–10.6)
CHLORIDE SERPL-SCNC: 95 MMOL/L (ref 99–110)
CO2 SERPL-SCNC: 28 MMOL/L (ref 21–32)
CREAT SERPL-MCNC: 0.9 MG/DL (ref 0.6–1.2)
DEPRECATED RDW RBC AUTO: 15 % (ref 12.4–15.4)
EKG ATRIAL RATE: 76 BPM
EKG DIAGNOSIS: NORMAL
EKG P-R INTERVAL: 166 MS
EKG Q-T INTERVAL: 378 MS
EKG QRS DURATION: 104 MS
EKG QTC CALCULATION (BAZETT): 405 MS
EKG R AXIS: -52 DEGREES
EKG T AXIS: 54 DEGREES
EKG VENTRICULAR RATE: 69 BPM
EOSINOPHIL # BLD: 0 K/UL (ref 0–0.6)
EOSINOPHIL NFR BLD: 0.1 %
EST. AVERAGE GLUCOSE BLD GHB EST-MCNC: 217.3 MG/DL
GFR SERPLBLD CREATININE-BSD FMLA CKD-EPI: 65 ML/MIN/{1.73_M2}
GLUCOSE BLD-MCNC: 181 MG/DL (ref 70–99)
GLUCOSE BLD-MCNC: 211 MG/DL (ref 70–99)
GLUCOSE BLD-MCNC: 351 MG/DL (ref 70–99)
GLUCOSE BLD-MCNC: 366 MG/DL (ref 70–99)
GLUCOSE BLD-MCNC: 383 MG/DL (ref 70–99)
GLUCOSE SERPL-MCNC: 217 MG/DL (ref 70–99)
HBA1C MFR BLD: 9.2 %
HCT VFR BLD AUTO: 39 % (ref 36–48)
HGB BLD-MCNC: 13 G/DL (ref 12–16)
LACTATE BLDV-SCNC: 2.1 MMOL/L (ref 0.4–2)
LEGIONELLA AG UR QL: NORMAL
LYMPHOCYTES # BLD: 1.2 K/UL (ref 1–5.1)
LYMPHOCYTES NFR BLD: 13 %
MCH RBC QN AUTO: 28.2 PG (ref 26–34)
MCHC RBC AUTO-ENTMCNC: 33.4 G/DL (ref 31–36)
MCV RBC AUTO: 84.4 FL (ref 80–100)
MONOCYTES # BLD: 0.6 K/UL (ref 0–1.3)
MONOCYTES NFR BLD: 6.2 %
NEUTROPHILS # BLD: 7.6 K/UL (ref 1.7–7.7)
NEUTROPHILS NFR BLD: 80.2 %
PERFORMED ON: ABNORMAL
PLATELET # BLD AUTO: 184 K/UL (ref 135–450)
PMV BLD AUTO: 8 FL (ref 5–10.5)
POTASSIUM SERPL-SCNC: 4.7 MMOL/L (ref 3.5–5.1)
PROCALCITONIN SERPL IA-MCNC: 0.07 NG/ML (ref 0–0.15)
RBC # BLD AUTO: 4.62 M/UL (ref 4–5.2)
SODIUM SERPL-SCNC: 133 MMOL/L (ref 136–145)
WBC # BLD AUTO: 9.5 K/UL (ref 4–11)

## 2024-11-24 PROCEDURE — 83036 HEMOGLOBIN GLYCOSYLATED A1C: CPT

## 2024-11-24 PROCEDURE — 92526 ORAL FUNCTION THERAPY: CPT

## 2024-11-24 PROCEDURE — 80048 BASIC METABOLIC PNL TOTAL CA: CPT

## 2024-11-24 PROCEDURE — 6370000000 HC RX 637 (ALT 250 FOR IP): Performed by: INTERNAL MEDICINE

## 2024-11-24 PROCEDURE — 6360000002 HC RX W HCPCS: Performed by: INTERNAL MEDICINE

## 2024-11-24 PROCEDURE — 84145 PROCALCITONIN (PCT): CPT

## 2024-11-24 PROCEDURE — 85025 COMPLETE CBC W/AUTO DIFF WBC: CPT

## 2024-11-24 PROCEDURE — 83605 ASSAY OF LACTIC ACID: CPT

## 2024-11-24 PROCEDURE — 1200000000 HC SEMI PRIVATE

## 2024-11-24 PROCEDURE — 94640 AIRWAY INHALATION TREATMENT: CPT

## 2024-11-24 PROCEDURE — 92610 EVALUATE SWALLOWING FUNCTION: CPT

## 2024-11-24 PROCEDURE — 71046 X-RAY EXAM CHEST 2 VIEWS: CPT

## 2024-11-24 PROCEDURE — 2580000003 HC RX 258: Performed by: INTERNAL MEDICINE

## 2024-11-24 PROCEDURE — 71250 CT THORAX DX C-: CPT

## 2024-11-24 PROCEDURE — 93010 ELECTROCARDIOGRAM REPORT: CPT | Performed by: INTERNAL MEDICINE

## 2024-11-24 PROCEDURE — 99222 1ST HOSP IP/OBS MODERATE 55: CPT | Performed by: INTERNAL MEDICINE

## 2024-11-24 PROCEDURE — 94761 N-INVAS EAR/PLS OXIMETRY MLT: CPT

## 2024-11-24 RX ORDER — GABAPENTIN 400 MG/1
400 CAPSULE ORAL 3 TIMES DAILY
Status: DISCONTINUED | OUTPATIENT
Start: 2024-11-24 | End: 2024-11-30 | Stop reason: HOSPADM

## 2024-11-24 RX ORDER — HYDROCORTISONE ACETATE 25 MG/1
25 SUPPOSITORY RECTAL 2 TIMES DAILY
Status: DISCONTINUED | OUTPATIENT
Start: 2024-11-24 | End: 2024-11-30 | Stop reason: HOSPADM

## 2024-11-24 RX ORDER — INSULIN LISPRO 100 [IU]/ML
0-16 INJECTION, SOLUTION INTRAVENOUS; SUBCUTANEOUS
Status: DISCONTINUED | OUTPATIENT
Start: 2024-11-24 | End: 2024-11-30 | Stop reason: HOSPADM

## 2024-11-24 RX ORDER — GLUCAGON 1 MG/ML
1 KIT INJECTION PRN
Status: DISCONTINUED | OUTPATIENT
Start: 2024-11-24 | End: 2024-11-30 | Stop reason: HOSPADM

## 2024-11-24 RX ORDER — DEXTROSE MONOHYDRATE 100 MG/ML
INJECTION, SOLUTION INTRAVENOUS CONTINUOUS PRN
Status: DISCONTINUED | OUTPATIENT
Start: 2024-11-24 | End: 2024-11-30 | Stop reason: HOSPADM

## 2024-11-24 RX ORDER — INSULIN LISPRO 100 [IU]/ML
0-8 INJECTION, SOLUTION INTRAVENOUS; SUBCUTANEOUS
Status: DISCONTINUED | OUTPATIENT
Start: 2024-11-24 | End: 2024-11-24

## 2024-11-24 RX ADMIN — Medication 10 ML: at 23:49

## 2024-11-24 RX ADMIN — ENOXAPARIN SODIUM 30 MG: 100 INJECTION SUBCUTANEOUS at 09:42

## 2024-11-24 RX ADMIN — DICLOFENAC SODIUM 2 G: 10 GEL TOPICAL at 09:47

## 2024-11-24 RX ADMIN — ROSUVASTATIN CALCIUM 20 MG: 20 TABLET, FILM COATED ORAL at 23:47

## 2024-11-24 RX ADMIN — GABAPENTIN 400 MG: 400 CAPSULE ORAL at 14:48

## 2024-11-24 RX ADMIN — DICLOFENAC SODIUM 2 G: 10 GEL TOPICAL at 23:48

## 2024-11-24 RX ADMIN — GUAIFENESIN 600 MG: 600 TABLET, EXTENDED RELEASE ORAL at 09:41

## 2024-11-24 RX ADMIN — INSULIN LISPRO 2 UNITS: 100 INJECTION, SOLUTION INTRAVENOUS; SUBCUTANEOUS at 06:53

## 2024-11-24 RX ADMIN — GLIPIZIDE 10 MG: 5 TABLET ORAL at 09:41

## 2024-11-24 RX ADMIN — AZITHROMYCIN DIHYDRATE 500 MG: 250 TABLET ORAL at 14:48

## 2024-11-24 RX ADMIN — PANTOPRAZOLE SODIUM 40 MG: 40 TABLET, DELAYED RELEASE ORAL at 06:54

## 2024-11-24 RX ADMIN — HYDROCORTISONE ACETATE 25 MG: 25 SUPPOSITORY RECTAL at 12:38

## 2024-11-24 RX ADMIN — INSULIN LISPRO 16 UNITS: 100 INJECTION, SOLUTION INTRAVENOUS; SUBCUTANEOUS at 16:55

## 2024-11-24 RX ADMIN — Medication 2 PUFF: at 21:34

## 2024-11-24 RX ADMIN — Medication 10 ML: at 09:47

## 2024-11-24 RX ADMIN — GLIPIZIDE 10 MG: 5 TABLET ORAL at 16:55

## 2024-11-24 RX ADMIN — Medication 2 PUFF: at 08:59

## 2024-11-24 RX ADMIN — HYDROCORTISONE ACETATE 25 MG: 25 SUPPOSITORY RECTAL at 23:48

## 2024-11-24 RX ADMIN — INSULIN LISPRO 2 UNITS: 100 INJECTION, SOLUTION INTRAVENOUS; SUBCUTANEOUS at 11:17

## 2024-11-24 RX ADMIN — IPRATROPIUM BROMIDE AND ALBUTEROL SULFATE 1 DOSE: .5; 3 SOLUTION RESPIRATORY (INHALATION) at 08:56

## 2024-11-24 RX ADMIN — TIOTROPIUM BROMIDE INHALATION SPRAY 2 PUFF: 3.12 SPRAY, METERED RESPIRATORY (INHALATION) at 08:57

## 2024-11-24 RX ADMIN — ALPRAZOLAM 1 MG: 0.5 TABLET ORAL at 09:42

## 2024-11-24 RX ADMIN — GUAIFENESIN 600 MG: 600 TABLET, EXTENDED RELEASE ORAL at 23:47

## 2024-11-24 RX ADMIN — ALPRAZOLAM 1 MG: 0.5 TABLET ORAL at 18:17

## 2024-11-24 RX ADMIN — GABAPENTIN 400 MG: 400 CAPSULE ORAL at 23:49

## 2024-11-24 RX ADMIN — ENOXAPARIN SODIUM 30 MG: 100 INJECTION SUBCUTANEOUS at 23:47

## 2024-11-24 RX ADMIN — WATER 40 MG: 1 INJECTION INTRAMUSCULAR; INTRAVENOUS; SUBCUTANEOUS at 11:15

## 2024-11-24 RX ADMIN — WATER 1000 MG: 1 INJECTION INTRAMUSCULAR; INTRAVENOUS; SUBCUTANEOUS at 12:35

## 2024-11-24 RX ADMIN — IPRATROPIUM BROMIDE AND ALBUTEROL SULFATE 1 DOSE: .5; 3 SOLUTION RESPIRATORY (INHALATION) at 21:34

## 2024-11-24 RX ADMIN — FUROSEMIDE 40 MG: 40 TABLET ORAL at 09:40

## 2024-11-24 RX ADMIN — GABAPENTIN 400 MG: 400 CAPSULE ORAL at 09:45

## 2024-11-24 RX ADMIN — IPRATROPIUM BROMIDE AND ALBUTEROL SULFATE 1 DOSE: .5; 3 SOLUTION RESPIRATORY (INHALATION) at 15:55

## 2024-11-24 ASSESSMENT — PAIN DESCRIPTION - ORIENTATION: ORIENTATION: RIGHT;LEFT;ANTERIOR;DISTAL;INNER;LOWER;MID;OUTER;POSTERIOR;PROXIMAL;UPPER

## 2024-11-24 ASSESSMENT — PAIN SCALES - GENERAL: PAINLEVEL_OUTOF10: 8

## 2024-11-24 ASSESSMENT — PAIN DESCRIPTION - FREQUENCY: FREQUENCY: CONTINUOUS

## 2024-11-24 ASSESSMENT — PAIN - FUNCTIONAL ASSESSMENT: PAIN_FUNCTIONAL_ASSESSMENT: ACTIVITIES ARE NOT PREVENTED

## 2024-11-24 ASSESSMENT — PAIN DESCRIPTION - LOCATION: LOCATION: GENERALIZED

## 2024-11-24 ASSESSMENT — PAIN DESCRIPTION - PAIN TYPE: TYPE: CHRONIC PAIN

## 2024-11-24 ASSESSMENT — PAIN DESCRIPTION - DESCRIPTORS: DESCRIPTORS: ACHING

## 2024-11-24 NOTE — CONSULTS
GI Consult Note      Admission Date: 11/23/2024  Hospital Day: Hospital Day: 2  Attending: Taj Almendarez MD  Date of service: 11/24/24    Subjective:     Chief complaint/ Reason for consult:   Rectal bleeding    HPI: Milana Pardo is a 79 y.o.  female patient, who was seen at the request of Taj Cruz MD.    History was obtained from chart review and the patient.       Patient with morbid obesity, hypertension, hyperlipidemia, atrial fibrillation anticoagulated on Xarelto, diabetes, COPD on 2 L nasal cannula oxygen at night, CAD, anxiety, depression, obstructive sleep apnea and grade 2 diastolic dysfunction     Patient was diagnosed with tonsillitis 3 days ago and was started on cephalexin.  She was brought in by EMS for worsening shortness of breath.  Chest x-ray here revealed pneumonia.  Currently being treated as pneumonia with COPD exacerbation.  We are called today for rectal bleeding that started this morning.    Patient has a history of IBS-D.  She also has hemorrhoids.  She would have 4-5 soft to liquid bowel movements a day, associated with crampy abdominal pain.  Once a month, she would bleed from her hemorrhoids lasting 1 to 2 days.  She would always treat this with over-the-counter hemorrhoid creams.  She is starting the antibiotics for tonsillitis, noticed her stools were slightly more loose and watery.    Patient was admitted in April 2023 for bloody diarrhea.  No colonoscopy was performed then. Her last EGD and colonoscopy was with Dr. Aceves 2018.  Single TI ulcer was noted with normal colon mucosa.  Biopsy revealed nonspecific ileitis and colitis.     Patient is not opposed to getting a colonoscopy.  However, she lives alone and cannot take the prep at home.      Past Endoscopic History:  EGD and colonoscopy with Dr Aceves 6/11/18 for dysphagia and diarrhea     The Olympus videoendoscope was placed in the patient's mouth and under direct visualization passed into the  no murmurs or rubs  Abdomen: soft, non-tender. Bowel sounds normal. No masses,  no organomegaly.   Extremities: atraumatic, no cyanosis or edema  Skin: warm and dry, no jaundice  Neuro: Grossly intact, A&OX3  EVA: Perianal erythema, small external hemorrhoids    Intake and output:   I/O last 3 completed shifts:  In: -   Out: 1200 [Urine:1200]    Lab Data:      CBC:   Recent Labs     11/23/24  1248 11/24/24  0529   WBC 7.5 9.5   RBC 4.95 4.62   HGB 14.0 13.0   HCT 42.6 39.0    184   MCV 86.1 84.4   MCH 28.2 28.2   MCHC 32.8 33.4   RDW 15.9* 15.0        BMP:  Recent Labs     11/23/24  1248 11/24/24  0529    133*   K 4.1 4.7   CL 97* 95*   CO2 28 28   BUN 11 16   CREATININE 0.9 0.9   CALCIUM 9.8 9.9   GLUCOSE 235* 217*        Hepatic Function Panel:   Recent Labs     11/23/24  1248   AST 33   ALT 17   BILITOT 0.4   ALKPHOS 101       No results for input(s): \"LIPASE\", \"AMYLASE\" in the last 72 hours.  No results for input(s): \"PROTIME\", \"INR\" in the last 72 hours.  Invalid input(s): \"PTT\"  No results for input(s): \"OCCULTBLD\" in the last 72 hours.      Imaging:    XR CHEST PORTABLE   Final Result   Increased left upper lobe airspace disease, either atelectasis or pneumonia.         XR CHEST (2 VW)    (Results Pending)         Known drug Allergies:   Allergies   Allergen Reactions    Morphine Shortness Of Breath    Acetaminophen      emesis    Codeine Other (See Comments)     Chest pain    Hydromorphone Other (See Comments)     hallucinations  Hallucinations    But will take if needed in an emergency    Levaquin [Levofloxacin In D5w] Itching    Vicodin [Hydrocodone-Acetaminophen] Hives    Aspirin      Upsets hiatal hernia           Assessment:   The patient is a 79 y.o. old female  with following problems:    Principal Problem:    Pneumonia  Active Problems:    HTN (hypertension)    Acute on chronic respiratory failure with hypoxia    COPD (chronic obstructive pulmonary disease) (HCC)    DM type 2,

## 2024-11-24 NOTE — PROGRESS NOTES
Shift assessment complete, bp 95/53, other VSS on 2L oxygen. Patient A&Ox4 in bed, c/o generalized pain 8/10. C/o some SOB. C/o hemorrhoids bleed - MD aware. Scheduled med given per MAR, POC and education reviewed with the patient. Safety measures provided. All needs met at this time, call light in reach, will continue to monitor.

## 2024-11-24 NOTE — PROGRESS NOTES
Progress Note - Dr. Almendarez - Internal Medicine  PCP: Arian Brandon MD [id number] [id number] [phone]    Hospital Day: 1  Code Status: Full Code  Current Diet: ADULT DIET; Regular; 4 carb choices (60 gm/meal)        CC: follow up on medical issues    Subjective:   Milana Pardo is a 79 y.o. female.    Pt seen and examined  Chart reviewed since last visit, labs and imaging below      Doing ok  Pt with reports of brbpr this am  Still very dyspneic      Review of Systems: (1 system for EPF, 2-9 for detailed, 10+ for comprehensive)  Constitutional: Negative for chills and fever.     HENT: Negative for dental problem, nosebleeds and rhinorrhea.      Eyes: Negative for photophobia and visual disturbance.     Respiratory: positive for cough, chest tightness and shortness of breath.      Cardiovascular: Negative for chest pain and leg swelling.     Gastrointestinal: Negative for diarrhea, nausea and vomiting.  Positive for bloody stools  Endocrine: Negative for polydipsia and polyphagia.     Genitourinary: Negative for frequency, hematuria and urgency.     Musculoskeletal: Negative for back pain and myalgias.     Skin: Negative for rash.     Allergic/Immunologic: Negative for food allergies.     Neurological: Negative for dizziness, seizures, syncope and facial asymmetry.     Hematological: Negative for adenopathy.     Psychiatric/Behavioral: Negative for dysphoric mood. The patient is not nervous/anxious.        I have reviewed the patient's medical and social history in detail and updated the computerized patient record.  To recap: She  has a past medical history of Acute MI ([facility]), Anxiety and depression, Arthritis, CAD (coronary artery disease), Cancer (HCC), Cancer (HCC), Cancer of skin of leg, Chronic obstructive pulmonary disease (HCC), Claustrophobia, COPD (chronic obstructive pulmonary disease) ([facility]), Emphysema lung ([facility]), ESBL (extended spectrum beta-lactamase) producing bacteria infection,    Respiratory: wheezes bilaterally with normal respiratory effort    Cardiovascular: normal rate, regular rhythm, normal S1 and S2 and no murmurs    Gastrointestinal: soft, non-tender, non-distended, normal bowel sounds, no masses or organomegaly    Extremities: no clubbing, no edema    Skin:No rashes or nodules noted.    Neurologic:negative         Labs:  Lab Results   Component Value Date    WBC 9.5 11/24/2024    HGB 13.0 11/24/2024    HCT 39.0 11/24/2024     11/24/2024    CHOL 185 06/19/2024    TRIG 387 (H) 06/19/2024    HDL 29 (L) 06/19/2024    LDLDIRECT 95 06/19/2024    ALT 17 11/23/2024    AST 33 11/23/2024     (L) 11/24/2024    K 4.7 11/24/2024    CL 95 (L) 11/24/2024    CREATININE 0.9 11/24/2024    BUN 16 11/24/2024    CO2 28 11/24/2024    TSH 1.32 08/02/2022    INR 1.00 10/06/2024    LABA1C 9.2 11/23/2024     Lab Results   Component Value Date    CKTOTAL 115 02/01/2023    CKMB 0.29 08/09/2011    TROPONINI <0.01 02/03/2023       Recent Imaging Results are Reviewed:  XR CHEST PORTABLE    Result Date: 11/23/2024  EXAMINATION: ONE XRAY VIEW OF THE CHEST 11/23/2024 11:54 am COMPARISON: 10/06/2024 HISTORY: ORDERING SYSTEM PROVIDED HISTORY: cough - ro PNA TECHNOLOGIST PROVIDED HISTORY: Reason for exam:->cough - ro PNA Reason for Exam: cough FINDINGS: Patient body habitus limits evaluation of fine pulmonary parenchymal changes. Heart size is enlarged.  No focal consolidation on the right Hazy opacity is seen in left upper lobe, increased compared to prior.     Increased left upper lobe airspace disease, either atelectasis or pneumonia.       Lab Results   Component Value Date/Time    GLUCOSE 217 11/24/2024 05:29 AM     Lab Results   Component Value Date/Time    POCGLU 181 11/24/2024 08:22 AM     BP (!) 95/53   Pulse 89   Temp 97.7 °F (36.5 °C) (Oral)   Resp 18   Ht 1.676 m (5' 6\")   Wt 124.7 kg (275 lb)   SpO2 93%   BMI 44.39 kg/m²     Assessment and Plan:  Principal Problem:    Pneumonia  -Established problem. Stable.    Plan: cont empiric abx. Repeat cxr today.  Trend wbc, procal  Active Problems:    HTN (hypertension) -Established problem. Stable.  95/53  Plan: Continue medications. Continue to check BP q shift. CBC and BMP ordered to monitor for disease progression, medication side effect.     Acute on chronic respiratory failure with hypoxia -Established problem. Stable.    Plan: cont to tx underlying issues.    COPD (chronic obstructive pulmonary disease) (HCC) -Established problem. Stable.    Plan: on iv steroids, on home meds. Pulm consulted    DM type 2, controlled, with complication (HCC) -Established problem. Stable.  Glu 217.    Plan: Continue on CCC diet, home medications. CBC and BMP ordered to monitor sugars and for other medication complications. Fingerstick glucose ordered to check for alterations in levels throughout day.  Sliding scale insulin ordered to cover fingerstick levels.     Pneumonia due to organism    Hematochezia -New Problem to me.    Plan: consult GI. Trend h/h      Case discussed with: pulm  Tests ordered/reviewed: cbc, bmp, cxr, procal          (Please note that portions of this note were completed with a voice recognition program.  Efforts were made to edit the dictations but occasionally words are mis-transcribed.)        Taj Almendarez MD  11/24/2024    Please use Met[employer]ia to contact me with any questions during the day.   The hospitalist service will provide cross-coverage for this patient from 7pm to 7am.    During those hours, contact the on-call hospitalist MD/YOUSIF for questions.

## 2024-11-24 NOTE — PLAN OF CARE
2245: Secure message sent to on-call Hospitalist: Advised pt . Will administer 8U per order. No further action required at this time per Provider.  ------------------------------  Problem: Chronic Conditions and Co-morbidities  Goal: Patient's chronic conditions and co-morbidity symptoms are monitored and maintained or improved  11/23/2024 2227 by Jonnie Ruvalcaba RN  Outcome: Progressing  11/23/2024 1643 by Cindy Evans RN  Outcome: Progressing     Problem: Discharge Planning  Goal: Discharge to home or other facility with appropriate resources  11/23/2024 2227 by Jonnie Ruvalcaba RN  Outcome: Progressing  11/23/2024 1643 by Cnidy Evans RN  Outcome: Progressing     Problem: Pain  Goal: Verbalizes/displays adequate comfort level or baseline comfort level  11/23/2024 2227 by Jonnie Ruvalcaba RN  Outcome: Progressing  11/23/2024 1643 by Cindy Evans RN  Outcome: Progressing     Problem: Skin/Tissue Integrity  Goal: Absence of new skin breakdown  Description: 1.  Monitor for areas of redness and/or skin breakdown  2.  Assess vascular access sites hourly  3.  Every 4-6 hours minimum:  Change oxygen saturation probe site  4.  Every 4-6 hours:  If on nasal continuous positive airway pressure, respiratory therapy assess nares and determine need for appliance change or resting period.  11/23/2024 2227 by Jonnie Ruvalcaba RN  Outcome: Progressing  11/23/2024 1643 by Cindy Evans RN  Outcome: Progressing     Problem: Safety - Adult  Goal: Free from fall injury  11/23/2024 2227 by Jonnie Ruvalcaba RN  Outcome: Progressing  11/23/2024 1643 by Cindy Evans RN  Outcome: Progressing

## 2024-11-24 NOTE — PLAN OF CARE
Problem: Chronic Conditions and Co-morbidities  Goal: Patient's chronic conditions and co-morbidity symptoms are monitored and maintained or improved  11/24/2024 0952 by Cindy Evans RN  Outcome: Progressing  11/23/2024 2227 by Jonnie Ruvalcaba RN  Outcome: Progressing     Problem: Discharge Planning  Goal: Discharge to home or other facility with appropriate resources  11/24/2024 0952 by Cindy Evans RN  Outcome: Progressing  11/23/2024 2227 by Jonnie Ruvalcaba RN  Outcome: Progressing     Problem: Pain  Goal: Verbalizes/displays adequate comfort level or baseline comfort level  11/24/2024 0952 by Cindy Evans RN  Outcome: Progressing  11/23/2024 2227 by Jonnie Ruvalcaba RN  Outcome: Progressing     Problem: Skin/Tissue Integrity  Goal: Absence of new skin breakdown  Description: 1.  Monitor for areas of redness and/or skin breakdown  2.  Assess vascular access sites hourly  3.  Every 4-6 hours minimum:  Change oxygen saturation probe site  4.  Every 4-6 hours:  If on nasal continuous positive airway pressure, respiratory therapy assess nares and determine need for appliance change or resting period.  11/24/2024 0952 by Cindy Evans RN  Outcome: Progressing  11/23/2024 2227 by Jonnie Ruvalcaba RN  Outcome: Progressing     Problem: Safety - Adult  Goal: Free from fall injury  11/24/2024 0952 by Cindy Evans RN  Outcome: Progressing  11/23/2024 2227 by Jonnie Ruvalcaba RN  Outcome: Progressing

## 2024-11-24 NOTE — CONSULTS
[facility]   Consult Note      Reason for Consult: Evaluation of pneumonia  Requesting Physician: Taj Almendarez    Subjective:   CHIEF COMPLAINT: Shortness of breath and cough     HPI: Patient states that he has been having increasing shortness of breath and cough associated with mucoid phlegm for the last 3 days.  Has a sore throat.  Denies any chest pain, fevers or sick contacts.  Was treated with outpatient Keflex with no improvement.  Hence she presents to the hospital for an evaluation.  Chest x-ray showed left basilar infiltrate ?  Pneumonia and hence pulmonary consultation has been requested.    Patient also complains of bright red bleeding per rectum, painless which has been ongoing for the past 2 days.    Former smoker, quit 3 years ago.  History of COPD, uses 2 L O2 at nighttime.  Previously followed by Dr. Abreu.  Currently on Trelegy 200, albuterol/DuoNeb breathing treatments.  History of multiple skin cancers.  History of CAD status post PCI to RCA and LAD in January 2023.  History of AKIL, not compliant with CPAP.       The patient is a 79 y.o. 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  Given patient's ongoing respiratory symptoms, we would request CT chest without contrast to clarify this.  I doubt patient has \"pneumonia \".    Currently on empiric antibiotics with Rocephin and Zithromax, continue for now.  Continue bronchodilators and IV Solu-Medrol 40 mg daily.  Currently patient does not have any wheezing.  WBC 9.5, lactate 1.8.  Will DC antibiotics if CT chest confirms atelectasis.    Pulmonary will follow    Jarocho Finley MD

## 2024-11-24 NOTE — PROGRESS NOTES
Speech Language Pathology  [facility]  [phone]  SLP Clinical Swallow Evaluation       Patient: Milana Pardo   : 1945   MRN: [id number]      Evaluation Date: 2024      Admitting Dx: Pneumonia due to organism [J18.9]  Chronic obstructive pulmonary disease with acute exacerbation (HCC) [J44.1]  Pneumonia due to infectious organism, unspecified laterality, unspecified part of lung [J18.9]  Treatment Diagnosis: Oropharyngeal Dysphagia   Pain: Did not state                                  Recommendations      Recommended Diet and Intervention 2024:  Diet Solids Recommendation:  Regular texture diet  Liquid Consistency Recommendation:  Thin liquids  Recommended form of Meds: Meds whole with water     Compensatory strategies: Alternate solids/liquids , Upright as possible with all PO intake , Small bites/sips , Eat/feed slowly, Remain upright 30-45 min , Aspiration Precautions     Discharge Recommendations:  Discharge recommendations to be determined pending ongoing follow-up during acute care stay    History/Course of Treatment     H&P: 2024  79 y.o. female with past medical history of hypertension, hyperlipidemia, atrial fibrillation anticoagulated on Xarelto, diabetes, COPD on 2 L nasal cannula oxygen at night, CAD, anxiety, depression, obstructive sleep apnea and grade 2 diastolic dysfunction documented who presents ED with complaint of shortness of breath.  Patient states has been short of breath for the past 3 days.  She came by EMS for further evaluation and treatment.  Patient reports he was diagnosed with tonsillitis a couple days ago.  Reports was put on antibiotics with cephalexin.  She reports she continues to have sore throat.  States now she is having cough, chest tightness and shortness of breath.  Feels like she is having flareup of her COPD.  She reports chronic pain but denies any new pain at this time.  Rates her pain as a 10/10  diffusely.     Imaging:  Chest X-ray: 11/23/2024  IMPRESSION:  Increased left upper lobe airspace disease, either atelectasis or pneumonia.    Prior Modified Barium Swallow Study: 8/2/22  Modified Barium Swallow evaluation completed on 8/2/2022.Patient presents with moderate oropharyngeal dysphagia with premature bolus loss to pharynx, deep pharyngeal pooling and delayed airway protection during the swallow noted with all textures. Deep pharyngeal pooling prior to swallow initiation with thin liquids results in bolus loss to laryngeal inlet with deep SILENT laryngeal penetration intermittently noted during the swallow with thin liquids. Laryngeal penetration with thin liquids is largely self clearing with no overt aspiration directly viewed. However, Pt is at increased risk for aspiration of thin liquids due to deep pharyngeal pooling and delayed airway protection, especially at times of respiratory compromise.(Of note, Pt's respiratory status was stable with no increased WOB or wheezing noted at the time of this study)  Main factors contributing to oropharyngeal dysphagia include: reduced bolus control with premature bolus loss to pharynx, deep pharyngeal pooling to laryngeal inlet/pyriform with thin liquids, and to the underside of epiglottis with all textures; laryngeal penetration during the swallow intermittently with thin liquids via straw, and over successive po trials; Delayed epiglottic inversion; delayed/reduced hyolaryngeal excursion; reduced tongue based retraction and reduced sensation for timely reflexive cough in the presence of deep laryngeal penetration with thins.    History/Prior Level of Function:   Living Status: Private residence   Prior Speech/Dysphagia History: Seen previously during admission by SLP, most recently 1/29/2023 with recommendation for regular diet with thin liquids. MBS completed in 2022 as above. Pt reports she consumes an unrestricted diet at home.   Reason for referral: SLP      Volitional Cough:  Elicited: Weak     Volitional Swallow:   []Absent   []Delayed     []Adequate     [x]Difficult to palpate 2/2 body habitus    Oral Mechanism Exam:  Mild   Labial Coordination   Lingual Coordination     Oral Phase: Mild   Impaired mastication   Suspected premature bolus loss     Pharyngeal Phase: Mild   Delayed swallow initiation   Globus sensation x 1    Dysphagia risk factors:   hx of dysphagia, current respiratory status, and co-morbidities  Risk factors for developing adverse outcomes to aspiration:   reduced ambulation and Impaired cough function  Eating Assistance:   Setup or clean-up assistance              Goals     Goals:  Dysphagia Goals: Pt will functionally tolerate recommended diet with no overt clinical s/s of aspiration   Pt will demonstrate understanding of aspiration risk and precautions via education/demonstration with occasional prompting  If clinical s/s of aspiration/penetration continue to be noted, Pt will participate in Modified Barium Swallow Study (MBS)       POC/Education     Dysphagia Therapeutic Intervention:  Diet Tolerance Monitoring , Patient/Family Education , Therapeutic Trials with SLP     Plan of care: 1-2 times to ensure diet tolerance.    Education:  Provided education regarding role of SLP, results of assessment, recommendations and general speech pathology plan of care:  Pt verbalized understanding and agreement   Pt requires ongoing learning     If patient discharges prior to next visit, this note will serve as discharge.     Treatment time:  Timed Code Treatment Minutes: 0  Total Treatment Time Minutes: 26      Electronically signed by:    Siria Silva MA CCC-SLP  Speech-Language Pathologist  SP. [id number]

## 2024-11-24 NOTE — PROGRESS NOTES
Speech Language Pathology  Clinical Swallow Screening    Name: Milana Pardo  : 1945  Medical Diagnosis: Pneumonia due to organism [J18.9]  Chronic obstructive pulmonary disease with acute exacerbation (HCC) [J44.1]  Pneumonia due to infectious organism, unspecified laterality, unspecified part of lung [J18.9]    Swallow screen completed. Patient demonstrates some risk indicators for potential dysphagia / aspiration per swallow screen.    RECOMMEND: Clinical Swallow Evaluation at bedside to assess swallowing function, evaluate risk of aspiration, and determine appropriate diet level.  Please place order for SLP Eval and Treat if in agreement.    Siria Silva MA CCC-SLP  Speech-Language Pathologist  SP. [id number]

## 2024-11-25 LAB
ANION GAP SERPL CALCULATED.3IONS-SCNC: 7 MMOL/L (ref 3–16)
BACTERIA SPEC RESP CULT: NORMAL
BASOPHILS # BLD: 0.1 K/UL (ref 0–0.2)
BASOPHILS NFR BLD: 0.8 %
BUN SERPL-MCNC: 22 MG/DL (ref 7–20)
CALCIUM SERPL-MCNC: 9.1 MG/DL (ref 8.3–10.6)
CHLORIDE SERPL-SCNC: 100 MMOL/L (ref 99–110)
CO2 SERPL-SCNC: 31 MMOL/L (ref 21–32)
CREAT SERPL-MCNC: 1.1 MG/DL (ref 0.6–1.2)
DEPRECATED RDW RBC AUTO: 15.4 % (ref 12.4–15.4)
EOSINOPHIL # BLD: 0.1 K/UL (ref 0–0.6)
EOSINOPHIL NFR BLD: 0.7 %
EST. AVERAGE GLUCOSE BLD GHB EST-MCNC: 211.6 MG/DL
GFR SERPLBLD CREATININE-BSD FMLA CKD-EPI: 51 ML/MIN/{1.73_M2}
GLUCOSE BLD-MCNC: 153 MG/DL (ref 70–99)
GLUCOSE BLD-MCNC: 199 MG/DL (ref 70–99)
GLUCOSE BLD-MCNC: 258 MG/DL (ref 70–99)
GLUCOSE BLD-MCNC: 399 MG/DL (ref 70–99)
GLUCOSE SERPL-MCNC: 194 MG/DL (ref 70–99)
GRAM STN SPEC: NORMAL
HBA1C MFR BLD: 9 %
HCT VFR BLD AUTO: 39.3 % (ref 36–48)
HGB BLD-MCNC: 12.9 G/DL (ref 12–16)
LYMPHOCYTES # BLD: 2.3 K/UL (ref 1–5.1)
LYMPHOCYTES NFR BLD: 22.9 %
MCH RBC QN AUTO: 28.4 PG (ref 26–34)
MCHC RBC AUTO-ENTMCNC: 32.8 G/DL (ref 31–36)
MCV RBC AUTO: 86.6 FL (ref 80–100)
MONOCYTES # BLD: 0.8 K/UL (ref 0–1.3)
MONOCYTES NFR BLD: 8.1 %
NEUTROPHILS # BLD: 6.8 K/UL (ref 1.7–7.7)
NEUTROPHILS NFR BLD: 67.5 %
PERFORMED ON: ABNORMAL
PLATELET # BLD AUTO: 166 K/UL (ref 135–450)
PMV BLD AUTO: 8 FL (ref 5–10.5)
POTASSIUM SERPL-SCNC: 4.2 MMOL/L (ref 3.5–5.1)
PROCALCITONIN SERPL IA-MCNC: 0.08 NG/ML (ref 0–0.15)
RBC # BLD AUTO: 4.54 M/UL (ref 4–5.2)
SODIUM SERPL-SCNC: 138 MMOL/L (ref 136–145)
WBC # BLD AUTO: 10.1 K/UL (ref 4–11)

## 2024-11-25 PROCEDURE — 94761 N-INVAS EAR/PLS OXIMETRY MLT: CPT

## 2024-11-25 PROCEDURE — 6370000000 HC RX 637 (ALT 250 FOR IP): Performed by: INTERNAL MEDICINE

## 2024-11-25 PROCEDURE — 2580000003 HC RX 258: Performed by: INTERNAL MEDICINE

## 2024-11-25 PROCEDURE — 99232 SBSQ HOSP IP/OBS MODERATE 35: CPT | Performed by: INTERNAL MEDICINE

## 2024-11-25 PROCEDURE — 97530 THERAPEUTIC ACTIVITIES: CPT

## 2024-11-25 PROCEDURE — 94640 AIRWAY INHALATION TREATMENT: CPT

## 2024-11-25 PROCEDURE — 2700000000 HC OXYGEN THERAPY PER DAY

## 2024-11-25 PROCEDURE — 6360000002 HC RX W HCPCS: Performed by: INTERNAL MEDICINE

## 2024-11-25 PROCEDURE — 80048 BASIC METABOLIC PNL TOTAL CA: CPT

## 2024-11-25 PROCEDURE — 1200000000 HC SEMI PRIVATE

## 2024-11-25 PROCEDURE — 97161 PT EVAL LOW COMPLEX 20 MIN: CPT

## 2024-11-25 PROCEDURE — 85025 COMPLETE CBC W/AUTO DIFF WBC: CPT

## 2024-11-25 PROCEDURE — 97535 SELF CARE MNGMENT TRAINING: CPT

## 2024-11-25 PROCEDURE — 97165 OT EVAL LOW COMPLEX 30 MIN: CPT

## 2024-11-25 PROCEDURE — 84145 PROCALCITONIN (PCT): CPT

## 2024-11-25 RX ADMIN — ENOXAPARIN SODIUM 30 MG: 100 INJECTION SUBCUTANEOUS at 22:01

## 2024-11-25 RX ADMIN — PANTOPRAZOLE SODIUM 40 MG: 40 TABLET, DELAYED RELEASE ORAL at 09:31

## 2024-11-25 RX ADMIN — Medication 10 ML: at 22:10

## 2024-11-25 RX ADMIN — GLIPIZIDE 10 MG: 5 TABLET ORAL at 09:31

## 2024-11-25 RX ADMIN — TIOTROPIUM BROMIDE INHALATION SPRAY 2 PUFF: 3.12 SPRAY, METERED RESPIRATORY (INHALATION) at 10:20

## 2024-11-25 RX ADMIN — Medication 2 PUFF: at 10:20

## 2024-11-25 RX ADMIN — DICLOFENAC SODIUM 2 G: 10 GEL TOPICAL at 13:24

## 2024-11-25 RX ADMIN — FUROSEMIDE 40 MG: 40 TABLET ORAL at 09:31

## 2024-11-25 RX ADMIN — INSULIN LISPRO 4 UNITS: 100 INJECTION, SOLUTION INTRAVENOUS; SUBCUTANEOUS at 13:18

## 2024-11-25 RX ADMIN — ALPRAZOLAM 1 MG: 0.5 TABLET ORAL at 09:31

## 2024-11-25 RX ADMIN — HYDROCORTISONE ACETATE 25 MG: 25 SUPPOSITORY RECTAL at 22:02

## 2024-11-25 RX ADMIN — GABAPENTIN 400 MG: 400 CAPSULE ORAL at 22:01

## 2024-11-25 RX ADMIN — INSULIN LISPRO 8 UNITS: 100 INJECTION, SOLUTION INTRAVENOUS; SUBCUTANEOUS at 22:04

## 2024-11-25 RX ADMIN — Medication 10 ML: at 09:32

## 2024-11-25 RX ADMIN — IPRATROPIUM BROMIDE AND ALBUTEROL SULFATE 1 DOSE: .5; 3 SOLUTION RESPIRATORY (INHALATION) at 10:15

## 2024-11-25 RX ADMIN — ALPRAZOLAM 1 MG: 0.5 TABLET ORAL at 13:20

## 2024-11-25 RX ADMIN — INSULIN LISPRO 16 UNITS: 100 INJECTION, SOLUTION INTRAVENOUS; SUBCUTANEOUS at 17:00

## 2024-11-25 RX ADMIN — GABAPENTIN 400 MG: 400 CAPSULE ORAL at 09:31

## 2024-11-25 RX ADMIN — LISINOPRIL 2.5 MG: 5 TABLET ORAL at 09:31

## 2024-11-25 RX ADMIN — IPRATROPIUM BROMIDE AND ALBUTEROL SULFATE 1 DOSE: .5; 3 SOLUTION RESPIRATORY (INHALATION) at 21:27

## 2024-11-25 RX ADMIN — DICLOFENAC SODIUM 2 G: 10 GEL TOPICAL at 22:07

## 2024-11-25 RX ADMIN — INSULIN LISPRO 16 UNITS: 100 INJECTION, SOLUTION INTRAVENOUS; SUBCUTANEOUS at 00:03

## 2024-11-25 RX ADMIN — HYDROCORTISONE ACETATE 25 MG: 25 SUPPOSITORY RECTAL at 09:33

## 2024-11-25 RX ADMIN — ALPRAZOLAM 1 MG: 0.5 TABLET ORAL at 22:02

## 2024-11-25 RX ADMIN — GLIPIZIDE 10 MG: 5 TABLET ORAL at 17:00

## 2024-11-25 RX ADMIN — Medication 2 PUFF: at 21:28

## 2024-11-25 RX ADMIN — WATER 40 MG: 1 INJECTION INTRAMUSCULAR; INTRAVENOUS; SUBCUTANEOUS at 09:31

## 2024-11-25 RX ADMIN — GUAIFENESIN 600 MG: 600 TABLET, EXTENDED RELEASE ORAL at 09:31

## 2024-11-25 RX ADMIN — GUAIFENESIN 600 MG: 600 TABLET, EXTENDED RELEASE ORAL at 22:02

## 2024-11-25 RX ADMIN — ENOXAPARIN SODIUM 30 MG: 100 INJECTION SUBCUTANEOUS at 09:32

## 2024-11-25 ASSESSMENT — PAIN DESCRIPTION - LOCATION: LOCATION: GENERALIZED

## 2024-11-25 ASSESSMENT — PAIN SCALES - GENERAL: PAINLEVEL_OUTOF10: 9

## 2024-11-25 NOTE — DISCHARGE INSTR - COC
Continuity of Care Form    Patient Name: Milana Pardo   :  1945  MRN:  [id number]    Admit date:  2024  Discharge date:  ***    Code Status Order: Full Code   Advance Directives:   Advance Care Flowsheet Documentation             Admitting Physician:  Taj Almendarez MD  PCP: Arian Brandon MD    Discharging Nurse: ***  Discharging Hospital Unit/Room#: 4TN-4478/4478-01  Discharging Unit Phone Number: ***    Emergency Contact:   Extended Emergency Contact Information  Primary Emergency Contact: Brenda Bedolla   [facility]  Home Phone: [phone]  Relation: Child  Secondary Emergency Contact: Rhea Bedolla  Home Phone: [phone]  Relation: Grandchild    Past Surgical History:  Past Surgical History:   Procedure Laterality Date    APPENDECTOMY      BRONCHOSCOPY N/A 2020    BRONCHOSCOPY ALVEOLAR LAVAGE performed by Tc Carter MD at [facility] ENDOSCOPY    BRONCHOSCOPY N/A 2020    BRONCHOSCOPY DIAGNOSTIC OR CELL WASH ONLY performed by Jarocho Finley MD at [facility] ENDOSCOPY    BRONCHOSCOPY N/A 2020    BRONCHOSCOPY DIAGNOSTIC OR CELL WASH ONLY performed by Abran Abreu MD at [facility] ENDOSCOPY    CARDIAC SURGERY      1 stent  and 1 stent     CATARACT REMOVAL   or     bilateral cataracts removed    CHOLECYSTECTOMY      COLONOSCOPY      COLONOSCOPY  2018    CORONARY ANGIOPLASTY WITH STENT PLACEMENT      ENDOSCOPY, COLON, DIAGNOSTIC      ESOPHAGEAL DILATATION      EYE SURGERY      bilateral cataracts    GALLBLADDER SURGERY      HYSTERECTOMY (CERVIX STATUS UNKNOWN)      OR EXCISION MALIGNANT LESION S/N/H/F/G 0.5 CM/< N/A 2018    EXCISION OF SCALP SQUAMOUS CELL CARCINOMA performed by Devyn Colin MD at [facility] OR    OR SPLIT AGRFT T/A/L 1ST 100 CM/&/1% BDY INFT/CHLD N/A 2018    SPLIT THICKNESS SKIN GRAFT FOR COVERAGE SCALP, APPLICATION OF WOUND VAC DEVICE performed by Devyn Colin MD at [facility] OR    SKIN BIOPSY       ADLs:630396451}  Toileting  {P DME ADLs:644414603}  Feeding  {P DME ADLs:217778763}  Med Admin  {P DME ADLs:260396363}  Med Delivery   { JOSE G MED Delivery:846321870}    Wound Care Documentation and Therapy:        Elimination:  Continence:   Bowel: {YES / NO:}  Bladder: {YES / NO:}  Urinary Catheter: {Urinary Catheter:246412039}   Colostomy/Ileostomy/Ileal Conduit: {YES / NO:}       Date of Last BM: ***    Intake/Output Summary (Last 24 hours) at 2024 1329  Last data filed at 2024 0931  Gross per 24 hour   Intake 250 ml   Output 1400 ml   Net -1150 ml     I/O last 3 completed shifts:  In: 720 [P.O.:720]  Out: 2600 [Urine:2600]    Safety Concerns:     { JOSE G Safety Concerns:849311584}    Impairments/Disabilities:      { JOSE G Impairments/Disabilities:288989400}    Nutrition Therapy:  Current Nutrition Therapy:   { JOSE G Diet List:060397734}    Routes of Feeding: {[facility] DME Other Feedings:588057714}  Liquids: {Slp liquid thickness:38909}  Daily Fluid Restriction: {[facility] DME Yes amt example:978204526}  Last Modified Barium Swallow with Video (Video Swallowing Test): {Done Not Done Date:}    Treatments at the Time of Hospital Discharge:   Respiratory Treatments: ***  Oxygen Therapy:  {Therapy; copd oxygen:89295}  Ventilator:    {[facility] Vent List:892682013}    Rehab Therapies: {THERAPEUTIC INTERVENTION:9297126737}  Weight Bearing Status/Restrictions: {[facility] Weight Bearin}  Other Medical Equipment (for information only, NOT a DME order):  {EQUIPMENT:360948640}  Other Treatments: ***    Patient's personal belongings (please select all that are sent with patient):  {[facility] DME Belongings:739791365}    RN SIGNATURE:  {Esignature:944743392}    CASE MANAGEMENT/SOCIAL WORK SECTION    Inpatient Status Date: 2024    Readmission Risk Assessment Score:  Readmission Risk              Risk of Unplanned Readmission:  21           Discharging to Facility/ Agency   [facility]

## 2024-11-25 NOTE — PROGRESS NOTES
Physician Progress Note      PATIENT:               SERA OLIVARES  CSN #:                  [id number]  :                       1945  ADMIT DATE:       2024 11:22 AM  DISCH DATE:  RESPONDING  PROVIDER #:        Taj Almendarez MD          QUERY TEXT:    Patient admitted with SOB, PNA . Noted documentation of acute on chronic   respiratory failure in notes . In order to support the diagnosis of acute   respiratory failure, please include additional clinical indicators in your   documentation.  Or please document if the diagnosis of acute respiratory   failure has been ruled out after further study.    The medical record reflects the following:  Risk Factors: COPD requiring 2lnc at night, CAD, anxiety, DM, AKIL, Afib,   diastolic dysfunction, PNA  Clinical Indicators: resps on admission-- -, ra--90%--- 2lnc - 95%  ED note-- -- \"  COPD on 2 L nasal cannula oxygen at night. Respiratory:    Positive for cough, chest tightness, shortness of breath and wheezing.   Negative for apnea, choking and stridor. \"  - pulm consult--\" negative except for cough, shortness of breath, and   sputum.\"  Treatment: nebs, steroid, cxray, pulm consult, labs, cultures, 2lnc oxygen,   essential home meds, supportive care    Thank you,  Kit Friend RN,BSB  Options provided:  -- Acute  Respiratory Failure as evidenced by, Please document evidence.  -- Acute on chronic Respiratory Failure ruled out after study  -- Other - I will add my own diagnosis  -- Disagree - Not applicable / Not valid  -- Disagree - Clinically unable to determine / Unknown  -- Refer to Clinical Documentation Reviewer    PROVIDER RESPONSE TEXT:    Acute on chronic Respiratory Failure has been ruled out after study.    Query created by: Kit Friend on 2024 11:21 AM      Electronically signed by:  Taj Almendarez MD 2024 1:50 PM

## 2024-11-25 NOTE — PROGRESS NOTES
[facility] Pulmonary/CCM Progress note      Admit Date: 11/23/2024    Chief Complaint: Shortness of breath and cough    Subjective:     Interval History: Patient is somewhat tearful today, could not explain the details.  She appears to be worried about her rectal bleeding.  She also complains of shortness of breath and dyspnea with any form of exertion.  Currently on room air.    Scheduled Meds:   gabapentin  400 mg Oral TID    methylPREDNISolone  40 mg IntraVENous Daily    hydrocortisone  25 mg Rectal BID    insulin lispro  0-16 Units SubCUTAneous 4x Daily AC & HS    furosemide  40 mg Oral Daily    rosuvastatin  20 mg Oral Nightly    guaiFENesin  600 mg Oral BID    glipiZIDE  10 mg Oral BID WC    lisinopril  2.5 mg Oral Daily    diclofenac sodium  2 g Topical BID    pantoprazole  40 mg Oral QAM AC    budesonide-formoterol  2 puff Inhalation BID RT    And    tiotropium  2 puff Inhalation Daily RT    sodium chloride flush  5-40 mL IntraVENous 2 times per day    enoxaparin  30 mg SubCUTAneous BID    ipratropium 0.5 mg-albuterol 2.5 mg  1 Dose Inhalation TID RT     Continuous Infusions:   dextrose      sodium chloride       PRN Meds:dextrose bolus **OR** dextrose bolus, glucagon (rDNA), dextrose, sodium chloride, albuterol sulfate HFA, ALPRAZolam, sodium chloride flush, sodium chloride, ondansetron **OR** ondansetron, magnesium hydroxide, acetaminophen **OR** acetaminophen, hydrALAZINE, sodium chloride, potassium chloride **OR** potassium alternative oral replacement **OR** potassium chloride, guaiFENesin-dextromethorphan, traMADol    Review of Systems  Constitutional: Fatigue, anxious  Ears, nose, mouth, throat: negative for ear drainage, epistaxis, hoarseness, nasal congestion, sore throat and voice change  Respiratory: negative except for dyspnea on exertion and shortness of breath  Cardiovascular: negative for chest pain, chest pressure/discomfort, irregular heart beat, lower extremity edema and  masses or organomegaly  Lymph Nodes: Cervical, supraclavicular normal  Extremities: no cyanosis, clubbing or edema  Musculoskeletal: normal range of motion, no joint swelling, deformity or tenderness  Neurologic: alert, no focal neurologic deficits    Data Review:  CBC:   Lab Results   Component Value Date/Time    WBC 10.1 11/25/2024 05:21 AM    RBC 4.54 11/25/2024 05:21 AM     BMP:   Lab Results   Component Value Date/Time    GLUCOSE 194 11/25/2024 05:21 AM    CO2 31 11/25/2024 05:21 AM    BUN 22 11/25/2024 05:21 AM    CREATININE 1.1 11/25/2024 05:21 AM    CALCIUM 9.1 11/25/2024 05:21 AM     ABG:   Lab Results   Component Value Date/Time    VZE7RMC 22.6 02/07/2017 12:26 PM    BEART -0.6 02/07/2017 12:26 PM    G4UBGBEA 96.6 02/07/2017 12:26 PM    PHART 7.446 02/07/2017 12:26 PM    FYP3SVN 33.6 02/07/2017 12:26 PM    PO2ART 77.3 02/07/2017 12:26 PM    DIT5PXG 23.7 02/07/2017 12:26 PM       Radiology: All pertinent images / reports were reviewed as a part of this visit.    EXAMINATION:  CT OF THE CHEST WITHOUT CONTRAST 11/24/2024 11:45 am     TECHNIQUE:  CT of the chest was performed without the administration of intravenous  contrast. Multiplanar reformatted images are provided for review. Automated  exposure control, iterative reconstruction, and/or weight based adjustment of  the mA/kV was utilized to reduce the radiation dose to as low as reasonably  achievable.     COMPARISON:  February 2023 April 2023     HISTORY:  ORDERING SYSTEM PROVIDED HISTORY: evaluate for infiltrates  TECHNOLOGIST PROVIDED HISTORY:  Reason for exam:->evaluate for infiltrates  Reason for Exam: evaluate for infiltrates     FINDINGS:  Mediastinum: Thyroid gland unremarkable.  Atherosclerotic change seen in  aorta.  Coronary artery calcifications seen.  Trace pericardial fluid is  seen.  No pericardial calcification noted.  Small hiatal hernia seen.  There  is nonspecific thickening at the GE junction.  No significant mediastinal or  hilar

## 2024-11-25 NOTE — PROGRESS NOTES
Speech Language Pathology  Attempt  x 2  Milana Pardo   1945    Attempt 1: Attempted to see pt for dysphagia therapy. PT in with pt at time of attempt. Will re-attempt to follow-up as therapy schedule allows.    Attempt 2 @ 1329: pt found lying on side in bed with RN present. Pt placed on 2L of O2 via NC, per RN requesting SLP to come back at a later time due to pt being tearful and wanting to rest. Pt stating she does not have an appetite. Will re-attempt at a later time as schedule allows.     Thanks,  Attempt 1: Hortencia Austin MA CCC-SLP #[id number]  Speech Language Pathologist      Attempt 2: Soni Morris M.A. CCC-SLP #[id number] 11/25/2024 1:31 PM  Speech-Language Pathologist

## 2024-11-25 NOTE — PLAN OF CARE
Patient confused, crying for her mom, does not know her age, upon assessing vital signs spo2 87 % on RA, placed on 2L O2 stating at 91%

## 2024-11-25 NOTE — PROGRESS NOTES
[facility] - Inpatient Rehabilitation Department   Phone: [phone]    Physical Therapy    [x] Initial Evaluation            [] Daily Treatment Note         [] Discharge Summary      Patient: Milana Pardo   : 1945   MRN: [id number]   Date of Service:  2024  Admitting Diagnosis: Pneumonia  Current Admission Summary: Per H&P on  \"79 y.o. female with past medical history of hypertension, hyperlipidemia, atrial fibrillation anticoagulated on Xarelto, diabetes, COPD on 2 L nasal cannula oxygen at night, CAD, anxiety, depression, obstructive sleep apnea and grade 2 diastolic dysfunction documented who presents ED with complaint of shortness of breath.  Patient states has been short of breath for the past 3 days.  She came by EMS for further evaluation and treatment.  Patient reports he was diagnosed with tonsillitis a couple days ago.  Reports was put on antibiotics with cephalexin.  She reports she continues to have sore throat.  States now she is having cough, chest tightness and shortness of breath.  Feels like she is having flareup of her COPD.  She reports chronic pain but denies any new pain at this time.  Rates her pain as a 10/10 diffusely.      Cxr done in er, reviewed by self, shows L upper infiltrate, suspect pneumonia     She continues to wheeze. Lactate elevated.  To be admitted for further tx of copd exac/pna\"    GI consulted for diarrhea and blood in stool. Pt with hx of IBS and hemorrhoids.     Past Medical History:  has a past medical history of Acute MI (HCC), Anxiety and depression, Arthritis, CAD (coronary artery disease), Cancer (HCC), Cancer (HCC), Cancer of skin of leg, Chronic obstructive pulmonary disease (HCC), Claustrophobia, COPD (chronic obstructive pulmonary disease) (Formerly [facility]), Emphysema lung (Formerly [facility]), ESBL (extended spectrum beta-lactamase) producing bacteria infection, Esophageal stricture, Gastroesophageal reflux disease without esophagitis, Hiatal hernia,  2 minutes in order to complete ADLs    Above goals reviewed on 11/25/2024.  All goals are ongoing at this time unless indicated above.      Therapy Session Time      Individual Group Co-treatment   Time In     0837   Time Out     0930   Minutes     53     Timed Code Treatment Minutes:  38 Minutes  Total Treatment Minutes:  53 Minutes       Electronically Signed By: Jennifer Sandoval, PT      Jennifer Sandoval PT, DPT, CNS #[id number]

## 2024-11-25 NOTE — CARE COORDINATION
Case Management Assessment  Initial Evaluation    Date/Time of Evaluation: 11/25/2024 1:45 PM   Assessment Completed by: ARIADNE GRANDE RN    If patient is discharged prior to next notation, then this note serves as note for discharge by case management.    Patient Name: Milana Pardo                   YOB: 1945  Diagnosis: Pneumonia due to organism [J18.9]  Chronic obstructive pulmonary disease with acute exacerbation (HCC) [J44.1]  Pneumonia due to infectious organism, unspecified laterality, unspecified part of lung [J18.9]                   Date / Time: 11/23/2024 11:22 AM    Patient Admission Status: Inpatient   Readmission Risk (Low < 19, Mod (19-27), High > 27): Readmission Risk Score: 18.9    Current PCP: Arian Brandon MD  PCP verified by CM? Yes    Chart Reviewed: Yes      History Provided by: Patient  Patient Orientation: Alert and Oriented    Patient Cognition: Alert    Hospitalization in the last 30 days (Readmission):  No    If yes, Readmission Assessment in  Navigator will be completed.    Advance Directives:      Code Status: Full Code   Patient's Primary Decision Maker is: Legal Next of Kin    Primary Decision Maker: Brenda Bedolla - Child - [phone]    Discharge Planning:    Patient lives with: Alone Type of Home: Apartment  Primary Care Giver: Other (Comment) (home care and COA aide)  Patient Support Systems include: Children, ZHANE/Passport, Emergency Call System, Other (Comment), Home Care Staff (home care and COA aide)   Current Financial resources: Medicare, Medicaid  Current community resources: ECF/Home Care, Other (Comment) (ZHANE)  Current services prior to admission: Durable Medical Equipment, Emergency Call  System, Home Care, Meals On Wheels, ZHANE/Passport            Current DME: Shower Chair, Walker, Wheelchair, Hospital Bed, Other (Comment) (lyft chair/ electric scooter/ rollator, ramped entry/ transport through medicaid)            Type of Home Care services:  Aide

## 2024-11-25 NOTE — CARE COORDINATION
Chart reviewed at this time for discharge planning.    Pt from home. Has insurance and PCP.    Pulmonology and gastroenterology following.    Therapy evaluations pending.     CM will follow for discharge plan and needs.    Aline Estrada RN, BSN  [phone]

## 2024-11-25 NOTE — PROGRESS NOTES
Progress Note - Dr. Almendarez - Internal Medicine  PCP: Arian Brandon MD [id number] [address] [phone]    Hospital Day: 2  Code Status: Full Code  Current Diet: ADULT DIET; Regular; 4 carb choices (60 gm/meal)        CC: follow up on medical issues    Subjective:   Milana Pardo is a 79 y.o. female.    Pt seen and examined  Chart reviewed since last visit, labs and imaging below      Doing ok  Pt with reports of brbpr yest - on anusol  Considering repeat colonoscopy    Less dyspneic  CT Chest reviewed - no evidence of pneumonia      Review of Systems: (1 system for EPF, 2-9 for detailed, 10+ for comprehensive)  Constitutional: Negative for chills and fever.     HENT: Negative for dental problem, nosebleeds and rhinorrhea.      Eyes: Negative for photophobia and visual disturbance.     Respiratory: positive for cough, chest tightness and shortness of breath.      Cardiovascular: Negative for chest pain and leg swelling.     Gastrointestinal: Negative for diarrhea, nausea and vomiting.  Positive for bloody stools  Endocrine: Negative for polydipsia and polyphagia.     Genitourinary: Negative for frequency, hematuria and urgency.     Musculoskeletal: Negative for back pain and myalgias.     Skin: Negative for rash.     Allergic/Immunologic: Negative for food allergies.     Neurological: Negative for dizziness, seizures, syncope and facial asymmetry.     Hematological: Negative for adenopathy.     Psychiatric/Behavioral: Negative for dysphoric mood. The patient is not nervous/anxious.        I have reviewed the patient's medical and social history in detail and updated the computerized patient record.  To recap: She  has a past medical history of Acute MI (HCC), Anxiety and depression, Arthritis, CAD (coronary artery disease), Cancer (HCC), Cancer (HCC), Cancer of skin of leg, Chronic obstructive pulmonary disease (HCC), Claustrophobia, COPD (chronic obstructive pulmonary disease) ([facility]), Emphysema  lung (HCC), ESBL (extended spectrum beta-lactamase) producing bacteria infection, Esophageal stricture, Gastroesophageal reflux disease without esophagitis, Hiatal hernia, Hypercholesteremia, Hypertension, IBS (irritable bowel syndrome), Macular degeneration, Migraines, Movement disorder, Paroxysmal atrial fibrillation (HCC), Pneumonia, PONV (postoperative nausea and vomiting), Thoracic radiculopathy, Type II or unspecified type diabetes mellitus without mention of complication, not stated as uncontrolled, and Unspecified cerebral artery occlusion with cerebral infarction.. She  has a past surgical history that includes Cardiac surgery; Gallbladder surgery; Hysterectomy; Appendectomy; Cholecystectomy; Colonoscopy; Upper gastrointestinal endoscopy (04/20/2012); Endoscopy, colon, diagnostic; Tear duct surgery; Upper gastrointestinal endoscopy (06/11/2018); Colonoscopy (06/11/2018); pr excision malignant lesion s/n/h/f/g 0.5 cm/< (N/A, 09/06/2018); pr split agrft t/a/l 1st 100 cm/&/1% bdy inft/chld (N/A, 09/06/2018); skin biopsy; eye surgery; bronchoscopy (N/A, 01/24/2020); bronchoscopy (N/A, 01/27/2020); Cataract removal (2013 or 2014); Esophagus dilation; bronchoscopy (N/A, 11/23/2020); and Coronary angioplasty with stent.. She  reports that she quit smoking about 6 years ago. Her smoking use included cigarettes. She started smoking about 55 years ago. She has a 12.3 pack-year smoking history. She has never used smokeless tobacco. She reports current alcohol use of about 1.0 standard drink of alcohol per week. She reports that she does not use drugs..        Active Hospital Problems    Diagnosis Date Noted    HTN (hypertension) [I10]      Priority: High    Hematochezia [K92.1] 11/24/2024    Pneumonia [J18.9] 11/23/2024    Pneumonia due to organism [J18.9] 11/23/2024    DM type 2, controlled, with complication (HCC) [E11.8]     COPD (chronic obstructive pulmonary disease) (HCC) [J44.9] 08/09/2011    Acute on chronic

## 2024-11-25 NOTE — PROGRESS NOTES
Gastroenterology Progress Note      Milana Pardo is a 79 y.o. female patient.  1. Chronic obstructive pulmonary disease with acute exacerbation (HCC)    2. Pneumonia due to infectious organism, unspecified laterality, unspecified part of lung        SUBJECTIVE:  had diarrhea overnight with some red blood. No abdominal pain.         Physical    VITALS:  BP (!) 147/78   Pulse 81   Temp 98 °F (36.7 °C) (Oral)   Resp 16   Ht 1.676 m (5' 6\")   Wt 124.7 kg (275 lb)   SpO2 92%   BMI 44.39 kg/m²   TEMPERATURE:  Current - Temp: 98 °F (36.7 °C); Max - Temp  Av.9 °F (36.6 °C)  Min: 97.7 °F (36.5 °C)  Max: 98 °F (36.7 °C)    NAD  RRR   Abdomen soft, ND, NT, no HSM, Bowel sounds normal  Anicteric, no jaundice    Data    Data Review:    Recent Labs     24  1248 24  0529 24  0521   WBC 7.5 9.5 10.1   HGB 14.0 13.0 12.9   HCT 42.6 39.0 39.3   MCV 86.1 84.4 86.6    184 166     Recent Labs     24  1248 24  0529 24  0521    133* 138   K 4.1 4.7 4.2   CL 97* 95* 100   CO2 28 28 31   BUN 11 16 22*   CREATININE 0.9 0.9 1.1     Recent Labs     24  1248   AST 33   ALT 17   BILITOT 0.4   ALKPHOS 101     No results for input(s): \"LIPASE\", \"AMYLASE\" in the last 72 hours.  No results for input(s): \"PROTIME\", \"INR\" in the last 72 hours.  Invalid input(s): \"PTT\"           ASSESSMENT / PLAN      Hematochezia, likely from hemorrhoidal irritation from diarrhea.  Hemoglobin 12.9 which is at baseline.   -Anusol suppository 25 mg twice daily for 10 days     Acute on chronic diarrhea.  Known to have IBS diarrhea. Initially stated iw was worse after starting oral antibiotics for tonsillitis but today she tells me no change in her chronic diarrhea.  Of note, random colon biopsy showed nonspecific ileitis and colitis in 2018.  - follow up c.diff  - Once more stable, consider colonoscopy .    COPD exacerbation, on steroids    H/o atrial fibrillation - Xarelto held here.

## 2024-11-25 NOTE — PROGRESS NOTES
[facility] - Inpatient Rehabilitation Department   Phone: [phone]    Occupational Therapy    [x] Initial Evaluation            [] Daily Treatment Note         [x] Discharge Summary      Patient: Milana Pardo   : 1945   MRN: [id number]   Date of Service:  2024    Admitting Diagnosis:  Pneumonia  Current Admission Summary: Pt admitted with SOB, found to have PNA. Recent tonsillitis dx prior to admit and started on abx, has noted diarrhea with occasional BRBPR since starting abx.   Past Medical History:  has a past medical history of Acute MI (HCC), Anxiety and depression, Arthritis, CAD (coronary artery disease), Cancer (HCC), Cancer (HCC), Cancer of skin of leg, Chronic obstructive pulmonary disease (HCC), Claustrophobia, COPD (chronic obstructive pulmonary disease) (HCC), Emphysema lung (HCC), ESBL (extended spectrum beta-lactamase) producing bacteria infection, Esophageal stricture, Gastroesophageal reflux disease without esophagitis, Hiatal hernia, Hypercholesteremia, Hypertension, IBS (irritable bowel syndrome), Macular degeneration, Migraines, Movement disorder, Paroxysmal atrial fibrillation (HCC), Pneumonia, PONV (postoperative nausea and vomiting), Thoracic radiculopathy, Type II or unspecified type diabetes mellitus without mention of complication, not stated as uncontrolled, and Unspecified cerebral artery occlusion with cerebral infarction.  Past Surgical History:  has a past surgical history that includes Cardiac surgery; Gallbladder surgery; Hysterectomy; Appendectomy; Cholecystectomy; Colonoscopy; Upper gastrointestinal endoscopy (2012); Endoscopy, colon, diagnostic; Tear duct surgery; Upper gastrointestinal endoscopy (2018); Colonoscopy (2018); pr excision malignant lesion s/n/h/f/g 0.5 cm/< (N/A, 2018); pr split agrft t/a/l 1st 100 cm/&/1% bdy inft/chld (N/A, 2018); skin biopsy; eye surgery; bronchoscopy (N/A, 2020); bronchoscopy (N/A,  facility that help out 4 hours 5 days per week.    Examination   Vision:   Vision Corrective Device: wears glasses for reading  Hearing:   WFL  Sensation:   reports numbness and tingling in all extremities  ROM:   (B) UE ROM WFL per observation, decreased end range shoulder mobility  Strength:   (B) UE gross strength WFL    Therapist Clinical Decision Making (Complexity): low complexity  Clinical Presentation: stable      Subjective  General: Pt supine in bed upon arrival, agreeable to evaluation with encouragement. Reports she can't walk. Reports feeling anxious - RN notified  Pain: Patient does not rate upon questioning  Pain Interventions: not applicable        Activities of Daily Living  Basic Activities of Daily Living  Feeding: setup assistance  Grooming: moderate assistance combing hair  Lower Extremity Dressing: dependent  Dressing Comments: depends change  Toileting: dependent.    Toileting Comments: purewick/depends change and pericare - pt bed saturated with urine upon arrival  Instrumental Activities of Daily Living  No IADL completed on this date.    Functional Mobility  Bed Mobility  Supine to Sit: minimal assistance  Scooting: stand by assistance  Comments:  Transfers  Sit to stand transfer:stand by assistance  Stand to sit transfer: stand by assistance  Stand step transfer: contact guard assistance  Comments: EOB > recliner with RW - first STS from EOB CGA, second completed at SBA from recliner to RW when managing depends to waist  Functional Mobility  No functional mobility completed on this date secondary to pt declined.  Balance:  Static Sitting Balance: fair (+): maintains balance at SBA/supervision without use of UE support  Dynamic Sitting Balance: fair (+): maintains balance at SBA/supervision without use of UE support  Static Standing Balance: fair (-): maintains balance at CGA with use of UE support  Comments:    Other Therapeutic Interventions    Functional Outcomes   AM-[facility] Inpatient Daily  Activity Raw Score: 10    Cognition  WFL  Attention Span: attends with cues to redirect  Comments: Anxious throughout, perseverates at times about how she is cared for by aides at home, emotionally labile. Able to be redirected  Orientation:    alert and oriented x 4  Command Following:   accurately follows one step commands     Education  Barriers To Learning: emotional  Patient Education: Patient educated on goals, OT role and benefits, plan of care, ADL adaptive strategies, transfer training, discharge recommendations  Learning Assessment:  patient verbalizes understanding, would benefit from continued reinforcement    Assessment  Activity Tolerance: Tolerated fair. SpO2 88% on RA, 91% on 2L/min O2  Impairments Requiring Therapeutic Intervention: none - eval with same day discharge  Prognosis: N/A - no need for additional therapy intervention  Clinical Assessment: Patient presenting near dependent level for completion of all self care tasks at this time, besides feeding/grooming.  Prior to admission patient required dependent assistance for most self care tasks.  Eval with d/c at this time.  No therapy services indicated.   Safety Interventions: patient left in chair, chair alarm in place, call light within reach, gait belt, and nurse notified    Plan  Frequency: Eval with same day discharge.  No follow up required.  Current Treatment Recommendations: Not applicable, evaluation completed with same day discharge.    Goals  Patient eval with same day discharge.  No goals set as patient is at baseline self care status.      Therapy Session Time     Individual Group Co-treatment   Time In    0837   Time Out    0930   Minutes    53        Timed Code Treatment Minutes:   38  Total Treatment Minutes:  53       Electronically Signed By: Dmitri Patten, OT    Dmitri Patten, MOT, OTR/L, CNS ([id number])

## 2024-11-26 ENCOUNTER — TELEPHONE (OUTPATIENT)
Dept: PULMONOLOGY | Age: 79
End: 2024-11-26

## 2024-11-26 LAB
ANION GAP SERPL CALCULATED.3IONS-SCNC: 8 MMOL/L (ref 3–16)
BASOPHILS # BLD: 0.1 K/UL (ref 0–0.2)
BASOPHILS NFR BLD: 0.4 %
BUN SERPL-MCNC: 31 MG/DL (ref 7–20)
CALCIUM SERPL-MCNC: 10 MG/DL (ref 8.3–10.6)
CHLORIDE SERPL-SCNC: 97 MMOL/L (ref 99–110)
CO2 SERPL-SCNC: 31 MMOL/L (ref 21–32)
CREAT SERPL-MCNC: 1.1 MG/DL (ref 0.6–1.2)
DEPRECATED RDW RBC AUTO: 15.6 % (ref 12.4–15.4)
EOSINOPHIL # BLD: 0.1 K/UL (ref 0–0.6)
EOSINOPHIL NFR BLD: 0.5 %
GFR SERPLBLD CREATININE-BSD FMLA CKD-EPI: 51 ML/MIN/{1.73_M2}
GLUCOSE BLD-MCNC: 118 MG/DL (ref 70–99)
GLUCOSE BLD-MCNC: 288 MG/DL (ref 70–99)
GLUCOSE BLD-MCNC: 306 MG/DL (ref 70–99)
GLUCOSE BLD-MCNC: 88 MG/DL (ref 70–99)
GLUCOSE SERPL-MCNC: 151 MG/DL (ref 70–99)
HCT VFR BLD AUTO: 40.5 % (ref 36–48)
HGB BLD-MCNC: 13.3 G/DL (ref 12–16)
LYMPHOCYTES # BLD: 2.9 K/UL (ref 1–5.1)
LYMPHOCYTES NFR BLD: 24 %
MCH RBC QN AUTO: 28.2 PG (ref 26–34)
MCHC RBC AUTO-ENTMCNC: 33 G/DL (ref 31–36)
MCV RBC AUTO: 85.4 FL (ref 80–100)
MONOCYTES # BLD: 0.8 K/UL (ref 0–1.3)
MONOCYTES NFR BLD: 6.6 %
NEUTROPHILS # BLD: 8.2 K/UL (ref 1.7–7.7)
NEUTROPHILS NFR BLD: 68.5 %
PERFORMED ON: ABNORMAL
PERFORMED ON: NORMAL
PLATELET # BLD AUTO: 195 K/UL (ref 135–450)
PMV BLD AUTO: 8.1 FL (ref 5–10.5)
POTASSIUM SERPL-SCNC: 4.6 MMOL/L (ref 3.5–5.1)
PROCALCITONIN SERPL IA-MCNC: 0.07 NG/ML (ref 0–0.15)
RBC # BLD AUTO: 4.74 M/UL (ref 4–5.2)
SODIUM SERPL-SCNC: 136 MMOL/L (ref 136–145)
WBC # BLD AUTO: 12 K/UL (ref 4–11)

## 2024-11-26 PROCEDURE — 6360000002 HC RX W HCPCS: Performed by: INTERNAL MEDICINE

## 2024-11-26 PROCEDURE — 80048 BASIC METABOLIC PNL TOTAL CA: CPT

## 2024-11-26 PROCEDURE — 1200000000 HC SEMI PRIVATE

## 2024-11-26 PROCEDURE — 36415 COLL VENOUS BLD VENIPUNCTURE: CPT

## 2024-11-26 PROCEDURE — 84145 PROCALCITONIN (PCT): CPT

## 2024-11-26 PROCEDURE — 92526 ORAL FUNCTION THERAPY: CPT

## 2024-11-26 PROCEDURE — 99232 SBSQ HOSP IP/OBS MODERATE 35: CPT | Performed by: INTERNAL MEDICINE

## 2024-11-26 PROCEDURE — 85025 COMPLETE CBC W/AUTO DIFF WBC: CPT

## 2024-11-26 PROCEDURE — 6370000000 HC RX 637 (ALT 250 FOR IP): Performed by: INTERNAL MEDICINE

## 2024-11-26 PROCEDURE — 2580000003 HC RX 258: Performed by: INTERNAL MEDICINE

## 2024-11-26 PROCEDURE — 97530 THERAPEUTIC ACTIVITIES: CPT

## 2024-11-26 PROCEDURE — 6370000000 HC RX 637 (ALT 250 FOR IP): Performed by: PHYSICIAN ASSISTANT

## 2024-11-26 RX ORDER — PEG-3350, SODIUM SULFATE, SODIUM CHLORIDE, POTASSIUM CHLORIDE, SODIUM ASCORBATE AND ASCORBIC ACID 7.5-2.691G
100 KIT ORAL ONCE
Status: COMPLETED | OUTPATIENT
Start: 2024-11-26 | End: 2024-11-26

## 2024-11-26 RX ORDER — PEG-3350, SODIUM SULFATE, SODIUM CHLORIDE, POTASSIUM CHLORIDE, SODIUM ASCORBATE AND ASCORBIC ACID 7.5-2.691G
100 KIT ORAL PRN
Status: DISCONTINUED | OUTPATIENT
Start: 2024-11-26 | End: 2024-11-30 | Stop reason: HOSPADM

## 2024-11-26 RX ORDER — IPRATROPIUM BROMIDE AND ALBUTEROL SULFATE 2.5; .5 MG/3ML; MG/3ML
1 SOLUTION RESPIRATORY (INHALATION)
Status: DISCONTINUED | OUTPATIENT
Start: 2024-11-26 | End: 2024-11-30 | Stop reason: HOSPADM

## 2024-11-26 RX ADMIN — LISINOPRIL 2.5 MG: 5 TABLET ORAL at 10:41

## 2024-11-26 RX ADMIN — WATER 40 MG: 1 INJECTION INTRAMUSCULAR; INTRAVENOUS; SUBCUTANEOUS at 10:40

## 2024-11-26 RX ADMIN — HYDROCORTISONE ACETATE 25 MG: 25 SUPPOSITORY RECTAL at 10:41

## 2024-11-26 RX ADMIN — FUROSEMIDE 40 MG: 40 TABLET ORAL at 10:40

## 2024-11-26 RX ADMIN — Medication 10 ML: at 21:28

## 2024-11-26 RX ADMIN — DICLOFENAC SODIUM 2 G: 10 GEL TOPICAL at 10:40

## 2024-11-26 RX ADMIN — GLIPIZIDE 10 MG: 5 TABLET ORAL at 16:43

## 2024-11-26 RX ADMIN — PANTOPRAZOLE SODIUM 40 MG: 40 TABLET, DELAYED RELEASE ORAL at 05:21

## 2024-11-26 RX ADMIN — GABAPENTIN 400 MG: 400 CAPSULE ORAL at 21:27

## 2024-11-26 RX ADMIN — ALPRAZOLAM 1 MG: 0.5 TABLET ORAL at 21:27

## 2024-11-26 RX ADMIN — PEG-3350, SODIUM SULFATE, SODIUM CHLORIDE, POTASSIUM CHLORIDE, SODIUM ASCORBATE AND ASCORBIC ACID 100 G: KIT at 16:43

## 2024-11-26 RX ADMIN — Medication 10 ML: at 09:45

## 2024-11-26 RX ADMIN — HYDROCORTISONE ACETATE 25 MG: 25 SUPPOSITORY RECTAL at 21:27

## 2024-11-26 RX ADMIN — ALPRAZOLAM 1 MG: 0.5 TABLET ORAL at 05:21

## 2024-11-26 RX ADMIN — GUAIFENESIN 600 MG: 600 TABLET, EXTENDED RELEASE ORAL at 10:41

## 2024-11-26 RX ADMIN — GUAIFENESIN 600 MG: 600 TABLET, EXTENDED RELEASE ORAL at 21:27

## 2024-11-26 RX ADMIN — GABAPENTIN 400 MG: 400 CAPSULE ORAL at 10:41

## 2024-11-26 RX ADMIN — GABAPENTIN 400 MG: 400 CAPSULE ORAL at 16:43

## 2024-11-26 RX ADMIN — GLIPIZIDE 10 MG: 5 TABLET ORAL at 10:41

## 2024-11-26 RX ADMIN — INSULIN LISPRO 8 UNITS: 100 INJECTION, SOLUTION INTRAVENOUS; SUBCUTANEOUS at 16:44

## 2024-11-26 RX ADMIN — DICLOFENAC SODIUM 2 G: 10 GEL TOPICAL at 21:28

## 2024-11-26 RX ADMIN — INSULIN LISPRO 12 UNITS: 100 INJECTION, SOLUTION INTRAVENOUS; SUBCUTANEOUS at 21:27

## 2024-11-26 RX ADMIN — ENOXAPARIN SODIUM 30 MG: 100 INJECTION SUBCUTANEOUS at 21:27

## 2024-11-26 RX ADMIN — PEG-3350, SODIUM SULFATE, SODIUM CHLORIDE, POTASSIUM CHLORIDE, SODIUM ASCORBATE AND ASCORBIC ACID 100 G: KIT at 21:28

## 2024-11-26 ASSESSMENT — PAIN DESCRIPTION - LOCATION: LOCATION: GENERALIZED

## 2024-11-26 ASSESSMENT — PAIN DESCRIPTION - PAIN TYPE: TYPE: CHRONIC PAIN

## 2024-11-26 ASSESSMENT — PAIN DESCRIPTION - ONSET: ONSET: ON-GOING

## 2024-11-26 ASSESSMENT — PAIN SCALES - GENERAL: PAINLEVEL_OUTOF10: 10

## 2024-11-26 ASSESSMENT — PAIN DESCRIPTION - FREQUENCY: FREQUENCY: CONTINUOUS

## 2024-11-26 ASSESSMENT — PAIN - FUNCTIONAL ASSESSMENT: PAIN_FUNCTIONAL_ASSESSMENT: ACTIVITIES ARE NOT PREVENTED

## 2024-11-26 ASSESSMENT — PAIN DESCRIPTION - ORIENTATION: ORIENTATION: RIGHT;LEFT;MID

## 2024-11-26 ASSESSMENT — PAIN DESCRIPTION - DESCRIPTORS: DESCRIPTORS: SHARP

## 2024-11-26 NOTE — PROGRESS NOTES
Gastroenterology Progress Note      Milana Pardo is a 79 y.o. female patient.  1. Chronic obstructive pulmonary disease with acute exacerbation (HCC)    2. Pneumonia due to infectious organism, unspecified laterality, unspecified part of lung        SUBJECTIVE:  Lying in bed. No BMs yesterday or today. No abdominal pain.         Physical    VITALS:  /84   Pulse 63   Temp 98 °F (36.7 °C) (Oral)   Resp 18   Ht 1.676 m (5' 6\")   Wt 124.7 kg (275 lb)   SpO2 93%   BMI 44.39 kg/m²   TEMPERATURE:  Current - Temp: 98 °F (36.7 °C); Max - Temp  Av.6 °F (36.4 °C)  Min: 97.4 °F (36.3 °C)  Max: 98 °F (36.7 °C)    NAD  RRR   Abdomen soft, ND, NT, no HSM, Bowel sounds normal  Anicteric, no jaundice    Data    Data Review:    Recent Labs     24  05   WBC 9.5 10.1 12.0*   HGB 13.0 12.9 13.3   HCT 39.0 39.3 40.5   MCV 84.4 86.6 85.4    166 195     Recent Labs     2421 24  0519   * 138 136   K 4.7 4.2 4.6   CL 95* 100 97*   CO2 28 31 31   BUN 16 22* 31*   CREATININE 0.9 1.1 1.1     Recent Labs     24  1248   AST 33   ALT 17   BILITOT 0.4   ALKPHOS 101     No results for input(s): \"LIPASE\", \"AMYLASE\" in the last 72 hours.  No results for input(s): \"PROTIME\", \"INR\" in the last 72 hours.  Invalid input(s): \"PTT\"           ASSESSMENT / PLAN      Hematochezia, likely from hemorrhoidal irritation from diarrhea.  Hemoglobin 12.9 which is at baseline. Last colonoscopy was in 2018 though.   -Anusol suppository 25 mg twice daily for 10 days   -Clear liquid diet today  -Bowel prep today  -N.p.o. midnight  -Colonoscopy tomorrow    Acute on chronic diarrhea.  Known to have IBS diarrhea. Initially stated it was worse after starting oral antibiotics for tonsillitis but later she told me no change in her chronic diarrhea.   C.diff ordered but then not sent as no diarrhea.   - Colonoscopy tomorrow  - consider trial of questran for

## 2024-11-26 NOTE — TELEPHONE ENCOUNTER
Dr. Brandon, has discussed on the phone, this patient would require repeat CT chest without contrast to monitor the 6 mm left lower lobe lung nodule which is new on CT scan performed on 11/24.    Patient prefers this to be performed by you and followed up.  I would be happy to assist if there are any concerns on the repeat CT scan.

## 2024-11-26 NOTE — PROGRESS NOTES
Attempted to give breathing treatments to patient, but she kept pushing inhalers and nebulizer away from face and turning away from me. Educated patient on indication of bronchodilators. BS clear to auscultation.    Electronically signed by Henrik Cohen RCP on 11/26/2024 at 8:06 AM

## 2024-11-26 NOTE — RT PROTOCOL NOTE
RT Nebulizer Bronchodilator Protocol Note    There is a bronchodilator order in the chart from a provider indicating to follow the RT Bronchodilator Protocol and there is an “Initiate RT Bronchodilator Protocol” order as well (see protocol at bottom of note).    CXR Findings:  No results found.    The findings from the last RT Protocol Assessment were as follows:  Smoking: Chronic pulmonary disease  Respiratory Pattern: Dyspnea on exertion or RR 21-25 bpm  Breath Sounds: Clear breath sounds  Cough: Strong, spontaneous, non-productive  Indication for Bronchodilator Therapy:    Bronchodilator Assessment Score: 4    Aerosolized bronchodilator medication orders have been revised according to the RT Nebulizer Bronchodilator Protocol below.    Respiratory Therapist to perform RT Therapy Protocol Assessment initially then follow the protocol.  Repeat RT Therapy Protocol Assessment PRN for score 0-3 or on second treatment, BID, and PRN for scores above 3.    No Indications - adjust the frequency to every 6 hours PRN wheezing or bronchospasm, if no treatments needed after 48 hours then discontinue using Per Protocol order mode.     If indication present, adjust the RT bronchodilator orders based on the Bronchodilator Assessment Score as indicated below.  If a patient is on this medication at home then do not decrease Frequency below that used at home.    0-3 - enter or revise RT bronchodilator order(s) to equivalent RT Bronchodilator order with Frequency of every 4 hours PRN for wheezing or increased work of breathing using Per Protocol order mode.       4-6 - enter or revise RT Bronchodilator order(s) to two equivalent RT bronchodilator orders with one order with BID Frequency and one order with Frequency of every 4 hours PRN wheezing or increased work of breathing using Per Protocol order mode.         7-10 - enter or revise RT Bronchodilator order(s) to two equivalent RT bronchodilator orders with one order with TID

## 2024-11-26 NOTE — PROGRESS NOTES
Speech Language Pathology  [facility] - Inpatient Rehabilitation Services  [phone]  SLP Dysphagia Treatment       Patient: Milana Pardo   : 1945   MRN: [id number]      Evaluation Date: 2024      Admitting Dx: Pneumonia due to organism [J18.9]  Chronic obstructive pulmonary disease with acute exacerbation (HCC) [J44.1]  Pneumonia due to infectious organism, unspecified laterality, unspecified part of lung [J18.9]  Treatment Diagnosis: Oropharyngeal Dysphagia   Pain: Did not state                                  Recommendations      Recommended Diet and Intervention 2024:  Diet Solids Recommendation:  Clear liquids  per GI  Liquid Consistency Recommendation:  Thin liquids    Recommended form of Meds: Meds whole with water as tolerated      Compensatory strategies: Alternate solids/liquids , Upright as possible with all PO intake , Small bites/sips , Eat/feed slowly, Remain upright 30-45 min , Aspiration Precautions     Discharge Recommendations:  Discharge recommendations to be determined pending ongoing follow-up during acute care stay    History/Course of Treatment     H&P: 2024  79 y.o. female with past medical history of hypertension, hyperlipidemia, atrial fibrillation anticoagulated on Xarelto, diabetes, COPD on 2 L nasal cannula oxygen at night, CAD, anxiety, depression, obstructive sleep apnea and grade 2 diastolic dysfunction documented who presents ED with complaint of shortness of breath.  Patient states has been short of breath for the past 3 days.  She came by EMS for further evaluation and treatment.  Patient reports he was diagnosed with tonsillitis a couple days ago.  Reports was put on antibiotics with cephalexin.  She reports she continues to have sore throat.  States now she is having cough, chest tightness and shortness of breath.  Feels like she is having flareup of her COPD.  She reports chronic pain but denies any new pain at this time.  Rates her pain  as a 10/10 diffusely.     Imaging:  Chest X-ray: 11/23/2024  IMPRESSION:  Increased left upper lobe airspace disease, either atelectasis or pneumonia.    Prior Modified Barium Swallow Study: 8/2/22  Modified Barium Swallow evaluation completed on 8/2/2022.Patient presents with moderate oropharyngeal dysphagia with premature bolus loss to pharynx, deep pharyngeal pooling and delayed airway protection during the swallow noted with all textures. Deep pharyngeal pooling prior to swallow initiation with thin liquids results in bolus loss to laryngeal inlet with deep SILENT laryngeal penetration intermittently noted during the swallow with thin liquids. Laryngeal penetration with thin liquids is largely self clearing with no overt aspiration directly viewed. However, Pt is at increased risk for aspiration of thin liquids due to deep pharyngeal pooling and delayed airway protection, especially at times of respiratory compromise.(Of note, Pt's respiratory status was stable with no increased WOB or wheezing noted at the time of this study)  Main factors contributing to oropharyngeal dysphagia include: reduced bolus control with premature bolus loss to pharynx, deep pharyngeal pooling to laryngeal inlet/pyriform with thin liquids, and to the underside of epiglottis with all textures; laryngeal penetration during the swallow intermittently with thin liquids via straw, and over successive po trials; Delayed epiglottic inversion; delayed/reduced hyolaryngeal excursion; reduced tongue based retraction and reduced sensation for timely reflexive cough in the presence of deep laryngeal penetration with thins.    History/Prior Level of Function:   Living Status: Private residence   Prior Speech/Dysphagia History: Seen previously during admission by SLP, most recently 1/29/2023 with recommendation for regular diet with thin liquids. MBS completed in 2022 as above. Pt reports she consumes an unrestricted diet at home.   Reason for

## 2024-11-26 NOTE — PROGRESS NOTES
Progress Note - Dr. Almendarez - Internal Medicine  PCP: Arian Brandon MD [address] [address] [phone]    Hospital Day: 3  Code Status: Full Code  Current Diet: ADULT DIET; Regular; 4 carb choices (60 gm/meal)        CC: follow up on medical issues    Subjective:   Milana Pardo is a 79 y.o. female.    Pt seen and examined  Chart reviewed since last visit, labs and imaging below      Doing ok  Pt with reports of brbpr yest - on anusol  Some loose stools  GI is considering repeat colonoscopy    Less dyspneic, off and on 2L  CT Chest reviewed - no evidence of pneumonia  Appreciate pulm eval      Review of Systems: (1 system for EPF, 2-9 for detailed, 10+ for comprehensive)  Constitutional: Negative for chills and fever.     HENT: Negative for dental problem, nosebleeds and rhinorrhea.      Eyes: Negative for photophobia and visual disturbance.     Respiratory: positive for cough, chest tightness and shortness of breath.      Cardiovascular: Negative for chest pain and leg swelling.     Gastrointestinal: Negative for diarrhea, nausea and vomiting.  Positive for bloody stools  Endocrine: Negative for polydipsia and polyphagia.     Genitourinary: Negative for frequency, hematuria and urgency.     Musculoskeletal: Negative for back pain and myalgias.     Skin: Negative for rash.     Allergic/Immunologic: Negative for food allergies.     Neurological: Negative for dizziness, seizures, syncope and facial asymmetry.     Hematological: Negative for adenopathy.     Psychiatric/Behavioral: Negative for dysphoric mood. The patient is not nervous/anxious.        I have reviewed the patient's medical and social history in detail and updated the computerized patient record.  To recap: She  has a past medical history of Acute MI (HCC), Anxiety and depression, Arthritis, CAD (coronary artery disease), Cancer (HCC), Cancer (HCC), Cancer of skin of leg, Chronic obstructive pulmonary disease (HCC), Claustrophobia,

## 2024-11-26 NOTE — PROGRESS NOTES
[facility] - Inpatient Rehabilitation Department   Phone: [phone]    Physical Therapy    [] Initial Evaluation            [x] Daily Treatment Note         [] Discharge Summary      Patient: Milana Pardo   : 1945   MRN: [id number]   Date of Service:  2024  Admitting Diagnosis: Pneumonia  Current Admission Summary: Per H&P on  \"79 y.o. female with past medical history of hypertension, hyperlipidemia, atrial fibrillation anticoagulated on Xarelto, diabetes, COPD on 2 L nasal cannula oxygen at night, CAD, anxiety, depression, obstructive sleep apnea and grade 2 diastolic dysfunction documented who presents ED with complaint of shortness of breath.  Patient states has been short of breath for the past 3 days.  She came by EMS for further evaluation and treatment.  Patient reports he was diagnosed with tonsillitis a couple days ago.  Reports was put on antibiotics with cephalexin.  She reports she continues to have sore throat.  States now she is having cough, chest tightness and shortness of breath.  Feels like she is having flareup of her COPD.  She reports chronic pain but denies any new pain at this time.  Rates her pain as a 10/10 diffusely.      Cxr done in er, reviewed by self, shows L upper infiltrate, suspect pneumonia     She continues to wheeze. Lactate elevated.  To be admitted for further tx of copd exac/pna\"    GI consulted for diarrhea and blood in stool. Pt with hx of IBS and hemorrhoids.     Past Medical History:  has a past medical history of Acute MI (HCC), Anxiety and depression, Arthritis, CAD (coronary artery disease), Cancer (HCC), Cancer (HCC), Cancer of skin of leg, Chronic obstructive pulmonary disease (HCC), Claustrophobia, COPD (chronic obstructive pulmonary disease) ([facility]), Emphysema lung ([facility]), ESBL (extended spectrum beta-lactamase) producing bacteria infection, Esophageal stricture, Gastroesophageal reflux disease without esophagitis, Hiatal hernia,  Hypercholesteremia, Hypertension, IBS (irritable bowel syndrome), Macular degeneration, Migraines, Movement disorder, Paroxysmal atrial fibrillation (HCC), Pneumonia, PONV (postoperative nausea and vomiting), Thoracic radiculopathy, Type II or unspecified type diabetes mellitus without mention of complication, not stated as uncontrolled, and Unspecified cerebral artery occlusion with cerebral infarction.  Past Surgical History:  has a past surgical history that includes Cardiac surgery; Gallbladder surgery; Hysterectomy; Appendectomy; Cholecystectomy; Colonoscopy; Upper gastrointestinal endoscopy (04/20/2012); Endoscopy, colon, diagnostic; Tear duct surgery; Upper gastrointestinal endoscopy (06/11/2018); Colonoscopy (06/11/2018); pr excision malignant lesion s/n/h/f/g 0.5 cm/< (N/A, 09/06/2018); pr split agrft t/a/l 1st 100 cm/&/1% bdy inft/chld (N/A, 09/06/2018); skin biopsy; eye surgery; bronchoscopy (N/A, 01/24/2020); bronchoscopy (N/A, 01/27/2020); Cataract removal (2013 or 2014); Esophagus dilation; bronchoscopy (N/A, 11/23/2020); and Coronary angioplasty with stent.    Discharge Recommendations: Milana Pardo scored a 17/24 on the AM-PAC short mobility form. Current research shows that an AM-PAC score of 18 or greater is typically associated with a discharge to the patient's home setting. Based on the patient's AM-PAC score and their current functional mobility deficits, it is recommended that the patient have 2-3 sessions per week of Physical Therapy at d/c to increase the patient's independence.  At this time, this patient demonstrates the endurance and safety to discharge home with PRN assist and home PT and a follow up treatment frequency of 2-3x/wk.  Please see assessment section for further patient specific details.    Pt appropriate for 24/7 at discharge this date but continues to adamantly refuse SNF recommendation. Extensive discussion with pt and CM about need for 24/7 assist for discharge.    HOME

## 2024-11-26 NOTE — PROGRESS NOTES
[facility] Pulmonary/CCM Progress note      Admit Date: 11/23/2024    Chief Complaint: Shortness of breath and cough    Subjective:     Interval History: Patient very anxious about bronchoscopy-worried about her long history of rectal bleeding.  Dyspnea at baseline.  On room air.  Denies any cough or phlegm.    Scheduled Meds:   ipratropium 0.5 mg-albuterol 2.5 mg  1 Dose Inhalation BID RT    PEG-KCl-NaCl-NaSulf-Na Asc-C  100 g Oral Once    gabapentin  400 mg Oral TID    methylPREDNISolone  40 mg IntraVENous Daily    hydrocortisone  25 mg Rectal BID    insulin lispro  0-16 Units SubCUTAneous 4x Daily AC & HS    furosemide  40 mg Oral Daily    rosuvastatin  20 mg Oral Nightly    guaiFENesin  600 mg Oral BID    glipiZIDE  10 mg Oral BID WC    lisinopril  2.5 mg Oral Daily    diclofenac sodium  2 g Topical BID    pantoprazole  40 mg Oral QAM AC    budesonide-formoterol  2 puff Inhalation BID RT    And    tiotropium  2 puff Inhalation Daily RT    sodium chloride flush  5-40 mL IntraVENous 2 times per day    enoxaparin  30 mg SubCUTAneous BID     Continuous Infusions:   dextrose      sodium chloride       PRN Meds:PEG-KCl-NaCl-NaSulf-Na Asc-C, dextrose bolus **OR** dextrose bolus, glucagon (rDNA), dextrose, sodium chloride, albuterol sulfate HFA, ALPRAZolam, sodium chloride flush, sodium chloride, ondansetron **OR** ondansetron, magnesium hydroxide, acetaminophen **OR** acetaminophen, hydrALAZINE, sodium chloride, potassium chloride **OR** potassium alternative oral replacement **OR** potassium chloride, guaiFENesin-dextromethorphan, traMADol    Review of Systems  Constitutional: Fatigue, anxious  Ears, nose, mouth, throat: negative for ear drainage, epistaxis, hoarseness, nasal congestion, sore throat and voice change  Respiratory: negative except for dyspnea on exertion and shortness of breath  Cardiovascular: negative for chest pain, chest pressure/discomfort, irregular heart beat, lower extremity edema and  edema  Musculoskeletal: normal range of motion, no joint swelling, deformity or tenderness  Neurologic: alert, no focal neurologic deficits    Data Review:  CBC:   Lab Results   Component Value Date/Time    WBC 12.0 11/26/2024 05:19 AM    RBC 4.74 11/26/2024 05:19 AM     BMP:   Lab Results   Component Value Date/Time    GLUCOSE 151 11/26/2024 05:19 AM    CO2 31 11/26/2024 05:19 AM    BUN 31 11/26/2024 05:19 AM    CREATININE 1.1 11/26/2024 05:19 AM    CALCIUM 10.0 11/26/2024 05:19 AM     ABG:   Lab Results   Component Value Date/Time    MIT6TST 22.6 02/07/2017 12:26 PM    BEART -0.6 02/07/2017 12:26 PM    Q9ZBFPCX 96.6 02/07/2017 12:26 PM    PHART 7.446 02/07/2017 12:26 PM    HBF1FIG 33.6 02/07/2017 12:26 PM    PO2ART 77.3 02/07/2017 12:26 PM    RPE7LCL 23.7 02/07/2017 12:26 PM       Radiology: All pertinent images / reports were reviewed as a part of this visit.    EXAMINATION:  CT OF THE CHEST WITHOUT CONTRAST 11/24/2024 11:45 am     TECHNIQUE:  CT of the chest was performed without the administration of intravenous  contrast. Multiplanar reformatted images are provided for review. Automated  exposure control, iterative reconstruction, and/or weight based adjustment of  the mA/kV was utilized to reduce the radiation dose to as low as reasonably  achievable.     COMPARISON:  February 2023 April 2023     HISTORY:  ORDERING SYSTEM PROVIDED HISTORY: evaluate for infiltrates  TECHNOLOGIST PROVIDED HISTORY:  Reason for exam:->evaluate for infiltrates  Reason for Exam: evaluate for infiltrates     FINDINGS:  Mediastinum: Thyroid gland unremarkable.  Atherosclerotic change seen in  aorta.  Coronary artery calcifications seen.  Trace pericardial fluid is  seen.  No pericardial calcification noted.  Small hiatal hernia seen.  There  is nonspecific thickening at the GE junction.  No significant mediastinal or  hilar adenopathy.     Lungs/pleura: Respiratory motion artifact limits evaluation of the lungs.  No  obstructing

## 2024-11-27 ENCOUNTER — ANESTHESIA (OUTPATIENT)
Dept: ENDOSCOPY | Age: 79
End: 2024-11-27
Payer: COMMERCIAL

## 2024-11-27 ENCOUNTER — ANESTHESIA EVENT (OUTPATIENT)
Dept: ENDOSCOPY | Age: 79
End: 2024-11-27
Payer: COMMERCIAL

## 2024-11-27 LAB
ANION GAP SERPL CALCULATED.3IONS-SCNC: 14 MMOL/L (ref 3–16)
BACTERIA BLD CULT ORG #2: NORMAL
BACTERIA BLD CULT: NORMAL
BASOPHILS # BLD: 0.1 K/UL (ref 0–0.2)
BASOPHILS NFR BLD: 0.6 %
BILIRUB UR QL STRIP.AUTO: NEGATIVE
BUN SERPL-MCNC: 27 MG/DL (ref 7–20)
CALCIUM SERPL-MCNC: 9.9 MG/DL (ref 8.3–10.6)
CHLORIDE SERPL-SCNC: 97 MMOL/L (ref 99–110)
CLARITY UR: CLEAR
CO2 SERPL-SCNC: 24 MMOL/L (ref 21–32)
COLOR UR: YELLOW
CREAT SERPL-MCNC: 0.9 MG/DL (ref 0.6–1.2)
DEPRECATED RDW RBC AUTO: 15.9 % (ref 12.4–15.4)
EOSINOPHIL # BLD: 0 K/UL (ref 0–0.6)
EOSINOPHIL NFR BLD: 0.3 %
GFR SERPLBLD CREATININE-BSD FMLA CKD-EPI: 65 ML/MIN/{1.73_M2}
GLUCOSE BLD-MCNC: 123 MG/DL (ref 70–99)
GLUCOSE BLD-MCNC: 285 MG/DL (ref 70–99)
GLUCOSE BLD-MCNC: 373 MG/DL (ref 70–99)
GLUCOSE BLD-MCNC: 73 MG/DL (ref 70–99)
GLUCOSE BLD-MCNC: 97 MG/DL (ref 70–99)
GLUCOSE SERPL-MCNC: 78 MG/DL (ref 70–99)
GLUCOSE UR STRIP.AUTO-MCNC: NEGATIVE MG/DL
HCT VFR BLD AUTO: 46.7 % (ref 36–48)
HGB BLD-MCNC: 14.6 G/DL (ref 12–16)
HGB UR QL STRIP.AUTO: NEGATIVE
KETONES UR STRIP.AUTO-MCNC: NEGATIVE MG/DL
LEUKOCYTE ESTERASE UR QL STRIP.AUTO: NEGATIVE
LYMPHOCYTES # BLD: 2.8 K/UL (ref 1–5.1)
LYMPHOCYTES NFR BLD: 22.2 %
MCH RBC QN AUTO: 27.5 PG (ref 26–34)
MCHC RBC AUTO-ENTMCNC: 31.4 G/DL (ref 31–36)
MCV RBC AUTO: 87.6 FL (ref 80–100)
MONOCYTES # BLD: 0.8 K/UL (ref 0–1.3)
MONOCYTES NFR BLD: 6.4 %
NEUTROPHILS # BLD: 9.1 K/UL (ref 1.7–7.7)
NEUTROPHILS NFR BLD: 70.5 %
NITRITE UR QL STRIP.AUTO: NEGATIVE
PERFORMED ON: ABNORMAL
PERFORMED ON: NORMAL
PERFORMED ON: NORMAL
PH UR STRIP.AUTO: 6.5 [PH] (ref 5–8)
PLATELET # BLD AUTO: 190 K/UL (ref 135–450)
PMV BLD AUTO: 8.3 FL (ref 5–10.5)
POTASSIUM SERPL-SCNC: 4.4 MMOL/L (ref 3.5–5.1)
PROT UR STRIP.AUTO-MCNC: NEGATIVE MG/DL
RBC # BLD AUTO: 5.33 M/UL (ref 4–5.2)
SODIUM SERPL-SCNC: 135 MMOL/L (ref 136–145)
SP GR UR STRIP.AUTO: 1.01 (ref 1–1.03)
UA COMPLETE W REFLEX CULTURE PNL UR: NORMAL
UA DIPSTICK W REFLEX MICRO PNL UR: NORMAL
URN SPEC COLLECT METH UR: NORMAL
UROBILINOGEN UR STRIP-ACNC: 0.2 E.U./DL
WBC # BLD AUTO: 12.8 K/UL (ref 4–11)

## 2024-11-27 PROCEDURE — 80048 BASIC METABOLIC PNL TOTAL CA: CPT

## 2024-11-27 PROCEDURE — 36415 COLL VENOUS BLD VENIPUNCTURE: CPT

## 2024-11-27 PROCEDURE — 0DBE8ZX EXCISION OF LARGE INTESTINE, VIA NATURAL OR ARTIFICIAL OPENING ENDOSCOPIC, DIAGNOSTIC: ICD-10-PCS | Performed by: INTERNAL MEDICINE

## 2024-11-27 PROCEDURE — 2580000003 HC RX 258: Performed by: INTERNAL MEDICINE

## 2024-11-27 PROCEDURE — 6370000000 HC RX 637 (ALT 250 FOR IP): Performed by: INTERNAL MEDICINE

## 2024-11-27 PROCEDURE — 7100000001 HC PACU RECOVERY - ADDTL 15 MIN: Performed by: INTERNAL MEDICINE

## 2024-11-27 PROCEDURE — 81003 URINALYSIS AUTO W/O SCOPE: CPT

## 2024-11-27 PROCEDURE — 6360000002 HC RX W HCPCS: Performed by: INTERNAL MEDICINE

## 2024-11-27 PROCEDURE — 6360000002 HC RX W HCPCS: Performed by: NURSE ANESTHETIST, CERTIFIED REGISTERED

## 2024-11-27 PROCEDURE — 3700000001 HC ADD 15 MINUTES (ANESTHESIA): Performed by: INTERNAL MEDICINE

## 2024-11-27 PROCEDURE — 88305 TISSUE EXAM BY PATHOLOGIST: CPT

## 2024-11-27 PROCEDURE — 3609010300 HC COLONOSCOPY W/BIOPSY SINGLE/MULTIPLE: Performed by: INTERNAL MEDICINE

## 2024-11-27 PROCEDURE — 2709999900 HC NON-CHARGEABLE SUPPLY: Performed by: INTERNAL MEDICINE

## 2024-11-27 PROCEDURE — 1200000000 HC SEMI PRIVATE

## 2024-11-27 PROCEDURE — 7100000000 HC PACU RECOVERY - FIRST 15 MIN: Performed by: INTERNAL MEDICINE

## 2024-11-27 PROCEDURE — 3700000000 HC ANESTHESIA ATTENDED CARE: Performed by: INTERNAL MEDICINE

## 2024-11-27 PROCEDURE — 85025 COMPLETE CBC W/AUTO DIFF WBC: CPT

## 2024-11-27 RX ORDER — SODIUM CHLORIDE 0.9 % (FLUSH) 0.9 %
5-40 SYRINGE (ML) INJECTION PRN
Status: DISCONTINUED | OUTPATIENT
Start: 2024-11-27 | End: 2024-11-27 | Stop reason: HOSPADM

## 2024-11-27 RX ORDER — METOCLOPRAMIDE HYDROCHLORIDE 5 MG/ML
10 INJECTION INTRAMUSCULAR; INTRAVENOUS
Status: DISCONTINUED | OUTPATIENT
Start: 2024-11-27 | End: 2024-11-27 | Stop reason: HOSPADM

## 2024-11-27 RX ORDER — FENTANYL CITRATE 50 UG/ML
25 INJECTION, SOLUTION INTRAMUSCULAR; INTRAVENOUS EVERY 5 MIN PRN
Status: DISCONTINUED | OUTPATIENT
Start: 2024-11-27 | End: 2024-11-27 | Stop reason: HOSPADM

## 2024-11-27 RX ORDER — NALOXONE HYDROCHLORIDE 0.4 MG/ML
INJECTION, SOLUTION INTRAMUSCULAR; INTRAVENOUS; SUBCUTANEOUS PRN
Status: DISCONTINUED | OUTPATIENT
Start: 2024-11-27 | End: 2024-11-27 | Stop reason: HOSPADM

## 2024-11-27 RX ORDER — CHOLESTYRAMINE LIGHT 4 G/5.7G
1 POWDER, FOR SUSPENSION ORAL 2 TIMES DAILY
Status: DISCONTINUED | OUTPATIENT
Start: 2024-11-27 | End: 2024-11-30 | Stop reason: HOSPADM

## 2024-11-27 RX ORDER — OXYCODONE HYDROCHLORIDE 5 MG/1
10 TABLET ORAL PRN
Status: DISCONTINUED | OUTPATIENT
Start: 2024-11-27 | End: 2024-11-27 | Stop reason: HOSPADM

## 2024-11-27 RX ORDER — ONDANSETRON 2 MG/ML
4 INJECTION INTRAMUSCULAR; INTRAVENOUS
Status: DISCONTINUED | OUTPATIENT
Start: 2024-11-27 | End: 2024-11-27 | Stop reason: HOSPADM

## 2024-11-27 RX ORDER — SODIUM CHLORIDE 0.9 % (FLUSH) 0.9 %
5-40 SYRINGE (ML) INJECTION EVERY 12 HOURS SCHEDULED
Status: DISCONTINUED | OUTPATIENT
Start: 2024-11-27 | End: 2024-11-27 | Stop reason: HOSPADM

## 2024-11-27 RX ORDER — OXYCODONE HYDROCHLORIDE 5 MG/1
5 TABLET ORAL PRN
Status: DISCONTINUED | OUTPATIENT
Start: 2024-11-27 | End: 2024-11-27 | Stop reason: HOSPADM

## 2024-11-27 RX ORDER — PROPOFOL 10 MG/ML
INJECTION, EMULSION INTRAVENOUS
Status: DISCONTINUED | OUTPATIENT
Start: 2024-11-27 | End: 2024-11-27 | Stop reason: SDUPTHER

## 2024-11-27 RX ORDER — SODIUM CHLORIDE 9 MG/ML
INJECTION, SOLUTION INTRAVENOUS PRN
Status: DISCONTINUED | OUTPATIENT
Start: 2024-11-27 | End: 2024-11-27 | Stop reason: HOSPADM

## 2024-11-27 RX ADMIN — DICLOFENAC SODIUM 2 G: 10 GEL TOPICAL at 21:54

## 2024-11-27 RX ADMIN — INSULIN LISPRO 16 UNITS: 100 INJECTION, SOLUTION INTRAVENOUS; SUBCUTANEOUS at 21:53

## 2024-11-27 RX ADMIN — CHOLESTYRAMINE 4 G: 4 POWDER, FOR SUSPENSION ORAL at 11:44

## 2024-11-27 RX ADMIN — DICLOFENAC SODIUM 2 G: 10 GEL TOPICAL at 11:37

## 2024-11-27 RX ADMIN — PROPOFOL 70 MG: 10 INJECTION, EMULSION INTRAVENOUS at 10:26

## 2024-11-27 RX ADMIN — ALPRAZOLAM 1 MG: 0.5 TABLET ORAL at 11:55

## 2024-11-27 RX ADMIN — WATER 40 MG: 1 INJECTION INTRAMUSCULAR; INTRAVENOUS; SUBCUTANEOUS at 11:35

## 2024-11-27 RX ADMIN — PROPOFOL 30 MG: 10 INJECTION, EMULSION INTRAVENOUS at 10:38

## 2024-11-27 RX ADMIN — ALPRAZOLAM 1 MG: 0.5 TABLET ORAL at 21:53

## 2024-11-27 RX ADMIN — HYDROCORTISONE ACETATE 25 MG: 25 SUPPOSITORY RECTAL at 21:53

## 2024-11-27 RX ADMIN — PROPOFOL 30 MG: 10 INJECTION, EMULSION INTRAVENOUS at 10:33

## 2024-11-27 RX ADMIN — GLIPIZIDE 10 MG: 5 TABLET ORAL at 17:25

## 2024-11-27 RX ADMIN — LISINOPRIL 2.5 MG: 5 TABLET ORAL at 11:36

## 2024-11-27 RX ADMIN — GUAIFENESIN 600 MG: 600 TABLET, EXTENDED RELEASE ORAL at 11:36

## 2024-11-27 RX ADMIN — FUROSEMIDE 40 MG: 40 TABLET ORAL at 11:43

## 2024-11-27 RX ADMIN — Medication 10 ML: at 21:53

## 2024-11-27 RX ADMIN — GUAIFENESIN 600 MG: 600 TABLET, EXTENDED RELEASE ORAL at 21:53

## 2024-11-27 RX ADMIN — GABAPENTIN 400 MG: 400 CAPSULE ORAL at 17:25

## 2024-11-27 RX ADMIN — Medication 10 ML: at 11:36

## 2024-11-27 RX ADMIN — PANTOPRAZOLE SODIUM 40 MG: 40 TABLET, DELAYED RELEASE ORAL at 06:02

## 2024-11-27 RX ADMIN — INSULIN LISPRO 8 UNITS: 100 INJECTION, SOLUTION INTRAVENOUS; SUBCUTANEOUS at 17:24

## 2024-11-27 RX ADMIN — PROPOFOL 30 MG: 10 INJECTION, EMULSION INTRAVENOUS at 10:27

## 2024-11-27 RX ADMIN — GABAPENTIN 400 MG: 400 CAPSULE ORAL at 21:53

## 2024-11-27 ASSESSMENT — PAIN DESCRIPTION - ORIENTATION
ORIENTATION: RIGHT;LEFT
ORIENTATION: LEFT;RIGHT

## 2024-11-27 ASSESSMENT — PAIN DESCRIPTION - LOCATION
LOCATION: BACK;KNEE
LOCATION: BACK;LEG;GENERALIZED

## 2024-11-27 ASSESSMENT — PAIN SCALES - GENERAL
PAINLEVEL_OUTOF10: 8
PAINLEVEL_OUTOF10: 0
PAINLEVEL_OUTOF10: 0
PAINLEVEL_OUTOF10: 5
PAINLEVEL_OUTOF10: 0
PAINLEVEL_OUTOF10: 0

## 2024-11-27 ASSESSMENT — PAIN DESCRIPTION - DESCRIPTORS
DESCRIPTORS: ACHING;SHARP
DESCRIPTORS: ACHING

## 2024-11-27 ASSESSMENT — PAIN DESCRIPTION - ONSET: ONSET: ON-GOING

## 2024-11-27 ASSESSMENT — PAIN DESCRIPTION - PAIN TYPE: TYPE: CHRONIC PAIN

## 2024-11-27 ASSESSMENT — PAIN - FUNCTIONAL ASSESSMENT: PAIN_FUNCTIONAL_ASSESSMENT: ACTIVITIES ARE NOT PREVENTED

## 2024-11-27 ASSESSMENT — PAIN DESCRIPTION - FREQUENCY: FREQUENCY: CONTINUOUS

## 2024-11-27 NOTE — PROGRESS NOTES
Physical Therapy  Milana Pardo  1945    Attempted to see patient for PT session. Patient currently KONSTANTIN at colonoscopy. Will follow up as schedule allows.    Ana Multani, PT, DPT [id number]

## 2024-11-27 NOTE — PLAN OF CARE
Problem: Chronic Conditions and Co-morbidities  Goal: Patient's chronic conditions and co-morbidity symptoms are monitored and maintained or improved  Outcome: Progressing  Flowsheets (Taken 11/27/2024 0314)  Care Plan - Patient's Chronic Conditions and Co-Morbidity Symptoms are Monitored and Maintained or Improved: Monitor and assess patient's chronic conditions and comorbid symptoms for stability, deterioration, or improvement     Problem: Discharge Planning  Goal: Discharge to home or other facility with appropriate resources  Outcome: Progressing  Flowsheets (Taken 11/27/2024 0314)  Discharge to home or other facility with appropriate resources: Identify barriers to discharge with patient and caregiver     Problem: Pain  Goal: Verbalizes/displays adequate comfort level or baseline comfort level  Outcome: Progressing  Flowsheets (Taken 11/27/2024 0314)  Verbalizes/displays adequate comfort level or baseline comfort level:   Encourage patient to monitor pain and request assistance   Assess pain using appropriate pain scale   Administer analgesics based on type and severity of pain and evaluate response   Implement non-pharmacological measures as appropriate and evaluate response     Problem: Skin/Tissue Integrity  Goal: Absence of new skin breakdown  Description: 1.  Monitor for areas of redness and/or skin breakdown  2.  Assess vascular access sites hourly  3.  Every 4-6 hours minimum:  Change oxygen saturation probe site  4.  Every 4-6 hours:  If on nasal continuous positive airway pressure, respiratory therapy assess nares and determine need for appliance change or resting period.  Outcome: Progressing     Problem: Safety - Adult  Goal: Free from fall injury  Outcome: Progressing  Flowsheets (Taken 11/27/2024 0314)  Free From Fall Injury:   Based on caregiver fall risk screen, instruct family/caregiver to ask for assistance with transferring infant if caregiver noted to have fall risk factors   Instruct  family/caregiver on patient safety

## 2024-11-27 NOTE — PROGRESS NOTES
Pt arrived from ENDO to PACU bay 7. Reported received from ENDO, RN/ CRNAis staff. Pt is arousable to voice.  Pt on 2-L NSR, and VSS. Will continue to monitor.

## 2024-11-27 NOTE — PROGRESS NOTES
Progress Note - Dr. Almendarez - Internal Medicine  PCP: Arian Brandon MD [address] [address] [phone]    Hospital Day: 4  Code Status: Full Code  Current Diet: Diet NPO Exceptions are: Sips of Water with Meds, Other (Specify); Specify Other Exceptions: bowel prep        CC: follow up on medical issues    Subjective:   Milana Pardo is a 79 y.o. female.    Pt seen and examined  Chart reviewed since last visit, labs and imaging below      Doing ok  Pt with reports of brbpr yest - on anusol  Some loose stools  GI is trying to do colonoscopy today    Less dyspneic, off and on 2L  CT Chest reviewed - no evidence of pneumonia  Appreciate pulm eval      Review of Systems: (1 system for EPF, 2-9 for detailed, 10+ for comprehensive)  Constitutional: Negative for chills and fever.     HENT: Negative for dental problem, nosebleeds and rhinorrhea.      Eyes: Negative for photophobia and visual disturbance.     Respiratory: positive for cough, chest tightness and shortness of breath.      Cardiovascular: Negative for chest pain and leg swelling.     Gastrointestinal: Negative for diarrhea, nausea and vomiting.  Positive for bloody stools  Endocrine: Negative for polydipsia and polyphagia.     Genitourinary: Negative for frequency, hematuria and urgency.     Musculoskeletal: Negative for back pain and myalgias.     Skin: Negative for rash.     Allergic/Immunologic: Negative for food allergies.     Neurological: Negative for dizziness, seizures, syncope and facial asymmetry.     Hematological: Negative for adenopathy.     Psychiatric/Behavioral: Negative for dysphoric mood. The patient is not nervous/anxious.        I have reviewed the patient's medical and social history in detail and updated the computerized patient record.  To recap: She  has a past medical history of Acute MI (HCC), Anxiety and depression, Arthritis, CAD (coronary artery disease), Cancer (HCC), Cancer (HCC), Cancer of skin of leg,

## 2024-11-27 NOTE — ANESTHESIA POSTPROCEDURE EVALUATION
Department of Anesthesiology  Postprocedure Note    Patient: Milana Pardo  MRN: [id number]  YOB: 1945  Date of evaluation: 11/27/2024    Procedure Summary       Date: 11/27/24 Room / Location: [address] [address] / [facility]    Anesthesia Start: 1023 Anesthesia Stop: 1045    Procedure: COLONOSCOPY BIOPSY Diagnosis:       Hematochezia      (Hematochezia [K92.1])    Surgeons: Carlos Lopez MD Responsible Provider: Noe Peters DO    Anesthesia Type: MAC ASA Status: 4            Anesthesia Type: No value filed.    Carlos Phase I: Carlos Score: 9    Carlos Phase II:      Anesthesia Post Evaluation    Patient location during evaluation: PACU  Patient participation: complete - patient participated  Level of consciousness: awake and alert  Airway patency: patent  Nausea & Vomiting: no nausea  Cardiovascular status: hemodynamically stable  Respiratory status: acceptable  Hydration status: stable  Pain management: adequate    No notable events documented.

## 2024-11-27 NOTE — PROGRESS NOTES
Patient alert and oriented x 1-2 (person and somewhat place). Initially thought she was at home but upon waking up and walking to bathroom realized she was in hospital.  Pt up x 1 walker. Patient voiding via BRP. Patient tolerating diet with no c/o nausea at this time. Pt drinking bowel prep and aware she is NPO at midnight for colonoscopy. Patient does c/o generalized pain but states she is allergic to many pain medications and so uses Voltaren gel for this. Voltaren applied per order. Pt states unable to take PRN tramadol because it makes her nauseous. Assessment completed, see flowsheet. Patient's bed is locked and in lowest position, side rails up x 2 with an active bed alarm. Non slip socks applied.

## 2024-11-27 NOTE — PROGRESS NOTES
Pt transferred to room 4478 at this time. A&O with no signs of distress. Report given to Brenda LONG. V/u and denies questions or further needs at this time.

## 2024-11-27 NOTE — ANESTHESIA PRE PROCEDURE
11/27/24 (!) 144/89   11/04/24 120/72   10/21/24 (!) 168/70       NPO Status: Time of last liquid consumption: 2359                        Time of last solid consumption: 2359                        Date of last liquid consumption: 11/26/24                        Date of last solid food consumption: 11/23/24    BMI:   Wt Readings from Last 3 Encounters:   11/23/24 124.7 kg (275 lb)   11/04/24 124.3 kg (274 lb)   10/21/24 123.8 kg (273 lb)     Body mass index is 44.39 kg/m².    CBC:   Lab Results   Component Value Date/Time    WBC 12.8 11/27/2024 06:50 AM    RBC 5.33 11/27/2024 06:50 AM    HGB 14.6 11/27/2024 06:50 AM    HCT 46.7 11/27/2024 06:50 AM    MCV 87.6 11/27/2024 06:50 AM    RDW 15.9 11/27/2024 06:50 AM     11/27/2024 06:50 AM       CMP:   Lab Results   Component Value Date/Time     11/27/2024 06:50 AM    K 4.4 11/27/2024 06:50 AM    K 4.1 11/23/2024 12:48 PM    CL 97 11/27/2024 06:50 AM    CO2 24 11/27/2024 06:50 AM    BUN 27 11/27/2024 06:50 AM    CREATININE 0.9 11/27/2024 06:50 AM    GFRAA >60 08/02/2022 08:47 AM    GFRAA >60 05/02/2013 10:40 AM    AGRATIO 1.2 11/23/2024 12:48 PM    LABGLOM 65 11/27/2024 06:50 AM    LABGLOM 65 04/04/2024 12:53 PM    GLUCOSE 78 11/27/2024 06:50 AM    CALCIUM 9.9 11/27/2024 06:50 AM    BILITOT 0.4 11/23/2024 12:48 PM    ALKPHOS 101 11/23/2024 12:48 PM    AST 33 11/23/2024 12:48 PM    ALT 17 11/23/2024 12:48 PM       POC Tests:   Recent Labs     11/27/24  0913   POCGLU 123*       Coags:   Lab Results   Component Value Date/Time    PROTIME 13.4 10/06/2024 04:44 PM    INR 1.00 10/06/2024 04:44 PM    APTT 41.0 04/04/2024 12:53 PM       HCG (If Applicable): No results found for: \"PREGTESTUR\", \"PREGSERUM\", \"HCG\", \"HCGQUANT\"     ABGs:   Lab Results   Component Value Date/Time    PHART 7.446 02/07/2017 12:26 PM    PO2ART 77.3 02/07/2017 12:26 PM    BEE3DNM 33.6 02/07/2017 12:26 PM    SVR2OWX 22.6 02/07/2017 12:26 PM    BEART -0.6 02/07/2017 12:26 PM    H9ZJPNQG 96.6

## 2024-11-27 NOTE — PROGRESS NOTES
Pt stable and able to be transferred from PACU to room 4478. A&O , VSS, with no complaints at this time. MERCEDES Gutierrez called and notified that pt is being transferred out of PACU and to room.

## 2024-11-27 NOTE — BRIEF OP NOTE
Colon:  diverticulosis, small external hemorrhoids.               Otherwise normal to ti.  Random bx obtained.      Rec:   F/u path  Start questran

## 2024-11-28 LAB
ANION GAP SERPL CALCULATED.3IONS-SCNC: 9 MMOL/L (ref 3–16)
BASOPHILS # BLD: 0.1 K/UL (ref 0–0.2)
BASOPHILS NFR BLD: 0.6 %
BUN SERPL-MCNC: 23 MG/DL (ref 7–20)
CALCIUM SERPL-MCNC: 10 MG/DL (ref 8.3–10.6)
CHLORIDE SERPL-SCNC: 100 MMOL/L (ref 99–110)
CO2 SERPL-SCNC: 30 MMOL/L (ref 21–32)
CREAT SERPL-MCNC: 0.9 MG/DL (ref 0.6–1.2)
DEPRECATED RDW RBC AUTO: 15.5 % (ref 12.4–15.4)
EOSINOPHIL # BLD: 0 K/UL (ref 0–0.6)
EOSINOPHIL NFR BLD: 0.1 %
GFR SERPLBLD CREATININE-BSD FMLA CKD-EPI: 65 ML/MIN/{1.73_M2}
GLUCOSE BLD-MCNC: 125 MG/DL (ref 70–99)
GLUCOSE BLD-MCNC: 208 MG/DL (ref 70–99)
GLUCOSE BLD-MCNC: 281 MG/DL (ref 70–99)
GLUCOSE BLD-MCNC: 339 MG/DL (ref 70–99)
GLUCOSE SERPL-MCNC: 162 MG/DL (ref 70–99)
HCT VFR BLD AUTO: 42.8 % (ref 36–48)
HGB BLD-MCNC: 14 G/DL (ref 12–16)
LYMPHOCYTES # BLD: 2.2 K/UL (ref 1–5.1)
LYMPHOCYTES NFR BLD: 18 %
MCH RBC QN AUTO: 27.9 PG (ref 26–34)
MCHC RBC AUTO-ENTMCNC: 32.7 G/DL (ref 31–36)
MCV RBC AUTO: 85.3 FL (ref 80–100)
MONOCYTES # BLD: 0.8 K/UL (ref 0–1.3)
MONOCYTES NFR BLD: 6.8 %
NEUTROPHILS # BLD: 9.1 K/UL (ref 1.7–7.7)
NEUTROPHILS NFR BLD: 74.5 %
PERFORMED ON: ABNORMAL
PLATELET # BLD AUTO: 207 K/UL (ref 135–450)
PMV BLD AUTO: 8.1 FL (ref 5–10.5)
POTASSIUM SERPL-SCNC: 4.9 MMOL/L (ref 3.5–5.1)
RBC # BLD AUTO: 5.02 M/UL (ref 4–5.2)
SODIUM SERPL-SCNC: 139 MMOL/L (ref 136–145)
WBC # BLD AUTO: 12.2 K/UL (ref 4–11)

## 2024-11-28 PROCEDURE — 6370000000 HC RX 637 (ALT 250 FOR IP): Performed by: INTERNAL MEDICINE

## 2024-11-28 PROCEDURE — 85025 COMPLETE CBC W/AUTO DIFF WBC: CPT

## 2024-11-28 PROCEDURE — 2700000000 HC OXYGEN THERAPY PER DAY

## 2024-11-28 PROCEDURE — 6360000002 HC RX W HCPCS: Performed by: INTERNAL MEDICINE

## 2024-11-28 PROCEDURE — 94760 N-INVAS EAR/PLS OXIMETRY 1: CPT

## 2024-11-28 PROCEDURE — 80048 BASIC METABOLIC PNL TOTAL CA: CPT

## 2024-11-28 PROCEDURE — 94640 AIRWAY INHALATION TREATMENT: CPT

## 2024-11-28 PROCEDURE — 94761 N-INVAS EAR/PLS OXIMETRY MLT: CPT

## 2024-11-28 PROCEDURE — 1200000000 HC SEMI PRIVATE

## 2024-11-28 PROCEDURE — 2580000003 HC RX 258: Performed by: INTERNAL MEDICINE

## 2024-11-28 RX ADMIN — GABAPENTIN 400 MG: 400 CAPSULE ORAL at 12:04

## 2024-11-28 RX ADMIN — Medication 2 PUFF: at 11:50

## 2024-11-28 RX ADMIN — ALPRAZOLAM 1 MG: 0.5 TABLET ORAL at 22:07

## 2024-11-28 RX ADMIN — GUAIFENESIN 600 MG: 600 TABLET, EXTENDED RELEASE ORAL at 22:01

## 2024-11-28 RX ADMIN — GUAIFENESIN 600 MG: 600 TABLET, EXTENDED RELEASE ORAL at 08:29

## 2024-11-28 RX ADMIN — PANTOPRAZOLE SODIUM 40 MG: 40 TABLET, DELAYED RELEASE ORAL at 05:05

## 2024-11-28 RX ADMIN — ALPRAZOLAM 1 MG: 0.5 TABLET ORAL at 08:35

## 2024-11-28 RX ADMIN — GLIPIZIDE 10 MG: 5 TABLET ORAL at 16:29

## 2024-11-28 RX ADMIN — LISINOPRIL 2.5 MG: 5 TABLET ORAL at 08:28

## 2024-11-28 RX ADMIN — ROSUVASTATIN CALCIUM 20 MG: 20 TABLET, FILM COATED ORAL at 22:01

## 2024-11-28 RX ADMIN — DICLOFENAC SODIUM 2 G: 10 GEL TOPICAL at 08:29

## 2024-11-28 RX ADMIN — CHOLESTYRAMINE 4 G: 4 POWDER, FOR SUSPENSION ORAL at 08:28

## 2024-11-28 RX ADMIN — INSULIN LISPRO 12 UNITS: 100 INJECTION, SOLUTION INTRAVENOUS; SUBCUTANEOUS at 16:29

## 2024-11-28 RX ADMIN — Medication 2 PUFF: at 19:40

## 2024-11-28 RX ADMIN — GLIPIZIDE 10 MG: 5 TABLET ORAL at 08:35

## 2024-11-28 RX ADMIN — Medication 10 ML: at 22:03

## 2024-11-28 RX ADMIN — INSULIN LISPRO 4 UNITS: 100 INJECTION, SOLUTION INTRAVENOUS; SUBCUTANEOUS at 12:04

## 2024-11-28 RX ADMIN — INSULIN LISPRO 8 UNITS: 100 INJECTION, SOLUTION INTRAVENOUS; SUBCUTANEOUS at 22:01

## 2024-11-28 RX ADMIN — FUROSEMIDE 40 MG: 40 TABLET ORAL at 08:28

## 2024-11-28 RX ADMIN — GABAPENTIN 400 MG: 400 CAPSULE ORAL at 08:28

## 2024-11-28 RX ADMIN — TIOTROPIUM BROMIDE INHALATION SPRAY 2 PUFF: 3.12 SPRAY, METERED RESPIRATORY (INHALATION) at 11:50

## 2024-11-28 RX ADMIN — IPRATROPIUM BROMIDE AND ALBUTEROL SULFATE 1 DOSE: .5; 3 SOLUTION RESPIRATORY (INHALATION) at 19:40

## 2024-11-28 RX ADMIN — GUAIFENESIN AND DEXTROMETHORPHAN 5 ML: 100; 10 SYRUP ORAL at 13:32

## 2024-11-28 RX ADMIN — Medication 10 ML: at 08:30

## 2024-11-28 RX ADMIN — DICLOFENAC SODIUM 2 G: 10 GEL TOPICAL at 22:02

## 2024-11-28 RX ADMIN — GABAPENTIN 400 MG: 400 CAPSULE ORAL at 22:01

## 2024-11-28 RX ADMIN — IPRATROPIUM BROMIDE AND ALBUTEROL SULFATE 1 DOSE: .5; 3 SOLUTION RESPIRATORY (INHALATION) at 11:50

## 2024-11-28 RX ADMIN — CHOLESTYRAMINE 4 G: 4 POWDER, FOR SUSPENSION ORAL at 22:01

## 2024-11-28 RX ADMIN — WATER 40 MG: 1 INJECTION INTRAMUSCULAR; INTRAVENOUS; SUBCUTANEOUS at 08:28

## 2024-11-28 NOTE — PROGRESS NOTES
obstructive pulmonary disease (HCC), Claustrophobia, COPD (chronic obstructive pulmonary disease) (HCC), Diastolic dysfunction, Emphysema lung (HCC), ESBL (extended spectrum beta-lactamase) producing bacteria infection, Esophageal stricture, Gastroesophageal reflux disease without esophagitis, Hiatal hernia, Hypercholesteremia, Hypertension, IBS (irritable bowel syndrome), Macular degeneration, Migraines, Movement disorder, Paroxysmal atrial fibrillation (HCC), Pneumonia, PONV (postoperative nausea and vomiting), Thoracic radiculopathy, Type II or unspecified type diabetes mellitus without mention of complication, not stated as uncontrolled, and Unspecified cerebral artery occlusion with cerebral infarction.. She  has a past surgical history that includes Cardiac surgery; Gallbladder surgery; Hysterectomy; Appendectomy; Cholecystectomy; Colonoscopy; Upper gastrointestinal endoscopy (04/20/2012); Endoscopy, colon, diagnostic; Tear duct surgery; Upper gastrointestinal endoscopy (06/11/2018); Colonoscopy (06/11/2018); pr excision malignant lesion s/n/h/f/g 0.5 cm/< (N/A, 09/06/2018); pr split agrft t/a/l 1st 100 cm/&/1% bdy inft/chld (N/A, 09/06/2018); skin biopsy; eye surgery; bronchoscopy (N/A, 01/24/2020); bronchoscopy (N/A, 01/27/2020); Cataract removal (2013 or 2014); Esophagus dilation; bronchoscopy (N/A, 11/23/2020); Coronary angioplasty with stent; and Colonoscopy (N/A, 11/27/2024).. She  reports that she quit smoking about 6 years ago. Her smoking use included cigarettes. She started smoking about 55 years ago. She has a 12.3 pack-year smoking history. She has never used smokeless tobacco. She reports current alcohol use of about 1.0 standard drink of alcohol per week. She reports that she does not use drugs..        Active Hospital Problems    Diagnosis Date Noted    HTN (hypertension) [I10]      Priority: High    Hematochezia [K92.1] 11/24/2024    Pneumonia [J18.9] 11/23/2024    Pneumonia due to organism  (!) 140/94 98 °F (36.7 °C) Axillary 69 20 96 %   11/27/24 1055 (!) 117/91 -- -- 69 12 96 %   11/27/24 1050 106/71 -- -- 77 18 97 %   11/27/24 1044 97/68 98 °F (36.7 °C) Axillary 79 15 95 %     No data found.          Intake/Output Summary (Last 24 hours) at 11/28/2024 0930  Last data filed at 11/28/2024 0505  Gross per 24 hour   Intake 1140 ml   Output 1100 ml   Net 40 ml         Physical Exam: (2-7 system for EPF/Detailed, >=8 for Comprehensive)  BP (!) 158/62   Pulse 56   Temp 97.9 °F (36.6 °C) (Oral)   Resp 16   Ht 1.676 m (5' 6\")   Wt 124.7 kg (275 lb)   SpO2 95%   BMI 44.39 kg/m²   Constitutional: vitals as above: alert, appears stated age and cooperative    Psychiatric: normal insight and judgment, oriented to person, place, time, and general circumstances    Head: Normocephalic, without obvious abnormality, atraumatic    Eyes:lids and lashes normal, conjunctivae and sclerae normal and pupils equal, round, reactive to light and accomodation    EMNT: external ears normal, nares midline    Neck: no carotid bruit, supple, symmetrical, trachea midline and thyroid not enlarged, symmetric, no tenderness/mass/nodules     Respiratory: wheezes bilaterally with normal respiratory effort    Cardiovascular: normal rate, regular rhythm, normal S1 and S2 and no murmurs    Gastrointestinal: soft, non-tender, non-distended, normal bowel sounds, no masses or organomegaly    Extremities: no clubbing, no edema    Skin:No rashes or nodules noted.    Neurologic:negative         Labs:  Lab Results   Component Value Date    WBC 12.2 (H) 11/28/2024    HGB 14.0 11/28/2024    HCT 42.8 11/28/2024     11/28/2024    CHOL 185 06/19/2024    TRIG 387 (H) 06/19/2024    HDL 29 (L) 06/19/2024    LDLDIRECT 95 06/19/2024    ALT 17 11/23/2024    AST 33 11/23/2024     11/28/2024    K 4.9 11/28/2024     11/28/2024    CREATININE 0.9 11/28/2024    BUN 23 (H) 11/28/2024    CO2 30 11/28/2024    TSH 1.32 08/02/2022    INR 1.00

## 2024-11-28 NOTE — PROGRESS NOTES
Shift assessment completed. Neuro WNL. Denies any pain at this time. AM meds administered per MAR. The care plan and education has been reviewed and mutually agreed upon with the patient. Standard safety measures in place.

## 2024-11-28 NOTE — PLAN OF CARE
Problem: Chronic Conditions and Co-morbidities  Goal: Patient's chronic conditions and co-morbidity symptoms are monitored and maintained or improved  11/28/2024 0042 by Pau Lima RN  Outcome: Progressing  Flowsheets (Taken 11/28/2024 0042)  Care Plan - Patient's Chronic Conditions and Co-Morbidity Symptoms are Monitored and Maintained or Improved: Monitor and assess patient's chronic conditions and comorbid symptoms for stability, deterioration, or improvement     Problem: Discharge Planning  Goal: Discharge to home or other facility with appropriate resources  11/28/2024 0042 by Pau Lima RN  Outcome: Progressing  Flowsheets (Taken 11/28/2024 0042)  Discharge to home or other facility with appropriate resources: Identify barriers to discharge with patient and caregiver     Problem: Pain  Goal: Verbalizes/displays adequate comfort level or baseline comfort level  11/28/2024 0042 by Pau Lima RN  Outcome: Progressing  Flowsheets  Taken 11/28/2024 0042 by Pau Lima RN  Verbalizes/displays adequate comfort level or baseline comfort level:   Encourage patient to monitor pain and request assistance   Assess pain using appropriate pain scale   Implement non-pharmacological measures as appropriate and evaluate response   Administer analgesics based on type and severity of pain and evaluate response  Taken 11/27/2024 1130 by Brenda Gaitan RN  Verbalizes/displays adequate comfort level or baseline comfort level:   Encourage patient to monitor pain and request assistance   Assess pain using appropriate pain scale   Administer analgesics based on type and severity of pain and evaluate response     Problem: Skin/Tissue Integrity  Goal: Absence of new skin breakdown  Description: 1.  Monitor for areas of redness and/or skin breakdown  2.  Assess vascular access sites hourly  3.  Every 4-6 hours minimum:  Change oxygen saturation probe site  4.  Every 4-6 hours:  If on nasal continuous

## 2024-11-28 NOTE — PROGRESS NOTES
Pt refused cholestyramine and Crestor this evening. States Crestor makes her sick and she tried the cholestyramine during the day and it also made her sick.

## 2024-11-28 NOTE — PROGRESS NOTES
Gastroenterology Progress Note            Milana Pardo is a 79 y.o. female patient.  1. Chronic obstructive pulmonary disease with acute exacerbation (HCC)    2. Pneumonia due to infectious organism, unspecified laterality, unspecified part of lung    3. Hematochezia        SUBJECTIVE:  no issues since colonoscopy    Physical    VITALS:  /71   Pulse 56   Temp 97.6 °F (36.4 °C) (Oral)   Resp 18   Ht 1.676 m (5' 6\")   Wt 124.7 kg (275 lb)   SpO2 90%   BMI 44.39 kg/m²   TEMPERATURE:  Current - Temp: 97.6 °F (36.4 °C); Max - Temp  Av.7 °F (36.5 °C)  Min: 96.3 °F (35.7 °C)  Max: 98 °F (36.7 °C)    Abdomen soft, ND, NT, no HSM, Bowel sounds normal     Data      Recent Labs     24  0519 24  0650 24  0448   WBC 12.0* 12.8* 12.2*   HGB 13.3 14.6 14.0   HCT 40.5 46.7 42.8   MCV 85.4 87.6 85.3    190 207     Recent Labs     24  0519 24  0650 24  0448    135* 139   K 4.6 4.4 4.9   CL 97* 97* 100   CO2 31 24 30   BUN 31* 27* 23*   CREATININE 1.1 0.9 0.9     No results for input(s): \"AST\", \"ALT\", \"BILIDIR\", \"BILITOT\", \"ALKPHOS\" in the last 72 hours.    Invalid input(s): \"ALB\"  No results for input(s): \"LIPASE\", \"AMYLASE\" in the last 72 hours.          ASSESSMENT   Chronic diarrhea- x years.  Colon neg but path pending   Hematochezia only small hemorrhoids noted.  Hgb remains normal.         PLAN    F/u path from colon.   Questran started   Ok to resume xarelto   Ok for dc tomorrow if doing ok    Carlos Lopez MD  [facility] [place]

## 2024-11-28 NOTE — PLAN OF CARE
Problem: Chronic Conditions and Co-morbidities  Goal: Patient's chronic conditions and co-morbidity symptoms are monitored and maintained or improved  Outcome: Progressing  Flowsheets (Taken 11/28/2024 0042)  Care Plan - Patient's Chronic Conditions and Co-Morbidity Symptoms are Monitored and Maintained or Improved: Monitor and assess patient's chronic conditions and comorbid symptoms for stability, deterioration, or improvement     Problem: Discharge Planning  Goal: Discharge to home or other facility with appropriate resources  Outcome: Progressing  Flowsheets (Taken 11/28/2024 0042)  Discharge to home or other facility with appropriate resources: Identify barriers to discharge with patient and caregiver     Problem: Pain  Goal: Verbalizes/displays adequate comfort level or baseline comfort level  Outcome: Progressing  Flowsheets  Taken 11/28/2024 0042 by Pau Lima RN  Verbalizes/displays adequate comfort level or baseline comfort level:   Encourage patient to monitor pain and request assistance   Assess pain using appropriate pain scale   Implement non-pharmacological measures as appropriate and evaluate response   Administer analgesics based on type and severity of pain and evaluate response  Taken 11/27/2024 1130 by Brenda Gaitan RN  Verbalizes/displays adequate comfort level or baseline comfort level:   Encourage patient to monitor pain and request assistance   Assess pain using appropriate pain scale   Administer analgesics based on type and severity of pain and evaluate response     Problem: Skin/Tissue Integrity  Goal: Absence of new skin breakdown  Description: 1.  Monitor for areas of redness and/or skin breakdown  2.  Assess vascular access sites hourly  3.  Every 4-6 hours minimum:  Change oxygen saturation probe site  4.  Every 4-6 hours:  If on nasal continuous positive airway pressure, respiratory therapy assess nares and determine need for appliance change or resting period.  Outcome:  Progressing     Problem: Safety - Adult  Goal: Free from fall injury  Outcome: Progressing  Flowsheets (Taken 11/28/2024 0042)  Free From Fall Injury:   Instruct family/caregiver on patient safety   Based on caregiver fall risk screen, instruct family/caregiver to ask for assistance with transferring infant if caregiver noted to have fall risk factors

## 2024-11-28 NOTE — PROGRESS NOTES
Patient alert and oriented x 4 and up x 1 walker. Patient voiding via pure wick. Patient tolerating diet with no c/o nausea at this time. Patient does co generalized pain managed with scheduled Voltaren per orders. Assessment completed, see flowsheet. Patient's bed is locked and in lowest position, side rails up x 3 with an active bed alarm. Non slip socks applied.

## 2024-11-28 NOTE — PROGRESS NOTES
Patient's HS glucose 373. This RN notified Brittany Georges NP per order. Per NP, give sliding scale as ordered.    Anxiety    Chronic pancreatitis, unspecified pancreatitis type    Diabetes    HTN (hypertension)

## 2024-11-29 ENCOUNTER — APPOINTMENT (OUTPATIENT)
Dept: GENERAL RADIOLOGY | Age: 79
End: 2024-11-29
Payer: COMMERCIAL

## 2024-11-29 DIAGNOSIS — R91.1 LUNG NODULE: Primary | ICD-10-CM

## 2024-11-29 LAB
ANION GAP SERPL CALCULATED.3IONS-SCNC: 9 MMOL/L (ref 3–16)
BASOPHILS # BLD: 0.1 K/UL (ref 0–0.2)
BASOPHILS NFR BLD: 0.8 %
BUN SERPL-MCNC: 25 MG/DL (ref 7–20)
CALCIUM SERPL-MCNC: 9.6 MG/DL (ref 8.3–10.6)
CHLORIDE SERPL-SCNC: 101 MMOL/L (ref 99–110)
CO2 SERPL-SCNC: 28 MMOL/L (ref 21–32)
CREAT SERPL-MCNC: 1.2 MG/DL (ref 0.6–1.2)
DEPRECATED RDW RBC AUTO: 15.6 % (ref 12.4–15.4)
EOSINOPHIL # BLD: 0 K/UL (ref 0–0.6)
EOSINOPHIL NFR BLD: 0.2 %
GFR SERPLBLD CREATININE-BSD FMLA CKD-EPI: 46 ML/MIN/{1.73_M2}
GLUCOSE BLD-MCNC: 129 MG/DL (ref 70–99)
GLUCOSE BLD-MCNC: 194 MG/DL (ref 70–99)
GLUCOSE BLD-MCNC: 314 MG/DL (ref 70–99)
GLUCOSE BLD-MCNC: 327 MG/DL (ref 70–99)
GLUCOSE SERPL-MCNC: 161 MG/DL (ref 70–99)
HCT VFR BLD AUTO: 40 % (ref 36–48)
HGB BLD-MCNC: 13 G/DL (ref 12–16)
LYMPHOCYTES # BLD: 3.2 K/UL (ref 1–5.1)
LYMPHOCYTES NFR BLD: 22.8 %
MCH RBC QN AUTO: 27.6 PG (ref 26–34)
MCHC RBC AUTO-ENTMCNC: 32.6 G/DL (ref 31–36)
MCV RBC AUTO: 84.7 FL (ref 80–100)
MONOCYTES # BLD: 1.1 K/UL (ref 0–1.3)
MONOCYTES NFR BLD: 7.7 %
NEUTROPHILS # BLD: 9.7 K/UL (ref 1.7–7.7)
NEUTROPHILS NFR BLD: 68.5 %
PERFORMED ON: ABNORMAL
PLATELET # BLD AUTO: 208 K/UL (ref 135–450)
PMV BLD AUTO: 8.1 FL (ref 5–10.5)
POTASSIUM SERPL-SCNC: 4.1 MMOL/L (ref 3.5–5.1)
RBC # BLD AUTO: 4.72 M/UL (ref 4–5.2)
SODIUM SERPL-SCNC: 138 MMOL/L (ref 136–145)
WBC # BLD AUTO: 14.1 K/UL (ref 4–11)

## 2024-11-29 PROCEDURE — 6360000002 HC RX W HCPCS: Performed by: INTERNAL MEDICINE

## 2024-11-29 PROCEDURE — 80048 BASIC METABOLIC PNL TOTAL CA: CPT

## 2024-11-29 PROCEDURE — 94640 AIRWAY INHALATION TREATMENT: CPT

## 2024-11-29 PROCEDURE — 85025 COMPLETE CBC W/AUTO DIFF WBC: CPT

## 2024-11-29 PROCEDURE — 94761 N-INVAS EAR/PLS OXIMETRY MLT: CPT

## 2024-11-29 PROCEDURE — 6370000000 HC RX 637 (ALT 250 FOR IP): Performed by: INTERNAL MEDICINE

## 2024-11-29 PROCEDURE — 2580000003 HC RX 258: Performed by: INTERNAL MEDICINE

## 2024-11-29 PROCEDURE — 1200000000 HC SEMI PRIVATE

## 2024-11-29 PROCEDURE — 2700000000 HC OXYGEN THERAPY PER DAY

## 2024-11-29 PROCEDURE — 71046 X-RAY EXAM CHEST 2 VIEWS: CPT

## 2024-11-29 PROCEDURE — 92526 ORAL FUNCTION THERAPY: CPT

## 2024-11-29 RX ADMIN — GUAIFENESIN 600 MG: 600 TABLET, EXTENDED RELEASE ORAL at 12:50

## 2024-11-29 RX ADMIN — CHOLESTYRAMINE 4 G: 4 POWDER, FOR SUSPENSION ORAL at 12:50

## 2024-11-29 RX ADMIN — LISINOPRIL 2.5 MG: 5 TABLET ORAL at 12:50

## 2024-11-29 RX ADMIN — WATER 40 MG: 1 INJECTION INTRAMUSCULAR; INTRAVENOUS; SUBCUTANEOUS at 12:54

## 2024-11-29 RX ADMIN — FUROSEMIDE 40 MG: 40 TABLET ORAL at 12:50

## 2024-11-29 RX ADMIN — Medication 10 ML: at 12:51

## 2024-11-29 RX ADMIN — INSULIN LISPRO 12 UNITS: 100 INJECTION, SOLUTION INTRAVENOUS; SUBCUTANEOUS at 17:54

## 2024-11-29 RX ADMIN — Medication 2 PUFF: at 08:22

## 2024-11-29 RX ADMIN — ALPRAZOLAM 1 MG: 0.5 TABLET ORAL at 17:56

## 2024-11-29 RX ADMIN — GLIPIZIDE 10 MG: 5 TABLET ORAL at 17:54

## 2024-11-29 RX ADMIN — IPRATROPIUM BROMIDE AND ALBUTEROL SULFATE 1 DOSE: .5; 3 SOLUTION RESPIRATORY (INHALATION) at 08:22

## 2024-11-29 RX ADMIN — Medication 10 ML: at 20:46

## 2024-11-29 RX ADMIN — GABAPENTIN 400 MG: 400 CAPSULE ORAL at 12:50

## 2024-11-29 RX ADMIN — DICLOFENAC SODIUM 2 G: 10 GEL TOPICAL at 21:43

## 2024-11-29 RX ADMIN — TIOTROPIUM BROMIDE INHALATION SPRAY 2 PUFF: 3.12 SPRAY, METERED RESPIRATORY (INHALATION) at 08:22

## 2024-11-29 RX ADMIN — ONDANSETRON 4 MG: 2 INJECTION, SOLUTION INTRAMUSCULAR; INTRAVENOUS at 20:46

## 2024-11-29 RX ADMIN — INSULIN LISPRO 12 UNITS: 100 INJECTION, SOLUTION INTRAVENOUS; SUBCUTANEOUS at 21:49

## 2024-11-29 RX ADMIN — DICLOFENAC SODIUM 2 G: 10 GEL TOPICAL at 12:54

## 2024-11-29 RX ADMIN — PANTOPRAZOLE SODIUM 40 MG: 40 TABLET, DELAYED RELEASE ORAL at 06:18

## 2024-11-29 RX ADMIN — INSULIN LISPRO 4 UNITS: 100 INJECTION, SOLUTION INTRAVENOUS; SUBCUTANEOUS at 12:50

## 2024-11-29 NOTE — PROGRESS NOTES
Speech Language Pathology  [facility]  [phone]  SLP Dysphagia Treatment       Patient: Milana Pardo   : 1945   MRN: [id number]      Evaluation Date: 2024      Admitting Dx: Pneumonia due to organism [J18.9]  Chronic obstructive pulmonary disease with acute exacerbation (HCC) [J44.1]  Pneumonia due to infectious organism, unspecified laterality, unspecified part of lung [J18.9]  Treatment Diagnosis: Oropharyngeal Dysphagia   Pain: Did not state                                  Recommendations      Recommended Diet and Intervention 2024:  Diet Solids Recommendation:  Regular texture diet   Liquid Consistency Recommendation:  Thin liquids    Recommended form of Meds: Meds whole with water as tolerated      Compensatory strategies: Alternate solids/liquids , Upright as possible with all PO intake , Small bites/sips , Eat/feed slowly, Remain upright 30-45 min , Aspiration Precautions     Discharge Recommendations:  No further follow-up appears indicated at this time.     History/Course of Treatment     H&P: 2024  79 y.o. female with past medical history of hypertension, hyperlipidemia, atrial fibrillation anticoagulated on Xarelto, diabetes, COPD on 2 L nasal cannula oxygen at night, CAD, anxiety, depression, obstructive sleep apnea and grade 2 diastolic dysfunction documented who presents ED with complaint of shortness of breath.  Patient states has been short of breath for the past 3 days.  She came by EMS for further evaluation and treatment.  Patient reports he was diagnosed with tonsillitis a couple days ago.  Reports was put on antibiotics with cephalexin.  She reports she continues to have sore throat.  States now she is having cough, chest tightness and shortness of breath.  Feels like she is having flareup of her COPD.  She reports chronic pain but denies any new pain at this time.  Rates her pain as a 10/10 diffusely.  Physical Therapy Treatment note     Name: Dennis Rocha  Clinic Number: [id number]    Therapy Diagnosis:   Encounter Diagnoses   Name Primary?    Decreased range of motion of right knee Yes    Weakness of right lower extremity     Impaired mobility and ADLs     Acute pain of right knee          Physician: Jacobo Turner*    Visit Date: 3/13/2023    Physician Orders: PT Evaluate and Treat   Medical Diagnosis: Pre-op testing [Z01.818], Complete tear of right ACL, initial encounter [S83.511A], Acute lateral meniscal tear, right, initial encounter [S83.281A]  Surgical Procedure and Date: 9/26/22  RECONSTRUCTION, KNEE, ACL, ARTHROSCOPIC, BTB (Right)  ARTHROSCOPY,KNEE,WITH MENISCUS REPAIR--MEDIAL AND LATERAL (Right)  Evaluation Date: 9/28/2022  Insurance Authorization Period Expiration: 03/05/2024  Plan of Care Certification Period: 12/28/22 updated (5/5/023)  Progress Note Due: 10/28/22   Date of Return to MD: 6 week post-op (11/7/2022)  Visit # / Visits authorized: 16 / 20 (2022), 6/20     PTA Visit: 5/5     Time In: 5 00 pm   Time Out: 6 00 pm  Total Billable Time: 60 min.     DOS: 9/26/2022 (6 months 3 weeks)   Surgeon: Pedro  PROCEDURE PERFORMED:   Right ACL reconstruction with bone-patellar tendon-bone autograft  Right knee arthroscopic medial meniscus repair  Right knee arthroscopic lateral meniscus repair      Precautions:  (6 months 3 weeks)  Begin agility exercises between 50-75% effort (utilize visual feedback to improve  mechanics as needed    Subjective      Patient reports:  went to doctor , was told to hold off on participating in sports and sport like movement and to begin physical therapy again   He was compliant with home exercise program.  Response to previous treatment: felt fine  Functional change: Ongoing     Pain: 0/10  Location: right knee      Objective   ROM WITHIN NORMAL LIMITATIONS bilaterally     MMT   RLE grossly 4-/5  LLE grossly 5/5    Squat : dysfunctional non painful   SL:  unable     SL balance EO - RLE <15 seconds  LLE <25 seconds  SL balance EC- RLE <10 seconds LLE < 25 seconds   Treatment     Dennis received therapeutic exercises to develop strength, endurance, ROM, flexibility, and core stabilization for 30 minutes including:  Goblet squat   TKE   360 deg lunges   Monster walk  3 way step down eccentric   Supine hamstring curl w/eccentric load         Dennis received the following manual therapy techniques: patellar mobilization for increased flexibility 00 minutes  Right Talocrural AP mobilizations  PROM right ankle All planes   Patellar mobilizations   Fat pad mobilizations *hypomobile fat pad on involved side*  Scar mobilizations  Knee extension hinge mobilizations     Dennis participated in neuromuscular re-education activities to improve: Coordination, Kinesthetic, Sense, and Proprioception for 25 minutes. The following activities were included:   DL hops in place 3x30  DL hops side to side 3x30   DL hops forward/backward 3x30   SL hops in place 3x20  SL hops side to side 3x20  SL hops forward/backward 3x20        Dennis participated in dynamic functional therapeutic activities to improve functional performance for 0 minutes, including:    Education on return to run program       Home Exercise Program (HEP) Provided and Patient Education Provided     Patient educated regarding progression of WB at 2 weeks.    Written Home Exercises Provided: Patient instructed to cont prior home program.  Exercises were reviewed and Dennis was able to demonstrate them prior to the end of the session.  Dennis demonstrated good  understanding of the education provided.     Assessment   Pt tolerated treatment well, limited with decreased strength of bilateral hips and right quad musculature  leading to imbalance to during dynamic movements.Continue treatment per protocol in order to return to sport.     Dennis Is progressing well towards his goals.     Patient will continue to benefit  from skilled outpatient physical therapy to address the deficits listed in the problem list box on initial evaluation, provide patient/family education and to maximize patient's level of independence in the home and community environment.     Patient prognosis is Good.     Patient's spiritual, cultural and educational needs considered and patient agreeable to plan of care and goals.    Anticipated barriers to physical therapy: transportation; family    Short Term Goals (4 weeks):  1. Patient will have improved AROM into knee hyperextension symmetrical to uninvolved limb and knee flexion > 100 degrees on the right knee to demonstrate readiness to ambulate without crutches. Met   2. Patient will demonstrate straight leg raise for 20 repetitions without knee extensor lag to show improved quadriceps motor recruitment and strength on the involved limb. met  3. Patient will demonstrate < 1+ on Stroke Test to indicate decreased intra-articular swelling and readiness to progress independently ambulate. met     Long Term Goals (12 weeks):   1.Patient will have improved AROM of the Right knee to symmetrical knee hyperextension and knee flexion compared to uninvolved limb. Met   2. Patient will have improved strength of the Right quadriceps to > or equal to 80% of uninvolved limb with leg press and knee extension machine (90-45 degrees) to show readiness to return to jogging.. not met   3. Patient will be able to perform 10 single leg squats to 45 degrees of knee flexion without notable movement impairments in all planes to indicate improved motor control on the involved limb. Not met   4. Patient with perform 30 step and holds without loss of balance or excessive sagittal plane movement deviations to indicate improved motor control and proprioception of the Right lower extremity. Not met   5. Patient will have overall improvement in condition to have decreased FOTO Limitation Score to 30% to demonstrate clinically significant  change in knee function. Progressing     Plan      Continue with aggressive knee extension stretching, quad setting, progre\ssion range of motion within restrictions,     Jeferson Montero, PT                                             Physical Therapy Treatment note     Name: Dennis Rocha  Clinic Number: [id number]    Therapy Diagnosis:   Encounter Diagnoses   Name Primary?    Decreased range of motion of right knee Yes    Weakness of right lower extremity     Impaired mobility and ADLs     Acute pain of right knee          Physician: Jacobo Turner*    Visit Date: 3/13/2023    Physician Orders: PT Evaluate and Treat   Medical Diagnosis: Pre-op testing [Z01.818], Complete tear of right ACL, initial encounter [S83.511A], Acute lateral meniscal tear, right, initial encounter [S83.281A]  Surgical Procedure and Date: 9/26/22  RECONSTRUCTION, KNEE, ACL, ARTHROSCOPIC, BTB (Right)  ARTHROSCOPY,KNEE,WITH MENISCUS REPAIR--MEDIAL AND LATERAL (Right)  Evaluation Date: 9/28/2022  Insurance Authorization Period Expiration: 12/31/22  Plan of Care Certification Period: 12/28/22  Progress Note Due: 10/28/22  Date of Return to MD: 6 week post-op (11/7/2022)  Visit # / Visits authorized: 16 / 20 (2022), 6/20     PTA Visit: 5/5     Time In: 4:00 pm   Time Out: 5:05 pm  Total Billable Time: 60 min.     DOS: 9/26/2022  Surgeon: Pedro  PROCEDURE PERFORMED:   Right ACL reconstruction with bone-patellar tendon-bone autograft  Right knee arthroscopic medial meniscus repair  Right knee arthroscopic lateral meniscus repair      Precautions:  Standard and Weightbearing - 50% WB weeks 2-4    Subjective      Patient reports:  He is feeling good this afternoon.   He was compliant with home exercise program.  Response to previous treatment: felt fine  Functional change: Ongoing     Pain: 0/10  Location: right knee      Objective    4 mo 14 days post op    Quad re-assessment:   Right quadriceps atrophy  SLR performed without lag:  "23    Ankle Re-assessment:   hypomobile right talocrural AP mobs  normal subtalar     Treatment     Dennis received therapeutic exercises to develop strength, endurance, ROM, flexibility, and core stabilization for 32 minutes including:    Bike 8' hills   Squats with heels elevated c 35# KB 3x12   Walking forward lunges 2x20# x 3 laps   Standing clamshells c 20# hold 3x10 B   Ankle dorsiflexion MWM c Monster band x15 ea direction  Squats to 90 deg 3x10 with green monsterband 3 x 15   BFR mini squats 30 /15 /15 /15   BFR DL calf raises 4x20   Lateral heel taps L1 3x10 B   DL shuttle 7 bands 3 x 15   SL shuttle (4 bands right, 5 bands left) 3x15  Standing calf raises 4  x  25   Slider lunges retro 2 x 15   Slider lunges lateral 2 x 15   Slider lunge anterior 2 x 15   Prone planks x 5 rounds till failure   TKE's with Green monsterband 3 x 15 5" hold    Ankle pumps + Quad sets with Leg elevation x10'  Heel prop 2x5# x 5 '   Side-lying hip abduction 2# 3x15  Heel slides towel assisted x30  Standing HS curls 3 x 15   SLR's 3 x 15 2# AW  TKE's with ball at wall x 30 5"    Supine bridge's 3 x 10   Supine bridge's with feet in staggered stance with 15# med ball 3 x 10 ea       Dennis received the following manual therapy techniques: patellar mobilization for increased flexibility 00 minutes    Right Talocrural AP mobilizations  PROM right ankle All planes   Patellar mobilizations   Fat pad mobilizations *hypomobile fat pad on involved side*  Scar mobilizations  Knee extension hinge mobilizations     Dennis participated in neuromuscular re-education activities to improve: Coordination, Kinesthetic, Sense, and Proprioception for 20 minutes. The following activities were included:     DL hops in place 3x30  DL hops side to side 3x30   DL hops forward/backward 3x30   SL hops in place 3x20  SL hops side to side 3x20  SL hops forward/backward 3x20  SL squat to chair c 3 airex pads 3x15 -- orange monster band   NMES SAQ x 10' "   SLR 5' russian 10/10       Dennis participated in dynamic functional therapeutic activities to improve functional performance for 08 minutes, including:    Education on return to run program       Home Exercise Program (HEP) Provided and Patient Education Provided     Patient educated regarding progression of WB at 2 weeks.    Written Home Exercises Provided: Patient instructed to cont prior home program.  Exercises were reviewed and Dennis was able to demonstrate them prior to the end of the session.  Dennis demonstrated good  understanding of the education provided.     Assessment     Dennis tolerated treatment well. We initiated hopping today which he demonstrated with good motor control and no pain. He very fatigued at the end of session. Dennis was given a return to run program today and this was reviewed with him, including soreness rules and progressions. Continue to improve strength and endurance.     Dennis Is progressing well towards his goals.     Patient will continue to benefit from skilled outpatient physical therapy to address the deficits listed in the problem list box on initial evaluation, provide patient/family education and to maximize patient's level of independence in the home and community environment.     Patient prognosis is Good.     Patient's spiritual, cultural and educational needs considered and patient agreeable to plan of care and goals.    Anticipated barriers to physical therapy: transportation; family    Short Term Goals (4 weeks):  1. Patient will have improved AROM into knee hyperextension symmetrical to uninvolved limb and knee flexion > 100 degrees on the right knee to demonstrate readiness to ambulate without crutches.  2. Patient will demonstrate straight leg raise for 20 repetitions without knee extensor lag to show improved quadriceps motor recruitment and strength on the involved limb.   3. Patient will demonstrate < 1+ on Stroke Test to indicate decreased  intra-articular swelling and readiness to progress independently ambulate.      Long Term Goals (12 weeks):   1.Patient will have improved AROM of the Right knee to symmetrical knee hyperextension and knee flexion compared to uninvolved limb.  2. Patient will have improved strength of the Right quadriceps to > or equal to 80% of uninvolved limb with leg press and knee extension machine (90-45 degrees) to show readiness to return to jogging..  3. Patient will be able to perform 10 single leg squats to 45 degrees of knee flexion without notable movement impairments in all planes to indicate improved motor control on the involved limb.  4. Patient with perform 30 step and holds without loss of balance or excessive sagittal plane movement deviations to indicate improved motor control and proprioception of the Right lower extremity.  5. Patient will have overall improvement in condition to have decreased FOTO Limitation Score to 30% to demonstrate clinically significant change in knee function    Plan      Continue with aggressive knee extension stretching, quad setting, progre\ssion range of motion within restrictions,     Jeferson Montero, PT

## 2024-11-29 NOTE — CARE COORDINATION
Discharge Planning Note:     (CRISTIAN) spoke with Lima @ [facility] about home health services for patient. Lima asked CRISTIAN to inform discharge planner to fax: [facility] order, JOSE G and H&P to [phone], when patient discharges.     CRISTIAN left voicemail with [facility] [phone].    CRISTIAN spoke with patient daughter Brenda [phone], who reports that she would provide transportation for patient upon discharge.     Electronically signed by PIOTR Craig on 11/29/24 at 2:36 PM EST

## 2024-11-29 NOTE — PLAN OF CARE
Problem: Chronic Conditions and Co-morbidities  Goal: Patient's chronic conditions and co-morbidity symptoms are monitored and maintained or improved  Outcome: Progressing     Problem: Discharge Planning  Goal: Discharge to home or other facility with appropriate resources  Outcome: Progressing     Problem: Pain  Goal: Verbalizes/displays adequate comfort level or baseline comfort level  Outcome: Progressing     Problem: Skin/Tissue Integrity  Goal: Absence of new skin breakdown  Description: 1.  Monitor for areas of redness and/or skin breakdown  2.  Assess vascular access sites hourly  3.  Every 4-6 hours minimum:  Change oxygen saturation probe site  4.  Every 4-6 hours:  If on nasal continuous positive airway pressure, respiratory therapy assess nares and determine need for appliance change or resting period.  Outcome: Progressing     Problem: Safety - Adult  Goal: Free from fall injury  Outcome: Progressing     Problem: Respiratory - Adult  Goal: Achieves optimal ventilation and oxygenation  Outcome: Progressing     Problem: Cardiovascular - Adult  Goal: Maintains optimal cardiac output and hemodynamic stability  Outcome: Progressing  Goal: Absence of cardiac dysrhythmias or at baseline  Outcome: Progressing     Problem: Cardiovascular - Adult  Goal: Absence of cardiac dysrhythmias or at baseline  Outcome: Progressing     Problem: Skin/Tissue Integrity - Adult  Goal: Skin integrity remains intact  Outcome: Progressing     Problem: Musculoskeletal - Adult  Goal: Return mobility to safest level of function  Outcome: Progressing  Goal: Maintain proper alignment of affected body part  Outcome: Progressing     Problem: Musculoskeletal - Adult  Goal: Maintain proper alignment of affected body part  Outcome: Progressing     Problem: Gastrointestinal - Adult  Goal: Minimal or absence of nausea and vomiting  Outcome: Progressing  Goal: Maintains or returns to baseline bowel function  Outcome: Progressing     Problem:  Gastrointestinal - Adult  Goal: Maintains or returns to baseline bowel function  Outcome: Progressing     Problem: Genitourinary - Adult  Goal: Absence of urinary retention  Outcome: Progressing     Problem: Infection - Adult  Goal: Absence of infection at discharge  Outcome: Progressing  Goal: Absence of infection during hospitalization  Outcome: Progressing     Problem: Infection - Adult  Goal: Absence of infection during hospitalization  Outcome: Progressing     Problem: Metabolic/Fluid and Electrolytes - Adult  Goal: Electrolytes maintained within normal limits  Outcome: Progressing  Goal: Hemodynamic stability and optimal renal function maintained  Outcome: Progressing     Problem: Metabolic/Fluid and Electrolytes - Adult  Goal: Hemodynamic stability and optimal renal function maintained  Outcome: Progressing

## 2024-11-29 NOTE — PLAN OF CARE
Problem: Chronic Conditions and Co-morbidities  Goal: Patient's chronic conditions and co-morbidity symptoms are monitored and maintained or improved  Outcome: Progressing     Problem: Discharge Planning  Goal: Discharge to home or other facility with appropriate resources  Outcome: Progressing     Problem: Pain  Goal: Verbalizes/displays adequate comfort level or baseline comfort level  Outcome: Progressing     Problem: Skin/Tissue Integrity  Goal: Absence of new skin breakdown  Description: 1.  Monitor for areas of redness and/or skin breakdown  2.  Assess vascular access sites hourly  3.  Every 4-6 hours minimum:  Change oxygen saturation probe site  4.  Every 4-6 hours:  If on nasal continuous positive airway pressure, respiratory therapy assess nares and determine need for appliance change or resting period.  Outcome: Progressing     Problem: Safety - Adult  Goal: Free from fall injury  Outcome: Progressing     Problem: Respiratory - Adult  Goal: Achieves optimal ventilation and oxygenation  Outcome: Progressing     Problem: Cardiovascular - Adult  Goal: Maintains optimal cardiac output and hemodynamic stability  Outcome: Progressing  Goal: Absence of cardiac dysrhythmias or at baseline  Outcome: Progressing     Problem: Skin/Tissue Integrity - Adult  Goal: Skin integrity remains intact  Outcome: Progressing     Problem: Musculoskeletal - Adult  Goal: Return mobility to safest level of function  Outcome: Progressing  Goal: Maintain proper alignment of affected body part  Outcome: Progressing     Problem: Gastrointestinal - Adult  Goal: Minimal or absence of nausea and vomiting  Outcome: Progressing  Goal: Maintains or returns to baseline bowel function  Outcome: Progressing     Problem: Genitourinary - Adult  Goal: Absence of urinary retention  Outcome: Progressing     Problem: Infection - Adult  Goal: Absence of infection at discharge  Outcome: Progressing  Goal: Absence of infection during  hospitalization  Outcome: Progressing     Problem: Metabolic/Fluid and Electrolytes - Adult  Goal: Electrolytes maintained within normal limits  Outcome: Progressing  Goal: Hemodynamic stability and optimal renal function maintained  Outcome: Progressing

## 2024-11-29 NOTE — PROGRESS NOTES
Pneumonia due to organism [J18.9] 11/23/2024    DM type 2, controlled, with complication (Formerly [facility]) [E11.8]     COPD (chronic obstructive pulmonary disease) (Formerly [facility]) [J44.9] 08/09/2011    Acute on chronic respiratory failure with hypoxia [J96.21] 12/22/2010       Current Facility-Administered Medications: cholestyramine light packet 4 g, 1 packet, Oral, BID  ipratropium 0.5 mg-albuterol 2.5 mg (DUONEB) nebulizer solution 1 Dose, 1 Dose, Inhalation, BID RT  PEG-KCl-NaCl-NaSulf-Na Asc-C (MOVIPREP) solution 100 g, 100 g, Oral, PRN  gabapentin (NEURONTIN) capsule 400 mg, 400 mg, Oral, TID  methylPREDNISolone sodium succ (SOLU-MEDROL) 40 mg in sterile water 1 mL injection, 40 mg, IntraVENous, Daily  dextrose bolus 10% 125 mL, 125 mL, IntraVENous, PRN **OR** dextrose bolus 10% 250 mL, 250 mL, IntraVENous, PRN  glucagon injection 1 mg, 1 mg, SubCUTAneous, PRN  dextrose 10 % infusion, , IntraVENous, Continuous PRN  hydrocortisone (ANUSOL-HC) suppository 25 mg, 25 mg, Rectal, BID  insulin lispro (HUMALOG,ADMELOG) injection vial 0-16 Units, 0-16 Units, SubCUTAneous, 4x Daily AC & HS  sodium chloride (OCEAN, BABY AYR) 0.65 % nasal spray 1 spray, 1 spray, Nasal, PRN  furosemide (LASIX) tablet 40 mg, 40 mg, Oral, Daily  rosuvastatin (CRESTOR) tablet 20 mg, 20 mg, Oral, Nightly  guaiFENesin (MUCINEX) extended release tablet 600 mg, 600 mg, Oral, BID  albuterol sulfate HFA (PROVENTIL;VENTOLIN;PROAIR) 108 (90 Base) MCG/ACT inhaler 2 puff, 2 puff, Inhalation, Q6H PRN  ALPRAZolam (XANAX) tablet 1 mg, 1 mg, Oral, TID PRN  glipiZIDE (GLUCOTROL) tablet 10 mg, 10 mg, Oral, BID WC  lisinopril (PRINIVIL;ZESTRIL) tablet 2.5 mg, 2.5 mg, Oral, Daily  diclofenac sodium (VOLTAREN) 1 % gel 2 g, 2 g, Topical, BID  pantoprazole (PROTONIX) tablet 40 mg, 40 mg, Oral, QAM AC  budesonide-formoterol (SYMBICORT) 160-4.5 MCG/ACT inhaler 2 puff, 2 puff, Inhalation, BID RT **AND** tiotropium (SPIRIVA RESPIMAT) 2.5 MCG/ACT inhaler 2 puff, 2 puff, Inhalation,  hour   Intake 800 ml   Output 700 ml   Net 100 ml         Physical Exam: (2-7 system for EPF/Detailed, >=8 for Comprehensive)  BP (!) 142/65   Pulse 50   Temp 97.9 °F (36.6 °C) (Oral)   Resp 20   Ht 1.676 m (5' 6\")   Wt 124.7 kg (275 lb)   SpO2 93%   BMI 44.39 kg/m²   Constitutional: vitals as above: alert, appears stated age and cooperative    Psychiatric: normal insight and judgment, oriented to person, place, time, and general circumstances    Head: Normocephalic, without obvious abnormality, atraumatic    Eyes:lids and lashes normal, conjunctivae and sclerae normal and pupils equal, round, reactive to light and accomodation    EMNT: external ears normal, nares midline    Neck: no carotid bruit, supple, symmetrical, trachea midline and thyroid not enlarged, symmetric, no tenderness/mass/nodules     Respiratory: wheezes bilaterally with normal respiratory effort    Cardiovascular: normal rate, regular rhythm, normal S1 and S2 and no murmurs    Gastrointestinal: soft, non-tender, non-distended, normal bowel sounds, no masses or organomegaly    Extremities: no clubbing, no edema    Skin:No rashes or nodules noted.    Neurologic:negative         Labs:  Lab Results   Component Value Date    WBC 14.1 (H) 11/29/2024    HGB 13.0 11/29/2024    HCT 40.0 11/29/2024     11/29/2024    CHOL 185 06/19/2024    TRIG 387 (H) 06/19/2024    HDL 29 (L) 06/19/2024    LDLDIRECT 95 06/19/2024    ALT 17 11/23/2024    AST 33 11/23/2024     11/29/2024    K 4.1 11/29/2024     11/29/2024    CREATININE 1.2 11/29/2024    BUN 25 (H) 11/29/2024    CO2 28 11/29/2024    TSH 1.32 08/02/2022    INR 1.00 10/06/2024    LABA1C 9.0 11/24/2024     Lab Results   Component Value Date    CKTOTAL 115 02/01/2023    CKMB 0.29 08/09/2011    TROPONINI <0.01 02/03/2023       Recent Imaging Results are Reviewed:  XR CHEST PORTABLE    Result Date: 11/23/2024  EXAMINATION: ONE XRAY VIEW OF THE CHEST 11/23/2024 11:54 am COMPARISON:  to follow as outpt    Case discussed with: GI  Tests ordered/reviewed: cbc, bmp, cxr, procal    Disp - hopefully home 11/30      (Please note that portions of this note were completed with a voice recognition program.  Efforts were made to edit the dictations but occasionally words are mis-transcribed.)        Taj Almendarez MD  11/29/2024    Please use [employer] to contact me with any questions during the day.   The hospitalist service will provide cross-coverage for this patient from 7pm to 7am.    During those hours, contact the on-call hospitalist MD/YOUSIF for questions.

## 2024-11-29 NOTE — PROGRESS NOTES
11/29/24 1229   RT Protocol   History Pulmonary Disease 2   Respiratory pattern 2   Breath sounds 2   Cough 0   Bronchodilator Assessment Score 6

## 2024-11-29 NOTE — RT PROTOCOL NOTE
RT Inhaler-Nebulizer Bronchodilator Protocol Note    There is a bronchodilator order in the chart from a provider indicating to follow the RT Bronchodilator Protocol and there is an “Initiate RT Inhaler-Nebulizer Bronchodilator Protocol” order as well (see protocol at bottom of note).    CXR Findings:  No results found.    The findings from the last RT Protocol Assessment were as follows:   History Pulmonary Disease: Chronic pulmonary disease  Respiratory Pattern: Dyspnea on exertion or RR 21-25 bpm  Breath Sounds: Slightly diminished and/or crackles  Cough: Strong, spontaneous, non-productive  Indication for Bronchodilator Therapy:    Bronchodilator Assessment Score: 6    Aerosolized bronchodilator medication orders have been revised according to the RT Inhaler-Nebulizer Bronchodilator Protocol below.    Respiratory Therapist to perform RT Therapy Protocol Assessment initially then follow the protocol.  Repeat RT Therapy Protocol Assessment PRN for score 0-3 or on second treatment, BID, and PRN for scores above 3.    No Indications - adjust the frequency to every 6 hours PRN wheezing or bronchospasm, if no treatments needed after 48 hours then discontinue using Per Protocol order mode.     If indication present, adjust the RT bronchodilator orders based on the Bronchodilator Assessment Score as indicated below.  Use Inhaler orders unless patient has one or more of the following: on home nebulizer, not able to hold breath for 10 seconds, is not alert and oriented, cannot activate and use MDI correctly, or respiratory rate 25 breaths per minute or more, then use the equivalent nebulizer order(s) with same Frequency and PRN reasons based on the score.  If a patient is on this medication at home then do not decrease Frequency below that used at home.    0-3 - enter or revise RT bronchodilator order(s) to equivalent RT Bronchodilator order with Frequency of every 4 hours PRN for wheezing or increased work of breathing  using Per Protocol order mode.        4-6 - enter or revise RT Bronchodilator order(s) to two equivalent RT bronchodilator orders with one order with BID Frequency and one order with Frequency of every 4 hours PRN wheezing or increased work of breathing using Per Protocol order mode.        7-10 - enter or revise RT Bronchodilator order(s) to two equivalent RT bronchodilator orders with one order with TID Frequency and one order with Frequency of every 4 hours PRN wheezing or increased work of breathing using Per Protocol order mode.       11-13 - enter or revise RT Bronchodilator order(s) to one equivalent RT bronchodilator order with QID Frequency and an Albuterol order with Frequency of every 4 hours PRN wheezing or increased work of breathing using Per Protocol order mode.      Greater than 13 - enter or revise RT Bronchodilator order(s) to one equivalent RT bronchodilator order with every 4 hours Frequency and an Albuterol order with Frequency of every 2 hours PRN wheezing or increased work of breathing using Per Protocol order mode.     RT to enter RT Home Evaluation for COPD & MDI Assessment order using Per Protocol order mode.    Electronically signed by BRIJESH LANDEROS RCP on 11/29/2024 at 12:29 PM

## 2024-11-30 VITALS
HEIGHT: 66 IN | OXYGEN SATURATION: 94 % | DIASTOLIC BLOOD PRESSURE: 64 MMHG | TEMPERATURE: 97.6 F | SYSTOLIC BLOOD PRESSURE: 126 MMHG | WEIGHT: 275 LBS | BODY MASS INDEX: 44.2 KG/M2 | HEART RATE: 64 BPM | RESPIRATION RATE: 20 BRPM

## 2024-11-30 LAB
ANION GAP SERPL CALCULATED.3IONS-SCNC: 10 MMOL/L (ref 3–16)
BASOPHILS # BLD: 0 K/UL (ref 0–0.2)
BASOPHILS NFR BLD: 0 %
BUN SERPL-MCNC: 28 MG/DL (ref 7–20)
CALCIUM SERPL-MCNC: 9.8 MG/DL (ref 8.3–10.6)
CHLORIDE SERPL-SCNC: 101 MMOL/L (ref 99–110)
CO2 SERPL-SCNC: 23 MMOL/L (ref 21–32)
CREAT SERPL-MCNC: 0.8 MG/DL (ref 0.6–1.2)
DEPRECATED RDW RBC AUTO: 15.1 % (ref 12.4–15.4)
EOSINOPHIL # BLD: 0.2 K/UL (ref 0–0.6)
EOSINOPHIL NFR BLD: 1 %
GFR SERPLBLD CREATININE-BSD FMLA CKD-EPI: 75 ML/MIN/{1.73_M2}
GLUCOSE BLD-MCNC: 173 MG/DL (ref 70–99)
GLUCOSE BLD-MCNC: 69 MG/DL (ref 70–99)
GLUCOSE SERPL-MCNC: 114 MG/DL (ref 70–99)
HCT VFR BLD AUTO: 41.2 % (ref 36–48)
HGB BLD-MCNC: 13.2 G/DL (ref 12–16)
LYMPHOCYTES # BLD: 1.9 K/UL (ref 1–5.1)
LYMPHOCYTES NFR BLD: 12 %
MCH RBC QN AUTO: 27.1 PG (ref 26–34)
MCHC RBC AUTO-ENTMCNC: 32 G/DL (ref 31–36)
MCV RBC AUTO: 84.7 FL (ref 80–100)
MONOCYTES # BLD: 1.3 K/UL (ref 0–1.3)
MONOCYTES NFR BLD: 8 %
NEUTROPHILS # BLD: 12.8 K/UL (ref 1.7–7.7)
NEUTROPHILS NFR BLD: 79 %
PERFORMED ON: ABNORMAL
PERFORMED ON: ABNORMAL
PLATELET # BLD AUTO: 228 K/UL (ref 135–450)
PLATELET BLD QL SMEAR: ADEQUATE
PMV BLD AUTO: 8.2 FL (ref 5–10.5)
POTASSIUM SERPL-SCNC: 4.9 MMOL/L (ref 3.5–5.1)
PROCALCITONIN SERPL IA-MCNC: 0.05 NG/ML (ref 0–0.15)
RBC # BLD AUTO: 4.87 M/UL (ref 4–5.2)
RBC MORPH BLD: NORMAL
SLIDE REVIEW: ABNORMAL
SODIUM SERPL-SCNC: 134 MMOL/L (ref 136–145)
WBC # BLD AUTO: 16.2 K/UL (ref 4–11)

## 2024-11-30 PROCEDURE — 94761 N-INVAS EAR/PLS OXIMETRY MLT: CPT

## 2024-11-30 PROCEDURE — 6370000000 HC RX 637 (ALT 250 FOR IP): Performed by: INTERNAL MEDICINE

## 2024-11-30 PROCEDURE — 84145 PROCALCITONIN (PCT): CPT

## 2024-11-30 PROCEDURE — 2700000000 HC OXYGEN THERAPY PER DAY

## 2024-11-30 PROCEDURE — 94640 AIRWAY INHALATION TREATMENT: CPT

## 2024-11-30 PROCEDURE — 85025 COMPLETE CBC W/AUTO DIFF WBC: CPT

## 2024-11-30 PROCEDURE — 6360000002 HC RX W HCPCS: Performed by: NURSE PRACTITIONER

## 2024-11-30 PROCEDURE — 2580000003 HC RX 258: Performed by: INTERNAL MEDICINE

## 2024-11-30 PROCEDURE — 6360000002 HC RX W HCPCS: Performed by: INTERNAL MEDICINE

## 2024-11-30 PROCEDURE — 80048 BASIC METABOLIC PNL TOTAL CA: CPT

## 2024-11-30 RX ORDER — CHOLESTYRAMINE LIGHT 4 G/5.7G
1 POWDER, FOR SUSPENSION ORAL 2 TIMES DAILY
Qty: 60 PACKET | Refills: 1 | Status: SHIPPED | OUTPATIENT
Start: 2024-11-30

## 2024-11-30 RX ORDER — HYDROCORTISONE 25 MG/G
CREAM TOPICAL
Qty: 28 G | Refills: 1 | Status: SHIPPED | OUTPATIENT
Start: 2024-11-30

## 2024-11-30 RX ORDER — PROCHLORPERAZINE EDISYLATE 5 MG/ML
10 INJECTION INTRAMUSCULAR; INTRAVENOUS EVERY 6 HOURS PRN
Status: DISCONTINUED | OUTPATIENT
Start: 2024-11-30 | End: 2024-11-30 | Stop reason: HOSPADM

## 2024-11-30 RX ORDER — PREDNISONE 10 MG/1
TABLET ORAL
Qty: 40 TABLET | Refills: 0 | Status: SHIPPED | OUTPATIENT
Start: 2024-11-30 | End: 2024-12-10

## 2024-11-30 RX ADMIN — Medication 2 PUFF: at 09:23

## 2024-11-30 RX ADMIN — GABAPENTIN 400 MG: 400 CAPSULE ORAL at 09:42

## 2024-11-30 RX ADMIN — GLIPIZIDE 10 MG: 5 TABLET ORAL at 09:42

## 2024-11-30 RX ADMIN — TIOTROPIUM BROMIDE INHALATION SPRAY 2 PUFF: 3.12 SPRAY, METERED RESPIRATORY (INHALATION) at 09:22

## 2024-11-30 RX ADMIN — Medication 10 ML: at 09:43

## 2024-11-30 RX ADMIN — WATER 40 MG: 1 INJECTION INTRAMUSCULAR; INTRAVENOUS; SUBCUTANEOUS at 09:42

## 2024-11-30 RX ADMIN — FUROSEMIDE 40 MG: 40 TABLET ORAL at 09:42

## 2024-11-30 RX ADMIN — LISINOPRIL 2.5 MG: 5 TABLET ORAL at 09:43

## 2024-11-30 RX ADMIN — DICLOFENAC SODIUM 2 G: 10 GEL TOPICAL at 09:44

## 2024-11-30 RX ADMIN — IPRATROPIUM BROMIDE AND ALBUTEROL SULFATE 1 DOSE: .5; 3 SOLUTION RESPIRATORY (INHALATION) at 09:22

## 2024-11-30 RX ADMIN — GUAIFENESIN 600 MG: 600 TABLET, EXTENDED RELEASE ORAL at 09:42

## 2024-11-30 RX ADMIN — PROCHLORPERAZINE EDISYLATE 10 MG: 5 INJECTION INTRAMUSCULAR; INTRAVENOUS at 00:25

## 2024-11-30 NOTE — PLAN OF CARE
Problem: Chronic Conditions and Co-morbidities  Goal: Patient's chronic conditions and co-morbidity symptoms are monitored and maintained or improved  11/29/2024 2337 by Anel Sanchez RN  Outcome: Progressing  11/29/2024 1611 by Anel Gomez RN  Outcome: Progressing     Problem: Discharge Planning  Goal: Discharge to home or other facility with appropriate resources  11/29/2024 2337 by Anel Sanchez RN  Outcome: Progressing  11/29/2024 1611 by Anel Gomez RN  Outcome: Progressing     Problem: Pain  Goal: Verbalizes/displays adequate comfort level or baseline comfort level  11/29/2024 2337 by Anel Sanchez RN  Outcome: Progressing  11/29/2024 1611 by Anel Gomez RN  Outcome: Progressing     Problem: Skin/Tissue Integrity  Goal: Absence of new skin breakdown  Description: 1.  Monitor for areas of redness and/or skin breakdown  2.  Assess vascular access sites hourly  3.  Every 4-6 hours minimum:  Change oxygen saturation probe site  4.  Every 4-6 hours:  If on nasal continuous positive airway pressure, respiratory therapy assess nares and determine need for appliance change or resting period.  11/29/2024 2337 by Anel Sanchez RN  Outcome: Progressing  11/29/2024 1611 by Anel Gomez RN  Outcome: Progressing     Problem: Safety - Adult  Goal: Free from fall injury  11/29/2024 2337 by Anel Sanchez RN  Outcome: Progressing  11/29/2024 1611 by Anel Gomez RN  Outcome: Progressing     Problem: Respiratory - Adult  Goal: Achieves optimal ventilation and oxygenation  11/29/2024 2337 by Anel Sanchez RN  Outcome: Progressing  11/29/2024 1611 by Anel Gomez RN  Outcome: Progressing     Problem: Cardiovascular - Adult  Goal: Maintains optimal cardiac output and hemodynamic stability  11/29/2024 2337 by Anel Sanchez RN  Outcome: Progressing  11/29/2024 1611 by Anel Gomez RN  Outcome: Progressing  Goal: Absence of cardiac dysrhythmias or at baseline  11/29/2024

## 2024-11-30 NOTE — PLAN OF CARE
Problem: Skin/Tissue Integrity  Goal: Absence of new skin breakdown  Description: 1.  Monitor for areas of redness and/or skin breakdown  2.  Assess vascular access sites hourly  3.  Every 4-6 hours minimum:  Change oxygen saturation probe site  4.  Every 4-6 hours:  If on nasal continuous positive airway pressure, respiratory therapy assess nares and determine need for appliance change or resting period.  11/29/2024 2337 by Anel Sanchez RN  Outcome: Progressing     Problem: Safety - Adult  Goal: Free from fall injury  11/29/2024 2337 by Anel Sanchez RN  Outcome: Progressing     Problem: Respiratory - Adult  Goal: Achieves optimal ventilation and oxygenation  11/30/2024 1139 by Brenda Gaitan RN  Outcome: Progressing  11/29/2024 2337 by Anel Sanchez RN  Outcome: Progressing     Problem: Cardiovascular - Adult  Goal: Maintains optimal cardiac output and hemodynamic stability  11/30/2024 1139 by Brenda Gaitan RN  Outcome: Progressing  11/29/2024 2337 by Anel Sanchez RN  Outcome: Progressing  Goal: Absence of cardiac dysrhythmias or at baseline  11/30/2024 1139 by Brenda Gaitan RN  Outcome: Progressing  11/29/2024 2337 by Anel Sanchez RN  Outcome: Progressing     Problem: Skin/Tissue Integrity - Adult  Goal: Skin integrity remains intact  11/30/2024 1139 by Brenda Gaitan RN  Outcome: Progressing  11/29/2024 2337 by Anel Sanchez RN  Outcome: Progressing     Problem: Musculoskeletal - Adult  Goal: Return mobility to safest level of function  11/30/2024 1139 by Brneda Gaitan RN  Outcome: Progressing  11/29/2024 2337 by Anel Sanchez RN  Outcome: Progressing  Goal: Maintain proper alignment of affected body part  11/30/2024 1139 by Brenda Gaitan RN  Outcome: Progressing  11/29/2024 2337 by Anel Sanchez RN  Outcome: Progressing     Problem: Gastrointestinal - Adult  Goal: Minimal or absence of nausea and vomiting  11/30/2024 1139 by Brenda Gaitan  RN  Outcome: Progressing  11/29/2024 2337 by Anel Sanchez RN  Outcome: Progressing  Goal: Maintains or returns to baseline bowel function  11/30/2024 1139 by Brenda Gaitan RN  Outcome: Progressing  11/29/2024 2337 by Anel Sanchez RN  Outcome: Progressing     Problem: Genitourinary - Adult  Goal: Absence of urinary retention  11/30/2024 1139 by Brenda Gaitan RN  Outcome: Progressing  11/29/2024 2337 by Anel Sanchez RN  Outcome: Progressing     Problem: Infection - Adult  Goal: Absence of infection at discharge  11/30/2024 1139 by Brenda Gaitan RN  Outcome: Progressing  11/29/2024 2337 by Anel Sanchez RN  Outcome: Progressing  Goal: Absence of infection during hospitalization  11/30/2024 1139 by Brenda Gaitan RN  Outcome: Progressing  11/29/2024 2337 by Anel Sanchez RN  Outcome: Progressing     Problem: Metabolic/Fluid and Electrolytes - Adult  Goal: Electrolytes maintained within normal limits  11/30/2024 1139 by Brenda Gaitan RN  Outcome: Progressing  11/29/2024 2337 by Anel Sanchez RN  Outcome: Progressing  Goal: Hemodynamic stability and optimal renal function maintained  11/30/2024 1139 by Brenda Gaitan RN  Outcome: Progressing  11/29/2024 2337 by Anel Sanchez RN  Outcome: Progressing     Problem: Anxiety  Goal: Will report anxiety at manageable levels  Description: INTERVENTIONS:  1. Administer medication as ordered  2. Teach and rehearse alternative coping skills  3. Provide emotional support with 1:1 interaction with staff  Outcome: Progressing

## 2024-11-30 NOTE — PROGRESS NOTES
Shift assessment completed. Neuro WNL. Denies any pain at this time. AM meds administered per MAR. The care plan and education has been reviewed and mutually agreed upon with the patient. Standard safety measures in place.    1:23 PM  IV removed prior dc. The care plan and education has been resolved and completed. Discharge instructions and medications reviewed with patient. Patient voices understanding and denies further concerns at this time. Patient discharged home per order.

## 2024-11-30 NOTE — DISCHARGE SUMMARY
[facility] -- Physician Discharge Summary     Milana Pardo  1945  MRN: [id number]    Admit Date: 11/23/2024  Discharge Date: No discharge date for patient encounter.    Attending MD: Taj Almendarez MD  Discharging MD: Taj Almendarez MD  PCP: Arian Brandon MD [facility] [place] [address] [phone]    Admission Diagnosis: Pneumonia due to organism [J18.9]  Chronic obstructive pulmonary disease with acute exacerbation (HCC) [J44.1]  Pneumonia due to infectious organism, unspecified laterality, unspecified part of lung [J18.9]  DISCHARGE DIAGNOSIS: same    Full Hospital Problem List:  Active Hospital Problems    Diagnosis Date Noted    HTN (hypertension) [I10]      Priority: High    Hematochezia [K92.1] 11/24/2024    Pneumonia [J18.9] 11/23/2024    Pneumonia due to organism [J18.9] 11/23/2024    DM type 2, controlled, with complication (HCC) [E11.8]     COPD (chronic obstructive pulmonary disease) (HCC) [J44.9] 08/09/2011    Acute on chronic respiratory failure with hypoxia [J96.21] 12/22/2010           Hospital Course:  79 y.o. female with past medical history of hypertension, hyperlipidemia, atrial fibrillation anticoagulated on Xarelto, diabetes, COPD on 2 L nasal cannula oxygen at night, CAD, anxiety, depression, obstructive sleep apnea and grade 2 diastolic dysfunction documented who presents ED with complaint of shortness of breath. Patient states has been short of breath for the past 3 days. She came by EMS for further evaluation and treatment.  Found to have copd exac.  Treated with steroids. Seen by pulmonary.  Her resp status improves and she seems to be back to baseline.    Lung imaging does show New 6 mm left lower lobe lung nodule noted which could be postinflammatory-will need follow-up with repeat CT scan in 3 months.     Also had some BRBPR while here, GI consulted  Scope done, shows hemorrhoids  Started on questran and anusol  H/H stable entire stay.  To  capsule; Commonly known as: PRILOSEC;   Take 1 capsule by mouth daily   ondansetron 4 MG disintegrating tablet; Commonly known as: ZOFRAN-ODT;   Take 1 tablet by mouth every 8 hours as needed for Nausea or Vomiting   OXYGEN   rosuvastatin 20 MG tablet; Commonly known as: CRESTOR; Take 1 tablet by   mouth nightly   sodium chloride 0.65 % nasal spray; Commonly known as: OCEAN, BABY AYR;   1 spray by Nasal route as needed for Congestion   Trelegy Ellipta 200-62.5-25 MCG/ACT Aepb inhaler; Generic drug:   fluticasone-umeclidin-vilant; Inhale 1 puff into the lungs daily   triamcinolone 0.1 % cream; Commonly known as: KENALOG; Apply topically 2   times daily.  * This list has 2 medication(s) that are the same as other medications   prescribed for you. Read the directions carefully, and ask your doctor or   other care provider to review them with you.     STOP taking these medications     hydrALAZINE 25 MG tablet; Commonly known as: APRESOLINE         Allergies:  Allergies   Allergen Reactions    Morphine Shortness Of Breath    Acetaminophen      emesis    Codeine Other (See Comments)     Chest pain    Hydromorphone Other (See Comments)     hallucinations  Hallucinations    But will take if needed in an emergency    Levaquin [Levofloxacin In D5w] Itching    Vicodin [Hydrocodone-Acetaminophen] Hives    Aspirin      Upsets hiatal hernia       Follow up Instructions:  Follow-up with PCP: Arian Brandon MD in 2wk .      Total time spent on day of discharge including face-to-face visit, examination, documentation, counseling, preparation of discharge plans and followup, and discharge medicine reconciliation and presciptions is 33 minutes      ---       Subjective from day of d/c:   Milana Pardo is a 79 y.o. female.    Pt seen and examined  Chart reviewed since last visit, labs and imaging below       Doing ok  Wants to go home    Review of Systems: (1 system for EPF, 2-9 for detailed, 10+ for comprehensive)  Constitutional:

## 2024-11-30 NOTE — CARE COORDINATION
Case Management -  Discharge Note      Patient Name: Milana Pardo                   YOB: 1945  Room: [unfilled]            Readmission Risk (Low < 19, Mod (19-27), High > 27): Readmission Risk Score: 20.1    Current PCP: Arian Brandon MD      (IMM) Important Message from Medicare:    Has pt received appropriate compliance notices before being discharged if required: yes - will deliver to patient this afternoon  Compliance doc:  [x] 2nd IMM; [] Code 44 [] Moon  Date: 11/30/24    PT AM-PAC: 17 /24  OT AM-PAC: 10 /24    Patient/patient representative has been educated on the benefits of HHC as well as the possible risks of declining recommended services. Patient/patient representative has acknowledged the information provided and decided on the following discharge plan. Patient/ patient representative has been provided freedom of choice regarding service provider, supported by basic dialogue that supports the patient's individualized plan of care/goals.    (Add Smart Phrase Here/Delete)      HHC agency notified of discharge:  [x] Yes [] No  [] NA    Family notified of discharge:  [x] Yes  [] No  [] NA    Facility notified of discharge:  [] Yes  [] No  [x] NA     Financial    Payor: [facility] / Plan: [facility] DUAL / Product Type: *No Product type* /     Pharmacy:  Potential assistance Purchasing Medications: No  Meds-to-Beds request:        [facility] [address] [phone] - F [phone]  [address]  [place] [id number]  Phone: [phone] Fax: [phone]    [facility] [id number] - [facility] [address] [phone] - F [phone]  [address]  [place] [address]  Phone: [phone] Fax: [phone]      Notes:    Additional Case Management Notes:  CRISTIAN informed that patient was ready for discharge home w/[facility].  CRISTIAN faxed orders and d/c paperwork to them at [phone].  Called [facility] intake and informed of  discharge as well.  All needs met at this time.    Electronically signed by PIOTR Yin, NAPOLEON on 11/30/2024 at 11:32 AM

## 2024-12-03 NOTE — PROGRESS NOTES
Physician Progress Note      PATIENT:               SERA OLIVARES  CSN #:                  [id number]  :                       1945  ADMIT DATE:       2024 11:22 AM  DISCH DATE:        2024 1:27 PM  RESPONDING  PROVIDER #:        Taj Almendarez MD          QUERY TEXT:    Pt admitted with COPD exacerbation .  Noted documentation of PNA in notes .   Please document in progress notes and discharge summary:  The medical record reflects the following:  Risk Factors: COPD exacerbation, ex-smoker, hx of acute MI, HTN, Afib,  Clinical Indicators:  H&P----\" Patient presents with Pneumonia. Cxr done in er, reviewed by   self, shows L upper infiltrate, suspect pneumonia.\"  - IM pn-- \"  Pneumonia -Established problem. Stable. -Plan:  cont empiric   abx for now, but will d/w Pulm about stopping.  Trend wbc, procal.\"  -Pulm pn--\" CT chest without contrast obtained yesterday was reviewed,   atelectatic changes only with no evidence of pneumonia.  New 6 mm left lower   lobe lung nodule noted which could be postinflammatory-will need follow-up   with repeat CT scan in 3 months.  Antibiotics will be discontinued.\"  Treatment: empiric roceph/azith, cxray, CT, labs, cultures, pulm/gi consults,   essential home meds, supportive care    Thank you,  Kit Friend RN,BSB  Options provided:  -- PNA is not clinically significant  -- PNA is clinically significant, treated and resolved  -- PNA ruled out  -- Other - I will add my own diagnosis  -- Disagree - Not applicable / Not valid  -- Disagree - Clinically unable to determine / Unknown  -- Refer to Clinical Documentation Reviewer    PROVIDER RESPONSE TEXT:    PNA is not clinically significant.    Query created by: Kit Friend on 2024 2:19 PM      Electronically signed by:  Taj Almendarez MD 12/3/2024 9:04 AM

## 2024-12-05 NOTE — PROGRESS NOTES
Physician Progress Note      PATIENT:               ESRA OLIVARES  CSN #:                  [id number]  :                       1945  ADMIT DATE:       2024 11:22 AM  DISCH DATE:        2024 1:27 PM  RESPONDING  PROVIDER #:        Taj Almendarez MD          QUERY TEXT:    Pt admitted with SOB, rectal bleed. . Pt noted to have per colonoscopy dated   --diverticulosis, small external hemorrhoids. If possible, please   document in progress notes and discharge summary the relationship, if any,   between the gi bleed and the above.    The medical record reflects the following:  Risk Factors: Afib on Xarleto, DM, HTN, obesity  Clinical Indicators:  GI consult-- --\" Patient has a history of IBS-D.  She also has   hemorrhoids.  She would have 4-5 soft to liquid bowel movements a day,   associated with crampy abdominal pain.  Once a month, she would bleed from her   hemorrhoids lasting 1 to 2 days.  She would always treat this with   over-the-counter hemorrhoid creams. Rectal bleeding, likely from hemorrhoidal   irritation from diarrhea.  Hemoglobin stable at 14.  --Brief op note-- \"Colon:  diverticulosis, small external hemorrhoids.\"  Treatment: Colonoscopy, labs, fluids, essential home meds, supportive care    Thank you,  Kit Friend RN,BSB  Options provided:  -- Rectal bleed  due to hemorrhoids  -- Rectal bleed due to diverticulosis  -- Rectal bleed due to aspects of both of the above  -- Other - I will add my own diagnosis  -- Disagree - Not applicable / Not valid  -- Disagree - Clinically unable to determine / Unknown  -- Refer to Clinical Documentation Reviewer    PROVIDER RESPONSE TEXT:    This patient has Rectal bleed due to hemorrhoids    Query created by: Kit Friend on 2024 9:59 AM      Electronically signed by:  Taj Almendarez MD 2024 9:27 AM

## 2025-01-16 RX ORDER — ISOSORBIDE MONONITRATE 30 MG/1
30 TABLET, EXTENDED RELEASE ORAL DAILY
Qty: 90 TABLET | Refills: 3 | Status: SHIPPED | OUTPATIENT
Start: 2025-01-16 | End: 2026-01-11

## 2025-02-06 ENCOUNTER — CLINICAL DOCUMENTATION (OUTPATIENT)
Dept: CASE MANAGEMENT | Age: 80
End: 2025-02-06

## 2025-02-06 NOTE — PROGRESS NOTES
Patient due for 3 month f/u CT ordered by Dr. Brandon.  Mailed patient reminder letter to schedule with number to schedule, [phone].

## 2025-03-24 ENCOUNTER — CLINICAL DOCUMENTATION (OUTPATIENT)
Dept: CASE MANAGEMENT | Age: 80
End: 2025-03-24

## 2025-03-24 NOTE — PROGRESS NOTES
Patient due for 3 month f/u CT ordered by Dr. Brandon.  Mailed patient reminder letter to schedule with number to schedule, [phone].

## 2025-05-21 ENCOUNTER — TELEPHONE (OUTPATIENT)
Dept: CARDIOLOGY CLINIC | Age: 80
End: 2025-05-21

## 2025-05-21 NOTE — TELEPHONE ENCOUNTER
ARIANA:Phoned to schedule [facility] f/u appointment, spoke with Daughter Brenda ([email or web address]). She states the patient has been in [facility] for the last 12 days with COPD. She will call try to call and schedule her appointment when she is released and feeling better.

## 2025-08-15 RX ORDER — LISINOPRIL 2.5 MG/1
2.5 TABLET ORAL DAILY
Qty: 90 TABLET | Refills: 1 | Status: SHIPPED | OUTPATIENT
Start: 2025-08-15 | End: 2026-02-11

## (undated) DEVICE — SKIN MARKER FINE TIP: Brand: DEVON

## (undated) DEVICE — PENCIL ES L3M ROCK SWCH S STL HEX LOK BLDE ELECTRD HOLSTER

## (undated) DEVICE — SHEET,DRAPE,53X77,STERILE: Brand: MEDLINE

## (undated) DEVICE — SYRINGE MED 10ML POLYPR LUERSLIP TIP FLAT TOP W/O SFTY DISP

## (undated) DEVICE — GLOVE ORANGE PI 7 1/2   MSG9075

## (undated) DEVICE — SYRINGE MED 10ML LUERLOCK TIP W/O SFTY DISP

## (undated) DEVICE — BW-412T DISP COMBO CLEANING BRUSH: Brand: SINGLE USE COMBINATION CLEANING BRUSH

## (undated) DEVICE — GLOVE ORANGE PI 8 1/2   MSG9085

## (undated) DEVICE — SOLUTION IRRIG 500ML STRL H2O NONPYROGENIC

## (undated) DEVICE — GAUZE,SPONGE,4"X4",16PLY,XRAY,STRL,LF: Brand: MEDLINE

## (undated) DEVICE — 3M™ STERI-STRIP™ BLEND TONE SKIN CLOSURES, B1557, TAN, 1/2 IN X 4 IN (12MM X 100MM), 6 STRIPS/ENVELOPE: Brand: 3M™ STERI-STRIP™

## (undated) DEVICE — SUTURE PDS II SZ 3-0 L27IN ABSRB VLT L26MM SH 1/2 CIR Z316H

## (undated) DEVICE — GOWN AURORA NONREINF LG: Brand: MEDLINE INDUSTRIES, INC.

## (undated) DEVICE — ENDOSCOPIC KIT 6X3/16 FT COLON W/ 1.1 OZ 2 GWN W/O BRSH

## (undated) DEVICE — SUTURE PROL SZ 5-0 L18IN NONABSORBABLE BLU L13MM P-3 3/8 8698G

## (undated) DEVICE — CONMED CHANNEL MASTER PULMONARY AND PEDIATRIC CLEANING BRUSH, 160 CM X 2.0 MM: Brand: CONMED

## (undated) DEVICE — SYRINGE IRRIG 60ML SFT PLIABLE BLB EZ TO GRP 1 HND USE W/

## (undated) DEVICE — 60 ML SYRINGE,REGULAR TIP: Brand: MONOJECT

## (undated) DEVICE — Device

## (undated) DEVICE — TOWEL,OR,DSP,ST,BLUE,STD,4/PK,20PK/CS: Brand: MEDLINE

## (undated) DEVICE — PROCEDURE KIT ENDOSCP CUST

## (undated) DEVICE — SYRINGE MED 50ML LUERLOCK TIP

## (undated) DEVICE — SINGLE USE AIR/WATER, SUCTION AND BIOPSY VALVES SET: Brand: ORCAPOD™

## (undated) DEVICE — PACK,BASIC: Brand: MEDLINE

## (undated) DEVICE — DERMATOME BLADES: Brand: DERMATOME

## (undated) DEVICE — SUTURE NONABSORBABLE MONOFILAMENT 4-0 PS-2 18 IN BLU PROLENE 8682H

## (undated) DEVICE — SPECIMEN TRAP: Brand: ARGYLE

## (undated) DEVICE — COVER LT HNDL BLU PLAS

## (undated) DEVICE — PAD,NON-ADHERENT,3X8,STERILE,LF,1/PK: Brand: MEDLINE

## (undated) DEVICE — PACK,UNIVERSAL,NO GOWNS: Brand: MEDLINE

## (undated) DEVICE — SOLUTION IV 1000ML 0.9% SOD CHL

## (undated) DEVICE — TURNOVER KIT RM INF CTRL TECH

## (undated) DEVICE — FORCEPS BX L240CM WRK CHN 2.8MM STD CAP W/ NDL MIC MESH

## (undated) DEVICE — SUTURE VCRL SZ 4-0 L18IN ABSRB UD L13MM P-3 3/8 CIR PRIM J494H

## (undated) DEVICE — MEDI-VAC NON-CONDUCTIVE SUCTION TUBING: Brand: CARDINAL HEALTH

## (undated) DEVICE — AIR/WATER CLEANING ADAPTER FOR OLYMPUS® GI ENDOSCOPE: Brand: BULLDOG®

## (undated) DEVICE — TOWEL,OR,DSP,ST,WHITE,DLX,4/PK,20PK/CS: Brand: MEDLINE

## (undated) DEVICE — DRESSING VAC WND SIMPLACE MED X3

## (undated) DEVICE — AIRWAY PHGEAL SZ 9 9CM PNK AD ORAL FBROPT INTUB PLAS DISP

## (undated) DEVICE — ADAPTER TBNG DIA15MM SWVL FBROPT BRONCHSCP TERM 2 AXIS PEEP

## (undated) DEVICE — GARMENT,MEDLINE,DVT,INT,CALF,MED, GEN2: Brand: MEDLINE

## (undated) DEVICE — SINGLE USE SUCTION VALVE MAJ-209: Brand: SINGLE USE SUCTION VALVE (STERILE)

## (undated) DEVICE — SINGLE USE BIOPSY VALVE MAJ-210: Brand: SINGLE USE BIOPSY VALVE (STERILE)

## (undated) DEVICE — ELECTRODE ELECSURG NDL 2.8 INX7.2 CM COAT INSUL EDGE

## (undated) DEVICE — GOWN,SIRUS,FABRNF,XL,20/CS: Brand: MEDLINE

## (undated) DEVICE — MARKER SKIN GENTIAN VLT 2 TIP W RUL PREP RESIST INK SKIN

## (undated) DEVICE — SURE SET-DOUBLE BASIN-LF: Brand: MEDLINE INDUSTRIES, INC.

## (undated) DEVICE — TRAY PREP DRY W/ PREM GLV 2 APPL 6 SPNG 2 UNDPD 1 OVERWRAP

## (undated) DEVICE — ELECTRODE PT RET AD L9FT HI MOIST COND ADH HYDRGEL CORDED

## (undated) DEVICE — 3M™ TEGADERM™ TRANSPARENT FILM DRESSING FRAME STYLE, 1628, 6 IN X 8 IN (15 CM X 20 CM), 10/CT 8CT/CASE: Brand: 3M™ TEGADERM™

## (undated) DEVICE — HOLDER SCALP PLAS G STD

## (undated) DEVICE — GOWN,SIRUS,POLYRNF,SETINSLV,L,20/CS: Brand: MEDLINE

## (undated) DEVICE — INTENDED FOR TISSUE SEPARATION, AND OTHER PROCEDURES THAT REQUIRE A SHARP SURGICAL BLADE TO PUNCTURE OR CUT.: Brand: BARD-PARKER ® CARBON RIB-BACK BLADES

## (undated) DEVICE — SURGICAL SET UP - SURE SET: Brand: MEDLINE INDUSTRIES, INC.

## (undated) DEVICE — STANDARD HYPODERMIC NEEDLE,POLYPROPYLENE HUB: Brand: MONOJECT

## (undated) DEVICE — COVER,MAYO STAND,XL,STERILE: Brand: MEDLINE